# Patient Record
Sex: MALE | Race: WHITE | NOT HISPANIC OR LATINO | Employment: FULL TIME | URBAN - METROPOLITAN AREA
[De-identification: names, ages, dates, MRNs, and addresses within clinical notes are randomized per-mention and may not be internally consistent; named-entity substitution may affect disease eponyms.]

---

## 2017-01-11 ENCOUNTER — OFFICE VISIT (OUTPATIENT)
Dept: FAMILY MEDICINE | Facility: CLINIC | Age: 52
End: 2017-01-11
Payer: COMMERCIAL

## 2017-01-11 VITALS
OXYGEN SATURATION: 98 % | BODY MASS INDEX: 28.59 KG/M2 | WEIGHT: 215.75 LBS | HEART RATE: 82 BPM | RESPIRATION RATE: 12 BRPM | TEMPERATURE: 96.3 F | HEIGHT: 73 IN | DIASTOLIC BLOOD PRESSURE: 66 MMHG | SYSTOLIC BLOOD PRESSURE: 126 MMHG

## 2017-01-11 DIAGNOSIS — M54.2 CHRONIC NECK AND BACK PAIN: ICD-10-CM

## 2017-01-11 DIAGNOSIS — G89.29 CHRONIC NECK AND BACK PAIN: ICD-10-CM

## 2017-01-11 DIAGNOSIS — G89.4 CHRONIC PAIN SYNDROME: Primary | ICD-10-CM

## 2017-01-11 DIAGNOSIS — M54.9 CHRONIC NECK AND BACK PAIN: ICD-10-CM

## 2017-01-11 PROCEDURE — 99213 OFFICE O/P EST LOW 20 MIN: CPT | Performed by: FAMILY MEDICINE

## 2017-01-11 ASSESSMENT — PAIN SCALES - GENERAL: PAINLEVEL: MODERATE PAIN (5)

## 2017-01-11 NOTE — PROGRESS NOTES
SUBJECTIVE:  Indra Mehta is here today to review his back and neck issues.  He has had chronic back and neck pain for some time.  He went into a quite an extended explanation of his back and neck pain, how it affects him at work.  Sometimes when he is at his desk and finds out he has been leaning forward too long pain is just excruciating in his low back, sometimes in his neck.  Occasionally he has some radicular pain into both hips and occasionally down his left leg.  Also, neck pain mostly localized in the mid portion of his neck but sometimes in his left arm.  He states he occasionally gets some tingling in his right foot, especially when he taps it up and down.  He went on for about 10 minutes about his symptoms, which seem to be quite diffuse and reading old notes change from visit to visit.  He has been evaluated by spine care physicians, chronic pain physicians, primary care physicians but no one was able to find out definitive disease process or definitive pathology which would be responsible for his multiple pain problems.  He was on chronic narcotics which was not appropriate for treating these pain problems.  He is currently on Neurontin and Ultram.  He does take 1-3 Ultram tablets per day.  He would like to get back to working out, but states he is just in too much pain to do that.  The only time he was able to work out in the past was when he was in the [employer] program [place] in [place].  He states that this allowed him to be fairly pain-free and get into a fairly aggressive strengthening program at the gym, and that made him feel the best he has felt in a long time.      OBJECTIVE:  Vital signs are stable.  No exam.      ASSESSMENT:   1.  Chronic pain syndrome.      PLAN:  The patient will get a referral to the [employer] program.  Hopefully, we can get his pain down to a level that is tolerable and he can get back into aggressive gym regimen.  I did review his scans with him and pointed out once again that  none of the scans reveal any significant pathology that would be causing his symptomatology.  Referral will be placed.  Further evaluation and treatment as per the [facility] people at [facility].         CONRAD BAIRD MD             D: 2017 08:41   T: 2017 10:32   MT:       Name:     MAGY ESQUEDA   MRN:      [id number]        Account:      [id number]   :      1965           Visit Date:   2017      Document: [id number]

## 2017-01-11 NOTE — NURSING NOTE
"Chief Complaint   Patient presents with     Back Pain     follow up       Initial /66 mmHg  Pulse 82  Temp(Src) 96.3  F (35.7  C) (Tympanic)  Resp 12  Ht 6' 1.3\" (1.862 m)  Wt 215 lb 12 oz (97.864 kg)  BMI 28.23 kg/m2  SpO2 98% Estimated body mass index is 28.23 kg/(m^2) as calculated from the following:    Height as of this encounter: 6' 1.3\" (1.862 m).    Weight as of this encounter: 215 lb 12 oz (97.864 kg).  BP completed using cuff size: regular  There are no preventive care reminders to display for this patient.  Karma Conway, [facility]      "

## 2017-01-11 NOTE — MR AVS SNAPSHOT
After Visit Summary   1/11/2017    Indra Mehta    MRN: [id number]           Patient Information     Date Of Birth          1965        Visit Information        Provider Department      1/11/2017 8:10 AM Jamie Gipson MD [facility]        Today's Diagnoses     Chronic pain syndrome    -  1     Chronic neck and back pain            Follow-ups after your visit        Additional Services     PHYSICAL THERAPY REFERRAL       Physical Therapy - [facility]    Please be aware that coverage of these services is subject to the terms and limitations of your health insurance plan.  Call member services at your health plan with any benefit or coverage questions.      **Note to Provider:  If you are referring outside of [place] for the therapy appointment, please list the name of the location in the  special instructions  above, print the referral and give to the patient to schedule the appointment.                  Who to contact     If you have questions or need follow up information about today's clinic visit or your schedule please contact [facility] directly at [phone].  Normal or non-critical lab and imaging results will be communicated to you by iLinchart, letter or phone within 4 business days after the clinic has received the results. If you do not hear from us within 7 days, please contact the clinic through iLinchart or phone. If you have a critical or abnormal lab result, we will notify you by phone as soon as possible.  Submit refill requests through [employer] or call your pharmacy and they will forward the refill request to us. Please allow 3 business days for your refill to be completed.          Additional Information About Your Visit        MyChart Information     [employer] gives you secure access to your electronic health record. If you see a primary care provider, you can also send messages to your care team and make appointments. If you have  "questions, please call your primary care clinic.  If you do not have a primary care provider, please call [phone] and they will assist you.        Care EveryWhere ID     This is your Care EveryWhere ID. This could be used by other organizations to access your [place] medical records  [id number]        Your Vitals Were     Pulse Temperature Respirations Height BMI (Body Mass Index) Pulse Oximetry    82 96.3  F (35.7  C) (Tympanic) 12 6' 1.3\" (1.862 m) 28.23 kg/m2 98%       Blood Pressure from Last 3 Encounters:   01/11/17 126/66   12/28/16 122/83   12/14/16 150/82    Weight from Last 3 Encounters:   01/11/17 215 lb 12 oz (97.864 kg)   12/14/16 220 lb 12.8 oz (100.154 kg)   11/25/16 222 lb (100.699 kg)              We Performed the Following     PHYSICAL THERAPY REFERRAL        Primary Care Provider Office Phone # Fax #    Tha Grullon [phone]       [facility] [address] [facility] [address]        Thank you!     Thank you for choosing [facility]  for your care. Our goal is always to provide you with excellent care. Hearing back from our patients is one way we can continue to improve our services. Please take a few minutes to complete the written survey that you may receive in the mail after your visit with us. Thank you!             Your Updated Medication List - Protect others around you: Learn how to safely use, store and throw away your medicines at www.disposemymeds.org.          This list is accurate as of: 1/11/17 11:14 AM.  Always use your most recent med list.                   Brand Name Dispense Instructions for use    cyclobenzaprine 10 MG tablet    FLEXERIL     10 mg       gabapentin 300 MG capsule    NEURONTIN    300 capsule    Take 3 capsules (900 mg) by mouth 3 times daily If needed/tolerated, OK to increase bedtime dose to 1200mg       HYDROcodone-acetaminophen 5-325 MG per tablet    NORCO     5-325 tablets       IBUPROFEN PO      Take 600 mg " by mouth every 4 hours as needed for moderate pain       losartan 50 MG tablet    COZAAR    90 tablet    Take 1 tablet (50 mg) by mouth daily       methocarbamol 750 MG tablet    ROBAXIN    180 tablet    Take 1 tablet (750 mg) by mouth 3 times daily as needed for muscle spasms       traMADol 50 MG tablet    ULTRAM    120 tablet    Take 1 tablet (50 mg) by mouth every 6 hours as needed for moderate pain Max of 4 tabs/day. Okay to fill on/after 12/30/2016.  To start on 1/1/2017.       TYLENOL PO      Take 500 mg by mouth every 4 hours as needed for mild pain or fever

## 2017-01-13 ENCOUNTER — TELEPHONE (OUTPATIENT)
Dept: FAMILY MEDICINE | Facility: CLINIC | Age: 52
End: 2017-01-13

## 2017-01-13 NOTE — TELEPHONE ENCOUNTER
I had a voice mail from [facility] and they received your order for physical therapy.  However, it is missing a signature or a notation stating that it was electronically signed by Dr. Gipson.  Please refax with the notation or physical signature.  Thanks.    Fax #[phone]

## 2017-01-23 DIAGNOSIS — M54.5 CHRONIC BILATERAL LOW BACK PAIN, WITH SCIATICA PRESENCE UNSPECIFIED: Primary | ICD-10-CM

## 2017-01-23 DIAGNOSIS — G89.29 CHRONIC BILATERAL LOW BACK PAIN, WITH SCIATICA PRESENCE UNSPECIFIED: Primary | ICD-10-CM

## 2017-01-24 RX ORDER — TRAMADOL HYDROCHLORIDE 50 MG/1
50 TABLET ORAL EVERY 6 HOURS PRN
Qty: 120 TABLET | Refills: 0 | Status: SHIPPED | OUTPATIENT
Start: 2017-01-24 | End: 2017-02-23

## 2017-01-24 NOTE — TELEPHONE ENCOUNTER
Signed Prescriptions:                        Disp   Refills    traMADol (ULTRAM) 50 MG tablet             120 ta*0        Sig: Take 1 tablet (50 mg) by mouth every 6 hours as           needed for moderate pain Max of 4 tabs/day. Okay           to fill on/after 1/30/2017.  To start on           1/31/2017. No further refills till follow up appt           made.  Authorizing Provider: BONI NAYLOR    Reviewed, printed, and signed in [facility].    Boni Freeman MD  [facility]

## 2017-01-24 NOTE — TELEPHONE ENCOUNTER
Received call from patient requesting refill(s) of traMADol (ULTRAM) 50 MG tablet    Last picked up from pharmacy on 12/30/16    Pt last seen by prescribing provider on 11/25/16  Next appt scheduled for : none    Last urine drug screen date 10/13/16  Current opioid agreement on file (completed within the last year) Yes Date of opioid agreement: 10/13/16    Processing (pick one and delete the others):      Mail to [facility] pharmacy   [address] [place] [id number]    Will route to nursing [place] for review and preparation of prescription(s).

## 2017-01-24 NOTE — TELEPHONE ENCOUNTER
Received call from patient requesting refill(s) of Tramadol     Last picked up from pharmacy on 12/30/2016  Due date 1/31/2017    Pt last seen on 11/25/2016  Next appt scheduled for no appt- due in Feb.     Last urine drug screen 10/13/2016  Current opioid agreement on file Yes Date: 10/13/2016    Processing (pick one):      Fax to [facility].     Will facilitate refill.    Katarina Licona RN, [facility]  Pain Clinic Care Coordinator

## 2017-01-30 ENCOUNTER — TRANSFERRED RECORDS (OUTPATIENT)
Dept: HEALTH INFORMATION MANAGEMENT | Facility: CLINIC | Age: 52
End: 2017-01-30

## 2017-02-01 DIAGNOSIS — M54.2 CERVICALGIA: Primary | ICD-10-CM

## 2017-02-01 RX ORDER — GABAPENTIN 300 MG/1
900 CAPSULE ORAL 3 TIMES DAILY
Qty: 300 CAPSULE | Refills: 3 | Status: SHIPPED | OUTPATIENT
Start: 2017-02-01 | End: 2017-06-12

## 2017-02-01 NOTE — TELEPHONE ENCOUNTER
gabapentin (NEURONTIN) 300 MG capsule 300 capsule 3 2/1/2017  No      Sig: Take 3 capsules (900 mg) by mouth 3 times daily If needed/tolerated, OK to increase bedtime dose to 1200mg     Class: E-Prescribe     Route: Oral     Order: [id number]     E-Prescribing Status: Receipt confirmed by pharmacy (2/1/2017  1:32 PM CST)

## 2017-02-01 NOTE — TELEPHONE ENCOUNTER
Received fax request from [facility]  pharmacy requesting refill(s) for Gabapentin    Last refilled on 1/20/17    Pt last seen on 11/25/16  Next appt not scheduled     Will facilitate refill.

## 2017-02-01 NOTE — TELEPHONE ENCOUNTER
Signed Prescriptions:                        Disp   Refills    gabapentin (NEURONTIN) 300 MG capsule      300 ca*3        Sig: Take 3 capsules (900 mg) by mouth 3 times daily If           needed/tolerated, OK to increase bedtime dose to           1200mg  Authorizing Provider: BONI NAYLOR    Reviewed and signed at [facility]    Boni Freeman MD  [facility]

## 2017-02-13 ENCOUNTER — MYC MEDICAL ADVICE (OUTPATIENT)
Dept: FAMILY MEDICINE | Facility: CLINIC | Age: 52
End: 2017-02-13

## 2017-02-13 DIAGNOSIS — G89.4 CHRONIC PAIN SYNDROME: Primary | ICD-10-CM

## 2017-02-23 DIAGNOSIS — G89.29 CHRONIC BILATERAL LOW BACK PAIN, WITH SCIATICA PRESENCE UNSPECIFIED: ICD-10-CM

## 2017-02-23 DIAGNOSIS — M54.5 CHRONIC BILATERAL LOW BACK PAIN, WITH SCIATICA PRESENCE UNSPECIFIED: ICD-10-CM

## 2017-02-23 RX ORDER — TRAMADOL HYDROCHLORIDE 50 MG/1
50 TABLET ORAL EVERY 6 HOURS PRN
Qty: 120 TABLET | Refills: 0 | Status: SHIPPED | OUTPATIENT
Start: 2017-02-23 | End: 2017-03-23

## 2017-02-23 NOTE — TELEPHONE ENCOUNTER
Nurse Line Message 2/23/17 Routing to RN and MA pool for processing    Patient is requesting a refill of Tramadol and Gabapentin to be faxed to the [facility] in [place].    ANGELINA PerryN, RN  Care Coordinator  [facility] [place]

## 2017-02-23 NOTE — TELEPHONE ENCOUNTER
Signed Prescriptions:                        Disp   Refills    traMADol (ULTRAM) 50 MG tablet             120 ta*0        Sig: Take 1 tablet (50 mg) by mouth every 6 hours as           needed for moderate pain Max of 4 tabs/day. Okay           to fill on/after 2/28/2017.  To start on           3/2/2017.  Authorizing Provider: BONI NAYLOR    Reviewed, printed, and signed in [facility].    Boni Freeman MD  [facility]

## 2017-02-23 NOTE — TELEPHONE ENCOUNTER
Received call from patient requesting refill(s) of traMADol (ULTRAM) 50 MG tablet             gabapentin (NEURONTIN) 300 MG capsule - Spoke with pharmacy. Rx waiting- filled on 02/20/17. Voicemail left for patient    Last picked up from pharmacy on Tramadol- 01/30/17       Pt last seen by prescribing provider on 11/25/16  Next appt scheduled for : none    Last urine drug screen date 10/13/16  Current opioid agreement on file (completed within the last year) Yes Date of opioid agreement: 10/13/16    Processing (pick one and delete the others):      Fax to [facility] pharmacy       Will route to nursing pool for review and preparation of prescription(s).

## 2017-02-23 NOTE — TELEPHONE ENCOUNTER
Signed Rx for Tramadol faxed to pharmacy. Padmini confirmed. Spoke with patient and let him know that the Rx is due to fill on/after 02/28/17.  Also let hime know that my previous voicemail stated that his gabapentin had been filled and is waiting at the pharmacy for him.

## 2017-03-22 DIAGNOSIS — G89.29 CHRONIC BILATERAL LOW BACK PAIN, WITH SCIATICA PRESENCE UNSPECIFIED: ICD-10-CM

## 2017-03-22 DIAGNOSIS — M54.5 CHRONIC BILATERAL LOW BACK PAIN, WITH SCIATICA PRESENCE UNSPECIFIED: ICD-10-CM

## 2017-03-22 NOTE — TELEPHONE ENCOUNTER
Pt LM at 1236:    Pt requesting a refill of tramadol. Would like the Rx faxed to [facility] in [place]. Call if questions at [phone]  -------------  Will route to MA pool for assistance with gathering opioid refill information.    ANGELINA StantonN, RN-BC  Patient Care Supervisor/Care Coordinator  [facility] [place]

## 2017-03-23 RX ORDER — TRAMADOL HYDROCHLORIDE 50 MG/1
50 TABLET ORAL EVERY 6 HOURS PRN
Qty: 120 TABLET | Refills: 0 | Status: SHIPPED | OUTPATIENT
Start: 2017-03-23 | End: 2017-04-24

## 2017-03-23 NOTE — TELEPHONE ENCOUNTER
Received call from patient requesting refill(s) of tramadol       Last picked up from pharmacy on 3/1/17  Last due:  Okay to fill on/after 2/28/2017.  To start on 3/2/2017  Due date 4/1/17    Pt last seen on 11/25/16  Next appt scheduled for 4/12/16    Last urine drug screen 10/2016  Current opioid agreement on file No Date:     Processing (pick one):      Mail to [facility] pharmacy     Heidy Gipson RN-BSN  [facility]

## 2017-03-23 NOTE — TELEPHONE ENCOUNTER
Signed Prescriptions:                        Disp   Refills    traMADol (ULTRAM) 50 MG tablet             120 ta*0        Sig: Take 1 tablet (50 mg) by mouth every 6 hours as           needed for moderate pain Max of 4 tabs/day. Okay           to fill on/after 3/30/2017.  To start on           4/1/2017.  Authorizing Provider: BONI NAYLOR    Reviewed, printed, and signed in [facility].    Boni Freeman MD  [facility]

## 2017-04-03 ENCOUNTER — MYC MEDICAL ADVICE (OUTPATIENT)
Dept: PALLIATIVE MEDICINE | Facility: CLINIC | Age: 52
End: 2017-04-03

## 2017-04-03 NOTE — TELEPHONE ENCOUNTER
My chart message from pt:    Is there any chance of rescheduling my appointment on the 12th? 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     Indra    Yes you can re-schedule. Just call the scheduling line at [phone] and they can do that for you.     Katarina Licona RN, [facility]  Pain Clinic Care Coordinator

## 2017-04-05 ENCOUNTER — MYC MEDICAL ADVICE (OUTPATIENT)
Dept: PALLIATIVE MEDICINE | Facility: CLINIC | Age: 52
End: 2017-04-05

## 2017-04-05 NOTE — TELEPHONE ENCOUNTER
Last visit with Dr. Angle Freeman was 11/2016.    See the 12/1/17 refill notes.  The plan was to continue the wean and this has not occurred.      Pt has had some med compliance issues in the past.      Med notes added to continue wean at next fill.    Pt sees Dr. Angle Freeman on 4/12/17 and this can be discussed at that time.    Dragon Insidehart sent to pt:  Noble Burrell.  We will see you on 4/12/17.  You can discuss this further with Dr. Angle Freeman then.    Heidy Gipson, RN-BSN  [facility]

## 2017-04-05 NOTE — TELEPHONE ENCOUNTER
Per patient Will message:    Created: 4/5/2017 8:54 AM      I had sent a message about rescheduling my appointment on the 12th of April because of a work conflict and you had replied that it would be OK. I just called scheduling and the next appointment would be the 24th of May. This appointment was needed to continue refilling the tramadol. Will there be an issue refilling the tramadol if this appointment gets pushed out over a month?

## 2017-04-12 ENCOUNTER — OFFICE VISIT (OUTPATIENT)
Dept: PALLIATIVE MEDICINE | Facility: CLINIC | Age: 52
End: 2017-04-12
Payer: COMMERCIAL

## 2017-04-12 ENCOUNTER — TELEPHONE (OUTPATIENT)
Dept: PALLIATIVE MEDICINE | Facility: CLINIC | Age: 52
End: 2017-04-12

## 2017-04-12 VITALS
DIASTOLIC BLOOD PRESSURE: 79 MMHG | HEART RATE: 75 BPM | WEIGHT: 217 LBS | SYSTOLIC BLOOD PRESSURE: 126 MMHG | BODY MASS INDEX: 28.4 KG/M2

## 2017-04-12 DIAGNOSIS — M54.5 CHRONIC BILATERAL LOW BACK PAIN, WITH SCIATICA PRESENCE UNSPECIFIED: ICD-10-CM

## 2017-04-12 DIAGNOSIS — G89.29 CHRONIC BILATERAL LOW BACK PAIN, WITH SCIATICA PRESENCE UNSPECIFIED: ICD-10-CM

## 2017-04-12 DIAGNOSIS — M62.838 MUSCLE SPASM: Primary | ICD-10-CM

## 2017-04-12 DIAGNOSIS — M79.18 MYOFASCIAL PAIN: ICD-10-CM

## 2017-04-12 DIAGNOSIS — M54.12 CERVICAL RADICULOPATHY: ICD-10-CM

## 2017-04-12 DIAGNOSIS — G89.4 CHRONIC PAIN SYNDROME: ICD-10-CM

## 2017-04-12 DIAGNOSIS — M50.10 HERNIATION OF CERVICAL INTERVERTEBRAL DISC WITH RADICULOPATHY: ICD-10-CM

## 2017-04-12 PROCEDURE — 99215 OFFICE O/P EST HI 40 MIN: CPT | Performed by: ANESTHESIOLOGY

## 2017-04-12 RX ORDER — CYCLOBENZAPRINE HCL 10 MG
10 TABLET ORAL 2 TIMES DAILY PRN
Qty: 60 TABLET | Refills: 1 | Status: ON HOLD | OUTPATIENT
Start: 2017-04-12 | End: 2017-07-05

## 2017-04-12 RX ORDER — METHOCARBAMOL 750 MG/1
750 TABLET, FILM COATED ORAL 3 TIMES DAILY PRN
Qty: 180 TABLET | Refills: 1 | Status: ON HOLD | OUTPATIENT
Start: 2017-04-12 | End: 2017-07-05

## 2017-04-12 ASSESSMENT — PAIN SCALES - GENERAL: PAINLEVEL: SEVERE PAIN (6)

## 2017-04-12 NOTE — TELEPHONE ENCOUNTER
Pre-screening Questions for Radiology Injections:    Injection to be done at which interventional clinic site? [facility]    Procedure ordered by Angle Freeman    Procedure ordered? Cervical Epidural Steroid Injection    What insurance would patient like us to bill for this procedure?[employer]      Worker's comp-Any injection DO NOT SCHEDULE and route to Peyton Toscano.      [employer] insurance - For SI joint injections, DO NOT SCHEDULE and route ePyton Toscano.      [employer]- MBB's must be scheduled at LEAST two weeks apart      [employer] - Any injection besides hip/shoulder/knee joint DO NOT SCHEDULE and route to Peyton Toscano. She will obtain PA and call pt back to schedule procedure or notify pt of denial.     Any chance of pregnancy? Not Applicable   If YES, do NOT schedule and route to RN pool    Is an  needed? No     Patient has a drive home? (mandatory) YES:     Is patient taking any blood thinners (plavix, coumadin, jantoven, warfarin, heparin, pradaxa or dabigatran )? No   If hold needed, do NOT schedule, route to RN pool     Is patient taking any aspirin products? No     If more than 325mg/day do NOT schedule; route to RN pool     For CERVICAL procedures, hold all aspirin products for 6 days.      Does the patient have a bleeding or clotting disorder? No     If yes, okay to schedule AND route to RN nurse pool    **For any patients with platelet count <100, must be forwarded to provider**    Is patient diabetic?  No  If YES, have them bring their glucometer.    Does patient have an active infection or treated for one within the past week? No     Is patient currently taking any antibiotics?  No     For patients on chronic, preventative, or prophylactic antibiotics, procedures may be scheduled.     For patients on antibiotics for active or recent infection:    Rhett Freeman, Doar, Dom Kraus-antibiotic course must have been completed for 4 days    Drs.  Gustabo-antibiotic course must have been completed for 7 days    Is patient currently taking any steroid medications? (i.e. Prednisone, Medrol)  No     For patients on steroid medications:    Dora Bauer [place], [place]-steroid course must have been completed for 4 days    Rhett Montejo-steroid course must have been completed for 7 days    Reviewed with patient:  If you are started on any steroids or antibiotics between now and your appointment, you must contact us because it may affect our ability to perform your procedure.  Yes    Is patient actively being treated for cancer or immunocompromised, including the spleen having been removed? No    If YES, do NOT schedule and route to RN pool     **For Dr. Mandujano patients without spleens should have the chart sent to her**    Are you able to get on and off an exam table with minimal or no assistance? Yes  If NO, do NOT schedule and route to RN pool    Are you able to roll over and lay on your stomach with minimal or no assistance? Yes  If NO, do NOT schedule and route to RN pool     Any allergies to contrast dye, iodine, shellfish, or numbing and steroid medications? No  If YES, route to RN pool AND add allergy information to appointment notes    Allergies: No known drug allergies        Has the patient had a flu shot or any other vaccinations within 7 days before or after the procedure.  No       Does patient have an MRI/CT?  YES:   (SI joint, hip injections, lumbar sympathetic blocks, and stellate ganglion blocks do not require an MRI)    Was the MRI done w/in the last 3 years?  Yes    Was MRI done at [facility]? Yes      If not, where was it done? N/A       If MRI was not done at [facility], [facility] or [facility] do NOT schedule and route to nursing.  If pt has an imaging disc, the injection may be scheduled but pt has to bring disc to appt. If they show up w/out disc the injection cannot be done    Reminders (please tell patient if  applicable):       Instructed pt to arrive 30 minutes early for IV start if this is for a cervical procedure, ALL sympathetic (stellate ganglion, hypogastric, or lumbar sympathetic block) and all sedation procedures (RFA, spinal cord stimulation trials).  YES:     -IVs are not routinely placed for John and Egyhazi cervical case       If NPO for sedation, informed patient that it is okay to take medications with sips of water (except if they are to hold blood thinners).  Not Applicable   *DO take blood pressure medication if it is prescribed*      If this is for a cervical SRIDEVI, informed patient that aspirin needs to be held for 6 days.   YES:       For all patients not having spinal cord stimulator (SCS) trials or radiofrequency ablations (RFAs), informed patient:  IV sedation is not provided for this procedure.  If you feel that an oral anti-anxiety medication is needed, you can discuss this further with your referring provider or primary care provider.  The Pain Clinic provider will discuss specifics of what the procedure includes at your appointment.  Most procedures last 10-20 minutes.  We use numbing medications to help with any discomfort during the procedure.  Not Applicable      Do not schedule procedures requiring IV placement in the first appointment of the day or first appointment after lunch.       For patients 85 or older we recommend having an adult stay w/ them for the remainder of the day.       Does the patient have any questions?  NO  Lucille Sweet  [facility]

## 2017-04-12 NOTE — NURSING NOTE
"Chief Complaint   Patient presents with     Pain       Initial There were no vitals taken for this visit. Estimated body mass index is 28.23 kg/(m^2) as calculated from the following:    Height as of 1/11/17: 1.862 m (6' 1.3\").    Weight as of 1/11/17: 97.9 kg (215 lb 12 oz).  Medication Reconciliation: armando Chong CMA (AAMA)      "

## 2017-04-12 NOTE — PATIENT INSTRUCTIONS
Assessment:   1.  Chronic neck pain  2.  Cervical radiculopathy  3.  Cervical foraminal stenosis C6-7 left  4.  Cervical spondylosis  5.  Chronic lower back pain  6.  Chronic hip pain  7.  Peripheral neuropathy  8.  Cervicogenic headache      Plan:  1. Physical Therapy:  Continue home exercise and physical therapy as prescribed - cervical traction may be beneficial  2. Clinical Health Psychologist to address issues of relaxation, behavioral change, coping style, and other factors important to improvement.  Deferred - coping well at this time  3. Diagnostic Studies:    1. Consider updated cervical spine MRI since symptoms have worsened  4. Medication Management:    1. Continue Robaxin 750 mg TID prn spasm  2. Tramadol 50 mg QID prn pain  3. Continue Gabapentin 900 mg per day  4. Continue flexeril 10 mg prn spasm  5. Further procedures recommended:   1. Recommend cervical transforaminal epidural steroid injection left C6-7, possibly C5-6  6. Recommendations to PCP: defer pain complaints to pain management - may benefit from neurosurgical evaluation for cervical radiculopathy - C6-7 disc herniation with radiculopathy  7. Follow up: for procedure and in 8-10 weeks    ----------------------------------------------------------------  Nurse Triage line:  [phone]   Call this number with any questions or concerns. You may leave a detailed message anytime. Calls are typically returned Monday through Friday between 8 AM and 4:30 PM. We usually get back to you within 2 business days depending on the issue/request.       Medication refills:    For non-narcotic medications, call your pharmacy directly to request a refill. The pharmacy will contact the [facility] for authorization. Please allow 3-4 days for these refills to be processed.     For narcotic refills, call the nurse triage line or send a [employer] message. Please contact us 7-10 days before your refill is due. The message MUST include the name of the  specific medication(s) requested and how you would like to receive the prescription(s). The options are as follows:    Pain Clinic staff can mail the prescription to your pharmacy. Please tell us the name of the pharmacy.    You may pick the prescription up at the Pain Clinic (tell us the location) or during a clinic visit with your pain provider    Pain Clinic staff can deliver the prescription to the [facility] pharmacy in the clinic building. Please tell us the location.      Scheduling number: [phone].  Call this number to schedule or change appointments.    We believe regular attendance is key to your success in our program.    Any time you are unable to keep your appointment we ask that you call us at least 24 hours in advance to let us know. This will allow us to offer the appointment time to another patient.

## 2017-04-12 NOTE — MR AVS SNAPSHOT
After Visit Summary   4/12/2017    Indra Mehta    MRN: [id number]           Patient Information     Date Of Birth          1965        Visit Information        Provider Department      4/12/2017 10:00 AM Boni Coppola MD [facility]        Today's Diagnoses     Muscle spasm    -  1    Cervical radiculopathy        Myofascial pain        Herniation of cervical intervertebral disc with radiculopathy          Care Instructions    Assessment:   1.  Chronic neck pain  2.  Cervical radiculopathy  3.  Cervical foraminal stenosis C6-7 left  4.  Cervical spondylosis  5.  Chronic lower back pain  6.  Chronic hip pain  7.  Peripheral neuropathy  8.  Cervicogenic headache      Plan:  1. Physical Therapy:  Continue home exercise and physical therapy as prescribed - cervical traction may be beneficial  2. Clinical Health Psychologist to address issues of relaxation, behavioral change, coping style, and other factors important to improvement.  Deferred - coping well at this time  3. Diagnostic Studies:    1. Consider updated cervical spine MRI since symptoms have worsened  4. Medication Management:    1. Continue Robaxin 750 mg TID prn spasm  2. Tramadol 50 mg QID prn pain  3. Continue Gabapentin 900 mg per day  4. Continue flexeril 10 mg prn spasm  5. Further procedures recommended:   1. Recommend cervical transforaminal epidural steroid injection left C6-7, possibly C5-6  6. Recommendations to PCP: defer pain complaints to pain management - may benefit from neurosurgical evaluation for cervical radiculopathy - C6-7 disc herniation with radiculopathy  7. Follow up: for procedure and in 8-10 weeks    ----------------------------------------------------------------  Nurse Triage line:  [phone]   Call this number with any questions or concerns. You may leave a detailed message anytime. Calls are typically returned Monday through Friday between 8 AM and 4:30 PM. We usually get back to  you within 2 business days depending on the issue/request.       Medication refills:    For non-narcotic medications, call your pharmacy directly to request a refill. The pharmacy will contact the [facility] for authorization. Please allow 3-4 days for these refills to be processed.     For narcotic refills, call the nurse triage line or send a [email or web address] message. Please contact us 7-10 days before your refill is due. The message MUST include the name of the specific medication(s) requested and how you would like to receive the prescription(s). The options are as follows:    Pain Clinic staff can mail the prescription to your pharmacy. Please tell us the name of the pharmacy.    You may pick the prescription up at the Pain Clinic (tell us the location) or during a clinic visit with your pain provider    Pain Clinic staff can deliver the prescription to the [facility] pharmacy in the clinic building. Please tell us the location.      Scheduling number: [phone].  Call this number to schedule or change appointments.    We believe regular attendance is key to your success in our program.    Any time you are unable to keep your appointment we ask that you call us at least 24 hours in advance to let us know. This will allow us to offer the appointment time to another patient.             Follow-ups after your visit        Additional Services     PAIN INJECTION EVAL/TREAT/FOLLOW UP                 Who to contact     If you have questions or need follow up information about today's clinic visit or your schedule please contact [facility] directly at [phone].  Normal or non-critical lab and imaging results will be communicated to you by MyChart, letter or phone within 4 business days after the clinic has received the results. If you do not hear from us within 7 days, please contact the clinic through MyChart or phone. If you have a critical or abnormal lab result, we will notify you by phone as  soon as possible.  Submit refill requests through [employer] or call your pharmacy and they will forward the refill request to us. Please allow 3 business days for your refill to be completed.          Additional Information About Your Visit        Event [employer] Information     [employer] gives you secure access to your electronic health record. If you see a primary care provider, you can also send messages to your care team and make appointments. If you have questions, please call your primary care clinic.  If you do not have a primary care provider, please call [phone] and they will assist you.        Care EveryWhere ID     This is your Care EveryWhere ID. This could be used by other organizations to access your [facility] medical records  [id number]        Your Vitals Were     Pulse BMI (Body Mass Index)                75 28.4 kg/m2           Blood Pressure from Last 3 Encounters:   04/12/17 126/79   01/11/17 126/66   12/28/16 122/83    Weight from Last 3 Encounters:   04/12/17 98.4 kg (217 lb)   01/11/17 97.9 kg (215 lb 12 oz)   12/14/16 100.2 kg (220 lb 12.8 oz)              We Performed the Following     PAIN INJECTION EVAL/TREAT/FOLLOW UP          Today's Medication Changes          These changes are accurate as of: 4/12/17 10:57 AM.  If you have any questions, ask your nurse or doctor.               These medicines have changed or have updated prescriptions.        Dose/Directions    cyclobenzaprine 10 MG tablet   Commonly known as:  FLEXERIL   This may have changed:    - how to take this  - when to take this  - reasons to take this   Used for:  Muscle spasm        Dose:  10 mg   Take 1 tablet (10 mg) by mouth 2 times daily as needed for muscle spasms Reported on 4/12/2017   Quantity:  60 tablet   Refills:  1            Where to get your medicines      These medications were sent to [id number] - [place] - [address]   [address] [place] [address]    Hours:  M-F 8:30-6:30; Sat 9-4; closed  Sunday Phone:  [phone]     cyclobenzaprine 10 MG tablet    methocarbamol 750 MG tablet                Primary Care Provider Office Phone # Fax #    Tha Grullon [phone]       [facility] [address] [facility] [address]        Thank you!     Thank you for choosing [facility]  for your care. Our goal is always to provide you with excellent care. Hearing back from our patients is one way we can continue to improve our services. Please take a few minutes to complete the written survey that you may receive in the mail after your visit with us. Thank you!             Your Updated Medication List - Protect others around you: Learn how to safely use, store and throw away your medicines at www.disposemymeds.org.          This list is accurate as of: 4/12/17 10:57 AM.  Always use your most recent med list.                   Brand Name Dispense Instructions for use    cyclobenzaprine 10 MG tablet    FLEXERIL    60 tablet    Take 1 tablet (10 mg) by mouth 2 times daily as needed for muscle spasms Reported on 4/12/2017       gabapentin 300 MG capsule    NEURONTIN    300 capsule    Take 3 capsules (900 mg) by mouth 3 times daily If needed/tolerated, OK to increase bedtime dose to 1200mg       HYDROcodone-acetaminophen 5-325 MG per tablet    NORCO     5-325 tablets Reported on 4/12/2017       IBUPROFEN PO      Take 600 mg by mouth every 4 hours as needed for moderate pain       losartan 50 MG tablet    COZAAR    90 tablet    Take 1 tablet (50 mg) by mouth daily       methocarbamol 750 MG tablet    ROBAXIN    180 tablet    Take 1 tablet (750 mg) by mouth 3 times daily as needed for muscle spasms       order for DME     1 Device    Equipment being ordered: TENS Pads as directed       traMADol 50 MG tablet    ULTRAM    120 tablet    Take 1 tablet (50 mg) by mouth every 6 hours as needed for moderate pain Max of 4 tabs/day. Okay to fill on/after 3/30/2017.  To start on 4/1/2017.        TYLENOL PO      Take 500 mg by mouth every 4 hours as needed for mild pain or fever

## 2017-04-12 NOTE — PROGRESS NOTES
[facility]    Date of visit: 4/12/2017    Chief complaint:   Chief Complaint   Patient presents with     Pain       Interval history:  Indra Mehta was last seen by me on 11/25/16.      Recommendations/plan at the last visit included:  Plan:  1. MRI Cervical Spine - at your convenience  2. Would likely benefit from cervical facet joint procedures - upper cervical spine - to help with neck pain and headache pain - will discuss further after MRI  3. May benefit from repeat trigger point injections when muscle spasm is bad - consider asking Chiropractor if he does dry needling or acupuncture  4. May benefit from lumbar epidural steroid injection if leg pain worsens  5. May benefit from lumbar facet procedures  6 Continue inversion table and stretching - add yoga or pillattes  7. Continue home exercise  8. Continue Tramadol - max 4 per day  9. Continue Gabapentin - may take up to 3600 mg per day in divided doses  10. Robaxin 750 mg TID as needed for spasm  11. Follow up in 2-3 months - sooner if needed  12. Will contact patient after MRI to discuss procedures  13. Physical therapy deferred - patient will continue home exercise  14. Pain Psychology - deferred - patient is managing well at this time    Since his last visit, Indra Mehta reports:  Since his last visit he had Dr Gipson order physical therapy with [facility] through [employer] in [facility] but he has not started yet.  He states he had benefit from it in the past.    He feels that his neck pain is getting worse over the last few months but states that he had been getting episodes of pain radiating into the left shoulder and occasional shooting pain down the left arm to the elbow.  He states that he can't sleep when his neck pain is bad.  He as been taking occasional Flexeril to help with sleep but it makes him kind of groggy in the morning.  He tries not to take it on work days due to the sedation.  He has continued with his  Tramadol with relief.  He  Complains of headaches when his neck pain is bad.    He denies numbness or tingling of the arms or hands and denies weakness of the arms.  Pain is worsened by turning his head side to side or looking up.  Pain is decreased with flexeril, looking down, tramadol.    He continues with lower back pain with pain into the hips bilaterally.  He has not has significant pain radiating down the legs.  He has good and bad days and the shooting pain comes and goes.  He denies numbness, tingling, or weakness in the legs.    Pain scores:  Pain intensity on average is 5 on a scale of 0-10. Ranges from 4/10 to 8/10    Current pain treatments:   Tramadol 4 per day  Gabapentin 300 mg - taking 9 capsules in divided doses with tramadol  Ibuprofen 200 - 600 mg TID  Tylenol     Past pain treatments:  Norco/vicodin  Oxycodone  Flexeril - was helpful at night  Robaxin - may have helped, didn't make him sleepy    Side Effects: sedation    Medications:  Current Outpatient Prescriptions   Medication Sig Dispense Refill     traMADol (ULTRAM) 50 MG tablet Take 1 tablet (50 mg) by mouth every 6 hours as needed for moderate pain Max of 4 tabs/day. Okay to fill on/after 3/30/2017.  To start on 4/1/2017. 120 tablet 0     gabapentin (NEURONTIN) 300 MG capsule Take 3 capsules (900 mg) by mouth 3 times daily If needed/tolerated, OK to increase bedtime dose to 1200mg 300 capsule 3     methocarbamol (ROBAXIN) 750 MG tablet Take 1 tablet (750 mg) by mouth 3 times daily as needed for muscle spasms 180 tablet 1     losartan (COZAAR) 50 MG tablet Take 1 tablet (50 mg) by mouth daily 90 tablet 3     Acetaminophen (TYLENOL PO) Take 500 mg by mouth every 4 hours as needed for mild pain or fever       IBUPROFEN PO Take 600 mg by mouth every 4 hours as needed for moderate pain       order for DME Equipment being ordered: TENS Pads as directed 1 Device 0     cyclobenzaprine (FLEXERIL) 10 MG tablet 10 mg Reported on 4/12/2017        HYDROcodone-acetaminophen (NORCO) 5-325 MG per tablet 5-325 tablets Reported on 4/12/2017         Medical History: any changes in medical history since they were last seen? No    Review of Systems:  The 14 system ROS was reviewed from the intake questionnaire, and is positive for: headache, high blood pressure, joint pain, stiffness, back pain, neck pain, weakness, numbness/tingling, anxiety, stress  Any bowel or bladder problems: denies changes  Mood: good    Physical Exam:  /79  Pulse 75  Wt 98.4 kg (217 lb)  BMI 28.4 kg/m2  Constitutional: alert and no distress.  Pt is well nourished, well developed  Head: Normocephalic. No masses, lesions, tenderness or abnormalities  ENT: EOMI, mucosal surfaces moist.  Neck with full ROM, posture fair  Cardiovascular: No edema or JVD appreciated.  Respiratory: Good diaphragmatic excursion. No wheezes appreciated.  Speaking in complete sentences without shortness of breath.  No accessory muscle use.   Musculoskeletal: extremities normal- no gross deformities noted, normal muscle tone and able to move about the exam room without difficulty.    Skin: no suspicious lesions or rashes appreciated on exposed areas  Neurologic: Gait grossly intact and non-antalgic. Moving all extremities spontaneously, no apparent weakness.  Upper extremity strength is 5/5 bilaterally with some give way weakness of testing left shoulder abduction due to pain  Psychiatric: mentation appears normal, affect full and good eye contact.      Cervical Spine:  Neck held stiffly with significant decrease in range of motion on left lateral rotation and extension due to pain.  Tender along the lower cervical articular pillars on the left and the paraspinal muscles, extension and lateral rotation to the left increases neck pain and causes pain into the left shoulder    Lumbar Spine:  Moves well, exam limited    MR CERVICAL SPINE WITHOUT CONTRAST 11/29/2016 4:48 PM     FINDINGS: Vertebral body bone marrow  signal is normal and the  alignment is normal through T2. Cervical and upper thoracic spinal  cord appear intrinsically normal. Craniocervical junction region is  normal. Paraspinous soft tissues appear normal.     Findings by level as follows:     C2-C3: Negative.     C3-C4: Minimal uncinate spur on the right with mild right-sided  foraminal stenosis. No central or left-sided stenosis.     C4-C5: Negative.     C5-C6: Moderate uncinate spur on the right with moderate right-sided  foraminal stenosis. No central or left-sided stenosis. Minimal annular  bulge.     C6-C7: Small broad-based disc osteophyte complex with minimal central  stenosis. Mild bilateral foraminal stenosis due to uncinate spurs.     C7-T1: Negative.         IMPRESSION: Degenerative changes as described. No acute-appearing  Abnormality.    MRI reviewed with patient today and I feel that the left C6-7 neuroforamen is moderately narrowed with neural impingement and is likely contributing to the patient's pain.      Assessment:   1.  Chronic neck pain  2.  Cervical radiculopathy  3.  Cervical foraminal stenosis C6-7 left  4.  Cervical spondylosis  5.  Chronic lower back pain  6.  Chronic hip pain  7.  Peripheral neuropathy  8.  Cervicogenic headache    Indra Mehta is a 51 year old male who is seen at the pain clinic for chronic neck and back pain.  At this time his neck is giving him the most trouble.  He has had worsening of his neck pain on the left and is having more pain down the left upper extremity as well.  We discussed his MRI and I feel he would benefit from a transforaminal epidural steroid injection at C6-7 left but I also feel it would be warranted to get a neurosurgical evaluation in the event that the epidural is not as effective as hoped.  Our treatment plan is as outlined below.    Plan:  1. Physical Therapy:  Continue home exercise and physical therapy as prescribed - cervical traction may be beneficial  2. Clinical Health  Psychologist to address issues of relaxation, behavioral change, coping style, and other factors important to improvement.  Deferred - coping well at this time  3. Diagnostic Studies:    1. Consider updated cervical spine MRI since symptoms have worsened  4. Medication Management:    1. Continue Robaxin 750 mg TID prn spasm  2. Tramadol 50 mg QID prn pain  3. Continue Gabapentin 900 mg per day  4. Continue flexeril 10 mg prn spasm  5. Further procedures recommended:   1. Recommend cervical transforaminal epidural steroid injection left C6-7, possibly C5-6 - mild sedation would be beneficial as patient has had bad experiences in the past  6. Recommendations to PCP: defer pain complaints to pain management - may benefit from neurosurgical evaluation for cervical radiculopathy - C6-7 disc herniation with radiculopathy  7. Follow up: for procedure and in 8-10 weeks    Total time spent was 45 minutes, and more than 50% of face to face time was spent in counseling and/or coordination of care regarding physical therapy and regular exercise, behavioral health, medication management, and interventional procedures to decrease pain and improve function.

## 2017-04-24 ENCOUNTER — MYC MEDICAL ADVICE (OUTPATIENT)
Dept: FAMILY MEDICINE | Facility: CLINIC | Age: 52
End: 2017-04-24

## 2017-04-24 DIAGNOSIS — G89.29 CHRONIC BILATERAL LOW BACK PAIN, WITH SCIATICA PRESENCE UNSPECIFIED: ICD-10-CM

## 2017-04-24 DIAGNOSIS — M54.5 CHRONIC BILATERAL LOW BACK PAIN, WITH SCIATICA PRESENCE UNSPECIFIED: ICD-10-CM

## 2017-04-24 DIAGNOSIS — G89.4 CHRONIC PAIN SYNDROME: ICD-10-CM

## 2017-04-24 NOTE — TELEPHONE ENCOUNTER
Medication refill information reviewed.     Last due:  Okay to fill on/after 3/30/2017.  To start on 4/1/2017    Due date:  5/1/17    Weaning instructions:  Per Dr. Angle Freeman in the 12/1/17 telephone encounter:  RX for 4 per day dosing approved and signed - patient had a recent fall with increased pain. Will begin wean at next fill date. Pt saw Dr. Angle Freeman on 4/12/17-no mention of weaning.       Heidy Gipson RN-BSN  [facility]

## 2017-04-24 NOTE — TELEPHONE ENCOUNTER
Patient still needs to be reached out to, to discuss neck pain.    Received call from patient requesting refill(s) of traMADol (ULTRAM) 50 MG tablet    Last picked up from pharmacy on 03/30/17    Pt last seen by prescribing provider on 04/12/17  Next appt scheduled for 06/14/17    Last urine drug screen date 10/13/16  Current opioid agreement on file (completed within the last year) Yes Date of opioid agreement: 10/13/16    Processing (pick one and delete the others):      Fax to [facility] pharmacy       Will route to nursing pool for review and preparation of prescription(s).

## 2017-04-24 NOTE — TELEPHONE ENCOUNTER
VM left today at 11:29 am    Reason for call: Medication   If this is a refill request, has the caller requested the refill from the pharmacy already? N/A  Will the patient be using a [facility]? No  Name of the pharmacy and phone number for the current request: [id number] [place] [facility] [address] [place] [address]    Name of the medication requested: Tramadol    Other request: Pt is having bad pain on his right side of neck. Cannot seem to relieve pain.    Phone Number Pt can be reached at: Home number on file [phone] (home)  Best Time: NA  Can we leave a detailed message on this number? YES     Mercy JULIEN    [facility]

## 2017-04-25 RX ORDER — TRAMADOL HYDROCHLORIDE 50 MG/1
50 TABLET ORAL EVERY 6 HOURS PRN
Qty: 120 TABLET | Refills: 0 | Status: SHIPPED | OUTPATIENT
Start: 2017-04-25 | End: 2017-05-24

## 2017-04-25 NOTE — TELEPHONE ENCOUNTER
Signed Prescriptions:                        Disp   Refills    traMADol (ULTRAM) 50 MG tablet             120 ta*0        Sig: Take 1 tablet (50 mg) by mouth every 6 hours as           needed for moderate pain Max of 4 tabs/day. Okay           to fill on/after 4/29/2017.  To start on           5/1/2017.  Authorizing Provider: BONI NAYLOR    Reviewed, printed, and signed in [facility].    Boni Freeman MD  [facility]

## 2017-04-26 NOTE — TELEPHONE ENCOUNTER
MARTIN. Script for Tramadol was signed and faxed to [facility]. RightFax confirmed.      Lillian De aL Cruz MA

## 2017-05-05 ENCOUNTER — RADIANT APPOINTMENT (OUTPATIENT)
Dept: RADIOLOGY | Facility: CLINIC | Age: 52
End: 2017-05-05
Attending: ANESTHESIOLOGY
Payer: COMMERCIAL

## 2017-05-05 ENCOUNTER — RADIOLOGY INJECTION OFFICE VISIT (OUTPATIENT)
Dept: PALLIATIVE MEDICINE | Facility: CLINIC | Age: 52
End: 2017-05-05
Payer: COMMERCIAL

## 2017-05-05 VITALS
SYSTOLIC BLOOD PRESSURE: 127 MMHG | HEART RATE: 79 BPM | RESPIRATION RATE: 16 BRPM | DIASTOLIC BLOOD PRESSURE: 88 MMHG | OXYGEN SATURATION: 98 %

## 2017-05-05 DIAGNOSIS — M54.12 CERVICAL RADICULOPATHY: Primary | ICD-10-CM

## 2017-05-05 DIAGNOSIS — M54.12 CERVICAL RADICULOPATHY: ICD-10-CM

## 2017-05-05 DIAGNOSIS — M99.71 CONNECTIVE TISSUE AND DISC STENOSIS OF INTERVERTEBRAL FORAMINA OF CERVICAL REGION: ICD-10-CM

## 2017-05-05 PROCEDURE — 64479 NJX AA&/STRD TFRM EPI C/T 1: CPT | Mod: LT | Performed by: ANESTHESIOLOGY

## 2017-05-05 ASSESSMENT — PAIN SCALES - GENERAL
PAINLEVEL: MODERATE PAIN (5)
PAINLEVEL: MILD PAIN (2)

## 2017-05-05 NOTE — PATIENT INSTRUCTIONS
[facility]   Procedure Discharge Instructions    Today you saw:  Dr. Boni Freeman    You had an:  Cervical Epidural steroid injection     Medications used:  Lidocaine 1% and 2%   Dexamethasone Omnipaque           Be cautious when walking. Numbness and/or weakness in the upper extremities may occur up to 6-8 hours after the procedure due to effect of the local anesthetic    Do not drive for 6 hours. The effect of the local anesthetic could slow your reflexes.     You may resume your regular activities after 24 hours    Avoid strenuous activity for the first 24 hours    You may shower, however avoid swimming, tub baths or hot tubs for 24 hours following your procedure    You may have a mild to moderate increase in pain for several days following the injection.    It may take up to 14 days for the steroid medication to start working although you may feel the effect as early as a few days after the procedure.       You may use ice packs for 10-15 minutes, 3 to 4 times a day at the injection site for comfort    Do not use heat to painful areas for 6 to 8 hours. This will give the local anesthetic time to wear off and prevent you from accidentally burning your skin.     You may use anti-inflammatory medications (such as Ibuprofen or Aleve or Advil) or Tylenol for pain control if necessary    If you were fasting, you may resume your normal diet and medications after the procedure    If you experience any of the following, call the pain center nursing line during work hours at [phone] or the after hours provider line at [phone]:  -Fever over 100 degree F  -Swelling, bleeding, redness, drainage, warmth at the injection site  -Progressive weakness or numbness in your arms  -Unusual headache that is not relieved by Tylenol  -Unusual new onset of pain that is not improving      Phone #s:  Appointment line: [phone];  Nurse line: [phone]

## 2017-05-05 NOTE — PROGRESS NOTES
Pre procedure Diagnosis: cervical radiculopathy, cervical stenosis, cervical degenerative disc disease   Post procedure Diagnosis: Same  Procedure performed: cervical transforaminal epidural steroid injection at C6-7 on the left, fluoroscopically guided, contrast controlled  Anesthesia: none  Complications: none  Operators: Boni Freeman MD      Indications:   Indra Mehta is a 51 year old year old male who is known to me that presents today for cervical epidural steroid injection. They have a history of chronic left sided neck pain with pain that radiates into the left interscapular area and posterior shoulder . Exam shows cervical tenderness along the lower articular pillars and paraspinal muscles and mildly decreased range of motion on left lateral rotation.  They have tried conservative treatment including physical therapy, activity modifications, medications, and previous interventional procedures.     MRI cervical spine was done on 11/29/16 which was read as:  Findings by level as follows:     C2-C3: Negative.     C3-C4: Minimal uncinate spur on the right with mild right-sided  foraminal stenosis. No central or left-sided stenosis.     C4-C5: Negative.     C5-C6: Moderate uncinate spur on the right with moderate right-sided  foraminal stenosis. No central or left-sided stenosis. Minimal annular  bulge.     C6-C7: Small broad-based disc osteophyte complex with minimal central  stenosis. Mild bilateral foraminal stenosis due to uncinate spurs.     C7-T1: Negative.         IMPRESSION: Degenerative changes as described. No acute-appearing  abnormality.     Options/alternatives, benefits and risks were discussed with the patient including bleeding, infection, tissue trauma, numbness, weakness, paralysis, spinal cord injury, radiation exposure, headache and reaction to medications. Questions were answered to his satisfaction and he agrees to proceed. Voluntary informed consent was obtained and signed.       Vitals were reviewed: Yes  /83  Pulse 64  Allergies were reviewed: Yes   Medications were reviewed: Yes   Pre-procedure pain score: 5/10     Procedure:  After getting informed consent, the patient was brought into the procedure suite and was placed in a supine position on the procedure table. A Pause for the Cause was performed. Patient was prepped and draped in sterile fashion.       The left C6-7 neuroforamen was identified with the C-arm rotated to a left lateral oblique angle. A total of 1 ml of Lidocaine 1% was used to anesthetize the skin and the needle track at a skin entry site coaxial with the fluoroscopy beam, and overriding the posterior aspect of the neuroforamen. A 27 gauge 3.5 inch spinal needle was advanced towards the posterior aspect of the neuroforamen, first making contact with the superior articular process of C7 with the image intensifier in an oblique view and then it was advanced approximately 1-2 mm further into the foramen with the image intensifier in a PA view. Aspiration was negative for blood or CSF.       Needle position was then verified by injecting 1.5 ml of Omnipaque-300 utilizing real time fluoroscopy which showed good needle placement and adequate spread along the C7 nerve root and medially into the neuroforamen and epidural space without signs of intravascular or intrathecal uptake. 8.5 ml of Omnipaque was wasted      Then, after repeated negative aspiration, 2 ml of a combination of 1ml of 2% Lidocaine and 10 mg of dexamethasone was injected. The needle was flushed with lidocaine and removed.      During the procedure, there was a paresthesia described as a pressure sensation in his usual pain distribution.   Hemostasis was achieved, the area was cleaned, and bandaids were placed when appropriate.  The patient tolerated the procedure well, and was taken to the recovery room.   Images were saved to PACS.     Post-procedure pain score: 2/10  Follow-up includes:   -f/u  phone call in one week  -f/u with referring provider and in the pain clinic as scheduled     Boni Freeman MD  [facility]

## 2017-05-05 NOTE — NURSING NOTE
MD Time IN: 0905  Sedation start time: 0913  MD Time OUT: 0930    Medications given: versed 1 mg IV  Sedation Level Achieved:  Minimal sedation    Katarina Licona RN, [facility]  Pain Clinic Care Coordinator

## 2017-05-05 NOTE — MR AVS SNAPSHOT
After Visit Summary   5/5/2017    Indra Mehta    MRN: [id number]           Patient Information     Date Of Birth          1965        Visit Information        Provider Department      5/5/2017 8:45 AM Boni Coppola MD [facility]        Care Instructions    [facility]   Procedure Discharge Instructions    Today you saw:  Dr. Boni Freeman    You had an:  Cervical Epidural steroid injection     Medications used:  Lidocaine 1% and 2%   Dexamethasone Omnipaque           Be cautious when walking. Numbness and/or weakness in the upper extremities may occur up to 6-8 hours after the procedure due to effect of the local anesthetic    Do not drive for 6 hours. The effect of the local anesthetic could slow your reflexes.     You may resume your regular activities after 24 hours    Avoid strenuous activity for the first 24 hours    You may shower, however avoid swimming, tub baths or hot tubs for 24 hours following your procedure    You may have a mild to moderate increase in pain for several days following the injection.    It may take up to 14 days for the steroid medication to start working although you may feel the effect as early as a few days after the procedure.       You may use ice packs for 10-15 minutes, 3 to 4 times a day at the injection site for comfort    Do not use heat to painful areas for 6 to 8 hours. This will give the local anesthetic time to wear off and prevent you from accidentally burning your skin.     You may use anti-inflammatory medications (such as Ibuprofen or Aleve or Advil) or Tylenol for pain control if necessary    If you were fasting, you may resume your normal diet and medications after the procedure    If you experience any of the following, call the pain center nursing line during work hours at [phone] or the after hours provider line at [phone]:  -Fever over 100 degree F  -Swelling, bleeding, redness,  drainage, warmth at the injection site  -Progressive weakness or numbness in your arms  -Unusual headache that is not relieved by Tylenol  -Unusual new onset of pain that is not improving      Phone #s:  Appointment line: [phone];  Nurse line: [phone]            Follow-ups after your visit        Your next 10 appointments already scheduled     Jun 14, 2017  9:30 AM CDT   Return Visit with Boni Freeman MD   [facility] [place] ([facility] [place])    [address]  [place] [address] [phone]              Who to contact     If you have questions or need follow up information about today's clinic visit or your schedule please contact [facility] [place] directly at [phone].  Normal or non-critical lab and imaging results will be communicated to you by MyChart, letter or phone within 4 business days after the clinic has received the results. If you do not hear from us within 7 days, please contact the clinic through MyChart or phone. If you have a critical or abnormal lab result, we will notify you by phone as soon as possible.  Submit refill requests through [facility] or call your pharmacy and they will forward the refill request to us. Please allow 3 business days for your refill to be completed.          Additional Information About Your Visit        [facility] Information     [facility] gives you secure access to your electronic health record. If you see a primary care provider, you can also send messages to your care team and make appointments. If you have questions, please call your primary care clinic.  If you do not have a primary care provider, please call [phone] and they will assist you.        Care EveryWhere ID     This is your Care EveryWhere ID. This could be used by other organizations to access your [place] medical records  [id number]        Your Vitals Were     Pulse Respirations Pulse Oximetry             71 16 98%          Blood  Pressure from Last 3 Encounters:   05/05/17 123/89   04/12/17 126/79   01/11/17 126/66    Weight from Last 3 Encounters:   04/12/17 98.4 kg (217 lb)   01/11/17 97.9 kg (215 lb 12 oz)   12/14/16 100.2 kg (220 lb 12.8 oz)              Today, you had the following     No orders found for display       Primary Care Provider Office Phone # Fax #    Tha Grullon [phone]       [facility] [address] [facility] [address]        Thank you!     Thank you for choosing [facility]  for your care. Our goal is always to provide you with excellent care. Hearing back from our patients is one way we can continue to improve our services. Please take a few minutes to complete the written survey that you may receive in the mail after your visit with us. Thank you!             Your Updated Medication List - Protect others around you: Learn how to safely use, store and throw away your medicines at www.disposemymeds.org.          This list is accurate as of: 5/5/17  9:36 AM.  Always use your most recent med list.                   Brand Name Dispense Instructions for use    cyclobenzaprine 10 MG tablet    FLEXERIL    60 tablet    Take 1 tablet (10 mg) by mouth 2 times daily as needed for muscle spasms Reported on 4/12/2017       gabapentin 300 MG capsule    NEURONTIN    300 capsule    Take 3 capsules (900 mg) by mouth 3 times daily If needed/tolerated, OK to increase bedtime dose to 1200mg       HYDROcodone-acetaminophen 5-325 MG per tablet    NORCO     5-325 tablets Reported on 5/5/2017       IBUPROFEN PO      Take 600 mg by mouth every 4 hours as needed for moderate pain       losartan 50 MG tablet    COZAAR    90 tablet    Take 1 tablet (50 mg) by mouth daily       methocarbamol 750 MG tablet    ROBAXIN    180 tablet    Take 1 tablet (750 mg) by mouth 3 times daily as needed for muscle spasms       order for DME     1 Device    Equipment being ordered: TENS Pads as directed       traMADol  50 MG tablet    ULTRAM    120 tablet    Take 1 tablet (50 mg) by mouth every 6 hours as needed for moderate pain Max of 4 tabs/day. Okay to fill on/after 4/29/2017.  To start on 5/1/2017.       TYLENOL PO      Take 500 mg by mouth every 4 hours as needed for mild pain or fever

## 2017-05-05 NOTE — NURSING NOTE
"Chief Complaint   Patient presents with     Pain     Neck pain        Initial /83  Pulse 64 Estimated body mass index is 28.4 kg/(m^2) as calculated from the following:    Height as of 1/11/17: 1.862 m (6' 1.3\").    Weight as of 4/12/17: 98.4 kg (217 lb).  Medication Reconciliation: complete     Injection intake:    If this procedure is requiring IV sedation has patient been NPO for 2 Hours? Yes    Is patient on coumadin, plavix or other prescribed blood thinner?   No    If patient is on coumadin was it held for 5 days?   NA    If patient is on plavix was it held for 7 days?    NA     Does patient take aspirin?  No    If this is for a cervical procedure and patient is on aspirin has it been held for 6 days?   NA    Any allergies to contrast dye, iodine, steroid and/or numbing medications?  NO    Is patient currently taking antibiotics or have an active infection?  NO    Does patient have a ? Yes       Is patient pregnant or breastfeeding?  NO    Are the vital signs normal?  Yes    Lillian De La Cruz MA      "

## 2017-05-05 NOTE — NURSING NOTE
20 gauge Peripheral IV inserted into left anticubital - attempts: 1    Katarina Licona RN, [facility]  Pain Clinic Care Coordinator

## 2017-05-05 NOTE — NURSING NOTE
Discharge Information    IV Discontiued Time:  0949 catheter intact    Amount of Fluid Infused:  NA    Discharge Criteria = When patient returns to baseline or as per MD order    Consciousness:  Pt is fully awake    Circulation:  BP +/- 20% of pre-procedure level    Respiration:  Patient is able to breathe deeply    O2 Sat:  Patient is able to maintain O2 Sat >92% on room air    Activity:  Moves 4 extremities on command    Ambulation:  Patient is able to stand and walk or stand and pivot into wheelchair    Dressing:  Clean/dry or No Dressing    Notes:   Discharge instructions and AVS given to patient    Patient meets criteria for discharge?  YES    Admitted to PCU?  No    Responsible adult present to accompany patient home?  Yes    Signature/Title:    Gema Licona RN Care Coordinator  [facility]

## 2017-05-08 ENCOUNTER — TELEPHONE (OUTPATIENT)
Dept: PALLIATIVE MEDICINE | Facility: CLINIC | Age: 52
End: 2017-05-08

## 2017-05-08 NOTE — TELEPHONE ENCOUNTER
VM left at 4:00pm:    Pt calling with a update after most recent injection. Pt stated that their pain is gradually getting worse. They've tried all methods that were recommended for pain relief and none of them are working. Pt would like to speak with someone about this.

## 2017-05-09 NOTE — TELEPHONE ENCOUNTER
Saturday started to get sore up in the neck and getting worse and spreading. The neck is painful from base of the skull to the collar bone. Has pain radiating into his shoulder blade and into his arm.     Called pt    Any new numbness/tingling?: Yes. Details: Left hand felt weak, has gone away now.   Any weakness?  No, Sunday only.   Any New pain areas?: Yes. Details: See above  Signs of infection? No  Fever/chills:  No  Pain relief from the injection? Yes. Details: on day one.     Informed pt that there do not appear to be any complications from the injection. Pt is aware that it can take up to 14 days for the steroid medication to take full effect. Pt stated that he is alternating between Tylenol and Ibuprofen and using ice. Has not used a heating pad. Only stood in the shower. Stated wanted pt to start using a heating pad 3-4 times a day and then follow with a massage and move around. Stated in between to ice. Stated to call us back in 3-4 days if not better. Will have Dr. Angle Freeman review and only call pt back if he has other recommendations. Pt is okay with this plan.      Gema Licona RN Care Coordinator  [facility] [place]

## 2017-05-09 NOTE — TELEPHONE ENCOUNTER
Agree with nursing assessment.  Allow two weeks for steroid effect.    Boni Freeman MD  [facility]

## 2017-05-19 ENCOUNTER — TELEPHONE (OUTPATIENT)
Dept: PALLIATIVE MEDICINE | Facility: CLINIC | Age: 52
End: 2017-05-19

## 2017-05-19 NOTE — TELEPHONE ENCOUNTER
Pt had a MERE on 5/5/2017. Today is two full weeks for steroid effect. Patient takes     traMADol (ULTRAM) 50 MG tablet 120 tablet 0 4/25/2017  No   Sig: Take 1 tablet (50 mg) by mouth every 6 hours as needed for moderate pain Max of 4 tabs/day. Okay to fill on/after 4/29/2017.  To start on 5/1/2017.     Per last AVS:    1. Physical Therapy: Continue home exercise and physical therapy as prescribed - cervical traction may be beneficial  2. Clinical Health Psychologist to address issues of relaxation, behavioral change, coping style, and other factors important to improvement. Deferred - coping well at this time  3. Diagnostic Studies:   1. Consider updated cervical spine MRI since symptoms have worsened  4. Medication Management:   1. Continue Robaxin 750 mg TID prn spasm  2. Tramadol 50 mg QID prn pain  3. Continue Gabapentin 900 mg per day  4. Continue flexeril 10 mg prn spasm  5. Further procedures recommended:   1. Recommend cervical transforaminal epidural steroid injection left C6-7, possibly C5-6 - mild sedation would be beneficial as patient has had bad experiences in the past  6. Recommendations to PCP: defer pain complaints to pain management - may benefit from neurosurgical evaluation for cervical radiculopathy - C6-7 disc herniation with radiculopathy  7. Follow up: for procedure and in 8-10 weeks    Attempted to call pt and [facility]. Asked for a CB to discuss options.     Katarina Licona RN, [facility]  Pain Clinic Care Coordinator

## 2017-05-19 NOTE — TELEPHONE ENCOUNTER
VM left today at: 12:00 pm    Pt calling back about his recent injection.   States experiencing new pain, pain still starting at neck down to shoulder down to elbow.   Having Constant headaches  Ph. [phone]    Mercy FLOWER.    [facility] [employer]

## 2017-05-19 NOTE — TELEPHONE ENCOUNTER
May 19, 2017            1:55 PM   Mercy Rosas routed this conversation to [facility] Nurse    Mercy Rosas        1:50 PM   Note      VM left today at: 12:00 pm     Pt calling back about his recent injection.   States experiencing new pain, pain still starting at neck down to shoulder down to elbow.   Having Constant headaches  Ph. [phone]     Mercy JULIEN    [facility] [place]

## 2017-05-22 ENCOUNTER — MYC MEDICAL ADVICE (OUTPATIENT)
Dept: FAMILY MEDICINE | Facility: CLINIC | Age: 52
End: 2017-05-22

## 2017-05-22 DIAGNOSIS — M25.519 SHOULDER PAIN: Primary | ICD-10-CM

## 2017-05-22 DIAGNOSIS — M54.5 CHRONIC BILATERAL LOW BACK PAIN, WITH SCIATICA PRESENCE UNSPECIFIED: ICD-10-CM

## 2017-05-22 DIAGNOSIS — G89.29 CHRONIC BILATERAL LOW BACK PAIN, WITH SCIATICA PRESENCE UNSPECIFIED: ICD-10-CM

## 2017-05-22 NOTE — TELEPHONE ENCOUNTER
Pt called requesting a refill of Tramadol 50 mg. States that he has been taking 1 pill 4 times daily. A few times he has taken 5/day so will run out before due date of 6/1. States that he will ration to 3/day if needed to get to the regular due date.  ----------  Will route to MA pool for assistance with gathering opioid refill information.    ANGELINA StantonN, RN-BC  Patient Care Supervisor/Care Coordinator  [facility]

## 2017-05-22 NOTE — TELEPHONE ENCOUNTER
5/19 501pm    Patient LM returning call. Patient has less than 20 degree motion in neck. Headaches getting worse. 24/7 headache. [phone]    Amee Flores    [facility]

## 2017-05-22 NOTE — TELEPHONE ENCOUNTER
Information noted.  As for the headache - if he would like we can schedule him for occipital nerve blocks - may be double booked - to decrease the headaches.    Will need to order the updated MRI cervical spine.  Will also need to consider neurosurgical evaluation as he is not responding to conservative measures.    He could try 1/2 Flexeril one or two times during the day to help relax the muscles.  Continue with Tramadol as prescribed.    Boni Freeman MD  [facility]

## 2017-05-22 NOTE — TELEPHONE ENCOUNTER
"Called pt. Right after the injection, things got a lot worse for about a week.  2nd week, the flare pain started to settle down. So far, he doesn't feel that the injection has given him any benefit.  On Friday and Saturday, pt had some of the normal pain in his neck and into his left shoulder; same pain that was there before. Pain is more isolated to the neck area today and not in the left arm.  If tries to put his ear to his shoulder, the pain shoots down into his shoulder.    States that he has been told that his headache is from muscles tightening up. States that it has been a LONG time since he hasn't had a headache. Headache has been going on for a couple of months and in the last month, he has a headache 24/7. Feels like it's behind his eyes and in the temple area. Struggles with concentration at work. Reports occassional sharp, shooting pain at the base of his skull and it goes \"up.\" states that this pain comes at random times.     Has been living with the pain that goes into his shoulder for a couple of years and it continues to get worse. Patient open to more suggestions for treatment of this continued pain.  Reviewed plan of care with pt. See documentation in RED below.     Plan from 4/12/17 visit:    1. Physical Therapy: Continue home exercise and physical therapy as prescribed - cervical traction may be beneficial [scheduled to start with [facility] but can't remember exactly when]  2. Clinical Health Psychologist to address issues of relaxation, behavioral change, coping style, and other factors important to improvement. Deferred - coping well at this time  3. Diagnostic Studies:   1. Consider updated cervical spine MRI since symptoms have worsened [last cervical MRI completed 11/29/16]  4. Medication Management:   1. Continue Robaxin 750 mg TID prn spasm [takes during the day; 1 tab a couple of times/day--seems to help a little. He notices if he doesn't take it during the day so says it must be " helping.]  2. Tramadol 50 mg QID prn pain [1 pill 4 times daily. A few times he has taken 5/day so will run out before due date of 6/1. States that he will ration to 3/day if needed to get to the regular due date. Pt requesting refill at this time and will open a separate encounter to address.]  3. Continue Gabapentin 900 mg per day [600 mg with the tramadol for 3 doses and then 900 mg at bedtime]  4. Continue flexeril 10 mg prn spasm [only takes on the weekends because it makes him feel groggy in the morning-have to be a little more active; lawn mowing and chores around the house; includes low back pain]  5. Further procedures recommended:   1. Recommend cervical transforaminal epidural steroid injection left C6-7, possibly C5-6 - mild sedation would be beneficial as patient has had bad experiences in the past [completed 5/5/17]  6. Recommendations to PCP: defer pain complaints to pain management - may benefit from neurosurgical evaluation for cervical radiculopathy - C6-7 disc herniation with radiculopathy  7. Follow up: for procedure and in 8-10 weeks [scheduled 6/14/17]  ------------------  Will have Dr. Angle Freeman review pt's report and will relay any additional recommendations to patient.     Erica Navarro, BSN, RN-BC  Patient Care Supervisor/Care Coordinator  [facility]

## 2017-05-23 NOTE — TELEPHONE ENCOUNTER
Called pt. Offered an ONB. Pt is interested and said that he had Friday off so was interested in scheduling that day. There was an open appt at 4 pm so pt scheduled.  He will double check his calendar and call if he needs to cancel this appointment. Let him know that we will try to find another time to get him if that time doesn't work out.     Relayed message re: repeat cervical MRI.  Since pt is not having new pain or symptoms, he doesn't think a new MRI is needed yet.  Reports that his pain is more is more consistent but not different than it has been.     Pt is interested in getting the neurosurgeon evaluation. Will ask Dr. Angle Freeman to put in an order for this.     Also discussed medications and dosing of flexeril as recommended. He will try the lower dose of flexeril and see how it goes.     Erica Navarro, BSN, RN-BC  Patient Care Supervisor/Care Coordinator  [facility]

## 2017-05-23 NOTE — TELEPHONE ENCOUNTER
I would recommend that his primary care provider place the referral to neurosurgery.  They will likely require an MRI that is within the last 6 months.    Boni Freeman MD  [facility]

## 2017-05-23 NOTE — TELEPHONE ENCOUNTER
VM left on 5- at 2:41pm    Reason for call: Medication    Patient talked to a nurse this morning and said when he was talking to the nurse about other things and mentioned that he's requesting for a refill for Tramadol. However, when he and the nurse were talking, he didn t understand if that refill request went through. Requests clinic let him know if the refill request did go through and when it s ready and when/how long he needs to make his last refills of medication last. If the request wasn't placed, he'd like it to be placed.    Name of the medication requested: Tramadol (Ultram) 50 mg tablet    Phone Number Pt can be reached at: Cell number on file:    Telephone Information:   Mobile [phone]     [place]    [facility] [place]

## 2017-05-24 RX ORDER — TRAMADOL HYDROCHLORIDE 50 MG/1
50 TABLET ORAL EVERY 6 HOURS PRN
Qty: 120 TABLET | Refills: 0 | Status: SHIPPED | OUTPATIENT
Start: 2017-05-24 | End: 2017-06-23

## 2017-05-24 NOTE — TELEPHONE ENCOUNTER
Signed Prescriptions:                        Disp   Refills    traMADol (ULTRAM) 50 MG tablet             120 ta*0        Sig: Take 1 tablet (50 mg) by mouth every 6 hours as           needed for moderate pain Max of 4 tabs/day. Okay           to fill on/after 5/28/2017 to start on/after           5/31/2017.  Authorizing Provider: BONI NAYLOR    Reviewed, printed, and signed in [facility].  The prescription is to last 30 days.  He will have to ration as he stated he would to get to start date.    Boni Freeman MD  [facility]

## 2017-05-24 NOTE — TELEPHONE ENCOUNTER
Received call from patient requesting refill(s) of tramadol     Last picked up from pharmacy on 4/29/17  Due date 5/31/17; pt reported (see below) that he took 5 tabs/day vs prescribed 4/day for a few days so will run out before the due date.  Pt would also like clarification of how long his refill should last.    Pt last seen on 4/12/17  Next appt scheduled for 6/14/17    Last urine drug screen 10/13/16  Current opioid agreement on file Yes Date: 10/26/16 with Dr. Ibarra     Processing (pick one):  fax to [facility] pharmacy     Will facilitate refill.    ANGELINA StantonN, RN-BC  Patient Care Supervisor/Care Coordinator  [facility] [place]

## 2017-05-24 NOTE — TELEPHONE ENCOUNTER
Signed Rx faxed to [facility]. RightFax confirmed. Patient was notified and knows that he needs to ration until start date of this Rx,  05/31/17.

## 2017-05-24 NOTE — TELEPHONE ENCOUNTER
MEHDI left on 5- at 2:15pm    Reason for call:  Returning call  Patient called regarding (reason for call): returning call  Additional comments: Patient returned clinic s call. Noted he received a little bit ago from the clinic about a neurosurgeon evaluation. Per patient, please call him back and hopefully he can take the clinic s call at that time [did not include  best time  to be reached].    Phone number to reach patient:  Cell number on file:    Telephone Information:   Mobile [phone]     Best Time:  Not mentioned in VM    Can we leave a detailed message on this number?  Not mentioned in [facility]    [facility] [place]

## 2017-05-24 NOTE — TELEPHONE ENCOUNTER
Contact Attempt      Outreach attempted to pt   Left message on voicemail with call back information and requested return call to discuss the neurosurg evaluation. Will also send a message to PCP.     Plan:  Will await for CB.     Katarina Licona RN, [facility]  Pain Clinic Care Coordinator

## 2017-05-25 NOTE — TELEPHONE ENCOUNTER
Lety Gipson, schedule patient with Dr. Mckenna for consultation per message below.  Referral for neurosurgery placed and routed call to specialty to schedule   Yarelis POP

## 2017-05-30 NOTE — TELEPHONE ENCOUNTER
Called pt and left message informing him to call primary care provider's office for any more information on the neurosurgery referral.  He was advised to call the nurse line back if he has further questions and to leave more details.    Encounter closed.    Heidy Gipson RN-BSN  [facility]

## 2017-06-06 ENCOUNTER — OFFICE VISIT (OUTPATIENT)
Dept: ORTHOPEDICS | Facility: CLINIC | Age: 52
End: 2017-06-06
Payer: COMMERCIAL

## 2017-06-06 ENCOUNTER — RADIANT APPOINTMENT (OUTPATIENT)
Dept: GENERAL RADIOLOGY | Facility: CLINIC | Age: 52
End: 2017-06-06
Attending: PHYSICAL MEDICINE & REHABILITATION
Payer: COMMERCIAL

## 2017-06-06 VITALS — BODY MASS INDEX: 28.57 KG/M2 | HEART RATE: 84 BPM | WEIGHT: 215.6 LBS | HEIGHT: 73 IN

## 2017-06-06 DIAGNOSIS — M25.512 ACUTE PAIN OF LEFT SHOULDER: Primary | ICD-10-CM

## 2017-06-06 DIAGNOSIS — M75.82 TENDONITIS OF BOTH ROTATOR CUFFS: ICD-10-CM

## 2017-06-06 DIAGNOSIS — M75.81 TENDONITIS OF BOTH ROTATOR CUFFS: ICD-10-CM

## 2017-06-06 DIAGNOSIS — M25.519 ACUTE SHOULDER PAIN: ICD-10-CM

## 2017-06-06 PROCEDURE — 73030 X-RAY EXAM OF SHOULDER: CPT | Mod: TC

## 2017-06-06 PROCEDURE — 99214 OFFICE O/P EST MOD 30 MIN: CPT | Performed by: PHYSICAL MEDICINE & REHABILITATION

## 2017-06-06 RX ORDER — HYDROCODONE BITARTRATE AND ACETAMINOPHEN 5; 325 MG/1; MG/1
1 TABLET ORAL EVERY 6 HOURS PRN
Qty: 30 TABLET | Refills: 0 | Status: SHIPPED | OUTPATIENT
Start: 2017-06-06 | End: 2017-06-27

## 2017-06-06 NOTE — MR AVS SNAPSHOT
After Visit Summary   6/6/2017    Indra Mehta    MRN: [id number]           Patient Information     Date Of Birth          1965        Visit Information        Provider Department      6/6/2017 2:20 PM Melissa Xie MD [facility]        Today's Diagnoses     Acute pain of left shoulder    -  1    Tendonitis of both rotator cuffs          Care Instructions    Today's Plan of Care:  -Hydrocodone/Acetaminophen as needed for severe pain. Do not take prior to driving  -Ibuprofen (Advil) 800mg every 8 hours with food for 2 weeks.  -Sling for comfort. Gentle range of motion 2-3 times per day  -Ice 15-20 minutes for pain relief as needed    -We also discussed other future treatment options:  Steroid injection of left shoulder    Follow Up: 2 weeks or sooner if symptoms fail to improve or worsen. Call with any questions or concerns.               Follow-ups after your visit        Your next 10 appointments already scheduled     Jun 14, 2017  9:30 AM CDT   Return Visit with Boni Freeman MD   [facility] ([facility])    [id number] [place] [id number] [phone]            Jun 22, 2017  3:00 PM CDT   [facility]enna [facility]   [facility]    [address] [facility] [address] [phone]              Who to contact     If you have questions or need follow up information about today's clinic visit or your schedule please contact [facility] directly at [phone].  Normal or non-critical lab and imaging results will be communicated to you by MyChart, letter or phone within 4 business days after the clinic has received the results. If you do not hear from us within 7 days, please contact the clinic through MyChart or phone. If you have a critical or abnormal lab result, we will notify you by phone as soon as possible.  Submit  "refill requests through [employer] or call your pharmacy and they will forward the refill request to us. Please allow 3 business days for your refill to be completed.          Additional Information About Your Visit        [employer] Information     [employer] gives you secure access to your electronic health record. If you see a primary care provider, you can also send messages to your care team and make appointments. If you have questions, please call your primary care clinic.  If you do not have a primary care provider, please call [phone] and they will assist you.        Care EveryWhere ID     This is your Care EveryWhere ID. This could be used by other organizations to access your [place] medical records  [id number]        Your Vitals Were     Pulse Height BMI (Body Mass Index)             84 6' 1.3\" (1.862 m) 28.21 kg/m2          Blood Pressure from Last 3 Encounters:   05/05/17 127/88   04/12/17 126/79   01/11/17 126/66    Weight from Last 3 Encounters:   06/06/17 215 lb 9.6 oz (97.8 kg)   04/12/17 217 lb (98.4 kg)   01/11/17 215 lb 12 oz (97.9 kg)                 Today's Medication Changes          These changes are accurate as of: 6/6/17  3:23 PM.  If you have any questions, ask your nurse or doctor.               Start taking these medicines.        Dose/Directions    HYDROcodone-acetaminophen 5-325 MG per tablet   Commonly known as:  NORCO   Used for:  Acute pain of left shoulder, Tendonitis of both rotator cuffs   Started by:  Melissa Xie MD        Dose:  1 tablet   Take 1 tablet by mouth every 6 hours as needed for moderate to severe pain or pain maximum 6 tablets per day   Quantity:  30 tablet   Refills:  0            Where to get your medicines      Some of these will need a paper prescription and others can be bought over the counter.  Ask your nurse if you have questions.     Bring a paper prescription for each of these medications     HYDROcodone-acetaminophen 5-325 MG per tablet    "             Primary Care Provider Office Phone # Fax #    Tha Grullon [phone]       [facility] [address] [facility] [address]        Thank you!     Thank you for choosing [facility]  for your care. Our goal is always to provide you with excellent care. Hearing back from our patients is one way we can continue to improve our services. Please take a few minutes to complete the written survey that you may receive in the mail after your visit with us. Thank you!             Your Updated Medication List - Protect others around you: Learn how to safely use, store and throw away your medicines at www.disposemymeds.org.          This list is accurate as of: 6/6/17  3:23 PM.  Always use your most recent med list.                   Brand Name Dispense Instructions for use    cyclobenzaprine 10 MG tablet    FLEXERIL    60 tablet    Take 1 tablet (10 mg) by mouth 2 times daily as needed for muscle spasms Reported on 4/12/2017       gabapentin 300 MG capsule    NEURONTIN    300 capsule    Take 3 capsules (900 mg) by mouth 3 times daily If needed/tolerated, OK to increase bedtime dose to 1200mg       HYDROcodone-acetaminophen 5-325 MG per tablet    NORCO    30 tablet    Take 1 tablet by mouth every 6 hours as needed for moderate to severe pain or pain maximum 6 tablets per day       IBUPROFEN PO      Take 600 mg by mouth every 4 hours as needed for moderate pain       losartan 50 MG tablet    COZAAR    90 tablet    Take 1 tablet (50 mg) by mouth daily       methocarbamol 750 MG tablet    ROBAXIN    180 tablet    Take 1 tablet (750 mg) by mouth 3 times daily as needed for muscle spasms       order for DME     1 Device    Equipment being ordered: TENS Pads as directed       traMADol 50 MG tablet    ULTRAM    120 tablet    Take 1 tablet (50 mg) by mouth every 6 hours as needed for moderate pain Max of 4 tabs/day. Okay to fill on/after 5/28/2017 to start on/after 5/31/2017.        TYLENOL PO      Take 500 mg by mouth every 4 hours as needed for mild pain or fever

## 2017-06-06 NOTE — PATIENT INSTRUCTIONS
Today's Plan of Care:  -Hydrocodone/Acetaminophen as needed for severe pain. Do not take prior to driving  -Ibuprofen (Advil) 800mg every 8 hours with food for 2 weeks.  -Sling for comfort. Gentle range of motion 2-3 times per day  -Ice 15-20 minutes for pain relief as needed    -We also discussed other future treatment options:  Steroid injection of left shoulder    Follow Up: 2 weeks or sooner if symptoms fail to improve or worsen. Call with any questions or concerns.

## 2017-06-06 NOTE — PROGRESS NOTES
Sports Medicine Clinic Visit    PCP: Tha Grullon    CC: Patient presents with:  Shoulder Pain: bilateral      HPI:  Indra Mehta is a 51 year old male who is seen in consultation at the request of Jamie Gipson MD.   He notes pain that began 2 days ago when he was moving tables, and putting up tents for a graduation party. He does not recall a specific ABIEL.  Pain is located on the left shoulder, anterior and superior.  He rates the pain at a 10/10 at its worst and a 6/10 currently.  Symptoms are relieved with nothing and worsened by reaching out. He endorses chronic instability and excruciating pain with radiating pain through the whole arm and denies swelling, bruising, popping, grinding, catching, locking, numbness, tingling, weakness, pain in other joints and fever, chills.  Other treatment has included tramadol (back and neck), ibuprofen, tylenol, and ice.  He notes difficulty with moving his arm away from his body.  He has a history of subluxation.       Review of Systems:  Musculoskeletal: as above  Remainder of review of systems is negative including constitutional, eyes, ENT, CV, pulmonary, GI, , endocrine, skin, hematologic, and neurologic except as noted in HPI or medical history.    History reviewed. No pertinent past surgical/medical/family/social history.    Past Medical History:   Diagnosis Date     Back pain      Meniere's disease, unspecified      Pain medication agreement signed 8/20/2010     Past Surgical History:   Procedure Laterality Date     BUNIONECTOMY RT/LT  09/05/08    Both feet     C MASTOIDECTOMY,COMPLETE  09/27/2007     COLONOSCOPY N/A 12/28/2016    Procedure: COMBINED COLONOSCOPY, SINGLE OR MULTIPLE BIOPSY/POLYPECTOMY BY BIOPSY;  Surgeon: Indra Jernigan MD;  Location:  GI     HC COLONOSCOPY W/WO BRUSH/WASH  12/27/2005     HC CREATE EARDRUM OPENING,GEN ANESTH  09/27/2007    Pe tube, Left endolymphatic sac enhancement.     PE TUBES  2006    left ear     Family History  "  Problem Relation Age of Onset     Cancer - colorectal Mother      DIABETES Mother      Cardiovascular Father      Hypertension Father      MENTAL ILLNESS Sister      Suicide Other      Social History     Social History     Marital status: Single     Spouse name: N/A     Number of children: 2     Years of education: N/A     Occupational History      [employer]     Social History Main Topics     Smoking status: Never Smoker     Smokeless tobacco: Never Used     Alcohol use No     Drug use: No     Sexual activity: Yes     Partners: Female     Other Topics Concern     Parent/Sibling W/ Cabg, Mi Or Angioplasty Before 65f 55m? No     Social History Narrative       He works as an   At baseline, he does not need assistance with ambulation      Current Outpatient Prescriptions   Medication     HYDROcodone-acetaminophen (NORCO) 5-325 MG per tablet     gabapentin (NEURONTIN) 300 MG capsule     losartan (COZAAR) 50 MG tablet     Acetaminophen (TYLENOL PO)     IBUPROFEN PO     traMADol (ULTRAM) 50 MG tablet     order for DME     methocarbamol (ROBAXIN) 750 MG tablet     cyclobenzaprine (FLEXERIL) 10 MG tablet     No current facility-administered medications for this visit.      Allergies   Allergen Reactions     No Known Drug Allergies          Objective:  Pulse 84  Ht 6' 1.3\" (1.862 m)  Wt 215 lb 9.6 oz (97.8 kg)  BMI 28.21 kg/m2    General: Alert and in mild distress    Head: Normocephalic, atraumatic  Eyes: no scleral icterus or conjunctival erythema   Oropharynx:  Mucous membranes moist  Skin: no erythema, ecchymosis, petechiae, or jaundice  CV: regular rhythm by palpation, 2+ distal pulses, no pedal edema    Resp: normal respiratory effort without conversational dyspnea   Psych: normal mood and affect    Gait: Non-antalgic, appropriate coordination and balance   Neuro: normal light touch sensory exam of the extremities. Motor strength as noted below    Musculoskeletal:    Bilateral Shoulder exam  ** " Shoulder exam limited today due to significant pain    Inspection and Posture:  -Guarding left shoulder    Skin:        no visible deformities    Palpation:  Tender to palpation over the left proximal biceps tendon and clavicle    ROM:   Left shoulder abduction and flexion extremely limited actively (full passively but painful).  Left internal and external rotation are also limited.      Painful motions:  Left shoulder abduction and flexion are painful.    Strength:        abduction - unable to do on left, 4+/5 right       flexion -unable to do on left, 4+/5 right       internal rotation 4/5 left, 5-/5 right       external rotation 4/5 left, 5-/5 right       elbow flexion 4+/5 left, 5/5 right       elbow extension 4+/5 left, 5/5 right       forearm pronation 5/5 bilaterally       forearm supination 5/5 bilaterally       wrist flexion 5/5 bilaterally       wrist extension 5/5 bilaterally        strength 5/5 bilaterally       finger abduction 5/5 bilaterally    Sensation:        normal sensation over shoulder and upper extremity     Radiology:  Left shoulder x-rays ordered and independent visualization of images performed.   Degenerative changes seen at the AC joint.  Full radiology report to follow.      Assessment:  1. Acute pain of left shoulder    2. Tendonitis of both rotator cuffs        Plan:  Discussed the assessment with the patient. We discussed the following treatment options: symptom treatment, activity modification/rest, imaging and rehab. Following discussion, plan:  -Indra sustained an acute on chronic injury of his shoulder 2 days ago and is in significant pain.  Provided him with Norco to take as needed for severe pain.  He should not take prior to driving.  Also offered him a steroid injection but he would like to hold off for now.   -Ibuprofen (Advil) 800mg every 8 hours with food for 2 weeks.  -Sling for comfort. Gentle range of motion 2-3 times per day  -Ice 15-20 minutes for pain relief as  needed    Follow Up: 2 weeks or sooner if symptoms fail to improve or worsen. Call with any questions or concerns.     Yoanna Xie MD, CAQ  [facility]

## 2017-06-12 DIAGNOSIS — M54.2 CERVICALGIA: ICD-10-CM

## 2017-06-12 NOTE — TELEPHONE ENCOUNTER
Received fax request from [facility] pharmacy requesting refill(s) for gabapentin (NEURONTIN) 300 MG capsule    Last refilled on 02/01/17    Pt last seen on 05/05/17 for injection, 04/14/17 for office visit  Next appt scheduled for : none    Will facilitate refill.

## 2017-06-12 NOTE — TELEPHONE ENCOUNTER
6/10 1040am    Pharmacy called for Gabapentin refill request. Call to approve. [phone]     Amee Flores    [facility] Management [facility]

## 2017-06-12 NOTE — TELEPHONE ENCOUNTER
Called pt.  Reviewed notes from last visit on 4/14: Follow up: for procedure and in 8-10 weeks    Called pt and LM relaying information from the last visit and requested that he call the main clinic number to schedule a return visit.     ANGELINA StantonN, RN-BC  Patient Care Supervisor/Care Coordinator  [facility]

## 2017-06-13 RX ORDER — GABAPENTIN 300 MG/1
900 CAPSULE ORAL 3 TIMES DAILY
Qty: 300 CAPSULE | Refills: 3 | Status: SHIPPED | OUTPATIENT
Start: 2017-06-13 | End: 2017-10-10

## 2017-06-13 NOTE — TELEPHONE ENCOUNTER
Signed Prescriptions:                        Disp   Refills    gabapentin (NEURONTIN) 300 MG capsule      300 ca*3        Sig: Take 3 capsules (900 mg) by mouth 3 times daily If           needed/tolerated, OK to increase bedtime dose to           1200mg  Authorizing Provider: BONI NAYLOR    Reviewed, signed, e-prescribed.    Boni Freeman MD  [facility]

## 2017-06-15 ENCOUNTER — OFFICE VISIT (OUTPATIENT)
Dept: PALLIATIVE MEDICINE | Facility: CLINIC | Age: 52
End: 2017-06-15
Payer: COMMERCIAL

## 2017-06-15 VITALS
HEART RATE: 81 BPM | DIASTOLIC BLOOD PRESSURE: 83 MMHG | BODY MASS INDEX: 28.13 KG/M2 | WEIGHT: 215 LBS | SYSTOLIC BLOOD PRESSURE: 133 MMHG

## 2017-06-15 DIAGNOSIS — G89.29 CHRONIC NECK PAIN: ICD-10-CM

## 2017-06-15 DIAGNOSIS — M79.18 MYOFASCIAL PAIN SYNDROME, CERVICAL: ICD-10-CM

## 2017-06-15 DIAGNOSIS — M54.2 CHRONIC NECK PAIN: ICD-10-CM

## 2017-06-15 DIAGNOSIS — M54.12 CERVICAL RADICULOPATHY: ICD-10-CM

## 2017-06-15 DIAGNOSIS — M25.512 CHRONIC LEFT SHOULDER PAIN: Primary | ICD-10-CM

## 2017-06-15 DIAGNOSIS — G89.29 CHRONIC LEFT SHOULDER PAIN: Primary | ICD-10-CM

## 2017-06-15 PROCEDURE — 99214 OFFICE O/P EST MOD 30 MIN: CPT | Mod: 25 | Performed by: ANESTHESIOLOGY

## 2017-06-15 PROCEDURE — 20552 NJX 1/MLT TRIGGER POINT 1/2: CPT | Performed by: ANESTHESIOLOGY

## 2017-06-15 ASSESSMENT — PAIN SCALES - GENERAL: PAINLEVEL: SEVERE PAIN (6)

## 2017-06-15 NOTE — PATIENT INSTRUCTIONS
Assessment:   1.  Chronic neck pain  2.  Cervical radiculopathy  3.  Cervical foraminal stenosis C6-7 left  4.  Cervical spondylosis  5.  Chronic lower back pain  6.  Chronic hip pain  7.  Peripheral neuropathy  8.  Cervicogenic headache  9.  Left shoulder pain  10.  Cervical myofascial pain    Plan:  1. Physical Therapy:  Deferred at this time - consider PT for shoulder depending on Ortho eval  2. Clinical Health Psychologist to address issues of relaxation, behavioral change, coping style, and other factors important to improvement.  deferred  3. Diagnostic Studies:  Left shoulder MRI prior to Ortho eval  4. Medication Management:    1. Continue Tramadol 50 mg QID prn pain  2. Continue Gabapentin 900 mg qam, 900 qnoon, and 1200 QHS  3. Continue Robaxin 750 mg TID  4. Continue Flexeri 10 mg prn spasm  5. Further procedures recommended:   1. Cervical trigger point injections today - see procedure note  2. Consider repeat cervical epidural steroid injection  3. May benefit from subacromial bursa injection vs intra-articular injection left shoulder  6. Recommendations to PCP: continue current treatment  7. Follow up: 8-10 weeks    [facility]   Post Procedure Instructions    Today you had:  trigger point injections     Medications used: bupivicaine dexamethasone          Go to the emergency room if you develop any shortness of breath    Monitor the injection sites for signs and symptoms of infection-fever, chills, redness, swelling, warmth, or drainage to areas.    You may have soreness at injection sites for up to 24 hours.    You may apply ice to the painful areas to help minimize the discomfort of the needle pokes.    Do not apply heat to sites for at least 12 hours.    You may use anti-inflammatory medications or Tylenol for pain control if necessary  Nurse line: [phone]  After hours provider line: [phone]  Appointment line:  [phone]        ----------------------------------------------------------------  Nurse Triage line:  [phone]   Call this number with any questions or concerns. You may leave a detailed message anytime. Calls are typically returned Monday through Friday between 8 AM and 4:30 PM. We usually get back to you within 2 business days depending on the issue/request.       Medication refills:    For non-narcotic medications, call your pharmacy directly to request a refill. The pharmacy will contact the Pain Management Center for authorization. Please allow 3-4 days for these refills to be processed.     For narcotic refills, call the nurse triage line or send a [phone] message. Please contact us 7-10 days before your refill is due. The message MUST include the name of the specific medication(s) requested and how you would like to receive the prescription(s). The options are as follows:    Pain Clinic staff can mail the prescription to your pharmacy. Please tell us the name of the pharmacy.    You may pick the prescription up at the Pain Clinic (tell us the location) or during a clinic visit with your pain provider    Pain Clinic staff can deliver the prescription to the [facility] in the clinic building. Please tell us the location.      Scheduling number: [phone].  Call this number to schedule or change appointments.    We believe regular attendance is key to your success in our program.    Any time you are unable to keep your appointment we ask that you call us at least 24 hours in advance to let us know. This will allow us to offer the appointment time to another patient.

## 2017-06-15 NOTE — NURSING NOTE
"Chief Complaint   Patient presents with     Pain       Initial Wt 97.5 kg (215 lb)  BMI 28.13 kg/m2 Estimated body mass index is 28.13 kg/(m^2) as calculated from the following:    Height as of 6/6/17: 1.862 m (6' 1.3\").    Weight as of this encounter: 97.5 kg (215 lb).  Medication Reconciliation: armando Chong CMA (AAMA)      "

## 2017-06-15 NOTE — MR AVS SNAPSHOT
After Visit Summary   6/15/2017    Indra Mehta    MRN: [id number]           Patient Information     Date Of Birth          1965        Visit Information        Provider Department      6/15/2017 3:00 PM Boni Coppola MD [facility]        Today's Diagnoses     Chronic left shoulder pain    -  1      Care Instructions    Assessment:   1.  Chronic neck pain  2.  Cervical radiculopathy  3.  Cervical foraminal stenosis C6-7 left  4.  Cervical spondylosis  5.  Chronic lower back pain  6.  Chronic hip pain  7.  Peripheral neuropathy  8.  Cervicogenic headache  9.  Left shoulder pain  10.  Cervical myofascial pain    Plan:  1. Physical Therapy:  Deferred at this time - consider PT for shoulder depending on Ortho eval  2. Clinical Health Psychologist to address issues of relaxation, behavioral change, coping style, and other factors important to improvement.  deferred  3. Diagnostic Studies:  Left shoulder MRI prior to Ortho eval  4. Medication Management:    1. Continue Tramadol 50 mg QID prn pain  2. Continue Gabapentin 900 mg qam, 900 qnoon, and 1200 QHS  3. Continue Robaxin 750 mg TID  4. Continue Flexeri 10 mg prn spasm  5. Further procedures recommended:   1. Cervical trigger point injections today - see procedure note  2. Consider repeat cervical epidural steroid injection  3. May benefit from subacromial bursa injection vs intra-articular injection left shoulder  6. Recommendations to PCP: continue current treatment  7. Follow up: 8-10 weeks    [facility]   Post Procedure Instructions    Today you had:  trigger point injections     Medications used: bupivicaine dexamethasone          Go to the emergency room if you develop any shortness of breath    Monitor the injection sites for signs and symptoms of infection-fever, chills, redness, swelling, warmth, or drainage to areas.    You may have soreness at injection sites for up to 24 hours.    You may  apply ice to the painful areas to help minimize the discomfort of the needle pokes.    Do not apply heat to sites for at least 12 hours.    You may use anti-inflammatory medications or Tylenol for pain control if necessary  Nurse line: [phone]  After hours provider line: [phone]  Appointment line: [phone]        ----------------------------------------------------------------  Nurse Triage line:  [phone]   Call this number with any questions or concerns. You may leave a detailed message anytime. Calls are typically returned Monday through Friday between 8 AM and 4:30 PM. We usually get back to you within 2 business days depending on the issue/request.       Medication refills:    For non-narcotic medications, call your pharmacy directly to request a refill. The pharmacy will contact the [facility] for authorization. Please allow 3-4 days for these refills to be processed.     For narcotic refills, call the nurse triage line or send a [employer] message. Please contact us 7-10 days before your refill is due. The message MUST include the name of the specific medication(s) requested and how you would like to receive the prescription(s). The options are as follows:    Pain Clinic staff can mail the prescription to your pharmacy. Please tell us the name of the pharmacy.    You may pick the prescription up at the Pain Clinic (tell us the location) or during a clinic visit with your pain provider    Pain Clinic staff can deliver the prescription to the [facility] pharmacy in the clinic building. Please tell us the location.      Scheduling number: [phone].  Call this number to schedule or change appointments.    We believe regular attendance is key to your success in our program.    Any time you are unable to keep your appointment we ask that you call us at least 24 hours in advance to let us know. This will allow us to offer the appointment time to another patient.               Follow-ups  after your visit        Your next 10 appointments already scheduled     Jun 20, 2017  8:00 AM CDT   [facility]    [address] [place] [address] [id number] [phone]            Jun 22, 2017  3:00 PM CDT   [facility]   [facility]    [address] [place] [address] [id number] [phone]              Future tests that were ordered for you today     Open Future Orders        Priority Expected Expires Ordered    MR Up Ext Joint Bl w/o Contrast Routine  6/15/2018 6/15/2017            Who to contact     If you have questions or need follow up information about today's clinic visit or your schedule please contact [facility] directly at [phone].  Normal or non-critical lab and imaging results will be communicated to you by MyChart, letter or phone within 4 business days after the clinic has received the results. If you do not hear from us within 7 days, please contact the clinic through [email or web address] or phone. If you have a critical or abnormal lab result, we will notify you by phone as soon as possible.  Submit refill requests through [facility] or call your pharmacy and they will forward the refill request to us. Please allow 3 business days for your refill to be completed.          Additional Information About Your Visit        MyChart Information     [facility] gives you secure access to your electronic health record. If you see a primary care provider, you can also send messages to your care team and make appointments. If you have questions, please call your primary care clinic.  If you do not have a primary care provider, please call [phone] and they will assist you.        Care EveryWhere ID     This is your Care EveryWhere ID. This could be used by other organizations to access your [facility] medical records  [id number]         Your Vitals Were     Pulse BMI (Body Mass Index)                81 28.13 kg/m2           Blood Pressure from Last 3 Encounters:   06/15/17 133/83   05/05/17 127/88   04/12/17 126/79    Weight from Last 3 Encounters:   06/15/17 97.5 kg (215 lb)   06/06/17 97.8 kg (215 lb 9.6 oz)   04/12/17 98.4 kg (217 lb)               Primary Care Provider Office Phone # Fax #    Tha Grullon [phone]       [facility] [address] [facility] [address]        Thank you!     Thank you for choosing [facility]  for your care. Our goal is always to provide you with excellent care. Hearing back from our patients is one way we can continue to improve our services. Please take a few minutes to complete the written survey that you may receive in the mail after your visit with us. Thank you!             Your Updated Medication List - Protect others around you: Learn how to safely use, store and throw away your medicines at www.disposemymeds.org.          This list is accurate as of: 6/15/17  4:04 PM.  Always use your most recent med list.                   Brand Name Dispense Instructions for use    cyclobenzaprine 10 MG tablet    FLEXERIL    60 tablet    Take 1 tablet (10 mg) by mouth 2 times daily as needed for muscle spasms Reported on 4/12/2017       gabapentin 300 MG capsule    NEURONTIN    300 capsule    Take 3 capsules (900 mg) by mouth 3 times daily If needed/tolerated, OK to increase bedtime dose to 1200mg       HYDROcodone-acetaminophen 5-325 MG per tablet    NORCO    30 tablet    Take 1 tablet by mouth every 6 hours as needed for moderate to severe pain or pain maximum 6 tablets per day       IBUPROFEN PO      Take 600 mg by mouth every 4 hours as needed for moderate pain       losartan 50 MG tablet    COZAAR    90 tablet    Take 1 tablet (50 mg) by mouth daily       methocarbamol 750 MG tablet    ROBAXIN    180 tablet    Take 1 tablet (750 mg) by mouth 3 times daily as needed for  muscle spasms       order for DME     1 Device    Equipment being ordered: TENS Pads as directed       traMADol 50 MG tablet    ULTRAM    120 tablet    Take 1 tablet (50 mg) by mouth every 6 hours as needed for moderate pain Max of 4 tabs/day. Okay to fill on/after 5/28/2017 to start on/after 5/31/2017.       TYLENOL PO      Take 500 mg by mouth every 4 hours as needed for mild pain or fever

## 2017-06-15 NOTE — PROGRESS NOTES
[facility]    Date of visit: 6/15/2017    Chief complaint:   Chief Complaint   Patient presents with     Pain       Interval history:  Indra Mehta was last seen by me on 5/5/17 for TFESI left C6-7 which offered a 60% reduction in pain at the time of discharge.  His last office visit was on 4/12/17.      Recommendations/plan at the last visit included:  Plan:  1. Physical Therapy:  Continue home exercise and physical therapy as prescribed - cervical traction may be beneficial  2. Clinical Health Psychologist to address issues of relaxation, behavioral change, coping style, and other factors important to improvement.  Deferred - coping well at this time  3. Diagnostic Studies:    1. Consider updated cervical spine MRI since symptoms have worsened  4. Medication Management:    1. Continue Robaxin 750 mg TID prn spasm  2. Tramadol 50 mg QID prn pain  3. Continue Gabapentin 900 mg per day  4. Continue flexeril 10 mg prn spasm  5. Further procedures recommended:   1. Recommend cervical transforaminal epidural steroid injection left C6-7, possibly C5-6 - mild sedation would be beneficial as patient has had bad experiences in the past  6. Recommendations to PCP: defer pain complaints to pain management - may benefit from neurosurgical evaluation for cervical radiculopathy - C6-7 disc herniation with radiculopathy  7. Follow up: for procedure and in 8-10 weeks    Since his last visit, Indra Mehta reports:  He states that he had been feeling better since the epidural steroid injection until he over did it getting ready for his daughter's graduation party two weeks ago.  After the injection he still had occasional pain between his shoulder blades on the left but had a good improvement in the neck pain.  He also notes increased pain after his drive to the clinic today after turning his head too fast to the left.  He had an appointment with Melissa Xie MD in [place] after he exacerbated  his pain and he received Norco 5/325 #30 on 6/6/17.    He has another appointment with Dr Xie next week for better evaluation of his left shoulder pain and an appointment with Dr Mike Mckenna next Thursday for neurosurgical evaluation.    The neck pain is still mostly on the left but he also has pain on the right.  The pain radiated to the back of the left shoulder and down between his shoulder blades on the left.  He had some pain and weakness right after the exacerbation of his pain and this has continued with pain that travels down to the elbow.  He denies numbness, tingling, or weakness of the upper extremities.    He also complains of pain at the front of the left shoulder that is worse with raising his arm laterally and anteriorly.    His pain is constant, described as aching, shooting, and unbearable.    Pain scores:  Pain intensity on average is 5 on a scale of 0-10. Ranges from 3/10 to 10/10    Current pain treatments:   Norco 5/325 - completed #30 tabs from Dr Xie for acute exacerbation  Tramadol 4 per day  Gabapentin 300 mg - taking 9 capsules in divided doses with tramadol  Ibuprofen 200 - 600 mg TID  Tylenol     Past pain treatments:  Norco/vicodin  Oxycodone  Flexeril - was helpful at night  Robaxin - may have helped, didn't make him sleepy    Side Effects: no side effect    Medications:  Current Outpatient Prescriptions   Medication Sig Dispense Refill     gabapentin (NEURONTIN) 300 MG capsule Take 3 capsules (900 mg) by mouth 3 times daily If needed/tolerated, OK to increase bedtime dose to 1200mg 300 capsule 3     traMADol (ULTRAM) 50 MG tablet Take 1 tablet (50 mg) by mouth every 6 hours as needed for moderate pain Max of 4 tabs/day. Okay to fill on/after 5/28/2017 to start on/after 5/31/2017. 120 tablet 0     methocarbamol (ROBAXIN) 750 MG tablet Take 1 tablet (750 mg) by mouth 3 times daily as needed for muscle spasms 180 tablet 1     losartan (COZAAR) 50 MG tablet Take 1 tablet (50 mg) by  mouth daily 90 tablet 3     Acetaminophen (TYLENOL PO) Take 500 mg by mouth every 4 hours as needed for mild pain or fever       IBUPROFEN PO Take 600 mg by mouth every 4 hours as needed for moderate pain       HYDROcodone-acetaminophen (NORCO) 5-325 MG per tablet Take 1 tablet by mouth every 6 hours as needed for moderate to severe pain or pain maximum 6 tablets per day (Patient not taking: Reported on 6/15/2017) 30 tablet 0     order for DME Equipment being ordered: TENS Pads as directed 1 Device 0     cyclobenzaprine (FLEXERIL) 10 MG tablet Take 1 tablet (10 mg) by mouth 2 times daily as needed for muscle spasms Reported on 4/12/2017 (Patient not taking: Reported on 6/15/2017) 60 tablet 1       Medical History: any changes in medical history since they were last seen? Yes increased left shoulder pain - history of multiple dislocations    Review of Systems:  The 14 system ROS was reviewed from the intake questionnaire, and is positive for: headache, tinnitus, high blood pressure, joint pain, joint stiffness, back pain, neck pain, weakness, numbness, tingling, stress  Any bowel or bladder problems: denies changes  Mood: good    Physical Exam:  /83  Pulse 81  Wt 97.5 kg (215 lb)  BMI 28.13 kg/m2  Constitutional: alert and no distress.  Pt is well nourished, well developed  Head: Normocephalic. No masses, lesions, tenderness or abnormalities  ENT: EOMI, mucosal surfaces moist.  Neck with full ROM, posture fair  Cardiovascular: No edema or JVD appreciated.  Respiratory: Good diaphragmatic excursion. No wheezes appreciated.  Speaking in complete sentences without shortness of breath.  No accessory muscle use.   Musculoskeletal: extremities normal- no gross deformities noted, normal muscle tone and able to move about the exam room without difficulty.    Skin: no suspicious lesions or rashes appreciated on exposed areas  Neurologic: Gait normal. Moving all extremities spontaneously, no apparent weakness.     Psychiatric: mentation appears normal, affect full and good eye contact.      Cervical Spine:  Tender left upper cervical paraspinal muscles, facet loading caused pain radiating to the left shoulder and increased neck pain, palpable paraspinal spasm left, decreased range of motion on lateral rotation and extension due to pain    Upper extremity strength intact bilaterally, sensation intact bilaterally    Assessment:   1.  Chronic neck pain  2.  Cervical radiculopathy  3.  Cervical foraminal stenosis C6-7 left  4.  Cervical spondylosis  5.  Chronic lower back pain  6.  Chronic hip pain  7.  Peripheral neuropathy  8.  Cervicogenic headache  9.  Left shoulder pain  10.  Cervical myofascial pain    Indra Mehta is a 51 year old male who is seen at the pain clinic for chronic neck and shoulder pain.  He did well after his epidural steroid injection but then over did it and had a pain exacerbation that is now settling down.  He is scheduled to have his left shoulder evaluated by Dr Xie as well as for a visit with Dr Mckenna with neurosurgery for his neck pain.  He had palpable paraspinal muscle spasm today so trigger point injections were performed.  Our treatment plan is as outlined below.    Plan:  1. Physical Therapy:  Deferred at this time - consider PT for shoulder depending on Ortho eval  2. Clinical Health Psychologist to address issues of relaxation, behavioral change, coping style, and other factors important to improvement.  deferred  3. Diagnostic Studies:  Left shoulder MRI prior to Ortho eval  4. Medication Management:    1. Continue Tramadol 50 mg QID prn pain  2. Continue Gabapentin 900 mg qam, 900 qnoon, and 1200 QHS  3. Continue Robaxin 750 mg TID  4. Continue Flexeri 10 mg prn spasm  5. Further procedures recommended:   1. Cervical trigger point injections today - see procedure note  2. Consider repeat cervical epidural steroid injection  3. May benefit from subacromial bursa injection vs  intra-articular injection left shoulder  6. Recommendations to PCP: continue current treatment  7. Follow up: 8-10 weeks    Procedure note:  Trigger points were identified by patient, and marked when appropriate.  Signed, informed consent was obtained.  A pause for the cause was performed.    The area was prepped with Chloroprep.    Using clean technique, injections were completed using a 30 G, 1 inch needle.  After negative aspiration, injection was completed.  A total of 2 locations were injected with 1.5 ml at each site with needle redirection within the muscle.  When possible, tissue was retracted from the chest wall to avoid lung injury.    Muscle groups injected:  Cervical paraspinal muscles    Injection solution contained:  Decadron 5 mg and 9.5ml of 0.5% bupivacaine.    Breath sounds were normal.  He tolerated the procedure well without complications.      Total time spent was 45 minutes, and more than 50% of face to face time was spent in counseling and/or coordination of care regarding physical therapy, medication management, and interventional procedures to decrease pain and improve function.    Boni Freeman MD  [facility]

## 2017-06-15 NOTE — LETTER
[facility]    06/15/17    Patient: Indra Mehta  YOB: 1965  Medical Record Number: [id number]                                                                  Controlled Substance Agreement  I understand that my care provider has prescribed controlled substances (narcotics, tranquilizers, and/or stimulants) to help manage my condition(s).  I am taking this medicine to help me function or work.  I know that this is strong medicine.  It could have serious side effects and even cause a dependency on the drug.  If I stop these medicines suddenly, I could have severe withdrawal symptoms.    The risks, benefits, and side effects of these medication(s) were explained to me.  I agree that:  1. I will take part in other treatments as advised by my provider.  This may be psychiatry or counseling, physical therapy, behavioral therapy, group treatment, or a referral to a pain clinic.  I will reduce or stop my medicine when my provider tells me to do so.   2. I will take my medicines as prescribed.  I will not change the dose or schedule unless my provider tells me to.  There will be no refills if I  run out early.   I may be contacted at any time without warning and asked to complete a drug test or pill count.   3. I will keep all my appointments at the clinic.  If I miss appointments or fail to follow instructions, my provider may stop my medicine.  4. I will not ask other providers to prescribe controlled substances. And I will not accept controlled substances from other people. If I need another prescribed controlled substance for a new reason, I will notify my provider within one business day.  5. If I enroll in the [place] Medical Marijuana program, I will tell my provider.  I will also sign an agreement to share my medical records with my provider.  6. I will use one pharmacy to fill all of my controlled substance prescriptions.  If my prescription is mailed to my pharmacy,  it may take 5 to 7 days for my medicine to be ready.  7. I understand that my provider, clinic care team, and pharmacy can track controlled substance prescriptions from other providers through a central database (prescription monitoring program).  8. I will bring in my list of medications (or my medicine bottles) each time I come to the clinic.  REV- 04/2016                                                                                                                                            Page 1 of 2      [facility]    06/15/17    Patient: Indra Mehta  YOB: 1965  Medical Record Number: [id number]    9. Refills of controlled substances will be made only during office hours.  It is up to me to make sure that I do not run out of my medicines on weekends or holidays.    10. I am responsible for my prescriptions.  If the medicine is lost or stolen, it will not be replaced.   I also agree not to share these medicines with anyone.  11. I agree to not use ANY illegal or recreational drugs.  This includes marijuana, cocaine, bath salts or other drugs.  I agree not to use alcohol unless my provider says I may.  I agree to give urine samples whenever asked.  If I fail to give a urine sample, the provider may stop my medicine.     12. I will tell my nurse or provider right away if I become pregnant or have a new medical problem treated outside of [facility].  13. I understand that this medicine can affect my thinking and judgment.  It may be unsafe for me to drive, use machinery and do dangerous tasks.  I will not do any of these things until I know how the medicine affects me.  If my dose changes, I will wait to see how it affects me.  I will contact my provider if I have concerns about medicine side effects.  I understand that if I do not follow any of the conditions above, my prescriptions or treatment may be stopped.    I agree that my provider, clinic care team, and  pharmacy may work with any city, state or federal law enforcement agency that investigates the misuse, sale, or other diversion of my controlled medicine. I will allow my provider to discuss my care with or share a copy of this agreement with any other treating provider, pharmacy or emergency room where I receive care.  I agree to give up (waive) any right of privacy or confidentiality with respect to these authorizations.   I have read this agreement and have asked questions about anything I did not understand.   ___________________________________    ___________________________  Patient Signature                                                           Date and Time  ___________________________________     ____________________________  Witness                                                                            Date and Time  ___________________________________  Boni Freeman MD  REV-  04/2016                                                                                                                                                                 Page 2 of 2  Opioid Pain Medicines (also known as Narcotics)  What You Need to Know      What are opioids?   Opioids are pain medicines that must be prescribed by a doctor. Examples are:     morphine (MS Contin, Lucia)    oxycodone (Oxycontin)    oxycodone and acetaminophen (Percocet)    hydrocodone and acetaminophen (Vicodin, Norco)     fentanyl patch (Duragesic)     hydromorphone (Dilaudid)     methadone     What do opioids do well?   Opioids are best for short-term pain after a surgery or injury. They also work well for cancer pain. Unlike other pain medicines, they do not cause liver or kidney failure or ulcers. They may help some people with long-lasting (chronic) pain.     What do opioids NOT do well?   Opioids never get rid of pain entirely, and they do not work well for most patients with chronic pain. Opioids do not reduce swelling, one of  the causes of pain. They also don t work well for nerve pain.     Side effects  Talk to your doctor before you start or decide to keep taking one of these medicines. Side effects include:    Lowers your breathing rate enough that it could cause death    Death due to taking more than the prescribed dose    Serious lifelong opioid use      Dependence is not the same as addiction. Addiction is when people keep using a substance that harms their body, their mind or their relations with others. If you have a history of drug or alcohol abuse, taking opioids can cause a relapse.  Over time, opioids don t work as well. Most people will need higher and higher doses. The higher the dose, the more serious the side effects. We don t know the long-term effects of opioids.   People who have used opioids for a long time have a lower quality of life, worse depression, higher levels of pain and more visits to doctors.  Overdose from prescription drugs is the second leading cause of death in the [place] The risk of overdose rises when opioids are taken with other drugs such as:    Medicines used for anxiety and panic attacks (such as lorazepam, alprazolam, clonazepam    Other sedatives    Alcohol    Illegal drugs such as heroin  Never share your opioids with others. Be sure to store opioids in a secure place, locked if possible.Young children can easily swallow them and overdose.     Are there other ways to manage pain?  Ways to help reduce pain:    Exercise every day.    Treat health problems that may be causing pain.    Treat mental health problems like depression and anxiety.     Worse depression symptoms; Less pleasure in things you usually enjoy    Feeling tired or sluggish    Slower thoughts or cloudy thinking    Being more sensitive to pain over time; Pain is harder to control.    Trouble sleeping or restless sleep    Changes in hormone levels (for example, less testosterone).     Changes in sex drive or ability to have  sex    Long lasting nausea and constipation    Trouble breathing while asleep; This is worse with lung problems like COPD or sleep apnea.    Unsafe driving    Getting sick more often    Itching    Feeling dizzy    Dry mouth    Sweating    Trouble emptying the bladder (peeing). This is worse if you have an enlarged prostate or get urinary tract infections (UTIs).    What else should I know about opioids?  When someone takes opioids for too long or too often, they become dependent. This means that if you stop or reduce the medicine too quickly, you will have withdrawal symptoms.          Practice good sleep habits.  Try to go to bed and get up at the same time every day.    Stop smoking.  Tobacco use can make pain worse.    Do things that you enjoy.    Find a way to work through pain without drugs.  Try deep breathing, meditation, visual imagery and aromatherapy.    Ask your doctor to help you create a plan to manage your pain.

## 2017-06-20 ENCOUNTER — OFFICE VISIT (OUTPATIENT)
Dept: ORTHOPEDICS | Facility: CLINIC | Age: 52
End: 2017-06-20
Payer: COMMERCIAL

## 2017-06-20 VITALS — BODY MASS INDEX: 28.49 KG/M2 | WEIGHT: 215 LBS | HEART RATE: 90 BPM | HEIGHT: 73 IN

## 2017-06-20 DIAGNOSIS — M25.512 ACUTE PAIN OF LEFT SHOULDER: Primary | ICD-10-CM

## 2017-06-20 DIAGNOSIS — M24.812 INTERNAL DERANGEMENT OF LEFT SHOULDER: ICD-10-CM

## 2017-06-20 PROCEDURE — 99214 OFFICE O/P EST MOD 30 MIN: CPT | Performed by: PHYSICAL MEDICINE & REHABILITATION

## 2017-06-20 NOTE — PATIENT INSTRUCTIONS
Today's Plan of Care:  MRI of the left shoulder - [facility] [phone]     Follow Up: Following MRI. Call with any questions or concerns.

## 2017-06-20 NOTE — PROGRESS NOTES
Sports Medicine Clinic Visit - Interim History June 20, 2017    Initial Visit Date 6/6/2017    PCP: Tha Grullon Janine is a 51 year old male who is seen in f/u up for left shoulder pain. Since last visit on 6/6/2017 patient has some improvement however the shoulder is worse than before the acute pain he had last time.  He rates the pain at a 1/10 currently.  Symptoms are relieved with not moving the arm and ibuprofen for the ache and worsened by AROM, carrying any amount of weight, shoulder flexion, and abduction. Notes he has soreness with even holding the steering wheel.    Review of Systems  Musculoskeletal: as above  Remainder of review of systems is negative including constitutional, eyes, ENT, CV, pulmonary, GI, , endocrine, skin, hematologic, and neurologic except as noted in HPI or medical history.    History reviewed. No pertinent past surgical/medical/family/social history.    Past Medical History:   Diagnosis Date     Back pain      Meniere's disease, unspecified      Pain medication agreement signed 8/20/2010     Past Surgical History:   Procedure Laterality Date     BUNIONECTOMY RT/LT  09/05/08    Both feet     C MASTOIDECTOMY,COMPLETE  09/27/2007     COLONOSCOPY N/A 12/28/2016    Procedure: COMBINED COLONOSCOPY, SINGLE OR MULTIPLE BIOPSY/POLYPECTOMY BY BIOPSY;  Surgeon: Indra Jernigan MD;  Location: [facility]     HC COLONOSCOPY W/WO BRUSH/WASH  12/27/2005     HC CREATE EARDRUM OPENING,GEN ANESTH  09/27/2007    Pe tube, Left endolymphatic sac enhancement.     PE TUBES  2006    left ear     Family History   Problem Relation Age of Onset     Cancer - colorectal Mother      DIABETES Mother      Cardiovascular Father      Hypertension Father      MENTAL ILLNESS Sister      Suicide Other      Social History     Social History     Marital status: Single     Spouse name: N/A     Number of children: 2     Years of education: N/A     Occupational History      [employer]  "History Main Topics     Smoking status: Never Smoker     Smokeless tobacco: Never Used     Alcohol use No     Drug use: No     Sexual activity: Yes     Partners: Female     Other Topics Concern     Parent/Sibling W/ Cabg, Mi Or Angioplasty Before 65f 55m? No     Social History Narrative       He works as an   At baseline, he does not need assistance with ambulation    Current Outpatient Prescriptions   Medication     gabapentin (NEURONTIN) 300 MG capsule     traMADol (ULTRAM) 50 MG tablet     methocarbamol (ROBAXIN) 750 MG tablet     losartan (COZAAR) 50 MG tablet     HYDROcodone-acetaminophen (NORCO) 5-325 MG per tablet     order for DME     cyclobenzaprine (FLEXERIL) 10 MG tablet     Acetaminophen (TYLENOL PO)     IBUPROFEN PO     No current facility-administered medications for this visit.      Allergies   Allergen Reactions     No Known Drug Allergies          Objective:  Pulse 90  Ht 6' 1.3\" (1.862 m)  Wt 215 lb (97.5 kg)  BMI 28.13 kg/m2    General: Alert and in no distress    Head: Normocephalic, atraumatic  Eyes: no scleral icterus or conjunctival erythema   Oropharynx:  Mucous membranes moist  Skin: no erythema, ecchymosis, petechiae, or jaundice  CV: regular rhythm by palpation, 2+ distal pulses, no pedal edema    Resp: normal respiratory effort without conversational dyspnea   Psych: normal mood and affect    Gait: Non-antalgic, appropriate coordination and balance   Neuro: normal light touch sensory exam of the extremities. Motor strength as noted below    Musculoskeletal:    Bilateral Shoulder exam    Inspection and Posture:       normal    Skin:        no visible deformities    Palpation:       Tender to palpation over the left proximal biceps tendon    ROM:   Bilateral shoulder abduction, flexion, extension, and external rotation are full.  Internal rotation behind the back is decreased bilaterally - left more so than right.    Painful motions:  Left shoulder abduction/adduction (painful " arc), flexion/extension (painful arc), external rotation, and internal rotation are painful.  Right internal rotation behind the back is also painful.      Strength:        abduction 4/5 left, 5/5 right       flexion 4/5 left, 5/5 right       internal rotation 5-/5 bilaterally       external rotation 4+/5 left, 5-/5 right       lift-off 4/5 bilaterally       elbow flexion 5/5 bilaterally       elbow extension 5/5 bilaterally       forearm pronation 5/5 bilaterally       forearm supination 5/5 bilaterally       wrist flexion 5/5 bilaterally       wrist extension 5/5 bilaterally        strength 5/5 bilaterally       finger abduction 5/5 bilaterally    Impingement testing:       neg (-) Neer bilaterally       positive (+) Gallegos bilaterally       positive (+) empty can left       positive (+) crossover left       neg (-) O'jacky bilaterally       positive (+) Speed's bilaterally    Sensation:        normal sensation over shoulder and upper extremity     Radiology:  No imaging today.  Recent Results (from the past 744 hour(s))   XR Shoulder Left G/E 3 Views    Narrative    SHOULDER LEFT THREE OR MORE VIEWS    6/6/2017 2:49 PM     HISTORY: Pain in unspecified shoulder    COMPARISON: None.     FINDINGS: There is no fracture. The humeral head is well located  within the glenoid fossa. Small ossification at the acromioclavicular  joint superiorly is likely secondary to mild degenerative change.  Glenohumeral, acromioclavicular, and coracoclavicular spaces are well  maintained. Visualized portions of the adjacent lung are clear.      Impression    IMPRESSION: Mild degenerative changes of the AC joint. Otherwise  negative left shoulder x-rays.    HILDA DOUGLAS MD       Assessment:  1. Acute pain of left shoulder    2. Internal derangement of left shoulder        Plan:  Discussed the assessment with the patient. We discussed the following treatment options: symptom treatment, activity modification/rest, imaging and rehab.  Following discussion, plan:  -Indra continues to have significant pain and weakness in his left shoulder.  Discussed various options, including MRI of the left shoulder, steroid injection, and physical therapy.  He would like to proceed with an MRI (previously ordered by Dr. Angle Freeman).  We will follow up in clinic after it is done.      Yoanna Xie MD, [facility]

## 2017-06-20 NOTE — MR AVS SNAPSHOT
After Visit Summary   2017    Indra Mehta    MRN: [id number]           Patient Information     Date Of Birth          1965        Visit Information        Provider Department      2017 8:00 AM Melissa Xie MD [facility]        Today's Diagnoses     Acute pain of left shoulder    -  1      Care Instructions    Today's Plan of Care:  MRI of the left shoulder - [facility] [phone]     Follow Up: Following MRI. Call with any questions or concerns.               Follow-ups after your visit        Your next 10 appointments already scheduled     2017  3:00 PM CDT   New Visit with Mike Mckenna MD   [facility]    [address] [address] [phone]              Who to contact     If you have questions or need follow up information about today's clinic visit or your schedule please contact [facility] directly at [phone].  Normal or non-critical lab and imaging results will be communicated to you by Ground[employer]shart, letter or phone within 4 business days after the clinic has received the results. If you do not hear from us within 7 days, please contact the clinic through [employer] or phone. If you have a critical or abnormal lab result, we will notify you by phone as soon as possible.  Submit refill requests through [employer] or call your pharmacy and they will forward the refill request to us. Please allow 3 business days for your refill to be completed.          Additional Information About Your Visit        Ground[employer]art Information     [employer] gives you secure access to your electronic health record. If you see a primary care provider, you can also send messages to your care team and make appointments. If you have questions, please call your primary care clinic.  If you do not have a primary care provider, please call [phone] and they will assist  "you.        Care EveryWhere ID     This is your Care EveryWhere ID. This could be used by other organizations to access your [place] medical records  [id number]        Your Vitals Were     Pulse Height BMI (Body Mass Index)             90 6' 1.3\" (1.862 m) 28.13 kg/m2          Blood Pressure from Last 3 Encounters:   06/15/17 133/83   05/05/17 127/88   04/12/17 126/79    Weight from Last 3 Encounters:   06/20/17 215 lb (97.5 kg)   06/15/17 215 lb (97.5 kg)   06/06/17 215 lb 9.6 oz (97.8 kg)              Today, you had the following     No orders found for display       Primary Care Provider Office Phone # Fax #    Tha Grullon [phone]       [facility] [address] [facility] [address]        Thank you!     Thank you for choosing [facility]  for your care. Our goal is always to provide you with excellent care. Hearing back from our patients is one way we can continue to improve our services. Please take a few minutes to complete the written survey that you may receive in the mail after your visit with us. Thank you!             Your Updated Medication List - Protect others around you: Learn how to safely use, store and throw away your medicines at www.disposemymeds.org.          This list is accurate as of: 6/20/17  8:38 AM.  Always use your most recent med list.                   Brand Name Dispense Instructions for use    cyclobenzaprine 10 MG tablet    FLEXERIL    60 tablet    Take 1 tablet (10 mg) by mouth 2 times daily as needed for muscle spasms Reported on 4/12/2017       gabapentin 300 MG capsule    NEURONTIN    300 capsule    Take 3 capsules (900 mg) by mouth 3 times daily If needed/tolerated, OK to increase bedtime dose to 1200mg       HYDROcodone-acetaminophen 5-325 MG per tablet    NORCO    30 tablet    Take 1 tablet by mouth every 6 hours as needed for moderate to severe pain or pain maximum 6 tablets per day       IBUPROFEN PO      Take 600 mg by mouth " every 4 hours as needed for moderate pain       losartan 50 MG tablet    COZAAR    90 tablet    Take 1 tablet (50 mg) by mouth daily       methocarbamol 750 MG tablet    ROBAXIN    180 tablet    Take 1 tablet (750 mg) by mouth 3 times daily as needed for muscle spasms       order for DME     1 Device    Equipment being ordered: TENS Pads as directed       traMADol 50 MG tablet    ULTRAM    120 tablet    Take 1 tablet (50 mg) by mouth every 6 hours as needed for moderate pain Max of 4 tabs/day. Okay to fill on/after 5/28/2017 to start on/after 5/31/2017.       TYLENOL PO      Take 500 mg by mouth every 4 hours as needed for mild pain or fever

## 2017-06-21 ENCOUNTER — TELEPHONE (OUTPATIENT)
Dept: PALLIATIVE MEDICINE | Facility: CLINIC | Age: 52
End: 2017-06-21

## 2017-06-21 NOTE — TELEPHONE ENCOUNTER
Katarina from [facility] calling, she needs an order signed for this patient. It is in [facility]      Peyton CHURCHILL    [facility]

## 2017-06-21 NOTE — TELEPHONE ENCOUNTER
No phone # left.    [facility] ([place]) imaging- [phone] or main number [phone]    Called and informed them that a covering provider would need to sign as he is out.  Dr. Kraus was able to get into his orders and sign for him.    Heidy Gipson, RN-BSN  [facility]

## 2017-06-22 ENCOUNTER — HOSPITAL ENCOUNTER (OUTPATIENT)
Dept: MRI IMAGING | Facility: CLINIC | Age: 52
Discharge: HOME | End: 2017-06-22
Attending: ANESTHESIOLOGY | Admitting: ANESTHESIOLOGY
Payer: COMMERCIAL

## 2017-06-22 ENCOUNTER — OFFICE VISIT (OUTPATIENT)
Dept: NEUROSURGERY | Facility: CLINIC | Age: 52
End: 2017-06-22
Payer: COMMERCIAL

## 2017-06-22 VITALS — TEMPERATURE: 97.6 F | WEIGHT: 216 LBS | BODY MASS INDEX: 28.63 KG/M2 | HEIGHT: 73 IN

## 2017-06-22 DIAGNOSIS — M25.512 CHRONIC LEFT SHOULDER PAIN: ICD-10-CM

## 2017-06-22 DIAGNOSIS — M75.81 TENDONITIS OF BOTH ROTATOR CUFFS: ICD-10-CM

## 2017-06-22 DIAGNOSIS — M75.82 TENDONITIS OF BOTH ROTATOR CUFFS: ICD-10-CM

## 2017-06-22 DIAGNOSIS — M25.512 ACUTE PAIN OF LEFT SHOULDER: ICD-10-CM

## 2017-06-22 DIAGNOSIS — M54.5 CHRONIC BILATERAL LOW BACK PAIN, WITH SCIATICA PRESENCE UNSPECIFIED: ICD-10-CM

## 2017-06-22 DIAGNOSIS — M54.12 CERVICAL RADICULOPATHY: Primary | ICD-10-CM

## 2017-06-22 DIAGNOSIS — G89.29 CHRONIC LEFT SHOULDER PAIN: ICD-10-CM

## 2017-06-22 DIAGNOSIS — G89.29 CHRONIC BILATERAL LOW BACK PAIN, WITH SCIATICA PRESENCE UNSPECIFIED: ICD-10-CM

## 2017-06-22 PROCEDURE — 73221 MRI JOINT UPR EXTREM W/O DYE: CPT | Mod: LT

## 2017-06-22 PROCEDURE — 99204 OFFICE O/P NEW MOD 45 MIN: CPT | Performed by: NEUROLOGICAL SURGERY

## 2017-06-22 NOTE — PROGRESS NOTES
51-year-old male with severe left shoulder and upper arm pain, left C6 7 disc herniation.  Notes approximately 2 years of eight out of 10, sharp left-sided neck pain, with radiation to the trapezius, shoulder, and triceps.  His only intermittently had radiation to the hand.  He underwent a left C6 7 epidural steroid injection with Dr. Angle Freeman, which gave him temporary relief.  He is also being worked up for left shoulder pathology with Dr. Xie.  He has also done past physical therapy without improvement.         Past Medical History:   Diagnosis Date     Back pain      Meniere's disease, unspecified      Pain medication agreement signed 8/20/2010     Past Surgical History:   Procedure Laterality Date     BUNIONECTOMY RT/LT  09/05/08    Both feet     C MASTOIDECTOMY,COMPLETE  09/27/2007     COLONOSCOPY N/A 12/28/2016    Procedure: COMBINED COLONOSCOPY, SINGLE OR MULTIPLE BIOPSY/POLYPECTOMY BY BIOPSY;  Surgeon: Indra Jernigan MD;  Location: [facility]     HC COLONOSCOPY W/WO BRUSH/WASH  12/27/2005     HC CREATE EARDRUM OPENING,GEN ANESTH  09/27/2007    Pe tube, Left endolymphatic sac enhancement.     PE TUBES  2006    left ear     Social History     Social History     Marital status: Single     Spouse name: N/A     Number of children: 2     Years of education: N/A     Occupational History      [employer]     Social History Main Topics     Smoking status: Never Smoker     Smokeless tobacco: Never Used     Alcohol use No     Drug use: No     Sexual activity: Yes     Partners: Female     Other Topics Concern     Parent/Sibling W/ Cabg, Mi Or Angioplasty Before 65f 55m? No     Social History Narrative     Family History   Problem Relation Age of Onset     Cancer - colorectal Mother      DIABETES Mother      Cardiovascular Father      Hypertension Father      MENTAL ILLNESS Sister      Suicide Other         ROS: 10 point ROS neg other than the symptoms noted above in the HPI.    Physical Exam  Temp 97.6  F  "(36.4  C) (Temporal)  Ht 1.862 m (6' 1.3\")  Wt 98 kg (216 lb)  BMI 28.26 kg/m2  HEENT:  Normocephalic, atraumatic.  PERRLA.  EOM s intact.  Visual fields full to gross exam  Neck:  Supple, non-tender, without lymphadenopathy.  Heart:  No peripheral edema  Lungs:  No SOB  Abdomen:  Non-distended.   Skin:  Warm and dry.  Extremities:  No edema, cyanosis or clubbing.    NEUROLOGICAL EXAMINATION:     Mental status:  Alert and Oriented x 3, speech is fluent.  Cranial nerves:  II-XII intact.   Motor:    Shoulder Abduction:  Right:  5/5   Left:  5/5  Biceps:                      Right:  5/5   Left:  5/5  Triceps:                     Right:  5/5   Left:  5/5  Wrist Extensors:       Right:  5/5   Left:  5/5  Wrist Flexors:           Right:  5/5   Left:  5/5  interosseus :            Right:  5/5   Left:  5/5   Hip Flexor:                Right: 5/5  Left:  5/5  Hip Adductor:             Right:  5/5  Left:  5/5  Hip Abductor:             Right:  5/5  Left:  5/5  Gastroc Soleus:        Right:  5/5  Left:  5/5  Tib/Ant:                      Right:  5/5  Left:  5/5  EHL:                     Right:  5/5  Left:  5/5  Sensation:  Intact  Reflexes:  Negative Babinski.  Negative Clonus.  Negative Ruby's.  Coordination:  Smooth finger to nose testing.   Negative pronator drift.  Smooth tandem walking.    A/P:  51-year-old male with severe left shoulder and upper arm pain, left C6 7 disc herniation    I had a lengthy discussion with the patient, reviewing the history, symptoms and imaging  We discussed that given his extensive nonoperative interventions, and his positive, albeit temporary, response to the epidural steroid injection, that it would be appropriate to proceed to surgery at this point  We will plan for C6 7 ACDF  Risk and benefits discussed and he wishes to proceed     "

## 2017-06-22 NOTE — NURSING NOTE
Pre-operative Education    Education included but not limited to:  - Pre-operative physical with primary care physician within 30 days of surgical date.   - Pre-operative clearance (cardiology, hematology, etc).   - Discontinue NSAIDS x 7 days prior to surgical date.   -Do not begin taking Non-Steroidal Anti-Inflammatory Drugs or NSAIDs (Advil,Motrin, Ibuprofen,Nuprin, Diclofenac,Meloxicam, Aleve, Celebrex, Aspirin, etc.) until 6 weeks after surgery if you had a fusion. May cause bleeding and interfere with bone healing.    -May try tylenol for pain 1000 mg three times per day for pain    -Patient is not a smoker  -Forms to be completed    -Surgical risks: blood clots in the leg or lung, problems urinating, nerve damage, drainage from the incision, infection, stiffness.    -Preparation timeline   When to start NPO   Pre-op shower    -Hospital stay:   Checking in   Surgery   Recovery room   Hospital room     - Pain management  - Likely same day procedure  - Post operative incisional pain x 1-2 weeks which will require pain medications and muscle relaxants.   -Do NOT drive while taking narcotic pain medication.  -Post operative incision care:   Keep your incision clean and dry at all times. OK to remove dressing on postop day 2. OK to shower on postop day 3 and allow water to run over incision, pat dry after shower.    No bathing, swimming or submerging in water. Call immediately or come to ED if any drainage occurs, or you develop new pain.   Watch for signs of infection: redness, swelling, warmth, drainage, and fever of 101 degrees or higher. Notify clinic.   - Post operative activity limitations: 6-8 weeks, no lifting > 10 pounds, no bending, twisting, or overhead reaching.  -If a brace is required per Dr. Mckenna, [facility] will fit you for the brace in the hospital. Brace type and length of time to wear it will be determined by Dr. Mckenna.   - Follow up appointments: 6 week post op, 3 months post op, 6 months post op,  1 year post op. You will need to an xray before each appointment. Please call to schedule follow up appointment at [phone].   - Education book was also given to the patient for further review.      Patient verbalized understanding of above instructions. All questions were answered to the best of my ability and the patient's satisfaction. Patient advised to call with any additional questions or concerns.

## 2017-06-22 NOTE — PATIENT INSTRUCTIONS
Surgery scheduled at [facility] for C6-7 ACDF (anterior cervical discectomy and fusion)       Pre-Operative:  -Surgical risks: blood clots in the leg or lung, problems urinating, nerve damage, drainage from the incision, infection, stiffness.  - Pre-operative physical with primary care physician within 30 days of surgical date.   -Stop all solid foods 8 hours before surgery.  -Keep drinking clear liquids until 4 hours before surgery. Clear liquids include water, clear juice, black coffee, or clear tea without milk, Gatorade, clear soda.   -Shower procedure: Please shower with antibacterial soap the night before surgery and the morning of surgery. Refer to information sheet in folder.   - Discontinue Aspirin, NSAIDs (Advil/Ibuprofen, Naproxen,Nuprin, Diclofenac,Meloxicam, Aleve, Celebrex) x 7 days prior to surgical date. Do not begin taking until 6 weeks after surgery. May cause bleeding and interfere with bone healing.  - May try Tylenol for pain 1000 mg three times per day for pain.      Post-Operative:  -Same day procedure  - Post operative incisional pain x 1-2 weeks which will require pain medications and muscle relaxants. You will receive medication upon discharge.  -Do NOT drive while taking narcotic pain medication.  -Post operative incision care- Watch for signs of infection: redness, swelling, warmth, drainage, and fever of 101 degrees or higher. Notify clinic [phone].  -No submerging incision in water such as pools, hot tubs, baths for at least 8 weeks or until incision is healed. Showers are fine.   - Post operative activity limitations: 6-8 weeks, no lifting > 10 pounds, no bending, twisting, or overhead reaching.  -If a brace is required per Dr. Mckenna, [facility] will fit you for the brace in the hospital. Brace type and length of time to wear it will be determined by Dr. Mckenna.   -If you are currently employed, you will need to be off work for 4-6 weeks for post op recovery and  healing.  - Follow up appointments: 6 week post op, 3 months post op, 6 months post op, 1 year post op. You will need to an xray before each appointment. Please call to schedule these appointments at [phone].

## 2017-06-22 NOTE — MR AVS SNAPSHOT
After Visit Summary   6/22/2017    Indra Mehta    MRN: [id number]           Patient Information     Date Of Birth          1965        Visit Information        Provider Department      6/22/2017 3:00 PM Mike Mckenna MD [facility]    Surgery scheduled at [facility] for C6-7 ACDF (anterior cervical discectomy and fusion)       Pre-Operative:  -Surgical risks: blood clots in the leg or lung, problems urinating, nerve damage, drainage from the incision, infection, stiffness.  - Pre-operative physical with primary care physician within 30 days of surgical date.   -Stop all solid foods 8 hours before surgery.  -Keep drinking clear liquids until 4 hours before surgery. Clear liquids include water, clear juice, black coffee, or clear tea without milk, Gatorade, clear soda.   -Shower procedure: Please shower with antibacterial soap the night before surgery and the morning of surgery. Refer to information sheet in folder.   - Discontinue Aspirin, NSAIDs (Advil/Ibuprofen, Naproxen,Nuprin, Diclofenac,Meloxicam, Aleve, Celebrex) x 7 days prior to surgical date. Do not begin taking until 6 weeks after surgery. May cause bleeding and interfere with bone healing.  - May try Tylenol for pain 1000 mg three times per day for pain.      Post-Operative:  -Same day procedure  - Post operative incisional pain x 1-2 weeks which will require pain medications and muscle relaxants. You will receive medication upon discharge.  -Do NOT drive while taking narcotic pain medication.  -Post operative incision care- Watch for signs of infection: redness, swelling, warmth, drainage, and fever of 101 degrees or higher. Notify clinic [phone].  -No submerging incision in water such as pools, hot tubs, baths for at least 8 weeks or until incision is healed. Showers are fine.   - Post operative activity limitations: 6-8 weeks, no lifting > 10 pounds, no bending,  twisting, or overhead reaching.  -If a brace is required per Dr. Mckenna, [facility] will fit you for the brace in the hospital. Brace type and length of time to wear it will be determined by Dr. Mckenna.   -If you are currently employed, you will need to be off work for 4-6 weeks for post op recovery and healing.  - Follow up appointments: 6 week post op, 3 months post op, 6 months post op, 1 year post op. You will need to an xray before each appointment. Please call to schedule these appointments at [phone].                Follow-ups after your visit        Your next 10 appointments already scheduled     Jun 27, 2017  3:40 PM CDT   Return Visit with Melissa Xie MD   [facility]    [address] [place] [id number] [phone]              Who to contact     If you have questions or need follow up information about today's clinic visit or your schedule please contact [facility] directly at [phone].  Normal or non-critical lab and imaging results will be communicated to you by [place], letter or phone within 4 business days after the clinic has received the results. If you do not hear from us within 7 days, please contact the clinic through [facility] or phone. If you have a critical or abnormal lab result, we will notify you by phone as soon as possible.  Submit refill requests through [employer] or call your pharmacy and they will forward the refill request to us. Please allow 3 business days for your refill to be completed.          Additional Information About Your Visit        [employer] Information     [employer] gives you secure access to your electronic health record. If you see a primary care provider, you can also send messages to your care team and make appointments. If you have questions, please call your primary care clinic.  If you do not have a primary care provider, please call [phone] and they will assist you.       "  Care EveryWhere ID     This is your Care EveryWhere ID. This could be used by other organizations to access your [facility] medical records  [id number]        Your Vitals Were     Temperature Height BMI (Body Mass Index)             97.6  F (36.4  C) (Temporal) 6' 1.3\" (1.862 m) 28.26 kg/m2          Blood Pressure from Last 3 Encounters:   06/15/17 133/83   05/05/17 127/88   04/12/17 126/79    Weight from Last 3 Encounters:   06/22/17 216 lb (98 kg)   06/20/17 215 lb (97.5 kg)   06/15/17 215 lb (97.5 kg)              Today, you had the following     No orders found for display       Primary Care Provider Office Phone # Fax #    Tha Grullon [phone]       [facility] [address] [facility] [address]        Equal Access to Services     KELSEY ARSHAD : Hadii marva khano Somary, waaxda luqadaha, qaybta kaalmada adeegyada, mikala carmichael . So [facility] [phone].    ATENCIÓN: Si habla español, tiene a lundy disposición servicios gratuitos de asistencia lingüística. Llame al [phone].    We comply with applicable federal civil rights laws and [place] laws. We do not discriminate on the basis of race, color, national origin, age, disability sex, sexual orientation or gender identity.            Thank you!     Thank you for choosing [facility]  for your care. Our goal is always to provide you with excellent care. Hearing back from our patients is one way we can continue to improve our services. Please take a few minutes to complete the written survey that you may receive in the mail after your visit with us. Thank you!             Your Updated Medication List - Protect others around you: Learn how to safely use, store and throw away your medicines at www.disposemymeds.org.          This list is accurate as of: 6/22/17  3:52 PM.  Always use your most recent med list.                   Brand Name Dispense Instructions for use Diagnosis    " cyclobenzaprine 10 MG tablet    FLEXERIL    60 tablet    Take 1 tablet (10 mg) by mouth 2 times daily as needed for muscle spasms Reported on 4/12/2017    Muscle spasm       gabapentin 300 MG capsule    NEURONTIN    300 capsule    Take 3 capsules (900 mg) by mouth 3 times daily If needed/tolerated, OK to increase bedtime dose to 1200mg    Cervicalgia       HYDROcodone-acetaminophen 5-325 MG per tablet    NORCO    30 tablet    Take 1 tablet by mouth every 6 hours as needed for moderate to severe pain or pain maximum 6 tablets per day    Acute pain of left shoulder, Tendonitis of both rotator cuffs       IBUPROFEN PO      Take 600 mg by mouth every 4 hours as needed for moderate pain        losartan 50 MG tablet    COZAAR    90 tablet    Take 1 tablet (50 mg) by mouth daily    Benign essential hypertension       methocarbamol 750 MG tablet    ROBAXIN    180 tablet    Take 1 tablet (750 mg) by mouth 3 times daily as needed for muscle spasms    Cervical radiculopathy, Myofascial pain       order for DME     1 Device    Equipment being ordered: TENS Pads as directed    Chronic pain syndrome       traMADol 50 MG tablet    ULTRAM    120 tablet    Take 1 tablet (50 mg) by mouth every 6 hours as needed for moderate pain Max of 4 tabs/day. Okay to fill on/after 5/28/2017 to start on/after 5/31/2017.    Chronic bilateral low back pain, with sciatica presence unspecified       TYLENOL PO      Take 500 mg by mouth every 4 hours as needed for mild pain or fever

## 2017-06-22 NOTE — NURSING NOTE
"Indra Mehta is a 51 year old male who presents for:  Chief Complaint   Patient presents with     Consult     cervical radiculopathy, into left arm, reduced ROM, and with daily headahes        Initial Vitals:  Temp 97.6  F (36.4  C) (Temporal)  Ht 1.862 m (6' 1.3\")  Wt 98 kg (216 lb)  BMI 28.26 kg/m2 Estimated body mass index is 28.26 kg/(m^2) as calculated from the following:    Height as of this encounter: 1.862 m (6' 1.3\").    Weight as of this encounter: 98 kg (216 lb).. Body surface area is 2.25 meters squared. BP completed using cuff size: NA (Not Taken)  Data Unavailable    Do you feel safe in your environment?  Yes  Do you need any refills today? No    Nursing Comments:         Gianfranco Church    "

## 2017-06-22 NOTE — TELEPHONE ENCOUNTER
Received call from patient requesting refill(s) of traMADol (ULTRAM) 50 MG tablet    Last picked up from pharmacy on 05/31/17    Pt last seen by prescribing provider on 06/15/17  Next appt scheduled for : none    Last urine drug screen date 10/13/16  Current opioid agreement on file (completed within the last year) Yes Date of opioid agreement: 06/15/17    Processing (pick one and delete the others):      Fax to [facility] pharmacy       Will route to nursing pool for review and preparation of prescription(s).

## 2017-06-22 NOTE — TELEPHONE ENCOUNTER
Patient left  10:52 am    Reason for call:  Medication   If this is a refill request, has the caller requested the refill from the pharmacy already? No  Will the patient be using a [facility]? No  Name of the pharmacy and phone number for the current request: [facility] in [place]    Name of the medication requested: Tramadol    Other request: Please fax to pharmacy

## 2017-06-23 ENCOUNTER — TELEPHONE (OUTPATIENT)
Dept: NEUROSURGERY | Facility: CLINIC | Age: 52
End: 2017-06-23

## 2017-06-23 NOTE — TELEPHONE ENCOUNTER
Pt LM at 0859 on 6/23 in regards to his refill request that he left yesterday. He is wondering if he can get something stronger than Tramadol because he is supposed to stop taking ibuprofen next week prior to his surgery on 7/5. Please call. Phone #[phone]    Shanda Dai    [facility]

## 2017-06-23 NOTE — TELEPHONE ENCOUNTER
Surgery Scheduled    Date of Surgery 7/5 Time of Surgery   Procedure: C6-7 ACDF  Hospital/Surgical Facility: [facility]  Surgeon: Dr. Mckenna  Type of Anesthesia : GEneral  Pre-op 6/30 with Dr. Lipscomb  2 week post op: with         Surgery packet mailed to patient's home address. Patients instructed to arrive 1 hours prior to surgery. Patient understood and agrees to plan.    Dhara Mendes  Surgical Scheduler

## 2017-06-23 NOTE — TELEPHONE ENCOUNTER
"Called pt. Dr. Angle Freeman had wanted him to see a neurosurgeon, Dr. Mckenna, and he wants to do surgery. Pt has to stop ibuprofen a week before the surgery. It is scheduled for July 5th. Will be stopping ibuprofen next week; last dose on Tuesday evening. Pt states that Dr. Mckenna suggested that he get a stronger pain medication, \"like hydrocodone or other opioids,\" especially for him to take at night so he can get good sleep before the surgery, and to help with pain management lost with use of ibuprofen. Pt concerned about a great increase in pain when he stops taking ibuprofen.      Pt states that Tramadol is good for his \"normal\" pain. Pain in his neck has gotten progressively worse on average. In the past week, has had pain much more than normal. Takes tylenol and ibuprofen throughout the day. Take ibuprofen 600 mg 3 times/day typically and, on bad days, he takes 1000 mg in addition to that at bedtime. Pt states that he knows that this dose is above the recommended guidelines but that he doesn't take that much everyday.   Pt adds tylenol in between ibuprofen dosing, so is taking medications about every 2 hours when pain severe. TPI helps for short term relief.    Tramadol refill information reviewed.     Due date for tramadol is 6/30/17.  Let him know that if Dr. Angle Freeman prescribes something different, he may need to adjust his dose of tramadol.       Prescription prepped for review.     Will route to provider.     ANGELINA StantonN, RN-BC  Patient Care Supervisor/Care Coordinator  [facility]                "

## 2017-06-27 ENCOUNTER — OFFICE VISIT (OUTPATIENT)
Dept: ORTHOPEDICS | Facility: CLINIC | Age: 52
End: 2017-06-27
Payer: COMMERCIAL

## 2017-06-27 VITALS — BODY MASS INDEX: 28.63 KG/M2 | HEIGHT: 73 IN | WEIGHT: 216 LBS | HEART RATE: 84 BPM

## 2017-06-27 DIAGNOSIS — M75.82 INFRASPINATUS TENDONITIS, LEFT: ICD-10-CM

## 2017-06-27 DIAGNOSIS — M75.92 LEFT SUPRASPINATUS TENDINITIS: Primary | ICD-10-CM

## 2017-06-27 DIAGNOSIS — M25.512 ACUTE PAIN OF LEFT SHOULDER: ICD-10-CM

## 2017-06-27 PROCEDURE — 99213 OFFICE O/P EST LOW 20 MIN: CPT | Performed by: PHYSICAL MEDICINE & REHABILITATION

## 2017-06-27 RX ORDER — HYDROCODONE BITARTRATE AND ACETAMINOPHEN 5; 325 MG/1; MG/1
1 TABLET ORAL EVERY 6 HOURS PRN
Qty: 30 TABLET | Refills: 0 | Status: ON HOLD | OUTPATIENT
Start: 2017-06-27 | End: 2017-07-05

## 2017-06-27 RX ORDER — TRAMADOL HYDROCHLORIDE 50 MG/1
50 TABLET ORAL EVERY 6 HOURS PRN
Qty: 120 TABLET | Refills: 0 | Status: ON HOLD | OUTPATIENT
Start: 2017-06-27 | End: 2017-07-05

## 2017-06-27 NOTE — PATIENT INSTRUCTIONS
Today's Plan of Care:  Follow up for steroid injection of the left shoulder after recovery from neck surgery    Follow Up: as needed

## 2017-06-27 NOTE — MR AVS SNAPSHOT
After Visit Summary   6/27/2017    Indra Mehta    MRN: [id number]           Patient Information     Date Of Birth          1965        Visit Information        Provider Department      6/27/2017 3:40 PM Melissa Xie MD [facility]        Care Instructions    Today's Plan of Care:  Follow up for steroid injection of the left shoulder after recovery from neck surgery    Follow Up: as needed            Follow-ups after your visit        Your next 10 appointments already scheduled     Jun 30, 2017  3:15 PM CDT   Pre-Op physical with Farhan Lipscomb MD   [facility]    [address] [place] [id number] [phone]            Jul 05, 2017   Procedure with Mike Mckenna MD   [facility] ([facility])    [address] [place] [id number] [phone]           From [address]: Exit at [address] on south side of [place]. Turn right on [address]. Turn left at stoplight on [address]. [facility] will be in view two blocks ahead            Jul 05, 2017 10:00 AM CDT   XR SURGERY SANDEEP LESS THAN 5 MIN FLUORO W STILLS with PHCARM1   [facility] ([facility])    [address] [place] [id number] [phone]           Please bring a list of your current medicines to your exam. (Include vitamins, minerals and over-thecounter medicines.) Leave your valuables at home.  Tell your doctor if there is a chance you may be pregnant.  You do not need to do anything special for this exam.              Who to contact     If you have questions or need follow up information about today's clinic visit or your schedule please contact [facility] directly at [phone].  Normal or non-critical lab and imaging results will be communicated to you by MyChart, letter or phone within 4 business days  "after the clinic has received the results. If you do not hear from us within 7 days, please contact the clinic through [employer] or phone. If you have a critical or abnormal lab result, we will notify you by phone as soon as possible.  Submit refill requests through [employer] or call your pharmacy and they will forward the refill request to us. Please allow 3 business days for your refill to be completed.          Additional Information About Your Visit        [employer] Information     [employer] gives you secure access to your electronic health record. If you see a primary care provider, you can also send messages to your care team and make appointments. If you have questions, please call your primary care clinic.  If you do not have a primary care provider, please call [phone] and they will assist you.        Care EveryWhere ID     This is your Care EveryWhere ID. This could be used by other organizations to access your [facility] medical records  [id number]        Your Vitals Were     Pulse Height BMI (Body Mass Index)             84 6' 1.3\" (1.862 m) 28.26 kg/m2          Blood Pressure from Last 3 Encounters:   06/15/17 133/83   05/05/17 127/88   04/12/17 126/79    Weight from Last 3 Encounters:   06/27/17 216 lb (98 kg)   06/22/17 216 lb (98 kg)   06/20/17 215 lb (97.5 kg)              Today, you had the following     No orders found for display       Primary Care Provider Office Phone # Fax #    Tha Grullon [phone]       [facility] [address] [place] [id number]        Equal Access to Services     KELSEY ARSHAD : Hadii marva joseph hadasho Soaliyaali, waaxda luqadaha, qaybta kaalmada adeegmallyda, mikala lainez. So [facility] [phone].    ATENCIÓN: Si habla español, tiene a lundy disposición servicios gratuitos de asistencia lingüística. Llame al [phone].    We comply with applicable federal civil rights laws and [place] laws. We do not discriminate on the " basis of race, color, national origin, age, disability sex, sexual orientation or gender identity.            Thank you!     Thank you for choosing [facility]  for your care. Our goal is always to provide you with excellent care. Hearing back from our patients is one way we can continue to improve our services. Please take a few minutes to complete the written survey that you may receive in the mail after your visit with us. Thank you!             Your Updated Medication List - Protect others around you: Learn how to safely use, store and throw away your medicines at www.disposemymeds.org.          This list is accurate as of: 6/27/17  4:12 PM.  Always use your most recent med list.                   Brand Name Dispense Instructions for use Diagnosis    cyclobenzaprine 10 MG tablet    FLEXERIL    60 tablet    Take 1 tablet (10 mg) by mouth 2 times daily as needed for muscle spasms Reported on 4/12/2017    Muscle spasm       gabapentin 300 MG capsule    NEURONTIN    300 capsule    Take 3 capsules (900 mg) by mouth 3 times daily If needed/tolerated, OK to increase bedtime dose to 1200mg    Cervicalgia       HYDROcodone-acetaminophen 5-325 MG per tablet    NORCO    30 tablet    Take 1 tablet by mouth every 6 hours as needed for moderate to severe pain or pain maximum 6 tablets per day    Acute pain of left shoulder, Tendonitis of both rotator cuffs       IBUPROFEN PO      Take 600 mg by mouth every 4 hours as needed for moderate pain        losartan 50 MG tablet    COZAAR    90 tablet    Take 1 tablet (50 mg) by mouth daily    Benign essential hypertension       methocarbamol 750 MG tablet    ROBAXIN    180 tablet    Take 1 tablet (750 mg) by mouth 3 times daily as needed for muscle spasms    Cervical radiculopathy, Myofascial pain       order for DME     1 Device    Equipment being ordered: TENS Pads as directed    Chronic pain syndrome       traMADol 50 MG tablet    ULTRAM    120 tablet    Take 1  tablet (50 mg) by mouth every 6 hours as needed for moderate pain Max of 4 tabs/day. Okay to fill on/after 6/28/2017 to start on/after 6/30/2017.    Chronic bilateral low back pain, with sciatica presence unspecified       TYLENOL PO      Take 500 mg by mouth every 4 hours as needed for mild pain or fever

## 2017-06-27 NOTE — PROGRESS NOTES
Sports Medicine Clinic Visit - Interim History June 27, 2017    Initial Visit Date 6/6/2017    PCP: Tha Grullon Janine is a 51 year old male who is seen in f/u up for left shoulder pain. Since last visit on 6/20/2017 patient has not had any change in symptoms.  He rates the pain at a   1/10 currently.  Symptoms are relieved with not moving the arm and worsened by  AROM, carrying any amount of weight, shoulder flexion, and abduction.         Review of Systems  Musculoskeletal: as above  Remainder of review of systems is negative including constitutional, eyes, ENT, CV, pulmonary, GI, , endocrine, skin, hematologic, and neurologic except as noted in HPI or medical history.    History reviewed. No pertinent past surgical/medical/family/social history.    Past Medical History:   Diagnosis Date     Back pain      Meniere's disease, unspecified      Pain medication agreement signed 8/20/2010     Past Surgical History:   Procedure Laterality Date     BUNIONECTOMY RT/LT  09/05/08    Both feet     C MASTOIDECTOMY,COMPLETE  09/27/2007     COLONOSCOPY N/A 12/28/2016    Procedure: COMBINED COLONOSCOPY, SINGLE OR MULTIPLE BIOPSY/POLYPECTOMY BY BIOPSY;  Surgeon: Indra Jernigan MD;  Location: [facility]     HC COLONOSCOPY W/WO BRUSH/WASH  12/27/2005     HC CREATE EARDRUM OPENING,GEN ANESTH  09/27/2007    Pe tube, Left endolymphatic sac enhancement.     PE TUBES  2006    left ear     Family History   Problem Relation Age of Onset     Cancer - colorectal Mother      DIABETES Mother      Cardiovascular Father      Hypertension Father      MENTAL ILLNESS Sister      Suicide Other      Social History     Social History     Marital status: Single     Spouse name: N/A     Number of children: 2     Years of education: N/A     Occupational History      [employer]     Social History Main Topics     Smoking status: Never Smoker     Smokeless tobacco: Never Used     Alcohol use No     Drug use: No     Sexual  "activity: Yes     Partners: Female     Other Topics Concern     Parent/Sibling W/ Cabg, Mi Or Angioplasty Before 65f 55m? No     Social History Narrative       He works as an   At baseline, he does not need assistance with ambulation      Current Outpatient Prescriptions   Medication     traMADol (ULTRAM) 50 MG tablet     HYDROcodone-acetaminophen (NORCO) 5-325 MG per tablet     gabapentin (NEURONTIN) 300 MG capsule     order for DME     methocarbamol (ROBAXIN) 750 MG tablet     cyclobenzaprine (FLEXERIL) 10 MG tablet     losartan (COZAAR) 50 MG tablet     Acetaminophen (TYLENOL PO)     IBUPROFEN PO     No current facility-administered medications for this visit.      Allergies   Allergen Reactions     No Known Drug Allergies          Objective:  Pulse 84  Ht 6' 1.3\" (1.862 m)  Wt 216 lb (98 kg)  BMI 28.26 kg/m2    General: Alert and in no distress    Head: Normocephalic, atraumatic  Neck:  Stiff  Eyes: no scleral icterus or conjunctival erythema   Oropharynx:  Mucous membranes moist  Skin: no erythema, ecchymosis, petechiae, or jaundice  CV: regular rhythm by palpation, 2+ distal pulses, no pedal edema    Resp: normal respiratory effort without conversational dyspnea   Psych: normal mood and affect    Gait: Non-antalgic, appropriate coordination and balance   Neuro: Motor strength and sensation as noted below    Musculoskeletal:    Bilateral Shoulder exam    Inspection and Posture:       Stiff neck    Skin:        no visible deformities    Palpation:  Tenderness to palpation over the left proximal biceps tendon insertion    ROM:   -Decreased bilateral external rotation.    Painful motions:  -Left shoulder abduction, flexion, internal rotation, and external rotation are painful.      Strength:        abduction 5/5 bilaterally       flexion 5/5 bilaterally       internal rotation 5/5 bilaterally       external rotation 5/5 bilaterally       lift-off 5/5 bilaterally       elbow flexion 5/5 bilaterally     "   elbow extension 5/5 bilaterally       forearm pronation 5/5 bilaterally       forearm supination 5/5 bilaterally       wrist flexion 5/5 bilaterally       wrist extension 5/5 bilaterally        strength 5/5 bilaterally       finger abduction 5/5 bilaterally    Sensation:        normal sensation over shoulder and upper extremity     Radiology:  Independent visualization of images performed  Recent Results (from the past 744 hour(s))   XR Shoulder Left G/E 3 Views    Narrative    SHOULDER LEFT THREE OR MORE VIEWS    6/6/2017 2:49 PM     HISTORY: Pain in unspecified shoulder    COMPARISON: None.     FINDINGS: There is no fracture. The humeral head is well located  within the glenoid fossa. Small ossification at the acromioclavicular  joint superiorly is likely secondary to mild degenerative change.  Glenohumeral, acromioclavicular, and coracoclavicular spaces are well  maintained. Visualized portions of the adjacent lung are clear.      Impression    IMPRESSION: Mild degenerative changes of the AC joint. Otherwise  negative left shoulder x-rays.    HILDA DOUGLAS MD   MR Shoulder Left w/o Contrast    Narrative    MR LEFT SHOULDER  6/22/2017 1:38 PM    HISTORY: Shoulder pain with movement for several years. No specific  injury.    TECHNIQUE: Oblique coronal T1, T2 and T2 fat suppressed images,  oblique sagittal T2 and axial proton density and T2 weighted images  were obtained.     COMPARISON: None.    FINDINGS:  Osseous acromial outlet: Moderate hypertrophic degenerative change at  the acromioclavicular joint does not indent the supraspinatus  myotendinous junction. The distal acromion is a type II morphology  with neutral slope on oblique sagittal images and no lateral  downsloping on oblique coronal images. There is no abnormal fluid in  the subacromial/subdeltoid bursa.    Rotator cuff: Mild signal heterogeneity in the posterolateral  supraspinatus and adjacent infraspinatus tendons consistent with  degenerative  tendinopathy. There is a possible superimposed tiny  linear intrasubstance tear. No significant partial- or full-thickness  tear or tendon retraction. The remainder of the supraspinatus,  infraspinatus, teres minor and subscapularis muscles and tendons are  unremarkable.    Labrum: The glenoid labrum is normal as visualized.    Biceps tendon: The biceps tendon is normal proximally at the biceps  anchor and distally in the bicipital groove.      Osseous structures and cartilaginous surfaces: No acute-appearing bony  abnormality. Numerous small subchondral cystic changes are seen at the  posterior glenoid. Glenohumeral articular cartilages appear normal.    Additional findings: There is a multiloculated teardrop-shaped cystic  mass projecting inferior to the greater humeral joint measuring 2.2 x  1.4 x 1.0 cm (image 13 of series 7 and image 16 of series 8). There is  no surrounding soft tissue edema, and the lesion appears to point  towards the joint. This is likely a periarticular ganglion cyst. A  large paralabral cyst dissecting inferiorly from the posterior labrum  is less likely but possible. If clinically indicated, MRI arthrogram  could be obtained to evaluate the posterior labrum.      Impression    IMPRESSION:   1. Degenerative tendinopathy in the posterolateral supraspinatus and  adjacent infraspinatus tendons. No significant partial- or  full-thickness rotator cuff tear.  2. Inferior projecting multiloculated periarticular ganglion cyst  measuring up to 2.2 cm.  3. Small subchondral cystic changes posterior glenoid.  4. Hypertrophic degenerative change at the acromioclavicular joint.    KELLE WILLIS MD       Assessment:  1. Left supraspinatus tendinitis    2. Acute pain of left shoulder    3. Infraspinatus tendonitis, left        Plan:  Discussed the assessment with the patient. We discussed the following treatment options: symptom treatment, activity modification/rest, imaging and rehab. Following  discussion, plan:  -Indra's shoulder MRI shows degenerative tendinopathy of the supraspinatus and infraspinatus, but no overt tears. Informed him that we could try a steroid injection but given that he is having a C6-7 anterior cervical discectomy and fusion next week, I would prefer to wait until after that has been completed.  He is in agreement.  Recommend follow up with me once beginning to recover from the surgery.      Yoanna Xie MD, CAQ  [facility]

## 2017-06-27 NOTE — TELEPHONE ENCOUNTER
Signed Prescriptions:                        Disp   Refills    traMADol (ULTRAM) 50 MG tablet             120 ta*0        Sig: Take 1 tablet (50 mg) by mouth every 6 hours as           needed for moderate pain Max of 4 tabs/day. Okay           to fill on/after 6/28/2017 to start on/after           6/30/2017.  Authorizing Provider: BONI NAYLOR    HYDROcodone-acetaminophen (NORCO) 5-325 MG*30 tab*0        Sig: Take 1 tablet by mouth every 6 hours as needed for           moderate to severe pain or pain maximum 6 tablets           per day  Authorizing Provider: BONI NAYLOR    I am OK with a temporary prescription for Norco 5/325 until surgery due to having to stop NSAIDS.  Rx prepped and signed as above.    Boni Freeman MD  [facility]

## 2017-06-27 NOTE — TELEPHONE ENCOUNTER
MARTIN. Script for Tramadol was signed and [facility] [place]. RightFax confirmed.   Script for NORCO was signed and will be mailing on 6/28/17 to [facility] [address]      Lillian De La Cruz MA

## 2017-06-28 NOTE — OR NURSING
Both Dr. Mckenna & MAYDA Serna available so I informed them of suture issue in 2008. Yamilex PABLO CAPA

## 2017-06-28 NOTE — OR NURSING
During pre-op call, pt informed me that he had had foot surgery in 2008 & had a reaction to the sutures that Dr. Mccann had used & that Dr. Mccann had taken the sutures out early as he felt he was developing an infection.  I looked up surgery report from 2008 & it was noted that he had used 5-0 ethilon simple interrupted sutures. Contacted OR charge nurse to inform her as well.  Yamilex PABLO CAPA

## 2017-06-29 ENCOUNTER — MYC MEDICAL ADVICE (OUTPATIENT)
Dept: PALLIATIVE MEDICINE | Facility: CLINIC | Age: 52
End: 2017-06-29

## 2017-06-29 ENCOUNTER — TELEPHONE (OUTPATIENT)
Dept: PALLIATIVE MEDICINE | Facility: CLINIC | Age: 52
End: 2017-06-29

## 2017-06-29 NOTE — TELEPHONE ENCOUNTER
Ketan    We tried to call you back multiple times but your phone would not accept our calls. Dr. Angle Freeman wrote you a prescription for Norco and this was sent to your pharmacy back on 6/28/2017. Please check with them in a few days to see if they received it yet.     Katarina Licona RN, [facility]  Pain Clinic Care Coordinator     Keep encounter open for communication if pt reply's back for 1-2 days.     Katarina Licona RN, [facility]  Pain Clinic Care Coordinator

## 2017-06-30 ENCOUNTER — TELEPHONE (OUTPATIENT)
Dept: NEUROSURGERY | Facility: CLINIC | Age: 52
End: 2017-06-30

## 2017-06-30 ENCOUNTER — OFFICE VISIT (OUTPATIENT)
Dept: FAMILY MEDICINE | Facility: CLINIC | Age: 52
End: 2017-06-30
Payer: COMMERCIAL

## 2017-06-30 VITALS
TEMPERATURE: 98.9 F | OXYGEN SATURATION: 96 % | WEIGHT: 218 LBS | HEIGHT: 73 IN | BODY MASS INDEX: 28.89 KG/M2 | HEART RATE: 88 BPM | RESPIRATION RATE: 18 BRPM | DIASTOLIC BLOOD PRESSURE: 76 MMHG | SYSTOLIC BLOOD PRESSURE: 134 MMHG

## 2017-06-30 DIAGNOSIS — H81.03 MENIERE'S DISEASE, BILATERAL: ICD-10-CM

## 2017-06-30 DIAGNOSIS — M54.12 CERVICAL RADICULOPATHY: ICD-10-CM

## 2017-06-30 DIAGNOSIS — K42.9 UMBILICAL HERNIA WITHOUT OBSTRUCTION AND WITHOUT GANGRENE: ICD-10-CM

## 2017-06-30 DIAGNOSIS — F32.5 MAJOR DEPRESSION IN COMPLETE REMISSION (H): ICD-10-CM

## 2017-06-30 DIAGNOSIS — I10 BENIGN ESSENTIAL HYPERTENSION: ICD-10-CM

## 2017-06-30 DIAGNOSIS — Z01.818 PREOP GENERAL PHYSICAL EXAM: Primary | ICD-10-CM

## 2017-06-30 LAB
ANION GAP SERPL CALCULATED.3IONS-SCNC: 10 MMOL/L (ref 3–14)
BUN SERPL-MCNC: 20 MG/DL (ref 7–30)
CALCIUM SERPL-MCNC: 8.8 MG/DL (ref 8.5–10.1)
CHLORIDE SERPL-SCNC: 104 MMOL/L (ref 94–109)
CO2 SERPL-SCNC: 28 MMOL/L (ref 20–32)
CREAT SERPL-MCNC: 1.15 MG/DL (ref 0.66–1.25)
GFR SERPL CREATININE-BSD FRML MDRD: 67 ML/MIN/1.7M2
GLUCOSE SERPL-MCNC: 120 MG/DL (ref 70–99)
POTASSIUM SERPL-SCNC: 3.9 MMOL/L (ref 3.4–5.3)
SODIUM SERPL-SCNC: 142 MMOL/L (ref 133–144)

## 2017-06-30 PROCEDURE — 36415 COLL VENOUS BLD VENIPUNCTURE: CPT | Performed by: FAMILY MEDICINE

## 2017-06-30 PROCEDURE — 80048 BASIC METABOLIC PNL TOTAL CA: CPT | Performed by: FAMILY MEDICINE

## 2017-06-30 PROCEDURE — 93000 ELECTROCARDIOGRAM COMPLETE: CPT | Performed by: FAMILY MEDICINE

## 2017-06-30 PROCEDURE — 99214 OFFICE O/P EST MOD 30 MIN: CPT | Performed by: FAMILY MEDICINE

## 2017-06-30 ASSESSMENT — PAIN SCALES - GENERAL: PAINLEVEL: SEVERE PAIN (6)

## 2017-06-30 NOTE — PROGRESS NOTES
[address] [address]  [phone]  Dept: [phone]    PRE-OP EVALUATION:  Today's date: 2017    Indra Mehta (: 1965) presents for pre-operative evaluation assessment as requested by Dr. Mckenna.  He requires evaluation and anesthesia risk assessment prior to undergoing surgery/procedure for treatment of back pain  .  Proposed procedure: Discetomy, cervical fusion, anterior one level     Date of Surgery/ Procedure: 2017  Time of Surgery/ Procedure: 10 am   Hospital/Surgical Facility: [facility]  Fax number for surgical facility:   Primary Physician: JAYLON Lipscomb   Type of Anesthesia Anticipated: General    Patient has a Health Care Directive or Living Will:  NO    1. NO - Do you have a history of heart attack, stroke, stent, bypass or surgery on an artery in the head, neck, heart or legs?  2. NO - Do you ever have any pain or discomfort in your chest?  3. NO - Do you have a history of  Heart Failure?  4. NO - Are you troubled by shortness of breath when: walking on the level, up a slight hill or at night?  5. NO - Do you currently have a cold, bronchitis or other respiratory infection?  6. NO - Do you have a cough, shortness of breath or wheezing?  7. NO - Do you sometimes get pains in the calves of your legs when you walk?  8. NO - Do you or anyone in your family have previous history of blood clots?  9. NO - Do you or does anyone in your family have a serious bleeding problem such as prolonged bleeding following surgeries or cuts?  10. NO - Have you ever had problems with anemia or been told to take iron pills?  11. NO - Have you had any abnormal blood loss such as black, tarry or bloody stools, or abnormal vaginal bleeding?  12. NO - Have you ever had a blood transfusion?  13. NO - Have you or any of your relatives ever had problems with anesthesia?  14. NO - Do you have sleep apnea, excessive snoring or daytime drowsiness?  15. NO - Do you have  any prosthetic heart valves?  16. NO - Do you have prosthetic joints?  17. NO - Is there any chance that you may be pregnant?      HPI:                                                      Brief HPI related to upcoming procedure: has had neck pain for past couple of years.  Recommended by Dr. Mckenna to have discectomy for improvement in pain     Incidentally today was found to have umbilical hernia.      See problem list for active medical problems.  Problems all longstanding and stable, except as noted/documented.  See ROS for pertinent symptoms related to these conditions.                                                                                                  .    MEDICAL HISTORY:                                                      Patient Active Problem List    Diagnosis Date Noted     Benign essential hypertension 06/30/2017     Priority: Medium     Myalgia 10/28/2016     Priority: Medium     Bilateral occipital neuralgia 10/28/2016     Priority: Medium     CARDIOVASCULAR SCREENING; LDL GOAL LESS THAN 160 10/31/2010     Priority: Medium     Major depression in complete remission (H) 08/20/2010     Priority: Medium     Chronic pain syndrome 08/20/2010     Priority: Medium     Patient is followed by Tha Grullon MD for ongoing prescription of pain medication.  All refills should be approved by this provider only at face-to-face appointments - not by phone request.    Medication(s): Tramadol.   Maximum quantity per month: 60  Clinic visit frequency required: Q 1 months     Controlled substance agreement:  Encounter-Level CSA - 10/13/16:               Controlled Substance Agreement - Scan on 10/23/2016  5:07 PM : CONTROLLED SUBSTANCE AGREEMENT 10/13/16 (below)            Pain Clinic evaluation in the past: Yes       Date/Location:   [facility]    DIRE Total Score(s):  No flowsheet data found.    Last [facility] website verification:  none   [email or web address]               Pain medication  agreement signed 08/20/2010     Priority: Medium     Meniere's disease 03/22/2007     Priority: Medium     Problem list name updated by automated process. Provider to review        Past Medical History:   Diagnosis Date     Back pain      Meniere's disease, unspecified      Pain medication agreement signed 8/20/2010     Past Surgical History:   Procedure Laterality Date     BUNIONECTOMY RT/LT  09/05/08    Both feet     C MASTOIDECTOMY,COMPLETE  09/27/2007     COLONOSCOPY N/A 12/28/2016    Procedure: COMBINED COLONOSCOPY, SINGLE OR MULTIPLE BIOPSY/POLYPECTOMY BY BIOPSY;  Surgeon: Indra Jernigan MD;  Location: [facility]     HC COLONOSCOPY W/WO BRUSH/WASH  12/27/2005     HC CREATE EARDRUM OPENING,GEN ANESTH  09/27/2007    Pe tube, Left endolymphatic sac enhancement.     PE TUBES  2006    left ear     Current Outpatient Prescriptions   Medication Sig Dispense Refill     traMADol (ULTRAM) 50 MG tablet Take 1 tablet (50 mg) by mouth every 6 hours as needed for moderate pain Max of 4 tabs/day. Okay to fill on/after 6/28/2017 to start on/after 6/30/2017. 120 tablet 0     HYDROcodone-acetaminophen (NORCO) 5-325 MG per tablet Take 1 tablet by mouth every 6 hours as needed for moderate to severe pain or pain maximum 6 tablets per day 30 tablet 0     gabapentin (NEURONTIN) 300 MG capsule Take 3 capsules (900 mg) by mouth 3 times daily If needed/tolerated, OK to increase bedtime dose to 1200mg 300 capsule 3     order for DME Equipment being ordered: TENS Pads as directed 1 Device 0     methocarbamol (ROBAXIN) 750 MG tablet Take 1 tablet (750 mg) by mouth 3 times daily as needed for muscle spasms 180 tablet 1     cyclobenzaprine (FLEXERIL) 10 MG tablet Take 1 tablet (10 mg) by mouth 2 times daily as needed for muscle spasms Reported on 4/12/2017 60 tablet 1     Acetaminophen (TYLENOL PO) Take 500 mg by mouth every 4 hours as needed for mild pain or fever       IBUPROFEN PO Take 600 mg by mouth every 4 hours as needed for  "moderate pain       losartan (COZAAR) 50 MG tablet Take 1 tablet (50 mg) by mouth daily 90 tablet 3     OTC products: None, except as noted above    Allergies   Allergen Reactions     No Known Drug Allergies       Latex Allergy: NO    Social History   Substance Use Topics     Smoking status: Never Smoker     Smokeless tobacco: Never Used     Alcohol use No     History   Drug Use No       REVIEW OF SYSTEMS:                                                    C: NEGATIVE for fever, chills, change in weight  I: NEGATIVE for worrisome rashes, moles or lesions  E: NEGATIVE for vision changes or irritation  E/M: NEGATIVE for ear, mouth and throat problems  R: NEGATIVE for significant cough or SOB  CV: NEGATIVE for chest pain, palpitations or peripheral edema  GI: NEGATIVE for nausea, abdominal pain, heartburn, or change in bowel habits.  Has not been aware of GI pain related to umbilical hernia.  : NEGATIVE for frequency, dysuria, or hematuria  M: NEGATIVE for significant arthralgias or myalgia  N: NEGATIVE for weakness, dizziness or paresthesias  E: NEGATIVE for temperature intolerance, skin/hair changes  H: NEGATIVE for bleeding problems  P: NEGATIVE for changes in mood or affect    EXAM:                                                    /76  Pulse 88  Temp 98.9  F (37.2  C) (Temporal)  Resp 18  Ht 6' 1\" (1.854 m)  Wt 218 lb (98.9 kg)  SpO2 96%  BMI 28.76 kg/m2    GENERAL APPEARANCE: healthy, alert and no distress     EYES: EOMI,  PERRL     HENT: ear canals and TM's normal and nose and mouth without ulcers or lesions     NECK: no adenopathy, no asymmetry, masses, or scars and thyroid normal to palpation     RESP: lungs clear to auscultation - no rales, rhonchi or wheezes     CV: regular rates and rhythm, normal S1 S2, no S3 or S4 and no murmur, click or rub     ABDOMEN:  soft,, no HSM and bowel sounds normal.  Has mass in the superior aspect of the umbilicus that is reducible and is tenderness to " palpation.       MS: extremities normal- no gross deformities noted, no evidence of inflammation in joints, FROM in all extremities.     SKIN: no suspicious lesions or rashes     NEURO: Normal strength and tone, sensory exam grossly normal, mentation intact and speech normal     PSYCH: mentation appears normal. and affect normal/bright     LYMPHATICS: normal ant/post cervical, supraclavicular nodes    DIAGNOSTICS:                                                    EKG: appears normal, NSR, normal axis, normal intervals, no acute ST/T changes c/w ischemia, no LVH by voltage criteria  BMP pending    Recent Labs   Lab Test  03/24/16   1900   HGB  15.7   PLT  259   NA  142   POTASSIUM  3.5   CR  1.06        IMPRESSION:                                                    Reason for surgery/procedure:    Cervical radiculopathy    Diagnosis/reason for consult:   Major depression in complete remission (H)  Benign essential hypertension  Meniere's disease, bilateral  Umbilical hernia without obstruction and without gangrene       The proposed surgical procedure is considered INTERMEDIATE risk.    REVISED CARDIAC RISK INDEX  The patient has the following serious cardiovascular risks for perioperative complications such as (MI, PE, VFib and 3  AV Block):  No serious cardiac risks  INTERPRETATION: 0 risks: Class I (very low risk - 0.4% complication rate)    The patient has the following additional risks for perioperative complications:  No identified additional risks      ICD-10-CM    1. Preop general physical exam Z01.818 Basic metabolic panel     EKG 12-lead complete w/read - Clinics   2. Cervical radiculopathy M54.12    3. Major depression in complete remission (H) F32.5    4. Benign essential hypertension I10 Basic metabolic panel     EKG 12-lead complete w/read - Clinics   5. Meniere's disease, bilateral H81.03    6. Umbilical hernia without obstruction and without gangrene K42.9 GENERAL SURG ADULT REFERRAL        RECOMMENDATIONS:                                                      --Patient is to take all scheduled medications on the day of surgery EXCEPT for modifications listed below.    Anticoagulant or Antiplatelet Medication Use  NSAIDS: Ibuprofen (Motrin):         Stop one day prior to surgery        ACE Inhibitor or Angiotensin Receptor Blocker (ARB) Use  Ace inhibitor or Angiotensin Receptor Blocker (ARB) and should HOLD this medication for the 24 hours prior to surgery.      APPROVAL GIVEN to proceed with proposed procedure, without further diagnostic evaluation    Patient has umbilical hernia.  Referral to general surgery for evaluation and discussion on surgical correction.         Signed Electronically by: Farhan Lipscomb MD    Copy of this evaluation report is provided to requesting physician.    [place] Preop Guidelines

## 2017-06-30 NOTE — TELEPHONE ENCOUNTER
Reason for Call:  Form, our goal is to have forms completed with 72 hours, however, some forms may require a visit or additional information.    Type of letter, form or note:  disability    Who is the form from?: Insurance comp    Where did the form come from: Patient or family brought in       What clinic location was the form placed at?: [place]    Where the form was placed: 's Box    What number is listed as a contact on the form?: fax # [phone] attn Cherise Garcia        Additional comments: [employer] - [place] [employer]     Call taken on 6/30/2017 at 3:37 PM by Ana Paula Segal

## 2017-06-30 NOTE — NURSING NOTE
"Chief Complaint   Patient presents with     Pre-Op Exam     7-5-17 Clarisa        Initial /76  Pulse 88  Temp 98.9  F (37.2  C) (Temporal)  Resp 18  Ht 6' 1\" (1.854 m)  Wt 218 lb (98.9 kg)  SpO2 96%  BMI 28.76 kg/m2 Estimated body mass index is 28.76 kg/(m^2) as calculated from the following:    Height as of this encounter: 6' 1\" (1.854 m).    Weight as of this encounter: 218 lb (98.9 kg).  Medication Reconciliation: complete    "

## 2017-06-30 NOTE — MR AVS SNAPSHOT
After Visit Summary   6/30/2017    Indra Mehta    MRN: [id number]           Patient Information     Date Of Birth          1965        Visit Information        Provider Department      6/30/2017 3:15 PM Farhan Lipscomb MD [facility]        Today's Diagnoses     Preop general physical exam    -  1    Major depression, single episode        Benign essential hypertension        Meniere's disease, bilateral        Umbilical hernia without obstruction and without gangrene          Care Instructions      Before Your Surgery      Call your surgeon if there is any change in your health. This includes signs of a cold or flu (such as a sore throat, runny nose, cough, rash or fever).    Do not smoke, drink alcohol or take over the counter medicine (unless your surgeon or primary care doctor tells you to) for the 24 hours before and after surgery.    If you take prescribed drugs: Follow your doctor s orders about which medicines to take and which to stop until after surgery.    Eating and drinking prior to surgery: follow the instructions from your surgeon    Take a shower or bath the night before surgery. Use the soap your surgeon gave you to gently clean your skin. If you do not have soap from your surgeon, use your regular soap. Do not shave or scrub the surgery site.  Wear clean pajamas and have clean sheets on your bed.           Follow-ups after your visit        Additional Services     GENERAL SURG ADULT REFERRAL       Your provider has referred you to: [facility]: [facility] [phone]   [email or web address]    Please be aware that coverage of these services is subject to the terms and limitations of your health insurance plan.  Call member services at your health plan with any benefit or coverage questions.      Please bring the following with you to your appointment:    (1) Any X-Rays, CTs or MRIs which have been performed.   Contact the facility where they were done to arrange for  prior to your scheduled appointment.   (2) List of current medications   (3) This referral request   (4) Any documents/labs given to you for this referral                  Your next 10 appointments already scheduled     Jul 05, 2017   Procedure with Mike Mckenna MD   [facility] ([facility])    [address] [place] [address]  [place] [id number] [phone]           From [address]: Exit at [address] on south side of [place]. Turn right on [address]. Turn left at stoplight on [address]. [facility] will be in view two blocks ahead            Jul 05, 2017 10:00 AM CDT   XR SURGERY SANDEEP LESS THAN 5 MIN FLUORO W STILLS with [email or web address]   [facility] ([facility])    [address] [place] [id number] [phone]           Please bring a list of your current medicines to your exam. (Include vitamins, minerals and over-thecounter medicines.) Leave your valuables at home.  Tell your doctor if there is a chance you may be pregnant.  You do not need to do anything special for this exam.            Jul 31, 2017  8:45 AM CDT   New Visit with Boni Story MD   [facility] ([facility])    [address] [place] [id number] [phone]              Who to contact     If you have questions or need follow up information about today's clinic visit or your schedule please contact [facility] directly at [phone].  Normal or non-critical lab and imaging results will be communicated to you by MyChart, letter or phone within 4 business days after the clinic has received the results. If you do not hear from us within 7 days, please contact the clinic through MyChart or phone. If you have a critical or abnormal lab result, we will notify you by phone as soon as possible.  Submit refill requests through  "MyChart or call your pharmacy and they will forward the refill request to us. Please allow 3 business days for your refill to be completed.          Additional Information About Your Visit        MyChart Information     [employer] gives you secure access to your electronic health record. If you see a primary care provider, you can also send messages to your care team and make appointments. If you have questions, please call your primary care clinic.  If you do not have a primary care provider, please call [phone] and they will assist you.        Care EveryWhere ID     This is your Care EveryWhere ID. This could be used by other organizations to access your [place] medical records  [id number]        Your Vitals Were     Pulse Temperature Respirations Height Pulse Oximetry BMI (Body Mass Index)    88 98.9  F (37.2  C) (Temporal) 18 6' 1\" (1.854 m) 96% 28.76 kg/m2       Blood Pressure from Last 3 Encounters:   06/30/17 134/76   06/15/17 133/83   05/05/17 127/88    Weight from Last 3 Encounters:   06/30/17 218 lb (98.9 kg)   06/27/17 216 lb (98 kg)   06/22/17 216 lb (98 kg)              We Performed the Following     Basic metabolic panel     DEPRESSION ACTION PLAN (DAP)     EKG 12-lead complete w/read - Clinics     GENERAL SURG ADULT REFERRAL        Primary Care Provider Office Phone # Fax #    Tha Grullon [phone]       [facility] [address] [facility] [address]        Equal Access to Services     KELSEY ARSHAD : Hadii marva khano Somary, waaxda luqadaha, qaybta kaalmada adeegyada, waxay jean rico adejailene lainez. So [facility] [phone].    ATENCIÓN: Si habla español, tiene a lundy disposición servicios gratuitos de asistencia lingüística. Llame al [phone].    We comply with applicable federal civil rights laws and [place] laws. We do not discriminate on the basis of race, color, national origin, age, disability sex, sexual orientation or gender identity.       "      Thank you!     Thank you for choosing [facility]  for your care. Our goal is always to provide you with excellent care. Hearing back from our patients is one way we can continue to improve our services. Please take a few minutes to complete the written survey that you may receive in the mail after your visit with us. Thank you!             Your Updated Medication List - Protect others around you: Learn how to safely use, store and throw away your medicines at www.disposemymeds.org.          This list is accurate as of: 6/30/17  4:03 PM.  Always use your most recent med list.                   Brand Name Dispense Instructions for use Diagnosis    cyclobenzaprine 10 MG tablet    FLEXERIL    60 tablet    Take 1 tablet (10 mg) by mouth 2 times daily as needed for muscle spasms Reported on 4/12/2017    Muscle spasm       gabapentin 300 MG capsule    NEURONTIN    300 capsule    Take 3 capsules (900 mg) by mouth 3 times daily If needed/tolerated, OK to increase bedtime dose to 1200mg    Cervicalgia       HYDROcodone-acetaminophen 5-325 MG per tablet    NORCO    30 tablet    Take 1 tablet by mouth every 6 hours as needed for moderate to severe pain or pain maximum 6 tablets per day    Acute pain of left shoulder, Tendonitis of both rotator cuffs       IBUPROFEN PO      Take 600 mg by mouth every 4 hours as needed for moderate pain        losartan 50 MG tablet    COZAAR    90 tablet    Take 1 tablet (50 mg) by mouth daily    Benign essential hypertension       methocarbamol 750 MG tablet    ROBAXIN    180 tablet    Take 1 tablet (750 mg) by mouth 3 times daily as needed for muscle spasms    Cervical radiculopathy, Myofascial pain       order for DME     1 Device    Equipment being ordered: TENS Pads as directed    Chronic pain syndrome       traMADol 50 MG tablet    ULTRAM    120 tablet    Take 1 tablet (50 mg) by mouth every 6 hours as needed for moderate pain Max of 4 tabs/day. Okay to fill on/after  6/28/2017 to start on/after 6/30/2017.    Chronic bilateral low back pain, with sciatica presence unspecified       TYLENOL PO      Take 500 mg by mouth every 4 hours as needed for mild pain or fever

## 2017-07-03 NOTE — H&P (VIEW-ONLY)
[address] [address]  [phone]  Dept: [phone]    PRE-OP EVALUATION:  Today's date: 2017    Indra Mehta (: 1965) presents for pre-operative evaluation assessment as requested by Dr. Mckenna.  He requires evaluation and anesthesia risk assessment prior to undergoing surgery/procedure for treatment of back pain  .  Proposed procedure: Discetomy, cervical fusion, anterior one level     Date of Surgery/ Procedure: 2017  Time of Surgery/ Procedure: 10 am   Hospital/Surgical Facility: [facility]  Fax number for surgical facility:   Primary Physician: JAYLON Lipscomb   Type of Anesthesia Anticipated: General    Patient has a Health Care Directive or Living Will:  NO    1. NO - Do you have a history of heart attack, stroke, stent, bypass or surgery on an artery in the head, neck, heart or legs?  2. NO - Do you ever have any pain or discomfort in your chest?  3. NO - Do you have a history of  Heart Failure?  4. NO - Are you troubled by shortness of breath when: walking on the level, up a slight hill or at night?  5. NO - Do you currently have a cold, bronchitis or other respiratory infection?  6. NO - Do you have a cough, shortness of breath or wheezing?  7. NO - Do you sometimes get pains in the calves of your legs when you walk?  8. NO - Do you or anyone in your family have previous history of blood clots?  9. NO - Do you or does anyone in your family have a serious bleeding problem such as prolonged bleeding following surgeries or cuts?  10. NO - Have you ever had problems with anemia or been told to take iron pills?  11. NO - Have you had any abnormal blood loss such as black, tarry or bloody stools, or abnormal vaginal bleeding?  12. NO - Have you ever had a blood transfusion?  13. NO - Have you or any of your relatives ever had problems with anesthesia?  14. NO - Do you have sleep apnea, excessive snoring or daytime drowsiness?  15. NO - Do you have  any prosthetic heart valves?  16. NO - Do you have prosthetic joints?  17. NO - Is there any chance that you may be pregnant?      HPI:                                                      Brief HPI related to upcoming procedure: has had neck pain for past couple of years.  Recommended by Dr. Mckenna to have discectomy for improvement in pain     Incidentally today was found to have umbilical hernia.      See problem list for active medical problems.  Problems all longstanding and stable, except as noted/documented.  See ROS for pertinent symptoms related to these conditions.                                                                                                  .    MEDICAL HISTORY:                                                      Patient Active Problem List    Diagnosis Date Noted     Benign essential hypertension 06/30/2017     Priority: Medium     Myalgia 10/28/2016     Priority: Medium     Bilateral occipital neuralgia 10/28/2016     Priority: Medium     CARDIOVASCULAR SCREENING; LDL GOAL LESS THAN 160 10/31/2010     Priority: Medium     Major depression in complete remission (H) 08/20/2010     Priority: Medium     Chronic pain syndrome 08/20/2010     Priority: Medium     Patient is followed by Tha Grullon MD for ongoing prescription of pain medication.  All refills should be approved by this provider only at face-to-face appointments - not by phone request.    Medication(s): Tramadol.   Maximum quantity per month: 60  Clinic visit frequency required: Q 1 months     Controlled substance agreement:  Encounter-Level CSA - 10/13/16:               Controlled Substance Agreement - Scan on 10/23/2016  5:07 PM : CONTROLLED SUBSTANCE AGREEMENT 10/13/16 (below)            Pain Clinic evaluation in the past: Yes       Date/Location:   [facility]    DIRE Total Score(s):  No flowsheet data found.    Last [facility] website verification:  none   [email or web address]               Pain medication  agreement signed 08/20/2010     Priority: Medium     Meniere's disease 03/22/2007     Priority: Medium     Problem list name updated by automated process. Provider to review        Past Medical History:   Diagnosis Date     Back pain      Meniere's disease, unspecified      Pain medication agreement signed 8/20/2010     Past Surgical History:   Procedure Laterality Date     BUNIONECTOMY RT/LT  09/05/08    Both feet     C MASTOIDECTOMY,COMPLETE  09/27/2007     COLONOSCOPY N/A 12/28/2016    Procedure: COMBINED COLONOSCOPY, SINGLE OR MULTIPLE BIOPSY/POLYPECTOMY BY BIOPSY;  Surgeon: Indra Jernigan MD;  Location: [facility]     HC COLONOSCOPY W/WO BRUSH/WASH  12/27/2005     HC CREATE EARDRUM OPENING,GEN ANESTH  09/27/2007    Pe tube, Left endolymphatic sac enhancement.     PE TUBES  2006    left ear     Current Outpatient Prescriptions   Medication Sig Dispense Refill     traMADol (ULTRAM) 50 MG tablet Take 1 tablet (50 mg) by mouth every 6 hours as needed for moderate pain Max of 4 tabs/day. Okay to fill on/after 6/28/2017 to start on/after 6/30/2017. 120 tablet 0     HYDROcodone-acetaminophen (NORCO) 5-325 MG per tablet Take 1 tablet by mouth every 6 hours as needed for moderate to severe pain or pain maximum 6 tablets per day 30 tablet 0     gabapentin (NEURONTIN) 300 MG capsule Take 3 capsules (900 mg) by mouth 3 times daily If needed/tolerated, OK to increase bedtime dose to 1200mg 300 capsule 3     order for DME Equipment being ordered: TENS Pads as directed 1 Device 0     methocarbamol (ROBAXIN) 750 MG tablet Take 1 tablet (750 mg) by mouth 3 times daily as needed for muscle spasms 180 tablet 1     cyclobenzaprine (FLEXERIL) 10 MG tablet Take 1 tablet (10 mg) by mouth 2 times daily as needed for muscle spasms Reported on 4/12/2017 60 tablet 1     Acetaminophen (TYLENOL PO) Take 500 mg by mouth every 4 hours as needed for mild pain or fever       IBUPROFEN PO Take 600 mg by mouth every 4 hours as needed for  "moderate pain       losartan (COZAAR) 50 MG tablet Take 1 tablet (50 mg) by mouth daily 90 tablet 3     OTC products: None, except as noted above    Allergies   Allergen Reactions     No Known Drug Allergies       Latex Allergy: NO    Social History   Substance Use Topics     Smoking status: Never Smoker     Smokeless tobacco: Never Used     Alcohol use No     History   Drug Use No       REVIEW OF SYSTEMS:                                                    C: NEGATIVE for fever, chills, change in weight  I: NEGATIVE for worrisome rashes, moles or lesions  E: NEGATIVE for vision changes or irritation  E/M: NEGATIVE for ear, mouth and throat problems  R: NEGATIVE for significant cough or SOB  CV: NEGATIVE for chest pain, palpitations or peripheral edema  GI: NEGATIVE for nausea, abdominal pain, heartburn, or change in bowel habits.  Has not been aware of GI pain related to umbilical hernia.  : NEGATIVE for frequency, dysuria, or hematuria  M: NEGATIVE for significant arthralgias or myalgia  N: NEGATIVE for weakness, dizziness or paresthesias  E: NEGATIVE for temperature intolerance, skin/hair changes  H: NEGATIVE for bleeding problems  P: NEGATIVE for changes in mood or affect    EXAM:                                                    /76  Pulse 88  Temp 98.9  F (37.2  C) (Temporal)  Resp 18  Ht 6' 1\" (1.854 m)  Wt 218 lb (98.9 kg)  SpO2 96%  BMI 28.76 kg/m2    GENERAL APPEARANCE: healthy, alert and no distress     EYES: EOMI,  PERRL     HENT: ear canals and TM's normal and nose and mouth without ulcers or lesions     NECK: no adenopathy, no asymmetry, masses, or scars and thyroid normal to palpation     RESP: lungs clear to auscultation - no rales, rhonchi or wheezes     CV: regular rates and rhythm, normal S1 S2, no S3 or S4 and no murmur, click or rub     ABDOMEN:  soft,, no HSM and bowel sounds normal.  Has mass in the superior aspect of the umbilicus that is reducible and is tenderness to " palpation.       MS: extremities normal- no gross deformities noted, no evidence of inflammation in joints, FROM in all extremities.     SKIN: no suspicious lesions or rashes     NEURO: Normal strength and tone, sensory exam grossly normal, mentation intact and speech normal     PSYCH: mentation appears normal. and affect normal/bright     LYMPHATICS: normal ant/post cervical, supraclavicular nodes    DIAGNOSTICS:                                                    EKG: appears normal, NSR, normal axis, normal intervals, no acute ST/T changes c/w ischemia, no LVH by voltage criteria  BMP pending    Recent Labs   Lab Test  03/24/16   1900   HGB  15.7   PLT  259   NA  142   POTASSIUM  3.5   CR  1.06        IMPRESSION:                                                    Reason for surgery/procedure:    Cervical radiculopathy    Diagnosis/reason for consult:   Major depression in complete remission (H)  Benign essential hypertension  Meniere's disease, bilateral  Umbilical hernia without obstruction and without gangrene       The proposed surgical procedure is considered INTERMEDIATE risk.    REVISED CARDIAC RISK INDEX  The patient has the following serious cardiovascular risks for perioperative complications such as (MI, PE, VFib and 3  AV Block):  No serious cardiac risks  INTERPRETATION: 0 risks: Class I (very low risk - 0.4% complication rate)    The patient has the following additional risks for perioperative complications:  No identified additional risks      ICD-10-CM    1. Preop general physical exam Z01.818 Basic metabolic panel     EKG 12-lead complete w/read - Clinics   2. Cervical radiculopathy M54.12    3. Major depression in complete remission (H) F32.5    4. Benign essential hypertension I10 Basic metabolic panel     EKG 12-lead complete w/read - Clinics   5. Meniere's disease, bilateral H81.03    6. Umbilical hernia without obstruction and without gangrene K42.9 GENERAL SURG ADULT REFERRAL        RECOMMENDATIONS:                                                      --Patient is to take all scheduled medications on the day of surgery EXCEPT for modifications listed below.    Anticoagulant or Antiplatelet Medication Use  NSAIDS: Ibuprofen (Motrin):         Stop one day prior to surgery        ACE Inhibitor or Angiotensin Receptor Blocker (ARB) Use  Ace inhibitor or Angiotensin Receptor Blocker (ARB) and should HOLD this medication for the 24 hours prior to surgery.      APPROVAL GIVEN to proceed with proposed procedure, without further diagnostic evaluation    Patient has umbilical hernia.  Referral to general surgery for evaluation and discussion on surgical correction.         Signed Electronically by: Farhan Lipscomb MD    Copy of this evaluation report is provided to requesting physician.    [facility] Preop Guidelines

## 2017-07-04 ENCOUNTER — ANESTHESIA EVENT (OUTPATIENT)
Dept: SURGERY | Facility: CLINIC | Age: 52
End: 2017-07-04
Payer: COMMERCIAL

## 2017-07-05 ENCOUNTER — ANESTHESIA (OUTPATIENT)
Dept: SURGERY | Facility: CLINIC | Age: 52
End: 2017-07-05
Payer: COMMERCIAL

## 2017-07-05 ENCOUNTER — HOSPITAL ENCOUNTER (OUTPATIENT)
Dept: GENERAL RADIOLOGY | Facility: CLINIC | Age: 52
End: 2017-07-05
Attending: NEUROLOGICAL SURGERY
Payer: COMMERCIAL

## 2017-07-05 ENCOUNTER — HOSPITAL ENCOUNTER (OUTPATIENT)
Facility: CLINIC | Age: 52
Discharge: HOME | End: 2017-07-05
Attending: NEUROLOGICAL SURGERY | Admitting: NEUROLOGICAL SURGERY
Payer: COMMERCIAL

## 2017-07-05 VITALS
HEIGHT: 73 IN | TEMPERATURE: 98.1 F | WEIGHT: 217.99 LBS | SYSTOLIC BLOOD PRESSURE: 120 MMHG | RESPIRATION RATE: 15 BRPM | BODY MASS INDEX: 28.89 KG/M2 | OXYGEN SATURATION: 96 % | DIASTOLIC BLOOD PRESSURE: 75 MMHG | HEART RATE: 68 BPM

## 2017-07-05 DIAGNOSIS — M75.82 TENDONITIS OF BOTH ROTATOR CUFFS: ICD-10-CM

## 2017-07-05 DIAGNOSIS — M54.12 CERVICAL RADICULOPATHY: ICD-10-CM

## 2017-07-05 DIAGNOSIS — Z98.1 S/P CERVICAL SPINAL FUSION: Primary | ICD-10-CM

## 2017-07-05 DIAGNOSIS — M75.81 TENDONITIS OF BOTH ROTATOR CUFFS: ICD-10-CM

## 2017-07-05 DIAGNOSIS — M79.18 MYOFASCIAL PAIN: ICD-10-CM

## 2017-07-05 DIAGNOSIS — M25.512 ACUTE PAIN OF LEFT SHOULDER: ICD-10-CM

## 2017-07-05 DIAGNOSIS — M62.838 MUSCLE SPASM: ICD-10-CM

## 2017-07-05 PROCEDURE — 27110028 ZZH OR GENERAL SUPPLY NON-STERILE: Performed by: NEUROLOGICAL SURGERY

## 2017-07-05 PROCEDURE — 25000566 ZZH SEVOFLURANE, EA 15 MIN: Performed by: NEUROLOGICAL SURGERY

## 2017-07-05 PROCEDURE — 40000306 ZZH STATISTIC PRE PROC ASSESS II: Performed by: NEUROLOGICAL SURGERY

## 2017-07-05 PROCEDURE — 25000132 ZZH RX MED GY IP 250 OP 250 PS 637: Performed by: NEUROLOGICAL SURGERY

## 2017-07-05 PROCEDURE — 27210794 ZZH OR GENERAL SUPPLY STERILE: Performed by: NEUROLOGICAL SURGERY

## 2017-07-05 PROCEDURE — 22551 ARTHRD ANT NTRBDY CERVICAL: CPT | Performed by: NEUROLOGICAL SURGERY

## 2017-07-05 PROCEDURE — 27210995 ZZH RX 272: Performed by: NEUROLOGICAL SURGERY

## 2017-07-05 PROCEDURE — 37000009 ZZH ANESTHESIA TECHNICAL FEE, EACH ADDTL 15 MIN: Performed by: NEUROLOGICAL SURGERY

## 2017-07-05 PROCEDURE — 25000125 ZZHC RX 250: Performed by: NURSE ANESTHETIST, CERTIFIED REGISTERED

## 2017-07-05 PROCEDURE — 22551 ARTHRD ANT NTRBDY CERVICAL: CPT | Mod: AS | Performed by: PHYSICIAN ASSISTANT

## 2017-07-05 PROCEDURE — 36000069 ZZH SURGERY LEVEL 5 EA 15 ADDTL MIN: Performed by: NEUROLOGICAL SURGERY

## 2017-07-05 PROCEDURE — 71000027 ZZH RECOVERY PHASE 2 EACH 15 MINS: Performed by: NEUROLOGICAL SURGERY

## 2017-07-05 PROCEDURE — C1713 ANCHOR/SCREW BN/BN,TIS/BN: HCPCS | Performed by: NEUROLOGICAL SURGERY

## 2017-07-05 PROCEDURE — 40000277 XR SURGERY CARM FLUORO LESS THAN 5 MIN W STILLS: Mod: TC

## 2017-07-05 PROCEDURE — 36000071 ZZH SURGERY LEVEL 5 W FLUORO 1ST 30 MIN: Performed by: NEUROLOGICAL SURGERY

## 2017-07-05 PROCEDURE — 22853 INSJ BIOMECHANICAL DEVICE: CPT | Mod: AS | Performed by: PHYSICIAN ASSISTANT

## 2017-07-05 PROCEDURE — 25000128 H RX IP 250 OP 636: Performed by: NURSE ANESTHETIST, CERTIFIED REGISTERED

## 2017-07-05 PROCEDURE — C1763 CONN TISS, NON-HUMAN: HCPCS | Performed by: NEUROLOGICAL SURGERY

## 2017-07-05 PROCEDURE — 25000128 H RX IP 250 OP 636: Performed by: NEUROLOGICAL SURGERY

## 2017-07-05 PROCEDURE — 71000014 ZZH RECOVERY PHASE 1 LEVEL 2 FIRST HR: Performed by: NEUROLOGICAL SURGERY

## 2017-07-05 PROCEDURE — 22853 INSJ BIOMECHANICAL DEVICE: CPT | Performed by: NEUROLOGICAL SURGERY

## 2017-07-05 PROCEDURE — 37000008 ZZH ANESTHESIA TECHNICAL FEE, 1ST 30 MIN: Performed by: NEUROLOGICAL SURGERY

## 2017-07-05 PROCEDURE — 25000128 H RX IP 250 OP 636: Performed by: PHYSICIAN ASSISTANT

## 2017-07-05 RX ORDER — GLYCOPYRROLATE 0.2 MG/ML
INJECTION, SOLUTION INTRAMUSCULAR; INTRAVENOUS PRN
Status: DISCONTINUED | OUTPATIENT
Start: 2017-07-05 | End: 2017-07-05

## 2017-07-05 RX ORDER — HYDROCODONE BITARTRATE AND ACETAMINOPHEN 5; 325 MG/1; MG/1
1-2 TABLET ORAL EVERY 4 HOURS PRN
Status: DISCONTINUED | OUTPATIENT
Start: 2017-07-05 | End: 2017-07-05 | Stop reason: HOSPADM

## 2017-07-05 RX ORDER — MEPERIDINE HYDROCHLORIDE 50 MG/ML
12.5 INJECTION INTRAMUSCULAR; INTRAVENOUS; SUBCUTANEOUS
Status: DISCONTINUED | OUTPATIENT
Start: 2017-07-05 | End: 2017-07-05 | Stop reason: HOSPADM

## 2017-07-05 RX ORDER — CYCLOBENZAPRINE HCL 10 MG
10 TABLET ORAL ONCE
Status: COMPLETED | OUTPATIENT
Start: 2017-07-05 | End: 2017-07-05

## 2017-07-05 RX ORDER — CEFAZOLIN SODIUM 2 G/100ML
2 INJECTION, SOLUTION INTRAVENOUS
Status: COMPLETED | OUTPATIENT
Start: 2017-07-05 | End: 2017-07-05

## 2017-07-05 RX ORDER — ONDANSETRON 2 MG/ML
4 INJECTION INTRAMUSCULAR; INTRAVENOUS EVERY 30 MIN PRN
Status: DISCONTINUED | OUTPATIENT
Start: 2017-07-05 | End: 2017-07-05 | Stop reason: HOSPADM

## 2017-07-05 RX ORDER — HYDROCODONE BITARTRATE AND ACETAMINOPHEN 5; 325 MG/1; MG/1
1-2 TABLET ORAL EVERY 4 HOURS PRN
Qty: 60 TABLET | Refills: 0 | Status: SHIPPED | OUTPATIENT
Start: 2017-07-05 | End: 2017-07-13

## 2017-07-05 RX ORDER — LIDOCAINE 40 MG/G
CREAM TOPICAL
Status: DISCONTINUED | OUTPATIENT
Start: 2017-07-05 | End: 2017-07-05 | Stop reason: HOSPADM

## 2017-07-05 RX ORDER — FENTANYL CITRATE 50 UG/ML
INJECTION, SOLUTION INTRAMUSCULAR; INTRAVENOUS PRN
Status: DISCONTINUED | OUTPATIENT
Start: 2017-07-05 | End: 2017-07-05

## 2017-07-05 RX ORDER — CYCLOBENZAPRINE HCL 10 MG
10 TABLET ORAL 2 TIMES DAILY PRN
Qty: 60 TABLET | Refills: 1 | Status: SHIPPED | OUTPATIENT
Start: 2017-07-05 | End: 2018-12-10

## 2017-07-05 RX ORDER — PROPOFOL 10 MG/ML
INJECTION, EMULSION INTRAVENOUS PRN
Status: DISCONTINUED | OUTPATIENT
Start: 2017-07-05 | End: 2017-07-05

## 2017-07-05 RX ORDER — FENTANYL CITRATE 50 UG/ML
25-50 INJECTION, SOLUTION INTRAMUSCULAR; INTRAVENOUS
Status: DISCONTINUED | OUTPATIENT
Start: 2017-07-05 | End: 2017-07-05 | Stop reason: HOSPADM

## 2017-07-05 RX ORDER — ONDANSETRON 4 MG/1
4 TABLET, ORALLY DISINTEGRATING ORAL EVERY 30 MIN PRN
Status: DISCONTINUED | OUTPATIENT
Start: 2017-07-05 | End: 2017-07-05 | Stop reason: HOSPADM

## 2017-07-05 RX ORDER — NEOSTIGMINE METHYLSULFATE 1 MG/ML
VIAL (ML) INJECTION PRN
Status: DISCONTINUED | OUTPATIENT
Start: 2017-07-05 | End: 2017-07-05

## 2017-07-05 RX ORDER — CEFAZOLIN SODIUM 1 G/3ML
1 INJECTION, POWDER, FOR SOLUTION INTRAMUSCULAR; INTRAVENOUS SEE ADMIN INSTRUCTIONS
Status: DISCONTINUED | OUTPATIENT
Start: 2017-07-05 | End: 2017-07-05 | Stop reason: HOSPADM

## 2017-07-05 RX ORDER — NALOXONE HYDROCHLORIDE 0.4 MG/ML
.1-.4 INJECTION, SOLUTION INTRAMUSCULAR; INTRAVENOUS; SUBCUTANEOUS
Status: DISCONTINUED | OUTPATIENT
Start: 2017-07-05 | End: 2017-07-05 | Stop reason: HOSPADM

## 2017-07-05 RX ORDER — METHOCARBAMOL 750 MG/1
750 TABLET, FILM COATED ORAL 3 TIMES DAILY PRN
Qty: 90 TABLET | Refills: 1 | Status: SHIPPED | OUTPATIENT
Start: 2017-07-05 | End: 2017-10-23

## 2017-07-05 RX ORDER — LIDOCAINE HYDROCHLORIDE 20 MG/ML
INJECTION, SOLUTION INFILTRATION; PERINEURAL PRN
Status: DISCONTINUED | OUTPATIENT
Start: 2017-07-05 | End: 2017-07-05

## 2017-07-05 RX ORDER — DEXAMETHASONE SODIUM PHOSPHATE 10 MG/ML
INJECTION, SOLUTION INTRAMUSCULAR; INTRAVENOUS PRN
Status: DISCONTINUED | OUTPATIENT
Start: 2017-07-05 | End: 2017-07-05

## 2017-07-05 RX ORDER — SODIUM CHLORIDE, SODIUM LACTATE, POTASSIUM CHLORIDE, CALCIUM CHLORIDE 600; 310; 30; 20 MG/100ML; MG/100ML; MG/100ML; MG/100ML
500 INJECTION, SOLUTION INTRAVENOUS CONTINUOUS
Status: DISCONTINUED | OUTPATIENT
Start: 2017-07-05 | End: 2017-07-05 | Stop reason: HOSPADM

## 2017-07-05 RX ORDER — SODIUM CHLORIDE, SODIUM LACTATE, POTASSIUM CHLORIDE, CALCIUM CHLORIDE 600; 310; 30; 20 MG/100ML; MG/100ML; MG/100ML; MG/100ML
INJECTION, SOLUTION INTRAVENOUS CONTINUOUS
Status: DISCONTINUED | OUTPATIENT
Start: 2017-07-05 | End: 2017-07-05 | Stop reason: HOSPADM

## 2017-07-05 RX ORDER — ONDANSETRON 2 MG/ML
INJECTION INTRAMUSCULAR; INTRAVENOUS PRN
Status: DISCONTINUED | OUTPATIENT
Start: 2017-07-05 | End: 2017-07-05

## 2017-07-05 RX ADMIN — DEXAMETHASONE SODIUM PHOSPHATE 10 MG: 10 INJECTION, SOLUTION INTRAMUSCULAR; INTRAVENOUS at 10:06

## 2017-07-05 RX ADMIN — SODIUM CHLORIDE, SODIUM LACTATE, POTASSIUM CHLORIDE, CALCIUM CHLORIDE 1000 ML: 600; 310; 30; 20 INJECTION, SOLUTION INTRAVENOUS at 09:52

## 2017-07-05 RX ADMIN — CEFAZOLIN SODIUM 2 G: 2 INJECTION, SOLUTION INTRAVENOUS at 10:06

## 2017-07-05 RX ADMIN — HYDROMORPHONE HYDROCHLORIDE 0.5 MG: 1 INJECTION, SOLUTION INTRAMUSCULAR; INTRAVENOUS; SUBCUTANEOUS at 11:48

## 2017-07-05 RX ADMIN — SODIUM CHLORIDE, SODIUM LACTATE, POTASSIUM CHLORIDE, CALCIUM CHLORIDE: 600; 310; 30; 20 INJECTION, SOLUTION INTRAVENOUS at 09:55

## 2017-07-05 RX ADMIN — NEOSTIGMINE METHYLSULFATE 3 MG: 1 INJECTION INTRAMUSCULAR; INTRAVENOUS; SUBCUTANEOUS at 11:17

## 2017-07-05 RX ADMIN — ROCURONIUM BROMIDE 5 MG: 10 INJECTION INTRAVENOUS at 10:00

## 2017-07-05 RX ADMIN — HYDROMORPHONE HYDROCHLORIDE 0.5 MG: 1 INJECTION, SOLUTION INTRAMUSCULAR; INTRAVENOUS; SUBCUTANEOUS at 12:09

## 2017-07-05 RX ADMIN — FENTANYL CITRATE 50 MCG: 50 INJECTION, SOLUTION INTRAMUSCULAR; INTRAVENOUS at 11:58

## 2017-07-05 RX ADMIN — FENTANYL CITRATE 100 MCG: 50 INJECTION, SOLUTION INTRAMUSCULAR; INTRAVENOUS at 09:55

## 2017-07-05 RX ADMIN — GLYCOPYRROLATE 0.5 MG: 0.2 INJECTION, SOLUTION INTRAMUSCULAR; INTRAVENOUS at 11:17

## 2017-07-05 RX ADMIN — FENTANYL CITRATE 50 MCG: 50 INJECTION, SOLUTION INTRAMUSCULAR; INTRAVENOUS at 11:32

## 2017-07-05 RX ADMIN — LIDOCAINE HYDROCHLORIDE 80 MG: 20 INJECTION, SOLUTION INFILTRATION; PERINEURAL at 10:00

## 2017-07-05 RX ADMIN — ONDANSETRON 4 MG: 2 INJECTION INTRAMUSCULAR; INTRAVENOUS at 10:06

## 2017-07-05 RX ADMIN — CYCLOBENZAPRINE HYDROCHLORIDE 10 MG: 10 TABLET, FILM COATED ORAL at 12:52

## 2017-07-05 RX ADMIN — ROCURONIUM BROMIDE 20 MG: 10 INJECTION INTRAVENOUS at 10:23

## 2017-07-05 RX ADMIN — PROPOFOL 200 MG: 10 INJECTION, EMULSION INTRAVENOUS at 10:00

## 2017-07-05 RX ADMIN — SODIUM CHLORIDE, SODIUM LACTATE, POTASSIUM CHLORIDE, CALCIUM CHLORIDE: 600; 310; 30; 20 INJECTION, SOLUTION INTRAVENOUS at 10:33

## 2017-07-05 RX ADMIN — MIDAZOLAM HYDROCHLORIDE 2 MG: 1 INJECTION, SOLUTION INTRAMUSCULAR; INTRAVENOUS at 09:55

## 2017-07-05 RX ADMIN — HYDROCODONE BITARTRATE AND ACETAMINOPHEN 2 TABLET: 5; 325 TABLET ORAL at 12:34

## 2017-07-05 RX ADMIN — SUCCINYLCHOLINE CHLORIDE 100 MG: 20 INJECTION, SOLUTION INTRAMUSCULAR; INTRAVENOUS at 10:00

## 2017-07-05 RX ADMIN — ROCURONIUM BROMIDE 25 MG: 10 INJECTION INTRAVENOUS at 10:12

## 2017-07-05 RX ADMIN — FENTANYL CITRATE 50 MCG: 50 INJECTION, SOLUTION INTRAMUSCULAR; INTRAVENOUS at 11:52

## 2017-07-05 RX ADMIN — LIDOCAINE HYDROCHLORIDE 0.3 ML: 10 INJECTION, SOLUTION EPIDURAL; INFILTRATION; INTRACAUDAL; PERINEURAL at 09:51

## 2017-07-05 RX ADMIN — HYDROMORPHONE HYDROCHLORIDE 0.5 MG: 1 INJECTION, SOLUTION INTRAMUSCULAR; INTRAVENOUS; SUBCUTANEOUS at 10:17

## 2017-07-05 NOTE — OP NOTE
Date of surgery: 7/5/2017  Surgeon: Mike Mckenna MD  Assistant: MAYDA Mooney  Note: Kareem Ayon was present for and assisted with the entire surgery, and his role as an assistant was crucial for his aid in positioning, exposure, suctioning, retraction, and closure    Preoperative diagnosis: Cervical radiculopathy  Postoperative diagnosis: Cervical radiculopathy    Procedure:  1.  C6-7 anterior discectomy and interbody arthrodesis  2.  C6-7 insertion of Jimmy Aero-C intervertebral graft  3.  C6-7 anterior instrumentation with insertion of titanium anchors via titanium plate  4.  Use of intraoperative microscope and fluoroscopy    EBL: 25 mL    Indications: 52-year-old male who presented with progressive left arm pain.  MRI demonstrated left sided C6-7 foraminal stenosis.  He underwent non-operative management including epidural steroid injection, with only transient improvement.  Risks, benefits, indications, and alternatives were discussed with the patient and family in detail.  All their questions were answered, and they wished to proceed with surgery.    Description of surgery: The patient was positioned supine.  Sterile prepping and draping procedures were performed.  Antibiotics were administered and timeout was performed.  A right horizontal neck incision was performed.  The monopolar was used to divide the platysma, and the Metzenbaum scissors were used to create a plane in the fascia medial to the sternocleidomastoid muscle.  Blunt dissection was used to come down upon the anterior cervical spine.  The spinal needle was used to verify the correct level.  The monopolar was used to elevate the longus coli, and the Trimline retractor was inserted.  The 15 blade was used to perform an annulotomy in the C6-7 disc space.  A complete discectomy was performed with combination of the high-speed drill, curettes, and pituitary rongeurs.  Interbody arthrodesis was performed with a high-speed drill.  The posterior  osteophytes were removed with the drill, and the posterior longitudinal ligament was removed with the Kerrison Aguirre, with decompression of the bilateral neural foramina.  A Jimmy Aero-C intervertebral graft was delivered in the disc space at C6-7.  Anterior instrumentation was performed with insertion of titanium anchors via the titanium plate.  Hemostasis was achieved.  Antibiotic irrigation was performed.  The platysma and dermal layers were closed with 3-0 Vicryl sutures, and the skin was closed with steri-strips.  There were no intraprocedural complications.

## 2017-07-05 NOTE — ANESTHESIA CARE TRANSFER NOTE
Patient: Indra Mehta    Procedure(s):  Cervical 6-7, Anterior cervical discectomy fusion - Wound Class: I-Clean    Diagnosis: cervical radiculopathy  Diagnosis Additional Information: No value filed.    Anesthesia Type:   General, ETT     Note:  Airway :Face Mask  Patient transferred to:PACU  Comments: /60, HR 75, Sats 99%, RR 12, Temp 97.5., Patient stated no pain. Awake and alert.      Vitals: (Last set prior to Anesthesia Care Transfer)    CRNA VITALS  7/5/2017 1106 - 7/5/2017 1142      7/5/2017             Pulse: 92    SpO2: 100 %    Resp Rate (observed): 13                Electronically Signed By: YUMIKO Chan CRNA  July 5, 2017  11:42 AM

## 2017-07-05 NOTE — ANESTHESIA PREPROCEDURE EVALUATION
Anesthesia Evaluation     . Pt has had prior anesthetic. Type: General and MAC           ROS/MED HX    ENT/Pulmonary:  - neg pulmonary ROS     Neurologic:     (+)neuropathy (bilateral occipital neuralgia) - Bilateral occipital neuralgia,     Cardiovascular:     (+) Dyslipidemia, hypertension----. : . . . :. . Previous cardiac testing date:results:date: results:ECG reviewed date:6/30/17 results:No results indicated, EKG reordered. date: results:          METS/Exercise Tolerance:  >4 METS   Hematologic:  - neg hematologic  ROS       Musculoskeletal:  - neg musculoskeletal ROS       GI/Hepatic:  - neg GI/hepatic ROS       Renal/Genitourinary:  - ROS Renal section negative       Endo:  - neg endo ROS       Psychiatric:     (+) psychiatric history depression      Infectious Disease:  - neg infectious disease ROS       Malignancy:         Other:    (+) No chance of pregnancy C-spine cleared: Yes, H/O Chronic Pain,H/O chronic opiod use , no other significant disability                    Physical Exam  Normal systems: cardiovascular, pulmonary and dental    Airway   Mallampati: I  TM distance: >3 FB  Neck ROM: limited    Dental     Cardiovascular   Rhythm and rate: regular and normal      Pulmonary    breath sounds clear to auscultation                    Anesthesia Plan      History & Physical Review  History and physical reviewed and following examination; no interval change.    ASA Status:  2 .    NPO Status:  > 8 hours    Plan for General and ETT with Intravenous and Propofol induction. Maintenance will be Inhalation and Balanced.    PONV prophylaxis:  Ondansetron (or other 5HT-3) and Dexamethasone or Solumedrol  Additional equipment: Videolaryngoscope      Postoperative Care  Postoperative pain management:  IV analgesics, Oral pain medications and Multi-modal analgesia.      Consents  Anesthetic plan, risks, benefits and alternatives discussed with:  Patient.  Use of blood products discussed: Yes.   Use of blood  products discussed with Patient.  .                          .

## 2017-07-05 NOTE — IP AVS SNAPSHOT
[facility]    [address]     [place] [address]    Phone:  [phone]                                       After Visit Summary   7/5/2017    Indra Mehta    MRN: [id number]           After Visit Summary Signature Page     I have received my discharge instructions, and my questions have been answered. I have discussed any challenges I see with this plan with the nurse or doctor.    ..........................................................................................................................................  Patient/Patient Representative Signature      ..........................................................................................................................................  Patient Representative Print Name and Relationship to Patient    ..................................................               ................................................  Date                                            Time    ..........................................................................................................................................  Reviewed by Signature/Title    ...................................................              ..............................................  Date                                                            Time

## 2017-07-05 NOTE — ANESTHESIA POSTPROCEDURE EVALUATION
Patient: Indra Mehta    Procedure(s):  Cervical 6-7, Anterior cervical discectomy fusion - Wound Class: I-Clean    Diagnosis:cervical radiculopathy  Diagnosis Additional Information: No value filed.    Anesthesia Type:  General, ETT    Note:  Anesthesia Post Evaluation    Patient location during evaluation: Phase 2  Patient participation: Able to fully participate in evaluation  Level of consciousness: awake and alert  Pain management: adequate  Airway patency: patent  Cardiovascular status: acceptable  Respiratory status: acceptable  Hydration status: acceptable  PONV: none             Last vitals:  Vitals:    07/05/17 1210 07/05/17 1215 07/05/17 1230   BP: (!) 130/91 (!) 130/91 135/66   Pulse:      Resp: (!) 7 15    Temp:      SpO2: 96% 95% 93%         Electronically Signed By: YUMIKO Chan CRNA  July 5, 2017  1:28 PM

## 2017-07-05 NOTE — IP AVS SNAPSHOT
MRN:[id number]                      After Visit Summary   7/5/2017    Indra Mehta    MRN: [id number]           Thank you!     Thank you for choosing [facility] for your care. Our goal is always to provide you with excellent care. Hearing back from our patients is one way we can continue to improve our services. Please take a few minutes to complete the written survey that you may receive in the mail after you visit with us. Thank you!        Patient Information     Date Of Birth          1965        About your hospital stay     You were admitted on:  July 5, 2017 You last received care in the:  [facility]    You were discharged on:  July 5, 2017       Who to Call     For medical emergencies, please call 911.  For non-urgent questions about your medical care, please call your primary care provider or clinic, [phone]  For questions related to your surgery, please call your surgery clinic        Attending Provider     Provider Specialty    Mike Mckenna MD Neurosurgery       Primary Care Provider Office Phone # Fax #    Tha Grullon [phone]      After Care Instructions     Discharge Instructions       Review outpatient procedure discharge instructions as directed by Provider.    Discharge instructions:  No lifting of more than 10 pounds until follow up visit.  Ok to shampoo hair, shower or bathe, but, do not scrub or submerge incision under water until evaluated post operatively in clinic.    Ok to walk as tolerated, avoid bedrest.    No contact sports until after follow up visit  No high impact activities such as; running/jogging, snowmobile or 4 delaney riding or any other recreational vehicles    Call my office at [phone] for increasing redness, swelling or pus draining from the incision, increased pain or any other questions and concerns.    Go to ER with any seizure activity, mental status change (increasing confusion), difficulty  "with speech or increasing or acute weakness            Discharge Instructions        Diet as tolerated. Return to diet before surgery.            Discharge Instructions - Lifting Limit (specify)       Lifting limit of  10 pounds until seen at Post-op follow up appointment.            Notify Provider       Signs and symptoms of infection: Fever greater than 101, redness, swelling, heat at site, drainage, or pus            Remove dressing - 48 hours           Return to Clinic       Return to Clinic in 6 weeks            Shower - Do not soak           Wound care       Do not immerse wound in water until sutures removed                  Your next 10 appointments already scheduled     Jul 31, 2017  8:45 AM CDT   [facility]i [facility]   [facility]    [address] [place] [id number] [phone]              Pending Results     No orders found from 7/3/2017 to 7/6/2017.            Admission Information     Date & Time Provider Department Dept. Phone    7/5/2017 Mike Mckenna MD [facility] Phase [phone]      Your Vitals Were     Blood Pressure Pulse Temperature Respirations Height Weight    130/91 68 98.1  F (36.7  C) (Oral) 7 1.854 m (6' 0.99\") 98.9 kg (217 lb 15.9 oz)    Pulse Oximetry BMI (Body Mass Index)                96% 28.77 kg/m2          MyChart Information     [employer] gives you secure access to your electronic health record. If you see a primary care provider, you can also send messages to your care team and make appointments. If you have questions, please call your primary care clinic.  If you do not have a primary care provider, please call [phone] and they will assist you.        Care EveryWhere ID     This is your Care EveryWhere ID. This could be used by other organizations to access your [facility] medical records  [id number]        Equal Access to Services     KELSEY ARSHAD AH: Donna Menendez, " wapolada shannonarturo, qaybta kahe delacruz, mikala cabanaahi ah. So [facility] [phone].    ATENCIÓN: Si juan kay, tiene a lundy disposición servicios gratuitos de asistencia lingüística. Llame al [phone].    We comply with applicable federal civil rights laws and [place] laws. We do not discriminate on the basis of race, color, national origin, age, disability sex, sexual orientation or gender identity.               Review of your medicines      CONTINUE these medicines which may have CHANGED, or have new prescriptions. If we are uncertain of the size of tablets/capsules you have at home, strength may be listed as something that might have changed.        Dose / Directions    HYDROcodone-acetaminophen 5-325 MG per tablet   Commonly known as:  NORCO   This may have changed:    - how much to take  - when to take this  - reasons to take this  - additional instructions   Used for:  S/P cervical spinal fusion   Notes to Patient:  PAIN MEDS ARE CONSTIPATING.  STOOL SOFTENERS ARE RECOMMENDED WHILE TAKING THEM.  DO NOT DUPLICATE PRODUCTS CONTAINING TYLENOL OR ACETAMINOPHEN.        Dose:  1-2 tablet   Take 1-2 tablets by mouth every 4 hours as needed for other (Moderate to Severe Pain)   Quantity:  60 tablet   Refills:  0         CONTINUE these medicines which have NOT CHANGED        Dose / Directions    cyclobenzaprine 10 MG tablet   Commonly known as:  FLEXERIL   Used for:  Muscle spasm        Dose:  10 mg   Take 1 tablet (10 mg) by mouth 2 times daily as needed for muscle spasms Reported on 4/12/2017   Quantity:  60 tablet   Refills:  1       gabapentin 300 MG capsule   Commonly known as:  NEURONTIN   Used for:  Cervicalgia        Dose:  900 mg   Take 3 capsules (900 mg) by mouth 3 times daily If needed/tolerated, OK to increase bedtime dose to 1200mg   Quantity:  300 capsule   Refills:  3       losartan 50 MG tablet   Commonly known as:  COZAAR   Used for:  Benign essential hypertension         Dose:  50 mg   Take 1 tablet (50 mg) by mouth daily   Quantity:  90 tablet   Refills:  3       methocarbamol 750 MG tablet   Commonly known as:  ROBAXIN   Used for:  Cervical radiculopathy, Myofascial pain        Dose:  750 mg   Take 1 tablet (750 mg) by mouth 3 times daily as needed for muscle spasms   Quantity:  90 tablet   Refills:  1       order for DME   Used for:  Chronic pain syndrome        Equipment being ordered: TENS Pads as directed   Quantity:  1 Device   Refills:  0       TYLENOL PO        Dose:  500 mg   Take 500 mg by mouth every 4 hours as needed for mild pain or fever   Refills:  0         STOP taking     IBUPROFEN PO           traMADol 50 MG tablet   Commonly known as:  ULTRAM                Where to get your medicines      Some of these will need a paper prescription and others can be bought over the counter. Ask your nurse if you have questions.     Bring a paper prescription for each of these medications     HYDROcodone-acetaminophen 5-325 MG per tablet         Information about where to get these medications is not yet available     ! Ask your nurse or doctor about these medications     cyclobenzaprine 10 MG tablet    methocarbamol 750 MG tablet                Protect others around you: Learn how to safely use, store and throw away your medicines at www.disposemymeds.org.             Medication List: This is a list of all your medications and when to take them. Check marks below indicate your daily home schedule. Keep this list as a reference.      Medications           Morning Afternoon Evening Bedtime As Needed    cyclobenzaprine 10 MG tablet   Commonly known as:  FLEXERIL   Take 1 tablet (10 mg) by mouth 2 times daily as needed for muscle spasms Reported on 4/12/2017                     MAY TAKE BEFORE BEDTIME TONITE           gabapentin 300 MG capsule   Commonly known as:  NEURONTIN   Take 3 capsules (900 mg) by mouth 3 times daily If needed/tolerated, OK to increase bedtime dose to  1200mg   Last time this was given:  12:45 P.M.                                  HYDROcodone-acetaminophen 5-325 MG per tablet   Commonly known as:  NORCO   Take 1-2 tablets by mouth every 4 hours as needed for other (Moderate to Severe Pain)   Last time this was given:  2 tablets on 7/5/2017 12:34 PM   Notes to Patient:  PAIN MEDS ARE CONSTIPATING.  STOOL SOFTENERS ARE RECOMMENDED WHILE TAKING THEM.  DO NOT DUPLICATE PRODUCTS CONTAINING TYLENOL OR ACETAMINOPHEN.                 MAY TAKE AFTER 4:30 P.M. TODAY               losartan 50 MG tablet   Commonly known as:  COZAAR   Take 1 tablet (50 mg) by mouth daily                                methocarbamol 750 MG tablet   Commonly known as:  ROBAXIN   Take 1 tablet (750 mg) by mouth 3 times daily as needed for muscle spasms                                order for DME   Equipment being ordered: TENS Pads as directed                                TYLENOL PO   Take 500 mg by mouth every 4 hours as needed for mild pain or fever

## 2017-07-07 ENCOUNTER — TELEPHONE (OUTPATIENT)
Dept: NEUROSURGERY | Facility: CLINIC | Age: 52
End: 2017-07-07

## 2017-07-07 NOTE — TELEPHONE ENCOUNTER
"Pt called. S/p cervical fusion Wed, 7/5/17. Reports discomfort when swallowing and especially when coughing. Also states that he took the \"tape\" over his incision off before taking a shower today. Admits incisional pain. Please call back asap.  "

## 2017-07-07 NOTE — TELEPHONE ENCOUNTER
Returned pt's phone call. He is 2 days post op ACDF. Reporting discomfort when swallowing and incisional pain. Denies any signs of infection or difficulty breathing. Educated patient that post op pain and discomfort when swallowing is common after ACDF, especially only 2 days post op. Advised him to continue to monitor the incision, use pain meds/muscle relaxers prn, and apply ice. Reminded to follow activity restrictions as well. Call if signs of infection occur and visit ED if difficulty breathing. Patient voiced understanding and will f/u at 6 weeks post op.

## 2017-07-10 ENCOUNTER — TELEPHONE (OUTPATIENT)
Dept: NEUROSURGERY | Facility: CLINIC | Age: 52
End: 2017-07-10

## 2017-07-10 NOTE — TELEPHONE ENCOUNTER
Spoke with patient. We can provide [place] refill on Thursday,  at [facility].

## 2017-07-13 DIAGNOSIS — Z98.1 S/P CERVICAL SPINAL FUSION: ICD-10-CM

## 2017-07-13 RX ORDER — HYDROCODONE BITARTRATE AND ACETAMINOPHEN 5; 325 MG/1; MG/1
1-2 TABLET ORAL EVERY 4 HOURS PRN
Qty: 60 TABLET | Refills: 0 | Status: SHIPPED | OUTPATIENT
Start: 2017-07-13 | End: 2017-07-17

## 2017-07-14 ENCOUNTER — DOCUMENTATION ONLY (OUTPATIENT)
Dept: NEUROSURGERY | Facility: CLINIC | Age: 52
End: 2017-07-14

## 2017-07-14 NOTE — PROGRESS NOTES
Certification of health care provider for employee's serious health condition (family and medical leave act)  [employer] short term disability claim complete  Faxed to [phone] on 07/11/17  Sent the original to medical records and placed a copy in the bin

## 2017-07-17 ENCOUNTER — TELEPHONE (OUTPATIENT)
Dept: NEUROSURGERY | Facility: CLINIC | Age: 52
End: 2017-07-17

## 2017-07-17 ENCOUNTER — HOSPITAL ENCOUNTER (OUTPATIENT)
Dept: GENERAL RADIOLOGY | Facility: CLINIC | Age: 52
Discharge: HOME | End: 2017-07-17
Attending: NEUROLOGICAL SURGERY | Admitting: NEUROLOGICAL SURGERY
Payer: COMMERCIAL

## 2017-07-17 DIAGNOSIS — Z98.1 S/P CERVICAL SPINAL FUSION: Primary | ICD-10-CM

## 2017-07-17 DIAGNOSIS — Z98.1 S/P CERVICAL SPINAL FUSION: ICD-10-CM

## 2017-07-17 PROCEDURE — 72040 X-RAY EXAM NECK SPINE 2-3 VW: CPT | Mod: TC

## 2017-07-17 NOTE — TELEPHONE ENCOUNTER
"Erica Mitchell PA-C approved Norco refill. The script can be picked up on Thursday in [place]. Patient voiced agreement.    Also, patient reported he \"stumbled\" on Wednesday while walking his dog, but did not actually fall. Since then, he's experienced increased pain in left shoulder and in neck, near base of skull. Spoke with Dr. Mckenna and he recommends to obtain cervical XR. We will contact the patient with the results. Patient voiced agreement.   "

## 2017-07-18 ENCOUNTER — TELEPHONE (OUTPATIENT)
Dept: NEUROSURGERY | Facility: CLINIC | Age: 52
End: 2017-07-18

## 2017-07-18 NOTE — TELEPHONE ENCOUNTER
Per Dr. Mckenna, patient's recent cervical XR looks good. He would recommend patient continue with normal post op course: activity restrictions, pain meds/muscle relaxants prn, apply ice. Patient notified and voiced agreement. He will call with any questions and f/u in clinic at 6 weeks post op.

## 2017-07-19 ENCOUNTER — TELEPHONE (OUTPATIENT)
Dept: NEUROSURGERY | Facility: CLINIC | Age: 52
End: 2017-07-19

## 2017-07-19 NOTE — TELEPHONE ENCOUNTER
Not able to  rx in [place].  Would like to coordinate when he can pick it up in [place].   Looking for as early as possible going out of town.

## 2017-07-19 NOTE — TELEPHONE ENCOUNTER
LVM with patient. We can arrange to have the script ready for  at [facility] instead of [place].

## 2017-07-20 RX ORDER — HYDROCODONE BITARTRATE AND ACETAMINOPHEN 5; 325 MG/1; MG/1
1-2 TABLET ORAL EVERY 4 HOURS PRN
Qty: 60 TABLET | Refills: 0 | Status: SHIPPED | OUTPATIENT
Start: 2017-07-20 | End: 2017-07-25

## 2017-07-23 ENCOUNTER — MYC MEDICAL ADVICE (OUTPATIENT)
Dept: NEUROSURGERY | Facility: CLINIC | Age: 52
End: 2017-07-23

## 2017-07-23 DIAGNOSIS — Z98.1 S/P CERVICAL SPINAL FUSION: ICD-10-CM

## 2017-07-25 DIAGNOSIS — Z98.1 S/P CERVICAL SPINAL FUSION: ICD-10-CM

## 2017-07-25 RX ORDER — METHYLPREDNISOLONE 4 MG
TABLET, DOSE PACK ORAL
Qty: 21 TABLET | Refills: 0 | Status: SHIPPED | OUTPATIENT
Start: 2017-07-25 | End: 2017-08-04

## 2017-07-25 NOTE — TELEPHONE ENCOUNTER
Received [employer] message from patient. Called and spoke with him regarding his post op questions. Patient s/p ACDF on 7/5/17. He reports pain in his neck and shoulders. He takes norco and muscle relaxants to help with the symptoms. He recently had XR, which Dr. Mckenna reviewed and is stable. Informed patient post op pain, soreness, muscle spasms is common after surgery, and the nerves do take time to heal. Recc continue with normal post op course: activity restrictions, prn meds, and apply ice. Erica Mitchell PA-C also offered pt to try medrol dosepak. Patient would like to try this, sent to [facility] pharmacy. Patient will call if symptoms change or worsen.     Patient would like a norco refill as well,  at [facility] this Thursday. Will forward to provider for approval.

## 2017-07-27 RX ORDER — HYDROCODONE BITARTRATE AND ACETAMINOPHEN 5; 325 MG/1; MG/1
1-2 TABLET ORAL EVERY 4 HOURS PRN
Qty: 60 TABLET | Refills: 0 | Status: SHIPPED | OUTPATIENT
Start: 2017-07-27 | End: 2017-08-03

## 2017-07-31 ENCOUNTER — TELEPHONE (OUTPATIENT)
Dept: SURGERY | Facility: OTHER | Age: 52
End: 2017-07-31

## 2017-07-31 ENCOUNTER — OFFICE VISIT (OUTPATIENT)
Dept: SURGERY | Facility: CLINIC | Age: 52
End: 2017-07-31
Payer: COMMERCIAL

## 2017-07-31 ENCOUNTER — TELEPHONE (OUTPATIENT)
Dept: NEUROSURGERY | Facility: CLINIC | Age: 52
End: 2017-07-31

## 2017-07-31 VITALS — BODY MASS INDEX: 27.73 KG/M2 | OXYGEN SATURATION: 98 % | TEMPERATURE: 97.3 F | HEART RATE: 80 BPM | WEIGHT: 210.1 LBS

## 2017-07-31 DIAGNOSIS — K42.0 INCARCERATED UMBILICAL HERNIA: Primary | ICD-10-CM

## 2017-07-31 PROCEDURE — 99204 OFFICE O/P NEW MOD 45 MIN: CPT | Performed by: SPECIALIST

## 2017-07-31 NOTE — TELEPHONE ENCOUNTER
Surgery Scheduled    Date of Surgery 08/11/17 Time of Surgery 1:00pm  Procedure: Open Umbilical Hernia Repair  Hospital/Surgical Facility: [facility]  Surgeon: Dr Story  Type of Anesthesia Anticipated: LMA  Pre-Op: 08/04/17 with Dr See   Post-Op: 08/18/17 with Dr James  Pre-Certification -to be completed  Consent Signed -to be completed  Hospital Stay -same day procedure    Surgery Packet (and/or) Colonscopy Prep (was given/or mailed) to patient. Patient was also instructed to arrive 1 hour(s) prior to surgery.  Patient understood and agrees to the plan.      Yarelis Diop  Specialty

## 2017-07-31 NOTE — TELEPHONE ENCOUNTER
Spoke with patient. He has enough tabs of Norco to get him through Sunday 8/6/17. He will call back on Monday 8/7/17 to  refill at .     Patient would like to RTW on 8/7/17 with restrictions. Will write letter and email to patient.    Regarding open repair for umbilical hernia, Dr. Mckenna is comfortable with patient proceeding at any time. Patient notified, he will call to coordinate with Dr. Story.

## 2017-07-31 NOTE — MR AVS SNAPSHOT
After Visit Summary   7/31/2017    Indra Mehta    MRN: [id number]           Patient Information     Date Of Birth          1965        Visit Information        Provider Department      7/31/2017 8:45 AM Bnoi Story MD [facility]         Follow-ups after your visit        Your next 10 appointments already scheduled     Aug 17, 2017  2:40 PM CDT   Return Visit with Erica Mitchell PA-C   [facility]    [address] [place] [address] [phone]              Who to contact     If you have questions or need follow up information about today's clinic visit or your schedule please contact [facility] directly at [phone].  Normal or non-critical lab and imaging results will be communicated to you by MyChart, letter or phone within 4 business days after the clinic has received the results. If you do not hear from us within 7 days, please contact the clinic through MyChart or phone. If you have a critical or abnormal lab result, we will notify you by phone as soon as possible.  Submit refill requests through [employer] or call your pharmacy and they will forward the refill request to us. Please allow 3 business days for your refill to be completed.          Additional Information About Your Visit        MyChart Information     [employer] gives you secure access to your electronic health record. If you see a primary care provider, you can also send messages to your care team and make appointments. If you have questions, please call your primary care clinic.  If you do not have a primary care provider, please call [phone] and they will assist you.        Care EveryWhere ID     This is your Care EveryWhere ID. This could be used by other organizations to access your [facility] medical records  [id number]        Your Vitals Were     Pulse Temperature Pulse Oximetry BMI (Body Mass Index)          80  97.3  F (36.3  C) (Temporal) 98% 27.73 kg/m2         Blood Pressure from Last 3 Encounters:   07/05/17 120/75   06/30/17 134/76   06/15/17 133/83    Weight from Last 3 Encounters:   07/31/17 210 lb 1.6 oz (95.3 kg)   07/05/17 217 lb 15.9 oz (98.9 kg)   06/30/17 218 lb (98.9 kg)              Today, you had the following     No orders found for display       Primary Care Provider Office Phone # Fax #    Tha Grullon [phone]       [facility] [address] [facility] [address]        Equal Access to Services     KELSEY ARSHAD : Donna Menendez, jaime menezes, ranulfo kaalmada nubia, mikala lainez. So [facility] [phone].    ATENCIÓN: Si habla español, tiene a lundy disposición servicios gratuitos de asistencia lingüística. Llame al [phone].    We comply with applicable federal civil rights laws and [place] laws. We do not discriminate on the basis of race, color, national origin, age, disability sex, sexual orientation or gender identity.            Thank you!     Thank you for choosing [facility]  for your care. Our goal is always to provide you with excellent care. Hearing back from our patients is one way we can continue to improve our services. Please take a few minutes to complete the written survey that you may receive in the mail after your visit with us. Thank you!             Your Updated Medication List - Protect others around you: Learn how to safely use, store and throw away your medicines at www.disposemymeds.org.          This list is accurate as of: 7/31/17  8:57 AM.  Always use your most recent med list.                   Brand Name Dispense Instructions for use Diagnosis    cyclobenzaprine 10 MG tablet    FLEXERIL    60 tablet    Take 1 tablet (10 mg) by mouth 2 times daily as needed for muscle spasms Reported on 4/12/2017    Muscle spasm       gabapentin 300 MG capsule    NEURONTIN    300 capsule    Take 3  capsules (900 mg) by mouth 3 times daily If needed/tolerated, OK to increase bedtime dose to 1200mg    Cervicalgia       HYDROcodone-acetaminophen 5-325 MG per tablet    NORCO    60 tablet    Take 1-2 tablets by mouth every 4 hours as needed for other (Moderate to Severe Pain)    S/P cervical spinal fusion       losartan 50 MG tablet    COZAAR    90 tablet    Take 1 tablet (50 mg) by mouth daily    Benign essential hypertension       methocarbamol 750 MG tablet    ROBAXIN    90 tablet    Take 1 tablet (750 mg) by mouth 3 times daily as needed for muscle spasms    Cervical radiculopathy, Myofascial pain       methylPREDNISolone 4 MG tablet    MEDROL DOSEPAK    21 tablet    Follow package instructions    S/P cervical spinal fusion       order for DME     1 Device    Equipment being ordered: TENS Pads as directed    Chronic pain syndrome       TYLENOL PO      Take 500 mg by mouth every 4 hours as needed for mild pain or fever

## 2017-07-31 NOTE — NURSING NOTE
[facility] [place] [facility] Surgical Services    Indra Mehta has been given the following teaching information:  Before Your Surgery booklet  [place]: Hernia Surgery  Instructions for Showering or Bathing before Surgery  Request for surgery sheet faxed to Yarelis at [facility]

## 2017-07-31 NOTE — TELEPHONE ENCOUNTER
Pt left message on clinic vm. Asking for a call back to discuss refill on Norco as he has he has only enough to last until this weekend. Also has questions about return to work letter and recent pre-op physical, at which he discovered he has a hernia and will need surgery for that as well. Please call back to discuss further details.

## 2017-07-31 NOTE — LETTER
[facility]   Sp[facility] and [facility]  [address] [place] [address]    July 31, 2017    To Whom it May Concern,      Indra Mehta is being seen at our clinic for post-operative follow up care. He is able to return to work on 8/7/17 with the restrictions of:    -Part time hours 8/7/17 to 8/11/17. May resume full time after that.   -Light duty work only.  -No heavy lifting greater than 10 pounds until further notice.  -No twisting, bending, or overhead reaching.    Indra will be re-evaluated at their next follow up visit on 8/17/17. Please call our clinic with questions or concerns.       Sincerely,            Mike Mckenna MD  [facility]  [address] [address]    Tel [phone]

## 2017-07-31 NOTE — LETTER
[facility]   Spine and Brain Clinic  [address] [place] [address]    July 31, 2017    To Whom it May Concern,      Indra Mehta is being seen at our clinic for post-operative follow up care. He is able to return to work on 8/7/17 with the restrictions of:    -Light duty work only.  -No heavy lifting greater than 10 pounds until further notice.  -No twisting, bending, or overhead reaching.    Indra will be re-evaluated at their next follow up visit on 8/17/17. Please call our clinic with questions or concerns.       Sincerely,            Mike Mckenna MD  [facility]  [address] [address]    Tel [phone]

## 2017-07-31 NOTE — TELEPHONE ENCOUNTER
Left message for patient to return call to my direct # [phone] to assist in scheduling surgical procedure with Dr Story.

## 2017-07-31 NOTE — NURSING NOTE
"Chief Complaint   Patient presents with     Hernia     Consult       Initial Pulse 80  Temp 97.3  F (36.3  C) (Temporal)  Wt 210 lb 1.6 oz (95.3 kg)  SpO2 98%  BMI 27.73 kg/m2 Estimated body mass index is 27.73 kg/(m^2) as calculated from the following:    Height as of 7/5/17: 6' 0.99\" (1.854 m).    Weight as of this encounter: 210 lb 1.6 oz (95.3 kg).  Medication Reconciliation: complete    "

## 2017-07-31 NOTE — PROGRESS NOTES
Consult requested by Dr. Lipscomb    Reason for consultation - Umbilical hernia    HPI:  Patient is a 52-year-old white male with a long-standing history of an umbilical hernia. He does report some occasional discomfort with sitting and lifting. It was found on a recent preop physical for spine surgery. He is 4 weeks out from his C-spine fusion and is now thinking he would like to have his hernia fixed as well. He denies any nausea vomiting fevers chills or obstructive symptoms. He now presents for evaluation of umbilical hernia.    Past Medical History:   Diagnosis Date     Back pain      Meniere's disease, unspecified      Pain medication agreement signed 8/20/2010     Past Surgical History:   Procedure Laterality Date     BUNIONECTOMY RT/LT  09/05/08    Both feet     C MASTOIDECTOMY,COMPLETE  09/27/2007     COLONOSCOPY N/A 12/28/2016    Procedure: COMBINED COLONOSCOPY, SINGLE OR MULTIPLE BIOPSY/POLYPECTOMY BY BIOPSY;  Surgeon: Indra Jernigan MD;  Location: [facility]     DISCECTOMY, FUSION CERVICAL ANTERIOR ONE LEVEL, COMBINED N/A 7/5/2017    Procedure: COMBINED DISCECTOMY, FUSION CERVICAL ANTERIOR ONE LEVEL;  Cervical 6-7, Anterior cervical discectomy fusion;  Surgeon: Mike Mckenna MD;  Location: [facility]     HC COLONOSCOPY W/WO BRUSH/WASH  12/27/2005     HC CREATE EARDRUM OPENING,GEN ANESTH  09/27/2007    Pe tube, Left endolymphatic sac enhancement.     PE TUBES  2006    left ear     Current Outpatient Prescriptions   Medication     HYDROcodone-acetaminophen (NORCO) 5-325 MG per tablet     methylPREDNISolone (MEDROL DOSEPAK) 4 MG tablet     cyclobenzaprine (FLEXERIL) 10 MG tablet     methocarbamol (ROBAXIN) 750 MG tablet     gabapentin (NEURONTIN) 300 MG capsule     order for DME     losartan (COZAAR) 50 MG tablet     Acetaminophen (TYLENOL PO)     No current facility-administered medications for this visit.         Allergies   Allergen Reactions     No Known Drug Allergies      Social History   Substance  Use Topics     Smoking status: Never Smoker     Smokeless tobacco: Never Used     Alcohol use No     Family History   Problem Relation Age of Onset     Cancer - colorectal Mother      DIABETES Mother      Cardiovascular Father      Hypertension Father      MENTAL ILLNESS Sister      Suicide Other       ROS: 10 point ROS neg other than the symptoms noted above in the HPI.    PE:  B/P: Data Unavailable, T: 97.3, P: 80, R: Data Unavailable  General: well developed, well nourished WM who appears his stated age  HEENT: NC/AT, EOMI, (-)icterus, (-)injection  Neck: Supple, No JVD  Chest: CTA  Heart: S1, S2, (-)m/r/g  Abd: Soft, non tender, non distended, incarcerated tender umbilical hernia.  Ext; Warm, no edema  Psych: AAOx3  Neuro: No focal deficits      Impression/plan:  This is a 52-year-old gentleman with an incarcerated symptomatic umbilical hernia. After discussion with the patient the plan at this time is for an open repair. The procedure, risks, benefits and alternatives were discussed and he agrees to proceed. He'll be scheduled for a few weeks out due to his recent spine surgery and allow him time to recover from that. The patient expressed understanding hasn't and is in agreement with the plan.    Boni Story MD, FACS

## 2017-08-03 ENCOUNTER — TELEPHONE (OUTPATIENT)
Dept: NEUROSURGERY | Facility: CLINIC | Age: 52
End: 2017-08-03

## 2017-08-03 DIAGNOSIS — Z98.1 S/P CERVICAL SPINAL FUSION: ICD-10-CM

## 2017-08-03 RX ORDER — HYDROCODONE BITARTRATE AND ACETAMINOPHEN 5; 325 MG/1; MG/1
1-2 TABLET ORAL EVERY 4 HOURS PRN
Qty: 60 TABLET | Refills: 0 | Status: SHIPPED | OUTPATIENT
Start: 2017-08-05 | End: 2017-08-14

## 2017-08-03 NOTE — TELEPHONE ENCOUNTER
Called pt to let him know that his prescription refill of HYDROcodone-acetaminophen (NORCO) 5-325 mg per tablet was approved by Erica Mitchell. Pt would like to pick medication script up at the [facility] .    Erum Stewart CMA

## 2017-08-04 ENCOUNTER — OFFICE VISIT (OUTPATIENT)
Dept: FAMILY MEDICINE | Facility: OTHER | Age: 52
End: 2017-08-04
Payer: COMMERCIAL

## 2017-08-04 VITALS
SYSTOLIC BLOOD PRESSURE: 116 MMHG | TEMPERATURE: 96.6 F | WEIGHT: 209 LBS | HEART RATE: 71 BPM | DIASTOLIC BLOOD PRESSURE: 80 MMHG | OXYGEN SATURATION: 100 % | BODY MASS INDEX: 27.58 KG/M2

## 2017-08-04 DIAGNOSIS — Z01.818 PREOP GENERAL PHYSICAL EXAM: Primary | ICD-10-CM

## 2017-08-04 DIAGNOSIS — I10 BENIGN ESSENTIAL HYPERTENSION: ICD-10-CM

## 2017-08-04 DIAGNOSIS — K42.9 UMBILICAL HERNIA WITHOUT OBSTRUCTION AND WITHOUT GANGRENE: ICD-10-CM

## 2017-08-04 LAB
ALBUMIN UR-MCNC: NEGATIVE MG/DL
APPEARANCE UR: CLEAR
BILIRUB UR QL STRIP: NEGATIVE
COLOR UR AUTO: YELLOW
GLUCOSE UR STRIP-MCNC: NEGATIVE MG/DL
HGB UR QL STRIP: NEGATIVE
KETONES UR STRIP-MCNC: NEGATIVE MG/DL
LEUKOCYTE ESTERASE UR QL STRIP: NEGATIVE
NITRATE UR QL: NEGATIVE
PH UR STRIP: 5.5 PH (ref 5–7)
SP GR UR STRIP: 1.01 (ref 1–1.03)
URN SPEC COLLECT METH UR: NORMAL
UROBILINOGEN UR STRIP-ACNC: 0.2 EU/DL (ref 0.2–1)

## 2017-08-04 PROCEDURE — 81003 URINALYSIS AUTO W/O SCOPE: CPT | Performed by: INTERNAL MEDICINE

## 2017-08-04 PROCEDURE — 99214 OFFICE O/P EST MOD 30 MIN: CPT | Performed by: INTERNAL MEDICINE

## 2017-08-04 ASSESSMENT — PAIN SCALES - GENERAL: PAINLEVEL: MODERATE PAIN (4)

## 2017-08-04 NOTE — PROGRESS NOTES
[facility]  [address] [facility] [address]  [phone]  Dept: [phone]    PRE-OP EVALUATION:  Today's date: 2017    Indra Mehta (: 1965) presents for pre-operative evaluation assessment as requested by Dr. Story.  He requires evaluation and anesthesia risk assessment prior to undergoing surgery/procedure for treatment of hernia .  Proposed procedure: HERNIORRHAPHY UMBILICAL    Date of Surgery/ Procedure: 17  Time of Surgery/ Procedure: 1:00  [facility]ospital[facility]urgical [facility]  Primary Physician: Tha Grullon  Type of Anesthesia Anticipated: General    Patient has a Health Care Directive or Living Will:  NO    1. NO - Do you have a history of heart attack, stroke, stent, bypass or surgery on an artery in the head, neck, heart or legs?  2. NO - Do you ever have any pain or discomfort in your chest?  3. NO - Do you have a history of  Heart Failure?  4. NO - Are you troubled by shortness of breath when: walking on the level, up a slight hill or at night?  5. NO - Do you currently have a cold, bronchitis or other respiratory infection?  6. NO - Do you have a cough, shortness of breath or wheezing?  7. NO - Do you sometimes get pains in the calves of your legs when you walk?  8. NO - Do you or anyone in your family have previous history of blood clots?  9. NO - Do you or does anyone in your family have a serious bleeding problem such as prolonged bleeding following surgeries or cuts?  10. NO - Have you ever had problems with anemia or been told to take iron pills?  11. NO - Have you had any abnormal blood loss such as black, tarry or bloody stools, or abnormal vaginal bleeding?  12. NO - Have you ever had a blood transfusion?  13. NO - Have you or any of your relatives ever had problems with anesthesia?  14. NO - Do you have sleep apnea, excessive snoring or daytime drowsiness?  15. NO - Do you have any prosthetic heart valves?  16. NO - Do you have  prosthetic joints?  17. NO - Is there any chance that you may be pregnant?           HPI:                                                      Brief HPI related to upcoming procedure: The patient has a small and reducible umbilical hernia. It is occasionally uncomfortable. He's had no obstruction, incarceration or gangrene in the past.      See problem list for active medical problems.  Problems all longstanding and stable, except as noted/documented.  See ROS for pertinent symptoms related to these conditions.                                                                                                  .    MEDICAL HISTORY:                                                    Patient Active Problem List    Diagnosis Date Noted     Benign essential hypertension 06/30/2017     Priority: Medium     Myalgia 10/28/2016     Priority: Medium     Bilateral occipital neuralgia 10/28/2016     Priority: Medium     CARDIOVASCULAR SCREENING; LDL GOAL LESS THAN 160 10/31/2010     Priority: Medium     Major depression in complete remission (H) 08/20/2010     Priority: Medium     Chronic pain syndrome 08/20/2010     Priority: Medium     Patient is followed by Tha Grullon MD for ongoing prescription of pain medication.  All refills should be approved by this provider only at face-to-face appointments - not by phone request.    Medication(s): Tramadol.   Maximum quantity per month: 60  Clinic visit frequency required: Q 1 months     Controlled substance agreement:  Encounter-Level CSA - 10/13/16:               Controlled Substance Agreement - Scan on 10/23/2016  5:07 PM : CONTROLLED SUBSTANCE AGREEMENT 10/13/16 (below)            Pain Clinic evaluation in the past: Yes       Date/Location:   [facility]    DIRE Total Score(s):  No flowsheet data found.    Last [facility] website verification:  none   [email or web address]               Pain medication agreement signed 08/20/2010     Priority: Medium     Meniere's  disease 03/22/2007     Priority: Medium     Problem list name updated by automated process. Provider to review        Past Medical History:   Diagnosis Date     Back pain      Meniere's disease, unspecified      Pain medication agreement signed 8/20/2010     Past Surgical History:   Procedure Laterality Date     BUNIONECTOMY RT/LT  09/05/08    Both feet     C MASTOIDECTOMY,COMPLETE  09/27/2007     COLONOSCOPY N/A 12/28/2016    Procedure: COMBINED COLONOSCOPY, SINGLE OR MULTIPLE BIOPSY/POLYPECTOMY BY BIOPSY;  Surgeon: Indra Jernigan MD;  [facility]     DISCECTOMY, FUSION CERVICAL ANTERIOR ONE LEVEL, COMBINED N/A 7/5/2017    Procedure: COMBINED DISCECTOMY, FUSION CERVICAL ANTERIOR ONE LEVEL;  Cervical 6-7, Anterior cervical discectomy fusion;  Surgeon: Mike Mckenna MD;  [facility]     HC COLONOSCOPY W/WO BRUSH/WASH  12/27/2005     HC CREATE EARDRUM OPENING,GEN ANESTH  09/27/2007    Pe tube, Left endolymphatic sac enhancement.     PE TUBES  2006    left ear     Current Outpatient Prescriptions   Medication Sig Dispense Refill     [START ON 8/5/2017] HYDROcodone-acetaminophen (NORCO) 5-325 MG per tablet Take 1-2 tablets by mouth every 4 hours as needed for other (Moderate to Severe Pain) 60 tablet 0     cyclobenzaprine (FLEXERIL) 10 MG tablet Take 1 tablet (10 mg) by mouth 2 times daily as needed for muscle spasms Reported on 4/12/2017 60 tablet 1     methocarbamol (ROBAXIN) 750 MG tablet Take 1 tablet (750 mg) by mouth 3 times daily as needed for muscle spasms 90 tablet 1     gabapentin (NEURONTIN) 300 MG capsule Take 3 capsules (900 mg) by mouth 3 times daily If needed/tolerated, OK to increase bedtime dose to 1200mg 300 capsule 3     order for DME Equipment being ordered: TENS Pads as directed 1 Device 0     losartan (COZAAR) 50 MG tablet Take 1 tablet (50 mg) by mouth daily 90 tablet 3     Acetaminophen (TYLENOL PO) Take 500 mg by mouth every 4 hours as needed for mild pain or fever       OTC  products: None, except as noted above    Allergies   Allergen Reactions     No Known Drug Allergies       Latex Allergy: NO    Social History   Substance Use Topics     Smoking status: Never Smoker     Smokeless tobacco: Never Used     Alcohol use No     History   Drug Use No       REVIEW OF SYSTEMS:                                                    C: NEGATIVE for fever, chills, change in weight  I: NEGATIVE for worrisome rashes, moles or lesions  E: NEGATIVE for vision changes or irritation  E/M: NEGATIVE for ear, mouth and throat problems  R: NEGATIVE for significant cough or SOB  B: NEGATIVE for masses, tenderness or discharge  CV: NEGATIVE for chest pain, palpitations or peripheral edema  GI: NEGATIVE for nausea, abdominal pain, heartburn, or change in bowel habits  : NEGATIVE for frequency, dysuria, or hematuria  MUSCULOSKELETAL: Patient recently had an anterior cervical disc fusion. He is doing well postoperatively  N: NEGATIVE for weakness, dizziness or paresthesias  E: NEGATIVE for temperature intolerance, skin/hair changes  H: NEGATIVE for bleeding problems  P: NEGATIVE for changes in mood or affect    EXAM:                                                    /80 (BP Location: Left arm, Patient Position: Chair, Cuff Size: Adult Regular)  Pulse 71  Temp 96.6  F (35.9  C) (Temporal)  Wt 209 lb (94.8 kg)  SpO2 100%  BMI 27.58 kg/m2    GENERAL APPEARANCE: healthy, alert and no distress     EYES: EOMI,  PERRL     HENT: ear canals and TM's normal and nose and mouth without ulcers or lesions     NECK: no adenopathy, no asymmetry, masses, or scars and thyroid normal to palpation     RESP: lungs clear to auscultation - no rales, rhonchi or wheezes     CV: regular rates and rhythm, normal S1 S2, no S3 or S4 and no murmur, click or rub     ABDOMEN: Abdomen is soft without rebound, rigidity or guarding. The small umbilical hernia is noted and is reducible at this time. Tenderness is noted with palpation  over the umbilical area.     MS: extremities normal- no gross deformities noted, no evidence of inflammation in joints, FROM in all extremities.     SKIN: no suspicious lesions or rashes     NEURO: Normal strength and tone, sensory exam grossly normal, mentation intact and speech normal     PSYCH: mentation appears normal. and affect normal/bright     LYMPHATICS: No axillary, cervical, or supraclavicular nodes    DIAGNOSTICS:                                                    No labs or EKG required for low risk surgery (cataract, skin procedure, breast biopsy, etc)    Recent Labs   Lab Test  06/30/17   1613  03/24/16   1900   HGB   --   15.7   PLT   --   259   NA  142  142   POTASSIUM  3.9  3.5   CR  1.15  1.06        IMPRESSION:                                                    Reason for surgery/procedure: Persistent abdominal hernia requiring surgical intervention    Perioperative risk assessment in a pleasant 52-year-old male with:    Diagnosis/reason for consult:   1. Preop general physical exam    - UA reflex to Microscopic and Culture; Future    2. Umbilical hernia without obstruction and without gangrene      3. Benign essential hypertension          The proposed surgical procedure is considered LOW risk.    REVISED CARDIAC RISK INDEX  The patient has the following serious cardiovascular risks for perioperative complications such as (MI, PE, VFib and 3  AV Block):  No serious cardiac risks  INTERPRETATION: 1 risks: Class II (low risk - 0.9% complication rate)    The patient has the following additional risks for perioperative complications:  No identified additional risks    No diagnosis found.    RECOMMENDATIONS:                                                          --Patient is to take all scheduled medications on the day of surgery EXCEPT for modifications listed below.    APPROVAL GIVEN to proceed with proposed procedure, without further diagnostic evaluation       Signed Electronically by: Evangelista  Pablo See, DO    Copy of this evaluation report is provided to requesting physician.    [facility] Preop Guidelines

## 2017-08-04 NOTE — MR AVS SNAPSHOT
After Visit Summary   8/4/2017    Indra Mehta    MRN: [id number]           Patient Information     Date Of Birth          1965        Visit Information        Provider Department      8/4/2017 8:00 AM Evangelista See DO [facility]        Today's Diagnoses     Preop general physical exam    -  1    Umbilical hernia without obstruction and without gangrene        Benign essential hypertension          Care Instructions      Before Your Surgery      Call your surgeon if there is any change in your health. This includes signs of a cold or flu (such as a sore throat, runny nose, cough, rash or fever).    Do not smoke, drink alcohol or take over the counter medicine (unless your surgeon or primary care doctor tells you to) for the 24 hours before and after surgery.    If you take prescribed drugs: Follow your doctor s orders about which medicines to take and which to stop until after surgery.    Eating and drinking prior to surgery: follow the instructions from your surgeon    Take a shower or bath the night before surgery. Use the soap your surgeon gave you to gently clean your skin. If you do not have soap from your surgeon, use your regular soap. Do not shave or scrub the surgery site.  Wear clean pajamas and have clean sheets on your bed.           Follow-ups after your visit        Your next 10 appointments already scheduled     Aug 11, 2017   Procedure with Boni Story MD   [facility] ([facility])    [address] [place] [address]   [phone]           From [address]: Exit at [address] on south side of [place]. Turn right on [address]. Turn left at stoplight on [address]. [facility] will be in view two blocks ahead            Aug 17, 2017  2:40 PM CDT   Return Visit with Erica Mitchell PA-C   [facility] ([facility])    [address]  [facility] [id number] [phone]            Aug 18, 2017  9:00 AM CDT   Return Visit with Michelle James MD   [facility]    [address] [facility] [id number] [phone]              Who to contact     If you have questions or need follow up information about today's clinic visit or your schedule please contact [facility] directly at [phone].  Normal or non-critical lab and imaging results will be communicated to you by Feeligohart, letter or phone within 4 business days after the clinic has received the results. If you do not hear from us within 7 days, please contact the clinic through PerfectPostt or phone. If you have a critical or abnormal lab result, we will notify you by phone as soon as possible.  Submit refill requests through How do you roll? or call your pharmacy and they will forward the refill request to us. Please allow 3 business days for your refill to be completed.          Additional Information About Your Visit        How do you roll? Information     How do you roll? gives you secure access to your electronic health record. If you see a primary care provider, you can also send messages to your care team and make appointments. If you have questions, please call your primary care clinic.  If you do not have a primary care provider, please call [phone] and they will assist you.        Care EveryWhere ID     This is your Care EveryWhere ID. This could be used by other organizations to access your [facility] medical records  [id number]        Your Vitals Were     Pulse Temperature Pulse Oximetry BMI (Body Mass Index)          71 96.6  F (35.9  C) (Temporal) 100% 27.58 kg/m2         Blood Pressure from Last 3 Encounters:   08/04/17 116/80   07/05/17 120/75   06/30/17 134/76    Weight from Last 3 Encounters:   08/04/17 209 lb (94.8 kg)   07/31/17 210 lb 1.6 oz (95.3 kg)   07/05/17 217 lb 15.9 oz (98.9 kg)              We Performed the Following      UA reflex to Microscopic and Culture        Primary Care Provider Office Phone # Fax #    Tha Grullon [phone]       [facility] [address] [facility] [address]        Equal Access to Services     KELSEY LAINEZ: Donna Menendez, wapolada luqadaha, qaybta kaalmada adewilder, mikala shilpain hayaahi delaneyxavikelsey lainez. So [facility] [phone].    ATENCIÓN: Si habla español, tiene a lundy disposición servicios gratuitos de asistencia lingüística. Llame al [phone].    We comply with applicable federal civil rights laws and [place] laws. We do not discriminate on the basis of race, color, national origin, age, disability sex, sexual orientation or gender identity.            Thank you!     Thank you for choosing [facility]  for your care. Our goal is always to provide you with excellent care. Hearing back from our patients is one way we can continue to improve our services. Please take a few minutes to complete the written survey that you may receive in the mail after your visit with us. Thank you!             Your Updated Medication List - Protect others around you: Learn how to safely use, store and throw away your medicines at www.disposemymeds.org.          This list is accurate as of: 8/4/17 11:59 PM.  Always use your most recent med list.                   Brand Name Dispense Instructions for use Diagnosis    cyclobenzaprine 10 MG tablet    FLEXERIL    60 tablet    Take 1 tablet (10 mg) by mouth 2 times daily as needed for muscle spasms Reported on 4/12/2017    Muscle spasm       gabapentin 300 MG capsule    NEURONTIN    300 capsule    Take 3 capsules (900 mg) by mouth 3 times daily If needed/tolerated, OK to increase bedtime dose to 1200mg    Cervicalgia       HYDROcodone-acetaminophen 5-325 MG per tablet    NORCO    60 tablet    Take 1-2 tablets by mouth every 4 hours as needed for other (Moderate to Severe Pain)    S/P cervical spinal fusion        losartan 50 MG tablet    COZAAR    90 tablet    Take 1 tablet (50 mg) by mouth daily    Benign essential hypertension       methocarbamol 750 MG tablet    ROBAXIN    90 tablet    Take 1 tablet (750 mg) by mouth 3 times daily as needed for muscle spasms    Cervical radiculopathy, Myofascial pain       order for DME     1 Device    Equipment being ordered: TENS Pads as directed    Chronic pain syndrome       TYLENOL PO      Take 500 mg by mouth every 4 hours as needed for mild pain or fever

## 2017-08-04 NOTE — NURSING NOTE
"Chief Complaint   Patient presents with     Pre-Op Exam       Initial /80 (BP Location: Left arm, Patient Position: Chair, Cuff Size: Adult Regular)  Pulse 71  Temp 96.6  F (35.9  C) (Temporal)  Wt 209 lb (94.8 kg)  SpO2 100%  BMI 27.58 kg/m2 Estimated body mass index is 27.58 kg/(m^2) as calculated from the following:    Height as of 7/5/17: 6' 0.99\" (1.854 m).    Weight as of this encounter: 209 lb (94.8 kg).  Medication Reconciliation: complete  Jackie HODGE    "

## 2017-08-05 ASSESSMENT — PATIENT HEALTH QUESTIONNAIRE - PHQ9: SUM OF ALL RESPONSES TO PHQ QUESTIONS 1-9: 0

## 2017-08-07 ENCOUNTER — MYC MEDICAL ADVICE (OUTPATIENT)
Dept: NEUROSURGERY | Facility: CLINIC | Age: 52
End: 2017-08-07

## 2017-08-07 DIAGNOSIS — I10 BENIGN ESSENTIAL HYPERTENSION: ICD-10-CM

## 2017-08-07 DIAGNOSIS — Z98.1 S/P CERVICAL SPINAL FUSION: Primary | ICD-10-CM

## 2017-08-07 NOTE — TELEPHONE ENCOUNTER
Losartan      Last Written Prescription Date: 07/15/2016  Last Fill Quantity: 90, # refills: 3  Last Office Visit with [facility] or [facility] prescribing provider: 08/04/2017  Next 5 appointments (look out 90 days)     Aug 17, 2017  2:40 PM CDT   Return Visit with Erica Mitchell PA-C   [facility]    [address] [place] [address]   [phone]            Aug 18, 2017  9:00 AM CDT   Return Visit with Michelle James MD   [facility] ([facility])    [address] [place] [address]   [phone]                   Potassium   Date Value Ref Range Status   06/30/2017 3.9 3.4 - 5.3 mmol/L Final     Creatinine   Date Value Ref Range Status   06/30/2017 1.15 0.66 - 1.25 mg/dL Final     BP Readings from Last 3 Encounters:   08/04/17 116/80   07/05/17 120/75   06/30/17 134/76     Yoanna Vázquez MA 8/7/2017  5:36 PM

## 2017-08-07 NOTE — PROGRESS NOTES
Dear Indra, your recent test results are attached.  Your urine sample was normal.    Feel free to contact me via the office or My Chart if you have any questions regarding the above.  Sincerely,  Evangelista See DO [facility]

## 2017-08-08 ENCOUNTER — HOSPITAL ENCOUNTER (OUTPATIENT)
Dept: GENERAL RADIOLOGY | Facility: CLINIC | Age: 52
Discharge: HOME | End: 2017-08-08
Attending: PHYSICIAN ASSISTANT | Admitting: PHYSICIAN ASSISTANT
Payer: COMMERCIAL

## 2017-08-08 DIAGNOSIS — Z98.1 S/P CERVICAL SPINAL FUSION: ICD-10-CM

## 2017-08-08 PROCEDURE — 72040 X-RAY EXAM NECK SPINE 2-3 VW: CPT | Mod: TC

## 2017-08-08 NOTE — TELEPHONE ENCOUNTER
Contacted patient regarding [employer] message. Last week he had an incident with his dog that he initially felt was muscular pain. He tried restricting activity and using ice and pain meds prn, but no relief. The pain has since increased and radiates from neck to right shoulder. Spoke with Erica Mitchell PA-C and we will order cervical xray to evaluate hardware. We will call patient with the results. He should still plan to f/u for routine 6 week visit on 8/17. Patient voiced agreement.

## 2017-08-09 ENCOUNTER — MYC MEDICAL ADVICE (OUTPATIENT)
Dept: NEUROSURGERY | Facility: CLINIC | Age: 52
End: 2017-08-09

## 2017-08-09 RX ORDER — LOSARTAN POTASSIUM 50 MG/1
50 TABLET ORAL DAILY
Qty: 90 TABLET | Refills: 1 | Status: SHIPPED | OUTPATIENT
Start: 2017-08-09 | End: 2018-01-31

## 2017-08-09 NOTE — TELEPHONE ENCOUNTER
Prescription approved per [facility] Refill Protocol.    Brandy Cooper RN  [facility]

## 2017-08-09 NOTE — TELEPHONE ENCOUNTER
Per Erica Mitchell PA-C, patient's cervical xray looks good, hardware stable. He's still experiencing muscular pain in his shoulder, which Erica believes is due to post op soreness and the incident with his dog last week, and will take time to heal. Reminded him to continue to follow post op activity restrictions, apply ice prn, use pain meds/muslce relaxants prn. He will f/u with Erica in clinic on 8/17/17 for further evaluation. Advised patient to call back with any questions or if symptoms change or worsen. Patient voiced understanding.

## 2017-08-10 NOTE — H&P (VIEW-ONLY)
[facility]  [address] [facility] [address]  [phone]  Dept: [phone]    PRE-OP EVALUATION:  Today's date: 2017    Indra Mehta (: 1965) presents for pre-operative evaluation assessment as requested by Dr. Story.  He requires evaluation and anesthesia risk assessment prior to undergoing surgery/procedure for treatment of hernia .  Proposed procedure: HERNIORRHAPHY UMBILICAL    Date of Surgery/ Procedure: 17  Time of Surgery/ Procedure: 1:00  Hospital/Surgical Facility: [facility]  Primary Physician: Tha Grullon  Type of Anesthesia Anticipated: General    Patient has a Health Care Directive or Living Will:  NO    1. NO - Do you have a history of heart attack, stroke, stent, bypass or surgery on an artery in the head, neck, heart or legs?  2. NO - Do you ever have any pain or discomfort in your chest?  3. NO - Do you have a history of  Heart Failure?  4. NO - Are you troubled by shortness of breath when: walking on the level, up a slight hill or at night?  5. NO - Do you currently have a cold, bronchitis or other respiratory infection?  6. NO - Do you have a cough, shortness of breath or wheezing?  7. NO - Do you sometimes get pains in the calves of your legs when you walk?  8. NO - Do you or anyone in your family have previous history of blood clots?  9. NO - Do you or does anyone in your family have a serious bleeding problem such as prolonged bleeding following surgeries or cuts?  10. NO - Have you ever had problems with anemia or been told to take iron pills?  11. NO - Have you had any abnormal blood loss such as black, tarry or bloody stools, or abnormal vaginal bleeding?  12. NO - Have you ever had a blood transfusion?  13. NO - Have you or any of your relatives ever had problems with anesthesia?  14. NO - Do you have sleep apnea, excessive snoring or daytime drowsiness?  15. NO - Do you have any prosthetic heart valves?  16. NO - Do you have  prosthetic joints?  17. NO - Is there any chance that you may be pregnant?           HPI:                                                      Brief HPI related to upcoming procedure: The patient has a small and reducible umbilical hernia. It is occasionally uncomfortable. He's had no obstruction, incarceration or gangrene in the past.      See problem list for active medical problems.  Problems all longstanding and stable, except as noted/documented.  See ROS for pertinent symptoms related to these conditions.                                                                                                  .    MEDICAL HISTORY:                                                    Patient Active Problem List    Diagnosis Date Noted     Benign essential hypertension 06/30/2017     Priority: Medium     Myalgia 10/28/2016     Priority: Medium     Bilateral occipital neuralgia 10/28/2016     Priority: Medium     CARDIOVASCULAR SCREENING; LDL GOAL LESS THAN 160 10/31/2010     Priority: Medium     Major depression in complete remission (H) 08/20/2010     Priority: Medium     Chronic pain syndrome 08/20/2010     Priority: Medium     Patient is followed by Tha Grullon MD for ongoing prescription of pain medication.  All refills should be approved by this provider only at face-to-face appointments - not by phone request.    Medication(s): Tramadol.   Maximum quantity per month: 60  Clinic visit frequency required: Q 1 months     Controlled substance agreement:  Encounter-Level CSA - 10/13/16:               Controlled Substance Agreement - Scan on 10/23/2016  5:07 PM : CONTROLLED SUBSTANCE AGREEMENT 10/13/16 (below)            Pain Clinic evaluation in the past: Yes       Date/Location:   [facility]    DIRE Total Score(s):  No flowsheet data found.    Last [facility] website verification:  none   [email or web address]               Pain medication agreement signed 08/20/2010     Priority: Medium     Meniere's  disease 03/22/2007     Priority: Medium     Problem list name updated by automated process. Provider to review        Past Medical History:   Diagnosis Date     Back pain      Meniere's disease, unspecified      Pain medication agreement signed 8/20/2010     Past Surgical History:   Procedure Laterality Date     BUNIONECTOMY RT/LT  09/05/08    Both feet     C MASTOIDECTOMY,COMPLETE  09/27/2007     COLONOSCOPY N/A 12/28/2016    Procedure: COMBINED COLONOSCOPY, SINGLE OR MULTIPLE BIOPSY/POLYPECTOMY BY BIOPSY;  Surgeon: Indra Jernigan MD;  Location: [facility]     DISCECTOMY, FUSION CERVICAL ANTERIOR ONE LEVEL, COMBINED N/A 7/5/2017    Procedure: COMBINED DISCECTOMY, FUSION CERVICAL ANTERIOR ONE LEVEL;  Cervical 6-7, Anterior cervical discectomy fusion;  Surgeon: Mike Mckenna MD;  Location: [facility] OR     HC COLONOSCOPY W/WO BRUSH/WASH  12/27/2005     HC CREATE EARDRUM OPENING,GEN ANESTH  09/27/2007    Pe tube, Left endolymphatic sac enhancement.     PE TUBES  2006    left ear     Current Outpatient Prescriptions   Medication Sig Dispense Refill     [START ON 8/5/2017] HYDROcodone-acetaminophen (NORCO) 5-325 MG per tablet Take 1-2 tablets by mouth every 4 hours as needed for other (Moderate to Severe Pain) 60 tablet 0     cyclobenzaprine (FLEXERIL) 10 MG tablet Take 1 tablet (10 mg) by mouth 2 times daily as needed for muscle spasms Reported on 4/12/2017 60 tablet 1     methocarbamol (ROBAXIN) 750 MG tablet Take 1 tablet (750 mg) by mouth 3 times daily as needed for muscle spasms 90 tablet 1     gabapentin (NEURONTIN) 300 MG capsule Take 3 capsules (900 mg) by mouth 3 times daily If needed/tolerated, OK to increase bedtime dose to 1200mg 300 capsule 3     order for DME Equipment being ordered: TENS Pads as directed 1 Device 0     losartan (COZAAR) 50 MG tablet Take 1 tablet (50 mg) by mouth daily 90 tablet 3     Acetaminophen (TYLENOL PO) Take 500 mg by mouth every 4 hours as needed for mild pain or fever       OTC  products: None, except as noted above    Allergies   Allergen Reactions     No Known Drug Allergies       Latex Allergy: NO    Social History   Substance Use Topics     Smoking status: Never Smoker     Smokeless tobacco: Never Used     Alcohol use No     History   Drug Use No       REVIEW OF SYSTEMS:                                                    C: NEGATIVE for fever, chills, change in weight  I: NEGATIVE for worrisome rashes, moles or lesions  E: NEGATIVE for vision changes or irritation  E/M: NEGATIVE for ear, mouth and throat problems  R: NEGATIVE for significant cough or SOB  B: NEGATIVE for masses, tenderness or discharge  CV: NEGATIVE for chest pain, palpitations or peripheral edema  GI: NEGATIVE for nausea, abdominal pain, heartburn, or change in bowel habits  : NEGATIVE for frequency, dysuria, or hematuria  MUSCULOSKELETAL: Patient recently had an anterior cervical disc fusion. He is doing well postoperatively  N: NEGATIVE for weakness, dizziness or paresthesias  E: NEGATIVE for temperature intolerance, skin/hair changes  H: NEGATIVE for bleeding problems  P: NEGATIVE for changes in mood or affect    EXAM:                                                    /80 (BP Location: Left arm, Patient Position: Chair, Cuff Size: Adult Regular)  Pulse 71  Temp 96.6  F (35.9  C) (Temporal)  Wt 209 lb (94.8 kg)  SpO2 100%  BMI 27.58 kg/m2    GENERAL APPEARANCE: healthy, alert and no distress     EYES: EOMI,  PERRL     HENT: ear canals and TM's normal and nose and mouth without ulcers or lesions     NECK: no adenopathy, no asymmetry, masses, or scars and thyroid normal to palpation     RESP: lungs clear to auscultation - no rales, rhonchi or wheezes     CV: regular rates and rhythm, normal S1 S2, no S3 or S4 and no murmur, click or rub     ABDOMEN: Abdomen is soft without rebound, rigidity or guarding. The small umbilical hernia is noted and is reducible at this time. Tenderness is noted with palpation  over the umbilical area.     MS: extremities normal- no gross deformities noted, no evidence of inflammation in joints, FROM in all extremities.     SKIN: no suspicious lesions or rashes     NEURO: Normal strength and tone, sensory exam grossly normal, mentation intact and speech normal     PSYCH: mentation appears normal. and affect normal/bright     LYMPHATICS: No axillary, cervical, or supraclavicular nodes    DIAGNOSTICS:                                                    No labs or EKG required for low risk surgery (cataract, skin procedure, breast biopsy, etc)    Recent Labs   Lab Test  06/30/17   1613  03/24/16   1900   HGB   --   15.7   PLT   --   259   NA  142  142   POTASSIUM  3.9  3.5   CR  1.15  1.06        IMPRESSION:                                                    Reason for surgery/procedure: Persistent abdominal hernia requiring surgical intervention    Perioperative risk assessment in a pleasant 52-year-old male with:    Diagnosis/reason for consult:   1. Preop general physical exam    - UA reflex to Microscopic and Culture; Future    2. Umbilical hernia without obstruction and without gangrene      3. Benign essential hypertension          The proposed surgical procedure is considered LOW risk.    REVISED CARDIAC RISK INDEX  The patient has the following serious cardiovascular risks for perioperative complications such as (MI, PE, VFib and 3  AV Block):  No serious cardiac risks  INTERPRETATION: 1 risks: Class II (low risk - 0.9% complication rate)    The patient has the following additional risks for perioperative complications:  No identified additional risks    No diagnosis found.    RECOMMENDATIONS:                                                          --Patient is to take all scheduled medications on the day of surgery EXCEPT for modifications listed below.    APPROVAL GIVEN to proceed with proposed procedure, without further diagnostic evaluation       Signed Electronically by: Evangelista  Pablo See, DO    Copy of this evaluation report is provided to requesting physician.    [place] Preop Guidelines

## 2017-08-11 ENCOUNTER — HOSPITAL ENCOUNTER (OUTPATIENT)
Facility: CLINIC | Age: 52
Discharge: HOME | End: 2017-08-11
Attending: SPECIALIST | Admitting: SPECIALIST
Payer: COMMERCIAL

## 2017-08-11 ENCOUNTER — ANESTHESIA EVENT (OUTPATIENT)
Dept: SURGERY | Facility: CLINIC | Age: 52
End: 2017-08-11
Payer: COMMERCIAL

## 2017-08-11 ENCOUNTER — ANESTHESIA (OUTPATIENT)
Dept: SURGERY | Facility: CLINIC | Age: 52
End: 2017-08-11
Payer: COMMERCIAL

## 2017-08-11 VITALS
HEIGHT: 74 IN | BODY MASS INDEX: 26.82 KG/M2 | SYSTOLIC BLOOD PRESSURE: 134 MMHG | DIASTOLIC BLOOD PRESSURE: 82 MMHG | RESPIRATION RATE: 12 BRPM | TEMPERATURE: 97.7 F | OXYGEN SATURATION: 100 % | WEIGHT: 209 LBS

## 2017-08-11 DIAGNOSIS — G89.18 POST-OP PAIN: Primary | ICD-10-CM

## 2017-08-11 PROCEDURE — 40000306 ZZH STATISTIC PRE PROC ASSESS II: Performed by: SPECIALIST

## 2017-08-11 PROCEDURE — 25000566 ZZH SEVOFLURANE, EA 15 MIN: Performed by: SPECIALIST

## 2017-08-11 PROCEDURE — 71000014 ZZH RECOVERY PHASE 1 LEVEL 2 FIRST HR: Performed by: SPECIALIST

## 2017-08-11 PROCEDURE — 25000128 H RX IP 250 OP 636: Performed by: NURSE ANESTHETIST, CERTIFIED REGISTERED

## 2017-08-11 PROCEDURE — 25000125 ZZHC RX 250: Performed by: NURSE ANESTHETIST, CERTIFIED REGISTERED

## 2017-08-11 PROCEDURE — 25000125 ZZHC RX 250: Performed by: SPECIALIST

## 2017-08-11 PROCEDURE — 36000052 ZZH SURGERY LEVEL 2 EA 15 ADDTL MIN: Performed by: SPECIALIST

## 2017-08-11 PROCEDURE — 25000128 H RX IP 250 OP 636: Performed by: SPECIALIST

## 2017-08-11 PROCEDURE — 27210794 ZZH OR GENERAL SUPPLY STERILE: Performed by: SPECIALIST

## 2017-08-11 PROCEDURE — 37000009 ZZH ANESTHESIA TECHNICAL FEE, EACH ADDTL 15 MIN: Performed by: SPECIALIST

## 2017-08-11 PROCEDURE — 27110028 ZZH OR GENERAL SUPPLY NON-STERILE: Performed by: SPECIALIST

## 2017-08-11 PROCEDURE — 25000132 ZZH RX MED GY IP 250 OP 250 PS 637: Performed by: SPECIALIST

## 2017-08-11 PROCEDURE — 36000050 ZZH SURGERY LEVEL 2 1ST 30 MIN: Performed by: SPECIALIST

## 2017-08-11 PROCEDURE — 37000008 ZZH ANESTHESIA TECHNICAL FEE, 1ST 30 MIN: Performed by: SPECIALIST

## 2017-08-11 PROCEDURE — 71000027 ZZH RECOVERY PHASE 2 EACH 15 MINS: Performed by: SPECIALIST

## 2017-08-11 PROCEDURE — 49587 ZZHC REPAIR UMBILICAL HERN,5+Y/O, INCARCERATED OR STRANGULATED: CPT | Performed by: SPECIALIST

## 2017-08-11 RX ORDER — OXYCODONE AND ACETAMINOPHEN 5; 325 MG/1; MG/1
1-2 TABLET ORAL
Status: COMPLETED | OUTPATIENT
Start: 2017-08-11 | End: 2017-08-11

## 2017-08-11 RX ORDER — SODIUM CHLORIDE, SODIUM LACTATE, POTASSIUM CHLORIDE, CALCIUM CHLORIDE 600; 310; 30; 20 MG/100ML; MG/100ML; MG/100ML; MG/100ML
INJECTION, SOLUTION INTRAVENOUS CONTINUOUS
Status: DISCONTINUED | OUTPATIENT
Start: 2017-08-11 | End: 2017-08-11 | Stop reason: HOSPADM

## 2017-08-11 RX ORDER — DIMENHYDRINATE 50 MG/ML
25 INJECTION, SOLUTION INTRAMUSCULAR; INTRAVENOUS
Status: DISCONTINUED | OUTPATIENT
Start: 2017-08-11 | End: 2017-08-11 | Stop reason: HOSPADM

## 2017-08-11 RX ORDER — LIDOCAINE HYDROCHLORIDE 20 MG/ML
INJECTION, SOLUTION INFILTRATION; PERINEURAL PRN
Status: DISCONTINUED | OUTPATIENT
Start: 2017-08-11 | End: 2017-08-11

## 2017-08-11 RX ORDER — BUPIVACAINE HYDROCHLORIDE AND EPINEPHRINE 2.5; 5 MG/ML; UG/ML
INJECTION, SOLUTION INFILTRATION; PERINEURAL PRN
Status: DISCONTINUED | OUTPATIENT
Start: 2017-08-11 | End: 2017-08-11 | Stop reason: HOSPADM

## 2017-08-11 RX ORDER — NALOXONE HYDROCHLORIDE 0.4 MG/ML
.1-.4 INJECTION, SOLUTION INTRAMUSCULAR; INTRAVENOUS; SUBCUTANEOUS
Status: DISCONTINUED | OUTPATIENT
Start: 2017-08-11 | End: 2017-08-11 | Stop reason: HOSPADM

## 2017-08-11 RX ORDER — FENTANYL CITRATE 50 UG/ML
INJECTION, SOLUTION INTRAMUSCULAR; INTRAVENOUS PRN
Status: DISCONTINUED | OUTPATIENT
Start: 2017-08-11 | End: 2017-08-11

## 2017-08-11 RX ORDER — CEFAZOLIN SODIUM 2 G/100ML
2 INJECTION, SOLUTION INTRAVENOUS
Status: COMPLETED | OUTPATIENT
Start: 2017-08-11 | End: 2017-08-11

## 2017-08-11 RX ORDER — ONDANSETRON 2 MG/ML
4 INJECTION INTRAMUSCULAR; INTRAVENOUS EVERY 30 MIN PRN
Status: DISCONTINUED | OUTPATIENT
Start: 2017-08-11 | End: 2017-08-11 | Stop reason: HOSPADM

## 2017-08-11 RX ORDER — PROPOFOL 10 MG/ML
INJECTION, EMULSION INTRAVENOUS PRN
Status: DISCONTINUED | OUTPATIENT
Start: 2017-08-11 | End: 2017-08-11

## 2017-08-11 RX ORDER — FENTANYL CITRATE 50 UG/ML
25-50 INJECTION, SOLUTION INTRAMUSCULAR; INTRAVENOUS
Status: DISCONTINUED | OUTPATIENT
Start: 2017-08-11 | End: 2017-08-11 | Stop reason: HOSPADM

## 2017-08-11 RX ORDER — MEPERIDINE HYDROCHLORIDE 25 MG/ML
12.5 INJECTION INTRAMUSCULAR; INTRAVENOUS; SUBCUTANEOUS
Status: DISCONTINUED | OUTPATIENT
Start: 2017-08-11 | End: 2017-08-11 | Stop reason: HOSPADM

## 2017-08-11 RX ORDER — KETOROLAC TROMETHAMINE 30 MG/ML
INJECTION, SOLUTION INTRAMUSCULAR; INTRAVENOUS PRN
Status: DISCONTINUED | OUTPATIENT
Start: 2017-08-11 | End: 2017-08-11

## 2017-08-11 RX ORDER — CEFAZOLIN SODIUM 1 G/3ML
1 INJECTION, POWDER, FOR SOLUTION INTRAMUSCULAR; INTRAVENOUS SEE ADMIN INSTRUCTIONS
Status: DISCONTINUED | OUTPATIENT
Start: 2017-08-11 | End: 2017-08-11 | Stop reason: HOSPADM

## 2017-08-11 RX ORDER — ONDANSETRON 4 MG/1
4 TABLET, ORALLY DISINTEGRATING ORAL EVERY 30 MIN PRN
Status: DISCONTINUED | OUTPATIENT
Start: 2017-08-11 | End: 2017-08-11 | Stop reason: HOSPADM

## 2017-08-11 RX ORDER — HYDROMORPHONE HYDROCHLORIDE 1 MG/ML
.3-.5 INJECTION, SOLUTION INTRAMUSCULAR; INTRAVENOUS; SUBCUTANEOUS EVERY 10 MIN PRN
Status: DISCONTINUED | OUTPATIENT
Start: 2017-08-11 | End: 2017-08-11 | Stop reason: HOSPADM

## 2017-08-11 RX ORDER — OXYCODONE AND ACETAMINOPHEN 5; 325 MG/1; MG/1
1-2 TABLET ORAL EVERY 4 HOURS PRN
Qty: 30 TABLET | Refills: 0 | Status: SHIPPED | OUTPATIENT
Start: 2017-08-11 | End: 2017-08-16

## 2017-08-11 RX ORDER — DEXAMETHASONE SODIUM PHOSPHATE 10 MG/ML
INJECTION, SOLUTION INTRAMUSCULAR; INTRAVENOUS PRN
Status: DISCONTINUED | OUTPATIENT
Start: 2017-08-11 | End: 2017-08-11

## 2017-08-11 RX ADMIN — FENTANYL CITRATE 50 MCG: 50 INJECTION, SOLUTION INTRAMUSCULAR; INTRAVENOUS at 14:12

## 2017-08-11 RX ADMIN — CEFAZOLIN SODIUM 2 G: 2 INJECTION, SOLUTION INTRAVENOUS at 13:17

## 2017-08-11 RX ADMIN — PROPOFOL 200 MG: 10 INJECTION, EMULSION INTRAVENOUS at 13:10

## 2017-08-11 RX ADMIN — LIDOCAINE HYDROCHLORIDE 80 MG: 20 INJECTION, SOLUTION INFILTRATION; PERINEURAL at 13:10

## 2017-08-11 RX ADMIN — MIDAZOLAM HYDROCHLORIDE 1 MG: 1 INJECTION, SOLUTION INTRAMUSCULAR; INTRAVENOUS at 13:06

## 2017-08-11 RX ADMIN — FENTANYL CITRATE 50 MCG: 50 INJECTION, SOLUTION INTRAMUSCULAR; INTRAVENOUS at 14:21

## 2017-08-11 RX ADMIN — LIDOCAINE HYDROCHLORIDE 1 ML: 10 INJECTION, SOLUTION EPIDURAL; INFILTRATION; INTRACAUDAL; PERINEURAL at 12:41

## 2017-08-11 RX ADMIN — SODIUM CHLORIDE, POTASSIUM CHLORIDE, SODIUM LACTATE AND CALCIUM CHLORIDE: 600; 310; 30; 20 INJECTION, SOLUTION INTRAVENOUS at 13:56

## 2017-08-11 RX ADMIN — FENTANYL CITRATE 50 MCG: 50 INJECTION, SOLUTION INTRAMUSCULAR; INTRAVENOUS at 13:10

## 2017-08-11 RX ADMIN — DEXAMETHASONE SODIUM PHOSPHATE 10 MG: 10 INJECTION, SOLUTION INTRAMUSCULAR; INTRAVENOUS at 13:15

## 2017-08-11 RX ADMIN — KETOROLAC TROMETHAMINE 30 MG: 30 INJECTION, SOLUTION INTRAMUSCULAR at 13:38

## 2017-08-11 RX ADMIN — MIDAZOLAM HYDROCHLORIDE 1 MG: 1 INJECTION, SOLUTION INTRAMUSCULAR; INTRAVENOUS at 13:08

## 2017-08-11 RX ADMIN — SODIUM CHLORIDE, POTASSIUM CHLORIDE, SODIUM LACTATE AND CALCIUM CHLORIDE: 600; 310; 30; 20 INJECTION, SOLUTION INTRAVENOUS at 12:41

## 2017-08-11 RX ADMIN — FENTANYL CITRATE 50 MCG: 50 INJECTION, SOLUTION INTRAMUSCULAR; INTRAVENOUS at 13:08

## 2017-08-11 RX ADMIN — OXYCODONE HYDROCHLORIDE AND ACETAMINOPHEN 2 TABLET: 5; 325 TABLET ORAL at 14:52

## 2017-08-11 NOTE — ANESTHESIA POSTPROCEDURE EVALUATION
Patient: Indra Mehta    Procedure(s):  open umbilical hernia repair - Wound Class: I-Clean    Diagnosis:incarcerated umbilical hernia  Diagnosis Additional Information: No value filed.    Anesthesia Type:  General, LMA    Note:  Anesthesia Post Evaluation    Patient location during evaluation: Phase 2  Patient participation: Able to fully participate in evaluation  Level of consciousness: awake and alert  Pain management: adequate  Airway patency: patent  Cardiovascular status: blood pressure returned to baseline  Respiratory status: spontaneous ventilation and room air  Hydration status: euvolemic  PONV: none     Anesthetic complications: None    Comments: Patient resting without complaint at this time. Pain well managed with pain medications. Patient was pleased with his anesthetic and has no concerns at this time.         Last vitals:  Vitals:    08/11/17 1430 08/11/17 1445 08/11/17 1500   BP: 138/58 127/83 123/84   Resp: 12 12    Temp:      SpO2: 98% 97% 99%         Electronically Signed By: YUMIKO Melendez CRNA  August 11, 2017  3:21 PM

## 2017-08-11 NOTE — IP AVS SNAPSHOT
[facility] Phase II    [address]     [place] [address]    Phone:  [phone]                                       After Visit Summary   8/11/2017    Indra Mehta    MRN: [id number]           After Visit Summary Signature Page     I have received my discharge instructions, and my questions have been answered. I have discussed any challenges I see with this plan with the nurse or doctor.    ..........................................................................................................................................  Patient/Patient Representative Signature      ..........................................................................................................................................  Patient Representative Print Name and Relationship to Patient    ..................................................               ................................................  Date                                            Time    ..........................................................................................................................................  Reviewed by Signature/Title    ...................................................              ..............................................  Date                                                            Time

## 2017-08-11 NOTE — BRIEF OP NOTE
[facility] Brief Operative Note    Pre-operative diagnosis: incarcerated umbilical hernia   Post-operative diagnosis Same   Procedure: Procedure(s):  open umbilical hernia repair - Wound Class: I-Clean   Surgeon: Boni Story MD, FACS   Assistants(s): ST Chalino   Estimated blood loss: Minimal    Specimens: None   Findings: Incarcerated pre-peritoneal fat     Boni Story MD, FACS    #[id number]

## 2017-08-11 NOTE — OP NOTE
DATE OF PROCEDURE:  2017      PREOPERATIVE DIAGNOSIS:  Incarcerated umbilical hernia.      POSTOPERATIVE DIAGNOSIS:  Incarcerated umbilical hernia.      PROCEDURE:  Open incarcerated umbilical hernia repair.      SURGEON:  Alexandra Story M.D.      ASSISTANT:  Ed Subramanian, Surgical technician.      ANESTHESIA:  LMA.      INDICATIONS FOR PROCEDURE:  Mr. Indra Esqueda is a 52-year-old gentleman who on a routine exam was found to have an umbilical hernia and was tender on exam he opted to have it repaired.      OPERATIVE FINDINGS:  Include incarcerated preperitoneal fat with a 1x1.2 cm defect.      DETAILS OF PROCEDURE:  The patient was taken to the operating room and placed on the table in supine position.  After induction of general anesthesia, the abdomen was then prepped and draped in sterile fashion.  Timeout was performed confirming the identity of the patient as well as the procedure to be performed.      A curvilinear infraumbilical incision was then made.  Subcutaneous tissues opened using cautery.  The hernia sac was opened using Metzenbaum scissors.  It was found to contain incarcerated preperitoneal fat.  This was dissected free from the sac itself and then dissected from the surrounding fascia and returned to the preperitoneal space.  The defect was noted to be quite small, only measuring 1x1.2 cm in size; it was then closed using a figure-of-eight #1 PDS.  The umbilicus was then recreated using 3-0 Vicryl.  Subcutaneous tissue was reapproximated with 3-0 Vicryl.  The skin was closed using a running 4-0 Monocryl subcuticular stitch.  Steri-Strips and sterile dressings were applied.      The patient was then taken from the operating room to the recovery room in stable condition to be sent home.         ALEXANDRA STORY MD             D: 2017 13:49   T: 2017 14:23   MT: [id number]      Name:     INDRA ESQUEDA   MRN:      [id number]        Account:        [id number]   :       1965           Procedure Date: 08/11/2017      Document: [id number]

## 2017-08-11 NOTE — ANESTHESIA PREPROCEDURE EVALUATION
Anesthesia Evaluation     . Pt has had prior anesthetic. Type: General and MAC           ROS/MED HX    ENT/Pulmonary:  - neg pulmonary ROS     Neurologic:     (+)neuropathy (bilateral occipital neuralgia) - Bilateral occipital neuralgia,     Cardiovascular:     (+) Dyslipidemia, hypertension----. : . . . :. . Previous cardiac testing date:results:date: results:ECG reviewed date:6/30/17 results:No results indicated, EKG reordered. date: results:          METS/Exercise Tolerance:  >4 METS   Hematologic:  - neg hematologic  ROS       Musculoskeletal:   (+) , , other musculoskeletal- myalgia/back pain      GI/Hepatic:  - neg GI/hepatic ROS       Renal/Genitourinary:  - ROS Renal section negative   (+) Pt has no history of transplant,       Endo:  - neg endo ROS       Psychiatric:     (+) psychiatric history depression      Infectious Disease:  - neg infectious disease ROS       Malignancy:      - no malignancy   Other:    (+) No chance of pregnancy C-spine cleared: Yes, H/O Chronic Pain,H/O chronic opiod use , no other significant disability                    Physical Exam  Normal systems: cardiovascular, pulmonary and dental    Airway   Mallampati: II  TM distance: >3 FB  Neck ROM: full    Dental     Cardiovascular   Rhythm and rate: regular and normal      Pulmonary    breath sounds clear to auscultation                    Anesthesia Plan      History & Physical Review  History and physical reviewed and following examination; no interval change.    ASA Status:  2 .    NPO Status:  > 8 hours    Plan for General and LMA with Intravenous and Propofol induction.   PONV prophylaxis:  Ondansetron (or other 5HT-3) and Dexamethasone or Solumedrol       Postoperative Care  Postoperative pain management:  IV analgesics and Oral pain medications.      Consents  Anesthetic plan, risks, benefits and alternatives discussed with:  Patient..                          .

## 2017-08-11 NOTE — IP AVS SNAPSHOT
MRN:[id number]                      After Visit Summary   8/11/2017    Indra Mehta    MRN: [id number]           Thank you!     Thank you for choosing [facility] for your care. Our goal is always to provide you with excellent care. Hearing back from our patients is one way we can continue to improve our services. Please take a few minutes to complete the written survey that you may receive in the mail after you visit with us. Thank you!        Patient Information     Date Of Birth          1965        About your hospital stay     You were admitted on:  August 11, 2017 You last received care in the:  [facility]    You were discharged on:  August 11, 2017       Who to Call     For medical emergencies, please call 911.  For non-urgent questions about your medical care, please call your primary care provider or clinic, [phone]  For questions related to your surgery, please call your surgery clinic        Attending Provider     Provider Specialty    Boni Story MD General Surgery       Primary Care Provider Office Phone # Fax #    Tha Grullon [phone]      After Care Instructions     Diet Instructions       Resume pre-procedure diet            Discharge Instructions       Follow up appointment as instructed by Surgeon and or RN            Do not - immerse incision in water until sutures removed       Do not immerse incision in water until sutures removed            Dressing       Keep dressing clean and dry.  Dressing / incisional care as instructed by RN and or Surgeon            Ice to affected area       Ice to operative site PRN            No Alcohol       For 24 hours post procedure            No driving or operating machinery        until the day after procedure            No lifting        No lifting over 20 lbs and no strenuous physical activity for  2 weeks            Shower       No shower for 24 hours post procedure. May shower  Postoperative Day (POD)  2                  Your next 10 appointments already scheduled     Aug 17, 2017  2:40 PM CDT   [facility]    [address] [place] [id number]   [phone]            Aug 18, 2017  9:00 AM CDT   [facility] with [facility] James MD   [facility]    [address] [place] [id number]   [phone]              Further instructions from your care team                  Home Care Following Open Hernia Repair  Dr. Story    HERNIA TYPE: {Umbilical hernia     Care of the Incision     Remove dressing after 48 hours.     Leave small tape strips in place.  They will gradually come off.     After dressing is removed, may shower.  Pat incision dry.    Activity     Gradually increase your activity.  Walk short distances several times each day and increase the distance as your strength allows     To promote circulation, do not cross your legs while sitting.     No strenuous lifting or straining for 2 weeks.  Do not lift anything over 20 pounds until your doctor approves an increase.     Do not drive or operate equipment while taking prescription pain medications.     Return to work will be determined by the type of work you do and should be discussed with your physician.     By 3 weeks after surgery, you can do any activity you feel like doing as long as you restrict lifting and STOP IF IT HURTS.     You may drive a car 1 week after surgery if you have stopped taking prescription medications and are pain free enough to make an emergency stop if necessary.    Diet     Return to the diet you were on before surgery.     Drink plenty of water and fruit juices.     Avoid foods that might cause constipation.     REMEMBER - most prescription pain pills can and do cause constipation.  Walking, extra fluids, and increased fiber (fresh fruits and vegetables,  etc.) are natural remedies for constipation.  Ask your doctor about a stool softener such as Colace or Metamucil.    Call Your Physician If You Have:     Redness, increased swelling or cloudy drainage from your incision.     A temperature of more than 100.5 degrees F.     Pain in your incision not relieved by your prescription pain pills and/or a short rest.     Any questions or concerns about your recovery call during clinic hours:  [phone] or Nurse Advice Line [phone] (24 hours)  Same-Day Surgery   Adult Discharge Orders & Instructions     For 24 hours after surgery    1. Get plenty of rest.  A responsible adult must stay with you for at least 24 hours after you leave the hospital.   2. Do not drive or use heavy equipment.  If you have weakness or tingling, don't drive or use heavy equipment until this feeling goes away.  3. Do not drink alcohol.  4. Avoid strenuous or risky activities.  Ask for help when climbing stairs.   5. You may feel lightheaded.  IF so, sit for a few minutes before standing.  Have someone help you get up.   6. If you have nausea (feel sick to your stomach): Drink only clear liquids such as apple juice, ginger ale, broth or 7-Up.  Rest may also help.  Be sure to drink enough fluids.  Move to a regular diet as you feel able.  7. You may have a slight fever. Call the doctor if your fever is over 100 F (37.7 C) (taken under the tongue) or lasts longer than 24 hours.  8. You may have a dry mouth, a sore throat, muscle aches or trouble sleeping.  These should go away after 24 hours.  9. Do not make important or legal decisions.   Call your doctor for any of the followin.  Signs of infection (fever, growing tenderness at the surgery site, a large amount of drainage or bleeding, severe pain, foul-smelling drainage, redness, swelling).    2. It has been over 8 to 10 hours since surgery and you are still not able to urinate (pass water).    3.  Headache for over 24 hours.      Nurse  "advice line: [phone]             Pending Results     No orders found from 8/9/2017 to 8/12/2017.            Admission Information     Date & Time Provider Department Dept. Phone    8/11/2017 Boni Story MD [facility] [phone]      Your Vitals Were     Blood Pressure Temperature Respirations Height Weight Pulse Oximetry    127/83 97.7  F (36.5  C) (Oral) 12 1.88 m (6' 2\") 94.8 kg (209 lb) 97%    BMI (Body Mass Index)                   26.83 kg/m2           MyChart Information     [employer] gives you secure access to your electronic health record. If you see a primary care provider, you can also send messages to your care team and make appointments. If you have questions, please call your primary care clinic.  If you do not have a primary care provider, please call [phone] and they will assist you.        Care EveryWhere ID     This is your Care EveryWhere ID. This could be used by other organizations to access your [facility] medical records  [id number]        Equal Access to Services     SUSY ARSHAD : Hadii marva bo Somary, waaxda luqadaha, qaybta kaalmaameya delacruz, mikala carmichael . So [facility] [phone].    ATENCIÓN: Si habla español, tiene a lundy disposición servicios gratuitos de asistencia lingüística. Llame al [phone].    We comply with applicable federal civil rights laws and [place] laws. We do not discriminate on the basis of race, color, national origin, age, disability sex, sexual orientation or gender identity.               Review of your medicines      START taking        Dose / Directions    oxyCODONE-acetaminophen 5-325 MG per tablet   Commonly known as:  PERCOCET   Used for:  Post-op pain        Dose:  1-2 tablet   Take 1-2 tablets by mouth every 4 hours as needed for pain (moderate to severe)   Quantity:  30 tablet   Refills:  0         CONTINUE these medicines which have NOT CHANGED        Dose / Directions    " cyclobenzaprine 10 MG tablet   Commonly known as:  FLEXERIL   Used for:  Muscle spasm        Dose:  10 mg   Take 1 tablet (10 mg) by mouth 2 times daily as needed for muscle spasms Reported on 4/12/2017   Quantity:  60 tablet   Refills:  1       gabapentin 300 MG capsule   Commonly known as:  NEURONTIN   Used for:  Cervicalgia        Dose:  900 mg   Take 3 capsules (900 mg) by mouth 3 times daily If needed/tolerated, OK to increase bedtime dose to 1200mg   Quantity:  300 capsule   Refills:  3       HYDROcodone-acetaminophen 5-325 MG per tablet   Commonly known as:  NORCO   Used for:  S/P cervical spinal fusion   Notes to Patient:  Do not use if taking Percocet        Dose:  1-2 tablet   Take 1-2 tablets by mouth every 4 hours as needed for other (Moderate to Severe Pain)   Quantity:  60 tablet   Refills:  0       losartan 50 MG tablet   Commonly known as:  COZAAR   Used for:  Benign essential hypertension        Dose:  50 mg   Take 1 tablet (50 mg) by mouth daily   Quantity:  90 tablet   Refills:  1       methocarbamol 750 MG tablet   Commonly known as:  ROBAXIN   Used for:  Cervical radiculopathy, Myofascial pain        Dose:  750 mg   Take 1 tablet (750 mg) by mouth 3 times daily as needed for muscle spasms   Quantity:  90 tablet   Refills:  1       order for DME   Used for:  Chronic pain syndrome        Equipment being ordered: TENS Pads as directed   Quantity:  1 Device   Refills:  0       TYLENOL PO   Notes to Patient:  Do NOT take if using Percocet        Dose:  500 mg   Take 500 mg by mouth every 4 hours as needed for mild pain or fever   Refills:  0            Where to get your medicines      Some of these will need a paper prescription and others can be bought over the counter. Ask your nurse if you have questions.     Bring a paper prescription for each of these medications     oxyCODONE-acetaminophen 5-325 MG per tablet                Protect others around you: Learn how to safely use, store and throw  away your medicines at www.disposemymeds.org.             Medication List: This is a list of all your medications and when to take them. Check marks below indicate your daily home schedule. Keep this list as a reference.      Medications           Morning Afternoon Evening Bedtime As Needed    cyclobenzaprine 10 MG tablet   Commonly known as:  FLEXERIL   Take 1 tablet (10 mg) by mouth 2 times daily as needed for muscle spasms Reported on 4/12/2017                                gabapentin 300 MG capsule   Commonly known as:  NEURONTIN   Take 3 capsules (900 mg) by mouth 3 times daily If needed/tolerated, OK to increase bedtime dose to 1200mg                                HYDROcodone-acetaminophen 5-325 MG per tablet   Commonly known as:  NORCO   Take 1-2 tablets by mouth every 4 hours as needed for other (Moderate to Severe Pain)   Notes to Patient:  Do not use if taking Percocet                                losartan 50 MG tablet   Commonly known as:  COZAAR   Take 1 tablet (50 mg) by mouth daily                                methocarbamol 750 MG tablet   Commonly known as:  ROBAXIN   Take 1 tablet (750 mg) by mouth 3 times daily as needed for muscle spasms                                order for DME   Equipment being ordered: TENS Pads as directed                                oxyCODONE-acetaminophen 5-325 MG per tablet   Commonly known as:  PERCOCET   Take 1-2 tablets by mouth every 4 hours as needed for pain (moderate to severe)   Last time this was given:  2 tablets on 8/11/2017  2:52 PM                 6:45PM               TYLENOL PO   Take 500 mg by mouth every 4 hours as needed for mild pain or fever   Notes to Patient:  Do NOT take if using Percocet

## 2017-08-11 NOTE — DISCHARGE INSTRUCTIONS
Home Care Following Open Hernia Repair  Dr. Story    HERNIA TYPE: {Umbilical hernia     Care of the Incision     Remove dressing after 48 hours.     Leave small tape strips in place.  They will gradually come off.     After dressing is removed, may shower.  Pat incision dry.    Activity     Gradually increase your activity.  Walk short distances several times each day and increase the distance as your strength allows     To promote circulation, do not cross your legs while sitting.     No strenuous lifting or straining for 2 weeks.  Do not lift anything over 20 pounds until your doctor approves an increase.     Do not drive or operate equipment while taking prescription pain medications.     Return to work will be determined by the type of work you do and should be discussed with your physician.     By 3 weeks after surgery, you can do any activity you feel like doing as long as you restrict lifting and STOP IF IT HURTS.     You may drive a car 1 week after surgery if you have stopped taking prescription medications and are pain free enough to make an emergency stop if necessary.    Diet     Return to the diet you were on before surgery.     Drink plenty of water and fruit juices.     Avoid foods that might cause constipation.     REMEMBER - most prescription pain pills can and do cause constipation.  Walking, extra fluids, and increased fiber (fresh fruits and vegetables, etc.) are natural remedies for constipation.  Ask your doctor about a stool softener such as Colace or Metamucil.    Call Your Physician If You Have:     Redness, increased swelling or cloudy drainage from your incision.     A temperature of more than 100.5 degrees F.     Pain in your incision not relieved by your prescription pain pills and/or a short rest.     Any questions or concerns about your recovery call during clinic hours:  [phone] or Nurse Advice Line [phone] (24 hours)  Same-Day Surgery   Adult Discharge Orders &  Instructions     For 24 hours after surgery    1. Get plenty of rest.  A responsible adult must stay with you for at least 24 hours after you leave the hospital.   2. Do not drive or use heavy equipment.  If you have weakness or tingling, don't drive or use heavy equipment until this feeling goes away.  3. Do not drink alcohol.  4. Avoid strenuous or risky activities.  Ask for help when climbing stairs.   5. You may feel lightheaded.  IF so, sit for a few minutes before standing.  Have someone help you get up.   6. If you have nausea (feel sick to your stomach): Drink only clear liquids such as apple juice, ginger ale, broth or 7-Up.  Rest may also help.  Be sure to drink enough fluids.  Move to a regular diet as you feel able.  7. You may have a slight fever. Call the doctor if your fever is over 100 F (37.7 C) (taken under the tongue) or lasts longer than 24 hours.  8. You may have a dry mouth, a sore throat, muscle aches or trouble sleeping.  These should go away after 24 hours.  9. Do not make important or legal decisions.   Call your doctor for any of the followin.  Signs of infection (fever, growing tenderness at the surgery site, a large amount of drainage or bleeding, severe pain, foul-smelling drainage, redness, swelling).    2. It has been over 8 to 10 hours since surgery and you are still not able to urinate (pass water).    3.  Headache for over 24 hours.      Nurse advice line: [phone]

## 2017-08-14 DIAGNOSIS — Z98.1 S/P CERVICAL SPINAL FUSION: ICD-10-CM

## 2017-08-14 RX ORDER — HYDROCODONE BITARTRATE AND ACETAMINOPHEN 5; 325 MG/1; MG/1
1-2 TABLET ORAL EVERY 4 HOURS PRN
Qty: 60 TABLET | Refills: 0 | Status: SHIPPED | OUTPATIENT
Start: 2017-08-14 | End: 2017-08-23

## 2017-08-15 ENCOUNTER — MYC MEDICAL ADVICE (OUTPATIENT)
Dept: SURGERY | Facility: CLINIC | Age: 52
End: 2017-08-15

## 2017-08-15 DIAGNOSIS — G89.18 POST-OP PAIN: ICD-10-CM

## 2017-08-15 RX ORDER — OXYCODONE AND ACETAMINOPHEN 5; 325 MG/1; MG/1
1-2 TABLET ORAL EVERY 4 HOURS PRN
Qty: 30 TABLET | Refills: 0 | Status: CANCELLED | OUTPATIENT
Start: 2017-08-15

## 2017-08-15 NOTE — TELEPHONE ENCOUNTER
Pt had surgery with neurosurgery a few weeks ago and had pain pills from them. He had open umbilical hernia repair 8/11/17 with Dr. Story. Pt is out of Percocet. He called and talked to neurosurgery yesterday and they wrote for him RX for Percocet. Pt states that he intended to ask for Percocet and not the Norco but the norco was written. He did not  the [place]. He has pain at surgery site, he is icing, up moving, no temp, taking ibuprofen 600mg TID, had BM.   I told pt that Dr. Story is not here today nor is any other surgeon here at this time. I felt it was best for pt to  the Norco and use it, try staggering if he needs 2 tabs, continue the ibuprofen and ice and splint, he should be starting to feel better, he has f/u in 3 days. Pt is in agreement with this plan. SAMY Guaman

## 2017-08-15 NOTE — TELEPHONE ENCOUNTER
Percocet      Last Written Prescription Date: 8/11/17  Last Fill Quantity: 30,  # refills: 0   Last Office Visit with [facility], [facility] or [facility] prescribing provider: 8/4/17                                         Next 5 appointments (look out 90 days)     Aug 17, 2017  2:40 PM CDT   [facility]    [address] [place] [id number]   [phone]            Aug 18, 2017  9:00 AM CDT   [facility] Visit with Michlele James MD   [facility]    [address] [place] [id number]   [phone]               Cinthia Baca [facility] [employer]  [facility]

## 2017-08-16 RX ORDER — OXYCODONE AND ACETAMINOPHEN 5; 325 MG/1; MG/1
1-2 TABLET ORAL EVERY 4 HOURS PRN
Qty: 20 TABLET | Refills: 0 | Status: SHIPPED | OUTPATIENT
Start: 2017-08-16 | End: 2017-09-21

## 2017-08-17 ENCOUNTER — MYC MEDICAL ADVICE (OUTPATIENT)
Dept: PALLIATIVE MEDICINE | Facility: CLINIC | Age: 52
End: 2017-08-17

## 2017-08-17 ENCOUNTER — OFFICE VISIT (OUTPATIENT)
Dept: NEUROSURGERY | Facility: CLINIC | Age: 52
End: 2017-08-17
Payer: COMMERCIAL

## 2017-08-17 VITALS — TEMPERATURE: 98.4 F | WEIGHT: 213 LBS | BODY MASS INDEX: 27.34 KG/M2 | HEIGHT: 74 IN

## 2017-08-17 DIAGNOSIS — Z98.1 S/P CERVICAL SPINAL FUSION: Primary | ICD-10-CM

## 2017-08-17 PROCEDURE — 99024 POSTOP FOLLOW-UP VISIT: CPT | Performed by: PHYSICIAN ASSISTANT

## 2017-08-17 NOTE — NURSING NOTE
"Indra Mehta is a 52 year old male who presents for:  Chief Complaint   Patient presents with     RECHECK     Cervical fusion DOS 07/05/17, discuss pain managment     Neurologic Problem        Initial Vitals:  Temp 98.4  F (36.9  C) (Temporal)  Ht 1.88 m (6' 2\")  Wt 96.6 kg (213 lb)  BMI 27.35 kg/m2 Estimated body mass index is 27.35 kg/(m^2) as calculated from the following:    Height as of this encounter: 1.88 m (6' 2\").    Weight as of this encounter: 96.6 kg (213 lb).. Body surface area is 2.25 meters squared. BP completed using cuff size: NA (Not Taken)  Data Unavailable    Do you feel safe in your environment?  Yes  Do you need any refills today? No    Nursing Comments:         Gianfranco Church    "

## 2017-08-17 NOTE — PROGRESS NOTES
[facility]  Neurosurgery followup:    HPI: 6 weeks s/p C6-7 ACDF. States he is doing well post-operatively and besides the 2 fall incidences he has had, he feels as though he is improving. He has some intermittent pain between his scapula, but this also continues to improve. He also underwent hernia surgery recently, so he is on a lifting restriction for this. Denies radicular pain or weakness.    Exam:  Constitutional:  Alert, well nourished, NAD.  HEENT: Normocephalic, atraumatic.   Pulm:  Without shortness of breath   CV:  No pitting edema of BLE.     Neurological:  Awake  Alert  Oriented x 3  Motor exam:     Shoulder Abduction:  Right:  5/5    Left:  5/5  Biceps:                      Right:  5/5    Left:  5/5  Triceps:                     Right:  5/5    Left:  5/5  Wrist Extensors:       Right:  5/5    Left:  5/5  Wrist Flexors:           Right:  5/5    Left:  5/5  Intrinsics:                  Right:  5/5    Left:  5/5     Able to spontaneously move U/E bilaterally  Sensation intact throughout all U/E dermatomes  Incision: Healing nicely.  Imaging: AP and lateral cervical films reveal stable hardware.  A/P: 6 weeks s/p C6-7 ACDF. Overall, he is recovering well from surgery. Radicular pain has resolved. Xrays reveal stable hardware.  - May increase lifting restriction to 20 pounds until 3 months post-op once cleared by general surgery d/t his hernia  - followup in 6 weeks with xray prior     - Call the clinic at [phone] for increased pain or any other questions and concerns.        Erica Mitchell PA-C  [facility]  [address]  [address] [place] [id number]    Tel [phone]  Pager [phone]

## 2017-08-17 NOTE — MR AVS SNAPSHOT
After Visit Summary   8/17/2017    Indra Mehta    MRN: [id number]           Patient Information     Date Of Birth          1965        Visit Information        Provider Department      8/17/2017 2:40 PM Erica Mitchell PA-C [facility]        Today's Diagnoses     S/P cervical spinal fusion    -  1       Follow-ups after your visit        Your next 10 appointments already scheduled     Aug 17, 2017  2:40 PM CDT   Return Visit with Erica Mitchell PA-C   [facility] ([address] [address]   [phone]            Aug 18, 2017  9:00 AM CDT   Return Visit with Michelle James MD   [facility] ([address] [address] [phone]            Sep 28, 2017  1:20 PM CDT   Return Visit with Erica Mitchell PA-C   [facility] ([address] [id number] [phone]              Future tests that were ordered for you today     Open Future Orders        Priority Expected Expires Ordered    XR Cervical Spine 2/3 Views Routine 9/28/2017 8/17/2018 8/17/2017            Who to contact     If you have questions or need follow up information about today's clinic visit or your schedule please contact [facility] directly at [phone].  Normal or non-critical lab and imaging results will be communicated to you by MyChart, letter or phone within 4 business days after the clinic has received the results. If you do not hear from us within 7 days, please contact the clinic through MyChart or phone. If you have a critical or abnormal lab result, we will notify you by phone as soon as possible.  Submit refill requests through UXCam or call your pharmacy and they will forward the refill request to us. Please allow 3 business days for your refill to be completed.           "Additional Information About Your Visit        MyChart Information     [employer] gives you secure access to your electronic health record. If you see a primary care provider, you can also send messages to your care team and make appointments. If you have questions, please call your primary care clinic.  If you do not have a primary care provider, please call [phone] and they will assist you.        Care EveryWhere ID     This is your Care EveryWhere ID. This could be used by other organizations to access your [place] medical records  [id number]        Your Vitals Were     Temperature Height BMI (Body Mass Index)             98.4  F (36.9  C) (Temporal) 6' 2\" (1.88 m) 27.35 kg/m2          Blood Pressure from Last 3 Encounters:   08/11/17 134/82   08/04/17 116/80   07/05/17 120/75    Weight from Last 3 Encounters:   08/17/17 213 lb (96.6 kg)   08/11/17 209 lb (94.8 kg)   08/04/17 209 lb (94.8 kg)               Primary Care Provider Office Phone # Fax #    Tha Grullon [phone]       [address] [facility] [address]        Equal Access to Services     KELSEY ARSHAD : Hadii marva ku hadasho Soomaali, waaxda luqadaha, qaybta kaalmada adeegyada, mikala lainez. So [facility] [phone].    ATENCIÓN: Si habla español, tiene a lundy disposición servicios gratuitos de asistencia lingüística. [facility] [phone].    We comply with applicable federal civil rights laws and [place] laws. We do not discriminate on the basis of race, color, national origin, age, disability sex, sexual orientation or gender identity.            Thank you!     Thank you for choosing [facility]  for your care. Our goal is always to provide you with excellent care. Hearing back from our patients is one way we can continue to improve our services. Please take a few minutes to complete the written survey that you may receive in the mail after your visit with us. Thank you!             Your " Updated Medication List - Protect others around you: Learn how to safely use, store and throw away your medicines at www.disposemymeds.org.          This list is accurate as of: 8/17/17  1:27 PM.  Always use your most recent med list.                   Brand Name Dispense Instructions for use Diagnosis    cyclobenzaprine 10 MG tablet    FLEXERIL    60 tablet    Take 1 tablet (10 mg) by mouth 2 times daily as needed for muscle spasms Reported on 4/12/2017    Muscle spasm       gabapentin 300 MG capsule    NEURONTIN    300 capsule    Take 3 capsules (900 mg) by mouth 3 times daily If needed/tolerated, OK to increase bedtime dose to 1200mg    Cervicalgia       HYDROcodone-acetaminophen 5-325 MG per tablet    NORCO    60 tablet    Take 1-2 tablets by mouth every 4 hours as needed for other (Moderate to Severe Pain)    S/P cervical spinal fusion       losartan 50 MG tablet    COZAAR    90 tablet    Take 1 tablet (50 mg) by mouth daily    Benign essential hypertension       methocarbamol 750 MG tablet    ROBAXIN    90 tablet    Take 1 tablet (750 mg) by mouth 3 times daily as needed for muscle spasms    Cervical radiculopathy, Myofascial pain       order for DME     1 Device    Equipment being ordered: TENS Pads as directed    Chronic pain syndrome       oxyCODONE-acetaminophen 5-325 MG per tablet    PERCOCET    20 tablet    Take 1-2 tablets by mouth every 4 hours as needed for pain (moderate to severe)    Post-op pain       TYLENOL PO      Take 500 mg by mouth every 4 hours as needed for mild pain or fever

## 2017-08-18 ENCOUNTER — OFFICE VISIT (OUTPATIENT)
Dept: SURGERY | Facility: CLINIC | Age: 52
End: 2017-08-18
Payer: COMMERCIAL

## 2017-08-18 VITALS
BODY MASS INDEX: 27.08 KG/M2 | HEIGHT: 74 IN | SYSTOLIC BLOOD PRESSURE: 138 MMHG | RESPIRATION RATE: 16 BRPM | DIASTOLIC BLOOD PRESSURE: 92 MMHG | WEIGHT: 211 LBS | OXYGEN SATURATION: 97 % | HEART RATE: 87 BPM | TEMPERATURE: 97.6 F

## 2017-08-18 DIAGNOSIS — Z09 S/P UMBILICAL HERNIA REPAIR, FOLLOW-UP EXAM: Primary | ICD-10-CM

## 2017-08-18 PROCEDURE — 99024 POSTOP FOLLOW-UP VISIT: CPT | Performed by: SURGERY

## 2017-08-18 ASSESSMENT — PAIN SCALES - GENERAL: PAINLEVEL: SEVERE PAIN (6)

## 2017-08-18 NOTE — MR AVS SNAPSHOT
After Visit Summary   8/18/2017    Indra Mehta    MRN: [id number]           Patient Information     Date Of Birth          1965        Visit Information        Provider Department      8/18/2017 9:00 AM Michelle James MD [facility]         Follow-ups after your visit        Your next 10 appointments already scheduled     Sep 28, 2017  1:20 PM CDT   Return Visit with Erica Mitchell PA-C   [facility] ([facility]    [address] [place] [id number] [phone]              Future tests that were ordered for you today     Open Future Orders        Priority Expected Expires Ordered    XR Cervical Spine 2/3 Views Routine 9/28/2017 8/17/2018 8/17/2017            Who to contact     If you have questions or need follow up information about today's clinic visit or your schedule please contact [facility] directly at [phone].  Normal or non-critical lab and imaging results will be communicated to you by Zeerhart, letter or phone within 4 business days after the clinic has received the results. If you do not hear from us within 7 days, please contact the clinic through Zeerhart or phone. If you have a critical or abnormal lab result, we will notify you by phone as soon as possible.  Submit refill requests through [employer] or call your pharmacy and they will forward the refill request to us. Please allow 3 business days for your refill to be completed.          Additional Information About Your Visit        MyChart Information     [employer] gives you secure access to your electronic health record. If you see a primary care provider, you can also send messages to your care team and make appointments. If you have questions, please call your primary care clinic.  If you do not have a primary care provider, please call [phone] and they will assist you.        Care EveryWhere ID     This is your Care EveryWhere ID. This  "could be used by other organizations to access your [place] medical records  [id number]        Your Vitals Were     Pulse Temperature Respirations Height Pulse Oximetry BMI (Body Mass Index)    87 97.6  F (36.4  C) (Temporal) 16 6' 2\" (1.88 m) 97% 27.09 kg/m2       Blood Pressure from Last 3 Encounters:   08/18/17 (!) 138/92   08/11/17 134/82   08/04/17 116/80    Weight from Last 3 Encounters:   08/18/17 211 lb (95.7 kg)   08/17/17 213 lb (96.6 kg)   08/11/17 209 lb (94.8 kg)              Today, you had the following     No orders found for display       Primary Care Provider Office Phone # Fax #    Tha Grullon [phone]       [address] [facility] [address]        Equal Access to Services     SUSY [facility] HospitalSHRUT[facility] : Hadii marva khano Somary, waaxda luqadaha, qaybta kaalmada adejaileneyada, mikala carmichael . So [facility] [phone].    ATENCIÓN: Si habla español, tiene a lundy disposición servicios gratuitos de asistencia lingüística. Llame al [phone].    We comply with applicable federal civil rights laws and [place] laws. We do not discriminate on the basis of race, color, national origin, age, disability sex, sexual orientation or gender identity.            Thank you!     Thank you for choosing [facility]  for your care. Our goal is always to provide you with excellent care. Hearing back from our patients is one way we can continue to improve our services. Please take a few minutes to complete the written survey that you may receive in the mail after your visit with us. Thank you!             Your Updated Medication List - Protect others around you: Learn how to safely use, store and throw away your medicines at www.disposemymeds.org.          This list is accurate as of: 8/18/17  9:22 AM.  Always use your most recent med list.                   Brand Name Dispense Instructions for use Diagnosis    cyclobenzaprine 10 MG tablet    FLEXERIL    60 tablet    " Take 1 tablet (10 mg) by mouth 2 times daily as needed for muscle spasms Reported on 4/12/2017    Muscle spasm       gabapentin 300 MG capsule    NEURONTIN    300 capsule    Take 3 capsules (900 mg) by mouth 3 times daily If needed/tolerated, OK to increase bedtime dose to 1200mg    Cervicalgia       HYDROcodone-acetaminophen 5-325 MG per tablet    NORCO    60 tablet    Take 1-2 tablets by mouth every 4 hours as needed for other (Moderate to Severe Pain)    S/P cervical spinal fusion       losartan 50 MG tablet    COZAAR    90 tablet    Take 1 tablet (50 mg) by mouth daily    Benign essential hypertension       methocarbamol 750 MG tablet    ROBAXIN    90 tablet    Take 1 tablet (750 mg) by mouth 3 times daily as needed for muscle spasms    Cervical radiculopathy, Myofascial pain       order for DME     1 Device    Equipment being ordered: TENS Pads as directed    Chronic pain syndrome       oxyCODONE-acetaminophen 5-325 MG per tablet    PERCOCET    20 tablet    Take 1-2 tablets by mouth every 4 hours as needed for pain (moderate to severe)    Post-op pain       TYLENOL PO      Take 500 mg by mouth every 4 hours as needed for mild pain or fever

## 2017-08-18 NOTE — NURSING NOTE
"Chief Complaint   Patient presents with     Surgical Followup     open umbilical hernia repair [facility]       Initial BP (!) 138/92 (BP Location: Left arm, Patient Position: Chair, Cuff Size: Adult Regular)  Pulse 87  Temp 97.6  F (36.4  C) (Temporal)  Resp 16  Ht 6' 2\" (1.88 m)  Wt 211 lb (95.7 kg)  SpO2 97%  BMI 27.09 kg/m2 Estimated body mass index is 27.09 kg/(m^2) as calculated from the following:    Height as of this encounter: 6' 2\" (1.88 m).    Weight as of this encounter: 211 lb (95.7 kg).  Medication Reconciliation: armando Walden CMA        "

## 2017-08-18 NOTE — PROGRESS NOTES
"[facility] NOTE  GENERAL SURGERY    PCP: Tha Grullon         Subjective:     Indra Mehta is a 52 year old male who presents with Surgical Followup (open umbilical hernia repair DOS 08/11/17 with [facility])    Pain has been moderately controlled. Currently is still using pain medications along with tylenol but he is only a week out from surgery. He is very sore. Eating a regular diet and having regular bladder/bowel function. Overall doing well.         Objective:     BP (!) 138/92 (BP Location: Left arm, Patient Position: Chair, Cuff Size: Adult Regular)  Pulse 87  Temp 97.6  F (36.4  C) (Temporal)  Resp 16  Ht 6' 2\" (1.88 m)  Wt 211 lb (95.7 kg)  SpO2 97%  BMI 27.09 kg/m2   Constitutional: Awake, alert, in no acute distress.  Respiratory: Non-labored.   Cardiovascular: Regular rate and rhythm.   Abdomen: Incision is clean and dry without erythema. Healing well. Adhesive tape caused a rash around the margins of use         Assessment and Plan:       ICD-10-CM    1. S/P umbilical hernia repair, follow-up exam Z09          Indra Mehta is a 52 year old male who presented post operatively and is doing adequately. Informed him to alternate using motrin and tylenol for pain. He does not need any additional pain meds. He is already at work but adhering to the weight restrictions.     He may increase his activity after two weeks.  Follow up with general surgery as needed.      Sarina James MD  [facility]            "

## 2017-08-18 NOTE — TELEPHONE ENCOUNTER
Per patient Will message:  From: Indra Mehta      Created: 8/17/2017 1:42 PM      I am recovering from my neck surgery and hernia surgery. The neck surgery went well-and recovery is going well. The hernia surgery last Friday is still quite painful but slowly getting better. I would like to move our attention to my other pain areas. The next worst pain area is my lower back. Would it be possible to go directly to doctor Clarisa and see if he thinks there is anything that he can-do ?

## 2017-08-18 NOTE — TELEPHONE ENCOUNTER
Jessee sent to pt:    From: Heidy Gipson RN        Created: 8/18/2017 9:29 AM       Noble Burrell.  I recommend you call and schedule a clinic visit with Dr. Angle Freeman to discuss your low back pain.   At your last June visit he had wanted to see you again in 8-10 weeks after.  For future always schedule this visit right away as his schedule books out quickly.  I know you have had a lot going on also.      The appointment line is [phone].    Take care,    Heidy Gipson RN-BSN  [facility]        Will keep encounter open for a couple of days in anticipation of patient call back.    Heidy Gipson RN-BSN  [facility]

## 2017-08-22 NOTE — TELEPHONE ENCOUNTER
Sent in Mippin message:    Indra,    I am glad to hear you are doing well after your surgeries but sorry to hear you continue with issues with your lower back.  I am fine with you discussing your back with Dr Mckenna, but you would likely need an updated lumbar spine MRI since your last one was done in 2015.  You could bring up your lower back to Erica Mitchell PA-C the next time you see her for follow up on your neck or contact her or Dr Mckenna on Mippin to find out if you would need a specific referral since you are already an established patient with them.  They would likely recommend trial of injections and therapy before surgery though, depending on what the MRI shows.      Boni Freeman MD  [facility]

## 2017-08-22 NOTE — TELEPHONE ENCOUNTER
My chart response from pt:    I have discussed my lower back pain with Dr Angle Freeman in the past. I have also been through several rounds of PT and several shots in the past.  I don't see how another visit will benefit me.    Sending to provider to determine plan.     Katarina Licona RN, [facility]  Pain Clinic Care Coordinator

## 2017-08-23 ENCOUNTER — TELEPHONE (OUTPATIENT)
Dept: NEUROSURGERY | Facility: CLINIC | Age: 52
End: 2017-08-23

## 2017-08-23 DIAGNOSIS — Z98.1 S/P CERVICAL SPINAL FUSION: ICD-10-CM

## 2017-08-23 NOTE — TELEPHONE ENCOUNTER
Pt left a vm on the clinic line requesting a refill of Norco. He said that he is almost out and will be totally out by Sunday.

## 2017-08-23 NOTE — TELEPHONE ENCOUNTER
"Spoke with patient. He would like a refill of Norco. He's been receiving percocet for s/p hernia repair, and norco for s/p ACDF. Informed patient we do not recommend taking both narcotics and advised him to choose which medication provides better pain relief. Patient said the percocet is \"too strong\" so he stopped taking that. He would like to refill norco. Spoke with Erica Mitchell PA-C, and she approves refill. It will be available for  tomorrow at [facility]. Patient notified.   "

## 2017-08-24 RX ORDER — HYDROCODONE BITARTRATE AND ACETAMINOPHEN 5; 325 MG/1; MG/1
1-2 TABLET ORAL EVERY 4 HOURS PRN
Qty: 60 TABLET | Refills: 0 | Status: SHIPPED | OUTPATIENT
Start: 2017-08-26 | End: 2017-09-08

## 2017-08-25 NOTE — TELEPHONE ENCOUNTER
Pt read message and has not responded closing encounter.     Katarina Licona RN, [facility]  Pain Clinic Care Coordinator

## 2017-09-04 ENCOUNTER — MYC MEDICAL ADVICE (OUTPATIENT)
Dept: NEUROSURGERY | Facility: CLINIC | Age: 52
End: 2017-09-04

## 2017-09-05 ENCOUNTER — HOSPITAL ENCOUNTER (OUTPATIENT)
Dept: MRI IMAGING | Facility: CLINIC | Age: 52
Discharge: HOME | End: 2017-09-05
Attending: PHYSICIAN ASSISTANT | Admitting: PHYSICIAN ASSISTANT
Payer: COMMERCIAL

## 2017-09-05 DIAGNOSIS — M54.50 LUMBAGO: ICD-10-CM

## 2017-09-05 PROCEDURE — 72148 MRI LUMBAR SPINE W/O DYE: CPT

## 2017-09-07 NOTE — TELEPHONE ENCOUNTER
Pt stopped in to the [facility] spec. To  RX, no RX in [place] or [facility]. Please call pt ASAP. Thank You Lina

## 2017-09-08 ENCOUNTER — TELEPHONE (OUTPATIENT)
Dept: FAMILY MEDICINE | Facility: OTHER | Age: 52
End: 2017-09-08

## 2017-09-08 DIAGNOSIS — Z98.1 S/P CERVICAL SPINAL FUSION: ICD-10-CM

## 2017-09-08 RX ORDER — HYDROCODONE BITARTRATE AND ACETAMINOPHEN 5; 325 MG/1; MG/1
1 TABLET ORAL EVERY 6 HOURS PRN
Qty: 28 TABLET | Refills: 0 | Status: SHIPPED | OUTPATIENT
Start: 2017-09-08 | End: 2017-09-15

## 2017-09-08 NOTE — TELEPHONE ENCOUNTER
Called and spoke to Sherrie. Informed her that Dr. Grullon will OK a one time refill for Dr. Mckenna.     Sherrie said they are working on weaning the patient down on his usage. Previous RX states 1-2 tabs Q 4 hours. The plan for this refill is 1 tab Q 6 hours per MAYDA Shaw, that works with Dr. Mckenna. See Ad Knightst message to patient. Patient is aware.     RN pended 1 tab Q 6 hours and pharmacy. Left quantity amount blank for provider to decide.     Will route to Dr. Grullon.     Brandy Cooper, RN  [facility]    Sherrie Roberts RN        10:00 AM   Unsigned Note      Called and spoke to patient to inform him we do not have any providers up in [place] or [place] today to fill script for Norco. Offered mailing script to pharmacy . Patient said he is out of his medication and knows that would take at least 2 business days to get to his pharmacy. Patient lives in [place] and [place] is too far of a drive for him.      Called patient's PCP office and left message for Dr. Grullon' nurse to see if they would be able to refill his [place] this one time. Rx is Norco 5/325 1 tab q 6 hours per Erica Mitchell PA-C. Patient is to continue to wean down. He is s/p cervical fusion with Dr Mckenna 7/5/17.

## 2017-09-08 NOTE — TELEPHONE ENCOUNTER
Signed Prescriptions:                        Disp   Refills    HYDROcodone-acetaminophen (NORCO) 5-325 MG*28 tab*0        Sig: Take 1 tablet by mouth every 6 hours as needed for           other (Moderate to Severe Pain)  Authorizing Provider: THA BELLO    Please notify patient.  Electronically signed by Tha Bello MD

## 2017-09-08 NOTE — TELEPHONE ENCOUNTER
Please call. OK for one time refill for Dr. Mckenna   Electronically signed by Tha Grullon MD   (Routing comment)

## 2017-09-08 NOTE — TELEPHONE ENCOUNTER
Called and spoke to patient to inform him we do not have any providers up in [place] or [place] today to fill script for Norco. Offered mailing script to pharmacy . Patient said he is out of his medication and knows that would take at least 2 business days to get to his pharmacy. Patient lives in [place] and [place] is too far of a drive for him.     Called patient's PCP office and left message for Dr. Grullon' nurse to see if they would be able to refill his [place] this one time. Rx is Norco 5/325 1 tab q 6 hours per Erica Mitchell PA-C. Patient is to continue to wean down. He is s/p cervical fusion with Dr Mckenna 7/5/17.

## 2017-09-08 NOTE — TELEPHONE ENCOUNTER
Reason for Call:  Other call back and prescription    Detailed comments: Sherrie from [facility] in [place] called and is requesting a call back from Dr. Grullon or his nurse. They are wondering if Dr. Grullon would be able to do a one time script of the patient's narco that was originally prescribed by Dr. Mckenna. Please call her back at [phone]. Please advise.     Phone Number Patient can be reached at: Other phone number:  [phone]    Best Time: any     Can we leave a detailed message on this number? YES    Call taken on 9/8/2017 at 10:01 AM by Gianfranco Hamilton

## 2017-09-08 NOTE — TELEPHONE ENCOUNTER
RX placed in the [facility] med box for . Patient informed.     Brandy Cooper RN  [facility]

## 2017-09-12 ENCOUNTER — MYC MEDICAL ADVICE (OUTPATIENT)
Dept: NEUROSURGERY | Facility: CLINIC | Age: 52
End: 2017-09-12

## 2017-09-12 DIAGNOSIS — Z98.1 S/P CERVICAL SPINAL FUSION: Primary | ICD-10-CM

## 2017-09-15 ENCOUNTER — TELEPHONE (OUTPATIENT)
Dept: NEUROSURGERY | Facility: CLINIC | Age: 52
End: 2017-09-15

## 2017-09-15 DIAGNOSIS — Z98.1 S/P CERVICAL SPINAL FUSION: ICD-10-CM

## 2017-09-15 RX ORDER — HYDROCODONE BITARTRATE AND ACETAMINOPHEN 5; 325 MG/1; MG/1
1 TABLET ORAL EVERY 6 HOURS PRN
Qty: 21 TABLET | Refills: 0 | Status: SHIPPED | OUTPATIENT
Start: 2017-09-15 | End: 2017-09-21

## 2017-09-15 NOTE — TELEPHONE ENCOUNTER
----- Message from Erica Mitchell PA-C sent at 9/12/2017  4:09 PM CDT -----  One week supply of Norco 1 tab q6 for fill in [place] on Thurs.

## 2017-09-15 NOTE — TELEPHONE ENCOUNTER
Pt is requesting a refill. Pt spoke to Erica Mitchell PA-C. Per Erica Mitchell pt can get a refill on norco 1 tab q6 hours and give him a supply to get him through til thurs so #21 tabs. Will forward to MAYDA Mooney so pt can pick script up in [place].     Erum Stewart, CMA

## 2017-09-21 ENCOUNTER — OFFICE VISIT (OUTPATIENT)
Dept: PALLIATIVE MEDICINE | Facility: CLINIC | Age: 52
End: 2017-09-21
Payer: COMMERCIAL

## 2017-09-21 VITALS
SYSTOLIC BLOOD PRESSURE: 124 MMHG | OXYGEN SATURATION: 98 % | WEIGHT: 211 LBS | HEART RATE: 67 BPM | DIASTOLIC BLOOD PRESSURE: 84 MMHG | BODY MASS INDEX: 27.09 KG/M2

## 2017-09-21 DIAGNOSIS — G89.4 CHRONIC PAIN SYNDROME: ICD-10-CM

## 2017-09-21 DIAGNOSIS — M79.18 MYOFASCIAL PAIN: ICD-10-CM

## 2017-09-21 DIAGNOSIS — M62.838 MUSCLE SPASM: Primary | ICD-10-CM

## 2017-09-21 DIAGNOSIS — Z98.1 S/P CERVICAL SPINAL FUSION: ICD-10-CM

## 2017-09-21 DIAGNOSIS — M51.369 DDD (DEGENERATIVE DISC DISEASE), LUMBAR: ICD-10-CM

## 2017-09-21 DIAGNOSIS — M54.9 CHRONIC NECK AND BACK PAIN: ICD-10-CM

## 2017-09-21 DIAGNOSIS — G89.29 CHRONIC NECK AND BACK PAIN: ICD-10-CM

## 2017-09-21 DIAGNOSIS — M50.30 DDD (DEGENERATIVE DISC DISEASE), CERVICAL: ICD-10-CM

## 2017-09-21 DIAGNOSIS — M54.2 CHRONIC NECK AND BACK PAIN: ICD-10-CM

## 2017-09-21 PROCEDURE — 99214 OFFICE O/P EST MOD 30 MIN: CPT | Performed by: ANESTHESIOLOGY

## 2017-09-21 RX ORDER — HYDROCODONE BITARTRATE AND ACETAMINOPHEN 5; 325 MG/1; MG/1
1 TABLET ORAL EVERY 6 HOURS PRN
Qty: 120 TABLET | Refills: 0 | Status: SHIPPED | OUTPATIENT
Start: 2017-09-21 | End: 2017-10-11

## 2017-09-21 ASSESSMENT — PAIN SCALES - GENERAL: PAINLEVEL: SEVERE PAIN (7)

## 2017-09-21 NOTE — PATIENT INSTRUCTIONS
Assessment:   1.  Chronic neck pain  2.  Post cervical fusion neck pain  3.  Myofascial pain  4.  Cervical facet joint pain  5.  Chronic lower back pain  6.  Lumbar radiculopathy  7.  Lumbar facet joint pain  8.  Multiple joint pain    Plan:  1. Physical Therapy:  Consider physical therapy for stretching and range of motion  2. Clinical Health Psychologist to address issues of relaxation, behavioral change, coping style, and other factors important to improvement.  deferred  3. Diagnostic Studies:  Reviewed cervical x-rays and lumbar MRI  4. Medication Management:    1. Norco 5/325 QID prn pain  2. Continue Gabapentin 300 mg as currently taking  3. Continue Robaxin 750 mg TID prn spasm for daytime  4. Trial of Tizanidine 4 mg BID for spasm - try at night  5. Continue Tylenol in safe doses  6. Continue Ibuprofen in safe doses  5. Further procedures recommended:   1. Cervical facet joint injections may be helpful for neck pain - C5-6 and C6-7 left  2. Cervical trigger point injections would help with muscle spasm  3. Consider lumbar epidural steroid injection vs lumbar facet joint injections for lower back pain  6. Recommendations to PCP: continue current treatment  7. Follow up: for injections and in 8-10 weeks      ----------------------------------------------------------------  Nurse Triage line:  [phone]   Call this number with any questions or concerns. You may leave a detailed message anytime. Calls are typically returned Monday through Friday between 8 AM and 4:30 PM. We usually get back to you within 2 business days depending on the issue/request.       Medication refills:    For non-narcotic medications, call your pharmacy directly to request a refill. The pharmacy will contact the Pain Management Center for authorization. Please allow 3-4 days for these refills to be processed.     For narcotic refills, call the nurse triage line or send a [employer] message. Please contact us 7-10 days before your refill  is due. The message MUST include the name of the specific medication(s) requested and how you would like to receive the prescription(s). The options are as follows:    Pain Clinic staff can mail the prescription to your pharmacy. Please tell us the name of the pharmacy.    You may pick the prescription up at the Pain Clinic (tell us the location) or during a clinic visit with your pain provider    Pain Clinic staff can deliver the prescription to the [facility] pharmacy in the clinic building. Please tell us the location.      Scheduling number: [phone].  Call this number to schedule or change appointments.    We believe regular attendance is key to your success in our program.    Any time you are unable to keep your appointment we ask that you call us at least 24 hours in advance to let us know. This will allow us to offer the appointment time to another patient.

## 2017-09-21 NOTE — NURSING NOTE
"Chief Complaint   Patient presents with     Pain       Initial /84  Pulse 67  Wt 95.7 kg (211 lb)  SpO2 98%  BMI 27.09 kg/m2 Estimated body mass index is 27.09 kg/(m^2) as calculated from the following:    Height as of 8/18/17: 1.88 m (6' 2\").    Weight as of this encounter: 95.7 kg (211 lb).  Medication Reconciliation: complete     Katarina Licona RN, [facility]  Pain Clinic Care Coordinator   "

## 2017-09-21 NOTE — MR AVS SNAPSHOT
After Visit Summary   9/21/2017    Indra Mehta    MRN: [id number]           Patient Information     Date Of Birth          1965        Visit Information        Provider Department      9/21/2017 3:00 PM Boni Coppola MD [facility]        Today's Diagnoses     Muscle spasm    -  1    S/P cervical spinal fusion          Care Instructions    Assessment:   1.  Chronic neck pain  2.  Post cervical fusion neck pain  3.  Myofascial pain  4.  Cervical facet joint pain  5.  Chronic lower back pain  6.  Lumbar radiculopathy  7.  Lumbar facet joint pain  8.  Multiple joint pain    Plan:  1. Physical Therapy:  Consider physical therapy for stretching and range of motion  2. Clinical Health Psychologist to address issues of relaxation, behavioral change, coping style, and other factors important to improvement.  deferred  3. Diagnostic Studies:  Reviewed cervical x-rays and lumbar MRI  4. Medication Management:    1. Norco 5/325 QID prn pain  2. Continue Gabapentin 300 mg as currently taking  3. Continue Robaxin 750 mg TID prn spasm for daytime  4. Trial of Tizanidine 4 mg BID for spasm - try at night  5. Continue Tylenol in safe doses  6. Continue Ibuprofen in safe doses  5. Further procedures recommended:   1. Cervical facet joint injections may be helpful for neck pain - C5-6 and C6-7 left  2. Cervical trigger point injections would help with muscle spasm  3. Consider lumbar epidural steroid injection vs lumbar facet joint injections for lower back pain  6. Recommendations to PCP: continue current treatment  7. Follow up: for injections and in 8-10 weeks      ----------------------------------------------------------------  Nurse Triage line:  [phone]   Call this number with any questions or concerns. You may leave a detailed message anytime. Calls are typically returned Monday through Friday between 8 AM and 4:30 PM. We usually get back to you within 2  business days depending on the issue/request.       Medication refills:    For non-narcotic medications, call your pharmacy directly to request a refill. The pharmacy will contact the [facility] for authorization. Please allow 3-4 days for these refills to be processed.     For narcotic refills, call the nurse triage line or send a [employer] message. Please contact us 7-10 days before your refill is due. The message MUST include the name of the specific medication(s) requested and how you would like to receive the prescription(s). The options are as follows:    Pain Clinic staff can mail the prescription to your pharmacy. Please tell us the name of the pharmacy.    You may pick the prescription up at the Pain Clinic (tell us the location) or during a clinic visit with your pain provider    Pain Clinic staff can deliver the prescription to the [facility] pharmacy in the clinic building. Please tell us the location.      Scheduling number: [phone].  Call this number to schedule or change appointments.    We believe regular attendance is key to your success in our program.    Any time you are unable to keep your appointment we ask that you call us at least 24 hours in advance to let us know. This will allow us to offer the appointment time to another patient.               Follow-ups after your visit        Your next 10 appointments already scheduled     Sep 28, 2017  1:20 PM CDT   Return Visit with Erica Mitchell PA-C   [facility] ([facility])    [address] [place] [id number] [phone]              Who to contact     If you have questions or need follow up information about today's clinic visit or your schedule please contact [facility] directly at [phone].  Normal or non-critical lab and imaging results will be communicated to you by MyChart, letter or phone within 4 business days after the clinic has received  the results. If you do not hear from us within 7 days, please contact the clinic through [employer] or phone. If you have a critical or abnormal lab result, we will notify you by phone as soon as possible.  Submit refill requests through [employer] or call your pharmacy and they will forward the refill request to us. Please allow 3 business days for your refill to be completed.          Additional Information About Your Visit        [employer] Information     [employer] gives you secure access to your electronic health record. If you see a primary care provider, you can also send messages to your care team and make appointments. If you have questions, please call your primary care clinic.  If you do not have a primary care provider, please call [phone] and they will assist you.        Care EveryWhere ID     This is your Care EveryWhere ID. This could be used by other organizations to access your [place] medical records  [id number]        Your Vitals Were     Pulse Pulse Oximetry BMI (Body Mass Index)             67 98% 27.09 kg/m2          Blood Pressure from Last 3 Encounters:   09/21/17 124/84   08/18/17 (!) 138/92   08/11/17 134/82    Weight from Last 3 Encounters:   09/21/17 95.7 kg (211 lb)   08/18/17 95.7 kg (211 lb)   08/17/17 96.6 kg (213 lb)              Today, you had the following     No orders found for display         Today's Medication Changes          These changes are accurate as of: 9/21/17  4:03 PM.  If you have any questions, ask your nurse or doctor.               Start taking these medicines.        Dose/Directions    tiZANidine 4 MG tablet   Commonly known as:  ZANAFLEX   Used for:  Muscle spasm        Dose:  4 mg   Take 1 tablet (4 mg) by mouth 2 times daily as needed for muscle spasms   Quantity:  60 tablet   Refills:  1            Where to get your medicines      These medications were sent to [id number] - [place], [place]  [address] [place] [address]  [address] [place], [place] [address] [address]    Hours:  MINISTERIO  8:30-6:30; Sat 9-4; closed Sunday Phone:  [phone]     tiZANidine 4 MG tablet         Some of these will need a paper prescription and others can be bought over the counter.  Ask your nurse if you have questions.     Bring a paper prescription for each of these medications     HYDROcodone-acetaminophen 5-325 MG per tablet                Primary Care Provider Office Phone # Fax #    Tha Grullon [phone]       [address] [facility] [address]        Equal Access to Services     KELSEY ARSHAD : Hadii aad ku hadasho Soomaali, waaxda luqadaha, qaybta kaalmada adeegyada, waxay idiin haymarion miguelito carmichael . So [facility] [phone].    ATENCIÓN: Si habla español, tiene a lundy disposición servicios gratuitos de asistencia lingüística. [facility] [phone].    We comply with applicable federal civil rights laws and [place] laws. We do not discriminate on the basis of race, color, national origin, age, disability sex, sexual orientation or gender identity.            Thank you!     Thank you for choosing [facility]  for your care. Our goal is always to provide you with excellent care. Hearing back from our patients is one way we can continue to improve our services. Please take a few minutes to complete the written survey that you may receive in the mail after your visit with us. Thank you!             Your Updated Medication List - Protect others around you: Learn how to safely use, store and throw away your medicines at www.disposemymeds.org.          This list is accurate as of: 9/21/17  4:03 PM.  Always use your most recent med list.                   Brand Name Dispense Instructions for use Diagnosis    cyclobenzaprine 10 MG tablet    FLEXERIL    60 tablet    Take 1 tablet (10 mg) by mouth 2 times daily as needed for muscle spasms Reported on 4/12/2017    Muscle spasm       gabapentin 300 MG capsule    NEURONTIN    300 capsule    Take 3 capsules (900 mg) by  mouth 3 times daily If needed/tolerated, OK to increase bedtime dose to 1200mg    Cervicalgia       HYDROcodone-acetaminophen 5-325 MG per tablet    NORCO    120 tablet    Take 1 tablet by mouth every 6 hours as needed for other (Moderate to Severe Pain)    S/P cervical spinal fusion       losartan 50 MG tablet    COZAAR    90 tablet    Take 1 tablet (50 mg) by mouth daily    Benign essential hypertension       methocarbamol 750 MG tablet    ROBAXIN    90 tablet    Take 1 tablet (750 mg) by mouth 3 times daily as needed for muscle spasms    Cervical radiculopathy, Myofascial pain       order for DME     1 Device    Equipment being ordered: TENS Pads as directed    Chronic pain syndrome       tiZANidine 4 MG tablet    ZANAFLEX    60 tablet    Take 1 tablet (4 mg) by mouth 2 times daily as needed for muscle spasms    Muscle spasm       TYLENOL PO      Take 500 mg by mouth every 4 hours as needed for mild pain or fever

## 2017-09-21 NOTE — PROGRESS NOTES
[facility]    Date of visit: 9/21/2017    Chief complaint:   Chief Complaint   Patient presents with     Pain       Interval history:  Indra Mehta was last seen by me on 6/15/17.      Recommendations/plan at the last visit included:  Plan:  1. Physical Therapy:  Deferred at this time - consider PT for shoulder depending on Ortho eval  2. Clinical Health Psychologist to address issues of relaxation, behavioral change, coping style, and other factors important to improvement.  deferred  3. Diagnostic Studies:  Left shoulder MRI prior to Ortho eval  4. Medication Management:    1. Continue Tramadol 50 mg QID prn pain  2. Continue Gabapentin 900 mg qam, 900 qnoon, and 1200 QHS  3. Continue Robaxin 750 mg TID  4. Continue Flexeri 10 mg prn spasm  5. Further procedures recommended:   1. Cervical trigger point injections today - see procedure note  2. Consider repeat cervical epidural steroid injection  3. May benefit from subacromial bursa injection vs intra-articular injection left shoulder  6. Recommendations to PCP: continue current treatment  7. Follow up: 8-10 weeks    Since his last visit, Indra Mehta reports:  He is s/p ACDF at C6-7 by Dr Mckenna on 7/5/17 and abdominal herniorrhaphy by Dr Story on 8/11/17.      He feels that he has done really well as far as his shoulder and arm pain but he continues with neck pain.  The neck pain is still mostly on the left and he states it feels like it is under the muscle of the neck and the neck tightens up with prolonged positions.  He will get occasional pain between the shoulder blades as well.  The pain is not traveling anywhere.  He states the he had a near fall after the surgery and another episode that strained his neck shortly after surgery.  He complains that he is not sleeping well at night because the neck pain is waking him from sleep.  He denies numbness, tingling, or weakness of the arms.  He complains of decreased range of motion  with looking up.    He is having lower back pain that travels out to the hips bilaterally and at times feels that it is in the hip joint.  He will have occasional pain traveling down to the knee on the left.  He continues with neuropathy in his feet but denies other numbness or tingling down the legs.  He also denies weakness of the legs.    His pain is described as aching, shooting, throbbing, sharp, stabbing, and miserable and is worse at night.  He also complains of chronic left shoulder pain, hip pain, and knee pain.  His pain is worsened with sitting, standing, lying for long times and decreased with medications, changing positions.    Pain scores:  Pain intensity on average is 6 on a scale of 0-10. Ranges from 4/10 to 9/10    Current pain treatments:   Norco 5/325 mg Q6H prn - states he took his last pill this morning  Gabapentin 300 mg three capsules twice a day and 4 capsules at bedtime  Tylenol 500 mg Q4H  Ibuprofen 200 mg three tabs four times a day  Flexeril 10 mg BID - occasional due to grogginess  Robaxin 750 mg TID takes during the day - helps    Past pain treatments:  Norco/vicodin  Oxycodone  Flexeril - was helpful at night  Robaxin - may have helped, didn't make him sleepy    Side Effects: no side effect    Medications:  Current Outpatient Prescriptions   Medication Sig Dispense Refill     losartan (COZAAR) 50 MG tablet Take 1 tablet (50 mg) by mouth daily 90 tablet 1     cyclobenzaprine (FLEXERIL) 10 MG tablet Take 1 tablet (10 mg) by mouth 2 times daily as needed for muscle spasms Reported on 4/12/2017 60 tablet 1     methocarbamol (ROBAXIN) 750 MG tablet Take 1 tablet (750 mg) by mouth 3 times daily as needed for muscle spasms 90 tablet 1     gabapentin (NEURONTIN) 300 MG capsule Take 3 capsules (900 mg) by mouth 3 times daily If needed/tolerated, OK to increase bedtime dose to 1200mg 300 capsule 3     order for DME Equipment being ordered: TENS Pads as directed 1 Device 0     Acetaminophen  (TYLENOL PO) Take 500 mg by mouth every 4 hours as needed for mild pain or fever       HYDROcodone-acetaminophen (NORCO) 5-325 MG per tablet Take 1 tablet by mouth every 6 hours as needed for other (Moderate to Severe Pain) (Patient not taking: Reported on 9/21/2017) 21 tablet 0       Medical History: any changes in medical history since they were last seen? Yes - as above    Review of Systems:  The 14 system ROS was reviewed from the intake questionnaire, and is positive for: hearing loss, tinnitus, joint pain, stiffness, back pain, neck pain, numbness, tingling  Any bowel or bladder problems: denies changes  Mood: good    Physical Exam:  /84  Pulse 67  Wt 95.7 kg (211 lb)  SpO2 98%  BMI 27.09 kg/m2  Constitutional: alert and no distress.  Pt is well nourished, well developed  Head: Normocephalic. No masses, lesions, tenderness or abnormalities  ENT: EOMI, mucosal surfaces moist.  Neck with full ROM, posture fair  Cardiovascular: No edema or JVD appreciated.  Respiratory: Good diaphragmatic excursion. No wheezes appreciated.  Speaking in complete sentences without shortness of breath.  No accessory muscle use.   Musculoskeletal: extremities normal- no gross deformities noted, normal muscle tone and able to move about the exam room without difficulty.    Skin: no suspicious lesions or rashes appreciated on exposed areas  Neurologic: Gait normal. Moving all extremities spontaneously, no apparent weakness.    Psychiatric: mentation appears normal, affect full and good eye contact.      Cervical Spine:  Tender left cervical paraspinal muscles and mild upper thoracic paraspinal tenderness, decreased extension due to discomfort and near full range of motion on lateral rotation and flexion, facet loading equivocal    Lumbar Spine:  Non-tender to palpation, flexes well, lateral bend and extension and lateral rotation to the left is mildly positive for facet loading pain, seated SLR negative    Assessment:   1.   Chronic neck pain  2.  Post cervical fusion neck pain  3.  Myofascial pain  4.  Cervical facet joint pain  5.  Chronic lower back pain  6.  Lumbar radiculopathy  7.  Lumbar facet joint pain  8.  Multiple joint pain    Indra Mehta is a 52 year old male who is seen at the pain clinic for chronic neck and back pain. He is s/p surgery on his neck and is having some muscle soreness likely due to a change in the mechanics of his neck after the surgery.  His lower back continues to give him troubles as well but his exam today was pretty benign.  We discussed treatment options as outlined below and he would like to try to hold off on injections for now and give his neck some time to heal.  Our treatment plan is as outlined below.    Plan:  1. Physical Therapy:  Consider physical therapy for stretching and range of motion  2. Clinical Health Psychologist to address issues of relaxation, behavioral change, coping style, and other factors important to improvement.  deferred  3. Diagnostic Studies:  Reviewed cervical x-rays and lumbar MRI  4. Medication Management:    1. Norco 5/325 QID prn pain - will wean as pain improves  2. Continue Gabapentin 300 mg as currently taking  3. Continue Robaxin 750 mg TID prn spasm for daytime  4. Trial of Tizanidine 4 mg BID for spasm - try at night  5. Continue Tylenol in safe doses  6. Continue Ibuprofen in safe doses  5. Further procedures recommended:   1. Cervical facet joint injections may be helpful for neck pain - C5-6 and C6-7 left  2. Cervical trigger point injections would help with muscle spasm  3. Consider lumbar epidural steroid injection vs lumbar facet joint injections for lower back pain  6. Recommendations to PCP: continue current treatment  7. Follow up: for injections and in 8-10 weeks    Total time spent was 30 minutes, and more than 50% of face to face time was spent in counseling and/or coordination of care regarding self directed exercise, medications, and  interventional procedures to decrease pain and improve function.    Boni Freeman MD  [facility]

## 2017-09-28 ENCOUNTER — OFFICE VISIT (OUTPATIENT)
Dept: NEUROSURGERY | Facility: CLINIC | Age: 52
End: 2017-09-28
Payer: COMMERCIAL

## 2017-09-28 ENCOUNTER — HOSPITAL ENCOUNTER (OUTPATIENT)
Dept: GENERAL RADIOLOGY | Facility: CLINIC | Age: 52
End: 2017-09-28
Attending: PHYSICIAN ASSISTANT
Payer: COMMERCIAL

## 2017-09-28 ENCOUNTER — TELEPHONE (OUTPATIENT)
Dept: SURGERY | Facility: CLINIC | Age: 52
End: 2017-09-28

## 2017-09-28 VITALS — BODY MASS INDEX: 27.85 KG/M2 | HEIGHT: 74 IN | WEIGHT: 217 LBS | TEMPERATURE: 97.3 F

## 2017-09-28 DIAGNOSIS — M54.16 LUMBAR RADICULOPATHY: Primary | ICD-10-CM

## 2017-09-28 DIAGNOSIS — Z98.1 S/P CERVICAL SPINAL FUSION: ICD-10-CM

## 2017-09-28 PROCEDURE — 72040 X-RAY EXAM NECK SPINE 2-3 VW: CPT | Mod: TC

## 2017-09-28 PROCEDURE — 99024 POSTOP FOLLOW-UP VISIT: CPT | Performed by: PHYSICIAN ASSISTANT

## 2017-09-28 ASSESSMENT — PAIN SCALES - GENERAL: PAINLEVEL: MODERATE PAIN (5)

## 2017-09-28 NOTE — MR AVS SNAPSHOT
After Visit Summary   9/28/2017    Indra Mehta    MRN: [id number]           Patient Information     Date Of Birth          1965        Visit Information        Provider Department      9/28/2017 1:20 PM Erica Mitchell PA-C [facility]        Today's Diagnoses     Lumbar radiculopathy    -  1       Follow-ups after your visit        Your next 10 appointments already scheduled     Sep 28, 2017  2:00 PM CDT   XR CERVICAL SPINE 2/3 VIEWS with [id number]   [facility] ([facility])    [address] [place] [id number]              Please bring a list of your current medicines to your exam. (Include vitamins, minerals and over-thecounter medicines.) Leave your valuables at home.  Tell your doctor if there is a chance you may be pregnant.  You do not need to do anything special for this exam.              Future tests that were ordered for you today     Open Future Orders        Priority Expected Expires Ordered    XR Lumbar Epidural Injection Incl Imaging Routine 9/28/2017 9/28/2018 9/28/2017            Who to contact     If you have questions or need follow up information about today's clinic visit or your schedule please contact [facility] directly at [phone].  Normal or non-critical lab and imaging results will be communicated to you by MyChart, letter or phone within 4 business days after the clinic has received the results. If you do not hear from us within 7 days, please contact the clinic through [employer] or phone. If you have a critical or abnormal lab result, we will notify you by phone as soon as possible.  Submit refill requests through [employer] or call your pharmacy and they will forward the refill request to us. Please allow 3 business days for your refill to be completed.          Additional Information About Your Visit        [employer] Information     [employer] gives you secure access to your electronic health  "record. If you see a primary care provider, you can also send messages to your care team and make appointments. If you have questions, please call your primary care clinic.  If you do not have a primary care provider, please call [phone] and they will assist you.        Care EveryWhere ID     This is your Care EveryWhere ID. This could be used by other organizations to access your [facility] medical records  [id number]        Your Vitals Were     Temperature Height BMI (Body Mass Index)             97.3  F (36.3  C) (Temporal) 6' 2\" (1.88 m) 27.86 kg/m2          Blood Pressure from Last 3 Encounters:   09/21/17 124/84   08/18/17 (!) 138/92   08/11/17 134/82    Weight from Last 3 Encounters:   09/28/17 217 lb (98.4 kg)   09/21/17 211 lb (95.7 kg)   08/18/17 211 lb (95.7 kg)               Primary Care Provider Office Phone # Fax #    Tha Grullon [phone]       [address] [place] [address]        Equal Access to Services     [facility]: Hadii marva ku hadasho Somary, waaxda luqadaha, qaybta kaalmada nubia, mikala lainez. So [facility] [phone].    ATENCIÓN: Si habla español, tiene a lundy disposición servicios gratuitos de asistencia lingüística. [facility] [phone].    We comply with applicable federal civil rights laws and [place] laws. We do not discriminate on the basis of race, color, national origin, age, disability sex, sexual orientation or gender identity.            Thank you!     Thank you for choosing [facility]  for your care. Our goal is always to provide you with excellent care. Hearing back from our patients is one way we can continue to improve our services. Please take a few minutes to complete the written survey that you may receive in the mail after your visit with us. Thank you!             Your Updated Medication List - Protect others around you: Learn how to safely use, store and throw away your medicines at " www.disposemymeds.org.          This list is accurate as of: 9/28/17  1:46 PM.  Always use your most recent med list.                   Brand Name Dispense Instructions for use Diagnosis    cyclobenzaprine 10 MG tablet    FLEXERIL    60 tablet    Take 1 tablet (10 mg) by mouth 2 times daily as needed for muscle spasms Reported on 4/12/2017    Muscle spasm       gabapentin 300 MG capsule    NEURONTIN    300 capsule    Take 3 capsules (900 mg) by mouth 3 times daily If needed/tolerated, OK to increase bedtime dose to 1200mg    Cervicalgia       HYDROcodone-acetaminophen 5-325 MG per tablet    NORCO    120 tablet    Take 1 tablet by mouth every 6 hours as needed for other (Moderate to Severe Pain)    S/P cervical spinal fusion       losartan 50 MG tablet    COZAAR    90 tablet    Take 1 tablet (50 mg) by mouth daily    Benign essential hypertension       methocarbamol 750 MG tablet    ROBAXIN    90 tablet    Take 1 tablet (750 mg) by mouth 3 times daily as needed for muscle spasms    Cervical radiculopathy, Myofascial pain       order for DME     1 Device    Equipment being ordered: TENS Pads as directed    Chronic pain syndrome       tiZANidine 4 MG tablet    ZANAFLEX    60 tablet    Take 1 tablet (4 mg) by mouth 2 times daily as needed for muscle spasms    Muscle spasm       TYLENOL PO      Take 500 mg by mouth every 4 hours as needed for mild pain or fever

## 2017-09-28 NOTE — PROGRESS NOTES
[facility]  Neurosurgery followup:    HPI: 3 months s/p C6-7 ACDF. States he continues to have infrascapular pain, but states his radicular upper extremity pain has resolved. He has been seeing Dr. Angle Freeman for injections and pain management for his cervical region, which has been going well. Denies weakness.  He would also like to review his lumbar MRI today. Pain is located in bilateral low back and radiates into the bilateral hip joint. He has also noticed intermittent pain going to lateral leg leg to the knee. Describes the pain as an intermittent sharp shooting pain. Pain worsens with bending or standing/sitting for extended periods of time. Heat has helped relax the muscles but never fully relieves the pain. He has not tried SRIDEVI or PT for his low back pain recently. Denies bladder/bowel incontinence.    Exam:  Constitutional:  Alert, well nourished, NAD.  HEENT: Normocephalic, atraumatic.   Pulm:  Without shortness of breath   CV:  No pitting edema of BLE.     Neurological:  Awake  Alert  Oriented x 3  Motor exam:     Shoulder Abduction:  Right:  5/5    Left:  5/5  Biceps:                      Right:  5/5    Left:  5/5  Triceps:                     Right:  5/5    Left:  5/5  Wrist Extensors:       Right:  5/5    Left:  5/5  Wrist Flexors:           Right:  5/5    Left:  5/5  Intrinsics:                  Right:  5/5    Left:  5/5        IP Q DF PF EHL  R   5  5   5   5    5  L   5  5   5   5    5     Reflexes are 2+ in the patellar and Achilles. There is no clonus. Downgoing Babinski.    Reflexes are 2+ in the brachial radialis and triceps. Negative Alejandro sign bilaterally.    Able to spontaneously move L/E bilaterally  Sensation intact throughout all L/E dermatomes    Incision: Healing nicely.  Imaging: AP and lateral cervical films reveal stable hardware.  MR LUMBAR SPINE WITHOUT CONTRAST 9/5/2017 8:39 AM      HISTORY: Low back pain. Bilateral leg pain.     Comparison 9/17/2015     TECHNIQUE:  Sagittal T1 and STIR. Sagittal T2 and axial dual-echo T2.      FINDINGS: Five lumbar vertebrae are assumed. Right asymmetric degenerative disc disease at L1-2. Slight levoscoliosis.      Findings by specific level:     There is minimal to mild multilevel facet degenerative change.     There is disc bulging at L1-2, L2-3, L4-5. There is also a right  foraminal osteophyte-disc complex at L4-5. There is mild ligamentum flavum thickening at L2-3, L3-4.   These findings combine to result in central stenosis as follows:  Borderline L2-3. There is right foraminal stenosis as follows: Moderate L4-5, mild L2-3, mild L1-2.     All of the left lumbar neural foramina appear adequate.      IMPRESSION:  1. Mild multilevel degenerative disc and facet disease.  2. Again there is borderline central stenosis at L2-3 and moderate right foraminal stenosis at L4-5.       KAYA RIVAS MD  A/P: 3 months s/p C6-7 ACDF. Overall, he has been improving and his radicular symptoms have resolved. Advised to continue follow up with Dr. Angle Freeman for his continued localized neck pain.   MRI reviewed in detail with patient and discussed that he may benefit from PT and SRIDEVI. He would like to try SRIDEVI again, as he has had benefit from this in the past. I have placed a referral for this.     - May increase lifting restriction to 20 pounds until 3 months post-op.  - followup in 3 months with xray prior     - Call the clinic at [phone] for increased pain or any other questions and concerns.        Erica Mitchell PA-C  [facility]  [address] [address]    Tel [phone]  Pager [phone]

## 2017-09-28 NOTE — PROGRESS NOTES
"Indra Mehta is a 52 year old male who presents for:  Chief Complaint   Patient presents with     Surgical Followup     cervical fusion DOS: 7/5/17 & x-ray results.      Neurologic Problem     wants to discuss low back issues        Initial Vitals:  Temp 97.3  F (36.3  C) (Temporal)  Ht 6' 2\" (1.88 m)  Wt 217 lb (98.4 kg)  BMI 27.86 kg/m2 Estimated body mass index is 27.86 kg/(m^2) as calculated from the following:    Height as of this encounter: 6' 2\" (1.88 m).    Weight as of this encounter: 217 lb (98.4 kg).. Body surface area is 2.27 meters squared. BP completed using cuff size: NA (Not Taken)  Moderate Pain (5)    Do you feel safe in your environment?  Yes  Do you need any refills today? No    Nursing Comments:         Erum Stewart CMA    "

## 2017-10-03 ENCOUNTER — MYC MEDICAL ADVICE (OUTPATIENT)
Dept: PALLIATIVE MEDICINE | Facility: CLINIC | Age: 52
End: 2017-10-03

## 2017-10-03 DIAGNOSIS — G47.9 SLEEP DISORDER: Primary | ICD-10-CM

## 2017-10-04 RX ORDER — HYDROXYZINE PAMOATE 50 MG/1
50 CAPSULE ORAL
Qty: 30 CAPSULE | Refills: 1 | Status: SHIPPED | OUTPATIENT
Start: 2017-10-04 | End: 2017-10-16 | Stop reason: DRUGHIGH

## 2017-10-04 NOTE — TELEPHONE ENCOUNTER
Signed Prescriptions:                        Disp   Refills    hydrOXYzine (VISTARIL) 50 MG capsule       30 cap*1        Sig: Take 1 capsule (50 mg) by mouth nightly as needed           (for sleep)  Authorizing Provider: BONI NAYLOR    Reviewed, signed, and e-prescribed.    Boni Freeman MD  [facility]

## 2017-10-04 NOTE — TELEPHONE ENCOUNTER
My chart pt response:    I've been taking four during the day. Usually 6am, noon, 5pm, 9pm. However, the dose at 9pm wears off around 1am. I've had a number of nights where I have taken a fifth pill during the night so I could get back to sleep.     Sending to provider.     Katarina Licona RN, [facility]  Pain Clinic Care Coordinator

## 2017-10-04 NOTE — TELEPHONE ENCOUNTER
If sleep is the main issue, we should try something that would help with sleep rather than increasing pain medications which actually interrupt sleep.  Vistaril is a medication that helps with sleep and pain when used in combination with other medications.  I would recommend a trial of Vistaril 50 mg at bedtime and continue the pain medications four times a day with the plan to continue to wean the pain medications as you improve.    Boni Freeman MD  [facility]

## 2017-10-04 NOTE — TELEPHONE ENCOUNTER
"My chart message from patient:    I continue to have trouble with sleep.  I have been working on range of motion and stretching with my neck and lower back.  What I am experiencing is this... I take my Norco around 9 9:30 pm and by 1 am I am awake with pain.  I can maybe find a somewhat comfortable position and fall back to sleep for a short time but after the Norco wears off I wake up quite frequently. About half the days I have taken a Norco in the middle of the night to get some sleep.  This will result in running out early.  Is there anything that relieves pain like the Norco but lasts a little longer?     I am scheduling an injection for the lower back which will hopefully give some relief.    Indra    Can you please elaborate on what \"About half the days I have taken a Norco in the middle of the night to get some sleep.  This will result in running out early.\" means- Can you please give us an exact number of pills that you are using per day to manage your pain.     Katarina Licona RN, [facility]  Pain Clinic Care Coordinator   "

## 2017-10-04 NOTE — TELEPHONE ENCOUNTER
My chart response from pt:    [facility] in [place].    Prescription prepped for provider.     Katarina Licona RN, [facility]  Pain Clinic Care Coordinator

## 2017-10-04 NOTE — TELEPHONE ENCOUNTER
"Ketan    Here is Dr. Angle Freeman's response:    \" If sleep is the main issue, we should try something that would help with sleep rather than increasing pain medications which actually interrupt sleep.  Vistaril is a medication that helps with sleep and pain when used in combination with other medications.  I would recommend a trial of Vistaril 50 mg at bedtime and continue the pain medications four times a day with the plan to continue to wean the pain medications as you improve.\"    If you want to try that please let me know which pharmacy to send it to.     Katarina Licona RN, [facility]  Pain Clinic Care Coordinator  "

## 2017-10-07 ENCOUNTER — MYC MEDICAL ADVICE (OUTPATIENT)
Dept: PALLIATIVE MEDICINE | Facility: CLINIC | Age: 52
End: 2017-10-07

## 2017-10-07 DIAGNOSIS — G47.9 SLEEP DISORDER: Primary | ICD-10-CM

## 2017-10-09 NOTE — TELEPHONE ENCOUNTER
"My chart message from pt:    I used the new medication for the first time last night. I took it just before 7pm. I was still tired after 11am. I cannot take that medication during the week. I also woke up with more pain in my neck and lower back than normal. That may be from sleeping longer in the same position.   My injection is scheduled for Nov 9th. I'm not sure what to do until then.  4 Norco in 24 hours relieves my pain on a good day but it doesn't on a normal or bad day.    On 10/3/2017 pt asked for an increase in narcotics and it was denied (see note). Sending to provider to respond.     Ketan    There are multiple websites that offer solutions to chronic pain and insomnia. Here are some of the basic techniques and if you look them up you can find much more. I will send this to Dr. Angle Freeman as well.     \" Stop or limit caffeine consumption.   \" Limit alcohol intake, particularly in the evening.   \" Practice relaxation techniques, such as deep abdominal breathing.   \" Increasing light exposure during the day.  \" Taking breaks outside in sunlight if possible and open blinds and curtains during the day.  \" Limit artificial light at night. Some sleep experts suggest using a low watt light for the last hour before you try to sleep.  \" To boost melatonin production, use low-wattage bulbs, cover windows and electrical displays in your bedroom, avoid bright light and turn off television and computer screens at least one hour before bed.  \" Relax with a Warm Salt Bath  \" If you can't make your bedroom dark enough, try using an eye mask.    Katarina Licona RN, [facility]  Pain Clinic Care Coordinator   "

## 2017-10-09 NOTE — TELEPHONE ENCOUNTER
My chart response from patient:    Thank you. I've been dealing with this pain and resulting insomnia for a long time. I've seen these recommendations and have tried them all and continue to follow some of them.    Awaiting providers response.     Katarina Licona RN, [facility]  Pain Clinic Care Coordinator

## 2017-10-10 DIAGNOSIS — M54.2 CERVICALGIA: ICD-10-CM

## 2017-10-10 RX ORDER — GABAPENTIN 300 MG/1
900 CAPSULE ORAL 3 TIMES DAILY
Qty: 300 CAPSULE | Refills: 3 | Status: SHIPPED | OUTPATIENT
Start: 2017-10-10 | End: 2018-01-31

## 2017-10-10 NOTE — TELEPHONE ENCOUNTER
Sounds like the Vistaril helped him to sleep.  Have the patient take half a tablet at bedtime instead of a whole one to see if he gets rest without the morning drowsiness.  Pain medications are not the answer and they are probably contributing to his pain and problems with sleep due to disruption of his bodies own ability to produce endorphins.    Boni Freeman MD  [facility]

## 2017-10-10 NOTE — TELEPHONE ENCOUNTER
Signed Prescriptions:                        Disp   Refills    gabapentin (NEURONTIN) 300 MG capsule      300 ca*3        Sig: Take 3 capsules (900 mg) by mouth 3 times daily If           needed/tolerated, OK to increase bedtime dose to           1200mg  Authorizing Provider: BONI NAYLOR    Reviewed, signed, e-prescribed.    Boni Freeman MD  [facility]

## 2017-10-10 NOTE — TELEPHONE ENCOUNTER
Received fax request from [facility]  pharmacy requesting refill(s) for gabapentin (NEURONTIN) 300 MG capsule    Last refilled on 9/19/17    Pt last seen on 9/21/17  Next appt scheduled for none    Josiane Riddle MA  [facility]      Will facilitate refill.

## 2017-10-10 NOTE — TELEPHONE ENCOUNTER
"Hi Ketan    Here is what Dr. Angle Freeman wrote back:    \"Sounds like the Vistaril helped him to sleep.  Have the patient take half a tablet at bedtime instead of a whole one to see if he gets rest without the morning drowsiness.  Pain medications are not the answer and they are probably contributing to his pain and problems with sleep due to disruption of his bodies own ability to produce endorphins.\"    Hopefully your injection will come soon.     Katarina Licona RN, [facility]  Pain Clinic Care Coordinator   "

## 2017-10-11 ENCOUNTER — TELEPHONE (OUTPATIENT)
Dept: PALLIATIVE MEDICINE | Facility: CLINIC | Age: 52
End: 2017-10-11

## 2017-10-11 ENCOUNTER — MYC MEDICAL ADVICE (OUTPATIENT)
Dept: PALLIATIVE MEDICINE | Facility: CLINIC | Age: 52
End: 2017-10-11

## 2017-10-11 DIAGNOSIS — Z98.1 S/P CERVICAL SPINAL FUSION: ICD-10-CM

## 2017-10-11 RX ORDER — HYDROXYZINE PAMOATE 25 MG/1
25 CAPSULE ORAL AT BEDTIME
Qty: 30 CAPSULE | Refills: 1 | Status: SHIPPED | OUTPATIENT
Start: 2017-10-11 | End: 2018-03-15

## 2017-10-11 NOTE — TELEPHONE ENCOUNTER
Per norco med notes:  Medication Notes           SOLOMON GRANTblaise Sep 21, 2017  4:24 PM  Patient filling the 9/21/2017 script in [facility] pharmacy. SS              Due date is 10/21/17.    Hx of med compliance:      5/22/17 telephone encounter: tramadol:  Took 5 tabs/day instead of 4 tabs/day and wanted an early fill.    12/2/17 telephone encounter: overuse again of his tramadol.     Mychart sent to pt:  Noble Burrell.  You will need to ration out your remaining norco tabs to make them last until the due date of 10/21/17.    Overuse w/out our consulting has happened on 2 other occasions w/ your tramadol.  This is a breach of your opioid contract.  Never take more than prescribed.  In the future contact us so we can give you direction on what to do.    We will process your refill and let you know when it is ready.    Received call from patient requesting refill(s) of Norco     Last picked up from pharmacy on 9/21/17  Due date 10/21/17    Pt last seen on 9/21/17  Next appt scheduled for none    Last urine drug screen 10/16/2016.   Current opioid agreement on file Yes Date: 6/2017    Processing (pick one): TBD.  Urine drug screen.  Urine drug screen may be needed.     Heidy Gipson RN-BSN  [facility]

## 2017-10-11 NOTE — TELEPHONE ENCOUNTER
Signed Prescriptions:                        Disp   Refills    hydrOXYzine (VISTARIL) 25 MG capsule       30 cap*1        Sig: Take 1 capsule (25 mg) by mouth At Bedtime  Authorizing Provider: BONI NAYLOR    I prescribed the 25 mg hydroxyzine since the 50 mg makes him too groggy.  This went to [facility] in [place]. (e-prescribed)    Boni Freeman MD  [facility]

## 2017-10-11 NOTE — TELEPHONE ENCOUNTER
Per patient Will message:  From: Indra Mehta      Created: 10/11/2017 10:44 AM      I left a request for a refill of the [place] on the nurse line.  In previous messages I told you I had taken a fifth pill on a number of days.  I will run out of the [place] on probably the 18th.  I know they are supposed to last me the entire month but I took them to relieve the pain I was in.  I have not taken over four since we talked about this.,  If possible I would like the refill for the 18th.

## 2017-10-11 NOTE — TELEPHONE ENCOUNTER
Per patient myChart message:  From: Indra Mehta      Created: 10/10/2017 5:57 PM      The vistaril is a capsule      See today's mychart encounter for more information on the norco fill.    Routed to Dr. Angle Freeman.    Heidy Gipson RN-BSN  [facility]

## 2017-10-11 NOTE — TELEPHONE ENCOUNTER
Pt SHARYN at 0831 --    Reason for call:  Medication   If this is a refill request, has the caller requested the refill from the pharmacy already? N/A  Will the patient be using a [facility]? No  Name of the pharmacy and phone number for the current request: [id number] - [address]    Name of the medication requested: HYDROcodone-acetaminophen (NORCO) 5-325 MG per tablet    Other request: Please send to [facility] in [place]      Phone number to reach patient:  Home number on file [phone] (home)    Shanda Dai    [facility]

## 2017-10-12 ENCOUNTER — OFFICE VISIT (OUTPATIENT)
Dept: SURGERY | Facility: CLINIC | Age: 52
End: 2017-10-12
Payer: COMMERCIAL

## 2017-10-12 VITALS — BODY MASS INDEX: 27.86 KG/M2 | TEMPERATURE: 97.4 F | OXYGEN SATURATION: 99 % | WEIGHT: 217 LBS | HEART RATE: 86 BPM

## 2017-10-12 DIAGNOSIS — R10.33 PERIUMBILICAL PAIN: Primary | ICD-10-CM

## 2017-10-12 DIAGNOSIS — Z98.890 POST-OPERATIVE STATE: ICD-10-CM

## 2017-10-12 PROCEDURE — 99024 POSTOP FOLLOW-UP VISIT: CPT | Performed by: SPECIALIST

## 2017-10-12 ASSESSMENT — PAIN SCALES - GENERAL: PAINLEVEL: MILD PAIN (2)

## 2017-10-12 NOTE — MR AVS SNAPSHOT
After Visit Summary   10/12/2017    Indra Mehta    MRN: [id number]           Patient Information     Date Of Birth          1965        Visit Information        Provider Department      10/12/2017 7:45 AM Boni Story MD [facility]        Today's Diagnoses     Periumbilical pain    -  1    Post-operative state           Follow-ups after your visit        Your next 10 appointments already scheduled     Nov 06, 2017  8:40 AM CST   Pre-Op physical with Tha [facility]ullon MD   [facility]    [address]  [facility] [id number] [phone]            Nov 09, 2017   Procedure with Darryn Mcdaniel MD   [facility] ([facility])    [address] [place] [id number] [phone]           From [address]: Exit at [address] on south side of [place]. Turn right on [address]. Turn left at stoplight on [address]. [facility] will be in view two blocks ahead              Who to contact     If you have questions or need follow up information about today's clinic visit or your schedule please contact [facility] directly at [phone].  Normal or non-critical lab and imaging results will be communicated to you by GRIDhart, letter or phone within 4 business days after the clinic has received the results. If you do not hear from us within 7 days, please contact the clinic through GRIDhart or phone. If you have a critical or abnormal lab result, we will notify you by phone as soon as possible.  Submit refill requests through [employer] or call your pharmacy and they will forward the refill request to us. Please allow 3 business days for your refill to be completed.          Additional Information About Your Visit        GRIDharEmbly Information     [employer] gives you secure access to your electronic health record. If you see a primary care provider, you can also send  messages to your care team and make appointments. If you have questions, please call your primary care clinic.  If you do not have a primary care provider, please call [phone] and they will assist you.        Care EveryWhere ID     This is your Care EveryWhere ID. This could be used by other organizations to access your [facility] medical records  [id number]        Your Vitals Were     Pulse Temperature Pulse Oximetry BMI (Body Mass Index)          86 97.4  F (36.3  C) (Temporal) 99% 27.86 kg/m2         Blood Pressure from Last 3 Encounters:   09/21/17 124/84   08/18/17 (!) 138/92   08/11/17 134/82    Weight from Last 3 Encounters:   10/12/17 217 lb (98.4 kg)   09/28/17 217 lb (98.4 kg)   09/21/17 211 lb (95.7 kg)              Today, you had the following     No orders found for display       Primary Care Provider Office Phone # Fax #    Tha Grullon [phone]       [address] [place] [address]        Equal Access to Services     [facility]: Hadii marva joseph hadasho Somary, waaxda luqadaha, qaybta kaalmada nubia, mikala carmichael . So [facility] [phone].    ATENCIÓN: Si habla español, tiene a lundy disposición servicios gratuitos de asistencia lingüística. Llame al [phone].    We comply with applicable federal civil rights laws and [place] laws. We do not discriminate on the basis of race, color, national origin, age, disability, sex, sexual orientation, or gender identity.            Thank you!     Thank you for choosing [facility]  for your care. Our goal is always to provide you with excellent care. Hearing back from our patients is one way we can continue to improve our services. Please take a few minutes to complete the written survey that you may receive in the mail after your visit with us. Thank you!             Your Updated Medication List - Protect others around you: Learn how to safely use, store and throw away your medicines at  www.disposemymeds.org.          This list is accurate as of: 10/12/17  8:05 AM.  Always use your most recent med list.                   Brand Name Dispense Instructions for use Diagnosis    cyclobenzaprine 10 MG tablet    FLEXERIL    60 tablet    Take 1 tablet (10 mg) by mouth 2 times daily as needed for muscle spasms Reported on 4/12/2017    Muscle spasm       gabapentin 300 MG capsule    NEURONTIN    300 capsule    Take 3 capsules (900 mg) by mouth 3 times daily If needed/tolerated, OK to increase bedtime dose to 1200mg    Cervicalgia       HYDROcodone-acetaminophen 5-325 MG per tablet    NORCO    120 tablet    Take 1 tablet by mouth every 6 hours as needed for other (Moderate to Severe Pain)    S/P cervical spinal fusion       * hydrOXYzine 50 MG capsule    VISTARIL    30 capsule    Take 1 capsule (50 mg) by mouth nightly as needed (for sleep)    Sleep disorder       * hydrOXYzine 25 MG capsule    VISTARIL    30 capsule    Take 1 capsule (25 mg) by mouth At Bedtime    Sleep disorder       losartan 50 MG tablet    COZAAR    90 tablet    Take 1 tablet (50 mg) by mouth daily    Benign essential hypertension       methocarbamol 750 MG tablet    ROBAXIN    90 tablet    Take 1 tablet (750 mg) by mouth 3 times daily as needed for muscle spasms    Cervical radiculopathy, Myofascial pain       order for DME     1 Device    Equipment being ordered: TENS Pads as directed    Chronic pain syndrome       tiZANidine 4 MG tablet    ZANAFLEX    60 tablet    Take 1 tablet (4 mg) by mouth 2 times daily as needed for muscle spasms    Muscle spasm       TYLENOL PO      Take 500 mg by mouth every 4 hours as needed for mild pain or fever        * Notice:  This list has 2 medication(s) that are the same as other medications prescribed for you. Read the directions carefully, and ask your doctor or other care provider to review them with you.

## 2017-10-12 NOTE — PROGRESS NOTES
"F/U for open umbilical hernia repair    Subjective:  Pt is 2 months s/p an open umbilical hernia repair 2 months ago. Was stretching yesterday and felt a \"pop\" 1/2 between his xyphoid and umbilicus and became concerned about it.  No bulges, nausea, vomiting, fever or chills.    Objective:  B/P: Data Unavailable, T: 97.4, P: 86, R: Data Unavailable  Abd: Soft, non tender, non distended, no hernias  Hernia repair intact      Assessment/Plan:  Pt is s/p open umbilical hernnia repair 2 months ago.  Pt felt a pop last night - no signs of a recurrent hernia.  Will observe for now.  F/U PRN.    Boni Story MD, FACS  "

## 2017-10-12 NOTE — NURSING NOTE
"Chief Complaint   Patient presents with     Pain     abd. pain, was stretching yesterday and felt something pop in previous hernia repair site.       Initial Pulse 86  Temp 97.4  F (36.3  C) (Temporal)  Wt 217 lb (98.4 kg)  SpO2 99%  BMI 27.86 kg/m2 Estimated body mass index is 27.86 kg/(m^2) as calculated from the following:    Height as of 9/28/17: 6' 2\" (1.88 m).    Weight as of this encounter: 217 lb (98.4 kg).  Medication Reconciliation: complete    "

## 2017-10-13 RX ORDER — HYDROCODONE BITARTRATE AND ACETAMINOPHEN 5; 325 MG/1; MG/1
1 TABLET ORAL EVERY 6 HOURS PRN
Qty: 120 TABLET | Refills: 0 | Status: SHIPPED | OUTPATIENT
Start: 2017-10-13 | End: 2017-11-14

## 2017-10-13 NOTE — TELEPHONE ENCOUNTER
Signed Prescriptions:                        Disp   Refills    HYDROcodone-acetaminophen (NORCO) 5-325 MG*120 ta*0        Sig: Take 1 tablet by mouth every 6 hours as needed for           other (Moderate to Severe Pain)  Authorizing Provider: BONI NAYOLR    Reviewed, printed, signed in [place].  Placed in top drawer of med cart for processing.  Agree with nursing instructions to ration remaining and stay on track with medications.    Boni Freeman MD  [facility]

## 2017-10-13 NOTE — TELEPHONE ENCOUNTER
Left voice message. Script was signed and kept in TPI cart. Patient is due for UDS. Left voice message for patient to schedule a nurse visit at the [facility] with us and obtain his prescription.       Lillian De La Cruz MA

## 2017-10-20 ENCOUNTER — ALLIED HEALTH/NURSE VISIT (OUTPATIENT)
Dept: PALLIATIVE MEDICINE | Facility: CLINIC | Age: 52
End: 2017-10-20

## 2017-10-20 DIAGNOSIS — Z79.891 ENCOUNTER FOR LONG-TERM OPIATE ANALGESIC USE: ICD-10-CM

## 2017-10-20 DIAGNOSIS — Z79.891 ENCOUNTER FOR LONG-TERM OPIATE ANALGESIC USE: Primary | ICD-10-CM

## 2017-10-20 PROCEDURE — 99000 SPECIMEN HANDLING OFFICE-LAB: CPT | Performed by: NURSE PRACTITIONER

## 2017-10-20 PROCEDURE — 80307 DRUG TEST PRSMV CHEM ANLYZR: CPT | Mod: 90 | Performed by: NURSE PRACTITIONER

## 2017-10-20 NOTE — TELEPHONE ENCOUNTER
Took signed script from [facility]PI cart and given to a patient at his Nurse visit. Urine sample was collected. Closing encounter.         Lillian De La Cruz MA

## 2017-10-20 NOTE — MR AVS SNAPSHOT
After Visit Summary   10/20/2017    Indra Mehta    MRN: [id number]           Patient Information     Date Of Birth          1965        Visit Information        Provider Department      10/20/2017 3:00 PM Nurse,  [facility]        Today's Diagnoses     Encounter for long-term opiate analgesic use    -  1       Follow-ups after your visit        Your next 10 appointments already scheduled     Nov 06, 2017  1:50 PM CST   Pre-Op physical with Tha Grullon MD   [facility] ([facility]    [address]  [facility] [id number] [phone]            Nov 09, 2017   Procedure with Darryn Mcdaniel MD   [facility] ([facility])    [address] [place] [address] [phone]           From [address]: Exit at [address] on south side of [place]. Turn right on [address]. Turn left at stoplight on [address]. [facility] will be in view two blocks ahead              Future tests that were ordered for you today     Open Future Orders        Priority Expected Expires Ordered    Drug Screen Comprehensive , Urine with Reported Meds (Pain Care Package) Routine  10/20/2018 10/20/2017            Who to contact     If you have questions or need follow up information about today's clinic visit or your schedule please contact [facility] directly at [phone].  Normal or non-critical lab and imaging results will be communicated to you by MyChart, letter or phone within 4 business days after the clinic has received the results. If you do not hear from us within 7 days, please contact the clinic through MyChart or phone. If you have a critical or abnormal lab result, we will notify you by phone as soon as possible.  Submit refill requests through [employer] or call your pharmacy and they will forward the refill request to us. Please allow 3 business days for your refill to be  completed.          Additional Information About Your Visit        Connect[employer] Information     The Glassbox gives you secure access to your electronic health record. If you see a primary care provider, you can also send messages to your care team and make appointments. If you have questions, please call your primary care clinic.  If you do not have a primary care provider, please call [phone] and they will assist you.        Care EveryWhere ID     This is your Care EveryWhere ID. This could be used by other organizations to access your [facility] medical records  [id number]         Blood Pressure from Last 3 Encounters:   09/21/17 124/84   08/18/17 (!) 138/92   08/11/17 134/82    Weight from Last 3 Encounters:   10/12/17 98.4 kg (217 lb)   09/28/17 98.4 kg (217 lb)   09/21/17 95.7 kg (211 lb)               Primary Care Provider Office Phone # Fax #    Tha Grullon [phone]       [address] [place] [address]        Equal Access to Services     SUSY ARSHAD : Hadii aad ku hadasho Soomaali, waaxda luqadaha, qaybta kaalmada adeegyada, waxay idiin haymarion miguelito carmichael . So [facility] [phone].    ATENCIÓN: Si habla español, tiene a lundy disposición servicios gratuitos de asistencia lingüística. [facility] [phone].    We comply with applicable federal civil rights laws and [place] laws. We do not discriminate on the basis of race, color, national origin, age, disability, sex, sexual orientation, or gender identity.            Thank you!     Thank you for choosing [facility] ([facility]3]  for your care. Our goal is always to provide you with excellent care. Hearing back from our patients is one way we can continue to improve our services. Please take a few minutes to complete the written survey that you may receive in the mail after your visit with us. Thank you!             Your Updated Medication List - Protect others around you: Learn how to safely use, store and throw away your medicines  at www.disposemymeds.org.          This list is accurate as of: 10/20/17  3:31 PM.  Always use your most recent med list.                   Brand Name Dispense Instructions for use Diagnosis    cyclobenzaprine 10 MG tablet    FLEXERIL    60 tablet    Take 1 tablet (10 mg) by mouth 2 times daily as needed for muscle spasms Reported on 4/12/2017    Muscle spasm       gabapentin 300 MG capsule    NEURONTIN    300 capsule    Take 3 capsules (900 mg) by mouth 3 times daily If needed/tolerated, OK to increase bedtime dose to 1200mg    Cervicalgia       HYDROcodone-acetaminophen 5-325 MG per tablet    NORCO    120 tablet    Take 1 tablet by mouth every 6 hours as needed for other (Moderate to Severe Pain)    S/P cervical spinal fusion       hydrOXYzine 25 MG capsule    VISTARIL    30 capsule    Take 1 capsule (25 mg) by mouth At Bedtime    Sleep disorder       losartan 50 MG tablet    COZAAR    90 tablet    Take 1 tablet (50 mg) by mouth daily    Benign essential hypertension       methocarbamol 750 MG tablet    ROBAXIN    90 tablet    Take 1 tablet (750 mg) by mouth 3 times daily as needed for muscle spasms    Cervical radiculopathy, Myofascial pain       order for DME     1 Device    Equipment being ordered: TENS Pads as directed    Chronic pain syndrome       tiZANidine 4 MG tablet    ZANAFLEX    60 tablet    Take 1 tablet (4 mg) by mouth 2 times daily as needed for muscle spasms    Muscle spasm       TYLENOL PO      Take 500 mg by mouth every 4 hours as needed for mild pain or fever

## 2017-10-23 DIAGNOSIS — M79.18 MYOFASCIAL PAIN: ICD-10-CM

## 2017-10-23 DIAGNOSIS — M54.12 CERVICAL RADICULOPATHY: ICD-10-CM

## 2017-10-23 RX ORDER — METHOCARBAMOL 750 MG/1
750 TABLET, FILM COATED ORAL 3 TIMES DAILY PRN
Qty: 90 TABLET | Refills: 3 | Status: SHIPPED | OUTPATIENT
Start: 2017-10-23 | End: 2018-04-30

## 2017-10-23 NOTE — TELEPHONE ENCOUNTER
Received fax request from [facility] pharmacy requesting refill(s) for Methocarbamol 750mg Tab    Last refilled on 08/09/2017    Pt last seen on 09/21/2017  Next appt scheduled for none.    Prescription last filled by Kareem Ayon PA-C on 7/5/2017    Will facilitate refill.

## 2017-10-23 NOTE — TELEPHONE ENCOUNTER
Signed Prescriptions:                        Disp   Refills    methocarbamol (ROBAXIN) 750 MG tablet      90 tab*3        Sig: Take 1 tablet (750 mg) by mouth 3 times daily as           needed for muscle spasms  Authorizing Provider: BONI NAYLOR    Reviewed, signed, e-prescribed.    Boni Freeman MD  [facility]

## 2017-10-26 LAB — PAIN DRUG SCR UR W RPTD MEDS: NORMAL

## 2017-11-06 ENCOUNTER — OFFICE VISIT (OUTPATIENT)
Dept: FAMILY MEDICINE | Facility: OTHER | Age: 52
End: 2017-11-06
Payer: COMMERCIAL

## 2017-11-06 VITALS
BODY MASS INDEX: 27.48 KG/M2 | WEIGHT: 214 LBS | OXYGEN SATURATION: 97 % | TEMPERATURE: 97.8 F | HEART RATE: 87 BPM | RESPIRATION RATE: 16 BRPM | SYSTOLIC BLOOD PRESSURE: 114 MMHG | DIASTOLIC BLOOD PRESSURE: 68 MMHG

## 2017-11-06 DIAGNOSIS — Z01.818 PREOP GENERAL PHYSICAL EXAM: Primary | ICD-10-CM

## 2017-11-06 DIAGNOSIS — M54.16 LUMBAR RADICULOPATHY: ICD-10-CM

## 2017-11-06 PROCEDURE — 99214 OFFICE O/P EST MOD 30 MIN: CPT | Performed by: FAMILY MEDICINE

## 2017-11-06 ASSESSMENT — PAIN SCALES - GENERAL: PAINLEVEL: MODERATE PAIN (4)

## 2017-11-06 ASSESSMENT — ANXIETY QUESTIONNAIRES
5. BEING SO RESTLESS THAT IT IS HARD TO SIT STILL: NOT AT ALL
7. FEELING AFRAID AS IF SOMETHING AWFUL MIGHT HAPPEN: NOT AT ALL
IF YOU CHECKED OFF ANY PROBLEMS ON THIS QUESTIONNAIRE, HOW DIFFICULT HAVE THESE PROBLEMS MADE IT FOR YOU TO DO YOUR WORK, TAKE CARE OF THINGS AT HOME, OR GET ALONG WITH OTHER PEOPLE: NOT DIFFICULT AT ALL
2. NOT BEING ABLE TO STOP OR CONTROL WORRYING: NOT AT ALL
1. FEELING NERVOUS, ANXIOUS, OR ON EDGE: SEVERAL DAYS
3. WORRYING TOO MUCH ABOUT DIFFERENT THINGS: NOT AT ALL
6. BECOMING EASILY ANNOYED OR IRRITABLE: SEVERAL DAYS
GAD7 TOTAL SCORE: 3

## 2017-11-06 ASSESSMENT — PATIENT HEALTH QUESTIONNAIRE - PHQ9: 5. POOR APPETITE OR OVEREATING: SEVERAL DAYS

## 2017-11-06 NOTE — PROGRESS NOTES
[facility]  [address] [facility] [address]  [phone]  Dept: [phone]    PRE-OP EVALUATION:  Today's date: 2017    Indra Mehta (: 1965) presents for pre-operative evaluation assessment as requested by Dr. Mcdaniel.  He requires evaluation and anesthesia risk assessment prior to undergoing surgery/procedure for treatment of Lumbar radiculopathy .  Proposed procedure: Lumbar epidural steroid injection    Date of Surgery/ Procedure: 2017  Time of Surgery/ Procedure: 09:00am  Hospital/Surgical Facility: [facility]  Primary Physician: Tha Grullon  Type of Anesthesia Anticipated: Monitor Anesthesia Care    Patient has a Health Care Directive or Living Will:  NO    Preop Questions 11/3/2017   1.  Do you have a history of heart attack, stroke, stent, bypass or surgery on an artery in the head, neck, heart or legs? No   2.  Do you ever have any pain or discomfort in your chest? No   3.  Do you have a history of  Heart Failure? No   4.   Are you troubled by shortness of breath when:  walking on a level surface, or up a slight hill, or at night? No   5.  Do you currently have a cold, bronchitis or other respiratory infection? No   6.  Do you have a cough, shortness of breath, or wheezing? No   7.  Do you sometimes get pains in the calves of your legs when you walk? YES - When lower back is hurting more   8. Do you or anyone in your family have previous history of blood clots? No   9.  Do you or does anyone in your family have a serious bleeding problem such as prolonged bleeding following surgeries or cuts? No   10. Have you ever had problems with anemia or been told to take iron pills? No   11. Have you had any abnormal blood loss such as black, tarry or bloody stools? No   12. Have you ever had a blood transfusion? No   13. Have you or any of your relatives ever had problems with anesthesia? No   14. Do you have sleep apnea, excessive snoring or daytime drowsiness? No    15. Do you have any prosthetic heart valves? No   16. Do you have prosthetic joints? No           HPI:                                                      Brief HPI related to upcoming procedure:   Indra Mehta is a 52 year old male who presents with chronic lumbar disc disease with central stenosis at L2-3 and L4-5 to repeat SRIDEVI.      See problem list for active medical problems.  Problems all longstanding and stable, except as noted/documented.  See ROS for pertinent symptoms related to these conditions.                                                                                                  .    MEDICAL HISTORY:                                                    Patient Active Problem List    Diagnosis Date Noted     Benign essential hypertension 06/30/2017     Priority: Medium     Myalgia 10/28/2016     Priority: Medium     Bilateral occipital neuralgia 10/28/2016     Priority: Medium     CARDIOVASCULAR SCREENING; LDL GOAL LESS THAN 160 10/31/2010     Priority: Medium     Major depression in complete remission (H) 08/20/2010     Priority: Medium     Chronic pain syndrome 08/20/2010     Priority: Medium     Patient is followed by Tha Grullon MD for ongoing prescription of pain medication.  All refills should be approved by this provider only at face-to-face appointments - not by phone request.    Medication(s): Tramadol.   Maximum quantity per month: 60  Clinic visit frequency required: Q 1 months     Controlled substance agreement:  Encounter-Level CSA - 10/13/16:               Controlled Substance Agreement - Scan on 10/23/2016  5:07 PM : CONTROLLED SUBSTANCE AGREEMENT 10/13/16 (below)            Pain Clinic evaluation in the past: Yes       Date/Location:   [facility]    DIRE Total Score(s):  No flowsheet data found.    Last [facility] website verification:  none   [email or web address]               Pain medication agreement signed 08/20/2010     Priority: Medium     Meniere's  disease 03/22/2007     Priority: Medium     Problem list name updated by automated process. Provider to review        Past Medical History:   Diagnosis Date     Back pain      Meniere's disease, unspecified      Pain medication agreement signed 8/20/2010     Past Surgical History:   Procedure Laterality Date     BUNIONECTOMY RT/LT  09/05/08    Both feet     COLONOSCOPY N/A 12/28/2016    Procedure: COMBINED COLONOSCOPY, SINGLE OR MULTIPLE BIOPSY/POLYPECTOMY BY BIOPSY;  Surgeon: Indra Jernigan MD;  Location: [facility]     DISCECTOMY, FUSION CERVICAL ANTERIOR ONE LEVEL, COMBINED N/A 7/5/2017    Procedure: COMBINED DISCECTOMY, FUSION CERVICAL ANTERIOR ONE LEVEL;  Cervical 6-7, Anterior cervical discectomy fusion;  Surgeon: Mike Mckenna MD;  Location: [facility] OR     HC COLONOSCOPY W/WO BRUSH/WASH  12/27/2005     HC CREATE EARDRUM OPENING,GEN ANESTH  09/27/2007    Pe tube, Left endolymphatic sac enhancement.     HC MASTOIDECTOMY,COMPLETE  09/27/2007     HERNIORRHAPHY UMBILICAL N/A 8/11/2017    Procedure: HERNIORRHAPHY UMBILICAL;  open umbilical hernia repair;  Surgeon: Boni Story MD;  Location: [facility] OR     PE TUBES  2006    left ear     Current Outpatient Prescriptions   Medication Sig Dispense Refill     methocarbamol (ROBAXIN) 750 MG tablet Take 1 tablet (750 mg) by mouth 3 times daily as needed for muscle spasms 90 tablet 3     HYDROcodone-acetaminophen (NORCO) 5-325 MG per tablet Take 1 tablet by mouth every 6 hours as needed for other (Moderate to Severe Pain) 120 tablet 0     hydrOXYzine (VISTARIL) 25 MG capsule Take 1 capsule (25 mg) by mouth At Bedtime 30 capsule 1     gabapentin (NEURONTIN) 300 MG capsule Take 3 capsules (900 mg) by mouth 3 times daily If needed/tolerated, OK to increase bedtime dose to 1200mg 300 capsule 3     losartan (COZAAR) 50 MG tablet Take 1 tablet (50 mg) by mouth daily 90 tablet 1     cyclobenzaprine (FLEXERIL) 10 MG tablet Take 1 tablet (10 mg) by mouth 2 times daily as needed  for muscle spasms Reported on 4/12/2017 60 tablet 1     order for DME Equipment being ordered: TENS Pads as directed 1 Device 0     Acetaminophen (TYLENOL PO) Take 500 mg by mouth every 4 hours as needed for mild pain or fever       tiZANidine (ZANAFLEX) 4 MG tablet Take 1 tablet (4 mg) by mouth 2 times daily as needed for muscle spasms (Patient not taking: Reported on 11/6/2017) 60 tablet 1     OTC products: None, except as noted above    Allergies   Allergen Reactions     No Known Drug Allergies       Latex Allergy: NO    Social History   Substance Use Topics     Smoking status: Never Smoker     Smokeless tobacco: Never Used     Alcohol use No     History   Drug Use No       REVIEW OF SYSTEMS:                                                    C: NEGATIVE for fever, chills, change in weight  E/M: NEGATIVE for ear, mouth and throat problems  R: NEGATIVE for significant cough or SOB  CV: NEGATIVE for chest pain, palpitations or peripheral edema  ROS otherwise negative    EXAM:                                                    /68 (BP Location: Left arm, Patient Position: Chair, Cuff Size: Adult Large)  Pulse 87  Temp 97.8  F (36.6  C) (Temporal)  Resp 16  Wt 214 lb (97.1 kg)  SpO2 97%  BMI 27.48 kg/m2  GENERAL APPEARANCE: healthy, alert and no distress  HENT: ear canals and TM's normal and nose and mouth without ulcers or lesions  RESP: lungs clear to auscultation - no rales, rhonchi or wheezes  CV: regular rate and rhythm, normal S1 S2, no S3 or S4 and no murmur, click or rub   ABDOMEN: soft, nontender, no HSM or masses and bowel sounds normal  NEURO: Normal strength and tone, sensory exam grossly normal, mentation intact and speech normal    DIAGNOSTICS:                                                    No labs or EKG required for low risk surgery (cataract, skin procedure, breast biopsy, etc)    Recent Labs   Lab Test  06/30/17   1613  03/24/16   1900   HGB   --   15.7   PLT   --   259   NA  142   142   POTASSIUM  3.9  3.5   CR  1.15  1.06        IMPRESSION:                                                    Reason for surgery/procedure: Lumbar radiculopathy .  Proposed procedure: Lumbar epidural steroid injection      The proposed surgical procedure is considered LOW risk.    REVISED CARDIAC RISK INDEX  The patient has the following serious cardiovascular risks for perioperative complications such as (MI, PE, VFib and 3  AV Block):  No serious cardiac risks  INTERPRETATION: 0 risks: Class I (very low risk - 0.4% complication rate)    The patient has the following additional risks for perioperative complications:  No identified additional risks  High tolerance to opioid analgesics due to chronic pain syndrome.      ICD-10-CM    1. Preop general physical exam Z01.818    2. Lumbar radiculopathy M54.16        RECOMMENDATIONS:                                                        --Patient is to take all scheduled medications on the day of surgery EXCEPT for modifications listed below.    ACE Inhibitor or Angiotensin Receptor Blocker (ARB) Use  Ace inhibitor or Angiotensin Receptor Blocker (ARB) and should HOLD this medication for the 24 hours prior to surgery.        APPROVAL GIVEN to proceed with proposed procedure, without further diagnostic evaluation       Signed Electronically by: Tha Grullon MD    Copy of this evaluation report is provided to requesting physician.    [facility] Preop Guidelines

## 2017-11-06 NOTE — NURSING NOTE
"Chief Complaint   Patient presents with     Pre-Op Exam       Initial /68 (BP Location: Left arm, Patient Position: Chair, Cuff Size: Adult Large)  Pulse 87  Temp 97.8  F (36.6  C) (Temporal)  Resp 16  Wt 214 lb (97.1 kg)  SpO2 97%  BMI 27.48 kg/m2 Estimated body mass index is 27.48 kg/(m^2) as calculated from the following:    Height as of 9/28/17: 6' 2\" (1.88 m).    Weight as of this encounter: 214 lb (97.1 kg).  Medication Reconciliation: complete     Yoanna Vázquez MA 11/6/2017  2:00 PM          "

## 2017-11-06 NOTE — MR AVS SNAPSHOT
After Visit Summary   11/6/2017    Indra Mehta    MRN: [id number]           Patient Information     Date Of Birth          1965        Visit Information        Provider Department      11/6/2017 1:50 PM Tha Grullon MD [facility]        Today's Diagnoses     Preop general physical exam    -  1    Lumbar radiculopathy          Care Instructions      Before Your Surgery      Call your surgeon if there is any change in your health. This includes signs of a cold or flu (such as a sore throat, runny nose, cough, rash or fever).    Do not smoke, drink alcohol or take over the counter medicine (unless your surgeon or primary care doctor tells you to) for the 24 hours before and after surgery.    If you take prescribed drugs: Follow your doctor s orders about which medicines to take and which to stop until after surgery.    Eating and drinking prior to surgery: follow the instructions from your surgeon    Take a shower or bath the night before surgery. Use the soap your surgeon gave you to gently clean your skin. If you do not have soap from your surgeon, use your regular soap. Do not shave or scrub the surgery site.  Wear clean pajamas and have clean sheets on your bed.           Follow-ups after your visit        Your next 10 appointments already scheduled     Nov 09, 2017   Procedure with Darryn Mcdaniel MD   [facility] ([facility])    [address] [place] [address]  [place] [address] [phone]           From [address]: Exit at [address] on south side of [place]. Turn right on [address]. Turn left at stoplight on [address]. [facility] will be in view two blocks ahead            Nov 09, 2017  9:00 AM CST   XR FLUORO NEEDLE PLACEMENT SPINE with [id number]   [facility] ([facility])    [address] [id number] [phone]           For nerve root injection, please send or  bring copies of any MRIs or other scans you have had.  Bring a list of your current medicines to your exam. (Include vitamins, minerals and over-the-counter medicines.) Leave your valuables at home.  Plan to have someone drive you home afterward.  Stop taking the following medicines (but talk to your doctor first):   If you take blood thinners, you may need to stop taking them a few days before treatment. Talk to your doctor before stopping these medicines.Stop taking Coumadin (warfarin) 3 days before treatment. Restart the day after treatment.   If you take Plavix, Ticlid, Pletal or Persantine, please ask your doctor if you should stop these medicines. You may need extra tests on the morning of your scan.   If you take Xarelto (Rivaroxaban), you may need to stop taking it 24 hours before treatment. Talk to your doctor before stopping this medicine. Restart the day after treatment.  You may take your other medicines as normal.  Stop all food and drink (including water) 3 hours before your test or treatment.  Please tell the doctor:   If you are allergic to X-ray dye (contrast fluid).   If you may be pregnant.  Injections take about 30 to 45 minutes. Most people spend up to 2 hours in the clinic or hospital.  Please call the Imaging Department at your exam site with any questions.              Who to contact     If you have questions or need follow up information about today's clinic visit or your schedule please contact [facility] directly at [phone].  Normal or non-critical lab and imaging results will be communicated to you by MyChart, letter or phone within 4 business days after the clinic has received the results. If you do not hear from us within 7 days, please contact the clinic through [employer] or phone. If you have a critical or abnormal lab result, we will notify you by phone as soon as possible.  Submit refill requests through [employer] or call your pharmacy and they will forward the refill  request to us. Please allow 3 business days for your refill to be completed.          Additional Information About Your Visit        MyChart Information     [employer]hart gives you secure access to your electronic health record. If you see a primary care provider, you can also send messages to your care team and make appointments. If you have questions, please call your primary care clinic.  If you do not have a primary care provider, please call [phone] and they will assist you.        Care EveryWhere ID     This is your Care EveryWhere ID. This could be used by other organizations to access your [facility] medical records  [id number]        Your Vitals Were     Pulse Temperature Respirations Pulse Oximetry BMI (Body Mass Index)       87 97.8  F (36.6  C) (Temporal) 16 97% 27.48 kg/m2        Blood Pressure from Last 3 Encounters:   11/06/17 114/68   09/21/17 124/84   08/18/17 (!) 138/92    Weight from Last 3 Encounters:   11/06/17 214 lb (97.1 kg)   10/12/17 217 lb (98.4 kg)   09/28/17 217 lb (98.4 kg)              Today, you had the following     No orders found for display       Primary Care Provider Office Phone # Fax #    Tha Grullon [phone]       [address] [facility] [address]        Equal Access to Services     KELSEY ARSHAD : Hadii aad ku hadasho Soomaali, waaxda luqadaha, qaybta kaalmada adeegyada, mikala lainez. So [facility] [phone].    ATENCIÓN: Si habla español, tiene a lundy disposición servicios gratuitos de asistencia lingüística. ame al [phone].    We comply with applicable federal civil rights laws and [place] laws. We do not discriminate on the basis of race, color, national origin, age, disability, sex, sexual orientation, or gender identity.            Thank you!     Thank you for choosing [facility]  for your care. Our goal is always to provide you with excellent care. Hearing back from our patients is one way we can continue  to improve our services. Please take a few minutes to complete the written survey that you may receive in the mail after your visit with us. Thank you!             Your Updated Medication List - Protect others around you: Learn how to safely use, store and throw away your medicines at www.disposemymeds.org.          This list is accurate as of: 11/6/17  2:52 PM.  Always use your most recent med list.                   Brand Name Dispense Instructions for use Diagnosis    cyclobenzaprine 10 MG tablet    FLEXERIL    60 tablet    Take 1 tablet (10 mg) by mouth 2 times daily as needed for muscle spasms Reported on 4/12/2017    Muscle spasm       gabapentin 300 MG capsule    NEURONTIN    300 capsule    Take 3 capsules (900 mg) by mouth 3 times daily If needed/tolerated, OK to increase bedtime dose to 1200mg    Cervicalgia       HYDROcodone-acetaminophen 5-325 MG per tablet    NORCO    120 tablet    Take 1 tablet by mouth every 6 hours as needed for other (Moderate to Severe Pain)    S/P cervical spinal fusion       hydrOXYzine 25 MG capsule    VISTARIL    30 capsule    Take 1 capsule (25 mg) by mouth At Bedtime    Sleep disorder       losartan 50 MG tablet    COZAAR    90 tablet    Take 1 tablet (50 mg) by mouth daily    Benign essential hypertension       methocarbamol 750 MG tablet    ROBAXIN    90 tablet    Take 1 tablet (750 mg) by mouth 3 times daily as needed for muscle spasms    Cervical radiculopathy, Myofascial pain       order for DME     1 Device    Equipment being ordered: TENS Pads as directed    Chronic pain syndrome       tiZANidine 4 MG tablet    ZANAFLEX    60 tablet    Take 1 tablet (4 mg) by mouth 2 times daily as needed for muscle spasms    Muscle spasm       TYLENOL PO      Take 500 mg by mouth every 4 hours as needed for mild pain or fever

## 2017-11-07 ASSESSMENT — ANXIETY QUESTIONNAIRES: GAD7 TOTAL SCORE: 3

## 2017-11-08 ENCOUNTER — ANESTHESIA EVENT (OUTPATIENT)
Dept: SURGERY | Facility: CLINIC | Age: 52
End: 2017-11-08
Payer: COMMERCIAL

## 2017-11-08 NOTE — H&P (VIEW-ONLY)
[facility]  [address] [facility] [address]  [phone]  Dept: [phone]    PRE-OP EVALUATION:  Today's date: 2017    Indra Mehta (: 1965) presents for pre-operative evaluation assessment as requested by Dr. Mcdaniel.  He requires evaluation and anesthesia risk assessment prior to undergoing surgery/procedure for treatment of Lumbar radiculopathy .  Proposed procedure: Lumbar epidural steroid injection    Date of Surgery/ Procedure: 2017  Time of Surgery/ Procedure: 09:00am  Hospital/Surgical Facility: [facility]  Primary Physician: Tha Grullon  Type of Anesthesia Anticipated: Monitor Anesthesia Care    Patient has a Health Care Directive or Living Will:  NO    Preop Questions 11/3/2017   1.  Do you have a history of heart attack, stroke, stent, bypass or surgery on an artery in the head, neck, heart or legs? No   2.  Do you ever have any pain or discomfort in your chest? No   3.  Do you have a history of  Heart Failure? No   4.   Are you troubled by shortness of breath when:  walking on a level surface, or up a slight hill, or at night? No   5.  Do you currently have a cold, bronchitis or other respiratory infection? No   6.  Do you have a cough, shortness of breath, or wheezing? No   7.  Do you sometimes get pains in the calves of your legs when you walk? YES - When lower back is hurting more   8. Do you or anyone in your family have previous history of blood clots? No   9.  Do you or does anyone in your family have a serious bleeding problem such as prolonged bleeding following surgeries or cuts? No   10. Have you ever had problems with anemia or been told to take iron pills? No   11. Have you had any abnormal blood loss such as black, tarry or bloody stools? No   12. Have you ever had a blood transfusion? No   13. Have you or any of your relatives ever had problems with anesthesia? No   14. Do you have sleep apnea, excessive snoring or daytime drowsiness? No    15. Do you have any prosthetic heart valves? No   16. Do you have prosthetic joints? No           HPI:                                                      Brief HPI related to upcoming procedure:   Indra Mehta is a 52 year old male who presents with chronic lumbar disc disease with central stenosis at L2-3 and L4-5 to repeat SRIDEVI.      See problem list for active medical problems.  Problems all longstanding and stable, except as noted/documented.  See ROS for pertinent symptoms related to these conditions.                                                                                                  .    MEDICAL HISTORY:                                                    Patient Active Problem List    Diagnosis Date Noted     Benign essential hypertension 06/30/2017     Priority: Medium     Myalgia 10/28/2016     Priority: Medium     Bilateral occipital neuralgia 10/28/2016     Priority: Medium     CARDIOVASCULAR SCREENING; LDL GOAL LESS THAN 160 10/31/2010     Priority: Medium     Major depression in complete remission (H) 08/20/2010     Priority: Medium     Chronic pain syndrome 08/20/2010     Priority: Medium     Patient is followed by Tha Grullon MD for ongoing prescription of pain medication.  All refills should be approved by this provider only at face-to-face appointments - not by phone request.    Medication(s): Tramadol.   Maximum quantity per month: 60  Clinic visit frequency required: Q 1 months     Controlled substance agreement:  Encounter-Level CSA - 10/13/16:               Controlled Substance Agreement - Scan on 10/23/2016  5:07 PM : CONTROLLED SUBSTANCE AGREEMENT 10/13/16 (below)            Pain Clinic evaluation in the past: Yes       Date/Location:   [facility]    DIRE Total Score(s):  No flowsheet data found.    Last [facility] website verification:  none   [email or web address]               Pain medication agreement signed 08/20/2010     Priority: Medium     Meniere's  disease 03/22/2007     Priority: Medium     Problem list name updated by automated process. Provider to review        Past Medical History:   Diagnosis Date     Back pain      Meniere's disease, unspecified      Pain medication agreement signed 8/20/2010     Past Surgical History:   Procedure Laterality Date     BUNIONECTOMY RT/LT  09/05/08    Both feet     COLONOSCOPY N/A 12/28/2016    Procedure: COMBINED COLONOSCOPY, SINGLE OR MULTIPLE BIOPSY/POLYPECTOMY BY BIOPSY;  Surgeon: Indra Jernigan MD;  Location: [facility]     DISCECTOMY, FUSION CERVICAL ANTERIOR ONE LEVEL, COMBINED N/A 7/5/2017    Procedure: COMBINED DISCECTOMY, FUSION CERVICAL ANTERIOR ONE LEVEL;  Cervical 6-7, Anterior cervical discectomy fusion;  Surgeon: Mike Mckenna MD;  Location: [facility] OR     HC COLONOSCOPY W/WO BRUSH/WASH  12/27/2005     HC CREATE EARDRUM OPENING,GEN ANESTH  09/27/2007    Pe tube, Left endolymphatic sac enhancement.     HC MASTOIDECTOMY,COMPLETE  09/27/2007     HERNIORRHAPHY UMBILICAL N/A 8/11/2017    Procedure: HERNIORRHAPHY UMBILICAL;  open umbilical hernia repair;  Surgeon: Boni Story MD;  Location: [facility] OR     PE TUBES  2006    left ear     Current Outpatient Prescriptions   Medication Sig Dispense Refill     methocarbamol (ROBAXIN) 750 MG tablet Take 1 tablet (750 mg) by mouth 3 times daily as needed for muscle spasms 90 tablet 3     HYDROcodone-acetaminophen (NORCO) 5-325 MG per tablet Take 1 tablet by mouth every 6 hours as needed for other (Moderate to Severe Pain) 120 tablet 0     hydrOXYzine (VISTARIL) 25 MG capsule Take 1 capsule (25 mg) by mouth At Bedtime 30 capsule 1     gabapentin (NEURONTIN) 300 MG capsule Take 3 capsules (900 mg) by mouth 3 times daily If needed/tolerated, OK to increase bedtime dose to 1200mg 300 capsule 3     losartan (COZAAR) 50 MG tablet Take 1 tablet (50 mg) by mouth daily 90 tablet 1     cyclobenzaprine (FLEXERIL) 10 MG tablet Take 1 tablet (10 mg) by mouth 2 times daily as needed  for muscle spasms Reported on 4/12/2017 60 tablet 1     order for DME Equipment being ordered: TENS Pads as directed 1 Device 0     Acetaminophen (TYLENOL PO) Take 500 mg by mouth every 4 hours as needed for mild pain or fever       tiZANidine (ZANAFLEX) 4 MG tablet Take 1 tablet (4 mg) by mouth 2 times daily as needed for muscle spasms (Patient not taking: Reported on 11/6/2017) 60 tablet 1     OTC products: None, except as noted above    Allergies   Allergen Reactions     No Known Drug Allergies       Latex Allergy: NO    Social History   Substance Use Topics     Smoking status: Never Smoker     Smokeless tobacco: Never Used     Alcohol use No     History   Drug Use No       REVIEW OF SYSTEMS:                                                    C: NEGATIVE for fever, chills, change in weight  E/M: NEGATIVE for ear, mouth and throat problems  R: NEGATIVE for significant cough or SOB  CV: NEGATIVE for chest pain, palpitations or peripheral edema  ROS otherwise negative    EXAM:                                                    /68 (BP Location: Left arm, Patient Position: Chair, Cuff Size: Adult Large)  Pulse 87  Temp 97.8  F (36.6  C) (Temporal)  Resp 16  Wt 214 lb (97.1 kg)  SpO2 97%  BMI 27.48 kg/m2  GENERAL APPEARANCE: healthy, alert and no distress  HENT: ear canals and TM's normal and nose and mouth without ulcers or lesions  RESP: lungs clear to auscultation - no rales, rhonchi or wheezes  CV: regular rate and rhythm, normal S1 S2, no S3 or S4 and no murmur, click or rub   ABDOMEN: soft, nontender, no HSM or masses and bowel sounds normal  NEURO: Normal strength and tone, sensory exam grossly normal, mentation intact and speech normal    DIAGNOSTICS:                                                    No labs or EKG required for low risk surgery (cataract, skin procedure, breast biopsy, etc)    Recent Labs   Lab Test  06/30/17   1613  03/24/16   1900   HGB   --   15.7   PLT   --   259   NA  142   142   POTASSIUM  3.9  3.5   CR  1.15  1.06        IMPRESSION:                                                    Reason for surgery/procedure: Lumbar radiculopathy .  Proposed procedure: Lumbar epidural steroid injection      The proposed surgical procedure is considered LOW risk.    REVISED CARDIAC RISK INDEX  The patient has the following serious cardiovascular risks for perioperative complications such as (MI, PE, VFib and 3  AV Block):  No serious cardiac risks  INTERPRETATION: 0 risks: Class I (very low risk - 0.4% complication rate)    The patient has the following additional risks for perioperative complications:  No identified additional risks  High tolerance to opioid analgesics due to chronic pain syndrome.      ICD-10-CM    1. Preop general physical exam Z01.818    2. Lumbar radiculopathy M54.16        RECOMMENDATIONS:                                                        --Patient is to take all scheduled medications on the day of surgery EXCEPT for modifications listed below.    ACE Inhibitor or Angiotensin Receptor Blocker (ARB) Use  Ace inhibitor or Angiotensin Receptor Blocker (ARB) and should HOLD this medication for the 24 hours prior to surgery.        APPROVAL GIVEN to proceed with proposed procedure, without further diagnostic evaluation       Signed Electronically by: Tha Grullon MD    Copy of this evaluation report is provided to requesting physician.    [facility] Preop Guidelines

## 2017-11-09 ENCOUNTER — SURGERY (OUTPATIENT)
Age: 52
End: 2017-11-09

## 2017-11-09 ENCOUNTER — HOSPITAL ENCOUNTER (OUTPATIENT)
Dept: GENERAL RADIOLOGY | Facility: CLINIC | Age: 52
End: 2017-11-09
Attending: ANESTHESIOLOGY | Admitting: ANESTHESIOLOGY
Payer: COMMERCIAL

## 2017-11-09 ENCOUNTER — ANESTHESIA (OUTPATIENT)
Dept: SURGERY | Facility: CLINIC | Age: 52
End: 2017-11-09
Payer: COMMERCIAL

## 2017-11-09 ENCOUNTER — HOSPITAL ENCOUNTER (OUTPATIENT)
Facility: CLINIC | Age: 52
Discharge: HOME | End: 2017-11-09
Attending: ANESTHESIOLOGY | Admitting: ANESTHESIOLOGY
Payer: COMMERCIAL

## 2017-11-09 VITALS
DIASTOLIC BLOOD PRESSURE: 84 MMHG | SYSTOLIC BLOOD PRESSURE: 127 MMHG | OXYGEN SATURATION: 98 % | WEIGHT: 214 LBS | RESPIRATION RATE: 16 BRPM | HEIGHT: 73 IN | BODY MASS INDEX: 28.36 KG/M2 | TEMPERATURE: 97.7 F

## 2017-11-09 DIAGNOSIS — M62.838 MUSCLE SPASM: ICD-10-CM

## 2017-11-09 DIAGNOSIS — M54.16 LUMBAR RADICULOPATHY: ICD-10-CM

## 2017-11-09 PROCEDURE — 25000128 H RX IP 250 OP 636: Performed by: ANESTHESIOLOGY

## 2017-11-09 PROCEDURE — 40000277 XR FLUORO NEEDLE PLACEMENT SPINE (WITH PROCEDURE)

## 2017-11-09 PROCEDURE — 25000128 H RX IP 250 OP 636: Performed by: NURSE ANESTHETIST, CERTIFIED REGISTERED

## 2017-11-09 PROCEDURE — 64483 NJX AA&/STRD TFRM EPI L/S 1: CPT | Mod: 50 | Performed by: ANESTHESIOLOGY

## 2017-11-09 PROCEDURE — 37000008 ZZH ANESTHESIA TECHNICAL FEE, 1ST 30 MIN: Performed by: ANESTHESIOLOGY

## 2017-11-09 PROCEDURE — 62323 NJX INTERLAMINAR LMBR/SAC: CPT | Performed by: ANESTHESIOLOGY

## 2017-11-09 PROCEDURE — 25000125 ZZHC RX 250: Performed by: NURSE ANESTHETIST, CERTIFIED REGISTERED

## 2017-11-09 RX ORDER — NALOXONE HYDROCHLORIDE 0.4 MG/ML
.1-.4 INJECTION, SOLUTION INTRAMUSCULAR; INTRAVENOUS; SUBCUTANEOUS
Status: DISCONTINUED | OUTPATIENT
Start: 2017-11-09 | End: 2017-11-09 | Stop reason: HOSPADM

## 2017-11-09 RX ORDER — ONDANSETRON 2 MG/ML
4 INJECTION INTRAMUSCULAR; INTRAVENOUS EVERY 30 MIN PRN
Status: DISCONTINUED | OUTPATIENT
Start: 2017-11-09 | End: 2017-11-09 | Stop reason: HOSPADM

## 2017-11-09 RX ORDER — PROPOFOL 10 MG/ML
INJECTION, EMULSION INTRAVENOUS PRN
Status: DISCONTINUED | OUTPATIENT
Start: 2017-11-09 | End: 2017-11-09

## 2017-11-09 RX ORDER — SODIUM CHLORIDE, SODIUM LACTATE, POTASSIUM CHLORIDE, CALCIUM CHLORIDE 600; 310; 30; 20 MG/100ML; MG/100ML; MG/100ML; MG/100ML
INJECTION, SOLUTION INTRAVENOUS CONTINUOUS
Status: DISCONTINUED | OUTPATIENT
Start: 2017-11-09 | End: 2017-11-09 | Stop reason: HOSPADM

## 2017-11-09 RX ORDER — MEPERIDINE HYDROCHLORIDE 25 MG/ML
12.5 INJECTION INTRAMUSCULAR; INTRAVENOUS; SUBCUTANEOUS
Status: DISCONTINUED | OUTPATIENT
Start: 2017-11-09 | End: 2017-11-09 | Stop reason: HOSPADM

## 2017-11-09 RX ORDER — LIDOCAINE HYDROCHLORIDE 20 MG/ML
INJECTION, SOLUTION INFILTRATION; PERINEURAL PRN
Status: DISCONTINUED | OUTPATIENT
Start: 2017-11-09 | End: 2017-11-09

## 2017-11-09 RX ORDER — TRIAMCINOLONE ACETONIDE 40 MG/ML
INJECTION, SUSPENSION INTRA-ARTICULAR; INTRAMUSCULAR PRN
Status: DISCONTINUED | OUTPATIENT
Start: 2017-11-09 | End: 2017-11-09 | Stop reason: HOSPADM

## 2017-11-09 RX ORDER — ONDANSETRON 4 MG/1
4 TABLET, ORALLY DISINTEGRATING ORAL EVERY 30 MIN PRN
Status: DISCONTINUED | OUTPATIENT
Start: 2017-11-09 | End: 2017-11-09 | Stop reason: HOSPADM

## 2017-11-09 RX ORDER — IOPAMIDOL 612 MG/ML
INJECTION, SOLUTION INTRATHECAL PRN
Status: DISCONTINUED | OUTPATIENT
Start: 2017-11-09 | End: 2017-11-09 | Stop reason: HOSPADM

## 2017-11-09 RX ORDER — FENTANYL CITRATE 50 UG/ML
25-50 INJECTION, SOLUTION INTRAMUSCULAR; INTRAVENOUS
Status: DISCONTINUED | OUTPATIENT
Start: 2017-11-09 | End: 2017-11-09 | Stop reason: HOSPADM

## 2017-11-09 RX ADMIN — SODIUM CHLORIDE, POTASSIUM CHLORIDE, SODIUM LACTATE AND CALCIUM CHLORIDE: 600; 310; 30; 20 INJECTION, SOLUTION INTRAVENOUS at 08:50

## 2017-11-09 RX ADMIN — TRIAMCINOLONE ACETONIDE 40 MG: 40 INJECTION, SUSPENSION INTRA-ARTICULAR; INTRAMUSCULAR at 09:01

## 2017-11-09 RX ADMIN — PROPOFOL 20 MG: 10 INJECTION, EMULSION INTRAVENOUS at 09:01

## 2017-11-09 RX ADMIN — PROPOFOL 20 MG: 10 INJECTION, EMULSION INTRAVENOUS at 09:03

## 2017-11-09 RX ADMIN — LIDOCAINE HYDROCHLORIDE 1 ML: 10 INJECTION, SOLUTION EPIDURAL; INFILTRATION; INTRACAUDAL; PERINEURAL at 08:29

## 2017-11-09 RX ADMIN — PROPOFOL 20 MG: 10 INJECTION, EMULSION INTRAVENOUS at 09:04

## 2017-11-09 RX ADMIN — LIDOCAINE HYDROCHLORIDE 40 MG: 20 INJECTION, SOLUTION INFILTRATION; PERINEURAL at 08:58

## 2017-11-09 RX ADMIN — SODIUM CHLORIDE, POTASSIUM CHLORIDE, SODIUM LACTATE AND CALCIUM CHLORIDE: 600; 310; 30; 20 INJECTION, SOLUTION INTRAVENOUS at 08:28

## 2017-11-09 RX ADMIN — SODIUM BICARBONATE 50 MEQ: 84 INJECTION, SOLUTION INTRAVENOUS at 09:01

## 2017-11-09 RX ADMIN — PROPOFOL 100 MG: 10 INJECTION, EMULSION INTRAVENOUS at 08:58

## 2017-11-09 RX ADMIN — IOPAMIDOL 3 ML: 612 INJECTION, SOLUTION INTRATHECAL at 09:00

## 2017-11-09 RX ADMIN — PROPOFOL 20 MG: 10 INJECTION, EMULSION INTRAVENOUS at 08:59

## 2017-11-09 NOTE — TELEPHONE ENCOUNTER
Fax received from: [facility] Refill authorization requested    Drug: tiZANidine (ZANAFLEX) 4 MG tablet   Qty: 60   Last filled 9/21/17  Last seen: 9/21/17  Next appointment: No future appointment.        Lillian De La Cruz MA

## 2017-11-09 NOTE — IP AVS SNAPSHOT
MRN:[id number]                      After Visit Summary   11/9/2017    Indra Mehta    MRN: [id number]           Thank you!     Thank you for choosing [facility] for your care. Our goal is always to provide you with excellent care. Hearing back from our patients is one way we can continue to improve our services. Please take a few minutes to complete the written survey that you may receive in the mail after you visit with us. Thank you!        Patient Information     Date Of Birth          1965        About your hospital stay     You were admitted on:  November 9, 2017 You last received care in the:  [facility] Phase II    You were discharged on:  November 9, 2017       Who to Call     For medical emergencies, please call 911.  For non-urgent questions about your medical care, please call your primary care provider or clinic, [phone]  For questions related to your surgery, please call your surgery clinic        Attending Provider     Provider Specialty    [facility]cdaniel [facility]       Primary Care Provider Office Phone # Fax #    Tha Grullon [phone]      After Care Instructions     Discharge Instructions       Review outpatient procedure discharge instructions as directed by Provider. F/u with [facility] 2-4 weeks                  Further instructions from your care team       Home Care Instructions    [facility] [phone]               Procedure:  Epidural Spine Injection or Joint injection    Activity:    Rest today    Do not work today    Resume normal activity tomorrow    Pain:    You may experience soreness at the injection site for one or two days    You may use an ice pack for 20 minutes every 2 hours for the first 24 hours    You may use a heating pad after the first 24 hours    You may use Tylenol  (acetaminophen) every 4 hours or other pain medicines as directed by your physician    Safety  Sedation  "medicine, if given may remain active for many hours.    It is important for the next 24 hours that you do not:    Drive a car    Operate machines or power tools    Consume alcohol, including beer    Sign any important papers or legal documents    You may experience numbness radiating into your legs or arms, (depending on the procedure location)  This numbness may last several hours.  Until the numb sensation returns to normal please use caution in walking, climbing stairs, stepping out of your vehicle, etc.    Common side effects of steroids:  Not everyone will experience corticosteroid side effects. If side effects are experienced they will gradually subside in the 7-10 day period following an injection.    Most common side effects include:    Flushed face and/or chest    Feeling of warmth, particularly in face but could be overall feeling of warmth    Increased blood sugar in diabetic patients    Menstrual irregularities may occur.  If taking hormone based birth control an alternate method of birth control is recommended    Sleep disturbances and/or mood swings are possible    Leg cramps    Please contact us if you have:  Severe pain   Fever more than 101.5 degrees Fahrenheit  Signs of infection (redness, swelling or drainage)      If you have questions, please contact our office at [phone] between the hours of 8:00am and 5:00pm Monday through Friday.  After office hours you can contact the on call provider by dialing [phone].  If you need immediate attention we recommend that you go to a hospital emergency room or dial [phone].                 Pending Results     No orders found from 11/7/2017 to 11/10/2017.            Admission Information     Date & Time Provider Department Dept. Phone    11/9/2017 Darryn Mcdaniel MD [facility] Phase [phone]      Your Vitals Were     Blood Pressure Temperature Respirations Height Weight Pulse Oximetry    118/78 97.7  F (36.5  C) (Oral) 16 1.854 m (6' 1\") " 97.1 kg (214 lb) 97%    BMI (Body Mass Index)                   28.23 kg/m2           [employer] Information     [employer] gives you secure access to your electronic health record. If you see a primary care provider, you can also send messages to your care team and make appointments. If you have questions, please call your primary care clinic.  If you do not have a primary care provider, please call [phone] and they will assist you.        Care EveryWhere ID     This is your Care EveryWhere ID. This could be used by other organizations to access your [facility] medical records  [id number]        Equal Access to Services     [facility] Hospital[facility] : Hadmaged Menendez, jaime menezes, ranulfo delacruz, mikala lainez. So [facility] [phone].    ATENCIÓN: Si habla español, tiene a lundy disposición servicios gratuitos de asistencia lingüística. Llame al [phone].    We comply with applicable federal civil rights laws and [place] laws. We do not discriminate on the basis of race, color, national origin, age, disability, sex, sexual orientation, or gender identity.               Review of your medicines      CONTINUE these medicines which have NOT CHANGED        Dose / Directions    cyclobenzaprine 10 MG tablet   Commonly known as:  FLEXERIL   Used for:  Muscle spasm        Dose:  10 mg   Take 1 tablet (10 mg) by mouth 2 times daily as needed for muscle spasms Reported on 4/12/2017   Quantity:  60 tablet   Refills:  1       gabapentin 300 MG capsule   Commonly known as:  NEURONTIN   Used for:  Cervicalgia        Dose:  900 mg   Take 3 capsules (900 mg) by mouth 3 times daily If needed/tolerated, OK to increase bedtime dose to 1200mg   Quantity:  300 capsule   Refills:  3       HYDROcodone-acetaminophen 5-325 MG per tablet   Commonly known as:  NORCO   Used for:  S/P cervical spinal fusion        Dose:  1 tablet   Take 1 tablet by mouth every 6 hours as needed for other (Moderate to  Severe Pain)   Quantity:  120 tablet   Refills:  0       hydrOXYzine 25 MG capsule   Commonly known as:  VISTARIL   Used for:  Sleep disorder        Dose:  25 mg   Take 1 capsule (25 mg) by mouth At Bedtime   Quantity:  30 capsule   Refills:  1       IBUPROFEN PO        Refills:  0       losartan 50 MG tablet   Commonly known as:  COZAAR   Used for:  Benign essential hypertension        Dose:  50 mg   Take 1 tablet (50 mg) by mouth daily   Quantity:  90 tablet   Refills:  1       methocarbamol 750 MG tablet   Commonly known as:  ROBAXIN   Used for:  Cervical radiculopathy, Myofascial pain        Dose:  750 mg   Take 1 tablet (750 mg) by mouth 3 times daily as needed for muscle spasms   Quantity:  90 tablet   Refills:  3       order for DME   Used for:  Chronic pain syndrome        Equipment being ordered: TENS Pads as directed   Quantity:  1 Device   Refills:  0       tiZANidine 4 MG tablet   Commonly known as:  ZANAFLEX   Used for:  Muscle spasm        Dose:  4 mg   Take 1 tablet (4 mg) by mouth 2 times daily as needed for muscle spasms   Quantity:  60 tablet   Refills:  1       TYLENOL PO        Dose:  500 mg   Take 500 mg by mouth every 4 hours as needed for mild pain or fever   Refills:  0                Protect others around you: Learn how to safely use, store and throw away your medicines at www.disposemymeds.org.             Medication List: This is a list of all your medications and when to take them. Check marks below indicate your daily home schedule. Keep this list as a reference.      Medications           Morning Afternoon Evening Bedtime As Needed    cyclobenzaprine 10 MG tablet   Commonly known as:  FLEXERIL   Take 1 tablet (10 mg) by mouth 2 times daily as needed for muscle spasms Reported on 4/12/2017                                gabapentin 300 MG capsule   Commonly known as:  NEURONTIN   Take 3 capsules (900 mg) by mouth 3 times daily If needed/tolerated, OK to increase bedtime dose to 1200mg                                 HYDROcodone-acetaminophen 5-325 MG per tablet   Commonly known as:  NORCO   Take 1 tablet by mouth every 6 hours as needed for other (Moderate to Severe Pain)                                hydrOXYzine 25 MG capsule   Commonly known as:  VISTARIL   Take 1 capsule (25 mg) by mouth At Bedtime                                IBUPROFEN PO                                losartan 50 MG tablet   Commonly known as:  COZAAR   Take 1 tablet (50 mg) by mouth daily                                methocarbamol 750 MG tablet   Commonly known as:  ROBAXIN   Take 1 tablet (750 mg) by mouth 3 times daily as needed for muscle spasms                                order for DME   Equipment being ordered: TENS Pads as directed                                tiZANidine 4 MG tablet   Commonly known as:  ZANAFLEX   Take 1 tablet (4 mg) by mouth 2 times daily as needed for muscle spasms                                TYLENOL PO   Take 500 mg by mouth every 4 hours as needed for mild pain or fever

## 2017-11-09 NOTE — ANESTHESIA POSTPROCEDURE EVALUATION
Patient: Indra Mehta    Procedure(s):  lumbar epidural steroid injection (level to be determined by provider) - Wound Class: I-Clean    Diagnosis:lumbar radiculopathy  Diagnosis Additional Information: No value filed.    Anesthesia Type:  MAC    Note:  Anesthesia Post Evaluation    Patient location during evaluation: Phase 2  Patient participation: Able to fully participate in evaluation  Level of consciousness: awake  Pain management: adequate  Airway patency: patent  Cardiovascular status: acceptable and hemodynamically stable  Respiratory status: acceptable, room air and nonlabored ventilation  Hydration status: stable  PONV: none     Anesthetic complications: None    Comments: Patient was happy with the anesthesia care received and no anesthesia related complications were noted.  I will follow up with the patient again if it is needed.        Last vitals:  Vitals:    11/09/17 0816 11/09/17 0920 11/09/17 0934   BP: 126/83 117/78 (P) 118/78   Resp: 14 16 (P) 16   Temp: 97.7  F (36.5  C)     SpO2:  99%          Electronically Signed By: YUMIKO Dinh CRNA  November 9, 2017  9:34 AM

## 2017-11-09 NOTE — ANESTHESIA CARE TRANSFER NOTE
Patient: Indra Mehta    Procedure(s):  lumbar epidural steroid injection (level to be determined by provider) - Wound Class: I-Clean    Diagnosis: lumbar radiculopathy  Diagnosis Additional Information: No value filed.    Anesthesia Type:   MAC     Note:  Airway :Nasal Cannula  Patient transferred to:Phase II  Handoff Report: Identifed the Patient, Identified the Reponsible Provider, Reviewed the pertinent medical history, Discussed the surgical course, Reviewed Intra-OP anesthesia mangement and issues during anesthesia, Set expectations for post-procedure period and Allowed opportunity for questions and acknowledgement of understanding      Vitals: (Last set prior to Anesthesia Care Transfer)    CRNA VITALS  11/9/2017 0841 - 11/9/2017 0915      11/9/2017             Pulse: 66    SpO2: 98 %                Electronically Signed By: YUMIKO Dinh CRNA  November 9, 2017  9:15 AM

## 2017-11-09 NOTE — DISCHARGE INSTRUCTIONS
Home Care Instructions    [facility] [phone]               Procedure:  Epidural Spine Injection or Joint injection    Activity:    Rest today    Do not work today    Resume normal activity tomorrow    Pain:    You may experience soreness at the injection site for one or two days    You may use an ice pack for 20 minutes every 2 hours for the first 24 hours    You may use a heating pad after the first 24 hours    You may use Tylenol  (acetaminophen) every 4 hours or other pain medicines as directed by your physician    Safety  Sedation medicine, if given may remain active for many hours.    It is important for the next 24 hours that you do not:    Drive a car    Operate machines or power tools    Consume alcohol, including beer    Sign any important papers or legal documents    You may experience numbness radiating into your legs or arms, (depending on the procedure location)  This numbness may last several hours.  Until the numb sensation returns to normal please use caution in walking, climbing stairs, stepping out of your vehicle, etc.    Common side effects of steroids:  Not everyone will experience corticosteroid side effects. If side effects are experienced they will gradually subside in the 7-10 day period following an injection.    Most common side effects include:    Flushed face and/or chest    Feeling of warmth, particularly in face but could be overall feeling of warmth    Increased blood sugar in diabetic patients    Menstrual irregularities may occur.  If taking hormone based birth control an alternate method of birth control is recommended    Sleep disturbances and/or mood swings are possible    Leg cramps    Please contact us if you have:  Severe pain   Fever more than 101.5 degrees Fahrenheit  Signs of infection (redness, swelling or drainage)      If you have questions, please contact our office at [phone] between the hours of 8:00am and 5:00pm Monday through Friday.   After office hours you can contact the on call provider by dialing [phone].  If you need immediate attention we recommend that you go to a hospital emergency room or dial [phone].

## 2017-11-09 NOTE — ANESTHESIA PREPROCEDURE EVALUATION
Anesthesia Evaluation     . Pt has had prior anesthetic. Type: General and MAC           ROS/MED HX    ENT/Pulmonary:  - neg pulmonary ROS     Neurologic:     (+)neuropathy (bilateral occipital neuralgia) - Bilateral occipital neuralgia, other neuro Menieres disease    Cardiovascular:     (+) Dyslipidemia, hypertension----. : . . . :. . Previous cardiac testing date:results:date: results:ECG reviewed date:6/30/17 results:No results indicated, EKG reordered. date: results:          METS/Exercise Tolerance:  >4 METS   Hematologic:  - neg hematologic  ROS       Musculoskeletal:   (+) , , other musculoskeletal- myalgia/back pain      GI/Hepatic:  - neg GI/hepatic ROS       Renal/Genitourinary:  - ROS Renal section negative   (+) Pt has no history of transplant,       Endo:  - neg endo ROS       Psychiatric:     (+) psychiatric history depression      Infectious Disease:  - neg infectious disease ROS       Malignancy:      - no malignancy   Other:    (+) No chance of pregnancy C-spine cleared: Yes, H/O Chronic Pain,H/O chronic opiod use , no other significant disability                    Physical Exam  Normal systems: cardiovascular, pulmonary and dental    Airway   Mallampati: II  TM distance: >3 FB  Neck ROM: full    Dental     Cardiovascular   Rhythm and rate: regular and normal      Pulmonary    breath sounds clear to auscultation                        Anesthesia Plan      History & Physical Review  History and physical reviewed and following examination; no interval change.    ASA Status:  2 .    NPO Status:  > 8 hours    Plan for MAC with Intravenous and Propofol induction. Maintenance will be TIVA.  Reason for MAC:  Deep or markedly invasive procedure (G8)    Pt verbalizes understanding of MAC anesthesia; denies further questions.        Postoperative Care  Postoperative pain management:  IV analgesics and Oral pain medications.      Consents  Anesthetic plan, risks, benefits and alternatives discussed with:   Patient..                          .

## 2017-11-09 NOTE — IP AVS SNAPSHOT
[facility] Phase II    [address]     [place] [address]    Phone:  [phone]                                       After Visit Summary   11/9/2017    Indra Mehta    MRN: [id number]           After Visit Summary Signature Page     I have received my discharge instructions, and my questions have been answered. I have discussed any challenges I see with this plan with the nurse or doctor.    ..........................................................................................................................................  Patient/Patient Representative Signature      ..........................................................................................................................................  Patient Representative Print Name and Relationship to Patient    ..................................................               ................................................  Date                                            Time    ..........................................................................................................................................  Reviewed by Signature/Title    ...................................................              ..............................................  Date                                                            Time

## 2017-11-09 NOTE — TELEPHONE ENCOUNTER
Signed Prescriptions:                        Disp   Refills    tiZANidine (ZANAFLEX) 4 MG tablet          60 tab*3        Sig: Take 1 tablet (4 mg) by mouth 2 times daily as needed           for muscle spasms  Authorizing Provider: BONI NAYLOR    Reviewed, signed, e-prescribed.    Boni Freeman MD  [facility]

## 2017-11-09 NOTE — OP NOTE
PRIMARY PROBLEM: Low back pain and bilateral leg pains    PROCEDURE: Bilatearl L4-5  Transforaminal Epidural Steroid Injections with fluoroscopic guidance and contrast.     PROCEDURE DETAILS: After written informed consent was obtained from the patient, the patient was escorted to the procedure room.  The patient was placed in the prone position.  A  time out  was conducted to verify patient identity, procedure to be performed, side, site, allergies and any special requirements.  The skin over the thoracolumbar region was prepped and draped in normal sterile fashion. Fluoroscopy was used to identify the neural foramen in AP view and the skin was anesthetized with 2 mL of 1% lidocaine with bicarbonate buffer. A 22-gauge,   5-inch Quincke spinal needle was advanced through this location and advanced under fluoroscopic guidance towards the neural foramen.  The target zone was the 6 o clock position of the pedicle.   Prior to entering the foramen, the depth of the needle was gauged with a lateral view on fluoroscopy. While still in a lateral view, the needle was slowly advanced to avoid injury to the spinal nerve.  Then, in the oblique view (approximately 28 degrees), after negative aspiration, 0.5 mL of Omnipaque contrast dye was injected revealing epidural spread without evidence of intravascular or intrathecal spread.  Then a 2.5cc solution of 20 mg of Triamcinolone in 2 mL of  Preservative-Free saline was slowly injected into the epidural space at each segment.  After injection of the medication, as the needle tip was withdrawn, it was flushed with local anesthetic. This was repeated bilaterally at the L4-5 segments.  The patient was monitored with blood pressure and pulse oximetry machines with the assistance of an RN throughout the procedure.  The patient was alert and responsive to questions throughout the procedure.   The patient tolerated the procedure well and was observed in the post-procedural area.  The  patient was dismissed without apparent complications.     DIAGNOSIS:  1. Bilateral lumbar radiculopathy  2. Disc degeneration L2-3 and L4-5  3. Central stenosis L2-3 and lateral recess stenosis L4-5    PLAN:  1. Performed Bilateral L4-5   transforaminal epidural steroid injections. I think this level is the most likely pain generator. L2-3 is the other possibility. SI joints are unlikely the etiology.  2. The patient was instructed to follow-up in two weeks with a progress update.      Darryn Mcdaniel MD  Diplomate of the American Board of Anesthesiology, Pain Medicine

## 2017-11-11 ENCOUNTER — MYC MEDICAL ADVICE (OUTPATIENT)
Dept: PALLIATIVE MEDICINE | Facility: CLINIC | Age: 52
End: 2017-11-11

## 2017-11-13 DIAGNOSIS — Z98.1 S/P CERVICAL SPINAL FUSION: ICD-10-CM

## 2017-11-13 NOTE — TELEPHONE ENCOUNTER
Pt SHARYN at 0802 --    Reason for call:  Medication   If this is a refill request, has the caller requested the refill from the pharmacy already? N/A  Will the patient be using a [facility]? No  Name of the pharmacy and phone number for the current request: [id number] - [address]    Name of the medication requested: HYDROcodone-acetaminophen (NORCO) 5-325 MG per tablet    Other request: Please send to [facility] in [place]       Phone number to reach patient:  Home number on file [phone] (home)      Shanda Dai    [facility]

## 2017-11-13 NOTE — TELEPHONE ENCOUNTER
Note:  This med compliance we knew about.  See the 10/11/17 mychart encounter for that information.    Heidy Gipson RN-BSN  [facility]

## 2017-11-13 NOTE — TELEPHONE ENCOUNTER
"My chart message from pt:    I see Doctor Estefany Freeman posted the drug screen from the 20th.  He also commented on it.  I wanted to clarify something that Doctor Angle Freeman stated in his comment which is not accurate.  Here is his comment...     \"UDS reviewed - methocarbamol is appropriate as it is a prescribed medication. Patient should have had hydrocodone in his system since his last prescription was given to him on 10/13/17 unless he was not taking it. This will need to be addressed at next visit.     Boni Freeman MD   [facility]\"     He states the prescription was given to me on 10/13/17.  This is not true.  He wrote the prescription on the 13th but I did not receive the prescription until after the drug screening on 10/20/17.     Because of extra pain in my neck and lower back I had on several nights taken a fifth pill during the night in an attempt to control the pain so I could sleep.  I had taken my last Norco on the morning of the 18th which is probably why it wasn't present in the screening.    Sending to provider to respond.     Katarina Licona RN, [facility]  Pain Clinic Care Coordinator   "

## 2017-11-14 RX ORDER — HYDROCODONE BITARTRATE AND ACETAMINOPHEN 5; 325 MG/1; MG/1
1 TABLET ORAL EVERY 6 HOURS PRN
Qty: 120 TABLET | Refills: 0 | Status: SHIPPED | OUTPATIENT
Start: 2017-11-14 | End: 2017-12-07

## 2017-11-14 NOTE — TELEPHONE ENCOUNTER
Received call from patient requesting refill(s) of HYDROcodone-acetaminophen (NORCO) 5-325 MG per tablet    Last picked up from pharmacy on 10/20/17    Pt last seen by prescribing provider on 09/21/17  Next appt scheduled for None    Last urine drug screen date 10/20/17  Current opioid agreement on file (completed within the last year) Yes Date of opioid agreement: 06/15/17    Processing (pick one and delete the others):      Mail to [facility] pharmacy   [address] [place] [id number]    Will route to nursing [place] for review and preparation of prescription(s).

## 2017-11-14 NOTE — TELEPHONE ENCOUNTER
Signed Prescriptions:                        Disp   Refills    HYDROcodone-acetaminophen (NORCO) 5-325 MG*120 ta*0        Sig: Take 1 tablet by mouth every 6 hours as needed for           other (Moderate to Severe Pain) . May fill           on/after 11/17/2017 not to start till 11/19/2017.           Further fills will require an appointment to be           made.  Authorizing Provider: BONI NAYLOR    Reviewed, printed, signed in [place].  Placed in MA basket for processing.    Boni Freeman MD  [facility]

## 2017-11-17 ENCOUNTER — MYC MEDICAL ADVICE (OUTPATIENT)
Dept: PALLIATIVE MEDICINE | Facility: CLINIC | Age: 52
End: 2017-11-17

## 2017-11-17 NOTE — TELEPHONE ENCOUNTER
My chart message from pt:    I am just checking to make sure the prescription for the [place] refill got mailed to [facility] in [place].  They did not receive it today.  Seeing that you are not in the office on Saturday I wanted to check before the end of the day.    Noble Aceves    It got mailed out on 11/15/2017. I hope it gets there soon.     Katarina Licona RN, [facility]  Pain Clinic Care Coordinator

## 2017-11-21 ENCOUNTER — MYC MEDICAL ADVICE (OUTPATIENT)
Dept: FAMILY MEDICINE | Facility: OTHER | Age: 52
End: 2017-11-21

## 2017-11-28 DIAGNOSIS — M54.16 LUMBAR RADICULOPATHY: Primary | ICD-10-CM

## 2017-11-29 ENCOUNTER — MYC MEDICAL ADVICE (OUTPATIENT)
Dept: PALLIATIVE MEDICINE | Facility: CLINIC | Age: 52
End: 2017-11-29

## 2017-11-29 NOTE — TELEPHONE ENCOUNTER
Information noted.  He states that he got good relief from the injections for a couple of weeks.  Agree with repeat SRIDEVI as ordered.    Boni Freeman MD  [facility]

## 2017-11-29 NOTE — TELEPHONE ENCOUNTER
My chart message from pt:    The lumbar injection I received at the beginning of this month is not providing any relief.  I am once again having pain shooting into my hips and into the thigh as far down as the knee on both sides.  I contacted Dr. Mckenna's office and they ordered another injection.  I am scheduled to receive this second injection on Dec 28th.     Ketan     Thanks for the update.     Katarina Licona RN, [facility]  Pain Clinic Care Coordinator

## 2017-12-06 DIAGNOSIS — Z98.1 S/P CERVICAL SPINAL FUSION: ICD-10-CM

## 2017-12-06 NOTE — TELEPHONE ENCOUNTER
Patient left VM at 12:06 pm    Rx- Norco, patient is calling today because he will be in the area on 12/13 and would like to pick this up Thursday afternoon. He does not want to fill then just  the script.        Peyton CHURCHILL    [facility]

## 2017-12-07 NOTE — TELEPHONE ENCOUNTER
Received call from patient requesting refill(s) of HYDROcodone-acetaminophen (NORCO) 5-325 MG per tablet    Last picked up from pharmacy on 11/18/17    Pt last seen by prescribing provider on 09/21/17  Next appt scheduled for 01/11/17    Last urine drug screen date 10/20/17  Current opioid agreement on file (completed within the last year) Yes Date of opioid agreement: 09/15/17    Processing (pick one and delete the others):        Pt will  in clinic at [facility] location-Patient will be in the area on 12/13, and would like to       Will route to nursing pool for review and preparation of prescription(s).

## 2017-12-07 NOTE — TELEPHONE ENCOUNTER
Medication refill information reviewed.     Due date for Norco is 12/19/2017    Prescriptions prepped for review.     Will route to provider.     Katarina Licona RN, [facility]  Pain Clinic Care Coordinator

## 2017-12-11 RX ORDER — HYDROCODONE BITARTRATE AND ACETAMINOPHEN 5; 325 MG/1; MG/1
1 TABLET ORAL EVERY 6 HOURS PRN
Qty: 120 TABLET | Refills: 0 | Status: SHIPPED | OUTPATIENT
Start: 2017-12-11 | End: 2018-01-11

## 2017-12-18 ENCOUNTER — OFFICE VISIT (OUTPATIENT)
Dept: FAMILY MEDICINE | Facility: OTHER | Age: 52
End: 2017-12-18
Payer: COMMERCIAL

## 2017-12-18 VITALS
HEART RATE: 74 BPM | OXYGEN SATURATION: 98 % | WEIGHT: 215.6 LBS | BODY MASS INDEX: 28.44 KG/M2 | TEMPERATURE: 97.7 F | RESPIRATION RATE: 16 BRPM | DIASTOLIC BLOOD PRESSURE: 84 MMHG | SYSTOLIC BLOOD PRESSURE: 130 MMHG

## 2017-12-18 DIAGNOSIS — Z01.818 PREOP GENERAL PHYSICAL EXAM: Primary | ICD-10-CM

## 2017-12-18 DIAGNOSIS — K58.0 IRRITABLE BOWEL SYNDROME WITH DIARRHEA: ICD-10-CM

## 2017-12-18 DIAGNOSIS — H81.03 MENIERE'S DISEASE OF BOTH EARS: ICD-10-CM

## 2017-12-18 DIAGNOSIS — B07.8 OTHER VIRAL WARTS: ICD-10-CM

## 2017-12-18 DIAGNOSIS — M48.061 SPINAL STENOSIS OF LUMBAR REGION WITHOUT NEUROGENIC CLAUDICATION: ICD-10-CM

## 2017-12-18 PROCEDURE — 17110 DESTRUCTION B9 LES UP TO 14: CPT | Performed by: FAMILY MEDICINE

## 2017-12-18 PROCEDURE — 99215 OFFICE O/P EST HI 40 MIN: CPT | Mod: 25 | Performed by: FAMILY MEDICINE

## 2017-12-18 ASSESSMENT — PAIN SCALES - GENERAL: PAINLEVEL: MODERATE PAIN (5)

## 2017-12-18 NOTE — MR AVS SNAPSHOT
After Visit Summary   12/18/2017    Indra Mehta    MRN: [id number]           Patient Information     Date Of Birth          1965        Visit Information        Provider Department      12/18/2017 3:30 PM Tha Grullon MD [facility]        Today's Diagnoses     Preop general physical exam    -  1    Meniere's disease of both ears          Care Instructions      Before Your Surgery      Call your surgeon if there is any change in your health. This includes signs of a cold or flu (such as a sore throat, runny nose, cough, rash or fever).    Do not smoke, drink alcohol or take over the counter medicine (unless your surgeon or primary care doctor tells you to) for the 24 hours before and after surgery.    If you take prescribed drugs: Follow your doctor s orders about which medicines to take and which to stop until after surgery.    Eating and drinking prior to surgery: follow the instructions from your surgeon  Take a shower or bath the night before surgery. Use the soap your surgeon gave you to gently clean your skin. If you do not have soap from your surgeon, use your regular soap. Do not shave or scrub the surgery site.  Wear clean pajamas and have clean sheets on your bed.   What is Irritable Bowel Syndrome (IBS)?     Muscle contraction moves food through the digestive tract.      People who have irritable bowel syndrome (IBS) have digestive tracts that react abnormally to certain substances or to stress. This leads to symptoms like cramps, gas, bloating, pain, constipation, and diarrhea. Sometimes called  spastic colon,  IBS is a common condition that is not a disease, but rather a group of symptoms that happen together.  IBS--a motility problem  The muscle movement that passes food through the digestive tract is called motility. When you have IBS, the normal motility of the digestive tract (especially the colon) is disrupted. Motility may speed up, slow down, or become  "irregular. If stool passes too quickly through the colon, not enough water is absorbed from it. Loose, watery stools (diarrhea) can result. If stool passes through the colon too slowly, too much water is absorbed and the stool becomes hard and dry (constipation). Also, stool and gas may back up and cause painful pressure and cramping. There is no single test that can diagnose IBS. It is a group of symptoms that help your healthcare provider with the diagnosis. Often multiple blood, stool, radiologic tests, or even colonoscopy are performed in the evaluation of people suspected to have IBS. These are done primarily to make sure that there are no other illnesses that can account for your symptoms.   What causes IBS?  A great deal of research has been done on IBS, but the cause is still not known. Some of the possible factors include:   Smoking, eating certain foods, or drinking alcohol or caffeinated drinks can cause, or \"trigger,\" symptoms of IBS.  Although no one knows for sure, IBS may be caused by a problem with the nerves or muscles in your digestive tract.  There is also some evidence that certain bacteria found after a severe gastroinstestinal infection in the small intestine and colon may cause IBS.  While stress and anxiety worsen the symptoms of IBS, it is not believed to be the cause.   What you can do  Recommendations include:  Certain medicines may help regulate the working of your digestive tract. Your healthcare provider may prescribe one or more for you.  Medicine can t cure IBS, but it may help manage the symptoms.  Because some medicines may make IBS worse, don t take any medicine, especially laxatives, unless your healthcare provider prescribes it for you.  Your healthcare provider may suggest some lifestyle changes to help control your IBS. Two of the most important are changing your diet and managing stress. If diet changes are suggested, ask for nutritional guidance from a dietitian so you " maintain a healthy nutritional balance in your food intake.   Date Last Reviewed: 7/1/2016 2000-2017 [employer]. [address] [place] [address], [place] [address]. All rights reserved. This information is not intended as a substitute for professional medical care. Always follow your healthcare professional's instructions.        Irritable Bowel Syndrome    Irritable bowel syndrome (IBS) is a disorder of the intestines. It is not a disease, but a group of symptoms caused by changes in the way the intestines work. It is fairly common, but the cause is not well understood.  Symptoms of IBS include:  Abdominal pain, discomfort, and cramping  Diarrhea  Constipation or dry, hard stools  Mucous stool  Bloating  Feeling of incomplete bowel movements  It usually results in one of 3 patterns of symptoms:  Chronic abdominal pain and constipation  Recurring episodes of diarrhea, with or without pain  Alternating diarrhea and constipation  Home care  The goal of treatment is to control and relieve your symptoms, so you can lead a full and active life. There is no cure for IBS. But it can be managed.  Diet  Your diet did not cause your IBS, but it can affect it. No one diet works for everyone. Finding the best foods for you may take trial and error. Keep a food log to help find what foods made your symptoms worse. Below are some tips that may help you.  Eat more slowly. Eat smaller amounts at a time, but more often. Remember, you can always eat more, but cannot eat less once you ve eaten too much.  High-fiber foods are complicated. While they may help relieve constipation, they can make your bloating, cramping, gas, and diarrhea worse.  Eat less sugar.  Try cutting out dairy products. Sometimes this helps.  Try cutting out foods that are high in fat and fatty meats.  You can control bloating or passing excess gas. Be careful with  gassy  vegetables and fruits like beans, cabbage, broccoli, and cauliflower.  Be careful  with carbonated beverages and fruit juices. They can make your bloating and diarrhea worse.  Caffeine, alcohol, and stimulants can make symptoms worse. These include coffee, tea, sodas, energy drinks, and chocolate.  Lifestyle  Look for factors that seem to worsen your symptoms. These include stress and emotions.   Although stress does not cause IBS, it may trigger flare-ups. Counseling can help you learn to handle stress. So can self-help measures like exercise, yoga, and meditation.  Depression can occur along with IBS. Your healthcare provider may prescribe antidepressant medicine. This may help with diarrhea, constipation, and cramping, as well as with symptoms of depression.  Smoking doesn't cause IBS, but can make the symptoms worse.  Medicines  Your healthcare provider may prescribe medicines. Take them as directed. For acute flare-ups of your illness, your provider may give you prescription medicines.  Check with your healthcare provider before taking any medicines for diarrhea.  Avoid anti-inflammatory medicines like ibuprofen or naproxen.  Consider nutritional supplements. This is especially true if your diarrhea is prolonged, or you aren't eating or are losing weight  Follow-up care  Follow up with your healthcare provider, or as advised. If a stool sample was taken or cultures were done, you will be told if your treatment needs to change. You can call as directed for the results.  When to seek medical advice  Call your healthcare provider right away if any of these occur:  Abdominal pain gets worse  Constant abdominal pain moves to the right-lower abdomen  You can't keep liquids down because of vomiting  You have severe diarrhea  You have blood (red or black color) or mucus in your stool  You feel very weak or dizzy, faint, or have extreme thirst  You have a fever of 100.4 F (38.0 C) or higher, or as directed by your healthcare provider  Date Last Reviewed: 8/31/2015 2000-2017 [employer],  [employer]. [address] [place] [address]. All rights reserved. This information is not intended as a substitute for professional medical care. Always follow your healthcare professional's instructions.        Diet and Lifestyle Tips for Irritable Bowel Syndrome (IBS)    Your healthcare professional may suggest some lifestyle changes to help control your IBS. Changing your diet and managing stress are two of the most important. Follow your healthcare provider s instructions and try some of the suggestions below.  Change your diet  Your diet may be an important cause of IBS symptoms. You may want to try the following:  Pay attention to what foods bother you, and avoid them. For example, dairy products are hard for some people to digest.  Drink 6 to 8 glasses of water a day.  Avoid caffeine and tobacco. These are muscle stimulants and can affect the working of your digestive tract.  Avoid alcohol, which can irritate your digestive tract and make your symptoms worse.  Eat more fiber if constipation is a problem. Fiber makes the stool softer and easier to pass through the colon.  Reduce stress  If stress or anxiety makes your IBS symptoms worse, learning how to manage stress may help you feel better. Try these tips:  Identify the causes of stress in your life.  Learn new ways to cope with them.  Regular exercise is a great way to relieve stress. It can also help ease constipation.  Date Last Reviewed: 7/1/2016 2000-2017 [employer]. [address] [place] [address]. All rights reserved. This information is not intended as a substitute for professional medical care. Always follow your healthcare professional's instructions.                  Follow-ups after your visit        Additional Services     AUDIOLOGY ADULT REFERRAL       Your provider has referred you to: [facility]: [facility] [phone]   [email or web address]    Specialty  Testing:  Audiogram w/Tymps and Reflexes (Comprehensive Audiology Evaluation)                  Follow-up notes from your care team     Return in about 2 weeks (around 1/1/2018) for retreat wart.      Your next 10 appointments already scheduled     Dec 28, 2017   Procedure with Darryn Mcdaniel MD   [facility] ([facility])    [address] [place] [id number] [phone]           From [address]: Exit at [email or web address] on south side of [place]. Turn right on [address]. Turn left at stoplight on [address]. [facility] will be in view two blocks ahead            Dec 28, 2017  1:00 PM CST   XR FLUORO NEEDLE PLACEMENT SPINE with [email or web address]   [facility] ([facility])    [address]  [place] [id number] [phone]           For nerve root injection, please send or bring copies of any MRIs or other scans you have had.  Bring a list of your current medicines to your exam. (Include vitamins, minerals and over-the-counter medicines.) Leave your valuables at home.  Plan to have someone drive you home afterward.  Stop taking the following medicines (but talk to your doctor first):   If you take blood thinners, you may need to stop taking them a few days before treatment. Talk to your doctor before stopping these medicines.Stop taking Coumadin (warfarin) 3 days before treatment. Restart the day after treatment.   If you take Plavix, Ticlid, Pletal or Persantine, please ask your doctor if you should stop these medicines. You may need extra tests on the morning of your scan.   If you take Xarelto (Rivaroxaban), you may need to stop taking it 24 hours before treatment. Talk to your doctor before stopping this medicine. Restart the day after treatment.  You may take your other medicines as normal.  Stop all food and drink (including water) 3 hours before your test or treatment.  Please tell the doctor:   If you are  allergic to X-ray dye (contrast fluid).   If you may be pregnant.  Injections take about 30 to 45 minutes. Most people spend up to 2 hours in the clinic or hospital.  Please call the Imaging Department at your exam site with any questions.            Jan 11, 2018  3:30 PM CST   Return Visit with Boni Freeman MD   [facility] ([facility])    [address]  [address]  [facility] [address] [phone]              Who to contact     If you have questions or need follow up information about today's clinic visit or your schedule please contact [facility] directly at [phone].  Normal or non-critical lab and imaging results will be communicated to you by [employer], letter or phone within 4 business days after the clinic has received the results. If you do not hear from us within 7 days, please contact the clinic through [employer] or phone. If you have a critical or abnormal lab result, we will notify you by phone as soon as possible.  Submit refill requests through link bird or call your pharmacy and they will forward the refill request to us. Please allow 3 business days for your refill to be completed.          Additional Information About Your Visit        [employer] Information     link bird gives you secure access to your electronic health record. If you see a primary care provider, you can also send messages to your care team and make appointments. If you have questions, please call your primary care clinic.  If you do not have a primary care provider, please call [phone] and they will assist you.        Care EveryWhere ID     This is your Care EveryWhere ID. This could be used by other organizations to access your [facility] medical records  [id number]        Your Vitals Were     Pulse Temperature Respirations Pulse Oximetry BMI (Body Mass Index)       74 97.7  F (36.5  C) (Temporal) 16 98% 28.44 kg/m2        Blood Pressure  from Last 3 Encounters:   12/18/17 130/84   11/09/17 127/84   11/06/17 114/68    Weight from Last 3 Encounters:   12/18/17 215 lb 9.6 oz (97.8 kg)   11/09/17 214 lb (97.1 kg)   11/06/17 214 lb (97.1 kg)              We Performed the Following     AUDIOLOGY ADULT REFERRAL        Primary Care Provider Office Phone # Fax #    Tha Grullon [phone]       [address] [facility] [address]        Equal Access to Services     [facility]: Hadii aad ku hadasho Soomaali, waaxda luqadaha, qaybta kaalmada adeegyaameya, mikala carmichael . So [facility] [phone].    ATENCIÓN: Si habla español, tiene a lundy disposición servicios gratuitos de asistencia lingüística. [facility] [phone].    We comply with applicable federal civil rights laws and [place] laws. We do not discriminate on the basis of race, color, national origin, age, disability, sex, sexual orientation, or gender identity.            Thank you!     Thank you for choosing [facility]  for your care. Our goal is always to provide you with excellent care. Hearing back from our patients is one way we can continue to improve our services. Please take a few minutes to complete the written survey that you may receive in the mail after your visit with us. Thank you!             Your Updated Medication List - Protect others around you: Learn how to safely use, store and throw away your medicines at www.disposemymeds.org.          This list is accurate as of: 12/18/17  4:39 PM.  Always use your most recent med list.                   Brand Name Dispense Instructions for use Diagnosis    cyclobenzaprine 10 MG tablet    FLEXERIL    60 tablet    Take 1 tablet (10 mg) by mouth 2 times daily as needed for muscle spasms Reported on 4/12/2017    Muscle spasm       gabapentin 300 MG capsule    NEURONTIN    300 capsule    Take 3 capsules (900 mg) by mouth 3 times daily If needed/tolerated, OK to increase bedtime dose to 1200mg     Cervicalgia       HYDROcodone-acetaminophen 5-325 MG per tablet    NORCO    120 tablet    Take 1 tablet by mouth every 6 hours as needed for other (Moderate to Severe Pain) . May fill on/after 12/18/2017 not to start till 12/19/2017.    S/P cervical spinal fusion       hydrOXYzine 25 MG capsule    VISTARIL    30 capsule    Take 1 capsule (25 mg) by mouth At Bedtime    Sleep disorder       IBUPROFEN PO           losartan 50 MG tablet    COZAAR    90 tablet    Take 1 tablet (50 mg) by mouth daily    Benign essential hypertension       methocarbamol 750 MG tablet    ROBAXIN    90 tablet    Take 1 tablet (750 mg) by mouth 3 times daily as needed for muscle spasms    Cervical radiculopathy, Myofascial pain       order for DME     1 Device    Equipment being ordered: TENS Pads as directed    Chronic pain syndrome       tiZANidine 4 MG tablet    ZANAFLEX    60 tablet    Take 1 tablet (4 mg) by mouth 2 times daily as needed for muscle spasms    Muscle spasm       TYLENOL PO      Take 500 mg by mouth every 4 hours as needed for mild pain or fever

## 2017-12-18 NOTE — NURSING NOTE
"Chief Complaint   Patient presents with     Pre-Op Exam       Initial /84 (BP Location: Right arm, Patient Position: Chair, Cuff Size: Adult Large)  Pulse 74  Temp 97.7  F (36.5  C) (Temporal)  Resp 16  Wt 215 lb 9.6 oz (97.8 kg)  SpO2 98%  BMI 28.44 kg/m2 Estimated body mass index is 28.44 kg/(m^2) as calculated from the following:    Height as of 11/9/17: 6' 1\" (1.854 m).    Weight as of this encounter: 215 lb 9.6 oz (97.8 kg).  Medication Reconciliation: complete     Yoanna Vázquez MA 12/18/2017  3:46 PM          "

## 2017-12-18 NOTE — PROGRESS NOTES
[place] [address] [address] [facility] [address]  [phone]  Dept: [phone]    PRE-OP EVALUATION:  Today's date: 2017    Indra Mehta (: 1965) presents for pre-operative evaluation assessment as requested by Dr. Mcdaniel.  He requires evaluation and anesthesia risk assessment prior to undergoing surgery/procedure for treatment of lumbar spinal stenosis .  Proposed procedure: Inject Epidural Lumbar    Date of Surgery/ Procedure: 2017  Time of Surgery/ Procedure: 1:00pm  Hospital/Surgical Facility: [facility]  Primary Physician: Tha Grullon  Type of Anesthesia Anticipated: Monitor Anesthesia Care    Patient has a Health Care Directive or Living Will:  NO    1. NO - Do you have a history of heart attack, stroke, stent, bypass or surgery on an artery in the head, neck, heart or legs?  2. NO - Do you ever have any pain or discomfort in your chest?  3. NO - Do you have a history of  Heart Failure?  4. NO - Are you troubled by shortness of breath when: walking on the level, up a slight hill or at night?  5. NO - Do you currently have a cold, bronchitis or other respiratory infection?  6. NO - Do you have a cough, shortness of breath or wheezing?  7. YES - DO YOU SOMETIMES GET PAINS IN THE CALVES OF YOUR LEGS WHEN YOU WALK? Due to back  8. NO - Do you or anyone in your family have previous history of blood clots?  9. NO - Do you or does anyone in your family have a serious bleeding problem such as prolonged bleeding following surgeries or cuts?  10. NO - Have you ever had problems with anemia or been told to take iron pills?  11. NO - Have you had any abnormal blood loss such as black, tarry or bloody stools, or abnormal vaginal bleeding?  12. NO - Have you ever had a blood transfusion?  13. NO - Have you or any of your relatives ever had problems with anesthesia?  14. NO - Do you have sleep apnea, excessive snoring or daytime drowsiness?  15. NO - Do you have any prosthetic  heart valves?  16. NO - Do you have prosthetic joints?  17. NO - Is there any chance that you may be pregnant?        HPI:                                                      Brief HPI related to upcoming procedure: Indra Mehta is a 52 year old male who presents with chronic lumbar degenerative disc disease and chronic low back pain.       HYPERTENSION - Patient has longstanding history of mod-severe HTN , currently denies any symptoms referable to elevated blood pressure. Specifically denies chest pain, palpitations, dyspnea, orthopnea, PND or peripheral edema. Blood pressure readings have been in normal range. Current medication regimen is as listed below. Patient denies any side effects of medication.                                                                                                                                                                                          .    MEDICAL HISTORY:                                                    Patient Active Problem List    Diagnosis Date Noted     Benign essential hypertension 06/30/2017     Priority: Medium     Myalgia 10/28/2016     Priority: Medium     Bilateral occipital neuralgia 10/28/2016     Priority: Medium     CARDIOVASCULAR SCREENING; LDL GOAL LESS THAN 160 10/31/2010     Priority: Medium     Major depression in complete remission (H) 08/20/2010     Priority: Medium     Chronic pain syndrome 08/20/2010     Priority: Medium     Patient is followed by Tha Grullon MD for ongoing prescription of pain medication.  All refills should be approved by this provider only at face-to-face appointments - not by phone request.    Medication(s): Tramadol.   Maximum quantity per month: 60  Clinic visit frequency required: Q 1 months     Controlled substance agreement:  Encounter-Level CSA - 10/13/16:               Controlled Substance Agreement - Scan on 10/23/2016  5:07 PM : CONTROLLED SUBSTANCE AGREEMENT 10/13/16 (below)            Pain Clinic  evaluation in the past: Yes       Date/Location:   [facility]    DIRE Total Score(s):  No flowsheet data found.    Last [facility] website verification:  none   [email or web address]               Pain medication agreement signed 08/20/2010     Priority: Medium     Meniere's disease 03/22/2007     Priority: Medium     Problem list name updated by automated process. Provider to review        Past Medical History:   Diagnosis Date     Back pain      Meniere's disease, unspecified      Pain medication agreement signed 8/20/2010     Past Surgical History:   Procedure Laterality Date     BUNIONECTOMY RT/LT  09/05/08    Both feet     COLONOSCOPY N/A 12/28/2016    Procedure: COMBINED COLONOSCOPY, SINGLE OR MULTIPLE BIOPSY/POLYPECTOMY BY BIOPSY;  Surgeon: Indra Jernigan MD;  Location: [facility]     DISCECTOMY, FUSION CERVICAL ANTERIOR ONE LEVEL, COMBINED N/A 7/5/2017    Procedure: COMBINED DISCECTOMY, FUSION CERVICAL ANTERIOR ONE LEVEL;  Cervical 6-7, Anterior cervical discectomy fusion;  Surgeon: Mike Mckenna MD;  Location: [facility]     HC COLONOSCOPY W/WO BRUSH/WASH  12/27/2005     HC CREATE EARDRUM OPENING,GEN ANESTH  09/27/2007    Pe tube, Left endolymphatic sac enhancement.     HC MASTOIDECTOMY,COMPLETE  09/27/2007     HERNIORRHAPHY UMBILICAL N/A 8/11/2017    Procedure: HERNIORRHAPHY UMBILICAL;  open umbilical hernia repair;  Surgeon: Boni Story MD;  Location: [facility]     INJECT EPIDURAL LUMBAR N/A 11/9/2017    Procedure: INJECT EPIDURAL LUMBAR;  lumbar 4-5 epidural steroid injection ;  Surgeon: Darryn Mcdaniel MD;  Location: [facility]     PE TUBES  2006    left ear     Current Outpatient Prescriptions   Medication Sig Dispense Refill     HYDROcodone-acetaminophen (NORCO) 5-325 MG per tablet Take 1 tablet by mouth every 6 hours as needed for other (Moderate to Severe Pain) . May fill on/after 12/18/2017 not to start till 12/19/2017. 120 tablet 0     IBUPROFEN PO        tiZANidine (ZANAFLEX) 4 MG  tablet Take 1 tablet (4 mg) by mouth 2 times daily as needed for muscle spasms 60 tablet 3     methocarbamol (ROBAXIN) 750 MG tablet Take 1 tablet (750 mg) by mouth 3 times daily as needed for muscle spasms 90 tablet 3     gabapentin (NEURONTIN) 300 MG capsule Take 3 capsules (900 mg) by mouth 3 times daily If needed/tolerated, OK to increase bedtime dose to 1200mg 300 capsule 3     losartan (COZAAR) 50 MG tablet Take 1 tablet (50 mg) by mouth daily 90 tablet 1     cyclobenzaprine (FLEXERIL) 10 MG tablet Take 1 tablet (10 mg) by mouth 2 times daily as needed for muscle spasms Reported on 4/12/2017 60 tablet 1     order for DME Equipment being ordered: TENS Pads as directed 1 Device 0     Acetaminophen (TYLENOL PO) Take 500 mg by mouth every 4 hours as needed for mild pain or fever       hydrOXYzine (VISTARIL) 25 MG capsule Take 1 capsule (25 mg) by mouth At Bedtime (Patient not taking: Reported on 12/18/2017) 30 capsule 1     OTC products: None, except as noted above    Allergies   Allergen Reactions     No Known Drug Allergies       Latex Allergy: NO    Social History   Substance Use Topics     Smoking status: Never Smoker     Smokeless tobacco: Never Used     Alcohol use No     History   Drug Use No       REVIEW OF SYSTEMS:                                                    C: NEGATIVE for fever, chills, change in weight  E/M: NEGATIVE for ear, mouth and throat problems  R: NEGATIVE for significant cough or SOB  CV: NEGATIVE for chest pain, palpitations or peripheral edema  ROS otherwise negative    EXAM:                                                    /84 (BP Location: Right arm, Patient Position: Chair, Cuff Size: Adult Large)  Pulse 74  Temp 97.7  F (36.5  C) (Temporal)  Resp 16  Wt 215 lb 9.6 oz (97.8 kg)  SpO2 98%  BMI 28.44 kg/m2  GENERAL APPEARANCE: healthy, alert and no distress  HENT: ear canals and TM's normal and nose and mouth without ulcers or lesions  RESP: lungs clear to auscultation  - no rales, rhonchi or wheezes  CV: regular rate and rhythm, normal S1 S2, no S3 or S4 and no murmur, click or rub   ABDOMEN: soft, nontender, no HSM or masses and bowel sounds normal  NEURO: Normal strength and tone, sensory exam grossly normal, mentation intact and speech normal    DIAGNOSTICS:                                                    No labs or EKG required for low risk surgery (cataract, skin procedure, breast biopsy, etc)    Recent Labs   Lab Test  06/30/17   1613  03/24/16   1900   HGB   --   15.7   PLT   --   259   NA  142  142   POTASSIUM  3.9  3.5   CR  1.15  1.06        IMPRESSION:                                                    Reason for surgery/procedure: lumbar spinal stenosis .  Proposed procedure: Inject Epidural Lumbar      The proposed surgical procedure is considered LOW risk.    REVISED CARDIAC RISK INDEX  The patient has the following serious cardiovascular risks for perioperative complications such as (MI, PE, VFib and 3  AV Block):  No serious cardiac risks  INTERPRETATION: 0 risks: Class I (very low risk - 0.4% complication rate)    The patient has the following additional risks for perioperative complications:  No identified additional risks      ICD-10-CM    1. Preop general physical exam Z01.818    2. Spinal stenosis of lumbar region without neurogenic claudication M48.061    3. Meniere's disease of both ears H81.03 AUDIOLOGY ADULT REFERRAL       RECOMMENDATIONS:                                                      --Consult hospital rounder / IM to assist post-op medical management    --Patient is to take all scheduled medications on the day of surgery EXCEPT for modifications listed below.    ACE Inhibitor or Angiotensin Receptor Blocker (ARB) Use  Ace inhibitor or Angiotensin Receptor Blocker (ARB) and should HOLD this medication for the 24 hours prior to surgery.        APPROVAL GIVEN to proceed with proposed procedure, without further diagnostic evaluation       Signed  Electronically by: Tha Grullon MD    Copy of this evaluation report is provided to requesting physician.    [place] Preop Guidelines

## 2017-12-19 NOTE — PROGRESS NOTES
SUBJECTIVE:                                                    Indra Mehta is a 52 year old male who presents to clinic today for the following health issues:      HPI    Hearing Loss   Acute illness concerns: chronic with worsening hearing loss on right.  Onset: chronic, worse 2-3 weeks.    Fever: no    Chills/Sweats: no    Headache (location?): no    Sinus Pressure:no    Conjunctivitis:  no    Ear Pain: no    Rhinorrhea: no    Congestion: no    Sore Throat: no     Cough: no    Wheeze: no    Decreased Appetite: no    Nausea: no    Vomiting: no    Diarrhea:  no    Dysuria/Freq.: no    Fatigue/Achiness: no    Sick/Strep Exposure: no     Therapies Tried and outcome: Known Meniere's with hearing loss on left, now more problems on right.    Diarrhea  Onset: Chronic    Description:   Consistency of stool: loose and pasty  Blood in stool: no   Number of loose stools in past 24 hours: 2    Progression of Symptoms:  intermittent and waxing and waning    Accompanying Signs & Symptoms:  Fever: no   Nausea or vomiting; no   Abdominal pain: YES- occasional post prandial cramping  Episodes of constipation: no   Weight loss: no     History:   Ill contacts: no   Recent use of antibiotics: no    Recent travels: no          Recent medication-new or changes(Rx or OTC): no     Precipitating factors:   Usually foods, stress. He hasn't been able to identify a specific food. He has tried to eliminate dairy and gluten without change    Alleviating factors:   Imodium has helped    Therapies Tried and outcome:  Imodium AD; Outcome: Improved    WART(S)  Onset: several months    Description:   Location: left palme  Number of warts: 1  Painful: YES    Accompanying Signs & Symptoms:  Signs of infection: no    History:   History of trauma: no  Prior warts: no    Therapies Tried and outcome: none          Problem list and histories reviewed & adjusted, as indicated.  Additional history: as documented      Patient Active Problem List    Diagnosis     Meniere's disease     Major depression in complete remission (H)     Chronic pain syndrome     CARDIOVASCULAR SCREENING; LDL GOAL LESS THAN 160     Pain medication agreement signed     Myalgia     Bilateral occipital neuralgia     Benign essential hypertension     Past Surgical History:   Procedure Laterality Date     BUNIONECTOMY RT/LT  09/05/08    Both feet     COLONOSCOPY N/A 12/28/2016    Procedure: COMBINED COLONOSCOPY, SINGLE OR MULTIPLE BIOPSY/POLYPECTOMY BY BIOPSY;  Surgeon: Indra Jernigan MD;  Location: [facility]     DISCECTOMY, FUSION CERVICAL ANTERIOR ONE LEVEL, COMBINED N/A 7/5/2017    Procedure: COMBINED DISCECTOMY, FUSION CERVICAL ANTERIOR ONE LEVEL;  Cervical 6-7, Anterior cervical discectomy fusion;  Surgeon: Mike Mckenna MD;  Location: [facility]     HC COLONOSCOPY W/WO BRUSH/WASH  12/27/2005     HC CREATE EARDRUM OPENING,GEN ANESTH  09/27/2007    Pe tube, Left endolymphatic sac enhancement.     HC MASTOIDECTOMY,COMPLETE  09/27/2007     HERNIORRHAPHY UMBILICAL N/A 8/11/2017    Procedure: HERNIORRHAPHY UMBILICAL;  open umbilical hernia repair;  Surgeon: Boni Story MD;  Location: [facility]     INJECT EPIDURAL LUMBAR N/A 11/9/2017    Procedure: INJECT EPIDURAL LUMBAR;  lumbar 4-5 epidural steroid injection ;  Surgeon: Darryn Mcdaniel MD;  Location: [facility]     PE TUBES  2006    left ear       Social History   Substance Use Topics     Smoking status: Never Smoker     Smokeless tobacco: Never Used     Alcohol use No     Family History   Problem Relation Age of Onset     Cancer - colorectal Mother      DIABETES Mother      Cardiovascular Father      Hypertension Father      MENTAL ILLNESS Sister      Suicide Other          Current Outpatient Prescriptions   Medication Sig Dispense Refill     HYDROcodone-acetaminophen (NORCO) 5-325 MG per tablet Take 1 tablet by mouth every 6 hours as needed for other (Moderate to Severe Pain) . May fill on/after 12/18/2017 not to start till  12/19/2017. 120 tablet 0     IBUPROFEN PO        tiZANidine (ZANAFLEX) 4 MG tablet Take 1 tablet (4 mg) by mouth 2 times daily as needed for muscle spasms 60 tablet 3     methocarbamol (ROBAXIN) 750 MG tablet Take 1 tablet (750 mg) by mouth 3 times daily as needed for muscle spasms 90 tablet 3     gabapentin (NEURONTIN) 300 MG capsule Take 3 capsules (900 mg) by mouth 3 times daily If needed/tolerated, OK to increase bedtime dose to 1200mg 300 capsule 3     losartan (COZAAR) 50 MG tablet Take 1 tablet (50 mg) by mouth daily 90 tablet 1     cyclobenzaprine (FLEXERIL) 10 MG tablet Take 1 tablet (10 mg) by mouth 2 times daily as needed for muscle spasms Reported on 4/12/2017 60 tablet 1     order for DME Equipment being ordered: TENS Pads as directed 1 Device 0     Acetaminophen (TYLENOL PO) Take 500 mg by mouth every 4 hours as needed for mild pain or fever       hydrOXYzine (VISTARIL) 25 MG capsule Take 1 capsule (25 mg) by mouth At Bedtime (Patient not taking: Reported on 12/18/2017) 30 capsule 1     Allergies   Allergen Reactions     No Known Drug Allergies      Recent Labs   Lab Test  06/30/17   1613  03/24/16   1900  03/29/13   0834   LDL   --    --   142*   HDL   --    --   34*   TRIG   --    --   185*   CR  1.15  1.06   --    GFRESTIMATED  67  74   --    GFRESTBLACK  81  90   --    POTASSIUM  3.9  3.5   --    TSH   --   2.00   --       BP Readings from Last 3 Encounters:   12/18/17 130/84   11/09/17 127/84   11/06/17 114/68    Wt Readings from Last 3 Encounters:   12/18/17 215 lb 9.6 oz (97.8 kg)   11/09/17 214 lb (97.1 kg)   11/06/17 214 lb (97.1 kg)                          ROS:  C: NEGATIVE for fever, chills, change in weight  INTEGUMENTARY/SKIN: POSITIVE for 1 wart  E/M: NEGATIVE for ear, mouth and throat problems  R: NEGATIVE for significant cough or SOB  CV: NEGATIVE for chest pain, palpitations or peripheral edema  GI: POSITIVE for diarrhea and gas or bloating and NEGATIVE for constipation, dyspepsia,  heartburn or reflux, hematemesis, hematochezia, Hx gallbladder trouble, Hx gastritis, Hx GERD, Hx IBD, Hx stomach or duadenal ulcer, jaundice, melena, nausea, poor appetite, vomiting and weight loss  : negative for dysuria, hematuria, decreased urinary stream, erectile dysfunction  ROS otherwise negative    OBJECTIVE:     /84 (BP Location: Right arm, Patient Position: Chair, Cuff Size: Adult Large)  Pulse 74  Temp 97.7  F (36.5  C) (Temporal)  Resp 16  Wt 215 lb 9.6 oz (97.8 kg)  SpO2 98%  BMI 28.44 kg/m2  Body mass index is 28.44 kg/(m^2).  GENERAL: healthy, alert and no distress  HENT: normal cephalic/atraumatic, right ear: normal: no effusions, no erythema, normal landmarks and partially occluded with wax, left ear: normal: no effusions, no erythema, normal landmarks, nose and mouth without ulcers or lesions, oropharynx clear and oral mucous membranes moist  NECK: no adenopathy, no asymmetry, masses, or scars and thyroid normal to palpation  RESP: lungs clear to auscultation - no rales, rhonchi or wheezes  CV: regular rate and rhythm, normal S1 S2, no S3 or S4, no murmur, click or rub, no peripheral edema and peripheral pulses strong  ABDOMEN: soft, nontender, no hepatosplenomegaly, no masses and bowel sounds normal  MS: no gross musculoskeletal defects noted, no edema  SKIN: 1 warts    Diagnostic Test Results:  none     ASSESSMENT/PLAN:        Meniere's disease of both ears  Chronic with progressive hearing loss. He does have some cerumen on the right which I offered to remove. He declined and will manage it at home. Will refer to audiology for ongoing hearing assessment.  - AUDIOLOGY ADULT REFERRAL    Irritable bowel syndrome with diarrhea  Chronic. Increase dietary fiber, small frequent meals, ample fluids, stress management, decrease caffeine and continue Imodium AD once daily.    5. Other viral warts  1 Wart pared and frozen with liquid nitrogen X 2. Follow up in 2 weeks for retreatment as  needed.  - DESTRUCT BENIGN LESION, UP TO 14    Patient Instructions       Before Your Surgery      Call your surgeon if there is any change in your health. This includes signs of a cold or flu (such as a sore throat, runny nose, cough, rash or fever).    Do not smoke, drink alcohol or take over the counter medicine (unless your surgeon or primary care doctor tells you to) for the 24 hours before and after surgery.    If you take prescribed drugs: Follow your doctor s orders about which medicines to take and which to stop until after surgery.    Eating and drinking prior to surgery: follow the instructions from your surgeon  Take a shower or bath the night before surgery. Use the soap your surgeon gave you to gently clean your skin. If you do not have soap from your surgeon, use your regular soap. Do not shave or scrub the surgery site.  Wear clean pajamas and have clean sheets on your bed.   What is Irritable Bowel Syndrome (IBS)?     Muscle contraction moves food through the digestive tract.      People who have irritable bowel syndrome (IBS) have digestive tracts that react abnormally to certain substances or to stress. This leads to symptoms like cramps, gas, bloating, pain, constipation, and diarrhea. Sometimes called  spastic colon,  IBS is a common condition that is not a disease, but rather a group of symptoms that happen together.  IBS--a motility problem  The muscle movement that passes food through the digestive tract is called motility. When you have IBS, the normal motility of the digestive tract (especially the colon) is disrupted. Motility may speed up, slow down, or become irregular. If stool passes too quickly through the colon, not enough water is absorbed from it. Loose, watery stools (diarrhea) can result. If stool passes through the colon too slowly, too much water is absorbed and the stool becomes hard and dry (constipation). Also, stool and gas may back up and cause painful pressure and  "cramping. There is no single test that can diagnose IBS. It is a group of symptoms that help your healthcare provider with the diagnosis. Often multiple blood, stool, radiologic tests, or even colonoscopy are performed in the evaluation of people suspected to have IBS. These are done primarily to make sure that there are no other illnesses that can account for your symptoms.   What causes IBS?  A great deal of research has been done on IBS, but the cause is still not known. Some of the possible factors include:   Smoking, eating certain foods, or drinking alcohol or caffeinated drinks can cause, or \"trigger,\" symptoms of IBS.  Although no one knows for sure, IBS may be caused by a problem with the nerves or muscles in your digestive tract.  There is also some evidence that certain bacteria found after a severe gastroinstestinal infection in the small intestine and colon may cause IBS.  While stress and anxiety worsen the symptoms of IBS, it is not believed to be the cause.   What you can do  Recommendations include:  Certain medicines may help regulate the working of your digestive tract. Your healthcare provider may prescribe one or more for you.  Medicine can t cure IBS, but it may help manage the symptoms.  Because some medicines may make IBS worse, don t take any medicine, especially laxatives, unless your healthcare provider prescribes it for you.  Your healthcare provider may suggest some lifestyle changes to help control your IBS. Two of the most important are changing your diet and managing stress. If diet changes are suggested, ask for nutritional guidance from a dietitian so you maintain a healthy nutritional balance in your food intake.   Date Last Reviewed: 7/1/2016 2000-2017 [employer]. [address] [place] [address], [place] [address]. All rights reserved. This information is not intended as a substitute for professional medical care. Always follow your healthcare professional's " instructions.        Irritable Bowel Syndrome    Irritable bowel syndrome (IBS) is a disorder of the intestines. It is not a disease, but a group of symptoms caused by changes in the way the intestines work. It is fairly common, but the cause is not well understood.  Symptoms of IBS include:  Abdominal pain, discomfort, and cramping  Diarrhea  Constipation or dry, hard stools  Mucous stool  Bloating  Feeling of incomplete bowel movements  It usually results in one of 3 patterns of symptoms:  Chronic abdominal pain and constipation  Recurring episodes of diarrhea, with or without pain  Alternating diarrhea and constipation  Home care  The goal of treatment is to control and relieve your symptoms, so you can lead a full and active life. There is no cure for IBS. But it can be managed.  Diet  Your diet did not cause your IBS, but it can affect it. No one diet works for everyone. Finding the best foods for you may take trial and error. Keep a food log to help find what foods made your symptoms worse. Below are some tips that may help you.  Eat more slowly. Eat smaller amounts at a time, but more often. Remember, you can always eat more, but cannot eat less once you ve eaten too much.  High-fiber foods are complicated. While they may help relieve constipation, they can make your bloating, cramping, gas, and diarrhea worse.  Eat less sugar.  Try cutting out dairy products. Sometimes this helps.  Try cutting out foods that are high in fat and fatty meats.  You can control bloating or passing excess gas. Be careful with  gassy  vegetables and fruits like beans, cabbage, broccoli, and cauliflower.  Be careful with carbonated beverages and fruit juices. They can make your bloating and diarrhea worse.  Caffeine, alcohol, and stimulants can make symptoms worse. These include coffee, tea, sodas, energy drinks, and chocolate.  Lifestyle  Look for factors that seem to worsen your symptoms. These include stress and  emotions.   Although stress does not cause IBS, it may trigger flare-ups. Counseling can help you learn to handle stress. So can self-help measures like exercise, yoga, and meditation.  Depression can occur along with IBS. Your healthcare provider may prescribe antidepressant medicine. This may help with diarrhea, constipation, and cramping, as well as with symptoms of depression.  Smoking doesn't cause IBS, but can make the symptoms worse.  Medicines  Your healthcare provider may prescribe medicines. Take them as directed. For acute flare-ups of your illness, your provider may give you prescription medicines.  Check with your healthcare provider before taking any medicines for diarrhea.  Avoid anti-inflammatory medicines like ibuprofen or naproxen.  Consider nutritional supplements. This is especially true if your diarrhea is prolonged, or you aren't eating or are losing weight  Follow-up care  Follow up with your healthcare provider, or as advised. If a stool sample was taken or cultures were done, you will be told if your treatment needs to change. You can call as directed for the results.  When to seek medical advice  Call your healthcare provider right away if any of these occur:  Abdominal pain gets worse  Constant abdominal pain moves to the right-lower abdomen  You can't keep liquids down because of vomiting  You have severe diarrhea  You have blood (red or black color) or mucus in your stool  You feel very weak or dizzy, faint, or have extreme thirst  You have a fever of 100.4 F (38.0 C) or higher, or as directed by your healthcare provider  Date Last Reviewed: 8/31/2015 2000-2017 [employer]. [address] [place] [address], [place] [address]. All rights reserved. This information is not intended as a substitute for professional medical care. Always follow your healthcare professional's instructions.        Diet and Lifestyle Tips for Irritable Bowel Syndrome (IBS)    Your healthcare professional  may suggest some lifestyle changes to help control your IBS. Changing your diet and managing stress are two of the most important. Follow your healthcare provider s instructions and try some of the suggestions below.  Change your diet  Your diet may be an important cause of IBS symptoms. You may want to try the following:  Pay attention to what foods bother you, and avoid them. For example, dairy products are hard for some people to digest.  Drink 6 to 8 glasses of water a day.  Avoid caffeine and tobacco. These are muscle stimulants and can affect the working of your digestive tract.  Avoid alcohol, which can irritate your digestive tract and make your symptoms worse.  Eat more fiber if constipation is a problem. Fiber makes the stool softer and easier to pass through the colon.  Reduce stress  If stress or anxiety makes your IBS symptoms worse, learning how to manage stress may help you feel better. Try these tips:  Identify the causes of stress in your life.  Learn new ways to cope with them.  Regular exercise is a great way to relieve stress. It can also help ease constipation.  Date Last Reviewed: 7/1/2016 2000-2017 [employer]. [address] [place] [address], [place] [address]. All rights reserved. This information is not intended as a substitute for professional medical care. Always follow your healthcare professional's instructions.              Tha Grullon MD  [facility]

## 2017-12-27 NOTE — H&P (VIEW-ONLY)
[place] [address] [facility] [address]  [phone]  Dept: [phone]    PRE-OP EVALUATION:  Today's date: 2017    Indra Mehta (: 1965) presents for pre-operative evaluation assessment as requested by Dr. Mcdaniel.  He requires evaluation and anesthesia risk assessment prior to undergoing surgery/procedure for treatment of lumbar spinal stenosis .  Proposed procedure: Inject Epidural Lumbar    Date of Surgery/ Procedure: 2017  Time of Surgery/ Procedure: 1:00pm  Hospital/Surgical Facility: [facility]  Primary Physician: Tha Grullon  Type of Anesthesia Anticipated: Monitor Anesthesia Care    Patient has a Health Care Directive or Living Will:  NO    1. NO - Do you have a history of heart attack, stroke, stent, bypass or surgery on an artery in the head, neck, heart or legs?  2. NO - Do you ever have any pain or discomfort in your chest?  3. NO - Do you have a history of  Heart Failure?  4. NO - Are you troubled by shortness of breath when: walking on the level, up a slight hill or at night?  5. NO - Do you currently have a cold, bronchitis or other respiratory infection?  6. NO - Do you have a cough, shortness of breath or wheezing?  7. YES - DO YOU SOMETIMES GET PAINS IN THE CALVES OF YOUR LEGS WHEN YOU WALK? Due to back  8. NO - Do you or anyone in your family have previous history of blood clots?  9. NO - Do you or does anyone in your family have a serious bleeding problem such as prolonged bleeding following surgeries or cuts?  10. NO - Have you ever had problems with anemia or been told to take iron pills?  11. NO - Have you had any abnormal blood loss such as black, tarry or bloody stools, or abnormal vaginal bleeding?  12. NO - Have you ever had a blood transfusion?  13. NO - Have you or any of your relatives ever had problems with anesthesia?  14. NO - Do you have sleep apnea, excessive snoring or daytime drowsiness?  15. NO - Do you have any prosthetic  heart valves?  16. NO - Do you have prosthetic joints?  17. NO - Is there any chance that you may be pregnant?        HPI:                                                      Brief HPI related to upcoming procedure: Indra Mehta is a 52 year old male who presents with chronic lumbar degenerative disc disease and chronic low back pain.       HYPERTENSION - Patient has longstanding history of mod-severe HTN , currently denies any symptoms referable to elevated blood pressure. Specifically denies chest pain, palpitations, dyspnea, orthopnea, PND or peripheral edema. Blood pressure readings have been in normal range. Current medication regimen is as listed below. Patient denies any side effects of medication.                                                                                                                                                                                          .    MEDICAL HISTORY:                                                    Patient Active Problem List    Diagnosis Date Noted     Benign essential hypertension 06/30/2017     Priority: Medium     Myalgia 10/28/2016     Priority: Medium     Bilateral occipital neuralgia 10/28/2016     Priority: Medium     CARDIOVASCULAR SCREENING; LDL GOAL LESS THAN 160 10/31/2010     Priority: Medium     Major depression in complete remission (H) 08/20/2010     Priority: Medium     Chronic pain syndrome 08/20/2010     Priority: Medium     Patient is followed by Tha Grullon MD for ongoing prescription of pain medication.  All refills should be approved by this provider only at face-to-face appointments - not by phone request.    Medication(s): Tramadol.   Maximum quantity per month: 60  Clinic visit frequency required: Q 1 months     Controlled substance agreement:  Encounter-Level CSA - 10/13/16:               Controlled Substance Agreement - Scan on 10/23/2016  5:07 PM : CONTROLLED SUBSTANCE AGREEMENT 10/13/16 (below)            Pain Clinic  evaluation in the past: Yes       Date/Location:   [facility]    DIRE Total Score(s):  No flowsheet data found.    Last [facility] website verification:  none   [email or web address]               Pain medication agreement signed 08/20/2010     Priority: Medium     Meniere's disease 03/22/2007     Priority: Medium     Problem list name updated by automated process. Provider to review        Past Medical History:   Diagnosis Date     Back pain      Meniere's disease, unspecified      Pain medication agreement signed 8/20/2010     Past Surgical History:   Procedure Laterality Date     BUNIONECTOMY RT/LT  09/05/08    Both feet     COLONOSCOPY N/A 12/28/2016    Procedure: COMBINED COLONOSCOPY, SINGLE OR MULTIPLE BIOPSY/POLYPECTOMY BY BIOPSY;  Surgeon: Indra Jernigan MD;  Location: [facility]     DISCECTOMY, FUSION CERVICAL ANTERIOR ONE LEVEL, COMBINED N/A 7/5/2017    Procedure: COMBINED DISCECTOMY, FUSION CERVICAL ANTERIOR ONE LEVEL;  Cervical 6-7, Anterior cervical discectomy fusion;  Surgeon: Mike Mckenna MD;  Location: [facility] OR     HC COLONOSCOPY W/WO BRUSH/WASH  12/27/2005     HC CREATE EARDRUM OPENING,GEN ANESTH  09/27/2007    Pe tube, Left endolymphatic sac enhancement.     HC MASTOIDECTOMY,COMPLETE  09/27/2007     HERNIORRHAPHY UMBILICAL N/A 8/11/2017    Procedure: HERNIORRHAPHY UMBILICAL;  open umbilical hernia repair;  Surgeon: Boni Story MD;  Location: [facility]     INJECT EPIDURAL LUMBAR N/A 11/9/2017    Procedure: INJECT EPIDURAL LUMBAR;  lumbar 4-5 epidural steroid injection ;  Surgeon: Darryn Mcdaniel MD;  Location: [facility]     PE TUBES  2006    left ear     Current Outpatient Prescriptions   Medication Sig Dispense Refill     HYDROcodone-acetaminophen (NORCO) 5-325 MG per tablet Take 1 tablet by mouth every 6 hours as needed for other (Moderate to Severe Pain) . May fill on/after 12/18/2017 not to start till 12/19/2017. 120 tablet 0     IBUPROFEN PO        tiZANidine (ZANAFLEX) 4 MG  tablet Take 1 tablet (4 mg) by mouth 2 times daily as needed for muscle spasms 60 tablet 3     methocarbamol (ROBAXIN) 750 MG tablet Take 1 tablet (750 mg) by mouth 3 times daily as needed for muscle spasms 90 tablet 3     gabapentin (NEURONTIN) 300 MG capsule Take 3 capsules (900 mg) by mouth 3 times daily If needed/tolerated, OK to increase bedtime dose to 1200mg 300 capsule 3     losartan (COZAAR) 50 MG tablet Take 1 tablet (50 mg) by mouth daily 90 tablet 1     cyclobenzaprine (FLEXERIL) 10 MG tablet Take 1 tablet (10 mg) by mouth 2 times daily as needed for muscle spasms Reported on 4/12/2017 60 tablet 1     order for DME Equipment being ordered: TENS Pads as directed 1 Device 0     Acetaminophen (TYLENOL PO) Take 500 mg by mouth every 4 hours as needed for mild pain or fever       hydrOXYzine (VISTARIL) 25 MG capsule Take 1 capsule (25 mg) by mouth At Bedtime (Patient not taking: Reported on 12/18/2017) 30 capsule 1     OTC products: None, except as noted above    Allergies   Allergen Reactions     No Known Drug Allergies       Latex Allergy: NO    Social History   Substance Use Topics     Smoking status: Never Smoker     Smokeless tobacco: Never Used     Alcohol use No     History   Drug Use No       REVIEW OF SYSTEMS:                                                    C: NEGATIVE for fever, chills, change in weight  E/M: NEGATIVE for ear, mouth and throat problems  R: NEGATIVE for significant cough or SOB  CV: NEGATIVE for chest pain, palpitations or peripheral edema  ROS otherwise negative    EXAM:                                                    /84 (BP Location: Right arm, Patient Position: Chair, Cuff Size: Adult Large)  Pulse 74  Temp 97.7  F (36.5  C) (Temporal)  Resp 16  Wt 215 lb 9.6 oz (97.8 kg)  SpO2 98%  BMI 28.44 kg/m2  GENERAL APPEARANCE: healthy, alert and no distress  HENT: ear canals and TM's normal and nose and mouth without ulcers or lesions  RESP: lungs clear to auscultation  - no rales, rhonchi or wheezes  CV: regular rate and rhythm, normal S1 S2, no S3 or S4 and no murmur, click or rub   ABDOMEN: soft, nontender, no HSM or masses and bowel sounds normal  NEURO: Normal strength and tone, sensory exam grossly normal, mentation intact and speech normal    DIAGNOSTICS:                                                    No labs or EKG required for low risk surgery (cataract, skin procedure, breast biopsy, etc)    Recent Labs   Lab Test  06/30/17   1613  03/24/16   1900   HGB   --   15.7   PLT   --   259   NA  142  142   POTASSIUM  3.9  3.5   CR  1.15  1.06        IMPRESSION:                                                    Reason for surgery/procedure: lumbar spinal stenosis .  Proposed procedure: Inject Epidural Lumbar      The proposed surgical procedure is considered LOW risk.    REVISED CARDIAC RISK INDEX  The patient has the following serious cardiovascular risks for perioperative complications such as (MI, PE, VFib and 3  AV Block):  No serious cardiac risks  INTERPRETATION: 0 risks: Class I (very low risk - 0.4% complication rate)    The patient has the following additional risks for perioperative complications:  No identified additional risks      ICD-10-CM    1. Preop general physical exam Z01.818    2. Spinal stenosis of lumbar region without neurogenic claudication M48.061    3. Meniere's disease of both ears H81.03 AUDIOLOGY ADULT REFERRAL       RECOMMENDATIONS:                                                      --Consult hospital rounder / IM to assist post-op medical management    --Patient is to take all scheduled medications on the day of surgery EXCEPT for modifications listed below.    ACE Inhibitor or Angiotensin Receptor Blocker (ARB) Use  Ace inhibitor or Angiotensin Receptor Blocker (ARB) and should HOLD this medication for the 24 hours prior to surgery.        APPROVAL GIVEN to proceed with proposed procedure, without further diagnostic evaluation       Signed  Electronically by: Tha Grullon MD    Copy of this evaluation report is provided to requesting physician.    [facility] Preop Guidelines

## 2017-12-28 ENCOUNTER — HOSPITAL ENCOUNTER (OUTPATIENT)
Facility: CLINIC | Age: 52
Discharge: HOME | End: 2017-12-28
Attending: ANESTHESIOLOGY | Admitting: ANESTHESIOLOGY
Payer: COMMERCIAL

## 2017-12-28 ENCOUNTER — HOSPITAL ENCOUNTER (OUTPATIENT)
Dept: GENERAL RADIOLOGY | Facility: CLINIC | Age: 52
End: 2017-12-28
Attending: ANESTHESIOLOGY | Admitting: ANESTHESIOLOGY
Payer: COMMERCIAL

## 2017-12-28 ENCOUNTER — ANESTHESIA EVENT (OUTPATIENT)
Dept: SURGERY | Facility: CLINIC | Age: 52
End: 2017-12-28
Payer: COMMERCIAL

## 2017-12-28 ENCOUNTER — SURGERY (OUTPATIENT)
Age: 52
End: 2017-12-28

## 2017-12-28 ENCOUNTER — ANESTHESIA (OUTPATIENT)
Dept: SURGERY | Facility: CLINIC | Age: 52
End: 2017-12-28
Payer: COMMERCIAL

## 2017-12-28 VITALS
RESPIRATION RATE: 16 BRPM | DIASTOLIC BLOOD PRESSURE: 77 MMHG | OXYGEN SATURATION: 97 % | TEMPERATURE: 97.6 F | SYSTOLIC BLOOD PRESSURE: 120 MMHG

## 2017-12-28 DIAGNOSIS — M54.16 LUMBAR RADICULOPATHY: ICD-10-CM

## 2017-12-28 PROCEDURE — 25000128 H RX IP 250 OP 636: Performed by: ANESTHESIOLOGY

## 2017-12-28 PROCEDURE — 40000277 XR FLUORO NEEDLE PLACEMENT SPINE (WITH PROCEDURE)

## 2017-12-28 PROCEDURE — 25000125 ZZHC RX 250: Performed by: NURSE ANESTHETIST, CERTIFIED REGISTERED

## 2017-12-28 PROCEDURE — 62323 NJX INTERLAMINAR LMBR/SAC: CPT | Performed by: ANESTHESIOLOGY

## 2017-12-28 PROCEDURE — 25000128 H RX IP 250 OP 636: Performed by: NURSE ANESTHETIST, CERTIFIED REGISTERED

## 2017-12-28 PROCEDURE — 37000008 ZZH ANESTHESIA TECHNICAL FEE, 1ST 30 MIN: Performed by: ANESTHESIOLOGY

## 2017-12-28 RX ORDER — ONDANSETRON 4 MG/1
4 TABLET, ORALLY DISINTEGRATING ORAL EVERY 30 MIN PRN
Status: CANCELLED | OUTPATIENT
Start: 2017-12-28

## 2017-12-28 RX ORDER — PROPOFOL 10 MG/ML
INJECTION, EMULSION INTRAVENOUS PRN
Status: DISCONTINUED | OUTPATIENT
Start: 2017-12-28 | End: 2017-12-28

## 2017-12-28 RX ORDER — NALOXONE HYDROCHLORIDE 0.4 MG/ML
.1-.4 INJECTION, SOLUTION INTRAMUSCULAR; INTRAVENOUS; SUBCUTANEOUS
Status: CANCELLED | OUTPATIENT
Start: 2017-12-28 | End: 2017-12-29

## 2017-12-28 RX ORDER — SODIUM CHLORIDE, SODIUM LACTATE, POTASSIUM CHLORIDE, CALCIUM CHLORIDE 600; 310; 30; 20 MG/100ML; MG/100ML; MG/100ML; MG/100ML
INJECTION, SOLUTION INTRAVENOUS CONTINUOUS
Status: DISCONTINUED | OUTPATIENT
Start: 2017-12-28 | End: 2017-12-28 | Stop reason: HOSPADM

## 2017-12-28 RX ORDER — LIDOCAINE HYDROCHLORIDE 20 MG/ML
INJECTION, SOLUTION INFILTRATION; PERINEURAL PRN
Status: DISCONTINUED | OUTPATIENT
Start: 2017-12-28 | End: 2017-12-28

## 2017-12-28 RX ORDER — DIMENHYDRINATE 50 MG/ML
25 INJECTION, SOLUTION INTRAMUSCULAR; INTRAVENOUS
Status: CANCELLED | OUTPATIENT
Start: 2017-12-28

## 2017-12-28 RX ORDER — ALBUTEROL SULFATE 0.83 MG/ML
2.5 SOLUTION RESPIRATORY (INHALATION) EVERY 4 HOURS PRN
Status: CANCELLED | OUTPATIENT
Start: 2017-12-28

## 2017-12-28 RX ORDER — LIDOCAINE 40 MG/G
CREAM TOPICAL
Status: DISCONTINUED | OUTPATIENT
Start: 2017-12-28 | End: 2017-12-28 | Stop reason: HOSPADM

## 2017-12-28 RX ORDER — IOPAMIDOL 612 MG/ML
INJECTION, SOLUTION INTRATHECAL PRN
Status: DISCONTINUED | OUTPATIENT
Start: 2017-12-28 | End: 2017-12-28 | Stop reason: HOSPADM

## 2017-12-28 RX ORDER — SODIUM CHLORIDE, SODIUM LACTATE, POTASSIUM CHLORIDE, CALCIUM CHLORIDE 600; 310; 30; 20 MG/100ML; MG/100ML; MG/100ML; MG/100ML
INJECTION, SOLUTION INTRAVENOUS CONTINUOUS
Status: CANCELLED | OUTPATIENT
Start: 2017-12-28

## 2017-12-28 RX ORDER — ONDANSETRON 2 MG/ML
4 INJECTION INTRAMUSCULAR; INTRAVENOUS EVERY 30 MIN PRN
Status: CANCELLED | OUTPATIENT
Start: 2017-12-28

## 2017-12-28 RX ORDER — FENTANYL CITRATE 50 UG/ML
25-50 INJECTION, SOLUTION INTRAMUSCULAR; INTRAVENOUS
Status: CANCELLED | OUTPATIENT
Start: 2017-12-28

## 2017-12-28 RX ADMIN — PROPOFOL 50 MG: 10 INJECTION, EMULSION INTRAVENOUS at 13:23

## 2017-12-28 RX ADMIN — LIDOCAINE HYDROCHLORIDE 3 ML: 10 INJECTION, SOLUTION EPIDURAL; INFILTRATION; INTRACAUDAL; PERINEURAL at 13:19

## 2017-12-28 RX ADMIN — PROPOFOL 50 MG: 10 INJECTION, EMULSION INTRAVENOUS at 13:24

## 2017-12-28 RX ADMIN — IOPAMIDOL 3 ML: 612 INJECTION, SOLUTION INTRATHECAL at 13:19

## 2017-12-28 RX ADMIN — PROPOFOL 50 MG: 10 INJECTION, EMULSION INTRAVENOUS at 13:20

## 2017-12-28 RX ADMIN — PROPOFOL 100 MG: 10 INJECTION, EMULSION INTRAVENOUS at 13:26

## 2017-12-28 RX ADMIN — PROPOFOL 50 MG: 10 INJECTION, EMULSION INTRAVENOUS at 13:17

## 2017-12-28 RX ADMIN — PROPOFOL 50 MG: 10 INJECTION, EMULSION INTRAVENOUS at 13:19

## 2017-12-28 RX ADMIN — SODIUM CHLORIDE, POTASSIUM CHLORIDE, SODIUM LACTATE AND CALCIUM CHLORIDE: 600; 310; 30; 20 INJECTION, SOLUTION INTRAVENOUS at 13:10

## 2017-12-28 RX ADMIN — LIDOCAINE HYDROCHLORIDE 40 MG: 20 INJECTION, SOLUTION INFILTRATION; PERINEURAL at 13:17

## 2017-12-28 RX ADMIN — SODIUM BICARBONATE 3 MEQ: 84 INJECTION, SOLUTION INTRAVENOUS at 13:20

## 2017-12-28 NOTE — DISCHARGE INSTRUCTIONS
Home Care Instructions  [facility] [phone]               Procedure:  Epidural Spine Injection or Joint injection    Activity:    Rest today    Do not work today    Resume normal activity tomorrow    Pain:    You may experience soreness at the injection site for one or two days    You may use an ice pack for 20 minutes every 2 hours for the first 24 hours    You may use a heating pad after the first 24 hours    You may use Tylenol  (acetaminophen) every 4 hours or other pain medicines as directed by your physician    Safety  Sedation medicine, if given may remain active for many hours.    It is important for the next 24 hours that you do not:    Drive a car    Operate machines or power tools    Consume alcohol, including beer    Sign any important papers or legal documents    You may experience numbness radiating into your legs or arms, (depending on the procedure location)  This numbness may last several hours.  Until the numb sensation returns to normal please use caution in walking, climbing stairs, stepping out of your vehicle, etc.    Common side effects of steroids:  Not everyone will experience corticosteroid side effects. If side effects are experienced they will gradually subside in the 7-10 day period following an injection.    Most common side effects include:    Flushed face and/or chest    Feeling of warmth, particularly in face but could be overall feeling of warmth    Increased blood sugar in diabetic patients    Menstrual irregularities may occur.  If taking hormone based birth control an alternate method of birth control is recommended    Sleep disturbances and/or mood swings are possible    Leg cramps    Please contact us if you have:  Severe pain   Fever more than 101.5 degrees Fahrenheit  Signs of infection (redness, swelling or drainage)      If you have questions, please contact Dr. Mcdaniel's office at [phone] between the hours of 8:00am and 5:00pm  Monday through Friday. After office hours you can contact the on call provider by dialing [phone].  If you need immediate attention we recommend that you go to a hospital emergency room or dial [phone].

## 2017-12-28 NOTE — ANESTHESIA PREPROCEDURE EVALUATION
Anesthesia Evaluation     . Pt has had prior anesthetic. Type: General and MAC           ROS/MED HX    ENT/Pulmonary:  - neg pulmonary ROS     Neurologic:     (+)neuropathy (bilateral occipital neuralgia) - Bilateral occipital neuralgia, other neuro Menieres disease    Cardiovascular:     (+) Dyslipidemia, hypertension----. : . . . :. . Previous cardiac testing date:results:date: results:ECG reviewed date:6/30/17 results:No results indicated, EKG reordered. date: results:          METS/Exercise Tolerance:  >4 METS   Hematologic:  - neg hematologic  ROS       Musculoskeletal:   (+) , , other musculoskeletal- myalgia/back pain      GI/Hepatic:  - neg GI/hepatic ROS       Renal/Genitourinary:  - ROS Renal section negative   (+) Pt has no history of transplant,       Endo:  - neg endo ROS       Psychiatric:     (+) psychiatric history depression      Infectious Disease:  - neg infectious disease ROS       Malignancy:      - no malignancy   Other:    (+) No chance of pregnancy C-spine cleared: Yes, H/O Chronic Pain,H/O chronic opiod use , no other significant disability                    Physical Exam  Normal systems: cardiovascular, pulmonary and dental    Airway   Mallampati: II  TM distance: >3 FB  Neck ROM: full    Dental     Cardiovascular   Rhythm and rate: regular and normal      Pulmonary    breath sounds clear to auscultation                    Anesthesia Plan      History & Physical Review  History and physical reviewed and following examination; no interval change.    ASA Status:  2 .    NPO Status:  > 6 hours    Plan for MAC with Propofol induction. Maintenance will be TIVA.  Reason for MAC:  Deep or markedly invasive procedure (G8)         Postoperative Care      Consents  Anesthetic plan, risks, benefits and alternatives discussed with:  Patient..                          .

## 2017-12-28 NOTE — IP AVS SNAPSHOT
[facility]    [address]     [place] [address]    Phone:  [phone]                                       After Visit Summary   12/28/2017    Indra Mehta    MRN: [id number]           After Visit Summary Signature Page     I have received my discharge instructions, and my questions have been answered. I have discussed any challenges I see with this plan with the nurse or doctor.    ..........................................................................................................................................  Patient/Patient Representative Signature      ..........................................................................................................................................  Patient Representative Print Name and Relationship to Patient    ..................................................               ................................................  Date                                            Time    ..........................................................................................................................................  Reviewed by Signature/Title    ...................................................              ..............................................  Date                                                            Time

## 2017-12-28 NOTE — IP AVS SNAPSHOT
MRN:[id number]                      After Visit Summary   12/28/2017    Indra Mehta    MRN: [id number]           Thank you!     Thank you for choosing [facility] for your care. Our goal is always to provide you with excellent care. Hearing back from our patients is one way we can continue to improve our services. Please take a few minutes to complete the written survey that you may receive in the mail after you visit with us. Thank you!        Patient Information     Date Of Birth          1965        About your hospital stay     You were admitted on:  December 28, 2017 You last received care in the:  [facility] Phase II    You were discharged on:  December 28, 2017       Who to Call     For medical emergencies, please call 911.  For non-urgent questions about your medical care, please call your primary care provider or clinic, [phone]  For questions related to your surgery, please call your surgery clinic        Attending Provider     Provider Specialty    [facility]       Primary Care Provider Office Phone # Fax #    Tha Grullon [phone]      After Care Instructions     Discharge Instructions       Review outpatient procedure discharge instructions as directed by Provider.                  Your next 10 appointments already scheduled     Jan 11, 2018  3:30 PM CST   Return Visit with Boni Freeman MD   [facility] ([facility])    [address] [address] [phone]              Further instructions from your care team       Home Care Instructions  [facility] [phone]               Procedure:  Epidural Spine Injection or Joint injection    Activity:    Rest today    Do not work today    Resume normal activity tomorrow    Pain:    You may experience soreness at the injection site for one or two  days    You may use an ice pack for 20 minutes every 2 hours for the first 24 hours    You may use a heating pad after the first 24 hours    You may use Tylenol  (acetaminophen) every 4 hours or other pain medicines as directed by your physician    Safety  Sedation medicine, if given may remain active for many hours.    It is important for the next 24 hours that you do not:    Drive a car    Operate machines or power tools    Consume alcohol, including beer    Sign any important papers or legal documents    You may experience numbness radiating into your legs or arms, (depending on the procedure location)  This numbness may last several hours.  Until the numb sensation returns to normal please use caution in walking, climbing stairs, stepping out of your vehicle, etc.    Common side effects of steroids:  Not everyone will experience corticosteroid side effects. If side effects are experienced they will gradually subside in the 7-10 day period following an injection.    Most common side effects include:    Flushed face and/or chest    Feeling of warmth, particularly in face but could be overall feeling of warmth    Increased blood sugar in diabetic patients    Menstrual irregularities may occur.  If taking hormone based birth control an alternate method of birth control is recommended    Sleep disturbances and/or mood swings are possible    Leg cramps    Please contact us if you have:  Severe pain   Fever more than 101.5 degrees Fahrenheit  Signs of infection (redness, swelling or drainage)      If you have questions, please contact Dr. Mcdaniel's office at [phone] between the hours of 8:00am and 5:00pm Monday through Friday. After office hours you can contact the on call provider by dialing [phone].  If you need immediate attention we recommend that you go to a hospital emergency room or dial 181.                 Pending Results     No orders found from 12/26/2017 to 12/29/2017.            Admission  Information     Date & Time Provider Department Dept. Phone    12/28/2017 Darryn Mcdaniel MD [facility] Phase [phone]      Your Vitals Were     Blood Pressure Temperature Respirations Pulse Oximetry          120/77 97.6  F (36.4  C) (Oral) 16 97%        MyChart Information     [facility] gives you secure access to your electronic health record. If you see a primary care provider, you can also send messages to your care team and make appointments. If you have questions, please call your primary care clinic.  If you do not have a primary care provider, please call [phone] and they will assist you.        Care EveryWhere ID     This is your Care EveryWhere ID. This could be used by other organizations to access your [place] medical records  [id number]        Equal Access to Services     KELSEY ARSHAD : Donna Menendez, jaime menezes, ranulfo delacruz, mikala lainez. So [facility] [phone].    ATENCIÓN: Si habla español, tiene a lundy disposición servicios gratuitos de asistencia lingüística. Llame al [phone].    We comply with applicable federal civil rights laws and [place] laws. We do not discriminate on the basis of race, color, national origin, age, disability, sex, sexual orientation, or gender identity.               Review of your medicines      CONTINUE these medicines which have NOT CHANGED        Dose / Directions    cyclobenzaprine 10 MG tablet   Commonly known as:  FLEXERIL   Used for:  Muscle spasm        Dose:  10 mg   Take 1 tablet (10 mg) by mouth 2 times daily as needed for muscle spasms Reported on 4/12/2017   Quantity:  60 tablet   Refills:  1       gabapentin 300 MG capsule   Commonly known as:  NEURONTIN   Used for:  Cervicalgia        Dose:  900 mg   Take 3 capsules (900 mg) by mouth 3 times daily If needed/tolerated, OK to increase bedtime dose to 1200mg   Quantity:  300 capsule   Refills:  3        HYDROcodone-acetaminophen 5-325 MG per tablet   Commonly known as:  NORCO   Used for:  S/P cervical spinal fusion        Dose:  1 tablet   Take 1 tablet by mouth every 6 hours as needed for other (Moderate to Severe Pain) . May fill on/after 12/18/2017 not to start till 12/19/2017.   Quantity:  120 tablet   Refills:  0       hydrOXYzine 25 MG capsule   Commonly known as:  VISTARIL   Used for:  Sleep disorder        Dose:  25 mg   Take 1 capsule (25 mg) by mouth At Bedtime   Quantity:  30 capsule   Refills:  1       IBUPROFEN PO        Refills:  0       losartan 50 MG tablet   Commonly known as:  COZAAR   Used for:  Benign essential hypertension        Dose:  50 mg   Take 1 tablet (50 mg) by mouth daily   Quantity:  90 tablet   Refills:  1       methocarbamol 750 MG tablet   Commonly known as:  ROBAXIN   Used for:  Cervical radiculopathy, Myofascial pain        Dose:  750 mg   Take 1 tablet (750 mg) by mouth 3 times daily as needed for muscle spasms   Quantity:  90 tablet   Refills:  3       order for DME   Used for:  Chronic pain syndrome        Equipment being ordered: TENS Pads as directed   Quantity:  1 Device   Refills:  0       tiZANidine 4 MG tablet   Commonly known as:  ZANAFLEX   Used for:  Muscle spasm        Dose:  4 mg   Take 1 tablet (4 mg) by mouth 2 times daily as needed for muscle spasms   Quantity:  60 tablet   Refills:  3       TYLENOL PO        Dose:  500 mg   Take 500 mg by mouth every 4 hours as needed for mild pain or fever   Refills:  0                Protect others around you: Learn how to safely use, store and throw away your medicines at www.disposemymeds.org.             Medication List: This is a list of all your medications and when to take them. Check marks below indicate your daily home schedule. Keep this list as a reference.      Medications           Morning Afternoon Evening Bedtime As Needed    cyclobenzaprine 10 MG tablet   Commonly known as:  FLEXERIL   Take 1 tablet (10 mg) by  mouth 2 times daily as needed for muscle spasms Reported on 4/12/2017                                gabapentin 300 MG capsule   Commonly known as:  NEURONTIN   Take 3 capsules (900 mg) by mouth 3 times daily If needed/tolerated, OK to increase bedtime dose to 1200mg                                HYDROcodone-acetaminophen 5-325 MG per tablet   Commonly known as:  NORCO   Take 1 tablet by mouth every 6 hours as needed for other (Moderate to Severe Pain) . May fill on/after 12/18/2017 not to start till 12/19/2017.                                hydrOXYzine 25 MG capsule   Commonly known as:  VISTARIL   Take 1 capsule (25 mg) by mouth At Bedtime                                IBUPROFEN PO                                losartan 50 MG tablet   Commonly known as:  COZAAR   Take 1 tablet (50 mg) by mouth daily                                methocarbamol 750 MG tablet   Commonly known as:  ROBAXIN   Take 1 tablet (750 mg) by mouth 3 times daily as needed for muscle spasms                                order for DME   Equipment being ordered: TENS Pads as directed                                tiZANidine 4 MG tablet   Commonly known as:  ZANAFLEX   Take 1 tablet (4 mg) by mouth 2 times daily as needed for muscle spasms                                TYLENOL PO   Take 500 mg by mouth every 4 hours as needed for mild pain or fever

## 2017-12-28 NOTE — ANESTHESIA POSTPROCEDURE EVALUATION
Patient: Indra Mehta    Procedure(s):  lumbar epidural injection - Wound Class: I-Clean    Diagnosis:lumbar radiculopathy  Diagnosis Additional Information: No value filed.    Anesthesia Type:  MAC    Note:  Anesthesia Post Evaluation    Patient location during evaluation: Phase 2  Patient participation: Able to fully participate in evaluation  Level of consciousness: awake  Pain management: adequate  Airway patency: patent  Cardiovascular status: acceptable and hemodynamically stable  Respiratory status: acceptable, room air and nonlabored ventilation  Hydration status: stable  PONV: none     Anesthetic complications: None    Comments: Patient was happy with the anesthesia care received and no anesthesia related complications were noted.  I will follow up with the patient again if it is needed.        Last vitals:  Vitals:    12/28/17 1211 12/28/17 1334   BP: (!) 137/93 120/77   Resp: 18 16   Temp: 97.6  F (36.4  C)    SpO2:  97%         Electronically Signed By: YUMIKO Dinh CRNA  December 28, 2017  2:08 PM

## 2017-12-28 NOTE — ANESTHESIA CARE TRANSFER NOTE
Patient: Indra Mehta    Procedure(s):  lumbar epidural injection - Wound Class: I-Clean    Diagnosis: lumbar radiculopathy  Diagnosis Additional Information: No value filed.    Anesthesia Type:   MAC     Note:  Airway :Room Air  Patient transferred to:Phase II  Handoff Report: Identifed the Patient, Identified the Reponsible Provider, Reviewed the pertinent medical history, Discussed the surgical course, Reviewed Intra-OP anesthesia mangement and issues during anesthesia, Set expectations for post-procedure period and Allowed opportunity for questions and acknowledgement of understanding      Vitals: (Last set prior to Anesthesia Care Transfer)    CRNA VITALS  12/28/2017 1301 - 12/28/2017 1335      12/28/2017             Pulse: 80    SpO2: (!)  83 %                Electronically Signed By: YUMIKO Dinh CRNA  December 28, 2017  1:35 PM

## 2017-12-28 NOTE — OP NOTE
PRIMARY PROBLEM: Low back pain and right leg pains    PROCEDURE: Bilateral L3-4   Transforaminal Epidural Steroid Injection with fluoroscopic guidance and contrast.     PROCEDURE DETAILS: After written informed consent was obtained from the patient, the patient was escorted to the procedure room.  The patient was placed in the prone position.  A  time out  was conducted to verify patient identity, procedure to be performed, side, site, allergies and any special requirements.  The skin over the thoracolumbar region was prepped and draped in normal sterile fashion. Fluoroscopy was used to identify the neural foramen in AP view and the skin was anesthetized with 2 mL of 1% lidocaine with bicarbonate buffer. A 25-gauge,   5-inch Quincke spinal needle was advanced through this location and advanced under fluoroscopic guidance towards the neural foramen.  The target zone was the 6 o clock position of the pedicle.   Prior to entering the foramen, the depth of the needle was gauged with a lateral view on fluoroscopy. While still in a lateral view, the needle was slowly advanced to avoid injury to the spinal nerve.  Then, in the oblique view (approximately 28 degrees), after negative aspiration, 0.5 mL of Omnipaque contrast dye was injected revealing epidural spread without evidence of intravascular or intrathecal spread.  Then a 3cc solution of 20 mg of Triamcinolone in 2.5 mL of  Preservative-Free saline was slowly injected into the epidural space at each segment.  After injection of the medication, as the needle tip was withdrawn, it was flushed with local anesthetic.   The patient was monitored with blood pressure and pulse oximetry machines with the assistance of an RN throughout the procedure.  The patient was alert and responsive to questions throughout the procedure.   The patient tolerated the procedure well and was observed in the post-procedural area.  The patient was dismissed without apparent complications.      DIAGNOSIS:  1. Low back and right leg pains secondary to multilevel lumbar disc degeneration from L2-S1    PLAN:  1. Performed bilateral L3-4  transforaminal epidural steroid injections.  2. The patient was instructed to follow-up per Dr. Mcdaniel's instructions. IF this is not helpful would recommend a discogram L2-S1 to see if candidate for intradiscalstem cells and confirm discogenic pain     Darryn Mcdaniel MD  Diplomate of the American Board of Anesthesiology, Pain Medicine

## 2018-01-11 ENCOUNTER — OFFICE VISIT (OUTPATIENT)
Dept: PALLIATIVE MEDICINE | Facility: CLINIC | Age: 53
End: 2018-01-11
Payer: COMMERCIAL

## 2018-01-11 VITALS — DIASTOLIC BLOOD PRESSURE: 96 MMHG | HEART RATE: 77 BPM | SYSTOLIC BLOOD PRESSURE: 157 MMHG

## 2018-01-11 DIAGNOSIS — M54.2 CHRONIC NECK AND BACK PAIN: ICD-10-CM

## 2018-01-11 DIAGNOSIS — M47.812 CERVICAL FACET JOINT SYNDROME: Primary | ICD-10-CM

## 2018-01-11 DIAGNOSIS — Z98.1 S/P CERVICAL SPINAL FUSION: ICD-10-CM

## 2018-01-11 DIAGNOSIS — M54.9 CHRONIC NECK AND BACK PAIN: ICD-10-CM

## 2018-01-11 DIAGNOSIS — M50.30 DDD (DEGENERATIVE DISC DISEASE), CERVICAL: ICD-10-CM

## 2018-01-11 DIAGNOSIS — G89.29 CHRONIC NECK AND BACK PAIN: ICD-10-CM

## 2018-01-11 DIAGNOSIS — M51.369 DDD (DEGENERATIVE DISC DISEASE), LUMBAR: ICD-10-CM

## 2018-01-11 PROCEDURE — 99214 OFFICE O/P EST MOD 30 MIN: CPT | Performed by: ANESTHESIOLOGY

## 2018-01-11 RX ORDER — HYDROCODONE BITARTRATE AND ACETAMINOPHEN 5; 325 MG/1; MG/1
1 TABLET ORAL EVERY 6 HOURS PRN
Qty: 120 TABLET | Refills: 0 | Status: SHIPPED | OUTPATIENT
Start: 2018-01-11 | End: 2018-02-08

## 2018-01-11 ASSESSMENT — PAIN SCALES - GENERAL: PAINLEVEL: SEVERE PAIN (6)

## 2018-01-11 NOTE — PROGRESS NOTES
[facility]    Date of visit: 1/11/2018    Chief complaint:   Chief Complaint   Patient presents with     Pain       Interval history:  Indra Mehta was last seen by me on 9/21/17.      Recommendations/plan at the last visit included:  Plan:  1. Physical Therapy:  Consider physical therapy for stretching and range of motion  2. Clinical Health Psychologist to address issues of relaxation, behavioral change, coping style, and other factors important to improvement.  deferred  3. Diagnostic Studies:  Reviewed cervical x-rays and lumbar MRI  4. Medication Management:    1. Norco 5/325 QID prn pain - will wean as pain improves  2. Continue Gabapentin 300 mg as currently taking  3. Continue Robaxin 750 mg TID prn spasm for daytime  4. Trial of Tizanidine 4 mg BID for spasm - try at night  5. Continue Tylenol in safe doses  6. Continue Ibuprofen in safe doses  5. Further procedures recommended:   1. Cervical facet joint injections may be helpful for neck pain - C5-6 and C6-7 left  2. Cervical trigger point injections would help with muscle spasm  3. Consider lumbar epidural steroid injection vs lumbar facet joint injections for lower back pain  6. Recommendations to PCP: continue current treatment  7. Follow up: for injections and in 8-10 weeks    Since his last visit, Indra Mehta reports:  He is s/p ACDF on 7/5/17.  He is s/p bilateral lumbar TFESI by Dr Mcdaniel on 10/23/17 at L4-5 and bilateral lumbar TFESI L3-4 on 12/28/17.    He continues with neck pain across the back of the neck without radiation except for occasional pain in the shoulder. He has a constant pain in the neck that is worsened with turning his head or looking up. He feels he has good range of motion but complains that it hurts.  He complains of difficulties sleeping because he can't get his head in a good position.    He continues with lower back pain across the lower with pain into the hips bilaterally.  He states that  the first injection helped with the leg pain but he has continued with hip pain.  He feels that the most recent SRIDEVI is helping. He went bowling on Sunday and had some increase in his back pain but it is now starting to ease up.  He complains of numbness in his feet due to peripheral neuropathy that alternates with pain, tingling, and burning sensations.  Gabapentin helps with his foot pain.    Pain scores:  Pain intensity on average is 6 on a scale of 0-10.  Ranges from 4/10 to 9/10. Pain worsened with prolonged activity or inactivity and better with changing positions/activity.    THE 4 A's OF OPIOID MAINTENANCE ANALGESIA    Analgesia: yes    Activity: improved    Adverse effects: denies    Adherence to Rx protocol: yes    [place] Board of Pharmacy Data Base Reviewed:    YES; compliant    Current pain treatments:   Norco 5/325 mg Q6H prn   Gabapentin 300 mg three capsules twice a day and 4 capsules at bedtime  Tylenol 500 mg Q4H - maybe takes one or two per day  Ibuprofen 200 mg three tabs four times a day  Tizanidine 4 mg - takes two at bedtime  Robaxin 750 mg TID takes during the day - helps  Excedrin Migraine - two per day  Arnecare topical     Past pain treatments:  Norco/vicodin  Oxycodone  Flexeril - was helpful at night  Robaxin - may have helped, didn't make him sleepy    Surgery:  ACDF C6-7 on 7/5/17 with improvement  Injections:  Bilateral L4-5 TFESI 10/23/17  - bilateral L3-4 TFESI 12/28/17    Side Effects: sedation    Medications:  Current Outpatient Prescriptions   Medication Sig Dispense Refill     HYDROcodone-acetaminophen (NORCO) 5-325 MG per tablet Take 1 tablet by mouth every 6 hours as needed for other (Moderate to Severe Pain) . May fill on/after 12/18/2017 not to start till 12/19/2017. 120 tablet 0     IBUPROFEN PO        tiZANidine (ZANAFLEX) 4 MG tablet Take 1 tablet (4 mg) by mouth 2 times daily as needed for muscle spasms 60 tablet 3     methocarbamol (ROBAXIN) 750 MG tablet Take 1 tablet  (750 mg) by mouth 3 times daily as needed for muscle spasms 90 tablet 3     gabapentin (NEURONTIN) 300 MG capsule Take 3 capsules (900 mg) by mouth 3 times daily If needed/tolerated, OK to increase bedtime dose to 1200mg 300 capsule 3     losartan (COZAAR) 50 MG tablet Take 1 tablet (50 mg) by mouth daily 90 tablet 1     order for DME Equipment being ordered: TENS Pads as directed 1 Device 0     hydrOXYzine (VISTARIL) 25 MG capsule Take 1 capsule (25 mg) by mouth At Bedtime 30 capsule 1     cyclobenzaprine (FLEXERIL) 10 MG tablet Take 1 tablet (10 mg) by mouth 2 times daily as needed for muscle spasms Reported on 4/12/2017 (Patient not taking: Reported on 1/11/2018) 60 tablet 1     Acetaminophen (TYLENOL PO) Take 500 mg by mouth every 4 hours as needed for mild pain or fever         Medical History: any changes in medical history since they were last seen? No    Review of Systems:  The 14 system ROS was reviewed from the intake questionnaire, and is positive for: joint pain, back pain, neck pain, numbness, tingling  Any bowel or bladder problems: denies changes  Mood: good    Physical Exam:  BP (!) 157/96  Pulse 77  Constitutional: alert and no distress.  Pt is well nourished, well developed  Head: Normocephalic. No masses, lesions, tenderness or abnormalities  ENT: EOMI, mucosal surfaces moist.  Neck with full ROM, posture fair  Cardiovascular: No edema or JVD appreciated.  Respiratory: Good diaphragmatic excursion. No wheezes appreciated.  Speaking in complete sentences without shortness of breath.  No accessory muscle use.   Musculoskeletal: extremities normal- no gross deformities noted, normal muscle tone and able to move about the exam room without difficulty.    Skin: no suspicious lesions or rashes appreciated on exposed areas  Neurologic: Gait normal and non-antalgic. Moving all extremities spontaneously, no apparent weakness.    Psychiatric: mentation appears normal, affect full and good eye contact.       Cervical Spine:  Mildly decreased cervical range of motion, tender along the lower cervical articular pillars and paraspinal muscles bilaterally, facet loading is mildly positive bilaterally    Lumbar Spine:  Mild tenderness lumbar paraspinal muscles, flexes well, lateral bending negative for facet loading pain, SLR negative    CERVICAL SPINE TWO/THREE VIEWS 9/28/2017 1:26 PM      HISTORY: Arthrodesis status.     COMPARISON: 8/8/2017       IMPRESSION: Interbody device at C6-7. Positioning and alignment are  stable. Mild degenerative disc disease at C5-6. There has been no  significant change.    Assessment:   1.  Chronic neck pain  2.  Post cervical fusion neck pain - improved radicular pain  3.  Myofascial pain  4.  Cervical facet joint pain  5.  Chronic lower back pain  6.  Lumbar radiculopathy - improved after injections  7.  Lumbar facet joint pain  8.  Multiple joint pain    Indra Mehta is a 52 year old male who is seen at the pain clinic for chronic neck and back pain.  His neck surgery took away his radicular symptoms, but now he is having axial pain consistent with facet joint pain.  He is getting good relief of his back and leg pain with epidural steroid injections.  Our treatment plan is as outlined below.    Plan:  1. Physical Therapy:  Continue home exercise program  2. Clinical Health Psychologist to address issues of relaxation, behavioral change, coping style, and other factors important to improvement.  deferred  3. Diagnostic Studies:  Not indicated  4. Medication Management:    1. Continue Norco 5/325 QID prn pain  2. Continue Robaxin 750 mg TID  3. Continue Tizanidine 4 mg BID  4. Continue Gabapentin 300 mg three capsules twice a day and four at bedtime  5. Continue Tylenol in safe doses  6. Continue Ibuprofen in safe doses  5. Further procedures recommended:   1. Recommend cervical Medial Branch Block bilaterally at C3, C4, C5 and C6 with subsequent Radiofrequency ablation if blocks are  positive  2. Cervical trigger point injections would be helpful for muscle spasm  3. Repeat lumbar epidural steroid injections if pain returns   6. Recommendations to PCP: continue current treatment  7. Follow up: for injections and in 3 months    Total time spent was 30 minutes, and more than 50% of face to face time was spent in counseling and/or coordination of care regarding diagnosis, physical activity, medication management, and interventional procedures.    Boni Freeman MD  [facility]

## 2018-01-11 NOTE — PATIENT INSTRUCTIONS
Assessment:   1.  Chronic neck pain  2.  Post cervical fusion neck pain - improved radicular pain  3.  Myofascial pain  4.  Cervical facet joint pain  5.  Chronic lower back pain  6.  Lumbar radiculopathy - improved after injections  7.  Lumbar facet joint pain  8.  Multiple joint pain    Plan:  1. Physical Therapy:  Continue home exercise program  2. Clinical Health Psychologist to address issues of relaxation, behavioral change, coping style, and other factors important to improvement.  deferred  3. Diagnostic Studies:  Not indicated  4. Medication Management:    1. Continue Norco 5/325 QID prn pain  2. Continue Robaxin 750 mg TID  3. Continue Tizanidine 4 mg BID  4. Continue Gabapentin 300 mg three capsules twice a day and four at bedtime  5. Continue Tylenol in safe doses  6. Continue Ibuprofen in safe doses  5. Further procedures recommended:   1. Recommend cervical Medial Branch Block bilaterally at C3, C4, C5 and C6 with subsequent Radiofrequency ablation if blocks are positive  2. Cervical trigger point injections would be helpful for muscle spasm  3. Repeat lumbar epidural steroid injections if pain returns   6. Recommendations to PCP: continue current treatment  7. Follow up: for injections and in 3 months      ----------------------------------------------------------------  Nurse Triage line:  [phone]   Call this number with any questions or concerns. You may leave a detailed message anytime. Calls are typically returned Monday through Friday between 8 AM and 4:30 PM. We usually get back to you within 2 business days depending on the issue/request.       Medication refills:    For non-narcotic medications, call your pharmacy directly to request a refill. The pharmacy will contact the [facility] for authorization. Please allow 3-4 days for these refills to be processed.     For narcotic refills, call the nurse triage line or send a [employer] message. Please contact us 7-10 days before your  refill is due. The message MUST include the name of the specific medication(s) requested and how you would like to receive the prescription(s). The options are as follows:    Pain Clinic staff can mail the prescription to your pharmacy. Please tell us the name of the pharmacy.    You may pick the prescription up at the Pain Clinic (tell us the location) or during a clinic visit with your pain provider    Pain Clinic staff can deliver the prescription to the [facility] pharmacy in the clinic building. Please tell us the location.      Scheduling number: [phone].  Call this number to schedule or change appointments.    We believe regular attendance is key to your success in our program.    Any time you are unable to keep your appointment we ask that you call us at least 24 hours in advance to let us know. This will allow us to offer the appointment time to another patient.

## 2018-01-11 NOTE — MR AVS SNAPSHOT
After Visit Summary   1/11/2018    Indra Mehta    MRN: [id number]           Patient Information     Date Of Birth          1965        Visit Information        Provider Department      1/11/2018 3:30 PM Boni Coppola MD [facility]        Today's Diagnoses     Cervical facet joint syndrome    -  1    S/P cervical spinal fusion          Care Instructions    Assessment:   1.  Chronic neck pain  2.  Post cervical fusion neck pain - improved radicular pain  3.  Myofascial pain  4.  Cervical facet joint pain  5.  Chronic lower back pain  6.  Lumbar radiculopathy - improved after injections  7.  Lumbar facet joint pain  8.  Multiple joint pain    Plan:  1. Physical Therapy:  Continue home exercise program  2. Clinical Health Psychologist to address issues of relaxation, behavioral change, coping style, and other factors important to improvement.  deferred  3. Diagnostic Studies:  Not indicated  4. Medication Management:    1. Continue Norco 5/325 QID prn pain  2. Continue Robaxin 750 mg TID  3. Continue Tizanidine 4 mg BID  4. Continue Gabapentin 300 mg three capsules twice a day and four at bedtime  5. Continue Tylenol in safe doses  6. Continue Ibuprofen in safe doses  5. Further procedures recommended:   1. Recommend cervical Medial Branch Block bilaterally at C3, C4, C5 and C6 with subsequent Radiofrequency ablation if blocks are positive  2. Cervical trigger point injections would be helpful for muscle spasm  3. Repeat lumbar epidural steroid injections if pain returns   6. Recommendations to PCP: continue current treatment  7. Follow up: for injections and in 3 months      ----------------------------------------------------------------  Nurse Triage line:  [phone]   Call this number with any questions or concerns. You may leave a detailed message anytime. Calls are typically returned Monday through Friday between 8 AM and 4:30 PM. We usually get back  to you within 2 business days depending on the issue/request.       Medication refills:    For non-narcotic medications, call your pharmacy directly to request a refill. The pharmacy will contact the [facility] for authorization. Please allow 3-4 days for these refills to be processed.     For narcotic refills, call the nurse triage line or send a [employer] message. Please contact us 7-10 days before your refill is due. The message MUST include the name of the specific medication(s) requested and how you would like to receive the prescription(s). The options are as follows:    Pain Clinic staff can mail the prescription to your pharmacy. Please tell us the name of the pharmacy.    You may pick the prescription up at the Pain Clinic (tell us the location) or during a clinic visit with your pain provider    Pain Clinic staff can deliver the prescription to the [facility] pharmacy in the clinic building. Please tell us the location.      Scheduling number: [phone].  Call this number to schedule or change appointments.    We believe regular attendance is key to your success in our program.    Any time you are unable to keep your appointment we ask that you call us at least 24 hours in advance to let us know. This will allow us to offer the appointment time to another patient.               Follow-ups after your visit        Additional Services     PAIN INJECTION EVAL/TREAT/FOLLOW UP                 Who to contact     If you have questions or need follow up information about today's clinic visit or your schedule please contact [facility] directly at [phone].  Normal or non-critical lab and imaging results will be communicated to you by MyChart, letter or phone within 4 business days after the clinic has received the results. If you do not hear from us within 7 days, please contact the clinic through MyChart or phone. If you have a critical or abnormal lab result, we will  notify you by phone as soon as possible.  Submit refill requests through [employer] or call your pharmacy and they will forward the refill request to us. Please allow 3 business days for your refill to be completed.          Additional Information About Your Visit        [employer] Information     [employer] gives you secure access to your electronic health record. If you see a primary care provider, you can also send messages to your care team and make appointments. If you have questions, please call your primary care clinic.  If you do not have a primary care provider, please call [phone] and they will assist you.        Care EveryWhere ID     This is your Care EveryWhere ID. This could be used by other organizations to access your [place] medical records  [id number]        Your Vitals Were     Pulse                   77            Blood Pressure from Last 3 Encounters:   01/11/18 (!) 157/96   12/28/17 120/77   12/18/17 130/84    Weight from Last 3 Encounters:   12/18/17 97.8 kg (215 lb 9.6 oz)   11/09/17 97.1 kg (214 lb)   11/06/17 97.1 kg (214 lb)              We Performed the Following     PAIN INJECTION EVAL/TREAT/FOLLOW UP          Today's Medication Changes          These changes are accurate as of: 1/11/18  4:10 PM.  If you have any questions, ask your nurse or doctor.               These medicines have changed or have updated prescriptions.        Dose/Directions    HYDROcodone-acetaminophen 5-325 MG per tablet   Commonly known as:  NORCO   This may have changed:  additional instructions   Used for:  S/P cervical spinal fusion   Changed by:  Boni Coppola MD        Dose:  1 tablet   Take 1 tablet by mouth every 6 hours as needed for other (Moderate to Severe Pain) May fill on/after 1/17/2017 not to start till 1/18/2017.   Quantity:  120 tablet   Refills:  0            Where to get your medicines      Some of these will need a paper prescription and others can be bought over the counter.  Ask your  nurse if you have questions.     Bring a paper prescription for each of these medications     HYDROcodone-acetaminophen 5-325 MG per tablet                Primary Care Provider Office Phone # Fax #    Tha Grullon [phone]       [address] [facility] [address]        Equal Access to Services     KELSEY ARSHAD : Hadii aad ku hadmalio Soomaali, waaxda luqadaha, qaybta kaalmada adeegyaameya, mikala lainez. So [facility] [phone].    ATENCIÓN: Si habla español, tiene a lundy disposición servicios gratuitos de asistencia lingüística. Llame al [phone].    We comply with applicable federal civil rights laws and [place] laws. We do not discriminate on the basis of race, color, national origin, age, disability, sex, sexual orientation, or gender identity.            Thank you!     Thank you for choosing [facility]  for your care. Our goal is always to provide you with excellent care. Hearing back from our patients is one way we can continue to improve our services. Please take a few minutes to complete the written survey that you may receive in the mail after your visit with us. Thank you!             Your Updated Medication List - Protect others around you: Learn how to safely use, store and throw away your medicines at www.disposemymeds.org.          This list is accurate as of: 1/11/18  4:10 PM.  Always use your most recent med list.                   Brand Name Dispense Instructions for use Diagnosis    cyclobenzaprine 10 MG tablet    FLEXERIL    60 tablet    Take 1 tablet (10 mg) by mouth 2 times daily as needed for muscle spasms Reported on 4/12/2017    Muscle spasm       gabapentin 300 MG capsule    NEURONTIN    300 capsule    Take 3 capsules (900 mg) by mouth 3 times daily If needed/tolerated, OK to increase bedtime dose to 1200mg    Cervicalgia       HYDROcodone-acetaminophen 5-325 MG per tablet    NORCO    120 tablet    Take 1 tablet by  mouth every 6 hours as needed for other (Moderate to Severe Pain) May fill on/after 1/17/2017 not to start till 1/18/2017.    S/P cervical spinal fusion       hydrOXYzine 25 MG capsule    VISTARIL    30 capsule    Take 1 capsule (25 mg) by mouth At Bedtime    Sleep disorder       IBUPROFEN PO           losartan 50 MG tablet    COZAAR    90 tablet    Take 1 tablet (50 mg) by mouth daily    Benign essential hypertension       methocarbamol 750 MG tablet    ROBAXIN    90 tablet    Take 1 tablet (750 mg) by mouth 3 times daily as needed for muscle spasms    Cervical radiculopathy, Myofascial pain       order for DME     1 Device    Equipment being ordered: TENS Pads as directed    Chronic pain syndrome       tiZANidine 4 MG tablet    ZANAFLEX    60 tablet    Take 1 tablet (4 mg) by mouth 2 times daily as needed for muscle spasms    Muscle spasm       TYLENOL PO      Take 500 mg by mouth every 4 hours as needed for mild pain or fever

## 2018-01-23 ENCOUNTER — MYC MEDICAL ADVICE (OUTPATIENT)
Dept: FAMILY MEDICINE | Facility: OTHER | Age: 53
End: 2018-01-23

## 2018-01-23 NOTE — TELEPHONE ENCOUNTER
"Called and spoke to the patient. He is looking for some advise from Dr. Grullon.     Patient said for about the last week he has had some lower left sided abdominal pain. He said the pain is really low and always seems \"to be there\". Patient said about 10 times a day he feels the pain get real sharp and then go down to just being there. He said the pain is not always noticeable but he knows it is there. He said yesterday he had 2 stools with bright red blood in it. He said there seemed to be quite a bit of blood in the toilet. Patient said he had a BM this morning and it was normal. No blood or black stool.     Patient said about 4 nights ago he had really bad abdominal pain. He said this pain was higher and seemed more in his stomach. Patient said he became very sweaty while he had this pain but then it just seemed to pass and he felt better. Patient said he still feels that lower left sided pain and thinks the pain is slowly getting slightly worse. Patient said a couple times he has felt slightly lightheaded while leaning over but it has only happened a random few times.     Patient denies: faint/unresponsiveness, severe weakness, inability to stand, cold/pale skin, profuse sweating, pain that radiates to back/legs, lightheadedness, vomiting, rapidly worsening pain, heavy smoker, high BP/cholesterol, obesity, HX diabetes/heart disease/blood clotting problems/CHF, RLQ pain, fever, nausea, or recent surgery/falls/injury.     RN advised patient he should be seen since the lower left sided pain has been there for a week now and he had 2 BMs with bright red blood. Patient said he does not feel this is emergent and does not want to go to the ED. He is wondering if Dr. Grullon can see him or if he has any other advise for patient. RN advised Dr. De Jesus's is not in until tomorrow afternoon. Offered to schedule with another provider or send message to another provider to review. Patient declined. He said he would prefer " to wait to hear back from Dr. Grullon tomorrow. RN did review symptoms that would warrant an ED visit and when he should call the clinic.     Will route to provider to advise.     Brandy Cooper RN  [facility]

## 2018-01-24 NOTE — TELEPHONE ENCOUNTER
Called and spoke to the patient. He said so far his symptoms have not improved. He is still having the pain. He has not had any more blood in the stool. He does not feel the pain has gotten worse but also feels like it has not improved. Patient would like to see Dr. Grullon. RN scheduled patient to see Dr. Grullon Friday DR only (he could only come later afternoon on Friday). He will call with any new/worsening symptoms or any questions/concerns.     Brandy Cooper RN  [facility]

## 2018-01-24 NOTE — TELEPHONE ENCOUNTER
If symptoms not improving OK for workin  Only. Please call patient  to schedule time.  Electronically signed by Tha Grullon MD

## 2018-01-26 ENCOUNTER — OFFICE VISIT (OUTPATIENT)
Dept: FAMILY MEDICINE | Facility: OTHER | Age: 53
End: 2018-01-26
Payer: COMMERCIAL

## 2018-01-26 VITALS
DIASTOLIC BLOOD PRESSURE: 80 MMHG | TEMPERATURE: 98 F | SYSTOLIC BLOOD PRESSURE: 114 MMHG | BODY MASS INDEX: 28.5 KG/M2 | WEIGHT: 216 LBS | OXYGEN SATURATION: 97 % | HEART RATE: 76 BPM | RESPIRATION RATE: 16 BRPM

## 2018-01-26 DIAGNOSIS — R10.32 LLQ ABDOMINAL PAIN: Primary | ICD-10-CM

## 2018-01-26 LAB
BASOPHILS # BLD AUTO: 0 10E9/L (ref 0–0.2)
BASOPHILS NFR BLD AUTO: 0.5 %
CRP SERPL-MCNC: <2.9 MG/L (ref 0–8)
DIFFERENTIAL METHOD BLD: NORMAL
EOSINOPHIL # BLD AUTO: 0.1 10E9/L (ref 0–0.7)
EOSINOPHIL NFR BLD AUTO: 2.2 %
ERYTHROCYTE [DISTWIDTH] IN BLOOD BY AUTOMATED COUNT: 12.6 % (ref 10–15)
HCT VFR BLD AUTO: 43.9 % (ref 40–53)
HGB BLD-MCNC: 15.1 G/DL (ref 13.3–17.7)
LYMPHOCYTES # BLD AUTO: 2.4 10E9/L (ref 0.8–5.3)
LYMPHOCYTES NFR BLD AUTO: 37.8 %
MCH RBC QN AUTO: 29.1 PG (ref 26.5–33)
MCHC RBC AUTO-ENTMCNC: 34.4 G/DL (ref 31.5–36.5)
MCV RBC AUTO: 85 FL (ref 78–100)
MONOCYTES # BLD AUTO: 0.6 10E9/L (ref 0–1.3)
MONOCYTES NFR BLD AUTO: 9.1 %
NEUTROPHILS # BLD AUTO: 3.2 10E9/L (ref 1.6–8.3)
NEUTROPHILS NFR BLD AUTO: 50.4 %
PLATELET # BLD AUTO: 239 10E9/L (ref 150–450)
RBC # BLD AUTO: 5.19 10E12/L (ref 4.4–5.9)
WBC # BLD AUTO: 6.3 10E9/L (ref 4–11)

## 2018-01-26 PROCEDURE — 85025 COMPLETE CBC W/AUTO DIFF WBC: CPT | Performed by: FAMILY MEDICINE

## 2018-01-26 PROCEDURE — 99214 OFFICE O/P EST MOD 30 MIN: CPT | Performed by: FAMILY MEDICINE

## 2018-01-26 PROCEDURE — 36415 COLL VENOUS BLD VENIPUNCTURE: CPT | Performed by: FAMILY MEDICINE

## 2018-01-26 PROCEDURE — 86140 C-REACTIVE PROTEIN: CPT | Performed by: FAMILY MEDICINE

## 2018-01-26 ASSESSMENT — PAIN SCALES - GENERAL: PAINLEVEL: MODERATE PAIN (5)

## 2018-01-26 NOTE — PATIENT INSTRUCTIONS
Understanding Diverticulosis and Diverticulitis     Pouches or diverticula usually occur in the lower part of the colon called the sigmoid.     The colon (large intestine) is the last part of the digestive tract. It absorbs water from stool and changes it from a liquid to a solid. In certain cases, small pouches called diverticula can form in the colon wall. This condition is called diverticulosis. The pouches can become infected. If this happens, it becomes a more serious problem called diverticulitis. These problems can be painful. But they can be managed.  Managing your condition  Diet changes or medicines may be prescribed.   If you have diverticulosis  Recommendations include:    Diet changes are often enough to control symptoms. The main changes are adding fiber (roughage) and drinking more water. Fiber absorbs water as it travels through your colon. This helps your stool stay soft and move smoothly. Water helps this process.    If needed, you may be told to take over-the-counter stool softeners.    To help relieve pain, antispasmodic medicines may be prescribed.    Watch for changes in your bowel movements. Tell the healthcare provider if you notice any changes.    Begin an exercise program. Ask your healthcare provider how to get started.    Get plenty of rest and sleep.   If you have diverticulitis  Treatment depends on how bad your symptoms are.    For mild symptoms. You may be put on a liquid diet for a short time. Antibiotics are usually prescribed. If these two steps relieve your symptoms, you may then be prescribed a high-fiber diet. If you still have symptoms, your healthcare provider will discuss more treatment choices with you.    For severe symptoms. You may need to be admitted to the hospital. There, you can be given IV antibiotics and fluids. You will also be put on a low-fiber or liquid diet. Although not common, surgery is needed in some people with severe symptoms.  Louisa to colon health      Diverticulitis occurs when the pouches become infected or inflamed.     Help keep your colon healthy with a diet that includes plenty of high-fiber fruits, vegetables, and whole grains. Drink plenty of liquids like water and juice. Maintain a healthy lifestyle including regular exercise, stress management, and adequate rest and sleep.   Date Last Reviewed: 7/1/2016 2000-2017 [employer]. [address] [place] [address], [place] [address]. All rights reserved. This information is not intended as a substitute for professional medical care. Always follow your healthcare professional's instructions.        What is Ulcerative Colitis?    Ulcerative colitis is a long-term (chronic) disease. It causes swelling (inflammation) and sores (ulcers) in the inner lining of the rectum and colon. It is a form of inflammatory bowel disease (IBD). No one knows for sure what causes ulcerative colitis, but symptoms can be treated. People with ulcerative colitis can lead full, active lives.  Symptoms of ulcerative colitis  Symptoms often have to do with bowel movements. Symptoms include:    Frequent, loose bowel movements    Blood and pus in stools, or rectal bleeding    Feeling that you didn t fully empty your bowels (incomplete bowel movement)    Feeling that you need to have a bowel movement right away (urgency)    Belly (abdominal) cramps    Loss of appetite, weight loss    Feeling very tired (fatigue)    Joint pain    Rectal pain that comes and goes  Your treatment options  Your healthcare provider will take a full health history and family history. He or she will also give you a physical exam. Your provider may also order certain tests. These may include:    Lab tests. Your blood and stool will be checked.    Endoscopies of the large intestine. These tests are the most accurate way to diagnose this condition. They use a long, flexible tube with a tiny light and camera on one end. They check the inside of your large  intestine.  Medicines  . Your provider will try to find the medicines that work best for you. These may include:    A type of anti-inflammatory medicine (called 5-ASA compounds or mesalamine) to help reduce intestinal swelling and inflammation    Corticosteroids to help reduce inflammation    Antibiotics to fight bacteria, if there is an infection    Medicines to control your body s immune system (such as immunomodulators or biologics)  Lifestyle changes         Ulcerative colitis affects the inside layers of the rectum and colon.     Certain foods can make your symptoms worse. You may need to change what you eat. Avoid any food that makes your symptoms worse. These foods vary from person to person. But certain foods cause symptoms in many people. These include high-fiber foods (such as fresh vegetables) and high-fat foods (such as dairy products and red meat). Keep track of foods that cause you problems.    Stress can also worsen symptoms. Reducing stress may help. Methods like relaxation exercises, meditation, and deep breathing can help you control stress. Your healthcare provider may be able to tell you more about these.  If surgery is needed  Surgery may help control or even cure ulcerative colitis. It is done to remove a severely affected part of the colon. If this is an option for you, your provider can give you more information.  Date Last Reviewed: 7/1/2016 2000-2017 [employer]. [address] [place] [address], [place] [address]. All rights reserved. This information is not intended as a substitute for professional medical care. Always follow your healthcare professional's instructions.

## 2018-01-26 NOTE — MR AVS SNAPSHOT
After Visit Summary   1/26/2018    Indra Mehta    MRN: [id number]           Patient Information     Date Of Birth          1965        Visit Information        Provider Department      1/26/2018 4:30 PM Tha Grullon MD [facility]        Today's Diagnoses     LLQ abdominal pain    -  1      Care Instructions      Understanding Diverticulosis and Diverticulitis     Pouches or diverticula usually occur in the lower part of the colon called the sigmoid.     The colon (large intestine) is the last part of the digestive tract. It absorbs water from stool and changes it from a liquid to a solid. In certain cases, small pouches called diverticula can form in the colon wall. This condition is called diverticulosis. The pouches can become infected. If this happens, it becomes a more serious problem called diverticulitis. These problems can be painful. But they can be managed.  Managing your condition  Diet changes or medicines may be prescribed.   If you have diverticulosis  Recommendations include:    Diet changes are often enough to control symptoms. The main changes are adding fiber (roughage) and drinking more water. Fiber absorbs water as it travels through your colon. This helps your stool stay soft and move smoothly. Water helps this process.    If needed, you may be told to take over-the-counter stool softeners.    To help relieve pain, antispasmodic medicines may be prescribed.    Watch for changes in your bowel movements. Tell the healthcare provider if you notice any changes.    Begin an exercise program. Ask your healthcare provider how to get started.    Get plenty of rest and sleep.   If you have diverticulitis  Treatment depends on how bad your symptoms are.    For mild symptoms. You may be put on a liquid diet for a short time. Antibiotics are usually prescribed. If these two steps relieve your symptoms, you may then be prescribed a high-fiber diet. If you still have  symptoms, your healthcare provider will discuss more treatment choices with you.    For severe symptoms. You may need to be admitted to the hospital. There, you can be given IV antibiotics and fluids. You will also be put on a low-fiber or liquid diet. Although not common, surgery is needed in some people with severe symptoms.  [place] to colon health     Diverticulitis occurs when the pouches become infected or inflamed.     Help keep your colon healthy with a diet that includes plenty of high-fiber fruits, vegetables, and whole grains. Drink plenty of liquids like water and juice. Maintain a healthy lifestyle including regular exercise, stress management, and adequate rest and sleep.   Date Last Reviewed: 7/1/2016 2000-2017 [employer]. [address] [place] [address], [place] [address]. All rights reserved. This information is not intended as a substitute for professional medical care. Always follow your healthcare professional's instructions.        What is Ulcerative Colitis?    Ulcerative colitis is a long-term (chronic) disease. It causes swelling (inflammation) and sores (ulcers) in the inner lining of the rectum and colon. It is a form of inflammatory bowel disease (IBD). No one knows for sure what causes ulcerative colitis, but symptoms can be treated. People with ulcerative colitis can lead full, active lives.  Symptoms of ulcerative colitis  Symptoms often have to do with bowel movements. Symptoms include:    Frequent, loose bowel movements    Blood and pus in stools, or rectal bleeding    Feeling that you didn t fully empty your bowels (incomplete bowel movement)    Feeling that you need to have a bowel movement right away (urgency)    Belly (abdominal) cramps    Loss of appetite, weight loss    Feeling very tired (fatigue)    Joint pain    Rectal pain that comes and goes  Your treatment options  Your healthcare provider will take a full health history and family history. He or she will also give  you a physical exam. Your provider may also order certain tests. These may include:    Lab tests. Your blood and stool will be checked.    Endoscopies of the large intestine. These tests are the most accurate way to diagnose this condition. They use a long, flexible tube with a tiny light and camera on one end. They check the inside of your large intestine.  Medicines  . Your provider will try to find the medicines that work best for you. These may include:    A type of anti-inflammatory medicine (called 5-ASA compounds or mesalamine) to help reduce intestinal swelling and inflammation    Corticosteroids to help reduce inflammation    Antibiotics to fight bacteria, if there is an infection    Medicines to control your body s immune system (such as immunomodulators or biologics)  Lifestyle changes         Ulcerative colitis affects the inside layers of the rectum and colon.     Certain foods can make your symptoms worse. You may need to change what you eat. Avoid any food that makes your symptoms worse. These foods vary from person to person. But certain foods cause symptoms in many people. These include high-fiber foods (such as fresh vegetables) and high-fat foods (such as dairy products and red meat). Keep track of foods that cause you problems.    Stress can also worsen symptoms. Reducing stress may help. Methods like relaxation exercises, meditation, and deep breathing can help you control stress. Your healthcare provider may be able to tell you more about these.  If surgery is needed  Surgery may help control or even cure ulcerative colitis. It is done to remove a severely affected part of the colon. If this is an option for you, your provider can give you more information.  Date Last Reviewed: 7/1/2016 2000-2017 [employer]. [address] [place] [address], [place] [address]. All rights reserved. This information is not intended as a substitute for professional medical care. Always follow your  healthcare professional's instructions.                Follow-ups after your visit        Your next 10 appointments already scheduled     Apr 09, 2018  4:00 PM CDT   Return Visit with Boni Freeman MD   [facility] [place] ([facility] [place])    [address]  [place] [address] [phone]              Future tests that were ordered for you today     Open Future Orders        Priority Expected Expires Ordered    CT Abdomen Pelvis w Contrast Routine  1/26/2019 1/26/2018            Who to contact     If you have questions or need follow up information about today's clinic visit or your schedule please contact [facility] directly at [phone].  Normal or non-critical lab and imaging results will be communicated to you by IDxhart, letter or phone within 4 business days after the clinic has received the results. If you do not hear from us within 7 days, please contact the clinic through IDxhart or phone. If you have a critical or abnormal lab result, we will notify you by phone as soon as possible.  Submit refill requests through [employer] or call your pharmacy and they will forward the refill request to us. Please allow 3 business days for your refill to be completed.          Additional Information About Your Visit        MyChart Information     [employer] gives you secure access to your electronic health record. If you see a primary care provider, you can also send messages to your care team and make appointments. If you have questions, please call your primary care clinic.  If you do not have a primary care provider, please call [phone] and they will assist you.        Care EveryWhere ID     This is your Care EveryWhere ID. This could be used by other organizations to access your [facility] medical records  [id number]        Your Vitals Were     Pulse Temperature Respirations Pulse Oximetry BMI (Body Mass Index)       76 98  F (36.7  C) (Temporal) 16 97%  28.5 kg/m2        Blood Pressure from Last 3 Encounters:   01/26/18 114/80   01/11/18 (!) 157/96   12/28/17 120/77    Weight from Last 3 Encounters:   01/26/18 216 lb (98 kg)   12/18/17 215 lb 9.6 oz (97.8 kg)   11/09/17 214 lb (97.1 kg)              We Performed the Following     CBC with platelets differential     CRP inflammation        Primary Care Provider Office Phone # Fax #    Tha Grullon [phone]       [address] [facility] [address]        Equal Access to Services     [facility] CenterSHRUTHI : Hadii aad ku hadashpilar Somary, waaxda luqadaha, qaybta kaalmaameya delacruz, mikala carmichael . So [facility] [phone].    ATENCIÓN: Si habla español, tiene a lundy disposición servicios gratuitos de asistencia lingüística. Ll[facility] [phone].    We comply with applicable federal civil rights laws and [place] laws. We do not discriminate on the basis of race, color, national origin, age, disability, sex, sexual orientation, or gender identity.            Thank you!     Thank you for choosing [facility]  for your care. Our goal is always to provide you with excellent care. Hearing back from our patients is one way we can continue to improve our services. Please take a few minutes to complete the written survey that you may receive in the mail after your visit with us. Thank you!             Your Updated Medication List - Protect others around you: Learn how to safely use, store and throw away your medicines at www.disposemymeds.org.          This list is accurate as of 1/26/18  5:41 PM.  Always use your most recent med list.                   Brand Name Dispense Instructions for use Diagnosis    cyclobenzaprine 10 MG tablet    FLEXERIL    60 tablet    Take 1 tablet (10 mg) by mouth 2 times daily as needed for muscle spasms Reported on 4/12/2017    Muscle spasm       gabapentin 300 MG capsule    NEURONTIN    300 capsule    Take 3 capsules (900 mg) by mouth 3 times daily If  needed/tolerated, OK to increase bedtime dose to 1200mg    Cervicalgia       HYDROcodone-acetaminophen 5-325 MG per tablet    NORCO    120 tablet    Take 1 tablet by mouth every 6 hours as needed for other (Moderate to Severe Pain) May fill on/after 1/17/2017 not to start till 1/18/2017.    S/P cervical spinal fusion       hydrOXYzine 25 MG capsule    VISTARIL    30 capsule    Take 1 capsule (25 mg) by mouth At Bedtime    Sleep disorder       IBUPROFEN PO           losartan 50 MG tablet    COZAAR    90 tablet    Take 1 tablet (50 mg) by mouth daily    Benign essential hypertension       methocarbamol 750 MG tablet    ROBAXIN    90 tablet    Take 1 tablet (750 mg) by mouth 3 times daily as needed for muscle spasms    Cervical radiculopathy, Myofascial pain       order for DME     1 Device    Equipment being ordered: TENS Pads as directed    Chronic pain syndrome       tiZANidine 4 MG tablet    ZANAFLEX    60 tablet    Take 1 tablet (4 mg) by mouth 2 times daily as needed for muscle spasms    Muscle spasm       TYLENOL PO      Take 500 mg by mouth every 4 hours as needed for mild pain or fever

## 2018-01-26 NOTE — NURSING NOTE
"Chief Complaint   Patient presents with     Abdominal Pain       Initial /80 (BP Location: Right arm, Patient Position: Chair, Cuff Size: Adult Large)  Pulse 76  Temp 98  F (36.7  C) (Temporal)  Resp 16  Wt 216 lb (98 kg)  SpO2 97%  BMI 28.5 kg/m2 Estimated body mass index is 28.5 kg/(m^2) as calculated from the following:    Height as of 11/9/17: 6' 1\" (1.854 m).    Weight as of this encounter: 216 lb (98 kg).  Medication Reconciliation: complete     Yoanna Vázquez MA 1/26/2018  4:42 PM          "

## 2018-01-26 NOTE — PROGRESS NOTES
SUBJECTIVE:   Indra Mehta is a 52 year old male who presents to clinic today for the following health issues:        ABDOMINAL PAIN     Onset: 4 to 5 weeks    Description:   Character: Sharp, Dull ache and Stabbing  Location: left lower quadrant  Radiation: None    Intensity: moderate    Progression of Symptoms:  worsening    Accompanying Signs & Symptoms:  Fever/Chills?: no   Gas/Bloating: no   Nausea: no   Vomitting: no   Diarrhea?: YES, been having blood in stools  Constipation:no   Dysuria or Hematuria: no    History:   Trauma: no   Previous similar pain: no    Previous tests done: none    Precipitating factors:   Does the pain change with:     Food: no      BM: no     Urination: no     Alleviating factors:  nothing    Therapies Tried and outcome: nothing tired    LMP:  not applicable     Prior colonoscopy 2016:    Findings:        A sessile polyp was found in the sigmoid colon. The polyp was diminutive        in size. The polyp was removed with a cold biopsy forceps. Resection and        retrieval were complete.        The exam was otherwise without abnormality.                                                                                     Impression:          - One diminutive polyp in the sigmoid colon. Resected                        and retrieved.                        - The examination was otherwise normal.   Recommendation:      - Await pathology results.                        - Repeat colonoscopy in 5 years for surveillance based                        on pathology results.                        - Return to referring physician.         Problem list and histories reviewed & adjusted, as indicated.  Additional history: as documented    Patient Active Problem List   Diagnosis     Meniere's disease     Major depression in complete remission (H)     Chronic pain syndrome     CARDIOVASCULAR SCREENING; LDL GOAL LESS THAN 160     Pain medication agreement signed     Myalgia     Bilateral occipital  neuralgia     Benign essential hypertension     Past Surgical History:   Procedure Laterality Date     BUNIONECTOMY RT/LT  09/05/08    Both feet     COLONOSCOPY N/A 12/28/2016    Procedure: COMBINED COLONOSCOPY, SINGLE OR MULTIPLE BIOPSY/POLYPECTOMY BY BIOPSY;  Surgeon: Indra Jernigan MD;  Location: [facility]     DISCECTOMY, FUSION CERVICAL ANTERIOR ONE LEVEL, COMBINED N/A 7/5/2017    Procedure: COMBINED DISCECTOMY, FUSION CERVICAL ANTERIOR ONE LEVEL;  Cervical 6-7, Anterior cervical discectomy fusion;  Surgeon: Mike Mckenna MD;  Location: [facility]     HC COLONOSCOPY W/WO BRUSH/WASH  12/27/2005     HC CREATE EARDRUM OPENING,GEN ANESTH  09/27/2007    Pe tube, Left endolymphatic sac enhancement.     HC MASTOIDECTOMY,COMPLETE  09/27/2007     HERNIORRHAPHY UMBILICAL N/A 8/11/2017    Procedure: HERNIORRHAPHY UMBILICAL;  open umbilical hernia repair;  Surgeon: Boni Story MD;  Location: [facility]     INJECT EPIDURAL LUMBAR N/A 11/9/2017    Procedure: INJECT EPIDURAL LUMBAR;  lumbar 4-5 epidural steroid injection ;  Surgeon: Darryn Mcdaniel MD;  Location: [facility]     INJECT EPIDURAL LUMBAR N/A 12/28/2017    Procedure: INJECT EPIDURAL LUMBAR;  lumbar epidural injection;  Surgeon: Darryn Mcdaniel MD;  Location: [facility]     PE TUBES  2006    left ear       Social History   Substance Use Topics     Smoking status: Never Smoker     Smokeless tobacco: Never Used     Alcohol use No     Family History   Problem Relation Age of Onset     Cancer - colorectal Mother      DIABETES Mother      Cardiovascular Father      Hypertension Father      MENTAL ILLNESS Sister      Suicide Other          Current Outpatient Prescriptions   Medication Sig Dispense Refill     HYDROcodone-acetaminophen (NORCO) 5-325 MG per tablet Take 1 tablet by mouth every 6 hours as needed for other (Moderate to Severe Pain) May fill on/after 1/17/2017 not to start till 1/18/2017. 120 tablet 0     IBUPROFEN PO        tiZANidine (ZANAFLEX) 4 MG tablet Take 1  tablet (4 mg) by mouth 2 times daily as needed for muscle spasms 60 tablet 3     methocarbamol (ROBAXIN) 750 MG tablet Take 1 tablet (750 mg) by mouth 3 times daily as needed for muscle spasms 90 tablet 3     gabapentin (NEURONTIN) 300 MG capsule Take 3 capsules (900 mg) by mouth 3 times daily If needed/tolerated, OK to increase bedtime dose to 1200mg 300 capsule 3     losartan (COZAAR) 50 MG tablet Take 1 tablet (50 mg) by mouth daily 90 tablet 1     order for DME Equipment being ordered: TENS Pads as directed 1 Device 0     Acetaminophen (TYLENOL PO) Take 500 mg by mouth every 4 hours as needed for mild pain or fever       hydrOXYzine (VISTARIL) 25 MG capsule Take 1 capsule (25 mg) by mouth At Bedtime (Patient not taking: Reported on 1/26/2018) 30 capsule 1     cyclobenzaprine (FLEXERIL) 10 MG tablet Take 1 tablet (10 mg) by mouth 2 times daily as needed for muscle spasms Reported on 4/12/2017 (Patient not taking: Reported on 1/11/2018) 60 tablet 1     Allergies   Allergen Reactions     No Known Drug Allergies      Recent Labs   Lab Test  06/30/17   1613  03/24/16   1900  03/29/13   0834   LDL   --    --   142*   HDL   --    --   34*   TRIG   --    --   185*   CR  1.15  1.06   --    GFRESTIMATED  67  74   --    GFRESTBLACK  81  90   --    POTASSIUM  3.9  3.5   --    TSH   --   2.00   --       BP Readings from Last 3 Encounters:   01/26/18 114/80   01/11/18 (!) 157/96   12/28/17 120/77    Wt Readings from Last 3 Encounters:   01/26/18 216 lb (98 kg)   12/18/17 215 lb 9.6 oz (97.8 kg)   11/09/17 214 lb (97.1 kg)                  Labs reviewed in EPIC    Reviewed and updated as needed this visit by clinical staff  Tobacco  Allergies  Meds  Problems  Med Hx  Surg Hx  Fam Hx  Soc Hx        Reviewed and updated as needed this visit by Provider  Allergies  Meds  Problems         ROS:  C: NEGATIVE for fever, chills, change in weight  E/M: NEGATIVE for ear, mouth and throat problems  R: NEGATIVE for significant  cough or SOB  CV: NEGATIVE for chest pain, palpitations or peripheral edema  GI: POSITIVE for abdominal pain LLQ, diarrhea, hematochezia and Hx IBS and NEGATIVE for hemorrhoids, Hx IBD, nausea, vomiting and weight loss  : negative for dysuria, hematuria, decreased urinary stream, erectile dysfunction  ROS otherwise negative    OBJECTIVE:     /80 (BP Location: Right arm, Patient Position: Chair, Cuff Size: Adult Large)  Pulse 76  Temp 98  F (36.7  C) (Temporal)  Resp 16  Wt 216 lb (98 kg)  SpO2 97%  BMI 28.5 kg/m2  Body mass index is 28.5 kg/(m^2).  GENERAL: healthy, alert and no distress  NECK: no adenopathy, no asymmetry, masses, or scars and thyroid normal to palpation  RESP: lungs clear to auscultation - no rales, rhonchi or wheezes  CV: regular rate and rhythm, normal S1 S2, no S3 or S4, no murmur, click or rub, no peripheral edema and peripheral pulses strong  ABDOMEN: tenderness LLQ and no guarding or rigidity, bowel sounds normal and well-healed incision infra umbilical    Diagnostic Test Results:  Results for orders placed or performed in visit on 01/26/18 (from the past 24 hour(s))   CBC with platelets differential   Result Value Ref Range    WBC 6.3 4.0 - 11.0 10e9/L    RBC Count 5.19 4.4 - 5.9 10e12/L    Hemoglobin 15.1 13.3 - 17.7 g/dL    Hematocrit 43.9 40.0 - 53.0 %    MCV 85 78 - 100 fl    MCH 29.1 26.5 - 33.0 pg    MCHC 34.4 31.5 - 36.5 g/dL    RDW 12.6 10.0 - 15.0 %    Platelet Count 239 150 - 450 10e9/L    Diff Method Automated Method     % Neutrophils 50.4 %    % Lymphocytes 37.8 %    % Monocytes 9.1 %    % Eosinophils 2.2 %    % Basophils 0.5 %    Absolute Neutrophil 3.2 1.6 - 8.3 10e9/L    Absolute Lymphocytes 2.4 0.8 - 5.3 10e9/L    Absolute Monocytes 0.6 0.0 - 1.3 10e9/L    Absolute Eosinophils 0.1 0.0 - 0.7 10e9/L    Absolute Basophils 0.0 0.0 - 0.2 10e9/L       ASSESSMENT/PLAN:     1. LLQ abdominal pain  Indra Mehta is a 52 year old male who presents with 4-5 weeks of  cramping  LLQ abdominal pain with recent day of blood streaked stool with blood on paper and water. He denies kristie diarrhea of complete bloody stool. No recent travel, diet concerns. No fever, chills, nausea and vomiting. He has not been on antibiotics previously. He has history of IBS with diarrhea in past with essentially normal colonoscopy 18 months ago. Concern for colitis vs diverticulitis. No diverticulosis or colitis on colonoscopy in 2016.  His CBC is normal. Unable to obtain flat upright abdominal x-ray today due to staffing. Will obtain CT abdomen and pelvis to exclude colitis vs diverticulitis. Possible IBS with internal hemorrhoids as well. Encourage fluids and fiber over weekend and if pain or bleeding worsens then follow up in ED for evaluation.   - CBC with platelets differential  - CRP inflammation  - CT Abdomen Pelvis w Contrast; Future    FURTHER TESTING:       - CT abdomen and pelvis  Patient Instructions       Understanding Diverticulosis and Diverticulitis     Pouches or diverticula usually occur in the lower part of the colon called the sigmoid.     The colon (large intestine) is the last part of the digestive tract. It absorbs water from stool and changes it from a liquid to a solid. In certain cases, small pouches called diverticula can form in the colon wall. This condition is called diverticulosis. The pouches can become infected. If this happens, it becomes a more serious problem called diverticulitis. These problems can be painful. But they can be managed.  Managing your condition  Diet changes or medicines may be prescribed.   If you have diverticulosis  Recommendations include:    Diet changes are often enough to control symptoms. The main changes are adding fiber (roughage) and drinking more water. Fiber absorbs water as it travels through your colon. This helps your stool stay soft and move smoothly. Water helps this process.    If needed, you may be told to take over-the-counter  stool softeners.    To help relieve pain, antispasmodic medicines may be prescribed.    Watch for changes in your bowel movements. Tell the healthcare provider if you notice any changes.    Begin an exercise program. Ask your healthcare provider how to get started.    Get plenty of rest and sleep.   If you have diverticulitis  Treatment depends on how bad your symptoms are.    For mild symptoms. You may be put on a liquid diet for a short time. Antibiotics are usually prescribed. If these two steps relieve your symptoms, you may then be prescribed a high-fiber diet. If you still have symptoms, your healthcare provider will discuss more treatment choices with you.    For severe symptoms. You may need to be admitted to the hospital. There, you can be given IV antibiotics and fluids. You will also be put on a low-fiber or liquid diet. Although not common, surgery is needed in some people with severe symptoms.  [place] to colon health     Diverticulitis occurs when the pouches become infected or inflamed.     Help keep your colon healthy with a diet that includes plenty of high-fiber fruits, vegetables, and whole grains. Drink plenty of liquids like water and juice. Maintain a healthy lifestyle including regular exercise, stress management, and adequate rest and sleep.   Date Last Reviewed: 7/1/2016 2000-2017 [employer]. [address] [place] [address], [place] [address]. All rights reserved. This information is not intended as a substitute for professional medical care. Always follow your healthcare professional's instructions.        What is Ulcerative Colitis?    Ulcerative colitis is a long-term (chronic) disease. It causes swelling (inflammation) and sores (ulcers) in the inner lining of the rectum and colon. It is a form of inflammatory bowel disease (IBD). No one knows for sure what causes ulcerative colitis, but symptoms can be treated. People with ulcerative colitis can lead full, active lives.  Symptoms  of ulcerative colitis  Symptoms often have to do with bowel movements. Symptoms include:    Frequent, loose bowel movements    Blood and pus in stools, or rectal bleeding    Feeling that you didn t fully empty your bowels (incomplete bowel movement)    Feeling that you need to have a bowel movement right away (urgency)    Belly (abdominal) cramps    Loss of appetite, weight loss    Feeling very tired (fatigue)    Joint pain    Rectal pain that comes and goes  Your treatment options  Your healthcare provider will take a full health history and family history. He or she will also give you a physical exam. Your provider may also order certain tests. These may include:    Lab tests. Your blood and stool will be checked.    Endoscopies of the large intestine. These tests are the most accurate way to diagnose this condition. They use a long, flexible tube with a tiny light and camera on one end. They check the inside of your large intestine.  Medicines  . Your provider will try to find the medicines that work best for you. These may include:    A type of anti-inflammatory medicine (called 5-ASA compounds or mesalamine) to help reduce intestinal swelling and inflammation    Corticosteroids to help reduce inflammation    Antibiotics to fight bacteria, if there is an infection    Medicines to control your body s immune system (such as immunomodulators or biologics)  Lifestyle changes         Ulcerative colitis affects the inside layers of the rectum and colon.     Certain foods can make your symptoms worse. You may need to change what you eat. Avoid any food that makes your symptoms worse. These foods vary from person to person. But certain foods cause symptoms in many people. These include high-fiber foods (such as fresh vegetables) and high-fat foods (such as dairy products and red meat). Keep track of foods that cause you problems.    Stress can also worsen symptoms. Reducing stress may help. Methods like relaxation  exercises, meditation, and deep breathing can help you control stress. Your healthcare provider may be able to tell you more about these.  If surgery is needed  Surgery may help control or even cure ulcerative colitis. It is done to remove a severely affected part of the colon. If this is an option for you, your provider can give you more information.  Date Last Reviewed: 7/1/2016 2000-2017 [employer]. [address] [place] [address], [place] [address]. All rights reserved. This information is not intended as a substitute for professional medical care. Always follow your healthcare professional's instructions.            Tha Grullon MD  [facility]

## 2018-01-26 NOTE — Clinical Note
Please call patient to schedule CT abdomen and pelvis with contrast. Electronically signed by Tha Grullon MD

## 2018-01-27 ASSESSMENT — PATIENT HEALTH QUESTIONNAIRE - PHQ9: SUM OF ALL RESPONSES TO PHQ QUESTIONS 1-9: 0

## 2018-01-31 ENCOUNTER — HOSPITAL ENCOUNTER (OUTPATIENT)
Dept: CT IMAGING | Facility: CLINIC | Age: 53
Discharge: HOME | End: 2018-01-31
Attending: FAMILY MEDICINE | Admitting: FAMILY MEDICINE
Payer: COMMERCIAL

## 2018-01-31 DIAGNOSIS — M54.2 CERVICALGIA: ICD-10-CM

## 2018-01-31 DIAGNOSIS — R10.32 LLQ ABDOMINAL PAIN: ICD-10-CM

## 2018-01-31 DIAGNOSIS — I10 BENIGN ESSENTIAL HYPERTENSION: ICD-10-CM

## 2018-01-31 PROCEDURE — 74177 CT ABD & PELVIS W/CONTRAST: CPT

## 2018-01-31 PROCEDURE — 25000125 ZZHC RX 250: Performed by: RADIOLOGY

## 2018-01-31 PROCEDURE — 25000128 H RX IP 250 OP 636: Performed by: RADIOLOGY

## 2018-01-31 RX ORDER — IOPAMIDOL 755 MG/ML
500 INJECTION, SOLUTION INTRAVASCULAR ONCE
Status: COMPLETED | OUTPATIENT
Start: 2018-01-31 | End: 2018-01-31

## 2018-01-31 RX ORDER — GABAPENTIN 300 MG/1
900 CAPSULE ORAL 3 TIMES DAILY
Qty: 300 CAPSULE | Refills: 3 | Status: SHIPPED | OUTPATIENT
Start: 2018-01-31 | End: 2018-04-30

## 2018-01-31 RX ADMIN — IOPAMIDOL 100 ML: 755 INJECTION, SOLUTION INTRAVENOUS at 08:09

## 2018-01-31 RX ADMIN — SODIUM CHLORIDE 60 ML: 9 INJECTION, SOLUTION INTRAVENOUS at 08:08

## 2018-01-31 NOTE — TELEPHONE ENCOUNTER
Signed Prescriptions:                        Disp   Refills    gabapentin (NEURONTIN) 300 MG capsule      300 ca*3        Sig: Take 3 capsules (900 mg) by mouth 3 times daily If           needed/tolerated, OK to increase bedtime dose to           1200mg  Authorizing Provider: BONI NAYLOR    Reviewed, signed, e-prescribed.    Boni Freeman MD  [facility]

## 2018-01-31 NOTE — TELEPHONE ENCOUNTER
Received fax request from [facility]  pharmacy requesting refill(s) for gabapentin (NEURONTIN) 300 MG capsule    Last refilled on 1/6/18    Pt last seen on 1/11/18  Next appt scheduled for 4/9/18    Josiane Riddle MA  [facility]      Will facilitate refill.

## 2018-02-01 ENCOUNTER — HOSPITAL ENCOUNTER (OUTPATIENT)
Dept: GENERAL RADIOLOGY | Facility: CLINIC | Age: 53
End: 2018-02-01
Attending: PHYSICIAN ASSISTANT
Payer: COMMERCIAL

## 2018-02-01 ENCOUNTER — MYC MEDICAL ADVICE (OUTPATIENT)
Dept: PALLIATIVE MEDICINE | Facility: CLINIC | Age: 53
End: 2018-02-01

## 2018-02-01 DIAGNOSIS — Z98.1 S/P CERVICAL SPINAL FUSION: ICD-10-CM

## 2018-02-01 PROCEDURE — 72040 X-RAY EXAM NECK SPINE 2-3 VW: CPT | Mod: TC

## 2018-02-01 RX ORDER — LOSARTAN POTASSIUM 50 MG/1
50 TABLET ORAL DAILY
Qty: 90 TABLET | Refills: 1 | Status: SHIPPED | OUTPATIENT
Start: 2018-02-01 | End: 2018-07-31

## 2018-02-01 NOTE — TELEPHONE ENCOUNTER
Prescription approved per [facility] Refill Protocol.    Brandy Cooper RN  [facility]

## 2018-02-01 NOTE — TELEPHONE ENCOUNTER
My chart message from pt:    At my last appointment you wrote the following...   Recommend cervical Medial Branch Block bilaterally at C3, C4, C5 and C6 with subsequent Radiofrequency ablation if blocks are positive     I tried to schedule with Dr. Mcdaniel but the nurse said there wasn't an order in the system for the injections.  Can you put an order in so I can schedule this?

## 2018-02-01 NOTE — TELEPHONE ENCOUNTER
The order for the radiofrequency ablation is in epic for our pain clinic location.   We don't order procedures for outside the pain clinic. If pt wants to go elsewhere he would need to talk w/ his primary care provider.    Routed to Dr. Angle Freeman to review.    Heidy Gipson, RN-BSN  [facility]

## 2018-02-01 NOTE — TELEPHONE ENCOUNTER
"Requested Prescriptions   Pending Prescriptions Disp Refills     losartan (COZAAR) 50 MG tablet 90 tablet 1     Sig: Take 1 tablet (50 mg) by mouth daily    Angiotensin-II Receptors Passed    1/31/2018  4:10 PM       Passed - Blood pressure under 140/90 in past 12 months.    BP Readings from Last 3 Encounters:   01/26/18 114/80   01/11/18 (!) 157/96   12/28/17 120/77                Passed - Recent or future visit with authorizing provider's specialty    Patient had office visit in the last year or has a visit in the next 30 days with authorizing provider.  See \"Patient Info\" tab in inbasket, or \"Choose Columns\" in Meds & Orders section of the refill encounter.            Passed - Patient is age 18 or older       Passed - Normal serum creatinine on file in past 12 months    Recent Labs   Lab Test  06/30/17   1613   CR  1.15            Passed - Normal serum potassium on file in past 12 months    Recent Labs   Lab Test  06/30/17   1613   POTASSIUM  3.9                    "

## 2018-02-01 NOTE — TELEPHONE ENCOUNTER
Medication refill information reviewed.     Due date for Tramadol is  3/2/2017    Next appt date made for April 12,2017    Prescriptions prepped for review.     Will route to provider.     Katarina Licona RN, [facility]  Pain Clinic Care Coordinator          81

## 2018-02-02 ENCOUNTER — MYC MEDICAL ADVICE (OUTPATIENT)
Dept: FAMILY MEDICINE | Facility: OTHER | Age: 53
End: 2018-02-02

## 2018-02-02 ENCOUNTER — TELEPHONE (OUTPATIENT)
Dept: NEUROSURGERY | Facility: CLINIC | Age: 53
End: 2018-02-02

## 2018-02-02 DIAGNOSIS — G89.29 CHRONIC NECK PAIN: ICD-10-CM

## 2018-02-02 DIAGNOSIS — Z98.1 STATUS POST CERVICAL SPINAL FUSION: ICD-10-CM

## 2018-02-02 DIAGNOSIS — M54.2 CHRONIC NECK PAIN: ICD-10-CM

## 2018-02-02 DIAGNOSIS — G89.4 CHRONIC PAIN SYNDROME: Primary | ICD-10-CM

## 2018-02-02 NOTE — TELEPHONE ENCOUNTER
----- Message from Erica Mitchell PA-C sent at 2/2/2018  2:42 PM CST -----  Hi, could you call patient and let him know that his xrays look good and if he is having continued shoulder and arm pain that I recommend he follow up with his PCP or pain management regarding this. Thanks.

## 2018-02-02 NOTE — TELEPHONE ENCOUNTER
Agree with nursing - patient should have Dr Grullon place the orders for the medial branch blocks and subsequent RFA if he wants to have it performed in [place].  Sorry for the inconvenience.    Boni Freeman MD  [facility] [place]

## 2018-02-02 NOTE — TELEPHONE ENCOUNTER
Message left for patient detailing results and recommendations per MAYDA Holder regarding xray results.  I instructed Indra to call our office with any questions.

## 2018-02-02 NOTE — TELEPHONE ENCOUNTER
Message sent to pt:    Finn Burrell,     If you want to have Dr. Mcdaniel do the procedures for you, Dr Grullon will need to place the orders for the medial branch blocks and subsequent RFA in [place]. Dr. Angle Freeman writes orders for the procedures to be completed at our clinic locations.  Please let us know if you have any questions.     Thank you,     Erica Navarro, ANGELINAN, RN-BC  Patient Care Supervisor/Care Coordinator  [facility] [place]

## 2018-02-08 ENCOUNTER — MYC MEDICAL ADVICE (OUTPATIENT)
Dept: FAMILY MEDICINE | Facility: OTHER | Age: 53
End: 2018-02-08

## 2018-02-08 DIAGNOSIS — Z98.1 S/P CERVICAL SPINAL FUSION: ICD-10-CM

## 2018-02-08 DIAGNOSIS — M54.81 BILATERAL OCCIPITAL NEURALGIA: Primary | ICD-10-CM

## 2018-02-08 RX ORDER — HYDROCODONE BITARTRATE AND ACETAMINOPHEN 5; 325 MG/1; MG/1
1 TABLET ORAL EVERY 6 HOURS PRN
Qty: 120 TABLET | Refills: 0 | Status: SHIPPED | OUTPATIENT
Start: 2018-02-08 | End: 2018-03-08

## 2018-02-08 NOTE — TELEPHONE ENCOUNTER
Signed Prescriptions:                        Disp   Refills    HYDROcodone-acetaminophen (NORCO) 5-325 MG*120 ta*0        Sig: Take 1 tablet by mouth every 6 hours as needed for           other (Moderate to Severe Pain) May fill on/after           2/15/2017 NOT to start till 2/17/2017.  Authorizing Provider: BONI NAYLOR    Reviewed, printed, signed in [place].  Given to MA for processing.    Boni Freeman MD  [facility]

## 2018-02-08 NOTE — TELEPHONE ENCOUNTER
Received call from patient requesting refill(s) of HYDROcodone-acetaminophen (NORCO) 5-325 MG per tablet    Last picked up from pharmacy on 1/17/18    Pt last seen by prescribing provider on 1/11/18  Next appt scheduled for 4/9/17    Last urine drug screen date 10/20/17  Current opioid agreement on file (completed within the last year) Yes Date of opioid agreement: 6/15/17    Processing (pick one and delete the others):   [facility] clinic    Josiane Riddle MA  [facility]    Will route to nursing pool for review and preparation of prescription(s).

## 2018-02-08 NOTE — TELEPHONE ENCOUNTER
2/7 332pm    Patient LM requesting refill of Norco. He will be out of town the weekend of the 17th for a long weekend. Might be filling at a different pharmacy in [place]. [phone]     Amee Flores    [facility]

## 2018-02-08 NOTE — TELEPHONE ENCOUNTER
Medication refill information reviewed.     Due date for Norco is  2/17/2018    Prescriptions prepped for review.     Will route to provider.     Katarina Licona RN, [facility]  Pain Clinic Care Coordinator

## 2018-02-08 NOTE — TELEPHONE ENCOUNTER
Spoke to patient and he would like to have signed Rx mailed to [facility]. Mailing from [place] on 02/09/18.

## 2018-02-09 ENCOUNTER — TELEPHONE (OUTPATIENT)
Dept: SURGERY | Facility: CLINIC | Age: 53
End: 2018-02-09

## 2018-02-09 NOTE — TELEPHONE ENCOUNTER
Patient will be seeing Dr. Mckenna/Kareem Ayon. They will determine if injection is needed and at which level.  Electronically signed by Tha Grullon MD

## 2018-02-09 NOTE — TELEPHONE ENCOUNTER
Pt is calling to sched a injection. There is an order placed but it does not state the location of the injection and what kind of injection he is needing. Can we get an order for this? Thank you.

## 2018-02-15 ENCOUNTER — OFFICE VISIT (OUTPATIENT)
Dept: NEUROSURGERY | Facility: OTHER | Age: 53
End: 2018-02-15
Payer: COMMERCIAL

## 2018-02-15 VITALS — WEIGHT: 217 LBS | TEMPERATURE: 98.3 F | HEIGHT: 74 IN | BODY MASS INDEX: 27.85 KG/M2

## 2018-02-15 DIAGNOSIS — Z98.1 S/P CERVICAL SPINAL FUSION: Primary | ICD-10-CM

## 2018-02-15 PROCEDURE — 99213 OFFICE O/P EST LOW 20 MIN: CPT | Performed by: NEUROLOGICAL SURGERY

## 2018-02-15 NOTE — PROGRESS NOTES
6 mos post-op s/p C6-7 ACDF.  Has been doing well overall, mild neck pain which responded to MBB.  4 weeks ago, was struck in the head by a volleyball, and is now having episodic, positional right arm numbness without pain.  No overt weakness.  XRs stable       Past Medical History:   Diagnosis Date     Back pain      Meniere's disease, unspecified      Pain medication agreement signed 8/20/2010     Past Surgical History:   Procedure Laterality Date     BUNIONECTOMY RT/LT  09/05/08    Both feet     COLONOSCOPY N/A 12/28/2016    Procedure: COMBINED COLONOSCOPY, SINGLE OR MULTIPLE BIOPSY/POLYPECTOMY BY BIOPSY;  Surgeon: Indra Jernigan MD;  [facility]     DISCECTOMY, FUSION CERVICAL ANTERIOR ONE LEVEL, COMBINED N/A 7/5/2017    Procedure: COMBINED DISCECTOMY, FUSION CERVICAL ANTERIOR ONE LEVEL;  Cervical 6-7, Anterior cervical discectomy fusion;  Surgeon: Mike Mckenna MD;  Location[facility]     HC COLONOSCOPY W/WO BRUSH/WASH  12/27/2005     HC CREATE EARDRUM OPENING,GEN ANESTH  09/27/2007    Pe tube, Left endolymphatic sac enhancement.     HC MASTOIDECTOMY,COMPLETE  09/27/2007     HERNIORRHAPHY UMBILICAL N/A 8/11/2017    Procedure: HERNIORRHAPHY UMBILICAL;  open umbilical hernia repair;  Surgeon: Boni Story MD;  [facility]     INJECT EPIDURAL LUMBAR N/A 11/9/2017    Procedure: INJECT EPIDURAL LUMBAR;  lumbar 4-5 epidural steroid injection ;  Surgeon: Darryn Mcdaniel MD;  [facility]     INJECT EPIDURAL LUMBAR N/A 12/28/2017    Procedure: INJECT EPIDURAL LUMBAR;  lumbar epidural injection;  Surgeon: Darryn Mcdaniel MD;  [facility]     PE TUBES  2006    left ear     Social History     Social History     Marital status: Single     Spouse name: N/A     Number of children: 2     Years of education: N/A     Occupational History      [employer]     Social History Main Topics     Smoking status: Never Smoker     Smokeless tobacco: Never Used     Alcohol use No     Drug use: No      "Sexual activity: Yes     Partners: Female     Other Topics Concern     Parent/Sibling W/ Cabg, Mi Or Angioplasty Before 65f 55m? No     Social History Narrative     Family History   Problem Relation Age of Onset     Cancer - colorectal Mother      DIABETES Mother      Cardiovascular Father      Hypertension Father      MENTAL ILLNESS Sister      Suicide Other         ROS: 10 point ROS neg other than the symptoms noted above in the HPI.    Physical Exam  Temp 98.3  F (36.8  C) (Temporal)  Ht 1.88 m (6' 2\")  Wt 98.4 kg (217 lb)  BMI 27.86 kg/m2  HEENT:  Normocephalic, atraumatic.  PERRLA.  EOM s intact.  Visual fields full to gross exam  Neck:  Supple, non-tender, without lymphadenopathy.  Heart:  No peripheral edema  Lungs:  No SOB  Abdomen:  Non-distended.   Skin:  Warm and dry.  Extremities:  No edema, cyanosis or clubbing.  Psychiatric:  No apparent distress  Musculoskeletal:  Normal bulk and tone    NEUROLOGICAL EXAMINATION:     Mental status:  Alert and Oriented x 3, speech is fluent.  Cranial nerves:  II-XII intact.   Motor:    Shoulder Abduction:  Right:  5/5   Left:  5/5  Biceps:                      Right:  5/5   Left:  5/5  Triceps:                     Right:  5/5   Left:  5/5  Wrist Extensors:       Right:  5/5   Left:  5/5  Wrist Flexors:           Right:  5/5   Left:  5/5  interosseus :            Right:  5/5   Left:  5/5   Hip Flexor:                Right: 5/5  Left:  5/5  Hip Adductor:             Right:  5/5  Left:  5/5  Hip Abductor:             Right:  5/5  Left:  5/5  Gastroc Soleus:        Right:  5/5  Left:  5/5  Tib/Ant:                      Right:  5/5  Left:  5/5  EHL:                     Right:  5/5  Left:  5/5  Sensation:  Intact  Reflexes:  Negative Babinski.  Negative Clonus.  Negative Ruby's.  Coordination:  Smooth finger to nose testing.   Negative pronator drift.  Smooth tandem walking.  Incision well healed    A/P:    XRs stable  Will obtain new MRI to rule out new structural " lesion  If stable, will start PT

## 2018-02-15 NOTE — LETTER
2/15/2018         RE: Indra Mehta  [id number] [address] [place] [id number]        Dear Colleague,    Thank you for referring your patient, Indra Mehta, to the [facility]. Please see a copy of my visit note below.    6 mos post-op s/p C6-7 ACDF.  Has been doing well overall, mild neck pain which responded to MBB.  4 weeks ago, was struck in the head by a volleyball, and is now having episodic, positional right arm numbness without pain.  No overt weakness.  XRs stable       Past Medical History:   Diagnosis Date     Back pain      Meniere's disease, unspecified      Pain medication agreement signed 8/20/2010     Past Surgical History:   Procedure Laterality Date     BUNIONECTOMY RT/LT  09/05/08    Both feet     COLONOSCOPY N/A 12/28/2016    Procedure: COMBINED COLONOSCOPY, SINGLE OR MULTIPLE BIOPSY/POLYPECTOMY BY BIOPSY;  Surgeon: Indra Jernigan MD;  [facility]     DISCECTOMY, FUSION CERVICAL ANTERIOR ONE LEVEL, COMBINED N/A 7/5/2017    Procedure: COMBINED DISCECTOMY, FUSION CERVICAL ANTERIOR ONE LEVEL;  Cervical 6-7, Anterior cervical discectomy fusion;  Surgeon: Mike Mckenna MD;  [facility]     HC COLONOSCOPY W/WO BRUSH/WASH  12/27/2005     HC CREATE EARDRUM OPENING,GEN ANESTH  09/27/2007    Pe tube, Left endolymphatic sac enhancement.     HC MASTOIDECTOMY,COMPLETE  09/27/2007     HERNIORRHAPHY UMBILICAL N/A 8/11/2017    Procedure: HERNIORRHAPHY UMBILICAL;  open umbilical hernia repair;  Surgeon: Boni Story MD;  [facility]     INJECT EPIDURAL LUMBAR N/A 11/9/2017    Procedure: INJECT EPIDURAL LUMBAR;  lumbar 4-5 epidural steroid injection ;  Surgeon: Darryn Mcdaniel MD;  [facility]     INJECT EPIDURAL LUMBAR N/A 12/28/2017    Procedure: INJECT EPIDURAL LUMBAR;  lumbar epidural injection;  Surgeon: Darryn Mcdaniel MD;  [facility]     PE TUBES  2006    left ear     Social History     Social History     Marital status: Single     Spouse name:  "N/A     Number of children: 2     Years of education: N/A     Occupational History      [employer]     Social History Main Topics     Smoking status: Never Smoker     Smokeless tobacco: Never Used     Alcohol use No     Drug use: No     Sexual activity: Yes     Partners: Female     Other Topics Concern     Parent/Sibling W/ Cabg, Mi Or Angioplasty Before 65f 55m? No     Social History Narrative     Family History   Problem Relation Age of Onset     Cancer - colorectal Mother      DIABETES Mother      Cardiovascular Father      Hypertension Father      MENTAL ILLNESS Sister      Suicide Other         ROS: 10 point ROS neg other than the symptoms noted above in the HPI.    Physical Exam  Temp 98.3  F (36.8  C) (Temporal)  Ht 1.88 m (6' 2\")  Wt 98.4 kg (217 lb)  BMI 27.86 kg/m2  HEENT:  Normocephalic, atraumatic.  PERRLA.  EOM s intact.  Visual fields full to gross exam  Neck:  Supple, non-tender, without lymphadenopathy.  Heart:  No peripheral edema  Lungs:  No SOB  Abdomen:  Non-distended.   Skin:  Warm and dry.  Extremities:  No edema, cyanosis or clubbing.  Psychiatric:  No apparent distress  Musculoskeletal:  Normal bulk and tone    NEUROLOGICAL EXAMINATION:     Mental status:  Alert and Oriented x 3, speech is fluent.  Cranial nerves:  II-XII intact.   Motor:    Shoulder Abduction:  Right:  5/5   Left:  5/5  Biceps:                      Right:  5/5   Left:  5/5  Triceps:                     Right:  5/5   Left:  5/5  Wrist Extensors:       Right:  5/5   Left:  5/5  Wrist Flexors:           Right:  5/5   Left:  5/5  interosseus :            Right:  5/5   Left:  5/5   Hip Flexor:                Right: 5/5  Left:  5/5  Hip Adductor:             Right:  5/5  Left:  5/5  Hip Abductor:             Right:  5/5  Left:  5/5  Gastroc Soleus:        Right:  5/5  Left:  5/5  Tib/Ant:                      Right:  5/5  Left:  5/5  EHL:                     Right:  5/5  Left:  5/5  Sensation:  Intact  Reflexes:  Negative " Babinski.  Negative Clonus.  Negative Ruby's.  Coordination:  Smooth finger to nose testing.   Negative pronator drift.  Smooth tandem walking.  Incision well healed    A/P:    XRs stable  Will obtain new MRI to rule out new structural lesion  If stable, will start PT    Again, thank you for allowing me to participate in the care of your patient.        Sincerely,        Mike Mckenna MD

## 2018-02-15 NOTE — PATIENT INSTRUCTIONS
MRI cervical spine scheduled: [facility], 2/19/18, check in at 4:00pm  We will contact you with the results.     Please call our clinic with any questions or concerns: [phone]

## 2018-02-15 NOTE — MR AVS SNAPSHOT
After Visit Summary   2/15/2018    Indra Mehta    MRN: [id number]           Patient Information     Date Of Birth          1965        Visit Information        Provider Department      2/15/2018 9:20 AM Mike Mckenna MD [facility]        Today's Diagnoses     S/P cervical spinal fusion    -  1      Care Instructions    MRI cervical spine scheduled: [facility], 2/19/18, check in at 4:00pm  We will contact you with the results.     Please call our clinic with any questions or concerns: [phone]            Follow-ups after your visit        Your next 10 appointments already scheduled     Feb 19, 2018  4:15 PM CST   (Arrive by 4:00 PM)   MR CERVICAL SPINE W/O & W CONTRAST with PHMR1   [facility] ([facility])    [address] [place] [id number] [phone]           Take your medicines as usual, unless your doctor tells you not to. Bring a list of your current medicines to your exam (including vitamins, minerals and over-the-counter drugs).  You may or may not receive intravenous (IV) contrast for this exam pending the discretion of the Radiologist.  You do not need to do anything special to prepare.  The MRI machine uses a strong magnet. Please wear clothes without metal (snaps, zippers). A sweatsuit works well, or we may give you a hospital gown.  Please remove any body piercings and hair extensions before you arrive. You will also remove watches, jewelry, hairpins, wallets, dentures, partial dental plates and hearing aids. You may wear contact lenses, and you may be able to wear your rings. We have a safe place to keep your personal items, but it is safer to leave them at home.  **IMPORTANT** THE INSTRUCTIONS BELOW ARE ONLY FOR THOSE PATIENTS WHO HAVE BEEN PRESCRIBED SEDATION OR GENERAL ANESTHESIA DURING THEIR MRI PROCEDURE:  IF YOUR DOCTOR PRESCRIBED ORAL SEDATION (take medicine to help you relax during your exam):    You must get the medicine from your doctor (oral medication) before you arrive. Bring the medicine to the exam. Do not take it at home. You ll be told when to take it upon arriving for your exam.   Arrive one hour early. Bring someone who can take you home after the test. Your medicine will make you sleepy. After the exam, you may not drive, take a bus or take a taxi by yourself.  IF YOUR DOCTOR PRESCRIBED IV SEDATION:   Arrive one hour early. Bring someone who can take you home after the test. Your medicine will make you sleepy. After the exam, you may not drive, take a bus or take a taxi by yourself.   No eating 6 hours before your exam. You may have clear liquids up until 4 hours before your exam. (Clear liquids include water, clear tea, black coffee and fruit juice without pulp.)  IF YOUR DOCTOR PRESCRIBED ANESTHESIA (be asleep for your exam):   Arrive 1 1/2 hours early. Bring someone who can take you home after the test. You may not drive, take a bus or take a taxi by yourself.   No eating 8 hours before your exam. You may have clear liquids up until 4 hours before your exam. (Clear liquids include water, clear tea, black coffee and fruit juice without pulp.)   You will spend four to five hours in the recovery room.  Please call the Imaging Department at your exam site with any questions.            Apr 09, 2018  4:00 PM CDT   Return Visit with Boni Freeman MD   [facility] [place] ([facility] [place])    [address] [address]  [place] [id number] [phone]              Future tests that were ordered for you today     Open Future Orders        Priority Expected Expires Ordered    MR Cervical Spine w/o & w Contrast Routine  2/15/2019 2/15/2018            Who to contact     If you have questions or need follow up information about today's clinic visit or your schedule please contact [facility] SHAMIR ROWE directly at [phone].  Normal or non-critical lab and imaging  "results will be communicated to you by MyChart, letter or phone within 4 business days after the clinic has received the results. If you do not hear from us within 7 days, please contact the clinic through [employer] or phone. If you have a critical or abnormal lab result, we will notify you by phone as soon as possible.  Submit refill requests through [employer] or call your pharmacy and they will forward the refill request to us. Please allow 3 business days for your refill to be completed.          Additional Information About Your Visit        [employer] Information     [employer] gives you secure access to your electronic health record. If you see a primary care provider, you can also send messages to your care team and make appointments. If you have questions, please call your primary care clinic.  If you do not have a primary care provider, please call [phone] and they will assist you.        Care EveryWhere ID     This is your Care EveryWhere ID. This could be used by other organizations to access your [place] medical records  [id number]        Your Vitals Were     Temperature Height BMI (Body Mass Index)             98.3  F (36.8  C) (Temporal) 6' 2\" (1.88 m) 27.86 kg/m2          Blood Pressure from Last 3 Encounters:   01/26/18 114/80   01/11/18 (!) 157/96   12/28/17 120/77    Weight from Last 3 Encounters:   02/15/18 217 lb (98.4 kg)   01/26/18 216 lb (98 kg)   12/18/17 215 lb 9.6 oz (97.8 kg)               Primary Care Provider Office Phone # Fax #    Tha Grullon [phone]       [address] [facility] [address]        Equal Access to Services     [facility] : Hadii marva Menendez, waaxda luqadaha, qaybta kaalmikala caballero. So [facility] [phone].    ATENCIÓN: Si habla español, tiene a lundy disposición servicios gratuitos de asistencia lingüística. Llame al [phone].    We comply with applicable federal civil rights laws and [place] " laws. We do not discriminate on the basis of race, color, national origin, age, disability, sex, sexual orientation, or gender identity.            Thank you!     Thank you for choosing [facility]  for your care. Our goal is always to provide you with excellent care. Hearing back from our patients is one way we can continue to improve our services. Please take a few minutes to complete the written survey that you may receive in the mail after your visit with us. Thank you!             Your Updated Medication List - Protect others around you: Learn how to safely use, store and throw away your medicines at www.disposemymeds.org.          This list is accurate as of 2/15/18  9:34 AM.  Always use your most recent med list.                   Brand Name Dispense Instructions for use Diagnosis    cyclobenzaprine 10 MG tablet    FLEXERIL    60 tablet    Take 1 tablet (10 mg) by mouth 2 times daily as needed for muscle spasms Reported on 4/12/2017    Muscle spasm       gabapentin 300 MG capsule    NEURONTIN    300 capsule    Take 3 capsules (900 mg) by mouth 3 times daily If needed/tolerated, OK to increase bedtime dose to 1200mg    Cervicalgia       HYDROcodone-acetaminophen 5-325 MG per tablet    NORCO    120 tablet    Take 1 tablet by mouth every 6 hours as needed for other (Moderate to Severe Pain) May fill on/after 2/15/2017 NOT to start till 2/17/2017.    S/P cervical spinal fusion       hydrOXYzine 25 MG capsule    VISTARIL    30 capsule    Take 1 capsule (25 mg) by mouth At Bedtime    Sleep disorder       IBUPROFEN PO           losartan 50 MG tablet    COZAAR    90 tablet    Take 1 tablet (50 mg) by mouth daily    Benign essential hypertension       methocarbamol 750 MG tablet    ROBAXIN    90 tablet    Take 1 tablet (750 mg) by mouth 3 times daily as needed for muscle spasms    Cervical radiculopathy, Myofascial pain       order for DME     1 Device    Equipment being ordered: TENS Pads as directed     Chronic pain syndrome       tiZANidine 4 MG tablet    ZANAFLEX    60 tablet    Take 1 tablet (4 mg) by mouth 2 times daily as needed for muscle spasms    Muscle spasm       TYLENOL PO      Take 500 mg by mouth every 4 hours as needed for mild pain or fever

## 2018-02-15 NOTE — NURSING NOTE
"Indra Mehta is a 52 year old male who presents for:  Chief Complaint   Patient presents with     Consult     bilateral occipital neuralgia into right arm     Neurologic Problem        Initial Vitals:  Temp 98.3  F (36.8  C) (Temporal)  Ht 6' 2\" (1.88 m)  Wt 217 lb (98.4 kg)  BMI 27.86 kg/m2 Estimated body mass index is 27.86 kg/(m^2) as calculated from the following:    Height as of this encounter: 6' 2\" (1.88 m).    Weight as of this encounter: 217 lb (98.4 kg).. Body surface area is 2.27 meters squared. BP completed using cuff size: NA (Not Taken)  Data Unavailable    Do you feel safe in your environment?  Yes  Do you need any refills today? No    Nursing Comments:         Gianfranco Church    "

## 2018-02-19 ENCOUNTER — HOSPITAL ENCOUNTER (OUTPATIENT)
Dept: MRI IMAGING | Facility: CLINIC | Age: 53
Discharge: HOME | End: 2018-02-19
Attending: NEUROLOGICAL SURGERY | Admitting: NEUROLOGICAL SURGERY
Payer: COMMERCIAL

## 2018-02-19 DIAGNOSIS — Z98.1 S/P CERVICAL SPINAL FUSION: ICD-10-CM

## 2018-02-19 PROCEDURE — A9585 GADOBUTROL INJECTION: HCPCS | Performed by: RADIOLOGY

## 2018-02-19 PROCEDURE — 72156 MRI NECK SPINE W/O & W/DYE: CPT

## 2018-02-19 PROCEDURE — 25000128 H RX IP 250 OP 636: Performed by: RADIOLOGY

## 2018-02-19 RX ORDER — GADOBUTROL 604.72 MG/ML
10 INJECTION INTRAVENOUS ONCE
Status: COMPLETED | OUTPATIENT
Start: 2018-02-19 | End: 2018-02-19

## 2018-02-19 RX ADMIN — GADOBUTROL 10 ML: 604.72 INJECTION INTRAVENOUS at 16:41

## 2018-02-20 ENCOUNTER — TELEPHONE (OUTPATIENT)
Dept: NEUROSURGERY | Facility: CLINIC | Age: 53
End: 2018-02-20

## 2018-02-20 DIAGNOSIS — Z98.1 S/P CERVICAL SPINAL FUSION: Primary | ICD-10-CM

## 2018-02-20 NOTE — TELEPHONE ENCOUNTER
Per Dr. Mckenna, recent MRI C spine looks good; he recommends starting PT at this point. Discussed with patient and voiced agreement. Placed order. Advised patient to call me back with any further questions/concerns.

## 2018-02-28 NOTE — TELEPHONE ENCOUNTER
Contacted patient. He saw Dr. Angle Freeman on 1/11/18, and he recommended MBB bilaterally at C3, C4, C5, and C6, with subsequent RFA if blocks are positive. Patient states he would like to proceed with this if possible, but would like to have them done with Dr. Mcdaniel.     Will discuss with Dr. Mckenna and update patient with plan.

## 2018-02-28 NOTE — TELEPHONE ENCOUNTER
Patient called asking to be scheduled for an injection, no orders in epic. Please contact patient to discuss if this is an option for him as it looks like only PT has been ordered.

## 2018-02-28 NOTE — TELEPHONE ENCOUNTER
Spoke with Dr. Mckenna, and he approves order for Cervical MBB. Will place order and send to Dr. Mcdaniel's team, per patient request. They will contact patient to schedule.

## 2018-03-01 ENCOUNTER — TELEPHONE (OUTPATIENT)
Dept: SURGERY | Facility: CLINIC | Age: 53
End: 2018-03-01

## 2018-03-02 ENCOUNTER — HOSPITAL ENCOUNTER (OUTPATIENT)
Dept: PHYSICAL THERAPY | Facility: CLINIC | Age: 53
Setting detail: THERAPIES SERIES
End: 2018-03-02
Attending: NEUROLOGICAL SURGERY
Payer: COMMERCIAL

## 2018-03-02 ENCOUNTER — MYC MEDICAL ADVICE (OUTPATIENT)
Dept: PALLIATIVE MEDICINE | Facility: CLINIC | Age: 53
End: 2018-03-02

## 2018-03-02 DIAGNOSIS — G89.4 CHRONIC PAIN SYNDROME: Primary | ICD-10-CM

## 2018-03-02 PROCEDURE — 40000718 ZZHC STATISTIC PT DEPARTMENT ORTHO VISIT: Performed by: PHYSICAL THERAPIST

## 2018-03-02 PROCEDURE — 97163 PT EVAL HIGH COMPLEX 45 MIN: CPT | Mod: GP | Performed by: PHYSICAL THERAPIST

## 2018-03-05 NOTE — TELEPHONE ENCOUNTER
I reviewed the cervical spine MRI that was just completed - there is some mild arthritic changes at C5-6 (level above the fusion) without significant central canal or foraminal (hole where the nerve exits the central canal) narrowing.  Due to the nature of the injury (volleyball hitting you in the head), this is likely more muscle related pain with the facet joint pain that should get better with there RFA treatment.  I would continue the gabapentin, muscle relaxant, tylenol, ibuprofen, and prescribed percocet but I would not recommend increasing the percocet at this time.  Instead, I recommend a trial of Voltaren gel applied to the affected areas 3-4 times a day to help with local inflammation.  I would also continue stretching and massage.  Once the RFA is completed, things should settle down.    Boni Freeman MD  [facility]

## 2018-03-05 NOTE — TELEPHONE ENCOUNTER
"Ketan    Here is Dr. Angle Freeman response:    \"I reviewed the cervical spine MRI that was just completed - there is some mild arthritic changes at C5-6 (level above the fusion) without significant central canal or foraminal (hole where the nerve exits the central canal) narrowing.  Due to the nature of the injury (volleyball hitting you in the head), this is likely more muscle related pain with the facet joint pain that should get better with there RFA treatment.  I would continue the gabapentin, muscle relaxant, tylenol, ibuprofen, and prescribed percocet but I would not recommend increasing the percocet at this time.  Instead, I recommend a trial of Voltaren gel applied to the affected areas 3-4 times a day to help with local inflammation.  I would also continue stretching and massage.  Once the RFA is completed, things should settle down.     Boni Freeman MD  [facility]\"    Do want an order for the Voltaren gel?    Katarina Licona RN, [facility]  Pain Clinic Care Coordinator   "

## 2018-03-05 NOTE — TELEPHONE ENCOUNTER
My chart message from pt:    I am currently at my max of Tylenol and ibuprophen.  I am also using heat and ice on my neck.  The PT, MBBs, and possible RFA will hopefully lead to pain relief in the near future.  Is there anything that can be done in the near term to help with the extra amount of pain I am experiencing?     Sending to provider to respond.     Katarina Licona RN, [facility]  Pain Clinic Care Coordinator

## 2018-03-05 NOTE — TELEPHONE ENCOUNTER
Patient returned call and scheduled MBB on 3/22/18 at 1030 with Dr. Mcdaniel.  Patient has requested sedation for procedure due to past anxiety with injections.  Instructed to make a Pre-Op appt with PCP.

## 2018-03-05 NOTE — TELEPHONE ENCOUNTER
My chart response from pt:    I will try it. I have been using something called arnacare which has arnica [place] in it.    Prepping order for Voltaren gel.     Katarina Licona RN, [facility]  Pain Clinic Care Coordinator

## 2018-03-05 NOTE — TELEPHONE ENCOUNTER
Signed Prescriptions:                        Disp   Refills    diclofenac (VOLTAREN) 1 % GEL topical gel  100 g  1        Sig: Apply 2 grams to neck four times daily using enclosed           dosing card.  Authorizing Provider: BONI NAYLOR    Reviewed, signed, e-prescribed.    Boni Freeman MD  [facility]

## 2018-03-05 NOTE — TELEPHONE ENCOUNTER
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  I have had a significant increase in pain that entire time. I will be starting PT next week. I continue to take my pain meds as prescribed but the increase in pain has had a large impact on my ability to work and perform daily tasks.     Ketan    It looks like you have seen Dr. Mckenna for this condition and that orders have been placed for you to have your MBB's and possible RFA done up in [place] with Dr. Mcdaniel. I am not sure are you asking Dr. Angle Freeman a specific question with this message? Thanks    Katarina Licona, RN, [facility]  Pain Clinic Care Coordinator

## 2018-03-06 ENCOUNTER — HOSPITAL ENCOUNTER (OUTPATIENT)
Dept: PHYSICAL THERAPY | Facility: OTHER | Age: 53
Setting detail: THERAPIES SERIES
End: 2018-03-06
Attending: NEUROLOGICAL SURGERY
Payer: COMMERCIAL

## 2018-03-06 PROCEDURE — 97110 THERAPEUTIC EXERCISES: CPT | Mod: GP | Performed by: PHYSICAL THERAPIST

## 2018-03-06 PROCEDURE — 40000718 ZZHC STATISTIC PT DEPARTMENT ORTHO VISIT: Performed by: PHYSICAL THERAPIST

## 2018-03-06 NOTE — ADDENDUM NOTE
Encounter addended by: Shanda Rivas PT on: 3/6/2018  7:26 AM<BR>     Actions taken: Sign clinical note, Flowsheet accepted

## 2018-03-06 NOTE — PROGRESS NOTES
Outpatient Physical Therapy Evaluation     03/02/18 1034   General Information   Type of Visit Initial OP Ortho PT Evaluation   Start of Care Date 03/02/18   Referring Physician Mike Mckenna MD   Patient/Family Goals Statement reduce pain in neck, also right shoulder and get right arm numbness decreased   Orders Evaluate and Treat   Date of Order 02/20/18   Insurance Type [employer]   Insurance Comments/Visits Authorized no limits   Medical Diagnosis S/P cervical spinal fusion (Z98.1) - Primary   Surgical/Medical history reviewed Yes   Body Part(s)   Body Part(s) Cervical Spine   Presentation and Etiology   Pertinent history of current problem (include personal factors and/or comorbidities that impact the POC) Two years ago his neck started to bother him worse than the low back. He had injections and PT and then PT. Eventally Dr. Mckenna did surgery to help disc herniation. 7/5/17 had neck surgery. It relieved his bilateral shooting pain in to both arms and relieved the constant electric pain in the neck. As he recovered the remaining pain was mainly in the spine.  said was facet joint pain. Is planning on block injections to see if helps, and if does can do an ablation. Just needs to be scheduled. 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 One of the serves hit him in the head. The volleyball hit him and caused head pain, the next day noted right shoulder pain and right arm numbness. Eventually determined if he puts his head in an upright position then arm goes numb. If he looks up to TV or projector imagine then within a few seconds then arm goes numb. Does a lot of slouching and keeping chin tucked. He has been having significant amount of pain not present after surgery. Sleeping is next to impossible. Prior to the six weeks was waking up with LBP. Is seeing a  at pain management clinic. Is taking Tyzandeine to sleep, lasts from 9-1:30. Has tried flexeril and goes too  long into the next day. Sometimes if wakes up early enough then can take another one. Even with that is still waking up 6 times a night. Usually able to go back to sleep right away when adjusting position. If he sleeps on his side he can keep his neck better but then bothers low back and left shoulder. Sometimes wakes up in prone and then right arm in numb. Is getting headaches. Driving is terrible with this. Thinks he has muscle pain from tilting chin forward.  Has continued to work out. Has been to 15 different PTs over that past few decades mostly for the neck. Did do PT a couple years ago for the neck. He used to be able to do a 4 foot vertical jump and used it a lot with basketball and volleyball. He also has chronic hip, knee, and ankle pain. Rotation seems good, it seems flexion or extension that triggers most of his pain.    Symptom Location Headache pain in bilateral temporal region forward into eyes. Numbness: feels first in thumb, then armpit, the entire arm. If continued head position then pain will start in armpit which would then progress down the arm. Can make that happen within a minute.  His complete arm will be numb within 25-30 seconds.    Pain rating (0-10 point scale) Best (/10);Worst (/10);Other   Best (/10) 4/10, when first go to bed   Worst (/10) 9/10, can be anytime if he moves just the right way and gets a shooting pain that makes him sick to his stomach when it happens. Can't say it is any one thing; Rotation seems good, it seems flexion or extension that triggers most of his pain.    Pain rating comment Currently pain is 5-6/10 after driving.    Pain quality A. Sharp;E. Shooting;H. Other   Pain quality comment Always hurts along his spine, constant ache (always 3-5/10 pain)   Frequency of pain/symptoms A. Constant   Pain/symptoms are: Other   Pain symptoms comment First waking up and on his way home from work    Pain/symptoms eased by K. Other   Pain eased by comment He struggles with  using ice or heat. There may be swelling but muscles are so tight.   Current / Previous Interventions   Diagnostic Tests: X-ray;MRI   MRI Results Results   MRI results Most recent is unremarkable   Current Level of Function   Patient role/employment history A. Employed;H. Other   Employment Comments  (mostly meetiings); At work has an sit<>stand desk, but can't stand all the time due to nerve pain in feet. Some days can't stand at all. Takes 50 min to drive to work, after 30 minutes gets pain from just below base of skull and shoots into middle of brain; alot of times are intense enough that shocks him.   Living environment [facility]   Fall Risk Screen   Fall screen completed by PT   Have you fallen 2 or more times in the past year? No   Have you fallen and had an injury in the past year? No   Is patient a fall risk? No   Functional Scales   Functional Scales Other   Other Scales  Neck Disability Index (NDI): Is a questionnaire used to assess how neck pain is affecting patient's functional mobility. The lower the score, the less amount of functional disability due to neck pain. Pt scored 30 points out of 50 possible indicating patient is at 60% of disability due to neck pain which is severe disability. Minimally clinically important difference (MCID) is 5 points.   Cervical Spine   Observation Frequently shifting positions to manage neck or LBP   Integumentary  No concerns   Posture Forward head and maintains moderate chin tuck, slightly rounded shoulders;    Cervical Flexion ROM 19 dg, stopped due to increased pain   Cervical Extension ROM 43 dg, increased pain in neck and right arm numbness   Cervical Right Side Bending ROM 21 dg, instant tingling in arm, slightly increased neck pain on right side of neck   Cervical Left Side Bending ROM 16 dg, increased centralized neck pain   Cervical Right Rotation ROM 58 dg, no change in symptoms (increased neck pain at endrange which is why he stops)   Cervical  Left Rotation ROM 59 dg, no change in symptoms (increased neck pain at endrange which is why he stops)   Thoracic Extension ROM Pain between his shoulder blades   Shoulder AROM Screen Full shoulder AROM, no pain change in neck. Hands on head does pull on nerve in arm, is not his typical pain   Shoulder Shrug (C2-C4) Strength Negative myotomal testing   Shoulder Abd (C5) Strength Negative myotomal testing   Shoulder ER (C5, C6) Strength Negative myotomal testing   Shoulder IR (C5, C6) Strength Negative myotomal testing   Elbow Flexion (C5, C6) Strength Negative myotomal testing   Elbow Extension (C7) Strength Negative myotomal testing   Wrist Extension (C6) Strength Negative myotomal testing   Wrist Flexion (C7) Strength Negative myotomal testing   Thumb Abd (C8) Strength Negative myotomal testing   5th Finger Add (T1) Strength Negative myotomal testing   Upper Trapezius Flexibility Tightness bilaterally   Levator Scapula Flexibility Tightness bilaterally   Scalene Flexibility Tightness bilaterally   Pectoralis Minor Flexibility Tightness bilaterally   Vertebral Artery Test Modified test negative   Alar Ligament Test Negative, testing caused UE numbness   Transverse Ligament Test Negative   Spurling Test Positive for all   Cervical Distraction Test Positive   Segmental Mobility-Cervical Hypomobility in upper cervical spine and CT junction   Segmental Mobility-Thoracic Upper thoracic spine hypomobile   Planned Therapy Interventions   Planned Therapy Interventions ROM;strengthening;stretching;neuromuscular re-education;motor coordination training;manual therapy;joint mobilization   Planned Modality Interventions   Planned Modality Interventions Traction   Planned Modality Interventions Comments Will consider traction use   Clinical Impression   Criteria for Skilled Therapeutic Interventions Met yes, treatment indicated   PT Diagnosis Mechanical neck pain resulting in numbness in right arm    Influenced by the following  impairments pain, decreased ROM, numbness, headahces   Functional limitations due to impairments sleeping, driving, working, concentrating, ADLs,    Clinical Presentation Unstable/Unpredictable   Clinical Presentation Rationale Complex medical history of cervical pain with new injury, cervical surgery, and LBP   Clinical Decision Making (Complexity) High complexity   Therapy Frequency 2 times/Week   Predicted Duration of Therapy Intervention (days/wks) 90 days   Risk & Benefits of therapy have been explained Yes   Patient, Family & other staff in agreement with plan of care Yes   Education Assessment   Preferred Learning Style Listening;Demonstration   Barriers to Learning No barriers   ORTHO GOALS   PT Ortho Eval Goals 1;2;3;4;5   Ortho Goal 1   Goal Identifier Pain   Goal Description Indra will report at least 50% decrease in overall pain intensity in order to more safely drive.    Target Date 04/16/18   Ortho Goal 2   Goal Identifier Numbness   Goal Description Indra will report at least 75% reduction in right UE numbness frequency and intensity in order to allow for improved driving.    Target Date 04/16/18   Ortho Goal 3   Goal Identifier NDI #1   Goal Description Indra will score 25 points or less on the Neck Disability Index in order to have clinically meaninful improvement in function due to decreased neck pain.    Target Date 04/16/18   Ortho Goal 4   Goal Identifier HEP   Goal Description Indra will have comprehensive home programming to self manage symptoms to allow for discharge from skilled PT services.    Target Date 05/31/18   Ortho Goal 5   Goal Identifier NDI #2   Goal Description Indra will score 14 points or less on the Neck Disability Index in order have only minimal disability due to neck pain.    Target Date 05/31/18   Total Evaluation Time   Total Evaluation Time 55     Shanda Rivas, PT, DPT  [phone]  [facility]

## 2018-03-08 ENCOUNTER — MYC MEDICAL ADVICE (OUTPATIENT)
Dept: PALLIATIVE MEDICINE | Facility: CLINIC | Age: 53
End: 2018-03-08

## 2018-03-08 DIAGNOSIS — Z98.1 S/P CERVICAL SPINAL FUSION: ICD-10-CM

## 2018-03-08 RX ORDER — HYDROCODONE BITARTRATE AND ACETAMINOPHEN 5; 325 MG/1; MG/1
1 TABLET ORAL EVERY 4 HOURS PRN
Qty: 150 TABLET | Refills: 0 | Status: SHIPPED | OUTPATIENT
Start: 2018-03-08 | End: 2018-04-10

## 2018-03-08 NOTE — TELEPHONE ENCOUNTER
Medication refill information reviewed.  I would be willing to increase to 5 per day for one month but then back to 4 per day after that     Due date for Norco is 3/19/2018     Prescriptions prepped for review.     Will route to provider.     Katarina Licona RN, [facility]  Pain Clinic Care Coordinator

## 2018-03-08 NOTE — TELEPHONE ENCOUNTER
Pt SHARYN at 0808 --    Reason for call:  Medication   If this is a refill request, has the caller requested the refill from the pharmacy already? N/A  Will the patient be using a [facility]? No  Name of the pharmacy and phone number for the current request: [id number] - [address]    Name of the medication requested: HYDROcodone-acetaminophen (NORCO) 5-325 MG per tablet    Other request: Please mail to [facility] in [place]    Phone number to reach patient:  Home number on file [phone] (home)        Shanda Dai    [facility]

## 2018-03-08 NOTE — TELEPHONE ENCOUNTER
Patient requesting refill(s) of HYDROcodone-acetaminophen (NORCO) 5-325 MG per tablet    Last picked up from pharmacy on 2/15/18     Pt last seen by prescribing provider on 1/11/18  Next appt scheduled for 4/30/18    Last urine drug screen da/te 10/20/17  Current opioid agreement on file (completed within the last year) Yes Date of opioid agreement: 6/15/17    Processing (pick one and delete the others):      Mail to [facility] in [place] pharmacy       Will route to nursing pool for review and preparation of prescription(s).

## 2018-03-08 NOTE — TELEPHONE ENCOUNTER
It seems he is developing a problem with pain medications.  His recent cervical spine MRI is pretty benign with only mild disc bulge above the fusion and mild facet arthritis without nerve impingement.  Pain from the volleyball is most likely muscle related and would benefit most from trigger point injections.  He is scheduled to have his medial branch RFA on 3/22/18 by Violeta, which should help with his pain as well.  I would be willing to increase to 5 per day for one month but then back to 4 per day after that since his RFA treatment will be taking effect.  He needs to continue stretching, heat, ice, massage, muscle relaxants.    Boni Freeman MD  [facility]

## 2018-03-08 NOTE — TELEPHONE ENCOUNTER
Signed Prescriptions:                        Disp   Refills    HYDROcodone-acetaminophen (NORCO) 5-325 MG*150 ta*0        Sig: Take 1 tablet by mouth every 4 hours as needed for           other (Moderate to Severe Pain) May fill on/after           3/16/2017 NOT to start till 3/19/2017. This is an           increase to 5 tabs max a day for 1 month only.           Next month will reduce back down.  Authorizing Provider: BONI NAYLOR    Reviewed, printed, signed in [place].  Given to MA for processing.    Boni Freeman MD  [facility]

## 2018-03-08 NOTE — TELEPHONE ENCOUNTER
My chart message from pt:    I have been using the gel on my neck the last couple of days with no noticeable relief.  My PT sessions have started and my therapists thinks it could takes months to correct what is going on.  I am very close to not being able to work most days because the level of pain in my neck is slowly getting worse.  I am asking permission to increase my norco to 5 or 6 a day for the next several weeks.  I know you are against this but I am maxed out on ibuprofen and tylenol and unless I stay home from work it is difficult to use ice, heat, or my tens unit during the day.     Sending to provider directly to respond to pt.     Katarina Licona RN, [facility]  Pain Clinic Care Coordinator

## 2018-03-08 NOTE — TELEPHONE ENCOUNTER
"Ketan    Here is Dr. Angle Freeman response:    \"It seems he is developing a problem with pain medications.  His recent cervical spine MRI is pretty benign with only mild disc bulge above the fusion and mild facet arthritis without nerve impingement.  Pain from the volleyball is most likely muscle related and would benefit most from trigger point injections.  He is scheduled to have his medial branch RFA on 3/22/18 by Violeta, which should help with his pain as well.  I would be willing to increase to 5 per day for one month but then back to 4 per day after that since his RFA treatment will be taking effect.  He needs to continue stretching, heat, ice, massage, muscle relaxants.     Boni Freeman MD  [facility]\"    I will adjust your pills on your refill and that will cover the increase for 1 month.     Katarina Licona RN, [facility]  Pain Clinic Care Coordinator   "

## 2018-03-09 ENCOUNTER — HOSPITAL ENCOUNTER (OUTPATIENT)
Dept: PHYSICAL THERAPY | Facility: CLINIC | Age: 53
Setting detail: THERAPIES SERIES
End: 2018-03-09
Attending: NEUROLOGICAL SURGERY
Payer: COMMERCIAL

## 2018-03-09 PROCEDURE — 40000718 ZZHC STATISTIC PT DEPARTMENT ORTHO VISIT: Performed by: PHYSICAL THERAPIST

## 2018-03-09 PROCEDURE — 97110 THERAPEUTIC EXERCISES: CPT | Mod: GP | Performed by: PHYSICAL THERAPIST

## 2018-03-09 PROCEDURE — 97140 MANUAL THERAPY 1/> REGIONS: CPT | Mod: GP | Performed by: PHYSICAL THERAPIST

## 2018-03-13 ENCOUNTER — HOSPITAL ENCOUNTER (OUTPATIENT)
Dept: PHYSICAL THERAPY | Facility: OTHER | Age: 53
Setting detail: THERAPIES SERIES
End: 2018-03-13
Attending: NEUROLOGICAL SURGERY
Payer: COMMERCIAL

## 2018-03-13 PROCEDURE — 97140 MANUAL THERAPY 1/> REGIONS: CPT | Mod: GP | Performed by: PHYSICAL THERAPIST

## 2018-03-13 PROCEDURE — 97110 THERAPEUTIC EXERCISES: CPT | Mod: GP | Performed by: PHYSICAL THERAPIST

## 2018-03-13 PROCEDURE — 40000718 ZZHC STATISTIC PT DEPARTMENT ORTHO VISIT: Performed by: PHYSICAL THERAPIST

## 2018-03-13 PROCEDURE — 97530 THERAPEUTIC ACTIVITIES: CPT | Mod: GP,59 | Performed by: PHYSICAL THERAPIST

## 2018-03-15 ENCOUNTER — HOSPITAL ENCOUNTER (OUTPATIENT)
Dept: PHYSICAL THERAPY | Facility: OTHER | Age: 53
Setting detail: THERAPIES SERIES
End: 2018-03-15
Attending: NEUROLOGICAL SURGERY
Payer: COMMERCIAL

## 2018-03-15 ENCOUNTER — OFFICE VISIT (OUTPATIENT)
Dept: FAMILY MEDICINE | Facility: OTHER | Age: 53
End: 2018-03-15
Payer: COMMERCIAL

## 2018-03-15 VITALS
SYSTOLIC BLOOD PRESSURE: 128 MMHG | RESPIRATION RATE: 16 BRPM | OXYGEN SATURATION: 95 % | BODY MASS INDEX: 28.39 KG/M2 | HEIGHT: 73 IN | TEMPERATURE: 96.9 F | WEIGHT: 214.2 LBS | HEART RATE: 68 BPM | DIASTOLIC BLOOD PRESSURE: 80 MMHG

## 2018-03-15 DIAGNOSIS — M54.2 CERVICALGIA: ICD-10-CM

## 2018-03-15 DIAGNOSIS — Z01.818 PREOP GENERAL PHYSICAL EXAM: Primary | ICD-10-CM

## 2018-03-15 LAB
CREAT SERPL-MCNC: 0.92 MG/DL (ref 0.66–1.25)
ERYTHROCYTE [DISTWIDTH] IN BLOOD BY AUTOMATED COUNT: 13.2 % (ref 10–15)
GFR SERPL CREATININE-BSD FRML MDRD: 87 ML/MIN/1.7M2
HCT VFR BLD AUTO: 43.6 % (ref 40–53)
HGB BLD-MCNC: 14.9 G/DL (ref 13.3–17.7)
MCH RBC QN AUTO: 29.6 PG (ref 26.5–33)
MCHC RBC AUTO-ENTMCNC: 34.2 G/DL (ref 31.5–36.5)
MCV RBC AUTO: 87 FL (ref 78–100)
PLATELET # BLD AUTO: 230 10E9/L (ref 150–450)
RBC # BLD AUTO: 5.03 10E12/L (ref 4.4–5.9)
WBC # BLD AUTO: 6 10E9/L (ref 4–11)

## 2018-03-15 PROCEDURE — 97530 THERAPEUTIC ACTIVITIES: CPT | Mod: GP | Performed by: PHYSICAL THERAPIST

## 2018-03-15 PROCEDURE — 85027 COMPLETE CBC AUTOMATED: CPT | Performed by: PHYSICIAN ASSISTANT

## 2018-03-15 PROCEDURE — 99214 OFFICE O/P EST MOD 30 MIN: CPT | Performed by: PHYSICIAN ASSISTANT

## 2018-03-15 PROCEDURE — 82565 ASSAY OF CREATININE: CPT | Performed by: PHYSICIAN ASSISTANT

## 2018-03-15 PROCEDURE — 40000718 ZZHC STATISTIC PT DEPARTMENT ORTHO VISIT: Performed by: PHYSICAL THERAPIST

## 2018-03-15 PROCEDURE — 36415 COLL VENOUS BLD VENIPUNCTURE: CPT | Performed by: PHYSICIAN ASSISTANT

## 2018-03-15 PROCEDURE — 97140 MANUAL THERAPY 1/> REGIONS: CPT | Mod: GP | Performed by: PHYSICAL THERAPIST

## 2018-03-15 PROCEDURE — 97110 THERAPEUTIC EXERCISES: CPT | Mod: GP | Performed by: PHYSICAL THERAPIST

## 2018-03-15 ASSESSMENT — PAIN SCALES - GENERAL: PAINLEVEL: MODERATE PAIN (5)

## 2018-03-15 NOTE — PROGRESS NOTES
[facility]  [address] [facility] [id number] [phone]  Dept:     PRE-OP EVALUATION:  Today's date: 3/15/2018    Indra Mehta (: 1965) presents for pre-operative evaluation assessment as requested by Dr. Mcdaniel.  He requires evaluation and anesthesia risk assessment prior to undergoing surgery/procedure for treatment of Neck problems .    [address]   [address]  Tel. [phone] / Fax [phone]    Primary Physician: Tha Grullon  Type of Anesthesia Anticipated: Monitor Anesthesia Care    Patient has a Health Care Directive or Living Will:  NO    Preop Questions 3/15/2018   Who is doing your surgery? juana   What are you having done? medial branch block   Date of Surgery/Procedure: 3\22   Facility or Hospital where procedure/surgery will be performed: [place]   1.  Do you have a history of Heart attack, stroke, stent, coronary bypass surgery, or other heart surgery? No   2.  Do you ever have any pain or discomfort in your chest? No   3.  Do you have a history of  Heart Failure? No   4.   Are you troubled by shortness of breath when:  walking on a level surface, or up a slight hill, or at night? No   5.  Do you currently have a cold, bronchitis or other respiratory infection? No   6.  Do you have a cough, shortness of breath, or wheezing? No   7.  Do you sometimes get pains in the calves of your legs when you walk? No   8. Do you or anyone in your family have previous history of blood clots? No   9.  Do you or does anyone in your family have a serious bleeding problem such as prolonged bleeding following surgeries or cuts? No   10. Have you ever had problems with anemia or been told to take iron pills? No   11. Have you had any abnormal blood loss such as black, tarry or bloody stools? No   12. Have you ever had a blood transfusion? No   13. Have you or any of your relatives ever had problems  with anesthesia? No   14. Do you have sleep apnea, excessive snoring or daytime drowsiness? No   15. Do you have any prosthetic heart valves? No   16. Do you have prosthetic joints? No         HPI:     HPI related to upcoming procedure: Patient is having injections to treat neck issues       See problem list for active medical problems.  Problems all longstanding and stable, except as noted/documented.  See ROS for pertinent symptoms related to these conditions.                                                                                                  .    MEDICAL HISTORY:     Patient Active Problem List    Diagnosis Date Noted     Benign essential hypertension 06/30/2017     Priority: Medium     Myalgia 10/28/2016     Priority: Medium     Bilateral occipital neuralgia 10/28/2016     Priority: Medium     CARDIOVASCULAR SCREENING; LDL GOAL LESS THAN 160 10/31/2010     Priority: Medium     Major depression in complete remission (H) 08/20/2010     Priority: Medium     Chronic pain syndrome 08/20/2010     Priority: Medium     Patient is followed by Tha Grullon MD for ongoing prescription of pain medication.  All refills should be approved by this provider only at face-to-face appointments - not by phone request.    Medication(s): Tramadol.   Maximum quantity per month: 60  Clinic visit frequency required: Q 1 months     Controlled substance agreement:  Encounter-Level CSA - 10/13/16:               Controlled Substance Agreement - Scan on 10/23/2016  5:07 PM : CONTROLLED SUBSTANCE AGREEMENT 10/13/16 (below)            Pain Clinic evaluation in the past: Yes       Date/Location:   [facility]    DIRE Total Score(s):  No flowsheet data found.    Last [facility] website verification:  none   [email or web address]               Pain medication agreement signed 08/20/2010     Priority: Medium     Meniere's disease 03/22/2007     Priority: Medium     Problem list name updated by automated process. Provider  to review        Past Medical History:   Diagnosis Date     Back pain      Meniere's disease, unspecified      Pain medication agreement signed 8/20/2010     Past Surgical History:   Procedure Laterality Date     BUNIONECTOMY RT/LT  09/05/08    Both feet     COLONOSCOPY N/A 12/28/2016    Procedure: COMBINED COLONOSCOPY, SINGLE OR MULTIPLE BIOPSY/POLYPECTOMY BY BIOPSY;  Surgeon: Indra Jernigan MD;  Location: [facility]     DISCECTOMY, FUSION CERVICAL ANTERIOR ONE LEVEL, COMBINED N/A 7/5/2017    Procedure: COMBINED DISCECTOMY, FUSION CERVICAL ANTERIOR ONE LEVEL;  Cervical 6-7, Anterior cervical discectomy fusion;  Surgeon: Mike Mckenna MD;  Location: [facility] OR     HC COLONOSCOPY W/WO BRUSH/WASH  12/27/2005     HC CREATE EARDRUM OPENING,GEN ANESTH  09/27/2007    Pe tube, Left endolymphatic sac enhancement.     HC MASTOIDECTOMY,COMPLETE  09/27/2007     HERNIORRHAPHY UMBILICAL N/A 8/11/2017    Procedure: HERNIORRHAPHY UMBILICAL;  open umbilical hernia repair;  Surgeon: Boni Story MD;  Location: [facility]     INJECT EPIDURAL LUMBAR N/A 11/9/2017    Procedure: INJECT EPIDURAL LUMBAR;  lumbar 4-5 epidural steroid injection ;  Surgeon: Darryn Mcdaniel MD;  Location: [facility]     INJECT EPIDURAL LUMBAR N/A 12/28/2017    Procedure: INJECT EPIDURAL LUMBAR;  lumbar epidural injection;  Surgeon: Darryn Mcdaniel MD;  Location: [facility]     PE TUBES  2006    left ear     Current Outpatient Prescriptions   Medication Sig Dispense Refill     HYDROcodone-acetaminophen (NORCO) 5-325 MG per tablet Take 1 tablet by mouth every 4 hours as needed for other (Moderate to Severe Pain) May fill on/after 3/16/2017 NOT to start till 3/19/2017. This is an increase to 5 tabs max a day for 1 month only. Next month will reduce back down. 150 tablet 0     losartan (COZAAR) 50 MG tablet Take 1 tablet (50 mg) by mouth daily 90 tablet 1     gabapentin (NEURONTIN) 300 MG capsule Take 3 capsules (900 mg) by mouth 3 times daily If needed/tolerated, OK to  "increase bedtime dose to 1200mg 300 capsule 3     IBUPROFEN PO        tiZANidine (ZANAFLEX) 4 MG tablet Take 1 tablet (4 mg) by mouth 2 times daily as needed for muscle spasms 60 tablet 3     methocarbamol (ROBAXIN) 750 MG tablet Take 1 tablet (750 mg) by mouth 3 times daily as needed for muscle spasms 90 tablet 3     order for DME Equipment being ordered: TENS Pads as directed 1 Device 0     Acetaminophen (TYLENOL PO) Take 500 mg by mouth every 4 hours as needed for mild pain or fever       diclofenac (VOLTAREN) 1 % GEL topical gel Apply 2 grams to neck four times daily using enclosed dosing card. (Patient not taking: Reported on 3/15/2018) 100 g 1     cyclobenzaprine (FLEXERIL) 10 MG tablet Take 1 tablet (10 mg) by mouth 2 times daily as needed for muscle spasms Reported on 4/12/2017 (Patient not taking: Reported on 1/11/2018) 60 tablet 1     OTC products: None, except as noted above    Allergies   Allergen Reactions     No Known Drug Allergies       Latex Allergy: NO    Social History   Substance Use Topics     Smoking status: Never Smoker     Smokeless tobacco: Never Used     Alcohol use No     History   Drug Use No       REVIEW OF SYSTEMS:   CONSTITUTIONAL: NEGATIVE for fever, chills, change in weight  ENT/MOUTH: NEGATIVE for ear, mouth and throat problems  RESP: NEGATIVE for significant cough or SOB  CV: NEGATIVE for chest pain, palpitations or peripheral edema  GI: NEGATIVE for nausea, abdominal pain, heartburn, or change in bowel habits  : negative for dysuria, hematuria, decreased urinary stream, erectile dysfunction  ROS otherwise negative    EXAM:   /80 (BP Location: Right arm, Patient Position: Chair, Cuff Size: Adult Large)  Pulse 68  Temp 96.9  F (36.1  C) (Oral)  Resp 16  Ht 6' 1\" (1.854 m)  Wt 214 lb 3.2 oz (97.2 kg)  SpO2 95%  BMI 28.26 kg/m2    GENERAL APPEARANCE: healthy, alert and no distress     EYES: EOMI,  PERRL     HENT: ear canals and TM's normal and nose and mouth without " ulcers or lesions     NECK: no adenopathy, no asymmetry, masses, or scars and thyroid normal to palpation     RESP: lungs clear to auscultation - no rales, rhonchi or wheezes     CV: regular rates and rhythm     ABDOMEN: bowel sounds normal     MS: extremities normal- no gross deformities noted     SKIN: no suspicious lesions or rashes     LYMPHATICS: No cervical adenopathy    DIAGNOSTICS:   EKG: Not indicated due to non-vascular surgery and low risk of event (age <65 and without cardiac risk factors)    Recent Labs   Lab Test  01/26/18   1705  06/30/17   1613  03/24/16   1900   HGB  15.1   --   15.7   PLT  239   --   259   NA   --   142  142   POTASSIUM   --   3.9  3.5   CR   --   1.15  1.06      Component      Latest Ref Rng & Units 3/15/2018   WBC      4.0 - 11.0 10e9/L 6.0   RBC Count      4.4 - 5.9 10e12/L 5.03   Hemoglobin      13.3 - 17.7 g/dL 14.9   Hematocrit      40.0 - 53.0 % 43.6   MCV      78 - 100 fl 87   MCH      26.5 - 33.0 pg 29.6   MCHC      31.5 - 36.5 g/dL 34.2   RDW      10.0 - 15.0 % 13.2   Platelet Count      150 - 450 10e9/L 230   Creatinine      0.66 - 1.25 mg/dL 0.92   GFR Estimate      >60 mL/min/1.7m2 87   GFR Estimate If Black      >60 mL/min/1.7m2 >90     IMPRESSION:   Reason for surgery/procedure: neck pain   Diagnosis/reason for consult: Pre Op    The proposed surgical procedure is considered LOW risk.    REVISED CARDIAC RISK INDEX  The patient has the following serious cardiovascular risks for perioperative complications such as (MI, PE, VFib and 3  AV Block):  No serious cardiac risks  INTERPRETATION: 0 risks: Class I (very low risk - 0.4% complication rate)    The patient has the following additional risks for perioperative complications:  No identified additional risks        ICD-10-CM    1. Preop general physical exam Z01.818 CBC with platelets     Creatinine   2. Cervicalgia M54.2        RECOMMENDATIONS:     --Patient is to take all scheduled medications on the day of surgery  EXCEPT for modifications listed below.   Losartan     APPROVAL GIVEN to proceed with proposed procedure, without further diagnostic evaluation       Signed Electronically by: Haylie Encarnacion PA-C    Copy of this evaluation report is provided to requesting physician.    [facility] Preop Guidelines

## 2018-03-15 NOTE — MR AVS SNAPSHOT
After Visit Summary   3/15/2018    Indra Mehta    MRN: [id number]           Patient Information     Date Of Birth          1965        Visit Information        Provider Department      3/15/2018 10:40 AM Haylie Encarnacion PA-C [facility]        Today's Diagnoses     Preop general physical exam    -  1    Cervicalgia          Care Instructions      Before Your Surgery      Call your surgeon if there is any change in your health. This includes signs of a cold or flu (such as a sore throat, runny nose, cough, rash or fever).    Do not smoke, drink alcohol or take over the counter medicine (unless your surgeon or primary care doctor tells you to) for the 24 hours before and after surgery.    If you take prescribed drugs: Follow your doctor s orders about which medicines to take and which to stop until after surgery.    Eating and drinking prior to surgery: follow the instructions from your surgeon    Take a shower or bath the night before surgery. Use the soap your surgeon gave you to gently clean your skin. If you do not have soap from your surgeon, use your regular soap. Do not shave or scrub the surgery site.  Wear clean pajamas and have clean sheets on your bed.           Follow-ups after your visit        Follow-up notes from your care team     Return if symptoms worsen or fail to improve.      Your next 10 appointments already scheduled     Mar 19, 2018  9:30 AM CDT   Ortho Treatment with Shanda Rivas PT   [facility] Physical Therapy ([facility])    [address] [place] [address] [address] [place] [address]   [phone]            Mar 22, 2018  9:30 AM CDT   Ortho Treatment with Shanda Rivas PT   [facility] Physical Therapy ([facility])    [facility] [address] [place] [address]   [phone]            Mar 22, 2018   Procedure with Darryn Mcdaniel MD   [facility] Periop Services ([facility])    [facility]  [address] [place] [id number] [phone]           From [address]: Exit at [place] on south side of [place]. Turn right on [address]. Turn left at stoplight on [address]. [facility] will be in view two blocks ahead            Mar 22, 2018 10:30 AM CDT   XR FLUORO NEEDLE PLACEMENT SPINE with PHCARM1   [facility] ([facility])    [address]  [place] [address]   [phone]           For nerve root injection, please send or bring copies of any MRIs or other scans you have had.  Bring a list of your current medicines to your exam. (Include vitamins, minerals and over-the-counter medicines.) Leave your valuables at home.  Plan to have someone drive you home afterward.  Stop taking the following medicines (but talk to your doctor first):   If you take blood thinners, you may need to stop taking them a few days before treatment. Talk to your doctor before stopping these medicines.Stop taking Coumadin (warfarin) 3 days before treatment. Restart the day after treatment.   If you take Plavix, Ticlid, Pletal or Persantine, please ask your doctor if you should stop these medicines. You may need extra tests on the morning of your scan.   If you take Xarelto (Rivaroxaban), you may need to stop taking it 24 hours before treatment. Talk to your doctor before stopping this medicine. Restart the day after treatment.  You may take your other medicines as normal.  Stop all food and drink (including water) 3 hours before your test or treatment.  Please tell the doctor:   If you are allergic to X-ray dye (contrast fluid).   If you may be pregnant.  Injections take about 30 to 45 minutes. Most people spend up to 2 hours in the clinic or hospital.  Please call the Imaging Department at your exam site with any questions.            Mar 27, 2018  9:30 AM CDT   Ortho Treatment with Shanda Rivas PT   [facility] Physical Therapy ([facility])  "   [address] [place] [address]   [phone]            Mar 29, 2018  9:30 AM CDT   Ortho Treatment with Shanda Rivas PT   [facility] Physical Therapy ([facility])    [address] [place] [address]   [phone]            Apr 30, 2018  3:30 PM CDT   Return Visit with Boni Freeman MD   [facility] ([facility])    [address]  [place] [id number] [phone]              Who to contact     If you have questions or need follow up information about today's clinic visit or your schedule please contact [facility] directly at [phone].  Normal or non-critical lab and imaging results will be communicated to you by MyChart, letter or phone within 4 business days after the clinic has received the results. If you do not hear from us within 7 days, please contact the clinic through [employer] or phone. If you have a critical or abnormal lab result, we will notify you by phone as soon as possible.  Submit refill requests through [employer] or call your pharmacy and they will forward the refill request to us. Please allow 3 business days for your refill to be completed.          Additional Information About Your Visit        [employer] Information     [employer] gives you secure access to your electronic health record. If you see a primary care provider, you can also send messages to your care team and make appointments. If you have questions, please call your primary care clinic.  If you do not have a primary care provider, please call [phone] and they will assist you.        Care EveryWhere ID     This is your Care EveryWhere ID. This could be used by other organizations to access your [facility] medical records  [id number]        Your Vitals Were     Pulse Temperature Respirations Height Pulse Oximetry BMI (Body Mass Index)    68 96.9  F (36.1  C) (Oral) 16 6' 1\" (1.854 m) 95% 28.26 kg/m2       Blood " Pressure from Last 3 Encounters:   03/15/18 128/80   01/26/18 114/80   01/11/18 (!) 157/96    Weight from Last 3 Encounters:   03/15/18 214 lb 3.2 oz (97.2 kg)   02/15/18 217 lb (98.4 kg)   01/26/18 216 lb (98 kg)              We Performed the Following     CBC with platelets     Creatinine        Primary Care Provider Office Phone # Fax #    Tha Grullon [phone]       [address] [facility] [address]        Equal Access to Services     [facility]: Hadii aad ku hadasho Soomaali, waaxda luqadaha, qaybta kaalmada adejaileneyaameya, mikala carmichael . So [facility] [phone].    ATENCIÓN: Si habla español, tiene a lundy disposición servicios gratuitos de asistencia lingüística. [facility] [phone].    We comply with applicable federal civil rights laws and [place] laws. We do not discriminate on the basis of race, color, national origin, age, disability, sex, sexual orientation, or gender identity.            Thank you!     Thank you for choosing [facility]  for your care. Our goal is always to provide you with excellent care. Hearing back from our patients is one way we can continue to improve our services. Please take a few minutes to complete the written survey that you may receive in the mail after your visit with us. Thank you!             Your Updated Medication List - Protect others around you: Learn how to safely use, store and throw away your medicines at www.disposemymeds.org.          This list is accurate as of 3/15/18 10:51 AM.  Always use your most recent med list.                   Brand Name Dispense Instructions for use Diagnosis    cyclobenzaprine 10 MG tablet    FLEXERIL    60 tablet    Take 1 tablet (10 mg) by mouth 2 times daily as needed for muscle spasms Reported on 4/12/2017    Muscle spasm       diclofenac 1 % Gel topical gel    VOLTAREN    100 g    Apply 2 grams to neck four times daily using enclosed dosing card.    Chronic pain syndrome        gabapentin 300 MG capsule    NEURONTIN    300 capsule    Take 3 capsules (900 mg) by mouth 3 times daily If needed/tolerated, OK to increase bedtime dose to 1200mg    Cervicalgia       HYDROcodone-acetaminophen 5-325 MG per tablet    NORCO    150 tablet    Take 1 tablet by mouth every 4 hours as needed for other (Moderate to Severe Pain) May fill on/after 3/16/2017 NOT to start till 3/19/2017. This is an increase to 5 tabs max a day for 1 month only. Next month will reduce back down.    S/P cervical spinal fusion       IBUPROFEN PO           losartan 50 MG tablet    COZAAR    90 tablet    Take 1 tablet (50 mg) by mouth daily    Benign essential hypertension       methocarbamol 750 MG tablet    ROBAXIN    90 tablet    Take 1 tablet (750 mg) by mouth 3 times daily as needed for muscle spasms    Cervical radiculopathy, Myofascial pain       order for DME     1 Device    Equipment being ordered: TENS Pads as directed    Chronic pain syndrome       tiZANidine 4 MG tablet    ZANAFLEX    60 tablet    Take 1 tablet (4 mg) by mouth 2 times daily as needed for muscle spasms    Muscle spasm       TYLENOL PO      Take 500 mg by mouth every 4 hours as needed for mild pain or fever

## 2018-03-15 NOTE — NURSING NOTE
"Chief Complaint   Patient presents with     Pre-Op Exam       Initial /80 (BP Location: Right arm, Patient Position: Chair, Cuff Size: Adult Large)  Pulse 68  Temp 96.9  F (36.1  C) (Oral)  Resp 16  Ht 6' 1\" (1.854 m)  Wt 214 lb 3.2 oz (97.2 kg)  SpO2 95%  BMI 28.26 kg/m2 Estimated body mass index is 28.26 kg/(m^2) as calculated from the following:    Height as of this encounter: 6' 1\" (1.854 m).    Weight as of this encounter: 214 lb 3.2 oz (97.2 kg).  Medication Reconciliation: complete     Deysi BARDALES LPN      "

## 2018-03-20 NOTE — H&P (VIEW-ONLY)
[facility]  [address] [facility] [id number] [phone]  Dept:     PRE-OP EVALUATION:  Today's date: 3/15/2018    Indra Mehta (: 1965) presents for pre-operative evaluation assessment as requested by Dr. Mcdaniel.  He requires evaluation and anesthesia risk assessment prior to undergoing surgery/procedure for treatment of Neck problems .    [address]   [address]  Tel. [phone] / Fax [phone]    Primary Physician: Tha Grullon  Type of Anesthesia Anticipated: Monitor Anesthesia Care    Patient has a Health Care Directive or Living Will:  NO    Preop Questions 3/15/2018   Who is doing your surgery? juana   What are you having done? medial branch block   Date of Surgery/Procedure: 3\22   Facility or Hospital where procedure/surgery will be performed: [facility]   1.  Do you have a history of Heart attack, stroke, stent, coronary bypass surgery, or other heart surgery? No   2.  Do you ever have any pain or discomfort in your chest? No   3.  Do you have a history of  Heart Failure? No   4.   Are you troubled by shortness of breath when:  walking on a level surface, or up a slight hill, or at night? No   5.  Do you currently have a cold, bronchitis or other respiratory infection? No   6.  Do you have a cough, shortness of breath, or wheezing? No   7.  Do you sometimes get pains in the calves of your legs when you walk? No   8. Do you or anyone in your family have previous history of blood clots? No   9.  Do you or does anyone in your family have a serious bleeding problem such as prolonged bleeding following surgeries or cuts? No   10. Have you ever had problems with anemia or been told to take iron pills? No   11. Have you had any abnormal blood loss such as black, tarry or bloody stools? No   12. Have you ever had a blood transfusion? No   13. Have you or any of your relatives ever had problems  with anesthesia? No   14. Do you have sleep apnea, excessive snoring or daytime drowsiness? No   15. Do you have any prosthetic heart valves? No   16. Do you have prosthetic joints? No         HPI:     HPI related to upcoming procedure: Patient is having injections to treat neck issues       See problem list for active medical problems.  Problems all longstanding and stable, except as noted/documented.  See ROS for pertinent symptoms related to these conditions.                                                                                                  .    MEDICAL HISTORY:     Patient Active Problem List    Diagnosis Date Noted     Benign essential hypertension 06/30/2017     Priority: Medium     Myalgia 10/28/2016     Priority: Medium     Bilateral occipital neuralgia 10/28/2016     Priority: Medium     CARDIOVASCULAR SCREENING; LDL GOAL LESS THAN 160 10/31/2010     Priority: Medium     Major depression in complete remission (H) 08/20/2010     Priority: Medium     Chronic pain syndrome 08/20/2010     Priority: Medium     Patient is followed by Tha Grullon MD for ongoing prescription of pain medication.  All refills should be approved by this provider only at face-to-face appointments - not by phone request.    Medication(s): Tramadol.   Maximum quantity per month: 60  Clinic visit frequency required: Q 1 months     Controlled substance agreement:  Encounter-Level CSA - 10/13/16:               Controlled Substance Agreement - Scan on 10/23/2016  5:07 PM : CONTROLLED SUBSTANCE AGREEMENT 10/13/16 (below)            Pain Clinic evaluation in the past: Yes       Date/Location:   [facility]    DIRE Total Score(s):  No flowsheet data found.    Last [facility] website verification:  none   [email or web address]               Pain medication agreement signed 08/20/2010     Priority: Medium     Meniere's disease 03/22/2007     Priority: Medium     Problem list name updated by automated process. Provider  to review        Past Medical History:   Diagnosis Date     Back pain      Meniere's disease, unspecified      Pain medication agreement signed 8/20/2010     Past Surgical History:   Procedure Laterality Date     BUNIONECTOMY RT/LT  09/05/08    Both feet     COLONOSCOPY N/A 12/28/2016    Procedure: COMBINED COLONOSCOPY, SINGLE OR MULTIPLE BIOPSY/POLYPECTOMY BY BIOPSY;  Surgeon: Indra Jernigan MD;  Location: [facility]     DISCECTOMY, FUSION CERVICAL ANTERIOR ONE LEVEL, COMBINED N/A 7/5/2017    Procedure: COMBINED DISCECTOMY, FUSION CERVICAL ANTERIOR ONE LEVEL;  Cervical 6-7, Anterior cervical discectomy fusion;  Surgeon: Mike Mckenna MD;  Location: [facility]     HC COLONOSCOPY W/WO BRUSH/WASH  12/27/2005     HC CREATE EARDRUM OPENING,GEN ANESTH  09/27/2007    Pe tube, Left endolymphatic sac enhancement.     HC MASTOIDECTOMY,COMPLETE  09/27/2007     HERNIORRHAPHY UMBILICAL N/A 8/11/2017    Procedure: HERNIORRHAPHY UMBILICAL;  open umbilical hernia repair;  Surgeon: Boni Story MD;  Location: [facility]     INJECT EPIDURAL LUMBAR N/A 11/9/2017    Procedure: INJECT EPIDURAL LUMBAR;  lumbar 4-5 epidural steroid injection ;  Surgeon: Darryn Mcdaniel MD;  Location: [facility]     INJECT EPIDURAL LUMBAR N/A 12/28/2017    Procedure: INJECT EPIDURAL LUMBAR;  lumbar epidural injection;  Surgeon: Darryn Mcdaniel MD;  Location: [facility]     PE TUBES  2006    left ear     Current Outpatient Prescriptions   Medication Sig Dispense Refill     HYDROcodone-acetaminophen (NORCO) 5-325 MG per tablet Take 1 tablet by mouth every 4 hours as needed for other (Moderate to Severe Pain) May fill on/after 3/16/2017 NOT to start till 3/19/2017. This is an increase to 5 tabs max a day for 1 month only. Next month will reduce back down. 150 tablet 0     losartan (COZAAR) 50 MG tablet Take 1 tablet (50 mg) by mouth daily 90 tablet 1     gabapentin (NEURONTIN) 300 MG capsule Take 3 capsules (900 mg) by mouth 3 times daily If needed/tolerated, OK to  "increase bedtime dose to 1200mg 300 capsule 3     IBUPROFEN PO        tiZANidine (ZANAFLEX) 4 MG tablet Take 1 tablet (4 mg) by mouth 2 times daily as needed for muscle spasms 60 tablet 3     methocarbamol (ROBAXIN) 750 MG tablet Take 1 tablet (750 mg) by mouth 3 times daily as needed for muscle spasms 90 tablet 3     order for DME Equipment being ordered: TENS Pads as directed 1 Device 0     Acetaminophen (TYLENOL PO) Take 500 mg by mouth every 4 hours as needed for mild pain or fever       diclofenac (VOLTAREN) 1 % GEL topical gel Apply 2 grams to neck four times daily using enclosed dosing card. (Patient not taking: Reported on 3/15/2018) 100 g 1     cyclobenzaprine (FLEXERIL) 10 MG tablet Take 1 tablet (10 mg) by mouth 2 times daily as needed for muscle spasms Reported on 4/12/2017 (Patient not taking: Reported on 1/11/2018) 60 tablet 1     OTC products: None, except as noted above    Allergies   Allergen Reactions     No Known Drug Allergies       Latex Allergy: NO    Social History   Substance Use Topics     Smoking status: Never Smoker     Smokeless tobacco: Never Used     Alcohol use No     History   Drug Use No       REVIEW OF SYSTEMS:   CONSTITUTIONAL: NEGATIVE for fever, chills, change in weight  ENT/MOUTH: NEGATIVE for ear, mouth and throat problems  RESP: NEGATIVE for significant cough or SOB  CV: NEGATIVE for chest pain, palpitations or peripheral edema  GI: NEGATIVE for nausea, abdominal pain, heartburn, or change in bowel habits  : negative for dysuria, hematuria, decreased urinary stream, erectile dysfunction  ROS otherwise negative    EXAM:   /80 (BP Location: Right arm, Patient Position: Chair, Cuff Size: Adult Large)  Pulse 68  Temp 96.9  F (36.1  C) (Oral)  Resp 16  Ht 6' 1\" (1.854 m)  Wt 214 lb 3.2 oz (97.2 kg)  SpO2 95%  BMI 28.26 kg/m2    GENERAL APPEARANCE: healthy, alert and no distress     EYES: EOMI,  PERRL     HENT: ear canals and TM's normal and nose and mouth without " ulcers or lesions     NECK: no adenopathy, no asymmetry, masses, or scars and thyroid normal to palpation     RESP: lungs clear to auscultation - no rales, rhonchi or wheezes     CV: regular rates and rhythm     ABDOMEN: bowel sounds normal     MS: extremities normal- no gross deformities noted     SKIN: no suspicious lesions or rashes     LYMPHATICS: No cervical adenopathy    DIAGNOSTICS:   EKG: Not indicated due to non-vascular surgery and low risk of event (age <65 and without cardiac risk factors)    Recent Labs   Lab Test  01/26/18   1705  06/30/17   1613  03/24/16   1900   HGB  15.1   --   15.7   PLT  239   --   259   NA   --   142  142   POTASSIUM   --   3.9  3.5   CR   --   1.15  1.06      Component      Latest Ref Rng & Units 3/15/2018   WBC      4.0 - 11.0 10e9/L 6.0   RBC Count      4.4 - 5.9 10e12/L 5.03   Hemoglobin      13.3 - 17.7 g/dL 14.9   Hematocrit      40.0 - 53.0 % 43.6   MCV      78 - 100 fl 87   MCH      26.5 - 33.0 pg 29.6   MCHC      31.5 - 36.5 g/dL 34.2   RDW      10.0 - 15.0 % 13.2   Platelet Count      150 - 450 10e9/L 230   Creatinine      0.66 - 1.25 mg/dL 0.92   GFR Estimate      >60 mL/min/1.7m2 87   GFR Estimate If Black      >60 mL/min/1.7m2 >90     IMPRESSION:   Reason for surgery/procedure: neck pain   Diagnosis/reason for consult: Pre Op    The proposed surgical procedure is considered LOW risk.    REVISED CARDIAC RISK INDEX  The patient has the following serious cardiovascular risks for perioperative complications such as (MI, PE, VFib and 3  AV Block):  No serious cardiac risks  INTERPRETATION: 0 risks: Class I (very low risk - 0.4% complication rate)    The patient has the following additional risks for perioperative complications:  No identified additional risks        ICD-10-CM    1. Preop general physical exam Z01.818 CBC with platelets     Creatinine   2. Cervicalgia M54.2        RECOMMENDATIONS:     --Patient is to take all scheduled medications on the day of surgery  EXCEPT for modifications listed below.   Losartan     APPROVAL GIVEN to proceed with proposed procedure, without further diagnostic evaluation       Signed Electronically by: Haylie Encarnacion PA-C    Copy of this evaluation report is provided to requesting physician.    [facility] Preop Guidelines

## 2018-03-22 ENCOUNTER — HOSPITAL ENCOUNTER (OUTPATIENT)
Facility: CLINIC | Age: 53
Discharge: HOME | End: 2018-03-22
Attending: ANESTHESIOLOGY | Admitting: ANESTHESIOLOGY
Payer: COMMERCIAL

## 2018-03-22 ENCOUNTER — SURGERY (OUTPATIENT)
Age: 53
End: 2018-03-22

## 2018-03-22 VITALS — SYSTOLIC BLOOD PRESSURE: 145 MMHG | RESPIRATION RATE: 16 BRPM | DIASTOLIC BLOOD PRESSURE: 90 MMHG

## 2018-03-22 DIAGNOSIS — M62.838 MUSCLE SPASM: ICD-10-CM

## 2018-03-22 NOTE — PROGRESS NOTES
I talked to the Ketan today.  We had a long discussion regarding his long-term treatment plan.  I do not think in the long run that he would benefit from medial branch blocks given that he has severe needle phobia.  We have anesthetic records revealing that he needed 350 mg of propofol in the past and he is unable to tolerate the procedure even with that significant amount of anesthesia.  For a cervical medial branch she would have to be in the seated position with very little anesthesia.  He would also have to undergo 2 diagnostic blocks and he was supposed to have 8 levels injected.  If he had 2 positive blocks and they were diagnostic then he would have to then undergo 2 separate radiofrequency procedures.  This is also not a permanent treatment.  Given the nature of his needle phobia and his inability to tolerate doing this awake I do not think he is a good candidate for interventional treatments.  I recommend medical management.  I canceled the appointment today for him.  At this point I do not think there is a surgical or interventional option for him.

## 2018-03-22 NOTE — TELEPHONE ENCOUNTER
Received fax request from [facility] pharmacy requesting refill(s) for tiZANidine (ZANAFLEX) 4 MG tablet    Last refilled on 3/2/18    Pt last seen on 1/11/18  Next appt scheduled for 4/30/18      Josiane Riddle MA  [facility]      Will facilitate refill.

## 2018-04-09 DIAGNOSIS — Z98.1 S/P CERVICAL SPINAL FUSION: ICD-10-CM

## 2018-04-09 NOTE — TELEPHONE ENCOUNTER
Reason for call:  Medication   If this is a refill request, has the caller requested the refill from the pharmacy already? N/A  Will the patient be using a [facility]? No  Name of the pharmacy and phone number for the current request: [id number] - [place], [place] - [address] [place] [address]     Name of the medication requested: HYDROcodone-acetaminophen (NORCO) 5-325 MG per tablet     Other request: Please mail to [facility] in [place]     Phone number to reach patient:  Home number on file [phone] (home)

## 2018-04-10 RX ORDER — HYDROCODONE BITARTRATE AND ACETAMINOPHEN 5; 325 MG/1; MG/1
1 TABLET ORAL EVERY 6 HOURS PRN
Qty: 120 TABLET | Refills: 0 | Status: SHIPPED | OUTPATIENT
Start: 2018-04-10 | End: 2018-05-10

## 2018-04-10 NOTE — TELEPHONE ENCOUNTER
Received call from patient requesting refill(s) of HYDROcodone-acetaminophen (NORCO) 5-325 MG per tablet    Last picked up from pharmacy on 03/16/18    Pt last seen by prescribing provider on 01/11/18  Next appt scheduled for 04/03/18     checked in the past 6 months? Yes If no, print current report and give to RN    Last urine drug screen date 10/27/17  Current opioid agreement on file (completed within the last year) Yes Date of opioid agreement: 06/15/17    Processing (pick one and delete the others):      Mail to [facility] pharmacy   [address] [place] [id number]    Will route to nursing pool for review and preparation of prescription(s).

## 2018-04-10 NOTE — TELEPHONE ENCOUNTER
Signed Prescriptions:                        Disp   Refills    HYDROcodone-acetaminophen (NORCO) 5-325 MG*120 ta*0        Sig: Take 1 tablet by mouth every 6 hours as needed for           other (Moderate to Severe Pain) May fill on/after           4/16/2017 NOT to start till 4/18/2017. Reducing           back down to normal dosage.  Authorizing Provider: BONI NAYLOR    Reviewed, printed, signed in [place].  Placed in MA basket for processing.    Boni Freeman MD  [facility]

## 2018-04-10 NOTE — TELEPHONE ENCOUNTER
Medication refill information reviewed.     Due date for Norco is 4/18/2018      Prescriptions prepped for review.     Will route to provider.     Katarina Licona RN, [facility]  Pain Clinic Care Coordinator

## 2018-04-30 ENCOUNTER — OFFICE VISIT (OUTPATIENT)
Dept: PALLIATIVE MEDICINE | Facility: CLINIC | Age: 53
End: 2018-04-30
Payer: COMMERCIAL

## 2018-04-30 VITALS
SYSTOLIC BLOOD PRESSURE: 157 MMHG | HEART RATE: 81 BPM | DIASTOLIC BLOOD PRESSURE: 97 MMHG | BODY MASS INDEX: 29.03 KG/M2 | WEIGHT: 220 LBS

## 2018-04-30 DIAGNOSIS — M79.18 MYOFASCIAL PAIN: ICD-10-CM

## 2018-04-30 DIAGNOSIS — G89.29 CHRONIC NECK AND BACK PAIN: ICD-10-CM

## 2018-04-30 DIAGNOSIS — M51.369 DDD (DEGENERATIVE DISC DISEASE), LUMBAR: ICD-10-CM

## 2018-04-30 DIAGNOSIS — M50.30 DDD (DEGENERATIVE DISC DISEASE), CERVICAL: ICD-10-CM

## 2018-04-30 DIAGNOSIS — M54.12 CERVICAL RADICULOPATHY: Primary | ICD-10-CM

## 2018-04-30 DIAGNOSIS — M54.2 CERVICALGIA: ICD-10-CM

## 2018-04-30 DIAGNOSIS — M54.9 CHRONIC NECK AND BACK PAIN: ICD-10-CM

## 2018-04-30 DIAGNOSIS — M54.2 CHRONIC NECK AND BACK PAIN: ICD-10-CM

## 2018-04-30 PROCEDURE — 99214 OFFICE O/P EST MOD 30 MIN: CPT | Performed by: ANESTHESIOLOGY

## 2018-04-30 RX ORDER — GABAPENTIN 400 MG/1
1200 CAPSULE ORAL 3 TIMES DAILY
Qty: 270 CAPSULE | Refills: 3 | Status: SHIPPED | OUTPATIENT
Start: 2018-04-30 | End: 2018-07-20

## 2018-04-30 RX ORDER — METHOCARBAMOL 500 MG/1
1000 TABLET, FILM COATED ORAL 3 TIMES DAILY PRN
Qty: 180 TABLET | Refills: 1 | Status: ON HOLD | OUTPATIENT
Start: 2018-04-30 | End: 2018-12-19

## 2018-04-30 ASSESSMENT — PAIN SCALES - GENERAL: PAINLEVEL: SEVERE PAIN (7)

## 2018-04-30 NOTE — TELEPHONE ENCOUNTER
Pending Prescriptions:                       Disp   Refills    gabapentin (NEURONTIN) 300 MG capsule     300 ca*3            Sig: Take 3 capsules (900 mg) by mouth 3 times daily           If needed/tolerated, OK to increase bedtime dose           to 1200mg  Last refill: 04/02/18  Last office visit: 1/11/18  Next appointment: No future appointment.     Lillian De La Cruz MA

## 2018-04-30 NOTE — TELEPHONE ENCOUNTER
Signed Prescriptions:                        Disp   Refills    gabapentin (NEURONTIN) 400 MG capsule      270 ca*3        Sig: Take 3 capsules (1,200 mg) by mouth 3 times daily  Authorizing Provider: BONI NAYLOR    Reviewed, changed to increased dose as discussed in office visit, signed, e-prescribed.    Boni Freeman MD  [facility]

## 2018-04-30 NOTE — PROGRESS NOTES
[facility]    Date of visit: 4/30/2018    Chief complaint:   Chief Complaint   Patient presents with     Pain       Interval history:  Indra Mehta was last seen by me on 1/11/18.      Recommendations/plan at the last visit included:  Plan:  1. Physical Therapy:  Continue home exercise program  2. Clinical Health Psychologist to address issues of relaxation, behavioral change, coping style, and other factors important to improvement.  deferred  3. Diagnostic Studies:  Not indicated  4. Medication Management:    1. Continue Norco 5/325 QID prn pain  2. Continue Robaxin 750 mg TID  3. Continue Tizanidine 4 mg BID  4. Continue Gabapentin 300 mg three capsules twice a day and four at bedtime  5. Continue Tylenol in safe doses  6. Continue Ibuprofen in safe doses  5. Further procedures recommended:   1. Recommend cervical Medial Branch Block bilaterally at C3, C4, C5 and C6 with subsequent Radiofrequency ablation if blocks are positive  2. Cervical trigger point injections would be helpful for muscle spasm  3. Repeat lumbar epidural steroid injections if pain returns   6. Recommendations to PCP: continue current treatment  7. Follow up: for injections and in 3 months    Since his last visit, Indra Mehta reports:  -continued pain in his neck and low back  -neck pain is worse with neck extension and he describes shooting pain down into the right arm associated with numbness/tingling. This pain is different than pain he had before his surgery.  -he denies weakness of his arm unless it is numb  -pain also exists in the low back R > L which radiates into the hip.  No numbness/tingling associated with that pain. Had previous lumbar epidurals which have given him good relief in the past  -was scheduled for cervical MBB but did not have them done as he was told his pain was more myofascial pain and the proceduralist in [place] didn't think he would tolerated the procedures  -denies bowel/bladder  changes    Pain scores:  Pain intensity on average is 5 on a scale of 0-10.  Ranges from 4/10 to 8/10.  His pain is described as constant and shooting in nature.  Pain is worse with activity and turning his head or looking up and decreased with position changes and medications.    THE 4 A's OF OPIOID MAINTENANCE ANALGESIA    Analgesia: good relief    Activity: increased activity    Adverse effects: denies    Adherence to Rx protocol: compliant    [place] Board of Pharmacy Data Base Reviewed:    YES;     Current pain treatments:   Norco 5/325 mg Q6H prn   Gabapentin 300 mg three capsules twice a day and 4 capsules at bedtime  Tylenol 500 mg Q4H - maybe takes one or two per day  Ibuprofen 200 mg three tabs four times a day  Tizanidine 4 mg - takes two at bedtime  Robaxin 750 mg TID takes during the day - helps  Excedrin Migraine - two per day  Arnecare topical      Past pain treatments:  Norco/vicodin  Oxycodone  Flexeril - was helpful at night  Robaxin - may have helped, didn't make him sleepy     Surgery:  ACDF C6-7 on 7/5/17 with improvement  Injections:  Bilateral L4-5 TFESI 10/23/17  - bilateral L3-4 TFESI 12/28/17    Side Effects: no side effect    Medications:  Current Outpatient Prescriptions   Medication Sig Dispense Refill     Acetaminophen (TYLENOL PO) Take 500 mg by mouth every 4 hours as needed for mild pain or fever       gabapentin (NEURONTIN) 300 MG capsule Take 3 capsules (900 mg) by mouth 3 times daily If needed/tolerated, OK to increase bedtime dose to 1200mg 300 capsule 3     HYDROcodone-acetaminophen (NORCO) 5-325 MG per tablet Take 1 tablet by mouth every 6 hours as needed for other (Moderate to Severe Pain) May fill on/after 4/16/2017 NOT to start till 4/18/2017. Reducing back down to normal dosage. 120 tablet 0     IBUPROFEN PO        losartan (COZAAR) 50 MG tablet Take 1 tablet (50 mg) by mouth daily 90 tablet 1     methocarbamol (ROBAXIN) 750 MG tablet Take 1 tablet (750 mg) by mouth 3  times daily as needed for muscle spasms 90 tablet 3     order for DME Equipment being ordered: TENS Pads as directed 1 Device 0     tiZANidine (ZANAFLEX) 4 MG tablet Take 1 tablet (4 mg) by mouth 2 times daily as needed for muscle spasms 60 tablet 3     cyclobenzaprine (FLEXERIL) 10 MG tablet Take 1 tablet (10 mg) by mouth 2 times daily as needed for muscle spasms Reported on 4/12/2017 (Patient not taking: Reported on 1/11/2018) 60 tablet 1     diclofenac (VOLTAREN) 1 % GEL topical gel Apply 2 grams to neck four times daily using enclosed dosing card. (Patient not taking: Reported on 3/15/2018) 100 g 1       Medical History: any changes in medical history since they were last seen? No     Review of Systems:  The 14 system ROS was reviewed from the intake questionnaire, and is positive for: joint pain, stiffness, back pain, and neck pain  Any bowel or bladder problems: denies  Mood: stable    Physical Exam:  BP (!) 157/97  Pulse 81  Wt 99.8 kg (220 lb)  BMI 29.03 kg/m2  Constitutional: alert and no distress.  Pt is well nourished, well developed  Head: Normocephalic. No masses, lesions, tenderness or abnormalities  ENT: EOMI, mucosal surfaces moist.  Neck with good ROM, posture fair  Cardiovascular: No edema or JVD appreciated.  Respiratory: Good diaphragmatic excursion. No wheezes appreciated.  Speaking in complete sentences without shortness of breath.  No accessory muscle use.   Musculoskeletal: extremities normal- no gross deformities noted, normal muscle tone and able to move about the exam room without difficulty.    Skin: no suspicious lesions or rashes appreciated on exposed areas  Neurologic: Gait normal. Moving all extremities spontaneously, no apparent weakness.    Psychiatric: mentation appears normal, affect full and good eye contact.      Cervical Spine:  Good range of motion, increased pain with lateral rotation and extension, TTP over cervical paraspinals bilaterally, Positive Spurling's on the  right    Lumbar spine:  Moves well, exam limited    MRI cervical spine 2/19/18:  FINDINGS: The cervical spinal cord and visualized portions of the  thoracic spinal cord appear normal.  The visualized portions of the  brainstem and cerebellum appear normal. Artificial disc at C6-C7 in  good position. This is new since the prior MR study of 11/29/2016.     Alignment: Normal.     Craniocervical Junction and C1-C2:  Normal.     C2-C3:  Normal disc, facet joints, spinal canal and neural foramina.     C3-C4:  Normal disc, facet joints, spinal canal and neural foramina.     C4-C5:  Minimal annular bulge. No central or lateral stenosis. Mild  facet joint disease on the left.     C5-C6:  Mild disc space narrowing. Minimal annular bulge. Small  uncinate spurs bilaterally with mild bilateral foraminal stenosis.     C6-C7:  Anterior fusion with artificial disc in place, new since the  prior exam. No central or lateral stenosis but there is some artifact  from the artificial disks.     C7-T1: Normal disc, facet joints, spinal canal and neural foramina.     T1-T2:  Normal disc, facet joints, spinal canal and neural foramina.      T2-T3:  Normal disc, facet joints, spinal canal and neural foramina.     Paraspinous Soft Tissues:  Normal as visualized.     Bone Marrow:  Normal signal intensity.     No pathologic enhancement.     IMPRESSION:    1. Artificial disc in place at C6-C7. This is new since the prior  exam.  2. Mild degenerative changes elsewhere.      Assessment:   1.  Chronic neck pain  2.  Post cervical fusion neck pain - improved radicular pain  3.  Myofascial pain  4.  Cervical facet joint pain  5.  Chronic lower back pain  6.  Lumbar radiculopathy - improved after injections  7.  Lumbar facet joint pain  8.  Multiple joint pain  9. Cervical radiculopathy     Indra Mehta is a 52 year old male who is seen at the pain clinic for chronic neck and back pain.  Patient underwent C6/7 disc replacement which helped his  pain at that time however now has new radicular symptoms which is consistent with C5/6 radiculopathy on the right.  He also has a component of axial neck pain which facet injections may benefit.  Patient with needle phobia and would like to exhaust non-interventional techniques first.  Our treatment plan is as outlined below.     Plan:  1. Physical Therapy: continue home exercises  2. Clinical Health Psychologist to address issues of relaxation, behavioral change, coping style, and other factors important to improvement.  deferred  3. Diagnostic Studies:  Reviewed cervical MRI - there is a right lateral/foraminal disc osteophyte complex at C5-6 that was not mentioned on the report and is likely causing the radicular symptoms on the right  4. Medication Management:    1. Increase Gabapentin to 1200/1200/1200  2. Continue Norco 5/325 QID prn pain  3. Increase Robaxin 750 mg 2 tabs TID PRN  4. Continue Tizanidine 4 mg qhs  5. Continue Tylenol in safe doses  6. Continue Ibuprofen in safe doses  7. Consider medical marijuana  5. Further procedures recommended:   1. Consider right cervical transforaminal epidural steroid injection C5-6  2. Consider cervical Medial Branch Block bilaterally at C3, C4, C5 and C6 with subsequent Radiofrequency ablation if blocks are positive  6. Recommendations to PCP: continue current treatment  7. Will send Dr Mckenna a message regarding his MRI and the disc at C5-6 for his thoughts   8. Follow up: 2-3 months however recommended patient may like to find a provider closer to this home    Danielito Grant, DO  Pain Medicine Fellow    I saw and examined the patient with the Pain Fellow/Resident. I have reviewed and agree with the resident's note and plan of care and made changes and corrections directly to the body of the note.  TIME SPENT:  BY FELLOW/RESIDENT ALONE 15 MIN  BY MYSELF AND FELLOW/RESIDENT TOGETHER 15 MIN  BY MYSELF WITHOUT THE FELLOW/RESIDENT 0 MIN    These times included at least 50%  of the time spent counseling him about his diagnosis and treatment options and coordination of care with the primary team      Boni Freeman MD  [facility]

## 2018-04-30 NOTE — NURSING NOTE
"Chief Complaint   Patient presents with     Pain       Initial BP (!) 157/97  Pulse 81  Wt 99.8 kg (220 lb)  BMI 29.03 kg/m2 Estimated body mass index is 29.03 kg/(m^2) as calculated from the following:    Height as of 3/15/18: 1.854 m (6' 1\").    Weight as of this encounter: 99.8 kg (220 lb).  Medication Reconciliation: complete     Lillian De La Cruz MA      "

## 2018-04-30 NOTE — PATIENT INSTRUCTIONS
Assessment:   1.  Chronic neck pain  2.  Post cervical fusion neck pain - improved radicular pain  3.  Myofascial pain  4.  Cervical facet joint pain  5.  Chronic lower back pain  6.  Lumbar radiculopathy - improved after injections  7.  Lumbar facet joint pain  8.  Multiple joint pain  9. Cervical radiculopathy     Plan:  1. Physical Therapy: continue home exercises  2. Clinical Health Psychologist to address issues of relaxation, behavioral change, coping style, and other factors important to improvement.  deferred  3. Diagnostic Studies:  Not indicated at this time  4. Medication Management:    1. Increase Gabapentin to 1200/1200/1200  2. Continue Norco 5/325 QID prn pain  3. Increase Robaxin 750 mg 2 tabs TID PRN  4. Continue Tizanidine 4 mg qhs  5. Continue Tylenol in safe doses  6. Continue Ibuprofen in safe doses  7. Consider medical marijuana  5. Further procedures recommended:   1. Consider right cervical transforaminal C5/6  2. Consider cervical Medial Branch Block bilaterally at C3, C4, C5 and C6 with subsequent Radiofrequency ablation if blocks are positive  6. Recommendations to PCP: continue current treatment  7. Follow up: 2-3 months however recommended patient may like to find a provider closer to this home    ----------------------------------------------------------------  Nurse Triage line:  [phone]   Call this number with any questions or concerns. You may leave a detailed message anytime. Calls are typically returned Monday through Friday between 8 AM and 4:30 PM. We usually get back to you within 2 business days depending on the issue/request.       Medication refills:    For non-narcotic medications, call your pharmacy directly to request a refill. The pharmacy will contact the [facility] for authorization. Please allow 3-4 days for these refills to be processed.     For narcotic refills, call the nurse triage line or send a [employer] message. Please contact us 7-10 days before  your refill is due. The message MUST include the name of the specific medication(s) requested and how you would like to receive the prescription(s). The options are as follows:    Pain Clinic staff can mail the prescription to your pharmacy. Please tell us the name of the pharmacy.    You may pick the prescription up at the Pain Clinic (tell us the location) or during a clinic visit with your pain provider    Pain Clinic staff can deliver the prescription to the [facility] pharmacy in the clinic building. Please tell us the location.      Scheduling number: [phone].  Call this number to schedule or change appointments.    We believe regular attendance is key to your success in our program.    Any time you are unable to keep your appointment we ask that you call us at least 24 hours in advance to let us know. This will allow us to offer the appointment time to another patient.

## 2018-04-30 NOTE — MR AVS SNAPSHOT
After Visit Summary   4/30/2018    Indra Mehta    MRN: [id number]           Patient Information     Date Of Birth          1965        Visit Information        Provider Department      4/30/2018 3:30 PM Boni Coppola MD [facility]        Today's Diagnoses     Cervical radiculopathy    -  1      Care Instructions    Assessment:   1.  Chronic neck pain  2.  Post cervical fusion neck pain - improved radicular pain  3.  Myofascial pain  4.  Cervical facet joint pain  5.  Chronic lower back pain  6.  Lumbar radiculopathy - improved after injections  7.  Lumbar facet joint pain  8.  Multiple joint pain  9. Cervical radiculopathy     Plan:  1. Physical Therapy: continue home exercises  2. Clinical Health Psychologist to address issues of relaxation, behavioral change, coping style, and other factors important to improvement.  deferred  3. Diagnostic Studies:  Not indicated at this time  4. Medication Management:    1. Increase Gabapentin to 1200/1200/1200  2. Continue Norco 5/325 QID prn pain  3. Increase Robaxin 750 mg 2 tabs TID PRN  4. Continue Tizanidine 4 mg qhs  5. Continue Tylenol in safe doses  6. Continue Ibuprofen in safe doses  7. Consider medical marijuana  5. Further procedures recommended:   1. Consider right cervical transforaminal C5/6  2. Consider cervical Medial Branch Block bilaterally at C3, C4, C5 and C6 with subsequent Radiofrequency ablation if blocks are positive  6. Recommendations to PCP: continue current treatment  7. Follow up: 2-3 months however recommended patient may like to find a provider closer to this home    ----------------------------------------------------------------  Nurse Triage line:  [phone]   Call this number with any questions or concerns. You may leave a detailed message anytime. Calls are typically returned Monday through Friday between 8 AM and 4:30 PM. We usually get back to you within 2 business days depending on the  issue/request.       Medication refills:    For non-narcotic medications, call your pharmacy directly to request a refill. The pharmacy will contact the [facility] for authorization. Please allow 3-4 days for these refills to be processed.     For narcotic refills, call the nurse triage line or send a [employer] message. Please contact us 7-10 days before your refill is due. The message MUST include the name of the specific medication(s) requested and how you would like to receive the prescription(s). The options are as follows:    Pain Clinic staff can mail the prescription to your pharmacy. Please tell us the name of the pharmacy.    You may pick the prescription up at the Pain Clinic (tell us the location) or during a clinic visit with your pain provider    Pain Clinic staff can deliver the prescription to the [facility] pharmacy in the clinic building. Please tell us the location.      Scheduling number: [phone].  Call this number to schedule or change appointments.    We believe regular attendance is key to your success in our program.    Any time you are unable to keep your appointment we ask that you call us at least 24 hours in advance to let us know. This will allow us to offer the appointment time to another patient.               Follow-ups after your visit        Additional Services     PAIN INJECTION EVAL/TREAT/FOLLOW UP                 Who to contact     If you have questions or need follow up information about today's clinic visit or your schedule please contact [facility] directly at [phone].  Normal or non-critical lab and imaging results will be communicated to you by MyChart, letter or phone within 4 business days after the clinic has received the results. If you do not hear from us within 7 days, please contact the clinic through MyChart or phone. If you have a critical or abnormal lab result, we will notify you by phone as soon as possible.  Submit refill requests  through [employer] or call your pharmacy and they will forward the refill request to us. Please allow 3 business days for your refill to be completed.          Additional Information About Your Visit        [employer] Information     [employer] gives you secure access to your electronic health record. If you see a primary care provider, you can also send messages to your care team and make appointments. If you have questions, please call your primary care clinic.  If you do not have a primary care provider, please call [phone] and they will assist you.        Care EveryWhere ID     This is your Care EveryWhere ID. This could be used by other organizations to access your [facility] medical records  [id number]        Your Vitals Were     Pulse BMI (Body Mass Index)                81 29.03 kg/m2           Blood Pressure from Last 3 Encounters:   04/30/18 (!) 157/97   03/22/18 145/90   03/15/18 128/80    Weight from Last 3 Encounters:   04/30/18 99.8 kg (220 lb)   03/15/18 97.2 kg (214 lb 3.2 oz)   02/15/18 98.4 kg (217 lb)              We Performed the Following     PAIN INJECTION EVAL/TREAT/FOLLOW UP        Primary Care Provider Office Phone # Fax #    Tha Grullon [phone]       [address] [place] [address]        Equal Access to Services     KELSEY ARSHAD : Hadii marva khano Soaliyaali, waaxda luqadaha, qaybta kaalmada adeegyada, mikala lainez. So [facility] [phone].    ATENCIÓN: Si habla español, tiene a lundy disposición servicios gratuitos de asistencia lingüística. ame al [phone].    We comply with applicable federal civil rights laws and [place] laws. We do not discriminate on the basis of race, color, national origin, age, disability, sex, sexual orientation, or gender identity.            Thank you!     Thank you for choosing [facility]  for your care. Our goal is always to provide you with excellent care. Hearing back from our patients is one  way we can continue to improve our services. Please take a few minutes to complete the written survey that you may receive in the mail after your visit with us. Thank you!             Your Updated Medication List - Protect others around you: Learn how to safely use, store and throw away your medicines at www.disposemymeds.org.          This list is accurate as of 4/30/18  4:25 PM.  Always use your most recent med list.                   Brand Name Dispense Instructions for use Diagnosis    cyclobenzaprine 10 MG tablet    FLEXERIL    60 tablet    Take 1 tablet (10 mg) by mouth 2 times daily as needed for muscle spasms Reported on 4/12/2017    Muscle spasm       diclofenac 1 % Gel topical gel    VOLTAREN    100 g    Apply 2 grams to neck four times daily using enclosed dosing card.    Chronic pain syndrome       gabapentin 300 MG capsule    NEURONTIN    300 capsule    Take 3 capsules (900 mg) by mouth 3 times daily If needed/tolerated, OK to increase bedtime dose to 1200mg    Cervicalgia       HYDROcodone-acetaminophen 5-325 MG per tablet    NORCO    120 tablet    Take 1 tablet by mouth every 6 hours as needed for other (Moderate to Severe Pain) May fill on/after 4/16/2017 NOT to start till 4/18/2017. Reducing back down to normal dosage.    S/P cervical spinal fusion       IBUPROFEN PO           losartan 50 MG tablet    COZAAR    90 tablet    Take 1 tablet (50 mg) by mouth daily    Benign essential hypertension       methocarbamol 750 MG tablet    ROBAXIN    90 tablet    Take 1 tablet (750 mg) by mouth 3 times daily as needed for muscle spasms    Cervical radiculopathy, Myofascial pain       order for DME     1 Device    Equipment being ordered: TENS Pads as directed    Chronic pain syndrome       tiZANidine 4 MG tablet    ZANAFLEX    60 tablet    Take 1 tablet (4 mg) by mouth 2 times daily as needed for muscle spasms    Muscle spasm       TYLENOL PO      Take 500 mg by mouth every 4 hours as needed for mild pain or  fever

## 2018-05-01 ENCOUNTER — TELEPHONE (OUTPATIENT)
Dept: PALLIATIVE MEDICINE | Facility: CLINIC | Age: 53
End: 2018-05-01

## 2018-05-09 DIAGNOSIS — Z98.1 S/P CERVICAL SPINAL FUSION: ICD-10-CM

## 2018-05-09 NOTE — TELEPHONE ENCOUNTER
Pt SHARYN at 0927 --    Reason for call:  Medication   If this is a refill request, has the caller requested the refill from the pharmacy already? N/A  Will the patient be using a [facility]? No  Name of the pharmacy and phone number for the current request: [id number] - [address]    Name of the medication requested: HYDROcodone-acetaminophen (NORCO) 5-325 MG per tablet    Other request: Please mail to [facility] in [place]    Phone number to reach patient:  Home number on file [phone] (home)      Shanda Dai    [facility]

## 2018-05-09 NOTE — TELEPHONE ENCOUNTER
Received call from patient requesting refill(s) of HYDROcodone-acetaminophen (NORCO) 5-325 MG per tablet    Last picked up from pharmacy on 04/16/18    Pt last seen by prescribing provider on 04/30/18  Next appt scheduled for :none     checked in the past 6 months? Yes If no, print current report and give to RN    Last urine drug screen date 10/20/17  Current opioid agreement on file (completed within the last year) Yes Date of opioid agreement: 06/15/17    Processing (pick one and delete the others):      Mail to [facility] pharmacy   [address] [place] [id number]    Will route to nursing [place] for review and preparation of prescription(s).

## 2018-05-10 ENCOUNTER — OFFICE VISIT (OUTPATIENT)
Dept: NEUROSURGERY | Facility: CLINIC | Age: 53
End: 2018-05-10
Payer: COMMERCIAL

## 2018-05-10 VITALS
TEMPERATURE: 97.8 F | SYSTOLIC BLOOD PRESSURE: 130 MMHG | DIASTOLIC BLOOD PRESSURE: 74 MMHG | WEIGHT: 215 LBS | HEIGHT: 73 IN | BODY MASS INDEX: 28.49 KG/M2

## 2018-05-10 DIAGNOSIS — M54.12 CERVICAL RADICULOPATHY: Primary | ICD-10-CM

## 2018-05-10 PROCEDURE — 99213 OFFICE O/P EST LOW 20 MIN: CPT | Performed by: NEUROLOGICAL SURGERY

## 2018-05-10 RX ORDER — HYDROCODONE BITARTRATE AND ACETAMINOPHEN 5; 325 MG/1; MG/1
1 TABLET ORAL EVERY 6 HOURS PRN
Qty: 120 TABLET | Refills: 0 | Status: SHIPPED | OUTPATIENT
Start: 2018-05-18 | End: 2018-06-11

## 2018-05-10 NOTE — PROGRESS NOTES
8 mos post-op s/p C6-7 ACDF.  Has been doing well overall, mild neck pain which responded to MBB.  3 months ago, was struck in the head by a volleyball, and is now having episodic, positional right arm numbness without pain.  No overt weakness.    Returns for follow-up.  Continued severe right arm pain radiating to the thumb, worse with neck extension.  Has been doing physical therapy without improvement.       Past Medical History:   Diagnosis Date     Back pain      Meniere's disease, unspecified      Pain medication agreement signed 8/20/2010     Past Surgical History:   Procedure Laterality Date     BUNIONECTOMY RT/LT  09/05/08    Both feet     COLONOSCOPY N/A 12/28/2016    Procedure: COMBINED COLONOSCOPY, SINGLE OR MULTIPLE BIOPSY/POLYPECTOMY BY BIOPSY;  Surgeon: Indra Jernigan MD;  [facility]     DISCECTOMY, FUSION CERVICAL ANTERIOR ONE LEVEL, COMBINED N/A 7/5/2017    Procedure: COMBINED DISCECTOMY, FUSION CERVICAL ANTERIOR ONE LEVEL;  Cervical 6-7, Anterior cervical discectomy fusion;  Surgeon: Mike Mckenna MD;  [facility]     HC COLONOSCOPY W/WO BRUSH/WASH  12/27/2005     HC CREATE EARDRUM OPENING,GEN ANESTH  09/27/2007    Pe tube, Left endolymphatic sac enhancement.     HC MASTOIDECTOMY,COMPLETE  09/27/2007     HERNIORRHAPHY UMBILICAL N/A 8/11/2017    Procedure: HERNIORRHAPHY UMBILICAL;  open umbilical hernia repair;  Surgeon: Boni Story MD;  [facility]     INJECT EPIDURAL LUMBAR N/A 11/9/2017    Procedure: INJECT EPIDURAL LUMBAR;  lumbar 4-5 epidural steroid injection ;  Surgeon: Darryn Mcdaniel MD;  [facility]     INJECT EPIDURAL LUMBAR N/A 12/28/2017    Procedure: INJECT EPIDURAL LUMBAR;  lumbar epidural injection;  Surgeon: Darryn Mcdaniel MD;  [facility]     PE TUBES  2006    left ear     Social History     Social History     Marital status: Single     Spouse name: N/A     Number of children: 2     Years of education: N/A     Occupational History      [employer]     Social History Main Topics     Smoking status: Never Smoker     Smokeless tobacco: Never Used     Alcohol use No     Drug use: No     Sexual activity: Yes     Partners: Female     Other Topics Concern     Parent/Sibling W/ Cabg, Mi Or Angioplasty Before 65f 55m? No     Social History Narrative     Family History   Problem Relation Age of Onset     Cancer - colorectal Mother      DIABETES Mother      Cardiovascular Father      Hypertension Father      MENTAL ILLNESS Sister      Suicide Other         ROS: 10 point ROS neg other than the symptoms noted above in the HPI.    Physical Exam  /74  Temp 97.8  F (36.6  C) (Temporal)  Ht 1.854 m (6' 1\")  Wt 97.5 kg (215 lb)  BMI 28.37 kg/m2  HEENT:  Normocephalic, atraumatic.  PERRLA.  EOM s intact.  Visual fields full to gross exam  Neck:  Supple, non-tender, without lymphadenopathy.  Heart:  No peripheral edema  Lungs:  No SOB  Abdomen:  Non-distended.   Skin:  Warm and dry.  Extremities:  No edema, cyanosis or clubbing.  Psychiatric:  No apparent distress  Musculoskeletal:  Normal bulk and tone    NEUROLOGICAL EXAMINATION:     Mental status:  Alert and Oriented x 3, speech is fluent.  Cranial nerves:  II-XII intact.   Motor:    Shoulder Abduction:  Right:  5/5   Left:  5/5  Biceps:                      Right:  5/5   Left:  5/5  Triceps:                     Right:  5/5   Left:  5/5  Wrist Extensors:       Right:  5/5   Left:  5/5  Wrist Flexors:           Right:  5/5   Left:  5/5  interosseus :            Right:  5/5   Left:  5/5   Hip Flexor:                Right: 5/5  Left:  5/5  Hip Adductor:             Right:  5/5  Left:  5/5  Hip Abductor:             Right:  5/5  Left:  5/5  Gastroc Soleus:        Right:  5/5  Left:  5/5  Tib/Ant:                      Right:  5/5  Left:  5/5  EHL:                     Right:  5/5  Left:  5/5  Sensation:  Intact  Reflexes:  Negative Babinski.  Negative Clonus.  Negative Ruby's.  Coordination:  Smooth finger " to nose testing.   Negative pronator drift.  Smooth tandem walking.  Incision well healed    A/P:    We discussed concerned that his symptoms may be related to degeneration of the adjacent segment at C5-6  He is scheduled for an epidural steroid injection in June  If his pain relief from this epidural is not durable, will potentially discuss C5-6 ACDF going forward

## 2018-05-10 NOTE — NURSING NOTE
"Indra Mehta is a 52 year old male who presents for:  Chief Complaint   Patient presents with     RECHECK     pain, numbness and tingling in right arm        Initial Vitals:  /74  Temp 97.8  F (36.6  C) (Temporal)  Ht 6' 1\" (1.854 m)  Wt 215 lb (97.5 kg)  BMI 28.37 kg/m2 Estimated body mass index is 28.37 kg/(m^2) as calculated from the following:    Height as of this encounter: 6' 1\" (1.854 m).    Weight as of this encounter: 215 lb (97.5 kg).. Body surface area is 2.24 meters squared. BP completed using cuff size: large  Data Unavailable    Do you feel safe in your environment?  Yes  Do you need any refills today? No    Nursing Comments:         Gianfranco Church    "

## 2018-05-10 NOTE — TELEPHONE ENCOUNTER
Routing to provider to review scripts prepped per below.     Norco 5-325, #120, Refill:No  Sig:May fill on/after 05/16/2017 NOT to start till 05/18/2017. Reducing back down to normal dosage.  Last picked up 04/16/18 with start on 04/18/18  Due:05/18/18      Per 04/30/18 OV:  1. Continue Norco 5/325 QID prn pain

## 2018-05-10 NOTE — TELEPHONE ENCOUNTER
Signed Prescriptions:                        Disp   Refills    HYDROcodone-acetaminophen (NORCO) 5-325 MG*120 ta*0        Sig: Take 1 tablet by mouth every 6 hours as needed for           other (Moderate to Severe Pain) May fill on/after           05/16/2017 NOT to start till 05/18/2017. Reducing           back down to normal dosage.  Authorizing Provider: BONI NAYLOR    Reviewed, printed, signed in [place].  Given to MA for processing.    Boni Freeman MD  [facility]

## 2018-05-10 NOTE — TELEPHONE ENCOUNTER
Pre-screening Questions for Radiology Injections:    Injection to be done at which interventional clinic site? [facility] [place]   [facility] instruct patient to arrive 30 minutes before the scheduled appointment time.     Procedure ordered by Dr. Grant    Procedure ordered? Cervical Epidural Steroid Injection    What insurance would patient like us to bill for this procedure? [employer]      Worker's comp or MVA (motor vehicle accident) -Any injection DO NOT SCHEDULE and route to [facility].      [employer] - For SI joint injections, DO NOT SCHEDULE and route Peyton Toscano. [address] NO PA REQUIRED EFFECTIVE 11/1/2017      [employer]- MBB's must be scheduled at LEAST two weeks apart      [employer] - Any injection besides hip/shoulder/knee joint DO NOT SCHEDULE and route to Peyton Toscano. She will obtain PA and call pt back to schedule procedure or notify pt of denial.        [employer]-Route to Peyton for review    Any chance of pregnancy? Not Applicable   If YES, do NOT schedule and route to RN pool    Is an  needed? No     Patient has a drive home? (mandatory) YES:     Is patient taking any blood thinners (plavix, coumadin, jantoven, warfarin, heparin, pradaxa or dabigatran )? No   If hold needed, do NOT schedule, route to RN pool     Is patient taking any aspirin products? No     If more than 325mg/day do NOT schedule; route to RN pool     For CERVICAL procedures, hold all aspirin products for 6 days.      Does the patient have a bleeding or clotting disorder? No     If YES, okay to schedule AND route to RN nurse pool    **For any patients with platelet count <100, must be forwarded to provider**    Is patient diabetic?  No  If YES, have them bring their glucometer.    Does patient have an active infection or treated for one within the past week? No     Is patient currently taking any antibiotics?  No     For patients on chronic, preventative, or prophylactic  antibiotics, procedures may be scheduled.     For patients on antibiotics for active or recent infection:    Efra Bauer Burton, Snitzer-antibiotic course must have been completed for 4 days    Is patient currently taking any steroid medications? (i.e. Prednisone, Medrol)  No     For patients on steroid medications:    Efra Bauer Burton, Snitzer-steroid course must have been completed for 4 days    Reviewed with patient:  If you are started on any steroids or antibiotics between now and your appointment, you must contact us because it may affect our ability to perform your procedure.  Yes    Is patient actively being treated for cancer or immunocompromised? No  If YES, do NOT schedule and route to RN pool     Are you able to get on and off an exam table with minimal or no assistance? Yes  If NO, do NOT schedule and route to RN pool    Are you able to roll over and lay on your stomach with minimal or no assistance? Yes  If NO, do NOT schedule and route to RN pool     Any allergies to contrast dye, iodine, shellfish, or numbing and steroid medications? No  If YES, route to RN pool AND add allergy information to appointment notes    Allergies: No known drug allergies      Has the patient had a flu shot or any other vaccinations within 7 days before or after the procedure.  No     Does patient have an MRI/CT?  YES:   (SI joint, hip injections, lumbar sympathetic blocks, and stellate ganglion blocks do not require an MRI)    Was the MRI done w/in the last 3 years?  Yes    Was MRI done at [facility]? Yes      If not, where was it done? N/A       If MRI was not done at [facility], [facility] do NOT schedule and route to nursing.  If pt has an imaging disc, the injection may be scheduled but pt has to bring disc to appt. If they show up w/out disc the injection cannot be done    Reminders (please tell patient if applicable):       Instructed pt to arrive 30 minutes early for IV start if  this is for a cervical procedure, ALL sympathetic (stellate ganglion, hypogastric, or lumbar sympathetic block) and all sedation procedures (RFA, spinal cord stimulation trials).  YES:    -IVs are not routinely placed for Dr. Fernandes cervical cases   -Dr. Pulido: IVs for cervical ESIs and cervical TBDs (not CMBBs/facet inj)      If NPO for sedation, informed patient that it is okay to take medications with sips of water (except if they are to hold blood thinners).  YES:    *DO take blood pressure medication if it is prescribed*      If this is for a cervical SRIDEVI, informed patient that aspirin needs to be held for 6 days.   Not Applicable      For all patients not having spinal cord stimulator (SCS) trials or radiofrequency ablations (RFAs), informed patient:    IV sedation is not provided for this procedure.  If you feel that an oral anti-anxiety medication is needed, you can discuss this further with your referring provider or primary care provider.  The Pain Clinic provider will discuss specifics of what the procedure includes at your appointment.  Most procedures last 10-20 minutes.  We use numbing medications to help with any discomfort during the procedure.  NO      Do not schedule procedures requiring IV placement in the first appointment of the day or first appointment after lunch. Do NOT schedule at 0745, 0815 or 1245. \      For patients 85 or older we recommend having an adult stay w/ them for the remainder of the day.       Does the patient have any questions?  NO  Mer[facility] Rosas  [facility]

## 2018-05-10 NOTE — MR AVS SNAPSHOT
After Visit Summary   5/10/2018    Indra Mehta    MRN: [id number]           Patient Information     Date Of Birth          1965        Visit Information        Provider Department      5/10/2018 2:00 PM Mike Mckenna MD [facility]        Today's Diagnoses     Cervical radiculopathy    -  1       Follow-ups after your visit        Your next 10 appointments already scheduled     Jun 11, 2018  8:15 AM CDT   Radiology Injections with Boni Freeman MD   [facility] ([facility])    [address]  [place] [address] [phone]              Who to contact     If you have questions or need follow up information about today's clinic visit or your schedule please contact [facility] directly at [phone].  Normal or non-critical lab and imaging results will be communicated to you by MyChart, letter or phone within 4 business days after the clinic has received the results. If you do not hear from us within 7 days, please contact the clinic through MyChart or phone. If you have a critical or abnormal lab result, we will notify you by phone as soon as possible.  Submit refill requests through [employer] or call your pharmacy and they will forward the refill request to us. Please allow 3 business days for your refill to be completed.          Additional Information About Your Visit        MyChart Information     [employer] gives you secure access to your electronic health record. If you see a primary care provider, you can also send messages to your care team and make appointments. If you have questions, please call your primary care clinic.  If you do not have a primary care provider, please call [phone] and they will assist you.        Care EveryWhere ID     This is your Care EveryWhere ID. This could be used by other organizations to access your [facility] medical records  [id number]        Your Vitals Were  "    Temperature Height BMI (Body Mass Index)             97.8  F (36.6  C) (Temporal) 1.854 m (6' 1\") 28.37 kg/m2          Blood Pressure from Last 3 Encounters:   05/10/18 130/74   04/30/18 (!) 157/97   03/22/18 145/90    Weight from Last 3 Encounters:   05/10/18 97.5 kg (215 lb)   04/30/18 99.8 kg (220 lb)   03/15/18 97.2 kg (214 lb 3.2 oz)              Today, you had the following     No orders found for display       Primary Care Provider Office Phone # Fax #    Tha Grullon [phone]       [address] [facility] [address]        Equal Access to Services     KELSEY ARSHAD : Donna khano Somary, waaxda luqadaha, qaybta kaalmada adeegyaameya, mikala carmichael . So [facility] [phone].    ATENCIÓN: Si habla español, tiene a lundy disposición servicios gratuitos de asistencia lingüística. [facility] [phone].    We comply with applicable federal civil rights laws and [place] laws. We do not discriminate on the basis of race, color, national origin, age, disability, sex, sexual orientation, or gender identity.            Thank you!     Thank you for choosing [facility]  for your care. Our goal is always to provide you with excellent care. Hearing back from our patients is one way we can continue to improve our services. Please take a few minutes to complete the written survey that you may receive in the mail after your visit with us. Thank you!             Your Updated Medication List - Protect others around you: Learn how to safely use, store and throw away your medicines at www.disposemymeds.org.          This list is accurate as of 5/10/18  2:39 PM.  Always use your most recent med list.                   Brand Name Dispense Instructions for use Diagnosis    cyclobenzaprine 10 MG tablet    FLEXERIL    60 tablet    Take 1 tablet (10 mg) by mouth 2 times daily as needed for muscle spasms Reported on 4/12/2017    Muscle spasm       diclofenac 1 % Gel " topical gel    VOLTAREN    100 g    Apply 2 grams to neck four times daily using enclosed dosing card.    Chronic pain syndrome       gabapentin 400 MG capsule    NEURONTIN    270 capsule    Take 3 capsules (1,200 mg) by mouth 3 times daily    Cervicalgia       HYDROcodone-acetaminophen 5-325 MG per tablet    NORCO    120 tablet    Take 1 tablet by mouth every 6 hours as needed for other (Moderate to Severe Pain) May fill on/after 4/16/2017 NOT to start till 4/18/2017. Reducing back down to normal dosage.    S/P cervical spinal fusion       IBUPROFEN PO           losartan 50 MG tablet    COZAAR    90 tablet    Take 1 tablet (50 mg) by mouth daily    Benign essential hypertension       methocarbamol 500 MG tablet    ROBAXIN    180 tablet    Take 2 tablets (1,000 mg) by mouth 3 times daily as needed for muscle spasms    Cervical radiculopathy, Myofascial pain       order for DME     1 Device    Equipment being ordered: TENS Pads as directed    Chronic pain syndrome       tiZANidine 4 MG tablet    ZANAFLEX    60 tablet    Take 1 tablet (4 mg) by mouth 2 times daily as needed for muscle spasms    Muscle spasm       TYLENOL PO      Take 500 mg by mouth every 4 hours as needed for mild pain or fever

## 2018-05-10 NOTE — LETTER
5/10/2018         RE: Indra Mehta  [address] [address] [place] [id number]        Dear Colleague,    Thank you for referring your patient, Indra Mehta, to the [facility]. Please see a copy of my visit note below.    8 mos post-op s/p C6-7 ACDF.  Has been doing well overall, mild neck pain which responded to MBB.  3 months ago, was struck in the head by a volleyball, and is now having episodic, positional right arm numbness without pain.  No overt weakness.    Returns for follow-up.  Continued severe right arm pain radiating to the thumb, worse with neck extension.  Has been doing physical therapy without improvement.       Past Medical History:   Diagnosis Date     Back pain      Meniere's disease, unspecified      Pain medication agreement signed 8/20/2010     Past Surgical History:   Procedure Laterality Date     BUNIONECTOMY RT/LT  09/05/08    Both feet     COLONOSCOPY N/A 12/28/2016    Procedure: COMBINED COLONOSCOPY, SINGLE OR MULTIPLE BIOPSY/POLYPECTOMY BY BIOPSY;  Surgeon: Indra Jernigan MD;  [facility]     DISCECTOMY, FUSION CERVICAL ANTERIOR ONE LEVEL, COMBINED N/A 7/5/2017    Procedure: COMBINED DISCECTOMY, FUSION CERVICAL ANTERIOR ONE LEVEL;  Cervical 6-7, Anterior cervical discectomy fusion;  Surgeon: Mike Mckenna MD;  [facility]     HC COLONOSCOPY W/WO BRUSH/WASH  12/27/2005     HC CREATE EARDRUM OPENING,GEN ANESTH  09/27/2007    Pe tube, Left endolymphatic sac enhancement.     HC MASTOIDECTOMY,COMPLETE  09/27/2007     HERNIORRHAPHY UMBILICAL N/A 8/11/2017    Procedure: HERNIORRHAPHY UMBILICAL;  open umbilical hernia repair;  Surgeon: Boni Story MD;  [facility]     INJECT EPIDURAL LUMBAR N/A 11/9/2017    Procedure: INJECT EPIDURAL LUMBAR;  lumbar 4-5 epidural steroid injection ;  Surgeon: Darryn Mcdaniel MD;  [facility]     INJECT EPIDURAL LUMBAR N/A 12/28/2017    Procedure: INJECT EPIDURAL LUMBAR;  lumbar epidural injection;   "Surgeon: Darryn Mcdaniel MD;  [facility]: [facility]     PE TUBES  2006    left ear     Social History     Social History     Marital status: Single     Spouse name: N/A     Number of children: 2     Years of education: N/A     Occupational History      [employer]     Social History Main Topics     Smoking status: Never Smoker     Smokeless tobacco: Never Used     Alcohol use No     Drug use: No     Sexual activity: Yes     Partners: Female     Other Topics Concern     Parent/Sibling W/ Cabg, Mi Or Angioplasty Before 65f 55m? No     Social History Narrative     Family History   Problem Relation Age of Onset     Cancer - colorectal Mother      DIABETES Mother      Cardiovascular Father      Hypertension Father      MENTAL ILLNESS Sister      Suicide Other         ROS: 10 point ROS neg other than the symptoms noted above in the HPI.    Physical Exam  /74  Temp 97.8  F (36.6  C) (Temporal)  Ht 1.854 m (6' 1\")  Wt 97.5 kg (215 lb)  BMI 28.37 kg/m2  HEENT:  Normocephalic, atraumatic.  PERRLA.  EOM s intact.  Visual fields full to gross exam  Neck:  Supple, non-tender, without lymphadenopathy.  Heart:  No peripheral edema  Lungs:  No SOB  Abdomen:  Non-distended.   Skin:  Warm and dry.  Extremities:  No edema, cyanosis or clubbing.  Psychiatric:  No apparent distress  Musculoskeletal:  Normal bulk and tone    NEUROLOGICAL EXAMINATION:     Mental status:  Alert and Oriented x 3, speech is fluent.  Cranial nerves:  II-XII intact.   Motor:    Shoulder Abduction:  Right:  5/5   Left:  5/5  Biceps:                      Right:  5/5   Left:  5/5  Triceps:                     Right:  5/5   Left:  5/5  Wrist Extensors:       Right:  5/5   Left:  5/5  Wrist Flexors:           Right:  5/5   Left:  5/5  interosseus :            Right:  5/5   Left:  5/5   Hip Flexor:                Right: 5/5  Left:  5/5  Hip Adductor:             Right:  5/5  Left:  5/5  Hip Abductor:             Right:  5/5  Left:  5/5  Gastroc Soleus:       "  Right:  5/5  Left:  5/5  Tib/Ant:                      Right:  5/5  Left:  5/5  EHL:                     Right:  5/5  Left:  5/5  Sensation:  Intact  Reflexes:  Negative Babinski.  Negative Clonus.  Negative Ruby's.  Coordination:  Smooth finger to nose testing.   Negative pronator drift.  Smooth tandem walking.  Incision well healed    A/P:    We discussed concerned that his symptoms may be related to degeneration of the adjacent segment at C5-6  He is scheduled for an epidural steroid injection in June  If his pain relief from this epidural is not durable, will potentially discuss C5-6 ACDF going forward    Again, thank you for allowing me to participate in the care of your patient.        Sincerely,        Mike Mckenna MD

## 2018-06-11 ENCOUNTER — RADIOLOGY INJECTION OFFICE VISIT (OUTPATIENT)
Dept: PALLIATIVE MEDICINE | Facility: CLINIC | Age: 53
End: 2018-06-11
Payer: COMMERCIAL

## 2018-06-11 ENCOUNTER — TELEPHONE (OUTPATIENT)
Dept: PALLIATIVE MEDICINE | Facility: CLINIC | Age: 53
End: 2018-06-11

## 2018-06-11 ENCOUNTER — RADIANT APPOINTMENT (OUTPATIENT)
Dept: RADIOLOGY | Facility: CLINIC | Age: 53
End: 2018-06-11
Attending: ANESTHESIOLOGY
Payer: COMMERCIAL

## 2018-06-11 VITALS — SYSTOLIC BLOOD PRESSURE: 123 MMHG | DIASTOLIC BLOOD PRESSURE: 81 MMHG | OXYGEN SATURATION: 100 % | HEART RATE: 59 BPM

## 2018-06-11 DIAGNOSIS — M54.12 CERVICAL RADICULOPATHY: ICD-10-CM

## 2018-06-11 DIAGNOSIS — Z98.1 S/P CERVICAL SPINAL FUSION: ICD-10-CM

## 2018-06-11 PROCEDURE — 64479 NJX AA&/STRD TFRM EPI C/T 1: CPT | Mod: RT | Performed by: ANESTHESIOLOGY

## 2018-06-11 RX ORDER — HYDROCODONE BITARTRATE AND ACETAMINOPHEN 5; 325 MG/1; MG/1
1 TABLET ORAL EVERY 6 HOURS PRN
Qty: 120 TABLET | Refills: 0 | Status: SHIPPED | OUTPATIENT
Start: 2018-06-11 | End: 2018-07-09

## 2018-06-11 ASSESSMENT — PAIN SCALES - GENERAL: PAINLEVEL: MODERATE PAIN (5)

## 2018-06-11 NOTE — NURSING NOTE
Pre-procedure Intake    Have you been fasting? Yes     If yes, for how long? 2 hrs     Are you taking a prescribed blood thinner such as coumadin, Plavix, Xarelto?    No    If yes, when did you take your last dose? No     Do you take aspirin?  No    If cervical procedure, have you held aspirin for 6 days?   Yes    Do you have any allergies to contrast dye, iodine, steroid and/or numbing medications?  NO    Are you currently taking antibiotics or have an active infection?  NO    Have you had a fever/elevated temperature within the past week? NO    Are you currently taking oral steroids? NO    Do you have a ? Yes       Are you pregnant or breastfeeding?  Not Applicable    Are the vital signs normal?  Yes    Lillian De La Cruz MA

## 2018-06-11 NOTE — PATIENT INSTRUCTIONS
[facility]   Procedure Discharge Instructions    Today you saw: Dr. Boni Freeman      You had an:  Epidural steroid injection      Medications used:  Lidocaine   Dexamethasone Omnipaque            Be cautious when walking. Numbness and/or weakness in the lower extremities may occur for up to 6-8 hours after the procedure due to effect of the local anesthetic    Do not drive for 6 hours. The effect of the local anesthetic could slow your reflexes.     You may resume your regular activities after 24 hours    Avoid strenuous activity for the first 24 hours    You may shower, however avoid swimming, tub baths or hot tubs for 24 hours following your procedure    You may have a mild to moderate increase in pain for several days following the injection.    It may take up to 14 days for the steroid medication to start working although you may feel the effect as early as a few days after the procedure.       You may use ice packs for 10-15 minutes, 3 to 4 times a day at the injection site for comfort    Do not use heat to painful areas for 6 to 8 hours. This will give the local anesthetic time to wear off and prevent you from accidentally burning your skin.     You may use anti-inflammatory medications (such as Ibuprofen or Aleve or Advil) or Tylenol for pain control if necessary    If you experience any of the following, call the Pain Clinic during work hours at [phone] or the Provider Line after hours at [phone]:  -Fever over 100 degree F  -Swelling, bleeding, redness, drainage, warmth at the injection site  -Progressive weakness or numbness in your legs or arms  -Loss of bowel or bladder function  -Unusual headache that is not relieved by Tylenol or other pain reliever  -Unusual new onset of pain that is not improving

## 2018-06-11 NOTE — PROGRESS NOTES
Pre procedure Diagnosis: cervical radiculopathy, cervical stenosis, cervical degenerative disc disease   Post procedure Diagnosis: Same  Procedure performed: cervical transforaminal epidural steroid injection at C5-6 on the right, fluoroscopically guided, contrast controlled  Anesthesia: none  Complications: none  Operators: Boni Freeman MD      Indications:   Inrda Mehta is a 52 year old year old male who is known to me that presents today for cervical epidural steroid injection. They have a history of chronic neck pain with positional pain and paresthesias radiating down the right upper extremity. Exam shows radiating pain into the right upper extremity with cervical extension.  They have tried conservative treatment including physical therapy, activity modifications, medications, and previous interventional procedures.     MRI cervical spine was done on 2/19/18 which was read as:  FINDINGS: The cervical spinal cord and visualized portions of the  thoracic spinal cord appear normal.  The visualized portions of the  brainstem and cerebellum appear normal. Artificial disc at C6-C7 in  good position. This is new since the prior MR study of 11/29/2016.     Alignment: Normal.     Craniocervical Junction and C1-C2:  Normal.     C2-C3:  Normal disc, facet joints, spinal canal and neural foramina.     C3-C4:  Normal disc, facet joints, spinal canal and neural foramina.     C4-C5:  Minimal annular bulge. No central or lateral stenosis. Mild  facet joint disease on the left.     C5-C6:  Mild disc space narrowing. Minimal annular bulge. Small  uncinate spurs bilaterally with mild bilateral foraminal stenosis.     C6-C7:  Anterior fusion with artificial disc in place, new since the  prior exam. No central or lateral stenosis but there is some artifact  from the artificial disks.     C7-T1: Normal disc, facet joints, spinal canal and neural foramina.     T1-T2:  Normal disc, facet joints, spinal canal and neural  foramina.      T2-T3:  Normal disc, facet joints, spinal canal and neural foramina.     Paraspinous Soft Tissues:  Normal as visualized.     Bone Marrow:  Normal signal intensity.     No pathologic enhancement.       IMPRESSION:    1. Artificial disc in place at C6-C7. This is new since the prior  exam.  2. Mild degenerative changes elsewhere.       Options/alternatives, benefits and risks were discussed with the patient including bleeding, infection, tissue trauma, numbness, weakness, paralysis, spinal cord injury, radiation exposure, headache and reaction to medications. Questions were answered to his satisfaction and he agrees to proceed. Voluntary informed consent was obtained and signed.      Vitals were reviewed: Yes  /81  Pulse 59  SpO2 100%  Allergies were reviewed: Yes   Medications were reviewed: Yes   Pre-procedure pain score: 5/10     Procedure:  After getting informed consent, the patient was brought into the procedure suite and was placed in a supine position on the procedure table. A Pause for the Cause was performed. Patient was prepped and draped in sterile fashion.       The right C5-6 neuroforamen was identified with the C-arm rotated to a right lateral oblique angle. A total of 1 ml of Lidocaine 1% was used to anesthetize the skin and the needle track at a skin entry site coaxial with the fluoroscopy beam, and overriding the posterior aspect of the neuroforamen. A 27 gauge 3.5 inch spinal needle was advanced towards the posterior aspect of the neuroforamen, first making contact with the superior articular process of C6 with the image intensifier in an oblique view and then it was advanced approximately 1-2 mm further into the foramen with the image intensifier in a PA view. Aspiration was negative for blood or CSF.       Needle position was then verified by injecting 0.5 ml of Omnipaque-300 utilizing real time fluoroscopy which showed good needle placement and adequate spread along the C6  nerve root and medially into the neuroforamen and epidural space without signs of intravascular or intrathecal uptake. 9.5 ml of Omnipaque was wasted      Then, after repeated negative aspiration, 2 ml of a combination of 1ml of 2% Lidocaine and 10 mg of dexamethasone was injected. The needle was flushed with lidocaine and removed.      During the procedure, there was not a paresthesia.    Hemostasis was achieved, the area was cleaned, and bandaids were placed when appropriate.  The patient tolerated the procedure well, and was taken to the recovery room.   Images were saved to PACS.     Post-procedure pain score: 0/10  Follow-up includes:   -f/u phone call in one week  -f/u with PCP and in the pain clinic as scheduled     Boni Freeman MD  [facility]

## 2018-06-11 NOTE — TELEPHONE ENCOUNTER
Patient requesting refill(s) of HYDROcodone-acetaminophen (NORCO) 5-325 MG per tablet     Last picked up from pharmacy on 5/16/18    Pt last seen by prescribing provider on 6/11/18  Next appt scheduled for No future appointment     checked in the past 6 months? Yes If no, print current report and give to RN    Last urine drug screen date 10/20/17  Current opioid agreement on file (completed within the last year) Yes Date of opioid agreement: 6/15/17    Processing (pick one)     Pt will  in clinic at [place] location    Will route to nursing pool for review and preparation of prescription(s).

## 2018-06-11 NOTE — TELEPHONE ENCOUNTER
Prescription completed while patient was here for procedure.    Boni Freeman MD  [facility]

## 2018-06-11 NOTE — NURSING NOTE
Discharge Information    IV Discontiued Time:  NA    Amount of Fluid Infused:  NA    Discharge Criteria = When patient returns to baseline or as per MD order    Consciousness:  Pt is fully awake    Circulation:  BP +/- 20% of pre-procedure level    Respiration:  Patient is able to breathe deeply    O2 Sat:  Patient is able to maintain O2 Sat >92% on room air    Activity:  Moves 4 extremities on command    Ambulation:  Patient is able to stand and walk or stand and pivot into wheelchair    Dressing:  Clean/dry or No Dressing    Notes:   Discharge instructions and AVS given to patient    Patient meets criteria for discharge?  YES    Admitted to PCU?  No    Responsible adult present to accompany patient home?  Yes    Signature/Title:    Cinthia Stallings RN Care Coordinator  [facility]

## 2018-06-11 NOTE — MR AVS SNAPSHOT
After Visit Summary   6/11/2018    Indra Mehta    MRN: [id number]           Patient Information     Date Of Birth          1965        Visit Information        Provider Department      6/11/2018 8:15 AM Boni Coppola MD [facility]        Care Instructions    [facility]   Procedure Discharge Instructions    Today you saw: Dr. Boni Freeman      You had an:  Epidural steroid injection      Medications used:  Lidocaine   Dexamethasone Omnipaque            Be cautious when walking. Numbness and/or weakness in the lower extremities may occur for up to 6-8 hours after the procedure due to effect of the local anesthetic    Do not drive for 6 hours. The effect of the local anesthetic could slow your reflexes.     You may resume your regular activities after 24 hours    Avoid strenuous activity for the first 24 hours    You may shower, however avoid swimming, tub baths or hot tubs for 24 hours following your procedure    You may have a mild to moderate increase in pain for several days following the injection.    It may take up to 14 days for the steroid medication to start working although you may feel the effect as early as a few days after the procedure.       You may use ice packs for 10-15 minutes, 3 to 4 times a day at the injection site for comfort    Do not use heat to painful areas for 6 to 8 hours. This will give the local anesthetic time to wear off and prevent you from accidentally burning your skin.     You may use anti-inflammatory medications (such as Ibuprofen or Aleve or Advil) or Tylenol for pain control if necessary    If you experience any of the following, call the Pain Clinic during work hours at [phone] or the Provider Line after hours at [phone]:  -Fever over 100 degree F  -Swelling, bleeding, redness, drainage, warmth at the injection site  -Progressive weakness or numbness in your legs or arms  -Loss of bowel or  bladder function  -Unusual headache that is not relieved by Tylenol or other pain reliever  -Unusual new onset of pain that is not improving                Follow-ups after your visit        Who to contact     If you have questions or need follow up information about today's clinic visit or your schedule please contact [facility] directly at [phone].  Normal or non-critical lab and imaging results will be communicated to you by MyChart, letter or phone within 4 business days after the clinic has received the results. If you do not hear from us within 7 days, please contact the clinic through Glide[employer] or phone. If you have a critical or abnormal lab result, we will notify you by phone as soon as possible.  Submit refill requests through [email or web address] or call your pharmacy and they will forward the refill request to us. Please allow 3 business days for your refill to be completed.          Additional Information About Your Visit        MyChart Information     [email or web address] gives you secure access to your electronic health record. If you see a primary care provider, you can also send messages to your care team and make appointments. If you have questions, please call your primary care clinic.  If you do not have a primary care provider, please call [phone] and they will assist you.        Care EveryWhere ID     This is your Care EveryWhere ID. This could be used by other organizations to access your [place] medical records  [id number]        Your Vitals Were     Pulse                   65            Blood Pressure from Last 3 Encounters:   06/11/18 (!) 195/94   05/10/18 130/74   04/30/18 (!) 157/97    Weight from Last 3 Encounters:   05/10/18 97.5 kg (215 lb)   04/30/18 99.8 kg (220 lb)   03/15/18 97.2 kg (214 lb 3.2 oz)              Today, you had the following     No orders found for display       Primary Care Provider Office Phone # Fax #    Tha Grullon [phone]       [address]   [facility] [address]        Equal Access to Services     [facility] Center[facility] : Hadii marva joseph billpilar Somary, wapolada luqadaha, qaybta kalenaameya delacruz, mikala lainez. So [facility] [phone].    ATENCIÓN: Si habla español, tiene a lundy disposición servicios gratuitos de asistencia lingüística. Marcieame al [phone].    We comply with applicable federal civil rights laws and [place] laws. We do not discriminate on the basis of race, color, national origin, age, disability, sex, sexual orientation, or gender identity.            Thank you!     Thank you for choosing [facility]  for your care. Our goal is always to provide you with excellent care. Hearing back from our patients is one way we can continue to improve our services. Please take a few minutes to complete the written survey that you may receive in the mail after your visit with us. Thank you!             Your Updated Medication List - Protect others around you: Learn how to safely use, store and throw away your medicines at www.disposemymeds.org.          This list is accurate as of 6/11/18  8:51 AM.  Always use your most recent med list.                   Brand Name Dispense Instructions for use Diagnosis    cyclobenzaprine 10 MG tablet    FLEXERIL    60 tablet    Take 1 tablet (10 mg) by mouth 2 times daily as needed for muscle spasms Reported on 4/12/2017    Muscle spasm       diclofenac 1 % Gel topical gel    VOLTAREN    100 g    Apply 2 grams to neck four times daily using enclosed dosing card.    Chronic pain syndrome       gabapentin 400 MG capsule    NEURONTIN    270 capsule    Take 3 capsules (1,200 mg) by mouth 3 times daily    Cervicalgia       HYDROcodone-acetaminophen 5-325 MG per tablet    NORCO    120 tablet    Take 1 tablet by mouth every 6 hours as needed for other (Moderate to Severe Pain) May fill on/after 05/16/2017 NOT to start till 05/18/2017. Reducing back down to normal dosage.    S/P cervical spinal  fusion       IBUPROFEN PO           losartan 50 MG tablet    COZAAR    90 tablet    Take 1 tablet (50 mg) by mouth daily    Benign essential hypertension       methocarbamol 500 MG tablet    ROBAXIN    180 tablet    Take 2 tablets (1,000 mg) by mouth 3 times daily as needed for muscle spasms    Cervical radiculopathy, Myofascial pain       order for DME     1 Device    Equipment being ordered: TENS Pads as directed    Chronic pain syndrome       tiZANidine 4 MG tablet    ZANAFLEX    60 tablet    Take 1 tablet (4 mg) by mouth 2 times daily as needed for muscle spasms    Muscle spasm       TYLENOL PO      Take 500 mg by mouth every 4 hours as needed for mild pain or fever

## 2018-06-28 ENCOUNTER — MYC MEDICAL ADVICE (OUTPATIENT)
Dept: PALLIATIVE MEDICINE | Facility: CLINIC | Age: 53
End: 2018-06-28

## 2018-06-28 NOTE — TELEPHONE ENCOUNTER
Information noted - would recommend follow up with neurosurgeon as discussed at previous visits.    Boni Freeman MD  [facility]

## 2018-07-09 DIAGNOSIS — Z98.1 S/P CERVICAL SPINAL FUSION: ICD-10-CM

## 2018-07-09 RX ORDER — HYDROCODONE BITARTRATE AND ACETAMINOPHEN 5; 325 MG/1; MG/1
1 TABLET ORAL EVERY 6 HOURS PRN
Qty: 120 TABLET | Refills: 0 | Status: SHIPPED | OUTPATIENT
Start: 2018-07-09 | End: 2018-08-08

## 2018-07-09 NOTE — TELEPHONE ENCOUNTER
Signed Prescriptions:                        Disp   Refills    HYDROcodone-acetaminophen (NORCO) 5-325 MG*120 ta*0        Sig: Take 1 tablet by mouth every 6 hours as needed for           other (Moderate to Severe Pain) May fill on/after           07/15/2017 NOT to start till 07/17/2017. Reducing           back down to normal dosage.  Authorizing Provider: BONI NAYLOR    Reviewed, printed, signed in [place].  Placed in MA basket for processing.    Boni Freeman MD  [facility]

## 2018-07-09 NOTE — TELEPHONE ENCOUNTER
Received call from patient requesting refill(s) of HYDROcodone-acetaminophen (NORCO) 5-325 MG per tablet    Last picked up from pharmacy on 06/15/18    Pt last seen by prescribing provider on 04/30/18 - has had injection since  Next appt scheduled for ;none     checked in the past 6 months? Yes If no, print current report and give to RN    Last urine drug screen date 10/20/07  Current opioid agreement on file (completed within the last year) No Date of opioid agreement: 06/05/17    Processing (pick one and delete the others):      Mail to [facility] pharmacy   [address] [place] [id number]    Will route to nursing [place] for review and preparation of prescription(s).

## 2018-07-09 NOTE — TELEPHONE ENCOUNTER
VM left today at: 7:45 AM    Reason for call:  Medication   If this is a refill request, has the caller requested the refill from the pharmacy already? N/A  Will the patient be using a [facility]? No  Name of the pharmacy and phone number for the current request: [id number] - [place], [address]     Name of the medication requested: HYDROcodone-acetaminophen (NORCO) 5-325 MG per tablet     Other request: Please mail to [facility] in [place]     Phone number to reach patient:  Home number on file [phone] (home)         Mercy JULIEN    [facility] [place]

## 2018-07-09 NOTE — TELEPHONE ENCOUNTER
Called patient. Mailed out signed Rx  to [facility] pharmacy   [address] [place] [phone]      Lillian De La Cruz MA

## 2018-07-09 NOTE — TELEPHONE ENCOUNTER
Medication refill information reviewed.     Due date for HYDROcodone-acetaminophen (NORCO) 5-325 MG per tablet is 7/17/18    Prescriptions prepped for review.     Will route to provider.     Sulema sent to Pt to schedule F/U appointment     Mariano Mcbride RN

## 2018-07-16 ENCOUNTER — MYC MEDICAL ADVICE (OUTPATIENT)
Dept: FAMILY MEDICINE | Facility: OTHER | Age: 53
End: 2018-07-16

## 2018-07-16 NOTE — TELEPHONE ENCOUNTER
Dr. Grullon, please advise if this order is ok to place.  Hemalatha Rivas CMA ([facility])

## 2018-07-20 DIAGNOSIS — M54.2 CERVICALGIA: ICD-10-CM

## 2018-07-20 RX ORDER — GABAPENTIN 400 MG/1
1200 CAPSULE ORAL 3 TIMES DAILY
Qty: 270 CAPSULE | Refills: 3 | Status: SHIPPED | OUTPATIENT
Start: 2018-07-20 | End: 2018-11-09

## 2018-07-20 NOTE — TELEPHONE ENCOUNTER
Received fax request from [facility] pharmacy requesting refill(s) for gabapentin (NEURONTIN) 400 MG capsule    Last refilled on 6/29/18    Pt last seen on 6/11/18  Next appt scheduled for none    Josiane Riddle MA  [facility]      Will facilitate refill.

## 2018-07-20 NOTE — TELEPHONE ENCOUNTER
Signed Prescriptions:                        Disp   Refills    gabapentin (NEURONTIN) 400 MG capsule      270 ca*3        Sig: Take 3 capsules (1,200 mg) by mouth 3 times daily  Authorizing Provider: BONI NAYOLR    Reviewed, signed, e-prescribed.    Boni Freeman MD  [facility]

## 2018-07-23 ENCOUNTER — OFFICE VISIT (OUTPATIENT)
Dept: FAMILY MEDICINE | Facility: OTHER | Age: 53
End: 2018-07-23
Payer: COMMERCIAL

## 2018-07-23 VITALS
SYSTOLIC BLOOD PRESSURE: 122 MMHG | OXYGEN SATURATION: 97 % | TEMPERATURE: 98.1 F | RESPIRATION RATE: 16 BRPM | DIASTOLIC BLOOD PRESSURE: 72 MMHG | BODY MASS INDEX: 28.22 KG/M2 | HEART RATE: 70 BPM | WEIGHT: 213.9 LBS

## 2018-07-23 DIAGNOSIS — R10.31 INGUINAL PAIN, RIGHT: ICD-10-CM

## 2018-07-23 DIAGNOSIS — B07.8 COMMON WART: Primary | ICD-10-CM

## 2018-07-23 DIAGNOSIS — M54.16 LUMBAR RADICULOPATHY: ICD-10-CM

## 2018-07-23 DIAGNOSIS — H81.03 MENIERE'S DISEASE OF BOTH EARS: ICD-10-CM

## 2018-07-23 DIAGNOSIS — D22.9 ATYPICAL MOLE: ICD-10-CM

## 2018-07-23 PROCEDURE — 99214 OFFICE O/P EST MOD 30 MIN: CPT | Mod: 25 | Performed by: FAMILY MEDICINE

## 2018-07-23 PROCEDURE — 17110 DESTRUCTION B9 LES UP TO 14: CPT | Performed by: FAMILY MEDICINE

## 2018-07-23 ASSESSMENT — ANXIETY QUESTIONNAIRES
3. WORRYING TOO MUCH ABOUT DIFFERENT THINGS: NOT AT ALL
5. BEING SO RESTLESS THAT IT IS HARD TO SIT STILL: NOT AT ALL
GAD7 TOTAL SCORE: 1
7. FEELING AFRAID AS IF SOMETHING AWFUL MIGHT HAPPEN: NOT AT ALL
1. FEELING NERVOUS, ANXIOUS, OR ON EDGE: NOT AT ALL
IF YOU CHECKED OFF ANY PROBLEMS ON THIS QUESTIONNAIRE, HOW DIFFICULT HAVE THESE PROBLEMS MADE IT FOR YOU TO DO YOUR WORK, TAKE CARE OF THINGS AT HOME, OR GET ALONG WITH OTHER PEOPLE: NOT DIFFICULT AT ALL
6. BECOMING EASILY ANNOYED OR IRRITABLE: SEVERAL DAYS
2. NOT BEING ABLE TO STOP OR CONTROL WORRYING: NOT AT ALL

## 2018-07-23 ASSESSMENT — PAIN SCALES - GENERAL: PAINLEVEL: MODERATE PAIN (5)

## 2018-07-23 ASSESSMENT — PATIENT HEALTH QUESTIONNAIRE - PHQ9: 5. POOR APPETITE OR OVEREATING: NOT AT ALL

## 2018-07-23 NOTE — TELEPHONE ENCOUNTER
Order placed for lumbar epidural injection at clinic visit today.  Electronically signed by Tha Grullon MD

## 2018-07-23 NOTE — MR AVS SNAPSHOT
After Visit Summary   7/23/2018    Indra Mehta    MRN: [id number]           Patient Information     Date Of Birth          1965        Visit Information        Provider Department      7/23/2018 3:30 PM Tha Grullon MD [facility]        Today's Diagnoses     Common wart    -  1    Lumbar radiculopathy        Inguinal pain, right        Meniere's disease of both ears        Atypical mole           Follow-ups after your visit        Additional Services     AUDIOLOGY ADULT REFERRAL       Your provider has referred you to: [facility]: [facility] [phone]   [email or web address]    Specialty Testing:  Audiogram w/Tymps and Reflexes (Comprehensive Audiology Evaluation)                  Follow-up notes from your care team     Return in about 2 weeks (around 8/6/2018) for retreat warts.      Your next 10 appointments already scheduled     Jul 27, 2018  8:30 AM CDT   (Arrive by 8:00 AM)   CT ABDOMEN W/O CONTRAST with PHCT1   [facility] CT Scan ([facility])    [address] [place] [id number] [phone]           Please bring any scans or X-rays taken at other hospitals, if similar tests were done. Also bring a list of your medicines, including vitamins, minerals and over-the-counter drugs. It is safest to leave personal items at home.  Be sure to tell your doctor:   If you have any allergies.   If there s any chance you are pregnant.   If you are breastfeeding.  You do not need to do anything special to prepare for this exam.  Please wear loose clothing, such as a sweat suit or jogging clothes. Avoid snaps, zippers and other metal. We may ask you to undress and put on a hospital gown.            Jul 27, 2018  9:00 AM CDT   New Visit with Keena Saldana   [facility]    [address] [facility] [address] [phone]             Aug 06, 2018  2:00 PM CDT   Return Visit with Mike Mckenna MD   [facility]    [address] [facility] [id number] [phone]            Aug 09, 2018  4:30 PM CDT   [facility] Visit with Tha Grullon MD   [facility]    [address] [facility] [id number] [phone]           Bring a current list of meds and any records pertaining to this visit. For Physicals, please bring immunization records and any forms needing to be filled out. Please arrive 10 minutes early to complete paperwork.              Future tests that were ordered for you today     Open Future Orders        Priority Expected Expires Ordered    XR Fluoro Needle Placement Spine (With Procedure) Routine 7/23/2018 7/23/2019 7/23/2018    CT Abdomen w/o Contrast Routine  7/23/2019 7/23/2018            Who to contact     If you have questions or need follow up information about today's clinic visit or your schedule please contact [facility] directly at [phone].  Normal or non-critical lab and imaging results will be communicated to you by TakeLessonshart, letter or phone within 4 business days after the clinic has received the results. If you do not hear from us within 7 days, please contact the clinic through for; to (do) Centerst or phone. If you have a critical or abnormal lab result, we will notify you by phone as soon as possible.  Submit refill requests through TXCOM or call your pharmacy and they will forward the refill request to us. Please allow 3 business days for your refill to be completed.          Additional Information About Your Visit        TXCOM Information     TXCOM gives you secure access to your electronic health record. If you see a primary care provider, you can also send messages to your care team and make appointments. If you have questions, please call your primary care clinic.  If you do not have a primary care provider,  please call [phone] and they will assist you.        Care EveryWhere ID     This is your Care EveryWhere ID. This could be used by other organizations to access your [facility] medical records  [id number]        Your Vitals Were     Pulse Temperature Respirations Pulse Oximetry BMI (Body Mass Index)       70 98.1  F (36.7  C) (Temporal) 16 97% 28.22 kg/m2        Blood Pressure from Last 3 Encounters:   07/23/18 122/72   06/11/18 123/81   05/10/18 130/74    Weight from Last 3 Encounters:   07/23/18 213 lb 14.4 oz (97 kg)   05/10/18 215 lb (97.5 kg)   04/30/18 220 lb (99.8 kg)              We Performed the Following     AUDIOLOGY ADULT REFERRAL     DESTRUCT BENIGN LESION, UP TO 14     OFFICE/OUTPT VISIT,EST,LEVL IV          Today's Medication Changes          These changes are accurate as of 7/23/18  4:37 PM.  If you have any questions, ask your nurse or doctor.               Stop taking these medicines if you haven't already. Please contact your care team if you have questions.     diclofenac 1 % Gel topical gel   Commonly known as:  VOLTAREN   Stopped by:  Tha Grullon MD                    Primary Care Provider Office Phone # Fax #    Tha Grullon [phone]       [address] [facility] [address]        Equal Access to Services     [facility] AH: Hadii marva joseph hadasho Soomaali, waaxda luqadaha, qaybta kaalmada nubia, mikala lainez. So [facility] [phone].    ATENCIÓN: Si habla español, tiene a lundy disposición servicios gratuitos de asistencia lingüística. Llame al [phone].    We comply with applicable federal civil rights laws and [place] laws. We do not discriminate on the basis of race, color, national origin, age, disability, sex, sexual orientation, or gender identity.            Thank you!     Thank you for choosing [facility]  for your care. Our goal is always to provide you with excellent care. Hearing back from our patients is one  way we can continue to improve our services. Please take a few minutes to complete the written survey that you may receive in the mail after your visit with us. Thank you!             Your Updated Medication List - Protect others around you: Learn how to safely use, store and throw away your medicines at www.disposemymeds.org.          This list is accurate as of 7/23/18  4:37 PM.  Always use your most recent med list.                   Brand Name Dispense Instructions for use Diagnosis    cyclobenzaprine 10 MG tablet    FLEXERIL    60 tablet    Take 1 tablet (10 mg) by mouth 2 times daily as needed for muscle spasms Reported on 4/12/2017    Muscle spasm       gabapentin 400 MG capsule    NEURONTIN    270 capsule    Take 3 capsules (1,200 mg) by mouth 3 times daily    Cervicalgia       HYDROcodone-acetaminophen 5-325 MG per tablet    NORCO    120 tablet    Take 1 tablet by mouth every 6 hours as needed for other (Moderate to Severe Pain) May fill on/after 07/15/2017 NOT to start till 07/17/2017. Reducing back down to normal dosage.    S/P cervical spinal fusion       IBUPROFEN PO           losartan 50 MG tablet    COZAAR    90 tablet    Take 1 tablet (50 mg) by mouth daily    Benign essential hypertension       methocarbamol 500 MG tablet    ROBAXIN    180 tablet    Take 2 tablets (1,000 mg) by mouth 3 times daily as needed for muscle spasms    Cervical radiculopathy, Myofascial pain       order for DME     1 Device    Equipment being ordered: TENS Pads as directed    Chronic pain syndrome       tiZANidine 4 MG tablet    ZANAFLEX    60 tablet    Take 1 tablet (4 mg) by mouth 2 times daily as needed for muscle spasms    Muscle spasm       TYLENOL PO      Take 500 mg by mouth every 4 hours as needed for mild pain or fever

## 2018-07-23 NOTE — PROGRESS NOTES
"  SUBJECTIVE:   Indra Mehta is a 53 year old male who presents to clinic today for the following health issues:    WART(S)  Onset: \"a long time ago\"    Description:   Location: right hand  Number of warts: 1  Painful: YES    Accompanying Signs & Symptoms:  Signs of infection: no    History:   History of trauma: no  Prior warts: YES    Therapies Tried and outcome: liquid nitrogen    Check Spot on Chest  He has multiple moles on his chest and back he would like checked    ABDOMINAL PAIN     Onset: 2 months    Description:   Character: Sharp and Dull ache  Location: right groin  Radiation: None    Intensity: mild, moderate    Progression of Symptoms:  constant with waxing and waning    Accompanying Signs & Symptoms:  Fever/Chills?: no   Gas/Bloating: no   Nausea: no   Vomitting: no   Diarrhea?: no   Constipation:no   Dysuria or Hematuria: no    History:   Trauma: no   Previous similar pain: YES- prior umbilical hernia repair 1 year ago but no groin pain at that time   Previous tests done: Colonoscopy    Precipitating factors:   Does the pain change with:     Food: no      BM: no     Urination: no     Alleviating factors:  positioning    Therapies Tried and outcome: none         Problem list and histories reviewed & adjusted, as indicated.  Additional history: He also continues to have hearing loss associated with his Meniere's Disease and would like his hearing evaluated.  He continues to have lumbar radiculitis and is followed by Dr. Angle Freeman. He has had multiple Lumbar SRIDEVI at [facility] with Dr. Mcdaniel and we was recommended by Dr. Angle Freeman to have another injection. He needs the procedure ordered.    Patient Active Problem List   Diagnosis     Meniere's disease     Major depression in complete remission (H)     Chronic pain syndrome     CARDIOVASCULAR SCREENING; LDL GOAL LESS THAN 160     Pain medication agreement signed     Myalgia     Bilateral occipital neuralgia     Benign essential hypertension     " Past Surgical History:   Procedure Laterality Date     BUNIONECTOMY RT/LT  09/05/08    Both feet     COLONOSCOPY N/A 12/28/2016    Procedure: COMBINED COLONOSCOPY, SINGLE OR MULTIPLE BIOPSY/POLYPECTOMY BY BIOPSY;  Surgeon: Indra Jernigan MD;  Location: [facility]     DISCECTOMY, FUSION CERVICAL ANTERIOR ONE LEVEL, COMBINED N/A 7/5/2017    Procedure: COMBINED DISCECTOMY, FUSION CERVICAL ANTERIOR ONE LEVEL;  Cervical 6-7, Anterior cervical discectomy fusion;  Surgeon: Mike Mckenna MD;  Location: [facility] OR     HC COLONOSCOPY W/WO BRUSH/WASH  12/27/2005     HC CREATE EARDRUM OPENING,GEN ANESTH  09/27/2007    Pe tube, Left endolymphatic sac enhancement.     HC MASTOIDECTOMY,COMPLETE  09/27/2007     HERNIORRHAPHY UMBILICAL N/A 8/11/2017    Procedure: HERNIORRHAPHY UMBILICAL;  open umbilical hernia repair;  Surgeon: Boni Story MD;  Location: [facility]     INJECT EPIDURAL LUMBAR N/A 11/9/2017    Procedure: INJECT EPIDURAL LUMBAR;  lumbar 4-5 epidural steroid injection ;  Surgeon: Darryn Mcdaniel MD;  Location: [facility]     INJECT EPIDURAL LUMBAR N/A 12/28/2017    Procedure: INJECT EPIDURAL LUMBAR;  lumbar epidural injection;  Surgeon: Darryn Mcdaniel MD;  Location: [facility]     PE TUBES  2006    left ear       Social History   Substance Use Topics     Smoking status: Never Smoker     Smokeless tobacco: Never Used     Alcohol use No     Family History   Problem Relation Age of Onset     Cancer - colorectal Mother      Diabetes Mother      Cardiovascular Father      Hypertension Father      Mental Illness Sister      Suicide Other          Current Outpatient Prescriptions   Medication Sig Dispense Refill     Acetaminophen (TYLENOL PO) Take 500 mg by mouth every 4 hours as needed for mild pain or fever       gabapentin (NEURONTIN) 400 MG capsule Take 3 capsules (1,200 mg) by mouth 3 times daily 270 capsule 3     HYDROcodone-acetaminophen (NORCO) 5-325 MG per tablet Take 1 tablet by mouth every 6 hours as needed for other  (Moderate to Severe Pain) May fill on/after 07/15/2017 NOT to start till 07/17/2017. Reducing back down to normal dosage. 120 tablet 0     IBUPROFEN PO        losartan (COZAAR) 50 MG tablet Take 1 tablet (50 mg) by mouth daily 90 tablet 1     methocarbamol (ROBAXIN) 500 MG tablet Take 2 tablets (1,000 mg) by mouth 3 times daily as needed for muscle spasms 180 tablet 1     tiZANidine (ZANAFLEX) 4 MG tablet Take 1 tablet (4 mg) by mouth 2 times daily as needed for muscle spasms 60 tablet 3     cyclobenzaprine (FLEXERIL) 10 MG tablet Take 1 tablet (10 mg) by mouth 2 times daily as needed for muscle spasms Reported on 4/12/2017 (Patient not taking: Reported on 7/23/2018) 60 tablet 1     order for DME Equipment being ordered: TENS Pads as directed 1 Device 0     Allergies   Allergen Reactions     No Known Drug Allergies      Recent Labs   Lab Test  03/15/18   1052  06/30/17   1613  03/24/16   1900   03/29/13   0834   LDL   --    --    --    --   142*   HDL   --    --    --    --   34*   TRIG   --    --    --    --   185*   CR  0.92  1.15  1.06   < >   --    GFRESTIMATED  87  67  74   < >   --    GFRESTBLACK  >90  81  90   < >   --    POTASSIUM   --   3.9  3.5   --    --    TSH   --    --   2.00   --    --     < > = values in this interval not displayed.      BP Readings from Last 3 Encounters:   07/23/18 122/72   06/11/18 123/81   05/10/18 130/74    Wt Readings from Last 3 Encounters:   07/23/18 213 lb 14.4 oz (97 kg)   05/10/18 215 lb (97.5 kg)   04/30/18 220 lb (99.8 kg)                  Labs reviewed in EPIC    Reviewed and updated as needed this visit by clinical staff  Tobacco  Allergies  Meds  Med Hx  Surg Hx  Fam Hx  Soc Hx      Reviewed and updated as needed this visit by Provider  Meds         ROS:  Constitutional, HEENT, cardiovascular, pulmonary, gi and gu systems are negative, except as otherwise noted.    OBJECTIVE:     /72  Pulse 70  Temp 98.1  F (36.7  C) (Temporal)  Resp 16  Wt 213 lb  14.4 oz (97 kg)  SpO2 97%  BMI 28.22 kg/m2  Body mass index is 28.22 kg/(m^2).  GENERAL: healthy, alert and no distress  HENT: ear canals and TM's normal, nose and mouth without ulcers or lesions  NECK: no adenopathy, no asymmetry, masses, or scars and thyroid normal to palpation  RESP: lungs clear to auscultation - no rales, rhonchi or wheezes  CV: regular rate and rhythm, normal S1 S2, no S3 or S4, no murmur, click or rub, no peripheral edema and peripheral pulses strong  ABDOMEN: soft, nontender, without hepatosplenomegaly or masses, bowel sounds normal, hernia not fully appreciated in the right inguinal canal but does have laxity and pain and well-healed incision infraumbilical from umbilical hernia repair. No prior documentation of inguinal exam prior to hernia repair.   (male): normal male genitalia without lesions or urethral discharge, hernia not fully appreciated in the right inguinal canal but does have laxity and pain and well-healed incision infraumbilical from umbilical hernia repair.  SKIN: wart palm of right hand. Multiple typical benign nevi on chest and back.     Diagnostic Test Results:  none     ASSESSMENT/PLAN:     1. Common wart    One wart was frozen easily three times with liquid nitrogen.  A total of 1 warts are treated today.  The etiology of common warts were discussed.  Indra will continue to use the over-the-counter medications such as Compound W under occlusion on a nightly  basis.  Warm soapy water soaks and sanding also recommended.  The patient is to return every two weeks until all warts have  - DESTRUCT BENIGN LESION, UP TO 14    2. Lumbar radiculopathy  Chronic. Will place order and schedule procedure with Dr. Mcdaniel  - XR Fluoro Needle Placement Spine (With Procedure); Future  - OFFICE/OUTPT VISIT,EST,LEVL IV    3. Inguinal pain, right  Acute right groin pain with laxity, no definitive mass. Will obtain CT. If present the refer to general surgery.  - OFFICE/OUTPT  VISIT,EST,LEVL IV  - CT Abdomen Pelvis w Contrast; Future    4. Meniere's disease of both ears  Chronic referral to   - AUDIOLOGY ADULT REFERRAL  - OFFICE/OUTPT VISIT,EST,LEVL IV    5. Atypical mole  Multiple nevi, all less than 2 mm size and symmetric in shape and color. Continue to monitor. Consider shave biopsy if changing.  - OFFICE/OUTPT VISIT,MIGUEL A CACERES IV    FUTURE APPOINTMENTS:       - Follow-up visit in 2 weeks retreat wart  Work on weight loss  Regular exercise    Tha Grullon MD  [facility]

## 2018-07-24 ASSESSMENT — ANXIETY QUESTIONNAIRES: GAD7 TOTAL SCORE: 1

## 2018-07-24 ASSESSMENT — PATIENT HEALTH QUESTIONNAIRE - PHQ9: SUM OF ALL RESPONSES TO PHQ QUESTIONS 1-9: 2

## 2018-07-25 ENCOUNTER — TELEPHONE (OUTPATIENT)
Dept: FAMILY MEDICINE | Facility: OTHER | Age: 53
End: 2018-07-25

## 2018-07-25 DIAGNOSIS — M54.16 LUMBAR RADICULOPATHY: Primary | ICD-10-CM

## 2018-07-25 NOTE — TELEPHONE ENCOUNTER
Different orders need to be placed for patient's lumbar steroid injection. RN reached out to pain management center and they recommended order ([id number]: procedure Bilateral L3-4 transforaminal epidural steroid injection with fluoroscopic guidance and contrast)    RN pended.     Will route to PCP to advise.     Brandy Cooper RN  [facility]

## 2018-07-27 ENCOUNTER — OFFICE VISIT (OUTPATIENT)
Dept: AUDIOLOGY | Facility: CLINIC | Age: 53
End: 2018-07-27
Payer: COMMERCIAL

## 2018-07-27 ENCOUNTER — HOSPITAL ENCOUNTER (OUTPATIENT)
Dept: CT IMAGING | Facility: CLINIC | Age: 53
Discharge: HOME | End: 2018-07-27
Attending: FAMILY MEDICINE | Admitting: FAMILY MEDICINE
Payer: COMMERCIAL

## 2018-07-27 DIAGNOSIS — H90.3 SENSORINEURAL HEARING LOSS, BILATERAL: Primary | ICD-10-CM

## 2018-07-27 DIAGNOSIS — H90.A32 MIXED CONDUCTIVE AND SENSORINEURAL HEARING LOSS OF LEFT EAR WITH RESTRICTED HEARING OF RIGHT EAR: ICD-10-CM

## 2018-07-27 DIAGNOSIS — R10.31 INGUINAL PAIN, RIGHT: ICD-10-CM

## 2018-07-27 PROCEDURE — 99207 ZZC NO CHARGE LOS: CPT | Performed by: AUDIOLOGIST

## 2018-07-27 PROCEDURE — 92557 COMPREHENSIVE HEARING TEST: CPT | Performed by: AUDIOLOGIST

## 2018-07-27 PROCEDURE — 25000128 H RX IP 250 OP 636: Performed by: RADIOLOGY

## 2018-07-27 PROCEDURE — 92550 TYMPANOMETRY & REFLEX THRESH: CPT | Performed by: AUDIOLOGIST

## 2018-07-27 PROCEDURE — 25000125 ZZHC RX 250: Performed by: RADIOLOGY

## 2018-07-27 PROCEDURE — 74177 CT ABD & PELVIS W/CONTRAST: CPT

## 2018-07-27 RX ORDER — IOPAMIDOL 755 MG/ML
500 INJECTION, SOLUTION INTRAVASCULAR ONCE
Status: COMPLETED | OUTPATIENT
Start: 2018-07-27 | End: 2018-07-27

## 2018-07-27 RX ADMIN — IOPAMIDOL 100 ML: 755 INJECTION, SOLUTION INTRAVENOUS at 08:32

## 2018-07-27 RX ADMIN — SODIUM CHLORIDE 60 ML: 9 INJECTION, SOLUTION INTRAVENOUS at 08:32

## 2018-07-27 NOTE — MR AVS SNAPSHOT
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 Bring a list of your current medicines to your exam (including vitamins, minerals and over-the-counter drugs). Also bring the results of similar scans you may have had.  Please remove any body piercings and hair extensions before you arrive.  Follow your doctor s orders. If you do not, we may have to postpone your exam.  You may or may not receive IV contrast for this exam pending the discretion of the Radiologist.  You do not need to do anything special to prepare.  The MRI machine uses a strong magnet. Please wear clothes without metal (snaps, zippers). A sweatsuit works well, or we may give you a hospital gown.   **IMPORTANT** THE INSTRUCTIONS BELOW ARE ONLY FOR THOSE PATIENTS WHO HAVE BEEN PRESCRIBED SEDATION OR GENERAL ANESTHESIA DURING THEIR MRI PROCEDURE:  IF YOUR DOCTOR PRESCRIBED ORAL SEDATION (take medicine to help you relax during your exam):   You must get the medicine from your doctor (oral  medication) before you arrive. Bring the medicine to the exam. Do not take it at home. You ll be told when to take it upon arriving for your exam.   Arrive one hour early. Bring someone who can take you home after the test. Your medicine will make you sleepy. After the exam, you may not drive, take a bus or take a taxi by yourself.  IF YOUR DOCTOR PRESCRIBED IV SEDATION:   Arrive one hour early. Bring someone who can take you home after the test. Your medicine will make you sleepy. After the exam, you may not drive, take a bus or take a taxi by yourself.   No eating 6 hours before your exam. You may have clear liquids up until 4 hours before your exam. (Clear liquids include water, clear tea, black coffee and fruit juice without pulp.)  IF YOUR DOCTOR PRESCRIBED ANESTHESIA (be asleep for your exam):   Arrive 1 1/2 hours early. Bring someone who can take you home after the test. You may not drive, take a bus or take a taxi by yourself.   No eating 8 hours before your exam. You may have clear liquids up until 4 hours before your exam. (Clear liquids include water, clear tea, black coffee and fruit juice without pulp.)   You will spend four to five hours in the recovery room.  Please call the Imaging Department at your exam site with any questions.            Aug 23, 2018   Procedure with Darryn Mcdaniel MD   [facility] ([facility])    [address] [place] [id number] [phone]           From [address]: Exit at [address] on south side of [place]. Turn right on [address]. Turn left at stoplight on [address]. [facility] will be in view two blocks ahead            Aug 23, 2018 10:30 AM CDT   XR SURGERY SANDEEP LESS THAN 5 MIN FLUORO W STILLS with PHCARM1   [facility] ([facility])    [address] [place] [id number] [phone]           Please bring a list of your current medicines to your exam.  (Include vitamins, minerals and over-thecounter medicines.) Leave your valuables at home.  Tell your doctor if there is a chance you may be pregnant.  You do not need to do anything special for this exam.              Future tests that were ordered for you today     Open Future Orders        Priority Expected Expires Ordered    XR Surgery SANDEEP L/T 5 Min Fluoro w Stills Routine 8/7/2018 8/7/2019 8/7/2018    MR Cervical Spine w/o Contrast Routine  8/6/2019 8/6/2018            Who to contact     If you have questions or need follow up information about today's clinic visit or your schedule please contact [facility] directly at [phone].  Normal or non-critical lab and imaging results will be communicated to you by MobileX Labshart, letter or phone within 4 business days after the clinic has received the results. If you do not hear from us within 7 days, please contact the clinic through Gumiyot or phone. If you have a critical or abnormal lab result, we will notify you by phone as soon as possible.  Submit refill requests through VoiceBunny or call your pharmacy and they will forward the refill request to us. Please allow 3 business days for your refill to be completed.          Additional Information About Your Visit        Mobile[employer] Information     VoiceBunny gives you secure access to your electronic health record. If you see a primary care provider, you can also send messages to your care team and make appointments. If you have questions, please call your primary care clinic.  If you do not have a primary care provider, please call [phone] and they will assist you.        Care EveryWhere ID     This is your Care EveryWhere ID. This could be used by other organizations to access your [facility] medical records  [id number]         Blood Pressure from Last 3 Encounters:   08/06/18 120/70   07/23/18 122/72   06/11/18 123/81    Weight from Last 3 Encounters:   08/06/18 210 lb 4.8 oz (95.4 kg)   07/23/18 213 lb  14.4 oz (97 kg)   05/10/18 215 lb (97.5 kg)              We Performed the Following     AUDIOGRAM/TYMPANOGRAM - INTERFACE     COMPREHENSIVE HEARING TEST     TYMPANOMETRY AND REFLEX THRESHOLD MEASUREMENTS        Primary Care Provider Office Phone # Fax #    Tha Grullon [phone]       [address] [facility] [address]        Equal Access to Services     SUSY ARSHAD : Hadii aad ku hadasho Soomaali, waaxda luqadaha, qaybta kaalmada adeegyada, waxay idiin hayaan adeeg elainaxavikelsey laricardo . So [facility] [phone].    ATENCIÓN: Si habla español, tiene a lundy disposición servicios gratuitos de asistencia lingüística. [facility] [phone].    We comply with applicable federal civil rights laws and [place] laws. We do not discriminate on the basis of race, color, national origin, age, disability, sex, sexual orientation, or gender identity.            Thank you!     Thank you for choosing [facility]  for your care. Our goal is always to provide you with excellent care. Hearing back from our patients is one way we can continue to improve our services. Please take a few minutes to complete the written survey that you may receive in the mail after your visit with us. Thank you!             Your Updated Medication List - Protect others around you: Learn how to safely use, store and throw away your medicines at www.disposemymeds.org.          This list is accurate as of 7/27/18 11:59 PM.  Always use your most recent med list.                   Brand Name Dispense Instructions for use Diagnosis    cyclobenzaprine 10 MG tablet    FLEXERIL    60 tablet    Take 1 tablet (10 mg) by mouth 2 times daily as needed for muscle spasms Reported on 4/12/2017    Muscle spasm       gabapentin 400 MG capsule    NEURONTIN    270 capsule    Take 3 capsules (1,200 mg) by mouth 3 times daily    Cervicalgia       HYDROcodone-acetaminophen 5-325 MG per tablet    NORCO    120 tablet    Take 1 tablet by mouth every 6  hours as needed for other (Moderate to Severe Pain) May fill on/after 07/15/2017 NOT to start till 07/17/2017. Reducing back down to normal dosage.    S/P cervical spinal fusion       IBUPROFEN PO           methocarbamol 500 MG tablet    ROBAXIN    180 tablet    Take 2 tablets (1,000 mg) by mouth 3 times daily as needed for muscle spasms    Cervical radiculopathy, Myofascial pain       order for DME     1 Device    Equipment being ordered: TENS Pads as directed    Chronic pain syndrome       tiZANidine 4 MG tablet    ZANAFLEX    60 tablet    Take 1 tablet (4 mg) by mouth 2 times daily as needed for muscle spasms    Muscle spasm       TYLENOL PO      Take 500 mg by mouth every 4 hours as needed for mild pain or fever

## 2018-07-31 DIAGNOSIS — I10 BENIGN ESSENTIAL HYPERTENSION: ICD-10-CM

## 2018-07-31 NOTE — TELEPHONE ENCOUNTER
Requested Prescriptions   Pending Prescriptions Disp Refills     losartan (COZAAR) 50 MG tablet 90 tablet 1     Sig: Take 1 tablet (50 mg) by mouth daily    There is no refill protocol information for this order        Last Written Prescription Date:  2-1-2018  Last Fill Quantity: 90,  # refills: 1   Last office visit: 7/23/2018 with prescribing provider:  7-   Future Office Visit:   Next 5 appointments (look out 90 days)     Aug 06, 2018  2:00 PM CDT   Return Visit with Mike Mckenna MD   [facility]    [address] [place] [id number] [phone]            Aug 09, 2018  4:30 PM CDT   [facility] Visit with [facility]   [facility]    [address] [facility] [address] [phone]

## 2018-08-01 ENCOUNTER — TELEPHONE (OUTPATIENT)
Dept: SURGERY | Facility: CLINIC | Age: 53
End: 2018-08-01

## 2018-08-01 RX ORDER — LOSARTAN POTASSIUM 50 MG/1
50 TABLET ORAL DAILY
Qty: 90 TABLET | Refills: 0 | Status: SHIPPED | OUTPATIENT
Start: 2018-08-01 | End: 2018-11-05

## 2018-08-01 NOTE — TELEPHONE ENCOUNTER
Patient returned called and setup injection on 8/23/18 at 1030 with Dr. Mcdaniel.  Instructed patient to make appt for PreOp with PCP and also to have a  the day of the procedure.

## 2018-08-01 NOTE — TELEPHONE ENCOUNTER
Contacted by Dr. Grullon regarding SRIDEVI for patient. Placed new order for TFESI as patient wishes to have this completed with Dr. Mcdaniel instead of Dr. Angle Freeman at this time.

## 2018-08-06 ENCOUNTER — OFFICE VISIT (OUTPATIENT)
Dept: NEUROSURGERY | Facility: CLINIC | Age: 53
End: 2018-08-06
Payer: COMMERCIAL

## 2018-08-06 VITALS
DIASTOLIC BLOOD PRESSURE: 70 MMHG | HEIGHT: 73 IN | WEIGHT: 210.3 LBS | HEART RATE: 80 BPM | OXYGEN SATURATION: 98 % | SYSTOLIC BLOOD PRESSURE: 120 MMHG | BODY MASS INDEX: 27.87 KG/M2

## 2018-08-06 DIAGNOSIS — Z98.1 S/P CERVICAL SPINAL FUSION: Primary | ICD-10-CM

## 2018-08-06 PROCEDURE — 99213 OFFICE O/P EST LOW 20 MIN: CPT | Performed by: NEUROLOGICAL SURGERY

## 2018-08-06 ASSESSMENT — PAIN SCALES - GENERAL: PAINLEVEL: SEVERE PAIN (6)

## 2018-08-06 NOTE — NURSING NOTE
"Indra Mehta is a 53 year old male who presents for:  Chief Complaint   Patient presents with     Neurologic Problem     follow up per Dr. Angle Freeman        Initial Vitals:  /70 (BP Location: Right arm, Patient Position: Chair, Cuff Size: Adult Large)  Pulse 80  Ht 6' 1\" (1.854 m)  Wt 210 lb 4.8 oz (95.4 kg)  SpO2 98%  BMI 27.75 kg/m2 Estimated body mass index is 27.75 kg/(m^2) as calculated from the following:    Height as of this encounter: 6' 1\" (1.854 m).    Weight as of this encounter: 210 lb 4.8 oz (95.4 kg).. Body surface area is 2.22 meters squared. BP completed using cuff size: large  Severe Pain (6)    Do you feel safe in your environment?  Yes  Do you need any refills today? No    Nursing Comments:         Norah Huang    "

## 2018-08-06 NOTE — MR AVS SNAPSHOT
After Visit Summary   8/6/2018    Indra Mehta    MRN: [id number]           Patient Information     Date Of Birth          1965        Visit Information        Provider Department      8/6/2018 2:00 PM Mike Mckenna MD [facility]        Today's Diagnoses     S/P cervical spinal fusion    -  1      Care Instructions    1. Order for cervical spine MRI. We will call you with the results.  2. Order for right arm EMG. We will call you with the results.   3. Please call our clinic with any questions or concerns: [phone]            Follow-ups after your visit        Additional Services     NEUROLOGY ADULT REFERRAL       Your provider has referred you for the following:   EMG at [facility]: [facility] Neurology [facility] [phone]   [email or web address]  Reason: right arm EMG    Please be aware that coverage of these services is subject to the terms and limitations of your health insurance plan.  Call member services at your health plan with any benefit or coverage questions.      Please bring the following with you to your appointment:    (1) Any X-Rays, CTs or MRIs which have been performed.  Contact the facility where they were done to arrange for  prior to your scheduled appointment.    (2) List of current medications  (3) This referral request   (4) Any documents/labs given to you for this referral                  Your next 10 appointments already scheduled     Aug 09, 2018  4:30 PM CDT   Pre-Op physical with Tha Grullon MD   [facility]    [address] [facility] [phone]            Aug 23, 2018   Procedure with Darryn Mcdaniel MD   [facility] ([facility])    [address] [place] [address] [place] [phone]           From [address]: Exit at [address] on [place] side of [place]. Turn right on [address].  "Turn left at stoplight on [address]. [facility] will be in view two blocks ahead              Future tests that were ordered for you today     Open Future Orders        Priority Expected Expires Ordered    MR Cervical Spine w/o Contrast Routine  8/6/2019 8/6/2018            Who to contact     If you have questions or need follow up information about today's clinic visit or your schedule please contact [facility] directly at [phone].  Normal or non-critical lab and imaging results will be communicated to you by Zoomyhart, letter or phone within 4 business days after the clinic has received the results. If you do not hear from us within 7 days, please contact the clinic through Fabule or phone. If you have a critical or abnormal lab result, we will notify you by phone as soon as possible.  Submit refill requests through Fabule or call your pharmacy and they will forward the refill request to us. Please allow 3 business days for your refill to be completed.          Additional Information About Your Visit        Fabule Information     Fabule gives you secure access to your electronic health record. If you see a primary care provider, you can also send messages to your care team and make appointments. If you have questions, please call your primary care clinic.  If you do not have a primary care provider, please call [phone] and they will assist you.        Care EveryWhere ID     This is your Care EveryWhere ID. This could be used by other organizations to access your [facility] medical records  [id number]        Your Vitals Were     Pulse Height Pulse Oximetry BMI (Body Mass Index)          80 6' 1\" (1.854 m) 98% 27.75 kg/m2         Blood Pressure from Last 3 Encounters:   08/06/18 120/70   07/23/18 122/72   06/11/18 123/81    Weight from Last 3 Encounters:   08/06/18 210 lb 4.8 oz (95.4 kg)   07/23/18 213 lb 14.4 oz (97 kg)   05/10/18 215 lb (97.5 kg)              We Performed " the Following     NEUROLOGY ADULT REFERRAL        Primary Care Provider Office Phone # Fax #    Tha Grullon [phone]       [address] [facility] [address]        Equal Access to Services     KELSEY ARSHAD : Hadii aad ku hadmalipilar Amimary, wapolada luqarturo, qaybta kahe delacruz, mikala rico paulinejailene elainaxavikelsey lainez. So [facility] [phone].    ATENCIÓN: Si habla español, tiene a lundy disposición servicios gratuitos de asistencia lingüística. Llame al [phone].    We comply with applicable federal civil rights laws and [place] laws. We do not discriminate on the basis of race, color, national origin, age, disability, sex, sexual orientation, or gender identity.            Thank you!     Thank you for choosing [facility]  for your care. Our goal is always to provide you with excellent care. Hearing back from our patients is one way we can continue to improve our services. Please take a few minutes to complete the written survey that you may receive in the mail after your visit with us. Thank you!             Your Updated Medication List - Protect others around you: Learn how to safely use, store and throw away your medicines at www.disposemymeds.org.          This list is accurate as of 8/6/18  2:13 PM.  Always use your most recent med list.                   Brand Name Dispense Instructions for use Diagnosis    cyclobenzaprine 10 MG tablet    FLEXERIL    60 tablet    Take 1 tablet (10 mg) by mouth 2 times daily as needed for muscle spasms Reported on 4/12/2017    Muscle spasm       gabapentin 400 MG capsule    NEURONTIN    270 capsule    Take 3 capsules (1,200 mg) by mouth 3 times daily    Cervicalgia       HYDROcodone-acetaminophen 5-325 MG per tablet    NORCO    120 tablet    Take 1 tablet by mouth every 6 hours as needed for other (Moderate to Severe Pain) May fill on/after 07/15/2017 NOT to start till 07/17/2017. Reducing back down to normal dosage.    S/P cervical  spinal fusion       IBUPROFEN PO           losartan 50 MG tablet    COZAAR    90 tablet    Take 1 tablet (50 mg) by mouth daily    Benign essential hypertension       methocarbamol 500 MG tablet    ROBAXIN    180 tablet    Take 2 tablets (1,000 mg) by mouth 3 times daily as needed for muscle spasms    Cervical radiculopathy, Myofascial pain       order for DME     1 Device    Equipment being ordered: TENS Pads as directed    Chronic pain syndrome       tiZANidine 4 MG tablet    ZANAFLEX    60 tablet    Take 1 tablet (4 mg) by mouth 2 times daily as needed for muscle spasms    Muscle spasm       TYLENOL PO      Take 500 mg by mouth every 4 hours as needed for mild pain or fever

## 2018-08-06 NOTE — PROGRESS NOTES
8 mos post-op s/p C6-7 ACDF.  Has been doing well overall, mild neck pain which responded to MBB.  3 months ago, was struck in the head by a volleyball, and is now having episodic, positional right arm numbness without pain.  No overt weakness.    Returns for follow-up.  Continued severe right arm pain radiating to the thumb, worse with neck extension.  Has been doing physical therapy without improvement.    Returns for follow up.  Now a year out from cervical arthroplasty.  Underwent Cervical SRIDEVI with Dr. Angle Freeman, with fairly modest relief.  Continued right arm shoulder/biceps pain, worse with neck extension.       Past Medical History:   Diagnosis Date     Back pain      Meniere's disease, unspecified      Pain medication agreement signed 8/20/2010     Past Surgical History:   Procedure Laterality Date     BUNIONECTOMY RT/LT  09/05/08    Both feet     COLONOSCOPY N/A 12/28/2016    Procedure: COMBINED COLONOSCOPY, SINGLE OR MULTIPLE BIOPSY/POLYPECTOMY BY BIOPSY;  Surgeon: Indra Jernigan MD;  Location: [facility]     DISCECTOMY, FUSION CERVICAL ANTERIOR ONE LEVEL, COMBINED N/A 7/5/2017    Procedure: COMBINED DISCECTOMY, FUSION CERVICAL ANTERIOR ONE LEVEL;  Cervical 6-7, Anterior cervical discectomy fusion;  Surgeon: Mike Mckenna MD;  Location: [facility]     HC COLONOSCOPY W/WO BRUSH/WASH  12/27/2005     HC CREATE EARDRUM OPENING,GEN ANESTH  09/27/2007    Pe tube, Left endolymphatic sac enhancement.     HC MASTOIDECTOMY,COMPLETE  09/27/2007     HERNIORRHAPHY UMBILICAL N/A 8/11/2017    Procedure: HERNIORRHAPHY UMBILICAL;  open umbilical hernia repair;  Surgeon: Boni Story MD;  Location: [facility]     INJECT EPIDURAL LUMBAR N/A 11/9/2017    Procedure: INJECT EPIDURAL LUMBAR;  lumbar 4-5 epidural steroid injection ;  Surgeon: Darryn Mcdaniel MD;  Location: [facility]     INJECT EPIDURAL LUMBAR N/A 12/28/2017    Procedure: INJECT EPIDURAL LUMBAR;  lumbar epidural injection;  Surgeon: Darryn Mcdaniel MD;  Location:  "[facility]     PE TUBES  2006    left ear     Social History     Social History     Marital status: Single     Spouse name: N/A     Number of children: 2     Years of education: N/A     Occupational History      [employer]     Social History Main Topics     Smoking status: Never Smoker     Smokeless tobacco: Never Used     Alcohol use No     Drug use: No     Sexual activity: Yes     Partners: Female     Other Topics Concern     Parent/Sibling W/ Cabg, Mi Or Angioplasty Before 65f 55m? No     Social History Narrative     Family History   Problem Relation Age of Onset     Cancer - colorectal Mother      Diabetes Mother      Cardiovascular Father      Hypertension Father      Mental Illness Sister      Suicide Other         ROS: 10 point ROS neg other than the symptoms noted above in the HPI.    Physical Exam  /70 (BP Location: Right arm, Patient Position: Chair, Cuff Size: Adult Large)  Pulse 80  Ht 1.854 m (6' 1\")  Wt 95.4 kg (210 lb 4.8 oz)  SpO2 98%  BMI 27.75 kg/m2  HEENT:  Normocephalic, atraumatic.  PERRLA.  EOM s intact.  Visual fields full to gross exam  Neck:  Supple, non-tender, without lymphadenopathy.  Heart:  No peripheral edema  Lungs:  No SOB  Abdomen:  Non-distended.   Skin:  Warm and dry.  Extremities:  No edema, cyanosis or clubbing.  Psychiatric:  No apparent distress  Musculoskeletal:  Normal bulk and tone    NEUROLOGICAL EXAMINATION:     Mental status:  Alert and Oriented x 3, speech is fluent.  Cranial nerves:  II-XII intact.   Motor:    Shoulder Abduction:  Right:  5/5   Left:  5/5  Biceps:                      Right:  5/5   Left:  5/5  Triceps:                     Right:  5/5   Left:  5/5  Wrist Extensors:       Right:  5/5   Left:  5/5  Wrist Flexors:           Right:  5/5   Left:  5/5  interosseus :            Right:  5/5   Left:  5/5   Hip Flexor:                Right: 5/5  Left:  5/5  Hip Adductor:             Right:  5/5  Left:  5/5  Hip Abductor:             Right:  5/5  Left:  " 5/5  Gastroc Soleus:        Right:  5/5  Left:  5/5  Tib/Ant:                      Right:  5/5  Left:  5/5  EHL:                     Right:  5/5  Left:  5/5  Sensation:  Intact  Reflexes:  Negative Babinski.  Negative Clonus.  Negative Ruby's.  Coordination:  Smooth finger to nose testing.   Negative pronator drift.  Smooth tandem walking.  Incision well healed    A/P:    Will obtain new MRI Cervical Spine  Will obtain Right arm EMG

## 2018-08-06 NOTE — LETTER
8/6/2018         RE: Indra Mehta  [address] [address] [place] [id number]        Dear Colleague,    Thank you for referring your patient, Indra Mehta, to the [facility]. Please see a copy of my visit note below.    8 mos post-op s/p C6-7 ACDF.  Has been doing well overall, mild neck pain which responded to MBB.  3 months ago, was struck in the head by a volleyball, and is now having episodic, positional right arm numbness without pain.  No overt weakness.    Returns for follow-up.  Continued severe right arm pain radiating to the thumb, worse with neck extension.  Has been doing physical therapy without improvement.    Returns for follow up.  Now a year out from cervical arthroplasty.  Underwent Cervical SRIDEVI with Dr. Angle Freeman, with fairly modest relief.  Continued right arm shoulder/biceps pain, worse with neck extension.       Past Medical History:   Diagnosis Date     Back pain      Meniere's disease, unspecified      Pain medication agreement signed 8/20/2010     Past Surgical History:   Procedure Laterality Date     BUNIONECTOMY RT/LT  09/05/08    Both feet     COLONOSCOPY N/A 12/28/2016    Procedure: COMBINED COLONOSCOPY, SINGLE OR MULTIPLE BIOPSY/POLYPECTOMY BY BIOPSY;  Surgeon: Indra Jernigan MD;  Location: [facility]     DISCECTOMY, FUSION CERVICAL ANTERIOR ONE LEVEL, COMBINED N/A 7/5/2017    Procedure: COMBINED DISCECTOMY, FUSION CERVICAL ANTERIOR ONE LEVEL;  Cervical 6-7, Anterior cervical discectomy fusion;  Surgeon: Mike Mckenna MD;  Location: [facility]     HC COLONOSCOPY W/WO BRUSH/WASH  12/27/2005     HC CREATE EARDRUM OPENING,GEN ANESTH  09/27/2007    Pe tube, Left endolymphatic sac enhancement.     HC MASTOIDECTOMY,COMPLETE  09/27/2007     HERNIORRHAPHY UMBILICAL N/A 8/11/2017    Procedure: HERNIORRHAPHY UMBILICAL;  open umbilical hernia repair;  Surgeon: Boni Story MD;  Location: [facility]     INJECT EPIDURAL LUMBAR N/A 11/9/2017    Procedure: INJECT EPIDURAL  "LUMBAR;  lumbar 4-5 epidural steroid injection ;  Surgeon: Darryn Mcdaniel MD;  Location: [facility]     INJECT EPIDURAL LUMBAR N/A 12/28/2017    Procedure: INJECT EPIDURAL LUMBAR;  lumbar epidural injection;  Surgeon: Darryn Mcdaniel MD;  Location: [facility]     PE TUBES  2006    left ear     Social History     Social History     Marital status: Single     Spouse name: N/A     Number of children: 2     Years of education: N/A     Occupational History      [employer]     Social History Main Topics     Smoking status: Never Smoker     Smokeless tobacco: Never Used     Alcohol use No     Drug use: No     Sexual activity: Yes     Partners: Female     Other Topics Concern     Parent/Sibling W/ Cabg, Mi Or Angioplasty Before 65f 55m? No     Social History Narrative     Family History   Problem Relation Age of Onset     Cancer - colorectal Mother      Diabetes Mother      Cardiovascular Father      Hypertension Father      Mental Illness Sister      Suicide Other         ROS: 10 point ROS neg other than the symptoms noted above in the HPI.    Physical Exam  /70 (BP Location: Right arm, Patient Position: Chair, Cuff Size: Adult Large)  Pulse 80  Ht 1.854 m (6' 1\")  Wt 95.4 kg (210 lb 4.8 oz)  SpO2 98%  BMI 27.75 kg/m2  HEENT:  Normocephalic, atraumatic.  PERRLA.  EOM s intact.  Visual fields full to gross exam  Neck:  Supple, non-tender, without lymphadenopathy.  Heart:  No peripheral edema  Lungs:  No SOB  Abdomen:  Non-distended.   Skin:  Warm and dry.  Extremities:  No edema, cyanosis or clubbing.  Psychiatric:  No apparent distress  Musculoskeletal:  Normal bulk and tone    NEUROLOGICAL EXAMINATION:     Mental status:  Alert and Oriented x 3, speech is fluent.  Cranial nerves:  II-XII intact.   Motor:    Shoulder Abduction:  Right:  5/5   Left:  5/5  Biceps:                      Right:  5/5   Left:  5/5  Triceps:                     Right:  5/5   Left:  5/5  Wrist Extensors:       Right:  5/5   Left:  5/5  Wrist " Flexors:           Right:  5/5   Left:  5/5  interosseus :            Right:  5/5   Left:  5/5   Hip Flexor:                Right: 5/5  Left:  5/5  Hip Adductor:             Right:  5/5  Left:  5/5  Hip Abductor:             Right:  5/5  Left:  5/5  Gastroc Soleus:        Right:  5/5  Left:  5/5  Tib/Ant:                      Right:  5/5  Left:  5/5  EHL:                     Right:  5/5  Left:  5/5  Sensation:  Intact  Reflexes:  Negative Babinski.  Negative Clonus.  Negative Ruby's.  Coordination:  Smooth finger to nose testing.   Negative pronator drift.  Smooth tandem walking.  Incision well healed    A/P:    Will obtain new MRI Cervical Spine  Will obtain Right arm EMG    Again, thank you for allowing me to participate in the care of your patient.        Sincerely,        Mike Mckenna MD

## 2018-08-06 NOTE — PATIENT INSTRUCTIONS
1. Order for cervical spine MRI. We will call you with the results.  2. Order for right arm EMG. We will call you with the results.   3. Please call our clinic with any questions or concerns: [phone]

## 2018-08-08 DIAGNOSIS — Z98.1 S/P CERVICAL SPINAL FUSION: ICD-10-CM

## 2018-08-08 RX ORDER — HYDROCODONE BITARTRATE AND ACETAMINOPHEN 5; 325 MG/1; MG/1
1 TABLET ORAL EVERY 6 HOURS PRN
Qty: 120 TABLET | Refills: 0 | Status: SHIPPED | OUTPATIENT
Start: 2018-08-08 | End: 2018-09-11

## 2018-08-08 NOTE — TELEPHONE ENCOUNTER
Medication refill information reviewed.     Last due:  May fill on/after 07/15/2017 NOT to start till 07/17/2017    Due date:  8/16/18    Weaning instructions:  N/A    Prescriptions prepped for review.     Heidy Gipson RN-BSN  [facility]              
Patient scheduled F/U visit on 9/11 in [place]. Routing as FYI.      Shanda Dai    [facility]    
Pt SHARYN at 0722 --      Reason for call:  Medication   If this is a refill request, has the caller requested the refill from the pharmacy already? N/A  Will the patient be using a [facility]? No  Name of the pharmacy and phone number for the current request: [id number] - [address]    Name of the medication requested: HYDROcodone-acetaminophen (NORCO) 5-325 MG per tablet    Other request: Please send to [facility] in [place]    Phone number to reach patient:  Home number on file [phone] (home)      Shanda Dai    [facility]      
Received call from patient requesting refill(s) of HYDROcodone-acetaminophen (NORCO) 5-325 MG per tablet    Last picked up from pharmacy on 07/16/18    Pt last seen by prescribing provider on 04/30/18 office visit - has had injection since  Next appt scheduled for ; 9/11/18   checked in the past 6 months? Yes If no, print current report and give to RN    Last urine drug screen date 10/20/17  Current opioid agreement on file (completed within the last year) No Date of opioid agreement: 06/15/17    Processing (pick one and delete the others):      [facility] -once pt has made the appointment with Dr. Angle Freeman.-Pt now scheduled for 9/11/18 okay to mail.  [address] [place] [address]  Will route to nursing Switzer for review and preparation of prescription(s).     Mychart sent to pt:  Noble Burrell  In order to process your pain medication refill you need to schedule your next appointment with Dr. Angle Freeman.  According to your last visit she/he wants to see you sometime in September.  His schedule is booked way out.  In order for him to be able to prescribe opioids he needs to see you in clinic every 3 months..  Please call the appointment line to schedule ([phone]).  Let us know once this appointment is made and we will process your refill.    Heidy Giposn, RN-BSN  [facility]  
Signed Prescriptions:                        Disp   Refills    HYDROcodone-acetaminophen (NORCO) 5-325 MG*120 ta*0        Sig: Take 1 tablet by mouth every 6 hours as needed for           other (Moderate to Severe Pain) May fill on/after           08/14/2017 NOT to start till 08/16/2017.  Authorizing Provider: BONI NAYLOR    Reviewed, printed, signed in [place].  Given to MA for processing.    Boni Freeman MD  [facility]    
Signed Rx mailing from [place] on 08/09/18. Mailing to [facility]. Voicemail left for patient.  
No

## 2018-08-09 ENCOUNTER — OFFICE VISIT (OUTPATIENT)
Dept: FAMILY MEDICINE | Facility: OTHER | Age: 53
End: 2018-08-09
Payer: COMMERCIAL

## 2018-08-09 VITALS
WEIGHT: 211.8 LBS | DIASTOLIC BLOOD PRESSURE: 70 MMHG | OXYGEN SATURATION: 97 % | TEMPERATURE: 98.3 F | BODY MASS INDEX: 27.94 KG/M2 | HEART RATE: 80 BPM | RESPIRATION RATE: 16 BRPM | SYSTOLIC BLOOD PRESSURE: 116 MMHG

## 2018-08-09 DIAGNOSIS — M54.16 LUMBAR RADICULOPATHY: ICD-10-CM

## 2018-08-09 DIAGNOSIS — Z01.818 PREOP GENERAL PHYSICAL EXAM: Primary | ICD-10-CM

## 2018-08-09 PROCEDURE — 99214 OFFICE O/P EST MOD 30 MIN: CPT | Performed by: FAMILY MEDICINE

## 2018-08-09 ASSESSMENT — PAIN SCALES - GENERAL: PAINLEVEL: MODERATE PAIN (5)

## 2018-08-09 NOTE — MR AVS SNAPSHOT
After Visit Summary   8/9/2018    Indra Mehta    MRN: [id number]           Patient Information     Date Of Birth          1965        Visit Information        Provider Department      8/9/2018 4:30 PM Tha Grullon MD [facility]        Today's Diagnoses     Preop general physical exam    -  1    Lumbar radiculopathy          Care Instructions      Before Your Surgery      Call your surgeon if there is any change in your health. This includes signs of a cold or flu (such as a sore throat, runny nose, cough, rash or fever).    Do not smoke, drink alcohol or take over the counter medicine (unless your surgeon or primary care doctor tells you to) for the 24 hours before and after surgery.    If you take prescribed drugs: Follow your doctor s orders about which medicines to take and which to stop until after surgery.    Eating and drinking prior to surgery: follow the instructions from your surgeon    Take a shower or bath the night before surgery. Use the soap your surgeon gave you to gently clean your skin. If you do not have soap from your surgeon, use your regular soap. Do not shave or scrub the surgery site.  Wear clean pajamas and have clean sheets on your bed.           Follow-ups after your visit        Your next 10 appointments already scheduled     Aug 14, 2018  8:00 AM CDT   (Arrive by 7:45 AM)   MR CERVICAL SPINE W/O CONTRAST with PH[facility]1   [facility] ([facility])    [address] [place] [id number] [phone]           Take your medicines as usual, unless your doctor tells you not to. Bring a list of your current medicines to your exam (including vitamins, minerals and over-the-counter drugs). Also bring the results of similar scans you may have had.  Please remove any body piercings and hair extensions before you arrive.  Follow your doctor s orders. If you do not, we may have to postpone your exam.  You may or may not receive  IV contrast for this exam pending the discretion of the Radiologist.  You do not need to do anything special to prepare.  The MRI machine uses a strong magnet. Please wear clothes without metal (snaps, zippers). A sweatsuit works well, or we may give you a hospital gown.   **IMPORTANT** THE INSTRUCTIONS BELOW ARE ONLY FOR THOSE PATIENTS WHO HAVE BEEN PRESCRIBED SEDATION OR GENERAL ANESTHESIA DURING THEIR MRI PROCEDURE:  IF YOUR DOCTOR PRESCRIBED ORAL SEDATION (take medicine to help you relax during your exam):   You must get the medicine from your doctor (oral medication) before you arrive. Bring the medicine to the exam. Do not take it at home. You ll be told when to take it upon arriving for your exam.   Arrive one hour early. Bring someone who can take you home after the test. Your medicine will make you sleepy. After the exam, you may not drive, take a bus or take a taxi by yourself.  IF YOUR DOCTOR PRESCRIBED IV SEDATION:   Arrive one hour early. Bring someone who can take you home after the test. Your medicine will make you sleepy. After the exam, you may not drive, take a bus or take a taxi by yourself.   No eating 6 hours before your exam. You may have clear liquids up until 4 hours before your exam. (Clear liquids include water, clear tea, black coffee and fruit juice without pulp.)  IF YOUR DOCTOR PRESCRIBED ANESTHESIA (be asleep for your exam):   Arrive 1 1/2 hours early. Bring someone who can take you home after the test. You may not drive, take a bus or take a taxi by yourself.   No eating 8 hours before your exam. You may have clear liquids up until 4 hours before your exam. (Clear liquids include water, clear tea, black coffee and fruit juice without pulp.)   You will spend four to five hours in the recovery room.  Please call the Imaging Department at your exam site with any questions.            Aug 23, 2018   Procedure with Darryn Mcdaniel MD   [facility] ([facility])    [address]   [place] [id number] [phone]           From [address]: Exit at [address] on south side of [place]. Turn right on [address]. Turn left at stoplight on [address]. [facility] will be in view two blocks ahead            Aug 23, 2018 10:30 AM CDT   XR SURGERY SANDEEP LESS THAN 5 MIN FLUORO W STILLS with [facility]1   [facility] ([facility])    [address]  [place] [address]   [phone]           Please bring a list of your current medicines to your exam. (Include vitamins, minerals and over-thecounter medicines.) Leave your valuables at home.  Tell your doctor if there is a chance you may be pregnant.  You do not need to do anything special for this exam.            Sep 11, 2018  2:30 PM CDT   Return Visit with Boni Fereman MD   [facility] ([facility])    [address]  [address]  [facility] [place] [address] [phone]              Who to contact     If you have questions or need follow up information about today's clinic visit or your schedule please contact [facility] directly at [phone].  Normal or non-critical lab and imaging results will be communicated to you by SmartMenuCardhart, letter or phone within 4 business days after the clinic has received the results. If you do not hear from us within 7 days, please contact the clinic through SmartMenuCardhart or phone. If you have a critical or abnormal lab result, we will notify you by phone as soon as possible.  Submit refill requests through Plango or call your pharmacy and they will forward the refill request to us. Please allow 3 business days for your refill to be completed.          Additional Information About Your Visit        Plango Information     Plango gives you secure access to your electronic health record. If you see a primary care provider, you can also send messages to  your care team and make appointments. If you have questions, please call your primary care clinic.  If you do not have a primary care provider, please call [phone] and they will assist you.        Care EveryWhere ID     This is your Care EveryWhere ID. This could be used by other organizations to access your [facility] medical records  [id number]        Your Vitals Were     Pulse Temperature Respirations Pulse Oximetry BMI (Body Mass Index)       80 98.3  F (36.8  C) (Temporal) 16 97% 27.94 kg/m2        Blood Pressure from Last 3 Encounters:   08/09/18 116/70   08/06/18 120/70   07/23/18 122/72    Weight from Last 3 Encounters:   08/09/18 211 lb 12.8 oz (96.1 kg)   08/06/18 210 lb 4.8 oz (95.4 kg)   07/23/18 213 lb 14.4 oz (97 kg)              Today, you had the following     No orders found for display       Primary Care Provider Office Phone # Fax #    Tha Grullon [phone]       [address] [facility] [address]        Equal Access to Services     [facility] Center[facility]RUTHI : Hadii marva ku hadashpilar Somary, waaxda luqadaha, qaybta kaalmaameya delacruz, mikala lainez. So [facility] [phone].    ATENCIÓN: Si habla español, tiene a lundy disposición servicios gratuitos de asistencia lingüística. Dominic al [phone].    We comply with applicable federal civil rights laws and [place] laws. We do not discriminate on the basis of race, color, national origin, age, disability, sex, sexual orientation, or gender identity.            Thank you!     Thank you for choosing [facility]  for your care. Our goal is always to provide you with excellent care. Hearing back from our patients is one way we can continue to improve our services. Please take a few minutes to complete the written survey that you may receive in the mail after your visit with us. Thank you!             Your Updated Medication List - Protect others around you: Learn how to safely use, store and throw away your  medicines at www.disposemymeds.org.          This list is accurate as of 8/9/18  6:07 PM.  Always use your most recent med list.                   Brand Name Dispense Instructions for use Diagnosis    cyclobenzaprine 10 MG tablet    FLEXERIL    60 tablet    Take 1 tablet (10 mg) by mouth 2 times daily as needed for muscle spasms Reported on 4/12/2017    Muscle spasm       gabapentin 400 MG capsule    NEURONTIN    270 capsule    Take 3 capsules (1,200 mg) by mouth 3 times daily    Cervicalgia       HYDROcodone-acetaminophen 5-325 MG per tablet    NORCO    120 tablet    Take 1 tablet by mouth every 6 hours as needed for other (Moderate to Severe Pain) May fill on/after 08/14/2017 NOT to start till 08/16/2017.    S/P cervical spinal fusion       IBUPROFEN PO           losartan 50 MG tablet    COZAAR    90 tablet    Take 1 tablet (50 mg) by mouth daily    Benign essential hypertension       methocarbamol 500 MG tablet    ROBAXIN    180 tablet    Take 2 tablets (1,000 mg) by mouth 3 times daily as needed for muscle spasms    Cervical radiculopathy, Myofascial pain       order for DME     1 Device    Equipment being ordered: TENS Pads as directed    Chronic pain syndrome       tiZANidine 4 MG tablet    ZANAFLEX    60 tablet    Take 1 tablet (4 mg) by mouth 2 times daily as needed for muscle spasms    Muscle spasm       TYLENOL PO      Take 500 mg by mouth every 4 hours as needed for mild pain or fever

## 2018-08-09 NOTE — PROGRESS NOTES
[facility]  [address] [facility] [id number] [phone]  Dept: [phone]    PRE-OP EVALUATION:  Today's date: 2018    Indra Mehta (: 1965) presents for pre-operative evaluation assessment as requested by Dr. Mcdaniel.  He requires evaluation and anesthesia risk assessment prior to undergoing surgery/procedure for treatment of Inject Epidural Transformainal bilateral .    Fax number for surgical facility:   Primary Physician: Tha Grullon  Type of Anesthesia Anticipated: Monitor Anesthesia    Patient has a Health Care Directive or Living Will:  NO    Preop Questions 2018   Who is doing your surgery? juana   What are you having done? lumbar epideral injection   Date of Surgery/Procedure:    Facility or Hospital where procedure/surgery will be performed: [facility]   1.  Do you have a history of Heart attack, stroke, stent, coronary bypass surgery, or other heart surgery? No   2.  Do you ever have any pain or discomfort in your chest? No   3.  Do you have a history of  Heart Failure? No   4.   Are you troubled by shortness of breath when:  walking on a level surface, or up a slight hill, or at night? No   5.  Do you currently have a cold, bronchitis or other respiratory infection? No   6.  Do you have a cough, shortness of breath, or wheezing? No   7.  Do you sometimes get pains in the calves of your legs when you walk? No   8. Do you or anyone in your family have previous history of blood clots? No   9.  Do you or does anyone in your family have a serious bleeding problem such as prolonged bleeding following surgeries or cuts? No   10. Have you ever had problems with anemia or been told to take iron pills? No   11. Have you had any abnormal blood loss such as black, tarry or bloody stools? No   12. Have you ever had a blood transfusion? No   13. Have you or any of your relatives ever had problems with anesthesia? No   14. Do you have sleep apnea, excessive  snoring or daytime drowsiness? No   15. Do you have any prosthetic heart valves? No   16. Do you have prosthetic joints? No         HPI:     HPI related to upcoming procedure: Indra Mehta is a 53 year old male who presents with chronic lumbar radiculapathy.       See problem list for active medical problems.  Problems all longstanding and stable, except as noted/documented.  See ROS for pertinent symptoms related to these conditions.                                                                                                                                                          .    MEDICAL HISTORY:     Patient Active Problem List    Diagnosis Date Noted     Benign essential hypertension 06/30/2017     Priority: Medium     Myalgia 10/28/2016     Priority: Medium     Bilateral occipital neuralgia 10/28/2016     Priority: Medium     CARDIOVASCULAR SCREENING; LDL GOAL LESS THAN 160 10/31/2010     Priority: Medium     Major depression in complete remission (H) 08/20/2010     Priority: Medium     Chronic pain syndrome 08/20/2010     Priority: Medium     Patient is followed by Tha Grullon MD for ongoing prescription of pain medication.  All refills should be approved by this provider only at face-to-face appointments - not by phone request.    Medication(s): Tramadol.   Maximum quantity per month: 60  Clinic visit frequency required: Q 1 months     Controlled substance agreement:  Encounter-Level CSA - 10/13/16:               Controlled Substance Agreement - Scan on 10/23/2016  5:07 PM : CONTROLLED SUBSTANCE AGREEMENT 10/13/16 (below)            Pain Clinic evaluation in the past: Yes       Date/Location:   [facility]    DIRE Total Score(s):  No flowsheet data found.    Last [facility] website verification:  none   [email or web address]               Pain medication agreement signed 08/20/2010     Priority: Medium     Meniere's disease 03/22/2007     Priority: Medium     Problem list name  updated by automated process. Provider to review        Past Medical History:   Diagnosis Date     Back pain      Meniere's disease, unspecified      Pain medication agreement signed 8/20/2010     Past Surgical History:   Procedure Laterality Date     BUNIONECTOMY RT/LT  09/05/08    Both feet     COLONOSCOPY N/A 12/28/2016    Procedure: COMBINED COLONOSCOPY, SINGLE OR MULTIPLE BIOPSY/POLYPECTOMY BY BIOPSY;  Surgeon: Indra Jernigan MD;  Location: [facility]     DISCECTOMY, FUSION CERVICAL ANTERIOR ONE LEVEL, COMBINED N/A 7/5/2017    Procedure: COMBINED DISCECTOMY, FUSION CERVICAL ANTERIOR ONE LEVEL;  Cervical 6-7, Anterior cervical discectomy fusion;  Surgeon: Mike Mckenna MD;  Location: [facility]     HC COLONOSCOPY W/WO BRUSH/WASH  12/27/2005     HC CREATE EARDRUM OPENING,GEN ANESTH  09/27/2007    Pe tube, Left endolymphatic sac enhancement.     HC MASTOIDECTOMY,COMPLETE  09/27/2007     HERNIORRHAPHY UMBILICAL N/A 8/11/2017    Procedure: HERNIORRHAPHY UMBILICAL;  open umbilical hernia repair;  Surgeon: Boni Story MD;  Location: [facility]     INJECT EPIDURAL LUMBAR N/A 11/9/2017    Procedure: INJECT EPIDURAL LUMBAR;  lumbar 4-5 epidural steroid injection ;  Surgeon: Darryn Mcdaniel MD;  Location: [facility]     INJECT EPIDURAL LUMBAR N/A 12/28/2017    Procedure: INJECT EPIDURAL LUMBAR;  lumbar epidural injection;  Surgeon: Darryn Mcdaniel MD;  Location: [facility]     PE TUBES  2006    left ear     Current Outpatient Prescriptions   Medication Sig Dispense Refill     Acetaminophen (TYLENOL PO) Take 500 mg by mouth every 4 hours as needed for mild pain or fever       gabapentin (NEURONTIN) 400 MG capsule Take 3 capsules (1,200 mg) by mouth 3 times daily 270 capsule 3     HYDROcodone-acetaminophen (NORCO) 5-325 MG per tablet Take 1 tablet by mouth every 6 hours as needed for other (Moderate to Severe Pain) May fill on/after 08/14/2017 NOT to start till 08/16/2017. 120 tablet 0     IBUPROFEN PO        losartan (COZAAR) 50  MG tablet Take 1 tablet (50 mg) by mouth daily 90 tablet 0     methocarbamol (ROBAXIN) 500 MG tablet Take 2 tablets (1,000 mg) by mouth 3 times daily as needed for muscle spasms 180 tablet 1     order for DME Equipment being ordered: TENS Pads as directed 1 Device 0     tiZANidine (ZANAFLEX) 4 MG tablet Take 1 tablet (4 mg) by mouth 2 times daily as needed for muscle spasms 60 tablet 3     cyclobenzaprine (FLEXERIL) 10 MG tablet Take 1 tablet (10 mg) by mouth 2 times daily as needed for muscle spasms Reported on 4/12/2017 (Patient not taking: Reported on 8/9/2018) 60 tablet 1     OTC products: None, except as noted above    Allergies   Allergen Reactions     No Known Drug Allergies       Latex Allergy: NO    Social History   Substance Use Topics     Smoking status: Never Smoker     Smokeless tobacco: Never Used     Alcohol use No     History   Drug Use No       REVIEW OF SYSTEMS:   CONSTITUTIONAL: NEGATIVE for fever, chills, change in weight  INTEGUMENTARY/SKIN: NEGATIVE for worrisome rashes, moles or lesions  EYES: NEGATIVE for vision changes or irritation  ENT/MOUTH: NEGATIVE for ear, mouth and throat problems  RESP: NEGATIVE for significant cough or SOB  BREAST: NEGATIVE for masses, tenderness or discharge  CV: NEGATIVE for chest pain, palpitations or peripheral edema  GI: NEGATIVE for nausea, abdominal pain, heartburn, or change in bowel habits  : NEGATIVE for frequency, dysuria, or hematuria  MUSCULOSKELETAL:back pain  NEURO: POSITIVE for history of Meniere's disease with hearing loss and intermittent positional vertigo.  ENDOCRINE: NEGATIVE for temperature intolerance, skin/hair changes  HEME: NEGATIVE for bleeding problems  PSYCHIATRIC: NEGATIVE for changes in mood or affect    EXAM:   /70 (BP Location: Right arm, Patient Position: Chair, Cuff Size: Adult Large)  Pulse 80  Temp 98.3  F (36.8  C) (Temporal)  Resp 16  Wt 211 lb 12.8 oz (96.1 kg)  SpO2 97%  BMI 27.94 kg/m2    GENERAL APPEARANCE:  healthy, alert and no distress     EYES: EOMI,  PERRL     HENT: ear canals and TM's normal and nose and mouth without ulcers or lesions     NECK: no adenopathy, no asymmetry, masses, or scars and thyroid normal to palpation     RESP: lungs clear to auscultation - no rales, rhonchi or wheezes     CV: regular rates and rhythm, normal S1 S2, no S3 or S4 and no murmur, click or rub     ABDOMEN:  soft, nontender, no HSM or masses and bowel sounds normal     MS: extremities normal- no gross deformities noted, no evidence of inflammation in joints, FROM in all extremities.     SKIN: no suspicious lesions or rashes     NEURO: Normal strength and tone, sensory exam grossly normal, mentation intact and speech normal     PSYCH: mentation appears normal. and affect normal/bright     LYMPHATICS: No cervical adenopathy    DIAGNOSTICS:   No labs or EKG required for low risk surgery (cataract, skin procedure, breast biopsy, etc)    Recent Labs   Lab Test  03/15/18   1052  01/26/18   1705  06/30/17   1613  03/24/16   1900   HGB  14.9  15.1   --   15.7   PLT  230  239   --   259   NA   --    --   142  142   POTASSIUM   --    --   3.9  3.5   CR  0.92   --   1.15  1.06        IMPRESSION:   Reason for surgery/procedure: Chronic lumbar radiculopathy/Bilateral transforaminal lumbar SRIDEVI    The proposed surgical procedure is considered LOW risk.    REVISED CARDIAC RISK INDEX  The patient has the following serious cardiovascular risks for perioperative complications such as (MI, PE, VFib and 3  AV Block):  No serious cardiac risks  INTERPRETATION: 0 risks: Class I (very low risk - 0.4% complication rate)    The patient has the following additional risks for perioperative complications:  No identified additional risks      ICD-10-CM    1. Preop general physical exam Z01.818    2. Lumbar radiculopathy M54.16        RECOMMENDATIONS:         --Patient is to take HOLD scheduled medications on the day of surgery     APPROVAL GIVEN to proceed with  proposed procedure, without further diagnostic evaluation       Signed Electronically by: Tha Grullon MD    Copy of this evaluation report is provided to requesting physician.    [facility] Preop Guidelines    Revised Cardiac Risk Index

## 2018-08-14 ENCOUNTER — HOSPITAL ENCOUNTER (OUTPATIENT)
Dept: MRI IMAGING | Facility: CLINIC | Age: 53
Discharge: HOME | End: 2018-08-14
Attending: NEUROLOGICAL SURGERY | Admitting: NEUROLOGICAL SURGERY
Payer: COMMERCIAL

## 2018-08-14 DIAGNOSIS — Z98.1 S/P CERVICAL SPINAL FUSION: ICD-10-CM

## 2018-08-14 PROCEDURE — 72141 MRI NECK SPINE W/O DYE: CPT

## 2018-08-15 NOTE — PROGRESS NOTES
Dr. Mckenna reviewed recent imaging. MRI stable. Next steps pending EMG results.     Discussed with patient. EMG scheduled for 9/4/18. Offered to help patient schedule for sooner appt, but he said 9/4/18 works best due to location.

## 2018-08-22 ENCOUNTER — ANESTHESIA EVENT (OUTPATIENT)
Dept: SURGERY | Facility: CLINIC | Age: 53
End: 2018-08-22
Payer: COMMERCIAL

## 2018-08-23 ENCOUNTER — HOSPITAL ENCOUNTER (OUTPATIENT)
Facility: CLINIC | Age: 53
Discharge: HOME | End: 2018-08-23
Attending: ANESTHESIOLOGY | Admitting: ANESTHESIOLOGY
Payer: COMMERCIAL

## 2018-08-23 ENCOUNTER — ANESTHESIA (OUTPATIENT)
Dept: SURGERY | Facility: CLINIC | Age: 53
End: 2018-08-23
Payer: COMMERCIAL

## 2018-08-23 ENCOUNTER — HOSPITAL ENCOUNTER (OUTPATIENT)
Dept: GENERAL RADIOLOGY | Facility: CLINIC | Age: 53
End: 2018-08-23
Attending: ANESTHESIOLOGY | Admitting: ANESTHESIOLOGY
Payer: COMMERCIAL

## 2018-08-23 VITALS
RESPIRATION RATE: 16 BRPM | SYSTOLIC BLOOD PRESSURE: 128 MMHG | DIASTOLIC BLOOD PRESSURE: 90 MMHG | TEMPERATURE: 98.2 F | OXYGEN SATURATION: 96 %

## 2018-08-23 DIAGNOSIS — M54.16 LUMBAR RADICULOPATHY: ICD-10-CM

## 2018-08-23 PROCEDURE — 37000008 ZZH ANESTHESIA TECHNICAL FEE, 1ST 30 MIN: Performed by: ANESTHESIOLOGY

## 2018-08-23 PROCEDURE — 40000277 XR SURGERY CARM FLUORO LESS THAN 5 MIN W STILLS: Mod: TC

## 2018-08-23 PROCEDURE — 25000128 H RX IP 250 OP 636: Performed by: NURSE ANESTHETIST, CERTIFIED REGISTERED

## 2018-08-23 PROCEDURE — 25000125 ZZHC RX 250: Performed by: NURSE ANESTHETIST, CERTIFIED REGISTERED

## 2018-08-23 PROCEDURE — 25000128 H RX IP 250 OP 636: Performed by: ANESTHESIOLOGY

## 2018-08-23 PROCEDURE — 64483 NJX AA&/STRD TFRM EPI L/S 1: CPT | Performed by: ANESTHESIOLOGY

## 2018-08-23 RX ORDER — MEPERIDINE HYDROCHLORIDE 25 MG/ML
12.5 INJECTION INTRAMUSCULAR; INTRAVENOUS; SUBCUTANEOUS
Status: DISCONTINUED | OUTPATIENT
Start: 2018-08-23 | End: 2018-08-23 | Stop reason: HOSPADM

## 2018-08-23 RX ORDER — SODIUM CHLORIDE, SODIUM LACTATE, POTASSIUM CHLORIDE, CALCIUM CHLORIDE 600; 310; 30; 20 MG/100ML; MG/100ML; MG/100ML; MG/100ML
INJECTION, SOLUTION INTRAVENOUS CONTINUOUS
Status: DISCONTINUED | OUTPATIENT
Start: 2018-08-23 | End: 2018-08-23 | Stop reason: HOSPADM

## 2018-08-23 RX ORDER — PROPOFOL 10 MG/ML
INJECTION, EMULSION INTRAVENOUS PRN
Status: DISCONTINUED | OUTPATIENT
Start: 2018-08-23 | End: 2018-08-23

## 2018-08-23 RX ORDER — LIDOCAINE HYDROCHLORIDE 20 MG/ML
INJECTION, SOLUTION INFILTRATION; PERINEURAL PRN
Status: DISCONTINUED | OUTPATIENT
Start: 2018-08-23 | End: 2018-08-23

## 2018-08-23 RX ORDER — NALOXONE HYDROCHLORIDE 0.4 MG/ML
.1-.4 INJECTION, SOLUTION INTRAMUSCULAR; INTRAVENOUS; SUBCUTANEOUS
Status: DISCONTINUED | OUTPATIENT
Start: 2018-08-23 | End: 2018-08-23 | Stop reason: HOSPADM

## 2018-08-23 RX ORDER — ONDANSETRON 4 MG/1
4 TABLET, ORALLY DISINTEGRATING ORAL EVERY 30 MIN PRN
Status: DISCONTINUED | OUTPATIENT
Start: 2018-08-23 | End: 2018-08-23 | Stop reason: HOSPADM

## 2018-08-23 RX ORDER — IOPAMIDOL 612 MG/ML
INJECTION, SOLUTION INTRATHECAL PRN
Status: DISCONTINUED | OUTPATIENT
Start: 2018-08-23 | End: 2018-08-23 | Stop reason: HOSPADM

## 2018-08-23 RX ORDER — TRIAMCINOLONE ACETONIDE 40 MG/ML
INJECTION, SUSPENSION INTRA-ARTICULAR; INTRAMUSCULAR PRN
Status: DISCONTINUED | OUTPATIENT
Start: 2018-08-23 | End: 2018-08-23 | Stop reason: HOSPADM

## 2018-08-23 RX ORDER — ONDANSETRON 2 MG/ML
4 INJECTION INTRAMUSCULAR; INTRAVENOUS EVERY 30 MIN PRN
Status: DISCONTINUED | OUTPATIENT
Start: 2018-08-23 | End: 2018-08-23 | Stop reason: HOSPADM

## 2018-08-23 RX ADMIN — PROPOFOL 30 MG: 10 INJECTION, EMULSION INTRAVENOUS at 10:11

## 2018-08-23 RX ADMIN — Medication 6 MCG: at 10:07

## 2018-08-23 RX ADMIN — PROPOFOL 50 MG: 10 INJECTION, EMULSION INTRAVENOUS at 10:17

## 2018-08-23 RX ADMIN — PROPOFOL 50 MG: 10 INJECTION, EMULSION INTRAVENOUS at 10:06

## 2018-08-23 RX ADMIN — LIDOCAINE HYDROCHLORIDE 40 MG: 20 INJECTION, SOLUTION INFILTRATION; PERINEURAL at 10:06

## 2018-08-23 NOTE — IP AVS SNAPSHOT
MRN:[id number]                      After Visit Summary   8/23/2018    Indra Mehta    MRN: [id number]           Thank you!     Thank you for choosing [facility] for your care. Our goal is always to provide you with excellent care. Hearing back from our patients is one way we can continue to improve our services. Please take a few minutes to complete the written survey that you may receive in the mail after you visit with us. Thank you!        Patient Information     Date Of Birth          1965        About your hospital stay     You were admitted on:  August 23, 2018 You last received care in the:  [facility] Phase II    You were discharged on:  August 23, 2018       Who to Call     For medical emergencies, please call 911.  For non-urgent questions about your medical care, please call your primary care provider or clinic, [phone]  For questions related to your surgery, please call your surgery clinic        Attending Provider     Provider Specialty    Darryn Mcdaniel [facility]       Primary Care Provider Office Phone # Fax #    Tha Grullon [phone]      After Care Instructions     Discharge Instructions       Review outpatient procedure discharge instructions as directed by Provider.                  Your next 10 appointments already scheduled     Sep 10, 2018  2:45 PM CDT   [facility]    [address] [place] [id number] [phone]            Sep 11, 2018  2:30 PM CDT   Return Visit with Boni Freeman MD   [facility] ([facility])    [address]  [address]  [facility] [id number] [phone]              Further instructions from your care team       Home Care Instructions                Procedure:  Epidural Steroid Injection or Joint injection    Activity:    Rest  today    Do not work today    Resume normal activity tomorrow    Pain:    You may experience soreness at the injection site for one or two days    You may use an ice pack for 20 minutes every 2 hours for the first 24 hours    You may use a heating pad after the first 24 hours    You may use Tylenol  (acetaminophen) every 4 hours or other pain medicines as directed by your physician    Safety  Sedation medicine, if given may remain active for many hours.    It is important for the next 24 hours that you do not:    Drive a car    Operate machines or power tools    Consume alcohol, including beer    Sign any important papers or legal documents    You may experience numbness radiating into your legs or arms, (depending on the procedure location)  This numbness may last several hours.  Until the numb sensation returns to normal please use caution in walking, climbing stairs, stepping out of your vehicle, etc.    Common side effects of steroids:  Not everyone will experience corticosteroid side effects. If side effects are experienced they will gradually subside in the 7-10 day period following an injection.    Most common side effects include:    Flushed face and/or chest    Feeling of warmth, particularly in face but could be overall feeling of warmth    Increased blood sugar in diabetic patients    Menstrual irregularities may occur.  If taking hormone based birth control an alternate method of birth control is recommended    Sleep disturbances and/or mood swings are possible    Leg cramps    Please contact us if you have:  Severe pain   Fever more than 101.5 degrees Fahrenheit  Signs of infection (redness, swelling or drainage)      If you have questions during normal business hours (8am-5pm Monday-Friday) contact the [facility] at [phone]. If you need help after hours, we recommend that you go to a hospital emergency room or dial 911.                 Pending Results     No orders found from 8/21/2018  to 8/24/2018.            Admission Information     Date & Time Provider Department Dept. Phone    8/23/2018 Darryn Mcdaniel MD [facility] Phase [phone]      Your Vitals Were     Blood Pressure Temperature Respirations Pulse Oximetry          123/84 98.2  F (36.8  C) (Oral) 16 97%        MyChart Information     [employer] gives you secure access to your electronic health record. If you see a primary care provider, you can also send messages to your care team and make appointments. If you have questions, please call your primary care clinic.  If you do not have a primary care provider, please call [phone] and they will assist you.        Care EveryWhere ID     This is your Care EveryWhere ID. This could be used by other organizations to access your [facility] medical records  [id number]        Equal Access to Services     KELSEY ARSHAD : Donna Menendez, jaime menezes, ranulfo delacruz, mikala carmichael . [facility] [place] [phone].    ATENCIÓN: Si habla español, tiene a lundy disposición servicios gratuitos de asistencia lingüística. Dominic al [phone].    We comply with applicable federal civil rights laws and [place] laws. We do not discriminate on the basis of race, color, national origin, age, disability, sex, sexual orientation, or gender identity.               Review of your medicines      CONTINUE these medicines which have NOT CHANGED        Dose / Directions    cyclobenzaprine 10 MG tablet   Commonly known as:  FLEXERIL   Used for:  Muscle spasm        Dose:  10 mg   Take 1 tablet (10 mg) by mouth 2 times daily as needed for muscle spasms Reported on 4/12/2017   Quantity:  60 tablet   Refills:  1       gabapentin 400 MG capsule   Commonly known as:  NEURONTIN   Used for:  Cervicalgia        Dose:  1200 mg   Take 3 capsules (1,200 mg) by mouth 3 times daily   Quantity:  270 capsule   Refills:  3       HYDROcodone-acetaminophen 5-325 MG per tablet    Commonly known as:  NORCO   Used for:  S/P cervical spinal fusion        Dose:  1 tablet   Take 1 tablet by mouth every 6 hours as needed for other (Moderate to Severe Pain) May fill on/after 08/14/2017 NOT to start till 08/16/2017.   Quantity:  120 tablet   Refills:  0       IBUPROFEN PO        Refills:  0       losartan 50 MG tablet   Commonly known as:  COZAAR   Used for:  Benign essential hypertension        Dose:  50 mg   Take 1 tablet (50 mg) by mouth daily   Quantity:  90 tablet   Refills:  0       methocarbamol 500 MG tablet   Commonly known as:  ROBAXIN   Used for:  Cervical radiculopathy, Myofascial pain        Dose:  1000 mg   Take 2 tablets (1,000 mg) by mouth 3 times daily as needed for muscle spasms   Quantity:  180 tablet   Refills:  1       order for DME   Used for:  Chronic pain syndrome        Equipment being ordered: TENS Pads as directed   Quantity:  1 Device   Refills:  0       tiZANidine 4 MG tablet   Commonly known as:  ZANAFLEX   Used for:  Muscle spasm        Dose:  4 mg   Take 1 tablet (4 mg) by mouth 2 times daily as needed for muscle spasms   Quantity:  60 tablet   Refills:  3       TYLENOL PO        Dose:  500 mg   Take 500 mg by mouth every 4 hours as needed for mild pain or fever   Refills:  0                Protect others around you: Learn how to safely use, store and throw away your medicines at www.disposemymeds.org.             Medication List: This is a list of all your medications and when to take them. Check marks below indicate your daily home schedule. Keep this list as a reference.      Medications           Morning Afternoon Evening Bedtime As Needed    cyclobenzaprine 10 MG tablet   Commonly known as:  FLEXERIL   Take 1 tablet (10 mg) by mouth 2 times daily as needed for muscle spasms Reported on 4/12/2017                                gabapentin 400 MG capsule   Commonly known as:  NEURONTIN   Take 3 capsules (1,200 mg) by mouth 3 times daily                                 HYDROcodone-acetaminophen 5-325 MG per tablet   Commonly known as:  NORCO   Take 1 tablet by mouth every 6 hours as needed for other (Moderate to Severe Pain) May fill on/after 08/14/2017 NOT to start till 08/16/2017.                                IBUPROFEN PO                                losartan 50 MG tablet   Commonly known as:  COZAAR   Take 1 tablet (50 mg) by mouth daily                                methocarbamol 500 MG tablet   Commonly known as:  ROBAXIN   Take 2 tablets (1,000 mg) by mouth 3 times daily as needed for muscle spasms                                order for DME   Equipment being ordered: TENS Pads as directed                                tiZANidine 4 MG tablet   Commonly known as:  ZANAFLEX   Take 1 tablet (4 mg) by mouth 2 times daily as needed for muscle spasms                                TYLENOL PO   Take 500 mg by mouth every 4 hours as needed for mild pain or fever

## 2018-08-23 NOTE — ANESTHESIA CARE TRANSFER NOTE
Patient: Indra Mehta    Procedure(s):  transforaminal bilateral lumbar 3-4 steroid injection - Wound Class: I-Clean    Diagnosis: lumbar radiculopathy  Diagnosis Additional Information: No value filed.    Anesthesia Type:   MAC     Note:  Airway :Room Air  Patient transferred to:Phase II  Handoff Report: Identifed the Patient, Identified the Reponsible Provider, Reviewed the pertinent medical history, Discussed the surgical course, Reviewed Intra-OP anesthesia mangement and issues during anesthesia, Set expectations for post-procedure period and Allowed opportunity for questions and acknowledgement of understanding      Vitals: (Last set prior to Anesthesia Care Transfer)    CRNA VITALS  8/23/2018 0952 - 8/23/2018 1022      8/23/2018             SpO2: 97 %    Resp Rate (observed): 14                Electronically Signed By: YUMIKO Valente CRNA  August 23, 2018  10:22 AM

## 2018-08-23 NOTE — OP NOTE
PRIMARY PROBLEM: Low back pain and leg pains    PROCEDURE: Bilateral L3-4  Transforaminal Epidural Steroid Injections with fluoroscopic guidance and contrast.     PROCEDURE DETAILS: After written informed consent was obtained from the patient, the patient was escorted to the procedure room.  The patient was placed in the prone position.  A  time out  was conducted to verify patient identity, procedure to be performed, side, site, allergies and any special requirements.  The skin over the thoracolumbar region was prepped and draped in normal sterile fashion. Fluoroscopy was used to identify the neural foramen in AP view and the skin was anesthetized with 2 mL of 1% lidocaine with bicarbonate buffer. A 22-gauge Quincke spinal needle was advanced through this location and advanced under fluoroscopic guidance towards the neural foramen.  The target zone was the 6 o clock position of the pedicle.   Prior to entering the foramen, the depth of the needle was gauged with a lateral view on fluoroscopy. While still in a lateral view, the needle was slowly advanced to avoid injury to the spinal nerve.  Then, in the oblique view (approximately 28 degrees), after negative aspiration, 1.5 mL of Omnipaque contrast dye was injected revealing epidural spread without evidence of intravascular or intrathecal spread.  Then a 2.5cc solution of 20 mg of Triamcinolone in 2 mL of  Preservative-Free saline was slowly injected into the epidural space at each segment.  After injection of the medication, as the needle tip was withdrawn, it was flushed with local anesthetic.   The patient was monitored with blood pressure and pulse oximetry machines with the assistance of an RN throughout the procedure.  The patient was alert and responsive to questions throughout the procedure.   The patient tolerated the procedure well and was observed in the post-procedural area.  The patient was dismissed without apparent complications.     DIAGNOSIS:  1.  Lumbar disc degeneration with an associated lumbar radiculopathy    PLAN:  1. Performed bilateral L3-4  transforaminal epidural steroid injections.  2. The patient was instructed to follow-up per Dr. Mcdaniel's instructions.  He has responded to this procedure in the past so if today's injection is partially beneficial a second injection would be recommended.    Darryn Mcdaniel MD  Diplomate of the American Board of Anesthesiology, Pain Medicine

## 2018-08-23 NOTE — ANESTHESIA PREPROCEDURE EVALUATION
Anesthesia Evaluation     . Pt has had prior anesthetic. Type: General and MAC    No history of anesthetic complications          ROS/MED HX    ENT/Pulmonary:  - neg pulmonary ROS     Neurologic:     (+)neuropathy (bilateral occipital neuralgia) - Bilateral occipital neuralgia, other neuro Menieres disease    Cardiovascular:     (+) Dyslipidemia, hypertension----. : . . . :. . Previous cardiac testing date:results:date: results:ECG reviewed date:6/30/17 results:No results indicated, EKG reordered. date: results:          METS/Exercise Tolerance:  >4 METS   Hematologic:  - neg hematologic  ROS       Musculoskeletal:   (+) , , other musculoskeletal- myalgia/back pain      GI/Hepatic:  - neg GI/hepatic ROS       Renal/Genitourinary:  - ROS Renal section negative   (+) Pt has no history of transplant,       Endo:  - neg endo ROS       Psychiatric:     (+) psychiatric history depression      Infectious Disease:  - neg infectious disease ROS       Malignancy:      - no malignancy   Other:    (+) No chance of pregnancy C-spine cleared: Yes, H/O Chronic Pain,H/O chronic opiod use , no other significant disability                    Physical Exam  Normal systems: cardiovascular, pulmonary and dental    Airway   Mallampati: II  TM distance: >3 FB  Neck ROM: full    Dental     Cardiovascular   Rhythm and rate: regular and normal      Pulmonary    breath sounds clear to auscultation                    Anesthesia Plan      History & Physical Review  History and physical reviewed and following examination; no interval change.    ASA Status:  2 .    NPO Status:  > 6 hours    Plan for MAC with Propofol induction. Maintenance will be TIVA.  Reason for MAC:  Deep or markedly invasive procedure (G8)  PONV prophylaxis:  Ondansetron (or other 5HT-3)       Postoperative Care      Consents  Anesthetic plan, risks, benefits and alternatives discussed with:  Patient.  Use of blood products discussed: No .   .                          .

## 2018-08-23 NOTE — IP AVS SNAPSHOT
[facility] II    [address]     [place] [address]    Phone:  [phone]                                       After Visit Summary   8/23/2018    Indra Mehta    MRN: [id number]           After Visit Summary Signature Page     I have received my discharge instructions, and my questions have been answered. I have discussed any challenges I see with this plan with the nurse or doctor.    ..........................................................................................................................................  Patient/Patient Representative Signature      ..........................................................................................................................................  Patient Representative Print Name and Relationship to Patient    ..................................................               ................................................  Date                                            Time    ..........................................................................................................................................  Reviewed by Signature/Title    ...................................................              ..............................................  Date                                                            Time

## 2018-08-23 NOTE — DISCHARGE INSTRUCTIONS
Home Care Instructions                Procedure:  Epidural Steroid Injection or Joint injection    Activity:    Rest today    Do not work today    Resume normal activity tomorrow    Pain:    You may experience soreness at the injection site for one or two days    You may use an ice pack for 20 minutes every 2 hours for the first 24 hours    You may use a heating pad after the first 24 hours    You may use Tylenol  (acetaminophen) every 4 hours or other pain medicines as directed by your physician    Safety  Sedation medicine, if given may remain active for many hours.    It is important for the next 24 hours that you do not:    Drive a car    Operate machines or power tools    Consume alcohol, including beer    Sign any important papers or legal documents    You may experience numbness radiating into your legs or arms, (depending on the procedure location)  This numbness may last several hours.  Until the numb sensation returns to normal please use caution in walking, climbing stairs, stepping out of your vehicle, etc.    Common side effects of steroids:  Not everyone will experience corticosteroid side effects. If side effects are experienced they will gradually subside in the 7-10 day period following an injection.    Most common side effects include:    Flushed face and/or chest    Feeling of warmth, particularly in face but could be overall feeling of warmth    Increased blood sugar in diabetic patients    Menstrual irregularities may occur.  If taking hormone based birth control an alternate method of birth control is recommended    Sleep disturbances and/or mood swings are possible    Leg cramps    Please contact us if you have:  Severe pain   Fever more than 101.5 degrees Fahrenheit  Signs of infection (redness, swelling or drainage)      If you have questions during normal business hours (8am-5pm Monday-Friday) contact the [facility] at [phone]. If you need help after hours, we recommend that  you go to a hospital emergency room or dial 911.

## 2018-08-23 NOTE — ANESTHESIA POSTPROCEDURE EVALUATION
Patient: Indra Mehta    Procedure(s):  transforaminal bilateral lumbar 3-4 steroid injection - Wound Class: I-Clean    Diagnosis:lumbar radiculopathy  Diagnosis Additional Information: No value filed.    Anesthesia Type:  MAC    Note:  Anesthesia Post Evaluation    Patient location during evaluation: Phase 2 and Bedside  Patient participation: Able to fully participate in evaluation  Level of consciousness: awake and alert  Pain management: adequate  Airway patency: patent  Cardiovascular status: acceptable  Respiratory status: acceptable  Hydration status: acceptable  PONV: none     Anesthetic complications: None    Comments: Patient was pleased with his anesthesia care today. He denies any postop concerns. Vital signs stable. No anesthesia complications noted. Will follow as needed.        Last vitals:  Vitals:    08/23/18 0934 08/23/18 1024 08/23/18 1030   BP: (!) 132/95 123/84 128/90   Resp: 16 16 16   Temp: 98.2  F (36.8  C)     SpO2:  97% 96%         Electronically Signed By: YUMIKO Valente CRNA  August 23, 2018  4:47 PM

## 2018-09-04 ENCOUNTER — TRANSFERRED RECORDS (OUTPATIENT)
Dept: HEALTH INFORMATION MANAGEMENT | Facility: CLINIC | Age: 53
End: 2018-09-04

## 2018-09-06 ENCOUNTER — TELEPHONE (OUTPATIENT)
Dept: NEUROSURGERY | Facility: CLINIC | Age: 53
End: 2018-09-06

## 2018-09-06 NOTE — TELEPHONE ENCOUNTER
MD reviewed EMG and the neurologist reports indicates belief of peripheral nerve issue so patient should see a neurologist.  Ana Paula Cage RN on 9/6/2018 at 11:15 AM

## 2018-09-07 NOTE — PROGRESS NOTES
ENT Consultation    Indra Mehta is a 53 year old male who is seen in consultation at the request of Roya Brink.      History of Present Illness - Indra Mehta is a 53 year old male here today for meniere's disease. Over 10 years ago he had surgery. Before that he had a negative MRI of the brain. Since then he has had increased amount of off balance sensation. The tinnitus in the left ear has increase and has developed into pulsatile tinnitus. He denies true vertigo. History of a cervical fusion last summer. Tinnitus does not change quality with the movement of neck. On his last hearing test it noted severe SNHL on the left and normal hearing on the right. There is very poor word recognition on the left and 100% word recognition on the right side.     Body mass index is 27.47 kg/(m^2).    BP Readings from Last 1 Encounters:   09/10/18 140/82     BP noted to be well controlled today in office.     Indra IS NOT a smoker/uses chewing tobacco.       Past Medical History -   Past Medical History:   Diagnosis Date     Back pain      Meniere's disease, unspecified      Pain medication agreement signed 8/20/2010       Current Medications -   Current Outpatient Prescriptions:      Acetaminophen (TYLENOL PO), Take 500 mg by mouth every 4 hours as needed for mild pain or fever, Disp: , Rfl:      cyclobenzaprine (FLEXERIL) 10 MG tablet, Take 1 tablet (10 mg) by mouth 2 times daily as needed for muscle spasms Reported on 4/12/2017, Disp: 60 tablet, Rfl: 1     gabapentin (NEURONTIN) 400 MG capsule, Take 3 capsules (1,200 mg) by mouth 3 times daily, Disp: 270 capsule, Rfl: 3     HYDROcodone-acetaminophen (NORCO) 5-325 MG per tablet, Take 1 tablet by mouth every 6 hours as needed for other (Moderate to Severe Pain) May fill on/after 08/14/2017 NOT to start till 08/16/2017., Disp: 120 tablet, Rfl: 0     IBUPROFEN PO, , Disp: , Rfl:      losartan (COZAAR) 50 MG tablet, Take 1 tablet (50 mg) by mouth daily, Disp:  "90 tablet, Rfl: 0     methocarbamol (ROBAXIN) 500 MG tablet, Take 2 tablets (1,000 mg) by mouth 3 times daily as needed for muscle spasms, Disp: 180 tablet, Rfl: 1     order for DME, Equipment being ordered: TENS Pads as directed, Disp: 1 Device, Rfl: 0     tiZANidine (ZANAFLEX) 4 MG tablet, Take 1 tablet (4 mg) by mouth 2 times daily as needed for muscle spasms, Disp: 60 tablet, Rfl: 3    Allergies -   Allergies   Allergen Reactions     No Known Drug Allergies        Social History -   Social History     Social History     Marital status: Single     Spouse name: N/A     Number of children: 2     Years of education: N/A     Occupational History      [employer]     Social History Main Topics     Smoking status: Never Smoker     Smokeless tobacco: Never Used     Alcohol use No     Drug use: No     Sexual activity: Yes     Partners: Female     Other Topics Concern     Parent/Sibling W/ Cabg, Mi Or Angioplasty Before 65f 55m? No     Social History Narrative       Family History -   Family History   Problem Relation Age of Onset     Cancer - colorectal Mother      Diabetes Mother      Cardiovascular Father      Hypertension Father      Mental Illness Sister      Suicide Other        Review of Systems - As per HPI and PMHx, otherwise review of system review of the head and neck negative.    Physical Exam  /82 (BP Location: Right arm, Patient Position: Sitting, Cuff Size: Adult Regular)  Pulse 91  Temp 99.2  F (37.3  C) (Temporal)  Ht 1.854 m (6' 1\")  Wt 94.4 kg (208 lb 3.2 oz)  SpO2 97%  BMI 27.47 kg/m2  BMI: Body mass index is 27.47 kg/(m^2).    General - The patient is well nourished and well developed, and appears to have good nutritional status.  Alert and oriented to person and place, answers questions and cooperates with examination appropriately.    SKIN - No suspicious lesions or rashes.  Respiration - No respiratory distress.  Head and Face - Normocephalic and atraumatic, with no gross asymmetry " noted of the contour of the facial features.  The facial nerve is intact, with strong symmetric movements.    Voice and Breathing - The patient was breathing comfortably without the use of accessory muscles.  The patients voice was clear and strong, and had appropriate pitch and quality.    Ears - Bilateral pinna and EACs with normal appearing overlying skin. Tympanic membrane intact with good mobility on pneumatic otoscopy bilaterally. Bony landmarks of the ossicular chain are normal. The tympanic membranes are normal in appearance. No retraction, perforation, or masses.  No fluid or purulence was seen in the external canal or the middle ear.     Eyes - Extraocular movements intact.  Sclera were not icteric or injected, conjunctiva were pink and moist.    Mouth - Examination of the oral cavity showed pink, healthy oral mucosa. No lesions or ulcerations noted.  The tongue was mobile and midline, and the dentition were in good condition.      Throat - The walls of the oropharynx were smooth, pink, moist, symmetric, and had no lesions or ulcerations.  The tonsillar pillars and soft palate were symmetric.  The uvula was midline on elevation.    Neck - Normal midline excursion of the laryngotracheal complex during swallowing.  Full range of motion on passive movement.  Palpation of the occipital, submental, submandibular, internal jugular chain, and supraclavicular nodes did not demonstrate any abnormal lymph nodes or masses.  The carotid pulse was palpable bilaterally.  Palpation of the thyroid was soft and smooth, with no nodules or goiter appreciated.  The trachea was mobile and midline. Normal carotid pulses without bruits.    Nose - External contour is symmetric, no gross deflection or scars.  Nasal mucosa is pink and moist with no abnormal mucus.  The septum was midline and non-obstructive, turbinates of normal size and position.  No polyps, masses, or purulence noted on examination.    Neuro - Nonfocal neuro exam  is normal, CN 2 through 12 intact, normal gait and muscle tone.      Performed in clinic today:  No procedures preformed in clinic today      A/P - Indra Mehta is a 53 year old male with Meniere's Disease. I will refer patient to balance therapy. If his pulsatile tinnitus have not improved I would like patient to obtain a MRI of the brain. I did prescribe xanax for the patient at bedtime to address mainly the tinnitus.       This document serves as a record of the services and decisions personally performed and made by Dr. Rick Farias MD. It was created on his behalf by Yas Reyes, a trained medical scribe. The creation of this document is based the provider's statements to the medical scribe.  Yas Reyes 9/10/2018    Provider:   The information in this document, created by the medical scribe for me, accurately reflects the services I personally performed and the decisions made by me. I have reviewed and approved this document for accuracy prior to leaving the patient care area.  Dr. Rick Farias MD 9/10/2018    Rick Farias MD

## 2018-09-10 ENCOUNTER — OFFICE VISIT (OUTPATIENT)
Dept: OTOLARYNGOLOGY | Facility: CLINIC | Age: 53
End: 2018-09-10
Payer: COMMERCIAL

## 2018-09-10 VITALS
WEIGHT: 208.2 LBS | OXYGEN SATURATION: 97 % | HEART RATE: 91 BPM | HEIGHT: 73 IN | TEMPERATURE: 99.2 F | DIASTOLIC BLOOD PRESSURE: 82 MMHG | BODY MASS INDEX: 27.59 KG/M2 | SYSTOLIC BLOOD PRESSURE: 140 MMHG

## 2018-09-10 DIAGNOSIS — R42 DIZZINESS: ICD-10-CM

## 2018-09-10 DIAGNOSIS — H93.A9 PULSATILE TINNITUS: Primary | ICD-10-CM

## 2018-09-10 DIAGNOSIS — H81.02 MENIERE'S DISEASE, LEFT: ICD-10-CM

## 2018-09-10 PROCEDURE — 99204 OFFICE O/P NEW MOD 45 MIN: CPT | Performed by: OTOLARYNGOLOGY

## 2018-09-10 RX ORDER — ALPRAZOLAM 0.5 MG/1
0.5 TABLET, EXTENDED RELEASE ORAL AT BEDTIME
Qty: 30 TABLET | Refills: 2 | Status: SHIPPED | OUTPATIENT
Start: 2018-09-10 | End: 2019-05-13

## 2018-09-10 ASSESSMENT — PAIN SCALES - GENERAL: PAINLEVEL: NO PAIN (0)

## 2018-09-10 NOTE — LETTER
9/10/2018         RE: Indra Mehta  [id number] [facility] [address]        Dear Colleague,    Thank you for referring your patient, Indra Mehta, to the [facility]. Please see a copy of my visit note below.    ENT Consultation    Indra Mehta is a 53 year old male who is seen in consultation at the request of Roya Brink.      History of Present Illness - Indra Mehta is a 53 year old male here today for meniere's disease. Over 10 years ago he had surgery. Before that he had a negative MRI of the brain. Since then he has had increased amount of off balance sensation. The tinnitus in the left ear has increase and has developed into pulsatile tinnitus. He denies true vertigo. History of a cervical fusion last summer. Tinnitus does not change quality with the movement of neck. On his last hearing test it noted severe SNHL on the left and normal hearing on the right. There is very poor word recognition on the left and 100% word recognition on the right side.     Body mass index is 27.47 kg/(m^2).    BP Readings from Last 1 Encounters:   09/10/18 140/82     BP noted to be well controlled today in office.     Indra IS NOT a smoker/uses chewing tobacco.       Past Medical History -   Past Medical History:   Diagnosis Date     Back pain      Meniere's disease, unspecified      Pain medication agreement signed 8/20/2010       Current Medications -   Current Outpatient Prescriptions:      Acetaminophen (TYLENOL PO), Take 500 mg by mouth every 4 hours as needed for mild pain or fever, Disp: , Rfl:      cyclobenzaprine (FLEXERIL) 10 MG tablet, Take 1 tablet (10 mg) by mouth 2 times daily as needed for muscle spasms Reported on 4/12/2017, Disp: 60 tablet, Rfl: 1     gabapentin (NEURONTIN) 400 MG capsule, Take 3 capsules (1,200 mg) by mouth 3 times daily, Disp: 270 capsule, Rfl: 3     HYDROcodone-acetaminophen (NORCO) 5-325 MG per tablet, Take 1 tablet by mouth every 6 hours  "as needed for other (Moderate to Severe Pain) May fill on/after 08/14/2017 NOT to start till 08/16/2017., Disp: 120 tablet, Rfl: 0     IBUPROFEN PO, , Disp: , Rfl:      losartan (COZAAR) 50 MG tablet, Take 1 tablet (50 mg) by mouth daily, Disp: 90 tablet, Rfl: 0     methocarbamol (ROBAXIN) 500 MG tablet, Take 2 tablets (1,000 mg) by mouth 3 times daily as needed for muscle spasms, Disp: 180 tablet, Rfl: 1     order for DME, Equipment being ordered: TENS Pads as directed, Disp: 1 Device, Rfl: 0     tiZANidine (ZANAFLEX) 4 MG tablet, Take 1 tablet (4 mg) by mouth 2 times daily as needed for muscle spasms, Disp: 60 tablet, Rfl: 3    Allergies -   Allergies   Allergen Reactions     No Known Drug Allergies        Social History -   Social History     Social History     Marital status: Single     Spouse name: N/A     Number of children: 2     Years of education: N/A     Occupational History      [employer]     Social History Main Topics     Smoking status: Never Smoker     Smokeless tobacco: Never Used     Alcohol use No     Drug use: No     Sexual activity: Yes     Partners: Female     Other Topics Concern     Parent/Sibling W/ Cabg, Mi Or Angioplasty Before 65f 55m? No     Social History Narrative       Family History -   Family History   Problem Relation Age of Onset     Cancer - colorectal Mother      Diabetes Mother      Cardiovascular Father      Hypertension Father      Mental Illness Sister      Suicide Other        Review of Systems - As per HPI and PMHx, otherwise review of system review of the head and neck negative.    Physical Exam  /82 (BP Location: Right arm, Patient Position: Sitting, Cuff Size: Adult Regular)  Pulse 91  Temp 99.2  F (37.3  C) (Temporal)  Ht 1.854 m (6' 1\")  Wt 94.4 kg (208 lb 3.2 oz)  SpO2 97%  BMI 27.47 kg/m2  BMI: Body mass index is 27.47 kg/(m^2).    General - The patient is well nourished and well developed, and appears to have good nutritional status.  Alert and " oriented to person and place, answers questions and cooperates with examination appropriately.    SKIN - No suspicious lesions or rashes.  Respiration - No respiratory distress.  Head and Face - Normocephalic and atraumatic, with no gross asymmetry noted of the contour of the facial features.  The facial nerve is intact, with strong symmetric movements.    Voice and Breathing - The patient was breathing comfortably without the use of accessory muscles.  The patients voice was clear and strong, and had appropriate pitch and quality.    Ears - Bilateral pinna and EACs with normal appearing overlying skin. Tympanic membrane intact with good mobility on pneumatic otoscopy bilaterally. Bony landmarks of the ossicular chain are normal. The tympanic membranes are normal in appearance. No retraction, perforation, or masses.  No fluid or purulence was seen in the external canal or the middle ear.     Eyes - Extraocular movements intact.  Sclera were not icteric or injected, conjunctiva were pink and moist.    Mouth - Examination of the oral cavity showed pink, healthy oral mucosa. No lesions or ulcerations noted.  The tongue was mobile and midline, and the dentition were in good condition.      Throat - The walls of the oropharynx were smooth, pink, moist, symmetric, and had no lesions or ulcerations.  The tonsillar pillars and soft palate were symmetric.  The uvula was midline on elevation.    Neck - Normal midline excursion of the laryngotracheal complex during swallowing.  Full range of motion on passive movement.  Palpation of the occipital, submental, submandibular, internal jugular chain, and supraclavicular nodes did not demonstrate any abnormal lymph nodes or masses.  The carotid pulse was palpable bilaterally.  Palpation of the thyroid was soft and smooth, with no nodules or goiter appreciated.  The trachea was mobile and midline. Normal carotid pulses without bruits.    Nose - External contour is symmetric, no  gross deflection or scars.  Nasal mucosa is pink and moist with no abnormal mucus.  The septum was midline and non-obstructive, turbinates of normal size and position.  No polyps, masses, or purulence noted on examination.    Neuro - Nonfocal neuro exam is normal, CN 2 through 12 intact, normal gait and muscle tone.      Performed in clinic today:  No procedures preformed in clinic today      A/P - Indra Mehta is a 53 year old male with Meniere's Disease. I will refer patient to balance therapy. If his pulsatile tinnitus have not improved I would like patient to obtain a MRI of the brain. I did prescribe xanax for the patient at bedtime to address mainly the tinnitus.       This document serves as a record of the services and decisions personally performed and made by Dr. Rick Farias MD. It was created on his behalf by Yas Reyes, a trained medical scribe. The creation of this document is based the provider's statements to the medical scribe.  Yas Reyes 9/10/2018    Provider:   The information in this document, created by the medical scribe for me, accurately reflects the services I personally performed and the decisions made by me. I have reviewed and approved this document for accuracy prior to leaving the patient care area.  Dr. Rick Farias MD 9/10/2018    Rick Farias MD      Again, thank you for allowing me to participate in the care of your patient.        Sincerely,        Rick Farias MD, MD

## 2018-09-10 NOTE — MR AVS SNAPSHOT
"              After Visit Summary   9/10/2018    Indra Mehta    MRN: [id number]           Patient Information     Date Of Birth          1965        Visit Information        Provider Department      9/10/2018 2:45 PM Rick Farias MD [facility]        Today's Diagnoses     Pulsatile tinnitus    -  1    Acidosis           Follow-ups after your visit        Additional Services     PHYSICAL THERAPY REFERRAL       *This therapy referral will be filtered to a centralized scheduling office at [facility] and the patient will receive a call to schedule an appointment at a [facility] location most convenient for them. *     [facility] provides Physical Therapy evaluation and treatment and many specialty services across the [facility] system.  If requesting a specialty program, please choose from the list below.    If you have not heard from the scheduling office within 2 business days, please call [phone] for all locations, with the exception of [place], please call [phone] and [facility], please call [phone]  Treatment: Evaluation & Treatment  Special Instructions/Modalities: vestibular retraining  Special Programs: Balance/Vestibular    Please be aware that coverage of these services is subject to the terms and limitations of your health insurance plan.  Call member services at your health plan with any benefit or coverage questions.      **Note to Provider:  If you are referring outside of [facility] for the therapy appointment, please list the name of the location in the \"special instructions\" above, print the referral and give to the patient to schedule the appointment.                  Your next 10 appointments already scheduled     Sep 11, 2018  2:30 PM CDT   Return Visit with Boni Freeman MD   [facility] ([facility])    [address] [facility]  [address] [place] [address] " "[id number]   [phone]            Sep 20, 2018  2:40 PM CDT   Return Visit with Mike Mckenna MD   [facility]    [address] [place] [address] [phone]            Oct 22, 2018  3:45 PM CDT   Return Visit with Rick Farias MD   [facility] ([address] [address] [phone]              Who to contact     If you have questions or need follow up information about today's clinic visit or your schedule please contact [facility] directly at [phone].  Normal or non-critical lab and imaging results will be communicated to you by AdsIthart, letter or phone within 4 business days after the clinic has received the results. If you do not hear from us within 7 days, please contact the clinic through AdsIthart or phone. If you have a critical or abnormal lab result, we will notify you by phone as soon as possible.  Submit refill requests through [employer] or call your pharmacy and they will forward the refill request to us. Please allow 3 business days for your refill to be completed.          Additional Information About Your Visit        [employer] Information     [employer] gives you secure access to your electronic health record. If you see a primary care provider, you can also send messages to your care team and make appointments. If you have questions, please call your primary care clinic.  If you do not have a primary care provider, please call [phone] and they will assist you.        Care EveryWhere ID     This is your Care EveryWhere ID. This could be used by other organizations to access your [facility] medical records  [id number]        Your Vitals Were     Pulse Temperature Height Pulse Oximetry BMI (Body Mass Index)       91 99.2  F (37.3  C) (Temporal) 1.854 m (6' 1\") 97% 27.47 kg/m2        Blood Pressure from Last 3 Encounters:   09/10/18 140/82 "   08/23/18 128/90   08/09/18 116/70    Weight from Last 3 Encounters:   09/10/18 94.4 kg (208 lb 3.2 oz)   08/09/18 96.1 kg (211 lb 12.8 oz)   08/06/18 95.4 kg (210 lb 4.8 oz)              We Performed the Following     PHYSICAL THERAPY REFERRAL          Today's Medication Changes          These changes are accurate as of 9/10/18  3:34 PM.  If you have any questions, ask your nurse or doctor.               Start taking these medicines.        Dose/Directions    ALPRAZolam 0.5 MG 24 hr tablet   Commonly known as:  XANAX XR   Used for:  Pulsatile tinnitus   Started by:  Rick Farias MD        Dose:  0.5 mg   Take 1 tablet (0.5 mg) by mouth At Bedtime   Quantity:  30 tablet   Refills:  2            Where to get your medicines      Some of these will need a paper prescription and others can be bought over the counter.  Ask your nurse if you have questions.     Bring a paper prescription for each of these medications     ALPRAZolam 0.5 MG 24 hr tablet                Primary Care Provider Office Phone # Fax #    Tha Grullon [phone]       [address] [place] [address]        Equal Access to Services     SUSY ARSHAD AH: Hadii marva Menendez, waaxda lurameshadaha, qaybta kaalmada adejaileneyada, mikala lainez. So [facility] [phone].    ATENCIÓN: Si habla español, tiene a lundy disposición servicios gratuitos de asistencia lingüística. [facility] [phone].    We comply with applicable federal civil rights laws and [place] laws. We do not discriminate on the basis of race, color, national origin, age, disability, sex, sexual orientation, or gender identity.            Thank you!     Thank you for choosing [facility]  for your care. Our goal is always to provide you with excellent care. Hearing back from our patients is one way we can continue to improve our services. Please take a few minutes to complete the written survey that you may receive in the mail  after your visit with us. Thank you!             Your Updated Medication List - Protect others around you: Learn how to safely use, store and throw away your medicines at www.disposemymeds.org.          This list is accurate as of 9/10/18  3:34 PM.  Always use your most recent med list.                   Brand Name Dispense Instructions for use Diagnosis    ALPRAZolam 0.5 MG 24 hr tablet    XANAX XR    30 tablet    Take 1 tablet (0.5 mg) by mouth At Bedtime    Pulsatile tinnitus       cyclobenzaprine 10 MG tablet    FLEXERIL    60 tablet    Take 1 tablet (10 mg) by mouth 2 times daily as needed for muscle spasms Reported on 4/12/2017    Muscle spasm       gabapentin 400 MG capsule    NEURONTIN    270 capsule    Take 3 capsules (1,200 mg) by mouth 3 times daily    Cervicalgia       HYDROcodone-acetaminophen 5-325 MG per tablet    NORCO    120 tablet    Take 1 tablet by mouth every 6 hours as needed for other (Moderate to Severe Pain) May fill on/after 08/14/2017 NOT to start till 08/16/2017.    S/P cervical spinal fusion       IBUPROFEN PO           losartan 50 MG tablet    COZAAR    90 tablet    Take 1 tablet (50 mg) by mouth daily    Benign essential hypertension       methocarbamol 500 MG tablet    ROBAXIN    180 tablet    Take 2 tablets (1,000 mg) by mouth 3 times daily as needed for muscle spasms    Cervical radiculopathy, Myofascial pain       order for DME     1 Device    Equipment being ordered: TENS Pads as directed    Chronic pain syndrome       tiZANidine 4 MG tablet    ZANAFLEX    60 tablet    Take 1 tablet (4 mg) by mouth 2 times daily as needed for muscle spasms    Muscle spasm       TYLENOL PO      Take 500 mg by mouth every 4 hours as needed for mild pain or fever

## 2018-09-11 ENCOUNTER — OFFICE VISIT (OUTPATIENT)
Dept: PALLIATIVE MEDICINE | Facility: CLINIC | Age: 53
End: 2018-09-11
Payer: COMMERCIAL

## 2018-09-11 VITALS — DIASTOLIC BLOOD PRESSURE: 88 MMHG | SYSTOLIC BLOOD PRESSURE: 140 MMHG | HEART RATE: 73 BPM

## 2018-09-11 DIAGNOSIS — M79.10 MYALGIA: ICD-10-CM

## 2018-09-11 DIAGNOSIS — G89.4 CHRONIC PAIN SYNDROME: ICD-10-CM

## 2018-09-11 DIAGNOSIS — Z98.1 S/P CERVICAL SPINAL FUSION: ICD-10-CM

## 2018-09-11 DIAGNOSIS — M51.369 DDD (DEGENERATIVE DISC DISEASE), LUMBAR: ICD-10-CM

## 2018-09-11 DIAGNOSIS — M54.12 CERVICAL RADICULOPATHY: ICD-10-CM

## 2018-09-11 DIAGNOSIS — M54.16 LUMBAR RADICULOPATHY: Primary | ICD-10-CM

## 2018-09-11 PROCEDURE — 99215 OFFICE O/P EST HI 40 MIN: CPT | Performed by: ANESTHESIOLOGY

## 2018-09-11 RX ORDER — HYDROCODONE BITARTRATE AND ACETAMINOPHEN 5; 325 MG/1; MG/1
1 TABLET ORAL EVERY 6 HOURS PRN
Qty: 120 TABLET | Refills: 0 | Status: SHIPPED | OUTPATIENT
Start: 2018-09-11 | End: 2018-10-08

## 2018-09-11 ASSESSMENT — PAIN SCALES - GENERAL: PAINLEVEL: SEVERE PAIN (7)

## 2018-09-11 NOTE — MR AVS SNAPSHOT
After Visit Summary   9/11/2018    Indra Mehta    MRN: [id number]           Patient Information     Date Of Birth          1965        Visit Information        Provider Department      9/11/2018 2:30 PM Boni Coppola MD [facility]        Today's Diagnoses     Lumbar radiculopathy    -  1    S/P cervical spinal fusion          Care Instructions    Assessment:   1.  Chronic neck pain  2.  Post cervical fusion neck pain - improved radicular pain  3.  Myofascial pain  4.  Cervical facet joint pain  5.  Chronic lower back pain  6.  Lumbar radiculopathy - improved after injections  7.  Lumbar facet joint pain  8.  Multiple joint pain  9. Cervical radiculopathy       Plan:  1. Physical Therapy:  Continue home exercise program  2. Clinical Health Psychologist to address issues of relaxation, behavioral change, coping style, and other factors important to improvement.  deferred  3. Diagnostic Studies:    1. Updated lumbar MRI due to change in symptoms ordered today  4. Medication Management:    1. Continue Norco 5/325 QID prn pain  2. Continue Gabapentin 1200 mg TID  3. Continue Robaxin 750 mg 2 tabs TID prn  4. Continue Tizanidine 4 mg QHS  5. Continue Tylenol in safe doses  6. Continue Ibuprofen in safe doses  7. Consider Medical cannabis  5. Further procedures recommended:   1. Would benefit from lumbar epidural steroid injection - will plan after review of updated MRI  2. Cervical trigger point injections may help with neck and shoulder pain  3. Repeat cervical epidural steroid injection if needed  6. Recommendations to PCP: continue current treatment  7. Patient to follow up with Primary Care provider regarding elevated blood pressure.  8. Follow up: 3 months - will plan procedure after MRI    ----------------------------------------------------------------  Clinic Number:  [phone]   Call this number with any questions about your care and for scheduling  assistance. Calls are returned Monday through Friday between 8 AM and 4:30 PM. We usually get back to you within 2 business days depending on the issue/request.       Medication refills:    For non-narcotic medications, call your pharmacy directly to request a refill. The pharmacy will contact the [facility] for authorization. Please allow 3-4 days for these refills to be processed.     For narcotic refills, call the nurse triage line or send a Paradial message. Please contact us 7-10 days before your refill is due. The message MUST include the name of the specific medication(s) requested and how you would like to receive the prescription(s). The options are as follows:    Pain Clinic staff can mail the prescription to your pharmacy. Please tell us the name of the pharmacy.    You may pick the prescription up at the Pain Clinic (tell us the location) or during a clinic visit with your pain provider    Pain Clinic staff can deliver the prescription to the [facility] pharmacy in the clinic building. Please tell us the location.      We believe regular attendance is key to your success in our program.    Any time you are unable to keep your appointment we ask that you call us at least 24 hours in advance to let us know. This will allow us to offer the appointment time to another patient.               Follow-ups after your visit        Your next 10 appointments already scheduled     Sep 20, 2018  2:40 PM CDT   Return Visit with Mike Mckenna MD   [facility] ([address] [address]   [phone]            Oct 22, 2018  3:45 PM CDT   Return Visit with Rick Farias MD   [address] [address]   [phone]              Future tests that were ordered for you today     Open Future Orders        Priority Expected Expires Ordered    MR Lumbar Spine w/o  Contrast Routine  9/11/2019 9/11/2018            Who to contact     If you have questions or need follow up information about today's clinic visit or your schedule please contact [facility] directly at [phone].  Normal or non-critical lab and imaging results will be communicated to you by BioMedFlexhart, letter or phone within 4 business days after the clinic has received the results. If you do not hear from us within 7 days, please contact the clinic through BioMedFlexhart or phone. If you have a critical or abnormal lab result, we will notify you by phone as soon as possible.  Submit refill requests through Likeability or call your pharmacy and they will forward the refill request to us. Please allow 3 business days for your refill to be completed.          Additional Information About Your Visit        BioMedFlexharTraNet'te Information     Like[employer] gives you secure access to your electronic health record. If you see a primary care provider, you can also send messages to your care team and make appointments. If you have questions, please call your primary care clinic.  If you do not have a primary care provider, please call [phone] and they will assist you.        Care EveryWhere ID     This is your Care EveryWhere ID. This could be used by other organizations to access your [facility] medical records  [id number]        Your Vitals Were     Pulse                   73            Blood Pressure from Last 3 Encounters:   09/11/18 140/88   09/10/18 140/82   08/23/18 128/90    Weight from Last 3 Encounters:   09/10/18 94.4 kg (208 lb 3.2 oz)   08/09/18 96.1 kg (211 lb 12.8 oz)   08/06/18 95.4 kg (210 lb 4.8 oz)                 Today's Medication Changes          These changes are accurate as of 9/11/18  3:31 PM.  If you have any questions, ask your nurse or doctor.               These medicines have changed or have updated prescriptions.        Dose/Directions    HYDROcodone-acetaminophen 5-325 MG per tablet    Commonly known as:  VIOLETA   This may have changed:  additional instructions   Used for:  S/P cervical spinal fusion   Changed by:  Boni Coppola MD        Dose:  1 tablet   Take 1 tablet by mouth every 6 hours as needed for other (Moderate to Severe Pain) May fill on/after 09/13/2017 NOT to start till 09/15/2017.   Quantity:  120 tablet   Refills:  0            Where to get your medicines      Some of these will need a paper prescription and others can be bought over the counter.  Ask your nurse if you have questions.     Bring a paper prescription for each of these medications     HYDROcodone-acetaminophen 5-325 MG per tablet               Information about OPIOIDS     PRESCRIPTION OPIOIDS: WHAT YOU NEED TO KNOW   We gave you an opioid (narcotic) pain medicine. It is important to manage your pain, but opioids are not always the best choice. You should first try all the other options your care team gave you. Take this medicine for as short a time (and as few doses) as possible.    Some activities can increase your pain, such as bandage changes or therapy sessions. It may help to take your pain medicine 30 to 60 minutes before these activities. Reduce your stress by getting enough sleep, working on hobbies you enjoy and practicing relaxation or meditation. Talk to your care team about ways to manage your pain beyond prescription opioids.    These medicines have risks:    DO NOT drive when on new or higher doses of pain medicine. These medicines can affect your alertness and reaction times, and you could be arrested for driving under the influence (DUI). If you need to use opioids long-term, talk to your care team about driving.    DO NOT operate heavy machinery    DO NOT do any other dangerous activities while taking these medicines.    DO NOT drink any alcohol while taking these medicines.     If the opioid prescribed includes acetaminophen, DO NOT take with any other medicines that contain acetaminophen.  Read all labels carefully. Look for the word  acetaminophen  or  Tylenol.  Ask your pharmacist if you have questions or are unsure.    You can get addicted to pain medicines, especially if you have a history of addiction (chemical, alcohol or substance dependence). Talk to your care team about ways to reduce this risk.    All opioids tend to cause constipation. Drink plenty of water and eat foods that have a lot of fiber, such as fruits, vegetables, prune juice, apple juice and high-fiber cereal. Take a laxative (Miralax, milk of magnesia, Colace, Senna) if you don t move your bowels at least every other day. Other side effects include upset stomach, sleepiness, dizziness, throwing up, tolerance (needing more of the medicine to have the same effect), physical dependence and slowed breathing.    Store your pills in a secure place, locked if possible. We will not replace any lost or stolen medicine. If you don t finish your medicine, please throw away (dispose) as directed by your pharmacist. The [place] Pollution [employer] has more information about safe disposal: https://www.pca.Counts include 234 beds at the [facility].[email or web address]/living-green/managing-[email or web address]-medications         Primary Care Provider Office Phone # Fax #    Tha Grullon [phone]       [address] [facility] [address]        Equal Access to Services     KELSEY ARSHAD : Donna khano Somary, waaxda luqadaha, qaybta kaalmada adewilder, mikala lainez. So [facility] [phone].    ATENCIÓN: Si habla español, tiene a lundy disposición servicios gratuitos de asistencia lingüística. Llame al [phone].    We comply with applicable federal civil rights laws and [place] laws. We do not discriminate on the basis of race, color, national origin, age, disability, sex, sexual orientation, or gender identity.            Thank you!     Thank you for choosing [facility]  for your care. Our goal is always to provide  you with excellent care. Hearing back from our patients is one way we can continue to improve our services. Please take a few minutes to complete the written survey that you may receive in the mail after your visit with us. Thank you!             Your Updated Medication List - Protect others around you: Learn how to safely use, store and throw away your medicines at www.disposemymeds.org.          This list is accurate as of 9/11/18  3:31 PM.  Always use your most recent med list.                   Brand Name Dispense Instructions for use Diagnosis    ALPRAZolam 0.5 MG 24 hr tablet    XANAX XR    30 tablet    Take 1 tablet (0.5 mg) by mouth At Bedtime    Pulsatile tinnitus       cyclobenzaprine 10 MG tablet    FLEXERIL    60 tablet    Take 1 tablet (10 mg) by mouth 2 times daily as needed for muscle spasms Reported on 4/12/2017    Muscle spasm       gabapentin 400 MG capsule    NEURONTIN    270 capsule    Take 3 capsules (1,200 mg) by mouth 3 times daily    Cervicalgia       HYDROcodone-acetaminophen 5-325 MG per tablet    NORCO    120 tablet    Take 1 tablet by mouth every 6 hours as needed for other (Moderate to Severe Pain) May fill on/after 09/13/2017 NOT to start till 09/15/2017.    S/P cervical spinal fusion       IBUPROFEN PO           losartan 50 MG tablet    COZAAR    90 tablet    Take 1 tablet (50 mg) by mouth daily    Benign essential hypertension       methocarbamol 500 MG tablet    ROBAXIN    180 tablet    Take 2 tablets (1,000 mg) by mouth 3 times daily as needed for muscle spasms    Cervical radiculopathy, Myofascial pain       order for DME     1 Device    Equipment being ordered: TENS Pads as directed    Chronic pain syndrome       tiZANidine 4 MG tablet    ZANAFLEX    60 tablet    Take 1 tablet (4 mg) by mouth 2 times daily as needed for muscle spasms    Muscle spasm       TYLENOL PO      Take 500 mg by mouth every 4 hours as needed for mild pain or fever

## 2018-09-11 NOTE — PATIENT INSTRUCTIONS
Assessment:   1.  Chronic neck pain  2.  Post cervical fusion neck pain - improved radicular pain  3.  Myofascial pain  4.  Cervical facet joint pain  5.  Chronic lower back pain  6.  Lumbar radiculopathy - improved after injections  7.  Lumbar facet joint pain  8.  Multiple joint pain  9. Cervical radiculopathy       Plan:  1. Physical Therapy:  Continue home exercise program  2. Clinical Health Psychologist to address issues of relaxation, behavioral change, coping style, and other factors important to improvement.  deferred  3. Diagnostic Studies:    1. Updated lumbar MRI due to change in symptoms ordered today  4. Medication Management:    1. Continue Norco 5/325 QID prn pain  2. Continue Gabapentin 1200 mg TID  3. Continue Robaxin 750 mg 2 tabs TID prn  4. Continue Tizanidine 4 mg QHS  5. Continue Tylenol in safe doses  6. Continue Ibuprofen in safe doses  7. Consider Medical cannabis  5. Further procedures recommended:   1. Would benefit from lumbar epidural steroid injection - will plan after review of updated MRI  2. Cervical trigger point injections may help with neck and shoulder pain  3. Repeat cervical epidural steroid injection if needed  6. Recommendations to PCP: continue current treatment  7. Patient to follow up with Primary Care provider regarding elevated blood pressure.  8. Follow up: 3 months - will plan procedure after MRI    ----------------------------------------------------------------  Clinic Number:  [phone]   Call this number with any questions about your care and for scheduling assistance. Calls are returned Monday through Friday between 8 AM and 4:30 PM. We usually get back to you within 2 business days depending on the issue/request.       Medication refills:    For non-narcotic medications, call your pharmacy directly to request a refill. The pharmacy will contact the [facility] for authorization. Please allow 3-4 days for these refills to be processed.     For  narcotic refills, call the nurse triage line or send a [employer]ent message. Please contact us 7-10 days before your refill is due. The message MUST include the name of the specific medication(s) requested and how you would like to receive the prescription(s). The options are as follows:    Pain Clinic staff can mail the prescription to your pharmacy. Please tell us the name of the pharmacy.    You may pick the prescription up at the Pain Clinic (tell us the location) or during a clinic visit with your pain provider    Pain Clinic staff can deliver the prescription to the [facility] pharmacy in the clinic building. Please tell us the location.      We believe regular attendance is key to your success in our program.    Any time you are unable to keep your appointment we ask that you call us at least 24 hours in advance to let us know. This will allow us to offer the appointment time to another patient.

## 2018-09-11 NOTE — PROGRESS NOTES
[facility]    Date of visit: 9/11/2018    Chief complaint:   Chief Complaint   Patient presents with     Pain       Interval history:  Indra Mehta was last seen by me on 4/30/18.      Recommendations/plan at the last visit included:  Plan:  1. Physical Therapy: continue home exercises  2. Clinical Health Psychologist to address issues of relaxation, behavioral change, coping style, and other factors important to improvement.  deferred  3. Diagnostic Studies:  Reviewed cervical MRI - there is a right lateral/foraminal disc osteophyte complex at C5-6 that was not mentioned on the report and is likely causing the radicular symptoms on the right  4. Medication Management:    1. Increase Gabapentin to 1200/1200/1200  2. Continue Norco 5/325 QID prn pain  3. Increase Robaxin 750 mg 2 tabs TID PRN  4. Continue Tizanidine 4 mg qhs  5. Continue Tylenol in safe doses  6. Continue Ibuprofen in safe doses  7. Consider medical marijuana  5. Further procedures recommended:   1. Consider right cervical transforaminal epidural steroid injection C5-6  2. Consider cervical Medial Branch Block bilaterally at C3, C4, C5 and C6 with subsequent Radiofrequency ablation if blocks are positive  6. Recommendations to PCP: continue current treatment  7. Will send Dr Mckenna a message regarding his MRI and the disc at C5-6 for his thoughts   8. Follow up: 2-3 months however recommended patient may like to find a provider closer to this home    Since his last visit, Indra Mehta reports:  He had lumbar TFESI L3-4 bilateral by Dr Mcdaniel on 8/23/18.  He states that he felt better the day of the injections but the next day his pain was worse and he has continued with worse pain since the injections.  He also states that he did some light yard work over the weekend that also made his pain worse.    He is having bilateral lower back pain, right greater than left, with pain radiating down the back of both legs to the  knees but on the right he is having pain that goes into the hip and down the side of the thigh to the knees.  He has numbness and tingling in the feet, right greater than left with pain worse in the big toe on the right and burning sensation.  He denies weakness of the legs.    He continues with chronic neck pain with pain into the back of the shoulder and under the shoulder blade and upper arm on the right.  He states that he is not having the numbness on his right arm like he was having prior to the cervical TFESI on the right at C5-6.  He states that this pain is similar to what he was having on the left prior to his neck surgery.  He denies pain below the elbow or into the hand since the epidural steroid injection.  He was seen recently by Dr Mckenna who ordered updated cervical MRI and has a follow up scheduled on 9/20/18.    He was seen by ENT for tinnitus and was given Rx for Xanax that he states kept him up all night and caused other symptoms so he has not taken it since.    His pain is constant and shooting in nature, worse with movement and better with medications, rest, and ice.    Pain scores:  Pain intensity on average is 6 on a scale of 0-10.  Ranges from 4/10 to 9/10.    THE 4 A's OF OPIOID MAINTENANCE ANALGESIA    Analgesia: yes    Activity: improved    Adverse effects: denies    Adherence to Rx protocol: yes    [place] Board of Pharmacy Data Base Reviewed:    YES; compliant    Current pain treatments:   Norco 5/325 mg Q6H prn   Gabapentin 400 mg three capsules three times a day  Tylenol 500 mg Q4H - maybe takes one or two per day  Ibuprofen 200 mg three tabs four times a day  Tizanidine 4 mg - takes two at bedtime  Robaxin 750 mg TID takes during the day - helps  Excedrin Migraine - two per day  Arnecare topical     Past pain treatments:  Norco/vicodin  Oxycodone  Flexeril - was helpful at night  Robaxin - may have helped, didn't make him sleepy      Surgery:  ACDF C6-7 on 7/5/17 with  improvement  Injections:    - 8/23/18 bilateral L3-4 TFESI - seems to have worsened pain (Dr Mcdaniel)  - 6/11/18 TFESI right C5-6 - helped with arm pain and numbness  - bilateral L3-4 TFESI 12/28/17  - Bilateral L4-5 TFESI 10/23/17    Date of last UDS:  10/20/17    Side Effects: insomnia    Medications:  Current Outpatient Prescriptions   Medication Sig Dispense Refill     Acetaminophen (TYLENOL PO) Take 500 mg by mouth every 4 hours as needed for mild pain or fever       ALPRAZolam (XANAX XR) 0.5 MG 24 hr tablet Take 1 tablet (0.5 mg) by mouth At Bedtime 30 tablet 2     gabapentin (NEURONTIN) 400 MG capsule Take 3 capsules (1,200 mg) by mouth 3 times daily 270 capsule 3     HYDROcodone-acetaminophen (NORCO) 5-325 MG per tablet Take 1 tablet by mouth every 6 hours as needed for other (Moderate to Severe Pain) May fill on/after 08/14/2017 NOT to start till 08/16/2017. 120 tablet 0     IBUPROFEN PO        losartan (COZAAR) 50 MG tablet Take 1 tablet (50 mg) by mouth daily 90 tablet 0     methocarbamol (ROBAXIN) 500 MG tablet Take 2 tablets (1,000 mg) by mouth 3 times daily as needed for muscle spasms 180 tablet 1     order for DME Equipment being ordered: TENS Pads as directed 1 Device 0     tiZANidine (ZANAFLEX) 4 MG tablet Take 1 tablet (4 mg) by mouth 2 times daily as needed for muscle spasms 60 tablet 3     cyclobenzaprine (FLEXERIL) 10 MG tablet Take 1 tablet (10 mg) by mouth 2 times daily as needed for muscle spasms Reported on 4/12/2017 (Patient not taking: Reported on 9/11/2018) 60 tablet 1       Medical History: any changes in medical history since they were last seen? No    Review of Systems:  The 14 system ROS was reviewed from the intake questionnaire, and is positive for: dizziness, tinnitus, joint pain, stiffness, back pain, neck pain  Any bowel or bladder problems: denies changes  Mood: good    Physical Exam:  /88  Pulse 73  Constitutional: alert and no distress.  Pt is well nourished, well  developed  Head: Normocephalic. No masses, lesions, tenderness or abnormalities  ENT: EOMI, mucosal surfaces moist.  Neck with full ROM, posture fair  Cardiovascular: No edema or JVD appreciated.  Respiratory: Good diaphragmatic excursion. No wheezes appreciated.  Speaking in complete sentences without shortness of breath.  No accessory muscle use.   Musculoskeletal: extremities normal- no gross deformities noted, normal muscle tone and able to move about the exam room without difficulty.    Skin: no suspicious lesions or rashes appreciated on exposed areas  Neurologic: Gait cautious, stiff, antalgic. Moving all extremities spontaneously, no apparent weakness.    Psychiatric: mentation appears normal, affect full and good eye contact.      Cervical Spine:  Good ROM, mild paraspinal and trapezius tenderness on the right, Spurling's was not performed, facet loading is negative  Lumbar Spine:  Tender lumbar paraspinal muscles, flexes greater than 90 degrees with pulling, lateral bending and extension and lateral rotation is equivocal to positive bilaterally, SLR is equivocal bilaterally    MRI CERVICAL SPINE WITHOUT CONTRAST August 14, 2018 8:44 AM   FINDINGS: Intervertebral disc spacer is seen at the C6-C7 level. The  hardware causes mild susceptibility artifact which limits evaluation  in that area. No new loss of vertebral body height. Cervical spine  alignment appears to be within normal limits. No destructive osseous  lesion.     Moderate loss of intervertebral disc height with disc desiccation and  degenerative endplate changes at C5-C6. Mild degenerative changes at  C3-C4 and C4-C5.      The cervical spinal cord and visualized portions of the thoracic  spinal cord appear normal. The visualized portions of the brainstem  and cerebellum appear normal.     Level by level as follows:     C2-C3: No significant spinal canal or neural foraminal narrowing.     C3-C4: Mild facet hypertrophy and right greater than left  uncinate  spurring results in moderate right and mild left neural foraminal  narrowing. No spinal canal narrowing.      C4-C5: Mild posterior disc bulge and endplate osteophytic spurring and  mild bilateral facet hypertrophy. Findings result in moderate  bilateral neural foraminal narrowing. No significant spinal canal  narrowing.      C5-C6: Mild circumferential disc bulge and endplate osteophytic  spurring along with mild bilateral facet hypertrophy. Findings result  in moderate to severe bilateral neural foraminal narrowing. No  significant spinal canal narrowing.      C6-C7: Postoperative changes of anterior fusion. There does not appear  to be significant spinal canal or neural foraminal narrowing.      C7-T1: No significant spinal canal or neural foraminal narrowing.      Paraspinous soft tissues are unremarkable.       IMPRESSION:    1. Postoperative changes of anterior fusion at C6-C7. No definite  acute complication appreciated by MR.  2. Degenerative changes throughout the other levels as described  above, most pronounced at C5-C6. No significant change since previous  postoperative MR 2/19/2018.     Assessment:   1.  Chronic neck pain  2.  Post cervical fusion neck pain - improved radicular pain  3.  Myofascial pain  4.  Cervical facet joint pain  5.  Chronic lower back pain  6.  Lumbar radiculopathy - improved after injections  7.  Lumbar facet joint pain  8.  Multiple joint pain  9. Cervical radiculopathy     Indra Mehta is a 53 year old male who is seen at the pain clinic for chronic neck and back pain.  His neck is overall better since his epidural steroid injection but he continues with pain into the right shoulder and upper arm.  His lower back pain and leg pain is limiting him the most at this time and he feels it is worse since his epidural steroid injection by Dr Mcdaniel and his symptoms have changed.  His previous lumbar MRI is a year old.  Our treatment plan is as outlined  below.    Plan:  1. Physical Therapy:  Continue home exercise program  2. Clinical Health Psychologist to address issues of relaxation, behavioral change, coping style, and other factors important to improvement.  deferred  3. Diagnostic Studies:    1. Updated lumbar MRI due to change in symptoms ordered today  4. Medication Management:    1. Continue Norco 5/325 QID prn pain  2. Continue Gabapentin 1200 mg TID  3. Continue Robaxin 750 mg 2 tabs TID prn  4. Continue Tizanidine 4 mg QHS  5. Continue Tylenol in safe doses  6. Continue Ibuprofen in safe doses  7. Consider Medical cannabis  5. Further procedures recommended:   1. Would benefit from lumbar epidural steroid injection - will plan after review of updated MRI  2. Cervical trigger point injections may help with neck and shoulder pain  3. Repeat cervical epidural steroid injection if needed  6. Recommendations to PCP: continue current treatment  7. Patient to follow up with Primary Care provider regarding elevated blood pressure.  8. Follow up: 3 months - will plan procedure after MRI    Total time spent was 45 minutes, and more than 50% of face to face time was spent in counseling and/or coordination of care regarding diagnosis, physical activity, medication management, and interventional procedures.    Boni Freeman MD  [facility]

## 2018-09-15 NOTE — ADDENDUM NOTE
Encounter addended by: Janay Fu, PT on: 9/15/2018  7:49 AM<BR>     Actions taken: Sign clinical note

## 2018-09-15 NOTE — PROGRESS NOTES
Outpatient Physical Therapy Discharge Note     Patient: Indra Mehta  : 1965    Beginning/End Dates of Reporting Period:  3/2/2018 to 3/15/2018  Ketan has been seen for a total of 5 visits overall.    Referring Provider: Dr. Mckenna    Therapy Diagnosis: Mechanical neck pain, headaches,  s/p cervical fusion.      Client Self Report: Ketan reports that he flared things up on Tuesday when he was demonstrating a volleyball technique to his team. He has had increased symptoms with pretty constant headaches but it hasn't increased the headaches to zone 9. At today's visit he reports a headache to the eyes /10.     Objective Measurements:  Objective Measure: Outcome Measure  Details: Neck Disability Index (NDI): Is a questionnaire used to assess how neck pain is affecting patient's functional mobility. The lower the score, the less amount of functional disability due to neck pain. Pt scored 30 points out of 50 possible indicating patient is at 60% of disability due to neck pain which is severe disability. Minimally clinically important difference (MCID) is 5 points.    Objective Measure: Frequency of PREP  Details: Only did 2 x yesterday.     Objective Measure: Effect of the PREP  Details: Didn't change much yesterday but realizes he didn't unload enough.     Objective Measure: Symptoms upon awakening   Details: Not asked.      Goals:  Goal Identifier Pain   Goal Description Indra will report at least 50% decrease in overall pain intensity in order to more safely drive.    Target Date 18   Date Met      Progress:     Goal Identifier Numbness   Goal Description Indra will report at least 75% reduction in right UE numbness frequency and intensity in order to allow for improved driving.    Target Date 18   Date Met      Progress:     Goal Identifier NDI #1   Goal Description Indra will score 25 points or less on the Neck Disability Index in order to have clinically meaninful improvement in function due  to decreased neck pain.    Target Date 04/16/18   Date Met      Progress:     Goal Identifier HEP   Goal Description Indra will have comprehensive home programming to self manage symptoms to allow for discharge from skilled PT services.    Target Date 05/31/18   Date Met      Progress:     Goal Identifier NDI #2   Goal Description Indra will score 14 points or less on the Neck Disability Index in order have only minimal disability due to neck pain.    Target Date 05/31/18   Date Met      Progress:     Progress Toward Goals:   Progress this reporting period: Has not met, having a hard time following through with home program.     Plan:  Discharge from therapy.    Discharge: Yes    Reason for Discharge: Patient has failed to schedule further appointments.    Equipment Issued: None    Discharge Plan: Patient to continue home program.    Thank you for this referral.    Janay Fu P.T.

## 2018-09-20 ENCOUNTER — OFFICE VISIT (OUTPATIENT)
Dept: NEUROSURGERY | Facility: CLINIC | Age: 53
End: 2018-09-20
Payer: COMMERCIAL

## 2018-09-20 VITALS
WEIGHT: 210 LBS | BODY MASS INDEX: 27.83 KG/M2 | DIASTOLIC BLOOD PRESSURE: 78 MMHG | SYSTOLIC BLOOD PRESSURE: 136 MMHG | HEIGHT: 73 IN | TEMPERATURE: 98 F

## 2018-09-20 DIAGNOSIS — M54.2 CERVICALGIA: Primary | ICD-10-CM

## 2018-09-20 PROCEDURE — 99213 OFFICE O/P EST LOW 20 MIN: CPT | Performed by: NEUROLOGICAL SURGERY

## 2018-09-20 ASSESSMENT — PAIN SCALES - GENERAL: PAINLEVEL: MODERATE PAIN (5)

## 2018-09-20 NOTE — PROGRESS NOTES
53M s/p C6-7 ACDF in 7/17.  Returns for follow up.  Underwent C5-6 SRIDEVI with Dr. Angle Freeman with marked relief in RUE paresthesias and numbness.  Now only has symptoms with aggressive neck extension.  Occasional LUE symptoms with neck manipulation.  Undergoing MR L Spine next week for back and thigh pain.       Past Medical History:   Diagnosis Date     Back pain      Meniere's disease, unspecified      Pain medication agreement signed 8/20/2010     Past Surgical History:   Procedure Laterality Date     BUNIONECTOMY RT/LT  09/05/08    Both feet     COLONOSCOPY N/A 12/28/2016    Procedure: COMBINED COLONOSCOPY, SINGLE OR MULTIPLE BIOPSY/POLYPECTOMY BY BIOPSY;  Surgeon: Indra Jernigan MD;  Location: [facility]     DISCECTOMY, FUSION CERVICAL ANTERIOR ONE LEVEL, COMBINED N/A 7/5/2017    Procedure: COMBINED DISCECTOMY, FUSION CERVICAL ANTERIOR ONE LEVEL;  Cervical 6-7, Anterior cervical discectomy fusion;  Surgeon: Mike Mckenna MD;  Location: [facility]     HC COLONOSCOPY W/WO BRUSH/WASH  12/27/2005     HC CREATE EARDRUM OPENING,GEN ANESTH  09/27/2007    Pe tube, Left endolymphatic sac enhancement.     HC MASTOIDECTOMY,COMPLETE  09/27/2007     HERNIORRHAPHY UMBILICAL N/A 8/11/2017    Procedure: HERNIORRHAPHY UMBILICAL;  open umbilical hernia repair;  Surgeon: Boni Story MD;  Location: [facility]     INJECT EPIDURAL LUMBAR N/A 11/9/2017    Procedure: INJECT EPIDURAL LUMBAR;  lumbar 4-5 epidural steroid injection ;  Surgeon: Darryn Mcdaniel MD;  Location: [facility]     INJECT EPIDURAL LUMBAR N/A 12/28/2017    Procedure: INJECT EPIDURAL LUMBAR;  lumbar epidural injection;  Surgeon: aDrryn Mcdaniel MD;  Location: [facility]     INJECT EPIDURAL TRANSFORAMINAL Bilateral 8/23/2018    Procedure: INJECT EPIDURAL TRANSFORAMINAL;  transforaminal bilateral lumbar 3-4 steroid injection;  Surgeon: Darryn Mcdaniel MD;  Location: [facility]     PE TUBES  2006    left ear     Social History     Social History     Marital status: Single      "Spouse name: N/A     Number of children: 2     Years of education: N/A     Occupational History      [employer]     Social History Main Topics     Smoking status: Never Smoker     Smokeless tobacco: Never Used     Alcohol use No     Drug use: No     Sexual activity: Yes     Partners: Female     Other Topics Concern     Parent/Sibling W/ Cabg, Mi Or Angioplasty Before 65f 55m? No     Social History Narrative     Family History   Problem Relation Age of Onset     Cancer - colorectal Mother      Diabetes Mother      Cardiovascular Father      Hypertension Father      Mental Illness Sister      Suicide Other         ROS: 10 point ROS neg other than the symptoms noted above in the HPI.    Physical Exam  /78  Temp 98  F (36.7  C) (Temporal)  Ht 1.854 m (6' 1\")  Wt 95.3 kg (210 lb)  BMI 27.71 kg/m2  HEENT:  Normocephalic, atraumatic.  PERRLA.  EOM s intact.  Visual fields full to gross exam  Neck:  Supple, non-tender, without lymphadenopathy.  Heart:  No peripheral edema  Lungs:  No SOB  Abdomen:  Non-distended.   Skin:  Warm and dry.  Extremities:  No edema, cyanosis or clubbing.  Psychiatric:  No apparent distress  Musculoskeletal:  Normal bulk and tone    NEUROLOGICAL EXAMINATION:     Mental status:  Alert and Oriented x 3, speech is fluent.  Cranial nerves:  II-XII intact.   Motor:    Shoulder Abduction:  Right:  5/5   Left:  5/5  Biceps:                      Right:  5/5   Left:  5/5  Triceps:                     Right:  5/5   Left:  5/5  Wrist Extensors:       Right:  5/5   Left:  5/5  Wrist Flexors:           Right:  5/5   Left:  5/5  interosseus :            Right:  5/5   Left:  5/5   Hip Flexor:                Right: 5/5  Left:  5/5  Hip Adductor:             Right:  5/5  Left:  5/5  Hip Abductor:             Right:  5/5  Left:  5/5  Gastroc Soleus:        Right:  5/5  Left:  5/5  Tib/Ant:                      Right:  5/5  Left:  5/5  EHL:                     Right:  5/5  Left:  5/5  Sensation:  " Intact  Reflexes:  Negative Babinski.  Negative Clonus.  Negative Ruby's.  Coordination:  Smooth finger to nose testing.   Negative pronator drift.  Smooth tandem walking.  Incision well healed    A/P:    New MRI showed bilateral severe foraminal stenosis at C5-6, good decompression at C6-7  Given his improvement after recent SRIDEVI, will continue observation at this point

## 2018-09-20 NOTE — MR AVS SNAPSHOT
After Visit Summary   9/20/2018    Indra Mehta    MRN: [id number]           Patient Information     Date Of Birth          1965        Visit Information        Provider Department      9/20/2018 2:40 PM Mike Mckenna MD [facility]        Today's Diagnoses     Cervicalgia    -  1       Follow-ups after your visit        Your next 10 appointments already scheduled     Sep 25, 2018  8:45 AM CDT   (Arrive by 8:30 AM)   MR LUMBAR SPINE W/O CONTRAST with PHMR1   [facility] ([facility])    [address] [place] [address]   [phone]           How do I prepare for my exam? (Food and drink instructions) **If you will be receiving sedation or general anesthesia, please see special notes below.**  How do I prepare for my exam? (Other instructions) Take your medicines as usual, unless your doctor tells you not to. Please remove any body piercings and hair extensions before you arrive. Follow your doctor s orders. If you do not, we may have to postpone your exam. You may or may not receive IV contrast for this exam pending the discretion of the Radiologist.  You do not need to do anything special to prepare. **If you will be receiving sedation or general anesthesia, please see special notes below.**  What should I wear:  The MRI machine uses a strong magnet. Please wear clothes without metal (snaps, zippers). A sweatsuit works well, or we may give you a hospital gown.  How long does the exam take: Most tests take 30 to 60 minutes.  HOWEVER, IF YOUR DOCTOR PRESCRIBES ANESTHESIA please plan on spending four to five hours in the recovery room.  What should I bring: Bring a list of your current medicines to your exam (including vitamins, minerals and over-the-counter drugs). Also bring the results of similar scans you may have had.  Do I need a : **If you will be receiving sedation or general anesthesia, please see special notes below.**   What should I do after the exam? No Restrictions, You may resume normal activities.  What is this test: MRI (magnetic resonance imaging) uses a strong magnet and radio waves to look inside the body. An MRA (magnetic resonance angiogram) does the same thing, but it lets us look at your blood vessels. A computer turns the radio waves into pictures showing cross sections of the body, much like slices of bread. This helps us see any problems more clearly.  Who should I call with questions: Please call the Imaging Department at your exam site with any questions. Directions, parking instructions, and other information is available on our website, [facility].  How do I prepare if I m having sedation or anesthesia? **IMPORTANT** THE INSTRUCTIONS BELOW ARE ONLY FOR THOSE PATIENTS WHO HAVE BEEN TOLD THEY WILL RECEIVE SEDATION OR GENERAL ANESTHESIA DURING THEIR MRI PROCEDURE:  IF YOU WILL RECEIVE SEDATION (take medicine to help you relax during your exam): You must get the medicine from your doctor before you arrive. Bring the medicine to the exam. Do not take it at home. Arrive one hour early. Bring someone who can take you home after the test. Your medicine will make you sleepy. After the exam, you may not drive, take a bus or take a taxi by yourself. No eating 8 hours before your exam. You may have clear liquids up until 4 hours before your exam. (Clear liquids include water, clear tea, black coffee and fruit juice without pulp.)  IF YOU WILL RECEIVE ANESTHESIA (be asleep for your exam): Arrive 1 1/2 hours early. Bring someone who can take you home after the test. You may not drive, take a bus or take a taxi by yourself. No eating 8 hours before your exam. You may have clear liquids up until 4 hours before your exam. (Clear liquids include water, clear tea, black coffee and fruit juice without pulp.) You will spend four to five hours in the recovery room.            Sep 27, 2018  9:30 AM ISABEL Staton with  "Myrtle Cooper, TRINO   [facility]    [address] [facility] [id number] [phone]            Oct 22, 2018  3:45 PM CDT   Return Visit with Rick Farias MD   [facility]    [address] [facility] [id number] [phone]            Dec 10, 2018  3:30 PM CST   Return Visit with Boni Freeman MD   [facility] ([facility])    [address] [facility]  [place] [address] [phone]              Who to contact     If you have questions or need follow up information about today's clinic visit or your schedule please contact [facility] directly at [phone].  Normal or non-critical lab and imaging results will be communicated to you by MyChart, letter or phone within 4 business days after the clinic has received the results. If you do not hear from us within 7 days, please contact the clinic through MyChart or phone. If you have a critical or abnormal lab result, we will notify you by phone as soon as possible.  Submit refill requests through [employer] or call your pharmacy and they will forward the refill request to us. Please allow 3 business days for your refill to be completed.          Additional Information About Your Visit        MyChart Information     [employer] gives you secure access to your electronic health record. If you see a primary care provider, you can also send messages to your care team and make appointments. If you have questions, please call your primary care clinic.  If you do not have a primary care provider, please call [phone] and they will assist you.        Care EveryWhere ID     This is your Care EveryWhere ID. This could be used by other organizations to access your [place] medical records  [id number]        Your Vitals Were     Temperature Height BMI (Body Mass Index)             98  F (36.7  C) (Temporal) 6' 1\" " (1.854 m) 27.71 kg/m2          Blood Pressure from Last 3 Encounters:   09/20/18 136/78   09/11/18 140/88   09/10/18 140/82    Weight from Last 3 Encounters:   09/20/18 210 lb (95.3 kg)   09/10/18 208 lb 3.2 oz (94.4 kg)   08/09/18 211 lb 12.8 oz (96.1 kg)              Today, you had the following     No orders found for display       Primary Care Provider Office Phone # Fax #    Tha Grullon [phone]       [address] [facility] [address]        Equal Access to Services     [facility]: Hadii marva Menendez, jaime menezes, ranulfo delacruz, mikala carmichael . So [facility] [phone].    ATENCIÓN: Si habla español, tiene a lundy disposición servicios gratuitos de asistencia lingüística. [facility] [phone].    We comply with applicable federal civil rights laws and [place] laws. We do not discriminate on the basis of race, color, national origin, age, disability, sex, sexual orientation, or gender identity.            Thank you!     Thank you for choosing [facility]  for your care. Our goal is always to provide you with excellent care. Hearing back from our patients is one way we can continue to improve our services. Please take a few minutes to complete the written survey that you may receive in the mail after your visit with us. Thank you!             Your Updated Medication List - Protect others around you: Learn how to safely use, store and throw away your medicines at www.disposemymeds.org.          This list is accurate as of 9/20/18  3:01 PM.  Always use your most recent med list.                   Brand Name Dispense Instructions for use Diagnosis    ALPRAZolam 0.5 MG 24 hr tablet    XANAX XR    30 tablet    Take 1 tablet (0.5 mg) by mouth At Bedtime    Pulsatile tinnitus       cyclobenzaprine 10 MG tablet    FLEXERIL    60 tablet    Take 1 tablet (10 mg) by mouth 2 times daily as needed for muscle spasms Reported on 4/12/2017     Muscle spasm       gabapentin 400 MG capsule    NEURONTIN    270 capsule    Take 3 capsules (1,200 mg) by mouth 3 times daily    Cervicalgia       HYDROcodone-acetaminophen 5-325 MG per tablet    NORCO    120 tablet    Take 1 tablet by mouth every 6 hours as needed for other (Moderate to Severe Pain) May fill on/after 09/13/2017 NOT to start till 09/15/2017.    S/P cervical spinal fusion       IBUPROFEN PO           losartan 50 MG tablet    COZAAR    90 tablet    Take 1 tablet (50 mg) by mouth daily    Benign essential hypertension       methocarbamol 500 MG tablet    ROBAXIN    180 tablet    Take 2 tablets (1,000 mg) by mouth 3 times daily as needed for muscle spasms    Cervical radiculopathy, Myofascial pain       order for DME     1 Device    Equipment being ordered: TENS Pads as directed    Chronic pain syndrome       tiZANidine 4 MG tablet    ZANAFLEX    60 tablet    Take 1 tablet (4 mg) by mouth 2 times daily as needed for muscle spasms    Muscle spasm       TYLENOL PO      Take 500 mg by mouth every 4 hours as needed for mild pain or fever

## 2018-09-20 NOTE — LETTER
"    9/20/2018         RE: Indra Mehta  [address] [facility] [address]        Dear Colleague,    Thank you for referring your patient, Indra Mehta, to the [facility]. Please see a copy of my visit note below.    Indra Mehta is a 53 year old male who presents for:  Chief Complaint   Patient presents with     Neurologic Problem     2 week follow up, arm going numb        Initial Vitals:  /78  Temp 98  F (36.7  C) (Temporal)  Ht 6' 1\" (1.854 m)  Wt 210 lb (95.3 kg)  BMI 27.71 kg/m2 Estimated body mass index is 27.71 kg/(m^2) as calculated from the following:    Height as of this encounter: 6' 1\" (1.854 m).    Weight as of this encounter: 210 lb (95.3 kg).. Body surface area is 2.22 meters squared. BP completed using cuff size: large  Moderate Pain (5)    Do you feel safe in your environment?  Yes  Do you need any refills today? No    Nursing Comments:         Neelima Umaña      53M s/p C6-7 ACDF in 7/17.  Returns for follow up.  Underwent C5-6 SRIDEVI with Dr. Angle Freeman with marked relief in RUE paresthesias and numbness.  Now only has symptoms with aggressive neck extension.  Occasional LUE symptoms with neck manipulation.  Undergoing MR L Spine next week for back and thigh pain.       Past Medical History:   Diagnosis Date     Back pain      Meniere's disease, unspecified      Pain medication agreement signed 8/20/2010     Past Surgical History:   Procedure Laterality Date     BUNIONECTOMY RT/LT  09/05/08    Both feet     COLONOSCOPY N/A 12/28/2016    Procedure: COMBINED COLONOSCOPY, SINGLE OR MULTIPLE BIOPSY/POLYPECTOMY BY BIOPSY;  Surgeon: Indra Jernigan MD;  Location: [facility]     DISCECTOMY, FUSION CERVICAL ANTERIOR ONE LEVEL, COMBINED N/A 7/5/2017    Procedure: COMBINED DISCECTOMY, FUSION CERVICAL ANTERIOR ONE LEVEL;  Cervical 6-7, Anterior cervical discectomy fusion;  Surgeon: Mike Mckenna MD;  Location: [facility] OR     HC COLONOSCOPY W/WO BRUSH/WASH  " "12/27/2005     HC CREATE EARDRUM OPENING,GEN ANESTH  09/27/2007    Pe tube, Left endolymphatic sac enhancement.     HC MASTOIDECTOMY,COMPLETE  09/27/2007     HERNIORRHAPHY UMBILICAL N/A 8/11/2017    Procedure: HERNIORRHAPHY UMBILICAL;  open umbilical hernia repair;  Surgeon: Boni Story MD;  Location: [facility]     INJECT EPIDURAL LUMBAR N/A 11/9/2017    Procedure: INJECT EPIDURAL LUMBAR;  lumbar 4-5 epidural steroid injection ;  Surgeon: Darryn Mcdaniel MD;  Location: [facility]     INJECT EPIDURAL LUMBAR N/A 12/28/2017    Procedure: INJECT EPIDURAL LUMBAR;  lumbar epidural injection;  Surgeon: Darryn Mcdaniel MD;  Location: [facility]     INJECT EPIDURAL TRANSFORAMINAL Bilateral 8/23/2018    Procedure: INJECT EPIDURAL TRANSFORAMINAL;  transforaminal bilateral lumbar 3-4 steroid injection;  Surgeon: Darryn Mcdaniel MD;  Location: [facility] OR     PE TUBES  2006    left ear     Social History     Social History     Marital status: Single     Spouse name: N/A     Number of children: 2     Years of education: N/A     Occupational History      [employer]     Social History Main Topics     Smoking status: Never Smoker     Smokeless tobacco: Never Used     Alcohol use No     Drug use: No     Sexual activity: Yes     Partners: Female     Other Topics Concern     Parent/Sibling W/ Cabg, Mi Or Angioplasty Before 65f 55m? No     Social History Narrative     Family History   Problem Relation Age of Onset     Cancer - colorectal Mother      Diabetes Mother      Cardiovascular Father      Hypertension Father      Mental Illness Sister      Suicide Other         ROS: 10 point ROS neg other than the symptoms noted above in the HPI.    Physical Exam  /78  Temp 98  F (36.7  C) (Temporal)  Ht 1.854 m (6' 1\")  Wt 95.3 kg (210 lb)  BMI 27.71 kg/m2  HEENT:  Normocephalic, atraumatic.  PERRLA.  EOM s intact.  Visual fields full to gross exam  Neck:  Supple, non-tender, without lymphadenopathy.  Heart:  No peripheral edema  Lungs:  No " SOB  Abdomen:  Non-distended.   Skin:  Warm and dry.  Extremities:  No edema, cyanosis or clubbing.  Psychiatric:  No apparent distress  Musculoskeletal:  Normal bulk and tone    NEUROLOGICAL EXAMINATION:     Mental status:  Alert and Oriented x 3, speech is fluent.  Cranial nerves:  II-XII intact.   Motor:    Shoulder Abduction:  Right:  5/5   Left:  5/5  Biceps:                      Right:  5/5   Left:  5/5  Triceps:                     Right:  5/5   Left:  5/5  Wrist Extensors:       Right:  5/5   Left:  5/5  Wrist Flexors:           Right:  5/5   Left:  5/5  interosseus :            Right:  5/5   Left:  5/5   Hip Flexor:                Right: 5/5  Left:  5/5  Hip Adductor:             Right:  5/5  Left:  5/5  Hip Abductor:             Right:  5/5  Left:  5/5  Gastroc Soleus:        Right:  5/5  Left:  5/5  Tib/Ant:                      Right:  5/5  Left:  5/5  EHL:                     Right:  5/5  Left:  5/5  Sensation:  Intact  Reflexes:  Negative Babinski.  Negative Clonus.  Negative Ruby's.  Coordination:  Smooth finger to nose testing.   Negative pronator drift.  Smooth tandem walking.  Incision well healed    A/P:    New MRI showed bilateral severe foraminal stenosis at C5-6, good decompression at C6-7  Given his improvement after recent SRIDEVI, will continue observation at this point    Again, thank you for allowing me to participate in the care of your patient.        Sincerely,        Mike Mckenna MD

## 2018-09-20 NOTE — PROGRESS NOTES
"Indra Mehta is a 53 year old male who presents for:  Chief Complaint   Patient presents with     Neurologic Problem     2 week follow up, arm going numb        Initial Vitals:  /78  Temp 98  F (36.7  C) (Temporal)  Ht 6' 1\" (1.854 m)  Wt 210 lb (95.3 kg)  BMI 27.71 kg/m2 Estimated body mass index is 27.71 kg/(m^2) as calculated from the following:    Height as of this encounter: 6' 1\" (1.854 m).    Weight as of this encounter: 210 lb (95.3 kg).. Body surface area is 2.22 meters squared. BP completed using cuff size: large  Moderate Pain (5)    Do you feel safe in your environment?  Yes  Do you need any refills today? No    Nursing Comments:         Neelima Umaña    "

## 2018-09-25 ENCOUNTER — HOSPITAL ENCOUNTER (OUTPATIENT)
Dept: MRI IMAGING | Facility: CLINIC | Age: 53
Discharge: HOME | End: 2018-09-25
Attending: ANESTHESIOLOGY | Admitting: ANESTHESIOLOGY
Payer: COMMERCIAL

## 2018-09-25 DIAGNOSIS — M54.16 LUMBAR RADICULOPATHY: ICD-10-CM

## 2018-09-25 PROCEDURE — 72148 MRI LUMBAR SPINE W/O DYE: CPT

## 2018-09-27 ENCOUNTER — HOSPITAL ENCOUNTER (OUTPATIENT)
Dept: PHYSICAL THERAPY | Facility: OTHER | Age: 53
Setting detail: THERAPIES SERIES
End: 2018-09-27
Attending: OTOLARYNGOLOGY
Payer: COMMERCIAL

## 2018-09-27 PROCEDURE — 97110 THERAPEUTIC EXERCISES: CPT | Mod: GP | Performed by: PHYSICAL THERAPIST

## 2018-09-27 PROCEDURE — 97161 PT EVAL LOW COMPLEX 20 MIN: CPT | Mod: GP | Performed by: PHYSICAL THERAPIST

## 2018-09-27 PROCEDURE — 40000840 ZZHC STATISTIC PT VESTIBULAR VISIT: Performed by: PHYSICAL THERAPIST

## 2018-09-27 NOTE — PROGRESS NOTES
09/27/18 0900   Quick Adds   Quick Adds Vestibular Eval   General Information   Start of Care Date 09/27/18   Referring Physician gayle hernandez MD   Orders Evaluate and Treat as Indicated   Order Date 09/11/18   Medical Diagnosis menieres disease left H81.02 dizziness R42   Onset of illness/injury or Date of Surgery 07/27/18   Precautions/Limitations no known precautions/limitations   Surgical/Medical history reviewed Yes   Pertinent history of current problem (include personal factors and/or comorbidities that impact the POC) patient reports that end of july he started to have more episodes of dizzy and was able to hear his heart beating . PMH had surgery for meniers 18 years ago before surgery loss of hearing , ringling in ear and severe dizzy after surgery dizzy was improved but no improvement with hearing or ringling . 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 No   Have you fallen and had an injury in the past year? No   Is patient a fall risk? No   Functional Scales   Functional Scales and Outcomes dizzy 4/100   Cognitive Status Examination   Orientation orientation to person, place and time   Level of Consciousness alert   Range of Motion (ROM)   ROM Comment cervical fusion slight limited AROM    Balance   Balance Comments single leg balance less than 30 seconds    Muscle Tone   Muscle Tone Comments WNL   Cervicogenic Screen   Vertebral Artery Test Normal   Oculomotor Exam   Smooth Pursuit Normal   Saccades Normal   Planned Therapy Interventions   Planned Therapy Interventions balance training;ROM   Clinical Impression    Criteria for Skilled Therapeutic Interventions Met yes, treatment indicated   PT Diagnosis dizzy    Functional limitations due to impairments dizzy scale 4/100   Clinical Presentation Stable/Uncomplicated   Clinical Decision Making (Complexity) Low complexity   Predicted Duration of Therapy Intervention (days/wks) 1x instruction in HEP and will call in 2 weeks , follow up in clinic if needed    Risk & Benefits of therapy have been explained Yes   Patient, Family & other staff in agreement with plan of care Yes   Clinical Impression Comments patient seen due to progressive dizzy feeling , Rx is instruction in HEP    Education Assessment   Preferred Learning Style Listening;Reading;Demonstration   Barriers to Learning No barriers   GOALS   PT Eval Goals 1;2   Goal 1   Goal Identifier 1   Goal Description instruction in HEP and compliant with it 5 of 7 days    Target Date 10/27/18   Goal 2   Goal Identifier 2   Goal Description patient to have overall decrease in dizzy feeling 50%   Target Date 10/27/18   Total Evaluation Time   Total Evaluation Time (Minutes) 15

## 2018-10-08 DIAGNOSIS — Z98.1 S/P CERVICAL SPINAL FUSION: ICD-10-CM

## 2018-10-08 RX ORDER — HYDROCODONE BITARTRATE AND ACETAMINOPHEN 5; 325 MG/1; MG/1
1 TABLET ORAL EVERY 6 HOURS PRN
Qty: 120 TABLET | Refills: 0 | Status: SHIPPED | OUTPATIENT
Start: 2018-10-08 | End: 2018-11-07

## 2018-10-08 NOTE — TELEPHONE ENCOUNTER
Received call from patient requesting refill(s) of HYDROcodone-acetaminophen (NORCO) 5-325 MG per tablet    Last picked up from pharmacy on 9/13/18    Pt last seen by prescribing provider on 9/11/18  Next appt scheduled for 12/10/18     checked in the past 6 months? Yes If no, print current report and give to RN    Last urine drug screen date 10/20/17  Current opioid agreement on file (completed within the last year) Yes Date of opioid agreement: 6/25/17    Processing (pick one and delete the others):  Mail to pharmacy   [id number] - [place], [place]  [facility] [address] [place] [address] [address] [facility] [address]  Phone: [phone] Fax: [phone]    Josiane Riddle MA  [facility]      Will route to nursing pool for review and preparation of prescription(s).

## 2018-10-08 NOTE — TELEPHONE ENCOUNTER
Patient left  10/7 at 6:19 pm    Rx- Norco, mail to [facility] in [place].          Peyton CHURCHILL    [facility]

## 2018-10-08 NOTE — TELEPHONE ENCOUNTER
Medication refill information reviewed.     Due date for HYDROcodone-acetaminophen (NORCO) 5-325 MG per tablet is 10/15/17     Prescriptions prepped for review.     Will route to provider.     Processing (pick one and delete the others):  Mail to pharmacy   [id number] - [place], [place]  [address] [place] [address]  Phone: [phone] Fax: [phone]    Mariano Mcbride, RN  Care Coordinator   [facility]

## 2018-10-08 NOTE — TELEPHONE ENCOUNTER
Signed Prescriptions:                        Disp   Refills    HYDROcodone-acetaminophen (NORCO) 5-325 MG*120 ta*0        Sig: Take 1 tablet by mouth every 6 hours as needed for           moderate to severe pain May fill on/after           10/13/2017 NOT to start till 10/15/2017.  Authorizing Provider: BONI NAYLOR    Reviewed, printed, signed in [place].  Placed in MA basket for processing.    Boni Freeman MD  [facility]

## 2018-10-08 NOTE — TELEPHONE ENCOUNTER
Medication refill information reviewed.     Last due:  May fill on/after 09/13/2017 NOT to start till 09/15/2017    Due date:  10/15/18    Weaning instructions:  N/A    Prescriptions prepped for review.     Heidy Gipson RN-BSN  [facility]

## 2018-10-09 NOTE — ADDENDUM NOTE
Encounter addended by: Myrtle Cooper, PT on: 10/9/2018 12:13 PM<BR>     Actions taken: Episode resolved

## 2018-10-18 ENCOUNTER — TELEPHONE (OUTPATIENT)
Dept: NEUROSURGERY | Facility: CLINIC | Age: 53
End: 2018-10-18

## 2018-10-18 NOTE — TELEPHONE ENCOUNTER
Type of surgery: C5-6 ACDF  Location of surgery: [facility] OR  Date and time of surgery: 12/19/2018 @7:30am   Surgeon: Dr. Mckenna  Pre-Op Appt Date: Discussed   Post-Op Appt Date: To far out/ Discussed    Packet sent out: Yes  Pre-cert/Authorization completed:  Yes  Date: 10/18/2018

## 2018-10-19 NOTE — PROGRESS NOTES
History of Present Illness - Indra Mehta is a 53 year old male presenting in clinic today for a recheck on Patient presents with:  RECHECK  Ear Problem: Meniere's Disease    Patient reports that he tried Xanax without any relief of his pulsatile tinnitus.  He reports that 2 weeks ago he went to bed with very loud pulsatile tinnitus and woke up the next morning without pulsatile tinnitus. Patient does have non pulsatile tinnitus that persists.  She does have unilateral left-sided deafness was previously evaluated.      Body mass index is 27.84 kg/(m^2).    Weight management plan: Patient was referred to their PCP to discuss a diet and exercise plan.    BP Readings from Last 1 Encounters:   09/20/18 136/78     Patient declined BP in clinic today    Indra IS NOT a smoker/uses chewing tobacco.        Past Medical History -   Past Medical History:   Diagnosis Date     Back pain      Meniere's disease, unspecified      Pain medication agreement signed 8/20/2010       Current Medications -   Current Outpatient Prescriptions:      Acetaminophen (TYLENOL PO), Take 500 mg by mouth every 4 hours as needed for mild pain or fever, Disp: , Rfl:      ALPRAZolam (XANAX XR) 0.5 MG 24 hr tablet, Take 1 tablet (0.5 mg) by mouth At Bedtime, Disp: 30 tablet, Rfl: 2     cyclobenzaprine (FLEXERIL) 10 MG tablet, Take 1 tablet (10 mg) by mouth 2 times daily as needed for muscle spasms Reported on 4/12/2017, Disp: 60 tablet, Rfl: 1     gabapentin (NEURONTIN) 400 MG capsule, Take 3 capsules (1,200 mg) by mouth 3 times daily, Disp: 270 capsule, Rfl: 3     HYDROcodone-acetaminophen (NORCO) 5-325 MG per tablet, Take 1 tablet by mouth every 6 hours as needed for moderate to severe pain May fill on/after 10/13/2017 NOT to start till 10/15/2017., Disp: 120 tablet, Rfl: 0     IBUPROFEN PO, , Disp: , Rfl:      losartan (COZAAR) 50 MG tablet, Take 1 tablet (50 mg) by mouth daily, Disp: 90 tablet, Rfl: 0     methocarbamol (ROBAXIN) 500 MG  "tablet, Take 2 tablets (1,000 mg) by mouth 3 times daily as needed for muscle spasms, Disp: 180 tablet, Rfl: 1     order for DME, Equipment being ordered: TENS Pads as directed, Disp: 1 Device, Rfl: 0     tiZANidine (ZANAFLEX) 4 MG tablet, Take 1 tablet (4 mg) by mouth 2 times daily as needed for muscle spasms, Disp: 60 tablet, Rfl: 3    Allergies -   Allergies   Allergen Reactions     No Known Drug Allergies        Social History -   Social History     Social History     Marital status: Single     Spouse name: N/A     Number of children: 2     Years of education: N/A     Occupational History      [employer]     Social History Main Topics     Smoking status: Never Smoker     Smokeless tobacco: Never Used     Alcohol use No     Drug use: No     Sexual activity: Yes     Partners: Female     Other Topics Concern     Parent/Sibling W/ Cabg, Mi Or Angioplasty Before 65f 55m? No     Social History Narrative       Family History -   Family History   Problem Relation Age of Onset     Cancer - colorectal Mother      Diabetes Mother      Cardiovascular Father      Hypertension Father      Mental Illness Sister      Suicide Other        Review of Systems - As per HPI and PMHx, otherwise review of system review of the head and neck negative.    Physical Exam  Pulse 80  Ht 1.854 m (6' 1\")  Wt 95.7 kg (211 lb)  SpO2 98%  BMI 27.84 kg/m2  BMI: Body mass index is 27.84 kg/(m^2).    General - The patient is well nourished and well developed, and appears to have good nutritional status.  Alert and oriented to person and place, answers questions and cooperates with examination appropriately.    SKIN - No suspicious lesions or rashes.  Respiration - No respiratory distress.  Head and Face - Normocephalic and atraumatic, with no gross asymmetry noted of the contour of the facial features.  The facial nerve is intact, with strong symmetric movements.    Voice and Breathing - The patient was breathing comfortably without the use " of accessory muscles. The patients voice was clear and strong, and had appropriate pitch and quality.    Ears - Bilateral pinna and EACs with normal appearing overlying skin. Tympanic membrane intact with good mobility on pneumatic otoscopy bilaterally. Bony landmarks of the ossicular chain are normal. The tympanic membranes are normal in appearance. No retraction, perforation, or masses.  No fluid or purulence was seen in the external canal or the middle ear.     Eyes - Extraocular movements intact.  Sclera were not icteric or injected, conjunctiva were pink and moist.    Mouth - Examination of the oral cavity showed pink, healthy oral mucosa. No lesions or ulcerations noted.  The tongue was mobile and midline, and the dentition were in good condition.      Throat - The walls of the oropharynx were smooth, pink, moist, symmetric, and had no lesions or ulcerations.  The tonsillar pillars and soft palate were symmetric. The uvula was midline on elevation.    Neck - Normal midline excursion of the laryngotracheal complex during swallowing.  Full range of motion on passive movement.  Palpation of the occipital, submental, submandibular, internal jugular chain, and supraclavicular nodes did not demonstrate any abnormal lymph nodes or masses.  The carotid pulse was palpable bilaterally.  Palpation of the thyroid was soft and smooth, with no nodules or goiter appreciated.  The trachea was mobile and midline.    Nose - External contour is symmetric, no gross deflection or scars.  Nasal mucosa is pink and moist with no abnormal mucus.  The septum was midline and non-obstructive, turbinates of normal size and position.  No polyps, masses, or purulence noted on examination.    Neuro - Nonfocal neuro exam is normal, CN 2 through 12 intact, normal gait and muscle tone.      Performed in clinic today:  No procedures preformed in clinic today      A/P - Indra Mehta is a 53 year old male Patient presents with:  RECHECK  Ear  Problem: Meniere's Disease  We discussed persistent tinnitus treatment options.  He does not wish to be on benzodiazepines.  He will try over-the-counter tinnitus relief formula and explained to him the limitations of this product.  However safety of Nicole also stressed.  Otherwise certainly will try and introduce white noise in the related sounds even though again because of due to significant unilateral loss in the left side central deafness this could be quite challenging.  I advised patient to continue reducing the amount of caffeine.    Indra should follow up in 1 year.      At Indra next appointment they will not need a hearing test.      This document serves as a record of the services and decisions personally performed and made by Dr. Rick Farias MD. It was created on his behalf by Yas Reyes, a trained medical scribe. The creation of this document is based the provider's statements to the medical scribe.  Yas Reyes 10/22/2018    Provider:   The information in this document, created by the medical scribe for me, accurately reflects the services I personally performed and the decisions made by me. I have reviewed and approved this document for accuracy prior to leaving the patient care area.  Dr. Rick Farias MD 10/22/2018    Rick Farias MD

## 2018-10-22 ENCOUNTER — OFFICE VISIT (OUTPATIENT)
Dept: OTOLARYNGOLOGY | Facility: CLINIC | Age: 53
End: 2018-10-22
Payer: COMMERCIAL

## 2018-10-22 VITALS — OXYGEN SATURATION: 98 % | WEIGHT: 211 LBS | HEART RATE: 80 BPM | BODY MASS INDEX: 27.96 KG/M2 | HEIGHT: 73 IN

## 2018-10-22 DIAGNOSIS — H91.92 DEAFNESS IN LEFT EAR: ICD-10-CM

## 2018-10-22 DIAGNOSIS — H93.12 TINNITUS, LEFT: Primary | ICD-10-CM

## 2018-10-22 PROCEDURE — 99213 OFFICE O/P EST LOW 20 MIN: CPT | Performed by: OTOLARYNGOLOGY

## 2018-10-22 NOTE — LETTER
10/22/2018         RE: Indra Mehta  [address] [address] [place] [id number]        Dear Colleague,    Thank you for referring your patient, Indra Mehta, to the [facility]. Please see a copy of my visit note below.    History of Present Illness - Indra Mehta is a 53 year old male presenting in clinic today for a recheck on Patient presents with:  RECHECK  Ear Problem: Meniere's Disease    Patient reports that he tried Xanax without any relief of his pulsatile tinnitus.  He reports that 2 weeks ago he went to bed with very loud pulsatile tinnitus and woke up the next morning without pulsatile tinnitus. Patient does have non pulsatile tinnitus that persists.  She does have unilateral left-sided deafness was previously evaluated.      Body mass index is 27.84 kg/(m^2).    Weight management plan: Patient was referred to their PCP to discuss a diet and exercise plan.    BP Readings from Last 1 Encounters:   09/20/18 136/78     Patient declined BP in clinic today    Indra IS NOT a smoker/uses chewing tobacco.        Past Medical History -   Past Medical History:   Diagnosis Date     Back pain      Meniere's disease, unspecified      Pain medication agreement signed 8/20/2010       Current Medications -   Current Outpatient Prescriptions:      Acetaminophen (TYLENOL PO), Take 500 mg by mouth every 4 hours as needed for mild pain or fever, Disp: , Rfl:      ALPRAZolam (XANAX XR) 0.5 MG 24 hr tablet, Take 1 tablet (0.5 mg) by mouth At Bedtime, Disp: 30 tablet, Rfl: 2     cyclobenzaprine (FLEXERIL) 10 MG tablet, Take 1 tablet (10 mg) by mouth 2 times daily as needed for muscle spasms Reported on 4/12/2017, Disp: 60 tablet, Rfl: 1     gabapentin (NEURONTIN) 400 MG capsule, Take 3 capsules (1,200 mg) by mouth 3 times daily, Disp: 270 capsule, Rfl: 3     HYDROcodone-acetaminophen (NORCO) 5-325 MG per tablet, Take 1 tablet by mouth every 6 hours as needed for moderate to severe pain May fill  "on/after 10/13/2017 NOT to start till 10/15/2017., Disp: 120 tablet, Rfl: 0     IBUPROFEN PO, , Disp: , Rfl:      losartan (COZAAR) 50 MG tablet, Take 1 tablet (50 mg) by mouth daily, Disp: 90 tablet, Rfl: 0     methocarbamol (ROBAXIN) 500 MG tablet, Take 2 tablets (1,000 mg) by mouth 3 times daily as needed for muscle spasms, Disp: 180 tablet, Rfl: 1     order for DME, Equipment being ordered: TENS Pads as directed, Disp: 1 Device, Rfl: 0     tiZANidine (ZANAFLEX) 4 MG tablet, Take 1 tablet (4 mg) by mouth 2 times daily as needed for muscle spasms, Disp: 60 tablet, Rfl: 3    Allergies -   Allergies   Allergen Reactions     No Known Drug Allergies        Social History -   Social History     Social History     Marital status: Single     Spouse name: N/A     Number of children: 2     Years of education: N/A     Occupational History      [employer]     Social History Main Topics     Smoking status: Never Smoker     Smokeless tobacco: Never Used     Alcohol use No     Drug use: No     Sexual activity: Yes     Partners: Female     Other Topics Concern     Parent/Sibling W/ Cabg, Mi Or Angioplasty Before 65f 55m? No     Social History Narrative       Family History -   Family History   Problem Relation Age of Onset     Cancer - colorectal Mother      Diabetes Mother      Cardiovascular Father      Hypertension Father      Mental Illness Sister      Suicide Other        Review of Systems - As per HPI and PMHx, otherwise review of system review of the head and neck negative.    Physical Exam  Pulse 80  Ht 1.854 m (6' 1\")  Wt 95.7 kg (211 lb)  SpO2 98%  BMI 27.84 kg/m2  BMI: Body mass index is 27.84 kg/(m^2).    General - The patient is well nourished and well developed, and appears to have good nutritional status.  Alert and oriented to person and place, answers questions and cooperates with examination appropriately.    SKIN - No suspicious lesions or rashes.  Respiration - No respiratory distress.  Head and Face " - Normocephalic and atraumatic, with no gross asymmetry noted of the contour of the facial features.  The facial nerve is intact, with strong symmetric movements.    Voice and Breathing - The patient was breathing comfortably without the use of accessory muscles. The patients voice was clear and strong, and had appropriate pitch and quality.    Ears - Bilateral pinna and EACs with normal appearing overlying skin. Tympanic membrane intact with good mobility on pneumatic otoscopy bilaterally. Bony landmarks of the ossicular chain are normal. The tympanic membranes are normal in appearance. No retraction, perforation, or masses.  No fluid or purulence was seen in the external canal or the middle ear.     Eyes - Extraocular movements intact.  Sclera were not icteric or injected, conjunctiva were pink and moist.    Mouth - Examination of the oral cavity showed pink, healthy oral mucosa. No lesions or ulcerations noted.  The tongue was mobile and midline, and the dentition were in good condition.      Throat - The walls of the oropharynx were smooth, pink, moist, symmetric, and had no lesions or ulcerations.  The tonsillar pillars and soft palate were symmetric. The uvula was midline on elevation.    Neck - Normal midline excursion of the laryngotracheal complex during swallowing.  Full range of motion on passive movement.  Palpation of the occipital, submental, submandibular, internal jugular chain, and supraclavicular nodes did not demonstrate any abnormal lymph nodes or masses.  The carotid pulse was palpable bilaterally.  Palpation of the thyroid was soft and smooth, with no nodules or goiter appreciated.  The trachea was mobile and midline.    Nose - External contour is symmetric, no gross deflection or scars.  Nasal mucosa is pink and moist with no abnormal mucus.  The septum was midline and non-obstructive, turbinates of normal size and position.  No polyps, masses, or purulence noted on examination.    Neuro -  Nonfocal neuro exam is normal, CN 2 through 12 intact, normal gait and muscle tone.      Performed in clinic today:  No procedures preformed in clinic today      A/P - Indra Mehta is a 53 year old male Patient presents with:  RECHECK  Ear Problem: Meniere's Disease  We discussed persistent tinnitus treatment options.  He does not wish to be on benzodiazepines.  He will try over-the-counter tinnitus relief formula and explained to him the limitations of this product.  However safety of Nicole also stressed.  Otherwise certainly will try and introduce white noise in the related sounds even though again because of due to significant unilateral loss in the left side central deafness this could be quite challenging.  I advised patient to continue reducing the amount of caffeine.    Indra should follow up in 1 year.      At Indra next appointment they will not need a hearing test.      This document serves as a record of the services and decisions personally performed and made by Dr. Rick Farias MD. It was created on his behalf by Yas Reyes, a trained medical scribe. The creation of this document is based the provider's statements to the medical scribe.  Yas Reyes 10/22/2018    Provider:   The information in this document, created by the medical scribe for me, accurately reflects the services I personally performed and the decisions made by me. I have reviewed and approved this document for accuracy prior to leaving the patient care area.  Dr. Rick Farias MD 10/22/2018    Rick Farias MD          Again, thank you for allowing me to participate in the care of your patient.        Sincerely,        Rick Farias MD, MD

## 2018-10-22 NOTE — MR AVS SNAPSHOT
After Visit Summary   10/22/2018    Indra Mehta    MRN: [id number]           Patient Information     Date Of Birth          1965        Visit Information        Provider Department      10/22/2018 3:45 PM Rick Farias MD [facility]         Follow-ups after your visit        Your next 10 appointments already scheduled     Dec 10, 2018  3:30 PM CST   Return Visit with Boni Freeman MD   [facility] ([facility])    [address]  [place] [id number] [phone]            Dec 19, 2018   Procedure with Mike Mckenna MD   [facility] ([facility])    [address] [place] [address] [phone]           From [address]: Exit at [address] on [place] side of [place]. Turn right on [address]. Turn left at stoplight on [address]. [facility] will be in view two blocks ahead              Who to contact     If you have questions or need follow up information about today's clinic visit or your schedule please contact [facility] directly at [phone].  Normal or non-critical lab and imaging results will be communicated to you by [email or web address], letter or phone within 4 business days after the clinic has received the results. If you do not hear from us within 7 days, please contact the clinic through [email or web address] or phone. If you have a critical or abnormal lab result, we will notify you by phone as soon as possible.  Submit refill requests through [employer] or call your pharmacy and they will forward the refill request to us. Please allow 3 business days for your refill to be completed.          Additional Information About Your Visit        MyChart Information     [employer] gives you secure access to your electronic health record. If you see a primary care provider, you can also send messages to your care team and make appointments. If you have  "questions, please call your primary care clinic.  If you do not have a primary care provider, please call [phone] and they will assist you.        Care EveryWhere ID     This is your Care EveryWhere ID. This could be used by other organizations to access your [facility] medical records  [id number]        Your Vitals Were     Pulse Height Pulse Oximetry BMI (Body Mass Index)          80 1.854 m (6' 1\") 98% 27.84 kg/m2         Blood Pressure from Last 3 Encounters:   09/20/18 136/78   09/11/18 140/88   09/10/18 140/82    Weight from Last 3 Encounters:   10/22/18 95.7 kg (211 lb)   09/20/18 95.3 kg (210 lb)   09/10/18 94.4 kg (208 lb 3.2 oz)              Today, you had the following     No orders found for display       Primary Care Provider Office Phone # Fax #    Tha Grullon [phone]       [address] [place] [address]        Equal Access to Services     SUSY ARSHAD AH: Hadii aad ku hadasho Soomaali, waaxda luqadaha, qaybta kaalmada adeegyada, waxay shilpain trisha acrmichael . So [facility] [phone].    ATENCIÓN: Si habla español, tiene a lundy disposición servicios gratuitos de asistencia lingüística. Llame al [phone].    We comply with applicable federal civil rights laws and [place] laws. We do not discriminate on the basis of race, color, national origin, age, disability, sex, sexual orientation, or gender identity.            Thank you!     Thank you for choosing [facility]  for your care. Our goal is always to provide you with excellent care. Hearing back from our patients is one way we can continue to improve our services. Please take a few minutes to complete the written survey that you may receive in the mail after your visit with us. Thank you!             Your Updated Medication List - Protect others around you: Learn how to safely use, store and throw away your medicines at www.disposemymeds.org.          This list is accurate as of 10/22/18  5:02 " PM.  Always use your most recent med list.                   Brand Name Dispense Instructions for use Diagnosis    ALPRAZolam 0.5 MG 24 hr tablet    XANAX XR    30 tablet    Take 1 tablet (0.5 mg) by mouth At Bedtime    Pulsatile tinnitus       cyclobenzaprine 10 MG tablet    FLEXERIL    60 tablet    Take 1 tablet (10 mg) by mouth 2 times daily as needed for muscle spasms Reported on 4/12/2017    Muscle spasm       gabapentin 400 MG capsule    NEURONTIN    270 capsule    Take 3 capsules (1,200 mg) by mouth 3 times daily    Cervicalgia       HYDROcodone-acetaminophen 5-325 MG per tablet    NORCO    120 tablet    Take 1 tablet by mouth every 6 hours as needed for moderate to severe pain May fill on/after 10/13/2017 NOT to start till 10/15/2017.    S/P cervical spinal fusion       IBUPROFEN PO           losartan 50 MG tablet    COZAAR    90 tablet    Take 1 tablet (50 mg) by mouth daily    Benign essential hypertension       methocarbamol 500 MG tablet    ROBAXIN    180 tablet    Take 2 tablets (1,000 mg) by mouth 3 times daily as needed for muscle spasms    Cervical radiculopathy, Myofascial pain       order for DME     1 Device    Equipment being ordered: TENS Pads as directed    Chronic pain syndrome       tiZANidine 4 MG tablet    ZANAFLEX    60 tablet    Take 1 tablet (4 mg) by mouth 2 times daily as needed for muscle spasms    Muscle spasm       TYLENOL PO      Take 500 mg by mouth every 4 hours as needed for mild pain or fever

## 2018-10-30 ENCOUNTER — TELEPHONE (OUTPATIENT)
Dept: SURGERY | Facility: CLINIC | Age: 53
End: 2018-10-30

## 2018-10-30 NOTE — TELEPHONE ENCOUNTER
Patient called to setup SRIDEVI, orders verified.  Scheduled for 11/8/18 at 0800 with Dr. Mcdaniel.  Patient instructed to make appt for H&P and to have a  for the procedure.

## 2018-11-05 ENCOUNTER — OFFICE VISIT (OUTPATIENT)
Dept: FAMILY MEDICINE | Facility: OTHER | Age: 53
End: 2018-11-05
Payer: COMMERCIAL

## 2018-11-05 VITALS
SYSTOLIC BLOOD PRESSURE: 124 MMHG | TEMPERATURE: 96.7 F | HEIGHT: 73 IN | BODY MASS INDEX: 27.58 KG/M2 | OXYGEN SATURATION: 98 % | DIASTOLIC BLOOD PRESSURE: 80 MMHG | RESPIRATION RATE: 12 BRPM | HEART RATE: 76 BPM | WEIGHT: 208.1 LBS

## 2018-11-05 DIAGNOSIS — Z01.818 PREOP GENERAL PHYSICAL EXAM: Primary | ICD-10-CM

## 2018-11-05 DIAGNOSIS — M54.50 LUMBAR BACK PAIN: ICD-10-CM

## 2018-11-05 DIAGNOSIS — I10 BENIGN ESSENTIAL HYPERTENSION: ICD-10-CM

## 2018-11-05 LAB
ANION GAP SERPL CALCULATED.3IONS-SCNC: 8 MMOL/L (ref 3–14)
BASOPHILS # BLD AUTO: 0 10E9/L (ref 0–0.2)
BASOPHILS NFR BLD AUTO: 0.4 %
BUN SERPL-MCNC: 15 MG/DL (ref 7–30)
CALCIUM SERPL-MCNC: 8.7 MG/DL (ref 8.5–10.1)
CHLORIDE SERPL-SCNC: 107 MMOL/L (ref 94–109)
CO2 SERPL-SCNC: 27 MMOL/L (ref 20–32)
CREAT SERPL-MCNC: 0.96 MG/DL (ref 0.66–1.25)
DIFFERENTIAL METHOD BLD: NORMAL
EOSINOPHIL # BLD AUTO: 0.1 10E9/L (ref 0–0.7)
EOSINOPHIL NFR BLD AUTO: 1.4 %
ERYTHROCYTE [DISTWIDTH] IN BLOOD BY AUTOMATED COUNT: 13 % (ref 10–15)
GFR SERPL CREATININE-BSD FRML MDRD: 82 ML/MIN/1.7M2
GLUCOSE SERPL-MCNC: 94 MG/DL (ref 70–99)
HCT VFR BLD AUTO: 41.6 % (ref 40–53)
HGB BLD-MCNC: 14 G/DL (ref 13.3–17.7)
LYMPHOCYTES # BLD AUTO: 2.2 10E9/L (ref 0.8–5.3)
LYMPHOCYTES NFR BLD AUTO: 31.1 %
MCH RBC QN AUTO: 29.3 PG (ref 26.5–33)
MCHC RBC AUTO-ENTMCNC: 33.7 G/DL (ref 31.5–36.5)
MCV RBC AUTO: 87 FL (ref 78–100)
MONOCYTES # BLD AUTO: 0.6 10E9/L (ref 0–1.3)
MONOCYTES NFR BLD AUTO: 8.7 %
NEUTROPHILS # BLD AUTO: 4 10E9/L (ref 1.6–8.3)
NEUTROPHILS NFR BLD AUTO: 58.4 %
PLATELET # BLD AUTO: 271 10E9/L (ref 150–450)
POTASSIUM SERPL-SCNC: 3.7 MMOL/L (ref 3.4–5.3)
RBC # BLD AUTO: 4.78 10E12/L (ref 4.4–5.9)
SODIUM SERPL-SCNC: 142 MMOL/L (ref 133–144)
WBC # BLD AUTO: 6.9 10E9/L (ref 4–11)

## 2018-11-05 PROCEDURE — 99214 OFFICE O/P EST MOD 30 MIN: CPT | Performed by: PHYSICIAN ASSISTANT

## 2018-11-05 PROCEDURE — 80048 BASIC METABOLIC PNL TOTAL CA: CPT | Performed by: PHYSICIAN ASSISTANT

## 2018-11-05 PROCEDURE — 36415 COLL VENOUS BLD VENIPUNCTURE: CPT | Performed by: PHYSICIAN ASSISTANT

## 2018-11-05 PROCEDURE — 85025 COMPLETE CBC W/AUTO DIFF WBC: CPT | Performed by: PHYSICIAN ASSISTANT

## 2018-11-05 ASSESSMENT — PAIN SCALES - GENERAL: PAINLEVEL: SEVERE PAIN (6)

## 2018-11-05 NOTE — MR AVS SNAPSHOT
After Visit Summary   11/5/2018    Indra Mehta    MRN: [id number]           Patient Information     Date Of Birth          1965        Visit Information        Provider Department      11/5/2018 3:00 PM Jg Mckay PA-C [facility]        Today's Diagnoses     Preop general physical exam    -  1      Care Instructions      Before Your Surgery      Call your surgeon if there is any change in your health. This includes signs of a cold or flu (such as a sore throat, runny nose, cough, rash or fever).    Do not smoke, drink alcohol or take over the counter medicine (unless your surgeon or primary care doctor tells you to) for the 24 hours before and after surgery.    If you take prescribed drugs: Follow your doctor s orders about which medicines to take and which to stop until after surgery.    Eating and drinking prior to surgery: follow the instructions from your surgeon    Take a shower or bath the night before surgery. Use the soap your surgeon gave you to gently clean your skin. If you do not have soap from your surgeon, use your regular soap. Do not shave or scrub the surgery site.  Wear clean pajamas and have clean sheets on your bed.           Follow-ups after your visit        Your next 10 appointments already scheduled     Nov 08, 2018   Procedure with Darryn Mcdaniel MD   [facility] ([facility])    [address] [place] [address]  [place] [id number] [phone]           From [address]: Exit at [address] on south side of [place]. Turn right on [address]. Turn left at stoplight on [address]. [facility] will be in view two blocks ahead            Nov 08, 2018  8:00 AM CST   XR SURGERY SANDEEP LESS THAN 5 MIN FLUORO W STILLS with PHCARM1   [facility] ([facility])    [address] [id number] [phone]           How do I prepare for my exam? (Food and drink  instructions) No Food and Drink Restrictions.  How do I prepare for my exam? (Other instructions) You do not need to do anything special for this exam.  What should I wear: Wear comfortable clothes.  How long does the exam take: Most scans take less than 5 minutes.  What should I bring: Bring a list of your medicines, including vitamins, minerals and over-the-counter drugs. It is safest to leave personal items at home.  Do I need a :  No  is needed.  What do I need to tell my doctor: Tell your doctor if there s any chance you are pregnant.  What should I do after the exam: No restrictions, You may resume normal activities.  What is this test: An image of a specific body part shown in shades of black and white.  Who should I call with questions: If you have any questions, please call the Imaging Department where you will have your exam. Directions, parking instructions, and other information is available on our website, [email or web address].            Dec 10, 2018  3:30 PM CST   Return Visit with Boni Freeman MD   [facility] ([facility])    [address] [id number] [phone]            Dec 19, 2018   Procedure with Mike Mckenna MD   [facility] ([facility])    [address] [place] [id number] [phone]           From [address]: Exit at [address] on south side of [place]. Turn right on [address]. Turn left at stoplight on [address]. [facility] will be in view two blocks ahead              Who to contact     If you have questions or need follow up information about today's clinic visit or your schedule please contact [facility] directly at [phone].  Normal or non-critical lab and imaging results will be communicated to you by MyChart, letter or phone within 4 business days after the clinic has received the results. If you do not hear  "from us within 7 days, please contact the clinic through [employer] or phone. If you have a critical or abnormal lab result, we will notify you by phone as soon as possible.  Submit refill requests through [employer] or call your pharmacy and they will forward the refill request to us. Please allow 3 business days for your refill to be completed.          Additional Information About Your Visit        [employer] Information     [employer] gives you secure access to your electronic health record. If you see a primary care provider, you can also send messages to your care team and make appointments. If you have questions, please call your primary care clinic.  If you do not have a primary care provider, please call [phone] and they will assist you.        Care EveryWhere ID     This is your Care EveryWhere ID. This could be used by other organizations to access your [facility] medical records  [id number]        Your Vitals Were     Pulse Temperature Respirations Height Pulse Oximetry BMI (Body Mass Index)    76 96.7  F (35.9  C) (Oral) 12 6' 0.75\" (1.848 m) 98% 27.64 kg/m2       Blood Pressure from Last 3 Encounters:   11/05/18 124/80   09/20/18 136/78   09/11/18 140/88    Weight from Last 3 Encounters:   11/05/18 208 lb 1.6 oz (94.4 kg)   10/22/18 211 lb (95.7 kg)   09/20/18 210 lb (95.3 kg)              We Performed the Following     Basic metabolic panel  (Ca, Cl, CO2, Creat, Gluc, K, Na, BUN)     CBC with platelets and differential        Primary Care Provider Office Phone # Fax #    Tha Grullon [phone]       [address] [facility] [address]        Equal Access to Services     [facility] : Hadii marva Menendez, jaime menezes, qamikala romero . So [facility] [phone].    ATENCIÓN: Si habla español, tiene a lundy disposición servicios gratuitos de asistencia lingüística. Llame al [phone].    We comply with applicable federal civil " rights laws and [place] laws. We do not discriminate on the basis of race, color, national origin, age, disability, sex, sexual orientation, or gender identity.            Thank you!     Thank you for choosing [facility]  for your care. Our goal is always to provide you with excellent care. Hearing back from our patients is one way we can continue to improve our services. Please take a few minutes to complete the written survey that you may receive in the mail after your visit with us. Thank you!             Your Updated Medication List - Protect others around you: Learn how to safely use, store and throw away your medicines at www.disposemymeds.org.          This list is accurate as of 11/5/18  3:22 PM.  Always use your most recent med list.                   Brand Name Dispense Instructions for use Diagnosis    ALPRAZolam 0.5 MG 24 hr tablet    XANAX XR    30 tablet    Take 1 tablet (0.5 mg) by mouth At Bedtime    Pulsatile tinnitus       cyclobenzaprine 10 MG tablet    FLEXERIL    60 tablet    Take 1 tablet (10 mg) by mouth 2 times daily as needed for muscle spasms Reported on 4/12/2017    Muscle spasm       gabapentin 400 MG capsule    NEURONTIN    270 capsule    Take 3 capsules (1,200 mg) by mouth 3 times daily    Cervicalgia       HYDROcodone-acetaminophen 5-325 MG per tablet    NORCO    120 tablet    Take 1 tablet by mouth every 6 hours as needed for moderate to severe pain May fill on/after 10/13/2017 NOT to start till 10/15/2017.    S/P cervical spinal fusion       IBUPROFEN PO           losartan 50 MG tablet    COZAAR    90 tablet    Take 1 tablet (50 mg) by mouth daily    Benign essential hypertension       methocarbamol 500 MG tablet    ROBAXIN    180 tablet    Take 2 tablets (1,000 mg) by mouth 3 times daily as needed for muscle spasms    Cervical radiculopathy, Myofascial pain       order for DME     1 Device    Equipment being ordered: TENS Pads as directed    Chronic pain syndrome        tiZANidine 4 MG tablet    ZANAFLEX    60 tablet    Take 1 tablet (4 mg) by mouth 2 times daily as needed for muscle spasms    Muscle spasm       TYLENOL PO      Take 500 mg by mouth every 4 hours as needed for mild pain or fever

## 2018-11-05 NOTE — NURSING NOTE
Health Maintenance Due   Topic Date Due     HIV SCREEN (SYSTEM ASSIGNED)  07/01/1984     LIPID SCREEN Q5 YR MALE (SYSTEM ASSIGNED)  03/29/2018     INFLUENZA VACCINE (1) 09/01/2018     URINE DRUG SCREEN Q1 YR  10/20/2018     Deysi BARDALES LPN

## 2018-11-05 NOTE — PROGRESS NOTES
[facility]  [address] [facility] [id number] [phone]  Dept: [phone]    PRE-OP EVALUATION:  Today's date: 2018    Indra Mehta (: 1965) presents for pre-operative evaluation assessment as requested by Dr. Mcdaniel.  He requires evaluation and anesthesia risk assessment prior to undergoing surgery/procedure for treatment of back problems .    Fax number for surgical facility: [address]   [address]  Tel. [phone] / Fax [phone]    Primary Physician: Tha Grullon  Type of Anesthesia Anticipated: Monitor Anesthesia Care    Patient has a Health Care Directive or Living Will:  NO    Preop Questions 2018   Who is doing your surgery? juana   What are you having done? lumbar steroid injection   Date of Surgery/Procedure:    Facility or Hospital where procedure/surgery will be performed: [facility]   1.  Do you have a history of Heart attack, stroke, stent, coronary bypass surgery, or other heart surgery? No   2.  Do you ever have any pain or discomfort in your chest? No   3.  Do you have a history of  Heart Failure? No   4.   Are you troubled by shortness of breath when:  walking on a level surface, or up a slight hill, or at night? No   5.  Do you currently have a cold, bronchitis or other respiratory infection? No   6.  Do you have a cough, shortness of breath, or wheezing? No   7.  Do you sometimes get pains in the calves of your legs when you walk? No   8. Do you or anyone in your family have previous history of blood clots? No   9.  Do you or does anyone in your family have a serious bleeding problem such as prolonged bleeding following surgeries or cuts? No   10. Have you ever had problems with anemia or been told to take iron pills? No   11. Have you had any abnormal blood loss such as black, tarry or bloody stools? No   12. Have you ever had a blood transfusion? No   13. Have you or  any of your relatives ever had problems with anesthesia? No   14. Do you have sleep apnea, excessive snoring or daytime drowsiness? No   15. Do you have any prosthetic heart valves? No   16. Do you have prosthetic joints? No         HPI:     HPI related to upcoming procedure:   Patient is a 53 year old male who presents today for pre-operative evaluation. He is scheduled for epidural injection on 11/08. Review of records shows that MRI on 09/2018 found DDD at mulitple levels with central stenosis at L2-L3. Patient says that he has had these injections in the past and done well with them. His last injection, however, caused more pain than relief. He believes that the previous injection may have been placed into the meningeal layer. No other health concerns at this time.       See problem list for active medical problems.  Problems all longstanding and stable, except as noted/documented.  See ROS for pertinent symptoms related to these conditions.                                                                                                                                                          .    MEDICAL HISTORY:     Patient Active Problem List    Diagnosis Date Noted     Benign essential hypertension 06/30/2017     Priority: Medium     Myalgia 10/28/2016     Priority: Medium     Bilateral occipital neuralgia 10/28/2016     Priority: Medium     CARDIOVASCULAR SCREENING; LDL GOAL LESS THAN 160 10/31/2010     Priority: Medium     Major depression in complete remission (H) 08/20/2010     Priority: Medium     Chronic pain syndrome 08/20/2010     Priority: Medium     Patient is followed by Tha Grullon MD for ongoing prescription of pain medication.  All refills should be approved by this provider only at face-to-face appointments - not by phone request.    Medication(s): Tramadol.   Maximum quantity per month: 60  Clinic visit frequency required: Q 1 months     Controlled substance agreement:  Encounter-Level CSA  - 10/13/16:               Controlled Substance Agreement - Scan on 10/23/2016  5:07 PM : CONTROLLED SUBSTANCE AGREEMENT 10/13/16 (below)            Pain Clinic evaluation in the past: Yes       Date/Location:   [facility]    DIRE Total Score(s):  No flowsheet data found.    Last [facility] website verification:  none   [email or web address]               Pain medication agreement signed 08/20/2010     Priority: Medium     Meniere's disease 03/22/2007     Priority: Medium     Problem list name updated by automated process. Provider to review        Past Medical History:   Diagnosis Date     Back pain      Meniere's disease, unspecified      Pain medication agreement signed 8/20/2010     Past Surgical History:   Procedure Laterality Date     BUNIONECTOMY RT/LT  09/05/08    Both feet     COLONOSCOPY N/A 12/28/2016    Procedure: COMBINED COLONOSCOPY, SINGLE OR MULTIPLE BIOPSY/POLYPECTOMY BY BIOPSY;  Surgeon: Indra Jernigan MD;  Location: [facility]     DISCECTOMY, FUSION CERVICAL ANTERIOR ONE LEVEL, COMBINED N/A 7/5/2017    Procedure: COMBINED DISCECTOMY, FUSION CERVICAL ANTERIOR ONE LEVEL;  Cervical 6-7, Anterior cervical discectomy fusion;  Surgeon: Mike Mckenna MD;  Location: [facility]     HC COLONOSCOPY W/WO BRUSH/WASH  12/27/2005     HC CREATE EARDRUM OPENING,GEN ANESTH  09/27/2007    Pe tube, Left endolymphatic sac enhancement.     HC MASTOIDECTOMY,COMPLETE  09/27/2007     HERNIORRHAPHY UMBILICAL N/A 8/11/2017    Procedure: HERNIORRHAPHY UMBILICAL;  open umbilical hernia repair;  Surgeon: Boni Story MD;  Location: [facility]     INJECT EPIDURAL LUMBAR N/A 11/9/2017    Procedure: INJECT EPIDURAL LUMBAR;  lumbar 4-5 epidural steroid injection ;  Surgeon: Darryn Mcdaniel MD;  Location: [facility]     INJECT EPIDURAL LUMBAR N/A 12/28/2017    Procedure: INJECT EPIDURAL LUMBAR;  lumbar epidural injection;  Surgeon: Darryn Mcdaniel MD;  Location: [facility]     INJECT EPIDURAL TRANSFORAMINAL Bilateral 8/23/2018     Procedure: INJECT EPIDURAL TRANSFORAMINAL;  transforaminal bilateral lumbar 3-4 steroid injection;  Surgeon: Darryn Mcdaniel MD;  Location: [facility]     PE TUBES  2006    left ear     Current Outpatient Prescriptions   Medication Sig Dispense Refill     Acetaminophen (TYLENOL PO) Take 500 mg by mouth every 4 hours as needed for mild pain or fever       ALPRAZolam (XANAX XR) 0.5 MG 24 hr tablet Take 1 tablet (0.5 mg) by mouth At Bedtime 30 tablet 2     gabapentin (NEURONTIN) 400 MG capsule Take 3 capsules (1,200 mg) by mouth 3 times daily 270 capsule 3     HYDROcodone-acetaminophen (NORCO) 5-325 MG per tablet Take 1 tablet by mouth every 6 hours as needed for moderate to severe pain May fill on/after 10/13/2017 NOT to start till 10/15/2017. 120 tablet 0     IBUPROFEN PO        losartan (COZAAR) 50 MG tablet Take 1 tablet (50 mg) by mouth daily 90 tablet 0     order for DME Equipment being ordered: TENS Pads as directed 1 Device 0     tiZANidine (ZANAFLEX) 4 MG tablet Take 1 tablet (4 mg) by mouth 2 times daily as needed for muscle spasms 60 tablet 3     cyclobenzaprine (FLEXERIL) 10 MG tablet Take 1 tablet (10 mg) by mouth 2 times daily as needed for muscle spasms Reported on 4/12/2017 (Patient not taking: Reported on 11/5/2018) 60 tablet 1     methocarbamol (ROBAXIN) 500 MG tablet Take 2 tablets (1,000 mg) by mouth 3 times daily as needed for muscle spasms (Patient not taking: Reported on 11/5/2018) 180 tablet 1     OTC products: None, except as noted above. Patient advised to hold NSAIDs 3-5 days prior to procedure.     Allergies   Allergen Reactions     No Known Drug Allergies       Latex Allergy: NO    Social History   Substance Use Topics     Smoking status: Never Smoker     Smokeless tobacco: Never Used     Alcohol use No     History   Drug Use No       REVIEW OF SYSTEMS:   Constitutional, HEENT, cardiovascular, pulmonary, gi and gu systems are negative, except as otherwise noted.    EXAM:   /80 (BP  "Location: Right arm, Patient Position: Chair, Cuff Size: Adult Large)  Pulse 76  Temp 96.7  F (35.9  C) (Oral)  Resp 12  Ht 6' 0.75\" (1.848 m)  Wt 208 lb 1.6 oz (94.4 kg)  SpO2 98%  BMI 27.64 kg/m2    GENERAL APPEARANCE: healthy, alert and no distress     EYES: EOMI,  PERRL     HENT: ear canals and TM's normal and nose and mouth without ulcers or lesions     NECK: no adenopathy, no asymmetry, masses, or scars and thyroid normal to palpation     RESP: lungs clear to auscultation - no rales, rhonchi or wheezes     CV: regular rates and rhythm, normal S1 S2, no S3 or S4 and no murmur, click or rub     ABDOMEN:  soft, nontender, no HSM or masses and bowel sounds normal     MS: extremities normal- no gross deformities noted, no evidence of inflammation in joints, FROM in all extremities.     SKIN: no suspicious lesions or rashes     NEURO: Normal strength and tone, sensory exam grossly normal, mentation intact and speech normal     PSYCH: mentation appears normal. and affect normal/bright    DIAGNOSTICS:     Labs Resulted Today:   Results for orders placed or performed in visit on 11/05/18   CBC with platelets and differential   Result Value Ref Range    WBC 6.9 4.0 - 11.0 10e9/L    RBC Count 4.78 4.4 - 5.9 10e12/L    Hemoglobin 14.0 13.3 - 17.7 g/dL    Hematocrit 41.6 40.0 - 53.0 %    MCV 87 78 - 100 fl    MCH 29.3 26.5 - 33.0 pg    MCHC 33.7 31.5 - 36.5 g/dL    RDW 13.0 10.0 - 15.0 %    Platelet Count 271 150 - 450 10e9/L    % Neutrophils 58.4 %    % Lymphocytes 31.1 %    % Monocytes 8.7 %    % Eosinophils 1.4 %    % Basophils 0.4 %    Absolute Neutrophil 4.0 1.6 - 8.3 10e9/L    Absolute Lymphocytes 2.2 0.8 - 5.3 10e9/L    Absolute Monocytes 0.6 0.0 - 1.3 10e9/L    Absolute Eosinophils 0.1 0.0 - 0.7 10e9/L    Absolute Basophils 0.0 0.0 - 0.2 10e9/L    Diff Method Automated Method    Basic metabolic panel  (Ca, Cl, CO2, Creat, Gluc, K, Na, BUN)   Result Value Ref Range    Sodium 142 133 - 144 mmol/L    Potassium " 3.7 3.4 - 5.3 mmol/L    Chloride 107 94 - 109 mmol/L    Carbon Dioxide 27 20 - 32 mmol/L    Anion Gap 8 3 - 14 mmol/L    Glucose 94 70 - 99 mg/dL    Urea Nitrogen 15 7 - 30 mg/dL    Creatinine 0.96 0.66 - 1.25 mg/dL    GFR Estimate 82 >60 mL/min/1.7m2    GFR Estimate If Black >90 >60 mL/min/1.7m2    Calcium 8.7 8.5 - 10.1 mg/dL       Recent Labs   Lab Test  03/15/18   1052  01/26/18   1705  06/30/17   1613  03/24/16   1900   HGB  14.9  15.1   --   15.7   PLT  230  239   --   259   NA   --    --   142  142   POTASSIUM   --    --   3.9  3.5   CR  0.92   --   1.15  1.06        IMPRESSION:   Reason for surgery/procedure: back pain  Diagnosis/reason for consult: Lumbar back pain    The proposed surgical procedure is considered INTERMEDIATE risk.    REVISED CARDIAC RISK INDEX  The patient has the following serious cardiovascular risks for perioperative complications such as (MI, PE, VFib and 3  AV Block):  No serious cardiac risks  INTERPRETATION: 0 risks: Class I (very low risk - 0.4% complication rate)    The patient has the following additional risks for perioperative complications:  No identified additional risks        ICD-10-CM    1. Preop general physical exam Z01.818 CBC with platelets and differential     Basic metabolic panel  (Ca, Cl, CO2, Creat, Gluc, K, Na, BUN)   2. Lumbar back pain M54.5        RECOMMENDATIONS:     --Patient is to take all scheduled medications on the day of surgery EXCEPT for modifications listed below.  NSAIDs    APPROVAL GIVEN to proceed with proposed procedure, without further diagnostic evaluation       Signed Electronically by: Jg Mckay PA-C    Copy of this evaluation report is provided to requesting physician.    [facility] Preop Guidelines    Revised Cardiac Risk Index

## 2018-11-06 ENCOUNTER — TELEPHONE (OUTPATIENT)
Dept: PALLIATIVE MEDICINE | Facility: CLINIC | Age: 53
End: 2018-11-06

## 2018-11-06 DIAGNOSIS — Z98.1 S/P CERVICAL SPINAL FUSION: ICD-10-CM

## 2018-11-06 RX ORDER — LOSARTAN POTASSIUM 50 MG/1
TABLET ORAL
Qty: 90 TABLET | Refills: 0 | Status: SHIPPED | OUTPATIENT
Start: 2018-11-06 | End: 2019-02-25

## 2018-11-06 NOTE — TELEPHONE ENCOUNTER
Routing refill request to provider for review/approval because:  Labs not current:  Potassium    EARLINE Thomas, RN  [facility]

## 2018-11-06 NOTE — TELEPHONE ENCOUNTER
Received call from patient requesting refill(s) of HYDROcodone-acetaminophen (NORCO) 5-325 MG per tablet    Last picked up from pharmacy on 10/13/18    Pt last seen by prescribing provider on 09/11/18  Next appt scheduled for 12/10/18 - note to update DSD and OA on note     checked in the past 6 months? Yes If no, print current report and give to RN    Last urine drug screen date 10/20/17  Current opioid agreement on file (completed within the last year) No Date of opioid agreement: 06/15/17    Processing (pick one and delete the others):      Mail to [facility] pharmacy   [address] [place] [id number]    Will route to nursing pool for review and preparation of prescription(s).

## 2018-11-06 NOTE — TELEPHONE ENCOUNTER
Reason for call:  Medication   If this is a refill request, has the caller requested the refill from the pharmacy already? No  Will the patient be using a [facility]? No  Name of the pharmacy and phone number for the current request: Mailed to [facility] [employer] [facility]    Name of the medication requested: HYDROcodone-acetaminophen (NORCO) 5-325 MG per tablet    Other request: Mailed to Leslie Munson in [facility]    Phone number to reach patient:  Home number on file [phone] (home)      Can we leave a detailed message on this number?  YES       Sterling SANDRA    [facility]

## 2018-11-06 NOTE — TELEPHONE ENCOUNTER
"Requested Prescriptions   Pending Prescriptions Disp Refills     losartan (COZAAR) 50 MG tablet [Pharmacy Med Name: LOSARTAN 50MG TABLET] 90 tablet     Last Written Prescription Date:  8/1/18  Last Fill Quantity: 90,  # refills: 0   Last office visit: 11/5/2018 with prescribing provider:  8/9/18   Future Office Visit:   Next 5 appointments (look out 90 days)     Dec 10, 2018  3:30 PM CST   Return Visit with Boni Freeman MD   [facility] [place] ([facility])    [address]  [place] [address]   [phone]                Sig: TAKE 1 TABLET BY MOUTH EVERY DAY    Angiotensin-II Receptors Failed    11/5/2018  7:27 AM       Failed - Normal serum potassium on file in past 12 months    Recent Labs   Lab Test  11/05/18   1532   POTASSIUM  3.7                   Passed - Blood pressure under 140/90 in past 12 months    BP Readings from Last 3 Encounters:   11/05/18 124/80   09/20/18 136/78   09/11/18 140/88                Passed - Recent (12 mo) or future (30 days) visit within the authorizing provider's specialty    Patient had office visit in the last 12 months or has a visit in the next 30 days with authorizing provider or within the authorizing provider's specialty.  See \"Patient Info\" tab in inbasket, or \"Choose Columns\" in Meds & Orders section of the refill encounter.             Passed - Patient is age 18 or older       Passed - Normal serum creatinine on file in past 12 months    Recent Labs   Lab Test  11/05/18   1532   CR  0.96             "

## 2018-11-07 NOTE — TELEPHONE ENCOUNTER
Medication refill information reviewed.     Due date for HYDROcodone-acetaminophen (NORCO) 5-325 MG per tablet is 11/14/18     Prescriptions prepped for review.     Will route to provider.     Mail to [facility] pharmacy   [address] [place] [address]      Mariano Mcbride, RN  Care Coordinator   [facility]

## 2018-11-07 NOTE — H&P (VIEW-ONLY)
[facility]  [address] [facility] [id number] [phone]  Dept: [phone]    PRE-OP EVALUATION:  Today's date: 2018    Indra Mehta (: 1965) presents for pre-operative evaluation assessment as requested by Dr. Mcdaniel.  He requires evaluation and anesthesia risk assessment prior to undergoing surgery/procedure for treatment of back problems .    Fax number for surgical facility: [address]   [address]  Tel. [phone] / Fax [phone]    Primary Physician: Tha Grullon  Type of Anesthesia Anticipated: Monitor Anesthesia Care    Patient has a Health Care Directive or Living Will:  NO    Preop Questions 2018   Who is doing your surgery? juana   What are you having done? lumbar steroid injection   Date of Surgery/Procedure:    Facility or Hospital where procedure/surgery will be performed: [facility]   1.  Do you have a history of Heart attack, stroke, stent, coronary bypass surgery, or other heart surgery? No   2.  Do you ever have any pain or discomfort in your chest? No   3.  Do you have a history of  Heart Failure? No   4.   Are you troubled by shortness of breath when:  walking on a level surface, or up a slight hill, or at night? No   5.  Do you currently have a cold, bronchitis or other respiratory infection? No   6.  Do you have a cough, shortness of breath, or wheezing? No   7.  Do you sometimes get pains in the calves of your legs when you walk? No   8. Do you or anyone in your family have previous history of blood clots? No   9.  Do you or does anyone in your family have a serious bleeding problem such as prolonged bleeding following surgeries or cuts? No   10. Have you ever had problems with anemia or been told to take iron pills? No   11. Have you had any abnormal blood loss such as black, tarry or bloody stools? No   12. Have you ever had a blood transfusion? No   13. Have you or  any of your relatives ever had problems with anesthesia? No   14. Do you have sleep apnea, excessive snoring or daytime drowsiness? No   15. Do you have any prosthetic heart valves? No   16. Do you have prosthetic joints? No         HPI:     HPI related to upcoming procedure:   Patient is a 53 year old male who presents today for pre-operative evaluation. He is scheduled for epidural injection on 11/08. Review of records shows that MRI on 09/2018 found DDD at mulitple levels with central stenosis at L2-L3. Patient says that he has had these injections in the past and done well with them. His last injection, however, caused more pain than relief. He believes that the previous injection may have been placed into the meningeal layer. No other health concerns at this time.       See problem list for active medical problems.  Problems all longstanding and stable, except as noted/documented.  See ROS for pertinent symptoms related to these conditions.                                                                                                                                                          .    MEDICAL HISTORY:     Patient Active Problem List    Diagnosis Date Noted     Benign essential hypertension 06/30/2017     Priority: Medium     Myalgia 10/28/2016     Priority: Medium     Bilateral occipital neuralgia 10/28/2016     Priority: Medium     CARDIOVASCULAR SCREENING; LDL GOAL LESS THAN 160 10/31/2010     Priority: Medium     Major depression in complete remission (H) 08/20/2010     Priority: Medium     Chronic pain syndrome 08/20/2010     Priority: Medium     Patient is followed by Tha Grullon MD for ongoing prescription of pain medication.  All refills should be approved by this provider only at face-to-face appointments - not by phone request.    Medication(s): Tramadol.   Maximum quantity per month: 60  Clinic visit frequency required: Q 1 months     Controlled substance agreement:  Encounter-Level CSA  - 10/13/16:               Controlled Substance Agreement - Scan on 10/23/2016  5:07 PM : CONTROLLED SUBSTANCE AGREEMENT 10/13/16 (below)            Pain Clinic evaluation in the past: Yes       Date/Location:   [facility]    DIRE Total Score(s):  No flowsheet data found.    Last [facility] website verification:  none   [email or web address]               Pain medication agreement signed 08/20/2010     Priority: Medium     Meniere's disease 03/22/2007     Priority: Medium     Problem list name updated by automated process. Provider to review        Past Medical History:   Diagnosis Date     Back pain      Meniere's disease, unspecified      Pain medication agreement signed 8/20/2010     Past Surgical History:   Procedure Laterality Date     BUNIONECTOMY RT/LT  09/05/08    Both feet     COLONOSCOPY N/A 12/28/2016    Procedure: COMBINED COLONOSCOPY, SINGLE OR MULTIPLE BIOPSY/POLYPECTOMY BY BIOPSY;  Surgeon: Indra Jernigan MD;  Location: [facility]     DISCECTOMY, FUSION CERVICAL ANTERIOR ONE LEVEL, COMBINED N/A 7/5/2017    Procedure: COMBINED DISCECTOMY, FUSION CERVICAL ANTERIOR ONE LEVEL;  Cervical 6-7, Anterior cervical discectomy fusion;  Surgeon: Mike Mckenna MD;  Location: [facility]     HC COLONOSCOPY W/WO BRUSH/WASH  12/27/2005     HC CREATE EARDRUM OPENING,GEN ANESTH  09/27/2007    Pe tube, Left endolymphatic sac enhancement.     HC MASTOIDECTOMY,COMPLETE  09/27/2007     HERNIORRHAPHY UMBILICAL N/A 8/11/2017    Procedure: HERNIORRHAPHY UMBILICAL;  open umbilical hernia repair;  Surgeon: Boni Story MD;  Location: [facility]     INJECT EPIDURAL LUMBAR N/A 11/9/2017    Procedure: INJECT EPIDURAL LUMBAR;  lumbar 4-5 epidural steroid injection ;  Surgeon: Darryn Mcdaniel MD;  Location: [facility]     INJECT EPIDURAL LUMBAR N/A 12/28/2017    Procedure: INJECT EPIDURAL LUMBAR;  lumbar epidural injection;  Surgeon: Darryn Mcdaniel MD;  Location: [facility]     INJECT EPIDURAL TRANSFORAMINAL Bilateral 8/23/2018     Procedure: INJECT EPIDURAL TRANSFORAMINAL;  transforaminal bilateral lumbar 3-4 steroid injection;  Surgeon: Darryn Mcdaniel MD;  Location: [facility]     PE TUBES  2006    left ear     Current Outpatient Prescriptions   Medication Sig Dispense Refill     Acetaminophen (TYLENOL PO) Take 500 mg by mouth every 4 hours as needed for mild pain or fever       ALPRAZolam (XANAX XR) 0.5 MG 24 hr tablet Take 1 tablet (0.5 mg) by mouth At Bedtime 30 tablet 2     gabapentin (NEURONTIN) 400 MG capsule Take 3 capsules (1,200 mg) by mouth 3 times daily 270 capsule 3     HYDROcodone-acetaminophen (NORCO) 5-325 MG per tablet Take 1 tablet by mouth every 6 hours as needed for moderate to severe pain May fill on/after 10/13/2017 NOT to start till 10/15/2017. 120 tablet 0     IBUPROFEN PO        losartan (COZAAR) 50 MG tablet Take 1 tablet (50 mg) by mouth daily 90 tablet 0     order for DME Equipment being ordered: TENS Pads as directed 1 Device 0     tiZANidine (ZANAFLEX) 4 MG tablet Take 1 tablet (4 mg) by mouth 2 times daily as needed for muscle spasms 60 tablet 3     cyclobenzaprine (FLEXERIL) 10 MG tablet Take 1 tablet (10 mg) by mouth 2 times daily as needed for muscle spasms Reported on 4/12/2017 (Patient not taking: Reported on 11/5/2018) 60 tablet 1     methocarbamol (ROBAXIN) 500 MG tablet Take 2 tablets (1,000 mg) by mouth 3 times daily as needed for muscle spasms (Patient not taking: Reported on 11/5/2018) 180 tablet 1     OTC products: None, except as noted above. Patient advised to hold NSAIDs 3-5 days prior to procedure.     Allergies   Allergen Reactions     No Known Drug Allergies       Latex Allergy: NO    Social History   Substance Use Topics     Smoking status: Never Smoker     Smokeless tobacco: Never Used     Alcohol use No     History   Drug Use No       REVIEW OF SYSTEMS:   Constitutional, HEENT, cardiovascular, pulmonary, gi and gu systems are negative, except as otherwise noted.    EXAM:   /80 (BP  "Location: Right arm, Patient Position: Chair, Cuff Size: Adult Large)  Pulse 76  Temp 96.7  F (35.9  C) (Oral)  Resp 12  Ht 6' 0.75\" (1.848 m)  Wt 208 lb 1.6 oz (94.4 kg)  SpO2 98%  BMI 27.64 kg/m2    GENERAL APPEARANCE: healthy, alert and no distress     EYES: EOMI,  PERRL     HENT: ear canals and TM's normal and nose and mouth without ulcers or lesions     NECK: no adenopathy, no asymmetry, masses, or scars and thyroid normal to palpation     RESP: lungs clear to auscultation - no rales, rhonchi or wheezes     CV: regular rates and rhythm, normal S1 S2, no S3 or S4 and no murmur, click or rub     ABDOMEN:  soft, nontender, no HSM or masses and bowel sounds normal     MS: extremities normal- no gross deformities noted, no evidence of inflammation in joints, FROM in all extremities.     SKIN: no suspicious lesions or rashes     NEURO: Normal strength and tone, sensory exam grossly normal, mentation intact and speech normal     PSYCH: mentation appears normal. and affect normal/bright    DIAGNOSTICS:     Labs Resulted Today:   Results for orders placed or performed in visit on 11/05/18   CBC with platelets and differential   Result Value Ref Range    WBC 6.9 4.0 - 11.0 10e9/L    RBC Count 4.78 4.4 - 5.9 10e12/L    Hemoglobin 14.0 13.3 - 17.7 g/dL    Hematocrit 41.6 40.0 - 53.0 %    MCV 87 78 - 100 fl    MCH 29.3 26.5 - 33.0 pg    MCHC 33.7 31.5 - 36.5 g/dL    RDW 13.0 10.0 - 15.0 %    Platelet Count 271 150 - 450 10e9/L    % Neutrophils 58.4 %    % Lymphocytes 31.1 %    % Monocytes 8.7 %    % Eosinophils 1.4 %    % Basophils 0.4 %    Absolute Neutrophil 4.0 1.6 - 8.3 10e9/L    Absolute Lymphocytes 2.2 0.8 - 5.3 10e9/L    Absolute Monocytes 0.6 0.0 - 1.3 10e9/L    Absolute Eosinophils 0.1 0.0 - 0.7 10e9/L    Absolute Basophils 0.0 0.0 - 0.2 10e9/L    Diff Method Automated Method    Basic metabolic panel  (Ca, Cl, CO2, Creat, Gluc, K, Na, BUN)   Result Value Ref Range    Sodium 142 133 - 144 mmol/L    Potassium " 3.7 3.4 - 5.3 mmol/L    Chloride 107 94 - 109 mmol/L    Carbon Dioxide 27 20 - 32 mmol/L    Anion Gap 8 3 - 14 mmol/L    Glucose 94 70 - 99 mg/dL    Urea Nitrogen 15 7 - 30 mg/dL    Creatinine 0.96 0.66 - 1.25 mg/dL    GFR Estimate 82 >60 mL/min/1.7m2    GFR Estimate If Black >90 >60 mL/min/1.7m2    Calcium 8.7 8.5 - 10.1 mg/dL       Recent Labs   Lab Test  03/15/18   1052  01/26/18   1705  06/30/17   1613  03/24/16   1900   HGB  14.9  15.1   --   15.7   PLT  230  239   --   259   NA   --    --   142  142   POTASSIUM   --    --   3.9  3.5   CR  0.92   --   1.15  1.06        IMPRESSION:   Reason for surgery/procedure: back pain  Diagnosis/reason for consult: Lumbar back pain    The proposed surgical procedure is considered INTERMEDIATE risk.    REVISED CARDIAC RISK INDEX  The patient has the following serious cardiovascular risks for perioperative complications such as (MI, PE, VFib and 3  AV Block):  No serious cardiac risks  INTERPRETATION: 0 risks: Class I (very low risk - 0.4% complication rate)    The patient has the following additional risks for perioperative complications:  No identified additional risks        ICD-10-CM    1. Preop general physical exam Z01.818 CBC with platelets and differential     Basic metabolic panel  (Ca, Cl, CO2, Creat, Gluc, K, Na, BUN)   2. Lumbar back pain M54.5        RECOMMENDATIONS:     --Patient is to take all scheduled medications on the day of surgery EXCEPT for modifications listed below.  NSAIDs    APPROVAL GIVEN to proceed with proposed procedure, without further diagnostic evaluation       Signed Electronically by: Jg Mckay PA-C    Copy of this evaluation report is provided to requesting physician.    [place] Preop Guidelines    Revised Cardiac Risk Index

## 2018-11-08 ENCOUNTER — HOSPITAL ENCOUNTER (OUTPATIENT)
Dept: GENERAL RADIOLOGY | Facility: CLINIC | Age: 53
End: 2018-11-08
Attending: ANESTHESIOLOGY | Admitting: ANESTHESIOLOGY
Payer: COMMERCIAL

## 2018-11-08 ENCOUNTER — HOSPITAL ENCOUNTER (OUTPATIENT)
Facility: CLINIC | Age: 53
Discharge: HOME | End: 2018-11-08
Attending: ANESTHESIOLOGY | Admitting: ANESTHESIOLOGY
Payer: COMMERCIAL

## 2018-11-08 ENCOUNTER — ANESTHESIA (OUTPATIENT)
Dept: SURGERY | Facility: CLINIC | Age: 53
End: 2018-11-08
Payer: COMMERCIAL

## 2018-11-08 ENCOUNTER — ANESTHESIA EVENT (OUTPATIENT)
Dept: SURGERY | Facility: CLINIC | Age: 53
End: 2018-11-08
Payer: COMMERCIAL

## 2018-11-08 ENCOUNTER — HISTORY (OUTPATIENT)
Dept: SURGERY | Facility: CLINIC | Age: 53
End: 2018-11-08
Payer: COMMERCIAL

## 2018-11-08 VITALS
TEMPERATURE: 97.9 F | OXYGEN SATURATION: 98 % | DIASTOLIC BLOOD PRESSURE: 96 MMHG | SYSTOLIC BLOOD PRESSURE: 128 MMHG | RESPIRATION RATE: 16 BRPM | HEART RATE: 61 BPM

## 2018-11-08 DIAGNOSIS — M54.16 LUMBAR RADICULOPATHY: ICD-10-CM

## 2018-11-08 PROCEDURE — 25000128 H RX IP 250 OP 636: Performed by: NURSE ANESTHETIST, CERTIFIED REGISTERED

## 2018-11-08 PROCEDURE — 64483 NJX AA&/STRD TFRM EPI L/S 1: CPT | Performed by: ANESTHESIOLOGY

## 2018-11-08 PROCEDURE — 25000125 ZZHC RX 250: Performed by: NURSE ANESTHETIST, CERTIFIED REGISTERED

## 2018-11-08 PROCEDURE — 25000128 H RX IP 250 OP 636: Performed by: ANESTHESIOLOGY

## 2018-11-08 PROCEDURE — 64483 NJX AA&/STRD TFRM EPI L/S 1: CPT | Mod: 50 | Performed by: ANESTHESIOLOGY

## 2018-11-08 PROCEDURE — 25000125 ZZHC RX 250: Performed by: ANESTHESIOLOGY

## 2018-11-08 PROCEDURE — 37000008 ZZH ANESTHESIA TECHNICAL FEE, 1ST 30 MIN: Performed by: ANESTHESIOLOGY

## 2018-11-08 PROCEDURE — 40000277 XR SURGERY CARM FLUORO LESS THAN 5 MIN W STILLS: Mod: TC

## 2018-11-08 RX ORDER — HYDROCODONE BITARTRATE AND ACETAMINOPHEN 5; 325 MG/1; MG/1
1 TABLET ORAL EVERY 6 HOURS PRN
Qty: 120 TABLET | Refills: 0 | Status: SHIPPED | OUTPATIENT
Start: 2018-11-08 | End: 2018-12-10

## 2018-11-08 RX ORDER — TRIAMCINOLONE ACETONIDE 40 MG/ML
INJECTION, SUSPENSION INTRA-ARTICULAR; INTRAMUSCULAR PRN
Status: DISCONTINUED | OUTPATIENT
Start: 2018-11-08 | End: 2018-11-08 | Stop reason: HOSPADM

## 2018-11-08 RX ORDER — LIDOCAINE 40 MG/G
CREAM TOPICAL
Status: DISCONTINUED | OUTPATIENT
Start: 2018-11-08 | End: 2018-11-08 | Stop reason: HOSPADM

## 2018-11-08 RX ORDER — ONDANSETRON 2 MG/ML
4 INJECTION INTRAMUSCULAR; INTRAVENOUS EVERY 30 MIN PRN
Status: DISCONTINUED | OUTPATIENT
Start: 2018-11-08 | End: 2018-11-08 | Stop reason: HOSPADM

## 2018-11-08 RX ORDER — ONDANSETRON 4 MG/1
4 TABLET, ORALLY DISINTEGRATING ORAL EVERY 30 MIN PRN
Status: DISCONTINUED | OUTPATIENT
Start: 2018-11-08 | End: 2018-11-08 | Stop reason: HOSPADM

## 2018-11-08 RX ORDER — PROPOFOL 10 MG/ML
INJECTION, EMULSION INTRAVENOUS PRN
Status: DISCONTINUED | OUTPATIENT
Start: 2018-11-08 | End: 2018-11-08

## 2018-11-08 RX ORDER — SODIUM CHLORIDE, SODIUM LACTATE, POTASSIUM CHLORIDE, CALCIUM CHLORIDE 600; 310; 30; 20 MG/100ML; MG/100ML; MG/100ML; MG/100ML
INJECTION, SOLUTION INTRAVENOUS CONTINUOUS
Status: DISCONTINUED | OUTPATIENT
Start: 2018-11-08 | End: 2018-11-08 | Stop reason: HOSPADM

## 2018-11-08 RX ORDER — ALBUTEROL SULFATE 0.83 MG/ML
2.5 SOLUTION RESPIRATORY (INHALATION) EVERY 4 HOURS PRN
Status: DISCONTINUED | OUTPATIENT
Start: 2018-11-08 | End: 2018-11-08 | Stop reason: HOSPADM

## 2018-11-08 RX ORDER — LIDOCAINE HYDROCHLORIDE 20 MG/ML
INJECTION, SOLUTION INFILTRATION; PERINEURAL PRN
Status: DISCONTINUED | OUTPATIENT
Start: 2018-11-08 | End: 2018-11-08

## 2018-11-08 RX ADMIN — LIDOCAINE HYDROCHLORIDE 1 ML: 10 INJECTION, SOLUTION EPIDURAL; INFILTRATION; INTRACAUDAL; PERINEURAL at 07:22

## 2018-11-08 RX ADMIN — PROPOFOL 80 MG: 10 INJECTION, EMULSION INTRAVENOUS at 08:09

## 2018-11-08 RX ADMIN — PROPOFOL 30 MG: 10 INJECTION, EMULSION INTRAVENOUS at 08:11

## 2018-11-08 RX ADMIN — MIDAZOLAM 2 MG: 1 INJECTION INTRAMUSCULAR; INTRAVENOUS at 08:06

## 2018-11-08 RX ADMIN — PROPOFOL 30 MG: 10 INJECTION, EMULSION INTRAVENOUS at 08:15

## 2018-11-08 RX ADMIN — LIDOCAINE HYDROCHLORIDE 50 MG: 20 INJECTION, SOLUTION INFILTRATION; PERINEURAL at 08:09

## 2018-11-08 ASSESSMENT — PAIN DESCRIPTION - DESCRIPTORS: DESCRIPTORS: ACHING;CONSTANT;DULL;SHARP

## 2018-11-08 NOTE — TELEPHONE ENCOUNTER
Signed Prescriptions:                        Disp   Refills    HYDROcodone-acetaminophen (NORCO) 5-325 MG*120 ta*0        Sig: Take 1 tablet by mouth every 6 hours as needed for           moderate to severe pain May fill on/after           11/12/2017 NOT to start till 11/14/2017.  Authorizing Provider: BONI NAYLOR    Reviewed, printed, signed in [place].  Given to MA for processing.    Boni Freeman MD  [facility]

## 2018-11-08 NOTE — ANESTHESIA PREPROCEDURE EVALUATION
Anesthesia Evaluation     . Pt has had prior anesthetic. Type: General and MAC    No history of anesthetic complications          ROS/MED HX    ENT/Pulmonary:  - neg pulmonary ROS     Neurologic:     (+)neuropathy (bilateral occipital neuralgia) - Bilateral occipital neuralgia, other neuro Menieres disease    Cardiovascular:     (+) Dyslipidemia, hypertension----. : . . . :. . Previous cardiac testing date:results:date: results:ECG reviewed date:6/30/17 results:NSR date: results:          METS/Exercise Tolerance:  >4 METS   Hematologic:  - neg hematologic  ROS       Musculoskeletal:   (+) , , other musculoskeletal- myalgia/back pain      GI/Hepatic:  - neg GI/hepatic ROS       Renal/Genitourinary:  - ROS Renal section negative   (+) Pt has no history of transplant,       Endo:  - neg endo ROS       Psychiatric:     (+) psychiatric history depression      Infectious Disease:  - neg infectious disease ROS       Malignancy:      - no malignancy   Other:    (+) No chance of pregnancy C-spine cleared: Yes, H/O Chronic Pain,H/O chronic opiod use , no other significant disability                    Physical Exam  Normal systems: cardiovascular, pulmonary and dental    Airway   Mallampati: II  TM distance: >3 FB  Neck ROM: full    Dental     Cardiovascular   Rhythm and rate: regular and normal      Pulmonary    breath sounds clear to auscultation                    Anesthesia Plan      History & Physical Review  History and physical reviewed and following examination; no interval change.    ASA Status:  2 .    NPO Status:  > 8 hours    Plan for MAC with Intravenous and Propofol induction. Maintenance will be TIVA.  Reason for MAC:  Deep or markedly invasive procedure (G8)         Postoperative Care      Consents  Anesthetic plan, risks, benefits and alternatives discussed with:  Patient.  Use of blood products discussed: No .   .                          .

## 2018-11-08 NOTE — DISCHARGE INSTRUCTIONS
Home Care Instructions                Procedure:  Epidural Steroid Injection or Joint injection    Activity:    Rest today    Do not work today    Resume normal activity tomorrow    Pain:    You may experience soreness at the injection site for one or two days    You may use an ice pack for 20 minutes every 2 hours for the first 24 hours    You may use a heating pad after the first 24 hours    You may use Tylenol  (acetaminophen) every 4 hours or other pain medicines as directed by your physician    Safety  Sedation medicine, if given may remain active for many hours.    It is important for the next 24 hours that you do not:    Drive a car    Operate machines or power tools    Consume alcohol, including beer    Sign any important papers or legal documents    You may experience numbness radiating into your legs or arms, (depending on the procedure location)  This numbness may last several hours.  Until the numb sensation returns to normal please use caution in walking, climbing stairs, stepping out of your vehicle, etc.    Common side effects of steroids:  Not everyone will experience corticosteroid side effects. If side effects are experienced they will gradually subside in the 7-10 day period following an injection.    Most common side effects include:    Flushed face and/or chest    Feeling of warmth, particularly in face but could be overall feeling of warmth    Increased blood sugar in diabetic patients    Menstrual irregularities may occur.  If taking hormone based birth control an alternate method of birth control is recommended    Sleep disturbances and/or mood swings are possible    Leg cramps    Please contact us if you have:  Severe pain   Fever more than 101.5 degrees Fahrenheit  Signs of infection (redness, swelling or drainage)      If you have questions during normal business hours (8am-5pm Monday-Friday) contact the [facility] at [phone]. If you need help after hours, we recommend that  you go to a hospital emergency room or dial 911.

## 2018-11-08 NOTE — OP NOTE
PRIMARY PROBLEM: Low back pain and leg pains    PROCEDURE: Bilateral L3-4  Transforaminal Epidural Steroid Injection with fluoroscopic guidance and contrast.     PROCEDURE DETAILS: After written informed consent was obtained from the patient, the patient was escorted to the procedure room.  The patient was placed in the prone position.  A  time out  was conducted to verify patient identity, procedure to be performed, side, site, allergies and any special requirements.  The skin over the thoracolumbar region was prepped and draped in normal sterile fashion. Fluoroscopy was used to identify the neural foramen in AP view and the skin was anesthetized with 2 mL of 1% lidocaine with bicarbonate buffer. A 22-gauge Quincke spinal needle was advanced through this location and advanced under fluoroscopic guidance towards the neural foramen.  The target zone was the 6 o clock position of the pedicle.   Prior to entering the foramen, the depth of the needle was gauged with a lateral view on fluoroscopy. While still in a lateral view, the needle was slowly advanced to avoid injury to the spinal nerve.  Then, in the oblique view (approximately 28 degrees), after negative aspiration, 1.5 mL of Omnipaque contrast dye was injected revealing epidural spread without evidence of intravascular or intrathecal spread.  Then a 2.5cc solution of 20 mg of Triamcinolone in 2 mL of  Preservative-Free saline was slowly injected into the epidural space at each segment.  After injection of the medication, as the needle tip was withdrawn, it was flushed with local anesthetic. This was done bilaterally at the L3-4 segment.  The patient was monitored with blood pressure and pulse oximetry machines with the assistance of an RN throughout the procedure.  The patient was alert and responsive to questions throughout the procedure.   The patient tolerated the procedure well and was observed in the post-procedural area.  The patient was dismissed without  apparent complications.     DIAGNOSIS:  1. Disc degeneration causing low back and leg pains  2. lumbar radiculopathy    PLAN:  1. Performed a bilateral L3-4  transforaminal epidural steroid injection.  2. The patient was instructed to follow-up per Dr. Mcdaniel's instructions.      Darryn Mcdaniel MD  Diplomate of the American Board of Anesthesiology, Pain Medicine

## 2018-11-08 NOTE — TELEPHONE ENCOUNTER
The signed norco scripts(s) were mailed to     [id number] - [place], [place] - [address] [place] [id number]  Phone: [phone] Fax: [phone]    Heidy Gipson RN-BSN  [facility]

## 2018-11-08 NOTE — ANESTHESIA CARE TRANSFER NOTE
Patient: Indra Mehta    Procedure(s):  INJECT EPIDURAL TRANSFORAMINAL lumbar 3-4    Diagnosis: lumbar radiculopathy  Diagnosis Additional Information: No value filed.    Anesthesia Type:   MAC     Note:  Airway :Nasal Cannula  Patient transferred to:Phase II  Handoff Report: Identifed the Patient, Identified the Reponsible Provider, Reviewed the pertinent medical history, Discussed the surgical course, Reviewed Intra-OP anesthesia mangement and issues during anesthesia, Set expectations for post-procedure period and Allowed opportunity for questions and acknowledgement of understanding      Vitals: (Last set prior to Anesthesia Care Transfer)    CRNA VITALS  11/8/2018 0756 - 11/8/2018 0855      11/8/2018             Resp Rate (observed): 18                Electronically Signed By: YUMIKO Allison CRNA  November 8, 2018  8:55 AM

## 2018-11-08 NOTE — ANESTHESIA POSTPROCEDURE EVALUATION
Patient: Indra Mehta    Procedure(s):  INJECT EPIDURAL TRANSFORAMINAL lumbar 3-4    Diagnosis:lumbar radiculopathy  Diagnosis Additional Information: No value filed.    Anesthesia Type:  MAC    Note:  Anesthesia Post Evaluation    Patient location during evaluation: Phase 2  Patient participation: Able to fully participate in evaluation  Level of consciousness: awake  Pain management: adequate  Airway patency: patent  Cardiovascular status: acceptable and hemodynamically stable  Respiratory status: acceptable, room air and nonlabored ventilation  Hydration status: stable  PONV: none     Anesthetic complications: None    Comments: Patient was happy with the anesthesia care received and no anesthesia related complications were noted.  I will follow up with the patient again if it is needed.        Last vitals:  Vitals:    11/08/18 0712   BP: (!) 140/95   Pulse: 61   Resp: 16   Temp: 97.9  F (36.6  C)   SpO2: 98%         Electronically Signed By: YUMIKO Allison CRNA  November 8, 2018  8:55 AM

## 2018-11-08 NOTE — IP AVS SNAPSHOT
MRN:[id number]                      After Visit Summary   11/8/2018    Indra Mehta    MRN: [id number]           Thank you!     Thank you for choosing [facility] for your care. Our goal is always to provide you with excellent care. Hearing back from our patients is one way we can continue to improve our services. Please take a few minutes to complete the written survey that you may receive in the mail after you visit with us. Thank you!        Patient Information     Date Of Birth          1965        About your hospital stay     You were admitted on:  November 8, 2018 You last received care in the:  [facility]    You were discharged on:  November 8, 2018       Who to Call     For medical emergencies, please call 911.  For non-urgent questions about your medical care, please call your primary care provider or clinic, [phone]  For questions related to your surgery, please call your surgery clinic        Attending Provider     Provider Specialty    [facility]       Primary Care Provider Office Phone # Fax #    Tha Grullon [phone]      After Care Instructions     Discharge Instructions       Review outpatient procedure discharge instructions as directed by Provider.                  Your next 10 appointments already scheduled     Dec 10, 2018  3:30 PM CST   Return Visit with Boni Freeman MD   [facility] ([facility])    [address]  [place] [address] [phone]            Dec 19, 2018   Procedure with Mike Mckenna MD   [facility] ([facility])    [address] [place] [address]   [phone]           From [address]: Exit at [address] on [place] side of [place]. Turn right on [address]. Turn left at stoplight on [address]. [facility] will be in view two blocks ahead              Further  instructions from your care team       Home Care Instructions                Procedure:  Epidural Steroid Injection or Joint injection    Activity:    Rest today    Do not work today    Resume normal activity tomorrow    Pain:    You may experience soreness at the injection site for one or two days    You may use an ice pack for 20 minutes every 2 hours for the first 24 hours    You may use a heating pad after the first 24 hours    You may use Tylenol  (acetaminophen) every 4 hours or other pain medicines as directed by your physician    Safety  Sedation medicine, if given may remain active for many hours.    It is important for the next 24 hours that you do not:    Drive a car    Operate machines or power tools    Consume alcohol, including beer    Sign any important papers or legal documents    You may experience numbness radiating into your legs or arms, (depending on the procedure location)  This numbness may last several hours.  Until the numb sensation returns to normal please use caution in walking, climbing stairs, stepping out of your vehicle, etc.    Common side effects of steroids:  Not everyone will experience corticosteroid side effects. If side effects are experienced they will gradually subside in the 7-10 day period following an injection.    Most common side effects include:    Flushed face and/or chest    Feeling of warmth, particularly in face but could be overall feeling of warmth    Increased blood sugar in diabetic patients    Menstrual irregularities may occur.  If taking hormone based birth control an alternate method of birth control is recommended    Sleep disturbances and/or mood swings are possible    Leg cramps    Please contact us if you have:  Severe pain   Fever more than 101.5 degrees Fahrenheit  Signs of infection (redness, swelling or drainage)      If you have questions during normal business hours (8am-5pm Monday-Friday) contact the [facility] at [phone]. If you  need help after hours, we recommend that you go to a hospital emergency room or dial 911.                 Pending Results     No orders found from 11/6/2018 to 11/9/2018.            Admission Information     Date & Time Provider Department Dept. Phone    11/8/2018 Darryn Mcdaniel MD [facility] Phase [phone]      Your Vitals Were     Blood Pressure Pulse Temperature Respirations Pulse Oximetry       140/95 61 97.9  F (36.6  C) (Oral) 16 98%       MyChart Information     [email or web address] gives you secure access to your electronic health record. If you see a primary care provider, you can also send messages to your care team and make appointments. If you have questions, please call your primary care clinic.  If you do not have a primary care provider, please call [phone] and they will assist you.        Care EveryWhere ID     This is your Care EveryWhere ID. This could be used by other organizations to access your [facility] medical records  [id number]        Equal Access to Services     KELSEY ARSHAD AH: Hadii marva khano Somary, waaxda luqadaha, qaybta kaalmada adewilder, mikala lainez. So [facility] [phone].    ATENCIÓN: Si habla español, tiene a lundy disposición servicios gratuitos de asistencia lingüística. Llame al [phone].    We comply with applicable federal civil rights laws and [place] laws. We do not discriminate on the basis of race, color, national origin, age, disability, sex, sexual orientation, or gender identity.               Review of your medicines      CONTINUE these medicines which have NOT CHANGED        Dose / Directions    ALPRAZolam 0.5 MG 24 hr tablet   Commonly known as:  XANAX XR   Used for:  Pulsatile tinnitus        Dose:  0.5 mg   Take 1 tablet (0.5 mg) by mouth At Bedtime   Quantity:  30 tablet   Refills:  2       cyclobenzaprine 10 MG tablet   Commonly known as:  FLEXERIL   Used for:  Muscle spasm        Dose:  10 mg   Take 1 tablet (10 mg)  by mouth 2 times daily as needed for muscle spasms Reported on 4/12/2017   Quantity:  60 tablet   Refills:  1       gabapentin 400 MG capsule   Commonly known as:  NEURONTIN   Used for:  Cervicalgia        Dose:  1200 mg   Take 3 capsules (1,200 mg) by mouth 3 times daily   Quantity:  270 capsule   Refills:  3       HYDROcodone-acetaminophen 5-325 MG per tablet   Commonly known as:  NORCO   Used for:  S/P cervical spinal fusion        Dose:  1 tablet   Take 1 tablet by mouth every 6 hours as needed for moderate to severe pain May fill on/after 10/13/2017 NOT to start till 10/15/2017.   Quantity:  120 tablet   Refills:  0       IBUPROFEN PO        Refills:  0       losartan 50 MG tablet   Commonly known as:  COZAAR   Used for:  Benign essential hypertension        TAKE 1 TABLET BY MOUTH EVERY DAY   Quantity:  90 tablet   Refills:  0       methocarbamol 500 MG tablet   Commonly known as:  ROBAXIN   Used for:  Cervical radiculopathy, Myofascial pain        Dose:  1000 mg   Take 2 tablets (1,000 mg) by mouth 3 times daily as needed for muscle spasms   Quantity:  180 tablet   Refills:  1       order for DME   Used for:  Chronic pain syndrome        Equipment being ordered: TENS Pads as directed   Quantity:  1 Device   Refills:  0       tiZANidine 4 MG tablet   Commonly known as:  ZANAFLEX   Used for:  Muscle spasm        Dose:  4 mg   Take 1 tablet (4 mg) by mouth 2 times daily as needed for muscle spasms   Quantity:  60 tablet   Refills:  3       TYLENOL PO        Dose:  500 mg   Take 500 mg by mouth every 4 hours as needed for mild pain or fever   Refills:  0                Protect others around you: Learn how to safely use, store and throw away your medicines at www.disposemymeds.org.             Medication List: This is a list of all your medications and when to take them. Check marks below indicate your daily home schedule. Keep this list as a reference.      Medications           Morning Afternoon Evening Bedtime  As Needed    ALPRAZolam 0.5 MG 24 hr tablet   Commonly known as:  XANAX XR   Take 1 tablet (0.5 mg) by mouth At Bedtime                                cyclobenzaprine 10 MG tablet   Commonly known as:  FLEXERIL   Take 1 tablet (10 mg) by mouth 2 times daily as needed for muscle spasms Reported on 4/12/2017                                gabapentin 400 MG capsule   Commonly known as:  NEURONTIN   Take 3 capsules (1,200 mg) by mouth 3 times daily                                HYDROcodone-acetaminophen 5-325 MG per tablet   Commonly known as:  NORCO   Take 1 tablet by mouth every 6 hours as needed for moderate to severe pain May fill on/after 10/13/2017 NOT to start till 10/15/2017.                                IBUPROFEN PO                                losartan 50 MG tablet   Commonly known as:  COZAAR   TAKE 1 TABLET BY MOUTH EVERY DAY                                methocarbamol 500 MG tablet   Commonly known as:  ROBAXIN   Take 2 tablets (1,000 mg) by mouth 3 times daily as needed for muscle spasms                                order for DME   Equipment being ordered: TENS Pads as directed                                tiZANidine 4 MG tablet   Commonly known as:  ZANAFLEX   Take 1 tablet (4 mg) by mouth 2 times daily as needed for muscle spasms                                TYLENOL PO   Take 500 mg by mouth every 4 hours as needed for mild pain or fever

## 2018-11-08 NOTE — IP AVS SNAPSHOT
[facility]    [address]     [place] [address]    Phone:  [phone]                                       After Visit Summary   11/8/2018    Indra Mehta    MRN: [id number]           After Visit Summary Signature Page     I have received my discharge instructions, and my questions have been answered. I have discussed any challenges I see with this plan with the nurse or doctor.    ..........................................................................................................................................  Patient/Patient Representative Signature      ..........................................................................................................................................  Patient Representative Print Name and Relationship to Patient    ..................................................               ................................................  Date                                   Time    ..........................................................................................................................................  Reviewed by Signature/Title    ...................................................              ..............................................  Date                                               Time          22EPIC Rev 08/18

## 2018-11-09 DIAGNOSIS — M54.2 CERVICALGIA: ICD-10-CM

## 2018-11-09 RX ORDER — GABAPENTIN 400 MG/1
1200 CAPSULE ORAL 3 TIMES DAILY
Qty: 270 CAPSULE | Refills: 3 | Status: SHIPPED | OUTPATIENT
Start: 2018-11-09 | End: 2019-03-28

## 2018-11-09 NOTE — TELEPHONE ENCOUNTER
Received fax request from [facility] pharmacy requesting refill(s) for Gabapentin 400 mg Capsule    Last refilled on 10/28/2018    Pt last seen on 9/11/18  Next appt scheduled for 12/10/18    Will facilitate refill. Routing to nursing for review.      Shanda Dai    [facility]

## 2018-11-09 NOTE — TELEPHONE ENCOUNTER
Signed Prescriptions:                        Disp   Refills    gabapentin (NEURONTIN) 400 MG capsule      270 ca*3        Sig: Take 3 capsules (1,200 mg) by mouth 3 times daily  Authorizing Provider: BONI NAYLOR    Reviewed, signed, e-prescribed.    Boni Freeman MD  [facility]

## 2018-11-12 NOTE — TELEPHONE ENCOUNTER
Patient LM at 1538 requesting a status check on this refill as he states he has not heard anything back. Phone #[phone]       Shanda Dai    [facility]

## 2018-11-13 NOTE — TELEPHONE ENCOUNTER
"Received call from Long at [facility] in [place] who states that they haven't received the rx for Norco yet. Advised that the rx was put into the mail on 11/8 and with no mail yesterday (11/12) due to the holiday there may have been a delay. She states the patient will be out this evening and They are wondering if an \"emergency supply\" can be issued to the patient until they receive the rx in the mail. Phone #[phone] -- pharmacy         Shanda Dai    [facility]    "

## 2018-11-13 NOTE — TELEPHONE ENCOUNTER
Writer called pt and informed that Rx's were mailed on the 8th to [facility] in [place]     Mariano Mcbride, RN  Care Coordinator   [facility] [place]

## 2018-11-14 NOTE — TELEPHONE ENCOUNTER
Patient LM at 0854 stating that his pharmacy has still not received his rx and he is now out of the medication. He would like to know what to do. Please call. Phone #[phone]       Shanda Dai    [facility]

## 2018-11-14 NOTE — TELEPHONE ENCOUNTER
Call to Pt.  Writer suggested that Dr. Angle Freeman may be willing to possibly writer a short Rx, Pt would have to go to [place] to  short Rx.      Pt declined and will wait to see if Rx is at the pharmacy tomorrow or the next day.    Mariano Mcbride, RN  Care Coordinator   [facility] [place]

## 2018-12-10 ENCOUNTER — OFFICE VISIT (OUTPATIENT)
Dept: PALLIATIVE MEDICINE | Facility: CLINIC | Age: 53
End: 2018-12-10
Payer: COMMERCIAL

## 2018-12-10 ENCOUNTER — APPOINTMENT (OUTPATIENT)
Dept: LAB | Facility: CLINIC | Age: 53
End: 2018-12-10
Payer: COMMERCIAL

## 2018-12-10 ENCOUNTER — TELEPHONE (OUTPATIENT)
Dept: NEUROSURGERY | Facility: OTHER | Age: 53
End: 2018-12-10

## 2018-12-10 ENCOUNTER — TELEPHONE (OUTPATIENT)
Dept: PALLIATIVE MEDICINE | Facility: CLINIC | Age: 53
End: 2018-12-10

## 2018-12-10 VITALS
WEIGHT: 205 LBS | DIASTOLIC BLOOD PRESSURE: 85 MMHG | SYSTOLIC BLOOD PRESSURE: 150 MMHG | BODY MASS INDEX: 27.23 KG/M2 | HEART RATE: 69 BPM

## 2018-12-10 DIAGNOSIS — M54.2 CHRONIC NECK AND BACK PAIN: ICD-10-CM

## 2018-12-10 DIAGNOSIS — Z79.891 ENCOUNTER FOR LONG-TERM OPIATE ANALGESIC USE: Primary | ICD-10-CM

## 2018-12-10 DIAGNOSIS — M54.9 CHRONIC NECK AND BACK PAIN: ICD-10-CM

## 2018-12-10 DIAGNOSIS — Z98.1 S/P CERVICAL SPINAL FUSION: ICD-10-CM

## 2018-12-10 DIAGNOSIS — G89.4 CHRONIC PAIN SYNDROME: ICD-10-CM

## 2018-12-10 DIAGNOSIS — G89.29 CHRONIC NECK AND BACK PAIN: ICD-10-CM

## 2018-12-10 DIAGNOSIS — M51.369 DDD (DEGENERATIVE DISC DISEASE), LUMBAR: ICD-10-CM

## 2018-12-10 DIAGNOSIS — M54.12 CERVICAL RADICULOPATHY: ICD-10-CM

## 2018-12-10 DIAGNOSIS — M54.16 LUMBAR RADICULOPATHY: ICD-10-CM

## 2018-12-10 PROCEDURE — 99214 OFFICE O/P EST MOD 30 MIN: CPT | Performed by: ANESTHESIOLOGY

## 2018-12-10 RX ORDER — HYDROCODONE BITARTRATE AND ACETAMINOPHEN 5; 325 MG/1; MG/1
1 TABLET ORAL EVERY 6 HOURS PRN
Qty: 120 TABLET | Refills: 0 | Status: ON HOLD | OUTPATIENT
Start: 2018-12-10 | End: 2018-12-19

## 2018-12-10 ASSESSMENT — PAIN SCALES - GENERAL: PAINLEVEL: SEVERE PAIN (6)

## 2018-12-10 NOTE — TELEPHONE ENCOUNTER
Called patient to discuss pre-op education material emailed to [email or web address]   Patient Education    Education included but not limited to:  - Surgical risks: blood clots, urinating difficulties, nerve damage, infection.  - Pre-operative physical with primary care physician within 30 days of surgical date.   - Pre-operative clearance from other pertaining specialties.   - Discontinue NSAIDS x 7 days prior to surgical date.    surgery. INR needs to be <1.4.  -Do not begin taking NSAIDs (Advil, Motrin, Ibuprofen, Nuprin, Diclofenac, Meloxicam, Aleve, Celebrex, Aspirin, etc.) until 6 weeks after surgery if you had a fusion. May cause bleeding and interfere with bone healing.    -May try Tylenol for pain.  -Smoking cessation  -Discussed being off work after surgery, short term disability, FMLA, etc.   -Forms to be completed    -Pre-op timeline: NPO, shower, medications    -Hospital stay: Checking in, surgery, recovery room, hospital room.    - Post operative pain management: narcotics, muscle relaxants, ice, etc.   -No driving while taking narcotics     -Post operative incision care:   Keep your incision clean and dry.   Okay to shower. No submerging in water until incision healed.   Watch for signs of infection and notify clinic if drainage or fever develops.   - Post operative activity limitations recommended until follow up appointment: no lifting > 10 pounds; limited bending, twisting, overhead reaching.  -If a brace is required per Dr. Mckenna, Orth[facility] will fit you for the brace in the hospital.  - Follow up appointments: Suture/staple removal at 2 weeks post op (TLIF, PSF, crani). (**for Coflex: 6 week post op with xray, 3 months post op.) 6 week post op, 3 months post op, 6 months post op, 1 year post op. You will need to an xray before each appointment. Please call to schedule follow up appointment at [phone].   - Education book was also given to the patient for further review.      Patient verbalized  understanding of above instructions. All questions were answered to the best of my ability and the patient's satisfaction. Patient advised to call with any additional questions or concerns.

## 2018-12-10 NOTE — PATIENT INSTRUCTIONS
Surgery scheduled at [facility] for C5-6 ACDF (anterior cervical discectomy and fusion)       Pre-Operative:  -Surgical risks: blood clots in the leg or lung, problems urinating, nerve damage, drainage from the incision, infection, stiffness.  - Pre-operative physical with primary care physician within 30 days of surgical date. 12/12  -Stop all solid foods and liquids 8 hours before surgery.    -Shower procedure: Please shower with antibacterial soap the night before surgery and the morning of surgery. Refer to information sheet in folder.   - Discontinue Aspirin, NSAIDs (Advil, Ibuprofen, Naproxen, Nuprin, Diclofenac, Meloxicam, Aleve, Celebrex) x 7 days prior to surgical date. After surgery, do not begin taking these medications until given clearance. May cause bleeding and interfere with bone healing.  - May take Tylenol for pain.      Post-Operative:  - Likely Same day procedure, may stay overnight 1 night.  - Post operative pain may require pain medications and muscle relaxants. You will receive medications upon discharge.  -Do NOT drive while taking narcotic pain medication.  -Post operative incision care- Watch for signs of infection: redness, swelling, warmth, drainage, and fever of 101 degrees or higher. Notify clinic [phone].  -Keep incision clean and dry. You may shower. No submerging incision in water such as pools, hot tubs, baths for at least 8 weeks or until incision is healed.   - Post operative activity limitations for 6 weeks after surgery: no lifting > 10 pounds, limited bending, twisting, or overhead reaching. You will be re-evaluated at your follow up appointments.   -If a brace is required per Dr. Mckenna, [facility] will fit you for the brace in the hospital.  -If you are currently employed, you will need to be off work for recovery and healing. Please fax any FMLA/short term disability paperwork to [phone]. You may call our clinic when you'd like to return to work and  we can provide a work letter.   - Follow up appointments: 6 week post op, 3 months post op, 6 months post op, 1 year post op. You will need to an xray before each appointment. Please call to schedule these appointments at [phone].

## 2018-12-10 NOTE — NURSING NOTE
Reviewed and signed new narcotic agreement. Patient was sent to lab with medication list. Handed hard copy of Rx for Norco.

## 2018-12-10 NOTE — PROGRESS NOTES
[facility]    Date of visit: 12/10/2018    Chief complaint:   Chief Complaint   Patient presents with     Pain       Interval history:  Indra Mehta was last seen by me on 9/11/18.      Recommendations/plan at the last visit included:  Plan:  1. Physical Therapy:  Continue home exercise program  2. Clinical Health Psychologist to address issues of relaxation, behavioral change, coping style, and other factors important to improvement.  deferred  3. Diagnostic Studies:    1. Updated lumbar MRI due to change in symptoms ordered today  4. Medication Management:    1. Continue Norco 5/325 QID prn pain  2. Continue Gabapentin 1200 mg TID  3. Continue Robaxin 750 mg 2 tabs TID prn  4. Continue Tizanidine 4 mg QHS  5. Continue Tylenol in safe doses  6. Continue Ibuprofen in safe doses  7. Consider Medical cannabis  5. Further procedures recommended:   1. Would benefit from lumbar epidural steroid injection - will plan after review of updated MRI  2. Cervical trigger point injections may help with neck and shoulder pain  3. Repeat cervical epidural steroid injection if needed  6. Recommendations to PCP: continue current treatment  7. Patient to follow up with Primary Care provider regarding elevated blood pressure.  8. Follow up: 3 months - will plan procedure after MRI    Since his last visit, Indra Mehta reports:  He underwent bilateral L3-4 TFESI by Dr Mcdaniel on 11/8/18 and he thinks that these injections may have helped with the shooting pain.  He is scheduled for ACDF C5-6 by Dr Mckenna on 12/19/18.    He complains of pain across his lower back that goes into his hips bilaterally, right greater than left with intermittent pain radiating down the right lower extremity in the front and side of the thigh.    He complains that he feels that his feet are getting worse,  He has the combination of pressure, pins and needles, burning and the feeling that he is walking on something, worse on the  right.  These symptoms will wake him up at night.    He finds that the Tizanidine is helpful but it doesn't seem to help him sleep all night.  He was recently prescribed Xanax by Dr Farias and he states that he will sleep all night long but he wakes up in much worse pain.    He continues with pain in the back of the right shoulder with numbness and tingling in the right upper extremity into the hand that is worse or brought on with looking up.    Pain scores:  Pain intensity on average is 5 on a scale of 0-10.  Ranges from 3/10 to 8/10.    THE 4 A's OF OPIOID MAINTENANCE ANALGESIA    Analgesia: yes    Activity: improved    Adverse effects: denies    Adherence to Rx protocol: yes    [place] Board of Pharmacy Data Base Reviewed:    YES; compliant    Current pain treatments:   Norco 5/325 mg Q6H prn   Gabapentin 400 mg three capsules three times a day  Tylenol 500 mg Q4H - maybe takes one or two per day  Ibuprofen 200 mg three tabs four times a day  Tizanidine 4 mg - takes two at bedtime  Robaxin 500 mg TID takes during the day - helps  Excedrin Migraine - two per day  Arnecare topical     Past pain treatments:  Norco/vicodin  Oxycodone  Flexeril - was helpful at night  Robaxin - may have helped, didn't make him sleepy      Surgery:  ACDF C6-7 on 7/5/17 with improvement  Injections:    - 11/8/18 bilateral L3-4 TFESI - (Dr Mcdaniel)  - 8/23/18 bilateral L3-4 TFESI - seems to have worsened pain (Dr Mcdaniel)  - 6/11/18 TFESI right C5-6 - helped with arm pain and numbness  - bilateral L3-4 TFESI 12/28/17  - Bilateral L4-5 TFESI 10/23/17    Date of last UDS:  10/20/17    Side Effects: no side effect    Medications:  Current Outpatient Medications   Medication Sig Dispense Refill     Acetaminophen (TYLENOL PO) Take 500 mg by mouth every 4 hours as needed for mild pain or fever       ALPRAZolam (XANAX XR) 0.5 MG 24 hr tablet Take 1 tablet (0.5 mg) by mouth At Bedtime 30 tablet 2     cyclobenzaprine (FLEXERIL) 10 MG tablet  Take 1 tablet (10 mg) by mouth 2 times daily as needed for muscle spasms Reported on 4/12/2017 (Patient not taking: Reported on 11/5/2018) 60 tablet 1     gabapentin (NEURONTIN) 400 MG capsule Take 3 capsules (1,200 mg) by mouth 3 times daily 270 capsule 3     HYDROcodone-acetaminophen (NORCO) 5-325 MG per tablet Take 1 tablet by mouth every 6 hours as needed for moderate to severe pain May fill on/after 11/12/2017 NOT to start till 11/14/2017. 120 tablet 0     IBUPROFEN PO        losartan (COZAAR) 50 MG tablet TAKE 1 TABLET BY MOUTH EVERY DAY 90 tablet 0     methocarbamol (ROBAXIN) 500 MG tablet Take 2 tablets (1,000 mg) by mouth 3 times daily as needed for muscle spasms (Patient not taking: Reported on 11/5/2018) 180 tablet 1     order for DME Equipment being ordered: TENS Pads as directed 1 Device 0     tiZANidine (ZANAFLEX) 4 MG tablet Take 1 tablet (4 mg) by mouth 2 times daily as needed for muscle spasms 60 tablet 3       Medical History: any changes in medical history since they were last seen? No    Review of Systems:  The 14 system ROS was reviewed from the intake questionnaire, and is positive for: joint pain, stiffness, back pain, neck pain, numbness, tingling, stress  Any bowel or bladder problems: denies changes  Mood: good    Physical Exam:  /90   Pulse 69   Wt 93 kg (205 lb)   BMI 27.23 kg/m    Constitutional: alert and no distress.  Pt is well nourished, well developed  Head: Normocephalic. No masses, lesions, tenderness or abnormalities  ENT: EOMI, mucosal surfaces moist.  Neck with full ROM, posture fair  Cardiovascular: No edema or JVD appreciated.  Respiratory: Good diaphragmatic excursion. No wheezes appreciated.  Speaking in complete sentences without shortness of breath.  No accessory muscle use.   Musculoskeletal: extremities normal- no gross deformities noted, normal muscle tone and able to move about the exam room without difficulty.    Skin: no suspicious lesions or rashes  appreciated on exposed areas  Neurologic: Gait normal and non-antalgic. Moving all extremities spontaneously, no apparent weakness.    Psychiatric: mentation appears normal, affect full and good eye contact.      Cervical Spine:  Good ROM, exam limited due to upcoming surgery  Lumbar Spine:  Mild lumbar tenderness and SI joint tenderness on the right, lateral bending and extension and lateral rotation is mildly positive on the right, mild hip bursa tenderness on the right.  Seated SLR is negative bilaterally    Assessment:   1.  Chronic neck pain  2.  Post cervical fusion neck pain - improved radicular pain  3.  Myofascial pain  4.  Cervical facet joint pain  5.  Chronic lower back pain  6.  Lumbar radiculopathy - improved after injections  7.  Lumbar facet joint pain  8.  Multiple joint pain  9. Cervical radiculopathy    Indra Mehta is a 53 year old male who is seen at the pain clinic for chronic neck and back pain.  He is scheduled for ACDF at C5-6 next Wednesday.  He did get some relief of his back and thigh pain after his last epidural steroid injections.  We will hold off on further steroid injections until cleared by the surgeon.  He is stable on his current medications without side effects.  Our treatment plan is as outlined below.    Plan:  1. Physical Therapy:  Deferred - scheduled for surgery on neck on 12/19/18  2. Clinical Health Psychologist to address issues of relaxation, behavioral change, coping style, and other factors important to improvement.  deferred  3. Diagnostic Studies:  Reviewed diagnostic imaging  4. Medication Management:    1. Continue Norco 5/325 QID prn pain - try to back off on dosing prior to surgery so you respond better to medications after surgery  2. Continue Gabapentin 1200 mg TID  3. Continue Robaxin 500 mg TID prn  4. Continue Tizanidine 4 mg 1-2 tabs at bedtime prn  5. Continue Tylenol in safe doses  6. Hold Ibuprofen due to upcoming surgery  7. Consider medical  cannabis  8. Trial of Hemp oils - two drops under the tongue twice a day  5. Further procedures recommended:   1. Consider lumbar transforaminal epidural steroid injection right L4-5 or combination of L3-4 and L4-5 on the right - will wait until allowed by surgery  6. Patient to follow up with Primary Care provider regarding elevated blood pressure.  7. Recommendations to PCP:  Continue current treatment   8. Follow up: 3 months    Total time spent was 30 minutes, and more than 50% of face to face time was spent in counseling and/or coordination of care regarding diagnosis, physical activity, medication management, and interventional procedures.    Boni Freeman MD  [facility]

## 2018-12-10 NOTE — LETTER
Opioid / Opioid Plus Controlled Substance Agreement    I understand that my care provider has prescribed an opioid (narcotic) controlled substance to help manage my condition(s). I am taking this medicine to help me function or work. I know this is strong medicine, and that it can cause serious side effects. Opioid medicine can be sedating, addicting and may cause a dependency on the drug. They can affect my ability to drive or think, and cause depression. They need to be taken exactly as prescribed. Combining opioids with certain medicines or chemicals (such as cocaine, sedatives and tranquilizers, sleeping pills, meth) can be dangerous or even fatal. Also, if I stop opioids suddenly, I may have severe withdrawal symptoms. Last, I understand that opioids do not work for all types of pain nor for all patients. If not helpful, I may be asked to stop them.        The risks, benefits, and side effects of these medicine(s) were explained to me. I agree that:    1. I will take part in other treatments as advised by my care team. This may be psychiatry or counseling, physical therapy, behavioral therapy, group treatment or a referral to a pain clinic. I will reduce or stop my medicine when my care team tells me to do so.  2. I will take my medicines as prescribed. I will not change the dose or schedule unless my care team tells me to. There will be no refills if I  run out early.   I may be contactedwithout warning and asked to complete a urine drug test or pill count at any time.   3. I will keep all my appointments, and understand this is part of the monitoring of opioids. My care team may require an office visit for EVERY opioid/controlled substance refill. If I miss appointments or don t follow instructions, my care team may stop my medicine.  4. I will not ask other providers to prescribe controlled substances, and I will not accept controlled substances from other people. If I need another prescribed controlled  substance for a new reason, I will tell my care team within 1 business day.  5. I will use one pharmacy to fill all of my controlled substance prescriptions, and it is up to me to make sure that I do not run out of my medicines on weekends or holidays. If my care team is willing to refill my opioid prescription without a visit, I must request refills only during office hours, refills may take up to 3 days to process, and it may take up to 5 to 7 days for my medicine to be mailed and ready at my pharmacy. Prescriptions will not be mailed anywhere except my pharmacy.    6. I am responsible for my prescriptions. If the medicine/prescription is lost or stolen, it will not be replaced. I also agree not to share controlled substance medicines with anyone.  7. I agree to not use ANY illegal or recreational drugs. This includes marijuana, cocaine, bath salts or other drugs. I agree not to use alcohol unless my care team says I may.          I agree to give urine samples whenever asked. If I don t give a urine sample, the care team may stop my medicine.    8. If I enroll in the [place] Medical Marijuana program, I will tell my care team. I will also sign an agreement to share my medical records with my care team.   9. I will bring in my list of medicines (or my medicine bottles) each time I come to the clinic.   10. I will tell my care team right away if I become pregnant or have a new medical problem treated outside of my regular clinic.  11. I understand that this medicine can affect my thinking and judgment. It may be unsafe for me to drive, use machinery and do dangerous tasks. I will not do any of these things until I know how the medicine affects me. If my dose changes, I will wait to see how it affects me. I will contact my care team if I have concerns about medicine side effects.    I understand that if I do not follow any of the conditions above, my prescriptions or treatment may be stopped.      I agree that my  provider, clinic care team, and pharmacy may work with any city, state or federal law enforcement agency that investigates the misuse, sale, or other diversion of my controlled medicine. I will allow my provider to discuss my care with or share a copy of this agreement with any other treating provider, pharmacy or emergency room where I receive care. I agree to give up (waive) any right of privacy or confidentiality with respect to these consents.     I have read this agreement and have asked questions about anything I did not understand.      ____________________________________________________    ____________  ________  Patient signature                                                         Date      Time    ____________________________________________________     ____________  ________  Witness                                                           Date      Time    ____________________________________________________  Provider signature

## 2018-12-10 NOTE — PATIENT INSTRUCTIONS
Assessment:   1.  Chronic neck pain  2.  Post cervical fusion neck pain - improved radicular pain  3.  Myofascial pain  4.  Cervical facet joint pain  5.  Chronic lower back pain  6.  Lumbar radiculopathy - improved after injections  7.  Lumbar facet joint pain  8.  Multiple joint pain  9. Cervical radiculopathy      Plan:  1. Physical Therapy:  Deferred - scheduled for surgery on neck on 12/19/18  2. Clinical Health Psychologist to address issues of relaxation, behavioral change, coping style, and other factors important to improvement.  deferred  3. Diagnostic Studies:  Reviewed diagnostic imaging  4. Medication Management:    1. Continue Norco 5/325 QID prn pain - try to back off on dosing prior to surgery so you respond better to medications after surgery  2. Continue Gabapentin 1200 mg TID  3. Continue Robaxin 500 mg TID prn  4. Continue Tizanidine 4 mg 1-2 tabs at bedtime prn  5. Continue Tylenol in safe doses  6. Hold Ibuprofen due to upcoming surgery  7. Consider medical cannabis  8. Trial of Hemp oils - two drops under the tongue twice a day  5. Further procedures recommended:   1. Consider lumbar transforaminal epidural steroid injection right L4-5 or combination of L3-4 and L4-5 on the right - will wait until allowed by surgery  6. Patient to follow up with Primary Care provider regarding elevated blood pressure.  7. Recommendations to PCP:  Continue current treatment   8. Follow up: 3 months      ----------------------------------------------------------------  Clinic Number:  [phone]   Call this number with any questions about your care and for scheduling assistance. Calls are returned Monday through Friday between 8 AM and 4:30 PM. We usually get back to you within 2 business days depending on the issue/request.       Medication refills:    For non-narcotic medications, call your pharmacy directly to request a refill. The pharmacy will contact the [facility] for authorization. Please  allow 3-4 days for these refills to be processed.     For narcotic refills, call the clinic number or send a [employer] message. Please contact us 7-10 days before your refill is due. The message MUST include the name of the specific medication(s) requested and how you would like to receive the prescription(s). The options are as follows:    Pain Clinic staff can mail the prescription to your pharmacy. Please tell us the name of the pharmacy.    You may pick the prescription up at the Pain Clinic (tell us the location) or during a clinic visit with your pain provider    Pain Clinic staff can deliver the prescription to the [place] pharmacy in the clinic building. Please tell us the location.      We believe regular attendance is key to your success in our program.    Any time you are unable to keep your appointment we ask that you call us at least 24 hours in advance to let us know. This will allow us to offer the appointment time to another patient.

## 2018-12-10 NOTE — TELEPHONE ENCOUNTER
Pt would like nursing to give him a call regarding his appt today.      Sterling SANDRA    [facility]

## 2018-12-11 NOTE — TELEPHONE ENCOUNTER
Past Appt time.  Pt not called.    Mariano Mcbride, RN  Care Coordinator   [facility]

## 2018-12-12 ENCOUNTER — TELEPHONE (OUTPATIENT)
Dept: PALLIATIVE MEDICINE | Facility: CLINIC | Age: 53
End: 2018-12-12

## 2018-12-12 ENCOUNTER — OFFICE VISIT (OUTPATIENT)
Dept: FAMILY MEDICINE | Facility: OTHER | Age: 53
End: 2018-12-12
Payer: COMMERCIAL

## 2018-12-12 VITALS
WEIGHT: 205 LBS | RESPIRATION RATE: 16 BRPM | OXYGEN SATURATION: 99 % | BODY MASS INDEX: 27.17 KG/M2 | HEIGHT: 73 IN | TEMPERATURE: 98.2 F | DIASTOLIC BLOOD PRESSURE: 78 MMHG | SYSTOLIC BLOOD PRESSURE: 130 MMHG | HEART RATE: 88 BPM

## 2018-12-12 DIAGNOSIS — Z01.818 PREOP GENERAL PHYSICAL EXAM: Primary | ICD-10-CM

## 2018-12-12 DIAGNOSIS — G89.4 CHRONIC PAIN SYNDROME: ICD-10-CM

## 2018-12-12 DIAGNOSIS — M54.2 NECK PAIN: ICD-10-CM

## 2018-12-12 DIAGNOSIS — I10 BENIGN ESSENTIAL HYPERTENSION: ICD-10-CM

## 2018-12-12 DIAGNOSIS — Z79.891 ENCOUNTER FOR LONG-TERM OPIATE ANALGESIC USE: ICD-10-CM

## 2018-12-12 PROCEDURE — 99214 OFFICE O/P EST MOD 30 MIN: CPT | Performed by: INTERNAL MEDICINE

## 2018-12-12 PROCEDURE — 99000 SPECIMEN HANDLING OFFICE-LAB: CPT | Performed by: ANESTHESIOLOGY

## 2018-12-12 PROCEDURE — 80307 DRUG TEST PRSMV CHEM ANLYZR: CPT | Mod: 90 | Performed by: ANESTHESIOLOGY

## 2018-12-12 ASSESSMENT — MIFFLIN-ST. JEOR: SCORE: 1829.78

## 2018-12-12 ASSESSMENT — PAIN SCALES - GENERAL: PAINLEVEL: MODERATE PAIN (5)

## 2018-12-12 NOTE — PROGRESS NOTES
[facility]  [address] [facility] [address]  [phone]  Dept: [phone]    PRE-OP EVALUATION:  Today's date: 2018    Indra Mehta (: 1965) presents for pre-operative evaluation assessment as requested by Dr. Mckenna.  He requires evaluation and anesthesia risk assessment prior to undergoing surgery/procedure for treatment of neck and back .    Primary Physician: Tha Grullon  Type of Anesthesia Anticipated: General    Patient has a Health Care Directive or Living Will:  NO    Preop Questions 2018   Who is doing your surgery? zora   What are you having done? cervical fusion   Date of Surgery/Procedure: dec 19   Facility or Hospital where procedure/surgery will be performed: [facility]   1.  Do you have a history of Heart attack, stroke, stent, coronary bypass surgery, or other heart surgery? No   2.  Do you ever have any pain or discomfort in your chest? No   3.  Do you have a history of  Heart Failure? No   4.   Are you troubled by shortness of breath when:  walking on a level surface, or up a slight hill, or at night? No   5.  Do you currently have a cold, bronchitis or other respiratory infection? No   6.  Do you have a cough, shortness of breath, or wheezing? No   7.  Do you sometimes get pains in the calves of your legs when you walk? No   8. Do you or anyone in your family have previous history of blood clots? No   9.  Do you or does anyone in your family have a serious bleeding problem such as prolonged bleeding following surgeries or cuts? No   10. Have you ever had problems with anemia or been told to take iron pills? No   11. Have you had any abnormal blood loss such as black, tarry or bloody stools? No   12. Have you ever had a blood transfusion? No   13. Have you or any of your relatives ever had problems with anesthesia? No   14. Do you have sleep apnea, excessive snoring or daytime drowsiness? No   15. Do you have any prosthetic heart valves? No   16.  Do you have prosthetic joints? No         HPI:     HPI related to upcoming procedure: Patient has a history of persistent neck pain with radiculopathy.  He has already had a single level fusion and is now anticipating a second single level fusion.      See problem list for active medical problems.  Problems all longstanding and stable, except as noted/documented.  See ROS for pertinent symptoms related to these conditions.                                                                                                                                                          .    MEDICAL HISTORY:     Patient Active Problem List    Diagnosis Date Noted     Benign essential hypertension 06/30/2017     Priority: Medium     Myalgia 10/28/2016     Priority: Medium     Bilateral occipital neuralgia 10/28/2016     Priority: Medium     CARDIOVASCULAR SCREENING; LDL GOAL LESS THAN 160 10/31/2010     Priority: Medium     Major depression in complete remission (H) 08/20/2010     Priority: Medium     Chronic pain syndrome 08/20/2010     Priority: Medium     Patient is followed by Tha Grullon MD for ongoing prescription of pain medication.  All refills should be approved by this provider only at face-to-face appointments - not by phone request.    Medication(s): Tramadol.   Maximum quantity per month: 60  Clinic visit frequency required: Q 1 months     Controlled substance agreement:  Encounter-Level CSA - 10/13/16:               Controlled Substance Agreement - Scan on 10/23/2016  5:07 PM : CONTROLLED SUBSTANCE AGREEMENT 10/13/16 (below)            Pain Clinic evaluation in the past: Yes       Date/Location:   [facility]    DIRE Total Score(s):  No flowsheet data found.    Last [facility] website verification:  none   [email or web address]               Pain medication agreement signed 08/20/2010     Priority: Medium     Meniere's disease 03/22/2007     Priority: Medium     Problem list name updated by automated  process. Provider to review        Past Medical History:   Diagnosis Date     Back pain      Meniere's disease, unspecified      Pain medication agreement signed 8/20/2010     Past Surgical History:   Procedure Laterality Date     BUNIONECTOMY RT/LT  09/05/08    Both feet     COLONOSCOPY N/A 12/28/2016    Procedure: COMBINED COLONOSCOPY, SINGLE OR MULTIPLE BIOPSY/POLYPECTOMY BY BIOPSY;  Surgeon: Indra Jernigan MD;  [facility]     DISCECTOMY, FUSION CERVICAL ANTERIOR ONE LEVEL, COMBINED N/A 7/5/2017    Procedure: COMBINED DISCECTOMY, FUSION CERVICAL ANTERIOR ONE LEVEL;  Cervical 6-7, Anterior cervical discectomy fusion;  Surgeon: Mike Mckenna MD;  [facility]     HC COLONOSCOPY W/WO BRUSH/WASH  12/27/2005     HC CREATE EARDRUM OPENING,GEN ANESTH  09/27/2007    Pe tube, Left endolymphatic sac enhancement.     HC MASTOIDECTOMY,COMPLETE  09/27/2007     HERNIORRHAPHY UMBILICAL N/A 8/11/2017    Procedure: HERNIORRHAPHY UMBILICAL;  open umbilical hernia repair;  Surgeon: Boni Story MD;  [facility]     INJECT EPIDURAL LUMBAR N/A 11/9/2017    Procedure: INJECT EPIDURAL LUMBAR;  lumbar 4-5 epidural steroid injection ;  Surgeon: Darryn Mcdaniel MD;  [facility]     INJECT EPIDURAL LUMBAR N/A 12/28/2017    Procedure: INJECT EPIDURAL LUMBAR;  lumbar epidural injection;  Surgeon: Darryn Mcdaniel MD;  [facility]     INJECT EPIDURAL TRANSFORAMINAL Bilateral 8/23/2018    Procedure: INJECT EPIDURAL TRANSFORAMINAL;  transforaminal bilateral lumbar 3-4 steroid injection;  Surgeon: Darryn Mcdaniel MD;  [facility]     INJECT EPIDURAL TRANSFORAMINAL Bilateral 11/8/2018    Procedure: INJECT EPIDURAL TRANSFORAMINAL lumbar 3-4;  Surgeon: Darryn Mcdainel MD;  [facility]     PE TUBES  2006    left ear     Current Outpatient Medications   Medication Sig Dispense Refill     ALPRAZolam (XANAX XR) 0.5 MG 24 hr tablet Take 1 tablet (0.5 mg) by mouth At Bedtime 30 tablet 2     gabapentin  (NEURONTIN) 400 MG capsule Take 3 capsules (1,200 mg) by mouth 3 times daily 270 capsule 3     HYDROcodone-acetaminophen (NORCO) 5-325 MG tablet Take 1 tablet by mouth every 6 hours as needed for moderate to severe pain May fill on/after 12/12/2017 NOT to start till 12/14/2017. 120 tablet 0     losartan (COZAAR) 50 MG tablet TAKE 1 TABLET BY MOUTH EVERY DAY 90 tablet 0     methocarbamol (ROBAXIN) 500 MG tablet Take 2 tablets (1,000 mg) by mouth 3 times daily as needed for muscle spasms 180 tablet 1     order for DME Equipment being ordered: TENS Pads as directed 1 Device 0     tiZANidine (ZANAFLEX) 4 MG tablet Take 1 tablet (4 mg) by mouth 2 times daily as needed for muscle spasms 60 tablet 3     Acetaminophen (TYLENOL PO) Take 500 mg by mouth every 4 hours as needed for mild pain or fever       IBUPROFEN PO        OTC products: None, except as noted above    Allergies   Allergen Reactions     No Known Drug Allergies       Latex Allergy: NO    Social History     Tobacco Use     Smoking status: Never Smoker     Smokeless tobacco: Never Used   Substance Use Topics     Alcohol use: No     Alcohol/week: 0.0 oz     History   Drug Use No       REVIEW OF SYSTEMS:   CONSTITUTIONAL: NEGATIVE for fever, chills, change in weight  INTEGUMENTARY/SKIN: NEGATIVE for worrisome rashes, moles or lesions  EYES: NEGATIVE for vision changes or irritation  ENT/MOUTH: NEGATIVE for ear, mouth and throat problems  RESP: NEGATIVE for significant cough or SOB  BREAST: NEGATIVE for masses, tenderness or discharge  CV: NEGATIVE for chest pain, palpitations or peripheral edema  GI: NEGATIVE for nausea, abdominal pain, heartburn, or change in bowel habits  : NEGATIVE for frequency, dysuria, or hematuria  MUSCULOSKELETAL: NEGATIVE for significant arthralgias or myalgia  NEURO: Patient has radicular pain into his right arm and shoulder.  It radiates down into the C6 dermatome.  ENDOCRINE: NEGATIVE for temperature intolerance, skin/hair  "changes  HEME: NEGATIVE for bleeding problems  PSYCHIATRIC: NEGATIVE for changes in mood or affect    EXAM:   /78 (BP Location: Right arm, Patient Position: Sitting, Cuff Size: Adult Regular)   Pulse 88   Temp 98.2  F (36.8  C) (Temporal)   Resp 16   Ht 1.848 m (6' 0.75\")   Wt 93 kg (205 lb)   SpO2 99%   BMI 27.23 kg/m      GENERAL APPEARANCE: healthy, alert and no distress     EYES: EOMI,  PERRL     HENT: ear canals and TM's normal and nose and mouth without ulcers or lesions     NECK: no adenopathy, no asymmetry, masses, or scars and thyroid normal to palpation     RESP: lungs clear to auscultation - no rales, rhonchi or wheezes     CV: regular rates and rhythm, normal S1 S2, no S3 or S4 and no murmur, click or rub     ABDOMEN:  soft, nontender, no HSM or masses and bowel sounds normal     MS: extremities normal- no gross deformities noted, no evidence of inflammation in joints, FROM in all extremities.     SKIN: no suspicious lesions or rashes     NEURO: Normal strength and tone, sensory exam grossly normal, mentation intact and speech normal     PSYCH: mentation appears normal. and affect normal/bright     LYMPHATICS: No cervical adenopathy    DIAGNOSTICS:   Lab work and EKG were recently performed and have been reviewed and are within normal limits.    Recent Labs   Lab Test 11/05/18  1532 03/15/18  1052  06/30/17  1613   HGB 14.0 14.9   < >  --     230   < >  --      --   --  142   POTASSIUM 3.7  --   --  3.9   CR 0.96 0.92  --  1.15    < > = values in this interval not displayed.        IMPRESSION:   Reason for surgery/procedure: Chronic cervical pain due to degenerative disease requiring fusion.  Previous conservative therapy has been failed  Diagnosis/reason for consult: Perioperative risk assessment in a pleasant 53-year-old gentleman with:    1. Preop general physical exam    2. Neck pain    3. Benign essential hypertension    4. Chronic pain syndrome      The proposed surgical " procedure is considered INTERMEDIATE risk.    REVISED CARDIAC RISK INDEX  The patient has the following serious cardiovascular risks for perioperative complications such as (MI, PE, VFib and 3  AV Block):  No serious cardiac risks  INTERPRETATION: 1 risks: Class II (low risk - 0.9% complication rate)    The patient has the following additional risks for perioperative complications:  No identified additional risks      ICD-10-CM    1. Preop general physical exam Z01.818        RECOMMENDATIONS:         --Patient is to take all scheduled medications on the day of surgery .    APPROVAL GIVEN to proceed with proposed procedure, without further diagnostic evaluation       Signed Electronically by: Evangelista See DO    Copy of this evaluation report is provided to requesting physician.    [place] Preop Guidelines    Revised Cardiac Risk Index

## 2018-12-12 NOTE — TELEPHONE ENCOUNTER
[facility] is [facility] for a ALCIDES # for the rx. [phone] Renetta SANDRA    [facility] [place]

## 2018-12-12 NOTE — TELEPHONE ENCOUNTER
Called [facility] and gave [facility] Dr. Angle Freeman's ALCIDES#.    Heidy Gipson, RN-BSN  [facility] [place]

## 2018-12-16 LAB — PAIN DRUG SCR UR W RPTD MEDS: NORMAL

## 2018-12-19 ENCOUNTER — ANESTHESIA (OUTPATIENT)
Dept: SURGERY | Facility: CLINIC | Age: 53
End: 2018-12-19
Payer: COMMERCIAL

## 2018-12-19 ENCOUNTER — HOSPITAL ENCOUNTER (OUTPATIENT)
Facility: CLINIC | Age: 53
Discharge: HOME | End: 2018-12-19
Attending: NEUROLOGICAL SURGERY | Admitting: NEUROLOGICAL SURGERY
Payer: COMMERCIAL

## 2018-12-19 ENCOUNTER — ANESTHESIA EVENT (OUTPATIENT)
Dept: SURGERY | Facility: CLINIC | Age: 53
End: 2018-12-19
Payer: COMMERCIAL

## 2018-12-19 ENCOUNTER — HOSPITAL ENCOUNTER (OUTPATIENT)
Dept: GENERAL RADIOLOGY | Facility: CLINIC | Age: 53
End: 2018-12-19
Attending: NEUROLOGICAL SURGERY | Admitting: NEUROLOGICAL SURGERY
Payer: COMMERCIAL

## 2018-12-19 VITALS
DIASTOLIC BLOOD PRESSURE: 85 MMHG | OXYGEN SATURATION: 92 % | HEART RATE: 70 BPM | RESPIRATION RATE: 12 BRPM | SYSTOLIC BLOOD PRESSURE: 137 MMHG | TEMPERATURE: 98.4 F

## 2018-12-19 DIAGNOSIS — Z98.1 S/P CERVICAL SPINAL FUSION: Primary | ICD-10-CM

## 2018-12-19 DIAGNOSIS — M54.12 CERVICAL RADICULOPATHY: ICD-10-CM

## 2018-12-19 DIAGNOSIS — M79.18 MYOFASCIAL PAIN: ICD-10-CM

## 2018-12-19 PROCEDURE — 25000128 H RX IP 250 OP 636: Performed by: PHYSICIAN ASSISTANT

## 2018-12-19 PROCEDURE — 71000014 ZZH RECOVERY PHASE 1 LEVEL 2 FIRST HR: Performed by: NEUROLOGICAL SURGERY

## 2018-12-19 PROCEDURE — 36000069 ZZH SURGERY LEVEL 5 EA 15 ADDTL MIN: Performed by: NEUROLOGICAL SURGERY

## 2018-12-19 PROCEDURE — 71000027 ZZH RECOVERY PHASE 2 EACH 15 MINS: Performed by: NEUROLOGICAL SURGERY

## 2018-12-19 PROCEDURE — 37000009 ZZH ANESTHESIA TECHNICAL FEE, EACH ADDTL 15 MIN: Performed by: NEUROLOGICAL SURGERY

## 2018-12-19 PROCEDURE — 22551 ARTHRD ANT NTRBDY CERVICAL: CPT | Mod: AS | Performed by: PHYSICIAN ASSISTANT

## 2018-12-19 PROCEDURE — 25000128 H RX IP 250 OP 636: Performed by: NURSE ANESTHETIST, CERTIFIED REGISTERED

## 2018-12-19 PROCEDURE — 36000071 ZZH SURGERY LEVEL 5 W FLUORO 1ST 30 MIN: Performed by: NEUROLOGICAL SURGERY

## 2018-12-19 PROCEDURE — 25000125 ZZHC RX 250: Performed by: NURSE ANESTHETIST, CERTIFIED REGISTERED

## 2018-12-19 PROCEDURE — L8699 PROSTHETIC IMPLANT NOS: HCPCS | Performed by: NEUROLOGICAL SURGERY

## 2018-12-19 PROCEDURE — 40000277 XR SURGERY CARM FLUORO LESS THAN 5 MIN W STILLS

## 2018-12-19 PROCEDURE — 27210794 ZZH OR GENERAL SUPPLY STERILE: Performed by: NEUROLOGICAL SURGERY

## 2018-12-19 PROCEDURE — 25000128 H RX IP 250 OP 636: Performed by: NEUROLOGICAL SURGERY

## 2018-12-19 PROCEDURE — 22853 INSJ BIOMECHANICAL DEVICE: CPT | Performed by: NEUROLOGICAL SURGERY

## 2018-12-19 PROCEDURE — 25000125 ZZHC RX 250: Performed by: NEUROLOGICAL SURGERY

## 2018-12-19 PROCEDURE — 25000566 ZZH SEVOFLURANE, EA 15 MIN: Performed by: NEUROLOGICAL SURGERY

## 2018-12-19 PROCEDURE — 37000008 ZZH ANESTHESIA TECHNICAL FEE, 1ST 30 MIN: Performed by: NEUROLOGICAL SURGERY

## 2018-12-19 PROCEDURE — 22853 INSJ BIOMECHANICAL DEVICE: CPT | Mod: AS | Performed by: PHYSICIAN ASSISTANT

## 2018-12-19 PROCEDURE — 22551 ARTHRD ANT NTRBDY CERVICAL: CPT | Performed by: NEUROLOGICAL SURGERY

## 2018-12-19 PROCEDURE — 27211024 ZZHC OR SUPPLY OTHER OPNP: Performed by: NEUROLOGICAL SURGERY

## 2018-12-19 PROCEDURE — 25000132 ZZH RX MED GY IP 250 OP 250 PS 637: Performed by: PHYSICIAN ASSISTANT

## 2018-12-19 PROCEDURE — 40000306 ZZH STATISTIC PRE PROC ASSESS II: Performed by: NEUROLOGICAL SURGERY

## 2018-12-19 RX ORDER — ONDANSETRON 4 MG/1
4 TABLET, ORALLY DISINTEGRATING ORAL
Status: DISCONTINUED | OUTPATIENT
Start: 2018-12-19 | End: 2018-12-19 | Stop reason: HOSPADM

## 2018-12-19 RX ORDER — HYDROMORPHONE HYDROCHLORIDE 1 MG/ML
.3-.5 INJECTION, SOLUTION INTRAMUSCULAR; INTRAVENOUS; SUBCUTANEOUS EVERY 10 MIN PRN
Status: DISCONTINUED | OUTPATIENT
Start: 2018-12-19 | End: 2018-12-19 | Stop reason: HOSPADM

## 2018-12-19 RX ORDER — MEPERIDINE HYDROCHLORIDE 25 MG/ML
12.5 INJECTION INTRAMUSCULAR; INTRAVENOUS; SUBCUTANEOUS
Status: DISCONTINUED | OUTPATIENT
Start: 2018-12-19 | End: 2018-12-19 | Stop reason: HOSPADM

## 2018-12-19 RX ORDER — CEFAZOLIN SODIUM 1 G/3ML
1 INJECTION, POWDER, FOR SOLUTION INTRAMUSCULAR; INTRAVENOUS SEE ADMIN INSTRUCTIONS
Status: DISCONTINUED | OUTPATIENT
Start: 2018-12-19 | End: 2018-12-19 | Stop reason: HOSPADM

## 2018-12-19 RX ORDER — CEFAZOLIN SODIUM 2 G/100ML
2 INJECTION, SOLUTION INTRAVENOUS
Status: COMPLETED | OUTPATIENT
Start: 2018-12-19 | End: 2018-12-19

## 2018-12-19 RX ORDER — ONDANSETRON 2 MG/ML
INJECTION INTRAMUSCULAR; INTRAVENOUS PRN
Status: DISCONTINUED | OUTPATIENT
Start: 2018-12-19 | End: 2018-12-19

## 2018-12-19 RX ORDER — LIDOCAINE HYDROCHLORIDE AND EPINEPHRINE 10; 10 MG/ML; UG/ML
INJECTION, SOLUTION INFILTRATION; PERINEURAL PRN
Status: DISCONTINUED | OUTPATIENT
Start: 2018-12-19 | End: 2018-12-19 | Stop reason: HOSPADM

## 2018-12-19 RX ORDER — AMOXICILLIN 250 MG
1-2 CAPSULE ORAL DAILY PRN
Qty: 30 TABLET | Refills: 0 | Status: SHIPPED | OUTPATIENT
Start: 2018-12-19 | End: 2019-01-18

## 2018-12-19 RX ORDER — ONDANSETRON 4 MG/1
4 TABLET, ORALLY DISINTEGRATING ORAL EVERY 30 MIN PRN
Status: DISCONTINUED | OUTPATIENT
Start: 2018-12-19 | End: 2018-12-19 | Stop reason: HOSPADM

## 2018-12-19 RX ORDER — ONDANSETRON 2 MG/ML
4 INJECTION INTRAMUSCULAR; INTRAVENOUS EVERY 30 MIN PRN
Status: DISCONTINUED | OUTPATIENT
Start: 2018-12-19 | End: 2018-12-19 | Stop reason: HOSPADM

## 2018-12-19 RX ORDER — SODIUM CHLORIDE, SODIUM LACTATE, POTASSIUM CHLORIDE, CALCIUM CHLORIDE 600; 310; 30; 20 MG/100ML; MG/100ML; MG/100ML; MG/100ML
INJECTION, SOLUTION INTRAVENOUS CONTINUOUS
Status: DISCONTINUED | OUTPATIENT
Start: 2018-12-19 | End: 2018-12-19 | Stop reason: HOSPADM

## 2018-12-19 RX ORDER — DIMENHYDRINATE 50 MG/ML
25 INJECTION, SOLUTION INTRAMUSCULAR; INTRAVENOUS
Status: DISCONTINUED | OUTPATIENT
Start: 2018-12-19 | End: 2018-12-19 | Stop reason: HOSPADM

## 2018-12-19 RX ORDER — METHOCARBAMOL 500 MG/1
1000 TABLET, FILM COATED ORAL 3 TIMES DAILY PRN
Qty: 60 TABLET | Refills: 1 | Status: SHIPPED | OUTPATIENT
Start: 2018-12-19 | End: 2018-12-28

## 2018-12-19 RX ORDER — OXYCODONE HYDROCHLORIDE 5 MG/1
5-10 TABLET ORAL EVERY 4 HOURS PRN
Qty: 50 TABLET | Refills: 0 | Status: SHIPPED | OUTPATIENT
Start: 2018-12-19 | End: 2018-12-21

## 2018-12-19 RX ORDER — FENTANYL CITRATE 50 UG/ML
INJECTION, SOLUTION INTRAMUSCULAR; INTRAVENOUS PRN
Status: DISCONTINUED | OUTPATIENT
Start: 2018-12-19 | End: 2018-12-19

## 2018-12-19 RX ORDER — NALOXONE HYDROCHLORIDE 0.4 MG/ML
.1-.4 INJECTION, SOLUTION INTRAMUSCULAR; INTRAVENOUS; SUBCUTANEOUS
Status: DISCONTINUED | OUTPATIENT
Start: 2018-12-19 | End: 2018-12-19 | Stop reason: HOSPADM

## 2018-12-19 RX ORDER — FENTANYL CITRATE 50 UG/ML
25-50 INJECTION, SOLUTION INTRAMUSCULAR; INTRAVENOUS
Status: DISCONTINUED | OUTPATIENT
Start: 2018-12-19 | End: 2018-12-19 | Stop reason: HOSPADM

## 2018-12-19 RX ORDER — OXYCODONE HYDROCHLORIDE 5 MG/1
5-10 TABLET ORAL
Status: COMPLETED | OUTPATIENT
Start: 2018-12-19 | End: 2018-12-19

## 2018-12-19 RX ORDER — PROPOFOL 10 MG/ML
INJECTION, EMULSION INTRAVENOUS PRN
Status: DISCONTINUED | OUTPATIENT
Start: 2018-12-19 | End: 2018-12-19

## 2018-12-19 RX ORDER — LIDOCAINE 40 MG/G
CREAM TOPICAL
Status: DISCONTINUED | OUTPATIENT
Start: 2018-12-19 | End: 2018-12-19 | Stop reason: HOSPADM

## 2018-12-19 RX ORDER — DEXAMETHASONE SODIUM PHOSPHATE 10 MG/ML
INJECTION, SOLUTION INTRAMUSCULAR; INTRAVENOUS PRN
Status: DISCONTINUED | OUTPATIENT
Start: 2018-12-19 | End: 2018-12-19

## 2018-12-19 RX ORDER — LIDOCAINE HYDROCHLORIDE 20 MG/ML
INJECTION, SOLUTION INFILTRATION; PERINEURAL PRN
Status: DISCONTINUED | OUTPATIENT
Start: 2018-12-19 | End: 2018-12-19

## 2018-12-19 RX ORDER — METHOCARBAMOL 750 MG/1
750-1500 TABLET, FILM COATED ORAL
Status: COMPLETED | OUTPATIENT
Start: 2018-12-19 | End: 2018-12-19

## 2018-12-19 RX ADMIN — MIDAZOLAM 2 MG: 1 INJECTION INTRAMUSCULAR; INTRAVENOUS at 08:36

## 2018-12-19 RX ADMIN — LIDOCAINE HYDROCHLORIDE 100 MG: 20 INJECTION, SOLUTION INFILTRATION; PERINEURAL at 08:45

## 2018-12-19 RX ADMIN — FENTANYL CITRATE 50 MCG: 50 INJECTION INTRAMUSCULAR; INTRAVENOUS at 10:54

## 2018-12-19 RX ADMIN — LIDOCAINE HYDROCHLORIDE 1 ML: 10 INJECTION, SOLUTION EPIDURAL; INFILTRATION; INTRACAUDAL; PERINEURAL at 07:56

## 2018-12-19 RX ADMIN — PHENYLEPHRINE HYDROCHLORIDE 100 MCG: 10 INJECTION, SOLUTION INTRAMUSCULAR; INTRAVENOUS; SUBCUTANEOUS at 09:44

## 2018-12-19 RX ADMIN — OXYCODONE HYDROCHLORIDE 10 MG: 5 TABLET ORAL at 11:01

## 2018-12-19 RX ADMIN — HYDROMORPHONE HYDROCHLORIDE 0.5 MG: 1 INJECTION, SOLUTION INTRAMUSCULAR; INTRAVENOUS; SUBCUTANEOUS at 10:40

## 2018-12-19 RX ADMIN — ONDANSETRON 4 MG: 2 INJECTION INTRAMUSCULAR; INTRAVENOUS at 09:37

## 2018-12-19 RX ADMIN — HYDROMORPHONE HYDROCHLORIDE 0.5 MG: 1 INJECTION, SOLUTION INTRAMUSCULAR; INTRAVENOUS; SUBCUTANEOUS at 11:58

## 2018-12-19 RX ADMIN — SUGAMMADEX 200 MG: 100 INJECTION, SOLUTION INTRAVENOUS at 10:20

## 2018-12-19 RX ADMIN — PROPOFOL 200 MG: 10 INJECTION, EMULSION INTRAVENOUS at 08:45

## 2018-12-19 RX ADMIN — FENTANYL CITRATE 50 MCG: 50 INJECTION, SOLUTION INTRAMUSCULAR; INTRAVENOUS at 08:36

## 2018-12-19 RX ADMIN — ROCURONIUM BROMIDE 10 MG: 10 INJECTION INTRAVENOUS at 09:02

## 2018-12-19 RX ADMIN — DEXAMETHASONE SODIUM PHOSPHATE 10 MG: 10 INJECTION, SOLUTION INTRAMUSCULAR; INTRAVENOUS at 08:52

## 2018-12-19 RX ADMIN — METHOCARBAMOL TABLETS 1500 MG: 750 TABLET, COATED ORAL at 11:59

## 2018-12-19 RX ADMIN — FENTANYL CITRATE 50 MCG: 50 INJECTION INTRAMUSCULAR; INTRAVENOUS at 11:05

## 2018-12-19 RX ADMIN — ROCURONIUM BROMIDE 50 MG: 10 INJECTION INTRAVENOUS at 08:45

## 2018-12-19 RX ADMIN — CEFAZOLIN SODIUM 2 G: 2 INJECTION, SOLUTION INTRAVENOUS at 08:52

## 2018-12-19 RX ADMIN — SODIUM CHLORIDE, POTASSIUM CHLORIDE, SODIUM LACTATE AND CALCIUM CHLORIDE: 600; 310; 30; 20 INJECTION, SOLUTION INTRAVENOUS at 09:51

## 2018-12-19 RX ADMIN — SODIUM CHLORIDE, POTASSIUM CHLORIDE, SODIUM LACTATE AND CALCIUM CHLORIDE: 600; 310; 30; 20 INJECTION, SOLUTION INTRAVENOUS at 07:56

## 2018-12-19 RX ADMIN — FENTANYL CITRATE 50 MCG: 50 INJECTION INTRAMUSCULAR; INTRAVENOUS at 10:43

## 2018-12-19 RX ADMIN — FENTANYL CITRATE 50 MCG: 50 INJECTION, SOLUTION INTRAMUSCULAR; INTRAVENOUS at 08:45

## 2018-12-19 NOTE — OP NOTE
Date of surgery: 12/19/2018  Surgeon: Mike Mckenna MD  Assistant: MAYDA Mooney  Note: Kareem Ayon was present for and assisted with the entire surgery, and his role as an assistant was crucial for his aid in positioning, exposure, suctioning, retraction, and closure    Preoperative diagnosis: Cervical radiculopathy  Postoperative diagnosis: Cervical radiculopathy    Procedure:  1.  C5-6 anterior discectomy and interbody arthrodesis  2.  C5-6 insertion of Jimmy Aero-C intervertebral graft  3.  C5-6 anterior instrumentation with insertion of titanium anchors via titanium plate  4.  Use of intraoperative microscope and fluoroscopy    EBL: 25 mL    Indications: 53-year-old male with history C6-7 ACDF, who presented with progressive right arm pain.  MRI demonstrated right sided C5-6 foraminal stenosis.  Underwent non-operative management with failure to improve.  Risks, benefits, indications, and alternatives were discussed with the patient and family in detail.  All their questions were answered, and they wished to proceed with surgery.    Description of surgery: The patient was positioned supine.  Sterile prepping and draping procedures were performed.  Antibiotics were administered and timeout was performed.  A right horizontal neck incision was performed.  The monopolar was used to divide the platysma, and the Metzenbaum scissors were used to create a plane in the fascia medial to the sternocleidomastoid muscle.  Blunt dissection was used to come down upon the anterior cervical spine.  The spinal needle was used to verify the correct level.  The monopolar was used to elevate the longus coli, and the Trimline retractor was inserted.  The 15 blade was used to perform an annulotomy in the C5-6 disc space.  A complete discectomy was performed with combination of the high-speed drill, curettes, and pituitary rongeurs.  Interbody arthrodesis was performed with a high-speed drill.  The posterior osteophytes were removed  with the drill, and the posterior longitudinal ligament was removed with the Kerrison Aguirre, with decompression of the bilateral neural foramina.  A Jimmy Aero-C intervertebral graft was delivered in the disc space at C5-6.  Anterior instrumentation was performed with insertion of titanium anchors via the titanium plate.  Hemostasis was achieved.  Antibiotic irrigation was performed.  The platysma and dermal layers were closed with 3-0 Vicryl sutures, and the skin was closed with a running subcuticular stitch.  There were no intraprocedural complications.

## 2018-12-19 NOTE — DISCHARGE INSTRUCTIONS
[facility] Same[facility]Day [facility]   Adult Discharge Orders & Instructions     For 24 hours after surgery    1. Get plenty of rest.  A responsible adult must stay with you for at least 24 hours after you leave the hospital.   2. Do not drive or use heavy equipment.  If you have weakness or tingling, don't drive or use heavy equipment until this feeling goes away.  3. Do not drink alcohol.  4. Avoid strenuous or risky activities.  Ask for help when climbing stairs.   5. You may feel lightheaded.  If so, sit for a few minutes before standing.  Have someone help you get up.   6. You may have a slight fever. Call the doctor if your fever is over 100 F (37.7 C) (taken under the tongue) or lasts longer than 24 hours.  7. You may have a dry mouth, a sore throat, muscle aches or trouble sleeping.  These should go away after 24 hours.  8. Do not make important or legal decisions.  Based on the surgery/procedure that you had today, we do not expect that you will have any problems.  However, we want you to know what to do if you have pain, nausea, bleeding,or infection:  To control pain:  Take medicines your physician has prescribed or or over-the counter medicine he or she advises.  Ice packs and periods of rest are often helpful.  For surgery on an arm or leg, raise it on a pillow to ease swelling.  If your pain is not managed with the above methods, contact your physician.  To control nausea:  Take anti-nausea medicine approved by your physician.  Drink clear liquids such as apple juice, ginger ale, broth or 7-Up. Be sure to drink enough fluids.  Move to a regular diet as you feel able.  Rest may also help.  Bleeding:  You may see a little blood on your dressing, about the size of a quarter in the first 24 hours.  If you see this, there is no reason to be alarmed.  However, if this continues to increase in size, apply pressure if able, and notify your physician.  Infection: Please contact your physician if you have any of  the following signs:  redness, swelling, heat, increasing pain or foul-smelling drainage at your surgery site, fever or chills.    Call your doctor for any of the followin.  It has been over 8 to 10 hours since surgery and you are still not able to urinate (pass water).    2.  Headache for over 24 hours.    Nurse advice line: [phone]

## 2018-12-19 NOTE — ANESTHESIA PREPROCEDURE EVALUATION
Anesthesia Pre-Procedure Evaluation    Patient: Indra Mehta   MRN: [id number] : 1965          Preoperative Diagnosis: cervical radiculopathy    Procedure(s):  cervical 5-6 anterior cervical discectomy fusion    Past Medical History:   Diagnosis Date     Back pain      Meniere's disease, unspecified      Pain medication agreement signed 2010     Past Surgical History:   Procedure Laterality Date     BUNIONECTOMY RT/LT  08    Both feet     COLONOSCOPY N/A 2016    Procedure: COMBINED COLONOSCOPY, SINGLE OR MULTIPLE BIOPSY/POLYPECTOMY BY BIOPSY;  Surgeon: Indra Jernigan MD;  [facility]     DISCECTOMY, FUSION CERVICAL ANTERIOR ONE LEVEL, COMBINED N/A 2017    Procedure: COMBINED DISCECTOMY, FUSION CERVICAL ANTERIOR ONE LEVEL;  Cervical 6-7, Anterior cervical discectomy fusion;  Surgeon: Mike Mckenna MD;  [facility]     HC COLONOSCOPY W/WO BRUSH/WASH  2005     HC CREATE EARDRUM OPENING,GEN ANESTH  2007    Pe tube, Left endolymphatic sac enhancement.     HC MASTOIDECTOMY,COMPLETE  2007     HERNIORRHAPHY UMBILICAL N/A 2017    Procedure: HERNIORRHAPHY UMBILICAL;  open umbilical hernia repair;  Surgeon: Boni Story MD;  [facility]     INJECT EPIDURAL LUMBAR N/A 2017    Procedure: INJECT EPIDURAL LUMBAR;  lumbar 4-5 epidural steroid injection ;  Surgeon: Darryn Mcdaniel MD;  [facility]     INJECT EPIDURAL LUMBAR N/A 2017    Procedure: INJECT EPIDURAL LUMBAR;  lumbar epidural injection;  Surgeon: Darryn Mcdaniel MD;  [facility]     INJECT EPIDURAL TRANSFORAMINAL Bilateral 2018    Procedure: INJECT EPIDURAL TRANSFORAMINAL;  transforaminal bilateral lumbar 3-4 steroid injection;  Surgeon: Darryn Mcdaniel MD;  [facility]     INJECT EPIDURAL TRANSFORAMINAL Bilateral 2018    Procedure: INJECT EPIDURAL TRANSFORAMINAL lumbar 3-4;  Surgeon: Darryn Mcdaniel MD;  [facility]     PE TUBES  2006    left ear        Anesthesia Evaluation     . Pt has had prior anesthetic. Type: General and MAC    No history of anesthetic complications          ROS/MED HX    ENT/Pulmonary:  - neg pulmonary ROS     Neurologic:     (+)neuropathy (bilateral occipital neuralgia) - Bilateral occipital neuralgia, other neuro Menieres disease    Cardiovascular:     (+) Dyslipidemia, hypertension----. : . . . :. . Previous cardiac testing date:results:date: results:ECG reviewed date:6/30/17 results:NSR date: results:          METS/Exercise Tolerance:  >4 METS   Hematologic:  - neg hematologic  ROS       Musculoskeletal:   (+) , , other musculoskeletal- myalgia/back pain      GI/Hepatic:  - neg GI/hepatic ROS       Renal/Genitourinary:  - ROS Renal section negative   (+) Pt has no history of transplant,       Endo:  - neg endo ROS       Psychiatric:     (+) psychiatric history depression      Infectious Disease:  - neg infectious disease ROS       Malignancy:      - no malignancy   Other:    (+) No chance of pregnancy C-spine cleared: Yes, H/O Chronic Pain,H/O chronic opiod use , no other significant disability                         Physical Exam  Normal systems: cardiovascular, pulmonary and dental    Airway   Mallampati: II  TM distance: >3 FB  Neck ROM: full    Dental     Cardiovascular   Rhythm and rate: regular and normal      Pulmonary    breath sounds clear to auscultation            Lab Results   Component Value Date    WBC 6.9 11/05/2018    HGB 14.0 11/05/2018    HCT 41.6 11/05/2018     11/05/2018    CRP <2.9 01/26/2018     11/05/2018    POTASSIUM 3.7 11/05/2018    CHLORIDE 107 11/05/2018    CO2 27 11/05/2018    BUN 15 11/05/2018    CR 0.96 11/05/2018    GLC 94 11/05/2018    KRISHAN 8.7 11/05/2018    MAG 2.2 03/19/2007    TSH 2.00 03/24/2016       Preop Vitals  BP Readings from Last 3 Encounters:   12/19/18 127/86   12/12/18 130/78   12/10/18 150/85    Pulse Readings from Last 3 Encounters:   12/19/18 62   12/12/18 88   12/10/18  "69      Resp Readings from Last 3 Encounters:   12/19/18 16   12/12/18 16   11/08/18 16    SpO2 Readings from Last 3 Encounters:   12/19/18 97%   12/12/18 99%   11/08/18 98%      Temp Readings from Last 1 Encounters:   12/19/18 97.8  F (36.6  C) (Oral)    Ht Readings from Last 1 Encounters:   12/12/18 1.848 m (6' 0.75\")      Wt Readings from Last 1 Encounters:   12/12/18 93 kg (205 lb)    Estimated body mass index is 27.23 kg/m  as calculated from the following:    Height as of 12/12/18: 1.848 m (6' 0.75\").    Weight as of 12/12/18: 93 kg (205 lb).       Anesthesia Plan      History & Physical Review  History and physical reviewed and following examination; no interval change.    ASA Status:  2 .    NPO Status:  > 8 hours    Plan for General and ETT with Intravenous and Propofol induction. Maintenance will be Balanced.    PONV prophylaxis:  Ondansetron (or other 5HT-3) and Dexamethasone or Solumedrol  Additional equipment: Videolaryngoscope      Postoperative Care  Postoperative pain management:  IV analgesics, Multi-modal analgesia and Neuraxial analgesia.      Consents  Anesthetic plan, risks, benefits and alternatives discussed with:  Patient.  Use of blood products discussed: No .   .                 YUMIKO Valente CRNA  "

## 2018-12-19 NOTE — ANESTHESIA CARE TRANSFER NOTE
Patient: Indra Mehta    Procedure(s):  cervical 5-6 anterior cervical discectomy fusion    Diagnosis: cervical radiculopathy  Diagnosis Additional Information: No value filed.    Anesthesia Type:   General, ETT     Note:  Airway :Face Mask  Patient transferred to:PACU  Handoff Report: Identifed the Patient, Identified the Reponsible Provider, Reviewed the pertinent medical history, Discussed the surgical course, Reviewed Intra-OP anesthesia mangement and issues during anesthesia, Set expectations for post-procedure period and Allowed opportunity for questions and acknowledgement of understanding      Vitals: (Last set prior to Anesthesia Care Transfer)    CRNA VITALS  12/19/2018 1000 - 12/19/2018 1034      12/19/2018             Pulse:  85    SpO2:  100 %    Resp Rate (observed):  15                Electronically Signed By: YUMIKO Valente CRNA  December 19, 2018  10:34 AM

## 2018-12-19 NOTE — ANESTHESIA POSTPROCEDURE EVALUATION
Patient: Indra Mehta    Procedure(s):  cervical 5-6 anterior cervical discectomy fusion    Diagnosis:cervical radiculopathy  Diagnosis Additional Information: No value filed.    Anesthesia Type:  General, ETT    Note:  Anesthesia Post Evaluation    Patient location during evaluation: Bedside and Phase 2  Patient participation: Able to fully participate in evaluation  Level of consciousness: awake and alert  Pain management: adequate  Airway patency: patent  Cardiovascular status: acceptable  Respiratory status: acceptable  Hydration status: acceptable  PONV: none     Anesthetic complications: None    Comments: Patient was pleased with his care today. No complications noted. Will follow as needed.        Last vitals:  Vitals:    12/19/18 1200 12/19/18 1215 12/19/18 1230   BP: 132/79 127/77 137/85   Pulse: 74 71 70   Resp:  12    Temp:      SpO2: 95% 94% 92%         Electronically Signed By: YUMIKO Valente CRNA  December 19, 2018  4:32 PM

## 2018-12-21 DIAGNOSIS — Z98.1 S/P CERVICAL SPINAL FUSION: ICD-10-CM

## 2018-12-21 RX ORDER — OXYCODONE HYDROCHLORIDE 5 MG/1
5-10 TABLET ORAL EVERY 6 HOURS PRN
Qty: 40 TABLET | Refills: 0 | Status: SHIPPED | OUTPATIENT
Start: 2018-12-22 | End: 2018-12-28

## 2018-12-21 NOTE — PROGRESS NOTES
[facility] Same Day Surgery  Discharge Call Back  Indra Mehta  1965  MRN: [id number]  Home: [phone] (home)   PCP: Tha Grullon    We are calling to see how you're doing since your surgery/procedure with us?   Comments: good, sore but tolerable with pain meds  Clinical Questions  1. Have you had time to look at your discharge instructions? Do you have any questions in regards to the instructions?   Comment: yes, no  2. Do you feel your pain is being controlled with the regimen the surgeon sent you home on? (ie: prescription medications, over the counter pain medications, ice packs)   Comments: yes  3. Have you noticed any drainage on your dressing? Do you know what to do if you have bleeding as a result of your procedure?   Comments: no  4. Have you had any nausea/vomiting? Do you know how to treat this?   Comment: no  5. Have you had any signs/symptoms of infection? (ie: fever, swelling, heat, drainage or redness) Do you know what to do if you have?   Comment: no  6. Do you have a follow up appointment made with your surgeon? Do you have a number to contact them at if you need it?   Comment: yes, yes  Retained Foreign Object (BOB, Hemovac, Penrose, Wound Packing, Vaginal Packing, Nasal Splints, Urethral Stents, Britton Catheter)  1. Do you still have na in place?   2. If the item is still in place, can you review the plan for removal with me? na  Recognition  Is there anyone from your care team that you would like to recognize?   Comments: no  Improvement  Our goal is to be the best. Do you have any suggestions for things that we could improve on?   Comments: no  You may be randomly selected to fill out a [facility] survey. We would appreciate you taking the time to fill this out. It is important to us if you would answer all of the questions on the survey.

## 2018-12-21 NOTE — TELEPHONE ENCOUNTER
Returned call to patient for post-op follow up call. Patient is s/p C5-6 ACDF on 12/19/18. After surgery patient is doing well overall. He reports moderate pain/soreness at bilateral shoulders. Pain is controlled by Robaxin and Oxycodone. He has started to wean off Oxycodone and is currently taking 8 tabs per day. He would like a refill of Oxycodone if possible, since he will run out before [place]. Will forward refill request to Lenard COSTA to have available at  pharmacy for . Start date of 12/22/18.    Patient has been eating and drinking well. No issues with bowel or bladder; advised to increase fluid, fiber intake, and use OTC stool softener if needed. No issues or concerns with incision. Reminded patient to watch for s/sx of infection, use proper incision care, and follow activity restrictions. Patient will call to schedule 6 week post op f/u appt.  All questions were answered to the best of my ability and the patient's satisfaction. Patient verbalized understanding and advised to call with any additional questions or concerns.

## 2018-12-24 DIAGNOSIS — Z98.1 S/P CERVICAL SPINAL FUSION: Primary | ICD-10-CM

## 2018-12-24 DIAGNOSIS — M54.12 CERVICAL RADICULOPATHY: ICD-10-CM

## 2018-12-24 DIAGNOSIS — M79.18 MYOFASCIAL PAIN: ICD-10-CM

## 2018-12-24 RX ORDER — METHYLPREDNISOLONE 4 MG
TABLET, DOSE PACK ORAL
Qty: 21 TABLET | Refills: 0 | Status: SHIPPED | OUTPATIENT
Start: 2018-12-24 | End: 2019-03-14

## 2018-12-24 NOTE — TELEPHONE ENCOUNTER
Discussed with Dr. Charlie Shetty to order medrol dosepak and continue to monitor symptoms. Rx sent to pharmacy. Advised patient to call clinic if he develops weakness or if symptoms change or worsen. Patient voiced agreement with plan.

## 2018-12-24 NOTE — TELEPHONE ENCOUNTER
REASON FOR CALL: Pt LVM stating that he feels tingling on his left arm since his s/p 12/19/2018 cervical fusion. The tingling has also spread to his back and shoulder. Pt noted that he did not have this feeling in his previous fusion, and is wondering if he should feel concern or not. Pt states that it feels like it is getting worst, w/aching and weakness as well.

## 2018-12-24 NOTE — TELEPHONE ENCOUNTER
"Spoke to patient. He is s/p C5-6 ACDF on 12/19/18. He called to report a \"pins and needle feeling\" in his left arm that he noticed a few days after surgery. Then he developed a deeper pain in left arm this past Saturday 12/22/18. He denies weakness, but states it is difficult to make a fist with his left hand. Prior to surgery, his symptoms were focused on the right arm. He's currently taking oxycodone, robaxin, and gabapentin to help manage. Will discuss with care team for recommendation.   "

## 2018-12-24 NOTE — TELEPHONE ENCOUNTER
Patient left message, to discuss Post Op Symptoms.    Patient returning call from RN.    Please advise.

## 2018-12-25 ENCOUNTER — HOSPITAL ENCOUNTER (EMERGENCY)
Facility: CLINIC | Age: 53
Discharge: HOME | End: 2018-12-25
Attending: NURSE PRACTITIONER | Admitting: NURSE PRACTITIONER
Payer: COMMERCIAL

## 2018-12-25 ENCOUNTER — APPOINTMENT (OUTPATIENT)
Dept: GENERAL RADIOLOGY | Facility: CLINIC | Age: 53
End: 2018-12-25
Attending: NURSE PRACTITIONER
Payer: COMMERCIAL

## 2018-12-25 VITALS
BODY MASS INDEX: 27.9 KG/M2 | OXYGEN SATURATION: 98 % | DIASTOLIC BLOOD PRESSURE: 85 MMHG | TEMPERATURE: 98.1 F | SYSTOLIC BLOOD PRESSURE: 145 MMHG | HEART RATE: 76 BPM | WEIGHT: 210 LBS | RESPIRATION RATE: 16 BRPM

## 2018-12-25 DIAGNOSIS — T14.8XXA CRUSH INJURY: ICD-10-CM

## 2018-12-25 PROCEDURE — 73140 X-RAY EXAM OF FINGER(S): CPT | Mod: TC,RT

## 2018-12-25 PROCEDURE — 99283 EMERGENCY DEPT VISIT LOW MDM: CPT | Performed by: NURSE PRACTITIONER

## 2018-12-25 PROCEDURE — 29130 APPL FINGER SPLINT STATIC: CPT | Mod: F6

## 2018-12-25 PROCEDURE — 99284 EMERGENCY DEPT VISIT MOD MDM: CPT | Mod: Z6 | Performed by: NURSE PRACTITIONER

## 2018-12-25 PROCEDURE — 99284 EMERGENCY DEPT VISIT MOD MDM: CPT

## 2018-12-25 PROCEDURE — 25000132 ZZH RX MED GY IP 250 OP 250 PS 637: Performed by: NURSE PRACTITIONER

## 2018-12-25 RX ORDER — OXYCODONE HYDROCHLORIDE 5 MG/1
10 TABLET ORAL ONCE
Status: COMPLETED | OUTPATIENT
Start: 2018-12-25 | End: 2018-12-25

## 2018-12-25 RX ORDER — OXYCODONE HYDROCHLORIDE 5 MG/1
5-10 TABLET ORAL EVERY 6 HOURS PRN
Qty: 12 TABLET | Refills: 0 | Status: SHIPPED | OUTPATIENT
Start: 2018-12-25 | End: 2019-03-14

## 2018-12-25 RX ADMIN — OXYCODONE HYDROCHLORIDE 10 MG: 5 TABLET ORAL at 17:21

## 2018-12-25 ASSESSMENT — ENCOUNTER SYMPTOMS
JOINT SWELLING: 1
COLOR CHANGE: 1
WOUND: 1
ARTHRALGIAS: 1

## 2018-12-25 NOTE — ED PROVIDER NOTES
History     Chief Complaint   Patient presents with     Hand Injury     HPI  Indra Mehta is a 53 year old male who presents to the ED today with c/o right index and middle finger pain after getting them slammed into a door prior to arrival, pain worse with movement, took Tylenol without relief. DT is up to date.  Patient did take a dose of oxycodone this morning which he has on hand from his recent neck surgery.    Problem List:    Patient Active Problem List    Diagnosis Date Noted     Benign essential hypertension 06/30/2017     Priority: Medium     Myalgia 10/28/2016     Priority: Medium     Bilateral occipital neuralgia 10/28/2016     Priority: Medium     CARDIOVASCULAR SCREENING; LDL GOAL LESS THAN 160 10/31/2010     Priority: Medium     Major depression in complete remission (H) 08/20/2010     Priority: Medium     Chronic pain syndrome 08/20/2010     Priority: Medium     Patient is followed by Tha Grullon MD for ongoing prescription of pain medication.  All refills should be approved by this provider only at face-to-face appointments - not by phone request.    Medication(s): Tramadol.   Maximum quantity per month: 60  Clinic visit frequency required: Q 1 months     Controlled substance agreement:  Encounter-Level CSA - 10/13/16:               Controlled Substance Agreement - Scan on 10/23/2016  5:07 PM : CONTROLLED SUBSTANCE AGREEMENT 10/13/16 (below)            Pain Clinic evaluation in the past: Yes       Date/Location:   [facility]    DIRE Total Score(s):  No flowsheet data found.    Last [facility] website verification:  none   [email or web address]               Pain medication agreement signed 08/20/2010     Priority: Medium     Meniere's disease 03/22/2007     Priority: Medium     Problem list name updated by automated process. Provider to review          Past Medical History:    Past Medical History:   Diagnosis Date     Back pain      Meniere's disease, unspecified      Pain  medication agreement signed 8/20/2010       Past Surgical History:    Past Surgical History:   Procedure Laterality Date     BUNIONECTOMY RT/LT  09/05/08    Both feet     COLONOSCOPY N/A 12/28/2016    Procedure: COMBINED COLONOSCOPY, SINGLE OR MULTIPLE BIOPSY/POLYPECTOMY BY BIOPSY;  Surgeon: Indra Jernigan MD;  Location: [facility]     DISCECTOMY, FUSION CERVICAL ANTERIOR ONE LEVEL, COMBINED N/A 7/5/2017    Procedure: COMBINED DISCECTOMY, FUSION CERVICAL ANTERIOR ONE LEVEL;  Cervical 6-7, Anterior cervical discectomy fusion;  Surgeon: Mike Mckenna MD;  Location: [facility]     DISCECTOMY, FUSION CERVICAL ANTERIOR ONE LEVEL, COMBINED N/A 12/19/2018    Procedure: cervical 5-6 anterior cervical discectomy fusion;  Surgeon: Mike Mckenna MD;  Location: [facility] OR     HC COLONOSCOPY W/WO BRUSH/WASH  12/27/2005     HC CREATE EARDRUM OPENING,GEN ANESTH  09/27/2007    Pe tube, Left endolymphatic sac enhancement.     HC MASTOIDECTOMY,COMPLETE  09/27/2007     HERNIORRHAPHY UMBILICAL N/A 8/11/2017    Procedure: HERNIORRHAPHY UMBILICAL;  open umbilical hernia repair;  Surgeon: Boni Story MD;  Location: [facility]     INJECT EPIDURAL LUMBAR N/A 11/9/2017    Procedure: INJECT EPIDURAL LUMBAR;  lumbar 4-5 epidural steroid injection ;  Surgeon: Darryn Mcdaniel MD;  Location: [facility]     INJECT EPIDURAL LUMBAR N/A 12/28/2017    Procedure: INJECT EPIDURAL LUMBAR;  lumbar epidural injection;  Surgeon: Darryn Mcdaniel MD;  Location: [facility]     INJECT EPIDURAL TRANSFORAMINAL Bilateral 8/23/2018    Procedure: INJECT EPIDURAL TRANSFORAMINAL;  transforaminal bilateral lumbar 3-4 steroid injection;  Surgeon: Darryn Mcdaniel MD;  Location: [facility]     INJECT EPIDURAL TRANSFORAMINAL Bilateral 11/8/2018    Procedure: INJECT EPIDURAL TRANSFORAMINAL lumbar 3-4;  Surgeon: Darryn Mcdaniel MD;  Location: [facility]     PE TUBES  2006    left ear       Family History:    Family History   Problem Relation Age of Onset     Cancer - colorectal Mother       Diabetes Mother      Cardiovascular Father      Hypertension Father      Mental Illness Sister      Suicide Other        Social History:  Marital Status:  Single [1]  Social History     Tobacco Use     Smoking status: Never Smoker     Smokeless tobacco: Never Used   Substance Use Topics     Alcohol use: No     Alcohol/week: 0.0 oz     Drug use: No        Medications:      Acetaminophen (TYLENOL PO)   oxyCODONE (ROXICODONE) 5 MG tablet   ALPRAZolam (XANAX XR) 0.5 MG 24 hr tablet   gabapentin (NEURONTIN) 400 MG capsule   losartan (COZAAR) 50 MG tablet   methocarbamol (ROBAXIN) 500 MG tablet   methylPREDNISolone (MEDROL DOSEPAK) 4 MG tablet therapy pack   order for DME   senna-docusate (SENOKOT-S/PERICOLACE) 8.6-50 MG tablet   tiZANidine (ZANAFLEX) 4 MG tablet         Review of Systems   Musculoskeletal: Positive for arthralgias and joint swelling.   Skin: Positive for color change and wound.   All other systems reviewed and are negative.      Physical Exam   BP: 145/85  Pulse: 76  Temp: 98.1  F (36.7  C)  Resp: 16  Weight: 95.3 kg (210 lb)  SpO2: 98 %      Physical Exam   Constitutional: He appears well-developed and well-nourished. No distress.   HENT:   Head: Normocephalic.   Eyes: EOM are normal.   Neck: Normal range of motion.   Cardiovascular: Normal rate.   Pulmonary/Chest: Effort normal.   Musculoskeletal: He exhibits edema and tenderness (right second and third digits with ecchymosis and swelling). He exhibits no deformity.   Neurological: He is alert.   Skin: Skin is warm and dry. Capillary refill takes less than 2 seconds. He is not diaphoretic.   Abrasion to second MCP joint       ED Course        Procedures    Results for orders placed or performed during the hospital encounter of 12/25/18 (from the past 24 hour(s))   XR Finger Right G/E 2 Views    Narrative    XR FINGER RT G/E 2 VW 12/25/2018 5:18 PM    COMPARISON: None.    HISTORY: Slammed fingers in door.      Impression    IMPRESSION: No fractures are  seen in the RIGHT second and third  fingers. Visualized joints appear preserved and in normal alignment.  No erosive changes. No radiodense foreign bodies or soft tissue gas.    JF DEL CID MD       Medications   oxyCODONE (ROXICODONE) tablet 10 mg (10 mg Oral Given 12/25/18 1721)       Assessments & Plan (with Medical Decision Making)  Finger contusions/swelling.  Xray negative for fracture.  Foam metal splint applied to second and third digits after abrasion covered with bacitracin and dressing.   Oxycodone Rx given to patient for pain control.  Patient currently on this for his neck, did allow for increased dosing as discussed for the next 2 days for his finger injury.   Follow up with PCP as needed, reasons to return to the ED discussed, patient agreeable to plan of care and discharged in stable condition with his daughter driving.      I have reviewed the nursing notes.    I have reviewed the findings, diagnosis, plan and need for follow up with the patient.         Medication List      Modified    * oxyCODONE 5 MG tablet  Commonly known as:  ROXICODONE  5-10 mg, Oral, EVERY 6 HOURS PRN  What changed:  Another medication with the same name was added. Make sure you understand how and when to take each.     * oxyCODONE 5 MG tablet  Commonly known as:  ROXICODONE  5-10 mg, Oral, EVERY 6 HOURS PRN  What changed:  You were already taking a medication with the same name, and this prescription was added. Make sure you understand how and when to take each.         * This list has 2 medication(s) that are the same as other medications prescribed for you. Read the directions carefully, and ask your doctor or other care provider to review them with you.                Final diagnoses:   Crush injury - right second and third fingers       12/25/2018   [facility] EMERGENCY DEPARTMENT     Argentina Mcneal, YUMIKO CNP  12/25/18 2817

## 2018-12-25 NOTE — ED AVS SNAPSHOT
[facility] Emergency Department  [address] [place] [address]  Phone:  [phone]  Fax:  [phone]                                    Indra Mehta   MRN: [id number]    Department:  [facility] Emergency Department   Date of Visit:  12/25/2018           After Visit Summary Signature Page    I have received my discharge instructions, and my questions have been answered. I have discussed any challenges I see with this plan with the nurse or doctor.    ..........................................................................................................................................  Patient/Patient Representative Signature      ..........................................................................................................................................  Patient Representative Print Name and Relationship to Patient    ..................................................               ................................................  Date                                   Time    ..........................................................................................................................................  Reviewed by Signature/Title    ...................................................              ..............................................  Date                                               Time          22EPIC Rev 08/18

## 2018-12-25 NOTE — ED TRIAGE NOTES
Pt was moving a pole shed door and it slipped on the tracked.  Crushed his right hand. Bruising of 2nd and 3rd digit noted.

## 2018-12-26 NOTE — TELEPHONE ENCOUNTER
Spoke to patient. He started the medrol dosepak on 12/24/18 and he plans to finish this course of steroid. He denies any weakness and states he is able to use his left arm, but the pain is bothersome. He will continue with oxycodone, robaxin, and medrol dosepak at this point. Advised patient to call us back if symptoms change or worsen. Patient voiced agreement with plan.

## 2018-12-26 NOTE — TELEPHONE ENCOUNTER
REASON FOR CALL: Pt [facility] morning of 12/26 to give a status update. As of this morning, nothing has changed. Pt states that, although he has been taking medication for two days now, he is still feeling pain, weakness, and tingling on his left arm.  Pain is from shoulder to elbow, w/tingling on the arm. He wants to know how long it takes medication to start working, or if it's just not working for him.

## 2018-12-28 ENCOUNTER — TELEPHONE (OUTPATIENT)
Dept: NEUROSURGERY | Facility: CLINIC | Age: 53
End: 2018-12-28

## 2018-12-28 RX ORDER — METHOCARBAMOL 500 MG/1
1000 TABLET, FILM COATED ORAL 3 TIMES DAILY PRN
Qty: 60 TABLET | Refills: 1 | Status: SHIPPED | OUTPATIENT
Start: 2018-12-28 | End: 2019-01-21

## 2018-12-28 RX ORDER — OXYCODONE HYDROCHLORIDE 5 MG/1
5-10 TABLET ORAL EVERY 6 HOURS PRN
Qty: 40 TABLET | Refills: 0 | Status: SHIPPED | OUTPATIENT
Start: 2018-12-28 | End: 2019-01-03

## 2018-12-28 NOTE — TELEPHONE ENCOUNTER
Patient LVM, stated Lt Arm Pain, and some hand numbness.    Patient requesting RX Refill.    Please advise.

## 2018-12-28 NOTE — TELEPHONE ENCOUNTER
Spoke to patient. Reports pin and needles sensation is better but still having pain in neck radiating to shoulder into arm. Patient currently on medrol dose pack. Patient is requesting Robaxin and oxycodone refill. He will run out of both later today. He takes 2 tabs robaxin TID and oxycodone 2 tabs every 6 hours. Patient is requesting  at [facility] if approved.

## 2018-12-28 NOTE — TELEPHONE ENCOUNTER
Prior Authorization Retail Medication Request    Medication/Dose: oxycodone 5mg  ICD code (if different than what is on RX):  n/a  Previously Tried and Failed:  n/a  Rationale:   This is the rejection we get from insurance - DS_LIMIT <= 14 IN 60 DAYS. Pt will need a pa done for this refill and future refills for this medication.       Insurance Name:   Commercial  Insurance ID:  [id number]      Pharmacy Information (if different than what is on RX)  Name:  n/a  Phone:  N/a    On behalf of [facility]    Thank You~  Kasia Ayers [facility]

## 2018-12-31 ENCOUNTER — TELEPHONE (OUTPATIENT)
Dept: NEUROSURGERY | Facility: CLINIC | Age: 53
End: 2018-12-31

## 2018-12-31 NOTE — TELEPHONE ENCOUNTER
Patient contacted the clinic to provide an update regarding the left neck and shoulder pain and left thumb/forefinger numbness he started experiencing after surgery. He was started on a medrol dose pack. Today he states that he is starting to see decreased neck and shoulder pain and decreased numbness in left thumb and forefinger. He states he is going to be out of his oxycodone on Thursday and would like a refill at this time. No provider available in [place] on Wednesday so he will have to wait to refill on Thursday or drive to [place] on Wednesday to  the prescription. Await his response as to where/when he would like to  the prescription.

## 2019-01-02 ENCOUNTER — DOCUMENTATION ONLY (OUTPATIENT)
Dept: NEUROSURGERY | Facility: CLINIC | Age: 54
End: 2019-01-02

## 2019-01-02 NOTE — PROGRESS NOTES
Prior Authorization Retail Medication Request    Medication/Dose: Oxycodone 5mg  ICD code (if different than what is on RX):  Status post cervical fusion (Z98.1)  Previously Tried and Failed:    Rationale:  Pain medication prescribed for post operative recovery from recent cervical spinal fusion    Insurance Name:  [employer]  Insurance ID:  [id number]       Pharmacy Information (if different than what is on RX)  Name:  [facility]  Phone:  [phone]

## 2019-01-03 DIAGNOSIS — Z98.1 S/P CERVICAL SPINAL FUSION: ICD-10-CM

## 2019-01-03 RX ORDER — OXYCODONE HYDROCHLORIDE 5 MG/1
5-10 TABLET ORAL EVERY 6 HOURS PRN
Qty: 40 TABLET | Refills: 0 | Status: SHIPPED | OUTPATIENT
Start: 2019-01-03 | End: 2019-01-08

## 2019-01-03 NOTE — TELEPHONE ENCOUNTER
Prior Authorization Approval    Authorization Effective Date: 12/4/2018  Authorization Expiration Date: 1/3/2020  Medication: oxycodone 5mg-APPROVED  Approved Dose/Quantity:    Reference #: [id number]   Insurance Company: [employer] - Phone [phone] Fax [phone]  Expected CoPay:       CoPay Card Available:      Foundation Assistance Needed:    Which Pharmacy is filling the prescription (Not needed for infusion/clinic administered): [facility] [address]   Pharmacy Notified: Yes  Patient Notified: Yes

## 2019-01-03 NOTE — PROGRESS NOTES
PA submitted to [facility] - documentation is in 12/28/2018 encounter.

## 2019-01-03 NOTE — TELEPHONE ENCOUNTER
Central Prior Authorization Team   Phone: [phone]    PA Initiation    Medication: oxycodone 5mg  Insurance Company: [employer] - Phone [phone] Fax [phone]  Pharmacy Filling the Rx:    Filling Pharmacy Phone:    Filling Pharmacy Fax:    Start Date: 1/3/2019

## 2019-01-03 NOTE — PROGRESS NOTES
[employer] is calling again about this. Stating that the patient is out of his medication and they are hoping this can be done this morning.

## 2019-01-03 NOTE — PROGRESS NOTES
[employer] is calling asking for the prior auth to be submitted. Please submit- please call [phone] option 2. Patient is out of medication- urgent need to be filled

## 2019-01-07 NOTE — TELEPHONE ENCOUNTER
Returned call and spoke to patient. He is s/p C5-6 ACDF on 12/19/18 with Dr. Mckenna. He called to request oxycodone and robaxin refills.    Patient reports increased right arm pain since Friday 1/4/19. He was unclogging a rain gutter at home but then noticed an increase in pain. Denies any falls or weakness. He states he was following our post op activity restrictions. The pain is located behind the right shoulder blade and radiates to the right elbow, with new tingling in the right hand as well. He reports the pain on the left neck and shoulder has improved, being less frequent and not constant pain anymore.     Informed patient there is a robaxin refill available at the  pharmacy. Okay to continue with tylenol and ice as well. Will send oxycodone refill request to care team for approval.     Patient voiced understanding and agreement .

## 2019-01-07 NOTE — TELEPHONE ENCOUNTER
Patient LVM, to request RX refill regarding,oxyCODONE (ROXICODONE) 5 MG tablet. Patient stated he will be out of Medication, sometime tomorrow, 01/08/2019.    Please advise.

## 2019-01-08 NOTE — TELEPHONE ENCOUNTER
Discussed with care team. Per Erica COSTA, would recommend to continue to monitor, symptoms likely do to recent increased activity. Okay to refill oxycodone tomorrow at  pharmacy when care team is at . Advised patient to continue to wean off oxycodone as directed. Recommend to continue with ice, rest, and post op restrictions.     Informed patient to contact our clinic if any weakness or worsening pain develops. Patient voiced understanding and agreement with plan.

## 2019-01-09 RX ORDER — OXYCODONE HYDROCHLORIDE 5 MG/1
5-10 TABLET ORAL EVERY 6 HOURS PRN
Qty: 40 TABLET | Refills: 0 | Status: SHIPPED | OUTPATIENT
Start: 2019-01-09 | End: 2019-01-14

## 2019-01-11 ENCOUNTER — TELEPHONE (OUTPATIENT)
Dept: NEUROSURGERY | Facility: CLINIC | Age: 54
End: 2019-01-11

## 2019-01-11 NOTE — LETTER
[facility]   [facility]  [address]  [address]        1/11/2019    To Whom it May Concern,      Indra Mehta is being seen at our clinic for post-operative follow up care. He is able to return to work on 1/14/19 with the restrictions of:    -No lifting more than 10 pounds  -Limited twisting, bending, overhead reaching  -May work 20 hours per week until follow-up visit on 1/28/19    Indra will be re-evaluated at their next follow up visit on 1/28/19. Please call our clinic with questions or concerns: [phone].      Sincerely,            Mike Mckenna MD

## 2019-01-11 NOTE — TELEPHONE ENCOUNTER
Patient called clinic requesting RTW letter. He would like to return part time on 19, stating the followin hours per week until his follow-up visit on 19  Post op restrictions- no lifting more than 10 lbs, limited bending/twisting.    Patient states the left sided neck and shoulder pain has improved almost completely. He reports some continued pain on the right neck/shoulder blade area, but managed with oxycodone prn.  Denies any weakness. He continues to wean off oxycodone, currently taking 6 tabs per day.      Will write RTW letter for patient. Advised him to continue with post op plan and to call back with any weakness or change in symptoms.    Per patient, will fax letter attn to [facility] #[phone].    Patient voiced agreement with plan.

## 2019-01-14 DIAGNOSIS — Z98.1 S/P CERVICAL SPINAL FUSION: ICD-10-CM

## 2019-01-14 RX ORDER — OXYCODONE HYDROCHLORIDE 5 MG/1
5-10 TABLET ORAL EVERY 6 HOURS PRN
Qty: 40 TABLET | Refills: 0 | Status: SHIPPED | OUTPATIENT
Start: 2019-01-15 | End: 2019-01-21

## 2019-01-14 NOTE — TELEPHONE ENCOUNTER
Patient called to request a refill on his oxycodone. S/p 12/19 cervical fusion, last refill on 1/9 for # 40. He states that he will be out on Wednesday morning and would like to  in [place]. Will send refill request to care team for approval.

## 2019-01-21 ENCOUNTER — TELEPHONE (OUTPATIENT)
Dept: NEUROSURGERY | Facility: CLINIC | Age: 54
End: 2019-01-21

## 2019-01-21 DIAGNOSIS — M79.18 MYOFASCIAL PAIN: ICD-10-CM

## 2019-01-21 DIAGNOSIS — Z98.1 S/P CERVICAL SPINAL FUSION: ICD-10-CM

## 2019-01-21 DIAGNOSIS — Z98.1 STATUS POST CERVICAL SPINAL FUSION: Primary | ICD-10-CM

## 2019-01-21 DIAGNOSIS — M54.12 CERVICAL RADICULOPATHY: ICD-10-CM

## 2019-01-21 RX ORDER — METHOCARBAMOL 500 MG/1
1000 TABLET, FILM COATED ORAL 3 TIMES DAILY PRN
Qty: 60 TABLET | Refills: 1 | Status: SHIPPED | OUTPATIENT
Start: 2019-01-21 | End: 2019-02-14

## 2019-01-21 RX ORDER — OXYCODONE HYDROCHLORIDE 5 MG/1
5-10 TABLET ORAL EVERY 6 HOURS PRN
Qty: 40 TABLET | Refills: 0 | Status: SHIPPED | OUTPATIENT
Start: 2019-01-21 | End: 2019-01-28

## 2019-01-21 NOTE — TELEPHONE ENCOUNTER
Spoke with patient he is S/p C5-6 ACDF on 12/19/18. He is reporting some on going pain in his neck that radiates into his right shoulder and arm. Denies N/T or weakness. He feels that his ROM is limited due to the discomfort. Patient had a previous ACDF in 2017 and states he was feeling better 2 weeks post operatively. I explained that it isn't uncommon for him to have post operative pain at this point in his recovery, especially after having 2 procedures in the same area. Patient understands that it's normal to have post op pain at this point, but is frustrated that things aren't going as fast this time around. He is using approximately 6-8 tablets of oxycodone along with tylenol and Robaxin to help control his pain. He is also using ice, which he states is helpful. Patient is also requesting a refill on his Oxycodone and Robaxin would maria teresa to  in  if approved. Will forward to care team for review.

## 2019-01-21 NOTE — TELEPHONE ENCOUNTER
Patient LVM, regarding increased pain frequency, on his Rt Side.    Ph. [phone]    Please advise.

## 2019-01-25 RX ORDER — METHYLPREDNISOLONE 4 MG
TABLET, DOSE PACK ORAL
Qty: 21 TABLET | Refills: 0 | Status: SHIPPED | OUTPATIENT
Start: 2019-01-25 | End: 2019-03-14

## 2019-01-25 NOTE — TELEPHONE ENCOUNTER
Pts call returned. He notes th at this week his pain has gotten worse.  He had ACDF on 12- he had right neck and right shoulder radicular pain.  Explained that the pain can come and go post surgery.  He is open to trying another medrol dose pack. This is his second since surgery. Recc. Heat and ice as well.

## 2019-01-28 ENCOUNTER — HOSPITAL ENCOUNTER (OUTPATIENT)
Dept: MRI IMAGING | Facility: CLINIC | Age: 54
Discharge: HOME | End: 2019-01-28
Admitting: PHYSICIAN ASSISTANT
Payer: COMMERCIAL

## 2019-01-28 ENCOUNTER — ANCILLARY PROCEDURE (OUTPATIENT)
Dept: GENERAL RADIOLOGY | Facility: CLINIC | Age: 54
End: 2019-01-28
Payer: COMMERCIAL

## 2019-01-28 ENCOUNTER — OFFICE VISIT (OUTPATIENT)
Dept: NEUROSURGERY | Facility: CLINIC | Age: 54
End: 2019-01-28
Payer: COMMERCIAL

## 2019-01-28 VITALS
TEMPERATURE: 97.9 F | DIASTOLIC BLOOD PRESSURE: 80 MMHG | HEIGHT: 73 IN | BODY MASS INDEX: 26.07 KG/M2 | SYSTOLIC BLOOD PRESSURE: 158 MMHG | WEIGHT: 196.7 LBS

## 2019-01-28 DIAGNOSIS — Z98.1 S/P CERVICAL SPINAL FUSION: Primary | ICD-10-CM

## 2019-01-28 DIAGNOSIS — Z98.1 S/P CERVICAL SPINAL FUSION: ICD-10-CM

## 2019-01-28 PROCEDURE — 99207 ZZC NO CHARGE LOS: CPT | Performed by: PHYSICIAN ASSISTANT

## 2019-01-28 PROCEDURE — 25500064 ZZH RX 255 OP 636: Performed by: RADIOLOGY

## 2019-01-28 PROCEDURE — 99024 POSTOP FOLLOW-UP VISIT: CPT | Performed by: PHYSICIAN ASSISTANT

## 2019-01-28 PROCEDURE — A9585 GADOBUTROL INJECTION: HCPCS | Performed by: RADIOLOGY

## 2019-01-28 PROCEDURE — 72040 X-RAY EXAM NECK SPINE 2-3 VW: CPT | Mod: TC

## 2019-01-28 PROCEDURE — 72156 MRI NECK SPINE W/O & W/DYE: CPT

## 2019-01-28 RX ORDER — OXYCODONE HYDROCHLORIDE 5 MG/1
5-10 TABLET ORAL EVERY 6 HOURS PRN
Qty: 60 TABLET | Refills: 0 | Status: SHIPPED | OUTPATIENT
Start: 2019-01-28 | End: 2019-02-04

## 2019-01-28 RX ORDER — GADOBUTROL 604.72 MG/ML
10 INJECTION INTRAVENOUS ONCE
Status: COMPLETED | OUTPATIENT
Start: 2019-01-28 | End: 2019-01-28

## 2019-01-28 RX ADMIN — GADOBUTROL 9 ML: 604.72 INJECTION INTRAVENOUS at 11:27

## 2019-01-28 ASSESSMENT — PAIN SCALES - GENERAL: PAINLEVEL: EXTREME PAIN (8)

## 2019-01-28 ASSESSMENT — MIFFLIN-ST. JEOR: SCORE: 1792.14

## 2019-01-28 NOTE — NURSING NOTE
"Indra Mehta is a 53 year old male who presents for:  Chief Complaint   Patient presents with     Neurologic Problem     s/p cervical fusion DOS: 12/19/18        Initial Vitals:  /80 (BP Location: Right arm, Patient Position: Chair, Cuff Size: Adult Regular)   Temp 97.9  F (36.6  C) (Temporal)   Ht 6' 0.75\" (1.848 m)   Wt 196 lb 11.2 oz (89.2 kg)   BMI 26.13 kg/m   Estimated body mass index is 26.13 kg/m  as calculated from the following:    Height as of this encounter: 6' 0.75\" (1.848 m).    Weight as of this encounter: 196 lb 11.2 oz (89.2 kg).. Body surface area is 2.14 meters squared. BP completed using cuff size: regular  Extreme Pain (8)        Nursing Comments:         Erum Alexis CMA    "

## 2019-01-28 NOTE — LETTER
1/28/2019         RE: Indra Mehta  [address] [address] [place] [id number]        Dear Colleague,    Thank you for referring your patient, Indra Mehta, to the [facility]. Please see a copy of my visit note below.    [facility]  Neurosurgery followup:    HPI: 53-year-old male, 6 weeks out from C5-6 ACDF.  He has a history of a prior C6-7 ACDF but had developed progressively worse right arm pain.  He then underwent C5-6 ACDF with Dr. Mckenna 6 weeks ago.  He states that for the first 10 days he did very well.  However, he experienced a sudden onset last Tuesday of severe right shoulder and then progressively worsening radiating pain into his elbow and ultimately his hand on the right.  This lasted for several days.  He states that last Friday the symptoms subsided somewhat, but he continues with severe pain in his right worse than left shoulder.  He was started on a Medrol Dosepak, but has not noticed significant improvement so far.    Exam:  Constitutional:  Alert, well nourished, NAD.  HEENT: Normocephalic, atraumatic.   Pulm:  Without shortness of breath   CV:  No pitting edema of BLE.     Neurological:  Awake  Alert  Oriented x 3  Motor exam:     Shoulder Abduction:  Right:  5/5    Left:  5/5  Biceps:                      Right:  5/5    Left:  5/5  Triceps:                     Right:  5/5    Left:  5/5  Wrist Extensors:       Right:  5/5    Left:  5/5  Wrist Flexors:           Right:  5/5    Left:  5/5  Intrinsics:                  Right:  5/5    Left:  5/5     Able to spontaneously move U/E bilaterally  Sensation intact throughout all U/E dermatomes  Incision: well healed      A/P:   6 weeks out from C5-6 ACDF.  He is now unfortunately experiencing some radiating right shoulder and arm pain, new since last Tuesday.  He initially had 10 days of good pain relief following his surgery.  He is very uncomfortable and is unable to move his head up or down without significant pain.   We will check a cervical spine MRI for further evaluation.  I also ordered him an [place] collar to help in the interim.  He voiced agreement and understanding. I refilled his oxycodone.           Kareem Ayon PA-C  [facility]  [address] [address]    Tel [phone]  Pager [phone]      Again, thank you for allowing me to participate in the care of your patient.        Sincerely,        Kareem Ayon PA-C

## 2019-01-28 NOTE — PROGRESS NOTES
[facility]  Neurosurgery followup:    HPI: 53-year-old male, 6 weeks out from C5-6 ACDF.  He has a history of a prior C6-7 ACDF but had developed progressively worse right arm pain.  He then underwent C5-6 ACDF with Dr. Mckenna 6 weeks ago.  He states that for the first 10 days he did very well.  However, he experienced a sudden onset last Tuesday of severe right shoulder and then progressively worsening radiating pain into his elbow and ultimately his hand on the right.  This lasted for several days.  He states that last Friday the symptoms subsided somewhat, but he continues with severe pain in his right worse than left shoulder.  He was started on a Medrol Dosepak, but has not noticed significant improvement so far.    Exam:  Constitutional:  Alert, well nourished, NAD.  HEENT: Normocephalic, atraumatic.   Pulm:  Without shortness of breath   CV:  No pitting edema of BLE.     Neurological:  Awake  Alert  Oriented x 3  Motor exam:     Shoulder Abduction:  Right:  5/5    Left:  5/5  Biceps:                      Right:  5/5    Left:  5/5  Triceps:                     Right:  5/5    Left:  5/5  Wrist Extensors:       Right:  5/5    Left:  5/5  Wrist Flexors:           Right:  5/5    Left:  5/5  Intrinsics:                  Right:  5/5    Left:  5/5     Able to spontaneously move U/E bilaterally  Sensation intact throughout all U/E dermatomes  Incision: well healed      A/P:   6 weeks out from C5-6 ACDF.  He is now unfortunately experiencing some radiating right shoulder and arm pain, new since last Tuesday.  He initially had 10 days of good pain relief following his surgery.  He is very uncomfortable and is unable to move his head up or down without significant pain.  We will check a cervical spine MRI for further evaluation.  I also ordered him an [place] collar to help in the interim.  He voiced agreement and understanding. I refilled his oxycodone.           Kareem Ayon PA-C  [facility]  [address]  [address]  [place] [address]    Tel [phone]  Pager [phone]

## 2019-01-31 ENCOUNTER — TELEPHONE (OUTPATIENT)
Dept: NEUROSURGERY | Facility: CLINIC | Age: 54
End: 2019-01-31

## 2019-01-31 NOTE — TELEPHONE ENCOUNTER
Patient called and left a message on the nurse line asking if Kareem COSTA and Dr. Mckenna reviewed his MRI.

## 2019-02-04 DIAGNOSIS — Z98.1 S/P CERVICAL SPINAL FUSION: ICD-10-CM

## 2019-02-04 RX ORDER — OXYCODONE HYDROCHLORIDE 5 MG/1
5-10 TABLET ORAL EVERY 4 HOURS PRN
Qty: 60 TABLET | Refills: 0 | Status: SHIPPED | OUTPATIENT
Start: 2019-02-04 | End: 2019-02-11

## 2019-02-04 NOTE — TELEPHONE ENCOUNTER
Patient is calling to request a refill on his oxycodone. S/p cervical fusion on 12/19, last refill on 1/28 # 60. Patient has increased is usage due to his on-going pain. He is taking 1-2 tablets every 4 hours approximately 8-12 tablets per day. Patient would like to  Rx in  if approved. I informed patient that we do not have a provider in  until Thursday, patient can't wait that long is willing to  at the  in [place]. Will forward message to care team for review.

## 2019-02-04 NOTE — TELEPHONE ENCOUNTER
Called and informed patient that Rx was approved and brought to the pharmacy in [place] to be filled.

## 2019-02-05 ENCOUNTER — TELEPHONE (OUTPATIENT)
Dept: NEUROSURGERY | Facility: CLINIC | Age: 54
End: 2019-02-05

## 2019-02-05 NOTE — LETTER
[facility]   [facility]  [address]  [address]      February 6, 2019    Re: Indra PEREZ Janine        To Whom it May Concern,      Indra is being seen at our clinic for post-operative follow up care. Due to his subsequent post op recovery and on-going pain, we are recommending that he works from home. He is able to return to a regular work schedule from home with the continued restrictions of:    -No heavy lifting greater than 20 pounds until further notice.  -Limited twisting, bending, or overhead reaching.  -Please allow for some flexibility so Indra can come into the office as tolerated.    Indra will be re-evaluated at his next follow up visit on 3/14/19. Please feel free to contact our clinic with any questions or concerns.       Sincerely,            Mike Mckenna MD

## 2019-02-05 NOTE — LETTER
[facility]   [facility]  [address]  [address]        February 6, 2019    Re: Indra PEREZ Janine        To Whom it May Concern,      Indra is being seen at our clinic for post-operative follow up care. Due to his subsequent post op recovery and on-going pain, we are recommending that he works from home. He is able to return to a regular work schedule from home with the continued restrictions of:    -No heavy lifting greater than 20 pounds until further notice.  -Limited twisting, bending, or overhead reaching.    Indra will be re-evaluated at his next follow up visit on 3/14/19. Please feel free to contact our clinic with any questions or concerns.       Sincerely,            Mike Mckenna MD

## 2019-02-05 NOTE — TELEPHONE ENCOUNTER
REASON FOR CALL: Anna Mcgee from patient's work place, [email or web address], is requesting for a new RTW letter be written for pt. She is asking for clinic to also address how many hours patient can work as well. She would like for it to be faxed and email to her.    Fax: [phone]  Email: [email or web address]    If there are any questions, can give her a call at [phone]

## 2019-02-06 DIAGNOSIS — Z98.1 S/P CERVICAL SPINAL FUSION: Primary | ICD-10-CM

## 2019-02-06 NOTE — TELEPHONE ENCOUNTER
Spoke with patient due to his on-going post operative pain patient is requesting to work from home until re-evaluated on 3/14.    Letter to be faxed attn: Anna Mcgee @ [phone]

## 2019-02-11 DIAGNOSIS — Z98.1 S/P CERVICAL SPINAL FUSION: ICD-10-CM

## 2019-02-11 RX ORDER — OXYCODONE HYDROCHLORIDE 5 MG/1
5-10 TABLET ORAL EVERY 4 HOURS PRN
Qty: 60 TABLET | Refills: 0 | Status: SHIPPED | OUTPATIENT
Start: 2019-02-11 | End: 2019-02-21

## 2019-02-11 NOTE — TELEPHONE ENCOUNTER
Patient is calling to request a refill on his oxycodone. S/p cervical fusion on 12/19, last refill on 2/4 for # 60. Patient states he has been taking approximately 8 tablets per day to help with his post op pain. Patient would like to  refill in  if approved.

## 2019-02-13 DIAGNOSIS — Z98.1 S/P CERVICAL SPINAL FUSION: ICD-10-CM

## 2019-02-14 RX ORDER — METHOCARBAMOL 500 MG/1
1000 TABLET, FILM COATED ORAL 3 TIMES DAILY PRN
Qty: 60 TABLET | Refills: 1 | Status: SHIPPED | OUTPATIENT
Start: 2019-02-14 | End: 2019-04-01

## 2019-02-21 DIAGNOSIS — Z98.1 S/P CERVICAL SPINAL FUSION: ICD-10-CM

## 2019-02-21 RX ORDER — OXYCODONE HYDROCHLORIDE 5 MG/1
5-10 TABLET ORAL EVERY 4 HOURS PRN
Qty: 60 TABLET | Refills: 0 | Status: SHIPPED | OUTPATIENT
Start: 2019-02-21 | End: 2019-02-27

## 2019-02-21 NOTE — TELEPHONE ENCOUNTER
Called Ketan and SHARYN indicating prescription was left at [facility].    Ana Paula Cage RN on 2/21/2019 at 2:57 PM

## 2019-02-21 NOTE — TELEPHONE ENCOUNTER
Patient LM on RN line for refill on his oxycodone.     Spoke to patient . He is s/p cervical fusion 12/19/18 by Dr Mckenna.  He took his last pill at 4:30 am today. He reports he had a rough night due to some shoveling yesterday and sore today. He is applying ice with relief. He is currently taking 2 tabs TID, MAX 6 per day. Last refill of oxycodone on 2/11/19 #60. If approved for refill, he will p/u at [facility].

## 2019-02-25 DIAGNOSIS — I10 BENIGN ESSENTIAL HYPERTENSION: ICD-10-CM

## 2019-02-26 RX ORDER — LOSARTAN POTASSIUM 50 MG/1
50 TABLET ORAL DAILY
Qty: 90 TABLET | Refills: 0 | Status: SHIPPED | OUTPATIENT
Start: 2019-02-26 | End: 2019-05-22

## 2019-02-26 NOTE — TELEPHONE ENCOUNTER
Routing refill request to provider for review/approval because:  Labs out of range:  BP    ANGELINA ThomasN, RN  [facility]

## 2019-02-26 NOTE — TELEPHONE ENCOUNTER
"Requested Prescriptions   Pending Prescriptions Disp Refills     losartan (COZAAR) 50 MG tablet 90 tablet 0    Last Written Prescription Date:  11/6/18  Last Fill Quantity: 90,  # refills: 0   Last office visit: 12/12/2018 with prescribing provider:  8/9/18   Future Office Visit:   Next 5 appointments (look out 90 days)    Mar 14, 2019  1:15 PM CDT  Return Visit with Mike Mckenna MD  [facility] [address] [place] [id number] [phone]        Sig: Take 1 tablet (50 mg) by mouth daily    Angiotensin-II Receptors Failed - 2/25/2019  7:23 AM       Failed - Blood pressure under 140/90 in past 12 months    BP Readings from Last 3 Encounters:   01/28/19 158/80   12/25/18 145/85   12/19/18 137/85                Passed - Recent (12 mo) or future (30 days) visit within the authorizing provider's specialty    Patient had office visit in the last 12 months or has a visit in the next 30 days with authorizing provider or within the authorizing provider's specialty.  See \"Patient Info\" tab in inbasket, or \"Choose Columns\" in Meds & Orders section of the refill encounter.             Passed - Medication is active on med list       Passed - Patient is age 18 or older       Passed - Normal serum creatinine on file in past 12 months    Recent Labs   Lab Test 11/05/18  1532   CR 0.96            Passed - Normal serum potassium on file in past 12 months    Recent Labs   Lab Test 11/05/18  1532   POTASSIUM 3.7                    "

## 2019-02-27 DIAGNOSIS — Z98.1 S/P CERVICAL SPINAL FUSION: ICD-10-CM

## 2019-02-27 RX ORDER — OXYCODONE HYDROCHLORIDE 5 MG/1
5-10 TABLET ORAL EVERY 6 HOURS PRN
Qty: 60 TABLET | Refills: 0 | Status: SHIPPED | OUTPATIENT
Start: 2019-03-01 | End: 2019-03-07

## 2019-02-27 NOTE — TELEPHONE ENCOUNTER
Patient called and LM on nurse line requesting a refill on his oxycodone. S/p cervical fusion on 12/19, last refill on 2/21 for # 60. Patient reports that he has had a rough weekend and will be out of medication on Saturday. Patient would like to  refill in [place] will forward request to care team for review.     LVM informing patient that we received his message and will forward refill request to care team.

## 2019-03-07 DIAGNOSIS — Z98.1 S/P CERVICAL SPINAL FUSION: ICD-10-CM

## 2019-03-07 RX ORDER — OXYCODONE HYDROCHLORIDE 5 MG/1
5-10 TABLET ORAL EVERY 6 HOURS PRN
Qty: 60 TABLET | Refills: 0 | Status: SHIPPED | OUTPATIENT
Start: 2019-03-07 | End: 2019-03-14

## 2019-03-07 NOTE — TELEPHONE ENCOUNTER
Called patient to inform him that I'm walking the oxycodone script to [facility]. Re-encouraged weaning from oxycodone.  Ana Paula Cage RN on 3/7/2019 at 1:58 PM

## 2019-03-07 NOTE — TELEPHONE ENCOUNTER
Patient is calling S/p C5-6 ACDF on 12/19/18 to report 8/10 neck, shoulder, and left arm pain over the past 3 days. He states his left hand has been numb and he has also been having horrible headaches. He has taken a medrol dose pack on 12/24/18 and 1/25/19, he states the last course helped some with his pian. Patient is taking oxycodone 6-8 tablets per day currently and will need a refill before this weekend and would like to  in [place]. Will forward to care team for review and recommendations on his pain.

## 2019-03-07 NOTE — TELEPHONE ENCOUNTER
Informed patient that both Kareem COSTA and Dr. Mckenna have reviewed his on-going symptoms and don't have anything further to offer at this time. Will discuss at his visit next week, refill has been forwarded to provider for review and if approved will be available for  in .

## 2019-03-14 ENCOUNTER — OFFICE VISIT (OUTPATIENT)
Dept: NEUROSURGERY | Facility: CLINIC | Age: 54
End: 2019-03-14
Payer: COMMERCIAL

## 2019-03-14 ENCOUNTER — ANCILLARY PROCEDURE (OUTPATIENT)
Dept: GENERAL RADIOLOGY | Facility: CLINIC | Age: 54
End: 2019-03-14
Attending: NEUROLOGICAL SURGERY
Payer: COMMERCIAL

## 2019-03-14 VITALS
WEIGHT: 205.6 LBS | DIASTOLIC BLOOD PRESSURE: 86 MMHG | HEIGHT: 73 IN | BODY MASS INDEX: 27.25 KG/M2 | HEART RATE: 79 BPM | SYSTOLIC BLOOD PRESSURE: 142 MMHG

## 2019-03-14 DIAGNOSIS — Z98.1 S/P CERVICAL SPINAL FUSION: Primary | ICD-10-CM

## 2019-03-14 DIAGNOSIS — Z98.1 S/P CERVICAL SPINAL FUSION: ICD-10-CM

## 2019-03-14 PROCEDURE — 99024 POSTOP FOLLOW-UP VISIT: CPT | Performed by: NURSE PRACTITIONER

## 2019-03-14 PROCEDURE — 72040 X-RAY EXAM NECK SPINE 2-3 VW: CPT | Mod: TC

## 2019-03-14 RX ORDER — OXYCODONE HYDROCHLORIDE 5 MG/1
5-10 TABLET ORAL EVERY 6 HOURS PRN
Qty: 60 TABLET | Refills: 0 | Status: SHIPPED | OUTPATIENT
Start: 2019-03-14 | End: 2019-03-26

## 2019-03-14 ASSESSMENT — MIFFLIN-ST. JEOR: SCORE: 1832.51

## 2019-03-14 ASSESSMENT — PAIN SCALES - GENERAL: PAINLEVEL: SEVERE PAIN (7)

## 2019-03-14 NOTE — PATIENT INSTRUCTIONS
- Physical therapy ordered. They will contact you to schedule.    - May increase activity as tolerated.    - Follow up in 3 months  with xray prior     - Call the clinic at [phone] for increased pain or any other questions and concerns.    - Ketan to follow up with Primary Care provider regarding elevated blood pressure.

## 2019-03-14 NOTE — NURSING NOTE
"Indra Mehta is a 53 year old male who presents for:  Chief Complaint   Patient presents with     Neurologic Problem     C5-6 anterior discectomy, DOS 12/19/18        Initial Vitals:  /86 (BP Location: Left arm, Patient Position: Chair, Cuff Size: Adult Large)   Pulse 79   Ht 6' 0.75\" (1.848 m)   Wt 205 lb 9.6 oz (93.3 kg)   BMI 27.31 kg/m   Estimated body mass index is 27.31 kg/m  as calculated from the following:    Height as of this encounter: 6' 0.75\" (1.848 m).    Weight as of this encounter: 205 lb 9.6 oz (93.3 kg).. Body surface area is 2.19 meters squared. BP completed using cuff size: large  Severe Pain (7)        Nursing Comments:         Cinthia Gallegos    "

## 2019-03-14 NOTE — LETTER
3/14/2019         RE: Indra Mehta  [address] [address] [place] [id number]        Dear Colleague,    Thank you for referring your patient, Indra Mehta, to the [facility]. Please see a copy of my visit note below.    [facility]  Neurosurgery followup:    HPI: 53-year-old male, 3 months out from C5-6 ACDF.  He has a history of a prior C6-7 ACDF but had developed progressively worse right arm pain.  He then underwent C5-6 ACDF with Dr. Mckenna on 12/19/2018. Today, he reports ongoing right arm pain that radiates into his elbow and to his hand. He has tried two rounds of steroids with some results. He also tried an [place] collar which he did not find beneficial and is no longer wearing. He has not had any physical therapy on his neck since surgery. He denies overt weakness.     Exam:  Constitutional:  Alert, well nourished, NAD.  HEENT: Normocephalic, atraumatic.   Pulm:  Without shortness of breath   CV:  No pitting edema of BLE.     Neurological:  Awake  Alert  Oriented x 3  Motor exam:     Shoulder Abduction:  Right:  5/5    Left:  5/5  Biceps:                      Right:  5/5    Left:  5/5  Triceps:                     Right:  5/5    Left:  5/5  Wrist Extensors:       Right:  5/5    Left:  5/5  Wrist Flexors:           Right:  5/5    Left:  5/5  Intrinsics:                  Right:  5/5    Left:  5/5     Able to spontaneously move U/E bilaterally  Sensation intact throughout all U/E dermatomes    Incisions:  Nicely healed.    Imaging:  AP and lateral films reveal stable hardware.     A/P: 53-year-old male, 3 months out from C5-6 ACDF.  He has a history of a prior C6-7 ACDF but had developed progressively worse right arm pain.  He then underwent C5-6 ACDF with Dr. Mckenna on 12/19/2018. Today, he reports ongoing right arm pain that radiates into his elbow and to his hand. He has tried two rounds of steroids with some results. He also tried an [place] collar which he did not find  beneficial and is no longer wearing. He has not had any physical therapy on his neck since surgery. He denies overt weakness. Discussed starting physical therapy. He is agreeable to this. He states he will be out of his oxycodone on Sunday. Refilled today. Discussed ongoing pain medication management to be done by his primary care provider. Follow up in 3 months with xray prior. He verbalized understanding and agreement with the plan.    Patient Instructions   - Physical therapy ordered. They will contact you to schedule.    - May increase activity as tolerated.    - Follow up in 3 months  with xray prior     - Call the clinic at [phone] for increased pain or any other questions and concerns.    - Ketan to follow up with Primary Care provider regarding elevated blood pressure.    Camilla Corona CNP  [facility]  [address] [address]    Tel [phone]  Pager [phone]        Again, thank you for allowing me to participate in the care of your patient.        Sincerely,        Camilla Corona NP

## 2019-03-14 NOTE — PROGRESS NOTES
[facility]  Neurosurgery followup:    HPI: 53-year-old male, 3 months out from C5-6 ACDF.  He has a history of a prior C6-7 ACDF but had developed progressively worse right arm pain.  He then underwent C5-6 ACDF with Dr. Mckenna on 12/19/2018. Today, he reports ongoing right arm pain that radiates into his elbow and to his hand. He has tried two rounds of steroids with some results. He also tried an [place] collar which he did not find beneficial and is no longer wearing. He has not had any physical therapy on his neck since surgery. He denies overt weakness.     Exam:  Constitutional:  Alert, well nourished, NAD.  HEENT: Normocephalic, atraumatic.   Pulm:  Without shortness of breath   CV:  No pitting edema of BLE.     Neurological:  Awake  Alert  Oriented x 3  Motor exam:     Shoulder Abduction:  Right:  5/5    Left:  5/5  Biceps:                      Right:  5/5    Left:  5/5  Triceps:                     Right:  5/5    Left:  5/5  Wrist Extensors:       Right:  5/5    Left:  5/5  Wrist Flexors:           Right:  5/5    Left:  5/5  Intrinsics:                  Right:  5/5    Left:  5/5     Able to spontaneously move U/E bilaterally  Sensation intact throughout all U/E dermatomes    Incisions:  Nicely healed.    Imaging:  AP and lateral films reveal stable hardware.     A/P: 53-year-old male, 3 months out from C5-6 ACDF.  He has a history of a prior C6-7 ACDF but had developed progressively worse right arm pain.  He then underwent C5-6 ACDF with Dr. Mckenna on 12/19/2018. Today, he reports ongoing right arm pain that radiates into his elbow and to his hand. He has tried two rounds of steroids with some results. He also tried an [place] collar which he did not find beneficial and is no longer wearing. He has not had any physical therapy on his neck since surgery. He denies overt weakness. Discussed starting physical therapy. He is agreeable to this. He states he will be out of his oxycodone on Sunday. Refilled  today. Discussed ongoing pain medication management to be done by his primary care provider. Follow up in 3 months with xray prior. He verbalized understanding and agreement with the plan.    Patient Instructions   - Physical therapy ordered. They will contact you to schedule.    - May increase activity as tolerated.    - Follow up in 3 months  with xray prior     - Call the clinic at [phone] for increased pain or any other questions and concerns.    - Ketan to follow up with Primary Care provider regarding elevated blood pressure.    Camilla Corona [facility]  [facility]  [address] [address]    Tel [phone]  Pager [phone]

## 2019-03-26 DIAGNOSIS — Z98.1 S/P CERVICAL SPINAL FUSION: Primary | ICD-10-CM

## 2019-03-26 NOTE — TELEPHONE ENCOUNTER
Prescription Refill Request    Medication: Oxycodone 5 mg    Surgical procedure/date: 12/19/18 s/p cervical spinal fusion with Dr Mckenna     Current regimen/number of tabs per day: Patient reports taking MAX 5 per day and working on weaning down to 4.  Patient will run out by Friday this week.     Last refill:  3/14/19    Pain scale today (0-10):  Neck pain and right shoulder pain. 6/10 pain.      location: [place]    Any patient questions or concerns: none per patient.     Will forward request to care team for approval.

## 2019-03-28 ENCOUNTER — TELEPHONE (OUTPATIENT)
Dept: PALLIATIVE MEDICINE | Facility: CLINIC | Age: 54
End: 2019-03-28

## 2019-03-28 DIAGNOSIS — M54.2 CERVICALGIA: ICD-10-CM

## 2019-03-28 RX ORDER — OXYCODONE HYDROCHLORIDE 5 MG/1
5-10 TABLET ORAL EVERY 6 HOURS PRN
Qty: 40 TABLET | Refills: 0 | Status: SHIPPED | OUTPATIENT
Start: 2019-03-28 | End: 2019-04-03

## 2019-03-28 RX ORDER — GABAPENTIN 400 MG/1
1200 CAPSULE ORAL 3 TIMES DAILY
Qty: 270 CAPSULE | Refills: 3 | Status: SHIPPED | OUTPATIENT
Start: 2019-03-28 | End: 2019-07-15

## 2019-03-28 NOTE — TELEPHONE ENCOUNTER
Fax received from: [facility] Refill authorization requested    Drug: gabapentin (NEURONTIN) 400 MG capsule  Qty: 270  Last filled 02/26/19  Last seen: 12/10/18  Next appointment: No future appointment.     Lillian De La Cruz MA

## 2019-03-28 NOTE — TELEPHONE ENCOUNTER
Signed Prescriptions:                        Disp   Refills    gabapentin (NEURONTIN) 400 MG capsule      270 ca*3        Sig: Take 3 capsules (1,200 mg) by mouth 3 times daily  Authorizing Provider: BONI NAYLOR    Reviewed, signed, e-prescribed.    Boni Freeman MD  [facility]

## 2019-04-01 ENCOUNTER — OFFICE VISIT (OUTPATIENT)
Dept: PODIATRY | Facility: CLINIC | Age: 54
End: 2019-04-01
Payer: COMMERCIAL

## 2019-04-01 ENCOUNTER — HOSPITAL ENCOUNTER (OUTPATIENT)
Dept: PHYSICAL THERAPY | Facility: CLINIC | Age: 54
Setting detail: THERAPIES SERIES
End: 2019-04-01
Attending: NURSE PRACTITIONER
Payer: COMMERCIAL

## 2019-04-01 VITALS
HEIGHT: 73 IN | DIASTOLIC BLOOD PRESSURE: 84 MMHG | WEIGHT: 209.5 LBS | SYSTOLIC BLOOD PRESSURE: 132 MMHG | BODY MASS INDEX: 27.77 KG/M2 | TEMPERATURE: 98.9 F

## 2019-04-01 DIAGNOSIS — G89.4 CHRONIC PAIN SYNDROME: ICD-10-CM

## 2019-04-01 DIAGNOSIS — Z98.1 S/P CERVICAL SPINAL FUSION: ICD-10-CM

## 2019-04-01 DIAGNOSIS — G62.9 NEUROPATHY: ICD-10-CM

## 2019-04-01 DIAGNOSIS — M54.12 CERVICAL RADICULOPATHY: Primary | ICD-10-CM

## 2019-04-01 DIAGNOSIS — M54.16 LUMBAR RADICULOPATHY: ICD-10-CM

## 2019-04-01 DIAGNOSIS — M79.18 MYOFASCIAL PAIN: ICD-10-CM

## 2019-04-01 PROCEDURE — 99215 OFFICE O/P EST HI 40 MIN: CPT | Performed by: PHYSICIAN ASSISTANT

## 2019-04-01 PROCEDURE — 97162 PT EVAL MOD COMPLEX 30 MIN: CPT | Mod: GP | Performed by: PHYSICAL THERAPIST

## 2019-04-01 PROCEDURE — 97110 THERAPEUTIC EXERCISES: CPT | Mod: GP | Performed by: PHYSICAL THERAPIST

## 2019-04-01 RX ORDER — DULOXETIN HYDROCHLORIDE 20 MG/1
20 CAPSULE, DELAYED RELEASE ORAL DAILY
Qty: 30 CAPSULE | Refills: 0 | Status: SHIPPED | OUTPATIENT
Start: 2019-04-01 | End: 2019-04-15

## 2019-04-01 RX ORDER — METHOCARBAMOL 500 MG/1
1000 TABLET, FILM COATED ORAL 3 TIMES DAILY PRN
Qty: 60 TABLET | Refills: 1 | Status: SHIPPED | OUTPATIENT
Start: 2019-04-01 | End: 2019-07-11

## 2019-04-01 ASSESSMENT — PAIN SCALES - GENERAL: PAINLEVEL: EXTREME PAIN (9)

## 2019-04-01 ASSESSMENT — MIFFLIN-ST. JEOR: SCORE: 1850.2

## 2019-04-01 NOTE — LETTER
4/1/2019         RE: Indra Mehta  [id number] [facility] [id number]        Dear Colleague,    Thank you for referring your patient, Indra Mehta, to the [facility]. Please see a copy of my visit note below.    [facility]    CHIEF COMPLAINT:  Pain  -neck and low back pain    INTERVAL HISTORY:  Last seen on 12/10/2018.      Ketan was previously a patient of Dr. Angle Freeman. He is transferring to me due to location.       Recommendations/plan at the last visit included:  1. Physical Therapy:  Deferred - scheduled for surgery on neck on 12/19/18  2. Clinical Health Psychologist to address issues of relaxation, behavioral change, coping style, and other factors important to improvement.  deferred  3. Diagnostic Studies:  Reviewed diagnostic imaging  4. Medication Management:    1. Continue Norco 5/325 QID prn pain - try to back off on dosing prior to surgery so you respond better to medications after surgery  2. Continue Gabapentin 1200 mg TID  3. Continue Robaxin 500 mg TID prn  4. Continue Tizanidine 4 mg 1-2 tabs at bedtime prn  5. Continue Tylenol in safe doses  6. Hold Ibuprofen due to upcoming surgery  7. Consider medical cannabis  8. Trial of Hemp oils - two drops under the tongue twice a day  5. Further procedures recommended:   1. Consider lumbar transforaminal epidural steroid injection right L4-5 or combination of L3-4 and L4-5 on the right - will wait until allowed by surgery  6. Patient to follow up with Primary Care provider regarding elevated blood pressure.  7. Recommendations to PCP:  Continue current treatment   8. Follow up: 3 months    Since his last visit, Indra Mehta reports:  - He states that he now has pain in his right arm that was not there prior to his surgery in December 2018. He states that it helped the numbness but he is having sharp pain in neck, down the shoulder and to the elbow. He does get pain into his left shoulder as well. He  "quite often gets tingling in his left arm and occasional weakness as well. He has started PT through referral with neurosurgery.     - His low back has responded well to the injections. He has started to notice that it is starting to come back, but doesn't feel that he needs an injection quite yet. He did do a lot of standing yesterday, so is aware that part of that pain is from that.     -He is noticing that his mood is getting down. He is feeling \"hopeless\". He has been dealing with pain for 15 years and to have the pain he is having after the surgery is discouraging. His feet are bothering him as well. He states that he never has anytime where there isn't something going on with his feet (shooting pain, burning, cold and numbness). He denies any suicidall ideation.     Pain Information:   Pain quality: Miserable    Pain rating: intensity ranges from 3/10 to 9/10, and averages 5/10 on a 0-10 scale.   Pain today 6/10    SELF CARE:   How often do you practice SELF-CARE (relaxing, stretching, pacing, monitoring posture, taking mini-breaks) in a typical day:  Trying to do 3-4 times/day.    UDS: 12/12/2018    CURRENT RELEVANT PAIN MEDICATIONS:   Gabapentin 400mg-Taking 3 capsules 3 times a day  Tizanidine 4mg-taking 1 at HS (helps get him to sleep)  Tylenol 500mg-1000mg 4x/day  Ibuprofen-800mg 4x/day  Robaxin- 500mg 2 Three times a day  Excedrin Migraine-takes occasionally-does not take on top of the tylenol and ibuprofen  Oxycodone-taking 4/day    Patient is using the medication as prescribed:  YES  Is your medication helpful? YES   Medication side effects? no side effect    Previous Medications: (H--helped; HI--Helped initially; SWH-- somewhat helpful, NH--No help; W--worse; SE--side effects)   Norco/vicodin H  Oxycodone H  Flexeril - was helpful at night  Robaxin - may have helped, didn't make him sleepy  Tizanidine H  Gabapentin ?  Lyrica SE sedation    Past Pain Treatments:  Injections:    - 11/8/18 bilateral L3-4 " TFESI - (Dr Mcdaniel)  - 8/23/18 bilateral L3-4 TFESI - seems to have worsened pain (Dr Mcdaniel)  - 6/11/18 TFESI right C5-6 - helped with arm pain and numbness  - bilateral L3-4 TFESI 12/28/17  - Bilateral L4-5 TFESI 10/23/17  - Bilateral L3-4 TFESI 11/08/2018  Surgery:  ACDF C6-7 on 7/5/17 with improvement; ACDF C5-6 12/19/2018  TENS unit: helpful  Physical therapy in [facility]  Pharmacy Data Base Reviewed:    YES; As expected, no concern for misuse/abuse of controlled medications based on this report.    THE 4 As OF OPIOID MAINTENANCE ANALGESIA    Analgesia: Is pain relief clinically significant? YES   Activity: Is patient functional and able to perform Activities of Daily Living? YES   Adverse effects: Is patient free from adverse side effects from opiates? YES   Adherence to Rx protocol: Is patient adhering to Controlled Substance Agreement and taking medications ONLY as ordered? YES       Is Narcan prescribed for opiate use >50 MME daily? N/A      Daily MME: 30    Medications:  Current Outpatient Medications   Medication Sig Dispense Refill     Acetaminophen (TYLENOL PO) Take 500 mg by mouth every 4 hours as needed for mild pain or fever       ALPRAZolam (XANAX XR) 0.5 MG 24 hr tablet Take 1 tablet (0.5 mg) by mouth At Bedtime (Patient not taking: Reported on 3/14/2019) 30 tablet 2     gabapentin (NEURONTIN) 400 MG capsule Take 3 capsules (1,200 mg) by mouth 3 times daily 270 capsule 3     losartan (COZAAR) 50 MG tablet Take 1 tablet (50 mg) by mouth daily 90 tablet 0     methocarbamol (ROBAXIN) 500 MG tablet Take 2 tablets (1,000 mg) by mouth 3 times daily as needed for muscle spasms 60 tablet 1     order for DME Equipment being ordered: TENS Pads as directed 1 Device 0     oxyCODONE (ROXICODONE) 5 MG tablet Take 1-2 tablets (5-10 mg) by mouth every 6 hours as needed for moderate to severe pain MAX 4 TABS PER DAY 40 tablet 0     tiZANidine (ZANAFLEX) 4 MG tablet Take 1 tablet (4 mg) by mouth 2  "times daily as needed for muscle spasms 60 tablet 3       Review of Systems: A 10-point review of systems was negative, with the exception of chronic pain issues, h/a, hearing loss, ringing in the ears, high blood pressure, depression, anxiety, mood swings and stress.     Social History:  Patient works at [employer]. He works full time. He is single. He lives with his daughter 1/2 time. 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     Family history: Sister with fibromyalgia    PHYSICAL EXAM:     Vitals:   /84   Temp 98.9  F (37.2  C) (Temporal)   Ht 1.848 m (6' 0.75\")   Wt 95 kg (209 lb 8 oz)   BMI 27.83 kg/m     Body mass index is 27.83 kg/m .  6' .75\"  209 lbs 8 oz    Constitutional: healthy, alert and no distress  HEENT: Head atraumatic, normocephalic. Eyes without conjunctival injection or jaundice. Neck supple. No obvious neck masses. PERRLA.  Lymph: no palpable cervical or supraclavicular adenopathy  Cardiovascular: No edema or JVD appreciated. No edema on bilateral lower extremities  Skin: No rash, lesions, or petechiae of exposed skin.   Extremities: No clubbing, cyanosis, or edema to exposed extremities  Abdomen: Soft, non-tender. No palpable organomegaly.  Psychiatric/mental status: Alert, without lethargy or stupor. Appropriate affect. Mood normal.   Neurologic exam:  CN:  Cranial nerves 2-12 are grossly normal.  Motor:  5/5 UE and LE strength.  Reflexes:     Biceps:     R:  2/4 L: 2/4   Brachioradialis   R:  2/4 L: 2/4   Patella:  R:  1/4 L: 1/4   Achilles:  R:  1/4 L: 1/4    Musculoskeletal exam:  Cervical spine:   Flex:  20 degrees   Ext: 15 degrees   Rotation to right: 60 degrees   Rotation to left:: 60 degrees   Rotation/ext to right: painful    Rotation/ext to left:: painful    Tenderness in the cervical spine at midline:  No   Tenderness in the cervical paraspinal muscles:  No   Thoracic spine:    Tenderness in the thoracic spine at midline:  No    Tenderness in the " thoracic paraspinal muscles:  No  Lumbar/Sacral spine:   Forward Flexion:  90 degrees   Ext: 25 degrees   Rotation/ext to right: painful    Rotation/ext to left:: painful    Tenderness in the lumbar spine at midline:  No   Tenderness in the lumbar paraspinal muscles:  No   Straight leg exam:negative   Loyda/Juan Francisco's test:  Right positive Left  positive   Tenderness over piriformis:     Right: negative    Left:  negative   Tenderness over Trochanteric Bursa:     Right: negative      Left:  negative     Sensory exam:   Light touch: normal bilateral upper and lower extremities    Vibration: normal in UE, decreased in LE in feet bilaterally   Sharp: normal in bilateral UE and LE.         DIAGNOSTIC TESTS:  Imaging Studies:   MRI Cervical Spine 01/28/2019  IMPRESSION:    1. New postoperative changes of anterior fusion at the C5-C6 level  compared to previous MR 8/14/2018. No acute complication appreciated.  Postoperative changes at C6-C7 again noted.  2. Mild degenerative disc changes at C3-C4 and C4-C5. No significant  change since previous MR 8/14/2018.     Assessment:  Indra Mehta is a 53 year old male who presents today for follow up regarding his:    1.  Cervical radiulopathy  2.  S/p cervical spine fusion  3.  Myofascial pain  4.  Lumbar radiculopathy  5. Neuropathy  6. Chronic pain syndrome    He continues to have a lot of neck pain radiating down his right arm. He is starting to feel down due to this pain. He is also getting frustrated at the pain in his feet. He is taking 4 Oxycodone/day. He would like to work on decreasing some of the medications he is taking once his pain is under better control. He is starting physical therapy today.     Plan:    Diagnosis reviewed, treatment option addressed, and risk/benifits discussed.  Self-care instructions given.  I am recommending a multidisciplinary treatment plan to help this patient better manage pain.      1. Physical Therapy:  YES, Keep appointment as  scheduled   2. Clinical Health Psychologist:  NO, try guided meditation    3. Diagnostic Studies:  None at this time  4. Medication Management:    1. Continue Gabapentin 1200 TID  2. Continue Robaxin 1000mg TID  3. Continue Tizanidine 4mg at bedtime  4. Continue Oxycodone as prescribed. I will take over this medication after I get the results of the UDS  5. Start Cymbalta 20mg at bedtime  5. Further procedures recommended: none at this time, but could consider repeat LESI. Would need to discuss MERE with neurosurgery.   6. Recommendations to PCP. See above  7. Urine drug screen today.     Follow up with this provider:  2 Weeks     Total time spent face to face was 40 minutes and more than 50% of face to face time was spent in counseling and/or coordination of care regarding the diagnosis and recommendations above.      Chiara Garcia PA-C   [facility]          Again, thank you for allowing me to participate in the care of your patient.        Sincerely,        Chiara Garcia PA-C

## 2019-04-01 NOTE — PATIENT INSTRUCTIONS
After Visit Instructions:     Thank you for coming to [facility] for your care. It is my goal to partner with you to help you reach your optimal state of health.     I am recommending multidisciplinary care at this time.  The focus of care will be to continue gradual rehabilitation and pain management with medication adjustments as needed.    Continue daily self-care, identifying contributing factors, and monitoring variations in pain level. Continue to integrate self-care into your life.      1. Schedule pain psychology assessment/visit: not at this time, but I would like you to try guided meditation. There is an concetta called [address] that can assist with this. Try to do 3-10 daily.   2. Schedule physical therapy assessment/visit: Keep this as scheduled  3. Schedule follow-up with Chiara Garcia PA-C in 2 weeks. You will need to make this appointment.   4. Labs: urine drug screen today  5. Procedures recommended: none at this time   6. Medication recommendations:   1. Oxycodone-continue as currently prescribed. I will take this over after I get your urine drug screen back  2. Methocarbamol-1000mg three times a day as needed  3. Cymbalta 20mg at bedtime  4. Continue your tizanidine at bedtime as needed  5. Continue Gabapentin 1200mg three times a day      Chiara Garcia PA-C  [facility]  [facility]    Contact information: [facility]  Clinic Number:  [phone]   Call this number with any questions about your care and for scheduling assistance. Calls are returned Monday through Friday between 8 AM and 4:30 PM. We usually get back to you within 2 business days depending on the issue/request.           Medication Refills Policy:    For non-narcotic medications, please your pharmacy directly to request a refill and the pharmacy will call the [facility] for authorization. Please allow 3-4 days for these refills.    For narcotic  refills, call the nurse triage line and leave a message requesting your refill along with the name of the pharmacy that you use. Narcotic prescriptions will be mailed to your pharmacy or you may pick them up at the clinic.  Please call 7-10 days before your refill is due  The above policy allows adequate time so that you do not run out of medication.    No Show - Late Cancellation - Late Arrival Policy  We believe regular attendance is key to your success in our program.    Any time you are unable to keep your appointment we ask that you call us at least 24 hours in advance to let us know. This will allow us to offer the appointment time to another patient. The following is our policy for missed appointments. This also applies to appointments cancelled with less than 24 hours notice.    After missing 3 appointments without calling first, we will cancel all of your future appointments at [facility].    At that point, you will not be able to resume services unless approved by your care team  We understand that unforseen circumstances arise, however, out of respect for all concerned and to provide this appointment to another patient, this policy will be enforced.    Please note that most follow up appointments are 30 minutes long. If you arrive late, your provider may not be able to see you for the entire 30 minutes. Please also note that if you arrive more than 15 minutes for any appointment, it may be rescheduled.

## 2019-04-01 NOTE — PROGRESS NOTES
[facility]    CHIEF COMPLAINT:  Pain  -neck and low back pain    INTERVAL HISTORY:  Last seen on 12/10/2018.      Ketan was previously a patient of Dr. Angle Freeman. He is transferring to me due to location.       Recommendations/plan at the last visit included:  1. Physical Therapy:  Deferred - scheduled for surgery on neck on 12/19/18  2. Clinical Health Psychologist to address issues of relaxation, behavioral change, coping style, and other factors important to improvement.  deferred  3. Diagnostic Studies:  Reviewed diagnostic imaging  4. Medication Management:    1. Continue Norco 5/325 QID prn pain - try to back off on dosing prior to surgery so you respond better to medications after surgery  2. Continue Gabapentin 1200 mg TID  3. Continue Robaxin 500 mg TID prn  4. Continue Tizanidine 4 mg 1-2 tabs at bedtime prn  5. Continue Tylenol in safe doses  6. Hold Ibuprofen due to upcoming surgery  7. Consider medical cannabis  8. Trial of Hemp oils - two drops under the tongue twice a day  5. Further procedures recommended:   1. Consider lumbar transforaminal epidural steroid injection right L4-5 or combination of L3-4 and L4-5 on the right - will wait until allowed by surgery  6. Patient to follow up with Primary Care provider regarding elevated blood pressure.  7. Recommendations to PCP:  Continue current treatment   8. Follow up: 3 months    Since his last visit, Indra Mehta reports:  - He states that he now has pain in his right arm that was not there prior to his surgery in December 2018. He states that it helped the numbness but he is having sharp pain in neck, down the shoulder and to the elbow. He does get pain into his left shoulder as well. He quite often gets tingling in his left arm and occasional weakness as well. He has started PT through referral with neurosurgery.     - His low back has responded well to the injections. He has started to notice that it is starting to come  "back, but doesn't feel that he needs an injection quite yet. He did do a lot of standing yesterday, so is aware that part of that pain is from that.     -He is noticing that his mood is getting down. He is feeling \"hopeless\". He has been dealing with pain for 15 years and to have the pain he is having after the surgery is discouraging. His feet are bothering him as well. He states that he never has anytime where there isn't something going on with his feet (shooting pain, burning, cold and numbness). He denies any suicidall ideation.     Pain Information:   Pain quality: Miserable    Pain rating: intensity ranges from 3/10 to 9/10, and averages 5/10 on a 0-10 scale.   Pain today 6/10    SELF CARE:   How often do you practice SELF-CARE (relaxing, stretching, pacing, monitoring posture, taking mini-breaks) in a typical day:  Trying to do 3-4 times/day.    UDS: 12/12/2018    CURRENT RELEVANT PAIN MEDICATIONS:   Gabapentin 400mg-Taking 3 capsules 3 times a day  Tizanidine 4mg-taking 1 at HS (helps get him to sleep)  Tylenol 500mg-1000mg 4x/day  Ibuprofen-800mg 4x/day  Robaxin- 500mg 2 Three times a day  Excedrin Migraine-takes occasionally-does not take on top of the tylenol and ibuprofen  Oxycodone-taking 4/day    Patient is using the medication as prescribed:  YES  Is your medication helpful? YES   Medication side effects? no side effect    Previous Medications: (H--helped; HI--Helped initially; SWH-- somewhat helpful, NH--No help; W--worse; SE--side effects)   Norco/vicodin H  Oxycodone H  Flexeril - was helpful at night  Robaxin - may have helped, didn't make him sleepy  Tizanidine H  Gabapentin ?  Lyrica SE sedation    Past Pain Treatments:  Injections:    - 11/8/18 bilateral L3-4 TFESI - (Dr Mcdaniel)  - 8/23/18 bilateral L3-4 TFESI - seems to have worsened pain (Dr Mcdaniel)  - 6/11/18 TFESI right C5-6 - helped with arm pain and numbness  - bilateral L3-4 TFESI 12/28/17  - Bilateral L4-5 TFESI 10/23/17  - Bilateral " L3-4 TFESI 11/08/2018  Surgery:  ACDF C6-7 on 7/5/17 with improvement; ACDF C5-6 12/19/2018  TENS unit: helpful  Physical therapy in [facility]  Pharmacy Data Base Reviewed:    YES; As expected, no concern for misuse/abuse of controlled medications based on this report.    THE 4 As OF OPIOID MAINTENANCE ANALGESIA    Analgesia: Is pain relief clinically significant? YES   Activity: Is patient functional and able to perform Activities of Daily Living? YES   Adverse effects: Is patient free from adverse side effects from opiates? YES   Adherence to Rx protocol: Is patient adhering to Controlled Substance Agreement and taking medications ONLY as ordered? YES       Is Narcan prescribed for opiate use >50 MME daily? N/A      Daily MME: 30    Medications:  Current Outpatient Medications   Medication Sig Dispense Refill     Acetaminophen (TYLENOL PO) Take 500 mg by mouth every 4 hours as needed for mild pain or fever       ALPRAZolam (XANAX XR) 0.5 MG 24 hr tablet Take 1 tablet (0.5 mg) by mouth At Bedtime (Patient not taking: Reported on 3/14/2019) 30 tablet 2     gabapentin (NEURONTIN) 400 MG capsule Take 3 capsules (1,200 mg) by mouth 3 times daily 270 capsule 3     losartan (COZAAR) 50 MG tablet Take 1 tablet (50 mg) by mouth daily 90 tablet 0     methocarbamol (ROBAXIN) 500 MG tablet Take 2 tablets (1,000 mg) by mouth 3 times daily as needed for muscle spasms 60 tablet 1     order for DME Equipment being ordered: TENS Pads as directed 1 Device 0     oxyCODONE (ROXICODONE) 5 MG tablet Take 1-2 tablets (5-10 mg) by mouth every 6 hours as needed for moderate to severe pain MAX 4 TABS PER DAY 40 tablet 0     tiZANidine (ZANAFLEX) 4 MG tablet Take 1 tablet (4 mg) by mouth 2 times daily as needed for muscle spasms 60 tablet 3       Review of Systems: A 10-point review of systems was negative, with the exception of chronic pain issues, h/a, hearing loss, ringing in the ears, high blood pressure, depression,  "anxiety, mood swings and stress.     Social History:  Patient works at [employer]. He works full time. He is single. He lives with his daughter 1/2 time. He has a [place] foreign exchange student 1/2 time as well.     Family history: Sister with fibromyalgia    PHYSICAL EXAM:     Vitals:   /84   Temp 98.9  F (37.2  C) (Temporal)   Ht 1.848 m (6' 0.75\")   Wt 95 kg (209 lb 8 oz)   BMI 27.83 kg/m    Body mass index is 27.83 kg/m .  6' .75\"  209 lbs 8 oz    Constitutional: healthy, alert and no distress  HEENT: Head atraumatic, normocephalic. Eyes without conjunctival injection or jaundice. Neck supple. No obvious neck masses. PERRLA.  Lymph: no palpable cervical or supraclavicular adenopathy  Cardiovascular: No edema or JVD appreciated. No edema on bilateral lower extremities  Skin: No rash, lesions, or petechiae of exposed skin.   Extremities: No clubbing, cyanosis, or edema to exposed extremities  Abdomen: Soft, non-tender. No palpable organomegaly.  Psychiatric/mental status: Alert, without lethargy or stupor. Appropriate affect. Mood normal.   Neurologic exam:  CN:  Cranial nerves 2-12 are grossly normal.  Motor:  5/5 UE and LE strength.  Reflexes:     Biceps:     R:  2/4 L: 2/4   Brachioradialis   R:  2/4 L: 2/4   Patella:  R:  1/4 L: 1/4   Achilles:  R:  1/4 L: 1/4    Musculoskeletal exam:  Cervical spine:   Flex:  20 degrees   Ext: 15 degrees   Rotation to right: 60 degrees   Rotation to left:: 60 degrees   Rotation/ext to right: painful    Rotation/ext to left:: painful    Tenderness in the cervical spine at midline:  No   Tenderness in the cervical paraspinal muscles:  No   Thoracic spine:    Tenderness in the thoracic spine at midline:  No    Tenderness in the thoracic paraspinal muscles:  No  Lumbar/Sacral spine:   Forward Flexion:  90 degrees   Ext: 25 degrees   Rotation/ext to right: painful    Rotation/ext to left:: painful    Tenderness in the lumbar spine at midline:  No   Tenderness in " the lumbar paraspinal muscles:  No   Straight leg exam:negative   Loyda/Juan Francisco's test:  Right positive Left  positive   Tenderness over piriformis:     Right: negative    Left:  negative   Tenderness over Trochanteric Bursa:     Right: negative      Left:  negative     Sensory exam:   Light touch: normal bilateral upper and lower extremities    Vibration: normal in UE, decreased in LE in feet bilaterally   Sharp: normal in bilateral UE and LE.         DIAGNOSTIC TESTS:  Imaging Studies:   MRI Cervical Spine 01/28/2019  IMPRESSION:    1. New postoperative changes of anterior fusion at the C5-C6 level  compared to previous MR 8/14/2018. No acute complication appreciated.  Postoperative changes at C6-C7 again noted.  2. Mild degenerative disc changes at C3-C4 and C4-C5. No significant  change since previous MR 8/14/2018.     Assessment:  Indra Mehta is a 53 year old male who presents today for follow up regarding his:    1.  Cervical radiulopathy  2.  S/p cervical spine fusion  3.  Myofascial pain  4.  Lumbar radiculopathy  5. Neuropathy  6. Chronic pain syndrome    He continues to have a lot of neck pain radiating down his right arm. He is starting to feel down due to this pain. He is also getting frustrated at the pain in his feet. He is taking 4 Oxycodone/day. He would like to work on decreasing some of the medications he is taking once his pain is under better control. He is starting physical therapy today.     Plan:    Diagnosis reviewed, treatment option addressed, and risk/benifits discussed.  Self-care instructions given.  I am recommending a multidisciplinary treatment plan to help this patient better manage pain.      1. Physical Therapy:  YES, Keep appointment as scheduled   2. Clinical Health Psychologist:  NO, try guided meditation    3. Diagnostic Studies:  None at this time  4. Medication Management:    1. Continue Gabapentin 1200 TID  2. Continue Robaxin 1000mg TID  3. Continue Tizanidine 4mg at  bedtime  4. Continue Oxycodone as prescribed. I will take over this medication after I get the results of the UDS  5. Start Cymbalta 20mg at bedtime  5. Further procedures recommended: none at this time, but could consider repeat LESI. Would need to discuss MERE with neurosurgery.   6. Recommendations to PCP. See above  7. Urine drug screen today.     Follow up with this provider:  2 Weeks     Total time spent face to face was 40 minutes and more than 50% of face to face time was spent in counseling and/or coordination of care regarding the diagnosis and recommendations above.      Chiara Garcia PA-C   [facility]

## 2019-04-01 NOTE — PROGRESS NOTES
" 04/01/19 1400   General Information   Type of Visit Initial OP Ortho PT Evaluation   Start of Care Date 04/01/19   Referring Physician Camilla Corona NP   Patient/Family Goals Statement increased ROM, reduced pain for every day life, sleep better, less reliance on meds   Orders Evaluate and Treat   Date of Order 03/14/19   Insurance Type Other   Insurance Comments/Visits Authorized HP - no limits   Medical Diagnosis S/P cervical spinal fusion Z98.1  - Primary   Surgical/Medical history reviewed Yes   Body Part(s)   Body Part(s) Cervical Spine   Presentation and Etiology   Pertinent history of current problem (include personal factors and/or comorbidities that impact the POC) Per EPIC: \"pt is 3 months out from C5-6 ACDF.  He has a history of a prior C6-7 ACDF but had developed progressively worse right arm pain.  He then underwent C5-6 ACDF with Dr. Mckenna on 12/19/2018. Today, he reports ongoing right arm pain that radiates into his elbow and to his hand. He has tried two rounds of steroids with some results. He also tried an Aspen collar which he did not find beneficial and is no longer wearing. He has not had any physical therapy on his neck since surgery. He denies overt weakness.\"  Pt reports that the latest surgery has introduced newer pains like sharp pains in the neck, pain down to SH blades B and under the SH blades R  > L.  The pain goes down the R post arm to elbow and, on the L side, it feels like a tingling in the general L hand and forearm.  He gets a pulling or spasm of 2nd and 3rd fingers and they pull toward the thumb.  The R arm numbness has generally better since the surgery but otherwise, he reports he thinks he is worse than before.  He has been doing easy ROM ex \"without pushing it\" over the last month.  He \"just can't get the sharp pain to go away\".  Wakes 6x/noc on average.  He has a \"just under one hour\" commute one way to work.  Also reports he has chronic LBP and chronic foot pain " with paraesthesias.  He has recently transferred his care to a pain med specialist.  His next follow up is in the next few weeks with the spine surgeon. No PT since surgery.   Impairments A. Pain;D. Decreased ROM;E. Decreased flexibility;F. Decreased strength and endurance;K. Numbness;L. Tingling   Functional Limitations perform activities of daily living;perform required work activities;perform desired leisure / sports activities   Symptom Location see above   How/Where did it occur From insidious onset   Onset date of current episode/exacerbation 12/19/18   Chronicity Chronic   Pain rating (0-10 point scale) Other   Pain rating comment now: 6.5/10, best: 3/10, worst: 9/10   Pain quality A. Sharp;G. Cramping;H. Other   Pain quality comment sore, aching   Frequency of pain/symptoms A. Constant   Pain/symptoms are: Worse in the morning  (a bit better by mid morning, worse again as day goes on)   Pain/symptoms exacerbated by M. 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 Other   Pain eased by comment ice, ibuprofen, previous ex he still does   Progression of symptoms since onset: Worsened  (didn't have pain in the SH before)   Current / Previous Interventions   Diagnostic Tests: X-ray   X-ray Results Results   X-ray results Cerv Xray from March: Again seen are postoperative changes with intervertebral disc spacers at C5-C6 and C6-C7. Alignment appears similar to prior. No new loss of vertebral body height. Mild degenerative changes and loss of disc height at C4-C5 is unchanged.   Current Level of Function   Patient role/employment history A. Employed   Employment Comments IT work, has stand up/sit down work station, coaches youth girls volleyball   Fall Risk Screen   Fall screen completed by PT   Have you fallen 2 or more times in the past year? No   Have you fallen and had an  injury in the past year? No   Is patient a fall risk? No   Functional Scales   Functional Scales Other   Other Scales  NDI: 60%   Cervical Spine   Observation flat affect, head in hands in waiting room   Integumentary  2 incisions noted ant/R side of cervical area   Cervical Flexion ROM 32   Cervical Extension ROM 29   Cervical Right Side Bending ROM 6   Cervical Left Side Bending ROM 13   Cervical Right Rotation ROM 45   Cervical Left Rotation ROM 40   Cervical/Thoracic/Shoulder ROM Comments SH ROM WFL   Shoulder/Wrist/Hand Strength Comments best of 3 trials of  strength: R 79#/ L 71#   Cervical/Shoulder Special Tests Comments Starting sx: most distal sx: R post elbow: 4/10, L scapular area: 3-4/10, did not have time to do more static/dynamic force analysis   Palpation nodules/tightness throughout cervical and upper back areas, most notably at R > L levator and upper traps   Biceps Reflexes 2/3 B   Triceps Reflexes 0/3 B   Planned Therapy Interventions   Planned Therapy Interventions manual therapy;neuromuscular re-education;ROM;strengthening;stretching   Planned Therapy Interventions Comment ergonomics/postural education   Planned Modality Interventions   Planned Modality Interventions Comments modalities as needed   Clinical Impression   Criteria for Skilled Therapeutic Interventions Met yes, treatment indicated   PT Diagnosis cerv pain, reduced cerv mobility, myofascial tightness   Influenced by the following impairments pain, decreased mobility   Functional limitations due to impairments sporting activities, sleep   Clinical Presentation Evolving/Changing   Clinical Presentation Rationale History: chronic pain, multiple co-morbidities; Examination: NDI score, activity and participation restrictions   Clinical Decision Making (Complexity) Moderate complexity   Therapy Frequency 2 times/Week   Predicted Duration of Therapy Intervention (days/wks) 90 days as needed   Risk & Benefits of therapy have been  explained Yes   Patient, Family & other staff in agreement with plan of care Yes   Clinical Impression Comments Pt is nearly 4 mos s/p ACDF at C5-6 with a hx of another ACDF at C6-7 a few years ago.  He presents with ongoing cerv/scapular pain with referral to the B UE.  He would benefit from skilled PT services to try to centralize sx, reduce myofascial pain and tightness and improve general mobility and overall function.   Education Assessment   Preferred Learning Style Listening;Reading;Demonstration;Pictures/video   Barriers to Learning No barriers   ORTHO GOALS   PT Ortho Eval Goals 1;2   Ortho Goal 1   Goal Identifier 1   Goal Description Pt will score 38% or less on the NDI (Neck Disability Index) indicating improved pain levels and function over time.   Target Date 06/29/19   Ortho Goal 2   Goal Identifier 2   Goal Description Pt will be able to stand for 30 min or greater due to reduced neck pain in order to be more comfortable watching his children's sports and coaching volleyball.   Target Date 06/29/19   Total Evaluation Time   PT Bironna, Moderate Complexity Minutes ([id number]) 30

## 2019-04-02 ENCOUNTER — MYC MEDICAL ADVICE (OUTPATIENT)
Dept: PODIATRY | Facility: CLINIC | Age: 54
End: 2019-04-02

## 2019-04-02 ENCOUNTER — TELEPHONE (OUTPATIENT)
Dept: NEUROSURGERY | Facility: CLINIC | Age: 54
End: 2019-04-02

## 2019-04-02 DIAGNOSIS — Z98.1 S/P CERVICAL SPINAL FUSION: ICD-10-CM

## 2019-04-02 NOTE — TELEPHONE ENCOUNTER
Attempted to reach patient regarding voicemail on nurse line for oxycodone refill. No answer. Recommending him to follow up with his primary care provider regarding ongoing pain management as discussed at the last office visit on 3/14/2019.

## 2019-04-02 NOTE — TELEPHONE ENCOUNTER
Jessee message from patient on 4/2 at 1626:    [facility] about one last refill of the oxycodone.  They refused saying I need to follow up with my primary.  I doubt I can see my primary before Friday.  I guess this means I will be without pain meds for a couple weeks.  Any suggestions?    -------------  Reviewed chart. Pt was seen by MAYDA Ny yesterday and plan includes the following re: medications:    1. Medication Management:    1. Continue Gabapentin 1200 TID  2. Continue Robaxin 1000mg TID  3. Continue Tizanidine 4mg at bedtime  4. Continue Oxycodone as prescribed. I will take over this medication after I get the results of the UDS     ----------------  UDS was obtained yesterday and pt was instructed to return to see Chiara in 2 weeks.     Message sent to pt:    Finn Aceves,     I have reviewed your chart and see that Chiara was planning to take over prescribing of oxycodone once she receives the results of the urine drug screen which typically takes about a week to get back. Based on the date of the last prescription in your chart, you should have enough medication to get to 4/7/19 at 4 tabs/day.  I will have Chiara review your message to see if she has any recommendations prior to seeing you again in about 2 weeks, we will pass that on to you.  Please call [phone] to get that appointment scheduled.     Take care,     ANGELINA StantonN, RN-BC  Patient Care Supervisor/Care Coordinator  [facility]

## 2019-04-02 NOTE — TELEPHONE ENCOUNTER
Patient s/p ACDF on 7/5/17. Lenard approves norco refill, will be available for  at [facility].     Patient recently received percocet from Dr. Story after hernia repair, but patient said he didn't like it very well and would like to switch back to norco. Informed patient to start weaning as well, as he is 6 weeks s/p ACDF. He will f/u with Erica this week in clinic.   
Percocet runs out tomorrow and states he will not be asking to refill that.   Would like to get refill of Norco.  Please call   
no chest pain and no edema.

## 2019-04-03 RX ORDER — OXYCODONE HYDROCHLORIDE 5 MG/1
TABLET ORAL
Qty: 28 TABLET | Refills: 0 | Status: SHIPPED | OUTPATIENT
Start: 2019-04-03 | End: 2019-04-15

## 2019-04-03 NOTE — TELEPHONE ENCOUNTER
1 week refill given. UDS from 12/2018 was reviewed. Script brought [facility].     Chiara Garcia PA-C on 4/3/2019 at 9:50 AM

## 2019-04-03 NOTE — TELEPHONE ENCOUNTER
Please call patient and see how many tablets he has left. He should have enough to get him through 4/7/19. I could give him a 1 week supply, but I need to know how many tablets he has left.     Chiara Garcia PA-C on 4/3/2019 at 9:25 AM

## 2019-04-03 NOTE — TELEPHONE ENCOUNTER
Spoke to patient lilibething med request. He has enough medication to get him through 4/7/2019 and he will be going out of town. I informed him that Due to the situation she will fill a prescription today for a 1 week supply. He was very appreciative.  Sarahy Strauss CMA

## 2019-04-05 LAB — PAIN DRUG SCR UR W RPTD MEDS: NORMAL

## 2019-04-11 NOTE — PROGRESS NOTES
[facility]    CHIEF COMPLAINT:  Pain  -neck and low back pain    INTERVAL HISTORY:  Last seen on 4/1/2018.      Ketan was previously a patient of Dr. Angle Freeman. He is transferring to me due to location.       Recommendations/plan at the last visit included:  1. Physical Therapy:  YES, Keep appointment as scheduled   2. Clinical Health Psychologist:  NO, try guided meditation    3. Diagnostic Studies:  None at this time  4. Medication Management:    1. Continue Gabapentin 1200 TID  2. Continue Robaxin 1000mg TID  3. Continue Tizanidine 4mg at bedtime  4. Continue Oxycodone as prescribed. I will take over this medication after I get the results of the UDS  5. Start Cymbalta 20mg at bedtime  5. Further procedures recommended: none at this time, but could consider repeat LESI. Would need to discuss MERE with neurosurgery.   6. Recommendations to PCP. See above  7. Urine drug screen today.     Follow up with this provider:  2 Weeks    Since his last visit, Indra Mehta reports:  -Things are overall the same. He is getting over a cold.      He states that he hasn't had tingling in his left hand in a week. He continues to have sharp pain in his neck and into his shoulder. He is also having less frequent pain down his right arm. He continues to have trouble sleeping due to either the pain in his neck or his low back.     He had his initial PT session and has it scheduled twice weekly for the next 2 weeks.       Pain Information:   Pain quality: Miserable    Pain rating: intensity ranges from 3/10 to 8/10, and averages 5/10 on a 0-10 scale.   Pain today 6/10    SELF CARE:   How often do you practice SELF-CARE (relaxing, stretching, pacing, monitoring posture, taking mini-breaks) in a typical day:  Trying to do 3-4 times/day. Started doing PT exercises as well    UDS: 4/1/19-reviewed and appropriate  Controlled Substance Agreement signed: 4/15/2019    CURRENT RELEVANT PAIN MEDICATIONS:   Gabapentin  400mg-Taking 3 capsules 3 times a day  Tizanidine 4mg-taking 1 at HS (helps get him to sleep)  Tylenol 500mg-1000mg 4x/day  Ibuprofen-800mg 4x/day  Robaxin- 500mg 2 Three times a day  Excedrin Migraine-takes occasionally-does not take on top of the tylenol and ibuprofen  Oxycodone-taking 4/day  Xanax-does not use daily, last use was about 3 weeks ago  Cymbalta 20mg at bedtime    Patient is using the medication as prescribed:  YES  Is your medication helpful? YES   Medication side effects? no side effect    Previous Medications: (H--helped; HI--Helped initially; SWH-- somewhat helpful, NH--No help; W--worse; SE--side effects)   Norco/vicodin H  Oxycodone H  Flexeril - was helpful at night  Robaxin - may have helped, didn't make him sleepy  Tizanidine H  Gabapentin ?  Lyricelsie RAMIREZ sedation    Past Pain Treatments:  Injections:    - 11/8/18 bilateral L3-4 TFESI - (Dr Mcdaniel)  - 8/23/18 bilateral L3-4 TFESI - seems to have worsened pain (Dr Mcdaniel)  - 6/11/18 TFESI right C5-6 - helped with arm pain and numbness  - bilateral L3-4 TFESI 12/28/17  - Bilateral L4-5 TFESI 10/23/17  - Bilateral L3-4 TFESI 11/08/2018  Surgery:  ACDF C6-7 on 7/5/17 with improvement; ACDF C5-6 12/19/2018  TENS unit: helpful  Physical therapy in [facility] Board of Pharmacy Data Base Reviewed:    YES; As expected, no concern for misuse/abuse of controlled medications based on this report.    THE 4 As OF OPIOID MAINTENANCE ANALGESIA    Analgesia: Is pain relief clinically significant? YES   Activity: Is patient functional and able to perform Activities of Daily Living? YES   Adverse effects: Is patient free from adverse side effects from opiates? YES   Adherence to Rx protocol: Is patient adhering to Controlled Substance Agreement and taking medications ONLY as ordered? YES       Is Narcan prescribed for opiate use >50 MME daily? N/A      Daily MME: 30    Medications:  Current Outpatient Medications   Medication Sig Dispense Refill      "Acetaminophen (TYLENOL PO) Take 500 mg by mouth every 4 hours as needed for mild pain or fever       ALPRAZolam (XANAX XR) 0.5 MG 24 hr tablet Take 1 tablet (0.5 mg) by mouth At Bedtime 30 tablet 2     DULoxetine (CYMBALTA) 20 MG capsule Take 1 capsule (20 mg) by mouth daily 30 capsule 0     gabapentin (NEURONTIN) 400 MG capsule Take 3 capsules (1,200 mg) by mouth 3 times daily 270 capsule 3     losartan (COZAAR) 50 MG tablet Take 1 tablet (50 mg) by mouth daily 90 tablet 0     methocarbamol (ROBAXIN) 500 MG tablet Take 2 tablets (1,000 mg) by mouth 3 times daily as needed for muscle spasms 60 tablet 1     order for DME Equipment being ordered: TENS Pads as directed 1 Device 0     oxyCODONE (ROXICODONE) 5 MG tablet Take 1 tab every 4-6 hours as needed for severe pain. MAX 4 TABS PER DAY. Okay to fill 4/5/19. To last until 4/15/19. 28 tablet 0     tiZANidine (ZANAFLEX) 4 MG tablet Take 1 tablet (4 mg) by mouth 2 times daily as needed for muscle spasms 60 tablet 3       Review of Systems: A 10-point review of systems was negative, with the exception of chronic pain issues, earache, ringing in the ears, high blood pressure, depression, and mood swings.     Social History:  No changes since previous visit    Family history: no changes since previous visit    PHYSICAL EXAM:     Vitals:   /82   Temp 98.6  F (37  C) (Temporal)   Ht 1.848 m (6' 0.75\")   Wt 95.7 kg (211 lb)   BMI 28.03 kg/m    Body mass index is 28.03 kg/m .  6' .75\"  211 lbs 0 oz    Constitutional: healthy, alert and no distress  HEENT: Head atraumatic, normocephalic. Eyes without conjunctival injection or jaundice. Neck supple. No obvious neck masses.   Skin: No rash, lesions, or petechiae of exposed skin.   Psychiatric/mental status: Alert, without lethargy or stupor. Appropriate affect. Mood normal.     DIAGNOSTIC TESTS:  No new imaging to review.    Assessment:  Indra Mehta is a 53 year old male who presents today for follow up regarding " his:    1.  Cervical radiulopathy  2.  S/p cervical spine fusion  3.  Myofascial pain  4.  Lumbar radiculopathy  5. Neuropathy  6. Chronic pain syndrome    Some slight improvement in pain in arms. Feels he may be due for a low back injection, but would like to wait a couple more weeks. Started the Cymbalta but has not noticed a huge change in his pain. Continues to take 4 oxycodone/day.     Plan:    Diagnosis reviewed, treatment option addressed, and risk/benifits discussed.  Self-care instructions given.  I am recommending a multidisciplinary treatment plan to help this patient better manage pain.      1. Physical Therapy:  YES, Keep appointments as scheduled   2. Clinical Health Psychologist:  NO, continue guided meditation    3. Diagnostic Studies:  None at this time  4. Medication Management:    1. Continue Gabapentin 1200 TID  2. Continue Robaxin 1000mg TID  3. Continue Tizanidine 4mg at bedtime  4. Continue Oxycodone 4/day  5. Increase Cymbalta 20mg BID  5. Further procedures recommended: none at this time, but could consider repeat LESI. Would need to discuss MERE with neurosurgery.   6. Recommendations to PCP. See above  7. Controlled substance agreement signed today.  8. Ketan to follow up with Primary Care provider regarding elevated blood pressure.      Follow up with this provider:  4 Weeks     Total time spent face to face was 20 minutes and more than 50% of face to face time was spent in counseling and/or coordination of care regarding the diagnosis and recommendations above.      Chiara Garcia PA-C   [facility]

## 2019-04-15 ENCOUNTER — OFFICE VISIT (OUTPATIENT)
Dept: PODIATRY | Facility: CLINIC | Age: 54
End: 2019-04-15
Payer: COMMERCIAL

## 2019-04-15 VITALS
TEMPERATURE: 98.6 F | SYSTOLIC BLOOD PRESSURE: 156 MMHG | BODY MASS INDEX: 27.96 KG/M2 | DIASTOLIC BLOOD PRESSURE: 82 MMHG | WEIGHT: 211 LBS | HEIGHT: 73 IN

## 2019-04-15 DIAGNOSIS — G89.4 CHRONIC PAIN SYNDROME: ICD-10-CM

## 2019-04-15 DIAGNOSIS — M54.12 CERVICAL RADICULOPATHY: Primary | ICD-10-CM

## 2019-04-15 DIAGNOSIS — M79.18 MYOFASCIAL PAIN: ICD-10-CM

## 2019-04-15 DIAGNOSIS — Z98.1 S/P CERVICAL SPINAL FUSION: ICD-10-CM

## 2019-04-15 DIAGNOSIS — M54.16 LUMBAR RADICULOPATHY: ICD-10-CM

## 2019-04-15 DIAGNOSIS — G62.9 NEUROPATHY: ICD-10-CM

## 2019-04-15 PROCEDURE — 99213 OFFICE O/P EST LOW 20 MIN: CPT | Performed by: PHYSICIAN ASSISTANT

## 2019-04-15 RX ORDER — OXYCODONE HYDROCHLORIDE 5 MG/1
TABLET ORAL
Qty: 120 TABLET | Refills: 0 | Status: SHIPPED | OUTPATIENT
Start: 2019-04-15 | End: 2019-05-13

## 2019-04-15 RX ORDER — DULOXETIN HYDROCHLORIDE 20 MG/1
20 CAPSULE, DELAYED RELEASE ORAL 2 TIMES DAILY
Qty: 60 CAPSULE | Refills: 0 | Status: SHIPPED | OUTPATIENT
Start: 2019-04-15 | End: 2019-06-13

## 2019-04-15 ASSESSMENT — MIFFLIN-ST. JEOR: SCORE: 1857

## 2019-04-15 ASSESSMENT — PAIN SCALES - GENERAL: PAINLEVEL: SEVERE PAIN (6)

## 2019-04-15 NOTE — LETTER
4/15/2019         RE: Indra Mehta  [address] [address] [place] [id number]        Dear Colleague,    Thank you for referring your patient, Indra Mehta, to the [facility]. Please see a copy of my visit note below.    [facility]    CHIEF COMPLAINT:  Pain  -neck and low back pain    INTERVAL HISTORY:  Last seen on 4/1/2018.      Ketan was previously a patient of Dr. Angle Freeman. He is transferring to me due to location.       Recommendations/plan at the last visit included:  1. Physical Therapy:  YES, Keep appointment as scheduled   2. Clinical Health Psychologist:  NO, try guided meditation    3. Diagnostic Studies:  None at this time  4. Medication Management:    1. Continue Gabapentin 1200 TID  2. Continue Robaxin 1000mg TID  3. Continue Tizanidine 4mg at bedtime  4. Continue Oxycodone as prescribed. I will take over this medication after I get the results of the UDS  5. Start Cymbalta 20mg at bedtime  5. Further procedures recommended: none at this time, but could consider repeat LESI. Would need to discuss MERE with neurosurgery.   6. Recommendations to PCP. See above  7. Urine drug screen today.     Follow up with this provider:  2 Weeks    Since his last visit, Indra Mehta reports:  -Things are overall the same. He is getting over a cold.      He states that he hasn't had tingling in his left hand in a week. He continues to have sharp pain in his neck and into his shoulder. He is also having less frequent pain down his right arm. He continues to have trouble sleeping due to either the pain in his neck or his low back.     He had his initial PT session and has it scheduled twice weekly for the next 2 weeks.       Pain Information:   Pain quality: Miserable    Pain rating: intensity ranges from 3/10 to 8/10, and averages 5/10 on a 0-10 scale.   Pain today 6/10    SELF CARE:   How often do you practice SELF-CARE (relaxing, stretching, pacing, monitoring posture,  taking mini-breaks) in a typical day:  Trying to do 3-4 times/day. Started doing PT exercises as well    UDS: 4/1/19-reviewed and appropriate  Controlled Substance Agreement signed: 4/15/2019    CURRENT RELEVANT PAIN MEDICATIONS:   Gabapentin 400mg-Taking 3 capsules 3 times a day  Tizanidine 4mg-taking 1 at HS (helps get him to sleep)  Tylenol 500mg-1000mg 4x/day  Ibuprofen-800mg 4x/day  Robaxin- 500mg 2 Three times a day  Excedrin Migraine-takes occasionally-does not take on top of the tylenol and ibuprofen  Oxycodone-taking 4/day  Xanax-does not use daily, last use was about 3 weeks ago  Cymbalta 20mg at bedtime    Patient is using the medication as prescribed:  YES  Is your medication helpful? YES   Medication side effects? no side effect    Previous Medications: (H--helped; HI--Helped initially; SWH-- somewhat helpful, NH--No help; W--worse; SE--side effects)   Norco/vicodin H  Oxycodone H  Flexeril - was helpful at night  Robaxin - may have helped, didn't make him sleepy  Tizanidine H  Gabapentin ?  Lyrica SE sedation    Past Pain Treatments:  Injections:    - 11/8/18 bilateral L3-4 TFESI - (Dr Mcdaniel)  - 8/23/18 bilateral L3-4 TFESI - seems to have worsened pain (Dr Mcdaniel)  - 6/11/18 TFESI right C5-6 - helped with arm pain and numbness  - bilateral L3-4 TFESI 12/28/17  - Bilateral L4-5 TFESI 10/23/17  - Bilateral L3-4 TFESI 11/08/2018  Surgery:  ACDF C6-7 on 7/5/17 with improvement; ACDF C5-6 12/19/2018  TENS unit: helpful  Physical therapy in [facility] Board of Pharmacy Data Base Reviewed:    YES; As expected, no concern for misuse/abuse of controlled medications based on this report.    THE 4 As OF OPIOID MAINTENANCE ANALGESIA    Analgesia: Is pain relief clinically significant? YES   Activity: Is patient functional and able to perform Activities of Daily Living? YES   Adverse effects: Is patient free from adverse side effects from opiates? YES   Adherence to Rx protocol: Is patient adhering to  "Controlled Substance Agreement and taking medications ONLY as ordered? YES       Is Narcan prescribed for opiate use >50 MME daily? N/A      Daily MME: 30    Medications:  Current Outpatient Medications   Medication Sig Dispense Refill     Acetaminophen (TYLENOL PO) Take 500 mg by mouth every 4 hours as needed for mild pain or fever       ALPRAZolam (XANAX XR) 0.5 MG 24 hr tablet Take 1 tablet (0.5 mg) by mouth At Bedtime 30 tablet 2     DULoxetine (CYMBALTA) 20 MG capsule Take 1 capsule (20 mg) by mouth daily 30 capsule 0     gabapentin (NEURONTIN) 400 MG capsule Take 3 capsules (1,200 mg) by mouth 3 times daily 270 capsule 3     losartan (COZAAR) 50 MG tablet Take 1 tablet (50 mg) by mouth daily 90 tablet 0     methocarbamol (ROBAXIN) 500 MG tablet Take 2 tablets (1,000 mg) by mouth 3 times daily as needed for muscle spasms 60 tablet 1     order for DME Equipment being ordered: TENS Pads as directed 1 Device 0     oxyCODONE (ROXICODONE) 5 MG tablet Take 1 tab every 4-6 hours as needed for severe pain. MAX 4 TABS PER DAY. Okay to fill 4/5/19. To last until 4/15/19. 28 tablet 0     tiZANidine (ZANAFLEX) 4 MG tablet Take 1 tablet (4 mg) by mouth 2 times daily as needed for muscle spasms 60 tablet 3       Review of Systems: A 10-point review of systems was negative, with the exception of chronic pain issues, earache, ringing in the ears, high blood pressure, depression, and mood swings.     Social History:  No changes since previous visit    Family history: no changes since previous visit    PHYSICAL EXAM:     Vitals:   /82   Temp 98.6  F (37  C) (Temporal)   Ht 1.848 m (6' 0.75\")   Wt 95.7 kg (211 lb)   BMI 28.03 kg/m     Body mass index is 28.03 kg/m .  6' .75\"  211 lbs 0 oz    Constitutional: healthy, alert and no distress  HEENT: Head atraumatic, normocephalic. Eyes without conjunctival injection or jaundice. Neck supple. No obvious neck masses.   Skin: No rash, lesions, or petechiae of exposed skin. "   Psychiatric/mental status: Alert, without lethargy or stupor. Appropriate affect. Mood normal.     DIAGNOSTIC TESTS:  No new imaging to review.    Assessment:  Indra Mehta is a 53 year old male who presents today for follow up regarding his:    1.  Cervical radiulopathy  2.  S/p cervical spine fusion  3.  Myofascial pain  4.  Lumbar radiculopathy  5. Neuropathy  6. Chronic pain syndrome    Some slight improvement in pain in arms. Feels he may be due for a low back injection, but would like to wait a couple more weeks. Started the Cymbalta but has not noticed a huge change in his pain. Continues to take 4 oxycodone/day.     Plan:    Diagnosis reviewed, treatment option addressed, and risk/benifits discussed.  Self-care instructions given.  I am recommending a multidisciplinary treatment plan to help this patient better manage pain.      1. Physical Therapy:  YES, Keep appointments as scheduled   2. Clinical Health Psychologist:  NO, continue guided meditation    3. Diagnostic Studies:  None at this time  4. Medication Management:    1. Continue Gabapentin 1200 TID  2. Continue Robaxin 1000mg TID  3. Continue Tizanidine 4mg at bedtime  4. Continue Oxycodone 4/day  5. Increase Cymbalta 20mg BID  5. Further procedures recommended: none at this time, but could consider repeat LESI. Would need to discuss MERE with neurosurgery.   6. Recommendations to PCP. See above  7. Controlled substance agreement signed today.  8. Ketan to follow up with Primary Care provider regarding elevated blood pressure.      Follow up with this provider:  4 Weeks     Total time spent face to face was 20 minutes and more than 50% of face to face time was spent in counseling and/or coordination of care regarding the diagnosis and recommendations above.      Chiara Garcia PA-C   [facility]        Again, thank you for allowing me to participate in the care of your patient.        Sincerely,        Chiara Garcia,  IGOR

## 2019-04-15 NOTE — PATIENT INSTRUCTIONS
After Visit Instructions:     Thank you for coming to [facility] for your care. It is my goal to partner with you to help you reach your optimal state of health.     I am recommending multidisciplinary care at this time.  The focus of care will be to continue gradual rehabilitation and pain management with medication adjustments as needed.    Continue daily self-care, identifying contributing factors, and monitoring variations in pain level. Continue to integrate self-care into your life.        1. Schedule physical therapy assessment/visit: Continue your current plan  2. Schedule follow-up with Chiara Garcia PA-C in 4 weeks. You will need to make this appointment.   3. Procedures recommended: Will wait, but we can consider repeating LESI  4. Medication recommendations:   1. Oxycodone 5mg every 4-6 hours as needed. Max 4/day  2. Continue Gabapentin 1200 TID  3.   Continue Robaxin 1000mg TID  4. Continue Tizanidine 4mg at bedtime  5.   Increase Cymbalta to 20mg twice daily.      Chiara Garcia PA-C  [facility]    Contact information: [facility]  Clinic Number:  [phone]   Call this number with any questions about your care and for scheduling assistance. Calls are returned Monday through Friday between 8 AM and 4:30 PM. We usually get back to you within 2 business days depending on the issue/request.           Medication Refills Policy:    For non-narcotic medications, please your pharmacy directly to request a refill and the pharmacy will call the [facility] for authorization. Please allow 3-4 days for these refills.    For narcotic refills, call the nurse triage line and leave a message requesting your refill along with the name of the pharmacy that you use. Narcotic prescriptions will be mailed to your pharmacy or you may pick them up at the clinic.  Please call 7-10 days before your refill is due  The  above policy allows adequate time so that you do not run out of medication.    No Show - Late Cancellation - Late Arrival Policy  We believe regular attendance is key to your success in our program.    Any time you are unable to keep your appointment we ask that you call us at least 24 hours in advance to let us know. This will allow us to offer the appointment time to another patient. The following is our policy for missed appointments. This also applies to appointments cancelled with less than 24 hours notice.    After missing 3 appointments without calling first, we will cancel all of your future appointments at [facility].    At that point, you will not be able to resume services unless approved by your care team  We understand that unforseen circumstances arise, however, out of respect for all concerned and to provide this appointment to another patient, this policy will be enforced.    Please note that most follow up appointments are 30 minutes long. If you arrive late, your provider may not be able to see you for the entire 30 minutes. Please also note that if you arrive more than 15 minutes for any appointment, it may be rescheduled.

## 2019-04-15 NOTE — LETTER
[facility]  04/15/19    Patient: Indra Mehta  YOB: 1965  Medical Record Number: [id number]                                                                  Opioid / Opioid Plus Controlled Substance Agreement    I understand that my care provider has prescribed an opioid (narcotic) controlled substance to help manage my condition(s). I am taking this medicine to help me function or work. I know this is strong medicine, and that it can cause serious side effects. Opioid medicine can be sedating, addicting and may cause a dependency on the drug. They can affect my ability to drive or think, and cause depression. They need to be taken exactly as prescribed. Combining opioids with certain medicines or chemicals (such as cocaine, sedatives and tranquilizers, sleeping pills, meth) can be dangerous or even fatal. Also, if I stop opioids suddenly, I may have severe withdrawal symptoms. Last, I understand that opioids do not work for all types of pain nor for all patients. If not helpful, I may be asked to stop them.    I am also being prescribed a benzodiazepine (tranquilizer) controlled substance.   I understand this type of medication is sedating, and can increase the risk of death when taken together with opioids.  I have talked to my care team about the option of having a prescription for Narcan to use to reverse the opioid medicine, in case I get too sleepy. I will be very careful to take my medicines only as directed.    The risks, benefits, and side effects of these medicine(s) were explained to me. I agree that:    1. I will take part in other treatments as advised by my care team. This may be psychiatry or counseling, physical therapy, behavioral therapy, group treatment or a referral to a pain clinic. I will reduce or stop my medicine when my care team tells me to do so.  2. I will take my medicines as prescribed. I will not change the dose or schedule unless my  care team tells me to. There will be no refills if I  run out early.   I may be contactedwithout warning and asked to complete a urine drug test or pill count at any time.   3. I will keep all my appointments, and understand this is part of the monitoring of opioids. My care team may require an office visit for EVERY opioid/controlled substance refill. If I miss appointments or don t follow instructions, my care team may stop my medicine.  4. I will not ask other providers to prescribe controlled substances, and I will not accept controlled substances from other people. If I need another prescribed controlled substance for a new reason, I will tell my care team within 1 business day.  5. I will use one pharmacy to fill all of my controlled substance prescriptions, and it is up to me to make sure that I do not run out of my medicines on weekends or holidays. If my care team is willing to refill my opioid prescription without a visit, I must request refills only during office hours, refills may take up to 3 days to process, and it may take up to 5 to 7 days for my medicine to be mailed and ready at my pharmacy. Prescriptions will not be mailed anywhere except my pharmacy.        [id number]  Rev 12/18         Registration to scan to EHR                             Page 1 of 2               Controlled Substance Agreement Opioid        [facility]  04/15/19  Patient: Indra Mehta  YOB: 1965  Medical Record Number: [id number]                                                                  6. I am responsible for my prescriptions. If the medicine/prescription is lost or stolen, it will not be replaced. I also agree not to share controlled substance medicines with anyone.  7. I agree to not use ANY illegal or recreational drugs. This includes marijuana, cocaine, bath salts or other drugs. I agree not to use alcohol unless my care team says I may.          I agree to give urine  samples whenever asked. If I don t give a urine sample, the care team may stop my medicine.    8. If I enroll in the [place] Medical Marijuana program, I will tell my care team. I will also sign an agreement to share my medical records with my care team.   9. I will bring in my list of medicines (or my medicine bottles) each time I come to the clinic.   10. I will tell my care team right away if I become pregnant or have a new medical problem treated outside of my regular clinic.  11. I understand that this medicine can affect my thinking and judgment. It may be unsafe for me to drive, use machinery and do dangerous tasks. I will not do any of these things until I know how the medicine affects me. If my dose changes, I will wait to see how it affects me. I will contact my care team if I have concerns about medicine side effects.    I understand that if I do not follow any of the conditions above, my prescriptions or treatment may be stopped.      I agree that my provider, clinic care team, and pharmacy may work with any city, state or federal law enforcement agency that investigates the misuse, sale, or other diversion of my controlled medicine. I will allow my provider to discuss my care with or share a copy of this agreement with any other treating provider, pharmacy or emergency room where I receive care. I agree to give up (waive) any right of privacy or confidentiality with respect to these consents.     I have read this agreement and have asked questions about anything I did not understand.      ________________________________________________________________________  Patient signature - Date/Time -  Indra Mehta                                      ________________________________________________________________________  Witness signature                                                            ________________________________________________________________________  Provider signature Jasmyn IYER  IGOR Garcia      [id number]  Rev 12/18         Registration to scan to EHR                         Page 2 of 2                   Controlled Substance Agreement Opioid           Page 1 of 2  Opioid Pain Medicines (also known as Narcotics)  What You Need to Know    What are opioids?   Opioids are pain medicines that must be prescribed by a doctor.  They are also known as narcotics.    Examples are:     morphine (MS Contin, Lucia)    oxycodone (Oxycontin)    oxycodone and acetaminophen (Percocet)    hydrocodone and acetaminophen (Vicodin, Norco)     fentanyl patch (Duragesic)     hydromorphone (Dilaudid)     methadone     What do opioids do well?   Opioids are best for short-term pain after a surgery or injury. They also work well for cancer pain. Unlike other pain medicines, they do not cause liver or kidney failure or ulcers. They may help some people with long-lasting (chronic) pain.     What do opioids NOT do well?   Opioids never get rid of pain entirely, and they do not work well for most patients with chronic pain. Opioids do not reduce swelling, one of the causes of pain. They also don t work well for nerve pain.                           For informational purposes only.  Not to replace the advice of your care provider.  Copyright 201 [facility]. All right reserved. [employer] [id number] 02/18.      Page 2 of 2    Risks and side effects   Talk to your doctor before you start or decide to keep taking one of these medicines. Side effects include:    Lowering your breathing rate enough to cause death    Overdose, including death, especially if taking higher than prescribed doses    Long-term opioid use    Worse depression symptoms; less pleasure in things you usually enjoy    Feeling tired or sluggish    Slower thoughts or cloudy thinking    Being more sensitive to pain over time; pain is harder to control    Trouble sleeping or restless sleep    Changes in hormone levels (for example, less  testosterone)    Changes in sex drive or ability to have sex    Constipation    Unsafe driving    Itching and sweating    Feeling dizzy    Nausea, vomiting and dry mouth    What else should I know about opioids?  When someone takes opioids for too long or too often, they become dependent. This means that if you stop or reduce the medicine too quickly, you will have withdrawal symptoms.    Dependence is not the same as addiction. Addiction is when people keep using a substance that harms their body, their mind or their relations with others. If you have a history of drug or alcohol abuse, taking opioids can cause a relapse.    Over time, opioids don t work as well. Most people will need higher and higher doses. The higher the dose, the more serious the side effects. We don t know the long-term effects of opioids.      Prescribed opioids aren't the best way to manage chronic pain    Other ways to manage pain include:      Ibuprofen or acetaminophen.  You should always try this first.      Treat health problems that may be causing pain.      acupuncture or massage, deep breathing, meditation, visual imagery, aromatherapy.      Use heat or ice at the pain site      Physical therapy and exercise      Stop smoking      See a counselor or therapist                                                  People who have used opioids for a long time may have a lower quality of life, worse depression, higher levels of pain and more visits to doctors.    Never share your opioids with others. Be sure to store opioids in a secure place, locked if possible.Young children can easily swallow them and overdose.     You can overdose on opioids.  Signs of overdose include decrease or loss of consciousness, slowed breathing, trouble waking and blue lips.  If someone is worried about overdose, they should call 911.    If you are at risk for overdose, you may get naloxone (Narcan, a medicine that reverses the effects of opioids.  If you  overdose, a friend or family member can give you Narcan while waiting for the ambulance.  They need to know the signs of overdose and how to give Narcan.    While you're taking opioids:    Don't use alcohol or street drugs. Taking them together can cause death.    Don't take any of these medicines unless your doctor says its okay.  Taking these with opioids can cause death.    Benzodiazepines (such as lorazepam         or diazepam)    Muscle relaxers (such as cyclobenzaprine)    sleeping pills    other opioids    Safe disposal of opioids  Find your area drug take-back program, your pharmacy mail-back program, buy a special disposal bag (such as Deterra) from your pharmacy or flush them down the toilet.  Use the guidelines at:  www.fda.gov/drugs/resourcesforyou

## 2019-04-23 ENCOUNTER — HOSPITAL ENCOUNTER (OUTPATIENT)
Dept: PHYSICAL THERAPY | Facility: CLINIC | Age: 54
Setting detail: THERAPIES SERIES
End: 2019-04-23
Attending: NURSE PRACTITIONER
Payer: COMMERCIAL

## 2019-04-23 PROCEDURE — 97140 MANUAL THERAPY 1/> REGIONS: CPT | Mod: GP | Performed by: PHYSICAL THERAPIST

## 2019-04-23 PROCEDURE — 97110 THERAPEUTIC EXERCISES: CPT | Mod: GP | Performed by: PHYSICAL THERAPIST

## 2019-04-25 ENCOUNTER — HOSPITAL ENCOUNTER (OUTPATIENT)
Dept: PHYSICAL THERAPY | Facility: CLINIC | Age: 54
Setting detail: THERAPIES SERIES
End: 2019-04-25
Attending: NURSE PRACTITIONER
Payer: COMMERCIAL

## 2019-04-25 PROCEDURE — 97110 THERAPEUTIC EXERCISES: CPT | Mod: GP | Performed by: PHYSICAL THERAPIST

## 2019-04-25 PROCEDURE — 97140 MANUAL THERAPY 1/> REGIONS: CPT | Mod: GP | Performed by: PHYSICAL THERAPIST

## 2019-04-29 ENCOUNTER — HOSPITAL ENCOUNTER (OUTPATIENT)
Dept: PHYSICAL THERAPY | Facility: CLINIC | Age: 54
Setting detail: THERAPIES SERIES
End: 2019-04-29
Attending: NURSE PRACTITIONER
Payer: COMMERCIAL

## 2019-04-29 PROCEDURE — 97140 MANUAL THERAPY 1/> REGIONS: CPT | Mod: GP | Performed by: PHYSICAL THERAPIST

## 2019-04-29 PROCEDURE — 97110 THERAPEUTIC EXERCISES: CPT | Mod: GP | Performed by: PHYSICAL THERAPIST

## 2019-05-06 ENCOUNTER — HOSPITAL ENCOUNTER (OUTPATIENT)
Dept: PHYSICAL THERAPY | Facility: CLINIC | Age: 54
Setting detail: THERAPIES SERIES
End: 2019-05-06
Attending: NURSE PRACTITIONER
Payer: COMMERCIAL

## 2019-05-06 PROCEDURE — 97140 MANUAL THERAPY 1/> REGIONS: CPT | Mod: GP | Performed by: PHYSICAL THERAPIST

## 2019-05-09 NOTE — PROGRESS NOTES
"[facility]    CHIEF COMPLAINT:  Pain  -neck and low back pain    INTERVAL HISTORY:  Last seen on 4/15/2018.      Ketan was previously a patient of Dr. Angle Freeman. He is transferring to me due to location.       Recommendations/plan at the last visit included:  1. Physical Therapy:  YES, Keep appointments as scheduled   2. Clinical Health Psychologist:  NO, continue guided meditation    3. Diagnostic Studies:  None at this time  4. Medication Management:    1. Continue Gabapentin 1200 TID  2. Continue Robaxin 1000mg TID  3. Continue Tizanidine 4mg at bedtime  4. Continue Oxycodone 4/day  5. Increase Cymbalta 20mg BID  5. Further procedures recommended: none at this time, but could consider repeat LESI. Would need to discuss MERE with neurosurgery.   6. Recommendations to PCP. See above  7. Controlled substance agreement signed today.  8. Ketan to follow up with Primary Care provider regarding elevated blood pressure.      Follow up with this provider:  4 Weeks     Since his last visit, Indra Mehta reports:  -He hurt his muscles a week ago. He states that he missed 2 days of work due to the increased pain. He states that he is now almost back to his normal. Today is \"bad' due to sleeping past his alarm and did not take any of his morning meds or his pain meds today. He has been doing PT. The exercises hurt his neck more at first. The tingling in his arm continues to improve. He still has some pain in his right shoulder, but thinks that this has been better. His lower back took the brunt of his recent muscle injury.     He has been taking the Cymbalta twice daily. He had parts of his day where his feet felt \"normal\". In the last week or so it has been less improved and he is noticing more tingling.       Pain Information:   Pain quality: Exhausting    Pain rating: intensity ranges from 4/10 to 9/10, and averages 6/10 on a 0-10 scale.   Pain today 6/10    SELF CARE:   How often do you practice " SELF-CARE (relaxing, stretching, pacing, monitoring posture, taking mini-breaks) in a typical day:  Trying to do 3-4 times/day. Started doing PT exercises as well    UDS: 4/1/19-reviewed and appropriate  Controlled Substance Agreement signed: 4/15/2019    CURRENT RELEVANT PAIN MEDICATIONS:  Gabapentin 400mg-Taking 3 capsules 3 times a day  Tizanidine 4mg-taking 1 at HS (helps get him to sleep)  Tylenol 500mg-1000mg 4x/day  Ibuprofen-800mg 4x/day  Robaxin- 500mg 2 Three times a day  Excedrin Migraine-takes occasionally-does not take on top of the tylenol and ibuprofen  Oxycodone-taking 4/day  Xanax-does not use daily, last use was about 3 weeks ago  Cymbalta 20mg twice daily    Patient is using the medication as prescribed:  YES  Is your medication helpful? YES   Medication side effects? no side effect    Previous Medications: (H--helped; HI--Helped initially; SWH-- somewhat helpful, NH--No help; W--worse; SE--side effects)   Norco/vicodin H  Oxycodone H  Flexeril - was helpful at night  Robaxin - may have helped, didn't make him sleepy  Tizanidine H  Gabapentin ?  Lyrica SE sedation    Past Pain Treatments:  Injections:    - 11/8/18 bilateral L3-4 TFESI - (Dr Mcdaniel)  - 8/23/18 bilateral L3-4 TFESI - seems to have worsened pain (Dr Mcdaniel)  - 6/11/18 TFESI right C5-6 - helped with arm pain and numbness  - bilateral L3-4 TFESI 12/28/17  - Bilateral L4-5 TFESI 10/23/17  - Bilateral L3-4 TFESI 11/08/2018  Surgery:  ACDF C6-7 on 7/5/17 with improvement; ACDF C5-6 12/19/2018  TENS unit: helpful  Physical therapy in [facility] Board of Pharmacy Data Base Reviewed:    YES; As expected, no concern for misuse/abuse of controlled medications based on this report.    THE 4 As OF OPIOID MAINTENANCE ANALGESIA    Analgesia: Is pain relief clinically significant? YES   Activity: Is patient functional and able to perform Activities of Daily Living? YES   Adverse effects: Is patient free from adverse side effects from opiates?  "YES   Adherence to Rx protocol: Is patient adhering to Controlled Substance Agreement and taking medications ONLY as ordered? YES       Is Narcan prescribed for opiate use >50 MME daily? N/A      Daily MME: 30    Medications:  Current Outpatient Medications   Medication Sig Dispense Refill     Acetaminophen (TYLENOL PO) Take 500 mg by mouth every 4 hours as needed for mild pain or fever       ALPRAZolam (XANAX XR) 0.5 MG 24 hr tablet Take 1 tablet (0.5 mg) by mouth At Bedtime 30 tablet 2     DULoxetine (CYMBALTA) 20 MG capsule Take 1 capsule (20 mg) by mouth 2 times daily 60 capsule 0     gabapentin (NEURONTIN) 400 MG capsule Take 3 capsules (1,200 mg) by mouth 3 times daily 270 capsule 3     losartan (COZAAR) 50 MG tablet Take 1 tablet (50 mg) by mouth daily 90 tablet 0     methocarbamol (ROBAXIN) 500 MG tablet Take 2 tablets (1,000 mg) by mouth 3 times daily as needed for muscle spasms 60 tablet 1     order for DME Equipment being ordered: TENS Pads as directed 1 Device 0     oxyCODONE (ROXICODONE) 5 MG tablet Take 1 tab every 4-6 hours as needed for severe pain. MAX 4 TABS PER DAY. Okay to fill 4/15/19. To last until 5/15/19. 120 tablet 0     tiZANidine (ZANAFLEX) 4 MG tablet Take 1 tablet (4 mg) by mouth 2 times daily as needed for muscle spasms 60 tablet 3       Review of Systems: A 10-point review of systems was negative, with the exception of chronic pain issues, earache, ringing in the ears, high blood pressure, depression, and mood swings.     Social History:  No changes since previous visit    Family history: no changes since previous visit    PHYSICAL EXAM:     Vitals:   /90   Temp 98.8  F (37.1  C) (Temporal)   Ht 1.848 m (6' 0.75\")   Wt 95 kg (209 lb 8 oz)   BMI 27.83 kg/m    Body mass index is 27.83 kg/m .  6' .75\"  209 lbs 8 oz    Constitutional: healthy, alert and no distress  HEENT: Head atraumatic, normocephalic. Eyes without conjunctival injection or jaundice. Neck supple. No obvious " neck masses.   Skin: No rash, lesions, or petechiae of exposed skin.   Psychiatric/mental status: Alert, without lethargy or stupor. Appropriate affect. Mood normal.     DIAGNOSTIC TESTS:  No new imaging to review.    Assessment:  Indra Mehta is a 53 year old male who presents today for follow up regarding his:    1.  Cervical radiulopathy  2.  S/p cervical spine fusion  3.  Myofascial pain  4.  Lumbar radiculopathy  5. Neuropathy  6. Chronic pain syndrome    Continued improved in pain in arms. Improvement of the burning in his feet until he had a recent low back injury. Injury sounds muscular and will likely continue to improve.  Feels he may be due for a low back injection. Continues to take 4 oxycodone/day and finds it to be helpful.     Plan:    Diagnosis reviewed, treatment option addressed, and risk/benifits discussed.  Self-care instructions given.  I am recommending a multidisciplinary treatment plan to help this patient better manage pain.      1. Physical Therapy:  YES, Keep appointments as scheduled   2. Clinical Health Psychologist:  NO, continue guided meditation    3. Diagnostic Studies:  None at this time  4. Medication Management:    1. Continue Gabapentin 1200 TID  2. Continue Robaxin 1000mg TID  3. Continue Tizanidine 4mg at bedtime  4. Continue Oxycodone 4/day  5. Continue Cymbalta 20mg BID until due for a refill, then we will increase to 30mg BID  5. Further procedures recommended:  repeat LESI  6. Recommendations to PCP. See above  7. Ketan to follow up with Primary Care provider regarding elevated blood pressure.      Follow up with this provider:  4 Weeks     Total time spent face to face was 20 minutes and more than 50% of face to face time was spent in counseling and/or coordination of care regarding the diagnosis and recommendations above.      Chiara Garcia PA-C   [facility]

## 2019-05-13 ENCOUNTER — OFFICE VISIT (OUTPATIENT)
Dept: PODIATRY | Facility: CLINIC | Age: 54
End: 2019-05-13
Payer: COMMERCIAL

## 2019-05-13 VITALS
BODY MASS INDEX: 27.77 KG/M2 | TEMPERATURE: 98.8 F | SYSTOLIC BLOOD PRESSURE: 156 MMHG | DIASTOLIC BLOOD PRESSURE: 88 MMHG | HEIGHT: 73 IN | WEIGHT: 209.5 LBS

## 2019-05-13 DIAGNOSIS — M79.18 MYOFASCIAL PAIN: ICD-10-CM

## 2019-05-13 DIAGNOSIS — G62.9 NEUROPATHY: ICD-10-CM

## 2019-05-13 DIAGNOSIS — H93.A9 PULSATILE TINNITUS: ICD-10-CM

## 2019-05-13 DIAGNOSIS — M54.16 LUMBAR RADICULOPATHY: ICD-10-CM

## 2019-05-13 DIAGNOSIS — G89.4 CHRONIC PAIN SYNDROME: ICD-10-CM

## 2019-05-13 DIAGNOSIS — Z98.1 S/P CERVICAL SPINAL FUSION: ICD-10-CM

## 2019-05-13 DIAGNOSIS — M54.12 CERVICAL RADICULOPATHY: Primary | ICD-10-CM

## 2019-05-13 PROCEDURE — 99213 OFFICE O/P EST LOW 20 MIN: CPT | Performed by: PHYSICIAN ASSISTANT

## 2019-05-13 RX ORDER — OXYCODONE HYDROCHLORIDE 5 MG/1
TABLET ORAL
Qty: 120 TABLET | Refills: 0 | Status: SHIPPED | OUTPATIENT
Start: 2019-05-13 | End: 2019-06-13

## 2019-05-13 RX ORDER — ALPRAZOLAM 0.5 MG/1
0.5 TABLET, EXTENDED RELEASE ORAL AT BEDTIME
Qty: 30 TABLET | Refills: 3 | Status: SHIPPED | OUTPATIENT
Start: 2019-05-13 | End: 2019-12-24

## 2019-05-13 ASSESSMENT — MIFFLIN-ST. JEOR: SCORE: 1850.2

## 2019-05-13 ASSESSMENT — PAIN SCALES - GENERAL: PAINLEVEL: SEVERE PAIN (6)

## 2019-05-13 NOTE — LETTER
"    5/13/2019         RE: Indra Mehta  [address] [facility] [address]        Dear Colleague,    Thank you for referring your patient, Indra Mehta, to the [facility]. Please see a copy of my visit note below.    [facility]    CHIEF COMPLAINT:  Pain  -neck and low back pain    INTERVAL HISTORY:  Last seen on 4/15/2018.      Ketan was previously a patient of Dr. Angle Freeman. He is transferring to me due to location.       Recommendations/plan at the last visit included:  1. Physical Therapy:  YES, Keep appointments as scheduled   2. Clinical Health Psychologist:  NO, continue guided meditation    3. Diagnostic Studies:  None at this time  4. Medication Management:    1. Continue Gabapentin 1200 TID  2. Continue Robaxin 1000mg TID  3. Continue Tizanidine 4mg at bedtime  4. Continue Oxycodone 4/day  5. Increase Cymbalta 20mg BID  5. Further procedures recommended: none at this time, but could consider repeat LESI. Would need to discuss MERE with neurosurgery.   6. Recommendations to PCP. See above  7. Controlled substance agreement signed today.  8. Ketan to follow up with Primary Care provider regarding elevated blood pressure.      Follow up with this provider:  4 Weeks     Since his last visit, Indra Mehta reports:  -He hurt his muscles a week ago. He states that he missed 2 days of work due to the increased pain. He states that he is now almost back to his normal. Today is \"bad' due to sleeping past his alarm and did not take any of his morning meds or his pain meds today. He has been doing PT. The exercises hurt his neck more at first. The tingling in his arm continues to improve. He still has some pain in his right shoulder, but thinks that this has been better. His lower back took the brunt of his recent muscle injury.     He has been taking the Cymbalta twice daily. He had parts of his day where his feet felt \"normal\". In the last week or so it has been less " improved and he is noticing more tingling.       Pain Information:   Pain quality: Exhausting    Pain rating: intensity ranges from 4/10 to 9/10, and averages 6/10 on a 0-10 scale.   Pain today 6/10    SELF CARE:   How often do you practice SELF-CARE (relaxing, stretching, pacing, monitoring posture, taking mini-breaks) in a typical day:  Trying to do 3-4 times/day. Started doing PT exercises as well    UDS: 4/1/19-reviewed and appropriate  Controlled Substance Agreement signed: 4/15/2019    CURRENT RELEVANT PAIN MEDICATIONS:  Gabapentin 400mg-Taking 3 capsules 3 times a day  Tizanidine 4mg-taking 1 at HS (helps get him to sleep)  Tylenol 500mg-1000mg 4x/day  Ibuprofen-800mg 4x/day  Robaxin- 500mg 2 Three times a day  Excedrin Migraine-takes occasionally-does not take on top of the tylenol and ibuprofen  Oxycodone-taking 4/day  Xanax-does not use daily, last use was about 3 weeks ago  Cymbalta 20mg twice daily    Patient is using the medication as prescribed:  YES  Is your medication helpful? YES   Medication side effects? no side effect    Previous Medications: (H--helped; HI--Helped initially; SWH-- somewhat helpful, NH--No help; W--worse; SE--side effects)   Norco/vicodin H  Oxycodone H  Flexeril - was helpful at night  Robaxin - may have helped, didn't make him sleepy  Tizanidine H  Gabapentin ?  Lyrica SE sedation    Past Pain Treatments:  Injections:    - 11/8/18 bilateral L3-4 TFESI - (Dr Mcdaniel)  - 8/23/18 bilateral L3-4 TFESI - seems to have worsened pain (Dr Mcdaniel)  - 6/11/18 TFESI right C5-6 - helped with arm pain and numbness  - bilateral L3-4 TFESI 12/28/17  - Bilateral L4-5 TFESI 10/23/17  - Bilateral L3-4 TFESI 11/08/2018  Surgery:  ACDF C6-7 on 7/5/17 with improvement; ACDF C5-6 12/19/2018  TENS unit: helpful  Physical therapy in [facility] Board of Pharmacy Data Base Reviewed:    YES; As expected, no concern for misuse/abuse of controlled medications based on this report.    THE 4 As OF  "OPIOID MAINTENANCE ANALGESIA    Analgesia: Is pain relief clinically significant? YES   Activity: Is patient functional and able to perform Activities of Daily Living? YES   Adverse effects: Is patient free from adverse side effects from opiates? YES   Adherence to Rx protocol: Is patient adhering to Controlled Substance Agreement and taking medications ONLY as ordered? YES       Is Narcan prescribed for opiate use >50 MME daily? N/A      Daily MME: 30    Medications:  Current Outpatient Medications   Medication Sig Dispense Refill     Acetaminophen (TYLENOL PO) Take 500 mg by mouth every 4 hours as needed for mild pain or fever       ALPRAZolam (XANAX XR) 0.5 MG 24 hr tablet Take 1 tablet (0.5 mg) by mouth At Bedtime 30 tablet 2     DULoxetine (CYMBALTA) 20 MG capsule Take 1 capsule (20 mg) by mouth 2 times daily 60 capsule 0     gabapentin (NEURONTIN) 400 MG capsule Take 3 capsules (1,200 mg) by mouth 3 times daily 270 capsule 3     losartan (COZAAR) 50 MG tablet Take 1 tablet (50 mg) by mouth daily 90 tablet 0     methocarbamol (ROBAXIN) 500 MG tablet Take 2 tablets (1,000 mg) by mouth 3 times daily as needed for muscle spasms 60 tablet 1     order for DME Equipment being ordered: TENS Pads as directed 1 Device 0     oxyCODONE (ROXICODONE) 5 MG tablet Take 1 tab every 4-6 hours as needed for severe pain. MAX 4 TABS PER DAY. Okay to fill 4/15/19. To last until 5/15/19. 120 tablet 0     tiZANidine (ZANAFLEX) 4 MG tablet Take 1 tablet (4 mg) by mouth 2 times daily as needed for muscle spasms 60 tablet 3       Review of Systems: A 10-point review of systems was negative, with the exception of chronic pain issues, earache, ringing in the ears, high blood pressure, depression, and mood swings.     Social History:  No changes since previous visit    Family history: no changes since previous visit    PHYSICAL EXAM:     Vitals:   /90   Temp 98.8  F (37.1  C) (Temporal)   Ht 1.848 m (6' 0.75\")   Wt 95 kg (209 lb 8 " "oz)   BMI 27.83 kg/m     Body mass index is 27.83 kg/m .  6' .75\"  209 lbs 8 oz    Constitutional: healthy, alert and no distress  HEENT: Head atraumatic, normocephalic. Eyes without conjunctival injection or jaundice. Neck supple. No obvious neck masses.   Skin: No rash, lesions, or petechiae of exposed skin.   Psychiatric/mental status: Alert, without lethargy or stupor. Appropriate affect. Mood normal.     DIAGNOSTIC TESTS:  No new imaging to review.    Assessment:  Indra Mehta is a 53 year old male who presents today for follow up regarding his:    1.  Cervical radiulopathy  2.  S/p cervical spine fusion  3.  Myofascial pain  4.  Lumbar radiculopathy  5. Neuropathy  6. Chronic pain syndrome    Continued improved in pain in arms. Improvement of the burning in his feet until he had a recent low back injury. Injury sounds muscular and will likely continue to improve.  Feels he may be due for a low back injection. Continues to take 4 oxycodone/day and finds it to be helpful.     Plan:    Diagnosis reviewed, treatment option addressed, and risk/benifits discussed.  Self-care instructions given.  I am recommending a multidisciplinary treatment plan to help this patient better manage pain.      1. Physical Therapy:  YES, Keep appointments as scheduled   2. Clinical Health Psychologist:  NO, continue guided meditation    3. Diagnostic Studies:  None at this time  4. Medication Management:    1. Continue Gabapentin 1200 TID  2. Continue Robaxin 1000mg TID  3. Continue Tizanidine 4mg at bedtime  4. Continue Oxycodone 4/day  5. Continue Cymbalta 20mg BID until due for a refill, then we will increase to 30mg BID  5. Further procedures recommended:  repeat LESI  6. Recommendations to PCP. See above  7. Ketan to follow up with Primary Care provider regarding elevated blood pressure.      Follow up with this provider:  4 Weeks     Total time spent face to face was 20 minutes and more than 50% of face to face time was " spent in counseling and/or coordination of care regarding the diagnosis and recommendations above.      Chiara Garcia PA-C   [facility]      Again, thank you for allowing me to participate in the care of your patient.        Sincerely,        Chiara Garcia PA-C

## 2019-05-13 NOTE — TELEPHONE ENCOUNTER
Chief Complaint   Patient presents with     Refill Request     ALPRAZolam (XANAX XR) 0.5 MG 24 hr tablet      Refill request received via fax by pharmacy          Pending Prescriptions:                       Disp   Refills    ALPRAZolam (XANAX XR) 0.5 MG 24 hr tablet 30 tab*2            Sig: Take 1 tablet (0.5 mg) by mouth At Bedtime         Last Written Prescription Date: 9/10/18   Last Fill Quantity: 30,   # refills: 2  Last Office Visit with prescribing provider: 10/22/18  Future Office visit:    Next 5 appointments (look out 90 days)    May 13, 2019  3:00 PM CDT  Return Visit with Chiara Garcia PA-C  [facility] [address] [place] [id number] [phone]         Erum Alexis CMA

## 2019-05-13 NOTE — PATIENT INSTRUCTIONS
After Visit Instructions:     Thank you for coming to [facility] for your care. It is my goal to partner with you to help you reach your optimal state of health.     I am recommending multidisciplinary care at this time.  The focus of care will be to continue gradual rehabilitation and pain management with medication adjustments as needed.    Continue daily self-care, identifying contributing factors, and monitoring variations in pain level. Continue to integrate self-care into your life.      1. Schedule physical therapy assessment/visit: continue current plan  2. Schedule follow-up with Chiara Garcia PA-C in 4 weeks. You will need to make this appointment.   3. Procedures recommended: referred for epidural steroid injection. We will call you to schedule.    4. Medication recommendations:   1. Continue Gabapentin 1200 three times a day  2. Continue Robaxin 1000mg three times a day  3. Continue Tizanidine 4mg at bedtime  4. Continue Oxycodone 4/day  5. Increase Cymbalta to 30mg twice daily-call me when you are ready for a refill and we'll increase it at that time      Chiara Garcia PA-C  [facility]  [place]    Contact information: [facility]  Clinic Number:  [phone]   Call this number with any questions about your care and for scheduling assistance. Calls are returned Monday through Friday between 8 AM and 4:30 PM. We usually get back to you within 2 business days depending on the issue/request.           Medication Refills Policy:    For non-narcotic medications, please your pharmacy directly to request a refill and the pharmacy will call the [facility] for authorization. Please allow 3-4 days for these refills.    For narcotic refills, call the nurse triage line and leave a message requesting your refill along with the name of the pharmacy that you use. Narcotic prescriptions will be mailed to your pharmacy or  you may pick them up at the clinic.  Please call 7-10 days before your refill is due  The above policy allows adequate time so that you do not run out of medication.    No Show - Late Cancellation - Late Arrival Policy  We believe regular attendance is key to your success in our program.    Any time you are unable to keep your appointment we ask that you call us at least 24 hours in advance to let us know. This will allow us to offer the appointment time to another patient. The following is our policy for missed appointments. This also applies to appointments cancelled with less than 24 hours notice.    After missing 3 appointments without calling first, we will cancel all of your future appointments at [facility].    At that point, you will not be able to resume services unless approved by your care team  We understand that unforseen circumstances arise, however, out of respect for all concerned and to provide this appointment to another patient, this policy will be enforced.    Please note that most follow up appointments are 30 minutes long. If you arrive late, your provider may not be able to see you for the entire 30 minutes. Please also note that if you arrive more than 15 minutes for any appointment, it may be rescheduled.

## 2019-05-14 ENCOUNTER — TELEPHONE (OUTPATIENT)
Dept: SURGERY | Facility: CLINIC | Age: 54
End: 2019-05-14

## 2019-05-20 ENCOUNTER — MYC MEDICAL ADVICE (OUTPATIENT)
Dept: PODIATRY | Facility: CLINIC | Age: 54
End: 2019-05-20

## 2019-05-20 DIAGNOSIS — M54.2 CERVICALGIA: Primary | ICD-10-CM

## 2019-05-21 DIAGNOSIS — I10 BENIGN ESSENTIAL HYPERTENSION: ICD-10-CM

## 2019-05-21 RX ORDER — DULOXETIN HYDROCHLORIDE 30 MG/1
30 CAPSULE, DELAYED RELEASE ORAL 2 TIMES DAILY
Qty: 60 CAPSULE | Refills: 1 | Status: SHIPPED | OUTPATIENT
Start: 2019-05-21 | End: 2019-06-19

## 2019-05-21 NOTE — TELEPHONE ENCOUNTER
Prescription sent at a dosing of 30mg twice daily to [facility] in [place].     Chiara Garcia PA-C on 5/21/2019 at 2:28 PM

## 2019-05-21 NOTE — TELEPHONE ENCOUNTER
Pended Cymbalta 30 MG bid. Routing to provider to review.  -------------------------  Plan:     Diagnosis reviewed, treatment option addressed, and risk/benifits discussed.  Self-care instructions given.  I am recommending a multidisciplinary treatment plan to help this patient better manage pain.       1. Physical Therapy:  YES, Keep appointments as scheduled   2. Clinical Health Psychologist:  NO, continue guided meditation    3. Diagnostic Studies:  None at this time  4. Medication Management:    1.   Continue Gabapentin 1200 TID  2.   Continue Robaxin 1000mg TID  3.   Continue Tizanidine 4mg at bedtime  4.   Continue Oxycodone 4/day  5.   Continue Cymbalta 20mg BID until due for a refill, then we will increase to 30mg BID  5. Further procedures recommended:  repeat LESI  6. Recommendations to PCP. See above  7. Ketan to follow up with Primary Care provider regarding elevated blood pressure.  -----------------------------------

## 2019-05-22 RX ORDER — LOSARTAN POTASSIUM 50 MG/1
50 TABLET ORAL DAILY
Qty: 90 TABLET | Refills: 0 | Status: SHIPPED | OUTPATIENT
Start: 2019-05-22 | End: 2019-08-20

## 2019-05-22 NOTE — TELEPHONE ENCOUNTER
"Requested Prescriptions   Pending Prescriptions Disp Refills     losartan (COZAAR) 50 MG tablet 90 tablet 0     Sig: Take 1 tablet (50 mg) by mouth daily   Last Written Prescription Date:  2/26/19  Last Fill Quantity: 90,  # refills: 0   Last office visit: 12/12/2018 with prescribing provider:  12/12/18   Future Office Visit:   Next 5 appointments (look out 90 days)    Jun 11, 2019 10:40 AM CDT  Pre-Op physical with Jg Mckay PA-C  [facility]) [address] [facility] [address]  [phone]   Jun 13, 2019  8:00 AM CDT  Return Visit with Chiara Garcia PA-C  [facility] [address] [facility] [id number] [phone]           Angiotensin-II Receptors Failed - 5/21/2019  3:28 PM        Failed - Blood pressure under 140/90 in past 12 months     BP Readings from Last 3 Encounters:   05/13/19 156/88   04/15/19 156/82   04/01/19 132/84                 Passed - Recent (12 mo) or future (30 days) visit within the authorizing provider's specialty     Patient had office visit in the last 12 months or has a visit in the next 30 days with authorizing provider or within the authorizing provider's specialty.  See \"Patient Info\" tab in inbasket, or \"Choose Columns\" in Meds & Orders section of the refill encounter.              Passed - Medication is active on med list        Passed - Patient is age 18 or older        Passed - Normal serum creatinine on file in past 12 months     Recent Labs   Lab Test 11/05/18  1532   CR 0.96             Passed - Normal serum potassium on file in past 12 months     Recent Labs   Lab Test 11/05/18  1532   POTASSIUM 3.7                    "

## 2019-05-22 NOTE — TELEPHONE ENCOUNTER
Patient returned call to schedule TESI  Date: 6/14/19  Time: 1230   Dr. Mcdaniel    Instructed pt to have H&P and  for procedure.

## 2019-05-22 NOTE — TELEPHONE ENCOUNTER
Routing refill request to provider for review/approval because:  Labs out of range:  BP    ANGELINA ThomasN, RN  [facility]

## 2019-06-11 ENCOUNTER — OFFICE VISIT (OUTPATIENT)
Dept: FAMILY MEDICINE | Facility: OTHER | Age: 54
End: 2019-06-11
Payer: COMMERCIAL

## 2019-06-11 VITALS
BODY MASS INDEX: 27.18 KG/M2 | DIASTOLIC BLOOD PRESSURE: 88 MMHG | WEIGHT: 204.6 LBS | RESPIRATION RATE: 18 BRPM | TEMPERATURE: 97.5 F | HEART RATE: 71 BPM | SYSTOLIC BLOOD PRESSURE: 134 MMHG | OXYGEN SATURATION: 99 %

## 2019-06-11 DIAGNOSIS — M54.50 LUMBAR BACK PAIN: Primary | ICD-10-CM

## 2019-06-11 DIAGNOSIS — Z01.818 PREOP GENERAL PHYSICAL EXAM: ICD-10-CM

## 2019-06-11 LAB
ANION GAP SERPL CALCULATED.3IONS-SCNC: 6 MMOL/L (ref 3–14)
BASOPHILS # BLD AUTO: 0 10E9/L (ref 0–0.2)
BASOPHILS NFR BLD AUTO: 0.3 %
BUN SERPL-MCNC: 11 MG/DL (ref 7–30)
CALCIUM SERPL-MCNC: 8.6 MG/DL (ref 8.5–10.1)
CHLORIDE SERPL-SCNC: 107 MMOL/L (ref 94–109)
CO2 SERPL-SCNC: 28 MMOL/L (ref 20–32)
CREAT SERPL-MCNC: 0.95 MG/DL (ref 0.66–1.25)
DIFFERENTIAL METHOD BLD: NORMAL
EOSINOPHIL # BLD AUTO: 0.1 10E9/L (ref 0–0.7)
EOSINOPHIL NFR BLD AUTO: 0.8 %
ERYTHROCYTE [DISTWIDTH] IN BLOOD BY AUTOMATED COUNT: 13.5 % (ref 10–15)
GFR SERPL CREATININE-BSD FRML MDRD: >90 ML/MIN/{1.73_M2}
GLUCOSE SERPL-MCNC: 86 MG/DL (ref 70–99)
HCT VFR BLD AUTO: 42.7 % (ref 40–53)
HGB BLD-MCNC: 14.5 G/DL (ref 13.3–17.7)
LYMPHOCYTES # BLD AUTO: 1.6 10E9/L (ref 0.8–5.3)
LYMPHOCYTES NFR BLD AUTO: 25.9 %
MCH RBC QN AUTO: 29.3 PG (ref 26.5–33)
MCHC RBC AUTO-ENTMCNC: 34 G/DL (ref 31.5–36.5)
MCV RBC AUTO: 86 FL (ref 78–100)
MONOCYTES # BLD AUTO: 0.4 10E9/L (ref 0–1.3)
MONOCYTES NFR BLD AUTO: 7.2 %
NEUTROPHILS # BLD AUTO: 4 10E9/L (ref 1.6–8.3)
NEUTROPHILS NFR BLD AUTO: 65.8 %
PLATELET # BLD AUTO: 248 10E9/L (ref 150–450)
POTASSIUM SERPL-SCNC: 3.9 MMOL/L (ref 3.4–5.3)
RBC # BLD AUTO: 4.95 10E12/L (ref 4.4–5.9)
SODIUM SERPL-SCNC: 141 MMOL/L (ref 133–144)
WBC # BLD AUTO: 6.1 10E9/L (ref 4–11)

## 2019-06-11 PROCEDURE — 85025 COMPLETE CBC W/AUTO DIFF WBC: CPT | Performed by: PHYSICIAN ASSISTANT

## 2019-06-11 PROCEDURE — 80048 BASIC METABOLIC PNL TOTAL CA: CPT | Performed by: PHYSICIAN ASSISTANT

## 2019-06-11 PROCEDURE — 36415 COLL VENOUS BLD VENIPUNCTURE: CPT | Performed by: PHYSICIAN ASSISTANT

## 2019-06-11 PROCEDURE — 99214 OFFICE O/P EST MOD 30 MIN: CPT | Performed by: PHYSICIAN ASSISTANT

## 2019-06-11 PROCEDURE — 93000 ELECTROCARDIOGRAM COMPLETE: CPT | Performed by: PHYSICIAN ASSISTANT

## 2019-06-11 ASSESSMENT — PAIN SCALES - GENERAL: PAINLEVEL: NO PAIN (0)

## 2019-06-11 NOTE — PROGRESS NOTES
[facility]  [address] [facility] [id number] [phone]  Dept: [phone]    PRE-OP EVALUATION:  Today's date: 2019    Indra Mehta (: 1965) presents for pre-operative evaluation assessment as requested by Dr. Mcdaniel.  He requires evaluation and anesthesia risk assessment prior to undergoing surgery/procedure for treatment of lumbar 3-4 bilateral    Fax number for surgical facility:   Primary Physician: Tha Grullon  Type of Anesthesia Anticipated: Monitor anesthesia care    Patient has a Health Care Directive or Living Will:  NO    Preop Questions 2019   Who is doing your surgery? juana   What are you having done? steroid injection lower back   Date of Surgery/Procedure:    Facility or Hospital where procedure/surgery will be performed: [place]   1.  Do you have a history of Heart attack, stroke, stent, coronary bypass surgery, or other heart surgery? No   2.  Do you ever have any pain or discomfort in your chest? No   3.  Do you have a history of  Heart Failure? No   4.   Are you troubled by shortness of breath when:  walking on a level surface, or up a slight hill, or at night? No   5.  Do you currently have a cold, bronchitis or other respiratory infection? No   6.  Do you have a cough, shortness of breath, or wheezing? No   7.  Do you sometimes get pains in the calves of your legs when you walk? No   8. Do you or anyone in your family have previous history of blood clots? No   9.  Do you or does anyone in your family have a serious bleeding problem such as prolonged bleeding following surgeries or cuts? No   10. Have you ever had problems with anemia or been told to take iron pills? No   11. Have you had any abnormal blood loss such as black, tarry or bloody stools? No   12. Have you ever had a blood transfusion? No   13. Have you or any of your relatives ever had problems with anesthesia? No   14. Do you have sleep apnea, excessive snoring or daytime  drowsiness? YES - been told he stops breathing at night, snores. Sleep apnea and daytime drowsiness due to back pain   15. Do you have any prosthetic heart valves? No   16. Do you have prosthetic joints? No         HPI:     HPI related to upcoming procedure:   Patient is a 53 year old male who presents for pre-operative evaluation for upcoming SRIDEVI in lumbar spine. He has had these before and tolerated without issue. Back pain ongoing for 20+ years, denies new injury. Patient has history of frequent loose stools. Recently noticed blood in bowl following bowel movement. Denies pain, constipation, trauma to the rectum or anus, fever, personal or family history of inflammatory disease. Review of chart shows that recent colonoscopy in 2016 returned normal. Discussed with patient repeat colonoscopy to further evaluate this. He will consider. No other health concerns.       See problem list for active medical problems.  Problems all longstanding and stable, except as noted/documented.  See ROS for pertinent symptoms related to these conditions.      MEDICAL HISTORY:     Patient Active Problem List    Diagnosis Date Noted     Benign essential hypertension 06/30/2017     Priority: Medium     Myalgia 10/28/2016     Priority: Medium     Bilateral occipital neuralgia 10/28/2016     Priority: Medium     CARDIOVASCULAR SCREENING; LDL GOAL LESS THAN 160 10/31/2010     Priority: Medium     Major depression in complete remission (H) 08/20/2010     Priority: Medium     Chronic pain syndrome 08/20/2010     Priority: Medium     Patient is followed by Tha Grullon MD for ongoing prescription of pain medication.  All refills should be approved by this provider only at face-to-face appointments - not by phone request.    Medication(s): Tramadol.   Maximum quantity per month: 60  Clinic visit frequency required: Q 1 months     Controlled substance agreement:  Encounter-Level CSA - 10/13/16:               Controlled Substance Agreement -  Scan on 10/23/2016  5:07 PM : CONTROLLED SUBSTANCE AGREEMENT 10/13/16 (below)            Pain Clinic evaluation in the past: Yes       Date/Location:   [facility]    DIRE Total Score(s):  No flowsheet data found.    Last [facility] website verification:  none   [email or web address]               Pain medication agreement signed 08/20/2010     Priority: Medium     Meniere's disease 03/22/2007     Priority: Medium     Problem list name updated by automated process. Provider to review        Past Medical History:   Diagnosis Date     Back pain      Meniere's disease, unspecified      Pain medication agreement signed 8/20/2010     Past Surgical History:   Procedure Laterality Date     BUNIONECTOMY RT/LT  09/05/08    Both feet     COLONOSCOPY N/A 12/28/2016    Procedure: COMBINED COLONOSCOPY, SINGLE OR MULTIPLE BIOPSY/POLYPECTOMY BY BIOPSY;  Surgeon: Indra Jernigan MD;  Location: [facility]     DISCECTOMY, FUSION CERVICAL ANTERIOR ONE LEVEL, COMBINED N/A 7/5/2017    Procedure: COMBINED DISCECTOMY, FUSION CERVICAL ANTERIOR ONE LEVEL;  Cervical 6-7, Anterior cervical discectomy fusion;  Surgeon: Mike Mckenna MD;  Location: [facility]     DISCECTOMY, FUSION CERVICAL ANTERIOR ONE LEVEL, COMBINED N/A 12/19/2018    Procedure: cervical 5-6 anterior cervical discectomy fusion;  Surgeon: Mike Mckenna MD;  Location: [facility]     HC COLONOSCOPY W/WO BRUSH/WASH  12/27/2005     HC CREATE EARDRUM OPENING,GEN ANESTH  09/27/2007    Pe tube, Left endolymphatic sac enhancement.     HC MASTOIDECTOMY,COMPLETE  09/27/2007     HERNIORRHAPHY UMBILICAL N/A 8/11/2017    Procedure: HERNIORRHAPHY UMBILICAL;  open umbilical hernia repair;  Surgeon: Boni Story MD;  [facility]     INJECT EPIDURAL LUMBAR N/A 11/9/2017    Procedure: INJECT EPIDURAL LUMBAR;  lumbar 4-5 epidural steroid injection ;  Surgeon: Darryn Mcdaniel MD;  [facility]     INJECT EPIDURAL LUMBAR N/A 12/28/2017    Procedure: INJECT EPIDURAL LUMBAR;   lumbar epidural injection;  Surgeon: Darryn Mcdaniel MD;  [facility]     INJECT EPIDURAL TRANSFORAMINAL Bilateral 8/23/2018    Procedure: INJECT EPIDURAL TRANSFORAMINAL;  transforaminal bilateral lumbar 3-4 steroid injection;  Surgeon: Darryn Mcdaniel MD;  [facility]     INJECT EPIDURAL TRANSFORAMINAL Bilateral 11/8/2018    Procedure: INJECT EPIDURAL TRANSFORAMINAL lumbar 3-4;  Surgeon: Darryn Mcdaniel MD;  Location: [facility]     PE TUBES  2006    left ear     Current Outpatient Medications   Medication Sig Dispense Refill     Acetaminophen (TYLENOL PO) Take 500 mg by mouth every 4 hours as needed for mild pain or fever       ALPRAZolam (XANAX XR) 0.5 MG 24 hr tablet Take 1 tablet (0.5 mg) by mouth At Bedtime 30 tablet 3     DULoxetine (CYMBALTA) 20 MG capsule Take 1 capsule (20 mg) by mouth 2 times daily 60 capsule 0     DULoxetine (CYMBALTA) 30 MG capsule Take 1 capsule (30 mg) by mouth 2 times daily 60 capsule 1     gabapentin (NEURONTIN) 400 MG capsule Take 3 capsules (1,200 mg) by mouth 3 times daily 270 capsule 3     losartan (COZAAR) 50 MG tablet Take 1 tablet (50 mg) by mouth daily 90 tablet 0     methocarbamol (ROBAXIN) 500 MG tablet Take 2 tablets (1,000 mg) by mouth 3 times daily as needed for muscle spasms 60 tablet 1     order for DME Equipment being ordered: TENS Pads as directed 1 Device 0     oxyCODONE (ROXICODONE) 5 MG tablet Take 1 tab every 4-6 hours as needed for severe pain. MAX 4 TABS PER DAY. Okay to fill 5/13/19. To last until 6/14/19. 120 tablet 0     tiZANidine (ZANAFLEX) 4 MG tablet Take 1 tablet (4 mg) by mouth 2 times daily as needed for muscle spasms 60 tablet 3     OTC products: NSAIDS    Allergies   Allergen Reactions     No Known Drug Allergies       Latex Allergy: NO    Social History     Tobacco Use     Smoking status: Never Smoker     Smokeless tobacco: Never Used   Substance Use Topics     Alcohol use: No     Alcohol/week: 0.0 oz     History   Drug Use No       REVIEW OF  SYSTEMS:   Constitutional, HEENT, cardiovascular, pulmonary, gi and gu systems are negative, except as otherwise noted.    EXAM:   There were no vitals taken for this visit.    GENERAL APPEARANCE: healthy, alert and no distress     EYES: EOMI,  PERRL     HENT: ear canals and TM's normal and nose and mouth without ulcers or lesions     RESP: lungs clear to auscultation - no rales, rhonchi or wheezes     CV: regular rates and rhythm, normal S1 S2, no S3 or S4 and no murmur, click or rub     ABDOMEN:  soft, nontender, no HSM or masses and bowel sounds normal     MS: extremities normal- no gross deformities noted, no evidence of inflammation in joints, FROM in all extremities.     NEURO: Normal strength and tone, sensory exam grossly normal, mentation intact and speech normal     PSYCH: mentation appears normal. and affect normal/bright    DIAGNOSTICS:     EKG: appears normal, NSR, normal axis, normal intervals, no acute ST/T changes c/w ischemia, no LVH by voltage criteria, unchanged from previous tracings  Labs Resulted Today:   Results for orders placed or performed in visit on 06/11/19   CBC with platelets and differential   Result Value Ref Range    WBC 6.1 4.0 - 11.0 10e9/L    RBC Count 4.95 4.4 - 5.9 10e12/L    Hemoglobin 14.5 13.3 - 17.7 g/dL    Hematocrit 42.7 40.0 - 53.0 %    MCV 86 78 - 100 fl    MCH 29.3 26.5 - 33.0 pg    MCHC 34.0 31.5 - 36.5 g/dL    RDW 13.5 10.0 - 15.0 %    Platelet Count 248 150 - 450 10e9/L    % Neutrophils 65.8 %    % Lymphocytes 25.9 %    % Monocytes 7.2 %    % Eosinophils 0.8 %    % Basophils 0.3 %    Absolute Neutrophil 4.0 1.6 - 8.3 10e9/L    Absolute Lymphocytes 1.6 0.8 - 5.3 10e9/L    Absolute Monocytes 0.4 0.0 - 1.3 10e9/L    Absolute Eosinophils 0.1 0.0 - 0.7 10e9/L    Absolute Basophils 0.0 0.0 - 0.2 10e9/L    Diff Method Automated Method        Recent Labs   Lab Test 11/05/18  1532 03/15/18  1052  06/30/17  1613   HGB 14.0 14.9   < >  --     230   < >  --      --    --  142   POTASSIUM 3.7  --   --  3.9   CR 0.96 0.92  --  1.15    < > = values in this interval not displayed.        IMPRESSION:   Reason for surgery/procedure: lumbar back pain  Diagnosis/reason for consult: lumbar back pain    The proposed surgical procedure is considered INTERMEDIATE risk.    REVISED CARDIAC RISK INDEX  The patient has the following serious cardiovascular risks for perioperative complications such as (MI, PE, VFib and 3  AV Block):  No serious cardiac risks  INTERPRETATION: 0 risks: Class I (very low risk - 0.4% complication rate)    The patient has the following additional risks for perioperative complications:  No identified additional risks      ICD-10-CM    1. Lumbar back pain M54.5 EKG 12-lead complete w/read - Clinics   2. Preop general physical exam Z01.818 CBC with platelets and differential     Basic metabolic panel  (Ca, Cl, CO2, Creat, Gluc, K, Na, BUN)       RECOMMENDATIONS:     --Patient is to hold all scheduled medications on the day of surgery EXCEPT for modifications listed below.      APPROVAL GIVEN to proceed with proposed procedure, without further diagnostic evaluation       Signed Electronically by: Jg Mckay PA-C    Copy of this evaluation report is provided to requesting physician.    [facility] Preop Guidelines    Revised Cardiac Risk Index

## 2019-06-12 NOTE — PROGRESS NOTES
[facility]    CHIEF COMPLAINT:  Pain  -neck and low back pain    INTERVAL HISTORY:  Last seen on 5/13/2018.      Ketan was previously a patient of Dr. Anlge Freeman. He is transferring to me due to location.       Recommendations/plan at the last visit included:  1. Physical Therapy:  YES, Keep appointments as scheduled   2. Clinical Health Psychologist:  NO, continue guided meditation    3. Diagnostic Studies:  None at this time  4. Medication Management:    1. Continue Gabapentin 1200 TID  2. Continue Robaxin 1000mg TID  3. Continue Tizanidine 4mg at bedtime  4. Continue Oxycodone 4/day  5. Continue Cymbalta 20mg BID until due for a refill, then we will increase to 30mg BID  5. Further procedures recommended:  repeat LESI  6. Recommendations to PCP. See above  7. Ketan to follow up with Primary Care provider regarding elevated blood pressure.      Follow up with this provider:  4 Weeks     Since his last visit, Indra Mehta reports:  -Overall things have been worse.    2 days after his last visit he started to feel some mid back pain. After another day or 2 he started to feel more pain on the right side. This usually goes away after a couple of days. This time it got worse. Then he wasn't sure if it was back pain. The whole thing lasted about 10 days. It got pretty intense then slowly went away.     He had a good time in [place] but states that he was really sore with sitting in the canoe and sleeping on an air mattress. When he got home, he had to do a bunch of mowing. He had to go under a branch and he started to come up too soon. He hit the branch and his head was pushed back and sideways. He has had increased pain around the base of his skull. If the sits with his head straight he starts having tingling in both of his arms. If he puts his head down the tingling instantly starts to go away.     The pain under his right shoulder has been pretty constant for the last 30 days. He has also noticed  more pain in the right arm. He has his injection for his low back tomorrow. He is having more pain going down his legs. He states that he was unable to sit for any length of time. He had a lot of sitting yesterday therefore has had more pain. He also sometimes feels that his hip joint is coming out. The foot pain has not changed. He is not sure that the cymbalta is helping much. He states that all night long either his feet hurt or tingling or burning hot or icy cold.     He states that he is having trouble sleeping and is wondering if he should have a sleep study. He states that he gets a pocket in the back of this throat that fills up with air. At times he feels that he will choke. He had an episode where he was sleeping and sniffed and coughed and then had to take the back of his tongue to try to flatten this out. He is also wondering if he has sleep apnea.      Pain Information:   Pain quality: Miserable    Pain rating: intensity ranges from 3/10 to 9/10, and averages 5/10 on a 0-10 scale.   Pain today 4/10    SELF CARE:   How often do you practice SELF-CARE (relaxing, stretching, pacing, monitoring posture, taking mini-breaks) in a typical day:  Trying to do 3-4 times/day. Was much more active with fishing in [place].     UDS: 4/1/19-reviewed and appropriate  Controlled Substance Agreement signed: 4/15/2019    CURRENT RELEVANT PAIN MEDICATIONS:  Gabapentin 400mg-Taking 3 capsules 3 times a day  Tizanidine 4mg-taking 1 at HS (helps get him to sleep-does not take with Robaxin)  Tylenol 500mg-1000mg 4x/day  Ibuprofen-800mg 4x/day  Robaxin- 500mg 2 Three times a day  Excedrin Migraine-takes occasionally-does not take on top of the tylenol and ibuprofen  Oxycodone-taking 4/day  Xanax-does not use daily, last use was about 3 weeks ago  Cymbalta 30mg twice daily    Patient is using the medication as prescribed:  YES  Is your medication helpful? YES   Medication side effects? no side effect    Previous Medications:  (H--helped; HI--Helped initially; SWH-- somewhat helpful, NH--No help; W--worse; SE--side effects)   Norco/vicodin H  Oxycodone H  Flexeril - was helpful at night  Robaxin - may have helped, didn't make him sleepy  Tizanidine H  Gabapentin ?  Lyrica SE sedation    Past Pain Treatments:  Injections:    - 11/8/18 bilateral L3-4 TFESI - (Dr Mcdaniel)  - 8/23/18 bilateral L3-4 TFESI - seems to have worsened pain (Dr Mcdaniel)  - 6/11/18 TFESI right C5-6 - helped with arm pain and numbness  - bilateral L3-4 TFESI 12/28/17  - Bilateral L4-5 TFESI 10/23/17  - Bilateral L3-4 TFESI 11/08/2018  Surgery:  ACDF C6-7 on 7/5/17 with improvement; ACDF C5-6 12/19/2018  TENS unit: helpful  Physical therapy in [facility] Board of Pharmacy Data Base Reviewed:    YES; As expected, no concern for misuse/abuse of controlled medications based on this report.    THE 4 As OF OPIOID MAINTENANCE ANALGESIA    Analgesia: Is pain relief clinically significant? YES   Activity: Is patient functional and able to perform Activities of Daily Living? YES   Adverse effects: Is patient free from adverse side effects from opiates? YES   Adherence to Rx protocol: Is patient adhering to Controlled Substance Agreement and taking medications ONLY as ordered? YES       Is Narcan prescribed for opiate use >50 MME daily? N/A      Daily MME: 30    Medications:  Current Outpatient Medications   Medication Sig Dispense Refill     Acetaminophen (TYLENOL PO) Take 500 mg by mouth every 4 hours as needed for mild pain or fever       ALPRAZolam (XANAX XR) 0.5 MG 24 hr tablet Take 1 tablet (0.5 mg) by mouth At Bedtime 30 tablet 3     DULoxetine (CYMBALTA) 30 MG capsule Take 1 capsule (30 mg) by mouth 2 times daily 60 capsule 1     gabapentin (NEURONTIN) 400 MG capsule Take 3 capsules (1,200 mg) by mouth 3 times daily 270 capsule 3     losartan (COZAAR) 50 MG tablet Take 1 tablet (50 mg) by mouth daily 90 tablet 0     methocarbamol (ROBAXIN) 500 MG tablet Take 2  "tablets (1,000 mg) by mouth 3 times daily as needed for muscle spasms 60 tablet 1     order for DME Equipment being ordered: TENS Pads as directed 1 Device 0     oxyCODONE (ROXICODONE) 5 MG tablet Take 1 tab every 4-6 hours as needed for severe pain. MAX 4 TABS PER DAY. Okay to fill 5/13/19. To last until 6/14/19. 120 tablet 0     tiZANidine (ZANAFLEX) 4 MG tablet Take 1 tablet (4 mg) by mouth 2 times daily as needed for muscle spasms 60 tablet 3       Review of Systems: A 10-point review of systems was negative, with the exception of chronic pain issues, ringing in the ears, high blood pressure, numbness and tingling, depression, stress, and mood swings.     Social History:  No changes since previous visit    Family history: no changes since previous visit    PHYSICAL EXAM:     Vitals:   /84   Temp 98.6  F (37  C) (Temporal)   Ht 1.848 m (6' 0.75\")   Wt 94.1 kg (207 lb 8 oz)   BMI 27.56 kg/m    Body mass index is 27.56 kg/m .  6' .75\"  207 lbs 8 oz      Constitutional: healthy, alert and no distress  HEENT: Head atraumatic, normocephalic. Eyes without conjunctival injection or jaundice. Neck supple. No obvious neck masses.   Skin: No rash, lesions, or petechiae of exposed skin.   Psychiatric/mental status: Alert, without lethargy or stupor. Appropriate affect. Mood normal.     DIAGNOSTIC TESTS:  No new imaging to review.    Assessment:  Indra Mehta is a 53 year old male who presents today for follow up regarding his:    1.  Cervicalgia  2.  S/p cervical spine fusion  3.  Myofascial pain  4.  Lumbar radiculopathy  5. Neuropathy  6. Chronic pain syndrome    Worsening pain this last month due to his fishing trip.  Discussed with patient that this increased pain will likely improve and was from the increased activity.    Recommend that the patient talk to his primary care provider regarding the concerns about sleep apnea and the throat issues.      Given the increased symptoms of numbness and tingling " in his arms when he has his neck up and forward we will get an x-ray given the recent surgery.    He is having continued sensations of neuropathy in his feet.  Has not noticed a significant benefit from the Cymbalta.    Plan:    Diagnosis reviewed, treatment option addressed, and risk/benifits discussed.  Self-care instructions given.  I am recommending a multidisciplinary treatment plan to help this patient better manage pain.      1. Physical Therapy:  YES, Keep appointments as scheduled   2. Clinical Health Psychologist:  NO, continue guided meditation    3. Diagnostic Studies: xray cervical spine  4. Medication Management:    1. Continue Gabapentin 1200 TID  2. Continue Robaxin 1000mg TID  3. Continue Tizanidine 4mg at bedtime  4. Continue Oxycodone 4/day  5. Increase Cymbalta 30mg in AM and 60mg at HS  5. Further procedures recommended:  Keep injection with Dr. Mcdaniel as scheduled  6. Recommendations to PCP. See above  7. Ketan to follow up with Primary Care provider regarding elevated blood pressure.      Follow up with this provider:  4 Weeks     Total time spent face to face was 20 minutes and more than 50% of face to face time was spent in counseling and/or coordination of care regarding the diagnosis and recommendations above.      Chiara Garcia PA-C   [facility]

## 2019-06-13 ENCOUNTER — OFFICE VISIT (OUTPATIENT)
Dept: PALLIATIVE MEDICINE | Facility: CLINIC | Age: 54
End: 2019-06-13
Payer: COMMERCIAL

## 2019-06-13 ENCOUNTER — HOSPITAL ENCOUNTER (OUTPATIENT)
Dept: GENERAL RADIOLOGY | Facility: CLINIC | Age: 54
Discharge: HOME | End: 2019-06-13
Attending: PHYSICIAN ASSISTANT | Admitting: PHYSICIAN ASSISTANT
Payer: COMMERCIAL

## 2019-06-13 VITALS
DIASTOLIC BLOOD PRESSURE: 86 MMHG | WEIGHT: 207.5 LBS | HEIGHT: 73 IN | SYSTOLIC BLOOD PRESSURE: 160 MMHG | BODY MASS INDEX: 27.5 KG/M2 | TEMPERATURE: 98.6 F

## 2019-06-13 DIAGNOSIS — G89.4 CHRONIC PAIN SYNDROME: ICD-10-CM

## 2019-06-13 DIAGNOSIS — M54.16 LUMBAR RADICULOPATHY: ICD-10-CM

## 2019-06-13 DIAGNOSIS — M79.18 MYOFASCIAL PAIN: ICD-10-CM

## 2019-06-13 DIAGNOSIS — Z98.1 S/P CERVICAL SPINAL FUSION: ICD-10-CM

## 2019-06-13 DIAGNOSIS — G62.9 NEUROPATHY: ICD-10-CM

## 2019-06-13 DIAGNOSIS — M54.2 CERVICALGIA: ICD-10-CM

## 2019-06-13 DIAGNOSIS — M54.2 CERVICALGIA: Primary | ICD-10-CM

## 2019-06-13 PROCEDURE — 99213 OFFICE O/P EST LOW 20 MIN: CPT | Performed by: PHYSICIAN ASSISTANT

## 2019-06-13 PROCEDURE — 72040 X-RAY EXAM NECK SPINE 2-3 VW: CPT | Mod: TC

## 2019-06-13 RX ORDER — OXYCODONE HYDROCHLORIDE 5 MG/1
TABLET ORAL
Qty: 120 TABLET | Refills: 0 | Status: SHIPPED | OUTPATIENT
Start: 2019-06-13 | End: 2019-07-11

## 2019-06-13 ASSESSMENT — PAIN SCALES - GENERAL: PAINLEVEL: MODERATE PAIN (4)

## 2019-06-13 ASSESSMENT — MIFFLIN-ST. JEOR: SCORE: 1841.12

## 2019-06-13 NOTE — PATIENT INSTRUCTIONS
After Visit Instructions:     Thank you for coming to [facility] for your care. It is my goal to partner with you to help you reach your optimal state of health.     I am recommending multidisciplinary care at this time.  The focus of care will be to continue gradual rehabilitation and pain management with medication adjustments as needed.    Continue daily self-care, identifying contributing factors, and monitoring variations in pain level. Continue to integrate self-care into your life.      1. Schedule physical therapy assessment/visit: continue current plan  2. Schedule follow-up with Chiara Garcia PA-C in 4 weeks. You will need to make this appointment.   3. Imaging: xray of your cervical spine  4. Talk to your primary care about about the throat issues and concern for sleep apnea  5. Procedures recommended: Keep appointment tomorrow as scheduled   6. Medication recommendations:   1. Increase Cymbalta to 30mg in AM and 60mg at bedtime  2. Continue Oxycodone 4/day.   3. Continue Gabapentin 1200 three times a day  4. Continue Robaxin 1000mg three times a day  5. Continue Tizanidine 4mg at bedtime        Chiara Garcia PA-C  [facility]    Contact information: [facility]  Clinic Number:  [phone]   Call this number with any questions about your care and for scheduling assistance. Calls are returned Monday through Friday between 8 AM and 4:30 PM. We usually get back to you within 2 business days depending on the issue/request.           Medication Refills Policy:    For non-narcotic medications, please your pharmacy directly to request a refill and the pharmacy will call the Pain Management Center for authorization. Please allow 3-4 days for these refills.    For narcotic refills, call the nurse triage line and leave a message requesting your refill along with the name of the pharmacy that you use. Narcotic  prescriptions will be mailed to your pharmacy or you may pick them up at the clinic.  Please call 7-10 days before your refill is due  The above policy allows adequate time so that you do not run out of medication.    No Show - Late Cancellation - Late Arrival Policy  We believe regular attendance is key to your success in our program.    Any time you are unable to keep your appointment we ask that you call us at least 24 hours in advance to let us know. This will allow us to offer the appointment time to another patient. The following is our policy for missed appointments. This also applies to appointments cancelled with less than 24 hours notice.    After missing 3 appointments without calling first, we will cancel all of your future appointments at [facility].    At that point, you will not be able to resume services unless approved by your care team  We understand that unforseen circumstances arise, however, out of respect for all concerned and to provide this appointment to another patient, this policy will be enforced.    Please note that most follow up appointments are 30 minutes long. If you arrive late, your provider may not be able to see you for the entire 30 minutes. Please also note that if you arrive more than 15 minutes for any appointment, it may be rescheduled.

## 2019-06-14 ENCOUNTER — ANESTHESIA (OUTPATIENT)
Dept: SURGERY | Facility: CLINIC | Age: 54
End: 2019-06-14
Payer: COMMERCIAL

## 2019-06-14 ENCOUNTER — ANESTHESIA EVENT (OUTPATIENT)
Dept: SURGERY | Facility: CLINIC | Age: 54
End: 2019-06-14
Payer: COMMERCIAL

## 2019-06-14 ENCOUNTER — HOSPITAL ENCOUNTER (OUTPATIENT)
Facility: CLINIC | Age: 54
Discharge: HOME | End: 2019-06-14
Attending: ANESTHESIOLOGY | Admitting: ANESTHESIOLOGY
Payer: COMMERCIAL

## 2019-06-14 ENCOUNTER — HOSPITAL ENCOUNTER (OUTPATIENT)
Dept: GENERAL RADIOLOGY | Facility: CLINIC | Age: 54
End: 2019-06-14
Attending: ANESTHESIOLOGY | Admitting: ANESTHESIOLOGY
Payer: COMMERCIAL

## 2019-06-14 ENCOUNTER — TELEPHONE (OUTPATIENT)
Dept: PALLIATIVE MEDICINE | Facility: CLINIC | Age: 54
End: 2019-06-14

## 2019-06-14 VITALS
OXYGEN SATURATION: 100 % | RESPIRATION RATE: 16 BRPM | SYSTOLIC BLOOD PRESSURE: 126 MMHG | DIASTOLIC BLOOD PRESSURE: 83 MMHG | TEMPERATURE: 98.4 F | HEART RATE: 80 BPM

## 2019-06-14 DIAGNOSIS — M54.16 LUMBAR RADICULOPATHY: ICD-10-CM

## 2019-06-14 PROCEDURE — 25000128 H RX IP 250 OP 636: Performed by: ANESTHESIOLOGY

## 2019-06-14 PROCEDURE — 64483 NJX AA&/STRD TFRM EPI L/S 1: CPT | Performed by: ANESTHESIOLOGY

## 2019-06-14 PROCEDURE — 25000125 ZZHC RX 250: Performed by: NURSE ANESTHETIST, CERTIFIED REGISTERED

## 2019-06-14 PROCEDURE — 25000128 H RX IP 250 OP 636: Performed by: NURSE ANESTHETIST, CERTIFIED REGISTERED

## 2019-06-14 PROCEDURE — 40000277 XR SURGERY CARM FLUORO LESS THAN 5 MIN W STILLS: Mod: TC

## 2019-06-14 PROCEDURE — 37000008 ZZH ANESTHESIA TECHNICAL FEE, 1ST 30 MIN: Performed by: ANESTHESIOLOGY

## 2019-06-14 RX ORDER — LIDOCAINE 40 MG/G
CREAM TOPICAL
Status: DISCONTINUED | OUTPATIENT
Start: 2019-06-14 | End: 2019-06-14 | Stop reason: HOSPADM

## 2019-06-14 RX ORDER — SODIUM CHLORIDE, SODIUM LACTATE, POTASSIUM CHLORIDE, CALCIUM CHLORIDE 600; 310; 30; 20 MG/100ML; MG/100ML; MG/100ML; MG/100ML
INJECTION, SOLUTION INTRAVENOUS CONTINUOUS
Status: CANCELLED | OUTPATIENT
Start: 2019-06-14

## 2019-06-14 RX ORDER — ONDANSETRON 2 MG/ML
4 INJECTION INTRAMUSCULAR; INTRAVENOUS EVERY 30 MIN PRN
Status: CANCELLED | OUTPATIENT
Start: 2019-06-14

## 2019-06-14 RX ORDER — IOPAMIDOL 612 MG/ML
INJECTION, SOLUTION INTRATHECAL PRN
Status: DISCONTINUED | OUTPATIENT
Start: 2019-06-14 | End: 2019-06-14 | Stop reason: HOSPADM

## 2019-06-14 RX ORDER — ONDANSETRON 4 MG/1
4 TABLET, ORALLY DISINTEGRATING ORAL EVERY 30 MIN PRN
Status: CANCELLED | OUTPATIENT
Start: 2019-06-14

## 2019-06-14 RX ORDER — LIDOCAINE HYDROCHLORIDE 20 MG/ML
INJECTION, SOLUTION INFILTRATION; PERINEURAL PRN
Status: DISCONTINUED | OUTPATIENT
Start: 2019-06-14 | End: 2019-06-14

## 2019-06-14 RX ORDER — TRIAMCINOLONE ACETONIDE 40 MG/ML
INJECTION, SUSPENSION INTRA-ARTICULAR; INTRAMUSCULAR PRN
Status: DISCONTINUED | OUTPATIENT
Start: 2019-06-14 | End: 2019-06-14 | Stop reason: HOSPADM

## 2019-06-14 RX ORDER — ALBUTEROL SULFATE 0.83 MG/ML
2.5 SOLUTION RESPIRATORY (INHALATION) EVERY 4 HOURS PRN
Status: CANCELLED | OUTPATIENT
Start: 2019-06-14

## 2019-06-14 RX ORDER — PROPOFOL 10 MG/ML
INJECTION, EMULSION INTRAVENOUS PRN
Status: DISCONTINUED | OUTPATIENT
Start: 2019-06-14 | End: 2019-06-14

## 2019-06-14 RX ADMIN — PROPOFOL 30 MG: 10 INJECTION, EMULSION INTRAVENOUS at 13:04

## 2019-06-14 RX ADMIN — PROPOFOL 30 MG: 10 INJECTION, EMULSION INTRAVENOUS at 13:07

## 2019-06-14 RX ADMIN — PROPOFOL 30 MG: 10 INJECTION, EMULSION INTRAVENOUS at 13:01

## 2019-06-14 RX ADMIN — LIDOCAINE HYDROCHLORIDE 5 ML: 10 INJECTION, SOLUTION EPIDURAL; INFILTRATION; INTRACAUDAL; PERINEURAL at 11:51

## 2019-06-14 RX ADMIN — PROPOFOL 30 MG: 10 INJECTION, EMULSION INTRAVENOUS at 12:59

## 2019-06-14 RX ADMIN — PROPOFOL 30 MG: 10 INJECTION, EMULSION INTRAVENOUS at 13:00

## 2019-06-14 RX ADMIN — PROPOFOL 30 MG: 10 INJECTION, EMULSION INTRAVENOUS at 13:09

## 2019-06-14 RX ADMIN — LIDOCAINE HYDROCHLORIDE 40 MG: 20 INJECTION, SOLUTION INFILTRATION; PERINEURAL at 12:58

## 2019-06-14 RX ADMIN — PROPOFOL 60 MG: 10 INJECTION, EMULSION INTRAVENOUS at 12:58

## 2019-06-14 NOTE — ANESTHESIA CARE TRANSFER NOTE
Patient: Indra Mehta    Procedure(s):  INJECTION, EPIDURAL, TRANSFORAMINAL LUMBAR 3-4 BILATERAL    Diagnosis: lumbar radiculopathy  Diagnosis Additional Information: No value filed.    Anesthesia Type:   MAC     Note:  Airway :Nasal Cannula  Patient transferred to:Phase II  Handoff Report: Identifed the Patient, Identified the Reponsible Provider, Reviewed the pertinent medical history, Discussed the surgical course, Reviewed Intra-OP anesthesia mangement and issues during anesthesia, Set expectations for post-procedure period and Allowed opportunity for questions and acknowledgement of understanding      Vitals: (Last set prior to Anesthesia Care Transfer)    CRNA VITALS  6/14/2019 1242 - 6/14/2019 1320      6/14/2019             Resp Rate (observed):  12                Electronically Signed By: YUMIKO Allison CRNA  June 14, 2019  1:20 PM

## 2019-06-14 NOTE — OP NOTE
PRIMARY PROBLEM: Low back pain bilateral leg pains.    PROCEDURE: Bilateral L3-4  Transforaminal Epidural Steroid Injections with fluoroscopic guidance and contrast.     PROCEDURE DETAILS: After written informed consent was obtained from the patient, the patient was escorted to the procedure room.  The patient was placed in the prone position.  A  time out  was conducted to verify patient identity, procedure to be performed, side, site, allergies and any special requirements.  The skin over the thoracolumbar region was prepped and draped in normal sterile fashion. Fluoroscopy was used to identify the neural foramen in AP view and the skin was anesthetized with 2 mL of 1% lidocaine with bicarbonate buffer. A 22-gauge Quincke spinal needle was advanced through this location and advanced under fluoroscopic guidance towards the neural foramen.  The target zone was the 6 o clock position of the pedicle.   Prior to entering the foramen, the depth of the needle was gauged with a lateral view on fluoroscopy. While still in a lateral view, the needle was slowly advanced to avoid injury to the spinal nerve.  Then, in the oblique view (approximately 28 degrees), after negative aspiration, 1.5 mL of Omnipaque contrast dye was injected revealing epidural spread without evidence of intravascular or intrathecal spread.  Then a 3cc solution of 20 mg of Triamcinolone in 2.5 mL of  Preservative-Free saline was slowly injected into the epidural space at each segment.  This was done bilaterally at the L3-4 levels.  After injection of the medication, as the needle tip was withdrawn, it was flushed with local anesthetic.   The patient was monitored with blood pressure and pulse oximetry machines with the assistance of an RN throughout the procedure.  The patient was alert and responsive to questions throughout the procedure.   The patient tolerated the procedure well and was observed in the post-procedural area.  The patient was  dismissed without apparent complications.     DIAGNOSIS:  1.  History of long-term pain relief from previous epidural injections  2.  L3-4 disc herniation causing back and leg pains    PLAN:  1. Performed bilateral L3-4  transforaminal epidural steroid injections.  2. The patient was instructed to follow-up per Dr. Mcdaniel's instructions.      Darryn Mcdaniel MD  Diplomate of the American Board of Anesthesiology, Pain Medicine

## 2019-06-14 NOTE — ANESTHESIA POSTPROCEDURE EVALUATION
Patient: Indra Mehta    Procedure(s):  INJECTION, EPIDURAL, TRANSFORAMINAL LUMBAR 3-4 BILATERAL    Diagnosis:lumbar radiculopathy  Diagnosis Additional Information: No value filed.    Anesthesia Type:  MAC    Note:  Anesthesia Post Evaluation    Patient location during evaluation: Phase 2  Patient participation: Able to fully participate in evaluation  Level of consciousness: awake  Pain management: adequate  Airway patency: patent  Cardiovascular status: acceptable and hemodynamically stable  Respiratory status: acceptable, room air and nonlabored ventilation  Hydration status: stable  PONV: none     Anesthetic complications: None    Comments: Patient was happy with the anesthesia care received and no anesthesia related complications were noted.  I will follow up with the patient again if it is needed.        Last vitals:  Vitals:    06/14/19 1100 06/14/19 1315   BP: (!) 145/99 126/83   Pulse: 80    Resp: 18 16   Temp: 98.4  F (36.9  C)    SpO2: 99% 100%         Electronically Signed By: YUMIKO Allison CRNA  June 14, 2019  1:22 PM

## 2019-06-14 NOTE — ANESTHESIA PREPROCEDURE EVALUATION
Anesthesia Pre-Procedure Evaluation    Patient: Indra Mehta   MRN: [id number] : 1965          Preoperative Diagnosis: cervical radiculopathy    Procedure(s):  cervical 5-6 anterior cervical discectomy fusion    Past Medical History:   Diagnosis Date     Back pain      Meniere's disease, unspecified      Pain medication agreement signed 2010     Past Surgical History:   Procedure Laterality Date     BUNIONECTOMY RT/LT  08    Both feet     COLONOSCOPY N/A 2016    Procedure: COMBINED COLONOSCOPY, SINGLE OR MULTIPLE BIOPSY/POLYPECTOMY BY BIOPSY;  Surgeon: Indra Jernigan MD;  Location: [facility]     DISCECTOMY, FUSION CERVICAL ANTERIOR ONE LEVEL, COMBINED N/A 2017    Procedure: COMBINED DISCECTOMY, FUSION CERVICAL ANTERIOR ONE LEVEL;  Cervical 6-7, Anterior cervical discectomy fusion;  Surgeon: Mike Mckenna MD;  Location: [facility]     DISCECTOMY, FUSION CERVICAL ANTERIOR ONE LEVEL, COMBINED N/A 2018    Procedure: cervical 5-6 anterior cervical discectomy fusion;  Surgeon: Mike Mckenna MD;  Location: [facility]     HC COLONOSCOPY W/WO BRUSH/WASH  2005     HC CREATE EARDRUM OPENING,GEN ANESTH  2007    Pe tube, Left endolymphatic sac enhancement.     HC MASTOIDECTOMY,COMPLETE  2007     HERNIORRHAPHY UMBILICAL N/A 2017    Procedure: HERNIORRHAPHY UMBILICAL;  open umbilical hernia repair;  Surgeon: Boni Story MD;  Location: [facility]     INJECT EPIDURAL LUMBAR N/A 2017    Procedure: INJECT EPIDURAL LUMBAR;  lumbar 4-5 epidural steroid injection ;  Surgeon: Darryn Mcdaniel MD;  Location[facility]     INJECT EPIDURAL LUMBAR N/A 2017    Procedure: INJECT EPIDURAL LUMBAR;  lumbar epidural injection;  Surgeon: Darryn Mcdaniel MD;  [facility]     INJECT EPIDURAL TRANSFORAMINAL Bilateral 2018    Procedure: INJECT EPIDURAL TRANSFORAMINAL;  transforaminal bilateral lumbar 3-4 steroid injection;  Surgeon: Darryn Mcdaniel MD;  [facility]      INJECT EPIDURAL TRANSFORAMINAL Bilateral 11/8/2018    Procedure: INJECT EPIDURAL TRANSFORAMINAL lumbar 3-4;  Surgeon: Darryn Mcdaniel MD;  Location: [facility]     PE TUBES  2006    left ear       Anesthesia Evaluation     . Pt has had prior anesthetic. Type: General and MAC    No history of anesthetic complications          ROS/MED HX    ENT/Pulmonary:  - neg pulmonary ROS     Neurologic:     (+)neuropathy (bilateral occipital neuralgia) - Bilateral occipital neuralgia, other neuro Menieres disease    Cardiovascular:     (+) Dyslipidemia, hypertension----. : . . . :. . Previous cardiac testing date:results:date: results:ECG reviewed date:6-11-19 results:NSR date: results:          METS/Exercise Tolerance:  >4 METS   Hematologic:  - neg hematologic  ROS       Musculoskeletal:   (+)  other musculoskeletal- myalgia/back pain      GI/Hepatic:  - neg GI/hepatic ROS      (-) GERD   Renal/Genitourinary:  - ROS Renal section negative   (+) Pt has no history of transplant,       Endo:  - neg endo ROS       Psychiatric:     (+) psychiatric history depression      Infectious Disease:  - neg infectious disease ROS       Malignancy:      - no malignancy   Other:    (+) No chance of pregnancy C-spine cleared: Yes, H/O Chronic Pain,H/O chronic opiod use , no other significant disability                           Physical Exam  Normal systems: cardiovascular, pulmonary and dental    Airway   Mallampati: II  TM distance: >3 FB  Neck ROM: full    Dental     Cardiovascular   Rhythm and rate: regular and normal      Pulmonary    breath sounds clear to auscultation            Lab Results   Component Value Date    WBC 6.1 06/11/2019    HGB 14.5 06/11/2019    HCT 42.7 06/11/2019     06/11/2019    CRP <2.9 01/26/2018     06/11/2019    POTASSIUM 3.9 06/11/2019    CHLORIDE 107 06/11/2019    CO2 28 06/11/2019    BUN 11 06/11/2019    CR 0.95 06/11/2019    GLC 86 06/11/2019    KRISHAN 8.6 06/11/2019    MAG 2.2 03/19/2007    TSH 2.00  "03/24/2016       Preop Vitals  BP Readings from Last 3 Encounters:   06/14/19 (!) 145/99   06/13/19 160/86   06/11/19 134/88    Pulse Readings from Last 3 Encounters:   06/14/19 80   06/11/19 71   03/14/19 79      Resp Readings from Last 3 Encounters:   06/14/19 18   06/11/19 18   12/25/18 16    SpO2 Readings from Last 3 Encounters:   06/14/19 99%   06/11/19 99%   12/25/18 98%      Temp Readings from Last 1 Encounters:   06/14/19 98.4  F (36.9  C)    Ht Readings from Last 1 Encounters:   06/13/19 1.848 m (6' 0.75\")      Wt Readings from Last 1 Encounters:   06/13/19 94.1 kg (207 lb 8 oz)    Estimated body mass index is 27.56 kg/m  as calculated from the following:    Height as of 6/13/19: 1.848 m (6' 0.75\").    Weight as of 6/13/19: 94.1 kg (207 lb 8 oz).       Anesthesia Plan      History & Physical Review  History and physical reviewed and following examination; no interval change.    ASA Status:  2 .    NPO Status:  > 8 hours    Plan for MAC with Propofol and Intravenous induction. Maintenance will be TIVA.  Reason for MAC:  Deep or markedly invasive procedure (G8)         Postoperative Care      Consents  Anesthetic plan, risks, benefits and alternatives discussed with:  Patient.  Use of blood products discussed: No .   .                   YUMIKO Allison CRNA  "

## 2019-06-14 NOTE — DISCHARGE INSTRUCTIONS
Home Care Instructions                Procedure:  Epidural Steroid Injection or Joint injection    Activity:    Rest today    Do not work today    Resume normal activity tomorrow    Pain:    You may experience soreness at the injection site for one or two days    You may use an ice pack for 20 minutes every 2 hours for the first 24 hours    You may use a heating pad after the first 24 hours    You may use Tylenol  (acetaminophen) every 4 hours or other pain medicines as directed by your physician    Safety  Sedation medicine, if given may remain active for many hours.    It is important for the next 24 hours that you do not:    Drive a car    Operate machines or power tools    Consume alcohol, including beer    Sign any important papers or legal documents    You may experience numbness radiating into your legs or arms, (depending on the procedure location)  This numbness may last several hours.  Until the numb sensation returns to normal please use caution in walking, climbing stairs, stepping out of your vehicle, etc.    Common side effects of steroids:  Not everyone will experience corticosteroid side effects. If side effects are experienced they will gradually subside in the 7-10 day period following an injection.    Most common side effects include:    Flushed face and/or chest    Feeling of warmth, particularly in face but could be overall feeling of warmth    Increased blood sugar in diabetic patients    Menstrual irregularities may occur.  If taking hormone based birth control an alternate method of birth control is recommended    Sleep disturbances and/or mood swings are possible    Leg cramps    Please contact us if you have:  Severe pain   Fever more than 101.5 degrees Fahrenheit  Signs of infection (redness, swelling or drainage)      If you have questions during normal business hours (8am-5pm Monday-Friday) contact the [facility] at [phone]. If you need help after hours, we recommend that  you go to a hospital emergency room or dial 911.       [facility]  Same-Day Surgery   Adult Discharge Orders & Instructions     For 24 hours after surgery    1. Get plenty of rest.  A responsible adult must stay with you for at least 24 hours after you leave the hospital.   2. Do not drive or use heavy equipment.  If you have weakness or tingling, don't drive or use heavy equipment until this feeling goes away.  3. Do not drink alcohol.  4. Avoid strenuous or risky activities.  Ask for help when climbing stairs.   5. You may feel lightheaded.  IF so, sit for a few minutes before standing.  Have someone help you get up.   6. If you have nausea (feel sick to your stomach): Drink only clear liquids such as apple juice, ginger ale, broth or 7-Up.  Rest may also help.  Be sure to drink enough fluids.  Move to a regular diet as you feel able.  7. You may have a slight fever. Call the doctor if your fever is over 100 F (37.7 C) (taken under the tongue) or lasts longer than 24 hours.  8. You may have a dry mouth, a sore throat, muscle aches or trouble sleeping.  These should go away after 24 hours.  9. Do not make important or legal decisions.   Call your doctor for any of the followin.  Signs of infection (fever, growing tenderness at the surgery site, a large amount of drainage or bleeding, severe pain, foul-smelling drainage, redness, swelling).    2. It has been over 8 to 10 hours since surgery and you are still not able to urinate (pass water).    3.  Headache for over 24 hours.

## 2019-06-18 ENCOUNTER — MYC MEDICAL ADVICE (OUTPATIENT)
Dept: FAMILY MEDICINE | Facility: OTHER | Age: 54
End: 2019-06-18

## 2019-06-18 DIAGNOSIS — G47.10 EXCESSIVE SLEEPINESS: Primary | ICD-10-CM

## 2019-06-19 ENCOUNTER — MYC MEDICAL ADVICE (OUTPATIENT)
Dept: FAMILY MEDICINE | Facility: OTHER | Age: 54
End: 2019-06-19

## 2019-06-19 DIAGNOSIS — M54.2 CERVICALGIA: ICD-10-CM

## 2019-06-19 RX ORDER — DULOXETIN HYDROCHLORIDE 30 MG/1
CAPSULE, DELAYED RELEASE ORAL
Qty: 90 CAPSULE | Refills: 1 | Status: SHIPPED | OUTPATIENT
Start: 2019-06-19 | End: 2019-08-12

## 2019-06-19 NOTE — TELEPHONE ENCOUNTER
Fax received from [facility] requesting a refill of DULoxetine (CYMBALTA) 30 MG capsule on behalf of Indra Mehta.  Last refill: 5/21/19  # 60 with 1 refills at [facility].  Last office visit:  6/13/19  Next office visit:  7/11/19      Upon communication with pharmacy to clarify the need for a refill the writer was informed that he is part of an Rx synchronization program so the full qty of his medication was not prescribed until 6/3/2019.    This is an appropriate refill, and has been e-prescribed. Sarahy Strauss, [facility]

## 2019-07-08 NOTE — PROGRESS NOTES
[facility]    CHIEF COMPLAINT:  Pain  -neck and low back pain    INTERVAL HISTORY:  Last seen on 6/13/2018.      Ketan was previously a patient of Dr. Angle Freeman. He is transferring to me due to location.       Recommendations/plan at the last visit included:  1. Physical Therapy:  YES, Keep appointments as scheduled   2. Clinical Health Psychologist:  NO, continue guided meditation    3. Diagnostic Studies: xray cervical spine  4. Medication Management:    1. Continue Gabapentin 1200 TID  2. Continue Robaxin 1000mg TID  3. Continue Tizanidine 4mg at bedtime  4. Continue Oxycodone 4/day  5. Increase Cymbalta 30mg in AM and 60mg at HS  5. Further procedures recommended:  Keep injection with Dr. Mcdaniel as scheduled  6. Recommendations to PCP. See above  7. Ketan to follow up with Primary Care provider regarding elevated blood pressure.      Follow up with this provider:  4 Weeks     Since his last visit, Indra CHRIS Janine reports:  -Things have not been going well for him. He opened his pill bottle and the strainer wasn't in his drain, therefore he lost 23 pills. He ran out of his medications on Tuesday.     He reports that worsening numbness and tingling in his hands. He had this prior to his surgery. He states that he had a stretch of a couple of days where the pain in his arm went down to his hand. He has not scheduled a follow up with Dr. Mckenna yet.     He had a L3-4 Transforaminal epidural steroid injection with Dr. Mcdaniel on 6/14/2019. This went well. The pain that was going into his hips and legs is almost resolved. His biggest complaint with his low back is when he is laying in bed.     He hopped on a shovel and he was on the ball of his foot and he slipped off the shovel. He now has black and blue under the toenails. He states that it took almost a full 4 weeks before he could walk without a limp.       Pain Information:   Pain quality: Exhausting     Pain rating: intensity ranges from 4/10 to  8/10, and averages 6/10 on a 0-10 scale.   Pain today 7/10    SELF CARE:   How often do you practice SELF-CARE (relaxing, stretching, pacing, monitoring posture, taking mini-breaks) in a typical day:  Has been doing some landscaping    UDS: 4/1/19-reviewed and appropriate  Controlled Substance Agreement signed: 4/15/2019    CURRENT RELEVANT PAIN MEDICATIONS:  Gabapentin 400mg-Taking 3 capsules 3 times a day  Tizanidine 4mg-taking 1 at HS (helps get him to sleep-does not take with Robaxin)  Tylenol 500mg-1000mg 4x/day  Ibuprofen-800mg 4x/day  Robaxin- 500mg 2 Three times a day  Excedrin Migraine-takes occasionally-does not take on top of the tylenol and ibuprofen  Oxycodone-taking 4/day  Xanax-does not use daily, last use was about 3 weeks ago  Cymbalta 30mg twice daily    Patient is using the medication as prescribed:  YES  Is your medication helpful? YES   Medication side effects? no side effect    Previous Medications: (H--helped; HI--Helped initially; SWH-- somewhat helpful, NH--No help; W--worse; SE--side effects)   Norco/vicodin H  Oxycodone H  Flexeril - was helpful at night  Robaxin - may have helped, didn't make him sleepy  Tizanidine H  Gabapentin ?  Lyrica SE sedation    Past Pain Treatments:  Injections:    - 11/8/18 bilateral L3-4 TFESI - (Dr Mcdaniel)  - 8/23/18 bilateral L3-4 TFESI - seems to have worsened pain (Dr Mcdaniel)  - 6/11/18 TFESI right C5-6 - helped with arm pain and numbness  - bilateral L3-4 TFESI 12/28/17  - Bilateral L4-5 TFESI 10/23/17  - Bilateral L3-4 TFESI 11/08/2018  -Bilateral L3-4 TFESI 6/14/2019  Surgery:  ACDF C6-7 on 7/5/17 with improvement; ACDF C5-6 12/19/2018  TENS unit: helpful  Physical therapy in [facility] Board of Pharmacy Data Base Reviewed:    YES; As expected, no concern for misuse/abuse of controlled medications based on this report.    THE 4 As OF OPIOID MAINTENANCE ANALGESIA    Analgesia: Is pain relief clinically significant? YES   Activity: Is patient  "functional and able to perform Activities of Daily Living? YES   Adverse effects: Is patient free from adverse side effects from opiates? YES   Adherence to Rx protocol: Is patient adhering to Controlled Substance Agreement and taking medications ONLY as ordered? YES       Is Narcan prescribed for opiate use >50 MME daily? N/A      Daily MME: 30    Medications:  Current Outpatient Medications   Medication Sig Dispense Refill     Acetaminophen (TYLENOL PO) Take 500 mg by mouth every 4 hours as needed for mild pain or fever       ALPRAZolam (XANAX XR) 0.5 MG 24 hr tablet Take 1 tablet (0.5 mg) by mouth At Bedtime 30 tablet 3     DULoxetine (CYMBALTA) 30 MG capsule Take 30mg in AM and 60mg at bedtime 90 capsule 1     gabapentin (NEURONTIN) 400 MG capsule Take 3 capsules (1,200 mg) by mouth 3 times daily 270 capsule 3     losartan (COZAAR) 50 MG tablet Take 1 tablet (50 mg) by mouth daily 90 tablet 0     methocarbamol (ROBAXIN) 500 MG tablet Take 2 tablets (1,000 mg) by mouth 3 times daily as needed for muscle spasms 60 tablet 1     order for DME Equipment being ordered: TENS Pads as directed 1 Device 0     oxyCODONE (ROXICODONE) 5 MG tablet Take 1 tab every 4-6 hours as needed for severe pain. MAX 4 TABS PER DAY. Okay to fill 6/13/19. To last until 7/14/19. 120 tablet 0     tiZANidine (ZANAFLEX) 4 MG tablet Take 1 tablet (4 mg) by mouth 2 times daily as needed for muscle spasms 60 tablet 3       Review of Systems: A 10-point review of systems was negative, with the exception of chronic pain issues, ringing in the ears, high blood pressure, numbness and tingling, and stress.     Social History:  No changes since previous visit    Family history: no changes since previous visit    PHYSICAL EXAM:     Vitals:   BP (!) 154/94   Temp 98.7  F (37.1  C) (Temporal)   Ht 1.848 m (6' 0.75\")   Wt 93.2 kg (205 lb 8 oz)   BMI 27.30 kg/m    Body mass index is 27.3 kg/m .  6' .75\"  205 lbs 8 oz      Constitutional: healthy, alert " and no distress  HEENT: Head atraumatic, normocephalic. Eyes without conjunctival injection or jaundice. Neck supple. No obvious neck masses.   Skin: No rash, lesions, or petechiae of exposed skin.   Psychiatric/mental status: Alert, without lethargy or stupor. Appropriate affect. Mood normal.     DIAGNOSTIC TESTS:  No new imaging to review.    Assessment:  Indra Mehta is a 53 year old male who presents today for follow up regarding his:    1.  Cervicalgia  2.  S/p cervical spine fusion  3.  Myofascial pain  4.  Lumbar radiculopathy  5. Neuropathy  6. Chronic pain syndrome    Increasing pain and symptoms in his arms. He reports feeling like he did prior to surgery. He did run out of his medications early due to dropping them down the sink. I did discuss with the patient, that I will allow for an early refill this month, but I will not allow for anymore. He verbalized his understanding.     Given the increased symptoms in his arms, he should have a follow up with Dr. Mckenna.       Plan:    Diagnosis reviewed, treatment option addressed, and risk/benifits discussed.  Self-care instructions given.  I am recommending a multidisciplinary treatment plan to help this patient better manage pain.      1. Physical Therapy:  YES, Keep appointments as scheduled   2. Clinical Health Psychologist:  NO, continue guided meditation    3. Diagnostic Studies: none  4. Medication Management:    1. Continue Gabapentin 1200 TID  2. Continue Robaxin 1000mg TID  3. Continue Tizanidine 4mg at bedtime  4. Continue Oxycodone 4/day  5. ContinueCymbalta 30mg in AM and 60mg at HS  5. Further procedures recommended:  None at this time  6. Recommendations to PCP. See above  7. Ketan to follow up with Primary Care provider regarding elevated blood pressure.      Follow up with this provider:  4 Weeks     Total time spent face to face was 15 minutes and more than 50% of face to face time was spent in counseling and/or coordination of care  regarding the diagnosis and recommendations above.      Chiara Garcia PA-C   [facility]

## 2019-07-11 ENCOUNTER — OFFICE VISIT (OUTPATIENT)
Dept: PALLIATIVE MEDICINE | Facility: CLINIC | Age: 54
End: 2019-07-11
Payer: COMMERCIAL

## 2019-07-11 VITALS
WEIGHT: 205.5 LBS | HEIGHT: 73 IN | SYSTOLIC BLOOD PRESSURE: 154 MMHG | DIASTOLIC BLOOD PRESSURE: 88 MMHG | TEMPERATURE: 98.7 F | BODY MASS INDEX: 27.23 KG/M2

## 2019-07-11 DIAGNOSIS — G89.4 CHRONIC PAIN SYNDROME: ICD-10-CM

## 2019-07-11 DIAGNOSIS — M54.2 CERVICALGIA: Primary | ICD-10-CM

## 2019-07-11 DIAGNOSIS — M79.18 MYOFASCIAL PAIN: ICD-10-CM

## 2019-07-11 DIAGNOSIS — Z98.1 S/P CERVICAL SPINAL FUSION: ICD-10-CM

## 2019-07-11 DIAGNOSIS — M54.16 LUMBAR RADICULOPATHY: ICD-10-CM

## 2019-07-11 DIAGNOSIS — G62.9 NEUROPATHY: ICD-10-CM

## 2019-07-11 PROCEDURE — 99214 OFFICE O/P EST MOD 30 MIN: CPT | Performed by: PHYSICIAN ASSISTANT

## 2019-07-11 RX ORDER — METHOCARBAMOL 500 MG/1
1000 TABLET, FILM COATED ORAL 3 TIMES DAILY PRN
Qty: 60 TABLET | Refills: 1 | Status: SHIPPED | OUTPATIENT
Start: 2019-07-11 | End: 2019-07-30

## 2019-07-11 RX ORDER — OXYCODONE HYDROCHLORIDE 5 MG/1
TABLET ORAL
Qty: 120 TABLET | Refills: 0 | Status: SHIPPED | OUTPATIENT
Start: 2019-07-11 | End: 2019-08-08

## 2019-07-11 ASSESSMENT — MIFFLIN-ST. JEOR: SCORE: 1827.05

## 2019-07-11 ASSESSMENT — PAIN SCALES - GENERAL: PAINLEVEL: SEVERE PAIN (7)

## 2019-07-11 NOTE — PATIENT INSTRUCTIONS
After Visit Instructions:     Thank you for coming to [facility] for your care. It is my goal to partner with you to help you reach your optimal state of health.     I am recommending multidisciplinary care at this time.  The focus of care will be to continue gradual rehabilitation and pain management with medication adjustments as needed.    Continue daily self-care, identifying contributing factors, and monitoring variations in pain level. Continue to integrate self-care into your life.        Schedule physical therapy assessment/visit: schedule with PT    Schedule follow-up with Chiara Garcia PA-C in 4 weeks. You will need to make this appointment.     Referrals: schedule a follow up with Dr. Mckenna     Medication recommendations:     Continue current regimen      Chiara Garcia PA-C  [facility]    Contact information: [facility]  Clinic Number:  [phone]     Call with any questions about your care and for scheduling assistance.     Calls are returned Monday through Friday between 8 AM and 4:30 PM. We usually get back to you within 2 business days depending on the issue/request.    If we are prescribing your medications:    For opioid medication refills, call the clinic or send a YES.TAP message 7 days in advance.  Please include:    Name of requested medication    Name of the pharmacy.    For non-opioid medications, call your pharmacy directly to request a refill. Please allow 3-4 days to be processed.     Per [place] State Law:    All controlled substance prescriptions must be filled within 30 days of being written.      For those controlled substances allowing refills, pickup must occur within 30 days of last fill.      We believe regular attendance is key to your success in our program!      Any time you are unable to keep your appointment we ask that you call us at least 24 hours in advance to cancel.This will allow us  to offer the appointment time to another patient.   Multiple missed appointments may lead to dismissal from the clinic.

## 2019-07-15 DIAGNOSIS — M54.2 CERVICALGIA: ICD-10-CM

## 2019-07-15 RX ORDER — GABAPENTIN 400 MG/1
1200 CAPSULE ORAL 3 TIMES DAILY
Qty: 270 CAPSULE | Refills: 3 | Status: SHIPPED | OUTPATIENT
Start: 2019-07-15 | End: 2019-11-11

## 2019-07-15 NOTE — TELEPHONE ENCOUNTER
Received fax request from [facility] pharmacy requesting refill(s) for gabapentin (NEURONTIN) 400 MG capsule    Last refilled on 06/21/19    Pt last seen on 06/13/19  Next appt scheduled for 08/08/19    Will facilitate refill.

## 2019-07-17 ENCOUNTER — HOSPITAL ENCOUNTER (OUTPATIENT)
Dept: PHYSICAL THERAPY | Facility: CLINIC | Age: 54
Setting detail: THERAPIES SERIES
End: 2019-07-17
Attending: NURSE PRACTITIONER
Payer: COMMERCIAL

## 2019-07-17 PROCEDURE — 97140 MANUAL THERAPY 1/> REGIONS: CPT | Mod: GP | Performed by: PHYSICAL THERAPIST

## 2019-07-17 PROCEDURE — 97035 APP MDLTY 1+ULTRASOUND EA 15: CPT | Mod: GP | Performed by: PHYSICAL THERAPIST

## 2019-07-22 ENCOUNTER — MYC MEDICAL ADVICE (OUTPATIENT)
Dept: PALLIATIVE MEDICINE | Facility: CLINIC | Age: 54
End: 2019-07-22

## 2019-07-22 DIAGNOSIS — M54.12 CERVICAL RADICULOPATHY: Primary | ICD-10-CM

## 2019-07-25 ENCOUNTER — MYC MEDICAL ADVICE (OUTPATIENT)
Dept: PALLIATIVE MEDICINE | Facility: CLINIC | Age: 54
End: 2019-07-25

## 2019-07-25 ENCOUNTER — TELEPHONE (OUTPATIENT)
Dept: PALLIATIVE MEDICINE | Facility: CLINIC | Age: 54
End: 2019-07-25

## 2019-07-25 ENCOUNTER — HOSPITAL ENCOUNTER (OUTPATIENT)
Dept: MRI IMAGING | Facility: CLINIC | Age: 54
Discharge: HOME | End: 2019-07-25
Attending: PHYSICIAN ASSISTANT | Admitting: PHYSICIAN ASSISTANT
Payer: COMMERCIAL

## 2019-07-25 DIAGNOSIS — M54.12 CERVICAL RADICULOPATHY: ICD-10-CM

## 2019-07-25 PROCEDURE — 72141 MRI NECK SPINE W/O DYE: CPT

## 2019-07-25 NOTE — TELEPHONE ENCOUNTER
See MyChart below. 1 month follow up scheduled for 8/8/19. Plan from 7/11/19 OV pasted below.  --------------------  Plan:     Diagnosis reviewed, treatment option addressed, and risk/benifits discussed.  Self-care instructions given.  I am recommending a multidisciplinary treatment plan to help this patient better manage pain.       1. Physical Therapy:  YES, Keep appointments as scheduled   2. Clinical Health Psychologist:  NO, continue guided meditation    3. Diagnostic Studies: none  4. Medication Management:    1.   Continue Gabapentin 1200 TID  2.   Continue Robaxin 1000mg TID  3.   Continue Tizanidine 4mg at bedtime  4.   Continue Oxycodone 4/day  5.   ContinueCymbalta 30mg in AM and 60mg at HS  5. Further procedures recommended:  None at this time  6. Recommendations to PCP. See above  7. Ketan to follow up with Primary Care provider regarding elevated blood pressure.      Follow up with this provider:  4 Weeks  ----------------------------------------------     EARLINE Perry, RN  Care Coordinator  [facility]

## 2019-07-25 NOTE — TELEPHONE ENCOUNTER
Please let patient know that I review his MRI. He does have some stenosis that could be causing the radiating pain that he is having. I think he has an appointment with Dr. Mckenna and I would recommend that he keep this appointment.     Chiara Garcia PA-C on 7/25/2019 at 11:00 AM

## 2019-07-26 ENCOUNTER — MYC MEDICAL ADVICE (OUTPATIENT)
Dept: PALLIATIVE MEDICINE | Facility: CLINIC | Age: 54
End: 2019-07-26

## 2019-07-29 DIAGNOSIS — Z98.1 S/P CERVICAL SPINAL FUSION: ICD-10-CM

## 2019-07-29 NOTE — TELEPHONE ENCOUNTER
Fax received from [facility] requesting a refill of methocarbamol (ROBAXIN) 500 MG tablet on behalf of Indra Mehta.  Last refill: 7/11/19  # 60 with 1 refills at [facility].  Last office visit:  7/11/19  Next office visit:  8/8/19    NOTE: Upon speaking to pharmacist, they would like to have a Rx on file so it will be ready for patient prior to needing it.    This is an appropriate refill, and has been e-prescribed. Sarahy Strauss, [facility]

## 2019-07-29 NOTE — TELEPHONE ENCOUNTER
[facility] [employer] message from patient on Friday 7/26 at 1227:      Did she say anything about a possible cause for the numbness and tingling in my arm?   ----------  Message sent to pt:      Chiara Sy did not saying anything about the possible cause for the numbness and tingling in your arm. Cinthia had relayed all of the details in Chiara's reply.  I will have her review your question but I see that you have an appointment with her on the morning of 8/8 as well which will be a good time to get all of your questions addressed.     Take care,     Erica Navarro, ANGELINAN, RN-BC  Patient Care Supervisor/Care Coordinator  [facility] [place]

## 2019-07-30 ENCOUNTER — HOSPITAL ENCOUNTER (OUTPATIENT)
Dept: PHYSICAL THERAPY | Facility: CLINIC | Age: 54
Setting detail: THERAPIES SERIES
End: 2019-07-30
Attending: NURSE PRACTITIONER
Payer: COMMERCIAL

## 2019-07-30 PROCEDURE — 97140 MANUAL THERAPY 1/> REGIONS: CPT | Mod: GP | Performed by: PHYSICAL THERAPIST

## 2019-07-30 PROCEDURE — 97035 APP MDLTY 1+ULTRASOUND EA 15: CPT | Mod: GP | Performed by: PHYSICAL THERAPIST

## 2019-07-30 RX ORDER — METHOCARBAMOL 500 MG/1
1000 TABLET, FILM COATED ORAL 3 TIMES DAILY PRN
Qty: 60 TABLET | Refills: 3 | Status: SHIPPED | OUTPATIENT
Start: 2019-07-30 | End: 2019-11-25

## 2019-07-30 NOTE — PROGRESS NOTES
Outpatient Physical Therapy Progress Note     Patient: Indra Mehta    : 1965    Beginning/End Dates of Reporting Period: 2019 to 2019    Referring Provider: Camilla Corona NP    Therapy Diagnosis: cerv pain, reduced cerv mobility, myofascial tightness     Client Self Report: The tingling start several weeks ago and the SH blade and arm pain started about 2 mos ago.  R > L arm numbness - going into hand on the R side.  3rd and 4th and thumb on R side are flared up - tingling.  Pain under R SH blade.  If chin is up like with shaving, within seconds, can go into the fingers.  On the L side, it generally just goes behind the elbow and under the SH blade.  The R side was the original pain and numbness that led to the most recent surgery.  The L side was the problem side for the first surgery about 4 years ago.  Aug 8th will see Dr. Mckenna again.  Can get sharp pains that linger or go away quickly.  Generally has mm pain all the time.  Did have an episode where was mowing and branch hit top of head and kind of compressed down.  Had xray for that.  Had a lower back injection that helped the lower hips and down the legs.  HA persist.  Has been doing most of the ex, the band ones are harder to get to.  The ex are tolerated okay - think they have helped reduce the HA.  Feels the mm are getting stronger.    Objective Measurements:  Objective Measure: pain  Details: pain: neck: 5/10 (fairly normal number), R scapula: 0/10 (can increase quickly), R thumb/foream: 4/10, HA: 2/10    Pt had not been seen since 19 and returns today with a worsening of sx.    Goals:  Goal Identifier 1   Goal Description Pt will score 38% or less on the NDI (Neck Disability Index) indicating improved pain levels and function over time.   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   Target Date 06/29/19   Date Met      Progress:     Progress Toward Goals: Goals remain in progress.  Pt returns with a flare of sx, worsening paraesthesias and HA.  Will resume PT for now and pt also plans to follow up with his neurosurgeon.     Plan: Continue therapy per current plan of care.    Discharge: No

## 2019-08-01 ENCOUNTER — HOSPITAL ENCOUNTER (OUTPATIENT)
Dept: PHYSICAL THERAPY | Facility: CLINIC | Age: 54
Setting detail: THERAPIES SERIES
End: 2019-08-01
Attending: NURSE PRACTITIONER
Payer: COMMERCIAL

## 2019-08-01 PROCEDURE — 97140 MANUAL THERAPY 1/> REGIONS: CPT | Mod: GP | Performed by: PHYSICAL THERAPIST

## 2019-08-01 PROCEDURE — 97035 APP MDLTY 1+ULTRASOUND EA 15: CPT | Mod: GP | Performed by: PHYSICAL THERAPIST

## 2019-08-05 NOTE — PROGRESS NOTES
[facility]    CHIEF COMPLAINT:  Pain  -neck and low back pain    INTERVAL HISTORY:  Last seen on 7/11/2018.      Ketan was previously a patient of Dr. Angle Freeman. He is transferring to me due to location.       Recommendations/plan at the last visit included:  1. Physical Therapy:  YES, Keep appointments as scheduled   2. Clinical Health Psychologist:  NO, continue guided meditation    3. Diagnostic Studies: none  4. Medication Management:    1. Continue Gabapentin 1200 TID  2. Continue Robaxin 1000mg TID  3. Continue Tizanidine 4mg at bedtime  4. Continue Oxycodone 4/day  5. ContinueCymbalta 30mg in AM and 60mg at HS  5. Further procedures recommended:  None at this time  6. Recommendations to PCP. See above  7. Ketan to follow up with Primary Care provider regarding elevated blood pressure.      Follow up with this provider:  4 Weeks     Since his last visit, Indra Mehta reports:  -His overall condition is worse.     He states that is right arm is steadily getting worse. He states that he is having a hard time getting the tingling to stop. He has to put his head down in order to get the tingling to stop. He also reports constant right shoulder pain. He states that the left side continues to improve. He states that because he always has to keep his head forward, he is having more neck/muscle pain.     He states his low back is doing okay. He states that this is a little more bothersome from having to slouch, again due to the neck/arm issues.     Pain Information:   Pain quality: aching, numb, unbearable, burning, tiring, shooting, exhausting , throbbing, penetrating, stabbing, gnawing, miserable and nagging    Pain rating: intensity ranges from 4/10 to 8/10, and averages 6/10 on a 0-10 scale.   Pain today 5/10    SELF CARE:   How often do you practice SELF-CARE (relaxing, stretching, pacing, monitoring posture, taking mini-breaks) in a typical day:  He is trying, but he has been really busy  at work-has been easier to rest on the weekends    UDS: 4/1/19-reviewed and appropriate  Controlled Substance Agreement signed: 4/15/2019    CURRENT RELEVANT PAIN MEDICATIONS:  Gabapentin 400mg-Taking 3 capsules 3 times a day  Tizanidine 4mg-taking 1 at HS (helps get him to sleep-does not take with Robaxin)  Tylenol 500mg-1000mg 4x/day  Ibuprofen-800mg 4x/day  Robaxin- 500mg 2 Three times a day  Excedrin Migraine-takes occasionally-does not take on top of the tylenol and ibuprofen  Oxycodone-taking 4/day  Xanax-does not use daily, last use was about 3 weeks ago  Cymbalta 30mg twice daily    Patient is using the medication as prescribed:  YES  Is your medication helpful? YES   Medication side effects? no side effect    Previous Medications: (H--helped; HI--Helped initially; SWH-- somewhat helpful, NH--No help; W--worse; SE--side effects)   Norco/vicodin H  Oxycodone H  Flexeril - was helpful at night  Robaxin - may have helped, didn't make him sleepy  Tizanidine H  Gabapentin ?  Lyricelsie RAMIREZ sedation    Past Pain Treatments:  Injections:    - 11/8/18 bilateral L3-4 TFESI - (Dr Mcdaniel)  - 8/23/18 bilateral L3-4 TFESI - seems to have worsened pain (Dr Mcdaniel)  - 6/11/18 TFESI right C5-6 - helped with arm pain and numbness  - bilateral L3-4 TFESI 12/28/17  - Bilateral L4-5 TFESI 10/23/17  - Bilateral L3-4 TFESI 11/08/2018  -Bilateral L3-4 TFESI 6/14/2019  Surgery:  ACDF C6-7 on 7/5/17 with improvement; ACDF C5-6 12/19/2018  TENS unit: helpful  Physical therapy in [facility] Board of Pharmacy Data Base Reviewed:    YES; As expected, no concern for misuse/abuse of controlled medications based on this report.    THE 4 As OF OPIOID MAINTENANCE ANALGESIA    Analgesia: Is pain relief clinically significant? YES   Activity: Is patient functional and able to perform Activities of Daily Living? YES   Adverse effects: Is patient free from adverse side effects from opiates? YES   Adherence to Rx protocol: Is patient adhering to  "Controlled Substance Agreement and taking medications ONLY as ordered? YES       Is Narcan prescribed for opiate use >50 MME daily? N/A      Daily MME: 30    Medications:  Current Outpatient Medications   Medication Sig Dispense Refill     Acetaminophen (TYLENOL PO) Take 500 mg by mouth every 4 hours as needed for mild pain or fever       ALPRAZolam (XANAX XR) 0.5 MG 24 hr tablet Take 1 tablet (0.5 mg) by mouth At Bedtime 30 tablet 3     DULoxetine (CYMBALTA) 30 MG capsule Take 30mg in AM and 60mg at bedtime 90 capsule 1     gabapentin (NEURONTIN) 400 MG capsule Take 3 capsules (1,200 mg) by mouth 3 times daily 270 capsule 3     losartan (COZAAR) 50 MG tablet Take 1 tablet (50 mg) by mouth daily 90 tablet 0     methocarbamol (ROBAXIN) 500 MG tablet Take 2 tablets (1,000 mg) by mouth 3 times daily as needed for muscle spasms 60 tablet 3     order for DME Equipment being ordered: TENS Pads as directed 1 Device 0     oxyCODONE (ROXICODONE) 5 MG tablet Take 1 tab every 4-6 hours as needed for severe pain. MAX 4 TABS PER DAY. Okay to fill 8/08/19. To start 8/10/19 and last until 9/9/2019. 120 tablet 0     tiZANidine (ZANAFLEX) 4 MG tablet Take 1 tablet (4 mg) by mouth 2 times daily as needed for muscle spasms 60 tablet 3       Review of Systems: A 10-point review of systems was negative, with the exception of chronic pain issues, ringing in the ears, high blood pressure, abdominal pain, diarrhea, numbness and tingling, and stress.     Social History:  No changes since previous visit    Family history: no changes since previous visit    PHYSICAL EXAM:     Vitals:   BP (!) 150/86   Temp 98.2  F (36.8  C) (Temporal)   Ht 1.848 m (6' 0.75\")   Wt 93 kg (205 lb)   BMI 27.23 kg/m    Body mass index is 27.23 kg/m .  6' .75\"  205 lbs 0 oz      Constitutional: healthy, alert and no distress  HEENT: Head atraumatic, normocephalic. Eyes without conjunctival injection or jaundice. Neck supple. No obvious neck masses.   Skin: No " rash, lesions, or petechiae of exposed skin.   Psychiatric/mental status: Alert, without lethargy or stupor. Appropriate affect. Mood normal.     DIAGNOSTIC TESTS:  MRI Cervical Spine 7/25/2019  IMPRESSION:  1. Redemonstrated postoperative changes of anterior spinal fusion at C5-C6 and C6-C7.  2. Multilevel degenerative disc disease, uncovertebral and facet  arthrosis elsewhere in the cervical spine is most pronounced at C5-C6, where the findings result in moderate or moderate/severe right neural foraminal stenosis, primarily on the basis of uncovertebral and facet arthrosis. Correlate clinically for right C6 radiculopathy.    Assessment:  Indra Mehta is a 53 year old male who presents today for follow up regarding his:    1.  Cervicalgia  2.  S/p cervical spine fusion  3.  Myofascial pain  4.  Lumbar radiculopathy  5. Neuropathy  6. Chronic pain syndrome    He continues to have worsening arm pain. He will be meeting with neurosurgery today to discuss options. We also discussed trigger point injections for the muscle pain he is having from having to compensate for the tingling in his arm.         Plan:    Diagnosis reviewed, treatment option addressed, and risk/benifits discussed.  Self-care instructions given.  I am recommending a multidisciplinary treatment plan to help this patient better manage pain.      1. Physical Therapy:  YES, Keep appointments as scheduled   2. Clinical Health Psychologist:  NO, continue guided meditation    3. Diagnostic Studies: none  4. Medication Management:    1. Continue Gabapentin 1200 TID  2. Continue Robaxin 1000mg TID  3. Continue Tizanidine 4mg at bedtime  4. Continue Oxycodone 4/day, to last until 9/9/2019.  5. Continue Cymbalta 30mg in AM and  60mg at HS  5. Further procedures recommended:  Will await for recommendations from Dr. Mckenna. Could consider trigger point injections for myofascial pain.  6. Recommendations to PCP. See above  7. Ketan to follow up with Primary Care  provider regarding elevated blood pressure.      Follow up with this provider:  4 Weeks     Total time spent face to face was 15 minutes and more than 50% of face to face time was spent in counseling and/or coordination of care regarding the diagnosis and recommendations above.      Chiara Garcia PA-C   [facility]

## 2019-08-06 ENCOUNTER — OFFICE VISIT (OUTPATIENT)
Dept: SLEEP MEDICINE | Facility: CLINIC | Age: 54
End: 2019-08-06
Attending: FAMILY MEDICINE
Payer: COMMERCIAL

## 2019-08-06 ENCOUNTER — OFFICE VISIT (OUTPATIENT)
Dept: SLEEP MEDICINE | Facility: CLINIC | Age: 54
End: 2019-08-06
Attending: INTERNAL MEDICINE
Payer: COMMERCIAL

## 2019-08-06 VITALS
HEIGHT: 72 IN | WEIGHT: 205 LBS | DIASTOLIC BLOOD PRESSURE: 72 MMHG | HEART RATE: 67 BPM | OXYGEN SATURATION: 98 % | SYSTOLIC BLOOD PRESSURE: 118 MMHG | BODY MASS INDEX: 27.77 KG/M2

## 2019-08-06 DIAGNOSIS — G47.10 EXCESSIVE SLEEPINESS: ICD-10-CM

## 2019-08-06 DIAGNOSIS — G47.33 OSA (OBSTRUCTIVE SLEEP APNEA): ICD-10-CM

## 2019-08-06 DIAGNOSIS — G47.33 OSA (OBSTRUCTIVE SLEEP APNEA): Primary | ICD-10-CM

## 2019-08-06 PROCEDURE — G0399 HOME SLEEP TEST/TYPE 3 PORTA: HCPCS | Performed by: INTERNAL MEDICINE

## 2019-08-06 PROCEDURE — 99205 OFFICE O/P NEW HI 60 MIN: CPT | Performed by: INTERNAL MEDICINE

## 2019-08-06 ASSESSMENT — MIFFLIN-ST. JEOR: SCORE: 1812.87

## 2019-08-06 NOTE — NURSING NOTE
Does Indra have a CPAP/Bipap?  No   Weight management plan: Patient was referred to their PCP to discuss a diet and exercise plan.

## 2019-08-06 NOTE — PATIENT INSTRUCTIONS
Drive Safe... Drive Alive     Sleep health profoundly affects your health, mood, and your safety. 33% of the population (one in three of us) is not getting enough sleep and many have a sleep disorder. Not getting enough sleep or having an untreated / undertreated sleep condition may make us sleepy without even knowing it. In fact, our driving could be dramatically impaired due to our sleep health. As your provider, here are some things I would like you to know about driving:     Here are some warning signs for impairment and dangerous drowsy driving:              -Having been awake more than 16 hours               -Looking tired               -Eyelid drooping              -Head nodding (it could be too late at this point)              -Driving for more than 30 minutes     Some things you could do to make the driving safer if you are experiencing some drowsiness:              -Stop driving and rest              -Call for transportation              -Make sure your sleep disorder is adequately treated     Some things that have been shown NOT to work when experiencing drowsiness while driving:              -Turning on the radio              -Opening windows              -Eating any  distracting  /  entertaining  foods (e.g., sunflower seeds, candy, or any other)              -Talking on the phone      Your decision may not only impact your life, but also the life of others. Please, remember to drive safe for yourself and all of us.    Krishna Beltre

## 2019-08-06 NOTE — PROGRESS NOTES
SLEEP MEDICINE CLINIC NOTE   [facility]  Indra Mehta 54 year old male  : 1965  MRN: [id number]      PRIMARY CARE PROVIDER: Tha Grullon    REFERRING PROVIDER: Krishna Beltre MD  [address] [place] [address] [address]  [place] [address]    Visit Date:   2019      REASON FOR VISIT:  Evaluation of sleep apnea.      HISTORY OF PRESENT ILLNESS:  The patient is a very pleasant 54-year-old gentleman with history of major depression, Meniere's disease, hypertension, bilateral occipital neuralgia and chronic neck pain, currently on daily opiates and muscle relaxants who is seen in clinic for evaluation of difficulty initiating sleep, nonrestorative sleep, excessive daytime sleepiness and loud snoring.      The patient reports that he has been experiencing these symptoms for a number of years.  He describes his current routine as going to bed between 9:30 and 10:00 p.m.  It generally takes him about 45 minutes to an hour to fall asleep.  He usually lies in bed trying to fall asleep.  He is not able to ascribe any particular reason for his inability to fall asleep and denies significant ruminating thoughts or significant pain.  Once he falls asleep, he reports waking up 3-4 times throughout the night, either because of neck or back pain, which requires him to reposition or spontaneously.  Then, he finally wakes up spontaneously between 5:30 and 6:00 a.m.  He generally does not feel rested upon awakening.  Some days, he has sleep inertia.  He does report excessive daytime sleepiness, with an [place] sleepiness score of 15 with no chances of falling asleep while driving.  The patient does not take any scheduled naps during the day.  He follows a similar schedule most days of the week.      He denies any features of motor restlessness suggestive of restless legs syndrome.  He denies any frequent dreams, nightmares, dream enactment behavior.  He does have significant bruxism.  About 2-3  times per week, he wakes up with a headache in the morning.  He reports snoring, which is very loud in intensity.  He has frequent snort arousals, witnessed apneas, sore throat upon awakening and difficulty breathing through his nose.  He does not have GERD.  He prefers to sleep on his sides.      He denies any features of hypocretin deficiency, including cataplexy, sleep paralysis or hypnagogic or hypnopompic hallucinations.      SOCIAL HISTORY:  The patient is  and lives at home by himself, but has custody of his 2 daughters, who spend half of the week with him.  He denies smoking or any alcohol use.  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      FAMILY HISTORY:  He reports that his father passed away from abdominal aortic aneurysm.  Mother had colon cancer.  Two of his brothers snore very loudly, but have not been tested for obstructive sleep apnea.      PAST SURGICAL HISTORY:  Includes a cervical fusion, ear surgery for treatment of Meniere's disease.      PAST MEDICAL HISTORY:  Includes chronic neck and back pain, hypertension, Meniere's disease, major depression.      MEDICATIONS:  Include ibuprofen, Tylenol, methocarbamol, gabapentin, oxycodone 5 mg 4 times a day, Duloxetine, losartan, tizanidine and alprazolam 0.5 mg at bedtime for muscle tension.      REVIEW OF SYSTEMS:  A complete 14-point review of systems was performed and found negative except as noted above.      PHYSICAL EXAMINATION:   GENERAL:  Pleasant gentleman sitting comfortably and speaking full sentences without any discomfort.   HEENT:  Mallampati class 3 airway with a large tongue and prominent peripheral scalloping.  Type 1 malocclusion noted.  No obstruction to flow in the nares noted.   NECK:  Circumference 40 cm.   PULMONARY:  Normal  intensity breath sounds bilaterally with no added sounds.   CARDIOVASCULAR:  S1, S2 normal.  No murmur, rubs or gallops.  Regular rate and rhythm.   ABDOMEN:  Soft, nontender, nondistended, normoactive  bowel sounds.   MUSCULOSKELETAL:  No lower extremity edema or upper extremity tremors.   NEUROLOGIC:  Grossly intact.   PSYCHIATRIC:  Normal affect.   SKIN:  No rashes on limited exam.      ASSESSMENT AND PLAN:  The patient is a 54-year-old gentleman who is being seen in clinic for symptoms suggestive of obstructive sleep apnea.  We reviewed in detail the pathophysiology of SANDEEP as well as its various treatment options.  We discussed diagnostic testing with either a home sleep apnea test or an in-lab polysomnogram given high pretest probability without any significant contraindication.  The patient is a good candidate for home sleep apnea test.  This will be ordered for him.  He will return to clinic after the test is done to review the results and discuss treatment options.      He was counseled regarding the importance of maintaining healthy weight and not driving and operating heavy machinery if drowsy or sleepy.         I spent a total of 60 minutes face to face with Indra CHRIS Jose Gjeannie during today's office visit. Over 50% of this time was spent counseling the patient and/or coordinating care regarding their sleep disorder.     Krishna Beltre MD   of Medicine  Pulmonary, Critical Care and Sleep Medicine  [facility]  Pager: [phone]                 D: 2019   T: 2019   MT: AMANDA      Name:     INDRA ESQUEDA   MRN:      [id number]        Account:      [id number]   :      1965           Visit Date:   2019      Document: [id number]

## 2019-08-07 ENCOUNTER — OFFICE VISIT (OUTPATIENT)
Dept: SLEEP MEDICINE | Facility: CLINIC | Age: 54
End: 2019-08-07
Payer: COMMERCIAL

## 2019-08-07 DIAGNOSIS — R06.83 SNORING: Primary | ICD-10-CM

## 2019-08-07 DIAGNOSIS — Z98.1 S/P CERVICAL SPINAL FUSION: Primary | ICD-10-CM

## 2019-08-07 PROCEDURE — G0399 HOME SLEEP TEST/TYPE 3 PORTA: HCPCS | Performed by: INTERNAL MEDICINE

## 2019-08-08 ENCOUNTER — OFFICE VISIT (OUTPATIENT)
Dept: PALLIATIVE MEDICINE | Facility: CLINIC | Age: 54
End: 2019-08-08
Payer: COMMERCIAL

## 2019-08-08 ENCOUNTER — OFFICE VISIT (OUTPATIENT)
Dept: NEUROSURGERY | Facility: CLINIC | Age: 54
End: 2019-08-08
Payer: COMMERCIAL

## 2019-08-08 ENCOUNTER — ANCILLARY PROCEDURE (OUTPATIENT)
Dept: GENERAL RADIOLOGY | Facility: CLINIC | Age: 54
End: 2019-08-08
Attending: NEUROLOGICAL SURGERY
Payer: COMMERCIAL

## 2019-08-08 VITALS
DIASTOLIC BLOOD PRESSURE: 86 MMHG | HEIGHT: 73 IN | BODY MASS INDEX: 27.17 KG/M2 | SYSTOLIC BLOOD PRESSURE: 150 MMHG | WEIGHT: 205 LBS | TEMPERATURE: 98.2 F

## 2019-08-08 VITALS
TEMPERATURE: 98.2 F | WEIGHT: 205 LBS | SYSTOLIC BLOOD PRESSURE: 150 MMHG | DIASTOLIC BLOOD PRESSURE: 86 MMHG | BODY MASS INDEX: 27.17 KG/M2 | HEIGHT: 73 IN

## 2019-08-08 DIAGNOSIS — Z98.1 S/P CERVICAL SPINAL FUSION: Primary | ICD-10-CM

## 2019-08-08 DIAGNOSIS — Z98.1 S/P CERVICAL SPINAL FUSION: ICD-10-CM

## 2019-08-08 PROCEDURE — 99213 OFFICE O/P EST LOW 20 MIN: CPT | Performed by: PHYSICIAN ASSISTANT

## 2019-08-08 PROCEDURE — 99213 OFFICE O/P EST LOW 20 MIN: CPT | Performed by: NEUROLOGICAL SURGERY

## 2019-08-08 PROCEDURE — 72040 X-RAY EXAM NECK SPINE 2-3 VW: CPT | Mod: TC

## 2019-08-08 RX ORDER — OXYCODONE HYDROCHLORIDE 5 MG/1
TABLET ORAL
Qty: 120 TABLET | Refills: 0 | Status: SHIPPED | OUTPATIENT
Start: 2019-08-08 | End: 2019-09-09

## 2019-08-08 ASSESSMENT — MIFFLIN-ST. JEOR
SCORE: 1824.78
SCORE: 1824.78

## 2019-08-08 ASSESSMENT — PAIN SCALES - GENERAL
PAINLEVEL: MODERATE PAIN (5)
PAINLEVEL: MODERATE PAIN (5)

## 2019-08-08 NOTE — PATIENT INSTRUCTIONS
Today's Visit    1. Ordered a right C5-6 transformational Epidural Steroid Injection. [facility] will call you within 48 hours to schedule this. If you don't here from them, please schedule by calling Operating Room scheduling team s phone number: [phone], option 2. If no decline in symptoms 2 weeks after injections, call our clinic and talk to a nurse.    Please call our clinic with any questions or concerns    Ana Paula HAWKINS RN ([facility] Nurse)  [facility] -- [address] [address]  Phone:  [phone]   Fax:  [phone]

## 2019-08-08 NOTE — PROGRESS NOTES
Returns for follow up.  3 months of worsening right arm pain from shoulder to thumb.  Worse with neck extension.        Exam:  Constitutional:  Alert, well nourished, NAD.  HEENT: Normocephalic, atraumatic.   Pulm:  Without shortness of breath   CV:  No pitting edema of BLE.     Neurological:  Awake  Alert  Oriented x 3  Motor exam:     Shoulder Abduction:  Right:  5/5    Left:  5/5  Biceps:                      Right:  5/5    Left:  5/5  Triceps:                     Right:  5/5    Left:  5/5  Wrist Extensors:       Right:  5/5    Left:  5/5  Wrist Flexors:           Right:  5/5    Left:  5/5  Intrinsics:                  Right:  5/5    Left:  5/5     Able to spontaneously move U/E bilaterally  Sensation intact throughout all U/E dermatomes    Incisions:  Nicely healed.    Imaging:  AP and lateral films reveal stable hardware.     A/P: 53-year-old male, 3 months out from C5-6 ACDF.  He has a history of a prior C6-7 ACDF but had developed progressively worse right arm pain.     New MRI with post-surgical changes, some continued moderate right foraminal stenosis  Discussed that this usually wouldn't cause pain as the segment is fused  Will try right C5-6 SRIDEVI

## 2019-08-08 NOTE — LETTER
8/8/2019         RE: Indra Mehta  [id number] [facility] [id number]        Dear Colleague,    Thank you for referring your patient, Indra Mehta, to the [facility]. Please see a copy of my visit note below.    Returns for follow up.  3 months of worsening right arm pain from shoulder to thumb.  Worse with neck extension.        Exam:  Constitutional:  Alert, well nourished, NAD.  HEENT: Normocephalic, atraumatic.   Pulm:  Without shortness of breath   CV:  No pitting edema of BLE.     Neurological:  Awake  Alert  Oriented x 3  Motor exam:     Shoulder Abduction:  Right:  5/5    Left:  5/5  Biceps:                      Right:  5/5    Left:  5/5  Triceps:                     Right:  5/5    Left:  5/5  Wrist Extensors:       Right:  5/5    Left:  5/5  Wrist Flexors:           Right:  5/5    Left:  5/5  Intrinsics:                  Right:  5/5    Left:  5/5     Able to spontaneously move U/E bilaterally  Sensation intact throughout all U/E dermatomes    Incisions:  Nicely healed.    Imaging:  AP and lateral films reveal stable hardware.     A/P: 53-year-old male, 3 months out from C5-6 ACDF.  He has a history of a prior C6-7 ACDF but had developed progressively worse right arm pain.     New MRI with post-surgical changes, some continued moderate right foraminal stenosis  Discussed that this usually wouldn't cause pain as the segment is fused  Will try right C5-6 SRIDEVI      Again, thank you for allowing me to participate in the care of your patient.        Sincerely,        Mike Mckenna MD

## 2019-08-08 NOTE — NURSING NOTE
"Indra Mehta is a 54 year old male who presents for:  Chief Complaint   Patient presents with     Neurologic Problem        Initial Vitals:  BP (!) 150/86   Temp 98.2  F (36.8  C) (Temporal)   Ht 1.848 m (6' 0.75\")   Wt 93 kg (205 lb)   BMI 27.23 kg/m   Estimated body mass index is 27.23 kg/m  as calculated from the following:    Height as of this encounter: 1.848 m (6' 0.75\").    Weight as of this encounter: 93 kg (205 lb).. Body surface area is 2.18 meters squared. BP completed using cuff size: regular  Moderate Pain (5)        Nursing Comments:         Cinthia Gallegos  "

## 2019-08-08 NOTE — PATIENT INSTRUCTIONS
After Visit Instructions:     Thank you for coming to [facility] for your care. It is my goal to partner with you to help you reach your optimal state of health.     I am recommending multidisciplinary care at this time.  The focus of care will be to continue gradual rehabilitation and pain management with medication adjustments as needed.    Continue daily self-care, identifying contributing factors, and monitoring variations in pain level. Continue to integrate self-care into your life.        Schedule physical therapy assessment/visit: continue current plan    Schedule follow-up with Chiara Garcia PA-C in 4 weeks. You will need to make this appointment.     Procedures recommended: trigger points if needed     Medication recommendations:     Continue current medications    Oxycodone to last until 9/9/2019      Chiara Garcia PA-C  [facility]    Contact information: [facility]  Clinic Number:  [phone]     Call with any questions about your care and for scheduling assistance.     Calls are returned Monday through Friday between 8 AM and 4:30 PM. We usually get back to you within 2 business days depending on the issue/request.    If we are prescribing your medications:    For opioid medication refills, call the clinic or send a [employer] message 7 days in advance.  Please include:    Name of requested medication    Name of the pharmacy.    For non-opioid medications, call your pharmacy directly to request a refill. Please allow 3-4 days to be processed.     Per [place] State Law:    All controlled substance prescriptions must be filled within 30 days of being written.      For those controlled substances allowing refills, pickup must occur within 30 days of last fill.      We believe regular attendance is key to your success in our program!      Any time you are unable to keep your appointment we ask that you call us at least  24 hours in advance to cancel.This will allow us to offer the appointment time to another patient.   Multiple missed appointments may lead to dismissal from the clinic.

## 2019-08-09 ENCOUNTER — TELEPHONE (OUTPATIENT)
Dept: SURGERY | Facility: CLINIC | Age: 54
End: 2019-08-09

## 2019-08-09 NOTE — PROCEDURES
"HOME SLEEP STUDY INTERPRETATION    Patient: Indra Mehta  MRN: [id number]  YOB: 1965  Study Date: 8/7/2019  Referring Provider: Tha Grullon  Ordering Provider: Krishna Beltre MD     Indications for Home Study: Indra Mehta is a 54 year old male with a history of major depression, Meniere's disease, hypertension, bilateral occipital neuralgia and chronic neck pain, currently on daily opiates and muscle relaxants who presents with symptoms suggestive of obstructive sleep apnea.    Estimated body mass index is 27.23 kg/m  as calculated from the following:    Height as of 8/8/19: 1.848 m (6' 0.75\").    Weight as of 8/8/19: 93 kg (205 lb).  Total score - [place]: 13 (8/6/2019 10:00 AM)  [employer]ang Total Score: 6 (8/6/2019 10:00 AM)    Data: A full night home sleep study was performed recording the standard physiologic parameters including body position, movement, sound, nasal pressure, thermal oral airflow, chest and abdominal movements with respiratory inductance plethysmography, and oxygen saturation by pulse oximetry. Pulse rate was estimated by oximetry recording. This study was considered adequate based on > 4 hours of quality oximetry and respiratory recording. As specified by the AASM Manual for the Scoring of Sleep and Associated events, version 2.3, Rule VIII.D 1B, 4% oxygen desaturation scoring for hypopneas is used as a standard of care on all home sleep apnea testing.    Analysis Time:  452 minutes    Respiration:   Sleep Associated Hypoxemia: sustained hypoxemia was present. Baseline oxygen saturation was 95%.  Time with saturation less than or equal to 88% was 18 minutes. The lowest oxygen saturation was 78%.   Snoring: Snoring was present - loud and continuous.   Respiratory events: The home study revealed a presence of 57 obstructive apneas and 6 mixed and central apneas. There were 29 hypopneas resulting in a combined apnea/hypopnea index [AHI] of 12.2 events per hour.  AHI was " 40.2, 3.3 per hour on left side.   Pattern: Excluding events noted above, respiratory rate and pattern was Normal.    Position: Percent of time spent: supine - 24.8%, prone - 4.3%, on left - 56.1%, on right - 13.8%.    Heart Rate: By pulse oximetry normal rate was noted.     Assessment:   Mild obstructive sleep apnea.  Sleep associated hypoxemia was present.    Recommendations:  Consider auto-CPAP at 5-15 cmH2O, oral appliance therapy or positional therapy.  Suggest optimizing sleep hygiene and avoiding sleep deprivation.  Weight management.    Diagnosis Code(s): Obstructive Sleep Apnea G47.33, Hypoxemia G47.36, Snoring R06.83    Krishna Beltre MD, August 9, 2019   Diplomate, American Board of Internal Medicine, Sleep Medicine

## 2019-08-09 NOTE — PROGRESS NOTES
This HSAT was performed using a Noxturnal T3 device which recorded snore, sound, movement activity, body position, nasal pressure, oronasal thermal airflow, pulse, oximetry and both chest and abdominal respiratory effort. HSAT data was restricted to the time patient states they were in bed.     HSAT was scored using 1B 4% hypopnea rule.     AHI: 12.2. Snoring was reported as loud.  Time with SpO2 below 89% was 18.0 minutes.   Overall signal quality was good     Pt will follow up with sleep provider to determine appropriate therapy.     Ordering Patt STAFFORD Umesh, MD Charles O., BA, RPS, RST System Clinical Specialist 08/09/2019

## 2019-08-09 NOTE — PROGRESS NOTES
Patient presented to clinic for  and demonstration of the HST. Patient was set up and instructed use. Patient verbalized understanding and demonstrated set up back to me. Patient will be returning device by 8/7/2019.    Patient was given sleep logs and written instructions for use.

## 2019-08-09 NOTE — TELEPHONE ENCOUNTER
Contacted patient to schedule SRIDEVI  Date:8/22/19  Time:1500  Dr. Mcdaniel    Instructed pt to have H&P and  for procedure.

## 2019-08-12 ENCOUNTER — HOSPITAL ENCOUNTER (OUTPATIENT)
Dept: PHYSICAL THERAPY | Facility: CLINIC | Age: 54
Setting detail: THERAPIES SERIES
End: 2019-08-12
Attending: NURSE PRACTITIONER
Payer: COMMERCIAL

## 2019-08-12 DIAGNOSIS — M54.2 CERVICALGIA: ICD-10-CM

## 2019-08-12 PROCEDURE — 97140 MANUAL THERAPY 1/> REGIONS: CPT | Mod: GP | Performed by: PHYSICAL THERAPIST

## 2019-08-12 PROCEDURE — 97035 APP MDLTY 1+ULTRASOUND EA 15: CPT | Mod: GP | Performed by: PHYSICAL THERAPIST

## 2019-08-12 RX ORDER — DULOXETIN HYDROCHLORIDE 30 MG/1
CAPSULE, DELAYED RELEASE ORAL
Qty: 90 CAPSULE | Refills: 1 | Status: SHIPPED | OUTPATIENT
Start: 2019-08-12 | End: 2019-08-16

## 2019-08-16 ENCOUNTER — OFFICE VISIT (OUTPATIENT)
Dept: FAMILY MEDICINE | Facility: OTHER | Age: 54
End: 2019-08-16
Payer: COMMERCIAL

## 2019-08-16 VITALS
WEIGHT: 200 LBS | TEMPERATURE: 97 F | SYSTOLIC BLOOD PRESSURE: 132 MMHG | BODY MASS INDEX: 26.51 KG/M2 | HEIGHT: 73 IN | DIASTOLIC BLOOD PRESSURE: 80 MMHG | OXYGEN SATURATION: 96 % | HEART RATE: 102 BPM | RESPIRATION RATE: 16 BRPM

## 2019-08-16 DIAGNOSIS — I10 BENIGN ESSENTIAL HYPERTENSION: ICD-10-CM

## 2019-08-16 DIAGNOSIS — Z01.818 PREOP GENERAL PHYSICAL EXAM: Primary | ICD-10-CM

## 2019-08-16 DIAGNOSIS — M50.30 DDD (DEGENERATIVE DISC DISEASE), CERVICAL: ICD-10-CM

## 2019-08-16 DIAGNOSIS — G89.4 CHRONIC PAIN SYNDROME: ICD-10-CM

## 2019-08-16 PROCEDURE — 99214 OFFICE O/P EST MOD 30 MIN: CPT | Performed by: INTERNAL MEDICINE

## 2019-08-16 ASSESSMENT — PAIN SCALES - GENERAL: PAINLEVEL: SEVERE PAIN (6)

## 2019-08-16 ASSESSMENT — ANXIETY QUESTIONNAIRES
7. FEELING AFRAID AS IF SOMETHING AWFUL MIGHT HAPPEN: SEVERAL DAYS
3. WORRYING TOO MUCH ABOUT DIFFERENT THINGS: SEVERAL DAYS
6. BECOMING EASILY ANNOYED OR IRRITABLE: SEVERAL DAYS
1. FEELING NERVOUS, ANXIOUS, OR ON EDGE: SEVERAL DAYS
GAD7 TOTAL SCORE: 5
5. BEING SO RESTLESS THAT IT IS HARD TO SIT STILL: NOT AT ALL
2. NOT BEING ABLE TO STOP OR CONTROL WORRYING: NOT AT ALL
IF YOU CHECKED OFF ANY PROBLEMS ON THIS QUESTIONNAIRE, HOW DIFFICULT HAVE THESE PROBLEMS MADE IT FOR YOU TO DO YOUR WORK, TAKE CARE OF THINGS AT HOME, OR GET ALONG WITH OTHER PEOPLE: NOT DIFFICULT AT ALL

## 2019-08-16 ASSESSMENT — MIFFLIN-ST. JEOR: SCORE: 1802.1

## 2019-08-16 ASSESSMENT — PATIENT HEALTH QUESTIONNAIRE - PHQ9
5. POOR APPETITE OR OVEREATING: SEVERAL DAYS
SUM OF ALL RESPONSES TO PHQ QUESTIONS 1-9: 7

## 2019-08-16 NOTE — PROGRESS NOTES
[facility]  [address] [facility] [id number] [phone]  Dept: [phone]    PRE-OP EVALUATION:  Today's date: 2019    Indra Mehta (: 1965) presents for pre-operative evaluation assessment as requested by Dr. arias.  He requires evaluation and anesthesia risk assessment prior to undergoing surgery/procedure for treatment of back pain .      Primary Physician: Tha Grullon  Type of Anesthesia Anticipated: Local with MAC    Patient has a Health Care Directive or Living Will:  NO    Preop Questions 2019   Who is doing your surgery? juana   What are you having done? cervical epidermal injection   Date of Surgery/Procedure:    Facility or Hospital where procedure/surgery will be performed: [facility]   1.  Do you have a history of Heart attack, stroke, stent, coronary bypass surgery, or other heart surgery? No   2.  Do you ever have any pain or discomfort in your chest? No   3.  Do you have a history of  Heart Failure? No   4.   Are you troubled by shortness of breath when:  walking on a level surface, or up a slight hill, or at night? No   5.  Do you currently have a cold, bronchitis or other respiratory infection? No   6.  Do you have a cough, shortness of breath, or wheezing? No   7.  Do you sometimes get pains in the calves of your legs when you walk? No   8. Do you or anyone in your family have previous history of blood clots? No   9.  Do you or does anyone in your family have a serious bleeding problem such as prolonged bleeding following surgeries or cuts? No   10. Have you ever had problems with anemia or been told to take iron pills? No   11. Have you had any abnormal blood loss such as black, tarry or bloody stools? No   12. Have you ever had a blood transfusion? No   13. Have you or any of your relatives ever had problems with anesthesia? No   14. Do you have sleep apnea, excessive snoring or daytime drowsiness? No   15. Do you have any prosthetic heart  valves? No   16. Do you have prosthetic joints? No         HPI:     HPI related to upcoming procedure: The patient has a history of chronic neck pain.  He has had previous cervical fusions and has been working with the pain clinic for a long time.  He has now been scheduled for epidural injections of the cervical spine.    See problem list for active medical problems.  Problems all longstanding and stable, except as noted/documented.  See ROS for pertinent symptoms related to these conditions.        See problem list for active medical problems.  Problems all longstanding and stable, except as noted/documented.  See ROS for pertinent symptoms related to these conditions.      MEDICAL HISTORY:     Patient Active Problem List    Diagnosis Date Noted     Benign essential hypertension 06/30/2017     Priority: Medium     Myalgia 10/28/2016     Priority: Medium     Bilateral occipital neuralgia 10/28/2016     Priority: Medium     CARDIOVASCULAR SCREENING; LDL GOAL LESS THAN 160 10/31/2010     Priority: Medium     Major depression in complete remission (H) 08/20/2010     Priority: Medium     Chronic pain syndrome 08/20/2010     Priority: Medium     Patient is followed by Tha Grullon MD for ongoing prescription of pain medication.  All refills should be approved by this provider only at face-to-face appointments - not by phone request.    Medication(s): Tramadol.   Maximum quantity per month: 60  Clinic visit frequency required: Q 1 months     Controlled substance agreement:  Encounter-Level CSA - 10/13/16:               Controlled Substance Agreement - Scan on 10/23/2016  5:07 PM : CONTROLLED SUBSTANCE AGREEMENT 10/13/16 (below)            Pain Clinic evaluation in the past: Yes       Date/Location:   [facility]    DIRE Total Score(s):  No flowsheet data found.    Last [facility] website verification:  none   [email or web address]               Pain medication agreement signed 08/20/2010     Priority: Medium      Meniere's disease 03/22/2007     Priority: Medium     Problem list name updated by automated process. Provider to review        Past Medical History:   Diagnosis Date     Back pain      Meniere's disease, unspecified      Pain medication agreement signed 8/20/2010     Past Surgical History:   Procedure Laterality Date     BUNIONECTOMY RT/LT  09/05/08    Both feet     COLONOSCOPY N/A 12/28/2016    Procedure: COMBINED COLONOSCOPY, SINGLE OR MULTIPLE BIOPSY/POLYPECTOMY BY BIOPSY;  Surgeon: Indra Jernigan MD;  Location: [facility]     DISCECTOMY, FUSION CERVICAL ANTERIOR ONE LEVEL, COMBINED N/A 7/5/2017    Procedure: COMBINED DISCECTOMY, FUSION CERVICAL ANTERIOR ONE LEVEL;  Cervical 6-7, Anterior cervical discectomy fusion;  Surgeon: Mike Mckenna MD;  Location: [facility]     DISCECTOMY, FUSION CERVICAL ANTERIOR ONE LEVEL, COMBINED N/A 12/19/2018    Procedure: cervical 5-6 anterior cervical discectomy fusion;  Surgeon: Mike Mckenna MD;  Location: [facility]     HC COLONOSCOPY W/WO BRUSH/WASH  12/27/2005     HC CREATE EARDRUM OPENING,GEN ANESTH  09/27/2007    Pe tube, Left endolymphatic sac enhancement.     HC MASTOIDECTOMY,COMPLETE  09/27/2007     HERNIORRHAPHY UMBILICAL N/A 8/11/2017    Procedure: HERNIORRHAPHY UMBILICAL;  open umbilical hernia repair;  Surgeon: Boni Story MD;  Location: [facility]     INJECT EPIDURAL LUMBAR N/A 11/9/2017    Procedure: INJECT EPIDURAL LUMBAR;  lumbar 4-5 epidural steroid injection ;  Surgeon: Darryn Mcdaniel MD;  [facility]     INJECT EPIDURAL LUMBAR N/A 12/28/2017    Procedure: INJECT EPIDURAL LUMBAR;  lumbar epidural injection;  Surgeon: Darryn Mcdaniel MD;  [facility]     INJECT EPIDURAL TRANSFORAMINAL Bilateral 8/23/2018    Procedure: INJECT EPIDURAL TRANSFORAMINAL;  transforaminal bilateral lumbar 3-4 steroid injection;  Surgeon: Darryn Mcdaniel MD;  [facility]     INJECT EPIDURAL TRANSFORAMINAL Bilateral 11/8/2018    Procedure: INJECT EPIDURAL  TRANSFORAMINAL lumbar 3-4;  Surgeon: Darryn Mcdaniel MD;  [facility]     INJECT EPIDURAL TRANSFORAMINAL Bilateral 6/14/2019    Procedure: INJECTION, EPIDURAL, TRANSFORAMINAL LUMBAR 3-4 BILATERAL;  Surgeon: Darryn Mcdaniel MD;  Location: [facility]     PE TUBES  2006    left ear     Current Outpatient Medications   Medication Sig Dispense Refill     Acetaminophen (TYLENOL PO) Take 500 mg by mouth every 4 hours as needed for mild pain or fever       ALPRAZolam (XANAX XR) 0.5 MG 24 hr tablet Take 1 tablet (0.5 mg) by mouth At Bedtime 30 tablet 3     gabapentin (NEURONTIN) 400 MG capsule Take 3 capsules (1,200 mg) by mouth 3 times daily 270 capsule 3     losartan (COZAAR) 50 MG tablet Take 1 tablet (50 mg) by mouth daily 90 tablet 0     methocarbamol (ROBAXIN) 500 MG tablet Take 2 tablets (1,000 mg) by mouth 3 times daily as needed for muscle spasms 60 tablet 3     order for DME Equipment being ordered: TENS Pads as directed 1 Device 0     oxyCODONE (ROXICODONE) 5 MG tablet Take 1 tab every 4-6 hours as needed for severe pain. MAX 4 TABS PER DAY. Okay to fill 8/08/19. To start 8/10/19 and last until 9/9/2019. 120 tablet 0     tiZANidine (ZANAFLEX) 4 MG tablet Take 1 tablet (4 mg) by mouth 2 times daily as needed for muscle spasms 60 tablet 3     OTC products: None, except as noted above    Allergies   Allergen Reactions     No Known Drug Allergies       Latex Allergy: NO    Social History     Tobacco Use     Smoking status: Never Smoker     Smokeless tobacco: Never Used   Substance Use Topics     Alcohol use: No     Alcohol/week: 0.0 oz     History   Drug Use No       REVIEW OF SYSTEMS:   CONSTITUTIONAL: NEGATIVE for fever, chills, change in weight  ENT/MOUTH: NEGATIVE for ear, mouth and throat problems  RESP: NEGATIVE for significant cough or SOB  CV: NEGATIVE for chest pain, palpitations or peripheral edema    EXAM:   /80 (BP Location: Left arm, Patient Position: Sitting, Cuff Size: Adult Regular)   Pulse 102   " Temp 97  F (36.1  C) (Temporal)   Resp 16   Ht 1.848 m (6' 0.75\")   Wt 90.7 kg (200 lb)   SpO2 96%   BMI 26.57 kg/m    GENERAL APPEARANCE: healthy, alert and no distress  HENT: ear canals and TM's normal and nose and mouth without ulcers or lesions  RESP: lungs clear to auscultation - no rales, rhonchi or wheezes  CV: regular rate and rhythm, normal S1 S2, no S3 or S4 and no murmur, click or rub   ABDOMEN: soft, nontender, no HSM or masses and bowel sounds normal  NEURO: Normal strength and tone, sensory exam grossly normal, mentation intact and speech normal    DIAGNOSTICS:   No labs or EKG required for low risk surgery (cataract, skin procedure, breast biopsy, etc)    Recent Labs   Lab Test 06/11/19  1113 11/05/18  1532   HGB 14.5 14.0    271    142   POTASSIUM 3.9 3.7   CR 0.95 0.96        IMPRESSION:   Reason for surgery/procedure: Degenerative disc disease of the cervical spine requiring epidural injection for pain management  Diagnosis/reason for consult: Perioperative risk stratification in a pleasant 54-year-old gentleman with:  1. DDD (degenerative disc disease), cervical    2. Preop general physical exam    3. Benign essential hypertension    4. Chronic pain syndrome    The proposed surgical procedure is considered LOW risk.    REVISED CARDIAC RISK INDEX  The patient has the following serious cardiovascular risks for perioperative complications such as (MI, PE, VFib and 3  AV Block):  No serious cardiac risks  INTERPRETATION: 0 risks: Class I (very low risk - 0.4% complication rate)    The patient has the following additional risks for perioperative complications:  No identified additional risks      ICD-10-CM    1. Preop general physical exam Z01.818        RECOMMENDATIONS:         --Patient is to take all scheduled medications on the day of surgery.      APPROVAL GIVEN to proceed with proposed procedure, without further diagnostic evaluation       Signed Electronically by: Evangelista" Pablo See, DO    Copy of this evaluation report is provided to requesting physician.    [place] Preop Guidelines    Revised Cardiac Risk Index

## 2019-08-17 ASSESSMENT — ANXIETY QUESTIONNAIRES: GAD7 TOTAL SCORE: 5

## 2019-08-19 ENCOUNTER — HOSPITAL ENCOUNTER (OUTPATIENT)
Dept: PHYSICAL THERAPY | Facility: CLINIC | Age: 54
Setting detail: THERAPIES SERIES
End: 2019-08-19
Attending: NURSE PRACTITIONER
Payer: COMMERCIAL

## 2019-08-19 PROCEDURE — 97140 MANUAL THERAPY 1/> REGIONS: CPT | Mod: GP | Performed by: PHYSICAL THERAPIST

## 2019-08-20 DIAGNOSIS — I10 BENIGN ESSENTIAL HYPERTENSION: ICD-10-CM

## 2019-08-20 RX ORDER — LOSARTAN POTASSIUM 50 MG/1
50 TABLET ORAL DAILY
Qty: 90 TABLET | Refills: 0 | Status: SHIPPED | OUTPATIENT
Start: 2019-08-20 | End: 2019-11-12

## 2019-08-20 NOTE — TELEPHONE ENCOUNTER
"Requested Prescriptions   Pending Prescriptions Disp Refills     losartan (COZAAR) 50 MG tablet 90 tablet 0     Sig: Take 1 tablet (50 mg) by mouth daily   Last Written Prescription Date:  5/22/19  Last Fill Quantity: 90,  # refills: 0   Last office visit: 8/16/2019 with prescribing provider:  8/16/19   Future Office Visit:   Next 5 appointments (look out 90 days)    Sep 09, 2019  8:00 AM CDT  Return Visit with Chiara Garcia PA-C  [facility] [address] [place] [address] [id number] [phone]           Angiotensin-II Receptors Passed - 8/20/2019 11:36 AM        Passed - Last blood pressure under 140/90 in past 12 months     BP Readings from Last 3 Encounters:   08/16/19 132/80   08/08/19 (!) 150/86   08/08/19 (!) 150/86                 Passed - Recent (12 mo) or future (30 days) visit within the authorizing provider's specialty     Patient had office visit in the last 12 months or has a visit in the next 30 days with authorizing provider or within the authorizing provider's specialty.  See \"Patient Info\" tab in inbasket, or \"Choose Columns\" in Meds & Orders section of the refill encounter.              Passed - Medication is active on med list        Passed - Patient is age 18 or older        Passed - Normal serum creatinine on file in past 12 months     Recent Labs   Lab Test 06/11/19  1113   CR 0.95             Passed - Normal serum potassium on file in past 12 months     Recent Labs   Lab Test 06/11/19  1113   POTASSIUM 3.9                    "

## 2019-08-20 NOTE — TELEPHONE ENCOUNTER
Prescription approved per [facility] Refill Protocol.    ANGELINA ThomasN, RN  [facility]

## 2019-08-22 ENCOUNTER — HOSPITAL ENCOUNTER (OUTPATIENT)
Facility: CLINIC | Age: 54
Discharge: HOME | End: 2019-08-22
Attending: ANESTHESIOLOGY | Admitting: ANESTHESIOLOGY
Payer: COMMERCIAL

## 2019-08-22 ENCOUNTER — ANESTHESIA EVENT (OUTPATIENT)
Dept: SURGERY | Facility: CLINIC | Age: 54
End: 2019-08-22
Payer: COMMERCIAL

## 2019-08-22 ENCOUNTER — HOSPITAL ENCOUNTER (OUTPATIENT)
Dept: GENERAL RADIOLOGY | Facility: CLINIC | Age: 54
End: 2019-08-22
Attending: ANESTHESIOLOGY | Admitting: ANESTHESIOLOGY
Payer: COMMERCIAL

## 2019-08-22 ENCOUNTER — ANESTHESIA (OUTPATIENT)
Dept: SURGERY | Facility: CLINIC | Age: 54
End: 2019-08-22
Payer: COMMERCIAL

## 2019-08-22 VITALS
SYSTOLIC BLOOD PRESSURE: 155 MMHG | OXYGEN SATURATION: 96 % | HEART RATE: 60 BPM | DIASTOLIC BLOOD PRESSURE: 95 MMHG | RESPIRATION RATE: 16 BRPM | TEMPERATURE: 97.9 F

## 2019-08-22 DIAGNOSIS — Z98.1 S/P CERVICAL SPINAL FUSION: ICD-10-CM

## 2019-08-22 PROCEDURE — 25000125 ZZHC RX 250: Performed by: NURSE ANESTHETIST, CERTIFIED REGISTERED

## 2019-08-22 PROCEDURE — 25000128 H RX IP 250 OP 636: Performed by: NURSE ANESTHETIST, CERTIFIED REGISTERED

## 2019-08-22 PROCEDURE — 37000008 ZZH ANESTHESIA TECHNICAL FEE, 1ST 30 MIN: Performed by: ANESTHESIOLOGY

## 2019-08-22 PROCEDURE — 62321 NJX INTERLAMINAR CRV/THRC: CPT | Performed by: ANESTHESIOLOGY

## 2019-08-22 PROCEDURE — 25000128 H RX IP 250 OP 636: Performed by: ANESTHESIOLOGY

## 2019-08-22 PROCEDURE — 40000277 XR SURGERY CARM FLUORO LESS THAN 5 MIN W STILLS: Mod: TC

## 2019-08-22 RX ORDER — NALOXONE HYDROCHLORIDE 0.4 MG/ML
.1-.4 INJECTION, SOLUTION INTRAMUSCULAR; INTRAVENOUS; SUBCUTANEOUS
Status: DISCONTINUED | OUTPATIENT
Start: 2019-08-22 | End: 2019-08-22 | Stop reason: HOSPADM

## 2019-08-22 RX ORDER — LIDOCAINE HYDROCHLORIDE 20 MG/ML
INJECTION, SOLUTION INFILTRATION; PERINEURAL PRN
Status: DISCONTINUED | OUTPATIENT
Start: 2019-08-22 | End: 2019-08-22

## 2019-08-22 RX ORDER — ONDANSETRON 2 MG/ML
4 INJECTION INTRAMUSCULAR; INTRAVENOUS EVERY 30 MIN PRN
Status: DISCONTINUED | OUTPATIENT
Start: 2019-08-22 | End: 2019-08-22 | Stop reason: HOSPADM

## 2019-08-22 RX ORDER — TRIAMCINOLONE ACETONIDE 40 MG/ML
INJECTION, SUSPENSION INTRA-ARTICULAR; INTRAMUSCULAR PRN
Status: DISCONTINUED | OUTPATIENT
Start: 2019-08-22 | End: 2019-08-22 | Stop reason: HOSPADM

## 2019-08-22 RX ORDER — ALBUTEROL SULFATE 0.83 MG/ML
2.5 SOLUTION RESPIRATORY (INHALATION) EVERY 4 HOURS PRN
Status: DISCONTINUED | OUTPATIENT
Start: 2019-08-22 | End: 2019-08-22 | Stop reason: HOSPADM

## 2019-08-22 RX ORDER — LIDOCAINE 40 MG/G
CREAM TOPICAL
Status: DISCONTINUED | OUTPATIENT
Start: 2019-08-22 | End: 2019-08-22 | Stop reason: HOSPADM

## 2019-08-22 RX ORDER — PROPOFOL 10 MG/ML
INJECTION, EMULSION INTRAVENOUS PRN
Status: DISCONTINUED | OUTPATIENT
Start: 2019-08-22 | End: 2019-08-22

## 2019-08-22 RX ORDER — ONDANSETRON 4 MG/1
4 TABLET, ORALLY DISINTEGRATING ORAL EVERY 30 MIN PRN
Status: DISCONTINUED | OUTPATIENT
Start: 2019-08-22 | End: 2019-08-22 | Stop reason: HOSPADM

## 2019-08-22 RX ORDER — SODIUM CHLORIDE, SODIUM LACTATE, POTASSIUM CHLORIDE, CALCIUM CHLORIDE 600; 310; 30; 20 MG/100ML; MG/100ML; MG/100ML; MG/100ML
INJECTION, SOLUTION INTRAVENOUS CONTINUOUS
Status: DISCONTINUED | OUTPATIENT
Start: 2019-08-22 | End: 2019-08-22 | Stop reason: HOSPADM

## 2019-08-22 RX ORDER — IOPAMIDOL 612 MG/ML
INJECTION, SOLUTION INTRATHECAL PRN
Status: DISCONTINUED | OUTPATIENT
Start: 2019-08-22 | End: 2019-08-22 | Stop reason: HOSPADM

## 2019-08-22 RX ORDER — MEPERIDINE HYDROCHLORIDE 25 MG/ML
12.5 INJECTION INTRAMUSCULAR; INTRAVENOUS; SUBCUTANEOUS
Status: DISCONTINUED | OUTPATIENT
Start: 2019-08-22 | End: 2019-08-22 | Stop reason: HOSPADM

## 2019-08-22 RX ADMIN — LIDOCAINE HYDROCHLORIDE 40 MG: 20 INJECTION, SOLUTION INFILTRATION; PERINEURAL at 15:55

## 2019-08-22 RX ADMIN — LIDOCAINE HYDROCHLORIDE 0.1 ML: 10 INJECTION, SOLUTION EPIDURAL; INFILTRATION; INTRACAUDAL; PERINEURAL at 14:41

## 2019-08-22 RX ADMIN — PROPOFOL 50 MG: 10 INJECTION, EMULSION INTRAVENOUS at 15:56

## 2019-08-22 RX ADMIN — PROPOFOL 50 MG: 10 INJECTION, EMULSION INTRAVENOUS at 15:58

## 2019-08-22 NOTE — ANESTHESIA CARE TRANSFER NOTE
Patient: Indra Mehta    Procedure(s):  INJECTION, SPINE, CERVICAL 6-7 EPIDURAL    Diagnosis: S/P cervical spinal fusion  - Primary  Diagnosis Additional Information: No value filed.    Anesthesia Type:   MAC     Note:  Airway :Room Air  Patient transferred to:PACU  Handoff Report: Identifed the Patient, Identified the Reponsible Provider, Reviewed the pertinent medical history, Discussed the surgical course, Reviewed Intra-OP anesthesia mangement and issues during anesthesia, Set expectations for post-procedure period and Allowed opportunity for questions and acknowledgement of understanding      Vitals: (Last set prior to Anesthesia Care Transfer)    CRNA VITALS  8/22/2019 1534 - 8/22/2019 1612      8/22/2019             SpO2:  99 %    Resp Rate (observed):  13                Electronically Signed By: YUMIKO Atkinson CRNA  August 22, 2019  4:12 PM

## 2019-08-22 NOTE — ANESTHESIA POSTPROCEDURE EVALUATION
Patient: Indra Mehta    Procedure(s):  INJECTION, SPINE, CERVICAL 6-7 EPIDURAL    Diagnosis:S/P cervical spinal fusion  - Primary  Diagnosis Additional Information: No value filed.    Anesthesia Type:  MAC    Note:  Anesthesia Post Evaluation    Patient location during evaluation: Phase 2  Patient participation: Able to fully participate in evaluation  Level of consciousness: awake and alert  Pain management: adequate  Airway patency: patent  Cardiovascular status: acceptable and stable  Respiratory status: acceptable and room air  Hydration status: acceptable  PONV: none     Anesthetic complications: None    Comments:  Patient was happy with the anesthesia care received and no anesthesia related complications were noted.  I will follow up with the patient again if it is needed.        Last vitals:  Vitals:    08/22/19 1430   BP: (!) 144/97   Pulse: 72   Resp: 16   Temp: 97.9  F (36.6  C)   SpO2: 98%         Electronically Signed By: YUMIKO Atkinson CRNA  August 22, 2019  4:13 PM

## 2019-08-22 NOTE — OP NOTE
CHIEF COMPLAINT: Right arm pain and neck pain secondary to a C6 radiculopathy with an in situ C5-C7 fusion  PROCEDURE: C6-7 Interlaminar epidural steroid injection using fluoroscopic guidance with contrast dye.   PROCEDURE DETAILS: After written informed consent was obtained from the patient, the patient was escorted to the procedure room.  The patient was placed in the prone position.  A  time out  was conducted to verify patient identity, procedure to be performed, side, site, allergies and any special requirements.  The skin over the neck and upper back region were prepped and draped in normal sterile fashion. Fluoroscopy was used to identify the C6-7 interspace in an AP view and the skin was anesthetized with 2 mL of 1% lidocaine with bicarbonate buffer.  A 20-gauge 3-1/2 inch Tuohy needle was advanced using the loss of resistance technique with preservative free normal saline with fluoroscopic guidance. After negative aspiration for CSF and blood, 1.5 cc of Isovue contrast dye was injected revealing the appropriate cervical epidurogram without evidence of intrathecal or intravascular spread. Following this, a 3-mL solution of 40 mg of triamcinolone with 2 cc preservative-free normal saline was slowly injected.  After injection of the medication, as the needle tip was withdrawn, it was flushed with local anesthetic.  The patient was monitored with blood pressure and pulse oximetry machines with the assistance of an RN throughout the procedure.  The patient was alert and responsive to questions throughout the procedure.   The patient tolerated the procedure well and was observed in the post-procedural area.  The patient was dismissed without apparent complications.     DIAGNOSIS:  1.  Right arm pain and neck pain secondary to a C6 radiculopathy  PLAN:  1. Performed a C6-7 interlaminar epidural steroid injection.   2. The patient was instructed to call the [place] [facility] clinic if today's procedure is not  helpful.    Darryn Mcdaniel MD  Diplomate of the American Board of Anesthesiology, Pain Medicine

## 2019-08-22 NOTE — DISCHARGE INSTRUCTIONS
Home Care Instructions                Procedure:  Epidural Steroid Injection or Joint injection    Activity:    Rest today    Do not work today    Resume normal activity tomorrow    Pain:    You may experience soreness at the injection site for one or two days    You may use an ice pack for 20 minutes every 2 hours for the first 24 hours    You may use a heating pad after the first 24 hours    You may use Tylenol  (acetaminophen) every 4 hours or other pain medicines as directed by your physician    Safety  Sedation medicine, if given may remain active for many hours.    It is important for the next 24 hours that you do not:    Drive a car    Operate machines or power tools    Consume alcohol, including beer    Sign any important papers or legal documents    You may experience numbness radiating into your legs or arms, (depending on the procedure location)  This numbness may last several hours.  Until the numb sensation returns to normal please use caution in walking, climbing stairs, stepping out of your vehicle, etc.    Common side effects of steroids:  Not everyone will experience corticosteroid side effects. If side effects are experienced they will gradually subside in the 7-10 day period following an injection.    Most common side effects include:    Flushed face and/or chest    Feeling of warmth, particularly in face but could be overall feeling of warmth    Increased blood sugar in diabetic patients    Menstrual irregularities may occur.  If taking hormone based birth control an alternate method of birth control is recommended    Sleep disturbances and/or mood swings are possible    Leg cramps    Please contact us if you have:  Severe pain   Fever more than 101.5 degrees Fahrenheit  Signs of infection (redness, swelling or drainage)      If you have questions during normal business hours (8am-5pm Monday-Friday) contact the [facility] at [phone]. If you need help after hours, we recommend that  you go to a hospital emergency room or dial 911.

## 2019-08-22 NOTE — ANESTHESIA PREPROCEDURE EVALUATION
Anesthesia Pre-Procedure Evaluation    Patient: Indra Mehta   MRN: [id number] : 1965          Preoperative Diagnosis: cervical radiculopathy    Procedure(s):  cervical 5-6 anterior cervical discectomy fusion    Past Medical History:   Diagnosis Date     Back pain      Meniere's disease, unspecified      Pain medication agreement signed 2010     Past Surgical History:   Procedure Laterality Date     BUNIONECTOMY RT/LT  08    Both feet     COLONOSCOPY N/A 2016    Procedure: COMBINED COLONOSCOPY, SINGLE OR MULTIPLE BIOPSY/POLYPECTOMY BY BIOPSY;  Surgeon: Indra Jernigan MD;  Location: [facility]     DISCECTOMY, FUSION CERVICAL ANTERIOR ONE LEVEL, COMBINED N/A 2017    Procedure: COMBINED DISCECTOMY, FUSION CERVICAL ANTERIOR ONE LEVEL;  Cervical 6-7, Anterior cervical discectomy fusion;  Surgeon: Mike Mckenna MD;  Location: [facility]     DISCECTOMY, FUSION CERVICAL ANTERIOR ONE LEVEL, COMBINED N/A 2018    Procedure: cervical 5-6 anterior cervical discectomy fusion;  Surgeon: Mike Mckenna MD;  Location: [facility]     HC COLONOSCOPY W/WO BRUSH/WASH  2005     HC CREATE EARDRUM OPENING,GEN ANESTH  2007    Pe tube, Left endolymphatic sac enhancement.     HC MASTOIDECTOMY,COMPLETE  2007     HERNIORRHAPHY UMBILICAL N/A 2017    Procedure: HERNIORRHAPHY UMBILICAL;  open umbilical hernia repair;  Surgeon: Boni Story MD;  [facility]: [facility]     INJECT EPIDURAL LUMBAR N/A 2017    Procedure: INJECT EPIDURAL LUMBAR;  lumbar 4-5 epidural steroid injection ;  Surgeon: Darryn Mcdaniel MD;  [facility]     INJECT EPIDURAL LUMBAR N/A 2017    Procedure: INJECT EPIDURAL LUMBAR;  lumbar epidural injection;  Surgeon: Darryn Mcdaniel MD;  [facility]     INJECT EPIDURAL TRANSFORAMINAL Bilateral 2018    Procedure: INJECT EPIDURAL TRANSFORAMINAL;  transforaminal bilateral lumbar 3-4 steroid injection;  Surgeon: Darryn Mcdaniel MD;  [facility]      INJECT EPIDURAL TRANSFORAMINAL Bilateral 11/8/2018    Procedure: INJECT EPIDURAL TRANSFORAMINAL lumbar 3-4;  Surgeon: Darryn Mcdaniel MD;  [facility]     INJECT EPIDURAL TRANSFORAMINAL Bilateral 6/14/2019    Procedure: INJECTION, EPIDURAL, TRANSFORAMINAL LUMBAR 3-4 BILATERAL;  Surgeon: Darryn Mcdaniel MD;  [facility]     PE TUBES  2006    left ear       Anesthesia Evaluation     . Pt has had prior anesthetic. Type: General and MAC    No history of anesthetic complications          ROS/MED HX    ENT/Pulmonary:  - neg pulmonary ROS     Neurologic:     (+)neuropathy (bilateral occipital neuralgia) - Bilateral occipital neuralgia, other neuro Menieres disease    Cardiovascular:     (+) Dyslipidemia, hypertension----. : . . . :. . Previous cardiac testing date:results:date: results:ECG reviewed date:6-11-19 results:NSR date: results:          METS/Exercise Tolerance:  >4 METS   Hematologic:  - neg hematologic  ROS       Musculoskeletal:   (+)  other musculoskeletal- myalgia/back pain      GI/Hepatic:  - neg GI/hepatic ROS      (-) GERD   Renal/Genitourinary:  - ROS Renal section negative   (+) Pt has no history of transplant,       Endo:  - neg endo ROS       Psychiatric:     (+) psychiatric history depression      Infectious Disease:  - neg infectious disease ROS       Malignancy:      - no malignancy   Other:    (+) No chance of pregnancy C-spine cleared: Yes, H/O Chronic Pain,H/O chronic opiod use , no other significant disability                           Physical Exam  Normal systems: cardiovascular, pulmonary and dental    Airway   Mallampati: II  TM distance: >3 FB  Neck ROM: full    Dental     Cardiovascular   Rhythm and rate: regular and normal      Pulmonary    breath sounds clear to auscultation            Lab Results   Component Value Date    WBC 6.1 06/11/2019    HGB 14.5 06/11/2019    HCT 42.7 06/11/2019     06/11/2019    CRP <2.9 01/26/2018     06/11/2019    POTASSIUM 3.9 06/11/2019  "   CHLORIDE 107 06/11/2019    CO2 28 06/11/2019    BUN 11 06/11/2019    CR 0.95 06/11/2019    GLC 86 06/11/2019    KRISHAN 8.6 06/11/2019    MAG 2.2 03/19/2007    TSH 2.00 03/24/2016       Preop Vitals  BP Readings from Last 3 Encounters:   08/16/19 132/80   08/08/19 (!) 150/86   08/08/19 (!) 150/86    Pulse Readings from Last 3 Encounters:   08/16/19 102   08/06/19 67   06/14/19 80      Resp Readings from Last 3 Encounters:   08/16/19 16   06/14/19 16   06/11/19 18    SpO2 Readings from Last 3 Encounters:   08/16/19 96%   08/06/19 98%   06/14/19 100%      Temp Readings from Last 1 Encounters:   08/16/19 97  F (36.1  C) (Temporal)    Ht Readings from Last 1 Encounters:   08/16/19 1.848 m (6' 0.75\")      Wt Readings from Last 1 Encounters:   08/16/19 90.7 kg (200 lb)    Estimated body mass index is 26.57 kg/m  as calculated from the following:    Height as of 8/16/19: 1.848 m (6' 0.75\").    Weight as of 8/16/19: 90.7 kg (200 lb).       Anesthesia Plan      History & Physical Review  History and physical reviewed and following examination; no interval change.    ASA Status:  2 .    NPO Status:  > 8 hours    Plan for MAC with Propofol and Intravenous induction. Maintenance will be TIVA.  Reason for MAC:  Deep or markedly invasive procedure (G8)         Postoperative Care      Consents  Anesthetic plan, risks, benefits and alternatives discussed with:  Patient.  Use of blood products discussed: No .   .                   YUMIKO Dinh CRNA  "

## 2019-08-26 ENCOUNTER — HOSPITAL ENCOUNTER (OUTPATIENT)
Dept: PHYSICAL THERAPY | Facility: CLINIC | Age: 54
Setting detail: THERAPIES SERIES
End: 2019-08-26
Attending: NURSE PRACTITIONER
Payer: COMMERCIAL

## 2019-08-26 PROCEDURE — 97140 MANUAL THERAPY 1/> REGIONS: CPT | Mod: GP | Performed by: PHYSICAL THERAPIST

## 2019-08-27 NOTE — PROGRESS NOTES
"[facility]    CHIEF COMPLAINT:  Pain  -neck and low back pain    INTERVAL HISTORY:  Last seen on 8/8/2018.      Ketan was previously a patient of Dr. Angle Freeman. He is transferred to me due to location.       Recommendations/plan at the last visit included:  1. Physical Therapy:  YES, Keep appointments as scheduled   2. Clinical Health Psychologist:  NO, continue guided meditation    3. Diagnostic Studies: none  4. Medication Management:    1. Continue Gabapentin 1200 TID  2. Continue Robaxin 1000mg TID  3. Continue Tizanidine 4mg at bedtime  4. Continue Oxycodone 4/day, to last until 9/9/2019.  5. Continue Cymbalta 30mg in AM and 60mg at HS  5. Further procedures recommended:  Will await for recommendations from Dr. Mckenna. Could consider trigger point injections for myofascial pain.  6. Recommendations to PCP. See above  7. Ketan to follow up with Primary Care provider regarding elevated blood pressure.      Follow up with this provider:  4 Weeks     Since his last visit, Indra Mehta reports:  -Overall he feels he is doing worse.    He had a C6-7 epidural with Dr. Mcdaniel on 8/22/2019. He states that this did not help. He states that he feels his pain has gotten worse. He states that he has had many days where he cannot control the pins and needles, even with his head staying low. Before, he states that he would be able to get this to stop easier. On Sunday he states that he was up at 3:30 due to arm pain. He states that it gets to the point where it is a high level ache and the whole arm throbs. He states that with having to keep his head down, he has headaches and more pain down his back and left shoulder pain. He states that this is getting to \"suck\". At times he feels that it gets to the point where he is not even able to use his arm.    He is having some low back pain, but feels that this is due to having to slouch at work. He feels this is more muscle related. He is not getting any pain " into his legs.    He is getting his CPAP today.    Pain Information:   Pain quality: miserable    Pain rating: intensity ranges from 5/10 to 9/10, and averages 6/10 on a 0-10 scale.   Pain today 7/10    SELF CARE:   How often do you practice SELF-CARE (relaxing, stretching, pacing, monitoring posture, taking mini-breaks) in a typical day:  He is taking time to rest. He did mow the lawn but would need to stop.    UDS: 4/1/19-reviewed and appropriate  Controlled Substance Agreement signed: 4/15/2019    CURRENT RELEVANT PAIN MEDICATIONS:  Gabapentin 400mg-Taking 3 capsules 3 times a day  Tizanidine 4mg-taking 1 at HS (helps get him to sleep-does not take with Robaxin)  Tylenol 500mg-1000mg 4x/day  Ibuprofen-800mg 4x/day  Robaxin- 500mg 2 Three times a day  Excedrin Migraine-takes occasionally-does not take on top of the tylenol and ibuprofen  Oxycodone-taking 4/day  Xanax-does not use daily, last use was about 3 weeks ago  Cymbalta 30mg twice daily    Patient is using the medication as prescribed:  YES  Is your medication helpful? YES   Medication side effects? no side effect    Previous Medications: (H--helped; HI--Helped initially; SWH-- somewhat helpful, NH--No help; W--worse; SE--side effects)   Norco/vicodin H  Oxycodone H  Flexeril - was helpful at night  Robaxin - may have helped, didn't make him sleepy  Tizanidine H  Gabapentin ?  Lyrica SE sedation    Past Pain Treatments:  Injections:    - 11/8/18 bilateral L3-4 TFESI - (Dr Mcdaniel)  - 8/23/18 bilateral L3-4 TFESI - seems to have worsened pain (Dr Mcdaniel)  - 6/11/18 TFESI right C5-6 - helped with arm pain and numbness  - bilateral L3-4 TFESI 12/28/17  - Bilateral L4-5 TFESI 10/23/17  - Bilateral L3-4 TFESI 11/08/2018  -Bilateral L3-4 TFESI 6/14/2019  -C6-7 SRIDEVI 8/22/2019  Surgery:  ACDF C6-7 on 7/5/17 with improvement; ACDF C5-6 12/19/2018  TENS unit: helpful  Physical therapy in [facility] Board of Pharmacy Data Base Reviewed:    YES; As expected, no  concern for misuse/abuse of controlled medications based on this report.    THE 4 As OF OPIOID MAINTENANCE ANALGESIA    Analgesia: Is pain relief clinically significant? YES   Activity: Is patient functional and able to perform Activities of Daily Living? YES   Adverse effects: Is patient free from adverse side effects from opiates? YES   Adherence to Rx protocol: Is patient adhering to Controlled Substance Agreement and taking medications ONLY as ordered? YES       Is Narcan prescribed for opiate use >50 MME daily? N/A      Daily MME: 30    Medications:  Current Outpatient Medications   Medication Sig Dispense Refill     Acetaminophen (TYLENOL PO) Take 500 mg by mouth every 4 hours as needed for mild pain or fever       ALPRAZolam (XANAX XR) 0.5 MG 24 hr tablet Take 1 tablet (0.5 mg) by mouth At Bedtime 30 tablet 3     DULoxetine (CYMBALTA) 30 MG capsule 30 mg       gabapentin (NEURONTIN) 400 MG capsule Take 3 capsules (1,200 mg) by mouth 3 times daily 270 capsule 3     losartan (COZAAR) 50 MG tablet Take 1 tablet (50 mg) by mouth daily 90 tablet 0     methocarbamol (ROBAXIN) 500 MG tablet Take 2 tablets (1,000 mg) by mouth 3 times daily as needed for muscle spasms 60 tablet 3     order for DME Equipment being ordered: TENS Pads as directed 1 Device 0     oxyCODONE (ROXICODONE) 5 MG tablet Take 1 tab every 4-6 hours as needed for severe pain. MAX 4 TABS PER DAY. Okay to fill 9/09/19. To start 9/09/19 and last until 10/9/2019. 120 tablet 0     tiZANidine (ZANAFLEX) 4 MG tablet Take 1 tablet (4 mg) by mouth 2 times daily as needed for muscle spasms 60 tablet 3       Review of Systems: A 10-point review of systems was negative, with the exception of chronic pain issues, headache, ringing in the ears, high blood pressure, weakness,  numbness and tingling, mood swings and stress.     Social History:  No changes since previous visit    Family history: no changes since previous visit    PHYSICAL EXAM:      Vitals:   BP  "(!) 140/86   Temp 98.3  F (36.8  C) (Temporal)   Ht 1.848 m (6' 0.75\")   Wt 92.5 kg (204 lb)   BMI 27.10 kg/m    Body mass index is 27.1 kg/m .  6' .75\"  204 lbs 0 oz      Constitutional: healthy, alert and no distress  HEENT: Head atraumatic, normocephalic. Eyes without conjunctival injection or jaundice. Neck supple. No obvious neck masses.   Skin: No rash, lesions, or petechiae of exposed skin.   Psychiatric/mental status: Alert, without lethargy or stupor. Appropriate affect. Mood normal.     DIAGNOSTIC TESTS:  No new imaging to review    Assessment:  Indra Mehta is a 53 year old male who presents today for follow up regarding his:    1.  Cervical radiculopathy  2.  S/p cervical spine fusion  3.  Myofascial pain  4.  Lumbar radiculopathy  5. Neuropathy  6. Chronic pain syndrome    The patient's pain has continued to worsen in the arm.  He does state that he is low back is a little sore but feels it is more due to having to sit awkwardly at work and in meetings to compensate for his neck.  It is becoming more difficult for him to get rid of the numbness and tingling/pins-and-needles sensation that he has to on his right arm.  He states that this is getting to be exhausting for him.  We did talk about how pain can activate the nervous system and how other factors can also activate the nervous system causing an increase in his pain.  1 of the aspects of this would be his mood/depression.  We did discuss having him talk with a counselor for relaxation techniques/cognitive behavioral therapy.  He is not currently interested in that but will think about it and let me know if he would like to pursue that.  I also talked about the amount of medications that he is on.  Discussed that 1 of the things we might want to try is actually reducing the amount of medication that he takes to see if he will start feeling better.  I would probably start with tapering his gabapentin.  Certainly if we started a taper and his " pain increased we would go back up on the dose.  Will wait on making any changes until the patient speaks with Dr. Mckenna regarding the epidural injection.      Plan:    Diagnosis reviewed, treatment option addressed, and risk/benifits discussed.  Self-care instructions given.  I am recommending a multidisciplinary treatment plan to help this patient better manage pain.      1. Physical Therapy:  YES, Keep appointments as scheduled   2. Clinical Health Psychologist:  NO, continue guided meditation, we should consider cognitive behavioral therapy/relaxation techniques, patient will consider this option.  3. Diagnostic Studies: none  4. Medication Management:    1. Continue Gabapentin 1200 TID  2. Continue Robaxin 1000mg TID  3. Continue Tizanidine 4mg at bedtime  4. Continue Oxycodone 4/day, to last until 10/9/2019.  5. Continue Cymbalta 30mg in AM and  60mg at HS  5. Further procedures recommended: We could consider some trigger point injections for his myofascial pain  6. Recommendations to PCP. See above  7. Ketan to follow up with Primary Care provider regarding elevated blood pressure.    Follow up with this provider:  4 Weeks     Total time spent face to face was 15 minutes and more than 50% of face to face time was spent in counseling and/or coordination of care regarding the diagnosis and recommendations above.      Chiara Garcia PA-C   [facility]

## 2019-09-04 ENCOUNTER — MYC MEDICAL ADVICE (OUTPATIENT)
Dept: PALLIATIVE MEDICINE | Facility: CLINIC | Age: 54
End: 2019-09-04

## 2019-09-04 ENCOUNTER — OFFICE VISIT (OUTPATIENT)
Dept: SLEEP MEDICINE | Facility: CLINIC | Age: 54
End: 2019-09-04
Payer: COMMERCIAL

## 2019-09-04 VITALS
OXYGEN SATURATION: 99 % | DIASTOLIC BLOOD PRESSURE: 72 MMHG | SYSTOLIC BLOOD PRESSURE: 126 MMHG | WEIGHT: 200 LBS | HEART RATE: 75 BPM | BODY MASS INDEX: 27.09 KG/M2 | HEIGHT: 72 IN

## 2019-09-04 DIAGNOSIS — G47.33 OSA (OBSTRUCTIVE SLEEP APNEA): Primary | ICD-10-CM

## 2019-09-04 PROCEDURE — 99213 OFFICE O/P EST LOW 20 MIN: CPT | Performed by: INTERNAL MEDICINE

## 2019-09-04 RX ORDER — DULOXETIN HYDROCHLORIDE 30 MG/1
30 CAPSULE, DELAYED RELEASE ORAL
COMMUNITY
Start: 2019-08-23 | End: 2020-01-16

## 2019-09-04 ASSESSMENT — MIFFLIN-ST. JEOR: SCORE: 1790.19

## 2019-09-04 NOTE — PATIENT INSTRUCTIONS
Your Body mass index is 27.12 kg/m .  Weight management is a personal decision.  If you are interested in exploring weight loss strategies, the following discussion covers the approaches that may be successful. Body mass index (BMI) is one way to tell whether you are at a healthy weight, overweight, or obese. It measures your weight in relation to your height.  A BMI of 18.5 to 24.9 is in the healthy range. A person with a BMI of 25 to 29.9 is considered overweight, and someone with a BMI of 30 or greater is considered obese. More than two-thirds of American adults are considered overweight or obese.  Being overweight or obese increases the risk for further weight gain. Excess weight may lead to heart disease and diabetes.  Creating and following plans for healthy eating and physical activity may help you improve your health.  Weight control is part of healthy lifestyle and includes exercise, emotional health, and healthy eating habits. Careful eating habits lifelong are the mainstay of weight control. Though there are significant health benefits from weight loss, long-term weight loss with diet alone may be very difficult to achieve- studies show long-term success with dietary management in less than 10% of people. Attaining a healthy weight may be especially difficult to achieve in those with severe obesity. In some cases, medications, devices and surgical management might be considered.  What can you do?  If you are overweight or obese and are interested in methods for weight loss, you should discuss this with your provider.     Consider reducing daily calorie intake by 500 calories.     Keep a food journal.     Avoiding skipping meals, consider cutting portions instead.    Diet combined with exercise helps maintain muscle while optimizing fat loss. Strength training is particularly important for building and maintaining muscle mass. Exercise helps reduce stress, increase energy, and improves fitness.  Increasing exercise without diet control, however, may not burn enough calories to loose weight.       Start walking three days a week 10-20 minutes at a time    Work towards walking thirty minutes five days a week     Eventually, increase the speed of your walking for 1-2 minutes at time    In addition, we recommend that you review healthy lifestyles and methods for weight loss available through the National Institutes of Health patient information sites:  http://win.niddk.nih.gov/publications/index.htm    And look into health and wellness programs that may be available through your health insurance provider, employer, local community center, or [employer]ura [employer].

## 2019-09-05 ENCOUNTER — HOSPITAL ENCOUNTER (OUTPATIENT)
Dept: PHYSICAL THERAPY | Facility: CLINIC | Age: 54
Setting detail: THERAPIES SERIES
End: 2019-09-05
Attending: NURSE PRACTITIONER
Payer: COMMERCIAL

## 2019-09-05 PROCEDURE — 97140 MANUAL THERAPY 1/> REGIONS: CPT | Mod: GP | Performed by: PHYSICAL THERAPIST

## 2019-09-09 ENCOUNTER — DOCUMENTATION ONLY (OUTPATIENT)
Dept: SLEEP MEDICINE | Facility: CLINIC | Age: 54
End: 2019-09-09
Payer: COMMERCIAL

## 2019-09-09 ENCOUNTER — OFFICE VISIT (OUTPATIENT)
Dept: PALLIATIVE MEDICINE | Facility: CLINIC | Age: 54
End: 2019-09-09
Payer: COMMERCIAL

## 2019-09-09 VITALS
HEIGHT: 73 IN | WEIGHT: 204 LBS | BODY MASS INDEX: 27.04 KG/M2 | DIASTOLIC BLOOD PRESSURE: 86 MMHG | SYSTOLIC BLOOD PRESSURE: 140 MMHG | TEMPERATURE: 98.3 F

## 2019-09-09 DIAGNOSIS — Z98.1 S/P CERVICAL SPINAL FUSION: ICD-10-CM

## 2019-09-09 DIAGNOSIS — G62.9 NEUROPATHY: ICD-10-CM

## 2019-09-09 DIAGNOSIS — M79.18 MYOFASCIAL PAIN: ICD-10-CM

## 2019-09-09 DIAGNOSIS — M54.12 CERVICAL RADICULOPATHY: Primary | ICD-10-CM

## 2019-09-09 DIAGNOSIS — G89.4 CHRONIC PAIN SYNDROME: ICD-10-CM

## 2019-09-09 DIAGNOSIS — M54.16 LUMBAR RADICULOPATHY: ICD-10-CM

## 2019-09-09 PROCEDURE — 99214 OFFICE O/P EST MOD 30 MIN: CPT | Performed by: PHYSICIAN ASSISTANT

## 2019-09-09 RX ORDER — OXYCODONE HYDROCHLORIDE 5 MG/1
TABLET ORAL
Qty: 120 TABLET | Refills: 0 | Status: SHIPPED | OUTPATIENT
Start: 2019-09-09 | End: 2019-10-04

## 2019-09-09 ASSESSMENT — PAIN SCALES - GENERAL: PAINLEVEL: SEVERE PAIN (7)

## 2019-09-09 ASSESSMENT — MIFFLIN-ST. JEOR: SCORE: 1820.25

## 2019-09-09 NOTE — PROGRESS NOTES
Patient was offered choice of vendor and chose [facility].  Patient Indra Mehta was set up at [place] on September 9, 2019. Patient received a Resmed AirSense 10 Auto. Pressures were set at 5-15 cm H2O.   Patient s ramp is 5 cm H2O for Off and FLEX/EPR is EPR.  Patient received a Telma Respironics Mask name: WISP  Nasal mask size Large, heated tubing and heated humidifier.  Patient is enrolled in the STM Program and does not need to meet compliance. Patient has a follow up on 10/30/19 with Dr. Beltre.    Corin Salinas

## 2019-09-09 NOTE — PATIENT INSTRUCTIONS
After Visit Instructions:     Thank you for coming to [facility] for your care. It is my goal to partner with you to help you reach your optimal state of health.     I am recommending multidisciplinary care at this time.  The focus of care will be to continue gradual rehabilitation and pain management with medication adjustments as needed.    Continue daily self-care, identifying contributing factors, and monitoring variations in pain level. Continue to integrate self-care into your life.        Schedule pain psychology assessment/visit: Continue current plan    Schedule physical therapy assessment/visit: Continue guided meditation    Schedule follow-up with Chiara Garcia PA-C in 4 weeks. You will need to make this appointment.     Procedures recommended: if your low back muscles continue to be irritated we could try trigger point injections      Medication recommendations:   1. Continue Gabapentin 1200 TID  2. Continue Robaxin 1000mg TID  3. Continue Tizanidine 4mg at bedtime  4. Continue Oxycodone 4/day, to last until 9/9/2019.  5. Continue Cymbalta 30mg in AM and 60mg at HS      Chiara Garcia PA-C  [facility]  [facility]    Contact information: [facility]  Clinic Number:  [phone]     Call with any questions about your care and for scheduling assistance.     Calls are returned Monday through Friday between 8 AM and 4:30 PM. We usually get back to you within 2 business days depending on the issue/request.    If we are prescribing your medications:    For opioid medication refills, call the clinic or send a [employer] message 7 days in advance.  Please include:    Name of requested medication    Name of the pharmacy.    For non-opioid medications, call your pharmacy directly to request a refill. Please allow 3-4 days to be processed.     Per [place] State Law:    All controlled substance prescriptions must be filled within 30 days of  being written.      For those controlled substances allowing refills, pickup must occur within 30 days of last fill.      We believe regular attendance is key to your success in our program!      Any time you are unable to keep your appointment we ask that you call us at least 24 hours in advance to cancel.This will allow us to offer the appointment time to another patient.   Multiple missed appointments may lead to dismissal from the clinic.

## 2019-09-12 ENCOUNTER — HOSPITAL ENCOUNTER (OUTPATIENT)
Dept: PHYSICAL THERAPY | Facility: CLINIC | Age: 54
Setting detail: THERAPIES SERIES
End: 2019-09-12
Attending: NURSE PRACTITIONER
Payer: COMMERCIAL

## 2019-09-12 ENCOUNTER — DOCUMENTATION ONLY (OUTPATIENT)
Dept: SLEEP MEDICINE | Facility: CLINIC | Age: 54
End: 2019-09-12

## 2019-09-12 DIAGNOSIS — G47.33 OSA (OBSTRUCTIVE SLEEP APNEA): Primary | ICD-10-CM

## 2019-09-12 PROCEDURE — 97140 MANUAL THERAPY 1/> REGIONS: CPT | Mod: GP | Performed by: PHYSICAL THERAPIST

## 2019-09-12 NOTE — PROGRESS NOTES
3 DAY STM VISIT    Diagnostic AHI:   12.2 HST    Patient contacted for 3 day STM visit  Subjective measures:  Pt feels that he is struggling a bit to breathe through his nose at night.  He started using the breathright strips and this has helped.  He would like to have surgery on his adenoids and will be reaching out to ENT.  He is having some discomfort with the starting pressure being too low and this is prohibiting him from falling asleep.      Device type: Auto-CPAP  PAP settings from order::  CPAP min 5 cm  H20       CPAP max 15 cm  H20        Mask type:    Nasal Mask     Device settings from machine      Min CPAP 5.0            Max CPAP 15.0           Assessment: Nightly usage over four hours.  Action plan: Pt to have f/u 14 day [facility] visit.  Patient has a follow up visit scheduled:   yes within 31-90 days of set up.    Total time spent on remote patient monitoring data analysis and patient contact today:   17 minutes

## 2019-09-17 ENCOUNTER — TRANSFERRED RECORDS (OUTPATIENT)
Dept: HEALTH INFORMATION MANAGEMENT | Facility: CLINIC | Age: 54
End: 2019-09-17

## 2019-09-18 ENCOUNTER — MYC MEDICAL ADVICE (OUTPATIENT)
Dept: FAMILY MEDICINE | Facility: OTHER | Age: 54
End: 2019-09-18

## 2019-09-18 DIAGNOSIS — M79.10 MYALGIA: Primary | ICD-10-CM

## 2019-09-20 ENCOUNTER — APPOINTMENT (OUTPATIENT)
Dept: SLEEP MEDICINE | Facility: CLINIC | Age: 54
End: 2019-09-20
Payer: COMMERCIAL

## 2019-09-20 DIAGNOSIS — M79.10 MYALGIA: ICD-10-CM

## 2019-09-20 PROCEDURE — 86618 LYME DISEASE ANTIBODY: CPT | Performed by: FAMILY MEDICINE

## 2019-09-20 PROCEDURE — 36415 COLL VENOUS BLD VENIPUNCTURE: CPT | Performed by: FAMILY MEDICINE

## 2019-09-21 ENCOUNTER — MYC MEDICAL ADVICE (OUTPATIENT)
Dept: PALLIATIVE MEDICINE | Facility: CLINIC | Age: 54
End: 2019-09-21

## 2019-09-23 LAB — B BURGDOR IGG+IGM SER QL: 0.31 (ref 0–0.89)

## 2019-09-23 NOTE — TELEPHONE ENCOUNTER
Jessee message from patient on Saturday 9/21 at 2011:    My neck and arms are getting steadily worse.  There are times when I cannot stop the pins and needles in my right arm.  This is leading to a lot of pain in that arm.  My neck itself hurts bad all the time I'm hoping because of the position I have it in to try to relieve the issues in my right arm.  My left arm has added into the mix with pain in the shoulder and upper arm.     If this continues to progress like it has, within a month or two I will not be able to drive, work, or properly take care of myself or my dogs.     I haven't heard from Clarisa about the EMG or next steps.   -------------  Reviewed chart. Pt sent a message to Dr. Mckenna as well. Dr. Mckenna ordered the EMG on 9/9/19.     Pt was last seen by Chiara Garcia on 9/9.     Pt scheduled for a return visit with Chiara on 10/4.     Will route this to Chiara as FYI.     Responded to message from patient dated 9/24.     Erica Navarro, ANGELINAN, RN-BC  Patient Care Supervisor/Care Coordinator  [facility]

## 2019-09-24 ENCOUNTER — MYC MEDICAL ADVICE (OUTPATIENT)
Dept: PALLIATIVE MEDICINE | Facility: CLINIC | Age: 54
End: 2019-09-24

## 2019-09-24 ENCOUNTER — DOCUMENTATION ONLY (OUTPATIENT)
Dept: SLEEP MEDICINE | Facility: CLINIC | Age: 54
End: 2019-09-24
Payer: COMMERCIAL

## 2019-09-24 NOTE — TELEPHONE ENCOUNTER
Jessee message from patient on 9/24 at 1230:    I see Dr. Mckenna on the third to go over the emg test and hopefully get a next step. I have sent my files to the [facility] in [place] and will probably hear back from them before my appointment next week.  I don't think dr Mckenna is aware of how bad this has become. I have my chin on my chest, or very close, almost all the time.  This in itself is causing all sorts of extra pain in my neck, back, head, and I think my left shoulder.   ---------------  Message sent to pt:    Finn Aceves,     I am glad that you have an appointment scheduled with Dr. Mckenna to discuss next steps. I am sorry to hear that things are getting worse and I will be sure to share this message with Chiara Garcia. It will be good for her to see you on 10/4 as scheduled.     Take care,     Erica Navarro, ANGELINAN, RN-BC  Patient Care Supervisor/Care Coordinator  [facility]

## 2019-09-24 NOTE — PROGRESS NOTES
14  DAY STM VISIT    Diagnostic AHI:  12.2 HST    Subjective measures:   Patient switched masks he now has a Full Face mask and patient feels like it doesn't fit right. Patient states he has trouble exhaling and opening his mouth.     Assessment: Pt not meeting objective benchmarks for compliance  Patient failing following subjective benchmarks: mask discomfort. Turned patient response from standard to soft and EPR to 3.      Action plan: pt to have 30 day STM visit.      Device type: Auto-CPAP ()    PAP settings: CPAP min 7.0 (Minimum allowable pressure in cmH2O) cm  H20       CPAP max 15.0 (Maximum allowable pressure in cmH2O) cm  H20            95th% pressure 11.3 cm  H20     Mask type:  Nasal Mask    Objective measures: 14 day rolling measures      Compliance  28 %      Leak  16.9 lpm  last  upload      AHI 4.54   last  upload      Average number of minutes 184      Objective measure goal  Compliance   Goal >70%  Leak   Goal < 24 lpm  AHI  Goal < 5  Usage  Goal >240      Total time spent on remote patient monitoring data analysis and patient contact today:   16 minutes

## 2019-09-25 DIAGNOSIS — M62.838 MUSCLE SPASM: ICD-10-CM

## 2019-09-25 NOTE — TELEPHONE ENCOUNTER
Received fax request from   [id number] - [place], [place] - [address] [place] [address]  Phone: [phone] Fax: [phone]     pharmacy requesting refill(s) for Tizanidine 4mg    Last refilled on 07/28/19    Pt last seen on 09/09/19  Next appt scheduled for 10/04/19    Will facilitate refill.          --------------------------  Mindy Braden, [facility]  September 25, 2019

## 2019-09-28 ENCOUNTER — HEALTH MAINTENANCE LETTER (OUTPATIENT)
Age: 54
End: 2019-09-28

## 2019-10-03 ENCOUNTER — OFFICE VISIT (OUTPATIENT)
Dept: NEUROSURGERY | Facility: OTHER | Age: 54
End: 2019-10-03
Payer: COMMERCIAL

## 2019-10-03 ENCOUNTER — HOSPITAL ENCOUNTER (INPATIENT)
Facility: CLINIC | Age: 54
Setting detail: SURGERY ADMIT
End: 2019-10-03
Attending: NEUROLOGICAL SURGERY | Admitting: NEUROLOGICAL SURGERY
Payer: COMMERCIAL

## 2019-10-03 VITALS
BODY MASS INDEX: 27.04 KG/M2 | DIASTOLIC BLOOD PRESSURE: 64 MMHG | WEIGHT: 204 LBS | HEIGHT: 73 IN | TEMPERATURE: 98.2 F | SYSTOLIC BLOOD PRESSURE: 130 MMHG

## 2019-10-03 DIAGNOSIS — M54.12 CERVICAL RADICULOPATHY: Primary | ICD-10-CM

## 2019-10-03 PROCEDURE — 99213 OFFICE O/P EST LOW 20 MIN: CPT | Performed by: NEUROLOGICAL SURGERY

## 2019-10-03 ASSESSMENT — PAIN SCALES - GENERAL: PAINLEVEL: MODERATE PAIN (5)

## 2019-10-03 ASSESSMENT — MIFFLIN-ST. JEOR: SCORE: 1820.25

## 2019-10-03 NOTE — PROGRESS NOTES
"Indra Mehta is a 54 year old male who presents for:  Chief Complaint   Patient presents with     Neurologic Problem     EMG results         Initial Vitals:  /64   Temp 98.2  F (36.8  C) (Temporal)   Ht 6' 0.75\" (1.848 m)   Wt 204 lb (92.5 kg)   BMI 27.10 kg/m   Estimated body mass index is 27.1 kg/m  as calculated from the following:    Height as of this encounter: 6' 0.75\" (1.848 m).    Weight as of this encounter: 204 lb (92.5 kg).. Body surface area is 2.18 meters squared. BP completed using cuff size: regular  Moderate Pain (5)        Nursing Comments:         Gianfranco Church CMA\    "

## 2019-10-03 NOTE — PATIENT INSTRUCTIONS
C5-7 posterior decompression and fusion     Surgery scheduled at [facility] for C5-7 posterior decompression and fusion     Pre-Operative:  -Surgical risks: blood clots in the leg or lung, problems urinating, nerve damage, drainage from the incision, infection, stiffness.  Smoking Cessation: You are advised to quit smoking immediately through recovery to help with healing and reduce risk of complications. Printout given.  - Pre-operative physical with primary care physician within 30 days of surgical date.    Spine Fusion Education: [email or web address]  - Discontinue Aspirin, NSAIDs (Advil, Ibuprofen, Naproxen, Nuprin, Diclofenac, Meloxicam, Aleve, Celebrex) x 7 days prior to surgical date. After surgery, do not begin taking these medications until given clearance. May cause bleeding and interfere with bone healing.  - May take Tylenol for pain.  -Shower procedure: Please shower with antibacterial soap the night before surgery and the morning of surgery. Refer to information sheet in folder.   -Stop all solid foods and liquids 8 hours before surgery.     Post-Operative:  - 2-4 night hospitalization stay  - Post operative pain may require pain medications and muscle relaxants. You will receive medications upon discharge. For refills, please call our clinic. A nurse will call you back to obtain a pain assessment. Please call 3-4 business days before you run out.  -Do NOT drive while taking narcotic pain medication.  -Post operative incision care- Watch for signs of infection: redness, swelling, warmth, drainage, and fever of 101 degrees or higher. Notify clinic [phone].  -Keep incision clean and dry. You may shower. No submerging incision in water such as pools, hot tubs, baths for at least 8 weeks or until incision is healed.   - Post operative activity limitations for 6 weeks after surgery: no lifting > 10 pounds, no bending, twisting, or overhead reaching. You will be re-evaluated at  your follow up appointments.   -If a brace is required per Dr. Mckenna, [facility] will fit you for the brace in the hospital.  -If you are currently employed, you will need to be off work for recovery and healing. Please fax any FMLA/short term disability paperwork to [phone]. You may call our clinic when you'd like to return to work and we can provide a work letter.   - Follow up appointments: 2 weeks post op for suture/staple removal. Then 6 week post op, 3 months post op, 6 months post op, 1 year post op with an xray before each appointment. Please call to schedule these appointments at [phone].   -Surgery folder provided to patient.    Ana Paula HAWKINS RN ([facility] Nurse)  [facility]  [address] [address]  T:  [phone]  F:  [phone]

## 2019-10-03 NOTE — PROGRESS NOTES
Returns for follow up.  3 months of worsening right arm pain from shoulder to thumb.  Worse with neck extension.      Returns again for follow up.  Worsening, continued severe right arm pain, worse with neck extension, radiation to the entire arm and hand.  Limits daily activities markedly.  PT and SRIDEVI without improvement.  EMG with mild bilateral carpal tunnel.  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    Exam:  Constitutional:  Alert, well nourished, NAD.  HEENT: Normocephalic, atraumatic.   Pulm:  Without shortness of breath   CV:  No pitting edema of BLE.     Neurological:  Awake  Alert  Oriented x 3  Motor exam:     Shoulder Abduction:  Right:  4+/5    Left:  5/5  Biceps:                      Right:  5/5    Left:  5/5  Triceps:                     Right:  5/5    Left:  5/5  Wrist Extensors:       Right:  5/5    Left:  5/5  Wrist Flexors:           Right:  5/5    Left:  5/5  Intrinsics:                  Right:  4+/5    Left:  5/5     Able to spontaneously move U/E bilaterally  Numbness in right forearm to thumb    Incisions:  Nicely healed.    Imaging:  AP and lateral films reveal stable hardware.     A/P: 53-year-old male, 10 months out from C5-6 ACDF.  He has a history of a prior C6-7 ACDF but had developed progressively worse right arm pain.     Worsening right arm pain  Extensive non-surgical management without improvement  Will plan for C5-7 posterior decompression and fusion  Risks and benefits discussed

## 2019-10-03 NOTE — LETTER
"    10/3/2019         RE: Indra Mehta  [id number] [facility] [address]        Dear Colleague,    Thank you for referring your patient, Indra Mehta, to the [facility]. Please see a copy of my visit note below.    Indra Mehta is a 54 year old male who presents for:  Chief Complaint   Patient presents with     Neurologic Problem     EMG results         Initial Vitals:  /64   Temp 98.2  F (36.8  C) (Temporal)   Ht 6' 0.75\" (1.848 m)   Wt 204 lb (92.5 kg)   BMI 27.10 kg/m    Estimated body mass index is 27.1 kg/m  as calculated from the following:    Height as of this encounter: 6' 0.75\" (1.848 m).    Weight as of this encounter: 204 lb (92.5 kg).. Body surface area is 2.18 meters squared. BP completed using cuff size: regular  Moderate Pain (5)        Nursing Comments:         Gianfranco Church CMA\      Returns for follow up.  3 months of worsening right arm pain from shoulder to thumb.  Worse with neck extension.      Returns again for follow up.  Worsening, continued severe right arm pain, worse with neck extension, radiation to the entire arm and hand.  Limits daily activities markedly.  PT and SRIDEVI without improvement.  EMG with mild bilateral carpal tunnel.  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    Exam:  Constitutional:  Alert, well nourished, NAD.  HEENT: Normocephalic, atraumatic.   Pulm:  Without shortness of breath   CV:  No pitting edema of BLE.     Neurological:  Awake  Alert  Oriented x 3  Motor exam:     Shoulder Abduction:  Right:  4+/5    Left:  5/5  Biceps:                      Right:  5/5    Left:  5/5  Triceps:                     Right:  5/5    Left:  5/5  Wrist Extensors:       Right:  5/5    Left:  5/5  Wrist Flexors:           Right:  5/5    Left:  5/5  Intrinsics:                  Right:  4+/5    Left:  5/5     Able to spontaneously move U/E bilaterally  Numbness in right forearm to thumb    Incisions:  Nicely healed.    Imaging: "  AP and lateral films reveal stable hardware.     A/P: 53-year-old male, 3 months out from C5-6 ACDF.  He has a history of a prior C6-7 ACDF but had developed progressively worse right arm pain.     Worsening right arm pain  Extensive non-surgical management without improvement  Will plan for C5-7 posterior decompression and fusion  Risks and benefits discussed      Again, thank you for allowing me to participate in the care of your patient.        Sincerely,        Mike Mckenna MD

## 2019-10-03 NOTE — NURSING NOTE
Patient Education    Education included but not limited to:  - Surgical risks: blood clots, urinating difficulties, nerve damage, infection.  - Pre-operative physical with primary care physician within 30 days of surgical date.   - Pre-operative clearance from other pertaining specialties.   - Discontinue NSAIDS x 7 days prior to surgical date.   -Do not begin taking NSAIDs (Advil, Motrin, Ibuprofen, Nuprin, Diclofenac, Meloxicam, Aleve, Celebrex, Aspirin, etc.) until 6 weeks after surgery if you had a fusion. May cause bleeding and interfere with bone healing.    -May try Tylenol for pain.  - Smoking cessation: na  -Discussed being off work after surgery, short term disability, FMLA, etc.   -Forms to be completed    -Pre-op timeline: NPO, shower, medications    -Hospital stay: Checking in, surgery, recovery room, hospital room.    - Post operative pain management: narcotics, muscle relaxants, ice, etc.   -No driving while taking narcotics     -Post operative incision care:   Keep your incision clean and dry.   Okay to shower. No submerging in water until incision healed.   Watch for signs of infection and notify clinic if drainage or fever develops.   - Post operative activity limitations recommended until follow up appointment: no lifting > 10 pounds; limited bending, twisting, overhead reaching.  -If a brace is required per Dr. Mckenna, [facility] will fit you for the brace in the hospital.  - Follow up appointments: Suture/staple removal at 2 weeks post op 6 week post op, 3 months post op, 6 months post op, 1 year post op. You will need to an xray before each appointment. Please call to schedule follow up appointment at [phone].   - Education book was also given to the patient for further review.   [facility] folder provided to patient.     Patient verbalized understanding of above instructions. All questions were answered to the best of my ability and the patient's satisfaction. Patient advised to call with  any additional questions or concerns.    Ana Paula Cage, RN ([facility] Nurse)  [facility]  [address] [address]  [place] [address]  T:  [phone]  F:  [phone]

## 2019-10-04 ENCOUNTER — TELEPHONE (OUTPATIENT)
Dept: PALLIATIVE MEDICINE | Facility: CLINIC | Age: 54
End: 2019-10-04

## 2019-10-04 DIAGNOSIS — Z98.1 S/P CERVICAL SPINAL FUSION: ICD-10-CM

## 2019-10-04 RX ORDER — OXYCODONE HYDROCHLORIDE 5 MG/1
TABLET ORAL
Qty: 120 TABLET | Refills: 0 | Status: SHIPPED | OUTPATIENT
Start: 2019-10-04 | End: 2019-12-19

## 2019-10-04 NOTE — TELEPHONE ENCOUNTER
Patient would like oxy sent to [facility].  Sarahy Strauss, [facility]

## 2019-10-09 NOTE — PROGRESS NOTES
ENT Consultation    Indra Mehta who is a 54 year old male seen in consultation at the request of self.      History of Present Illness - Indra Mehta is a 54 year old male with a history of left Ménière's disease had endolymphatic sac surgery in the past presents today because of ear issues nasal issues.  He does wear CPAP auto CPAP 5-15 range and feels even though the normal nose seems to be breathing but it takes longer time to get air in with nasal pillows he felt was insufficient ear going in.  Is concerned about his adenoids.  Denies any nasal trauma.  Sense of smell appears to be intact.  He feels he can breathe better on the right than the left side mostly.  He has tried nasal sprays like Flonase without much relief.  Also his ears both pop and often he feels echoing his ears and amplification his own voice.  He denies any new vertigo or tinnitus.  Denies any new changes in his hearing.  Hearing appears lower vibratory sounds like tires that are becoming much more uncomfortable for his ears amplify these years.  He feels popping his ear feels better for short period time fleeting seconds then the problems come back.  Denies any allergies seasonal perennial but has not been tested.      There is no height or weight on file to calculate BMI.        BP Readings from Last 1 Encounters:   10/03/19 130/64   By  BP noted to be well controlled today in office.     Indra IS NOT a smoker/uses chewing tobacco.        Past Medical History -   Past Medical History:   Diagnosis Date     Back pain      Meniere's disease, unspecified      Pain medication agreement signed 8/20/2010       Current Medications -   Current Outpatient Medications:      Acetaminophen (TYLENOL PO), Take 500 mg by mouth every 4 hours as needed for mild pain or fever, Disp: , Rfl:      ALPRAZolam (XANAX XR) 0.5 MG 24 hr tablet, Take 1 tablet (0.5 mg) by mouth At Bedtime, Disp: 30 tablet, Rfl: 3     DULoxetine (CYMBALTA) 30 MG capsule, 30  mg, Disp: , Rfl:      gabapentin (NEURONTIN) 400 MG capsule, Take 3 capsules (1,200 mg) by mouth 3 times daily, Disp: 270 capsule, Rfl: 3     losartan (COZAAR) 50 MG tablet, Take 1 tablet (50 mg) by mouth daily, Disp: 90 tablet, Rfl: 0     methocarbamol (ROBAXIN) 500 MG tablet, Take 2 tablets (1,000 mg) by mouth 3 times daily as needed for muscle spasms, Disp: 60 tablet, Rfl: 3     order for DME, Equipment being ordered: TENS Pads as directed, Disp: 1 Device, Rfl: 0     oxyCODONE (ROXICODONE) 5 MG tablet, Take 1 tab every 4-6 hours as needed for severe pain. MAX 4 TABS PER DAY. Okay to fill 10/4/19. To start 10/09/19 and last until 11/8/2019., Disp: 120 tablet, Rfl: 0     tiZANidine (ZANAFLEX) 4 MG tablet, Take 1 tablet (4 mg) by mouth 2 times daily as needed for muscle spasms, Disp: 60 tablet, Rfl: 3    Allergies -   Allergies   Allergen Reactions     No Known Drug Allergies        Social History -   Social History     Socioeconomic History     Marital status: Single     Spouse name: Not on file     Number of children: 2     Years of education: Not on file     Highest education level: Not on file   Occupational History     Employer: [employer]   Social Needs     Financial resource strain: Not on file     Food insecurity:     Worry: Not on file     Inability: Not on file     Transportation needs:     Medical: Not on file     Non-medical: Not on file   Tobacco Use     Smoking status: Never Smoker     Smokeless tobacco: Never Used   Substance and Sexual Activity     Alcohol use: No     Alcohol/week: 0.0 standard drinks     Drug use: No     Sexual activity: Yes     Partners: Female   Lifestyle     Physical activity:     Days per week: Not on file     Minutes per session: Not on file     Stress: Not on file   Relationships     Social connections:     Talks on phone: Not on file     Gets together: Not on file     Attends Taoism service: Not on file     Active member of club or organization: Not on file      Attends meetings of clubs or organizations: Not on file     Relationship status: Not on file     Intimate partner violence:     Fear of current or ex partner: Not on file     Emotionally abused: Not on file     Physically abused: Not on file     Forced sexual activity: Not on file   Other Topics Concern     Parent/sibling w/ CABG, MI or angioplasty before 65F 55M? No   Social History Narrative     Not on file       Family History -   Family History   Problem Relation Age of Onset     Cancer - colorectal Mother      Diabetes Mother      Cardiovascular Father      Hypertension Father      Mental Illness Sister      Suicide Other        Review of Systems - As per HPI and PMHx, otherwise review of system review of the head and neck negative. Otherwise 10+ review of system is negative    Physical Exam  There were no vitals taken for this visit.  BMI: There is no height or weight on file to calculate BMI.    General - The patient is well nourished and well developed, and appears to have good nutritional status.  Alert and oriented to person and place, answers questions and cooperates with examination appropriately.    SKIN - No suspicious lesions or rashes.  Respiration - No respiratory distress.  Head and Face - Normocephalic and atraumatic, with no gross asymmetry noted of the contour of the facial features.  The facial nerve is intact, with strong symmetric movements.    Voice and Breathing - The patient was breathing comfortably without the use of accessory muscles. The patients voice was clear and strong, and had appropriate pitch and quality.    Ears - Bilateral pinna and EACs with normal appearing overlying skin. Tympanic membrane intact with good mobility on pneumatic otoscopy bilaterally. Bony landmarks of the ossicular chain are normal. The tympanic membranes are normal in appearance. No retraction, perforation, or masses.  No fluid or purulence was seen in the external canal or the middle ear.     Eyes -  Extraocular movements intact.  Sclera were not icteric or injected, conjunctiva were pink and moist.    Mouth - Examination of the oral cavity showed pink, healthy oral mucosa. No lesions or ulcerations noted.  The tongue was mobile and midline, and the dentition were in good condition.      Throat - The walls of the oropharynx were smooth, pink, moist, symmetric, and had no lesions or ulcerations.  The tonsillar pillars and soft palate were symmetric.  The uvula was midline on elevation.    Neck - Normal midline excursion of the laryngotracheal complex during swallowing.  Full range of motion on passive movement.  Palpation of the occipital, submental, submandibular, internal jugular chain, and supraclavicular nodes did not demonstrate any abnormal lymph nodes or masses.  The carotid pulse was palpable bilaterally.  Palpation of the thyroid was soft and smooth, with no nodules or goiter appreciated.  The trachea was mobile and midline.    Nose - External contour is symmetric, no gross deflection or scars.  Nasal mucosa is pink and moist with no abnormal mucus.  The septum was slightly deviated to the right side superiorly and non-obstructive, turbinates of larger size and position.  No polyps, masses, or purulence noted on examination.    Neuro - Nonfocal neuro exam is normal, CN 2 through 12 intact, normal gait and muscle tone.      Performed in clinic today:  To further evaluate the nasal cavity, I performed rigid nasal endoscopy.  I first sprayed the nasal cavity bilaterally with a mix of lidocaine and neosynephrine.  I then began on the left side using a 2.7mm, 30 degree rigid nasal endoscope.  The septum was deviated and the nasal airway was open.  No abnormal secretions, purulence, or polyps were noted. The left middle turbinate and middle meatus were clearly visualized and normal in appearance.  Looking up, the olfactory cleft was unobstructed.  Going further back, the sphenoethmoid recess was normal in  appearance, with healthy appearing mucosa on the face of the sphenoid.  The nasopharynx was unremarkable, and the eustachian tube opening on this side was unobstructed.    I then turned my attention to the right side.  Once again, the septum was deviated, and the airway was open.  No abnormal secretions, purulence, polyps were noted.  The right middle turbinate and middle meatus were clearly visualized and normal in appearance.  Looking up, the olfactory cleft was unobstructed.  Going further back the right sphenoethmoid recess was normal in appearance, and eustachian tube opening was unobstructed.   Black - [id number] Lingvisttamed      A/P - Indra Mehta is a 54 year old male with previous history of Ménière's disease but now more of a symptoms with eustachian tube issue.  Some of the symptoms favor patulous eustachian tube infect on exam the eustachian tubes appear to be normal but easily opening with swallowing.  Certainly no adenoid tissue was appreciated.  In regard to his nasal obstruction at appears that the decongestion of the turbinates has helped to a significant degree bilaterally.  Considering he has failed fluticasone in the past we discussed potentially performing submucous resection of turbinates in the office setting with [facility].  We also discussed trying to see if opening his nose and improving with nasal ventilation not only will help his use of CPAP but also potentially eustachian tube function.  This point considering potential patulous eustachian tube this may be a conservative undertaking that may be helpful.  We will not try any other options right now.  We will proceed with a submucous resection of turbinates after discussing this with him.      Rick Farias MD  Ketan to follow up with Primary Care provider regarding elevated blood pressure.

## 2019-10-10 ENCOUNTER — DOCUMENTATION ONLY (OUTPATIENT)
Dept: SLEEP MEDICINE | Facility: CLINIC | Age: 54
End: 2019-10-10
Payer: COMMERCIAL

## 2019-10-14 ENCOUNTER — OFFICE VISIT (OUTPATIENT)
Dept: FAMILY MEDICINE | Facility: OTHER | Age: 54
End: 2019-10-14
Payer: COMMERCIAL

## 2019-10-14 VITALS
SYSTOLIC BLOOD PRESSURE: 132 MMHG | DIASTOLIC BLOOD PRESSURE: 78 MMHG | BODY MASS INDEX: 28.36 KG/M2 | HEART RATE: 80 BPM | WEIGHT: 214 LBS | TEMPERATURE: 97.2 F | HEIGHT: 73 IN | RESPIRATION RATE: 20 BRPM

## 2019-10-14 DIAGNOSIS — Z01.818 PREOP GENERAL PHYSICAL EXAM: Primary | ICD-10-CM

## 2019-10-14 DIAGNOSIS — M54.12 CERVICAL RADICULOPATHY: ICD-10-CM

## 2019-10-14 LAB
ALBUMIN UR-MCNC: NEGATIVE MG/DL
ANION GAP SERPL CALCULATED.3IONS-SCNC: 6 MMOL/L (ref 3–14)
APPEARANCE UR: CLEAR
BASOPHILS # BLD AUTO: 0 10E9/L (ref 0–0.2)
BASOPHILS NFR BLD AUTO: 0.3 %
BILIRUB UR QL STRIP: NEGATIVE
BUN SERPL-MCNC: 12 MG/DL (ref 7–30)
CALCIUM SERPL-MCNC: 8.9 MG/DL (ref 8.5–10.1)
CHLORIDE SERPL-SCNC: 103 MMOL/L (ref 94–109)
CO2 SERPL-SCNC: 32 MMOL/L (ref 20–32)
COLOR UR AUTO: YELLOW
CREAT SERPL-MCNC: 0.94 MG/DL (ref 0.66–1.25)
DIFFERENTIAL METHOD BLD: NORMAL
EOSINOPHIL # BLD AUTO: 0.1 10E9/L (ref 0–0.7)
EOSINOPHIL NFR BLD AUTO: 0.9 %
ERYTHROCYTE [DISTWIDTH] IN BLOOD BY AUTOMATED COUNT: 12.8 % (ref 10–15)
GFR SERPL CREATININE-BSD FRML MDRD: >90 ML/MIN/{1.73_M2}
GLUCOSE SERPL-MCNC: 110 MG/DL (ref 70–99)
GLUCOSE UR STRIP-MCNC: NEGATIVE MG/DL
HCT VFR BLD AUTO: 44.9 % (ref 40–53)
HGB BLD-MCNC: 15.4 G/DL (ref 13.3–17.7)
HGB UR QL STRIP: NEGATIVE
KETONES UR STRIP-MCNC: NEGATIVE MG/DL
LEUKOCYTE ESTERASE UR QL STRIP: NEGATIVE
LYMPHOCYTES # BLD AUTO: 1.4 10E9/L (ref 0.8–5.3)
LYMPHOCYTES NFR BLD AUTO: 18.3 %
MCH RBC QN AUTO: 31.1 PG (ref 26.5–33)
MCHC RBC AUTO-ENTMCNC: 34.3 G/DL (ref 31.5–36.5)
MCV RBC AUTO: 91 FL (ref 78–100)
MONOCYTES # BLD AUTO: 0.6 10E9/L (ref 0–1.3)
MONOCYTES NFR BLD AUTO: 8.4 %
NEUTROPHILS # BLD AUTO: 5.4 10E9/L (ref 1.6–8.3)
NEUTROPHILS NFR BLD AUTO: 72.1 %
NITRATE UR QL: NEGATIVE
PH UR STRIP: 7 PH (ref 5–7)
PLATELET # BLD AUTO: 304 10E9/L (ref 150–450)
POTASSIUM SERPL-SCNC: 3.9 MMOL/L (ref 3.4–5.3)
RBC # BLD AUTO: 4.95 10E12/L (ref 4.4–5.9)
SODIUM SERPL-SCNC: 141 MMOL/L (ref 133–144)
SOURCE: NORMAL
SP GR UR STRIP: 1.01 (ref 1–1.03)
UROBILINOGEN UR STRIP-ACNC: 0.2 EU/DL (ref 0.2–1)
WBC # BLD AUTO: 7.5 10E9/L (ref 4–11)

## 2019-10-14 PROCEDURE — 40000868 ZZHCL STATISTIC MRSA/MSSA PRESURGICAL SCREEN ID: Performed by: PHYSICIAN ASSISTANT

## 2019-10-14 PROCEDURE — 36415 COLL VENOUS BLD VENIPUNCTURE: CPT | Performed by: PHYSICIAN ASSISTANT

## 2019-10-14 PROCEDURE — 40000869 ZZHCL STATISTIC MRSA/MSSA PRESURGICAL SCREEN CULTURE: Performed by: PHYSICIAN ASSISTANT

## 2019-10-14 PROCEDURE — 80048 BASIC METABOLIC PNL TOTAL CA: CPT | Performed by: PHYSICIAN ASSISTANT

## 2019-10-14 PROCEDURE — 99214 OFFICE O/P EST MOD 30 MIN: CPT | Performed by: PHYSICIAN ASSISTANT

## 2019-10-14 PROCEDURE — 85025 COMPLETE CBC W/AUTO DIFF WBC: CPT | Performed by: PHYSICIAN ASSISTANT

## 2019-10-14 PROCEDURE — 81003 URINALYSIS AUTO W/O SCOPE: CPT | Performed by: PHYSICIAN ASSISTANT

## 2019-10-14 ASSESSMENT — MIFFLIN-ST. JEOR: SCORE: 1865.61

## 2019-10-14 ASSESSMENT — PAIN SCALES - GENERAL: PAINLEVEL: SEVERE PAIN (6)

## 2019-10-14 NOTE — NURSING NOTE
Health Maintenance Due   Topic Date Due     HIV SCREENING  [birth year]01/1981     PREVENTIVE CARE VISIT  11/29/2006     ZOSTER IMMUNIZATION (1 of 2) 07/01/2016     LIPID  03/29/2018     INFLUENZA VACCINE (1) 09/01/2019     Deysi BARDALES LPN

## 2019-10-14 NOTE — PROGRESS NOTES
[facility]  [address] [facility] [id number] [phone]  Dept: [phone]    PRE-OP EVALUATION:  Today's date: 10/14/2019    Indra Mehta (: 1965) presents for pre-operative evaluation assessment as requested by Dr. Mckenna.  He requires evaluation and anesthesia risk assessment prior to undergoing surgery/procedure for treatment of Neck problems .    Fax number for surgical facility:   Primary Physician: Tha Grullon  Type of Anesthesia Anticipated: General    Patient has a Health Care Directive or Living Will:  NO    Preop Questions 10/14/2019   Who is doing your surgery? Dr. Mckenna   What are you having done? C5-C7 POSTERIOR DECOMPRESSION AND FUSION (SPENCER PINS,STEALTH,O ARM,PAVAN/K2M  (DAVID CAMPOS), MIDAS BRIONNA. LEICA MICROSCOPE)   Date of Surgery/Procedure:    Facility or Hospital where procedure/surgery will be performed: [facility]   1.  Do you have a history of Heart attack, stroke, stent, coronary bypass surgery, or other heart surgery? No   2.  Do you ever have any pain or discomfort in your chest? No   3.  Do you have a history of  Heart Failure? No   4.   Are you troubled by shortness of breath when:  walking on a level surface, or up a slight hill, or at night? No   5.  Do you currently have a cold, bronchitis or other respiratory infection? No   6.  Do you have a cough, shortness of breath, or wheezing? No   7.  Do you sometimes get pains in the calves of your legs when you walk? No   8. Do you or anyone in your family have previous history of blood clots? No   9.  Do you or does anyone in your family have a serious bleeding problem such as prolonged bleeding following surgeries or cuts? No   10. Have you ever had problems with anemia or been told to take iron pills? No   11. Have you had any abnormal blood loss such as black, tarry or bloody stools? No   12. Have you ever had a blood transfusion? No   13. Have you or any of your  relatives ever had problems with anesthesia? No   14. Do you have sleep apnea, excessive snoring or daytime drowsiness? YES - sleep apnea   15. Do you have any prosthetic heart valves? No   16. Do you have prosthetic joints? No         HPI:     HPI related to upcoming procedure:   Patient is a 54 year old male who presents today for pre-operative evaluation. He is scheduled for C5-7 posterior decompression and fusion on 11/05/2019 with Dr. Mckenna. Patient has ongoing history of cervical radicular pain with prior fusion of the lower cervical spine. He has been experiencing numbness and tingling with minimal extension of his neck and is not receiving any benefit from conservative therapies. In addition, the patient notes that the symptoms are interfering with sleep and work. 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       See problem list for active medical problems.  Problems all longstanding and stable, except as noted/documented.  See ROS for pertinent symptoms related to these conditions.      MEDICAL HISTORY:     Patient Active Problem List    Diagnosis Date Noted     Benign essential hypertension 06/30/2017     Priority: Medium     Myalgia 10/28/2016     Priority: Medium     Bilateral occipital neuralgia 10/28/2016     Priority: Medium     CARDIOVASCULAR SCREENING; LDL GOAL LESS THAN 160 10/31/2010     Priority: Medium     Major depression in complete remission (H) 08/20/2010     Priority: Medium     Chronic pain syndrome 08/20/2010     Priority: Medium     Patient is followed by Tha Grullon MD for ongoing prescription of pain medication.  All refills should be approved by this provider only at face-to-face appointments - not by phone request.    Medication(s): Tramadol.   Maximum quantity per month: 60  Clinic visit frequency required: Q 1 months     Controlled substance agreement:  Encounter-Level CSA - 10/13/16:               Controlled Substance  Agreement - Scan on 10/23/2016  5:07 PM : CONTROLLED SUBSTANCE AGREEMENT 10/13/16 (below)            Pain Clinic evaluation in the past: Yes       Date/Location:   [facility]    DIRE Total Score(s):  No flowsheet data found.    Last [facility] website verification:  none   [email or web address]               Pain medication agreement signed 08/20/2010     Priority: Medium     Meniere's disease 03/22/2007     Priority: Medium     Problem list name updated by automated process. Provider to review        Past Medical History:   Diagnosis Date     Back pain      Meniere's disease, unspecified      Pain medication agreement signed 8/20/2010     Past Surgical History:   Procedure Laterality Date     BUNIONECTOMY RT/LT  09/05/08    Both feet     COLONOSCOPY N/A 12/28/2016    Procedure: COMBINED COLONOSCOPY, SINGLE OR MULTIPLE BIOPSY/POLYPECTOMY BY BIOPSY;  Surgeon: Indra Jernigan MD;  Location: [facility]     DISCECTOMY, FUSION CERVICAL ANTERIOR ONE LEVEL, COMBINED N/A 7/5/2017    Procedure: COMBINED DISCECTOMY, FUSION CERVICAL ANTERIOR ONE LEVEL;  Cervical 6-7, Anterior cervical discectomy fusion;  Surgeon: Mike Mckenna MD;  Location: [facility]     DISCECTOMY, FUSION CERVICAL ANTERIOR ONE LEVEL, COMBINED N/A 12/19/2018    Procedure: cervical 5-6 anterior cervical discectomy fusion;  Surgeon: Mike Mckenna MD;  Location: [facility]     HC COLONOSCOPY W/WO BRUSH/WASH  12/27/2005     HC CREATE EARDRUM OPENING,GEN ANESTH  09/27/2007    Pe tube, Left endolymphatic sac enhancement.     HC MASTOIDECTOMY,COMPLETE  09/27/2007     HERNIORRHAPHY UMBILICAL N/A 8/11/2017    Procedure: HERNIORRHAPHY UMBILICAL;  open umbilical hernia repair;  Surgeon: Boni Story MD;  Location: [facility]     INJECT EPIDURAL CERVICAL N/A 8/22/2019    Procedure: INJECTION, SPINE, CERVICAL 6-7 EPIDURAL;  Surgeon: Darryn Mcdaniel MD;  [facility]: [facility]     INJECT EPIDURAL LUMBAR N/A 11/9/2017    Procedure: INJECT EPIDURAL LUMBAR;  lumbar  4-5 epidural steroid injection ;  Surgeon: Darryn Mcdaniel MD;  [facility]     INJECT EPIDURAL LUMBAR N/A 12/28/2017    Procedure: INJECT EPIDURAL LUMBAR;  lumbar epidural injection;  Surgeon: Darryn Mcdaniel MD;  [facility]     INJECT EPIDURAL TRANSFORAMINAL Bilateral 8/23/2018    Procedure: INJECT EPIDURAL TRANSFORAMINAL;  transforaminal bilateral lumbar 3-4 steroid injection;  Surgeon: Darryn Mcdaniel MD;  [facility]     INJECT EPIDURAL TRANSFORAMINAL Bilateral 11/8/2018    Procedure: INJECT EPIDURAL TRANSFORAMINAL lumbar 3-4;  Surgeon: Darryn Mcdaniel MD;  [facility]     INJECT EPIDURAL TRANSFORAMINAL Bilateral 6/14/2019    Procedure: INJECTION, EPIDURAL, TRANSFORAMINAL LUMBAR 3-4 BILATERAL;  Surgeon: Darryn Mcdaniel MD;  Location: [facility]     PE TUBES  2006    left ear     Current Outpatient Medications   Medication Sig Dispense Refill     Acetaminophen (TYLENOL PO) Take 500 mg by mouth every 4 hours as needed for mild pain or fever       gabapentin (NEURONTIN) 400 MG capsule Take 3 capsules (1,200 mg) by mouth 3 times daily 270 capsule 3     losartan (COZAAR) 50 MG tablet Take 1 tablet (50 mg) by mouth daily 90 tablet 0     methocarbamol (ROBAXIN) 500 MG tablet Take 2 tablets (1,000 mg) by mouth 3 times daily as needed for muscle spasms 60 tablet 3     order for DME Equipment being ordered: TENS Pads as directed 1 Device 0     oxyCODONE (ROXICODONE) 5 MG tablet Take 1 tab every 4-6 hours as needed for severe pain. MAX 4 TABS PER DAY. Okay to fill 10/4/19. To start 10/09/19 and last until 11/8/2019. 120 tablet 0     tiZANidine (ZANAFLEX) 4 MG tablet Take 1 tablet (4 mg) by mouth 2 times daily as needed for muscle spasms 60 tablet 3     ALPRAZolam (XANAX XR) 0.5 MG 24 hr tablet Take 1 tablet (0.5 mg) by mouth At Bedtime (Patient not taking: Reported on 10/14/2019) 30 tablet 3     DULoxetine (CYMBALTA) 30 MG capsule 30 mg       OTC products: no recent use of OTC ASA, NSAIDS or  "Steroids    Allergies   Allergen Reactions     No Known Drug Allergies       Latex Allergy: NO    Social History     Tobacco Use     Smoking status: Never Smoker     Smokeless tobacco: Never Used   Substance Use Topics     Alcohol use: No     Alcohol/week: 0.0 standard drinks     History   Drug Use No       REVIEW OF SYSTEMS:   Constitutional, neuro, ENT, endocrine, pulmonary, cardiac, gastrointestinal, genitourinary, musculoskeletal, integument and psychiatric systems are negative, except as otherwise noted.    EXAM:   /78 (BP Location: Right arm, Patient Position: Chair, Cuff Size: Adult Large)   Pulse 80   Temp 97.2  F (36.2  C) (Oral)   Resp 20   Ht 1.848 m (6' 0.75\")   Wt 97.1 kg (214 lb)   BMI 28.43 kg/m      GENERAL APPEARANCE: healthy, alert and no distress     EYES: EOMI,  PERRL     HENT: ear canals and TM's normal and nose and mouth without ulcers or lesions     NECK: no adenopathy, no asymmetry, masses, or scars and thyroid normal to palpation     RESP: lungs clear to auscultation - no rales, rhonchi or wheezes     CV: regular rates and rhythm, normal S1 S2, no S3 or S4 and no murmur, click or rub     ABDOMEN:  soft, nontender, no HSM or masses and bowel sounds normal     MS: extremities normal- no gross deformities noted, no evidence of inflammation in joints, FROM in all extremities.     SKIN: no suspicious lesions or rashes     NEURO: Normal strength and tone, sensory exam grossly normal, mentation intact and speech normal     PSYCH: mentation appears normal. and affect normal/bright     LYMPHATICS: No cervical adenopathy    DIAGNOSTICS:     Labs Resulted Today:   Results for orders placed or performed in visit on 10/14/19   Basic metabolic panel  (Ca, Cl, CO2, Creat, Gluc, K, Na, BUN)   Result Value Ref Range    Sodium 141 133 - 144 mmol/L    Potassium 3.9 3.4 - 5.3 mmol/L    Chloride 103 94 - 109 mmol/L    Carbon Dioxide 32 20 - 32 mmol/L    Anion Gap 6 3 - 14 mmol/L    Glucose 110 (H) 70 " - 99 mg/dL    Urea Nitrogen 12 7 - 30 mg/dL    Creatinine 0.94 0.66 - 1.25 mg/dL    GFR Estimate >90 >60 mL/min/[1.73_m2]    GFR Estimate If Black >90 >60 mL/min/[1.73_m2]    Calcium 8.9 8.5 - 10.1 mg/dL   CBC with platelets and differential   Result Value Ref Range    WBC 7.5 4.0 - 11.0 10e9/L    RBC Count 4.95 4.4 - 5.9 10e12/L    Hemoglobin 15.4 13.3 - 17.7 g/dL    Hematocrit 44.9 40.0 - 53.0 %    MCV 91 78 - 100 fl    MCH 31.1 26.5 - 33.0 pg    MCHC 34.3 31.5 - 36.5 g/dL    RDW 12.8 10.0 - 15.0 %    Platelet Count 304 150 - 450 10e9/L    % Neutrophils 72.1 %    % Lymphocytes 18.3 %    % Monocytes 8.4 %    % Eosinophils 0.9 %    % Basophils 0.3 %    Absolute Neutrophil 5.4 1.6 - 8.3 10e9/L    Absolute Lymphocytes 1.4 0.8 - 5.3 10e9/L    Absolute Monocytes 0.6 0.0 - 1.3 10e9/L    Absolute Eosinophils 0.1 0.0 - 0.7 10e9/L    Absolute Basophils 0.0 0.0 - 0.2 10e9/L    Diff Method Automated Method    UA reflex to Microscopic and Culture   Result Value Ref Range    Color Urine Yellow     Appearance Urine Clear     Glucose Urine Negative NEG^Negative mg/dL    Bilirubin Urine Negative NEG^Negative    Ketones Urine Negative NEG^Negative mg/dL    Specific Gravity Urine 1.010 1.003 - 1.035    Blood Urine Negative NEG^Negative    pH Urine 7.0 5.0 - 7.0 pH    Protein Albumin Urine Negative NEG^Negative mg/dL    Urobilinogen Urine 0.2 0.2 - 1.0 EU/dL    Nitrite Urine Negative NEG^Negative    Leukocyte Esterase Urine Negative NEG^Negative    Source Midstream Urine        Recent Labs   Lab Test 06/11/19  1113 11/05/18  1532   HGB 14.5 14.0    271    142   POTASSIUM 3.9 3.7   CR 0.95 0.96        IMPRESSION:   Reason for surgery/procedure: Radicular cervical pain  Diagnosis/reason for consult: Cervical radiculopathy    The proposed surgical procedure is considered INTERMEDIATE risk.    REVISED CARDIAC RISK INDEX  The patient has the following serious cardiovascular risks for perioperative complications such as (MI,  PE, VFib and 3  AV Block):  No serious cardiac risks  INTERPRETATION: 0 risks: Class I (very low risk - 0.4% complication rate)    The patient has the following additional risks for perioperative complications:  No identified additional risks      ICD-10-CM    1. Preop general physical exam Z01.818 Basic metabolic panel  (Ca, Cl, CO2, Creat, Gluc, K, Na, BUN)     CBC with platelets and differential     MRSA MSSA Presurgical Screen     UA reflex to Microscopic and Culture   2. Cervical radiculopathy M54.12        RECOMMENDATIONS:     --Patient is to take all scheduled medications on the day of surgery EXCEPT for modifications listed below.    ACE Inhibitor or Angiotensin Receptor Blocker (ARB) Use  Ace inhibitor or Angiotensin Receptor Blocker (ARB) and should HOLD this medication for the 24 hours prior to surgery.      APPROVAL GIVEN to proceed with proposed procedure, without further diagnostic evaluation       Signed Electronically by: Jg Mckay PA-C    Copy of this evaluation report is provided to requesting physician.    [facility] Preop Guidelines    Revised Cardiac Risk Index

## 2019-10-15 LAB
BACTERIA SPEC CULT: NORMAL
BACTERIA SPEC CULT: NORMAL
SPECIMEN SOURCE: NORMAL

## 2019-10-17 ENCOUNTER — DOCUMENTATION ONLY (OUTPATIENT)
Facility: CLINIC | Age: 54
End: 2019-10-17

## 2019-10-17 NOTE — PROGRESS NOTES
10/17/2019    [employer] Forms: yes    Faxed: [phone]      Type of form: Completed FMLA forms and faxed them to Lyudmila Meade at [employer] at the number listed above.     Placed a copy in the bin and sent the original to medical records

## 2019-10-17 NOTE — PROGRESS NOTES
10/17/2019    [employer] Forms: yes    Faxed: [phone]     Type of form: Completed FMLA forms for [facility] and faxed them to them at the number listed above.     Placed a copy in the bin and sent the original to medical records

## 2019-10-21 ENCOUNTER — OFFICE VISIT (OUTPATIENT)
Dept: OTOLARYNGOLOGY | Facility: CLINIC | Age: 54
End: 2019-10-21
Payer: COMMERCIAL

## 2019-10-21 VITALS
DIASTOLIC BLOOD PRESSURE: 90 MMHG | HEIGHT: 73 IN | WEIGHT: 214 LBS | BODY MASS INDEX: 28.36 KG/M2 | SYSTOLIC BLOOD PRESSURE: 144 MMHG

## 2019-10-21 DIAGNOSIS — J34.89 NASAL OBSTRUCTION: ICD-10-CM

## 2019-10-21 DIAGNOSIS — J34.3 HYPERTROPHY OF BOTH INFERIOR NASAL TURBINATES: Primary | ICD-10-CM

## 2019-10-21 DIAGNOSIS — H69.93 DYSFUNCTION OF BOTH EUSTACHIAN TUBES: ICD-10-CM

## 2019-10-21 PROCEDURE — 31231 NASAL ENDOSCOPY DX: CPT | Performed by: OTOLARYNGOLOGY

## 2019-10-21 PROCEDURE — 99214 OFFICE O/P EST MOD 30 MIN: CPT | Mod: 25 | Performed by: OTOLARYNGOLOGY

## 2019-10-21 ASSESSMENT — PAIN SCALES - GENERAL: PAINLEVEL: NO PAIN (0)

## 2019-10-21 ASSESSMENT — MIFFLIN-ST. JEOR: SCORE: 1865.61

## 2019-10-21 NOTE — LETTER
10/21/2019         RE: Indra Mehta  [address] [facility] [id number]        Dear Colleague,    Thank you for referring your patient, Indra Mehta, to the [facility]. Please see a copy of my visit note below.    ENT Consultation    Indra Mehta who is a 54 year old male seen in consultation at the request of self.      History of Present Illness - Indra Mehta is a 54 year old male with a history of left Ménière's disease had endolymphatic sac surgery in the past presents today because of ear issues nasal issues.  He does wear CPAP auto CPAP 5-15 range and feels even though the normal nose seems to be breathing but it takes longer time to get air in with nasal pillows he felt was insufficient ear going in.  Is concerned about his adenoids.  Denies any nasal trauma.  Sense of smell appears to be intact.  He feels he can breathe better on the right than the left side mostly.  He has tried nasal sprays like Flonase without much relief.  Also his ears both pop and often he feels echoing his ears and amplification his own voice.  He denies any new vertigo or tinnitus.  Denies any new changes in his hearing.  Hearing appears lower vibratory sounds like tires that are becoming much more uncomfortable for his ears amplify these years.  He feels popping his ear feels better for short period time fleeting seconds then the problems come back.  Denies any allergies seasonal perennial but has not been tested.      There is no height or weight on file to calculate BMI.        BP Readings from Last 1 Encounters:   10/03/19 130/64   By  BP noted to be well controlled today in office.     Indra IS NOT a smoker/uses chewing tobacco.        Past Medical History -   Past Medical History:   Diagnosis Date     Back pain      Meniere's disease, unspecified      Pain medication agreement signed 8/20/2010       Current Medications -   Current Outpatient Medications:      Acetaminophen (TYLENOL  PO), Take 500 mg by mouth every 4 hours as needed for mild pain or fever, Disp: , Rfl:      ALPRAZolam (XANAX XR) 0.5 MG 24 hr tablet, Take 1 tablet (0.5 mg) by mouth At Bedtime, Disp: 30 tablet, Rfl: 3     DULoxetine (CYMBALTA) 30 MG capsule, 30 mg, Disp: , Rfl:      gabapentin (NEURONTIN) 400 MG capsule, Take 3 capsules (1,200 mg) by mouth 3 times daily, Disp: 270 capsule, Rfl: 3     losartan (COZAAR) 50 MG tablet, Take 1 tablet (50 mg) by mouth daily, Disp: 90 tablet, Rfl: 0     methocarbamol (ROBAXIN) 500 MG tablet, Take 2 tablets (1,000 mg) by mouth 3 times daily as needed for muscle spasms, Disp: 60 tablet, Rfl: 3     order for DME, Equipment being ordered: TENS Pads as directed, Disp: 1 Device, Rfl: 0     oxyCODONE (ROXICODONE) 5 MG tablet, Take 1 tab every 4-6 hours as needed for severe pain. MAX 4 TABS PER DAY. Okay to fill 10/4/19. To start 10/09/19 and last until 11/8/2019., Disp: 120 tablet, Rfl: 0     tiZANidine (ZANAFLEX) 4 MG tablet, Take 1 tablet (4 mg) by mouth 2 times daily as needed for muscle spasms, Disp: 60 tablet, Rfl: 3    Allergies -   Allergies   Allergen Reactions     No Known Drug Allergies        Social History -   Social History     Socioeconomic History     Marital status: Single     Spouse name: Not on file     Number of children: 2     Years of education: Not on file     Highest education level: Not on file   Occupational History     Employer: [employer]   Social Needs     Financial resource strain: Not on file     Food insecurity:     Worry: Not on file     Inability: Not on file     Transportation needs:     Medical: Not on file     Non-medical: Not on file   Tobacco Use     Smoking status: Never Smoker     Smokeless tobacco: Never Used   Substance and Sexual Activity     Alcohol use: No     Alcohol/week: 0.0 standard drinks     Drug use: No     Sexual activity: Yes     Partners: Female   Lifestyle     Physical activity:     Days per week: Not on file     Minutes per session:  Not on file     Stress: Not on file   Relationships     Social connections:     Talks on phone: Not on file     Gets together: Not on file     Attends Taoist service: Not on file     Active member of club or organization: Not on file     Attends meetings of clubs or organizations: Not on file     Relationship status: Not on file     Intimate partner violence:     Fear of current or ex partner: Not on file     Emotionally abused: Not on file     Physically abused: Not on file     Forced sexual activity: Not on file   Other Topics Concern     Parent/sibling w/ CABG, MI or angioplasty before 65F 55M? No   Social History Narrative     Not on file       Family History -   Family History   Problem Relation Age of Onset     Cancer - colorectal Mother      Diabetes Mother      Cardiovascular Father      Hypertension Father      Mental Illness Sister      Suicide Other        Review of Systems - As per HPI and PMHx, otherwise review of system review of the head and neck negative. Otherwise 10+ review of system is negative    Physical Exam  There were no vitals taken for this visit.  BMI: There is no height or weight on file to calculate BMI.    General - The patient is well nourished and well developed, and appears to have good nutritional status.  Alert and oriented to person and place, answers questions and cooperates with examination appropriately.    SKIN - No suspicious lesions or rashes.  Respiration - No respiratory distress.  Head and Face - Normocephalic and atraumatic, with no gross asymmetry noted of the contour of the facial features.  The facial nerve is intact, with strong symmetric movements.    Voice and Breathing - The patient was breathing comfortably without the use of accessory muscles. The patients voice was clear and strong, and had appropriate pitch and quality.    Ears - Bilateral pinna and EACs with normal appearing overlying skin. Tympanic membrane intact with good mobility on pneumatic otoscopy  bilaterally. Bony landmarks of the ossicular chain are normal. The tympanic membranes are normal in appearance. No retraction, perforation, or masses.  No fluid or purulence was seen in the external canal or the middle ear.     Eyes - Extraocular movements intact.  Sclera were not icteric or injected, conjunctiva were pink and moist.    Mouth - Examination of the oral cavity showed pink, healthy oral mucosa. No lesions or ulcerations noted.  The tongue was mobile and midline, and the dentition were in good condition.      Throat - The walls of the oropharynx were smooth, pink, moist, symmetric, and had no lesions or ulcerations.  The tonsillar pillars and soft palate were symmetric.  The uvula was midline on elevation.    Neck - Normal midline excursion of the laryngotracheal complex during swallowing.  Full range of motion on passive movement.  Palpation of the occipital, submental, submandibular, internal jugular chain, and supraclavicular nodes did not demonstrate any abnormal lymph nodes or masses.  The carotid pulse was palpable bilaterally.  Palpation of the thyroid was soft and smooth, with no nodules or goiter appreciated.  The trachea was mobile and midline.    Nose - External contour is symmetric, no gross deflection or scars.  Nasal mucosa is pink and moist with no abnormal mucus.  The septum was slightly deviated to the right side superiorly and non-obstructive, turbinates of larger size and position.  No polyps, masses, or purulence noted on examination.    Neuro - Nonfocal neuro exam is normal, CN 2 through 12 intact, normal gait and muscle tone.      Performed in clinic today:  To further evaluate the nasal cavity, I performed rigid nasal endoscopy.  I first sprayed the nasal cavity bilaterally with a mix of lidocaine and neosynephrine.  I then began on the left side using a 2.7mm, 30 degree rigid nasal endoscope.  The septum was deviated and the nasal airway was open.  No abnormal secretions,  purulence, or polyps were noted. The left middle turbinate and middle meatus were clearly visualized and normal in appearance.  Looking up, the olfactory cleft was unobstructed.  Going further back, the sphenoethmoid recess was normal in appearance, with healthy appearing mucosa on the face of the sphenoid.  The nasopharynx was unremarkable, and the eustachian tube opening on this side was unobstructed.    I then turned my attention to the right side.  Once again, the septum was deviated, and the airway was open.  No abnormal secretions, purulence, polyps were noted.  The right middle turbinate and middle meatus were clearly visualized and normal in appearance.  Looking up, the olfactory cleft was unobstructed.  Going further back the right sphenoethmoid recess was normal in appearance, and eustachian tube opening was unobstructed.   Black - [phone] [facility]      A/P - Indra Mehta is a 54 year old male with previous history of Ménière's disease but now more of a symptoms with eustachian tube issue.  Some of the symptoms favor patulous eustachian tube infect on exam the eustachian tubes appear to be normal but easily opening with swallowing.  Certainly no adenoid tissue was appreciated.  In regard to his nasal obstruction at appears that the decongestion of the turbinates has helped to a significant degree bilaterally.  Considering he has failed fluticasone in the past we discussed potentially performing submucous resection of turbinates in the office setting with Coblator.  We also discussed trying to see if opening his nose and improving with nasal ventilation not only will help his use of CPAP but also potentially eustachian tube function.  This point considering potential patulous eustachian tube this may be a conservative undertaking that may be helpful.  We will not try any other options right now.  We will proceed with a submucous resection of turbinates after discussing this with him.      Rick  MD Dinora  Ketan to follow up with Primary Care provider regarding elevated blood pressure.      Again, thank you for allowing me to participate in the care of your patient.        Sincerely,        Rick Farias MD, MD

## 2019-10-22 ENCOUNTER — TELEPHONE (OUTPATIENT)
Dept: NEUROSURGERY | Facility: CLINIC | Age: 54
End: 2019-10-22

## 2019-10-22 NOTE — TELEPHONE ENCOUNTER
Type of surgery: C5-7 posterior decompression and fusion   Location of surgery: [facility]  Date and time of surgery: 11/5/19 @2:30pm  Surgeon: MD Clarisa  Pre-Op Appt Date: 10/14/19 tammi/ Jg Mckay  Post-Op Appt Date: 12/19/19 tammi/ Cj   Packet sent out: Yes  Pre-cert/Authorization completed:  Yes  Date: 10/22/19 LAURENCE

## 2019-10-22 NOTE — PROGRESS NOTES
Outpatient Physical Therapy Discharge Note     Patient: Indra Mehta    : 1965    Beginning/End Dates of Reporting Period:  19 to 19    Referring Provider: Gulbranson    Therapy Diagnosis: cerv pain, reduced cerv mobility, myofascial tightness     Client Self Report: Surgeon wants to order another EMG, will have it Tuesday.  Feels frustrated with that plan.  3 weeks out from injection.  Feels like he has almost injured the SH - not just the nerve issues.  Can't sleep on sides but has a new CPAP so mainly has to sleep on back anyways.  Just got the CPAP on Monday.    Objective Measurements:  Objective Measure: pain  Details: 5-6/10, has had pain into both SH    Goals:  Goal Identifier 1   Goal Description Pt will score 38% or less on the NDI (Neck Disability Index) indicating improved pain levels and function over time.   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   Target Date 09/15/19   Date Met      Progress:     Progress Toward Goals: Pt will be having an additional neck surgery.  Will discharge from PT at this time.    Plan: Discharge from therapy.    Reason for Discharge: Change in medical status.    Equipment Issued: theraband    Discharge Plan: Patient to continue home program.

## 2019-11-07 ENCOUNTER — DOCUMENTATION ONLY (OUTPATIENT)
Dept: SLEEP MEDICINE | Facility: CLINIC | Age: 54
End: 2019-11-07
Payer: COMMERCIAL

## 2019-11-07 NOTE — PROGRESS NOTES
STM Recheck: Patient not using his CPAP and rescheduled with his Provider for 12/11/2019. Will recheck after he see's his Provider.    none

## 2019-11-11 DIAGNOSIS — M54.2 CERVICALGIA: ICD-10-CM

## 2019-11-11 RX ORDER — GABAPENTIN 400 MG/1
1200 CAPSULE ORAL 3 TIMES DAILY
Qty: 270 CAPSULE | Refills: 3 | Status: SHIPPED | OUTPATIENT
Start: 2019-11-11 | End: 2020-03-06

## 2019-11-11 NOTE — TELEPHONE ENCOUNTER
"Fax received from [facility] requesting a refill of gabapentin (NEURONTIN) 400 MG capsule on behalf of Indra Mehta \"Ketan\".  Last refill: 7/15/19  # 270 with 3 refills at [facility].  Last office visit:  10/04/19  Next office visit:  None scheduled    This is an appropriate refill, and has been pended.     Sarahy Strauss [facility]            "

## 2019-11-12 DIAGNOSIS — I10 BENIGN ESSENTIAL HYPERTENSION: ICD-10-CM

## 2019-11-12 RX ORDER — LOSARTAN POTASSIUM 50 MG/1
50 TABLET ORAL DAILY
Qty: 90 TABLET | Refills: 0 | Status: SHIPPED | OUTPATIENT
Start: 2019-11-12 | End: 2020-02-11

## 2019-11-12 NOTE — TELEPHONE ENCOUNTER
Pending Prescriptions:                       Disp   Refills    losartan (COZAAR) 50 MG tablet             90 tab*0        Sig: Take 1 tablet (50 mg) by mouth daily      Routing refill request to provider for review/approval because:  Labs out of range:  BP    BP Readings from Last 3 Encounters:   10/21/19 (!) 144/90   10/14/19 132/78   10/03/19 130/64     Taylor Solis RN BSN

## 2019-11-14 NOTE — PROGRESS NOTES
History of Present Illness - Indra Mehta is a 54 year old male presenting in clinic today for a recheck on Patient presents with:  RECHECK: Hypertrophy of both inferior nasal turbinates   Procedure: in office procedure    Patient has significant nasal congestion obstruction due to hypertrophy of inferior turbinates.  He has tried medical therapies without much success.  He understands risks and benefits of the procedure submucous resection of turbinates wishes to have it done.    BP Readings from Last 1 Encounters:   10/21/19 (!) 144/90       BP noted to be elevated today in office.  Patient to follow up with Primary Care provider regarding elevated blood pressure.    Indra IS NOT a smoker/uses chewing tobacco.       Past Medical History -   Past Medical History:   Diagnosis Date     Back pain      Meniere's disease, unspecified      Pain medication agreement signed 8/20/2010       Current Medications -   Current Outpatient Medications:      Acetaminophen (TYLENOL PO), Take 500 mg by mouth every 4 hours as needed for mild pain or fever, Disp: , Rfl:      ALPRAZolam (XANAX XR) 0.5 MG 24 hr tablet, Take 1 tablet (0.5 mg) by mouth At Bedtime, Disp: 30 tablet, Rfl: 3     DULoxetine (CYMBALTA) 30 MG capsule, 30 mg, Disp: , Rfl:      gabapentin (NEURONTIN) 400 MG capsule, Take 3 capsules (1,200 mg) by mouth 3 times daily, Disp: 270 capsule, Rfl: 3     losartan (COZAAR) 50 MG tablet, Take 1 tablet (50 mg) by mouth daily, Disp: 90 tablet, Rfl: 0     methocarbamol (ROBAXIN) 500 MG tablet, Take 2 tablets (1,000 mg) by mouth 3 times daily as needed for muscle spasms, Disp: 60 tablet, Rfl: 3     order for DME, Equipment being ordered: TENS Pads as directed, Disp: 1 Device, Rfl: 0     oxyCODONE (ROXICODONE) 5 MG tablet, Take 1 tab every 4-6 hours as needed for severe pain. MAX 4 TABS PER DAY. Okay to fill 10/4/19. To start 10/09/19 and last until 11/8/2019., Disp: 120 tablet, Rfl: 0     tiZANidine (ZANAFLEX) 4 MG tablet,  Take 1 tablet (4 mg) by mouth 2 times daily as needed for muscle spasms, Disp: 60 tablet, Rfl: 3    Allergies -   Allergies   Allergen Reactions     No Known Drug Allergies        Social History -   Social History     Socioeconomic History     Marital status: Single     Spouse name: Not on file     Number of children: 2     Years of education: Not on file     Highest education level: Not on file   Occupational History     Employer: [employer]   Social Needs     Financial resource strain: Not on file     Food insecurity:     Worry: Not on file     Inability: Not on file     Transportation needs:     Medical: Not on file     Non-medical: Not on file   Tobacco Use     Smoking status: Never Smoker     Smokeless tobacco: Never Used   Substance and Sexual Activity     Alcohol use: No     Alcohol/week: 0.0 standard drinks     Drug use: No     Sexual activity: Yes     Partners: Female   Lifestyle     Physical activity:     Days per week: Not on file     Minutes per session: Not on file     Stress: Not on file   Relationships     Social connections:     Talks on phone: Not on file     Gets together: Not on file     Attends Gnosticist service: Not on file     Active member of club or organization: Not on file     Attends meetings of clubs or organizations: Not on file     Relationship status: Not on file     Intimate partner violence:     Fear of current or ex partner: Not on file     Emotionally abused: Not on file     Physically abused: Not on file     Forced sexual activity: Not on file   Other Topics Concern     Parent/sibling w/ CABG, MI or angioplasty before 65F 55M? No   Social History Narrative     Not on file       Family History -   Family History   Problem Relation Age of Onset     Cancer - colorectal Mother      Diabetes Mother      Cardiovascular Father      Hypertension Father      Mental Illness Sister      Suicide Other        Review of Systems - As per HPI and PMHx, otherwise review of system review of  the head and neck negative. Otherwise 10+ review of system is negative    Physical Exam  There were no vitals taken for this visit.  BMI: There is no height or weight on file to calculate BMI.    General - The patient is well nourished and well developed, and appears to have good nutritional status.  Alert and oriented to person and place, answers questions and cooperates with examination appropriately.    SKIN - No suspicious lesions or rashes.  Respiration - No respiratory distress.  Head and Face - Normocephalic and atraumatic, with no gross asymmetry noted of the contour of the facial features.  The facial nerve is intact, with strong symmetric movements.    Voice and Breathing - The patient was breathing comfortably without the use of accessory muscles. The patients voice was clear and strong, and had appropriate pitch and quality.    Ears - Bilateral pinna and EACs with normal appearing overlying skin. Tympanic membrane intact with good mobility on pneumatic otoscopy bilaterally. Bony landmarks of the ossicular chain are normal. The tympanic membranes are normal in appearance. No retraction, perforation, or masses.  No fluid or purulence was seen in the external canal or the middle ear.     Eyes - Extraocular movements intact.  Sclera were not icteric or injected, conjunctiva were pink and moist.    Mouth - Examination of the oral cavity showed pink, healthy oral mucosa. No lesions or ulcerations noted.  The tongue was mobile and midline, and the dentition were in good condition.      Throat - The walls of the oropharynx were smooth, pink, moist, symmetric, and had no lesions or ulcerations.  The   Performed in clinic today:  Submucous resection of inferior turbinates bilaterally.  Patient appropriate position prepped and draped in the procedure room.  Using tetracaine gel on cotton 4% on each side in addition to jaron lido spray on top of the cotton we will leave those for period of 15 minutes bilaterally.   Subsequently under the guidance of rigid pediatric scope I injected each inferior turbinate that is appreciated is quite large with 1% lidocaine 1-100,000 epinephrine bilaterally.  A total 5 mL's I used for injection.  Once proper local anesthetic is achieved I started on the left side.  Using capital elevator to make an incision and then develop a tunnel submucosally.  I placed Coblator tip of the turbinate or wand with setting of 7 and 3 I go back and forth submucosally removing some of the tissue.  Patient tolerated well.  The same procedure was repeated in the right side where again the tunnel is made and the Coblator tip is introduced.  Again I removed tissue those settings.  After the tissue successfully removed he use blunt edge of the [place] elevator to outfracture each inferior turbinate.  Patient tolerated well little bit of vasovagal reaction that is very momentary.  Hemostasis well controlled.   estimated blood loss was less than 1 mL.    [place]  A/P - Indra Mehta is a 54 year old male Patient presents with:  RECHECK: Hypertrophy of both inferior nasal turbinates   Procedure: in office procedure    Patient will start saline irrigations tonight use it twice daily acetaminophen can be used for discomfort.  He will see us back in 2 weeks.      Rick Farias MD

## 2019-11-18 ENCOUNTER — OFFICE VISIT (OUTPATIENT)
Dept: OTOLARYNGOLOGY | Facility: CLINIC | Age: 54
End: 2019-11-18
Payer: COMMERCIAL

## 2019-11-18 ENCOUNTER — ANCILLARY PROCEDURE (OUTPATIENT)
Dept: GENERAL RADIOLOGY | Facility: OTHER | Age: 54
End: 2019-11-18
Attending: NURSE PRACTITIONER
Payer: COMMERCIAL

## 2019-11-18 VITALS
WEIGHT: 214 LBS | BODY MASS INDEX: 28.36 KG/M2 | HEIGHT: 73 IN | SYSTOLIC BLOOD PRESSURE: 128 MMHG | DIASTOLIC BLOOD PRESSURE: 90 MMHG

## 2019-11-18 DIAGNOSIS — Z98.1 S/P CERVICAL SPINAL FUSION: ICD-10-CM

## 2019-11-18 DIAGNOSIS — J34.3 HYPERTROPHY OF BOTH INFERIOR NASAL TURBINATES: Primary | ICD-10-CM

## 2019-11-18 PROCEDURE — 72040 X-RAY EXAM NECK SPINE 2-3 VW: CPT | Mod: FY

## 2019-11-18 PROCEDURE — 30140 RESECT INFERIOR TURBINATE: CPT | Mod: 50 | Performed by: OTOLARYNGOLOGY

## 2019-11-18 PROCEDURE — 99207 ZZC DROP WITH A PROCEDURE: CPT | Performed by: OTOLARYNGOLOGY

## 2019-11-18 ASSESSMENT — MIFFLIN-ST. JEOR: SCORE: 1865.61

## 2019-11-18 NOTE — LETTER
MRN:[id number]                      After Visit Summary   11/20/2018    Aaliyah Hayes    MRN: [id number]           Thank you!     Thank you for choosing [facility] for your care. Our goal is always to provide you with excellent care. Hearing back from our patients is one way we can continue to improve our services. Please take a few minutes to complete the written survey that you may receive in the mail after you visit. If you would like to speak to someone directly about your visit please contact Patient Relations at [phone]. Thank you!          Patient Information     Date Of Birth          1951        About your hospital stay     You were admitted on:  November 20, 2018 You last received care in the:  [facility]    You were discharged on:  November 21, 2018        Reason for your hospital stay       S/p left reverse total shoulder                  Who to Call     For medical emergencies, please call 911.  For non-urgent questions about your medical care, please call your primary care provider or clinic, [phone]  For questions related to your surgery, please call your surgery clinic        Attending Provider     Provider Specialty    Cricket Shah MD Orthopaedic Surgery       Primary Care Provider Office Phone # Fax #    Jessie High [phone]      After Care Instructions     Activity       Your activity upon discharge: NWB to LUE x 4 weeks, keep sling in place            Diet       Follow this diet upon discharge: regular            Discharge Instructions       Patient is to remain NWB to LUE x 4 weeks and keep sling in place. Only come out of sling for daily Codmans and pendulum swing exercises.   They are to keep the dressing in place at all times and do not remove it.   You can shower. Do not at any point in time, submerge the incision or soak the incision.   Monitor the wound closely for any foul smelling or      11/18/2019         RE: Indra Mehta  [address] [facility] [address]        Dear Colleague,    Thank you for referring your patient, Indra Mehta, to the [facility]. Please see a copy of my visit note below.    History of Present Illness - Indra Mehta is a 54 year old male presenting in clinic today for a recheck on Patient presents with:  RECHECK: Hypertrophy of both inferior nasal turbinates   Procedure: in office procedure    Patient has significant nasal congestion obstruction due to hypertrophy of inferior turbinates.  He has tried medical therapies without much success.  He understands risks and benefits of the procedure submucous resection of turbinates wishes to have it done.    BP Readings from Last 1 Encounters:   10/21/19 (!) 144/90       BP noted to be elevated today in office.  Patient to follow up with Primary Care provider regarding elevated blood pressure.    Indra IS NOT a smoker/uses chewing tobacco.       Past Medical History -   Past Medical History:   Diagnosis Date     Back pain      Meniere's disease, unspecified      Pain medication agreement signed 8/20/2010       Current Medications -   Current Outpatient Medications:      Acetaminophen (TYLENOL PO), Take 500 mg by mouth every 4 hours as needed for mild pain or fever, Disp: , Rfl:      ALPRAZolam (XANAX XR) 0.5 MG 24 hr tablet, Take 1 tablet (0.5 mg) by mouth At Bedtime, Disp: 30 tablet, Rfl: 3     DULoxetine (CYMBALTA) 30 MG capsule, 30 mg, Disp: , Rfl:      gabapentin (NEURONTIN) 400 MG capsule, Take 3 capsules (1,200 mg) by mouth 3 times daily, Disp: 270 capsule, Rfl: 3     losartan (COZAAR) 50 MG tablet, Take 1 tablet (50 mg) by mouth daily, Disp: 90 tablet, Rfl: 0     methocarbamol (ROBAXIN) 500 MG tablet, Take 2 tablets (1,000 mg) by mouth 3 times daily as needed for muscle spasms, Disp: 60 tablet, Rfl: 3     order for DME, Equipment being ordered: TENS Pads as directed, Disp: 1 Device, Rfl:  0     oxyCODONE (ROXICODONE) 5 MG tablet, Take 1 tab every 4-6 hours as needed for severe pain. MAX 4 TABS PER DAY. Okay to fill 10/4/19. To start 10/09/19 and last until 11/8/2019., Disp: 120 tablet, Rfl: 0     tiZANidine (ZANAFLEX) 4 MG tablet, Take 1 tablet (4 mg) by mouth 2 times daily as needed for muscle spasms, Disp: 60 tablet, Rfl: 3    Allergies -   Allergies   Allergen Reactions     No Known Drug Allergies        Social History -   Social History     Socioeconomic History     Marital status: Single     Spouse name: Not on file     Number of children: 2     Years of education: Not on file     Highest education level: Not on file   Occupational History     Employer: [employer]   Social Needs     Financial resource strain: Not on file     Food insecurity:     Worry: Not on file     Inability: Not on file     Transportation needs:     Medical: Not on file     Non-medical: Not on file   Tobacco Use     Smoking status: Never Smoker     Smokeless tobacco: Never Used   Substance and Sexual Activity     Alcohol use: No     Alcohol/week: 0.0 standard drinks     Drug use: No     Sexual activity: Yes     Partners: Female   Lifestyle     Physical activity:     Days per week: Not on file     Minutes per session: Not on file     Stress: Not on file   Relationships     Social connections:     Talks on phone: Not on file     Gets together: Not on file     Attends Judaism service: Not on file     Active member of club or organization: Not on file     Attends meetings of clubs or organizations: Not on file     Relationship status: Not on file     Intimate partner violence:     Fear of current or ex partner: Not on file     Emotionally abused: Not on file     Physically abused: Not on file     Forced sexual activity: Not on file   Other Topics Concern     Parent/sibling w/ CABG, MI or angioplasty before 65F 55M? No   Social History Narrative     Not on file       Family History -   Family History   Problem Relation Age  abnormal discharge. Any temperatures over 101.5 with chills, or any redness about the incision please contact our office immediately. If you experience any chest pains, or shortness of breath, go directly to the emergency department.   Follow up with Dr. Shah in approximately 2 weeks and call if you have any complaints between now and your follow up.   Call [phone] to schedule your follow up if you do not already have one scheduled.     Take aspirin 325 mg PO 2x daily for 4 weeks for prevention of blood clots. After completion of the 4 weeks you can resume your baby aspirin daily 81 mg.   Take oxycodone/Tylenol for pain control  Take senokot to help with constipation            Wound care and dressings       Instructions to care for your wound at home: Leave dressing intact if Aquacel and remove at POD #7-10.                  Follow-up Appointments     Follow-up and recommended labs and tests        Please follow up with Dr. Shah with [facility] in 2 weeks. Call his care coordinator Micki at [phone] to make an appointment. No follow up labs or test are needed.                  Additional Services     Home Care PT Referral for Hospital Discharge       PT to eval and treat    Your provider has ordered home care - physical therapy. If you have not been contacted within 2 days of your discharge please call the department phone number listed on the top of this document.                  Pending Results     No orders found for last 3 day(s).            Statement of Approval     Ordered          11/21/18 4620  I have reviewed and agree with all the recommendations and orders detailed in this document.  EFFECTIVE NOW     Approved and electronically signed by:  Milla Godfrey PA-C           11/21/18 0378  I have reviewed and agree with all the recommendations and orders detailed in this document.  EFFECTIVE NOW     Approved and electronically signed by:  Milla Godfrey PA-C         "     Admission Information     Date & Time Provider Department Dept. Phone    11/20/2018 Cricket Shah MD [facility] [phone]      Your Vitals Were     Blood Pressure Pulse Temperature Respirations Height Weight    115/67 84 98.5  F (36.9  C) (Oral) 18 1.575 m (5' 2\") 60.8 kg (134 lb)    Pulse Oximetry BMI (Body Mass Index)                95% 24.51 kg/m2          MyChart Information     [facility] gives you secure access to your electronic health record. If you see a primary care provider, you can also send messages to your care team and make appointments. If you have questions, please call your primary care clinic.  If you do not have a primary care provider, please call [phone] and they will assist you.        Care EveryWhere ID     This is your Care EveryWhere ID. This could be used by other organizations to access your [place] medical records  [id number]        Equal Access to Services     NEELA FENRANDO AH: Columba Ware, joyce scott, tamica kaalmasweta jack, shaq traore. So [facility] [phone].    ATENCIÓN: Si habla español, tiene a coates disposición servicios gratuitos de asistencia lingüística. Llame al [phone].    We comply with applicable federal civil rights laws and [place] laws. We do not discriminate on the basis of race, color, national origin, age, disability, sex, sexual orientation, or gender identity.               Review of your medicines      START taking        Dose / Directions    acetaminophen 325 MG tablet   Commonly known as:  TYLENOL        Dose:  650 mg   Take 2 tablets (650 mg) by mouth every 6 hours as needed for mild pain   Quantity:  100 tablet   Refills:  0       aspirin 325 MG tablet   Commonly known as:  ASA   Replaces:  ASPIRIN ADULT LOW STRENGTH PO        Dose:  325 mg   Take 1 tablet (325 mg) by mouth 2 times daily   Quantity:  60 tablet   Refills:  0       HYDROcodone-acetaminophen 5-325 MG tablet " of Onset     Cancer - colorectal Mother      Diabetes Mother      Cardiovascular Father      Hypertension Father      Mental Illness Sister      Suicide Other        Review of Systems - As per HPI and PMHx, otherwise review of system review of the head and neck negative. Otherwise 10+ review of system is negative    Physical Exam  There were no vitals taken for this visit.  BMI: There is no height or weight on file to calculate BMI.    General - The patient is well nourished and well developed, and appears to have good nutritional status.  Alert and oriented to person and place, answers questions and cooperates with examination appropriately.    SKIN - No suspicious lesions or rashes.  Respiration - No respiratory distress.  Head and Face - Normocephalic and atraumatic, with no gross asymmetry noted of the contour of the facial features.  The facial nerve is intact, with strong symmetric movements.    Voice and Breathing - The patient was breathing comfortably without the use of accessory muscles. The patients voice was clear and strong, and had appropriate pitch and quality.    Ears - Bilateral pinna and EACs with normal appearing overlying skin. Tympanic membrane intact with good mobility on pneumatic otoscopy bilaterally. Bony landmarks of the ossicular chain are normal. The tympanic membranes are normal in appearance. No retraction, perforation, or masses.  No fluid or purulence was seen in the external canal or the middle ear.     Eyes - Extraocular movements intact.  Sclera were not icteric or injected, conjunctiva were pink and moist.    Mouth - Examination of the oral cavity showed pink, healthy oral mucosa. No lesions or ulcerations noted.  The tongue was mobile and midline, and the dentition were in good condition.      Throat - The walls of the oropharynx were smooth, pink, moist, symmetric, and had no lesions or ulcerations.  The   Performed in clinic today:  Submucous resection of inferior turbinates    Commonly known as:  NORCO        Dose:  1-2 tablet   Take 1-2 tablets by mouth every 4 hours as needed for pain   Quantity:  30 tablet   Refills:  0       ondansetron 4 MG tablet   Commonly known as:  ZOFRAN   Used for:  Nausea        Dose:  4 mg   Take 1 tablet (4 mg) by mouth every 8 hours as needed for nausea   Quantity:  18 tablet   Refills:  0       senna-docusate 8.6-50 MG per tablet   Commonly known as:  SENOKOT-S;PERICOLACE        Dose:  2 tablet   Take 2 tablets by mouth 2 times daily   Quantity:  100 tablet   Refills:  0         CONTINUE these medicines which have NOT CHANGED        Dose / Directions    atenolol 25 MG tablet   Commonly known as:  TENORMIN   Used for:  Benign essential hypertension        Dose:  12.5 mg   Take 0.5 tablets (12.5 mg) by mouth daily   Quantity:  90 tablet   Refills:  0       calcium carbonate 500 mg (elemental) 1250 (500 Ca) MG Tabs tablet   Commonly known as:  OSCAL 500        Dose:  1250 mg   Take 1,250 mg by mouth daily   Refills:  0       chlorthalidone 25 MG tablet   Commonly known as:  HYGROTON   Used for:  Benign essential hypertension        TAKE ONE TABLET BY MOUTH ONCE DAILY   Quantity:  90 tablet   Refills:  3       ferrous sulfate 142 (45 Fe) MG Tbcr   Commonly known as:  SLO-FE   Used for:  Anemia, unspecified type        Dose:  142 mg   Take 1 tablet (142 mg) by mouth 2 times daily   Quantity:  60 tablet   Refills:  1       lisinopril 10 MG tablet   Commonly known as:  PRINIVIL/ZESTRIL   Used for:  Benign essential hypertension        Dose:  10 mg   Take 1 tablet (10 mg) by mouth daily   Quantity:  90 tablet   Refills:  3       Multi-vitamin Tabs tablet        Dose:  1 tablet   Take 1 tablet by mouth every morning   Quantity:  100 tablet   Refills:  3       OMEGA 3 500 500 MG Caps        Dose:  500 mg   Take 500 mg by mouth daily   Refills:  0       potassium gluconate 2.5 MEQ Tabs        Dose:  5 mEq   Take 5 mEq by mouth daily   Refills:  0       valACYclovir  bilaterally.  Patient appropriate position prepped and draped in the procedure room.  Using tetracaine gel on cotton 4% on each side in addition to jaron lido spray on top of the cotton we will leave those for period of 15 minutes bilaterally.  Subsequently under the guidance of rigid pediatric scope I injected each inferior turbinate that is appreciated is quite large with 1% lidocaine 1-100,000 epinephrine bilaterally.  A total 5 mL's I used for injection.  Once proper local anesthetic is achieved I started on the left side.  Using capital elevator to make an incision and then develop a tunnel submucosally.  I placed Coblator tip of the turbinate or wand with setting of 7 and 3 I go back and forth submucosally removing some of the tissue.  Patient tolerated well.  The same procedure was repeated in the right side where again the tunnel is made and the Coblator tip is introduced.  Again I removed tissue those settings.  After the tissue successfully removed he use blunt edge of the Carver elevator to outfracture each inferior turbinate.  Patient tolerated well little bit of vasovagal reaction that is very momentary.  Hemostasis well controlled.   estimated blood loss was less than 1 mL.    [facility]  A/P - Indra Mehta is a 54 year old male Patient presents with:  RECHECK: Hypertrophy of both inferior nasal turbinates   Procedure: in office procedure    Patient will start saline irrigations tonight use it twice daily acetaminophen can be used for discomfort.  He will see us back in 2 weeks.      Rick Farias MD        Again, thank you for allowing me to participate in the care of your patient.        Sincerely,        Rick Farias MD, MD     1000 mg tablet   Commonly known as:  VALTREX   Used for:  Herpetic ritesh        Dose:  2000 mg   Take 2 tablets (2,000 mg) by mouth 2 times daily As needed for herpetic ritesh   Quantity:  30 tablet   Refills:  3       vitamin B complex with vitamin C Tabs tablet        Dose:  1 tablet   Take 1 tablet by mouth daily   Refills:  0       VITAMIN C PO        Dose:  1000 mg   Take 1,000 mg by mouth daily   Refills:  0       vitamin E 50 UNIT/ML Soln drops        Dose:  100 Units   Take 100 Units by mouth daily   Refills:  0         STOP taking     ASPIRIN ADULT LOW STRENGTH PO   Replaced by:  aspirin 325 MG tablet                Where to get your medicines      These medications were sent to [place], [place] - [address] [address] [facility]  [address] [address] [address]     Phone:  [phone]     ondansetron 4 MG tablet         Some of these will need a paper prescription and others can be bought over the counter. Ask your nurse if you have questions.     Bring a paper prescription for each of these medications     acetaminophen 325 MG tablet    aspirin 325 MG tablet    HYDROcodone-acetaminophen 5-325 MG tablet    senna-docusate 8.6-50 MG per tablet                Protect others around you: Learn how to safely use, store and throw away your medicines at www.disposemymeds.org.        Information about OPIOIDS     PRESCRIPTION OPIOIDS: WHAT YOU NEED TO KNOW   We gave you an opioid (narcotic) pain medicine. It is important to manage your pain, but opioids are not always the best choice. You should first try all the other options your care team gave you. Take this medicine for as short a time (and as few doses) as possible.    Some activities can increase your pain, such as bandage changes or therapy sessions. It may help to take your pain medicine 30 to 60 minutes before these activities. Reduce your stress by getting enough sleep, working on hobbies you enjoy and practicing  relaxation or meditation. Talk to your care team about ways to manage your pain beyond prescription opioids.    These medicines have risks:    DO NOT drive when on new or higher doses of pain medicine. These medicines can affect your alertness and reaction times, and you could be arrested for driving under the influence (DUI). If you need to use opioids long-term, talk to your care team about driving.    DO NOT operate heavy machinery    DO NOT do any other dangerous activities while taking these medicines.    DO NOT drink any alcohol while taking these medicines.     If the opioid prescribed includes acetaminophen, DO NOT take with any other medicines that contain acetaminophen. Read all labels carefully. Look for the word  acetaminophen  or  Tylenol.  Ask your pharmacist if you have questions or are unsure.    You can get addicted to pain medicines, especially if you have a history of addiction (chemical, alcohol or substance dependence). Talk to your care team about ways to reduce this risk.    All opioids tend to cause constipation. Drink plenty of water and eat foods that have a lot of fiber, such as fruits, vegetables, prune juice, apple juice and high-fiber cereal. Take a laxative (Miralax, milk of magnesia, Colace, Senna) if you don t move your bowels at least every other day. Other side effects include upset stomach, sleepiness, dizziness, throwing up, tolerance (needing more of the medicine to have the same effect), physical dependence and slowed breathing.    Store your pills in a secure place, locked if possible. We will not replace any lost or stolen medicine. If you don t finish your medicine, please throw away (dispose) as directed by your pharmacist. The [place] Pollution Control Agency has more information about safe disposal: https://www.pca.[facility].[place].us/living-green/managing-unwanted-medications             Medication List: This is a list of all your medications and when to take them. Check marks  below indicate your daily home schedule. Keep this list as a reference.      Medications           Morning Afternoon Evening Bedtime As Needed    acetaminophen 325 MG tablet   Commonly known as:  TYLENOL   Take 2 tablets (650 mg) by mouth every 6 hours as needed for mild pain                                aspirin 325 MG tablet   Commonly known as:  ASA   Take 1 tablet (325 mg) by mouth 2 times daily                                atenolol 25 MG tablet   Commonly known as:  TENORMIN   Take 0.5 tablets (12.5 mg) by mouth daily   Last time this was given:  12.5 mg on 11/21/2018  9:10 AM                                calcium carbonate 500 mg (elemental) 1250 (500 Ca) MG Tabs tablet   Commonly known as:  OSCAL 500   Take 1,250 mg by mouth daily                                chlorthalidone 25 MG tablet   Commonly known as:  HYGROTON   TAKE ONE TABLET BY MOUTH ONCE DAILY                                ferrous sulfate 142 (45 Fe) MG Tbcr   Commonly known as:  SLO-FE   Take 1 tablet (142 mg) by mouth 2 times daily   Last time this was given:  140 mg on 11/21/2018  9:10 AM                                HYDROcodone-acetaminophen 5-325 MG tablet   Commonly known as:  NORCO   Take 1-2 tablets by mouth every 4 hours as needed for pain   Last time this was given:  2 tablets on 11/21/2018  5:26 AM                                lisinopril 10 MG tablet   Commonly known as:  PRINIVIL/ZESTRIL   Take 1 tablet (10 mg) by mouth daily   Last time this was given:  10 mg on 11/21/2018  9:10 AM                                Multi-vitamin Tabs tablet   Take 1 tablet by mouth every morning                                OMEGA 3 500 500 MG Caps   Take 500 mg by mouth daily                                ondansetron 4 MG tablet   Commonly known as:  ZOFRAN   Take 1 tablet (4 mg) by mouth every 8 hours as needed for nausea                                potassium gluconate 2.5 MEQ Tabs   Take 5 mEq by mouth daily                                 senna-docusate 8.6-50 MG per tablet   Commonly known as:  SENOKOT-S;PERICOLACE   Take 2 tablets by mouth 2 times daily   Last time this was given:  1 tablet on 11/21/2018  9:10 AM                                valACYclovir 1000 mg tablet   Commonly known as:  VALTREX   Take 2 tablets (2,000 mg) by mouth 2 times daily As needed for herpetic ritesh                                vitamin B complex with vitamin C Tabs tablet   Take 1 tablet by mouth daily                                VITAMIN C PO   Take 1,000 mg by mouth daily                                vitamin E 50 UNIT/ML Soln drops   Take 100 Units by mouth daily

## 2019-11-25 DIAGNOSIS — Z98.1 S/P CERVICAL SPINAL FUSION: ICD-10-CM

## 2019-11-25 RX ORDER — METHOCARBAMOL 500 MG/1
1000 TABLET, FILM COATED ORAL 3 TIMES DAILY PRN
Qty: 60 TABLET | Refills: 3 | Status: SHIPPED | OUTPATIENT
Start: 2019-11-25 | End: 2020-01-10

## 2019-11-26 NOTE — PROGRESS NOTES
History of Present Illness - Indra Mehta is a 54 year old male presenting in clinic today for a recheck on Patient presents with:  RECHECK: Hypertrophy of both inferior nasal turbinates     Patient is status post submucous resection of turbinates done in the office.  He is doing much much better breathing much better through his nose.  Still has eustachian tube symptoms on the right at this point.      BP Readings from Last 1 Encounters:   11/18/19 (!) 128/90       BP noted to be elevated today in office.  Patient to follow up with Primary Care provider regarding elevated blood pressure.    Indra IS NOT a smoker/uses chewing tobacco.       Past Medical History -   Past Medical History:   Diagnosis Date     Back pain      Meniere's disease, unspecified      Pain medication agreement signed 8/20/2010       Current Medications -   Current Outpatient Medications:      Acetaminophen (TYLENOL PO), Take 500 mg by mouth every 4 hours as needed for mild pain or fever, Disp: , Rfl:      ALPRAZolam (XANAX XR) 0.5 MG 24 hr tablet, Take 1 tablet (0.5 mg) by mouth At Bedtime, Disp: 30 tablet, Rfl: 3     DULoxetine (CYMBALTA) 30 MG capsule, 30 mg, Disp: , Rfl:      gabapentin (NEURONTIN) 400 MG capsule, Take 3 capsules (1,200 mg) by mouth 3 times daily, Disp: 270 capsule, Rfl: 3     losartan (COZAAR) 50 MG tablet, Take 1 tablet (50 mg) by mouth daily, Disp: 90 tablet, Rfl: 0     methocarbamol (ROBAXIN) 500 MG tablet, Take 2 tablets (1,000 mg) by mouth 3 times daily as needed for muscle spasms, Disp: 60 tablet, Rfl: 3     order for DME, Equipment being ordered: TENS Pads as directed, Disp: 1 Device, Rfl: 0     oxyCODONE (ROXICODONE) 5 MG tablet, Take 1 tab every 4-6 hours as needed for severe pain. MAX 4 TABS PER DAY. Okay to fill 10/4/19. To start 10/09/19 and last until 11/8/2019., Disp: 120 tablet, Rfl: 0     tiZANidine (ZANAFLEX) 4 MG tablet, Take 1 tablet (4 mg) by mouth 2 times daily as needed for muscle spasms, Disp:  60 tablet, Rfl: 3    Allergies -   Allergies   Allergen Reactions     No Known Drug Allergies        Social History -   Social History     Socioeconomic History     Marital status: Single     Spouse name: Not on file     Number of children: 2     Years of education: Not on file     Highest education level: Not on file   Occupational History     Employer: [employer]   Social Needs     Financial resource strain: Not on file     Food insecurity:     Worry: Not on file     Inability: Not on file     Transportation needs:     Medical: Not on file     Non-medical: Not on file   Tobacco Use     Smoking status: Never Smoker     Smokeless tobacco: Never Used   Substance and Sexual Activity     Alcohol use: No     Alcohol/week: 0.0 standard drinks     Drug use: No     Sexual activity: Yes     Partners: Female   Lifestyle     Physical activity:     Days per week: Not on file     Minutes per session: Not on file     Stress: Not on file   Relationships     Social connections:     Talks on phone: Not on file     Gets together: Not on file     Attends Rastafari service: Not on file     Active member of club or organization: Not on file     Attends meetings of clubs or organizations: Not on file     Relationship status: Not on file     Intimate partner violence:     Fear of current or ex partner: Not on file     Emotionally abused: Not on file     Physically abused: Not on file     Forced sexual activity: Not on file   Other Topics Concern     Parent/sibling w/ CABG, MI or angioplasty before 65F 55M? No   Social History Narrative     Not on file       Family History -   Family History   Problem Relation Age of Onset     Cancer - colorectal Mother      Diabetes Mother      Cardiovascular Father      Hypertension Father      Mental Illness Sister      Suicide Other        Review of Systems - As per HPI and PMHx, otherwise review of system review of the head and neck negative. Otherwise 10+ review of system is  negative    Physical Exam  There were no vitals taken for this visit.  BMI: There is no height or weight on file to calculate BMI.    General - The patient is well nourished and well developed, and appears to have good nutritional status.  Alert and oriented to person and place, answers questions and cooperates with examination appropriately.    SKIN - No suspicious lesions or rashes.  Respiration - No respiratory distress.  Head and Face - Normocephalic and atraumatic, with no gross asymmetry noted of the contour of the facial features.  The facial nerve is intact, with strong symmetric movements.    Voice and Breathing - The patient was breathing comfortably without the use of accessory muscles. The patients voice was clear and strong, and had appropriate pitch and quality.    Ears - Bilateral pinna and EACs with normal appearing overlying skin. Tympanic membrane intact with good mobility on pneumatic otoscopy bilaterally. Bony landmarks of the ossicular chain are normal. The tympanic membranes are normal in appearance. No retraction, perforation, or masses.  No fluid or purulence was seen in the external canal or the middle ear.     Eyes - Extraocular movements intact.  Sclera were not icteric or injected, conjunctiva were pink and moist.    Mouth - Examination of the oral cavity showed pink, healthy oral mucosa. No lesions or ulcerations noted.  The tongue was mobile and midline, and the dentition were in good condition.      Throat - The walls of the oropharynx were smooth, pink, moist, symmetric, and had no lesions or ulcerations.  The tonsillar pillars and soft palate were symmetric.  Nose - External contour is symmetric, no gross deflection or scars.  Nasal mucosa is pink and moist with no abnormal mucus.  The septum was midline and non-obstructive, turbinates of normal size and position.  No polyps, masses, or purulence noted on examination.  Some treatment scabs were removed today without any difficulty.   He is breathing much much better.    Neuro - Nonfocal neuro exam is normal, CN 2 through 12 intact, normal gait and muscle tone.      Performed in clinic today:  No procedures preformed in clinic today      A/P - Indra Mehta is a 54 year old male Patient presents with:  RECHECK: Hypertrophy of both inferior nasal turbinates     Patient is doing very well after submucous resection of turbinates.  Scabs are removed today.  He will continue irrigating for another week.  In regard to his right eustachian tube dysfunction will follow conservatively see how improving airflow through the nose will help with his eustachian tube and he will continue eustachian tube exercises besides.  I would like to see him back in 2 months.          Rick Farias MD

## 2019-11-27 NOTE — TELEPHONE ENCOUNTER
Per Erica Mitchell PA-C, referred patient to pain mgmt for comprehensive care. He's gone to  [facility] in the past and would like to continue with them. Placed order. Advised patient to call back with further questions or concerns.    [FreeTextEntry1] : patient will follow up  if needed. Warning signs, follow up, and restrictions were discussed with the patient.

## 2019-12-02 ENCOUNTER — OFFICE VISIT (OUTPATIENT)
Dept: OTOLARYNGOLOGY | Facility: CLINIC | Age: 54
End: 2019-12-02
Payer: COMMERCIAL

## 2019-12-02 VITALS
HEIGHT: 73 IN | SYSTOLIC BLOOD PRESSURE: 132 MMHG | BODY MASS INDEX: 28.36 KG/M2 | WEIGHT: 214 LBS | DIASTOLIC BLOOD PRESSURE: 86 MMHG

## 2019-12-02 DIAGNOSIS — J34.3 HYPERTROPHY OF INFERIOR NASAL TURBINATE: Primary | ICD-10-CM

## 2019-12-02 DIAGNOSIS — H69.91 CHRONIC DYSFUNCTION OF RIGHT EUSTACHIAN TUBE: ICD-10-CM

## 2019-12-02 PROCEDURE — 99213 OFFICE O/P EST LOW 20 MIN: CPT | Performed by: OTOLARYNGOLOGY

## 2019-12-02 ASSESSMENT — MIFFLIN-ST. JEOR: SCORE: 1865.61

## 2019-12-02 NOTE — LETTER
12/2/2019         RE: Indra Mehta  [address] [facility] [id number]        Dear Colleague,    Thank you for referring your patient, Indra Mehta, to the [facility]. Please see a copy of my visit note below.    History of Present Illness - Indra Mehta is a 54 year old male presenting in clinic today for a recheck on Patient presents with:  RECHECK: Hypertrophy of both inferior nasal turbinates     Patient is status post submucous resection of turbinates done in the office.  He is doing much much better breathing much better through his nose.  Still has eustachian tube symptoms on the right at this point.      BP Readings from Last 1 Encounters:   11/18/19 (!) 128/90       BP noted to be elevated today in office.  Patient to follow up with Primary Care provider regarding elevated blood pressure.    Indra IS NOT a smoker/uses chewing tobacco.       Past Medical History -   Past Medical History:   Diagnosis Date     Back pain      Meniere's disease, unspecified      Pain medication agreement signed 8/20/2010       Current Medications -   Current Outpatient Medications:      Acetaminophen (TYLENOL PO), Take 500 mg by mouth every 4 hours as needed for mild pain or fever, Disp: , Rfl:      ALPRAZolam (XANAX XR) 0.5 MG 24 hr tablet, Take 1 tablet (0.5 mg) by mouth At Bedtime, Disp: 30 tablet, Rfl: 3     DULoxetine (CYMBALTA) 30 MG capsule, 30 mg, Disp: , Rfl:      gabapentin (NEURONTIN) 400 MG capsule, Take 3 capsules (1,200 mg) by mouth 3 times daily, Disp: 270 capsule, Rfl: 3     losartan (COZAAR) 50 MG tablet, Take 1 tablet (50 mg) by mouth daily, Disp: 90 tablet, Rfl: 0     methocarbamol (ROBAXIN) 500 MG tablet, Take 2 tablets (1,000 mg) by mouth 3 times daily as needed for muscle spasms, Disp: 60 tablet, Rfl: 3     order for DME, Equipment being ordered: TENS Pads as directed, Disp: 1 Device, Rfl: 0     oxyCODONE (ROXICODONE) 5 MG tablet, Take 1 tab every 4-6 hours as needed  for severe pain. MAX 4 TABS PER DAY. Okay to fill 10/4/19. To start 10/09/19 and last until 11/8/2019., Disp: 120 tablet, Rfl: 0     tiZANidine (ZANAFLEX) 4 MG tablet, Take 1 tablet (4 mg) by mouth 2 times daily as needed for muscle spasms, Disp: 60 tablet, Rfl: 3    Allergies -   Allergies   Allergen Reactions     No Known Drug Allergies        Social History -   Social History     Socioeconomic History     Marital status: Single     Spouse name: Not on file     Number of children: 2     Years of education: Not on file     Highest education level: Not on file   Occupational History     Employer: [email or web address]   Social Needs     Financial resource strain: Not on file     Food insecurity:     Worry: Not on file     Inability: Not on file     Transportation needs:     Medical: Not on file     Non-medical: Not on file   Tobacco Use     Smoking status: Never Smoker     Smokeless tobacco: Never Used   Substance and Sexual Activity     Alcohol use: No     Alcohol/week: 0.0 standard drinks     Drug use: No     Sexual activity: Yes     Partners: Female   Lifestyle     Physical activity:     Days per week: Not on file     Minutes per session: Not on file     Stress: Not on file   Relationships     Social connections:     Talks on phone: Not on file     Gets together: Not on file     Attends Confucianist service: Not on file     Active member of club or organization: Not on file     Attends meetings of clubs or organizations: Not on file     Relationship status: Not on file     Intimate partner violence:     Fear of current or ex partner: Not on file     Emotionally abused: Not on file     Physically abused: Not on file     Forced sexual activity: Not on file   Other Topics Concern     Parent/sibling w/ CABG, MI or angioplasty before 65F 55M? No   Social History Narrative     Not on file       Family History -   Family History   Problem Relation Age of Onset     Cancer - colorectal Mother      Diabetes Mother       Cardiovascular Father      Hypertension Father      Mental Illness Sister      Suicide Other        Review of Systems - As per HPI and PMHx, otherwise review of system review of the head and neck negative. Otherwise 10+ review of system is negative    Physical Exam  There were no vitals taken for this visit.  BMI: There is no height or weight on file to calculate BMI.    General - The patient is well nourished and well developed, and appears to have good nutritional status.  Alert and oriented to person and place, answers questions and cooperates with examination appropriately.    SKIN - No suspicious lesions or rashes.  Respiration - No respiratory distress.  Head and Face - Normocephalic and atraumatic, with no gross asymmetry noted of the contour of the facial features.  The facial nerve is intact, with strong symmetric movements.    Voice and Breathing - The patient was breathing comfortably without the use of accessory muscles. The patients voice was clear and strong, and had appropriate pitch and quality.    Ears - Bilateral pinna and EACs with normal appearing overlying skin. Tympanic membrane intact with good mobility on pneumatic otoscopy bilaterally. Bony landmarks of the ossicular chain are normal. The tympanic membranes are normal in appearance. No retraction, perforation, or masses.  No fluid or purulence was seen in the external canal or the middle ear.     Eyes - Extraocular movements intact.  Sclera were not icteric or injected, conjunctiva were pink and moist.    Mouth - Examination of the oral cavity showed pink, healthy oral mucosa. No lesions or ulcerations noted.  The tongue was mobile and midline, and the dentition were in good condition.      Throat - The walls of the oropharynx were smooth, pink, moist, symmetric, and had no lesions or ulcerations.  The tonsillar pillars and soft palate were symmetric.  Nose - External contour is symmetric, no gross deflection or scars.  Nasal mucosa is pink  and moist with no abnormal mucus.  The septum was midline and non-obstructive, turbinates of normal size and position.  No polyps, masses, or purulence noted on examination.  Some treatment scabs were removed today without any difficulty.  He is breathing much much better.    Neuro - Nonfocal neuro exam is normal, CN 2 through 12 intact, normal gait and muscle tone.      Performed in clinic today:  No procedures preformed in clinic today      A/P - Indra Mehta is a 54 year old male Patient presents with:  RECHECK: Hypertrophy of both inferior nasal turbinates     Patient is doing very well after submucous resection of turbinates.  Scabs are removed today.  He will continue irrigating for another week.  In regard to his right eustachian tube dysfunction will follow conservatively see how improving airflow through the nose will help with his eustachian tube and he will continue eustachian tube exercises besides.  I would like to see him back in 2 months.          Rick Farias MD        Again, thank you for allowing me to participate in the care of your patient.        Sincerely,        Rick Farias MD, MD

## 2019-12-05 ENCOUNTER — DOCUMENTATION ONLY (OUTPATIENT)
Dept: SLEEP MEDICINE | Facility: CLINIC | Age: 54
End: 2019-12-05

## 2019-12-05 NOTE — PROGRESS NOTES
12/5/1962-[id number] CALLED STATING THAT HE DOESN'T FEEL AS THOUGH HE'S GETTING ENOUGH PRESSURE AND WANTED TO INCREASE IT TO 9-16CM H2O. I WENT AHEAD AND DID THAT AND HE IS BEING SEEN NEXT WEEK 12/11 WITH DR CLEVELAND SO HE WILL CONSULT WITH PROVIDER AT THAT TIME ABOUT CONCERNS

## 2019-12-10 ENCOUNTER — MYC MEDICAL ADVICE (OUTPATIENT)
Dept: PALLIATIVE MEDICINE | Facility: CLINIC | Age: 54
End: 2019-12-10

## 2019-12-11 ENCOUNTER — OFFICE VISIT (OUTPATIENT)
Dept: SLEEP MEDICINE | Facility: CLINIC | Age: 54
End: 2019-12-11
Payer: COMMERCIAL

## 2019-12-11 VITALS
HEART RATE: 82 BPM | SYSTOLIC BLOOD PRESSURE: 120 MMHG | BODY MASS INDEX: 28.99 KG/M2 | HEIGHT: 72 IN | WEIGHT: 214 LBS | DIASTOLIC BLOOD PRESSURE: 72 MMHG | OXYGEN SATURATION: 99 %

## 2019-12-11 DIAGNOSIS — G47.33 OSA (OBSTRUCTIVE SLEEP APNEA): Primary | ICD-10-CM

## 2019-12-11 PROCEDURE — 99213 OFFICE O/P EST LOW 20 MIN: CPT | Performed by: INTERNAL MEDICINE

## 2019-12-11 ASSESSMENT — MIFFLIN-ST. JEOR: SCORE: 1853.7

## 2019-12-11 NOTE — PATIENT INSTRUCTIONS
Your Body mass index is 29.02 kg/m .  Weight management is a personal decision.  If you are interested in exploring weight loss strategies, the following discussion covers the approaches that may be successful. Body mass index (BMI) is one way to tell whether you are at a healthy weight, overweight, or obese. It measures your weight in relation to your height.  A BMI of 18.5 to 24.9 is in the healthy range. A person with a BMI of 25 to 29.9 is considered overweight, and someone with a BMI of 30 or greater is considered obese. More than two-thirds of American adults are considered overweight or obese.  Being overweight or obese increases the risk for further weight gain. Excess weight may lead to heart disease and diabetes.  Creating and following plans for healthy eating and physical activity may help you improve your health.  Weight control is part of healthy lifestyle and includes exercise, emotional health, and healthy eating habits. Careful eating habits lifelong are the mainstay of weight control. Though there are significant health benefits from weight loss, long-term weight loss with diet alone may be very difficult to achieve- studies show long-term success with dietary management in less than 10% of people. Attaining a healthy weight may be especially difficult to achieve in those with severe obesity. In some cases, medications, devices and surgical management might be considered.  What can you do?  If you are overweight or obese and are interested in methods for weight loss, you should discuss this with your provider.     Consider reducing daily calorie intake by 500 calories.     Keep a food journal.     Avoiding skipping meals, consider cutting portions instead.    Diet combined with exercise helps maintain muscle while optimizing fat loss. Strength training is particularly important for building and maintaining muscle mass. Exercise helps reduce stress, increase energy, and improves fitness.  Increasing exercise without diet control, however, may not burn enough calories to loose weight.       Start walking three days a week 10-20 minutes at a time    Work towards walking thirty minutes five days a week     Eventually, increase the speed of your walking for 1-2 minutes at time    In addition, we recommend that you review healthy lifestyles and methods for weight loss available through the National Institutes of Health patient information sites:  http://win.niddk.nih.gov/publications/index.htm    And look into health and wellness programs that may be available through your health insurance provider, employer, local community center, or laura club.    Weight management plan: Patient was referred to their PCP to discuss a diet and exercise plan.

## 2019-12-11 NOTE — PROGRESS NOTES
SLEEP MEDICINE CLINIC NOTE   [facility]  Indra Mehta 54 year old male  : 1965  MRN: [id number]      PRIMARY CARE PROVIDER: Tha Grullon    Visit Date:   2019      REASON FOR VISIT:  Followup of obstructive sleep apnea.      HISTORY OF PRESENT ILLNESS:  The patient is a very pleasant 54-year-old gentleman who was diagnosed with overall mild obstructive sleep apnea with an AHI of 12.2 on a home sleep apnea test performed in 2019.  His supine AHI was 40 and lateral AHI was 3.3.  He also had significant sleep-associated hypoxemia with 18 minutes spent below 88% with brian oxygen saturation of 78%.  The patient was prescribed an auto CPAP with a range of 5-15 cm of water, which was set up in September, but the patient was initially not able to use it.  He was using a nasal pillow mask at that time.  He was seen by ENT and underwent submucous resection of turbinates and since then he reports significant improvement in his nasal congestion.      About 6 weeks ago the patient underwent a cervical spine decompression and fusion which was complicated by poor wound healing and breakage of internal sutures.  The patient remains in a hard collar during the day and a soft collar at night.  This is supposed to go on for another 6 weeks.  He reports that he has been going to sleep between 7:30 and 10:00 p.m. and sleeps without any discomfort until 4:00 a.m. or 6:00 a.m.  He has noted significant improvement in his nonrestorative sleep now that he has been using a CPAP over the last month or so.  He does report persistent excessive daytime sleepiness, but he attributes that to the use of muscle relaxants and other opiate medications that he is using for his neck pain.  He has noticed improvement in his sleep continuity with the use of CPAP as well.  He is now switched to a full face mask and denied any significant difficulties with the mask interface.      I reviewed his CPAP  compliance over a 30-day period from  to 2019 out of which the CPAP was only used for the last 11 days.  The patient reports that he was not able to use the CPAP prior to that due to neck pain and the surgery on his cervical spine.  Now that he has gotten used to the CPAP, he reports 100% compliance.  His average daily use is 6-1/2 hours.  The device settings are 7-15 cm of water, median pressure is 10.3, 95th percentile pressure is 13.3.  Median leak is 1.7 liters per minute.  His remnant AHI is 1.2 events per hour.      ASSESSMENT AND PLAN:  Overall, the patient has mild supine predominant obstructive sleep apnea which is very well treated with the CPAP.  The patient has noted significant improvement in his sleep continuity and daytime dysfunction.  He was advised to continue using his CPAP every time he goes to sleep, including during naps if any.  He was counseled regarding the importance of maintaining a healthy weight and avoid sleep deprivation.  He was advised not to drive or operate heavy machinery if drowsy or sleepy.  He will return to clinic on an annual basis.      I spent a total of 25 minutes face to face with Magy Esqueda during today's office visit. Over 50% of this time was spent counseling the patient and/or coordinating care regarding their sleep disorder.     Krishna Beltre MD   of Medicine  Pulmonary, Critical Care and Sleep Medicine  [facility]  Pager: [phone]                  D: 2019   T: 2019   MT: VERNON      Name:     MAGY ESQUEDA   MRN:      [id number]        Account:      [id number]   :      1965           Visit Date:   2019      Document: [id number]

## 2019-12-11 NOTE — NURSING NOTE
Does Indra have a CPAP/Bipap?  Yes             Type of mask: full     [facility]: [place] [phone]    [email or web address]     Weight management plan: Patient was referred to their PCP to discuss a diet and exercise plan.

## 2019-12-13 ENCOUNTER — DOCUMENTATION ONLY (OUTPATIENT)
Dept: SLEEP MEDICINE | Facility: CLINIC | Age: 54
End: 2019-12-13

## 2019-12-13 NOTE — PROGRESS NOTES
STM recheck     Diagnostic AHI:   12.2 HST    Data only recheck     Assessment: Pt meeting objective benchmarks.       Action plan: follow up per provider request      Device type: Auto-CPAP    PAP settings: CPAP min 9.0 cm  H20       CPAP max 16.0 cm  H20           95th% pressure 12.9 cm  H20     Mask type:  Full Face Mask    Objective measures: 14 day rolling measures      Compliance  92 %      Leak  12.48 lpm  last  upload      AHI 1.48   last  upload      Average number of minutes 381      Objective measure goal  Compliance   Goal >70%  Leak   Goal < 24 lpm  AHI  Goal < 5  Usage  Goal >240      Total time spent on accessing, reviewing and interpreting remote patient PAP therapy data:   10 minutes

## 2019-12-17 DIAGNOSIS — H93.A9 PULSATILE TINNITUS: ICD-10-CM

## 2019-12-17 NOTE — TELEPHONE ENCOUNTER
Chief Complaint   Patient presents with     Refill Request      Refill request received via fax by pharmacy              Pending Prescriptions:                       Disp   Refills    ALPRAZolam (XANAX XR) 0.5 MG 24 hr tablet 30 tab*3            Sig: Take 1 tablet (0.5 mg) by mouth At Bedtime         Last Written Prescription Date:  05/13/19  Last Fill Quantity: 30   # refills:3   Last Office Visit with prescribing provider:  Future Office visit: 12/2/2019  Next 5 appointments (look out 90 days)    Dec 19, 2019  9:30 AM CST  [facility]-C  [facility] [address] [place] [address] [id number] [phone]   Feb 03, 2020  8:00 AM CST  Return Visit with Rick Farias MD  [facility] [address] [place] [address] [id number] [phone]

## 2019-12-18 NOTE — PROGRESS NOTES
[facility]    CHIEF COMPLAINT:  Pain  -neck and low back pain    INTERVAL HISTORY:  Last seen on 9/9/2018.      Ketan was previously a patient of Dr. Angle Freeman. He is transferred to me due to location.       Recommendations/plan at the last visit included:  1. Physical Therapy:  YES, Keep appointments as scheduled   2. Clinical Health Psychologist:  NO, continue guided meditation, we should consider cognitive behavioral therapy/relaxation techniques, patient will consider this option.  3. Diagnostic Studies: none  4. Medication Management:    1. Continue Gabapentin 1200 TID  2. Continue Robaxin 1000mg TID  3. Continue Tizanidine 4mg at bedtime  4. Continue Oxycodone 4/day, to last until 10/9/2019.  5. Continue Cymbalta 30mg in AM and 60mg at HS  5. Further procedures recommended: We could consider some trigger point injections for his myofascial pain  6. Recommendations to PCP. See above  7. Ketan to follow up with Primary Care provider regarding elevated blood pressure.    Follow up with this provider:  4 Weeks     Since his last visit, Indra Mehta reports:      He had a posterior C5-7 fusion. Lateral mass screws, right C5-6 medial facetectomy, right C6 foraminotomy on 10/29/2019 with Dr Wesley at [place]. He no longer has any pain or numbness in his right arm. At week 2, he tore out the sutures in the muscles. He states that he still has a lot pain, 90% muscle pain and 10% neck pain. He states that things are slowly getting better as he does not have as much pain towards the shoulders. He also has a lot more movement with his arms. He has not had any pain medications since Sunday night. He has had some withdrawals. The plan is for a total of 9 weeks in the neck brace. He is on week 7. He does take it off at times, for icing an heat. He states that by the end of the day the muscles are really tight.       Pain Information:   Pain quality: unbearable     Pain rating: intensity ranges  from 4/10 to 10/10, and averages 6/10 on a 0-10 scale.   Pain today 5/10    SELF CARE:   How often do you practice SELF-CARE (relaxing, stretching, pacing, monitoring posture, taking mini-breaks) in a typical day:  He is taking time to rest and using ice to his neck.     UDS: 4/1/19-reviewed and appropriate  Controlled Substance Agreement signed: 4/15/2019    CURRENT RELEVANT PAIN MEDICATIONS:  Gabapentin 400mg-Taking 3 capsules 3 times a day  Tizanidine 4mg-taking 1 at HS (helps get him to sleep-does not take with Robaxin)  Tylenol 500mg-1000mg 4x/day  Robaxin- 500mg 2 Three times a day  Oxycodone-taking 4/day  Xanax-does not use daily, last use was about 3 weeks ago  Cymbalta 30mg twice daily    Patient is using the medication as prescribed:  YES  Is your medication helpful? YES   Medication side effects? no side effect    Previous Medications: (H--helped; HI--Helped initially; SWH-- somewhat helpful, NH--No help; W--worse; SE--side effects)   Norco/vicodin H  Oxycodone H  Flexeril - was helpful at night  Robaxin - may have helped, didn't make him sleepy  Tizanidine H  Gabapentin ?  Lyrica SE sedation    Past Pain Treatments:  Injections:    - 11/8/18 bilateral L3-4 TFESI - (Dr Mcdaniel)  - 8/23/18 bilateral L3-4 TFESI - seems to have worsened pain (Dr Mcdaniel)  - 6/11/18 TFESI right C5-6 - helped with arm pain and numbness  - bilateral L3-4 TFESI 12/28/17  - Bilateral L4-5 TFESI 10/23/17  - Bilateral L3-4 TFESI 11/08/2018  -Bilateral L3-4 TFESI 6/14/2019  -C6-7 SRIDEVI 8/22/2019  Surgery:  ACDF C6-7 on 7/5/17 with improvement; ACDF C5-6 12/19/2018  TENS unit: helpful  Physical therapy in [facility] Board of Pharmacy Data Base Reviewed:    YES; As expected, no concern for misuse/abuse of controlled medications based on this report.    THE 4 As OF OPIOID MAINTENANCE ANALGESIA    Analgesia: Is pain relief clinically significant? YES   Activity: Is patient functional and able to perform Activities of Daily Living? YES  "  Adverse effects: Is patient free from adverse side effects from opiates? YES   Adherence to Rx protocol: Is patient adhering to Controlled Substance Agreement and taking medications ONLY as ordered? YES       Is Narcan prescribed for opiate use >50 MME daily? N/A      Daily MME: 30    Medications:  Current Outpatient Medications   Medication Sig Dispense Refill     Acetaminophen (TYLENOL PO) Take 500 mg by mouth every 4 hours as needed for mild pain or fever       ALPRAZolam (XANAX XR) 0.5 MG 24 hr tablet Take 1 tablet (0.5 mg) by mouth At Bedtime 30 tablet 3     DULoxetine (CYMBALTA) 30 MG capsule 30 mg       gabapentin (NEURONTIN) 400 MG capsule Take 3 capsules (1,200 mg) by mouth 3 times daily 270 capsule 3     losartan (COZAAR) 50 MG tablet Take 1 tablet (50 mg) by mouth daily 90 tablet 0     methocarbamol (ROBAXIN) 500 MG tablet Take 2 tablets (1,000 mg) by mouth 3 times daily as needed for muscle spasms 60 tablet 3     order for DME Equipment being ordered: TENS Pads as directed 1 Device 0     oxyCODONE (ROXICODONE) 5 MG tablet Take 1 tab every 4-6 hours as needed for severe pain. MAX 3 TABS PER DAY. Okay to fill/start 12/19/2019. 90 tablet 0     tiZANidine (ZANAFLEX) 4 MG tablet Take 1 tablet (4 mg) by mouth At Bedtime 90 tablet 0       Review of Systems: A 10-point review of systems was negative, with the exception of chronic pain issues, headache, ringing in the ears, high blood pressure, weakness,  numbness and tingling, mood swings and stress.     Social History:  No changes since previous visit    Family history: no changes since previous visit    PHYSICAL EXAM:      Vitals:   /78   Temp 98.4  F (36.9  C) (Temporal)   Ht 1.848 m (6' 0.75\")   Wt 98 kg (216 lb)   BMI 28.69 kg/m    Body mass index is 28.69 kg/m .  6' .75\"  216 lbs 0 oz      Constitutional: healthy, alert and no distress  HEENT: Head atraumatic, normocephalic. Eyes without conjunctival injection or jaundice. Neck supple. No " obvious neck masses.   Skin: No rash, lesions, or petechiae of exposed skin.   Psychiatric/mental status: Alert, without lethargy or stupor. Appropriate affect. Mood normal.     DIAGNOSTIC TESTS:  No new imaging to review    Assessment:  Indra Mehta is a 53 year old male who presents today for follow up regarding his:    1.  Cervical radiculopathy  2.  S/p cervical spine fusion  3.  Myofascial pain  4.  Lumbar radiculopathy  5. Neuropathy  6. Chronic pain syndrome    Since the patient's last visit he had another cervical spine surgery.  He then tore the muscles where the sutures were at.  He is wearing a neck brace.  The plan is for him to wear this neck brace for another couple of weeks.  He has not been on any oxycodone since Sunday.  We will restart this but will lower the dose to have him take 2-3 a day.  He would ultimately like to be off of this medication which I think is reasonable.  We did also briefly talk about getting him off of the gabapentin due to some memory issues.  I think as he is a still in the postsurgical phase he should continue this for now.  When he comes back and sees me next month we will talk about how to taper off of it.  He had side effects to Lyrica in the past but states he is willing to try it again.  We will can engage of this as we go and if we should restart it.  Topamax would be another option however he would need to make sure that he stays very well-hydrated with it.      Plan:    Diagnosis reviewed, treatment option addressed, and risk/benifits discussed.  Self-care instructions given.  I am recommending a multidisciplinary treatment plan to help this patient better manage pain.      1. Physical Therapy:  Not at this time  2. Clinical Health Psychologist:  NO, continue guided meditation, we could consider cognitive behavioral therapy/relaxation techniques, patient will consider this option.  3. Diagnostic Studies: none  4. Medication Management:    1. Continue Gabapentin  1200 TID-will discuss potential taper at next visit  2. Continue Robaxin 1000mg TID  3. Continue Tizanidine 4mg at bedtime  4. Continue Oxycodone 3/day  5. Continue Cymbalta 30mg in AM and  60mg at HS  5. Further procedures recommended: none at this timee  6. Recommendations to PCP. See above      Follow up with this provider:  4 Weeks     Total time spent face to face was 10 minutes and more than 50% of face to face time was spent in counseling and/or coordination of care regarding the diagnosis and recommendations above.      Chiara Garcia PA-C   [facility]

## 2019-12-19 ENCOUNTER — HOSPITAL ENCOUNTER (OUTPATIENT)
Dept: PHYSICAL THERAPY | Facility: OTHER | Age: 54
Setting detail: THERAPIES SERIES
End: 2019-12-19
Attending: NURSE PRACTITIONER
Payer: COMMERCIAL

## 2019-12-19 ENCOUNTER — OFFICE VISIT (OUTPATIENT)
Dept: PALLIATIVE MEDICINE | Facility: CLINIC | Age: 54
End: 2019-12-19
Payer: COMMERCIAL

## 2019-12-19 VITALS
SYSTOLIC BLOOD PRESSURE: 128 MMHG | HEIGHT: 73 IN | DIASTOLIC BLOOD PRESSURE: 78 MMHG | TEMPERATURE: 98.4 F | WEIGHT: 216 LBS | BODY MASS INDEX: 28.63 KG/M2

## 2019-12-19 DIAGNOSIS — M79.18 MYOFASCIAL PAIN: ICD-10-CM

## 2019-12-19 DIAGNOSIS — M54.16 LUMBAR RADICULOPATHY: ICD-10-CM

## 2019-12-19 DIAGNOSIS — M54.2 CERVICALGIA: Primary | ICD-10-CM

## 2019-12-19 DIAGNOSIS — G62.9 NEUROPATHY: ICD-10-CM

## 2019-12-19 DIAGNOSIS — Z98.1 S/P CERVICAL SPINAL FUSION: ICD-10-CM

## 2019-12-19 DIAGNOSIS — G89.4 CHRONIC PAIN SYNDROME: ICD-10-CM

## 2019-12-19 PROCEDURE — 97161 PT EVAL LOW COMPLEX 20 MIN: CPT | Mod: GP | Performed by: PHYSICAL THERAPIST

## 2019-12-19 PROCEDURE — 99213 OFFICE O/P EST LOW 20 MIN: CPT | Performed by: PHYSICIAN ASSISTANT

## 2019-12-19 PROCEDURE — 97110 THERAPEUTIC EXERCISES: CPT | Mod: GP | Performed by: PHYSICAL THERAPIST

## 2019-12-19 RX ORDER — OXYCODONE HYDROCHLORIDE 5 MG/1
TABLET ORAL
Qty: 90 TABLET | Refills: 0 | Status: SHIPPED | OUTPATIENT
Start: 2019-12-19 | End: 2020-01-16

## 2019-12-19 ASSESSMENT — PAIN SCALES - GENERAL: PAINLEVEL: MODERATE PAIN (5)

## 2019-12-19 ASSESSMENT — MIFFLIN-ST. JEOR: SCORE: 1874.68

## 2019-12-19 NOTE — PROGRESS NOTES
12/19/19 1426   General Information   Type of Visit Initial OP Ortho PT Evaluation   Start of Care Date 12/19/19   Referring Physician amauri chang    Patient/Family Goals Statement neck pain after fusion 10/29/19   Orders Evaluate and Treat   Orders Comment wear collar durning therapy avoid bending and twisting gentle therapy for muscle pain as tolerated   Date of Order 12/12/19   Certification Required? No   Medical Diagnosis cervical fusion C5-7 10/29/2019   Surgical/Medical history reviewed Yes   Precautions/Limitations spinal precautions   Body Part(s)   Body Part(s) Cervical Spine   Presentation and Etiology   Pertinent history of current problem (include personal factors and/or comorbidities that impact the POC) patient reports he has had 3 neck surgeries 2 years C6-7 fusion left side , 1 year ago C5-6 right side and then oct 2019. prior to last surgery was having right radiular symptoms , after surgery right radiuclar symptoms resolved . has follow up in [place] feb 2020PMH  none reported . left ear deaf    Impairments A. Pain   Functional Limitations perform activities of daily living;perform required work activities;perform desired leisure / sports activities   Symptom Location neck    Onset date of current episode/exacerbation 10/29/19   Chronicity New   Pain rating (0-10 point scale) Best (/10);Worst (/10)   Best (/10) 4/10   Worst (/10) 8/10   Pain quality A. Sharp;B. Dull;C. Aching   Frequency of pain/symptoms A. Constant   Pain/symptoms exacerbated by J. ADL;K. Home tasks;G. Certain positions   Pain/symptoms eased by C. Rest   Progression of symptoms since onset: Improved   Current Level of Function   Current Community Support Family/friend caregiver   Patient role/employment history A. Employed   Fall Risk Screen   Fall screen completed by PT   Have you fallen 2 or more times in the past year? No   Have you fallen and had an injury in the past year? No   Is patient a fall risk? No   Cervical Spine    Observation patient with collar on  orders states to leave collar on in therapy so did not remove or measure ROM of neck . B UE AROM WNLs .    Palpation tender in B upper trap and medial scapular border    Shoulder AROM Screen WNL did not MMT    Planned Therapy Interventions   Planned Therapy Interventions ADL retraining;ROM;strengthening   Planned Therapy Interventions Comment manual therapy as needed   Planned Modality Interventions   Planned Modality Interventions Comments modalities as needed    Clinical Impression   Criteria for Skilled Therapeutic Interventions Met yes, treatment indicated   PT Diagnosis s/p cervical fusion C5-7 10/29/2019   Functional limitations due to impairments decrease tolerance to activity    Clinical Presentation Stable/Uncomplicated   Clinical Presentation Rationale patient seen s/p cervical fusion C5-7 10/29/2019 patient seen due to pain and decrease tolerance to activity , he requested 1x instruction in HEP for stretching , explain to patient that per his orders and current time after surgery that he should not be working on stretching or ROM  of his neck . he was instructed in gentle ROM and stabilization exercise with B UE and use of theracane for manual work to tight muscles    Clinical Decision Making (Complexity) Low complexity   Predicted Duration of Therapy Intervention (days/wks) patient requested 1x instruction in HEP    Risk & Benefits of therapy have been explained Yes   Patient, Family & other staff in agreement with plan of care Yes   Education Assessment   Preferred Learning Style Listening;Demonstration   Barriers to Learning No barriers   ORTHO GOALS   PT Ortho Eval Goals 1   Ortho Goal 1   Goal Identifier 1   Goal Description instruction in HEP for gentle ROM and stabilization for B UE and manual Rx for tight muscles    Target Date 01/19/20   Total Evaluation Time   PT Eval, Low Complexity Minutes (28909) 15

## 2019-12-19 NOTE — PATIENT INSTRUCTIONS
After Visit Instructions:     Thank you for coming to [facility] for your care. It is my goal to partner with you to help you reach your optimal state of health.     I am recommending multidisciplinary care at this time.  The focus of care will be to continue gradual rehabilitation and pain management with medication adjustments as needed.    Continue daily self-care, identifying contributing factors, and monitoring variations in pain level. Continue to integrate self-care into your life.        Schedule follow-up with Chiara Garcia PA-C in 4 weeks. You will need to make this appointment.     Medication recommendations:     Oxycodone 5mg: take 1 tablet every 4-6 hours as needed for severe pain. Max of 3/day.    Tizanidine 4mg-take 1 at bedtime    Gabapentin-no changes. Lets talk about tapering this at your next visit.       Chiara Garcia PA-C  [facility]    Contact information: [facility]  Clinic Number:  [phone]     Call with any questions about your care and for scheduling assistance.     Calls are returned Monday through Friday between 8 AM and 4:30 PM. We usually get back to you within 2 business days depending on the issue/request.    If we are prescribing your medications:    For opioid medication refills, call the clinic or send a [employer] message 7 days in advance.  Please include:    Name of requested medication    Name of the pharmacy.    For non-opioid medications, call your pharmacy directly to request a refill. Please allow 3-4 days to be processed.     Per [place] State Law:    All controlled substance prescriptions must be filled within 30 days of being written.      For those controlled substances allowing refills, pickup must occur within 30 days of last fill.      We believe regular attendance is key to your success in our program!      Any time you are unable to keep your appointment we ask that you call  us at least 24 hours in advance to cancel.This will allow us to offer the appointment time to another patient.   Multiple missed appointments may lead to dismissal from the clinic.

## 2019-12-20 ENCOUNTER — MYC MEDICAL ADVICE (OUTPATIENT)
Dept: PALLIATIVE MEDICINE | Facility: CLINIC | Age: 54
End: 2019-12-20

## 2019-12-24 RX ORDER — ALPRAZOLAM 0.5 MG/1
0.5 TABLET, EXTENDED RELEASE ORAL AT BEDTIME
Qty: 30 TABLET | Refills: 3 | Status: SHIPPED | OUTPATIENT
Start: 2019-12-24 | End: 2020-09-17

## 2019-12-30 ENCOUNTER — MYC MEDICAL ADVICE (OUTPATIENT)
Dept: PALLIATIVE MEDICINE | Facility: CLINIC | Age: 54
End: 2019-12-30

## 2020-01-10 DIAGNOSIS — Z98.1 S/P CERVICAL SPINAL FUSION: ICD-10-CM

## 2020-01-10 RX ORDER — METHOCARBAMOL 500 MG/1
1000 TABLET, FILM COATED ORAL 3 TIMES DAILY PRN
Qty: 60 TABLET | Refills: 3 | Status: SHIPPED | OUTPATIENT
Start: 2020-01-10 | End: 2020-01-16

## 2020-01-10 NOTE — TELEPHONE ENCOUNTER
"Fax received from [facility] requesting a refill of methocarbamol (ROBAXIN) 500 MG tablet on behalf of Indra Mehta \"Ketan\".  Last refill: 1/10/2020  # 60 with 0 refills at [facility].  Last office visit:  12/19/2019  Next office visit:  1/16/2020    This is an appropriate refill, and has been pended.     Sarahy Strauss, [facility]            "

## 2020-01-15 NOTE — PROGRESS NOTES
"[facility]    CHIEF COMPLAINT:  Pain  -neck and low back pain    INTERVAL HISTORY:  Last seen on 12/19/2018.      Recommendations/plan at the last visit included:  1. Physical Therapy:  Not at this time  2. Clinical Health Psychologist:  NO, continue guided meditation, we could consider cognitive behavioral therapy/relaxation techniques, patient will consider this option.  3. Diagnostic Studies: none  4. Medication Management:    1. Continue Gabapentin 1200 TID-will discuss potential taper at next visit  2. Continue Robaxin 1000mg TID  3. Continue Tizanidine 4mg at bedtime  4. Continue Oxycodone 3/day  5. Continue Cymbalta 30mg in AM and 60mg at HS  5. Further procedures recommended: none at this timee  6. Recommendations to PCP. See above      Follow up with this provider:  4 Weeks      Since his last visit, Indra Mehta reports:      Patient went to the [place] due to heavy weakness in the left hand. He states that around eri he was at the grocery store and he went to grab some from the freezer. He went to reach in and he felt \"like a really bad tear or a crunching\" on the left side of his neck. He states that shortly after that he got some weakness in his left hand. He states that he is unable to lean forward. He states that he gets a sharp pain at the shoulder/neck. He states that it is a sharp deep pain. After this happens enough times, he will get a deep ache down his shoulder and occasionally to the elbow. He does however feel that this has gotten better since it initially happened.     The pain on the right from the torn muscles has improved. He states that his low back is starting to hurt locally. He is not having any pain going down his legs.       Pain Information:   Pain quality: Stabbing      Pain rating: intensity ranges from 4/10 to 9/10, and averages 6/10 on a 0-10 scale.   Pain today 6/10    SELF CARE:   How often do you practice SELF-CARE (relaxing, stretching, " pacing, monitoring posture, taking mini-breaks) in a typical day: Focusing on his posture    UDS: 4/1/19-reviewed and appropriate  Controlled Substance Agreement signed: 4/15/2019    CURRENT RELEVANT PAIN MEDICATIONS:  Gabapentin 400mg-Taking 3 capsules 3 times a day  Tizanidine 4mg-taking 1 at HS   Tylenol 500mg-1000mg 4x/day  Oxycodone-taking 4/day  Xanax-does not use daily, last use was about 3 weeks ago    Patient is using the medication as prescribed:  YES  Is your medication helpful? YES   Medication side effects? no side effect    Previous Medications: (H--helped; HI--Helped initially; SWH-- somewhat helpful, NH--No help; W--worse; SE--side effects)   Norco/vicodin H  Oxycodone H  Flexeril - was helpful at night  Robaxin - not helpful  Tizanidine H  Gabapentin ?  Lyrica SE sedation  Cymbalta ?    Past Pain Treatments:  Injections:    - 11/8/18 bilateral L3-4 TFESI - (Dr Mcdaniel)  - 8/23/18 bilateral L3-4 TFESI - seems to have worsened pain (Dr Mcdaniel)  - 6/11/18 TFESI right C5-6 - helped with arm pain and numbness  - bilateral L3-4 TFESI 12/28/17  - Bilateral L4-5 TFESI 10/23/17  - Bilateral L3-4 TFESI 11/08/2018  -Bilateral L3-4 TFESI 6/14/2019  -C6-7 SRIDEVI 8/22/2019  Surgery:  ACDF C6-7 on 7/5/17 with improvement; ACDF C5-6 12/19/2018, posterior C5-7 fusion. Lateral mass screws, right C5-6 medial facetectomy, right C6 foraminotomy on 10/29/2019  TENS unit: helpful  Physical therapy in [facility] Board of Pharmacy Data Base Reviewed:    YES; As expected, no concern for misuse/abuse of controlled medications based on this report.    THE 4 As OF OPIOID MAINTENANCE ANALGESIA    Analgesia: Is pain relief clinically significant? YES   Activity: Is patient functional and able to perform Activities of Daily Living? YES   Adverse effects: Is patient free from adverse side effects from opiates? YES   Adherence to Rx protocol: Is patient adhering to Controlled Substance Agreement and taking medications ONLY as  "ordered? YES       Is Narcan prescribed for opiate use >50 MME daily? N/A      Daily MME: 30    Medications:  Current Outpatient Medications   Medication Sig Dispense Refill     Acetaminophen (TYLENOL PO) Take 500 mg by mouth every 4 hours as needed for mild pain or fever       ALPRAZolam (XANAX XR) 0.5 MG 24 hr tablet Take 1 tablet (0.5 mg) by mouth At Bedtime 30 tablet 3     gabapentin (NEURONTIN) 400 MG capsule Take 3 capsules (1,200 mg) by mouth 3 times daily 270 capsule 3     losartan (COZAAR) 50 MG tablet Take 1 tablet (50 mg) by mouth daily 90 tablet 0     order for DME Equipment being ordered: TENS Pads as directed 1 Device 0     oxyCODONE (ROXICODONE) 5 MG tablet Take 1 tab every 4-6 hours as needed for severe pain. MAX 4 TABS PER DAY. Okay to fill 1/16/2020 start 1/18/2020. 100 tablet 0     tiZANidine (ZANAFLEX) 4 MG tablet Take 1 tablet (4 mg) by mouth At Bedtime 90 tablet 0       Review of Systems: A 10-point review of systems was negative, with the exception of chronic pain issues, headache, ringing in the ears, high blood pressure, weakness,  numbness and tingling, mood swings and stress.     Social History:  No changes since previous visit    Family history: no changes since previous visit    PHYSICAL EXAM:      Vitals:   /80   Temp 99  F (37.2  C) (Temporal)   Ht 1.848 m (6' 0.75\")   Wt 98.2 kg (216 lb 8 oz)   BMI 28.76 kg/m    Body mass index is 28.76 kg/m .  6' .75\"  216 lbs 8 oz      Constitutional: healthy, alert and no distress  HEENT: Head atraumatic, normocephalic. Eyes without conjunctival injection or jaundice. Neck supple. No obvious neck masses.   Skin: No rash, lesions, or petechiae of exposed skin.   Psychiatric/mental status: Alert, without lethargy or stupor. Appropriate affect. Mood normal. Tearful at times.    DIAGNOSTIC TESTS:  No new imaging to review    Assessment:  Indra Mehta is a 53 year old male who presents today for follow up regarding his:    1.  " Cervicalgia  2.  S/p cervical spine fusion  3.  Myofascial pain  4. Chronic low back pain  5. Neuropathy  6. Chronic pain syndrome    Patient had an episode where he was reaching into the freezer at the grocery store and felt a sharp pain on his left neck.  He was evaluated for this at the [facility] this week.  They thought that the continued pain is more secondary to guarding.  Physical therapy was recommended.  Patient is very frustrated at the continued pain that he has after having multiple surgeries.  He states his last surgery took a lot out of him.  We talked about potentially having him [place]  He is very interested in this.  He does have low back pain as well and I think that this could benefit that pain as well.    I did agree to increase his oxycodone to 100 tablets this month.  Therefore he can take up to 4 tablets in some days when his pain is more severe.  He states that he stopped the Cymbalta a long time ago.  He also stopped taking the Robaxin.  No change in his pain with stopping these medications.          Plan:    Diagnosis reviewed, treatment option addressed, and risk/benifits discussed.  Self-care instructions given.  I am recommending a multidisciplinary treatment plan to help this patient better manage pain.      1. Physical Therapy:  Not at this time  2. Clinical Health Psychologist:  NO, continue guided meditation, we could consider cognitive behavioral therapy/relaxation techniques, patient will consider this option.  3. Diagnostic Studies: none  4. Medication Management:    1. Continue Gabapentin 1200 TID  2. Stop Robaxin  3. Continue Tizanidine 4mg at bedtime  4. Continue Oxycodone 3-4/day  5. Further procedures recommended: none at this time  6. Recommendations to PCP. See above      Follow up with this provider:  8 Weeks     Total time spent face to face was 24 minutes and more than 50% of face to face time was spent in counseling and/or coordination of  care regarding the diagnosis and recommendations above.      Chiara Garcia PA-C   [facility]

## 2020-01-16 ENCOUNTER — TELEPHONE (OUTPATIENT)
Dept: PALLIATIVE MEDICINE | Facility: CLINIC | Age: 55
End: 2020-01-16

## 2020-01-16 ENCOUNTER — TELEPHONE (OUTPATIENT)
Dept: FAMILY MEDICINE | Facility: OTHER | Age: 55
End: 2020-01-16

## 2020-01-16 ENCOUNTER — OFFICE VISIT (OUTPATIENT)
Dept: PALLIATIVE MEDICINE | Facility: CLINIC | Age: 55
End: 2020-01-16
Payer: COMMERCIAL

## 2020-01-16 ENCOUNTER — MYC MEDICAL ADVICE (OUTPATIENT)
Dept: PALLIATIVE MEDICINE | Facility: CLINIC | Age: 55
End: 2020-01-16

## 2020-01-16 VITALS
DIASTOLIC BLOOD PRESSURE: 80 MMHG | TEMPERATURE: 99 F | BODY MASS INDEX: 28.69 KG/M2 | SYSTOLIC BLOOD PRESSURE: 138 MMHG | WEIGHT: 216.5 LBS | HEIGHT: 73 IN

## 2020-01-16 DIAGNOSIS — M79.18 MYOFASCIAL PAIN: ICD-10-CM

## 2020-01-16 DIAGNOSIS — M54.2 CERVICALGIA: Primary | ICD-10-CM

## 2020-01-16 DIAGNOSIS — G62.9 NEUROPATHY: ICD-10-CM

## 2020-01-16 DIAGNOSIS — G89.29 CHRONIC BILATERAL LOW BACK PAIN WITHOUT SCIATICA: ICD-10-CM

## 2020-01-16 DIAGNOSIS — Z98.1 S/P CERVICAL SPINAL FUSION: ICD-10-CM

## 2020-01-16 DIAGNOSIS — M54.50 CHRONIC BILATERAL LOW BACK PAIN WITHOUT SCIATICA: ICD-10-CM

## 2020-01-16 DIAGNOSIS — G89.4 CHRONIC PAIN SYNDROME: ICD-10-CM

## 2020-01-16 PROCEDURE — 99214 OFFICE O/P EST MOD 30 MIN: CPT | Performed by: PHYSICIAN ASSISTANT

## 2020-01-16 RX ORDER — OXYCODONE HYDROCHLORIDE 5 MG/1
TABLET ORAL
Qty: 100 TABLET | Refills: 0 | Status: SHIPPED | OUTPATIENT
Start: 2020-01-16 | End: 2020-02-12

## 2020-01-16 ASSESSMENT — MIFFLIN-ST. JEOR: SCORE: 1876.95

## 2020-01-16 ASSESSMENT — PAIN SCALES - GENERAL: PAINLEVEL: SEVERE PAIN (6)

## 2020-01-16 NOTE — TELEPHONE ENCOUNTER
**HIGH PRIORITY**    Prior Authorization Specialty Medication Request    Medication/Dose: oxyCODONE (ROXICODONE) 5 MG tablet    Previous Prior Auth  1/3/2020    Insurance Name: [employer] ACCESS   Insurance ID: [id number]  Insurance Phone Number: [phone]     Sarahy Strauss MA on 2020 at 9:27 AM

## 2020-01-16 NOTE — PATIENT INSTRUCTIONS
After Visit Instructions:     Thank you for coming to [facility] for your care. It is my goal to partner with you to help you reach your optimal state of health.     I am recommending multidisciplinary care at this time.  The focus of care will be to continue gradual rehabilitation and pain management with medication adjustments as needed.    Continue daily self-care, identifying contributing factors, and monitoring variations in pain level. Continue to integrate self-care into your life.        Schedule physical therapy assessment/visit: referred for pool therapy    Schedule follow-up with Chiara Garcia PA-C in 8 weeks. You will need to make this appointment.     Procedures recommended: let me know when you would like a low back epidural     Medication recommendations:     Oxycodone 5m tablet every 6-8 hours as needed for severe pain. Max of 4 tablets/day. 100 tablets to last 30 days    Stop Robaxin    Continue Tizanidine 4mg at bedtime        Chiara Garcia PA-C  [facility]  [facility]    Contact information: [facility] [place]  Clinic Number:  [phone]     Call with any questions about your care and for scheduling assistance.     Calls are returned Monday through Friday between 8 AM and 4:30 PM. We usually get back to you within 2 business days depending on the issue/request.    If we are prescribing your medications:    For opioid medication refills, call the clinic or send a [employer] message 7 days in advance.  Please include:    Name of requested medication    Name of the pharmacy.    For non-opioid medications, call your pharmacy directly to request a refill. Please allow 3-4 days to be processed.     Per [place] State Law:    All controlled substance prescriptions must be filled within 30 days of being written.      For those controlled substances allowing refills, pickup must occur within 30 days of last fill.      We believe  regular attendance is key to your success in our program!      Any time you are unable to keep your appointment we ask that you call us at least 24 hours in advance to cancel.This will allow us to offer the appointment time to another patient.   Multiple missed appointments may lead to dismissal from the clinic.

## 2020-01-16 NOTE — TELEPHONE ENCOUNTER
Jessee message from patient on 1/16 at 1313:      The pharmacy did confirm that the oxy needs a pre authorization I am wondering if you were able to get that to the insurance so that it can get refilled before next week   --------------  Noble Aceves,     A prior authorization has already been initiated. We will let you know the status when we get a response.     Thank you,     ANGELINA StantonN, RN-BC  Patient Care Supervisor  [facility] [place]

## 2020-01-16 NOTE — TELEPHONE ENCOUNTER
Central Prior Authorization Team   Phone: [phone]      PA Initiation    Medication: oxyCODONE (ROXICODONE) 5 MG tablet-Initiated  Insurance Company: [employer] - Phone [phone] Fax [phone]  Pharmacy Filling the Rx: [id number] - [place], [place] - [address] [place] [address]  Filling Pharmacy Phone: [phone]  Filling Pharmacy Fax:    Start Date: 1/16/2020

## 2020-01-17 NOTE — TELEPHONE ENCOUNTER
Prior Authorization Approval    Authorization Effective Date: 12/18/2019  Authorization Expiration Date: 1/16/2021  Medication: oxyCODONE (ROXICODONE) 5 MG tablet-APPROVED  Approved Dose/Quantity:   Reference #:     Insurance Company: [employer] [place] - Phone [phone] Fax [phone]  Expected CoPay:       CoPay Card Available:      Foundation Assistance Needed:    Which Pharmacy is filling the prescription (Not needed for infusion/clinic administered): [id number] - [address]  Pharmacy Notified: Yes  Patient Notified: No    Pharmacy will notify patient when medication is ready.

## 2020-01-21 ENCOUNTER — HOSPITAL ENCOUNTER (OUTPATIENT)
Dept: PHYSICAL THERAPY | Facility: CLINIC | Age: 55
Setting detail: THERAPIES SERIES
End: 2020-01-21
Attending: PHYSICIAN ASSISTANT
Payer: COMMERCIAL

## 2020-01-21 DIAGNOSIS — G89.29 CHRONIC BILATERAL LOW BACK PAIN WITHOUT SCIATICA: ICD-10-CM

## 2020-01-21 DIAGNOSIS — G89.4 CHRONIC PAIN SYNDROME: ICD-10-CM

## 2020-01-21 DIAGNOSIS — M54.50 CHRONIC BILATERAL LOW BACK PAIN WITHOUT SCIATICA: ICD-10-CM

## 2020-01-21 DIAGNOSIS — M79.18 MYOFASCIAL PAIN: ICD-10-CM

## 2020-01-21 DIAGNOSIS — Z98.1 S/P CERVICAL SPINAL FUSION: ICD-10-CM

## 2020-01-21 DIAGNOSIS — G62.9 NEUROPATHY: ICD-10-CM

## 2020-01-21 DIAGNOSIS — M54.2 CERVICALGIA: ICD-10-CM

## 2020-01-21 PROCEDURE — 97162 PT EVAL MOD COMPLEX 30 MIN: CPT | Mod: GP | Performed by: PHYSICAL THERAPIST

## 2020-01-21 NOTE — PROGRESS NOTES
"   01/21/20 0800   General Information   Type of Visit Initial OP Ortho PT Evaluation   Start of Care Date 01/21/20   Referring Physician Chiara Garcia PA-C   Patient/Family Goals Statement play volleyball eventually, resolve hand sx,    Orders Evaluate and Treat   Orders Comment Low back and neck pain. S/p cervical fusion 10/2019, pool therapy   Date of Order 01/16/20   Certification Required? No   Medical Diagnosis Cervicalgia - primary, s/p cervical fusion, myofascial pain, neuropathy, chronic pain syndrome, chronic B LBP without sciatica   Surgical/Medical history reviewed Yes   Body Part(s)   Body Part(s) Cervical Spine   Presentation and Etiology   Pertinent history of current problem (include personal factors and/or comorbidities that impact the POC) Pt underwent a C6-7 fusion a few years ago followed by a C5-C6 on 12/19/18.  He then ended up going through [facility] and had an additional surgery on 10/29/2019 where they did a posterior C5-7 fusion with a lateral mass screws, right C5-6 medial facetectomy, & right C6 foraminotomy.  Following that surgery, pt states he injured the surgical area 2x.  Once about 2 wks after surgery when he \"tore sutures\" after shaking out a small throw rug and another time on 12/23/19 when he reached awkwardly into a low freezer shelf while shopping.  Pt was wearing his collar during both of these incidences.  Since surgery, pt states the R UE sx have been \"fixed\".  However, he has been noting new L forearm weakness/ \"funny feeling\" ever since the 2nd reinjury in December.  Pt states his neurologist told him it was from muscle guarding.  Pt feels his fine motor control on the L side is affected.  He is R handed.  He follows with a pain provider and sees her every 1-2 mos.  He reports he has had some clear drainage from his post incision as recently as 3 weeks ago.  Pt had one visit of PT since her surgery in October but did not feel it was very useful.  He reports he wears " "his hard collar prn, he has a 30# wt limit.  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  He reports he also has a hx of chronic lumbar pain which is usually managed with injections. He gets \"localized\" lower lumbar pain that then radiates to hips and down the leg when he is \"due for an injection.  He has a final surgical recheck in about 6 weeks down at [place].  He has started working out a [employer] - mostly doing legs and back, he has started with some upper body ex without wt. He feels supervised activity to start a pool program would be good for him as he knows he can overdo things easily.  PmHx: chronic pain syndrome, HTN, depression, Meniere's Disease.   Impairments A. Pain;D. Decreased ROM;E. Decreased flexibility;F. Decreased strength and endurance;K. Numbness   Functional Limitations perform activities of daily living;perform required work activities;perform desired leisure / sports activities   Symptom Location B neck, HA (occipital and goes up and can goes into eyes), post L arm down to elbow, behind L ear, L scapula, whole L hand and forearm, R side of neck only if overreaches   How/Where did it occur From insidious onset   Onset date of current episode/exacerbation 10/29/19   Chronicity Chronic   Pain rating (0-10 point scale) Other   Pain rating comment now: 4-5/10, best: 2/10, worst: 8/10   Pain quality H. Other   Pain quality comment sharp, can be ripping, deep ache as day goes on   Frequency of pain/symptoms A. Constant   Pain/symptoms are:   (as day goes on, can be worse in am)   Pain/symptoms exacerbated by M. Other   Pain exacerbation comment as the day goes on, head down, head still for too long, turn too far, reach too far   Pain/symptoms eased by K. Other   Pain eased by comment wearing collar, ice, changing positions often   Progression of symptoms since onset: Improved   Current Level of Function   Current Community Support   (12th grade " daughter at home)   Patient role/employment history A. Employed   Employment Comments  - sitting/computer work/meetings; hasn't worked a full day since surgery, was completely off for 6 weeks, then worked part-time from home for 3 weeks, has since gone back to full-time and physically going into work but hasn't made it a full day yet, wears hard collar with driving to work   Living environment [place]   Fall Risk Screen   Fall screen completed by PT   Have you fallen 2 or more times in the past year? No   Have you fallen and had an injury in the past year? No   Is patient a fall risk? No   Functional Scales   Functional Scales Other   Other Scales  NDI: 64%   Cervical Spine   Observation no acute distress, repetitively bouncing legs on floor during conversation   Integumentary  post cerv incision shows no signs of infection/no drainage, one small area in center of incision with small nodule underneath, tightness noted all along incision; 2 well healed ant cerv incisions   Shoulder AROM Screen flex and abd WNL B   Cervical/Thoracic/Shoulder ROM Comments CROM: flex: 40, ext: 34, SB: R 10/ L 10, rot: R 32/ L 27, protrusion: WNL, retraction: mod loss; increased pain with L > R SB and retraction   Shoulder/Wrist/Hand Strength Comments best of 3 trials in 3rd  position: R 103#/ L 42#   Biceps Reflexes 2/3 B   Triceps Reflexes 0/3 B   Posture decreased cerv lordosis   Shoulder Shrug (C2-C4) Strength 5/5 B   Shoulder Abd (C5) Strength 5/5 B   Elbow Flexion (C5, C6) Strength 4/5 L, 5/5 R   Elbow Extension (C7) Strength 4+/5 L, 5/5 R   Wrist Extension (C6) Strength 4+/5 B   Thumb Abd (C8) Strength 4/5 L, 4+/5 R   Planned Modality Interventions   Planned Modality Interventions Comments modalities as needed   Clinical Impression   Criteria for Skilled Therapeutic Interventions Met yes, treatment indicated   PT Diagnosis cervical pain, L UE weakness, myofascial pain, decreased cerv and  lumb mobility   Influenced by the following impairments pain, decreased ROM, weakness   Functional limitations due to impairments driving, work duties, lifting, sports, sleeping   Clinical Presentation Evolving/Changing   Clinical Presentation Rationale History: multiple surgeries hx, chronic pain, other medical co-morbidities; Examination: NDI score, activity limitations   Clinical Decision Making (Complexity) Moderate complexity   Therapy Frequency 2 times/Week   Predicted Duration of Therapy Intervention (days/wks) 90 days   (as needed)   Risk & Benefits of therapy have been explained Yes   Patient, Family & other staff in agreement with plan of care Yes   Clinical Impression Comments Pt is about 3 mos post-op post cerv C5-7 fusion surgery with hx of 2 previous cerv fusions at the same level.  He has ongoing pain and some L UE weakness as well as presents with chronic LBP.  He would benefit from skilled PT services utlizing aquatic therapy to address these impairments.  The buoyancy will help pt ease back into exercising, the warm temperatures can provide pain relief and the viscosity can promote strengthening.   Education Assessment   Preferred Learning Style Listening;Reading;Demonstration;Pictures/video   Barriers to Learning No barriers   ORTHO GOALS   PT Ortho Eval Goals 1;2;3   Ortho Goal 1   Goal Identifier 1   Goal Description Pt will report is sleep is disturbed for no more than 3 hours per noc indicating improved sleeping habits.   Target Date 04/19/20   Ortho Goal 2   Goal Identifier 2   Goal Description Pt will score 30% or less on the NDI (Neck Disability Index) indicataing a clinically meaningful improvement in pain and function over time.   Target Date 04/19/20   Ortho Goal 3   Goal Identifier 3   Goal Description Pt will have a plan in place to carry out his aquatic exercise program as appropriate in order to better self-manage chronic pain and sx.   Target Date 04/19/20   Total Evaluation Time    PT Eval, Moderate Complexity Minutes (20504) 50

## 2020-01-29 ENCOUNTER — HOSPITAL ENCOUNTER (OUTPATIENT)
Dept: PHYSICAL THERAPY | Facility: CLINIC | Age: 55
Setting detail: THERAPIES SERIES
End: 2020-01-29
Attending: PHYSICIAN ASSISTANT
Payer: COMMERCIAL

## 2020-01-29 PROCEDURE — 97113 AQUATIC THERAPY/EXERCISES: CPT | Mod: GP | Performed by: PHYSICAL THERAPIST

## 2020-01-29 NOTE — PROGRESS NOTES
History of Present Illness - Indra Mehta is a 54 year old male presents for evaluation of ear pressure eustachian tube dysfunction.  He has had tubes in the past which helped to a degree.  His eustachian tube dysfunction is bilateral but worse on the right always than the left.  Submucous resection of turbinates that was done recently has helped significantly with nasal breathing may be slightly with eustachian tube symptoms but not relieved no symptoms completely.      Past Medical History -   Past Medical History:   Diagnosis Date     Back pain      Meniere's disease, unspecified      Pain medication agreement signed 8/20/2010       Current Medications -   Current Outpatient Medications:      Acetaminophen (TYLENOL PO), Take 500 mg by mouth every 4 hours as needed for mild pain or fever, Disp: , Rfl:      ALPRAZolam (XANAX XR) 0.5 MG 24 hr tablet, Take 1 tablet (0.5 mg) by mouth At Bedtime, Disp: 30 tablet, Rfl: 3     gabapentin (NEURONTIN) 400 MG capsule, Take 3 capsules (1,200 mg) by mouth 3 times daily, Disp: 270 capsule, Rfl: 3     losartan (COZAAR) 50 MG tablet, Take 1 tablet (50 mg) by mouth daily, Disp: 90 tablet, Rfl: 0     order for DME, Equipment being ordered: TENS Pads as directed, Disp: 1 Device, Rfl: 0     oxyCODONE (ROXICODONE) 5 MG tablet, Take 1 tab every 4-6 hours as needed for severe pain. MAX 4 TABS PER DAY. Okay to fill 1/16/2020 start 1/18/2020., Disp: 100 tablet, Rfl: 0     tiZANidine (ZANAFLEX) 4 MG tablet, Take 1 tablet (4 mg) by mouth At Bedtime, Disp: 90 tablet, Rfl: 0    Allergies -   Allergies   Allergen Reactions     No Known Drug Allergies        Social History -   Social History     Socioeconomic History     Marital status: Single     Spouse name: Not on file     Number of children: 2     Years of education: Not on file     Highest education level: Not on file   Occupational History     Employer: [employer]   Social Needs     Financial resource strain: Not on file      Food insecurity:     Worry: Not on file     Inability: Not on file     Transportation needs:     Medical: Not on file     Non-medical: Not on file   Tobacco Use     Smoking status: Never Smoker     Smokeless tobacco: Never Used   Substance and Sexual Activity     Alcohol use: No     Alcohol/week: 0.0 standard drinks     Drug use: No     Sexual activity: Yes     Partners: Female   Lifestyle     Physical activity:     Days per week: Not on file     Minutes per session: Not on file     Stress: Not on file   Relationships     Social connections:     Talks on phone: Not on file     Gets together: Not on file     Attends Zoroastrian service: Not on file     Active member of club or organization: Not on file     Attends meetings of clubs or organizations: Not on file     Relationship status: Not on file     Intimate partner violence:     Fear of current or ex partner: Not on file     Emotionally abused: Not on file     Physically abused: Not on file     Forced sexual activity: Not on file   Other Topics Concern     Parent/sibling w/ CABG, MI or angioplasty before 65F 55M? No   Social History Narrative     Not on file       Family History -   Family History   Problem Relation Age of Onset     Cancer - colorectal Mother      Diabetes Mother      Cardiovascular Father      Hypertension Father      Mental Illness Sister      Suicide Other        Review of Systems - As per HPI and PMHx, otherwise review of system review of the head and neck negative. Otherwise 10+ review systems negative.    Physical Exam  There were no vitals taken for this visit.  BMI: There is no height or weight on file to calculate BMI.    General - The patient is well nourished and well developed, and appears to have good nutritional status.  Alert and oriented to person and place, answers questions and cooperates with examination appropriately.    SKIN - No suspicious lesions or rashes.  Respiration - No respiratory distress.  Head and Face - Normocephalic  and atraumatic, with no gross asymmetry noted of the contour of the facial features.  The facial nerve is intact, with strong symmetric movements.    Voice and Breathing - The patient was breathing comfortably without the use of accessory muscles. The patients voice was clear and strong, and had appropriate pitch and quality.    Ears - Bilateral pinna and EACs with normal appearing overlying skin. Tympanic membrane intact with good mobility on pneumatic otoscopy bilaterally. Bony landmarks of the ossicular chain are normal. The tympanic membranes are normal in appearance. No retraction, perforation, or masses.  No fluid or purulence was seen in the external canal or the middle ear.     Eyes - Extraocular movements intact.  Sclera were not icteric or injected, conjunctiva were pink and moist.    Mouth - Examination of the oral cavity showed pink, healthy oral mucosa. No lesions or ulcerations noted.  The tongue was mobile and midline, and the dentition were in good condition.      Throat - The walls of the oropharynx were smooth, pink, moist, symmetric, and had no lesions or ulcerations.  The tonsillar pillars and soft palate were symmetric.   Neck - Normal midline excursion of the laryngotracheal complex during swallowing.  Full range of motion on passive movement.  Palpation of the occipital, submental, submandibular, internal jugular chain, and supraclavicular nodes did not demonstrate any abnormal lymph nodes or masses.  The carotid pulse was palpable bilaterally.  Palpation of the thyroid was soft and smooth, with no nodules or goiter appreciated.  The trachea was mobile and midline.    Nose - External contour is symmetric, no gross deflection or scars.  Nasal mucosa is pink and moist with no abnormal mucus.  The septum was midline and non-obstructive, turbinates of smaller size and position.  No polyps, masses, or purulence noted on examination.  Nasal vestibules were bilaterally open.    Neuro - Nonfocal neuro  exam is normal, CN 2 through 12 intact, normal gait and muscle tone.      Performed in clinic today:  No procedures preformed in clinic today          A/P - Indra Mehta is a 54 year old male Patient presents with:  RECHECK: turbinates        We discussed at length his eustachian tube dysfunction exercises that would be appropriate.  Discussed potential replacement of his tubes.  He will think about those he is traveling to [place] will go snorkeling and then when he gets back he will think about potentially tube replacement will get back in touch with us regarding his decision.        Rick Farias MD

## 2020-01-30 ENCOUNTER — OFFICE VISIT (OUTPATIENT)
Dept: OTOLARYNGOLOGY | Facility: CLINIC | Age: 55
End: 2020-01-30
Payer: COMMERCIAL

## 2020-01-30 VITALS
DIASTOLIC BLOOD PRESSURE: 78 MMHG | SYSTOLIC BLOOD PRESSURE: 126 MMHG | HEIGHT: 73 IN | BODY MASS INDEX: 28.73 KG/M2 | WEIGHT: 216.8 LBS

## 2020-01-30 DIAGNOSIS — H69.93 EUSTACHIAN TUBE DYSFUNCTION, BILATERAL: Primary | ICD-10-CM

## 2020-01-30 PROCEDURE — 99213 OFFICE O/P EST LOW 20 MIN: CPT | Performed by: OTOLARYNGOLOGY

## 2020-01-30 ASSESSMENT — MIFFLIN-ST. JEOR: SCORE: 1878.31

## 2020-01-30 NOTE — LETTER
1/30/2020         RE: Indra Mehta  [address] [facility] [address]        Dear Colleague,    Thank you for referring your patient, Indra Mehta, to the [facility]. Please see a copy of my visit note below.    History of Present Illness - Indra Mehta is a 54 year old male presents for evaluation of ear pressure eustachian tube dysfunction.  He has had tubes in the past which helped to a degree.  His eustachian tube dysfunction is bilateral but worse on the right always than the left.  Submucous resection of turbinates that was done recently has helped significantly with nasal breathing may be slightly with eustachian tube symptoms but not relieved no symptoms completely.      Past Medical History -   Past Medical History:   Diagnosis Date     Back pain      Meniere's disease, unspecified      Pain medication agreement signed 8/20/2010       Current Medications -   Current Outpatient Medications:      Acetaminophen (TYLENOL PO), Take 500 mg by mouth every 4 hours as needed for mild pain or fever, Disp: , Rfl:      ALPRAZolam (XANAX XR) 0.5 MG 24 hr tablet, Take 1 tablet (0.5 mg) by mouth At Bedtime, Disp: 30 tablet, Rfl: 3     gabapentin (NEURONTIN) 400 MG capsule, Take 3 capsules (1,200 mg) by mouth 3 times daily, Disp: 270 capsule, Rfl: 3     losartan (COZAAR) 50 MG tablet, Take 1 tablet (50 mg) by mouth daily, Disp: 90 tablet, Rfl: 0     order for DME, Equipment being ordered: TENS Pads as directed, Disp: 1 Device, Rfl: 0     oxyCODONE (ROXICODONE) 5 MG tablet, Take 1 tab every 4-6 hours as needed for severe pain. MAX 4 TABS PER DAY. Okay to fill 1/16/2020 start 1/18/2020., Disp: 100 tablet, Rfl: 0     tiZANidine (ZANAFLEX) 4 MG tablet, Take 1 tablet (4 mg) by mouth At Bedtime, Disp: 90 tablet, Rfl: 0    Allergies -   Allergies   Allergen Reactions     No Known Drug Allergies        Social History -   Social History     Socioeconomic History     Marital status: Single      Spouse name: Not on file     Number of children: 2     Years of education: Not on file     Highest education level: Not on file   Occupational History     Employer: [employer]   Social Needs     Financial resource strain: Not on file     Food insecurity:     Worry: Not on file     Inability: Not on file     Transportation needs:     Medical: Not on file     Non-medical: Not on file   Tobacco Use     Smoking status: Never Smoker     Smokeless tobacco: Never Used   Substance and Sexual Activity     Alcohol use: No     Alcohol/week: 0.0 standard drinks     Drug use: No     Sexual activity: Yes     Partners: Female   Lifestyle     Physical activity:     Days per week: Not on file     Minutes per session: Not on file     Stress: Not on file   Relationships     Social connections:     Talks on phone: Not on file     Gets together: Not on file     Attends Evangelical service: Not on file     Active member of club or organization: Not on file     Attends meetings of clubs or organizations: Not on file     Relationship status: Not on file     Intimate partner violence:     Fear of current or ex partner: Not on file     Emotionally abused: Not on file     Physically abused: Not on file     Forced sexual activity: Not on file   Other Topics Concern     Parent/sibling w/ CABG, MI or angioplasty before 65F 55M? No   Social History Narrative     Not on file       Family History -   Family History   Problem Relation Age of Onset     Cancer - colorectal Mother      Diabetes Mother      Cardiovascular Father      Hypertension Father      Mental Illness Sister      Suicide Other        Review of Systems - As per HPI and PMHx, otherwise review of system review of the head and neck negative. Otherwise 10+ review systems negative.    Physical Exam  There were no vitals taken for this visit.  BMI: There is no height or weight on file to calculate BMI.    General - The patient is well nourished and well developed, and appears to have  good nutritional status.  Alert and oriented to person and place, answers questions and cooperates with examination appropriately.    SKIN - No suspicious lesions or rashes.  Respiration - No respiratory distress.  Head and Face - Normocephalic and atraumatic, with no gross asymmetry noted of the contour of the facial features.  The facial nerve is intact, with strong symmetric movements.    Voice and Breathing - The patient was breathing comfortably without the use of accessory muscles. The patients voice was clear and strong, and had appropriate pitch and quality.    Ears - Bilateral pinna and EACs with normal appearing overlying skin. Tympanic membrane intact with good mobility on pneumatic otoscopy bilaterally. Bony landmarks of the ossicular chain are normal. The tympanic membranes are normal in appearance. No retraction, perforation, or masses.  No fluid or purulence was seen in the external canal or the middle ear.     Eyes - Extraocular movements intact.  Sclera were not icteric or injected, conjunctiva were pink and moist.    Mouth - Examination of the oral cavity showed pink, healthy oral mucosa. No lesions or ulcerations noted.  The tongue was mobile and midline, and the dentition were in good condition.      Throat - The walls of the oropharynx were smooth, pink, moist, symmetric, and had no lesions or ulcerations.  The tonsillar pillars and soft palate were symmetric.   Neck - Normal midline excursion of the laryngotracheal complex during swallowing.  Full range of motion on passive movement.  Palpation of the occipital, submental, submandibular, internal jugular chain, and supraclavicular nodes did not demonstrate any abnormal lymph nodes or masses.  The carotid pulse was palpable bilaterally.  Palpation of the thyroid was soft and smooth, with no nodules or goiter appreciated.  The trachea was mobile and midline.    Nose - External contour is symmetric, no gross deflection or scars.  Nasal mucosa is  pink and moist with no abnormal mucus.  The septum was midline and non-obstructive, turbinates of smaller size and position.  No polyps, masses, or purulence noted on examination.  Nasal vestibules were bilaterally open.    Neuro - Nonfocal neuro exam is normal, CN 2 through 12 intact, normal gait and muscle tone.      Performed in clinic today:  No procedures preformed in clinic today          A/P - Indra Mehta is a 54 year old male Patient presents with:  RECHECK: turbinates        We discussed at length his eustachian tube dysfunction exercises that would be appropriate.  Discussed potential replacement of his tubes.  He will think about those he is traveling to [place] will go snorkeling and then when he gets back he will think about potentially tube replacement will get back in touch with us regarding his decision.        Rick Farias MD        Again, thank you for allowing me to participate in the care of your patient.        Sincerely,        Rick Farias MD, MD

## 2020-02-04 ENCOUNTER — HOSPITAL ENCOUNTER (OUTPATIENT)
Dept: PHYSICAL THERAPY | Facility: CLINIC | Age: 55
Setting detail: THERAPIES SERIES
End: 2020-02-04
Attending: PHYSICIAN ASSISTANT
Payer: COMMERCIAL

## 2020-02-04 PROCEDURE — 97113 AQUATIC THERAPY/EXERCISES: CPT | Mod: GP | Performed by: PHYSICAL THERAPIST

## 2020-02-05 ENCOUNTER — MYC MEDICAL ADVICE (OUTPATIENT)
Dept: PALLIATIVE MEDICINE | Facility: CLINIC | Age: 55
End: 2020-02-05

## 2020-02-05 DIAGNOSIS — G89.4 CHRONIC PAIN SYNDROME: Primary | ICD-10-CM

## 2020-02-05 DIAGNOSIS — G62.9 NEUROPATHY: ICD-10-CM

## 2020-02-05 DIAGNOSIS — M54.16 LUMBAR RADICULOPATHY: ICD-10-CM

## 2020-02-05 DIAGNOSIS — G89.29 CHRONIC BILATERAL LOW BACK PAIN WITHOUT SCIATICA: ICD-10-CM

## 2020-02-05 DIAGNOSIS — M54.50 CHRONIC BILATERAL LOW BACK PAIN WITHOUT SCIATICA: ICD-10-CM

## 2020-02-05 DIAGNOSIS — M54.2 CERVICALGIA: ICD-10-CM

## 2020-02-06 ENCOUNTER — MYC MEDICAL ADVICE (OUTPATIENT)
Dept: PALLIATIVE MEDICINE | Facility: CLINIC | Age: 55
End: 2020-02-06

## 2020-02-06 ENCOUNTER — HOSPITAL ENCOUNTER (OUTPATIENT)
Dept: PHYSICAL THERAPY | Facility: CLINIC | Age: 55
Setting detail: THERAPIES SERIES
End: 2020-02-06
Attending: PHYSICIAN ASSISTANT
Payer: COMMERCIAL

## 2020-02-06 PROCEDURE — 97113 AQUATIC THERAPY/EXERCISES: CPT | Mod: GP | Performed by: PHYSICAL THERAPIST

## 2020-02-06 RX ORDER — NORTRIPTYLINE HCL 10 MG
10 CAPSULE ORAL AT BEDTIME
Qty: 30 CAPSULE | Refills: 0 | Status: SHIPPED | OUTPATIENT
Start: 2020-02-06 | End: 2020-03-03

## 2020-02-07 NOTE — TELEPHONE ENCOUNTER
FYI. Patient prefers to do land therapy than pool therapy. He will let us know if he needs a new order.    ANGELINA PerryN, RN  Care Coordinator  [facility]

## 2020-02-10 DIAGNOSIS — I10 BENIGN ESSENTIAL HYPERTENSION: ICD-10-CM

## 2020-02-11 ENCOUNTER — HOSPITAL ENCOUNTER (OUTPATIENT)
Dept: PHYSICAL THERAPY | Facility: CLINIC | Age: 55
Setting detail: THERAPIES SERIES
End: 2020-02-11
Attending: PHYSICIAN ASSISTANT
Payer: COMMERCIAL

## 2020-02-11 PROCEDURE — 97110 THERAPEUTIC EXERCISES: CPT | Mod: GP | Performed by: PHYSICAL THERAPIST

## 2020-02-11 RX ORDER — LOSARTAN POTASSIUM 50 MG/1
50 TABLET ORAL DAILY
Qty: 90 TABLET | Refills: 0 | Status: SHIPPED | OUTPATIENT
Start: 2020-02-11 | End: 2020-06-10

## 2020-02-11 NOTE — TELEPHONE ENCOUNTER
Routing refill request to provider for review/approval because:  Has not seen PCP since 8/2018, will have PCP review.     ANGELINA ThomasN, RN  [facility]

## 2020-02-11 NOTE — TELEPHONE ENCOUNTER
"Requested Prescriptions   Pending Prescriptions Disp Refills     losartan (COZAAR) 50 MG tablet 90 tablet 0     Sig: Take 1 tablet (50 mg) by mouth daily   Last Written Prescription Date:  11/12/19  Last Fill Quantity: 90,  # refills: 0   Last office visit: 10/14/2019 with prescribing provider:  8/9/18   Future Office Visit:   Next 5 appointments (look out 90 days)    Mar 12, 2020  8:00 AM CDT  Return Visit with Chiara Garcia PA-C  [facility] [address] [place] [id number] [phone]           Angiotensin-II Receptors Passed - 2/11/2020 10:52 AM        Passed - Last blood pressure under 140/90 in past 12 months     BP Readings from Last 3 Encounters:   01/30/20 126/78   01/16/20 138/80   12/19/19 128/78                 Passed - Recent (12 mo) or future (30 days) visit within the authorizing provider's specialty     Patient has had an office visit with the authorizing provider or a provider within the authorizing providers department within the previous 12 mos or has a future within next 30 days. See \"Patient Info\" tab in inbasket, or \"Choose Columns\" in Meds & Orders section of the refill encounter.              Passed - Medication is active on med list        Passed - Patient is age 18 or older        Passed - Normal serum creatinine on file in past 12 months     Recent Labs   Lab Test 10/14/19  1602   CR 0.94             Passed - Normal serum potassium on file in past 12 months     Recent Labs   Lab Test 10/14/19  1602   POTASSIUM 3.9                    "

## 2020-02-12 ENCOUNTER — MYC REFILL (OUTPATIENT)
Dept: PALLIATIVE MEDICINE | Facility: CLINIC | Age: 55
End: 2020-02-12

## 2020-02-12 DIAGNOSIS — Z98.1 S/P CERVICAL SPINAL FUSION: ICD-10-CM

## 2020-02-12 DIAGNOSIS — M54.50 CHRONIC BILATERAL LOW BACK PAIN WITHOUT SCIATICA: ICD-10-CM

## 2020-02-12 DIAGNOSIS — G62.9 NEUROPATHY: ICD-10-CM

## 2020-02-12 DIAGNOSIS — G89.29 CHRONIC BILATERAL LOW BACK PAIN WITHOUT SCIATICA: ICD-10-CM

## 2020-02-12 DIAGNOSIS — G89.4 CHRONIC PAIN SYNDROME: ICD-10-CM

## 2020-02-12 DIAGNOSIS — M54.2 CERVICALGIA: ICD-10-CM

## 2020-02-12 DIAGNOSIS — M79.18 MYOFASCIAL PAIN: ICD-10-CM

## 2020-02-12 RX ORDER — OXYCODONE HYDROCHLORIDE 5 MG/1
TABLET ORAL
Qty: 100 TABLET | Refills: 0 | Status: CANCELLED | OUTPATIENT
Start: 2020-02-12

## 2020-02-12 RX ORDER — OXYCODONE HYDROCHLORIDE 5 MG/1
TABLET ORAL
Qty: 100 TABLET | Refills: 0 | Status: SHIPPED | OUTPATIENT
Start: 2020-02-12 | End: 2020-03-12

## 2020-02-12 NOTE — TELEPHONE ENCOUNTER
Medication refill information reviewed.     Due date for oxyCODONE (ROXICODONE) 5 MG tablet is 2/17/20    Prescriptions prepped for review.     Will route to provider.

## 2020-02-12 NOTE — TELEPHONE ENCOUNTER
Received call from patient requesting refill(s) of oxyCODONE (ROXICODONE) 5 MG tablet    Last dispensed from pharmacy on 01/17/20    Pt last seen by prescribing provider on 01/16/20  Next appt scheduled for 03/12/20- note for UDS and OA added to appointment     checked in the past 6 months? Yes If no, print current report and give to RN    Last urine drug screen date 04/01/19  Current opioid agreement on file (completed within the last year) Yes Date of opioid agreement: 04/15/19    Processing (pick one and delete the others):      E-prescribe to pharmacy-[id number] - [place], [place] - [address] [place] [address] [address]     Will route to nursing pool for review and preparation of prescription(s).

## 2020-02-12 NOTE — TELEPHONE ENCOUNTER
Jessee message from patient on 2/12 at 1219:    Indra Mehta would like a refill of the following medications:         oxyCODONE (ROXICODONE) 5 MG tablet [Chiara Garcia PA-C]       Patient Comment: Not sure if you are still out Thursday afternoons and Fridays so I'm putting this in today     Preferred pharmacy: [id number] - [place], [place] - [address] [place] [address]   ----------------    Will route to MA pool for assistance with gathering opioid refill information.    ANGELINA StantonN, RN-BC  Patient Care Supervisor/Care Coordinator  [facility]

## 2020-02-13 ENCOUNTER — HOSPITAL ENCOUNTER (OUTPATIENT)
Dept: PHYSICAL THERAPY | Facility: CLINIC | Age: 55
Setting detail: THERAPIES SERIES
End: 2020-02-13
Attending: PHYSICIAN ASSISTANT
Payer: COMMERCIAL

## 2020-02-13 PROCEDURE — 97140 MANUAL THERAPY 1/> REGIONS: CPT | Mod: GP | Performed by: PHYSICAL THERAPIST

## 2020-02-24 ENCOUNTER — HOSPITAL ENCOUNTER (OUTPATIENT)
Dept: PHYSICAL THERAPY | Facility: CLINIC | Age: 55
Setting detail: THERAPIES SERIES
End: 2020-02-24
Attending: PHYSICIAN ASSISTANT
Payer: COMMERCIAL

## 2020-02-24 PROCEDURE — 97140 MANUAL THERAPY 1/> REGIONS: CPT | Mod: GP | Performed by: PHYSICAL THERAPIST

## 2020-02-26 NOTE — PROGRESS NOTES
"  Outpatient Physical Therapy Discharge Note     Patient: Indra Mehta    : 1965    Beginning/End Dates of Reporting Period: 20 - 20    Referring Provider: Jose Villarreal Diagnosis: cervical pain, L UE weakness, myofascial pain, decreased cerv and lumb mobility     Client Self Report: Fri am woke to neck soreness but then all mm/joints hurt/stiff and sore through Saturday.  Never turned into anything, thought it was maybe the flu but wasn't.  Better since .  Ex are going good, no questions.  Felt good rest of day after here.    Objective Measurements:  Objective Measure: pain  Details: 5/10     Goals:  Goal Identifier 1   Goal Description Pt will report is sleep is disturbed for no more than 3 hours per noc indicating improved sleeping habits.   Target Date 20   Date Met      Progress:     Goal Identifier 2   Goal Description Pt will score 30% or less on the NDI (Neck Disability Index) indicataing a clinically meaningful improvement in pain and function over time.   Target Date 20   Date Met      Progress:     Goal Identifier 3   Goal Description Pt will have a plan in place to carry out his aquatic exercise program as appropriate in order to better self-manage chronic pain and sx.  Pt thought he was getting a \"better workout\" at the gym doing machines vs the pool so didn't plan on specifically continuing his pool program.   Target Date 20   Date Met      Progress:     Progress Toward Goals: Goals were ongoing as of last visit, did not know pt was not going to continue with PT so did not get a final NDI/assessment of goals.  Pt did feel like he had a good home program and was tolerating some ex at the gym well.  Will discharge at this time.    Plan: Discharge from therapy.    Reason for Discharge: pt chooses to cont on own    Equipment Issued: none    Discharge Plan: Patient to continue home program.  "

## 2020-03-03 ENCOUNTER — MYC MEDICAL ADVICE (OUTPATIENT)
Dept: PALLIATIVE MEDICINE | Facility: CLINIC | Age: 55
End: 2020-03-03

## 2020-03-03 DIAGNOSIS — M54.16 LUMBAR RADICULOPATHY: ICD-10-CM

## 2020-03-03 DIAGNOSIS — G89.29 CHRONIC BILATERAL LOW BACK PAIN WITHOUT SCIATICA: ICD-10-CM

## 2020-03-03 DIAGNOSIS — G62.9 NEUROPATHY: ICD-10-CM

## 2020-03-03 DIAGNOSIS — M54.2 CERVICALGIA: ICD-10-CM

## 2020-03-03 DIAGNOSIS — G89.4 CHRONIC PAIN SYNDROME: ICD-10-CM

## 2020-03-03 DIAGNOSIS — M54.50 CHRONIC BILATERAL LOW BACK PAIN WITHOUT SCIATICA: ICD-10-CM

## 2020-03-03 RX ORDER — NORTRIPTYLINE HCL 10 MG
10 CAPSULE ORAL AT BEDTIME
Qty: 30 CAPSULE | Refills: 1 | Status: SHIPPED | OUTPATIENT
Start: 2020-03-03 | End: 2020-03-12

## 2020-03-03 NOTE — TELEPHONE ENCOUNTER
nortriptyline (PAMELOR) 10 MG capsule    Last written rx date: 2/6/2020  Last refill date and quantity: 2/7/2020 for 30 tablets  Last OV: 1/16/2020  Next appointment: 3/12/2020    Elida Knight CMA, March 3, 2020

## 2020-03-06 DIAGNOSIS — M54.2 CERVICALGIA: ICD-10-CM

## 2020-03-06 RX ORDER — GABAPENTIN 400 MG/1
1200 CAPSULE ORAL 3 TIMES DAILY
Qty: 270 CAPSULE | Refills: 3 | Status: SHIPPED | OUTPATIENT
Start: 2020-03-06 | End: 2020-03-12

## 2020-03-06 NOTE — TELEPHONE ENCOUNTER
gabapentin  Last Written Prescription Date:  11/11/19  Last Fill Quantity: 270 cap,  # refills: 3   Last office visit: 1/16/2020 with prescribing provider:     Future Office Visit:   Next 5 appointments (look out 90 days)    Mar 12, 2020  8:00 AM CDT  Return Visit with Chiara Garcia PA-C  [facility]) [address] [place] [id number] [phone]           There are no medications in this encounter.           Gianfranco Church MA on 3/6/2020 at 11:33 AM

## 2020-03-12 ENCOUNTER — OFFICE VISIT (OUTPATIENT)
Dept: PALLIATIVE MEDICINE | Facility: CLINIC | Age: 55
End: 2020-03-12
Payer: COMMERCIAL

## 2020-03-12 VITALS
BODY MASS INDEX: 28.96 KG/M2 | WEIGHT: 218 LBS | SYSTOLIC BLOOD PRESSURE: 158 MMHG | DIASTOLIC BLOOD PRESSURE: 88 MMHG | TEMPERATURE: 98.9 F

## 2020-03-12 DIAGNOSIS — G89.4 CHRONIC PAIN SYNDROME: ICD-10-CM

## 2020-03-12 DIAGNOSIS — M79.18 MYOFASCIAL PAIN: ICD-10-CM

## 2020-03-12 DIAGNOSIS — M54.50 CHRONIC BILATERAL LOW BACK PAIN WITHOUT SCIATICA: ICD-10-CM

## 2020-03-12 DIAGNOSIS — G62.9 NEUROPATHY: ICD-10-CM

## 2020-03-12 DIAGNOSIS — G89.29 CHRONIC BILATERAL LOW BACK PAIN WITHOUT SCIATICA: ICD-10-CM

## 2020-03-12 DIAGNOSIS — Z98.1 S/P CERVICAL SPINAL FUSION: ICD-10-CM

## 2020-03-12 DIAGNOSIS — M54.2 CERVICALGIA: ICD-10-CM

## 2020-03-12 PROCEDURE — 99214 OFFICE O/P EST MOD 30 MIN: CPT | Performed by: PHYSICIAN ASSISTANT

## 2020-03-12 RX ORDER — GABAPENTIN 400 MG/1
1200 CAPSULE ORAL 3 TIMES DAILY
Qty: 270 CAPSULE | Refills: 3 | Status: SHIPPED | OUTPATIENT
Start: 2020-03-12 | End: 2020-07-31

## 2020-03-12 RX ORDER — NORTRIPTYLINE HCL 25 MG
25 CAPSULE ORAL AT BEDTIME
Qty: 30 CAPSULE | Refills: 0 | Status: SHIPPED | OUTPATIENT
Start: 2020-03-12 | End: 2020-03-18

## 2020-03-12 RX ORDER — OXYCODONE HYDROCHLORIDE 5 MG/1
TABLET ORAL
Qty: 100 TABLET | Refills: 0 | Status: SHIPPED | OUTPATIENT
Start: 2020-03-12 | End: 2020-04-15 | Stop reason: ALTCHOICE

## 2020-03-12 ASSESSMENT — PAIN SCALES - GENERAL: PAINLEVEL: SEVERE PAIN (7)

## 2020-03-12 NOTE — PATIENT INSTRUCTIONS
After Visit Instructions:     Thank you for coming to [facility] for your care. It is my goal to partner with you to help you reach your optimal state of health.     I am recommending multidisciplinary care at this time.  The focus of care will be to continue gradual rehabilitation and pain management with medication adjustments as needed.    Continue daily self-care, identifying contributing factors, and monitoring variations in pain level. Continue to integrate self-care into your life.        Schedule follow-up with Chiara Garcia PA-C in 4 weeks. You will need to make this appointment.     Medication recommendations:     Nortriptyline 10mg: okay to take 2 of these until you can fill the 25mg. Take these at night    Oxycodone 1 tablet every 4-6 hours as needed for severe pain    Consider Butrans    Continue Tizanidine 4mg at bedtime      Chiara Garcia PA-C  [facility]  [facility]    Contact information: [facility]  Clinic Number:  [phone]     Call with any questions about your care and for scheduling assistance.     Calls are returned Monday through Friday between 8 AM and 4:30 PM. We usually get back to you within 2 business days depending on the issue/request.    If we are prescribing your medications:    For opioid medication refills, call the clinic or send a [employer] message 7 days in advance.  Please include:    Name of requested medication    Name of the pharmacy.    For non-opioid medications, call your pharmacy directly to request a refill. Please allow 3-4 days to be processed.     Per [place] State Law:    All controlled substance prescriptions must be filled within 30 days of being written.      For those controlled substances allowing refills, pickup must occur within 30 days of last fill.      We believe regular attendance is key to your success in our program!      Any time you are unable to keep your appointment we  ask that you call us at least 24 hours in advance to cancel.This will allow us to offer the appointment time to another patient.   Multiple missed appointments may lead to dismissal from the clinic.

## 2020-03-18 DIAGNOSIS — G89.29 CHRONIC BILATERAL LOW BACK PAIN WITHOUT SCIATICA: ICD-10-CM

## 2020-03-18 DIAGNOSIS — G89.4 CHRONIC PAIN SYNDROME: ICD-10-CM

## 2020-03-18 DIAGNOSIS — G62.9 NEUROPATHY: ICD-10-CM

## 2020-03-18 DIAGNOSIS — M54.2 CERVICALGIA: ICD-10-CM

## 2020-03-18 DIAGNOSIS — M54.50 CHRONIC BILATERAL LOW BACK PAIN WITHOUT SCIATICA: ICD-10-CM

## 2020-03-18 RX ORDER — NORTRIPTYLINE HCL 25 MG
25 CAPSULE ORAL AT BEDTIME
Qty: 30 CAPSULE | Refills: 0 | Status: SHIPPED | OUTPATIENT
Start: 2020-03-18 | End: 2020-04-15 | Stop reason: DRUGHIGH

## 2020-03-18 NOTE — TELEPHONE ENCOUNTER
nortiptyline  Last Written Prescription Date:  3/12/20  Last Fill Quantity: 30,  # refills: 0   Last office visit: 3/12/2020 with prescribing provider:  3/12/2020   Future Office Visit:   Next 5 appointments (look out 90 days)    Apr 15, 2020  8:00 AM CDT  Return Visit with Chiara Garcia PA-C  [facility]) [address] [place] [id number] [phone]           Requested Prescriptions   Pending Prescriptions Disp Refills     nortriptyline (PAMELOR) 25 MG capsule 30 capsule 0     Sig: Take 1 capsule (25 mg) by mouth At Bedtime       There is no refill protocol information for this order              Danica KIMBLE, Lead RN, BSN. . .  3/18/2020, 9:34 AM

## 2020-03-21 NOTE — PROGRESS NOTES
MTM ENCOUNTER  SUBJECTIVE/OBJECTIVE:                           Indra Mehta is a 54 year old male called for an initial visit. He was referred to me from [employer], his insurance plan. PCP is Dr. Grullon.    Chief Complaint: Nerve foot pain.    Allergies/ADRs: Reviewed in Epic  Tobacco:  reports that he has never smoked. He has never used smokeless tobacco.  Alcohol: not currently using  Caffeine: 2 sodas/day  Activity: Variable, goes to gym 3-4 times weekly, does stationary bike and light weights, otherwise walking is limited by pain.  PMH: Reviewed in Epic    Medication Adherence/Access:   Patient takes medications directly from bottles.  Patient takes medications 3 time(s) per day.   Per patient, misses medication 0 times per week.   The patient fills medications at [facility]: NO, fills medications at [facility].    Pain: Pt is currently taking gabapentin 1200mg TID (not as helpful as it used to be), nortriptyline 25mg at bedtime (started last month and dose increased last week), oxycodone 5mg TID (working on tapering down), tizanidine 4mg at bedtime (helpful to relax muscles for sleep), acetaminophen 1000mg 3 times daily, ibuprofen 800mg 3 times daily, and alprazolam ER 0.5mg PRN (originally prescribed for tinnitus, uses 1-2 times every 1-2 weeks at bedtime for sleep). Therapies tried in the past include: duloxetine wasn't effective and Lyrica caused sedation. Had neck surgery last October and then had 2 injuries thereafter. Follows with pain provider Chiara Garcia PA-C.    Hypertension: Current medications include losartan 50mg daily. Pt states last clinic BP was high because he was in pain and hadn't taken his morning meds. Patient does not self-monitor BP.  Patient reports no current medication side effects.    BP Readings from Last 3 Encounters:   03/12/20 (!) 158/88   01/30/20 126/78   01/16/20 138/80     Today's Vitals: None taken (telemed)    Last Comprehensive Metabolic Panel:  Sodium    Date Value Ref Range Status   10/14/2019 141 133 - 144 mmol/L Final     Potassium   Date Value Ref Range Status   10/14/2019 3.9 3.4 - 5.3 mmol/L Final     Chloride   Date Value Ref Range Status   10/14/2019 103 94 - 109 mmol/L Final     Carbon Dioxide   Date Value Ref Range Status   10/14/2019 32 20 - 32 mmol/L Final     Anion Gap   Date Value Ref Range Status   10/14/2019 6 3 - 14 mmol/L Final     Glucose   Date Value Ref Range Status   10/14/2019 110 (H) 70 - 99 mg/dL Final     Urea Nitrogen   Date Value Ref Range Status   10/14/2019 12 7 - 30 mg/dL Final     Creatinine   Date Value Ref Range Status   10/14/2019 0.94 0.66 - 1.25 mg/dL Final     GFR Estimate   Date Value Ref Range Status   10/14/2019 >90 >60 mL/min/[1.73_m2] Final     Comment:     Non  GFR Calc  Starting 12/18/2018, serum creatinine based estimated GFR (eGFR) will be   calculated using the Chronic Kidney Disease Epidemiology Collaboration   (CKD-EPI) equation.       Calcium   Date Value Ref Range Status   10/14/2019 8.9 8.5 - 10.1 mg/dL Final     ASSESSMENT:                            Medication Adherence: good, no issues identified    Pain: Needs Improvement. Educated pt about risks for respiratory depression with concomitant gabapentin, oxycodone, tizanidine, and alprazolam. Questionable efficacy for gabapentin and nortriptyline. I reached out to the [facility] pharmacist who specializes in pain for ideas. We could consider trying lamotrigine or ketamine troches PRN.    Hypertension: Unimproved. Patient is not meeting BP goal of < 140/90mmHg. Will continue monitoring.     PLAN:                            1. Considerations for pain provider: Trying lamotrigine or ketamine troches PRN?    I spent 30 minutes with this patient today. I offer these suggestions for consideration by Chiara Garcia PA-C. A copy of the visit note was provided to the patient's primary care provider.    Will follow up in 1 month.    The patient declined a  summary of these recommendations.     Ayaka Almazan, PharmD  Medication Therapy Management Pharmacist  Pager: [phone]         Mohs Histo Method Verbiage: Each section was then chromacoded and processed in the Mohs lab using the Mohs protocol and submitted for frozen section.

## 2020-03-23 ENCOUNTER — DOCUMENTATION ONLY (OUTPATIENT)
Dept: SLEEP MEDICINE | Facility: CLINIC | Age: 55
End: 2020-03-23

## 2020-03-23 ENCOUNTER — ALLIED HEALTH/NURSE VISIT (OUTPATIENT)
Dept: PHARMACY | Facility: OTHER | Age: 55
End: 2020-03-23
Payer: COMMERCIAL

## 2020-03-23 DIAGNOSIS — G89.4 CHRONIC PAIN SYNDROME: Primary | ICD-10-CM

## 2020-03-23 DIAGNOSIS — I10 BENIGN ESSENTIAL HYPERTENSION: ICD-10-CM

## 2020-03-23 PROCEDURE — 99607 MTMS BY PHARM ADDL 15 MIN: CPT | Performed by: PHARMACIST

## 2020-03-23 PROCEDURE — 99605 MTMS BY PHARM NP 15 MIN: CPT | Performed by: PHARMACIST

## 2020-03-23 RX ORDER — IBUPROFEN 800 MG/1
800 TABLET, FILM COATED ORAL 3 TIMES DAILY
COMMUNITY
End: 2023-10-10

## 2020-03-23 NOTE — Clinical Note
Noble Guadarrama - I spoke with Ketan today for an MTM visit. I'm wondering if you've considered trying lamotrigine? Another idea might be ketamine troches PRN, however not sure if your clinic does that or not. Let me know your thoughts!    Ayaka Almazan, PharmD  Medication Therapy Management Pharmacist  Pager: [phone]

## 2020-03-23 NOTE — PROGRESS NOTES
6 month [facility]    STM Recheck Visit     Diagnostic AHI:  12.2 HST    Subjective measures: Patient reports doing well. No issue with mask fit or pressures.       Assessment: Pt not meeting objective benchmarks for compliance  Patient meeting subjective benchmarks.   Action plan:   pt to follow up per provider request       Device type: Auto-CPAP  PAP settings: CPAP min 9.0 cm  H20     CPAP max 16.0 cm  H20    95th% pressure 13.1 cm  H20   Objective measures: 14 day rolling measures      Compliance  50 %      Leak  7.2 lpm  last  upload      AHI 2.2   last  upload      Average number of minutes 186      Objective measure goal  Compliance   Goal >70%  Leak   Goal < 24 lpm  AHI  Goal < 5  Usage  Goal >240    Total time spent on accessing, reviewing and interpreting remote patient PAP therapy data:   10 minutes      Total time spent with direct patient communication :   2 minutes

## 2020-04-10 DIAGNOSIS — G89.4 CHRONIC PAIN SYNDROME: Primary | ICD-10-CM

## 2020-04-10 NOTE — TELEPHONE ENCOUNTER
tiZANidine (ZANAFLEX) 4 MG tablet  Last Written Prescription Date:  12/19/2019  Last Fill Quantity: 90 tab,  # refills: 0   Last office visit: 3/12/2020 with prescribing provider:     Future Office Visit:   Next 5 appointments (look out 90 days)    Apr 15, 2020  8:00 AM CDT  Return Visit with Chiara Garcia PA-C  [facility] ([facility]) [address] [place] [id number] [phone]           There are no medications in this encounter.           Gianfranco Church MA on 4/10/2020 at 8:50 AM

## 2020-04-15 ENCOUNTER — VIRTUAL VISIT (OUTPATIENT)
Dept: PALLIATIVE MEDICINE | Facility: CLINIC | Age: 55
End: 2020-04-15
Payer: COMMERCIAL

## 2020-04-15 VITALS — BODY MASS INDEX: 28.56 KG/M2 | WEIGHT: 215 LBS

## 2020-04-15 DIAGNOSIS — M54.2 CERVICALGIA: ICD-10-CM

## 2020-04-15 DIAGNOSIS — Z98.1 S/P CERVICAL SPINAL FUSION: ICD-10-CM

## 2020-04-15 DIAGNOSIS — G89.4 CHRONIC PAIN SYNDROME: ICD-10-CM

## 2020-04-15 DIAGNOSIS — G62.9 NEUROPATHY: ICD-10-CM

## 2020-04-15 DIAGNOSIS — M79.18 MYOFASCIAL PAIN: ICD-10-CM

## 2020-04-15 DIAGNOSIS — M54.50 CHRONIC BILATERAL LOW BACK PAIN WITHOUT SCIATICA: ICD-10-CM

## 2020-04-15 DIAGNOSIS — G89.29 CHRONIC BILATERAL LOW BACK PAIN WITHOUT SCIATICA: ICD-10-CM

## 2020-04-15 PROCEDURE — 99213 OFFICE O/P EST LOW 20 MIN: CPT | Mod: TEL | Performed by: PHYSICIAN ASSISTANT

## 2020-04-15 RX ORDER — OXYCODONE HYDROCHLORIDE 5 MG/1
TABLET ORAL
Qty: 100 TABLET | Refills: 0 | Status: SHIPPED | OUTPATIENT
Start: 2020-04-15 | End: 2020-05-14

## 2020-04-15 RX ORDER — NORTRIPTYLINE HYDROCHLORIDE 50 MG/1
50 CAPSULE ORAL AT BEDTIME
Qty: 30 CAPSULE | Refills: 0 | Status: SHIPPED | OUTPATIENT
Start: 2020-04-15 | End: 2020-04-30

## 2020-04-15 ASSESSMENT — PAIN SCALES - GENERAL: PAINLEVEL: SEVERE PAIN (6)

## 2020-04-15 NOTE — PATIENT INSTRUCTIONS
After Visit Instructions:     Thank you for coming to [facility] for your care. It is my goal to partner with you to help you reach your optimal state of health.     I am recommending multidisciplinary care at this time.  The focus of care will be to continue gradual rehabilitation and pain management with medication adjustments as needed.    Continue daily self-care, identifying contributing factors, and monitoring variations in pain level. Continue to integrate self-care into your life.        Schedule follow-up with Chiara Garcia PA-C in 4 weeks. You will need to make this appointment.     Medication recommendations:     Continue Oxycodone 5mg, 3-4/day    Continue Tizanidine 4mg at bedtime    Increase Nortriptyline to 50mg at bedtime    Consider butrans in future    Consider lamictal in future    Continue Gabapentin 1200mg three times daily      Chiara Garcia PA-C  [facility]  [facility]    Contact information: [facility] [place]  Clinic Number:  [phone]     Call with any questions about your care and for scheduling assistance.     Calls are returned Monday through Friday between 8 AM and 4:30 PM. We usually get back to you within 2 business days depending on the issue/request.    If we are prescribing your medications:    For opioid medication refills, call the clinic or send a [employer] message 7 days in advance.  Please include:    Name of requested medication    Name of the pharmacy.    For non-opioid medications, call your pharmacy directly to request a refill. Please allow 3-4 days to be processed.     Per [place] State Law:    All controlled substance prescriptions must be filled within 30 days of being written.      For those controlled substances allowing refills, pickup must occur within 30 days of last fill.      We believe regular attendance is key to your success in our program!      Any time you are unable to keep your  appointment we ask that you call us at least 24 hours in advance to cancel.This will allow us to offer the appointment time to another patient.   Multiple missed appointments may lead to dismissal from the clinic.

## 2020-04-15 NOTE — PROGRESS NOTES
"Indra Mehta is a 54 year old male who is being evaluated via a billable telephone visit.      The patient has been notified of following:     \"This telephone visit will be conducted via a call between you and your physician/provider. We have found that certain health care needs can be provided without the need for a physical exam.  This service lets us provide the care you need with a short phone conversation.  If a prescription is necessary we can send it directly to your pharmacy.  If lab work is needed we can place an order for that and you can then stop by our lab to have the test done at a later time.    Telephone visits are billed at different rates depending on your insurance coverage. During this emergency period, for some insurers they may be billed the same as an in-person visit.  Please reach out to your insurance provider with any questions.    If during the course of the call the physician/provider feels a telephone visit is not appropriate, you will not be charged for this service.\"    Patient has given verbal consent for Telephone visit?  Yes    How would you like to obtain your AVS? Sulema    Additional provider notes:   CHIEF COMPLAINT:  Pain  -neck and low back pain     INTERVAL HISTORY:  Last seen on 03/12/2020.       Recommendations/plan at the last visit included:  1. Physical Therapy:  Not at this time, but he should continue his exercises  2. Clinical Health Psychologist:  NO, continue guided meditation, we could consider cognitive behavioral therapy/relaxation techniques, patient will consider this option.  3. Diagnostic Studies: none  4. Medication Management:    1.   Continue Gabapentin 1200 TID  2.   Increase nortriptyline to 25 mg at bedtime.  He did just  a prescription for the 10 mg therefore he can take 2 of these at bedtime.  3.   Continue Tizanidine 4mg at bedtime  4.   Continue Oxycodone 3-4/day, okay for an early refill as the patient is leaving for [place] " tomorrow  5. Further procedures recommended: none at this time, for considering a neck injection would want to get the okay from his surgeon  6. Recommendations to PCP. See above  7. The patient will need an updated urine drug screen and opiate agreement at his next visit     Follow up with this provider:  4 Weeks     Current Visit:   He states that he has been doing alright. He went to [place]. He states that it went well. He states that he did have more pain due to increased activity. He did go snorkeling and states that this was hard on his neck. He had sore feet and low back with the increased walking. He thinks that the working from home is harder on him than his vacation. He states that at work he has all the ergonomic chairs and he does not have that at home. He states that he has had some pain in his left arm/shoulder with some weakness.  He states that by the end of the day his neck is pretty sore. He feels that this is related to the muscles.     He states that his feet are getting worse. He states that he does not have any time where it is not uncomfortable to walk. He states that his left foot is tingling, the right foot is tingling and painful. He continues to take the Nortriptyline and is tolerating it well. He has not noticed a change with it.       UDS: 4/1/19-reviewed and appropriate  Controlled Substance Agreement signed: 4/15/2019    THE 4 As OF OPIOID MAINTENANCE ANALGESIA    Analgesia: Is pain relief clinically significant? YES   Activity: Is patient functional and able to perform Activities of Daily Living? YES   Adverse effects: Is patient free from adverse side effects from opiates? YES   Adherence to Rx protocol: Is patient adhering to Controlled Substance Agreement and taking medications ONLY as ordered? YES     Assessment:  Indra Mehta is a 53 year old male who presents today for follow up regarding his:      1.  Cervicalgia   2.  S/p cervical spine fusion   3.  Myofascial pain   4.  Chronic low back pain   5. Neuropathy    6. Chronic pain syndrome     Patient is having worsening neuropathy in his feet. He also is noticing more muscle soreness with working from home. Encouraged him to move consistently throughout the day as well as stretch. Will increase Nortriptyline at bedtime. Will discuss Lamictal with my colleagues. Consider Butrans in future.         Plan:     Diagnosis reviewed, treatment option addressed, and risk/benifits discussed.  Self-care instructions given.  I am recommending a multidisciplinary treatment plan to help this patient better manage pain.       5. Physical Therapy:  Not at this time, but he should continue his exercises  6. Clinical Health Psychologist:  NO, continue guided meditation, we could consider cognitive behavioral therapy/relaxation techniques, patient will consider this option.  7. Diagnostic Studies: none  8. Medication Management:    1.   Continue Gabapentin 1200 TID  2.   Increase nortriptyline to 50 mg at bedtime.    3.   Continue Tizanidine 4mg at bedtime  4.   Continue Oxycodone 3-4/day,  8. Further procedures recommended: none at this time, for considering a neck injection would want to get the okay from his surgeon  9. Recommendations to PCP. See above  10. The patient will need an updated urine drug screen and opiate agreement at his next visit in clinic        Follow up with this provider:  4 Weeks   Phone call duration: 12 minutes    Chiara Garcia PA-C   [facility]

## 2020-04-30 DIAGNOSIS — G89.4 CHRONIC PAIN SYNDROME: ICD-10-CM

## 2020-04-30 DIAGNOSIS — G89.29 CHRONIC BILATERAL LOW BACK PAIN WITHOUT SCIATICA: ICD-10-CM

## 2020-04-30 DIAGNOSIS — M54.2 CERVICALGIA: ICD-10-CM

## 2020-04-30 DIAGNOSIS — M54.50 CHRONIC BILATERAL LOW BACK PAIN WITHOUT SCIATICA: ICD-10-CM

## 2020-04-30 DIAGNOSIS — G62.9 NEUROPATHY: ICD-10-CM

## 2020-04-30 DIAGNOSIS — M79.18 MYOFASCIAL PAIN: ICD-10-CM

## 2020-04-30 DIAGNOSIS — Z98.1 S/P CERVICAL SPINAL FUSION: ICD-10-CM

## 2020-04-30 RX ORDER — NORTRIPTYLINE HYDROCHLORIDE 50 MG/1
50 CAPSULE ORAL AT BEDTIME
Qty: 30 CAPSULE | Refills: 0 | Status: SHIPPED | OUTPATIENT
Start: 2020-04-30 | End: 2020-06-24

## 2020-04-30 NOTE — TELEPHONE ENCOUNTER
Chief Complaint   Patient presents with     Refill Request     nortriptyline (PAMELOR) 50 MG capsule       Refill request received via fax by pharmacy   [id number] - [place], [place] - [address] [place] [address]       Pending Prescriptions:                       Disp   Refills    nortriptyline (PAMELOR) 50 MG capsule     30 cap*0            Sig: Take 1 capsule (50 mg) by mouth At Bedtime         Last Written Prescription Date:  04/15/2020  Last Fill Quantity: 30,   # refills: 0  Last Office Visit with prescribing provider: 04/15/2020  Future Office visit: 05/14/2020      Darcie Moreno CMA on 4/30/2020 at 10:19 AM

## 2020-04-30 NOTE — TELEPHONE ENCOUNTER
Signed Prescriptions:                        Disp   Refills    nortriptyline (PAMELOR) 50 MG capsule      30 cap*0        Sig: Take 1 capsule (50 mg) by mouth At Bedtime  Authorizing Provider: KENA DIAZ      Covering for provider who is out of the office.  Refill appears appropriate and was sent to requested pharmacy.    Kena Diaz MD  [facility]

## 2020-05-14 ENCOUNTER — VIRTUAL VISIT (OUTPATIENT)
Dept: PALLIATIVE MEDICINE | Facility: CLINIC | Age: 55
End: 2020-05-14
Payer: COMMERCIAL

## 2020-05-14 VITALS — BODY MASS INDEX: 28.37 KG/M2 | HEIGHT: 73 IN

## 2020-05-14 DIAGNOSIS — G62.9 NEUROPATHY: ICD-10-CM

## 2020-05-14 DIAGNOSIS — M54.50 CHRONIC BILATERAL LOW BACK PAIN WITHOUT SCIATICA: ICD-10-CM

## 2020-05-14 DIAGNOSIS — M54.16 LUMBAR RADICULOPATHY: Primary | ICD-10-CM

## 2020-05-14 DIAGNOSIS — M54.2 CERVICALGIA: ICD-10-CM

## 2020-05-14 DIAGNOSIS — G89.4 CHRONIC PAIN SYNDROME: ICD-10-CM

## 2020-05-14 DIAGNOSIS — M79.18 MYOFASCIAL PAIN: ICD-10-CM

## 2020-05-14 DIAGNOSIS — Z98.1 S/P CERVICAL SPINAL FUSION: ICD-10-CM

## 2020-05-14 DIAGNOSIS — G89.29 CHRONIC BILATERAL LOW BACK PAIN WITHOUT SCIATICA: ICD-10-CM

## 2020-05-14 PROCEDURE — 99214 OFFICE O/P EST MOD 30 MIN: CPT | Mod: TEL | Performed by: PHYSICIAN ASSISTANT

## 2020-05-14 RX ORDER — OXYCODONE HYDROCHLORIDE 5 MG/1
TABLET ORAL
Qty: 105 TABLET | Refills: 0 | Status: SHIPPED | OUTPATIENT
Start: 2020-05-14 | End: 2020-06-09

## 2020-05-14 ASSESSMENT — PAIN SCALES - GENERAL: PAINLEVEL: SEVERE PAIN (6)

## 2020-05-14 NOTE — PROGRESS NOTES
"Indra Mehta is a 54 year old male who is being evaluated via a billable telephone visit.      The patient has been notified of following:     \"This telephone visit will be conducted via a call between you and your physician/provider. We have found that certain health care needs can be provided without the need for a physical exam.  This service lets us provide the care you need with a short phone conversation.  If a prescription is necessary we can send it directly to your pharmacy.  If lab work is needed we can place an order for that and you can then stop by our lab to have the test done at a later time.    Telephone visits are billed at different rates depending on your insurance coverage. During this emergency period, for some insurers they may be billed the same as an in-person visit.  Please reach out to your insurance provider with any questions.    If during the course of the call the physician/provider feels a telephone visit is not appropriate, you will not be charged for this service.\"    Patient has given verbal consent for Telephone visit?  Yes      How would you like to obtain your AVS? Sulema     Additional provider notes:   CHIEF COMPLAINT:  Pain  -neck and low back pain     INTERVAL HISTORY:  Last seen on 04/15/2020.       Recommendations/plan at the last visit included:  1. Physical Therapy:  Not at this time, but he should continue his exercises  2. Clinical Health Psychologist:  NO, continue guided meditation, we could consider cognitive behavioral therapy/relaxation techniques, patient will consider this option.  3. Diagnostic Studies: none  4. Medication Management:    1.   Continue Gabapentin 1200 TID  2.   Increase nortriptyline to 50 mg at bedtime.    3.   Continue Tizanidine 4mg at bedtime  4.   Continue Oxycodone 3-4/day,  5. Further procedures recommended: none at this time, for considering a neck injection would want to get the okay from his surgeon  6. Recommendations to PCP. See " "above  7. The patient will need an updated urine drug screen and opiate agreement at his next visit in clinic    Follow up with this provider:  4 Weeks     Current Visit:     Patient states that his neck is about the same. He thinks that it is getting a little better, but he still has a lot of pain if his head is forward. He states that it feels that part of the pain is deeper. The pain is worse at the end of the day with being up and active. If he is upright it hurts less than it did before. He states that his lower back continues to get worse. He states that in the last month he has had 10-12 days where he has had pain in his right leg going down to the knee. He states that he has pain on both sides into the hips. He states that by the end of the day, his pelvic area \"aches\". He thinks its a combination of his hips and lower back.     As the weather has gotten nicer he has been outside doing some landscaping. He states that this has been tough. He reports needing multiple breaks. He will ice and sit for awhile and then try to do a little more. He states that his joints are getting worse with swelling and pain with his increased activity. He has noticed it with his elbows and hands.     UDS: 4/1/19-reviewed and appropriate  Controlled Substance Agreement signed: 4/15/2019    THE 4 As OF OPIOID MAINTENANCE ANALGESIA    Analgesia: Is pain relief clinically significant? YES   Activity: Is patient functional and able to perform Activities of Daily Living? YES   Adverse effects: Is patient free from adverse side effects from opiates? YES   Adherence to Rx protocol: Is patient adhering to Controlled Substance Agreement and taking medications ONLY as ordered? YES     Assessment:  Indra Mehta is a 54 year old male who presents today for follow up regarding his:      1.  Cervicalgia   2.  S/p cervical spine fusion   3.  Myofascial pain   4. Chronic low back pain   5. Neuropathy    6. Chronic pain " syndrome     Worsening low back pain. Trying to increase activity but noticing a lot of issues with his low back and joints. He will monitor his joints and follow up with his PCP if they continue to swell. He has had significant benefit in the past from epidurals. Will plan for one as soon as able.         Plan:     Diagnosis reviewed, treatment option addressed, and risk/benifits discussed.  Self-care instructions given.  I am recommending a multidisciplinary treatment plan to help this patient better manage pain.       1. Physical Therapy:  Not at this time, but he should continue his exercises  2. Clinical Health Psychologist:  NO, continue guided meditation, we could consider cognitive behavioral therapy/relaxation techniques, patient will consider this option.  3. Diagnostic Studies: none  4. Medication Management:    1.   Continue Gabapentin 1200 TID  2.   Continue nortriptyline to 50 mg at bedtime.    3.   Continue Tizanidine 4mg at bedtime  4.   Continue Oxycodone 3-4/day, okay to increase to 105 tablets for 30 days  5. Further procedures recommended: plan for L3-4 bilateral  transforaminal epidural when able  6. Recommendations to PCP. See above    Follow up with this provider:  8 Weeks   Phone call duration: 16 minutes    Chiara Garcia PA-C   [facility]

## 2020-05-14 NOTE — PATIENT INSTRUCTIONS
After Visit Instructions:     Thank you for coming to [facility] for your care. It is my goal to partner with you to help you reach your optimal state of health.     I am recommending multidisciplinary care at this time.  The focus of care will be to continue gradual rehabilitation and pain management with medication adjustments as needed.    Continue daily self-care, identifying contributing factors, and monitoring variations in pain level. Continue to integrate self-care into your life.          Schedule follow-up with Chiara Garcia PA-C in 8 weeks. You will need to make this appointment.     Procedures recommended: plan for bilateral L3-4 transforaminal epidural steroid injection when able     Medication recommendations:  1.   Continue Gabapentin 1200 TID  2.   Continue nortriptyline to 50 mg at bedtime.    3.   Continue Tizanidine 4mg at bedtime  4.   Continue Oxycodone 3-4/day, okay to increase to 105 tablets for 30 days      Chiara Garcia PA-C  [facility]  [place]    Contact information: [facility] [place]  Clinic Number:  [phone]     Call with any questions about your care and for scheduling assistance.     Calls are returned Monday through Friday between 8 AM and 4:30 PM. We usually get back to you within 2 business days depending on the issue/request.    If we are prescribing your medications:    For opioid medication refills, call the clinic or send a [employer] message 7 days in advance.  Please include:    Name of requested medication    Name of the pharmacy.    For non-opioid medications, call your pharmacy directly to request a refill. Please allow 3-4 days to be processed.     Per [place] State Law:    All controlled substance prescriptions must be filled within 30 days of being written.      For those controlled substances allowing refills, pickup must occur within 30 days of last fill.      We believe regular attendance is  key to your success in our program!      Any time you are unable to keep your appointment we ask that you call us at least 24 hours in advance to cancel.This will allow us to offer the appointment time to another patient.   Multiple missed appointments may lead to dismissal from the clinic.

## 2020-05-19 ENCOUNTER — TELEPHONE (OUTPATIENT)
Dept: SURGERY | Facility: CLINIC | Age: 55
End: 2020-05-19

## 2020-05-19 ENCOUNTER — OFFICE VISIT (OUTPATIENT)
Dept: FAMILY MEDICINE | Facility: CLINIC | Age: 55
End: 2020-05-19
Attending: ANESTHESIOLOGY
Payer: COMMERCIAL

## 2020-05-19 DIAGNOSIS — Z11.59 ENCOUNTER FOR SCREENING FOR OTHER VIRAL DISEASES: ICD-10-CM

## 2020-05-19 DIAGNOSIS — Z11.59 ENCOUNTER FOR SCREENING FOR OTHER VIRAL DISEASES: Primary | ICD-10-CM

## 2020-05-19 PROCEDURE — 87635 SARS-COV-2 COVID-19 AMP PRB: CPT | Mod: 90 | Performed by: ANESTHESIOLOGY

## 2020-05-19 PROCEDURE — 99000 SPECIMEN HANDLING OFFICE-LAB: CPT | Performed by: ANESTHESIOLOGY

## 2020-05-19 PROCEDURE — 99207 ZZC NO CHARGE NURSE ONLY: CPT

## 2020-05-19 NOTE — PROGRESS NOTES
Patient is here for pre-op COVID swab. Swab completed with no concerns. Specimen walked down to lab.    Enzo Mendez CMA

## 2020-05-19 NOTE — TELEPHONE ENCOUNTER
Pt scheduled for SRIDEVI   Date: 5/22/20  Time: 0830   Dr. Mcdaniel    Instructed pt to have H&P and  for procedure. Informed patient he will be notified to schedule COVID testing

## 2020-05-20 LAB
SARS-COV-2 RNA SPEC QL NAA+PROBE: NOT DETECTED
SPECIMEN SOURCE: NORMAL

## 2020-05-21 ENCOUNTER — OFFICE VISIT (OUTPATIENT)
Dept: FAMILY MEDICINE | Facility: CLINIC | Age: 55
End: 2020-05-21
Payer: COMMERCIAL

## 2020-05-21 ENCOUNTER — ANESTHESIA EVENT (OUTPATIENT)
Dept: SURGERY | Facility: CLINIC | Age: 55
End: 2020-05-21
Payer: COMMERCIAL

## 2020-05-21 VITALS
WEIGHT: 223 LBS | BODY MASS INDEX: 29.55 KG/M2 | HEIGHT: 73 IN | HEART RATE: 68 BPM | RESPIRATION RATE: 14 BRPM | TEMPERATURE: 97.8 F | OXYGEN SATURATION: 98 % | DIASTOLIC BLOOD PRESSURE: 88 MMHG | SYSTOLIC BLOOD PRESSURE: 124 MMHG

## 2020-05-21 DIAGNOSIS — Z01.818 PREOP GENERAL PHYSICAL EXAM: Primary | ICD-10-CM

## 2020-05-21 PROCEDURE — 99213 OFFICE O/P EST LOW 20 MIN: CPT | Performed by: NURSE PRACTITIONER

## 2020-05-21 ASSESSMENT — PAIN SCALES - GENERAL: PAINLEVEL: MODERATE PAIN (5)

## 2020-05-21 ASSESSMENT — MIFFLIN-ST. JEOR: SCORE: 1910.4

## 2020-05-21 NOTE — PATIENT INSTRUCTIONS
Before Your Surgery      Call your surgeon if there is any change in your health. This includes signs of a cold or flu (such as a sore throat, runny nose, cough, rash or fever).    Do not smoke, drink alcohol or take over the counter medicine (unless your surgeon or primary care doctor tells you to) for the 24 hours before and after surgery.    If you take prescribed drugs: Follow your doctor s orders about which medicines to take and which to stop until after surgery.    Eating and drinking prior to surgery: follow the instructions from your surgeon    Take a shower or bath the night before surgery. Use the soap your surgeon gave you to gently clean your skin. If you do not have soap from your surgeon, use your regular soap. Do not shave or scrub the surgery site.  Wear clean pajamas and have clean sheets on your bed.     Hold Losartan day of surgery.

## 2020-05-21 NOTE — PROGRESS NOTES
[address] [address]  [phone]  Dept: [phone]    PRE-OP EVALUATION:  Today's date: 2020    Indra Mehta (: 1965) presents for pre-operative evaluation assessment as requested by Dr. Mcdaniel.  He requires evaluation and anesthesia risk assessment prior to undergoing surgery/procedure for treatment of back pain .    Primary Physician: Tha Grullon  Type of Anesthesia Anticipated: to be determined    Patient has a Health Care Directive or Living Will:  NO     Preop Questions 2020   Who is doing your surgery? Dr Mcdaniel   What are you having done? lumbar 3-4 bilateral transforaminal epidural steroid injection    Date of Surgery/Procedure: 20   Facility or Hospital where procedure/surgery will be performed: [facility]   1.  Do you have a history of Heart attack, stroke, stent, coronary bypass surgery, or other heart surgery? No   2.  Do you ever have any pain or discomfort in your chest? No   3.  Do you have a history of  Heart Failure? No   4.   Are you troubled by shortness of breath when:  walking on a level surface, or up a slight hill, or at night? No   5.  Do you currently have a cold, bronchitis or other respiratory infection? No   6.  Do you have a cough, shortness of breath, or wheezing? No   7.  Do you sometimes get pains in the calves of your legs when you walk? No   8. Do you or anyone in your family have previous history of blood clots? No   9.  Do you or does anyone in your family have a serious bleeding problem such as prolonged bleeding following surgeries or cuts? No   10. Have you ever had problems with anemia or been told to take iron pills? No   11. Have you had any abnormal blood loss such as black, tarry or bloody stools? No   12. Have you ever had a blood transfusion? No   13. Have you or any of your relatives ever had problems with anesthesia? No   14. Do you have sleep apnea, excessive snoring or daytime  drowsiness? YES - Sleep apnea    15. Do you have any prosthetic heart valves? No   16. Do you have prosthetic joints? No         HPI:     HPI related to upcoming procedure: History of chronic back pain, low, with bilateral radicular pain. Plan for Lumbar epidural injection with Dr. Mcdaniel.     No NSAIDS or Steroids.           See problem list for active medical problems.  Problems all longstanding and stable, except as noted/documented.  See ROS for pertinent symptoms related to these conditions.      MEDICAL HISTORY:     Patient Active Problem List    Diagnosis Date Noted     Benign essential hypertension 06/30/2017     Priority: Medium     Myalgia 10/28/2016     Priority: Medium     Bilateral occipital neuralgia 10/28/2016     Priority: Medium     CARDIOVASCULAR SCREENING; LDL GOAL LESS THAN 160 10/31/2010     Priority: Medium     Major depression in complete remission (H) 08/20/2010     Priority: Medium     Chronic pain syndrome 08/20/2010     Priority: Medium     Patient is followed by Tha Grullon MD for ongoing prescription of pain medication.  All refills should be approved by this provider only at face-to-face appointments - not by phone request.    Medication(s): Tramadol.   Maximum quantity per month: 60  Clinic visit frequency required: Q 1 months     Controlled substance agreement:  Encounter-Level CSA - 10/13/16:               Controlled Substance Agreement - Scan on 10/23/2016  5:07 PM : CONTROLLED SUBSTANCE AGREEMENT 10/13/16 (below)            Pain Clinic evaluation in the past: Yes       Date/Location:   [facility]    DIRE Total Score(s):  No flowsheet data found.    Last [facility] website verification:  none   [email or web address]               Pain medication agreement signed 08/20/2010     Priority: Medium     Meniere's disease 03/22/2007     Priority: Medium     Problem list name updated by automated process. Provider to review        Past Medical History:   Diagnosis Date      Back pain      Meniere's disease, unspecified      Pain medication agreement signed 8/20/2010     Past Surgical History:   Procedure Laterality Date     BUNIONECTOMY RT/LT  09/05/08    Both feet     COLONOSCOPY N/A 12/28/2016    Procedure: COMBINED COLONOSCOPY, SINGLE OR MULTIPLE BIOPSY/POLYPECTOMY BY BIOPSY;  Surgeon: Indra Jernigan MD;  [facility]     DISCECTOMY, FUSION CERVICAL ANTERIOR ONE LEVEL, COMBINED N/A 7/5/2017    Procedure: COMBINED DISCECTOMY, FUSION CERVICAL ANTERIOR ONE LEVEL;  Cervical 6-7, Anterior cervical discectomy fusion;  Surgeon: Mike Mckenna MD;  [facility]     DISCECTOMY, FUSION CERVICAL ANTERIOR ONE LEVEL, COMBINED N/A 12/19/2018    Procedure: cervical 5-6 anterior cervical discectomy fusion;  Surgeon: Mike Mckenna MD;  [facility]     HC COLONOSCOPY W/WO BRUSH/WASH  12/27/2005     HC CREATE EARDRUM OPENING,GEN ANESTH  09/27/2007    Pe tube, Left endolymphatic sac enhancement.     HC MASTOIDECTOMY,COMPLETE  09/27/2007     HERNIORRHAPHY UMBILICAL N/A 8/11/2017    Procedure: HERNIORRHAPHY UMBILICAL;  open umbilical hernia repair;  Surgeon: Boni Story MD;  [facility]     INJECT EPIDURAL CERVICAL N/A 8/22/2019    Procedure: INJECTION, SPINE, CERVICAL 6-7 EPIDURAL;  Surgeon: Darryn Mcdaniel MD;  [facility]     INJECT EPIDURAL LUMBAR N/A 11/9/2017    Procedure: INJECT EPIDURAL LUMBAR;  lumbar 4-5 epidural steroid injection ;  Surgeon: Darryn Mcdaniel MD;  [facility]     INJECT EPIDURAL LUMBAR N/A 12/28/2017    Procedure: INJECT EPIDURAL LUMBAR;  lumbar epidural injection;  Surgeon: Darryn Mcdaniel MD;  [facility]     INJECT EPIDURAL TRANSFORAMINAL Bilateral 8/23/2018    Procedure: INJECT EPIDURAL TRANSFORAMINAL;  transforaminal bilateral lumbar 3-4 steroid injection;  Surgeon: Darryn Mcdaniel MD;  [facility]     INJECT EPIDURAL TRANSFORAMINAL Bilateral 11/8/2018    Procedure: INJECT EPIDURAL TRANSFORAMINAL lumbar 3-4;  Surgeon:  "Darryn Mcdaniel MD;  [facility]     INJECT EPIDURAL TRANSFORAMINAL Bilateral 6/14/2019    Procedure: INJECTION, EPIDURAL, TRANSFORAMINAL LUMBAR 3-4 BILATERAL;  Surgeon: Darryn Mcdaniel MD;  [facility]     PE TUBES  2006    left ear     Current Outpatient Medications   Medication Sig Dispense Refill     Acetaminophen (TYLENOL PO) Take 1,000 mg by mouth 3 times daily        ALPRAZolam (XANAX XR) 0.5 MG 24 hr tablet Take 1 tablet (0.5 mg) by mouth At Bedtime 30 tablet 3     gabapentin (NEURONTIN) 400 MG capsule Take 3 capsules (1,200 mg) by mouth 3 times daily 270 capsule 3     ibuprofen (ADVIL/MOTRIN) 800 MG tablet Take 800 mg by mouth 3 times daily       losartan (COZAAR) 50 MG tablet Take 1 tablet (50 mg) by mouth daily 90 tablet 0     nortriptyline (PAMELOR) 50 MG capsule Take 1 capsule (50 mg) by mouth At Bedtime 30 capsule 0     order for DME Equipment being ordered: TENS Pads as directed 1 Device 0     oxyCODONE (ROXICODONE) 5 MG tablet Take 1 tablet every 4-6 hours as needed for severe pain. Max 3-4/day. Fill 5/14/2020, start 5/17/2020 105 tablet 0     tiZANidine (ZANAFLEX) 4 MG tablet Take 1 tablet (4 mg) by mouth At Bedtime 90 tablet 0     OTC products: no recent use of OTC ASA, NSAIDS or Steroids    Allergies   Allergen Reactions     No Known Drug Allergies       Latex Allergy: NO    Social History     Tobacco Use     Smoking status: Never Smoker     Smokeless tobacco: Never Used   Substance Use Topics     Alcohol use: No     Alcohol/week: 0.0 standard drinks     History   Drug Use No       REVIEW OF SYSTEMS:   Constitutional, HEENT, cardiovascular, pulmonary, gi and gu systems are negative, except as otherwise noted.    EXAM:   /88 (BP Location: Right arm, Patient Position: Sitting, Cuff Size: Adult Large)   Pulse 68   Temp 97.8  F (36.6  C) (Temporal)   Resp 14   Ht 1.854 m (6' 1\")   Wt 101.2 kg (223 lb)   SpO2 98%   BMI 29.42 kg/m      GENERAL APPEARANCE: healthy, alert and no " distress     EYES: EOMI,  PERRL     HENT: ear canals and TM's normal and nose and mouth without ulcers or lesions     NECK: no adenopathy, no asymmetry, masses, or scars and thyroid normal to palpation     RESP: lungs clear to auscultation - no rales, rhonchi or wheezes     CV: regular rates and rhythm, normal S1 S2, no S3 or S4 and no murmur, click or rub     ABDOMEN:  soft, nontender, no HSM or masses and bowel sounds normal     MS: extremities normal- no gross deformities noted, no evidence of inflammation in joints, FROM in all extremities.     SKIN: no suspicious lesions or rashes     NEURO: Normal strength and tone, sensory exam grossly normal, mentation intact and speech normal     PSYCH: mentation appears normal. and affect normal/bright     LYMPHATICS: No cervical adenopathy    DIAGNOSTICS:   No labs or EKG required for low risk surgery (cataract, skin procedure, breast biopsy, etc)    Recent Labs   Lab Test 10/14/19  1602 06/11/19  1113   HGB 15.4 14.5    248    141   POTASSIUM 3.9 3.9   CR 0.94 0.95        IMPRESSION:   Reason for surgery/procedure: Chronic low back pain with bilateral radiculopathy plan for  Plan for Bilateral Lumbar epidural injection with Dr. Mcdaniel.       The proposed surgical procedure is considered LOW risk.    REVISED CARDIAC RISK INDEX  The patient has the following serious cardiovascular risks for perioperative complications such as (MI, PE, VFib and 3  AV Block):  No serious cardiac risks  INTERPRETATION: 0 risks: Class I (very low risk - 0.4% complication rate)    The patient has the following additional risks for perioperative complications:  No identified additional risks      ICD-10-CM    1. Preop general physical exam  Z01.818        RECOMMENDATIONS:         --Patient is to take all scheduled medications on the day of surgery EXCEPT for modifications listed below.  - Losartan.     APPROVAL GIVEN to proceed with proposed procedure, without further diagnostic  evaluation       Signed Electronically by: Javier Riley NP    Copy of this evaluation report is provided to requesting physician.    [facility] Preop Guidelines    Revised Cardiac Risk Index

## 2020-05-21 NOTE — NURSING NOTE
Health Maintenance Due   Topic Date Due     HIV SCREENING  07/01/1981     PREVENTIVE CARE VISIT  11/29/2006     ZOSTER IMMUNIZATION (1 of 2) 07/01/2016     LIPID  03/29/2018     PHQ-9  02/16/2020     URINE DRUG SCREEN  04/01/2020     Jackie Alas LPN........5/21/2020 3:28 PM

## 2020-05-22 ENCOUNTER — HOSPITAL ENCOUNTER (OUTPATIENT)
Facility: CLINIC | Age: 55
Discharge: HOME | End: 2020-05-22
Attending: ANESTHESIOLOGY | Admitting: ANESTHESIOLOGY
Payer: COMMERCIAL

## 2020-05-22 ENCOUNTER — HOSPITAL ENCOUNTER (OUTPATIENT)
Dept: GENERAL RADIOLOGY | Facility: CLINIC | Age: 55
End: 2020-05-22
Attending: ANESTHESIOLOGY | Admitting: ANESTHESIOLOGY
Payer: COMMERCIAL

## 2020-05-22 ENCOUNTER — ANESTHESIA (OUTPATIENT)
Dept: SURGERY | Facility: CLINIC | Age: 55
End: 2020-05-22
Payer: COMMERCIAL

## 2020-05-22 VITALS
HEART RATE: 85 BPM | RESPIRATION RATE: 16 BRPM | SYSTOLIC BLOOD PRESSURE: 142 MMHG | OXYGEN SATURATION: 96 % | TEMPERATURE: 97.7 F | DIASTOLIC BLOOD PRESSURE: 94 MMHG

## 2020-05-22 DIAGNOSIS — M54.16 LUMBAR RADICULOPATHY: ICD-10-CM

## 2020-05-22 PROCEDURE — 25000128 H RX IP 250 OP 636: Performed by: ANESTHESIOLOGY

## 2020-05-22 PROCEDURE — 64483 NJX AA&/STRD TFRM EPI L/S 1: CPT | Mod: 50 | Performed by: ANESTHESIOLOGY

## 2020-05-22 PROCEDURE — 25000125 ZZHC RX 250: Performed by: NURSE ANESTHETIST, CERTIFIED REGISTERED

## 2020-05-22 PROCEDURE — 25000128 H RX IP 250 OP 636: Performed by: NURSE ANESTHETIST, CERTIFIED REGISTERED

## 2020-05-22 PROCEDURE — 37000008 ZZH ANESTHESIA TECHNICAL FEE, 1ST 30 MIN: Performed by: ANESTHESIOLOGY

## 2020-05-22 PROCEDURE — 40000277 XR SURGERY CARM FLUORO LESS THAN 5 MIN W STILLS: Mod: TC

## 2020-05-22 RX ORDER — ALBUTEROL SULFATE 0.83 MG/ML
2.5 SOLUTION RESPIRATORY (INHALATION) EVERY 4 HOURS PRN
Status: DISCONTINUED | OUTPATIENT
Start: 2020-05-22 | End: 2020-05-22 | Stop reason: HOSPADM

## 2020-05-22 RX ORDER — NALOXONE HYDROCHLORIDE 0.4 MG/ML
.1-.4 INJECTION, SOLUTION INTRAMUSCULAR; INTRAVENOUS; SUBCUTANEOUS
Status: DISCONTINUED | OUTPATIENT
Start: 2020-05-22 | End: 2020-05-22 | Stop reason: HOSPADM

## 2020-05-22 RX ORDER — IOPAMIDOL 612 MG/ML
INJECTION, SOLUTION INTRATHECAL PRN
Status: DISCONTINUED | OUTPATIENT
Start: 2020-05-22 | End: 2020-05-22 | Stop reason: HOSPADM

## 2020-05-22 RX ORDER — ONDANSETRON 4 MG/1
4 TABLET, ORALLY DISINTEGRATING ORAL EVERY 30 MIN PRN
Status: DISCONTINUED | OUTPATIENT
Start: 2020-05-22 | End: 2020-05-22 | Stop reason: HOSPADM

## 2020-05-22 RX ORDER — ONDANSETRON 2 MG/ML
4 INJECTION INTRAMUSCULAR; INTRAVENOUS EVERY 30 MIN PRN
Status: DISCONTINUED | OUTPATIENT
Start: 2020-05-22 | End: 2020-05-22 | Stop reason: HOSPADM

## 2020-05-22 RX ORDER — LIDOCAINE HYDROCHLORIDE 20 MG/ML
INJECTION, SOLUTION INFILTRATION; PERINEURAL PRN
Status: DISCONTINUED | OUTPATIENT
Start: 2020-05-22 | End: 2020-05-22

## 2020-05-22 RX ORDER — HYDRALAZINE HYDROCHLORIDE 20 MG/ML
2.5-5 INJECTION INTRAMUSCULAR; INTRAVENOUS EVERY 10 MIN PRN
Status: DISCONTINUED | OUTPATIENT
Start: 2020-05-22 | End: 2020-05-22 | Stop reason: HOSPADM

## 2020-05-22 RX ORDER — PROPOFOL 10 MG/ML
INJECTION, EMULSION INTRAVENOUS PRN
Status: DISCONTINUED | OUTPATIENT
Start: 2020-05-22 | End: 2020-05-22

## 2020-05-22 RX ORDER — LIDOCAINE 40 MG/G
CREAM TOPICAL
Status: DISCONTINUED | OUTPATIENT
Start: 2020-05-22 | End: 2020-05-22 | Stop reason: HOSPADM

## 2020-05-22 RX ORDER — TRIAMCINOLONE ACETONIDE 40 MG/ML
INJECTION, SUSPENSION INTRA-ARTICULAR; INTRAMUSCULAR PRN
Status: DISCONTINUED | OUTPATIENT
Start: 2020-05-22 | End: 2020-05-22 | Stop reason: HOSPADM

## 2020-05-22 RX ADMIN — PROPOFOL 30 MG: 10 INJECTION, EMULSION INTRAVENOUS at 08:48

## 2020-05-22 RX ADMIN — PROPOFOL 30 MG: 10 INJECTION, EMULSION INTRAVENOUS at 08:46

## 2020-05-22 RX ADMIN — LIDOCAINE HYDROCHLORIDE 60 MG: 20 INJECTION, SOLUTION INFILTRATION; PERINEURAL at 08:38

## 2020-05-22 RX ADMIN — PROPOFOL 40 MG: 10 INJECTION, EMULSION INTRAVENOUS at 08:38

## 2020-05-22 RX ADMIN — LIDOCAINE HYDROCHLORIDE 0.1 ML: 10 INJECTION, SOLUTION EPIDURAL; INFILTRATION; INTRACAUDAL; PERINEURAL at 07:58

## 2020-05-22 RX ADMIN — PROPOFOL 30 MG: 10 INJECTION, EMULSION INTRAVENOUS at 08:43

## 2020-05-22 RX ADMIN — PROPOFOL 60 MG: 10 INJECTION, EMULSION INTRAVENOUS at 08:40

## 2020-05-22 NOTE — ANESTHESIA PREPROCEDURE EVALUATION
Anesthesia Pre-Procedure Evaluation    Patient: Indra Mehta   MRN: [id number] : 1965          Preoperative Diagnosis: cervical radiculopathy    Procedure(s):  cervical 5-6 anterior cervical discectomy fusion    Past Medical History:   Diagnosis Date     Back pain      Meniere's disease, unspecified      Pain medication agreement signed 2010     Past Surgical History:   Procedure Laterality Date     BUNIONECTOMY RT/LT  08    Both feet     COLONOSCOPY N/A 2016    Procedure: COMBINED COLONOSCOPY, SINGLE OR MULTIPLE BIOPSY/POLYPECTOMY BY BIOPSY;  Surgeon: Indra Jernigan MD;  [facility]     DISCECTOMY, FUSION CERVICAL ANTERIOR ONE LEVEL, COMBINED N/A 2017    Procedure: COMBINED DISCECTOMY, FUSION CERVICAL ANTERIOR ONE LEVEL;  Cervical 6-7, Anterior cervical discectomy fusion;  Surgeon: Mike Mckenna MD;  [facility]     DISCECTOMY, FUSION CERVICAL ANTERIOR ONE LEVEL, COMBINED N/A 2018    Procedure: cervical 5-6 anterior cervical discectomy fusion;  Surgeon: Mike Mckenna MD;  [facility]     HC COLONOSCOPY W/WO BRUSH/WASH  2005     HC CREATE EARDRUM OPENING,GEN ANESTH  2007    Pe tube, Left endolymphatic sac enhancement.     HC MASTOIDECTOMY,COMPLETE  2007     HERNIORRHAPHY UMBILICAL N/A 2017    Procedure: HERNIORRHAPHY UMBILICAL;  open umbilical hernia repair;  Surgeon: Boni Story MD;  [facility]     INJECT EPIDURAL CERVICAL N/A 2019    Procedure: INJECTION, SPINE, CERVICAL 6-7 EPIDURAL;  Surgeon: Darryn Mcdaniel MD;  [facility]     INJECT EPIDURAL LUMBAR N/A 2017    Procedure: INJECT EPIDURAL LUMBAR;  lumbar 4-5 epidural steroid injection ;  Surgeon: Darryn Mcdaniel MD;  [facility]     INJECT EPIDURAL LUMBAR N/A 2017    Procedure: INJECT EPIDURAL LUMBAR;  lumbar epidural injection;  Surgeon: Darryn Mcdaniel MD;  [facility]     INJECT EPIDURAL TRANSFORAMINAL Bilateral 2018     Procedure: INJECT EPIDURAL TRANSFORAMINAL;  transforaminal bilateral lumbar 3-4 steroid injection;  Surgeon: Darryn Mcdaniel MD;  [facility]     INJECT EPIDURAL TRANSFORAMINAL Bilateral 11/8/2018    Procedure: INJECT EPIDURAL TRANSFORAMINAL lumbar 3-4;  Surgeon: Darryn Mcdaniel MD;  [facility]     INJECT EPIDURAL TRANSFORAMINAL Bilateral 6/14/2019    Procedure: INJECTION, EPIDURAL, TRANSFORAMINAL LUMBAR 3-4 BILATERAL;  Surgeon: Darryn Mcdaniel MD;  [facility]     PE TUBES  2006    left ear       Anesthesia Evaluation     . Pt has had prior anesthetic. Type: General and MAC    No history of anesthetic complications          ROS/MED HX    ENT/Pulmonary:  - neg pulmonary ROS     Neurologic:     (+)neuropathy (bilateral occipital neuralgia) - Bilateral occipital neuralgia, other neuro Menieres disease    Cardiovascular:     (+) Dyslipidemia, hypertension----. : . . . :. . Previous cardiac testing date:results:date: results:ECG reviewed date:6-11-19 results:NSR date: results:          METS/Exercise Tolerance:  >4 METS   Hematologic:  - neg hematologic  ROS       Musculoskeletal:   (+)  other musculoskeletal- myalgia/back pain      GI/Hepatic:  - neg GI/hepatic ROS      (-) GERD   Renal/Genitourinary:  - ROS Renal section negative   (+) Pt has no history of transplant,       Endo:  - neg endo ROS       Psychiatric:     (+) psychiatric history depression      Infectious Disease:  - neg infectious disease ROS       Malignancy:      - no malignancy   Other:    (+) No chance of pregnancy C-spine cleared: Yes, H/O Chronic Pain,H/O chronic opiod use , no other significant disability                           Physical Exam  Normal systems: cardiovascular, pulmonary and dental    Airway   Mallampati: II  TM distance: >3 FB  Neck ROM: full    Dental     Cardiovascular   Rhythm and rate: regular and normal      Pulmonary    breath sounds clear to auscultation            Lab Results   Component Value Date    WBC 7.5  "10/14/2019    HGB 15.4 10/14/2019    HCT 44.9 10/14/2019     10/14/2019    CRP <2.9 01/26/2018     10/14/2019    POTASSIUM 3.9 10/14/2019    CHLORIDE 103 10/14/2019    CO2 32 10/14/2019    BUN 12 10/14/2019    CR 0.94 10/14/2019     (H) 10/14/2019    KRISHAN 8.9 10/14/2019    MAG 2.2 03/19/2007    TSH 2.00 03/24/2016       Preop Vitals  BP Readings from Last 3 Encounters:   05/21/20 124/88   03/12/20 (!) 158/88   01/30/20 126/78    Pulse Readings from Last 3 Encounters:   05/21/20 68   12/11/19 82   10/14/19 80      Resp Readings from Last 3 Encounters:   05/21/20 14   10/14/19 20   08/22/19 16    SpO2 Readings from Last 3 Encounters:   05/21/20 98%   12/11/19 99%   09/04/19 99%      Temp Readings from Last 1 Encounters:   05/21/20 97.8  F (36.6  C) (Temporal)    Ht Readings from Last 1 Encounters:   05/21/20 1.854 m (6' 1\")      Wt Readings from Last 1 Encounters:   05/21/20 101.2 kg (223 lb)    Estimated body mass index is 29.42 kg/m  as calculated from the following:    Height as of 5/21/20: 1.854 m (6' 1\").    Weight as of 5/21/20: 101.2 kg (223 lb).       Anesthesia Plan      History & Physical Review  History and physical reviewed and following examination; no interval change.    ASA Status:  2 .    NPO Status:  > 8 hours    Plan for MAC with Propofol and Intravenous induction. Maintenance will be TIVA.  Reason for MAC:  Deep or markedly invasive procedure (G8)    Pt has had this type of procedure multiple times before and has done well with the anesthesia.        Postoperative Care  Postoperative pain management:  IV analgesics.      Consents  Anesthetic plan, risks, benefits and alternatives discussed with:  Patient.  Use of blood products discussed: No .   .                   YUMIKO Allison CRNA  "

## 2020-05-22 NOTE — OP NOTE
PRIMARY PROBLEM: Low back pain and bilateral leg pains    PROCEDURE: Repeat L3-4  Transforaminal Epidural Steroid Injections with fluoroscopic guidance and contrast.     PROCEDURE DETAILS: After written informed consent was obtained from the patient, the patient was escorted to the procedure room.  The patient was placed in the prone position.  A  time out  was conducted to verify patient identity, procedure to be performed, side, site, allergies and any special requirements.  The skin over the thoracolumbar region was prepped and draped in normal sterile fashion. Fluoroscopy was used to identify the neural foramen in AP view and the skin was anesthetized with 2 mL of 1% lidocaine with bicarbonate buffer. A 22-gauge Quincke spinal needle was advanced through this location and advanced under fluoroscopic guidance towards the neural foramen.  The target zone was the 6 o clock position of the pedicle.   Prior to entering the foramen, the depth of the needle was gauged with a lateral view on fluoroscopy. While still in a lateral view, the needle was slowly advanced to avoid injury to the spinal nerve.  Then, in the oblique view (approximately 28 degrees), after negative aspiration, 1.5 mL of Omnipaque contrast dye was injected revealing epidural spread without evidence of intravascular or intrathecal spread.  Then a 2.5cc solution of 20 mg of Triamcinolone in 2 mL of  Preservative-Free saline was slowly injected into the epidural space at each segment.  This was done bilaterally at the L3-4 segments.  After injection of the medication, as the needle tip was withdrawn, it was flushed with local anesthetic.   The patient was monitored with blood pressure and pulse oximetry machines with the assistance of an RN throughout the procedure.  The patient was alert and responsive to questions throughout the procedure.   The patient tolerated the procedure well and was observed in the post-procedural area.  The patient was  dismissed without apparent complications.     DIAGNOSIS:  1.  Lateral recess stenosis causing back pain and leg pains at the L3-4 segment with a history of close to 1 year of pain relief from the previous injection    PLAN:  1. Performed repeat L3-4  transforaminal epidural steroid injections.  2. The patient was instructed to follow-up per Dr. Mcdaniel's instructions.      Darryn Mcdaniel MD  Diplomate of the American Board of Anesthesiology, Pain Medicine

## 2020-05-22 NOTE — ANESTHESIA POSTPROCEDURE EVALUATION
Patient: Indra Mehta    Procedure(s):  lumbar 3-4 bilateral transforaminal epidural steroid injection    Diagnosis:Lumbar radiculopathy [M54.16]  Diagnosis Additional Information: No value filed.    Anesthesia Type:  MAC    Note:  Anesthesia Post Evaluation    Patient location during evaluation: Phase 2  Patient participation: Able to fully participate in evaluation  Level of consciousness: awake  Pain management: adequate  Airway patency: patent  Cardiovascular status: acceptable and hemodynamically stable  Respiratory status: acceptable, room air and nonlabored ventilation  Hydration status: stable  PONV: none     Anesthetic complications: None    Comments: Patient was happy with the anesthesia care received and no anesthesia related complications were noted.  I will follow up with the patient again if it is needed.        Last vitals:  Vitals:    05/22/20 0756   BP: (!) 149/95   Resp: 16   Temp: 97.7  F (36.5  C)         Electronically Signed By: YUMIKO Allison CRNA  May 22, 2020  8:56 AM

## 2020-05-22 NOTE — DISCHARGE INSTRUCTIONS
Home Care Instructions                Procedure: Epidural injection or joint injection    Activity:    Rest today    Do not work today    Resume normal activity tomorrow    Pain:    You may experience soreness at the injection site for 1 to 3 days.    You may use an ice pack for 20 minutes every 2 hours for the first 24 hours    You may use a heating pad after the first 24 hours    You may use Tylenol  (acetaminophen) every 4 hours or other pain medicines as directed by your physician    Safety  Sedation medicine, if given may remain active for many hours.    It is important for the next 24 hours that you do not:    Drive a car    Operate machines or power tools    Consume alcohol, including beer    Sign any important papers or legal documents    You may experience numbness radiating into your legs or arms, (depending on the procedure location)  This numbness may last several hours.  Until the numb sensation returns to normal please use caution in walking, climbing stairs, stepping out of your vehicle, etc.    Common side effects of steroids:  Not everyone will experience corticosteroid side effects. If side effects are experienced they will gradually subside in the 7-10 day period following an injection.    Most common side effects include:    Flushed face and/or chest    Feeling of warmth, particularly in face but could be overall feeling of warmth    Increased blood sugar in diabetic patients    Menstrual irregularities may occur.  If taking hormone based birth control an alternate method of birth control is recommended    Sleep disturbances and/or mood swings are possible    Leg cramps    Please contact us if you have:  Severe pain   Fever more than 101.5 degrees Fahrenheit  Signs of infection (redness, swelling or drainage)      If you have questions during normal business hours (8am-5pm Monday-Friday) contact the [facility] at [phone]. If you need help after hours, we recommend that you go to a  hospital emergency room or dial 911.

## 2020-06-02 ENCOUNTER — MEDICAL CORRESPONDENCE (OUTPATIENT)
Dept: HEALTH INFORMATION MANAGEMENT | Facility: CLINIC | Age: 55
End: 2020-06-02

## 2020-06-02 ENCOUNTER — MYC MEDICAL ADVICE (OUTPATIENT)
Dept: PALLIATIVE MEDICINE | Facility: CLINIC | Age: 55
End: 2020-06-02

## 2020-06-05 ENCOUNTER — ANCILLARY PROCEDURE (OUTPATIENT)
Dept: GENERAL RADIOLOGY | Facility: CLINIC | Age: 55
End: 2020-06-05
Attending: NURSE PRACTITIONER
Payer: COMMERCIAL

## 2020-06-05 DIAGNOSIS — Z98.1 STATUS POST CERVICAL SPINAL FUSION: ICD-10-CM

## 2020-06-05 PROCEDURE — 72040 X-RAY EXAM NECK SPINE 2-3 VW: CPT | Mod: TC

## 2020-06-08 DIAGNOSIS — I10 BENIGN ESSENTIAL HYPERTENSION: ICD-10-CM

## 2020-06-09 ENCOUNTER — MYC REFILL (OUTPATIENT)
Dept: PALLIATIVE MEDICINE | Facility: CLINIC | Age: 55
End: 2020-06-09

## 2020-06-09 DIAGNOSIS — G89.4 CHRONIC PAIN SYNDROME: ICD-10-CM

## 2020-06-09 DIAGNOSIS — G89.29 CHRONIC BILATERAL LOW BACK PAIN WITHOUT SCIATICA: ICD-10-CM

## 2020-06-09 DIAGNOSIS — M54.50 CHRONIC BILATERAL LOW BACK PAIN WITHOUT SCIATICA: ICD-10-CM

## 2020-06-09 DIAGNOSIS — M79.18 MYOFASCIAL PAIN: ICD-10-CM

## 2020-06-09 DIAGNOSIS — Z98.1 S/P CERVICAL SPINAL FUSION: ICD-10-CM

## 2020-06-09 DIAGNOSIS — M54.2 CERVICALGIA: ICD-10-CM

## 2020-06-09 DIAGNOSIS — G62.9 NEUROPATHY: ICD-10-CM

## 2020-06-09 RX ORDER — OXYCODONE HYDROCHLORIDE 5 MG/1
TABLET ORAL
Qty: 105 TABLET | Refills: 0 | Status: CANCELLED | OUTPATIENT
Start: 2020-06-09

## 2020-06-09 NOTE — TELEPHONE ENCOUNTER
Routing to provider to review medication prepped per below      Oxycodone 5 mg, #105, Refill: No  Sig:  Take 1 tablet every 4-6 hours as needed for severe pain. Max 3-4/day. Fill 06/13/2020, start 06/16/2020  Last picked up 05/14/2020 with start on 05/17/2020  Due 06/16/2020    Per last OV note 05/14/2020:  Continue Oxycodone 3-4/day, okay to increase to 105 tablets for 30 days    Follow up scheduled 07/14/2020    UDS and OA 04/15/2019    Alysia Martínez RN  Care Coordinator  [facility]

## 2020-06-09 NOTE — TELEPHONE ENCOUNTER
Received mychart message from patient requesting refill(s) of oxyCODONE (ROXICODONE) 5 MG tablet      Last picked up from pharmacy on 05/14/20    Pt last seen by prescribing provider on 05/14/20    Next appt scheduled for 07/14/20     checked in the past 6 months? Yes If no, print current report and give to RN    Last urine drug screen date 04/01/19  Current opioid agreement on file (completed within the last year) No Date of opioid agreement: 04/15/19    Processing (pick one and delete the others):      E-prescribe to    [id number] - [place], [place]  [address] [place] [address] [address] [facility] [address]  Phone: [phone] Fax: [phone]    Will route to nursing pool for review and preparation of prescription(s).    Sofiya Roa [facility]  [place]

## 2020-06-09 NOTE — TELEPHONE ENCOUNTER
Jessee message from patient on 06/09/2020 at 1117: Indra Mehta would like a refill of the following medications:         oxyCODONE (ROXICODONE) 5 MG tablet [Chiara Garcia PA-C]     Preferred pharmacy: [id number] - [place], [place] - [address]   _______________________________________    Will route to MA pool for assistance with gathering opioid refill information.    Alysia Martínez RN  Care Coordinator  [facility]

## 2020-06-10 RX ORDER — OXYCODONE HYDROCHLORIDE 5 MG/1
TABLET ORAL
Qty: 105 TABLET | Refills: 0 | Status: SHIPPED | OUTPATIENT
Start: 2020-06-10 | End: 2020-07-14

## 2020-06-10 RX ORDER — LOSARTAN POTASSIUM 50 MG/1
50 TABLET ORAL DAILY
Qty: 90 TABLET | Refills: 0 | Status: SHIPPED | OUTPATIENT
Start: 2020-06-10 | End: 2020-08-13

## 2020-06-10 NOTE — TELEPHONE ENCOUNTER
Routing refill request to provider for review/approval because:  Labs out of range:  BP  Last Written Prescription Date:  2/11/2020  Last Fill Quantity: 90,  # refills: 0   Last office visit: 10/14/2019 with prescribing provider:     Future Office Visit:      ANGELINA LouiseN, RN

## 2020-06-24 DIAGNOSIS — G89.29 CHRONIC BILATERAL LOW BACK PAIN WITHOUT SCIATICA: ICD-10-CM

## 2020-06-24 DIAGNOSIS — M54.50 CHRONIC BILATERAL LOW BACK PAIN WITHOUT SCIATICA: ICD-10-CM

## 2020-06-24 DIAGNOSIS — M54.2 CERVICALGIA: ICD-10-CM

## 2020-06-24 DIAGNOSIS — G62.9 NEUROPATHY: ICD-10-CM

## 2020-06-24 DIAGNOSIS — G89.4 CHRONIC PAIN SYNDROME: ICD-10-CM

## 2020-06-24 DIAGNOSIS — M79.18 MYOFASCIAL PAIN: ICD-10-CM

## 2020-06-24 DIAGNOSIS — Z98.1 S/P CERVICAL SPINAL FUSION: ICD-10-CM

## 2020-06-24 RX ORDER — NORTRIPTYLINE HYDROCHLORIDE 50 MG/1
50 CAPSULE ORAL AT BEDTIME
Qty: 30 CAPSULE | Refills: 2 | Status: SHIPPED | OUTPATIENT
Start: 2020-06-24 | End: 2020-09-29

## 2020-06-24 NOTE — TELEPHONE ENCOUNTER
Received fax request from   [id number] - [place], [place] - [address] [place] [address] [address] [facility] [address]  Phone: [phone] Fax: [phone]     pharmacy requesting refill(s) for nortriptyline (PAMELOR) 50 MG capsule     Last refilled on 05/22/20    Pt last seen on 05/14/20    Next appt scheduled for 07/14/20    Will facilitate refill.    Sofiya Roa CMA  [facility]  [place]

## 2020-07-13 NOTE — PROGRESS NOTES
"Indra Mehta is a 54 year old male who is being evaluated via a billable telephone visit.      The patient has been notified of following:     \"This telephone visit will be conducted via a call between you and your physician/provider. We have found that certain health care needs can be provided without the need for a physical exam.  This service lets us provide the care you need with a short phone conversation.  If a prescription is necessary we can send it directly to your pharmacy.  If lab work is needed we can place an order for that and you can then stop by our lab to have the test done at a later time.    Telephone visits are billed at different rates depending on your insurance coverage. During this emergency period, for some insurers they may be billed the same as an in-person visit.  Please reach out to your insurance provider with any questions.    If during the course of the call the physician/provider feels a telephone visit is not appropriate, you will not be charged for this service.\"    Patient has given verbal consent for Telephone visit?  Yes      How would you like to obtain your AVS? Sulema     CHIEF COMPLAINT:  Pain  -neck and low back pain     INTERVAL HISTORY:  Last seen on 05/14/2020.       Recommendations/plan at the last visit included:  1. Physical Therapy:  Not at this time, but he should continue his exercises  2. Clinical Health Psychologist:  NO, continue guided meditation, we could consider cognitive behavioral therapy/relaxation techniques, patient will consider this option.  3. Diagnostic Studies: none  4. Medication Management:    1.   Continue Gabapentin 1200 TID  2.   Continue nortriptyline to 50 mg at bedtime.    3.   Continue Tizanidine 4mg at bedtime  4.   Continue Oxycodone 3-4/day, okay to increase to 105 tablets for 30 days  5. Further procedures recommended: plan for L3-4 bilateral  transforaminal epidural when able  6. Recommendations to PCP. See above     Follow up " with this provider:  8 Weeks    Since his last visit, Indra PEREZ Janine reports:    He that he feels worse but feels that this is due to increased activity as he is hosting his daughters graduation party. He had an injection and this did not help as much as the previous one did. He states that the injection did less the intensity of the pain in the legs but hasn't helped much for the pain in his back. He has a lot of pain when bending over. He states that his neck is the same. He does not feel that it is improving. He reached out to his surgeon as he was having weakness in his hand.     He states that he is having issues with his ankles and knees and thinking he may need to see orthopedics. He has noticed it more in the last 4 weeks.     He continues to have the tingling in his feet as well. He states that sometimes he doesn't want to walk due to the pain in his feet.       Pain Information:                   Pain today 5/10       UDS: 4/1/19-reviewed and appropriate  Controlled Substance Agreement signed: 4/15/2019     CURRENT RELEVANT PAIN MEDICATIONS:  Gabapentin 400mg-Taking 3 capsules 3 times a day  Tizanidine 4mg-taking 1 at HS   Tylenol 500mg-1000mg 4x/day  Oxycodone-taking 3-4/day  Xanax-does not use daily, last use was about 3 weeks ago  Nortriptyline 10mg at bedtime     Patient is using the medication as prescribed:  YES  Is your medication helpful?     YES   Medication side effects? no side effect     Previous Medications: (H--helped; HI--Helped initially; SWH-- somewhat helpful, NH--No help; W--worse; SE--side effects)   Norco/vicodin H  Oxycodone H  Flexeril - was helpful at night  Robaxin - not helpful  Tizanidine H  Gabapentin ?  Lyrica SE sedation  Cymbalta ?     Past Pain Treatments:  Injections:    - 11/8/18 bilateral L3-4 TFESI - (Dr Mcdaniel)  - 8/23/18 bilateral L3-4 TFESI - seems to have worsened pain (Dr Mcdaniel)  - 6/11/18 TFESI right C5-6 - helped with arm pain and numbness  - bilateral L3-4  TFESI 12/28/17  - Bilateral L4-5 TFESI 10/23/17  - Bilateral L3-4 TFESI 11/08/2018  -Bilateral L3-4 TFESI 6/14/2019  -C6-7 SRIDEVI 8/22/2019  -L3-4 bilateral TLESI  Surgery:  ACDF C6-7 on 7/5/17 with improvement; ACDF C5-6 12/19/2018, posterior C5-7 fusion. Lateral mass screws, right C5-6 medial facetectomy, right C6 foraminotomy on 10/29/2019  TENS unit: helpful  Physical therapy in [facility] Board of Pharmacy Data Base Reviewed:    YES; As expected, no concern for misuse/abuse of controlled medications based on this report.     THE 4 As OF OPIOID MAINTENANCE ANALGESIA    Analgesia: Is pain relief clinically significant? YES   Activity: Is patient functional and able to perform Activities of Daily Living? YES   Adverse effects: Is patient free from adverse side effects from opiates? YES   Adherence to Rx protocol: Is patient adhering to Controlled Substance Agreement and taking medications ONLY as ordered? YES         Is Narcan prescribed for opiate use >50 MME daily? N/A        Daily MME: 30     Medications:  Current Outpatient Prescriptions          Current Outpatient Medications   Medication Sig Dispense Refill     Acetaminophen (TYLENOL PO) Take 500 mg by mouth every 4 hours as needed for mild pain or fever         ALPRAZolam (XANAX XR) 0.5 MG 24 hr tablet Take 1 tablet (0.5 mg) by mouth At Bedtime 30 tablet 3     gabapentin (NEURONTIN) 400 MG capsule Take 3 capsules (1,200 mg) by mouth 3 times daily 270 capsule 3     losartan (COZAAR) 50 MG tablet Take 1 tablet (50 mg) by mouth daily 90 tablet 0     nortriptyline (PAMELOR) 25 MG capsule Take 1 capsule (25 mg) by mouth At Bedtime 30 capsule 0     order for DME Equipment being ordered: TENS Pads as directed 1 Device 0     oxyCODONE (ROXICODONE) 5 MG tablet Take 1 tab every 4-6 hours as needed for severe pain. MAX 4 TABS PER DAY. Okay to fill 3/12/20 start 3/18/20. 100 tablet 0     tiZANidine (ZANAFLEX) 4 MG tablet Take 1 tablet (4 mg) by mouth At Bedtime 90  tablet 0               Assessment:  Indra Mehta is a 55 year old male who presents today for follow up regarding his:      1.  Cervicalgia   2.  S/p cervical spine fusion   3.  Myofascial pain   4. Chronic low back pain   5. Neuropathy    6. Chronic pain syndrome     Worsening pain with increased activity. Continues to have a lot of issues with his feet. We discussed a spinal cord stimulator and he is interested in that. Will have him get a consult with Dr. Pulido. No medications changes.         Plan:     Diagnosis reviewed, treatment option addressed, and risk/benifits discussed.  Self-care instructions given.  I am recommending a multidisciplinary treatment plan to help this patient better manage pain.       1. Physical Therapy:  Not at this time, but he should continue his exercises  2. Clinical Health Psychologist:  NO, continue guided meditation, we could consider cognitive behavioral therapy/relaxation techniques, patient will consider this option.  3. Diagnostic Studies: none  4. Medication Management:    1.   Continue Gabapentin 1200 TID  2.   Continue nortriptyline to 50 mg at bedtime.    3.   Continue Tizanidine 4mg at bedtime  4.   Continue Oxycodone 3-4/day, continue 105 tablets for 30 days  5. Further procedures recommended: referred for consult for spinal cord stimulator  6. Recommendations to PCP. See above    Follow up with this provider:  4 Weeks     Phone call duration: 12 minutes    Chiara Garcia PA-C   [facility]

## 2020-07-14 ENCOUNTER — VIRTUAL VISIT (OUTPATIENT)
Dept: PALLIATIVE MEDICINE | Facility: CLINIC | Age: 55
End: 2020-07-14
Payer: COMMERCIAL

## 2020-07-14 VITALS — WEIGHT: 220 LBS | HEIGHT: 73 IN | BODY MASS INDEX: 29.16 KG/M2

## 2020-07-14 DIAGNOSIS — Z98.1 S/P CERVICAL SPINAL FUSION: ICD-10-CM

## 2020-07-14 DIAGNOSIS — G89.4 CHRONIC PAIN SYNDROME: ICD-10-CM

## 2020-07-14 DIAGNOSIS — G62.9 NEUROPATHY: ICD-10-CM

## 2020-07-14 DIAGNOSIS — M79.18 MYOFASCIAL PAIN: ICD-10-CM

## 2020-07-14 DIAGNOSIS — G89.29 CHRONIC BILATERAL LOW BACK PAIN WITHOUT SCIATICA: ICD-10-CM

## 2020-07-14 DIAGNOSIS — M54.50 CHRONIC BILATERAL LOW BACK PAIN WITHOUT SCIATICA: ICD-10-CM

## 2020-07-14 DIAGNOSIS — M54.2 CERVICALGIA: ICD-10-CM

## 2020-07-14 PROCEDURE — 99214 OFFICE O/P EST MOD 30 MIN: CPT | Mod: TEL | Performed by: PHYSICIAN ASSISTANT

## 2020-07-14 RX ORDER — OXYCODONE HYDROCHLORIDE 5 MG/1
TABLET ORAL
Qty: 105 TABLET | Refills: 0 | Status: SHIPPED | OUTPATIENT
Start: 2020-07-14 | End: 2020-08-10

## 2020-07-14 ASSESSMENT — MIFFLIN-ST. JEOR: SCORE: 1891.79

## 2020-07-14 ASSESSMENT — PAIN SCALES - GENERAL: PAINLEVEL: MODERATE PAIN (5)

## 2020-07-15 ENCOUNTER — TELEPHONE (OUTPATIENT)
Dept: PALLIATIVE MEDICINE | Facility: CLINIC | Age: 55
End: 2020-07-15

## 2020-07-15 NOTE — TELEPHONE ENCOUNTER
LVM to schedule Procedure Order Spinal Cord Stimulation - office visit needed first. Lumbar and lower extremity only.    Shanda DE LA CRUZ    [facility]

## 2020-07-17 DIAGNOSIS — M54.2 CERVICALGIA: ICD-10-CM

## 2020-07-17 DIAGNOSIS — M79.18 MYOFASCIAL PAIN: ICD-10-CM

## 2020-07-17 DIAGNOSIS — M54.50 CHRONIC BILATERAL LOW BACK PAIN WITHOUT SCIATICA: ICD-10-CM

## 2020-07-17 DIAGNOSIS — G89.4 CHRONIC PAIN SYNDROME: ICD-10-CM

## 2020-07-17 DIAGNOSIS — G89.29 CHRONIC BILATERAL LOW BACK PAIN WITHOUT SCIATICA: ICD-10-CM

## 2020-07-17 DIAGNOSIS — Z98.1 S/P CERVICAL SPINAL FUSION: ICD-10-CM

## 2020-07-17 DIAGNOSIS — G62.9 NEUROPATHY: ICD-10-CM

## 2020-07-17 PROCEDURE — 80307 DRUG TEST PRSMV CHEM ANLYZR: CPT | Mod: 90 | Performed by: PHYSICIAN ASSISTANT

## 2020-07-17 PROCEDURE — 99000 SPECIMEN HANDLING OFFICE-LAB: CPT | Performed by: PHYSICIAN ASSISTANT

## 2020-07-20 LAB — PAIN DRUG SCR UR W RPTD MEDS: NORMAL

## 2020-07-31 DIAGNOSIS — M54.2 CERVICALGIA: ICD-10-CM

## 2020-07-31 RX ORDER — GABAPENTIN 400 MG/1
1200 CAPSULE ORAL 3 TIMES DAILY
Qty: 270 CAPSULE | Refills: 3 | Status: SHIPPED | OUTPATIENT
Start: 2020-07-31 | End: 2020-11-24

## 2020-07-31 NOTE — TELEPHONE ENCOUNTER
Chief Complaint   Patient presents with     Refill Request     gabapentin (NEURONTIN) 400 MG capsule      Refill request received via fax by pharmacy   [id number] - [place], [place] - [address] [place] [address]      Pending Prescriptions:                       Disp   Refills    gabapentin (NEURONTIN) 400 MG capsule     270 ca*3            Sig: Take 3 capsules (1,200 mg) by mouth 3 times daily         Last Written Prescription Date:  03/12/2020  Last Fill Quantity: 270,   # refills: 3  Last Office Visit with prescribing provider: 07/14/2020  Future Office visit:   08/10/2020 - Chiara  Next 5 appointments (look out 90 days)    Aug 06, 2020  9:00 AM CDT  Return Visit with Fabian Pulido MD  [facility] [place] ([facility] [place]) [address] [facility]  [place] [address] [phone]

## 2020-08-06 ENCOUNTER — OFFICE VISIT (OUTPATIENT)
Dept: PALLIATIVE MEDICINE | Facility: CLINIC | Age: 55
End: 2020-08-06
Payer: COMMERCIAL

## 2020-08-06 VITALS
SYSTOLIC BLOOD PRESSURE: 158 MMHG | HEART RATE: 104 BPM | BODY MASS INDEX: 29.03 KG/M2 | WEIGHT: 220 LBS | DIASTOLIC BLOOD PRESSURE: 90 MMHG

## 2020-08-06 DIAGNOSIS — M54.16 LUMBAR RADICULOPATHY: Primary | ICD-10-CM

## 2020-08-06 PROCEDURE — 99207 ZZC DOWN CODE DUE TO SUBSEQUENT EXAM: CPT | Performed by: PAIN MEDICINE

## 2020-08-06 PROCEDURE — 99214 OFFICE O/P EST MOD 30 MIN: CPT | Performed by: PAIN MEDICINE

## 2020-08-06 ASSESSMENT — PAIN SCALES - GENERAL: PAINLEVEL: SEVERE PAIN (6)

## 2020-08-06 NOTE — PATIENT INSTRUCTIONS
----------------------------------------------------------------  [facility] Number:  [phone]     Call with any questions about your care and for scheduling assistance.     Calls are returned Monday through Friday between 8 AM and 4:30 PM. We usually get back to you within 2 business days depending on the issue/request.    If we are prescribing your medications:    For opioid medication refills, call the clinic or send a [employer] ([email or web address]) message 7 days in advance.  Please include:    Name of requested medication    Name of the pharmacy.    For non-opioid medications, call your pharmacy directly to request a refill. Please allow 3-4 days to be processed.     Per [place] State Law:    All controlled substance prescriptions must be filled within 30 days of being written.      For those controlled substances allowing refills, pickup must occur within 30 days of last fill.      We believe regular attendance is key to your success in our program!      Any time you are unable to keep your appointment we ask that you call us at least 24 hours in advance to cancel.This will allow us to offer the appointment time to another patient.     Multiple missed appointments may lead to dismissal from the clinic.

## 2020-08-06 NOTE — PROGRESS NOTES
[facility] Consultation    Date of visit: 8/6/2020    Reason for consultation:    Primary Care Provider is Tha Grullon.      Please see the [facility] health questionnaire which the patient completed and reviewed with me in detail.    Chief Complaint:    No chief complaint on file.    MME prescribed prior to seeing patient:  Current MME:    Pain history: acute on chronic bilatarel L3-4 TRANSFORAMINAL EPIDURAL STEROID INJECTION . Cervical ACDF 1/14/20  Indra Mehta is a 55 year old male who first started having problems with pain chronic>10 yrs. The pain is bilat LBP L>R. The pain has been getting progressively worse. Denies any inciting event. The pt reports the pain is constant in the back. The pain is intermittent into his lat hip/leg and post lat left thigh/leg. The pain is a deep aching/throbbing pain in his back. The pain is also sharp shooting pain into his Lower ext. The pt currently denies burning. The pt denies numbness. The pt reports occasionally has tingling. Of note he also feels  He is constant tingling/burnig in his feet bilat. This been going on for yrs. He does not feel this is associated with his back. The pt does not have a hx of DM/ETOH. He has never been dx with peripheral neuropathy. The pt reports sometimes he feels he is walking on gravel.    Nothing specific makes the feet worse.   The LBP pain is worse with flexion. The pain is worse with lifting. The pain is worse in certain position in bed. The pt reports some with ice. The pt reports some benefit with rest. The pt reports some benefit in certain positions.     The pt denies any recent falls. Denies any overt weakness. Denies any incontinence    The pt is here to discuss possible options for tx. The pt feels his symptoms has gottent so bad.  Pain rating: intensity  Averages 8/10 on a 0-10 scale.      - 5/22/20-  L3-4 TRANSFORAMINAL EPIDURAL STEROID INJECTION  no benefit   - 11/8/18  bilateral L3-4 TFESI -    CURRENT RELEVANT PAIN MEDICATIONS:  Gabapentin 400mg-Taking 3 capsules 3 times a day  Tizanidine 4mg-taking 1 at HS   Tylenol 500mg-1000mg 4x/day  Oxycodone-taking 3-4/day  Xanax-does not use daily, last use was about 3 weeks ago  Nortriptyline 10mg at bedtime     Patient is using the medication as prescribed:  YES  Is your medication helpful?     YES   Medication side effects? no side effect     Previous Medications: (H--helped; HI--Helped initially; [facility]WH-- somewhat helpful, NH--No help; W--worse; SE--side effects)   Norco/vicodin  Oxycodone H  Flexeril - was helpful at night  Robaxin - not helpful  Tizanidine H  Gabapentin ?  Lyrica SE sedation  Cymbalta ?     Past Pain Treatments:  Injections:    - 5/22/20- no benefit   - 11/8/18 bilateral L3-4 TFESI - (Dr Mcdaniel) amazIng benefit   - 8/23/18 bilateral L3-4 TFESI - seems to have worsened pain (Dr Mcdaniel)  - 6/11/18 TFESI right C5-6 - helped with arm pain and numbness  - bilateral L3-4 TFESI 12/28/17  - Bilateral L4-5 TFESI 10/23/17  - Bilateral L3-4 TFESI 11/08/2018  -Bilateral L3-4 TFESI 6/14/2019  -C6-7 SRIDEVI 8/22/2019  -L3-4 bilateral TLESI  Surgery:  ACDF C6-7 on 7/5/17 with improvement; ACDF C5-6 12/19/2018, posterior C5-7 fusion. Lateral mass screws, right C5-6 medial facetectomy, right C6 foraminotomy on 10/29/2019  TENS unit: helpful  Physical therapy in now        Past Medical History:  Past Medical History:   Diagnosis Date     Back pain      Meniere's disease, unspecified      Pain medication agreement signed 8/20/2010     Past Surgical History:  Past Surgical History:   Procedure Laterality Date     BUNIONECTOMY RT/LT  09/05/08    Both feet     COLONOSCOPY N/A 12/28/2016    Procedure: COMBINED COLONOSCOPY, SINGLE OR MULTIPLE BIOPSY/POLYPECTOMY BY BIOPSY;  Surgeon: Indra Jernigan MD;  Location: [facility]     DISCECTOMY, FUSION CERVICAL ANTERIOR ONE LEVEL, COMBINED N/A 7/5/2017    Procedure: COMBINED DISCECTOMY, FUSION CERVICAL  ANTERIOR ONE LEVEL;  Cervical 6-7, Anterior cervical discectomy fusion;  Surgeon: Mike Mckenna MD;  Location: [facility]     DISCECTOMY, FUSION CERVICAL ANTERIOR ONE LEVEL, COMBINED N/A 12/19/2018    Procedure: cervical 5-6 anterior cervical discectomy fusion;  Surgeon: Mike Mckenna MD;  Location: [facility]     HC COLONOSCOPY W/WO BRUSH/WASH  12/27/2005     HC CREATE EARDRUM OPENING,GEN ANESTH  09/27/2007    Pe tube, Left endolymphatic sac enhancement.     HC MASTOIDECTOMY,COMPLETE  09/27/2007     HERNIORRHAPHY UMBILICAL N/A 8/11/2017    Procedure: HERNIORRHAPHY UMBILICAL;  open umbilical hernia repair;  Surgeon: Boni Story MD;  Location: [facility]     INJECT EPIDURAL CERVICAL N/A 8/22/2019    Procedure: INJECTION, SPINE, CERVICAL 6-7 EPIDURAL;  Surgeon: Darryn Mcdaniel MD;  Location: [facility]     INJECT EPIDURAL LUMBAR N/A 11/9/2017    Procedure: INJECT EPIDURAL LUMBAR;  lumbar 4-5 epidural steroid injection ;  Surgeon: Darryn Mcdaniel MD;  [facility]     INJECT EPIDURAL LUMBAR N/A 12/28/2017    Procedure: INJECT EPIDURAL LUMBAR;  lumbar epidural injection;  Surgeon: Darryn Mcdaniel MD;  [facility]     INJECT EPIDURAL TRANSFORAMINAL Bilateral 8/23/2018    Procedure: INJECT EPIDURAL TRANSFORAMINAL;  transforaminal bilateral lumbar 3-4 steroid injection;  Surgeon: Darryn Mcdaniel MD;  [facility]     INJECT EPIDURAL TRANSFORAMINAL Bilateral 11/8/2018    Procedure: INJECT EPIDURAL TRANSFORAMINAL lumbar 3-4;  Surgeon: Darryn Mcdaniel MD;  [facility]     INJECT EPIDURAL TRANSFORAMINAL Bilateral 6/14/2019    Procedure: INJECTION, EPIDURAL, TRANSFORAMINAL LUMBAR 3-4 BILATERAL;  Surgeon: Darryn Mcdaniel MD;  [facility]     INJECT EPIDURAL TRANSFORAMINAL Bilateral 5/22/2020    Procedure: lumbar 3-4 bilateral transforaminal epidural steroid injection;  Surgeon: Darryn Mcdaniel MD;  Location: [facility]     PE TUBES  2006    left ear     Medications:  Current Outpatient Medications   Medication Sig  Dispense Refill     Acetaminophen (TYLENOL PO) Take 1,000 mg by mouth 3 times daily        ALPRAZolam (XANAX XR) 0.5 MG 24 hr tablet Take 1 tablet (0.5 mg) by mouth At Bedtime 30 tablet 3     gabapentin (NEURONTIN) 400 MG capsule Take 3 capsules (1,200 mg) by mouth 3 times daily 270 capsule 3     ibuprofen (ADVIL/MOTRIN) 800 MG tablet Take 800 mg by mouth 3 times daily       losartan (COZAAR) 50 MG tablet TAKE 1 TABLET (50 MG) BY MOUTH DAILY 90 tablet 0     nortriptyline (PAMELOR) 50 MG capsule Take 1 capsule (50 mg) by mouth At Bedtime 30 capsule 2     order for DME Equipment being ordered: TENS Pads as directed 1 Device 0     oxyCODONE (ROXICODONE) 5 MG tablet Take 1 tablet every 4-6 hours as needed for severe pain. Max 3-4/day. Fill 07/14/2020, start 07/16/2020 105 tablet 0     tiZANidine (ZANAFLEX) 4 MG tablet Take 1 tablet (4 mg) by mouth At Bedtime 90 tablet 0     Allergies:     Allergies   Allergen Reactions     No Known Drug Allergies      Social History:  Home situation: [facility]  Occupation/Schooling: y      Family history:  Family History   Problem Relation Age of Onset     Cancer - colorectal Mother      Diabetes Mother      Cardiovascular Father      Hypertension Father      Mental Illness Sister      Suicide Other      Family history of headaches: n    Review of Systems:  Skin: negative  Eyes: negative  Ears/Nose/Throat: negative  Respiratory: No shortness of breath, dyspnea on exertion, cough, or hemoptysis  Cardiovascular: negative  Gastrointestinal: negative  Genitourinary: negative  Musculoskeletal: negative  Neurologic: negative  Psychiatric: negative  Hematologic/Lymphatic/Immunologic: negative  Endocrine: negative    Physical Exam:  There were no vitals filed for this visit.  Exam:  Constitutional: healthy, alert and no distress  Head: normocephalic. Atraumatic.   Eyes: no redness or jaundice noted   ENT: mask   Cardiovascular: Negative JVD  Respiratory: Speaking in full sentences no accessory  muscles use     Psychiatric: mentation appears normal and affect normal/bright    Musculoskeletal exam:  Gait/Station/Posture: wnl         Lumbar spine:     ROM: wnl   Myofascial tenderness:  mild   Reyes's: mild               Straight leg exam: neg   RADHA/FADIR: neg              SI: neg   Greater trochanteric bursa: neg  Neurologic exam:  CN:  Cranial nerves 2-12 are normal  Motor:  5/5  LE strength  Reflexes:        Achilles:  +2    Sensory:  (upper and lower extremities):   Light touch: noramal    Allodynia: absent    Dysethesia:+feet   Hyperalgesia: absent    Diagnostic tests:     T12-L1: Normal disc space. Mild right posterior facet degenerative  change. No change.     L1-L2: Signal loss, mild disc space narrowing and mild disc bulging  without disc protrusion or central stenosis. Mild right and minimal  left foraminal stenosis. Posterior facets are unremarkable. No change.     L2-L3: Very early signal loss and mild disc bulging combine with  dorsal epidural fat and thickening of the ligamentum flavum to yield  borderline mild central stenosis. Mild right and minimal left  foraminal stenosis. Posterior facets are unremarkable. No change.     L3-L4: Normal.     L4-L5: Very early signal loss and minimal disc bulging without disc  protrusion or central stenosis. Mild right foraminal narrowing.  Posterior facets are unremarkable. No change.     L5-S1: Normal.     Paraspinal soft tissues are normal.                                                                      IMPRESSION:   1. Early degenerative disc disease at multiple levels. No significant  change since the prior exam.  2. Borderline mild central stenosis at L2-L3 related to multiple  factors. No significant central stenosis throughout the lumbar spine.  3. Mild foraminal narrowing at some levels as described above,  especially on the right.           D.I.R.E Score: Patient Selection for Chronic Opioid Analgesia    For each factor, rate the patient's score  from 1 - 3 based on the explanations on the right.       Diagnosis             2         1 = Benign chronic condition with minimal objective findings or no definite medical diagnosis.  Examples:  fibromyalgia, migraine, headaches, non-specific back pain.  2 = Slowly progressive condition concordant with moderate pain, or fixed condition with moderate objective findings.  Examples: failed back surgery syndrome, back pain with moderate degenerative changes, neuropathic pain.  3 = Advanced condition concordant with severe pain with objective findings.  Examples: severe ischemic vascular disease, advanced neuropathy, severe spinal stenosis.    Intractability             2         1 = Few therapies have been tried and the patient takes a passive role in his/her pain management process.   2 = Most costomary treatments have been tried but the patient is not fully engaged in the pain management process, or barriers prevent (insurance, transportation, medical illness)  3 = Patient fully engaged in a spectrum of appropriate treatments but with inadequate response.    Risk   (Risk = Total of P+C+R+S below)       Psychological             2         1 = Serious personality dysfunction or mental illness interfering with care.  Examples: personality disorder, severe affective disorder, significant personality issues.  2 = Personality or mental health interferes moderately.  Example: depression or anxiety disorder.  3 = Good communication with the clinic.  No significant personality dysfunction or mental illness.       Chemical      Health             2         1 = Active or very recent use of illicit drugs, excessive alcohol, or prescription drug abuse.  2 = Chemical coper (uses medications to cope with stress) or history of chemical dependency in remission.  3 = No CD history.  Not drug-focused or chemically reliant       Reliability             2         1 = History of numerous problems: medication misuse, missed appointments,  rarely follows through.  2 = Occasional difficulties with compliance, but generally reliable.  3 = Highly reliable patient with medications, appointments and treatment.       Social      Support             2         1= Life in chaos.  Little family support and few close relationships.  Loss of most normal life roles.  2 = Reduction in some relationships and life roles.  3 = Supportive family/close relationships.  Involved in work or school and no social isolation.    Efficacy score             2         1 = Poor function or minimal pain relief despite moderate to high doses.  2 = Moderate benefit with function improved in a number of ways (or insufficient info - hasn't tried opioid yet or very low doses or too short a trial.  3 = Good improvement in pain and function and quality of life with stable doses over time.                                    14    Total score = D + I + R + E    Score 7-13: Not a suitable candidate for long-term opioid analgesia  Score 14-21: May be a good candidate      Assessment/Plan:  Indra Mehta is a 55 year old male who presents with the complaints of bilateral L>R radiating to his LE.   There are no diagnoses linked to this encounter.     - Further procedures recommended:    - Ordered Spinal cord stimulator   - Medication Management:    - continue current regimen   - Clinical Health Psychologist to address issues of relaxation, behavioral change, coping style, and other factors important to improvement:    Order placement for SCS placement   - Follow up:    - SCS trial    - Will forward to nursing staff to help with process        Total time spent was 45 minutes, and more than 50% of face to face time was spent counseling and/or coordination of care regarding principles of multidisciplinary care and medication management bilateral LBP radiating to his le    Fabian Pulido MD  [facility]  This note was created with voice recognition software, and while  reviewed for accuracy, typos may remain.

## 2020-08-07 ENCOUNTER — MYC MEDICAL ADVICE (OUTPATIENT)
Dept: PALLIATIVE MEDICINE | Facility: CLINIC | Age: 55
End: 2020-08-07

## 2020-08-07 DIAGNOSIS — M54.16 LUMBAR RADICULOPATHY: Primary | ICD-10-CM

## 2020-08-07 NOTE — TELEPHONE ENCOUNTER
Pt was seen in clinic yesterday for SPINAL CORD STIMULATOR evaluation. Provider sent a staff message to nursing and  with the following:    Will need psych eval   Will need scs approval   F/unit(s) for scs trial    ---------------  Will route to teams for appropriate follow up.     ANGELINA StantonN, RN-BC  Patient Care Supervisor  [facility] [place]

## 2020-08-07 NOTE — LETTER
2021    Indra Mehta  [address] [address] [place] [id number]      To Whom it May Concern,  I am writing this letter to appeal coverage of a spinal cord stimulator trial for my patient, Indra Mehta,  1965.  I am currently one of the treating physicians at [facility].  The patient has a condition called lumbar radiculopathy.  My patient has suffered from this condition for greater than 14 years.  The patient's symptoms are consistent with a lumbar radiculopathy.  His symptoms include bilateral low back pain radiating to his lower extremity.  Additionally, the patient has numbness, tingling, and burning.  My patient has tried and failed multiple other therapies.  The patient has been treated by multiple providers.  The patient has been very active in his care.  Patient has had numerous lumbar epidurals with varying benefit.  Patient has also been on a plethora of different medications from a variety of different classes with minimal benefit.  Of note patient has had a cervical fusion, which ultimately worsened his pain.  As result, patient is very hesitant to have any other major lumbar surgery.  Currently, the patient's symptoms are getting progressively worse. The patient's symptoms significantly affect his quality of life.  The pain significantly affects his ability to take care of his daily responsibilities.  Additionally, the patient is currently on opioid medications, and he would like to be able to titrate off these medications.    Spinal cord stimulation has been proven with random control trials to be an effective treatment option for patients with radicular pain.  Spinal cord stimulation is a safe, effective, and reversible treatment option for lumbar radiculopathy.  Given patient's history, I feel the patient is an excellent candidate for spinal cord stimulation.  I hope that you reconsider your noncoverage decision, so that my patient may receive  this life-changing therapy.  Please feel free to contact me with any additional questions.    Fabian Pulido MD  [facility]

## 2020-08-10 ENCOUNTER — MYC MEDICAL ADVICE (OUTPATIENT)
Dept: FAMILY MEDICINE | Facility: OTHER | Age: 55
End: 2020-08-10

## 2020-08-10 ENCOUNTER — VIRTUAL VISIT (OUTPATIENT)
Dept: PALLIATIVE MEDICINE | Facility: CLINIC | Age: 55
End: 2020-08-10
Payer: COMMERCIAL

## 2020-08-10 VITALS — BODY MASS INDEX: 29.82 KG/M2 | HEIGHT: 73 IN | WEIGHT: 225 LBS

## 2020-08-10 DIAGNOSIS — M79.18 MYOFASCIAL PAIN: ICD-10-CM

## 2020-08-10 DIAGNOSIS — M54.50 CHRONIC BILATERAL LOW BACK PAIN WITHOUT SCIATICA: ICD-10-CM

## 2020-08-10 DIAGNOSIS — M54.2 CERVICALGIA: ICD-10-CM

## 2020-08-10 DIAGNOSIS — G62.9 NEUROPATHY: ICD-10-CM

## 2020-08-10 DIAGNOSIS — Z98.1 S/P CERVICAL SPINAL FUSION: ICD-10-CM

## 2020-08-10 DIAGNOSIS — G89.4 CHRONIC PAIN SYNDROME: ICD-10-CM

## 2020-08-10 DIAGNOSIS — G89.29 CHRONIC BILATERAL LOW BACK PAIN WITHOUT SCIATICA: ICD-10-CM

## 2020-08-10 PROCEDURE — 99213 OFFICE O/P EST LOW 20 MIN: CPT | Mod: TEL | Performed by: PHYSICIAN ASSISTANT

## 2020-08-10 RX ORDER — OXYCODONE HYDROCHLORIDE 5 MG/1
TABLET ORAL
Qty: 105 TABLET | Refills: 0 | Status: SHIPPED | OUTPATIENT
Start: 2020-08-10 | End: 2020-09-11

## 2020-08-10 ASSESSMENT — MIFFLIN-ST. JEOR: SCORE: 1914.47

## 2020-08-10 ASSESSMENT — PAIN SCALES - GENERAL: PAINLEVEL: SEVERE PAIN (6)

## 2020-08-10 NOTE — PROGRESS NOTES
"Indra Mehta is a 54 year old male who is being evaluated via a billable telephone visit.      The patient has been notified of following:     \"This telephone visit will be conducted via a call between you and your physician/provider. We have found that certain health care needs can be provided without the need for a physical exam.  This service lets us provide the care you need with a short phone conversation.  If a prescription is necessary we can send it directly to your pharmacy.  If lab work is needed we can place an order for that and you can then stop by our lab to have the test done at a later time.    Telephone visits are billed at different rates depending on your insurance coverage. During this emergency period, for some insurers they may be billed the same as an in-person visit.  Please reach out to your insurance provider with any questions.    If during the course of the call the physician/provider feels a telephone visit is not appropriate, you will not be charged for this service.\"    Patient has given verbal consent for Telephone visit?  Yes    How would you like to obtain your AVS? Sulmea     CHIEF COMPLAINT:  Pain  -neck and low back pain     INTERVAL HISTORY:  Last seen on 07/14/2020.       Recommendations/plan at the last visit included:  1. Physical Therapy:  Not at this time, but he should continue his exercises  2. Clinical Health Psychologist:  NO, continue guided meditation, we could consider cognitive behavioral therapy/relaxation techniques, patient will consider this option.  3. Diagnostic Studies: none  4. Medication Management:    1.   Continue Gabapentin 1200 TID  2.   Continue nortriptyline to 50 mg at bedtime.    3.   Continue Tizanidine 4mg at bedtime  4.   Continue Oxycodone 3-4/day, continue 105 tablets for 30 days  5. Further procedures recommended: referred for consult for spinal cord stimulator  6. Recommendations to PCP. See above     Follow up with this " "provider:  4 Weeks     Since his last visit, Indra Mehta reports:    He saw Dr. Pulido. He is going to move forward with the SCS trial. He has his psych eval on the 21st. The plan will be to proceed once he completes that visit.  He is hopeful that it'll help the low back and pain in the feet. He states that yesterday he didn't hardly walk due to the pain in feet from the balls of his feet to the tip of his toes. He states it is more bearable today, but he feels his feet are worse daily. He states that his lower back is worse as well, but not as bad as it was before his injection.     He states that he had some pain in his right shoulder the last couple weeks on and off. He states that it wasn't unbearable but was more noticeable. It did not go down his arm. He states that the pain from his neck seems to wander. The left hand weakness has been better.     He states that his knees are \"really getting bad\". He has not had any work up on his knees.      Pain Information:                   Pain today 6/10       UDS: 07/17/2020-reviewed and appropriate  Controlled Substance Agreement signed: 4/15/2019     CURRENT RELEVANT PAIN MEDICATIONS:  Gabapentin 400mg-Taking 3 capsules 3 times a day  Tizanidine 4mg-taking 1 at HS   Tylenol 500mg-1000mg 4x/day  Oxycodone-taking 3-4/day  Xanax-does not use daily, last use was about 3 weeks ago  Nortriptyline 50mg at bedtime     Patient is using the medication as prescribed:  YES  Is your medication helpful?     YES   Medication side effects? no side effect     Previous Medications: (H--helped; HI--Helped initially; SWH-- somewhat helpful, NH--No help; W--worse; SE--side effects)   Norco/vicodin H  Oxycodone H  Flexeril - was helpful at night  Robaxin - not helpful  Tizanidine H  Gabapentin ?  Lyrica SE sedation  Cymbalta ?     Past Pain Treatments:  Injections:    - 11/8/18 bilateral L3-4 TFESI - (Dr Mcdaniel)  - 8/23/18 bilateral L3-4 TFESI - seems to have worsened pain (Dr" Viloeta)  - 6/11/18 TFESI right C5-6 - helped with arm pain and numbness  - bilateral L3-4 TFESI 12/28/17  - Bilateral L4-5 TFESI 10/23/17  - Bilateral L3-4 TFESI 11/08/2018  -Bilateral L3-4 TFESI 6/14/2019  -C6-7 SRIDEVI 8/22/2019  -L3-4 bilateral TLESI  Surgery:  ACDF C6-7 on 7/5/17 with improvement; ACDF C5-6 12/19/2018, posterior C5-7 fusion. Lateral mass screws, right C5-6 medial facetectomy, right C6 foraminotomy on 10/29/2019  TENS unit: helpful  Physical therapy in [facility]  Pharmacy Data Base Reviewed:    YES; As expected, no concern for misuse/abuse of controlled medications based on this report.     THE 4 As OF OPIOID MAINTENANCE ANALGESIA    Analgesia: Is pain relief clinically significant? YES   Activity: Is patient functional and able to perform Activities of Daily Living? YES   Adverse effects: Is patient free from adverse side effects from opiates? YES   Adherence to Rx protocol: Is patient adhering to Controlled Substance Agreement and taking medications ONLY as ordered? YES         Is Narcan prescribed for opiate use >50 MME daily? N/A        Daily MME: 30     Medications:  Current Outpatient Prescriptions          Current Outpatient Medications   Medication Sig Dispense Refill     Acetaminophen (TYLENOL PO) Take 500 mg by mouth every 4 hours as needed for mild pain or fever         ALPRAZolam (XANAX XR) 0.5 MG 24 hr tablet Take 1 tablet (0.5 mg) by mouth At Bedtime 30 tablet 3     gabapentin (NEURONTIN) 400 MG capsule Take 3 capsules (1,200 mg) by mouth 3 times daily 270 capsule 3     losartan (COZAAR) 50 MG tablet Take 1 tablet (50 mg) by mouth daily 90 tablet 0     nortriptyline (PAMELOR) 25 MG capsule Take 1 capsule (25 mg) by mouth At Bedtime 30 capsule 0     order for DME Equipment being ordered: TENS Pads as directed 1 Device 0     oxyCODONE (ROXICODONE) 5 MG tablet Take 1 tab every 4-6 hours as needed for severe pain. MAX 4 TABS PER DAY. Okay to fill 3/12/20 start 3/18/20. 100  tablet 0     tiZANidine (ZANAFLEX) 4 MG tablet Take 1 tablet (4 mg) by mouth At Bedtime 90 tablet 0               Assessment:  Indra Mehta is a 55 year old male who presents today for follow up regarding his:      1.  Cervicalgia   2.  S/p cervical spine fusion   3.  Myofascial pain   4. Chronic low back pain   5. Neuropathy    6. Chronic pain syndrome     Worsening pain in multiple areas. He should discuss his blood pressure and knee issues with his primary care.     The hope is that he will be proceeding with the SCS trial. Will continue current regimen for now. He did have some concerns of sedation in the morning. We did discuss decreasing his Nortriptyline, but he would like to continue the current dose for now and monitor.           Plan:     Diagnosis reviewed, treatment option addressed, and risk/benifits discussed.  Self-care instructions given.  I am recommending a multidisciplinary treatment plan to help this patient better manage pain.       1. Physical Therapy:  Not at this time, but he should continue his exercises  2. Clinical Health Psychologist:  NO  3. Diagnostic Studies: none  4. Medication Management:    1.   Continue Gabapentin 1200 TID  2.   Continue nortriptyline to 50 mg at bedtime.  We can reduce to 25mg if continued sedation in morning  3.   Continue Tizanidine 4mg at bedtime  4.   Continue Oxycodone 3-4/day, continue 105 tablets for 30 days  5. Further procedures recommended: Plan for SCS trial once approved  6. Recommendations to PCP. See above    Follow up with this provider:  4 Weeks     Phone call duration: 12 minutes    Chiara Garcia PA-C   [facility]

## 2020-08-11 ENCOUNTER — TELEPHONE (OUTPATIENT)
Dept: PALLIATIVE MEDICINE | Facility: CLINIC | Age: 55
End: 2020-08-11

## 2020-08-11 NOTE — TELEPHONE ENCOUNTER
Pt doesn't need eval with Dr. Pulido as this was already done, Pt needs pain psych eval, Pt stated that this is already scheduled.    No further action needed.    Mariano Mcbride, RN  Care Coordinator   [facility] [place]

## 2020-08-11 NOTE — TELEPHONE ENCOUNTER
Phoned pt to schedule spinal cord stimulator eval-okay to do virtual. Pt confused, stated he has already met with Dr Pulido. He is requesting a nurse call him to confirm what is needed.      Sterling SANDRA    [facility] [place]

## 2020-08-11 NOTE — TELEPHONE ENCOUNTER
A new pain psych order was placed.  Routed to schedulers to call pt.    Heidy Gipson RN-BSN  [facility]

## 2020-08-13 ENCOUNTER — MYC MEDICAL ADVICE (OUTPATIENT)
Dept: FAMILY MEDICINE | Facility: CLINIC | Age: 55
End: 2020-08-13

## 2020-08-13 DIAGNOSIS — I10 BENIGN ESSENTIAL HYPERTENSION: ICD-10-CM

## 2020-08-13 RX ORDER — LOSARTAN POTASSIUM 50 MG/1
100 TABLET ORAL DAILY
Qty: 180 TABLET | Refills: 0 | Status: SHIPPED | OUTPATIENT
Start: 2020-08-13 | End: 2021-04-06

## 2020-08-14 ENCOUNTER — TELEPHONE (OUTPATIENT)
Dept: FAMILY MEDICINE | Facility: OTHER | Age: 55
End: 2020-08-14

## 2020-08-14 ENCOUNTER — OFFICE VISIT (OUTPATIENT)
Dept: FAMILY MEDICINE | Facility: CLINIC | Age: 55
End: 2020-08-14
Payer: COMMERCIAL

## 2020-08-14 VITALS
RESPIRATION RATE: 20 BRPM | SYSTOLIC BLOOD PRESSURE: 154 MMHG | OXYGEN SATURATION: 100 % | WEIGHT: 220 LBS | BODY MASS INDEX: 29.03 KG/M2 | HEART RATE: 101 BPM | TEMPERATURE: 98.1 F | DIASTOLIC BLOOD PRESSURE: 93 MMHG

## 2020-08-14 DIAGNOSIS — I10 BENIGN ESSENTIAL HYPERTENSION: ICD-10-CM

## 2020-08-14 DIAGNOSIS — G89.4 CHRONIC PAIN SYNDROME: ICD-10-CM

## 2020-08-14 DIAGNOSIS — S93.692A RUPTURE OF PLANTAR FASCIA OF LEFT FOOT, INITIAL ENCOUNTER: Primary | ICD-10-CM

## 2020-08-14 PROCEDURE — 99213 OFFICE O/P EST LOW 20 MIN: CPT | Performed by: INTERNAL MEDICINE

## 2020-08-14 RX ORDER — INDOMETHACIN 50 MG/1
50 CAPSULE ORAL 2 TIMES DAILY WITH MEALS
Qty: 10 CAPSULE | Refills: 0 | Status: SHIPPED | OUTPATIENT
Start: 2020-08-14 | End: 2020-09-21

## 2020-08-14 ASSESSMENT — PATIENT HEALTH QUESTIONNAIRE - PHQ9: SUM OF ALL RESPONSES TO PHQ QUESTIONS 1-9: 3

## 2020-08-14 ASSESSMENT — PAIN SCALES - GENERAL: PAINLEVEL: EXTREME PAIN (9)

## 2020-08-14 NOTE — TELEPHONE ENCOUNTER
Please call patient to triage symptoms.  Message Regina Cornelio for F2F otherwise OK to wait for Monday.  Electronically signed by Tha Grullon MD

## 2020-08-14 NOTE — PROGRESS NOTES
Subjective     Indra Mehta is a 55 year old male who presents to clinic today for the following health issues:    HPI       Musculoskeletal problem/pain      Duration: 1 day    Description  Location: left foot    Intensity:  severe    Accompanying signs and symptoms: swelling    History  Previous similar problem: no   Previous evaluation:  none    Precipitating or alleviating factors:  Trauma or overuse: YES  Aggravating factors include: walking    Therapies tried and outcome: ice, acetaminophen and Ibuprofen                    Chief Complaint         The patient is a pleasant 55-year-old gentleman who presents today with severe pain on the plantar aspect of his left foot.  He notes that he was starting to walk promptly the other day and felt a snapping sensation in the bottom of his foot as he pushed off.  The pain started at the calcaneus but now extends to the entire plantar area.  No redness or evidence of bruising is noted at this time.  The arch appears to be maintained.  No ankle injury is noted.  Contralateral foot is doing well.  He notes that he has tried over-the-counter medication with minimal benefit.  He does have a history of chronic discomfort and is already on gabapentin 1200 mg 3 times daily for pain.  He is also on Pamelor at bedtime and uses oxycodone frequently for his pain management.  These medications are all managed and maintained by the pain clinics physician.                         PAST, FAMILY,SOCIAL HISTORY:     Medical  History:   has a past medical history of Back pain, Meniere's disease, unspecified, and Pain medication agreement signed (8/20/2010).     Surgical History:   has a past surgical history that includes Colonoscopy w/wo Brush **Performed** (12/27/2005); pe tubes (2006); MASTOIDECTOMY,COMPLETE (09/27/2007); CREATE EARDRUM OPENING,GEN ANESTH (09/27/2007); Colonoscopy (N/A, 12/28/2016); bunionectomy rt/lt (09/05/08); Discectomy, fusion cervical anterior one level,  combined (N/A, 7/5/2017); Herniorrhaphy umbilical (N/A, 8/11/2017); Inject epidural lumbar (N/A, 11/9/2017); Inject epidural lumbar (N/A, 12/28/2017); Inject epidural transforaminal (Bilateral, 8/23/2018); Inject epidural transforaminal (Bilateral, 11/8/2018); Discectomy, fusion cervical anterior one level, combined (N/A, 12/19/2018); Inject epidural transforaminal (Bilateral, 6/14/2019); Inject epidural cervical (N/A, 8/22/2019); and Inject epidural transforaminal (Bilateral, 5/22/2020).     Social History:   reports that he has never smoked. He has never used smokeless tobacco. He reports that he does not drink alcohol or use drugs.     Family History:  family history includes Cancer - colorectal in his mother; Cardiovascular in his father; Diabetes in his mother; Hypertension in his father; Mental Illness in his sister; Suicide in an other family member.            MEDICATIONS  Current Outpatient Medications   Medication Sig Dispense Refill     Acetaminophen (TYLENOL PO) Take 1,000 mg by mouth 3 times daily        gabapentin (NEURONTIN) 400 MG capsule Take 3 capsules (1,200 mg) by mouth 3 times daily 270 capsule 3     ibuprofen (ADVIL/MOTRIN) 800 MG tablet Take 800 mg by mouth 3 times daily       indomethacin (INDOCIN) 50 MG capsule Take 1 capsule (50 mg) by mouth 2 times daily (with meals) 10 capsule 0     losartan (COZAAR) 50 MG tablet Take 2 tablets (100 mg) by mouth daily 180 tablet 0     nortriptyline (PAMELOR) 50 MG capsule Take 1 capsule (50 mg) by mouth At Bedtime 30 capsule 2     order for DME Equipment being ordered: TENS Pads as directed 1 Device 0     oxyCODONE (ROXICODONE) 5 MG tablet Take 1 tablet every 4-6 hours as needed for severe pain. Max 3-4/day. Fill 08/13/2020, start 08/15/2020 105 tablet 0     tiZANidine (ZANAFLEX) 4 MG tablet Take 1 tablet (4 mg) by mouth At Bedtime 90 tablet 2     ALPRAZolam (XANAX XR) 0.5 MG 24 hr tablet Take 1 tablet (0.5 mg) by mouth At Bedtime (Patient not taking:  Reported on 8/6/2020) 30 tablet 3         --------------------------------------------------------------------------------------------------------------------                              Review of Systems       LUNGS: Pt denies: cough, excess sputum, hemoptysis, or shortness of breath.   HEART: Pt denies: chest pain, arrhythmia, syncope, tachy or bradyarrhythmia.   GI: Pt denies: nausea, vomiting, diarrhea, constipation, melena, or hematochezia.   NEURO: Pt denies: seizures, strokes, diplopia, weakness, paraesthesias, or paralysis.   SKIN: Pt denies: itching, rashes, discoloration, or specific lesions of concern. Denies recent hair loss.   PSYCH: The patient denies significant depression, anxiety, mood imbalance. Specifically denies any suicidal ideation.        Examination    BP (!) 154/93   Pulse 101   Temp 98.1  F (36.7  C) (Temporal)   Resp 20   Wt 99.8 kg (220 lb)   SpO2 100%   BMI 29.03 kg/m      Constitutional: The patient appears to be in no acute distress. The patient appears to be adequately hydrated. No acute respiratory or hemodynamic distress is noted at this time.   LUNGS: clear bilaterally, airflow is brisk, no intercostal retraction or stridor is noted. No coughing is noted during visit.   HEART:  regular without rubs, clicks, gallops, or murmurs. PMI is nondisplaced. Upstrokes are brisk. S1,S2 are heard.   GI: Abdomen is soft, without rebound, guarding or tenderness. Bowel sounds are appropriate. No renal bruits are heard.   NEURO: Pt is alert and appropriate. No neurologic lateralization is noted. Cranial nerves 2-12 are intact. Peripheral sensory and motor function are grossly normal.    SKIN:  warm and dry. No erythema, or rashes are noted. No specific lesions of concern are noted.    PSYCH: The patient appears grossly appropriate. Maintains good eye contact, does not have any jittery or atypical motion. Displays appropriate affect.   MS: Minimal crepitance is noted in the extremities. No  deformity is present. Muscle strength is appropriate and equal bilaterally. No acute joint erythema or swelling is present.  Severe tenderness with palpation of plantar fascia is noted.  No disruption is evident.  No signs of hematoma are noted.         Decision Making       1. Rupture of plantar fascia of left foot, initial encounter  Appears to be incomplete tear.  Will place patient on a short course indomethacin, recommend elevation of foot, and no ambulation.  He will then follow-up with orthopedics.  - Orthopedic & Spine  Referral; Future  - indomethacin (INDOCIN) 50 MG capsule; Take 1 capsule (50 mg) by mouth 2 times daily (with meals)  Dispense: 10 capsule; Refill: 0    2. Chronic pain syndrome  Continue with medications as directed by pain specialist    3. Benign essential hypertension  Currently somewhat elevated though patient is in discomfort.  Follow closely and follow-up with primary care provider.                             FOLLOW UP   I have asked the patient to make an appointment for followup with me as needed.            I have carefully explained the diagnosis and treatment options to the patient.  The patient has displayed an understanding of the above, and all subsequent questions were answered.      DO RONY Awad    Portions of this note were produced using [employer]  Although every attempt at real-time proof reading has been made, occasional grammar/syntax errors may have been missed.

## 2020-08-14 NOTE — TELEPHONE ENCOUNTER
Reason for call:  Patient reporting a symptom    Symptom or request: Patient was playing with his dog and pushed off with his foot and felt something pop. Now he is not able to walk on it. I do not see any face to face visits available today. He is wondering if he should wait until Monday or go to the ED    Duration (how long have symptoms been present): last night    Have you been treated for this before? No    Additional comments:     Phone Number patient can be reached at:  Home number on file [phone] (home)    Best Time:  any    Can we leave a detailed message on this number:  YES    Call taken on 8/14/2020 at 8:18 AM by Namita Grullon CNA

## 2020-08-14 NOTE — TELEPHONE ENCOUNTER
Called and spoke to the patient. He said last night he was playing with his dog. He went to take off running, pushed off with his left foot and suddenly felt and heard a pop. Patient said his daughter also heard the pop. At first that pain was in the heel. He said he couldn't even touch his heel because of the pain. Then the pain moved to the ball of his foot. Can't really bear any weight. Has crutches he has been using. Patient said he can walk slightly on the side of his foot but that is it.     RECOMMENDED DISPOSITION:  See in 4 hours, another person to drive - since patient can't bear weight, he should be seen. He is scheduled this morning with Dr. See  Will comply with recommendation: YES   If further questions/concerns or if Sx do not improve, worsen or new Sx develop, call your PCP or [facility] as soon as possible.    NOTES:  Disposition was determined by the first positive assessment question, therefore all previous assessment questions were negative.  Informed to check provider manual or call insurance company to assure coverage.    Guideline used: Foot Problems  Telephone Triage Protocols for Nurses, Fifth Edition, Sarahy Cooper, RN, RN

## 2020-08-17 ENCOUNTER — ANCILLARY PROCEDURE (OUTPATIENT)
Dept: GENERAL RADIOLOGY | Facility: CLINIC | Age: 55
End: 2020-08-17
Attending: PODIATRIST
Payer: COMMERCIAL

## 2020-08-17 ENCOUNTER — OFFICE VISIT (OUTPATIENT)
Dept: PODIATRY | Facility: CLINIC | Age: 55
End: 2020-08-17
Payer: COMMERCIAL

## 2020-08-17 VITALS
BODY MASS INDEX: 29.16 KG/M2 | WEIGHT: 220 LBS | SYSTOLIC BLOOD PRESSURE: 150 MMHG | HEIGHT: 73 IN | DIASTOLIC BLOOD PRESSURE: 88 MMHG

## 2020-08-17 DIAGNOSIS — Z98.1 S/P CERVICAL SPINAL FUSION: ICD-10-CM

## 2020-08-17 DIAGNOSIS — M54.12 CERVICAL RADICULOPATHY: ICD-10-CM

## 2020-08-17 DIAGNOSIS — S93.692A RUPTURE OF PLANTAR FASCIA OF LEFT FOOT, INITIAL ENCOUNTER: ICD-10-CM

## 2020-08-17 DIAGNOSIS — G89.4 CHRONIC PAIN SYNDROME: ICD-10-CM

## 2020-08-17 DIAGNOSIS — M54.16 LUMBAR RADICULOPATHY: ICD-10-CM

## 2020-08-17 DIAGNOSIS — S93.692A RUPTURE OF PLANTAR FASCIA OF LEFT FOOT, INITIAL ENCOUNTER: Primary | ICD-10-CM

## 2020-08-17 PROCEDURE — 73650 X-RAY EXAM OF HEEL: CPT | Mod: TC

## 2020-08-17 PROCEDURE — 99203 OFFICE O/P NEW LOW 30 MIN: CPT | Performed by: PODIATRIST

## 2020-08-17 ASSESSMENT — PAIN SCALES - GENERAL: PAINLEVEL: MODERATE PAIN (5)

## 2020-08-17 ASSESSMENT — MIFFLIN-ST. JEOR: SCORE: 1891.79

## 2020-08-17 NOTE — NURSING NOTE
Dispensed 1 Pneumatic Walking Brace, Size medium, with [facility] agreement signed by patient. Elida Knight CMA, August 17, 2020

## 2020-08-17 NOTE — TELEPHONE ENCOUNTER
Behavioral eval scheduled for 8/21/20.    Heidy Gipson, RN-BSN  [facility]

## 2020-08-17 NOTE — PROGRESS NOTES
"HPI:  Indra Mehta is a 55 year old male who is seen in consultation at the request of Evangelista See DO    Pt presents for eval of:   (Onset, Location, L/R, Character, Treatments, Injury if yes)    XR Left calcaneal today 8/17/2020    Right achilles surgery approx 2010     Onset 8/13/2020, while taking a few steps felt and heard a \"pop\" left heel. Unable to weight bear. Presents today with plantar Left foot pain, NWB w/crutches. Constant, dull ache, pain 5, swelling, bruising. Sharp pain 9 certain ROM or pressure.  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    Weight management plan: Patient was referred to their PCP to discuss a diet and exercise plan.     Ketan to follow up with Primary Care provider regarding elevated blood pressure.      ROS: 10 point ROS neg other than the symptoms noted above in the HPI.    Patient Active Problem List   Diagnosis     Meniere's disease     Major depression in complete remission (H)     Chronic pain syndrome     CARDIOVASCULAR SCREENING; LDL GOAL LESS THAN 160     Pain medication agreement signed     Myalgia     Bilateral occipital neuralgia     Benign essential hypertension       PAST MEDICAL HISTORY:   Past Medical History:   Diagnosis Date     Back pain      Meniere's disease, unspecified      Pain medication agreement signed 8/20/2010     PAST SURGICAL HISTORY:   Past Surgical History:   Procedure Laterality Date     BUNIONECTOMY RT/LT  09/05/08    Both feet     COLONOSCOPY N/A 12/28/2016    Procedure: COMBINED COLONOSCOPY, SINGLE OR MULTIPLE BIOPSY/POLYPECTOMY BY BIOPSY;  Surgeon: Indra Jernigan MD;  Location: [facility]     DISCECTOMY, FUSION CERVICAL ANTERIOR ONE LEVEL, COMBINED N/A 7/5/2017    Procedure: COMBINED DISCECTOMY, FUSION CERVICAL ANTERIOR ONE LEVEL;  Cervical 6-7, Anterior cervical discectomy fusion;  Surgeon: Mike Mckenna MD;  [facility]     DISCECTOMY, FUSION " CERVICAL ANTERIOR ONE LEVEL, COMBINED N/A 12/19/2018    Procedure: cervical 5-6 anterior cervical discectomy fusion;  Surgeon: Mike Mckenna MD;  [facility]     HC COLONOSCOPY W/WO BRUSH/WASH  12/27/2005     HC CREATE EARDRUM OPENING,GEN ANESTH  09/27/2007    Pe tube, Left endolymphatic sac enhancement.     HC MASTOIDECTOMY,COMPLETE  09/27/2007     HERNIORRHAPHY UMBILICAL N/A 8/11/2017    Procedure: HERNIORRHAPHY UMBILICAL;  open umbilical hernia repair;  Surgeon: Boni Story MD;  [facility]     INJECT EPIDURAL CERVICAL N/A 8/22/2019    Procedure: INJECTION, SPINE, CERVICAL 6-7 EPIDURAL;  Surgeon: Darryn Mcdaniel MD;  [facility]     INJECT EPIDURAL LUMBAR N/A 11/9/2017    Procedure: INJECT EPIDURAL LUMBAR;  lumbar 4-5 epidural steroid injection ;  Surgeon: Darryn Mcdaniel MD;  [facility]     INJECT EPIDURAL LUMBAR N/A 12/28/2017    Procedure: INJECT EPIDURAL LUMBAR;  lumbar epidural injection;  Surgeon: Darryn Mcdaniel MD;  [facility]     INJECT EPIDURAL TRANSFORAMINAL Bilateral 8/23/2018    Procedure: INJECT EPIDURAL TRANSFORAMINAL;  transforaminal bilateral lumbar 3-4 steroid injection;  Surgeon: Darryn Mcdaniel MD;  [facility]     INJECT EPIDURAL TRANSFORAMINAL Bilateral 11/8/2018    Procedure: INJECT EPIDURAL TRANSFORAMINAL lumbar 3-4;  Surgeon: Darryn Mcdaniel MD;  [facility]     INJECT EPIDURAL TRANSFORAMINAL Bilateral 6/14/2019    Procedure: INJECTION, EPIDURAL, TRANSFORAMINAL LUMBAR 3-4 BILATERAL;  Surgeon: Darryn Mcdaniel MD;  [facility]     INJECT EPIDURAL TRANSFORAMINAL Bilateral 5/22/2020    Procedure: lumbar 3-4 bilateral transforaminal epidural steroid injection;  Surgeon: Darryn Mcdaniel MD;  [facility]     PE TUBES  2006    left ear     MEDICATIONS:   Current Outpatient Medications:      Acetaminophen (TYLENOL PO), Take 1,000 mg by mouth 3 times daily , Disp: , Rfl:      ALPRAZolam (XANAX XR) 0.5 MG 24 hr tablet, Take 1 tablet (0.5 mg) by mouth  At Bedtime, Disp: 30 tablet, Rfl: 3     gabapentin (NEURONTIN) 400 MG capsule, Take 3 capsules (1,200 mg) by mouth 3 times daily, Disp: 270 capsule, Rfl: 3     ibuprofen (ADVIL/MOTRIN) 800 MG tablet, Take 800 mg by mouth 3 times daily, Disp: , Rfl:      indomethacin (INDOCIN) 50 MG capsule, Take 1 capsule (50 mg) by mouth 2 times daily (with meals), Disp: 10 capsule, Rfl: 0     losartan (COZAAR) 50 MG tablet, Take 2 tablets (100 mg) by mouth daily, Disp: 180 tablet, Rfl: 0     nortriptyline (PAMELOR) 50 MG capsule, Take 1 capsule (50 mg) by mouth At Bedtime, Disp: 30 capsule, Rfl: 2     order for DME, Equipment being ordered: TENS Pads as directed, Disp: 1 Device, Rfl: 0     oxyCODONE (ROXICODONE) 5 MG tablet, Take 1 tablet every 4-6 hours as needed for severe pain. Max 3-4/day. Fill 08/13/2020, start 08/15/2020, Disp: 105 tablet, Rfl: 0     tiZANidine (ZANAFLEX) 4 MG tablet, Take 1 tablet (4 mg) by mouth At Bedtime, Disp: 90 tablet, Rfl: 2  ALLERGIES:    Allergies   Allergen Reactions     No Known Drug Allergies      SOCIAL HISTORY:   Social History     Socioeconomic History     Marital status: Single     Spouse name: Not on file     Number of children: 2     Years of education: Not on file     Highest education level: Not on file   Occupational History     Employer: [employer]   Social Needs     Financial resource strain: Not on file     Food insecurity     Worry: Not on file     Inability: Not on file     Transportation needs     Medical: Not on file     Non-medical: Not on file   Tobacco Use     Smoking status: Never Smoker     Smokeless tobacco: Never Used   Substance and Sexual Activity     Alcohol use: No     Alcohol/week: 0.0 standard drinks     Drug use: No     Sexual activity: Yes     Partners: Female   Lifestyle     Physical activity     Days per week: Not on file     Minutes per session: Not on file     Stress: Not on file   Relationships     Social connections     Talks on phone: Not on file      "Gets together: Not on file     Attends Taoist service: Not on file     Active member of club or organization: Not on file     Attends meetings of clubs or organizations: Not on file     Relationship status: Not on file     Intimate partner violence     Fear of current or ex partner: Not on file     Emotionally abused: Not on file     Physically abused: Not on file     Forced sexual activity: Not on file   Other Topics Concern     Parent/sibling w/ CABG, MI or angioplasty before 65F 55M? No   Social History Narrative     Not on file     FAMILY HISTORY:   Family History   Problem Relation Age of Onset     Cancer - colorectal Mother      Diabetes Mother      Cardiovascular Father      Hypertension Father      Mental Illness Sister      Suicide Other        EXAM:Vitals: BP (!) 150/88 (BP Location: Left arm, Patient Position: Sitting, Cuff Size: Adult Regular)   Ht 1.854 m (6' 1\")   Wt 99.8 kg (220 lb)   BMI 29.03 kg/m    BMI= Body mass index is 29.03 kg/m .    General appearance: Patient is alert and fully cooperative with history & exam.  No sign of distress is noted during the visit.     Psychiatric: Affect is pleasant & appropriate.  Patient appears motivated to improve health.     Respiratory: Breathing is regular & unlabored while sitting.     HEENT: Hearing is intact to spoken word.  Speech is clear.  No gross evidence of visual impairment that would impact ambulation.     Vascular: DP & PT 2/4 & regular bilaterally.  No significant edema, rubor or varicosities noted.  CFT and skin temperature is normal to both lower extremities.       Neurologic: Lower extremity sensation is intact to light touch.  No evidence of weakness in the lower extremities.  No evidence of neuropathy and negative tinel sign.     Dermatologic: Skin is intact to both lower extremities without significant lesions, rash or abrasion.  Normal texture turgor and tone. No paronychia or evidence of soft tissue infection is " noted.    Musculoskeletal: Patient is ambulatory without assistive device or brace. Pain is noted with firm palpation along the medial band of the plantar fascia left foot most notably at the origination upon the calcaneus not through the arch.  No pain with compression of the calcaneus medial to lateral or with palpation of the achilles, peroneal or posterior tibial tendons.  Slightly more than 0  of ankle joint dorsiflexion without crepitus or pain throughout the ankle, subtalar or midtarsal joints.  No pain or limitations throughout manual muscle strength testing plus 5/5 to all four quadrants bilateral.  No palpable edema noted.     There is noticeable limited range of motion to the first MPJ with bony prominence throughout consistent with chronic hallux limitus.    Radiographs:  Osteophyte noted about the plantar calcaneus consistent with plantar fasciitis.     ASSESSMENT:       ICD-10-CM    1. Rupture of plantar fascia of left foot, initial encounter  S93.692A Orthopedic & Spine  Referral     XR Calcaneus Left G/E 2 Views   2. Cervical radiculopathy  M54.12    3. S/P cervical spinal fusion  Z98.1    4. Lumbar radiculopathy  M54.16    5. Chronic pain syndrome  G89.4        PLAN:  Reviewed [facility] in [facility] and discussed etiology and treatment options.      Treatments:  8/17/2020  Dispensed a fracture boot can begin weightbearing to tolerance and discontinue crutches as tolerated.  Continue the fracture boot even during sleep and rest until pain-free x5 days  Obtained and interpreted radiographs.   When he progresses back to regular shoe gear we will utilize stiff sturdy shoes not flexible shoes.  Patient is already utilizing oral anti-inflammatories and has narcotics chronic pain contract associated with spinal disease  Follow-up with me in 3 weeks to confirm his left foot symptoms are resolved  This may take as long as 6 weeks to recuperate.  He is working from home and refuses the work release  letter    Mariano Masters DPM

## 2020-08-18 NOTE — PROGRESS NOTES
"Indra Mehta is a 55 year old male who is being evaluated via a billable video visit.      The patient has been notified of following:     \"This video visit will be conducted via a call between you and your physician/provider. We have found that certain health care needs can be provided without the need for an in-person physical exam.  This service lets us provide the care you need with a video conversation.  If a prescription is necessary we can send it directly to your pharmacy.  If lab work is needed we can place an order for that and you can then stop by our lab to have the test done at a later time.    Video visits are billed at different rates depending on your insurance coverage.  Please reach out to your insurance provider with any questions.    If during the course of the call the physician/provider feels a video visit is not appropriate, you will not be charged for this service.\"    Patient has given verbal consent for Video visit? Yes    Video Start Time: 1:30 PM          August 18, 2020     Pain Diagnoses per pain provider:   Chronic pain syndrome      Chronic bilateral low back pain without sciatica      Neuropathy      Myofascial pain     Psychologist Spinal Cord Stimulator (SCS) Evaluation       Does the patient:    Have a reasonable understanding of what the SCS actually is? (as below)    - Understand that the SCS is implanted surgically, beneath their skin, with wires placed into their spinal column? YES  - Understand that the SCS is made of metal, and that wires may remain in their body permanently? YES  - Understand that the SCS is battery powered, and the battery likely will need to be replaced after 7-10 years? YES  - Understand that the permanent implant is preceded by a trial, where the wires are placed into the spine, but they come out to the surface, and are plugged into an external battery that the patient wears for the duration of the trial (5-7 days)? YES       Know what the SCS is " doing, and what it is not doing? (as below)    - Know that the SCS is distracting their nervous system from the pain they otherwise would feel? YES  - Know that it is not curing the source of the problem? YES  - know the battery needs to be charged weekly, and sometimes daily (depending on how much power they use), and eventually replaced after 7-10 years involving another small surgery? YES  - Know there are generally several months of reprogramming and fine-tuning that are needed after the implant to get the settings  just right ? YES  - Know they will have a remote control, need to learn how to use it to get the most out of the stimulator? YES    Understand the general process start to finish including:    - an understanding their health insurance is presented with all their past treatments tried (meds, procedures, therapy, etc.) and they give approval or denial for the SCS trial? YES    -appealing denials can take up to 30 additional days to the process? YES    - if the trial is approved and then scheduled that the scheduling is contingent upon appointment availability? YES    - that the trial takes generally 1 week, at end of trial the patient decides if there was good stimulation/coverage of the painful areas? YES    -an understanding there are bench marks for proceeding with implant such as the need to have at least 50% relief of pain, 50% reduction in medication doses and 50% improvement in function? YES    -an understanding that request for prior approval from the insurance of the permanent SCS implant is required? YES    -an understanding that only when approval is granted is there an attempt to schedule patient and that this schedule is also contingent on provider availability?  YES    -an understanding that the implant procedure is done in an outpatient surgery center and that s/he goes home from the surgery center the same day? YES    -an understanding that s/he returns to clinic approximately 1 week  post op and has the reprograming done by device company at that time? YES    -an understanding that s/he returns to clinic at 2-3 weeks post op; has additional reprogramming, and that some restrictions are lifted for driving, lifting, bending, twisting? YES    -an understanding that s/he returns to clinic at 6 weeks post-op; fine-turning of programming (if needed), and all restrictions are lifted? YES    -an understanding that additional, as needed, visits for more reprogramming can be done out of the office, with just the device company? YES    - an understanding that generally at this point the pain provider will begin again with things like PT and start to make changes in medications (weaning off nerve-pain meds, and narcotics) if we have not already started this process. YES      Current:    Patient reports difficulty with function in relationship to their pain. Patient reports onset of their pain symptoms about 20 years ago. He believes degenerative disk issues, bulging disks, and playing sports as a youth contributes factor to their pain. His pain is generally located lower back with nerve pain radiating bilaterally into his feet.  Factors that increase their pain include lifting, bending, certain positions in bed. He finds relief from their pain using ice, stretches, takes maximum dose of ibuprofen and tylenol, oxycodone, gabapentin, nortriptyline, taking breaks. Patient reports their pain ranges in intensity from 5 to 9/10, averages 6 to 7 out of 10. They describe their pain as burning and shooting, constant 'pain,' dull, deep ache . Pain interferes with the following:     Relationships with important others:  Doesn't socialize as he'd like due to pain, being active with others is tough, feels crabby more than he'd like due to pain and worries this impacts others. His pain impacts his ability to  volleyball for local community.   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 He has not had access to this, however he reports he makes efforts to get up and move and adjust position frequently.   Overall Quality of Life:  Quite a bit - he is unable to enjoy activities and hobbies due to his pain such as hiking, walking, canoeing, basketball, volleyball. Notes his mood is more crabby.     Ability to complete ADLS: Independent, needs to take a lot of breaks to complete.     Patient spends more time than he'd like talking to others about their pain. Patient spends all the time thinking about their pain in a day. Patient is fairly able to pace their activity or obey limitations their chronic pain places on them. Patient's pain negatively impacts their ability to relax, states he actively works to engage in relaxing activities to manage his pain and occasionally struggles to relax despite best efforts. Patient has worked with a pain clinic in the past: [facility] for some time, clinic in [facility]. As a result of their pain, they rate their stress level as medium. Patient reports current stressors include pain, job stress, covid and current world situation, everyday stress.    Patient reports the following as it relates to how their pain impacts their sleep hygiene (endorsed in BOLD):  Difficulty falling asleep / problems mid-awakenings / poor quality of sleep / daytime sleepiness or fatigue / napping    Patient reports obtaining approximately 2-5 hours of sleep per night. This sleep is greatly affected by his pain - he struggles to stay asleep, fall asleep, and struggles to find a comfortable position in bed. He has a CPAP, but has not been using it regularly lately due to feeling claustrophobic, thinks he will try this again as it did help with his sleep.   Patient reports their exercise regimen currently includes previously light weights and stationary bike at  [employer] prior to covid -has not engaged in much activity at home due to difficulty. Knows exercise helps however has been struggling to get back into the routine.    Current/Past Coping Skills for Pain Management: Injections, OTC and prescibed medications, ice, changing positions, stretching, exercise, PT several times, facet injections.    Current/Past Mental Health History:   Patient reports being diagnosed with depression in the past during his divorce. Patient reports no history of hospitalization for mental health reasons. States he has learned to manage his mood and he feels it has improved significantly.    Patient reports the following psychotropic medications are currently prescribed: nothing  and the following have been prescribed in the past: tried antidepressants. Patient reports n/a side effects from their medications. Patient's mental health history and support includes marriage counseling. Patient reports no history of suicidal ideation. Patient reports no homicidal ideation. Patient reports no history of abuse. Patient denies symptoms of ga, psychosis, disordered eating, PTSD. Other symptoms patient reports include more easily irritable, at times feels helpless and hopeless about his pain.    Current/Past Chemical Health History:      Patient reports occasional alcohol use in social settings. Reports during his divorce he was drinking more than he liked and stopped. Patient reports no recreational drug use. Patient reports no tobacco use. Patient reports 1-2 caffeine use - mountain dew - cut down from 4-5 per day. Patient reports no previous chemical dependency or other addictive behavior treatment.          Engagement and Compliance with Current Pain Treatment:   Patient has been engaged with and compliant with all aspects of the [facility] pain services program.    Summary/ Recommendations:  Patient is a 55 year old with chronic back pain for 20 or so years. He reports sports and degenerative  changes have likely contributed to his current chronic pain. He appears to have a solid understanding of the SCS trial and reports feeling comfortable asking his provider about any additional concerns about the procedure as they arise. he appears to be capable of coping well given the limitations associated with his chronic pain.  I support him proceeding to a trial for the SCS.     ideo-Visit Details    Type of service:  Video Visit    Video End Time (time video stopped): 2:20 PM    Originating Location (pt. Location): Home    Distant Location (provider location):  [facility]     Mode of Communication:  Video Conference via Karis Hancock PsyD LP  Outpatient Clinic Therapist  [facility]    Disclaimer: This note consists of symbols derived from keyboarding, dictation and/or voice recognition software. As a result, there may be errors in the script that have gone undetected. Please consider this when interpreting information found in this chart.

## 2020-08-21 ENCOUNTER — VIRTUAL VISIT (OUTPATIENT)
Dept: PALLIATIVE MEDICINE | Facility: CLINIC | Age: 55
End: 2020-08-21
Payer: COMMERCIAL

## 2020-08-21 DIAGNOSIS — M79.18 MYOFASCIAL PAIN: ICD-10-CM

## 2020-08-21 DIAGNOSIS — M54.50 CHRONIC BILATERAL LOW BACK PAIN WITHOUT SCIATICA: Primary | ICD-10-CM

## 2020-08-21 DIAGNOSIS — G62.9 NEUROPATHY: ICD-10-CM

## 2020-08-21 DIAGNOSIS — G89.4 CHRONIC PAIN SYNDROME: ICD-10-CM

## 2020-08-21 DIAGNOSIS — G89.29 CHRONIC BILATERAL LOW BACK PAIN WITHOUT SCIATICA: Primary | ICD-10-CM

## 2020-08-21 PROCEDURE — 96156 HLTH BHV ASSMT/REASSESSMENT: CPT | Performed by: PSYCHOLOGIST

## 2020-08-24 NOTE — TELEPHONE ENCOUNTER
Routed to Peyton Toscano to check insurance coverage for spinal cord stimulator trial.      Approved by interventional team? Yes   a pain psychologist?  yes 8/21/20        Diagnosis:  Chronic pain syndrome, lumbar radiculapthy    Insurance: [employer] OPEN ACCESS     Vendor:  [employer]    Anticoagulants:  No    Pre-trial IV Antibiotic to be used:  Ancef 2 gm    Other No    Pain Provider:  IGOR Ny RN-BSN  [facility]

## 2020-08-24 NOTE — TELEPHONE ENCOUNTER
Faxed completed PA form and supporting documentation for SCS TRIAL to .  Right fax confirmation          Peyton CHURCHILL    [facility]

## 2020-09-01 ENCOUNTER — MYC MEDICAL ADVICE (OUTPATIENT)
Dept: PALLIATIVE MEDICINE | Facility: CLINIC | Age: 55
End: 2020-09-01

## 2020-09-01 ENCOUNTER — MYC MEDICAL ADVICE (OUTPATIENT)
Dept: PODIATRY | Facility: CLINIC | Age: 55
End: 2020-09-01

## 2020-09-02 ENCOUNTER — OFFICE VISIT (OUTPATIENT)
Dept: FAMILY MEDICINE | Facility: CLINIC | Age: 55
End: 2020-09-02
Payer: COMMERCIAL

## 2020-09-02 ENCOUNTER — HOSPITAL ENCOUNTER (OUTPATIENT)
Dept: GENERAL RADIOLOGY | Facility: CLINIC | Age: 55
End: 2020-09-02
Attending: FAMILY MEDICINE
Payer: COMMERCIAL

## 2020-09-02 VITALS
WEIGHT: 221.4 LBS | TEMPERATURE: 98.3 F | SYSTOLIC BLOOD PRESSURE: 160 MMHG | OXYGEN SATURATION: 96 % | RESPIRATION RATE: 12 BRPM | DIASTOLIC BLOOD PRESSURE: 88 MMHG | BODY MASS INDEX: 29.21 KG/M2 | HEART RATE: 92 BPM

## 2020-09-02 DIAGNOSIS — M25.561 CHRONIC PAIN OF BOTH KNEES: ICD-10-CM

## 2020-09-02 DIAGNOSIS — I10 BENIGN ESSENTIAL HYPERTENSION: Primary | ICD-10-CM

## 2020-09-02 DIAGNOSIS — M25.562 CHRONIC PAIN OF BOTH KNEES: ICD-10-CM

## 2020-09-02 DIAGNOSIS — G89.29 CHRONIC PAIN OF BOTH KNEES: ICD-10-CM

## 2020-09-02 PROCEDURE — 73565 X-RAY EXAM OF KNEES: CPT | Mod: TC

## 2020-09-02 PROCEDURE — 99214 OFFICE O/P EST MOD 30 MIN: CPT | Performed by: FAMILY MEDICINE

## 2020-09-02 RX ORDER — HYDROCHLOROTHIAZIDE 25 MG/1
25 TABLET ORAL DAILY
Qty: 30 TABLET | Refills: 1 | Status: SHIPPED | OUTPATIENT
Start: 2020-09-02 | End: 2020-10-21

## 2020-09-02 NOTE — TELEPHONE ENCOUNTER
Incoming fax from     PA denied.  Reason given:      Patient does not meet PA criteria.(see below)  Patient notified:  Letter sent by insurance company and letter forwarded to [employer].      Reason:  The pain is not from: failed back syndrome, complex regional pain syndrome or diabetic peripheral neuropathy.     Use of the spinal cord stimulator for any other condition other than those listed above, is considered investigational. Patients health plan does not cover investigational services.        Routing to review and advise if an appeal will be done      Peyton CHURCHILL    [facility]

## 2020-09-02 NOTE — PROGRESS NOTES
Subjective     Indra Mehta is a 55 year old male who presents to clinic today for the following health issues:    HPI       Hypertension Follow-up      Do you check your blood pressure regularly outside of the clinic? No     Are you following a low salt diet? Yes    Are your blood pressures ever more than 140 on the top number (systolic) OR more   than 90 on the bottom number (diastolic), for example 140/90? Yes      How many servings of fruits and vegetables do you eat daily?  2-3    On average, how many sweetened beverages do you drink each day (Examples: soda, juice, sweet tea, etc.  Do NOT count diet or artificially sweetened beverages)?   2    How many days per week do you exercise enough to make your heart beat faster? 3 or less    How many minutes a day do you exercise enough to make your heart beat faster? 9 or less    How many days per week do you miss taking your medication? 0    Musculoskeletal problem/pain  Onset/Duration: ongoing  Description  Location: knee - bilateral  Joint Swelling: no  Redness: no  Pain: YES  Warmth: YES  Intensity:  moderate  Progression of Symptoms:  constant  Accompanying signs and symptoms:   Fevers: no  Numbness/tingling/weakness: no  History  Trauma to the area: no  Recent illness:  no  Previous similar problem: no  Previous evaluation:  no  Precipitating or alleviating factors:  Aggravating factors include: sitting, walking and climbing stairs  Therapies tried and outcome: stretching, acetaminophen and Ibuprofen    Patient Active Problem List   Diagnosis     Meniere's disease     Major depression in complete remission (H)     Chronic pain syndrome     CARDIOVASCULAR SCREENING; LDL GOAL LESS THAN 160     Pain medication agreement signed     Myalgia     Bilateral occipital neuralgia     Benign essential hypertension      Current Outpatient Medications   Medication Sig Dispense Refill     Acetaminophen (TYLENOL PO) Take 1,000 mg by mouth 3 times daily        ALPRAZolam  (XANAX XR) 0.5 MG 24 hr tablet Take 1 tablet (0.5 mg) by mouth At Bedtime 30 tablet 3     gabapentin (NEURONTIN) 400 MG capsule Take 3 capsules (1,200 mg) by mouth 3 times daily 270 capsule 3     ibuprofen (ADVIL/MOTRIN) 800 MG tablet Take 800 mg by mouth 3 times daily       losartan (COZAAR) 50 MG tablet Take 2 tablets (100 mg) by mouth daily 180 tablet 0     nortriptyline (PAMELOR) 50 MG capsule Take 1 capsule (50 mg) by mouth At Bedtime 30 capsule 2     order for DME Equipment being ordered: TENS Pads as directed 1 Device 0     oxyCODONE (ROXICODONE) 5 MG tablet Take 1 tablet every 4-6 hours as needed for severe pain. Max 3-4/day. Fill 08/13/2020, start 08/15/2020 105 tablet 0     tiZANidine (ZANAFLEX) 4 MG tablet Take 1 tablet (4 mg) by mouth At Bedtime 90 tablet 2     indomethacin (INDOCIN) 50 MG capsule Take 1 capsule (50 mg) by mouth 2 times daily (with meals) (Patient not taking: Reported on 9/2/2020) 10 capsule 0       Review of Systems   CONSTITUTIONAL: NEGATIVE for fever, chills, change in weight  ENT/MOUTH: NEGATIVE for ear, mouth and throat problems  RESP: NEGATIVE for significant cough or SOB  CV: NEGATIVE for chest pain, palpitations or peripheral edema  MUSCULOSKELETAL: POSITIVE  for back pain chronic, joint pain bilateral knee locking and clicking and radicular pain chronic and NEGATIVE for joint instability, joint swelling and joint warmth  ROS otherwise negative      Objective    BP (!) 160/88 (BP Location: Right arm, Patient Position: Sitting, Cuff Size: Adult Large)   Pulse 92   Temp 98.3  F (36.8  C) (Temporal)   Resp 12   Wt 100.4 kg (221 lb 6.4 oz)   SpO2 96%   BMI 29.21 kg/m    Body mass index is 29.21 kg/m .  Physical Exam   GENERAL: healthy, alert and no distress  NECK: no adenopathy, no asymmetry, masses, or scars and thyroid normal to palpation  RESP: lungs clear to auscultation - no rales, rhonchi or wheezes  CV: regular rate and rhythm, normal S1 S2, no S3 or S4, no murmur, click  or rub, no peripheral edema and peripheral pulses strong  ABDOMEN: soft, nontender, no hepatosplenomegaly, no masses and bowel sounds normal  MS: Knees reveal full range of motion, bilateral medial joint tenderness, no masses, effusion or ligamentous instability.      Xray - bilateral knees standing.  Bilateral medial compartment narrowing. No effusion.         Assessment & Plan     Benign essential hypertension  Chronic, poorly controlled. Add hydrochlorothiazide and recheck in 1 month.  - hydrochlorothiazide (HYDRODIURIL) 25 MG tablet; Take 1 tablet (25 mg) by mouth daily    Chronic pain of both knees  Acute on chronic. Recent locking and pain without effusion or instability. Most likely medial meniscus erosion vs tear. Currently taking NSAID for lumbar disc disease. Will refer to [facility] for possible injection with PT.  - XR Knee AP Standing Bilateral; Future  - Orthopedic & Spine  Referral; Future       CONSULTATION/REFERRAL to [facility]  FUTURE APPOINTMENTS:       - Follow-up visit in 1 month recheck BP    Return in about 4 weeks (around 9/30/2020) for BP Recheck.    Tha Grullon MD  [facility]

## 2020-09-02 NOTE — TELEPHONE ENCOUNTER
See the 9/1/20 mychart encounter for more information. Pt was informed of status on 8/24/20 and again today.    Heidy Gipson, RN-BSN  [facility]

## 2020-09-03 NOTE — TELEPHONE ENCOUNTER
Jessicat sent to pt:  From: Heidy Gipson RN      Created: 9/3/2020 1:37 PM      Hi Ketan,  Unfortunately your [employer] insurance has denied covered for the spinal cord stimulator trial.  You can follow up with Chiara Garcia PA-C for further options.     Heidy Gipson RN-BSN  [facility]

## 2020-09-08 ENCOUNTER — ANCILLARY PROCEDURE (OUTPATIENT)
Dept: GENERAL RADIOLOGY | Facility: CLINIC | Age: 55
End: 2020-09-08
Attending: PHYSICAL MEDICINE & REHABILITATION
Payer: COMMERCIAL

## 2020-09-08 ENCOUNTER — OFFICE VISIT (OUTPATIENT)
Dept: ORTHOPEDICS | Facility: CLINIC | Age: 55
End: 2020-09-08
Attending: FAMILY MEDICINE
Payer: COMMERCIAL

## 2020-09-08 VITALS
WEIGHT: 221.4 LBS | HEIGHT: 73 IN | SYSTOLIC BLOOD PRESSURE: 131 MMHG | BODY MASS INDEX: 29.34 KG/M2 | DIASTOLIC BLOOD PRESSURE: 88 MMHG

## 2020-09-08 DIAGNOSIS — G89.29 CHRONIC PAIN OF BOTH KNEES: ICD-10-CM

## 2020-09-08 DIAGNOSIS — M25.562 CHRONIC PAIN OF BOTH KNEES: ICD-10-CM

## 2020-09-08 DIAGNOSIS — M25.561 CHRONIC PAIN OF BOTH KNEES: ICD-10-CM

## 2020-09-08 PROCEDURE — 99204 OFFICE O/P NEW MOD 45 MIN: CPT | Performed by: PHYSICAL MEDICINE & REHABILITATION

## 2020-09-08 PROCEDURE — 73560 X-RAY EXAM OF KNEE 1 OR 2: CPT | Mod: TC

## 2020-09-08 ASSESSMENT — MIFFLIN-ST. JEOR: SCORE: 1898.14

## 2020-09-08 NOTE — PROGRESS NOTES
"Sports Medicine Clinic Visit    PCP: Tha Grullon    CC: Patient presents with:  Left Knee - Pain  Right Knee - Pain      HPI:  Indra Mehta is a 55 year old male who is seen in consultation at the request of Dr. Grullon.   He notes bilateral knee pain that began \"years\" ago, worsening in the past 6 months.  He notes pain over the medial knee.  He rates the pain at a  9/10 at its worst and a 2/10 currently.  Symptoms are relieved with ice. Symptoms are worsened by stairs, sit to stand transition. He endorses mild swelling, catching, popping, and instability/weakness.   He denies locking, numbness, and tingling.  Other treatment has included heat, Tylenol and ibuprofen.       Review of Systems:  Musculoskeletal: as above  Remainder of review of systems is negative including constitutional, eyes, ENT, CV, pulmonary, GI, , endocrine, skin, hematologic, and neurologic except as noted in HPI or medical history.    History reviewed. No pertinent past surgical/medical/family/social history other than as mentioned in HPI.    Patient Active Problem List   Diagnosis     Meniere's disease     Major depression in complete remission (H)     Chronic pain syndrome     CARDIOVASCULAR SCREENING; LDL GOAL LESS THAN 160     Pain medication agreement signed     Myalgia     Bilateral occipital neuralgia     Benign essential hypertension     Past Medical History:   Diagnosis Date     Back pain      Meniere's disease, unspecified      Pain medication agreement signed 8/20/2010     Past Surgical History:   Procedure Laterality Date     BUNIONECTOMY RT/LT  09/05/08    Both feet     COLONOSCOPY N/A 12/28/2016    Procedure: COMBINED COLONOSCOPY, SINGLE OR MULTIPLE BIOPSY/POLYPECTOMY BY BIOPSY;  Surgeon: Indra Jernigan MD;  [facility]     DISCECTOMY, FUSION CERVICAL ANTERIOR ONE LEVEL, COMBINED N/A 7/5/2017    Procedure: COMBINED DISCECTOMY, FUSION CERVICAL ANTERIOR ONE LEVEL;  Cervical 6-7, Anterior cervical discectomy " fusion;  Surgeon: Mike Mckenna MD;  [facility]     DISCECTOMY, FUSION CERVICAL ANTERIOR ONE LEVEL, COMBINED N/A 12/19/2018    Procedure: cervical 5-6 anterior cervical discectomy fusion;  Surgeon: Mike Mckenna MD;  [facility]     HC COLONOSCOPY W/WO BRUSH/WASH  12/27/2005     HC CREATE EARDRUM OPENING,GEN ANESTH  09/27/2007    Pe tube, Left endolymphatic sac enhancement.     HC MASTOIDECTOMY,COMPLETE  09/27/2007     HERNIORRHAPHY UMBILICAL N/A 8/11/2017    Procedure: HERNIORRHAPHY UMBILICAL;  open umbilical hernia repair;  Surgeon: Boni Story MD;  [facility]     INJECT EPIDURAL CERVICAL N/A 8/22/2019    Procedure: INJECTION, SPINE, CERVICAL 6-7 EPIDURAL;  Surgeon: Darryn Mcdaniel MD;  [facility]     INJECT EPIDURAL LUMBAR N/A 11/9/2017    Procedure: INJECT EPIDURAL LUMBAR;  lumbar 4-5 epidural steroid injection ;  Surgeon: Darryn Mcdaniel MD;  [facility]     INJECT EPIDURAL LUMBAR N/A 12/28/2017    Procedure: INJECT EPIDURAL LUMBAR;  lumbar epidural injection;  Surgeon: Darryn Mcdaniel MD;  [facility]     INJECT EPIDURAL TRANSFORAMINAL Bilateral 8/23/2018    Procedure: INJECT EPIDURAL TRANSFORAMINAL;  transforaminal bilateral lumbar 3-4 steroid injection;  Surgeon: Darryn Mcdaniel MD;  [facility]     INJECT EPIDURAL TRANSFORAMINAL Bilateral 11/8/2018    Procedure: INJECT EPIDURAL TRANSFORAMINAL lumbar 3-4;  Surgeon: Darryn Mcdaniel MD;  [facility]     INJECT EPIDURAL TRANSFORAMINAL Bilateral 6/14/2019    Procedure: INJECTION, EPIDURAL, TRANSFORAMINAL LUMBAR 3-4 BILATERAL;  Surgeon: Darryn Mcdaniel MD;  [facility]     INJECT EPIDURAL TRANSFORAMINAL Bilateral 5/22/2020    Procedure: lumbar 3-4 bilateral transforaminal epidural steroid injection;  Surgeon: Darryn Mcdaniel MD;  [facility]     PE TUBES  2006    left ear     Family History   Problem Relation Age of Onset     Cancer - colorectal Mother      Diabetes Mother      Cardiovascular Father       Hypertension Father      Mental Illness Sister      Suicide Other      Social History     Socioeconomic History     Marital status: Single     Spouse name: Not on file     Number of children: 2     Years of education: Not on file     Highest education level: Not on file   Occupational History     Employer: [employer]   Social Needs     Financial resource strain: Not on file     Food insecurity     Worry: Not on file     Inability: Not on file     Transportation needs     Medical: Not on file     Non-medical: Not on file   Tobacco Use     Smoking status: Never Smoker     Smokeless tobacco: Never Used   Substance and Sexual Activity     Alcohol use: No     Alcohol/week: 0.0 standard drinks     Drug use: No     Sexual activity: Yes     Partners: Female   Lifestyle     Physical activity     Days per week: Not on file     Minutes per session: Not on file     Stress: Not on file   Relationships     Social connections     Talks on phone: Not on file     Gets together: Not on file     Attends Christian service: Not on file     Active member of club or organization: Not on file     Attends meetings of clubs or organizations: Not on file     Relationship status: Not on file     Intimate partner violence     Fear of current or ex partner: Not on file     Emotionally abused: Not on file     Physically abused: Not on file     Forced sexual activity: Not on file   Other Topics Concern     Parent/sibling w/ CABG, MI or angioplasty before 65F 55M? 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     Current Outpatient Medications   Medication     Acetaminophen (TYLENOL PO)     ALPRAZolam (XANAX XR) 0.5 MG 24 hr tablet     gabapentin (NEURONTIN) 400 MG capsule     hydrochlorothiazide (HYDRODIURIL) 25 MG tablet     ibuprofen (ADVIL/MOTRIN) 800 MG tablet     indomethacin (INDOCIN) 50 MG capsule     losartan (COZAAR) 50 MG tablet     nortriptyline (PAMELOR) 50 MG capsule     order for [facility] [place] [facility]     oxyCODONE  "(ROXICODONE) 5 MG tablet     tiZANidine (ZANAFLEX) 4 MG tablet     No current facility-administered medications for this visit.      Allergies   Allergen Reactions     No Known Drug Allergies          Objective:  /88   Ht 1.854 m (6' 1\")   Wt 100.4 kg (221 lb 6.4 oz)   BMI 29.21 kg/m      General: Alert and in no distress    Head: Normocephalic, atraumatic  Eyes: no scleral icterus or conjunctival erythema   Skin: no erythema, petechiae, or jaundice  CV: regular rhythm by palpation, 2+ distal pulses  Resp: normal respiratory effort without conversational dyspnea   Psych: normal mood and affect    Gait: Non-antalgic, appropriate coordination and balance   Neuro: Motor strength and sensation as noted below    Musculoskeletal:    Bilateral Knee exam    Inspection:  No erythema, ecchymosis, warmth, or edema    Palpation: No tenderness to palpation over the bilateral knees    Knee ROM: Full seated active knee extension and flexion bilaterally.  Right knee extension is mildly painful.      Strength:    Left:  5/5 hip flexion/abduction/adduction, 5/5 knee extension/flexion, 5/5 ankle dorsiflexion/plantarflexion  Right:  5/5 hip flexion/abduction/adduction, 5/5 knee extension/flexion, 5/5 ankle dorsiflexion/plantarflexion    Sensation:  Intact to light touch in the bilateral lower extremities      Radiology:  X-rays ordered and independent visualization of images performed and reviewed with Ketan.    KNEE AP STANDING BILATERAL   9/2/2020 2:34 PM      HISTORY: Chronic pain of both knees.      COMPARISON: None.     FINDINGS: There is no fracture or significant joint effusion. Joint  spaces are well maintained.                                                                      IMPRESSION:  Negative single view of the bilateral knees.     HILDA DOUGLAS MD      KNEE BILATERAL ONE TO TWO VIEWS September 8, 2020 10:23 AM      HISTORY: Chronic pain of both knees.     COMPARISON: AP bilateral knee x-rays dated " 9/2/2020.     FINDINGS: Moderate enthesopathic spurring of the bilateral quadriceps  tendons into the bilateral patellae are noted. No fracture,  malalignment, joint space loss, or effusion is otherwise seen.                                                                      IMPRESSION:  1. Moderate enthesopathic spurring of the bilateral quadriceps tendon  insertions into the bilateral patellae.  2. Otherwise negative bilateral knee x-rays.       Assessment:  1. Chronic pain of both knees        Plan:  Discussed the assessment with the patient and developed a plan together:  -Physical therapy referral placed.  Please do 5-6 days of exercises per week between formal sessions and the home exercises they provide.  -Patient's preferred over the counter medication as directed on packaging as needed for pain or soreness.  -Ice 15-20 minutes for pain relief or swelling as needed.  -Participate as tolerated in activities.      -Also discussed steroid injections    -Follow up as needed if symptoms fail to improve or worsen.  Please call with questions or concerns.      Yoanna Xie MD, CAQ Sports Medicine  [facility]

## 2020-09-08 NOTE — LETTER
"    9/8/2020         RE: Indra Mehta  [id number] [facility] [id number]        Dear Colleague,    Thank you for referring your patient, Indra Mehta, to the [facility]. Please see a copy of my visit note below.    Sports Medicine Clinic Visit    PCP: Tha Grullon    CC: Patient presents with:  Left Knee - Pain  Right Knee - Pain      HPI:  Indra Mehta is a 55 year old male who is seen in consultation at the request of Dr. Grullon.   He notes bilateral knee pain that began \"years\" ago, worsening in the past 6 months.  He notes pain over the medial knee.  He rates the pain at a  9/10 at its worst and a 2/10 currently.  Symptoms are relieved with ice. Symptoms are worsened by stairs, sit to stand transition. He endorses mild swelling, catching, popping, and instability/weakness.   He denies locking, numbness, and tingling.  Other treatment has included heat, Tylenol and ibuprofen.       Review of Systems:  Musculoskeletal: as above  Remainder of review of systems is negative including constitutional, eyes, ENT, CV, pulmonary, GI, , endocrine, skin, hematologic, and neurologic except as noted in HPI or medical history.    History reviewed. No pertinent past surgical/medical/family/social history other than as mentioned in HPI.    Patient Active Problem List   Diagnosis     Meniere's disease     Major depression in complete remission (H)     Chronic pain syndrome     CARDIOVASCULAR SCREENING; LDL GOAL LESS THAN 160     Pain medication agreement signed     Myalgia     Bilateral occipital neuralgia     Benign essential hypertension     Past Medical History:   Diagnosis Date     Back pain      Meniere's disease, unspecified      Pain medication agreement signed 8/20/2010     Past Surgical History:   Procedure Laterality Date     BUNIONECTOMY RT/LT  09/05/08    Both feet     COLONOSCOPY N/A 12/28/2016    Procedure: COMBINED COLONOSCOPY, SINGLE OR MULTIPLE BIOPSY/POLYPECTOMY BY BIOPSY; "  Surgeon: Indra Jernigan MD;  [facility]     DISCECTOMY, FUSION CERVICAL ANTERIOR ONE LEVEL, COMBINED N/A 7/5/2017    Procedure: COMBINED DISCECTOMY, FUSION CERVICAL ANTERIOR ONE LEVEL;  Cervical 6-7, Anterior cervical discectomy fusion;  Surgeon: Mike Mckenna MD;  [facility]     DISCECTOMY, FUSION CERVICAL ANTERIOR ONE LEVEL, COMBINED N/A 12/19/2018    Procedure: cervical 5-6 anterior cervical discectomy fusion;  Surgeon: Mike Mckenna MD;  [facility]     HC COLONOSCOPY W/WO BRUSH/WASH  12/27/2005     HC CREATE EARDRUM OPENING,GEN ANESTH  09/27/2007    Pe tube, Left endolymphatic sac enhancement.     HC MASTOIDECTOMY,COMPLETE  09/27/2007     HERNIORRHAPHY UMBILICAL N/A 8/11/2017    Procedure: HERNIORRHAPHY UMBILICAL;  open umbilical hernia repair;  Surgeon: Boni Story MD;  [facility]     INJECT EPIDURAL CERVICAL N/A 8/22/2019    Procedure: INJECTION, SPINE, CERVICAL 6-7 EPIDURAL;  Surgeon: Darryn Mcdaniel MD;  [facility]     INJECT EPIDURAL LUMBAR N/A 11/9/2017    Procedure: INJECT EPIDURAL LUMBAR;  lumbar 4-5 epidural steroid injection ;  Surgeon: Darryn Mcdaniel MD;  [facility]     INJECT EPIDURAL LUMBAR N/A 12/28/2017    Procedure: INJECT EPIDURAL LUMBAR;  lumbar epidural injection;  Surgeon: Darryn Mcdaniel MD;  [facility]     INJECT EPIDURAL TRANSFORAMINAL Bilateral 8/23/2018    Procedure: INJECT EPIDURAL TRANSFORAMINAL;  transforaminal bilateral lumbar 3-4 steroid injection;  Surgeon: Darryn Mcdaniel MD;  [facility]     INJECT EPIDURAL TRANSFORAMINAL Bilateral 11/8/2018    Procedure: INJECT EPIDURAL TRANSFORAMINAL lumbar 3-4;  Surgeon: Darryn Mcdaniel MD;  [facility]     INJECT EPIDURAL TRANSFORAMINAL Bilateral 6/14/2019    Procedure: INJECTION, EPIDURAL, TRANSFORAMINAL LUMBAR 3-4 BILATERAL;  Surgeon: Darryn Mcdaniel MD;  [facility]     INJECT EPIDURAL TRANSFORAMINAL Bilateral 5/22/2020    Procedure: lumbar 3-4 bilateral  transforaminal epidural steroid injection;  Surgeon: Darryn Mcdaniel MD;  [facility]     PE TUBES  2006    left ear     Family History   Problem Relation Age of Onset     Cancer - colorectal Mother      Diabetes Mother      Cardiovascular Father      Hypertension Father      Mental Illness Sister      Suicide Other      Social History     Socioeconomic History     Marital status: Single     Spouse name: Not on file     Number of children: 2     Years of education: Not on file     Highest education level: Not on file   Occupational History     Employer: [employer]   Social Needs     Financial resource strain: Not on file     Food insecurity     Worry: Not on file     Inability: Not on file     Transportation needs     Medical: Not on file     Non-medical: Not on file   Tobacco Use     Smoking status: Never Smoker     Smokeless tobacco: Never Used   Substance and Sexual Activity     Alcohol use: No     Alcohol/week: 0.0 standard drinks     Drug use: No     Sexual activity: Yes     Partners: Female   Lifestyle     Physical activity     Days per week: Not on file     Minutes per session: Not on file     Stress: Not on file   Relationships     Social connections     Talks on phone: Not on file     Gets together: Not on file     Attends Druze service: Not on file     Active member of club or organization: Not on file     Attends meetings of clubs or organizations: Not on file     Relationship status: Not on file     Intimate partner violence     Fear of current or ex partner: Not on file     Emotionally abused: Not on file     Physically abused: Not on file     Forced sexual activity: Not on file   Other Topics Concern     Parent/sibling w/ CABG, MI or angioplasty before 65F 55M? 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     Current Outpatient Medications   Medication     Acetaminophen (TYLENOL PO)     ALPRAZolam (XANAX XR) 0.5 MG 24 hr tablet     gabapentin (NEURONTIN)  "400 MG capsule     hydrochlorothiazide (HYDRODIURIL) 25 MG tablet     ibuprofen (ADVIL/MOTRIN) 800 MG tablet     indomethacin (INDOCIN) 50 MG capsule     losartan (COZAAR) 50 MG tablet     nortriptyline (PAMELOR) 50 MG capsule     order for DME     oxyCODONE (ROXICODONE) 5 MG tablet     tiZANidine (ZANAFLEX) 4 MG tablet     No current facility-administered medications for this visit.      Allergies   Allergen Reactions     No Known Drug Allergies          Objective:  /88   Ht 1.854 m (6' 1\")   Wt 100.4 kg (221 lb 6.4 oz)   BMI 29.21 kg/m      General: Alert and in no distress    Head: Normocephalic, atraumatic  Eyes: no scleral icterus or conjunctival erythema   Skin: no erythema, petechiae, or jaundice  CV: regular rhythm by palpation, 2+ distal pulses  Resp: normal respiratory effort without conversational dyspnea   Psych: normal mood and affect    Gait: Non-antalgic, appropriate coordination and balance   Neuro: Motor strength and sensation as noted below    Musculoskeletal:    Bilateral Knee exam    Inspection:  No erythema, ecchymosis, warmth, or edema    Palpation: No tenderness to palpation over the bilateral knees    Knee ROM: Full seated active knee extension and flexion bilaterally.  Right knee extension is mildly painful.      Strength:    Left:  5/5 hip flexion/abduction/adduction, 5/5 knee extension/flexion, 5/5 ankle dorsiflexion/plantarflexion  Right:  5/5 hip flexion/abduction/adduction, 5/5 knee extension/flexion, 5/5 ankle dorsiflexion/plantarflexion    Sensation:  Intact to light touch in the bilateral lower extremities      Radiology:  X-rays ordered and independent visualization of images performed and reviewed with Ketan.    KNEE AP STANDING BILATERAL   9/2/2020 2:34 PM      HISTORY: Chronic pain of both knees.      COMPARISON: None.     FINDINGS: There is no fracture or significant joint effusion. Joint  spaces are well maintained.                                                         "              IMPRESSION:  Negative single view of the bilateral knees.     HILDA DOUGLAS MD      KNEE BILATERAL ONE TO TWO VIEWS September 8, 2020 10:23 AM      HISTORY: Chronic pain of both knees.     COMPARISON: AP bilateral knee x-rays dated 9/2/2020.     FINDINGS: Moderate enthesopathic spurring of the bilateral quadriceps  tendons into the bilateral patellae are noted. No fracture,  malalignment, joint space loss, or effusion is otherwise seen.                                                                      IMPRESSION:  1. Moderate enthesopathic spurring of the bilateral quadriceps tendon  insertions into the bilateral patellae.  2. Otherwise negative bilateral knee x-rays.       Assessment:  1. Chronic pain of both knees        Plan:  Discussed the assessment with the patient and developed a plan together:  -Physical therapy referral placed.  Please do 5-6 days of exercises per week between formal sessions and the home exercises they provide.  -Patient's preferred over the counter medication as directed on packaging as needed for pain or soreness.  -Ice 15-20 minutes for pain relief or swelling as needed.  -Participate as tolerated in activities.      -Also discussed steroid injections    -Follow up as needed if symptoms fail to improve or worsen.  Please call with questions or concerns.      Yoanna Xie MD, [facility] Sports Medicine  [facility]    Again, thank you for allowing me to participate in the care of your patient.        Sincerely,        Melissa Xie MD

## 2020-09-08 NOTE — PATIENT INSTRUCTIONS
-Physical therapy referral placed.  Please do 5-6 days of exercises per week between formal sessions and the home exercises they provide.  -Patient's preferred over the counter medication as directed on packaging as needed for pain or soreness.  -Ice 15-20 minutes for pain relief or swelling as needed.  -Participate as tolerated in activities.      -Also discussed steroid injections    -Follow up as needed if symptoms fail to improve or worsen.  Please call with questions or concerns.

## 2020-09-11 ENCOUNTER — VIRTUAL VISIT (OUTPATIENT)
Dept: PALLIATIVE MEDICINE | Facility: CLINIC | Age: 55
End: 2020-09-11
Payer: COMMERCIAL

## 2020-09-11 VITALS — BODY MASS INDEX: 29.29 KG/M2 | HEIGHT: 73 IN | WEIGHT: 221 LBS

## 2020-09-11 DIAGNOSIS — M54.50 CHRONIC BILATERAL LOW BACK PAIN WITHOUT SCIATICA: Primary | ICD-10-CM

## 2020-09-11 DIAGNOSIS — M54.16 LUMBAR RADICULOPATHY: ICD-10-CM

## 2020-09-11 DIAGNOSIS — M54.2 CERVICALGIA: ICD-10-CM

## 2020-09-11 DIAGNOSIS — Z98.1 S/P CERVICAL SPINAL FUSION: ICD-10-CM

## 2020-09-11 DIAGNOSIS — M79.18 MYOFASCIAL PAIN: ICD-10-CM

## 2020-09-11 DIAGNOSIS — G89.4 CHRONIC PAIN SYNDROME: ICD-10-CM

## 2020-09-11 DIAGNOSIS — G62.9 NEUROPATHY: ICD-10-CM

## 2020-09-11 DIAGNOSIS — G89.29 CHRONIC BILATERAL LOW BACK PAIN WITHOUT SCIATICA: Primary | ICD-10-CM

## 2020-09-11 PROCEDURE — 99214 OFFICE O/P EST MOD 30 MIN: CPT | Mod: TEL | Performed by: PHYSICIAN ASSISTANT

## 2020-09-11 RX ORDER — OXYCODONE HYDROCHLORIDE 5 MG/1
TABLET ORAL
Qty: 120 TABLET | Refills: 0 | Status: SHIPPED | OUTPATIENT
Start: 2020-09-11 | End: 2020-10-09

## 2020-09-11 ASSESSMENT — MIFFLIN-ST. JEOR: SCORE: 1896.33

## 2020-09-11 ASSESSMENT — PAIN SCALES - GENERAL: PAINLEVEL: SEVERE PAIN (6)

## 2020-09-11 NOTE — PATIENT INSTRUCTIONS
After Visit Instructions:     Thank you for coming to [facility] for your care. It is my goal to partner with you to help you reach your optimal state of health.     I am recommending multidisciplinary care at this time.  The focus of care will be to continue gradual rehabilitation and pain management with medication adjustments as needed.    Continue daily self-care, identifying contributing factors, and monitoring variations in pain level. Continue to integrate self-care into your life.        Schedule follow-up with Chiara Garcia PA-C in 4 weeks.  Procedures recommended: Plan for SCS trial when approved. Referred for L3-4 bilateral transforaminal epidural steroid injection. To call and schedule your procedure, you can call: [phone], then hit option 2     Medication recommendations:    1.   Continue Gabapentin 1200 TID   2.   Continue nortriptyline to 50 mg at bedtime.     3.   Continue Tizanidine 4mg at bedtime   4.   Okay to increase Oxycodone 4/day, 120 tablets for 30 days      Chiara Garcia PA-C  [facility]  [place]    Contact information: [facility]  Clinic Number:  [phone]     Call with any questions about your care and for scheduling assistance.     Calls are returned Monday through Friday between 8 AM and 4:30 PM. We usually get back to you within 2 business days depending on the issue/request.    If we are prescribing your medications:    For opioid medication refills, call the clinic or send a [employer] message 7 days in advance.  Please include:    Name of requested medication    Name of the pharmacy.    For non-opioid medications, call your pharmacy directly to request a refill. Please allow 3-4 days to be processed.     Per [place] State Law:    All controlled substance prescriptions must be filled within 30 days of being written.      For those controlled substances allowing refills, pickup must occur within 30  days of last fill.      We believe regular attendance is key to your success in our program!      Any time you are unable to keep your appointment we ask that you call us at least 24 hours in advance to cancel.This will allow us to offer the appointment time to another patient.   Multiple missed appointments may lead to dismissal from the clinic.

## 2020-09-11 NOTE — PROGRESS NOTES
"Indra Mehta is a 54 year old male who is being evaluated via a billable telephone visit.      The patient has been notified of following:     \"This telephone visit will be conducted via a call between you and your physician/provider. We have found that certain health care needs can be provided without the need for a physical exam.  This service lets us provide the care you need with a short phone conversation.  If a prescription is necessary we can send it directly to your pharmacy.  If lab work is needed we can place an order for that and you can then stop by our lab to have the test done at a later time.    Telephone visits are billed at different rates depending on your insurance coverage. During this emergency period, for some insurers they may be billed the same as an in-person visit.  Please reach out to your insurance provider with any questions.    If during the course of the call the physician/provider feels a telephone visit is not appropriate, you will not be charged for this service.\"    Patient has given verbal consent for Telephone visit?  Yes    How would you like to obtain your AVS? Sulema     CHIEF COMPLAINT:  Pain  -neck and low back pain     INTERVAL HISTORY:  Last seen on 08/10/2020.       Recommendations/plan at the last visit included:  1. Physical Therapy:  Not at this time, but he should continue his exercises  2. Clinical Health Psychologist:  NO  3. Diagnostic Studies: none  4. Medication Management:     1.   Continue Gabapentin 1200 TID   2.   Continue nortriptyline to 50 mg at bedtime.  We can reduce to 25mg if  continued sedation in morning   3.   Continue Tizanidine 4mg at bedtime   4.   Continue Oxycodone 3-4/day, continue 105 tablets for 30 days  5. Further procedures recommended: Plan for SCS trial once approved  6. Recommendations to PCP. See above     Follow up with this provider:  4 Weeks     Since his last visit, Indra Mehta reports:    He states that overall things " "are the same. He states that his feet are \"bad\" most of the time. He states that his lower back is about the same. It is putting pain into both hips and right leg. He states that for about the last 5 days, he notices that if turns his head to the left, he is unable to go very far before he has severe pain. He has been using ice and heat and rubbing the muscles. He denies any pain going down his arms.     He does plan to proceed with a SCS trial once he can get it approved with his insurance. He would like to try another injection in his low back.       Pain Information:                   Pain today 6/10       UDS: 07/17/2020-reviewed and appropriate  Controlled Substance Agreement signed: 4/15/2019     CURRENT RELEVANT PAIN MEDICATIONS:  Gabapentin 400mg-Taking 3 capsules 3 times a day  Tizanidine 4mg-taking 1 at HS   Tylenol 500mg-1000mg 4x/day  Oxycodone-taking 3-4/day  Xanax-does not use daily, last use was about 3 weeks ago  Nortriptyline 50mg at bedtime     Patient is using the medication as prescribed:  YES  Is your medication helpful?     YES   Medication side effects? no side effect     Previous Medications: (H--helped; HI--Helped initially; SWH-- somewhat helpful, NH--No help; W--worse; SE--side effects)   Norco/vicodin H  Oxycodone H  Flexeril - was helpful at night  Robaxin - not helpful  Tizanidine H  Gabapentin ?  Lyrica SE sedation  Cymbalta ?     Past Pain Treatments:  Injections:    - 11/8/18 bilateral L3-4 TFESI - (Dr Mcdaniel)  - 8/23/18 bilateral L3-4 TFESI - seems to have worsened pain (Dr Mcdaniel)  - 6/11/18 TFESI right C5-6 - helped with arm pain and numbness  - 12/28/17 bilateral L3-4 TFESI   - 10/23/17 Bilateral L4-5 TFESI   - 11/08/2018 Bilateral L3-4 TFESI   -6/14/2019 Bilateral L3-4 TFESI   -8/22/2019 C6-7 SRIDEVI   -5/22/2020 L3-4 bilateral TLESI  Surgery:  ACDF C6-7 on 7/5/17 with improvement; ACDF C5-6 12/19/2018, posterior C5-7 fusion. Lateral mass screws, right C5-6 medial facetectomy, right " C6 foraminotomy on 10/29/2019  TENS unit: helpful     [place] Board of Pharmacy Data Base Reviewed:    YES; As expected, no concern for misuse/abuse of controlled medications based on this report.     THE 4 As OF OPIOID MAINTENANCE ANALGESIA    Analgesia: Is pain relief clinically significant? YES   Activity: Is patient functional and able to perform Activities of Daily Living? YES   Adverse effects: Is patient free from adverse side effects from opiates? YES   Adherence to Rx protocol: Is patient adhering to Controlled Substance Agreement and taking medications ONLY as ordered? YES         Is Narcan prescribed for opiate use >50 MME daily? N/A        Daily MME: 30     Medications:  Current Outpatient Prescriptions          Current Outpatient Medications   Medication Sig Dispense Refill     Acetaminophen (TYLENOL PO) Take 500 mg by mouth every 4 hours as needed for mild pain or fever         ALPRAZolam (XANAX XR) 0.5 MG 24 hr tablet Take 1 tablet (0.5 mg) by mouth At Bedtime 30 tablet 3     gabapentin (NEURONTIN) 400 MG capsule Take 3 capsules (1,200 mg) by mouth 3 times daily 270 capsule 3     losartan (COZAAR) 50 MG tablet Take 1 tablet (50 mg) by mouth daily 90 tablet 0     nortriptyline (PAMELOR) 25 MG capsule Take 1 capsule (25 mg) by mouth At Bedtime 30 capsule 0     order for DME Equipment being ordered: TENS Pads as directed 1 Device 0     oxyCODONE (ROXICODONE) 5 MG tablet Take 1 tab every 4-6 hours as needed for severe pain. MAX 4 TABS PER DAY. Okay to fill 3/12/20 start 3/18/20. 100 tablet 0     tiZANidine (ZANAFLEX) 4 MG tablet Take 1 tablet (4 mg) by mouth At Bedtime 90 tablet 0               Assessment:  Indra Mehta is a 55 year old male who presents today for follow up regarding his:      1.  Cervicalgia   2.  S/p cervical spine fusion   3.  Myofascial pain   4. Chronic low back pain   5. Neuropathy    6. Chronic pain syndrome     Worsening pain in low back. Would like to try another  injection. Will hopefully be able to proceed with SCS trial. If not, may need to consider evaluation for pain medication pump. Okay to increase Oxycodone to 4/day every day.     Plan:     Diagnosis reviewed, treatment option addressed, and risk/benifits discussed.  Self-care instructions given.  I am recommending a multidisciplinary treatment plan to help this patient better manage pain.       1. Physical Therapy:  Not at this time, but he should continue his exercises  2. Clinical Health Psychologist:  NO  3. Diagnostic Studies: none  4. Medication Management:     1.   Continue Gabapentin 1200 TID   2.   Continue nortriptyline to 50 mg at bedtime.     3.   Continue Tizanidine 4mg at bedtime   4.   Okay to increase Oxycodone 4/day, 120 tablets for 30 days  5. Further procedures recommended: Plan for SCS trial once approved. Referred for L3-4 bilateral transforaminal epidural steroid injection.  6. Recommendations to PCP. See above    Follow up with this provider: 4 Weeks     Phone call duration: 12 minutes    Chiara Garcia PA-C   [facility]

## 2020-09-15 ENCOUNTER — HOSPITAL ENCOUNTER (OUTPATIENT)
Dept: PHYSICAL THERAPY | Facility: CLINIC | Age: 55
Setting detail: THERAPIES SERIES
End: 2020-09-15
Attending: PHYSICAL MEDICINE & REHABILITATION
Payer: COMMERCIAL

## 2020-09-15 ENCOUNTER — TELEPHONE (OUTPATIENT)
Facility: CLINIC | Age: 55
End: 2020-09-15

## 2020-09-15 DIAGNOSIS — Z11.59 ENCOUNTER FOR SCREENING FOR OTHER VIRAL DISEASES: Primary | ICD-10-CM

## 2020-09-15 DIAGNOSIS — M25.561 CHRONIC PAIN OF BOTH KNEES: ICD-10-CM

## 2020-09-15 DIAGNOSIS — G89.29 CHRONIC PAIN OF BOTH KNEES: ICD-10-CM

## 2020-09-15 DIAGNOSIS — M25.562 CHRONIC PAIN OF BOTH KNEES: ICD-10-CM

## 2020-09-15 PROCEDURE — 97161 PT EVAL LOW COMPLEX 20 MIN: CPT | Mod: GP | Performed by: PHYSICAL THERAPIST

## 2020-09-15 PROCEDURE — 97110 THERAPEUTIC EXERCISES: CPT | Mod: GP | Performed by: PHYSICAL THERAPIST

## 2020-09-15 NOTE — TELEPHONE ENCOUNTER
Pt scheduled for TESI  Date: 9/24/20  Time: 1045  Dr. Mcdaniel    Instructed pt to have H&P and  for procedure.  Patient informed of COVID testing process.

## 2020-09-15 NOTE — PROGRESS NOTES
09/15/20 1424   General Information   Type of Visit Initial OP Ortho PT Evaluation   Start of Care Date 09/15/20   Referring Physician rodrigo aguero MD    Patient/Family Goals Statement knee pain    Orders Evaluate and Treat   Date of Order 09/08/20   Certification Required? No   Medical Diagnosis chronic pain of B knee M25.561   Surgical/Medical history reviewed Yes   Precautions/Limitations no known precautions/limitations   Body Part(s)   Body Part(s) Knee   Presentation and Etiology   Pertinent history of current problem (include personal factors and/or comorbidities that impact the POC) patient seen due to chronic B knee pain . has the most pain with stairs .PMH cervical fusion and low back pain . HTN   Impairments A. Pain;H. Impaired gait   Functional Limitations perform activities of daily living;perform required work activities;perform desired leisure / sports activities   Onset date of current episode/exacerbation 09/15/19   Chronicity Chronic   Pain rating (0-10 point scale) Best (/10);Worst (/10)   Best (/10) 2/10   Worst (/10) 8/10   Pain quality A. Sharp;B. Dull;C. Aching   Frequency of pain/symptoms A. Constant   Pain/symptoms exacerbated by G. Certain positions  (stairs )   Pain/symptoms eased by C. Rest;E. Changing positions;H. Cold   Progression of symptoms since onset: Worsened  (increase pain and less tolerate of activity )   Current / Previous Interventions   Diagnostic Tests: X-ray   Prior Level of Function   Functional Level Prior Comment snap 3x week    Current Level of Function   Current Community Support Family/friend caregiver   Patient role/employment history A. Employed   Fall Risk Screen   Fall screen completed by PT   Have you fallen 2 or more times in the past year? No   Have you fallen and had an injury in the past year? No   Is patient a fall risk? No   Knee Objective Findings   Side (if bilateral, select both right and left) Right;Left   Observation     Knee ROM Comment full  AROM    Anterior Drawer Test neg   Posterior Drawer Test neg   Varus Stress Test neg   Valgus Stress Test neg   Jasmin's Test neg   Palpation pain is anterior superior patella    Right Hamstring Flexibility B 30    Right Quadricep Flexibility B 7 inch    Left Knee Flexion Strength sit stand 5x 19 seconds and increase knee pain    Planned Therapy Interventions   Planned Therapy Interventions ADL retraining;ROM;strengthening;stretching   Planned Modality Interventions   Planned Modality Interventions Comments modalities as needed    Clinical Impression   Criteria for Skilled Therapeutic Interventions Met yes, treatment indicated   PT Diagnosis B chronic knee pain    Functional limitations due to impairments LEFS 28/80   Clinical Presentation Stable/Uncomplicated   Clinical Presentation Rationale patient seen due to chronic B anterior knee pain , Rx is instruction in HEP for gentle strengthening and stretching    Clinical Decision Making (Complexity) Low complexity   Predicted Duration of Therapy Intervention (days/wks) recheck in 3 weeks , 4 Rx over 2 months   Risk & Benefits of therapy have been explained Yes   Patient, Family & other staff in agreement with plan of care Yes   Education Assessment   Preferred Learning Style Listening;Reading;Demonstration;Pictures/video   Barriers to Learning No barriers   ORTHO GOALS   PT Ortho Eval Goals 1;2   Ortho Goal 1   Goal Identifier 1   Goal Description instruction in HEP and compliant with it 5 of 7 days to improve quality of life    Target Date 11/15/20   Ortho Goal 2   Goal Identifier 2   Goal Description patient to report overall decrease in pain , decrease 50% of 8/10 pain , currently pain 2-8/10   Target Date 11/15/20   Total Evaluation Time   PT Brionna Low Complexity Minutes (10492) 15

## 2020-09-17 DIAGNOSIS — H93.A9 PULSATILE TINNITUS: ICD-10-CM

## 2020-09-17 RX ORDER — ALPRAZOLAM 0.5 MG/1
0.5 TABLET, EXTENDED RELEASE ORAL AT BEDTIME
Qty: 30 TABLET | Refills: 3 | Status: SHIPPED | OUTPATIENT
Start: 2020-09-17 | End: 2021-12-14

## 2020-09-17 NOTE — TELEPHONE ENCOUNTER
alprazolam  Last Written Prescription Date:  12/24/19  Last Fill Quantity: 30,  # refills: 3   Last office visit: 1/30/2020 with prescribing provider:  1/30/2020   Future Office Visit:   Next 5 appointments (look out 90 days)    Sep 21, 2020 11:20 AM CDT  Pre-[facility] Jg [facility]-C  [facility] [address] [place] [id number] [phone]           Requested Prescriptions   Pending Prescriptions Disp Refills     ALPRAZolam (XANAX XR) 0.5 MG 24 hr tablet 30 tablet 3     Sig: Take 1 tablet (0.5 mg) by mouth At Bedtime       There is no refill protocol information for this order          Unable to fill, not on protocol, routing to provider.    Danica KIMBLE, Lead RN, BSN. . .  9/17/2020, 10:27 AM

## 2020-09-21 ENCOUNTER — OFFICE VISIT (OUTPATIENT)
Dept: FAMILY MEDICINE | Facility: CLINIC | Age: 55
End: 2020-09-21
Payer: COMMERCIAL

## 2020-09-21 VITALS
RESPIRATION RATE: 18 BRPM | DIASTOLIC BLOOD PRESSURE: 78 MMHG | OXYGEN SATURATION: 96 % | BODY MASS INDEX: 29.33 KG/M2 | SYSTOLIC BLOOD PRESSURE: 136 MMHG | HEART RATE: 80 BPM | TEMPERATURE: 95.7 F | HEIGHT: 73 IN | WEIGHT: 221.3 LBS

## 2020-09-21 DIAGNOSIS — M54.50 LUMBAR BACK PAIN: ICD-10-CM

## 2020-09-21 DIAGNOSIS — Z01.818 PREOP GENERAL PHYSICAL EXAM: Primary | ICD-10-CM

## 2020-09-21 DIAGNOSIS — Z11.59 ENCOUNTER FOR SCREENING FOR OTHER VIRAL DISEASES: ICD-10-CM

## 2020-09-21 LAB
ANION GAP SERPL CALCULATED.3IONS-SCNC: 4 MMOL/L (ref 3–14)
BASOPHILS # BLD AUTO: 0 10E9/L (ref 0–0.2)
BASOPHILS NFR BLD AUTO: 0.7 %
BUN SERPL-MCNC: 8 MG/DL (ref 7–30)
CALCIUM SERPL-MCNC: 9.4 MG/DL (ref 8.5–10.1)
CHLORIDE SERPL-SCNC: 104 MMOL/L (ref 94–109)
CO2 SERPL-SCNC: 31 MMOL/L (ref 20–32)
CREAT SERPL-MCNC: 1.09 MG/DL (ref 0.66–1.25)
DIFFERENTIAL METHOD BLD: NORMAL
EOSINOPHIL NFR BLD AUTO: 1.9 %
ERYTHROCYTE [DISTWIDTH] IN BLOOD BY AUTOMATED COUNT: 12.2 % (ref 10–15)
GFR SERPL CREATININE-BSD FRML MDRD: 76 ML/MIN/{1.73_M2}
GLUCOSE SERPL-MCNC: 87 MG/DL (ref 70–99)
HCT VFR BLD AUTO: 46.3 % (ref 40–53)
HGB BLD-MCNC: 15.6 G/DL (ref 13.3–17.7)
IMM GRANULOCYTES # BLD: 0 10E9/L (ref 0–0.4)
IMM GRANULOCYTES NFR BLD: 0.7 %
LYMPHOCYTES # BLD AUTO: 1.5 10E9/L (ref 0.8–5.3)
LYMPHOCYTES NFR BLD AUTO: 25.8 %
MCH RBC QN AUTO: 30.8 PG (ref 26.5–33)
MCHC RBC AUTO-ENTMCNC: 33.7 G/DL (ref 31.5–36.5)
MCV RBC AUTO: 92 FL (ref 78–100)
MONOCYTES # BLD AUTO: 0.6 10E9/L (ref 0–1.3)
MONOCYTES NFR BLD AUTO: 10.6 %
NEUTROPHILS # BLD AUTO: 3.5 10E9/L (ref 1.6–8.3)
NEUTROPHILS NFR BLD AUTO: 60.3 %
NRBC # BLD AUTO: 0 10*3/UL
NRBC BLD AUTO-RTO: 0 /100
PLATELET # BLD AUTO: 238 10E9/L (ref 150–450)
POTASSIUM SERPL-SCNC: 4.3 MMOL/L (ref 3.4–5.3)
RBC # BLD AUTO: 5.06 10E12/L (ref 4.4–5.9)
SODIUM SERPL-SCNC: 139 MMOL/L (ref 133–144)
WBC # BLD AUTO: 5.7 10E9/L (ref 4–11)

## 2020-09-21 PROCEDURE — 80048 BASIC METABOLIC PNL TOTAL CA: CPT | Performed by: PHYSICIAN ASSISTANT

## 2020-09-21 PROCEDURE — U0003 INFECTIOUS AGENT DETECTION BY NUCLEIC ACID (DNA OR RNA); SEVERE ACUTE RESPIRATORY SYNDROME CORONAVIRUS 2 (SARS-COV-2) (CORONAVIRUS DISEASE [COVID-19]), AMPLIFIED PROBE TECHNIQUE, MAKING USE OF HIGH THROUGHPUT TECHNOLOGIES AS DESCRIBED BY CMS-2020-01-R: HCPCS | Performed by: ANESTHESIOLOGY

## 2020-09-21 PROCEDURE — 85025 COMPLETE CBC W/AUTO DIFF WBC: CPT | Performed by: PHYSICIAN ASSISTANT

## 2020-09-21 PROCEDURE — 93000 ELECTROCARDIOGRAM COMPLETE: CPT | Performed by: PHYSICIAN ASSISTANT

## 2020-09-21 PROCEDURE — 99214 OFFICE O/P EST MOD 30 MIN: CPT | Performed by: PHYSICIAN ASSISTANT

## 2020-09-21 PROCEDURE — 36415 COLL VENOUS BLD VENIPUNCTURE: CPT | Performed by: PHYSICIAN ASSISTANT

## 2020-09-21 ASSESSMENT — PAIN SCALES - GENERAL: PAINLEVEL: MODERATE PAIN (5)

## 2020-09-21 ASSESSMENT — MIFFLIN-ST. JEOR: SCORE: 1897.69

## 2020-09-21 NOTE — NURSING NOTE
Health Maintenance Due   Topic Date Due     HIV SCREENING  07[birth year]01[birth year]1981     PREVENTIVE CARE VISIT  11/29/2006     ZOSTER IMMUNIZATION (1 of 2) 07/01/2016     LIPID  03/29/2018     INFLUENZA VACCINE (1) 09/01/2020     CREATININE  10/14/2020     Deysi BARDALES LPN

## 2020-09-21 NOTE — PROGRESS NOTES
[address] [address]  Phone: [phone]  Fax: [phone]  Primary Provider: Tha Grullon  Pre-op Performing Provider: DOLORES CHRISTOPHER    PREOPERATIVE EVALUATION:  Today's date: 9/21/2020    Indra Mehta is a 55 year old male who presents for a preoperative evaluation.    Surgical Information:  Surgery Details 9/21/2020   Surgery/Procedure: INJECTION, EPIDURAL, TRANSFORAMINAL  LUMBAR 3-4 APPROACH    Surgery Location: [facility]   Surgeon: violeta   Surgery Date: 9/24/2020   Time of Surgery: 10:45   Where patient plans to recover: At home with family     Fax number for surgical facility: Note does not need to be faxed, will be available electronically in Epic.  Type of Anesthesia Anticipated: Monitor Anesthesia Care  Subjective     HPI related to upcoming procedure:   Patient is a 55 year old male who presents today for pre-operative evaluation. He is scheduled for a transforaminal L3-4 epidural injection on 09/24 with Violeta.     Preop Questions 9/21/2020   1. Have you ever had a heart attack or stroke? No   2. Have you ever had surgery on your heart or blood vessels, such as a stent placement, a coronary artery bypass, or surgery on an artery in your head, neck, heart, or legs? No   3. Do you have chest pain with activity? No   4. Do you have a history of  heart failure? No   5. Do you currently have a cold, bronchitis or symptoms of other infection? No   6. Do you have a cough, shortness of breath, or wheezing? No   7. Do you or anyone in your family have previous history of blood clots? No   8. Do you or does anyone in your family have a serious bleeding problem such as prolonged bleeding following surgeries or cuts? No   9. Have you ever had problems with anemia or been told to take iron pills? No   10. Have you had any abnormal blood loss such as black, tarry or bloody stools? No   11. Have you ever had a blood transfusion? No   Are you willing to  have a blood transfusion if it is medically needed before, during, or after your surgery? Yes   13. Have you or any of your relatives ever had problems with anesthesia? No   14. Do you have sleep apnea, excessive snoring or daytime drowsiness? YES - sleep apnea   14a. Do you have a CPAP machine? Yes   15. Do you have any artifical heart valves or other implanted medical devices like a pacemaker, defibrillator, or continuous glucose monitor? No   16. Do you have artificial joints? No   17. Are you allergic to latex? No         Review of Systems  Constitutional, neuro, ENT, endocrine, pulmonary, cardiac, gastrointestinal, genitourinary, musculoskeletal, integument and psychiatric systems are negative, except as otherwise noted.    Patient Active Problem List    Diagnosis Date Noted     Benign essential hypertension 06/30/2017     Priority: Medium     Myalgia 10/28/2016     Priority: Medium     Bilateral occipital neuralgia 10/28/2016     Priority: Medium     CARDIOVASCULAR SCREENING; LDL GOAL LESS THAN 160 10/31/2010     Priority: Medium     Major depression in complete remission (H) 08/20/2010     Priority: Medium     Chronic pain syndrome 08/20/2010     Priority: Medium     Patient is followed by Tha Grullon MD for ongoing prescription of pain medication.  All refills should be approved by this provider only at face-to-face appointments - not by phone request.    Medication(s): Tramadol.   Maximum quantity per month: 60  Clinic visit frequency required: Q 1 months     Controlled substance agreement:  Encounter-Level CSA - 10/13/16:               Controlled Substance Agreement - Scan on 10/23/2016  5:07 PM : CONTROLLED SUBSTANCE AGREEMENT 10/13/16 (below)            Pain Clinic evaluation in the past: Yes       Date/Location:   [facility]    DIRE Total Score(s):  No flowsheet data found.    Last [facility] website verification:  none   [email or web address]               Pain medication agreement  signed 08/20/2010     Priority: Medium     Meniere's disease 03/22/2007     Priority: Medium     Problem list name updated by automated process. Provider to review        Past Medical History:   Diagnosis Date     Back pain      Meniere's disease, unspecified      Pain medication agreement signed 8/20/2010     Past Surgical History:   Procedure Laterality Date     BUNIONECTOMY RT/LT  09/05/08    Both feet     COLONOSCOPY N/A 12/28/2016    Procedure: COMBINED COLONOSCOPY, SINGLE OR MULTIPLE BIOPSY/POLYPECTOMY BY BIOPSY;  Surgeon: Indra Jernigan MD;  [facility]     DISCECTOMY, FUSION CERVICAL ANTERIOR ONE LEVEL, COMBINED N/A 7/5/2017    Procedure: COMBINED DISCECTOMY, FUSION CERVICAL ANTERIOR ONE LEVEL;  Cervical 6-7, Anterior cervical discectomy fusion;  Surgeon: Mike Mckenna MD;  [facility]     DISCECTOMY, FUSION CERVICAL ANTERIOR ONE LEVEL, COMBINED N/A 12/19/2018    Procedure: cervical 5-6 anterior cervical discectomy fusion;  Surgeon: Mike Mckenna MD;  [facility]     HC COLONOSCOPY W/WO BRUSH/WASH  12/27/2005     HC CREATE EARDRUM OPENING,GEN ANESTH  09/27/2007    Pe tube, Left endolymphatic sac enhancement.     HC MASTOIDECTOMY,COMPLETE  09/27/2007     HERNIORRHAPHY UMBILICAL N/A 8/11/2017    Procedure: HERNIORRHAPHY UMBILICAL;  open umbilical hernia repair;  Surgeon: Boni Story MD;  [facility]     INJECT EPIDURAL CERVICAL N/A 8/22/2019    Procedure: INJECTION, SPINE, CERVICAL 6-7 EPIDURAL;  Surgeon: Darryn Mcdaniel MD;  [facility]     INJECT EPIDURAL LUMBAR N/A 11/9/2017    Procedure: INJECT EPIDURAL LUMBAR;  lumbar 4-5 epidural steroid injection ;  Surgeon: Darryn Mcdaniel MD;  [facility]     INJECT EPIDURAL LUMBAR N/A 12/28/2017    Procedure: INJECT EPIDURAL LUMBAR;  lumbar epidural injection;  Surgeon: Darryn Mcdaniel MD;  [facility]     INJECT EPIDURAL TRANSFORAMINAL Bilateral 8/23/2018    Procedure: INJECT EPIDURAL TRANSFORAMINAL;   transforaminal bilateral lumbar 3-4 steroid injection;  Surgeon: Darryn Mcdaniel MD;  [facility]     INJECT EPIDURAL TRANSFORAMINAL Bilateral 11/8/2018    Procedure: INJECT EPIDURAL TRANSFORAMINAL lumbar 3-4;  Surgeon: Darryn Mcdaniel MD;  [facility]     INJECT EPIDURAL TRANSFORAMINAL Bilateral 6/14/2019    Procedure: INJECTION, EPIDURAL, TRANSFORAMINAL LUMBAR 3-4 BILATERAL;  Surgeon: Darryn Mcdaniel MD;  [facility]     INJECT EPIDURAL TRANSFORAMINAL Bilateral 5/22/2020    Procedure: lumbar 3-4 bilateral transforaminal epidural steroid injection;  Surgeon: Darryn Mcdaniel MD;  [facility]     PE TUBES  2006    left ear     Current Outpatient Medications   Medication Sig Dispense Refill     Acetaminophen (TYLENOL PO) Take 1,000 mg by mouth 3 times daily        gabapentin (NEURONTIN) 400 MG capsule Take 3 capsules (1,200 mg) by mouth 3 times daily 270 capsule 3     hydrochlorothiazide (HYDRODIURIL) 25 MG tablet Take 1 tablet (25 mg) by mouth daily 30 tablet 1     ibuprofen (ADVIL/MOTRIN) 800 MG tablet Take 800 mg by mouth 3 times daily       losartan (COZAAR) 50 MG tablet Take 2 tablets (100 mg) by mouth daily 180 tablet 0     nortriptyline (PAMELOR) 50 MG capsule Take 1 capsule (50 mg) by mouth At Bedtime 30 capsule 2     order for DME Equipment being ordered: TENS Pads as directed 1 Device 0     oxyCODONE (ROXICODONE) 5 MG tablet Take 1 tablet every 4-6 hours as needed for severe pain. Max 4/day. Fill 09/11/2020, start 09/14/2020 120 tablet 0     tiZANidine (ZANAFLEX) 4 MG tablet Take 1 tablet (4 mg) by mouth At Bedtime 90 tablet 2     ALPRAZolam (XANAX XR) 0.5 MG 24 hr tablet Take 1 tablet (0.5 mg) by mouth At Bedtime (Patient not taking: Reported on 9/21/2020) 30 tablet 3       Allergies   Allergen Reactions     No Known Drug Allergies         Social History     Tobacco Use     Smoking status: Never Smoker     Smokeless tobacco: Never Used   Substance Use Topics     Alcohol use: No      "Alcohol/week: 0.0 standard drinks       History   Drug Use No            Objective   /78 (BP Location: Left arm, Patient Position: Chair, Cuff Size: Adult Large)   Pulse 80   Temp 95.7  F (35.4  C) (Temporal)   Resp 18   Ht 1.854 m (6' 1\")   Wt 100.4 kg (221 lb 4.8 oz)   SpO2 96%   BMI 29.20 kg/m    Physical Exam    GENERAL APPEARANCE: healthy, alert and no distress     EYES: EOMI,  PERRL     HENT: ear canals and TM's normal and nose and mouth without ulcers or lesions     NECK: no adenopathy, no asymmetry, masses, or scars and thyroid normal to palpation     RESP: lungs clear to auscultation - no rales, rhonchi or wheezes     CV: regular rates and rhythm, normal S1 S2, no S3 or S4 and no murmur, click or rub     ABDOMEN:  soft, nontender, no HSM or masses and bowel sounds normal     MS: extremities normal- no gross deformities noted, no evidence of inflammation in joints, FROM in all extremities.     SKIN: no suspicious lesions or rashes     NEURO: Normal strength and tone, sensory exam grossly normal, mentation intact and speech normal     PSYCH: mentation appears normal. and affect normal/bright    Recent Labs   Lab Test 10/14/19  1602 06/11/19  1113   HGB 15.4 14.5    248    141   POTASSIUM 3.9 3.9   CR 0.94 0.95        PRE-OP Diagnostics:  Labs pending at this time.  Results will be reviewed when available.  EKG: appears normal, NSR, normal axis, normal intervals, no acute ST/T changes c/w ischemia, no LVH by voltage criteria, unchanged from previous tracings         Assessment & Plan   The proposed surgical procedure is considered INTERMEDIATE risk.    REVISED CARDIAC RISK INDEX  The patient has the following serious cardiovascular risks for perioperative complications:  No serious cardiac risks = 0 points    INTERPRETATION: 0 points: Class I (very low risk - 0.4% complication rate)         ICD-10-CM    1. Preop general physical exam  Z01.818    2. Lumbar back pain  M54.5 EKG 12-lead " complete w/read - Clinics     CBC with platelets and differential     Basic metabolic panel  (Ca, Cl, CO2, Creat, Gluc, K, Na, BUN)       The patient has the following additional risks and recommendations for perioperative complications:     - No identified additional risk factors other than previously addressed      MEDICATION INSTRUCTIONS:  Patient is to HOLD all scheduled medications on the day of surgery    RECOMMENDATION:  APPROVAL GIVEN to proceed with proposed procedure, without further diagnostic evaluation.    No follow-ups on file.    Signed Electronically by: Jg Mckay PA-C    Copy of this evaluation report is provided to requesting physician.    [facility] Guidelines    Revised Cardiac Risk Index

## 2020-09-21 NOTE — PATIENT INSTRUCTIONS
Before Your Surgery      Call your surgeon if there is any change in your health. This includes signs of a cold or flu (such as a sore throat, runny nose, cough, rash or fever).    A surgery nurse will call you to review your health history and instructions. They will give you an arrival time based on your scheduled surgery time.    Please be ready to share the following:    Your doctor's clinic name and phone number    Your medical, surgical and anesthesia history    A list of allergies and sensitivities    A list of medicines, including herbal treatments and over-the-counter drugs    Whether the patient has a legal guardian (ask how to send us the papers in advance)    Do not smoke, drink alcohol or take over the counter medicine (unless your surgeon or primary care doctor tells you to) for the 24 hours before and after surgery.  o No smoking 2 weeks prior and 2 weeks after surgery to improve healing  o No alcohol 24 hours prior to surgery to reduce bleed risk  o No NSAIDs (ibuprofen, advil, motrin, aleve, naproxen, aspirin) 7 days prior to surgery to reduce bleed risk  - Tylenol or acetaminophen is A-OK    If you take prescribed drugs: Follow your doctor s orders about which medicines to take and which to stop until after surgery.  o HOLD all medications day of procedure    Eating and drinking prior to surgery: follow the instructions from your surgeon    Eat and drink as usual until 8 hours before surgery. After that, no food or milk.    Drink clear liquids until 2 hours before surgery. These are liquids you can see through, like water, Gatorade and Propel Water. You may also have black coffee and tea (no cream or milk).    Nothing by mouth within 2 hours of surgery. This includes gum, candy and breath mints.  Preventing infection    Shower or bathe the night before and morning of your surgery. Follow the instructions your clinic gave you. (If no instructions, use regular soap.)    Don't shave or clip hair near  your surgery site. This can lead to skin infection.    Your care team will make every effort to keep you safe from infection. We will:  ? Clean our hands often with soap and water (or an alcohol-based hand rub).  ? Clean the skin at your surgery site with a special soap that kills germs. We'll also remove hair from the site as needed.  ? Wear special hair covers, masks, gowns and gloves during surgery.  ? Give antibiotic medicine, if prescribed. Not all surgeries need antibiotics.  What to bring on the day of surgery    Photo ID and insurance card    Copy of your health care directive, if you have one    Glasses and hearing aides (bring cases)  ? You can't wear contacts during surgery    Inhaler and eye drops, if you use them (tell us about these when you arrive)    CPAP machine or breathing device, if you use them    A few personal items, if spending the night    If you have . . .  ? A pacemaker or ICD (cardiac defibrillator): Bring the ID card.  ? An implanted stimulator: Bring the remote control.  ? A legal guardian: Bring a copy of the certified (court-stamped) guardianship papers.  Please remove any jewelry, including body piercings. Leave jewelry and other valuables at home.  If you're going home the day of surgery  Important: If you don't follow the rules below, we must cancel your surgery.     Arrange for someone to drive you home after surgery. You may not drive, take a taxi or take public transportation by yourself (unless you'll have local anesthesia only).    Arrange for a responsible adult to stay with you overnight. If you don't, we may keep you in the hospital overnight, and you may need to pay the costs yourself.

## 2020-09-22 LAB
SARS-COV-2 RNA SPEC QL NAA+PROBE: NOT DETECTED
SPECIMEN SOURCE: NORMAL

## 2020-09-23 ENCOUNTER — ANESTHESIA EVENT (OUTPATIENT)
Dept: SURGERY | Facility: CLINIC | Age: 55
End: 2020-09-23
Payer: COMMERCIAL

## 2020-09-24 ENCOUNTER — HOSPITAL ENCOUNTER (OUTPATIENT)
Dept: GENERAL RADIOLOGY | Facility: CLINIC | Age: 55
End: 2020-09-24
Attending: ANESTHESIOLOGY | Admitting: ANESTHESIOLOGY
Payer: COMMERCIAL

## 2020-09-24 ENCOUNTER — ANESTHESIA (OUTPATIENT)
Dept: SURGERY | Facility: CLINIC | Age: 55
End: 2020-09-24
Payer: COMMERCIAL

## 2020-09-24 ENCOUNTER — HOSPITAL ENCOUNTER (OUTPATIENT)
Facility: CLINIC | Age: 55
Discharge: HOME | End: 2020-09-24
Attending: ANESTHESIOLOGY | Admitting: ANESTHESIOLOGY
Payer: COMMERCIAL

## 2020-09-24 VITALS
TEMPERATURE: 97.8 F | RESPIRATION RATE: 18 BRPM | SYSTOLIC BLOOD PRESSURE: 136 MMHG | OXYGEN SATURATION: 98 % | HEART RATE: 86 BPM | DIASTOLIC BLOOD PRESSURE: 94 MMHG

## 2020-09-24 DIAGNOSIS — M54.16 LUMBAR RADICULOPATHY: ICD-10-CM

## 2020-09-24 PROCEDURE — 999N000179 XR SURGERY CARM FLUORO LESS THAN 5 MIN W STILLS: Mod: TC

## 2020-09-24 PROCEDURE — 25000128 H RX IP 250 OP 636: Performed by: NURSE ANESTHETIST, CERTIFIED REGISTERED

## 2020-09-24 PROCEDURE — 25000125 ZZHC RX 250: Performed by: NURSE ANESTHETIST, CERTIFIED REGISTERED

## 2020-09-24 PROCEDURE — 64483 NJX AA&/STRD TFRM EPI L/S 1: CPT | Mod: 50 | Performed by: ANESTHESIOLOGY

## 2020-09-24 PROCEDURE — 25000128 H RX IP 250 OP 636: Performed by: ANESTHESIOLOGY

## 2020-09-24 PROCEDURE — 37000008 ZZH ANESTHESIA TECHNICAL FEE, 1ST 30 MIN: Performed by: ANESTHESIOLOGY

## 2020-09-24 PROCEDURE — 64483 NJX AA&/STRD TFRM EPI L/S 1: CPT | Performed by: ANESTHESIOLOGY

## 2020-09-24 RX ORDER — IOPAMIDOL 612 MG/ML
INJECTION, SOLUTION INTRATHECAL PRN
Status: DISCONTINUED | OUTPATIENT
Start: 2020-09-24 | End: 2020-09-24 | Stop reason: HOSPADM

## 2020-09-24 RX ORDER — TRIAMCINOLONE ACETONIDE 40 MG/ML
INJECTION, SUSPENSION INTRA-ARTICULAR; INTRAMUSCULAR PRN
Status: DISCONTINUED | OUTPATIENT
Start: 2020-09-24 | End: 2020-09-24 | Stop reason: HOSPADM

## 2020-09-24 RX ORDER — PROPOFOL 10 MG/ML
INJECTION, EMULSION INTRAVENOUS PRN
Status: DISCONTINUED | OUTPATIENT
Start: 2020-09-24 | End: 2020-09-24

## 2020-09-24 RX ORDER — LIDOCAINE HYDROCHLORIDE 20 MG/ML
INJECTION, SOLUTION INFILTRATION; PERINEURAL PRN
Status: DISCONTINUED | OUTPATIENT
Start: 2020-09-24 | End: 2020-09-24

## 2020-09-24 RX ORDER — LIDOCAINE 40 MG/G
CREAM TOPICAL
Status: DISCONTINUED | OUTPATIENT
Start: 2020-09-24 | End: 2020-09-24 | Stop reason: HOSPADM

## 2020-09-24 RX ADMIN — LIDOCAINE HYDROCHLORIDE 60 MG: 20 INJECTION, SOLUTION INFILTRATION; PERINEURAL at 11:25

## 2020-09-24 RX ADMIN — LIDOCAINE HYDROCHLORIDE 0.1 ML: 10 INJECTION, SOLUTION EPIDURAL; INFILTRATION; INTRACAUDAL; PERINEURAL at 11:20

## 2020-09-24 RX ADMIN — PROPOFOL 100 MG: 10 INJECTION, EMULSION INTRAVENOUS at 11:27

## 2020-09-24 RX ADMIN — PROPOFOL 50 MG: 10 INJECTION, EMULSION INTRAVENOUS at 11:29

## 2020-09-24 NOTE — ANESTHESIA PREPROCEDURE EVALUATION
Anesthesia Pre-Procedure Evaluation    Patient: Indra Mehta   MRN: [id number] : 1965          Preoperative Diagnosis: Lumbar radiculopathy [M54.16]    Procedure(s):  INJECTION, EPIDURAL, TRANSFORAMINAL  LUMBAR 3-4 APPROACH    Past Medical History:   Diagnosis Date     Back pain      Meniere's disease, unspecified      Pain medication agreement signed 2010     Past Surgical History:   Procedure Laterality Date     BUNIONECTOMY RT/LT  08    Both feet     COLONOSCOPY N/A 2016    Procedure: COMBINED COLONOSCOPY, SINGLE OR MULTIPLE BIOPSY/POLYPECTOMY BY BIOPSY;  Surgeon: Indra Jernigan MD;  [facility]     DISCECTOMY, FUSION CERVICAL ANTERIOR ONE LEVEL, COMBINED N/A 2017    Procedure: COMBINED DISCECTOMY, FUSION CERVICAL ANTERIOR ONE LEVEL;  Cervical 6-7, Anterior cervical discectomy fusion;  Surgeon: Mike Mckenna MD;  [facility]     DISCECTOMY, FUSION CERVICAL ANTERIOR ONE LEVEL, COMBINED N/A 2018    Procedure: cervical 5-6 anterior cervical discectomy fusion;  Surgeon: Mike Mckenna MD;  [facility]     HC COLONOSCOPY W/WO BRUSH/WASH  2005     HC CREATE EARDRUM OPENING,GEN ANESTH  2007    Pe tube, Left endolymphatic sac enhancement.     HC MASTOIDECTOMY,COMPLETE  2007     HERNIORRHAPHY UMBILICAL N/A 2017    Procedure: HERNIORRHAPHY UMBILICAL;  open umbilical hernia repair;  Surgeon: Boni Story MD;  [facility]     INJECT EPIDURAL CERVICAL N/A 2019    Procedure: INJECTION, SPINE, CERVICAL 6-7 EPIDURAL;  Surgeon: Darryn Mcdaniel MD;  [facility]     INJECT EPIDURAL LUMBAR N/A 2017    Procedure: INJECT EPIDURAL LUMBAR;  lumbar 4-5 epidural steroid injection ;  Surgeon: Darryn Mcdaniel MD;  [facility]     INJECT EPIDURAL LUMBAR N/A 2017    Procedure: INJECT EPIDURAL LUMBAR;  lumbar epidural injection;  Surgeon: Darryn Mcdaniel MD;  [facility]     INJECT EPIDURAL TRANSFORAMINAL Bilateral  8/23/2018    Procedure: INJECT EPIDURAL TRANSFORAMINAL;  transforaminal bilateral lumbar 3-4 steroid injection;  Surgeon: Darryn Mcdaniel MD;  [facility]     INJECT EPIDURAL TRANSFORAMINAL Bilateral 11/8/2018    Procedure: INJECT EPIDURAL TRANSFORAMINAL lumbar 3-4;  Surgeon: Darryn Mcdaniel MD;  [facility]     INJECT EPIDURAL TRANSFORAMINAL Bilateral 6/14/2019    Procedure: INJECTION, EPIDURAL, TRANSFORAMINAL LUMBAR 3-4 BILATERAL;  Surgeon: Darryn Mcdaniel MD;  [facility]     INJECT EPIDURAL TRANSFORAMINAL Bilateral 5/22/2020    Procedure: lumbar 3-4 bilateral transforaminal epidural steroid injection;  Surgeon: Darryn Mcdaniel MD;  [facility]     PE TUBES  2006    left ear       Anesthesia Evaluation     . Pt has had prior anesthetic. Type: General and MAC    No history of anesthetic complications          ROS/MED HX    ENT/Pulmonary:  - neg pulmonary ROS     Neurologic:     (+)neuropathy (bilateral occipital neuralgia) - Bilateral occipital neuralgia, other neuro Menieres disease    Cardiovascular:     (+) Dyslipidemia, hypertension----. : . . . :. . Previous cardiac testing date:results:date: results:ECG reviewed date:6-11-19 results:NSR date: results:          METS/Exercise Tolerance:  >4 METS   Hematologic:  - neg hematologic  ROS       Musculoskeletal:   (+)  other musculoskeletal- myalgia/back pain      GI/Hepatic:  - neg GI/hepatic ROS      (-) GERD   Renal/Genitourinary:  - ROS Renal section negative   (+) Pt has no history of transplant,       Endo:  - neg endo ROS       Psychiatric:     (+) psychiatric history depression      Infectious Disease:  - neg infectious disease ROS       Malignancy:      - no malignancy   Other:    (+) No chance of pregnancy C-spine cleared: Yes, H/O Chronic Pain,H/O chronic opiod use , no other significant disability                         Physical Exam  Normal systems: cardiovascular, pulmonary and dental    Airway   Mallampati: II  TM distance: >3  "FB  Neck ROM: full    Dental     Cardiovascular   Rhythm and rate: regular and normal      Pulmonary    breath sounds clear to auscultation    Other findings: Took Losartan and hydrochlorothiazide 9/23        Lab Results   Component Value Date    WBC 5.7 09/21/2020    HGB 15.6 09/21/2020    HCT 46.3 09/21/2020     09/21/2020    CRP <2.9 01/26/2018     09/21/2020    POTASSIUM 4.3 09/21/2020    CHLORIDE 104 09/21/2020    CO2 31 09/21/2020    BUN 8 09/21/2020    CR 1.09 09/21/2020    GLC 87 09/21/2020    KRISHAN 9.4 09/21/2020    MAG 2.2 03/19/2007    TSH 2.00 03/24/2016       Preop Vitals  BP Readings from Last 3 Encounters:   09/21/20 136/78   09/08/20 131/88   09/02/20 (!) 160/88    Pulse Readings from Last 3 Encounters:   09/21/20 80   09/02/20 92   08/14/20 101      Resp Readings from Last 3 Encounters:   09/21/20 18   09/02/20 12   08/14/20 20    SpO2 Readings from Last 3 Encounters:   09/21/20 96%   09/02/20 96%   08/14/20 100%      Temp Readings from Last 1 Encounters:   09/21/20 95.7  F (35.4  C) (Temporal)    Ht Readings from Last 1 Encounters:   09/21/20 1.854 m (6' 1\")      Wt Readings from Last 1 Encounters:   09/21/20 100.4 kg (221 lb 4.8 oz)    Estimated body mass index is 29.2 kg/m  as calculated from the following:    Height as of 9/21/20: 1.854 m (6' 1\").    Weight as of 9/21/20: 100.4 kg (221 lb 4.8 oz).       Anesthesia Plan      History & Physical Review  History and physical reviewed and following examination; no interval change.    ASA Status:  2 .    NPO Status:  > 8 hours    Plan for MAC with Propofol and Intravenous induction. Maintenance will be TIVA.  Reason for MAC:  Deep or markedly invasive procedure (G8)    Pt has had this type of procedure multiple times before and has done well with the anesthesia.        Postoperative Care  Postoperative pain management:  IV analgesics.      Consents  Anesthetic plan, risks, benefits and alternatives discussed with:  Patient.  Use of blood " products discussed: No .   .                 YUMIKO Babb CRNA

## 2020-09-24 NOTE — DISCHARGE INSTRUCTIONS
Home Care Instructions                Procedure: Epidural injection or joint injection    Activity:    Rest today    Do not work today    Resume normal activity tomorrow    Pain:    You may experience soreness at the injection site for 1 to 3 days.    You may use an ice pack for 20 minutes every 2 hours for the first 24 hours    You may use a heating pad after the first 24 hours    You may use Tylenol  (acetaminophen) every 4 hours or other pain medicines as directed by your physician    Safety  Sedation medicine, if given may remain active for many hours.    It is important for the next 24 hours that you do not:    Drive a car    Operate machines or power tools    Consume alcohol, including beer    Sign any important papers or legal documents    You may experience numbness radiating into your legs or arms, (depending on the procedure location)  This numbness may last several hours.  Until the numb sensation returns to normal please use caution in walking, climbing stairs, stepping out of your vehicle, etc.    Common side effects of steroids:  Not everyone will experience corticosteroid side effects. If side effects are experienced they will gradually subside in the 7-10 day period following an injection.    Most common side effects include:    Flushed face and/or chest    Feeling of warmth, particularly in face but could be overall feeling of warmth    Increased blood sugar in diabetic patients    Menstrual irregularities may occur.  If taking hormone based birth control an alternate method of birth control is recommended    Sleep disturbances and/or mood swings are possible    Leg cramps    Please contact us if you have:  Severe pain   Fever more than 101.5 degrees Fahrenheit  Signs of infection (redness, swelling or drainage)      If you have questions during normal business hours (8am-5pm Monday-Friday) contact the [facility] at [phone]. If you need help after hours, we recommend that you go to a  hospital emergency room or dial 911.

## 2020-09-24 NOTE — ANESTHESIA CARE TRANSFER NOTE
Patient: Indra Mehta    Procedure(s):  INJECTION, EPIDURAL, TRANSFORAMINAL  LUMBAR 3-4 APPROACH    Diagnosis: Lumbar radiculopathy [M54.16]  Diagnosis Additional Information: No value filed.    Anesthesia Type:   MAC     Note:  Airway :Room Air  Patient transferred to:Phase II  Handoff Report: Identifed the Patient, Identified the Reponsible Provider, Reviewed the pertinent medical history, Discussed the surgical course, Reviewed Intra-OP anesthesia mangement and issues during anesthesia, Set expectations for post-procedure period and Allowed opportunity for questions and acknowledgement of understanding      Vitals: (Last set prior to Anesthesia Care Transfer)    CRNA VITALS  9/24/2020 1104 - 9/24/2020 1142      9/24/2020             Pulse:  84    SpO2:  98 %    Resp Rate (observed):  17                Electronically Signed By: YUMIKO Babb CRNA  September 24, 2020  11:42 AM

## 2020-09-24 NOTE — ANESTHESIA POSTPROCEDURE EVALUATION
Patient: Indra Mehta    Procedure(s):  INJECTION, EPIDURAL, TRANSFORAMINAL  LUMBAR 3-4 APPROACH    Diagnosis:Lumbar radiculopathy [M54.16]  Diagnosis Additional Information: No value filed.    Anesthesia Type:  MAC    Note:  Anesthesia Post Evaluation    Patient location during evaluation: Phase 2  Patient participation: Able to fully participate in evaluation  Level of consciousness: awake  Pain management: satisfactory to patient  Cardiovascular status: stable and blood pressure returned to baseline  Respiratory status: room air and spontaneous ventilation  Hydration status: acceptable  PONV: none     Anesthetic complications: None    Comments: Appear to tolerate MAC with IV sedation well without anesthesia related problems / complications noted.  Pain level satisfactory per patient. No N  /  V.  No complaints per patient.  Will follow as needed.        Last vitals:  Vitals:    09/24/20 1100   BP: (!) 140/97   Pulse: 84   Resp: 16   Temp: 97.8  F (36.6  C)   SpO2: 97%         Electronically Signed By: YUMIKO Babb CRNA  September 24, 2020  11:45 AM

## 2020-09-25 DIAGNOSIS — G89.4 CHRONIC PAIN SYNDROME: ICD-10-CM

## 2020-09-25 DIAGNOSIS — M54.50 CHRONIC BILATERAL LOW BACK PAIN WITHOUT SCIATICA: ICD-10-CM

## 2020-09-25 DIAGNOSIS — G62.9 NEUROPATHY: ICD-10-CM

## 2020-09-25 DIAGNOSIS — G89.29 CHRONIC BILATERAL LOW BACK PAIN WITHOUT SCIATICA: ICD-10-CM

## 2020-09-25 DIAGNOSIS — Z98.1 S/P CERVICAL SPINAL FUSION: ICD-10-CM

## 2020-09-25 DIAGNOSIS — M79.18 MYOFASCIAL PAIN: ICD-10-CM

## 2020-09-25 DIAGNOSIS — M54.2 CERVICALGIA: ICD-10-CM

## 2020-09-25 NOTE — TELEPHONE ENCOUNTER
nortriptyline (PAMELOR) 50 MG capsule  Last Written Prescription Date:  06/24/2020  Last Fill Quantity: 30 cap,  # refills: 2   Last office visit: 9/11/2020 with prescribing provider:     Future Office Visit:      There are no medications in this encounter.           Gianfranco Church MA on 9/25/2020 at 1:30 PM

## 2020-09-29 RX ORDER — NORTRIPTYLINE HYDROCHLORIDE 50 MG/1
50 CAPSULE ORAL AT BEDTIME
Qty: 30 CAPSULE | Refills: 2 | Status: SHIPPED | OUTPATIENT
Start: 2020-09-29 | End: 2021-01-06

## 2020-10-06 ENCOUNTER — HOSPITAL ENCOUNTER (OUTPATIENT)
Dept: PHYSICAL THERAPY | Facility: CLINIC | Age: 55
Setting detail: THERAPIES SERIES
End: 2020-10-06
Attending: PHYSICAL MEDICINE & REHABILITATION
Payer: COMMERCIAL

## 2020-10-06 PROCEDURE — 97110 THERAPEUTIC EXERCISES: CPT | Mod: GP | Performed by: PHYSICAL THERAPIST

## 2020-10-09 ENCOUNTER — MYC MEDICAL ADVICE (OUTPATIENT)
Dept: PALLIATIVE MEDICINE | Facility: CLINIC | Age: 55
End: 2020-10-09

## 2020-10-09 ENCOUNTER — VIRTUAL VISIT (OUTPATIENT)
Dept: PALLIATIVE MEDICINE | Facility: CLINIC | Age: 55
End: 2020-10-09
Payer: COMMERCIAL

## 2020-10-09 ENCOUNTER — TELEPHONE (OUTPATIENT)
Dept: PALLIATIVE MEDICINE | Facility: CLINIC | Age: 55
End: 2020-10-09

## 2020-10-09 DIAGNOSIS — Z98.1 S/P CERVICAL SPINAL FUSION: ICD-10-CM

## 2020-10-09 DIAGNOSIS — M54.2 CERVICALGIA: ICD-10-CM

## 2020-10-09 DIAGNOSIS — M54.50 CHRONIC BILATERAL LOW BACK PAIN WITHOUT SCIATICA: ICD-10-CM

## 2020-10-09 DIAGNOSIS — G62.9 NEUROPATHY: ICD-10-CM

## 2020-10-09 DIAGNOSIS — G89.4 CHRONIC PAIN SYNDROME: ICD-10-CM

## 2020-10-09 DIAGNOSIS — M79.18 MYOFASCIAL PAIN: ICD-10-CM

## 2020-10-09 DIAGNOSIS — G89.29 CHRONIC BILATERAL LOW BACK PAIN WITHOUT SCIATICA: ICD-10-CM

## 2020-10-09 DIAGNOSIS — G89.4 CHRONIC PAIN SYNDROME: Primary | ICD-10-CM

## 2020-10-09 PROCEDURE — 99214 OFFICE O/P EST MOD 30 MIN: CPT | Mod: TEL | Performed by: STUDENT IN AN ORGANIZED HEALTH CARE EDUCATION/TRAINING PROGRAM

## 2020-10-09 RX ORDER — LIDOCAINE HYDROCHLORIDE 40 MG/ML
SOLUTION TOPICAL PRN
Qty: 50 ML | Refills: 1 | Status: SHIPPED | OUTPATIENT
Start: 2020-10-09 | End: 2021-04-06

## 2020-10-09 RX ORDER — OXYCODONE HYDROCHLORIDE 5 MG/1
TABLET ORAL
Qty: 8 TABLET | Refills: 0 | Status: SHIPPED | OUTPATIENT
Start: 2020-10-14 | End: 2020-10-14

## 2020-10-09 ASSESSMENT — PAIN SCALES - GENERAL: PAINLEVEL: SEVERE PAIN (6)

## 2020-10-09 NOTE — TELEPHONE ENCOUNTER
I would like to submit for authorization for Nucynta (tapentadol) for Ketan.   He is currently on oxycodone and has both MSK and neuropathic components of his pain, and the Nucynta is likely to be more effective for the neuropathic components of his pain. What other steps do I need to take for authorization?     Prior Authorization Retail Medication Request    Medication/Dose: Tapentadol HCl 50 MG TB12  ICD code (if different than what is on RX):     Previously Tried and Failed:     Rationale:       Insurance Name:   [employer]  Insurance ID:         Pharmacy Information (if different than what is on RX)  Name:     Phone:

## 2020-10-09 NOTE — TELEPHONE ENCOUNTER
Jessee message from patient on 10/9 at 9314:      was the bridge sent to [facility] in [place]?  ----------------    Yes, Ketan, the oxycodone Rx was [facility] [place].     Erica Bennett BSN, RN-BC  Patient Care Supervisor  [facility]

## 2020-10-09 NOTE — TELEPHONE ENCOUNTER
Message sent to pt on 10/9 at 1221:    Noble Aceves,      I have sent the request for authorization of the Nucynta medication. It may take a day or two to get back, so I am sending a 3 day bridge Rx of oxycodone to the pharmacy so that you do not run out over the weekend.   Feel free to message me with questions.     Also, my MA has sent a new controlled substance agreement to you. Please complete it and return it to the clinic.   Thank you     Liz Mallory MD  [facility]

## 2020-10-09 NOTE — PROGRESS NOTES
"Indra Mehta is a 55 year old male who is being evaluated via a billable telephone visit.      The patient has been notified of following:     \"This telephone visit will be conducted via a call between you and your physician/provider. We have found that certain health care needs can be provided without the need for a physical exam.  This service lets us provide the care you need with a short phone conversation.  If a prescription is necessary we can send it directly to your pharmacy.  If lab work is needed we can place an order for that and you can then stop by our lab to have the test done at a later time.    Telephone visits are billed at different rates depending on your insurance coverage. During this emergency period, for some insurers they may be billed the same as an in-person visit.  Please reach out to your insurance provider with any questions.    If during the course of the call the physician/provider feels a telephone visit is not appropriate, you will not be charged for this service.\"    Patient has given verbal consent for Telephone visit?  Yes    What phone number would you like to be contacted at? [phone]    How would you like to obtain your AVS? Sulema De La Cruz MA      Phone call duration: 30 minutes    Liz Mallory MD      "

## 2020-10-09 NOTE — PATIENT INSTRUCTIONS
----------------------------------------------------------------  [facility] Number:  [phone]     Call with any questions about your care and for scheduling assistance.     Calls are returned Monday through Friday between 8 AM and 4:30 PM. We usually get back to you within 2 business days depending on the issue/request.    If we are prescribing your medications:    For opioid medication refills, call the clinic or send a FindThatCourse message 7 days in advance.  Please include:    Name of requested medication    Name of the pharmacy.    For non-opioid medications, call your pharmacy directly to request a refill. Please allow 3-4 days to be processed.     Per [place] State Law:    All controlled substance prescriptions must be filled within 30 days of being written.      For those controlled substances allowing refills, pickup must occur within 30 days of last fill.      We believe regular attendance is key to your success in our program!      Any time you are unable to keep your appointment we ask that you call us at least 24 hours in advance to cancel.This will allow us to offer the appointment time to another patient.     Multiple missed appointments may lead to dismissal from the clinic.

## 2020-10-09 NOTE — PROGRESS NOTES
[facility]  Follow up clinic visit    Date of visit: 10/9/2020    Chief complaint:   Chief Complaint   Patient presents with     Pain     Telephone visit due to Covid-19        Interval history:  Indra Mehta is a 55 year old male with a history of depression who follows with Chiara Garcia PA-C for chronic low back pain, neck pain, and headaches. Last seen by Chiara on 9/11/2020.  he is seeing me while Chiara is on leave.     Recommendations/plan at the last visit included:  1. Physical Therapy:  Not at this time, but he should continue his exercises  2. Clinical Health Psychologist:  NO  3. Diagnostic Studies: none  4. Medication Management:                1.   Continue Gabapentin 1200 TID              2.   Continue nortriptyline to 50 mg at bedtime.                3.   Continue Tizanidine 4mg at bedtime              4.   Okay to increase Oxycodone 4/day, 120 tablets for 30 days  5. Further procedures recommended: Plan for SCS trial once approved. Referred for L3-4 bilateral transforaminal epidural steroid injection.  6. Recommendations to PCP. See above    Since his last visit, Indra Mehta reports:  He is now s/p bilateral L3/4 TFESI injections on 9/24/2020 with Dr. Mcdaniel. This doesn't seem to be as effective as prior injections    He has pain radiating down R leg to knee  Also pain that radiates to buttocks    He also has pain in the plantar aspect of bilateral feet (like he is stepping on nails or like he is stepping on rocks). Also has tingling in whole feet intermittently. On a good day the pain is isolated to the toes. This can cause him to not want to walk.     Pain scores:  Pain intensity on average is 6 on a scale of 0-10.     Current pain treatments:   Gabapentin 400 mg-Taking 3 capsules 3 times a day  Tizanidine 4 mg-taking 1 at HS   Tylenol 1000 mg 3x/day  Ibuprofen 800 mg TID  Oxycodone- 5 mg taking 4/day, about q5 hrs  Xanax-does not  use daily, last use was quite a while ago (was taking for insomnia related to) L ear tinnitis  Nortriptyline 50mg at bedtime    Past pain treatments:  - 11/8/18 bilateral L3-4 TFESI - (Dr Mcdaniel)  - 8/23/18 bilateral L3-4 TFESI - seems to have worsened pain (Dr Mcdaniel)  - 6/11/18 TFESI right C5-6 - helped with arm pain and numbness  - 12/28/17 bilateral L3-4 TFESI   - 10/23/17 Bilateral L4-5 TFESI   - 11/08/2018 Bilateral L3-4 TFESI   -6/14/2019 Bilateral L3-4 TFESI   -8/22/2019 C6-7 SRIDEVI   -5/22/2020 L3-4 bilateral TLESI  Surgery:  ACDF C6-7 on 7/5/17 with improvement; ACDF C5-6 12/19/2018, posterior C5-7 fusion. Lateral mass screws, right C5-6 medial facetectomy, right C6 foraminotomy on 10/29/2019  TENS unit: helpful    Side Effects: no side effect    Medications:  Current Outpatient Medications   Medication Sig Dispense Refill     Acetaminophen (TYLENOL PO) Take 1,000 mg by mouth 3 times daily        gabapentin (NEURONTIN) 400 MG capsule Take 3 capsules (1,200 mg) by mouth 3 times daily 270 capsule 3     hydrochlorothiazide (HYDRODIURIL) 25 MG tablet Take 1 tablet (25 mg) by mouth daily 30 tablet 1     ibuprofen (ADVIL/MOTRIN) 800 MG tablet Take 800 mg by mouth 3 times daily       losartan (COZAAR) 50 MG tablet Take 2 tablets (100 mg) by mouth daily 180 tablet 0     nortriptyline (PAMELOR) 50 MG capsule Take 1 capsule (50 mg) by mouth At Bedtime 30 capsule 2     order for DME Equipment being ordered: TENS Pads as directed 1 Device 0     oxyCODONE (ROXICODONE) 5 MG tablet Take 1 tablet every 4-6 hours as needed for severe pain. Max 4/day. Fill 09/11/2020, start 09/14/2020 120 tablet 0     tiZANidine (ZANAFLEX) 4 MG tablet Take 1 tablet (4 mg) by mouth At Bedtime 90 tablet 2     ALPRAZolam (XANAX XR) 0.5 MG 24 hr tablet Take 1 tablet (0.5 mg) by mouth At Bedtime (Patient not taking: Reported on 9/21/2020) 30 tablet 3       Medical History: any changes in medical history since they were last seen? No    Review of  Systems:  The 14 system ROS was reviewed from the intake questionnaire, and is positive for: LBP, neck, feet pain as above  Any bowel or bladder problems: no  Mood: good      Controlled Substance Agreement:   Encounter-Level CSA - 06/15/2017:    Controlled Substance Agreement - Scan on 6/22/2017 11:53 AM: CONTROLLED SUBSTANCE AGREEMENT     Encounter-Level CSA - 10/13/2016:    Controlled Substance Agreement - Scan on 10/23/2016  5:07 PM: CONTROLLED SUBSTANCE AGREEMENT 10/13/16     Patient-Level CSA:    Controlled Substance Agreement - Opioid - Scan on 4/15/2019  3:04 PM          UDS:   Pain Drug SCR UR W RPTD Meds   Date Value Ref Range Status   07/17/2020 FINAL  Final     Comment:     (Note)  ====================================================================  TOXAS[facility]RE COMP DRUG ANALYSIS,UR  ====================================================================  Test                             Result       Flag       Units        Drug Present   Oxycodone                      3628                    ng/mg creat   Oxymorphone                    1618                    ng/mg creat   Noroxycodone                   2804                    ng/mg creat   Noroxymorphone                 508                     ng/mg creat    Sources of oxycodone are scheduled prescription medications.    Oxymorphone, noroxycodone, and noroxymorphone are expected    metabolites of oxycodone. Oxymorphone is also available as a    scheduled prescription medication.   Gabapentin                     PRESENT                               Tizanidine                     PRESENT                               Nortriptyline                  PRESENT                                Nortriptyline may be administered as a prescription drug; it is    also an expected metabolite of amitriptyline.   Acetaminophen                  PRESENT                               Ibuprofen                      PRESENT                               Diphenhydramine                 PRESENT                              ====================================================================  Test                      Result    Flag   Units      Ref Range        Creatinine              212              mg/dL      >=20            ====================================================================  Declared Medications:  Medication list was not provided.  ====================================================================  For clinical consultation, please call [phone].  ====================================================================  Analysis performed by [employer] [place] [address]          THE 4 As OF OPIOID MAINTENANCE ANALGESIA    Analgesia: Is pain relief clinically significant? YES   Activity: Is patient functional and able to perform Activities of Daily Living? YES   Adverse effects: Is patient free from adverse side effects from opiates? YES   Adherence to Rx protocol: Is patient adhering to Controlled Substance Agreement and taking medications ONLY as ordered? YES    MME: 30    Is Narcan prescribed for opiate use >50 MME daily? NO     :  [place] Prescription Drug Monitoring Program (PDMP) Link  Checked and as expected  Summary  Total Prescriptions:    52   Total Prescribers:    7   Total Pharmacies:    5   Narcotics* (excluding Buprenorphine)  Current Qty:    4   Current MME/day:   30.00  30 Day Avg MME/day:    28.00   Sedatives*  Current Qty:    0   Current LME/day:  0.00  30 Day Avg LME/day:  0.00  Buprenorphine*  Current Qty:    0   Current mg/day:  0.00  30 Day Avg mg/day:  0.00  Rx Data    PRESCRIPTIONS  Total Prescriptions: 52   Total Private Pay: 6   Fill Date ID Written Sold Drug Qty Days Prescriber Rx # Pharmacy Refill Daily Dose * Pymt Type    09/26/2020  2   07/31/2020   Gabapentin 400 Mg Capsule  270.00 30 [facility]  [id number]  Thr (9082)  2/3  [place]   09/11/2020  2   09/11/2020   Oxycodone Hcl 5 Mg Tablet  120.00 30 [facility]  [id number]   Thr (9082)  0/0 30.[place]   09/01/2020  4   08/31/2020 09/01/2020  Testosteron Cyp 2,000 Mg/10 Ml  10.00 90 [facility]  [id number]  Mix (7959)  0/[place]   08/28/2020  2   07/31/2020   Gabapentin 400 Mg Capsule  270.00 30 [facility]  [id number]  Thr (9082)  1/3  [place]   08/13/2020  2   08/10/2020   Oxycodone Hcl 5 Mg Tablet  105.00 27 [facility]  [id number]  Thr (9082)  0/0 29.17 [place]   07/31/2020  2   07/31/2020   Gabapentin 400 Mg Capsule  270.00 30 [facility]  [id number]  Thr (9082)  0/3  [place]   07/14/2020  2   07/14/2020   Oxycodone Hcl 5 Mg Tablet  105.00 27 [facility]  [id number]  Thr (9082)  0/0 29.17 [place]   07/02/2020  2   03/12/2020   Gabapentin 400 Mg Capsule  270.00 30 [facility]  [id number]  Thr (9082)  3/3  [place]   06/13/2020  2   06/10/2020   Oxycodone Hcl 5 Mg Tablet  105.00 27 [facility]  [id number]  Thr (9082)  0/0 29.17 MME Comm Ins         Assessment:   Indra Mehta is a 55 year old male who presents today for follow up regarding his:                 1.  Cervicalgia              2.  S/p cervical spine fusion              3.  Myofascial pain              4. Chronic low back pain              5. Neuropathy               6. Chronic pain syndrome     Worsening pain in low back. Would like to try another injection. Will hopefully be able to proceed with SCS trial. If not, may need to consider evaluation for pain medication pump. Okay to increase Oxycodone to 4/day every day.    Indra Mehta is a 55 year old male who is seen at the pain clinic for chronic low back pain, painful peripheral neuropathy, and chronic neck pain s/p fusion.    Plan:  Additional Workup:   1. - Diagnostic Studies:  Not at this time  2. - Laboratory studies:  Not at this time  3. - Urine toxicology screen today: no     Therapies:   4. - Physical Therapy: continue current exercises  5. - Clinical Health Psychologist to address issues of relaxation, behavioral change, coping style, and other factors  important to improvement: not at this time    Medication Management:   6. - Transition from oxycodone to Nucynta for dual management of both his MSK low back and neck pain as well as his peripheral neuropathy. The norepinephrine effect of Nucynta will likely be more efficacious for his painful peripheral neuropathy, which is the most functionally limiting of his pain complaints.   7. - Start topical lidocaine for painful peripheral neuropathy    Further procedures recommended:   8. - Consider procedures to target facet joint in future for LBP  9. Agree with SCS trial - Dr. Pulido is in the process of appealing this with Pt's insurance.     Follow up: 1 month telephone    Liz Mallory MD  [facility]

## 2020-10-12 ENCOUNTER — MYC MEDICAL ADVICE (OUTPATIENT)
Dept: PALLIATIVE MEDICINE | Facility: CLINIC | Age: 55
End: 2020-10-12

## 2020-10-12 NOTE — TELEPHONE ENCOUNTER
PA Initiation    Medication: Tapentadol HCl 50 MG TB12  Insurance Company: [employer] - Phone [phone] Fax [phone]  [id number] [place] [facility] [address] [place] [address]  Filling Pharmacy Phone: [phone]  Filling Pharmacy Fax: [phone]  Start Date: 10/12/2020

## 2020-10-13 NOTE — PROGRESS NOTES
Outpatient Physical Therapy Discharge Note     Patient: Indra Mehta  : 1965    Beginning/End Dates of Reporting Period:  9/15/20 to 10/13/2020    Referring Provider: rodrigo aguero MD     Therapy Diagnosis: chronic B knee pain      Client Self Report: HEP is going no questions and overall left is feeling really good and right knee is alittle better     Objective Measurements:  Objective Measure: pain 2-8/10 LEFS 23/80 at al   Details: currently pain 0-2-3/10  left knee , right knee 2-3 to 7/10 , LEFS 41/80      patient seen for 2 Rx sessions for exercises in clinic and instruction in HEP at last Rx he was completing the following quad stretch ,90/90, standing heelcord stretch hold 10 x 5 , standing TKE with blue theraband , standing single leg mini squatt , single leg balance 1 minute  add arm movement , close eyes , cushion , walking laterial and backward with green band , walking forward , backward and laterial gray around waist         Goals:  Goal Identifier 1   Goal Description instruction in HEP and compliant with it 5 of 7 days to improve quality of life    Target Date 11/15/20   Date Met      Progress:     Goal Identifier 2   Goal Description patient to report overall decrease in pain , decrease 50% of 8/10 pain , currently pain 2-8/10   Target Date 11/15/20   Date Met      Progress:       Progress Toward Goals:   Progress this reporting period: patient is meeting his therapy goals and ready for discharge to HEP           Plan:  Discharge from therapy.    Discharge:    Reason for Discharge: Patient has met all goals.    Equipment Issued: none    Discharge Plan: Patient to continue home program.

## 2020-10-14 ENCOUNTER — MYC MEDICAL ADVICE (OUTPATIENT)
Dept: PALLIATIVE MEDICINE | Facility: CLINIC | Age: 55
End: 2020-10-14

## 2020-10-14 RX ORDER — OXYCODONE HYDROCHLORIDE 5 MG/1
TABLET ORAL
Qty: 28 TABLET | Refills: 0 | Status: SHIPPED | OUTPATIENT
Start: 2020-10-16 | End: 2020-10-19

## 2020-10-14 NOTE — TELEPHONE ENCOUNTER
Is there any way this can be expedited?    The plan is to discontinue his oxycodone when he starts the nucynta.  He will be out of oxycodone on Friday.    SAMY Moody-BSN  [facility]

## 2020-10-14 NOTE — TELEPHONE ENCOUNTER
See the 10/13/20 mychart encounter for more information.    Heidy Gipson RN-BSN  [facility]

## 2020-10-14 NOTE — TELEPHONE ENCOUNTER
Per patient myChart message:  From: Ketan Mehta      Created: 10/12/2020 8:38 PM      The Tapentadol has not been authorized by my insurance yet.  I picked up the 8 oxy today so I will be out at the end of day Thursday if I continue with the 4 per day I have been taking.     I also picked up the Lidocaine cream and applied it to my feet several times today.  I did not notice any relief from the cream.  I will continue to apply it to see if that changes.      and  From: Ketan Mehta      Created: 10/14/2020 12:06 PM      Has anything come back from my insurance company regarding the prior auth for the tapentadol?      ________________    The prior authorization for nucynta is in process as of 10/12/20.    See the 10/9/20 telephone encounter for prior authorization of the nucynta.  It was routed back to team asking for it to be expedited.    6. Per Dr. Mallory's 10/9/20 AVS: - Transition from oxycodone to Nucynta for dual management of both his MSK low back and neck pain as well as his peripheral neuropathy. The norepinephrine effect of Nucynta will likely be more efficacious for his painful peripheral neuropathy, which is the most functionally limiting of his pain complaints.   - Start topical lidocaine for painful peripheral neuropathy     Mychart sent to pt:  From: Heidy Gipson RN      Created: 10/14/2020 12:51 PM      Noble Aceves.  The prior authorization for the nucynta is still pending.    Check back w/ us tomorrow and we will go from there.       I sent a message to our prior authorization team asking to expedite.        SAMY Moody-BSN  [facility]

## 2020-10-14 NOTE — TELEPHONE ENCOUNTER
Dr. Mallory do you want to appeal?  FYI: Pt will run out of oxycodone on Friday, 10/16/20.    See the active mychart encounter on 10/12/20 for more information.    SAMY Moody-BSN  [facility]

## 2020-10-14 NOTE — TELEPHONE ENCOUNTER
PRIOR AUTHORIZATION DENIED    Medication: Tapentadol HCl 50 MG TB12    Denial Date: 10/14/2020    Denial Rational: Patient has to first try/fail 2 preferred medications: morphine ER tablets, oxycodone ER tablets, fentanyl patch (12mcg, 25mcg, 50mcg, 75mcg, 100mcg). He has already failed one (oxycodone ER)- he will have to try/fail one more medication.         Appeal Information: If provider would like to appeal this decision we will need a detailed letter of medical necessity to start the process. Then re-route this request back to the PA pool.

## 2020-10-14 NOTE — TELEPHONE ENCOUNTER
The prior authorization for  nucynta was denied.    See the 10/9/20 telephone encounter for that information.    Routed to provider to review and determine plan.    Heidy RN-BSN  [facility]

## 2020-10-15 ENCOUNTER — MYC MEDICAL ADVICE (OUTPATIENT)
Dept: PALLIATIVE MEDICINE | Facility: CLINIC | Age: 55
End: 2020-10-15

## 2020-10-15 NOTE — TELEPHONE ENCOUNTER
Dr. Mallory appealed the nucynta and prescribed a week of oxycodone.  Pt was informed of this via Treaterhart.    SAMY Moody-BSN  [facility]

## 2020-10-15 NOTE — TELEPHONE ENCOUNTER
Appeal made for Nucynta denial, routed to PA group.    One week Rx for oxycodone written to fill 10/15/2020 to start 10/16/2020 to await results of appeal.     Script Eprescribed to pharmacy    Will send this to MA team to notify patient.    Signed Prescriptions:                        Disp   Refills    oxyCODONE (ROXICODONE) 5 MG tablet         28 tab*0        Sig: Take 1 tablet every 4-6 hours as needed for severe           pain. Max 4/day. Fill 10/15/2020, start           10/16/2020. 1 week Rx pending appeal for Nucynta.  Authorizing Provider: DONALD MACIAS MD  [facility]

## 2020-10-15 NOTE — TELEPHONE ENCOUNTER
Jessee message from patient on 10/15 at 1231:    Thank you.  I am hopeful that the insurance company will allow the Nucynta because as we discussed my feet are constantly causing me major problems.  From what I have researched it does sound like the Nucynta could be a better drug for calming the pain in my feet.  ----------  EARLINE Stanton, RN-BC  Patient Care Supervisor  [facility] [place]

## 2020-10-15 NOTE — TELEPHONE ENCOUNTER
Message to pt at 1225:        Noble Aceves,      I am sorry the Nucynta was denied by your insurance. I have sent an appeal letter. I spoke with Leslie Munson and it sounds like you have collected the 1 week bridge prescription I sent.   If my appeal is denied I will send the remaining 23 days of your oxycodone prescription to [facility] next week.   Please feel free to contact me with questions.      Liz Mallory MD  [facility]

## 2020-10-15 NOTE — TELEPHONE ENCOUNTER
Medication Appeal Initiation    We have initiated an appeal for the requested medication:  Medication: Tapentadol HCl 50 MG TB12-APPEAL INITIATED  Appeal Start Date:  10/15/2020  Insurance Company: [employer] - Phone [phone] Fax [phone]  Comments:       Manually faxed letter of medical necessity to [phone]

## 2020-10-15 NOTE — TELEPHONE ENCOUNTER
See letters for appeal letter.    Routed to the prior authorization team.    SAMY Moody-BSN  [facility]

## 2020-10-15 NOTE — TELEPHONE ENCOUNTER
Noble Aceves,     I am sorry the Nucynta was denied by your insurance. I have sent an appeal letter. I spoke with Leslie Munson and it sounds like you have collected the 1 week bridge prescription I sent.   If my appeal is denied I will send the remaining 23 days of your oxycodone prescription to [facility] next week.   Please feel free to contact me with questions.     Liz Mallory MD  [facility]

## 2020-10-16 ENCOUNTER — TELEPHONE (OUTPATIENT)
Dept: PALLIATIVE MEDICINE | Facility: CLINIC | Age: 55
End: 2020-10-16

## 2020-10-16 DIAGNOSIS — G62.9 NEUROPATHY: ICD-10-CM

## 2020-10-16 DIAGNOSIS — M54.50 CHRONIC BILATERAL LOW BACK PAIN WITHOUT SCIATICA: ICD-10-CM

## 2020-10-16 DIAGNOSIS — G89.4 CHRONIC PAIN SYNDROME: ICD-10-CM

## 2020-10-16 DIAGNOSIS — G89.29 CHRONIC BILATERAL LOW BACK PAIN WITHOUT SCIATICA: ICD-10-CM

## 2020-10-16 DIAGNOSIS — M54.2 CERVICALGIA: ICD-10-CM

## 2020-10-16 NOTE — TELEPHONE ENCOUNTER
MEDICATION APPEAL APPROVED    Medication: Tapentadol HCl 50 MG TB12-APPEAL APPROVED  Authorization Effective Date: 9/15/2020  Authorization Expiration Date: 10/15/2021  Approved Dose/Quantity:   Reference #: [id number]   Insurance Company: [employer] - Phone [phone] Fax [phone]  Expected CoPay:       CoPay Card Available:      Foundation Assistance Needed:    Which Pharmacy is filling the prescription (Not needed for infusion/clinic administered): [id number] - [place] - [address] [place] [address]    Pharmacy will notify patient when medication is ready.

## 2020-10-16 NOTE — TELEPHONE ENCOUNTER
Script Eprescribed to pharmacy    Will send this to nursing team to notify patient.    Signed Prescriptions:                        Disp   Refills    Tapentadol HCl 50 MG TB12                  60 tab*0        Sig: Take 50 mg by mouth 2 times daily To start           10/23/2020, first fill 10/21/2020.  Authorizing Provider: DONALD MACIAS MD  [facility]

## 2020-10-19 ENCOUNTER — MYC MEDICAL ADVICE (OUTPATIENT)
Dept: PALLIATIVE MEDICINE | Facility: CLINIC | Age: 55
End: 2020-10-19

## 2020-10-19 DIAGNOSIS — Z98.1 S/P CERVICAL SPINAL FUSION: ICD-10-CM

## 2020-10-19 DIAGNOSIS — G89.4 CHRONIC PAIN SYNDROME: ICD-10-CM

## 2020-10-19 DIAGNOSIS — M54.2 CERVICALGIA: ICD-10-CM

## 2020-10-19 DIAGNOSIS — M54.50 CHRONIC BILATERAL LOW BACK PAIN WITHOUT SCIATICA: ICD-10-CM

## 2020-10-19 DIAGNOSIS — M79.18 MYOFASCIAL PAIN: ICD-10-CM

## 2020-10-19 DIAGNOSIS — G62.9 NEUROPATHY: ICD-10-CM

## 2020-10-19 DIAGNOSIS — G89.29 CHRONIC BILATERAL LOW BACK PAIN WITHOUT SCIATICA: ICD-10-CM

## 2020-10-19 RX ORDER — OXYCODONE HYDROCHLORIDE 5 MG/1
TABLET ORAL
Qty: 120 TABLET | Refills: 0 | Status: SHIPPED | OUTPATIENT
Start: 2020-10-24 | End: 2020-10-19 | Stop reason: ALTCHOICE

## 2020-10-19 NOTE — TELEPHONE ENCOUNTER
Noble Aceves,     I am sorry the Nucynta is not helpful.   You can stop the Nucynta and transition back to oxycodone tomorrow.   You will need to take the Nucynta to the [place] pharmacy to destroy the medication.   I will write you a 30 day oxycodone prescription to fill as early as 10/24 to start on 10/26.     Liz Mallory MD  [facility]    Script Eprescribed to pharmacy    Will send this to MA team to notify patient.    Pending Prescriptions:                       Disp   Refills    oxyCODONE (ROXICODONE) 5 MG tablet        120 ta*0            Sig: Take 1 tablet every 4-6 hours as needed for           severe pain. Max 4/day. Fill 10/24/2020, start           10/26/2020.      Liz Mallory MD  [facility]

## 2020-10-19 NOTE — TELEPHONE ENCOUNTER
Per patient myChart message:  From: Ketan Mehta      Created: 10/19/2020 1:18 PM      I wouldn't say that the Nucynta isn't working for me.  It is making my feet feel a little better.  Would it make sense to try it a little longer?  I was able to take a walk with my dogs and my lower back forced me to stop before my feet did.

## 2020-10-19 NOTE — TELEPHONE ENCOUNTER
See the other 10/19/20 mychart encounter for more information.    Heidy Gipson RN-BSN  [facility]

## 2020-10-19 NOTE — TELEPHONE ENCOUNTER
Hi Ketan,     I misunderstood your first message to mean that the Nucynta didn't help at all and that your pain was worse. The goal in starting it was to better address your neuropathic foot pain, which was preventing you from walking. From your most recent MyChart note, it sounds like the Nucynta is indeed helping your foot pain and helping you walk more.     I think in this case it is worth continuing on the Nucynta for a longer period of time. Continue the Nucynta until our next visit, which should be in about 2 weeks. Do not re-start the oxycodone.   We can reassess your medications at that time. I will cancel the additional oxycodone prescription and plan to discuss the Nucynta at our next visit.     Liz Mallory MD  [facility]

## 2020-10-19 NOTE — TELEPHONE ENCOUNTER
Dr. Pulido what is the status on the appeal letter?    Heidy RN-BSN  [facility]

## 2020-10-19 NOTE — TELEPHONE ENCOUNTER
See the 10/19/20 mychart encounter for more information on nucynta effectvieness    Heidy Gipson RN-BSN  [facility]

## 2020-10-19 NOTE — TELEPHONE ENCOUNTER
Per patient myChart message:  From: Ketan Mehta      Created: 10/19/2020 6:20 AM      I stopped taking the Oxy and started the Nucynta on Saturday.  The Nucynta does seem to help the neuropathy in my feet a little bit.  However, it isn't nearly as effective as the Oxy reducing the other pain I have.        Routed to provider.    Heidy RN-BSN  [facility]

## 2020-10-21 DIAGNOSIS — I10 BENIGN ESSENTIAL HYPERTENSION: ICD-10-CM

## 2020-10-21 RX ORDER — HYDROCHLOROTHIAZIDE 25 MG/1
TABLET ORAL
Qty: 90 TABLET | Refills: 1 | Status: SHIPPED | OUTPATIENT
Start: 2020-10-21 | End: 2021-04-06

## 2020-10-21 NOTE — TELEPHONE ENCOUNTER
Routing refill request to provider for review/approval because:  Labs out of range:  BP    ANGELINA ThomasN, RN  [facility]

## 2020-10-22 ENCOUNTER — MYC MEDICAL ADVICE (OUTPATIENT)
Dept: FAMILY MEDICINE | Facility: CLINIC | Age: 55
End: 2020-10-22

## 2020-10-28 ENCOUNTER — OFFICE VISIT (OUTPATIENT)
Dept: FAMILY MEDICINE | Facility: CLINIC | Age: 55
End: 2020-10-28
Payer: COMMERCIAL

## 2020-10-28 VITALS
RESPIRATION RATE: 16 BRPM | TEMPERATURE: 98.3 F | SYSTOLIC BLOOD PRESSURE: 128 MMHG | BODY MASS INDEX: 29.34 KG/M2 | HEART RATE: 82 BPM | WEIGHT: 222.38 LBS | DIASTOLIC BLOOD PRESSURE: 82 MMHG | OXYGEN SATURATION: 96 %

## 2020-10-28 DIAGNOSIS — M21.612 BILATERAL BUNIONS: ICD-10-CM

## 2020-10-28 DIAGNOSIS — M21.611 BILATERAL BUNIONS: ICD-10-CM

## 2020-10-28 DIAGNOSIS — B07.8 COMMON WART: ICD-10-CM

## 2020-10-28 DIAGNOSIS — I73.01 RAYNAUD'S DISEASE WITH GANGRENE (H): ICD-10-CM

## 2020-10-28 DIAGNOSIS — F32.5 MAJOR DEPRESSION IN COMPLETE REMISSION (H): ICD-10-CM

## 2020-10-28 DIAGNOSIS — I10 BENIGN ESSENTIAL HYPERTENSION: Primary | ICD-10-CM

## 2020-10-28 PROCEDURE — 99214 OFFICE O/P EST MOD 30 MIN: CPT | Performed by: FAMILY MEDICINE

## 2020-10-28 RX ORDER — AMLODIPINE BESYLATE 5 MG/1
2.5 TABLET ORAL DAILY
Qty: 30 TABLET | Refills: 0 | Status: SHIPPED | OUTPATIENT
Start: 2020-10-28 | End: 2020-12-23

## 2020-10-28 RX ORDER — LOSARTAN POTASSIUM AND HYDROCHLOROTHIAZIDE 25; 100 MG/1; MG/1
1 TABLET ORAL DAILY
Qty: 90 TABLET | Refills: 3 | Status: SHIPPED | OUTPATIENT
Start: 2020-10-28 | End: 2021-12-28

## 2020-10-28 ASSESSMENT — PAIN SCALES - GENERAL: PAINLEVEL: MODERATE PAIN (5)

## 2020-10-28 NOTE — PROGRESS NOTES
Subjective     Indra Mehta is a 55 year old male who presents to clinic today for the following health issues:    HPI         Hypertension Follow-up      Do you check your blood pressure regularly outside of the clinic? No     Are you following a low salt diet? Yes    Are your blood pressures ever more than 140 on the top number (systolic) OR more   than 90 on the bottom number (diastolic), for example 140/90? Yes      How many servings of fruits and vegetables do you eat daily?  2-3    On average, how many sweetened beverages do you drink each day (Examples: soda, juice, sweet tea, etc.  Do NOT count diet or artificially sweetened beverages)?   1    How many days per week do you exercise enough to make your heart beat faster? 4    How many minutes a day do you exercise enough to make your heart beat faster? 30 - 60    How many days per week do you miss taking your medication? 0    Depression Followup    How are you doing with your depression since your last visit? No change    Are you having other symptoms that might be associated with depression? No    Have you had a significant life event?  OTHER: Chronic pain syndrome     Are you feeling anxious or having panic attacks?   No    Do you have any concerns with your use of alcohol or other drugs? No    Social History     Tobacco Use     Smoking status: Never Smoker     Smokeless tobacco: Never Used   Substance Use Topics     Alcohol use: No     Alcohol/week: 0.0 standard drinks     Drug use: No     PHQ 7/23/2018 8/16/2019 8/14/2020   PHQ-9 Total Score 2 7 3   Q9: Thoughts of better off dead/self-harm past 2 weeks Not at all Not at all Not at all     ALICE-7 SCORE 11/6/2017 7/23/2018 8/16/2019   Total Score - - -   Total Score 3 1 5     Last PHQ-9 8/14/2020   1.  Little interest or pleasure in doing things 0   2.  Feeling down, depressed, or hopeless 1   3.  Trouble falling or staying asleep, or sleeping too much 1   4.  Feeling tired or having little energy 0    5.  Poor appetite or overeating 0   6.  Feeling bad about yourself 0   7.  Trouble concentrating 1   8.  Moving slowly or restless 0   Q9: Thoughts of better off dead/self-harm past 2 weeks 0   PHQ-9 Total Score 3   Difficulty at work, home, or with people Very difficult     ALICE-7  8/16/2019   1. Feeling nervous, anxious, or on edge 1   2. Not being able to stop or control worrying 0   3. Worrying too much about different things 1   4. Trouble relaxing 1   5. Being so restless that it is hard to sit still 0   6. Becoming easily annoyed or irritable 1   7. Feeling afraid, as if something awful might happen 1   ALICE-7 Total Score 5   If you checked any problems, how difficult have they made it for you to do your work, take care of things at home, or get along with other people? Not difficult at all           Patient Active Problem List   Diagnosis     Meniere's disease     Major depression in complete remission (H)     Chronic pain syndrome     CARDIOVASCULAR SCREENING; LDL GOAL LESS THAN 160     Pain medication agreement signed     Myalgia     Bilateral occipital neuralgia     Benign essential hypertension     Current Outpatient Medications   Medication Sig Dispense Refill     Acetaminophen (TYLENOL PO) Take 1,000 mg by mouth 3 times daily        ALPRAZolam (XANAX XR) 0.5 MG 24 hr tablet Take 1 tablet (0.5 mg) by mouth At Bedtime 30 tablet 3     gabapentin (NEURONTIN) 400 MG capsule Take 3 capsules (1,200 mg) by mouth 3 times daily 270 capsule 3     hydrochlorothiazide (HYDRODIURIL) 25 MG tablet TAKE 1 TABLET BY MOUTH EVERY DAY 90 tablet 1     ibuprofen (ADVIL/MOTRIN) 800 MG tablet Take 800 mg by mouth 3 times daily       losartan (COZAAR) 50 MG tablet Take 2 tablets (100 mg) by mouth daily 180 tablet 0     nortriptyline (PAMELOR) 50 MG capsule Take 1 capsule (50 mg) by mouth At Bedtime 30 capsule 2     order for DME Equipment being ordered: TENS Pads as directed 1 Device 0     Tapentadol HCl 50 MG TB12 Take 50 mg  by mouth 2 times daily To start 10/23/2020, first fill 10/21/2020. 60 tablet 0     tiZANidine (ZANAFLEX) 4 MG tablet Take 1 tablet (4 mg) by mouth At Bedtime 90 tablet 2     lidocaine (XYLOCAINE) 4 % external solution Apply topically as needed for moderate pain (Patient not taking: Reported on 10/28/2020) 50 mL 1         Review of Systems   CONSTITUTIONAL: NEGATIVE for fever, chills, change in weight  INTEGUMENTARY/SKIN: NEGATIVE for worrisome rashes, moles or lesions and POSITIVE for recalcitrant wart palmar aspect of right hand. Prior freezing and shaving. He would like referral to dermatology for immune therapy  ENT/MOUTH: NEGATIVE for ear, mouth and throat problems  RESP: NEGATIVE for significant cough or SOB  CV: POSITIVE for HX HTN and vasospasm and cyanosis of feet and NEGATIVE for claudication  MUSCULOSKELETAL: POSITIVE  for back pain chronic with neuropathy, radicular pain followed by pain and palliative care and bilateral prior bunion surgery with chronic painful 1st MTP.  ROS otherwise negative      Objective    /82 (BP Location: Right arm, Patient Position: Chair, Cuff Size: Adult Large)   Pulse 82   Temp 98.3  F (36.8  C) (Temporal)   Resp 16   Wt 100.9 kg (222 lb 6 oz)   SpO2 96%   BMI 29.34 kg/m    Body mass index is 29.34 kg/m .  Physical Exam   GENERAL: healthy, alert and no distress  NECK: no adenopathy, no asymmetry, masses, or scars and thyroid normal to palpation  RESP: lungs clear to auscultation - no rales, rhonchi or wheezes  CV: regular rate and rhythm, normal S1 S2, no S3 or S4, no murmur, click or rub, no peripheral edema and peripheral pulses strong  ABDOMEN: soft, nontender, no hepatosplenomegaly, no masses and bowel sounds normal  MS: no gross musculoskeletal defects noted, no edema  SKIN: no suspicious lesions or rashes and large wart right palm  Diabetic foot exam: normal DP and PT pulses, no trophic changes or ulcerative lesions, reduced sensation at distal calf  bilaterally and cool extremity without palor.            Assessment & Plan     Benign essential hypertension  Chronic, improved with the addition of the hydrochlorothiazide.  We will consolidate his losartan with his hydrochlorothiazide to Hyzaar.  He will take 1 tablet once daily and continue to monitor his blood pressures.  - losartan-hydrochlorothiazide (HYZAAR) 100-25 MG tablet; Take 1 tablet by mouth daily    Common wart  Has recurrent Calcitrene to wart on his right palm which he has frozen cauterized, curetted on multiple occasions.  He continues to have symptoms of pain with this.  He would like to see dermatology for immunotherapy with Candida antigen injection.  - DERMATOLOGY ADULT REFERRAL; Future    Raynaud's disease with gangrene (H)  Episodic bilateral lower extremity coolness and pain associated with temperature change.  He has underlying peripheral neuropathy due to his chronic lumbar radiculitis.  He is currently on maximum dose of gabapentin.  He does have palpable dorsalis pedis and posterior tibial pulses.  He has no hand symptoms.  I suspect he has a component of Raynaud's syndrome drawn.  We will start low-dose calcium channel blocker and monitor his symptoms over the next month.  - amLODIPine (NORVASC) 5 MG tablet; Take 0.5 tablets (2.5 mg) by mouth daily    Bilateral bunions  Chronic bilateral bunions for which she has had surgery on both feet many years ago.  Continues to have pain and deformity, lack of mobility.  This associated with his peripheral neuropathy and recently identified vasospastic phenomenon.  He would like further podiatry evaluation.  He has seen Dr. Masters in the past for torn plantar fascia.  Will refer him back to see Dr. Masters.  - Orthopedic & Spine  Referral; Future        SELF MONITORING:       - Please check blood pressure readings daily  Work on weight loss  Regular exercise    Return in about 1 month (around 11/28/2020) for If symptoms persist or do not  improve.    Tha Grullon MD  [facility]

## 2020-11-02 ENCOUNTER — OFFICE VISIT (OUTPATIENT)
Dept: PODIATRY | Facility: CLINIC | Age: 55
End: 2020-11-02
Attending: FAMILY MEDICINE
Payer: COMMERCIAL

## 2020-11-02 ENCOUNTER — ANCILLARY PROCEDURE (OUTPATIENT)
Dept: GENERAL RADIOLOGY | Facility: CLINIC | Age: 55
End: 2020-11-02
Attending: PODIATRIST
Payer: COMMERCIAL

## 2020-11-02 VITALS
BODY MASS INDEX: 29.47 KG/M2 | SYSTOLIC BLOOD PRESSURE: 136 MMHG | WEIGHT: 222.38 LBS | HEIGHT: 73 IN | DIASTOLIC BLOOD PRESSURE: 86 MMHG

## 2020-11-02 DIAGNOSIS — M20.12 VALGUS DEFORMITY OF BOTH GREAT TOES: Primary | ICD-10-CM

## 2020-11-02 DIAGNOSIS — M20.11 VALGUS DEFORMITY OF BOTH GREAT TOES: ICD-10-CM

## 2020-11-02 DIAGNOSIS — S93.692A RUPTURE OF PLANTAR FASCIA OF LEFT FOOT, INITIAL ENCOUNTER: ICD-10-CM

## 2020-11-02 DIAGNOSIS — M21.611 BILATERAL BUNIONS: ICD-10-CM

## 2020-11-02 DIAGNOSIS — M21.612 BILATERAL BUNIONS: ICD-10-CM

## 2020-11-02 DIAGNOSIS — M20.12 VALGUS DEFORMITY OF BOTH GREAT TOES: ICD-10-CM

## 2020-11-02 DIAGNOSIS — M20.11 VALGUS DEFORMITY OF BOTH GREAT TOES: Primary | ICD-10-CM

## 2020-11-02 PROCEDURE — 73630 X-RAY EXAM OF FOOT: CPT | Mod: LT | Performed by: RADIOLOGY

## 2020-11-02 PROCEDURE — 99213 OFFICE O/P EST LOW 20 MIN: CPT | Performed by: PODIATRIST

## 2020-11-02 ASSESSMENT — MIFFLIN-ST. JEOR: SCORE: 1902.57

## 2020-11-02 ASSESSMENT — PAIN SCALES - GENERAL: PAINLEVEL: MODERATE PAIN (5)

## 2020-11-02 NOTE — LETTER
11/2/2020         RE: Indra Mehta  [id number] [place] [id number]        Dear Colleague,    Thank you for referring your patient, Indra Mehta, to the [facility]. Please see a copy of my visit note below.    HPI:  Indra Mehta is a 55 year old male who is seen in consultation at the request of Tha Grullon MD    Pt presents for eval of:   (Onset, Location, L/R, Character, Treatments, Injury if yes)    XR Left and Right foot 11/2/2020 painful hallux limitus bilateral first MPJs with a history of bunionectomies 20 years prior.    Right achilles surgery approx 2010, partial rupture and repair     Partial rupture left planar fascia 8/13/2020    Bunionectomy approx 20 years ago.     Onset March 2020, medial left and right bunion pain. No injury noted. Presents today with athletic shoes.  After daily activity, constant dull ache with intermittent sharp pain 5.  Also has history of surgery in his cervical spine and chronic back pain off and on extending into his hips.    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    Weight management plan: Patient was referred to their PCP to discuss a diet and exercise plan.       ROS:  10 point ROS neg other than the symptoms noted above in the HPI.    Patient Active Problem List   Diagnosis     Meniere's disease     Major depression in complete remission (H)     Chronic pain syndrome     CARDIOVASCULAR SCREENING; LDL GOAL LESS THAN 160     Pain medication agreement signed     Myalgia     Bilateral occipital neuralgia     Benign essential hypertension       PAST MEDICAL HISTORY:   Past Medical History:   Diagnosis Date     Back pain      Meniere's disease, unspecified      Pain medication agreement signed 8/20/2010        PAST SURGICAL HISTORY:   Past Surgical History:   Procedure Laterality Date     BUNIONECTOMY RT/LT  09/05/08    Both feet     COLONOSCOPY N/A 12/28/2016    Procedure: COMBINED COLONOSCOPY, SINGLE  OR MULTIPLE BIOPSY/POLYPECTOMY BY BIOPSY;  Surgeon: Indra Jernigan MD;  Location: [facility]     DISCECTOMY, FUSION CERVICAL ANTERIOR ONE LEVEL, COMBINED N/A 7/5/2017    Procedure: COMBINED DISCECTOMY, FUSION CERVICAL ANTERIOR ONE LEVEL;  Cervical 6-7, Anterior cervical discectomy fusion;  Surgeon: Mike Mckenna MD;  Location: [facility]     DISCECTOMY, FUSION CERVICAL ANTERIOR ONE LEVEL, COMBINED N/A 12/19/2018    Procedure: cervical 5-6 anterior cervical discectomy fusion;  Surgeon: Mike Mckenna MD;  Location: [facility]     HC COLONOSCOPY W/WO BRUSH/WASH  12/27/2005     HC CREATE EARDRUM OPENING,GEN ANESTH  09/27/2007    Pe tube, Left endolymphatic sac enhancement.     HC MASTOIDECTOMY,COMPLETE  09/27/2007     HERNIORRHAPHY UMBILICAL N/A 8/11/2017    Procedure: HERNIORRHAPHY UMBILICAL;  open umbilical hernia repair;  Surgeon: Boni Story MD;  [facility]: [facility]     INJECT EPIDURAL CERVICAL N/A 8/22/2019    Procedure: INJECTION, SPINE, CERVICAL 6-7 EPIDURAL;  Surgeon: Darryn Mcdaniel MD;  Location: [facility]     INJECT EPIDURAL LUMBAR N/A 11/9/2017    Procedure: INJECT EPIDURAL LUMBAR;  lumbar 4-5 epidural steroid injection ;  Surgeon: Darryn Mcdaniel MD;  [facility]: [facility]     INJECT EPIDURAL LUMBAR N/A 12/28/2017    Procedure: INJECT EPIDURAL LUMBAR;  lumbar epidural injection;  Surgeon: Darryn Mcdaniel MD;  [facility]     INJECT EPIDURAL TRANSFORAMINAL Bilateral 8/23/2018    Procedure: INJECT EPIDURAL TRANSFORAMINAL;  transforaminal bilateral lumbar 3-4 steroid injection;  Surgeon: Darryn Mcdaniel MD;  [facility]     INJECT EPIDURAL TRANSFORAMINAL Bilateral 11/8/2018    Procedure: INJECT EPIDURAL TRANSFORAMINAL lumbar 3-4;  Surgeon: Darryn Mcdaniel MD;  [facility]     INJECT EPIDURAL TRANSFORAMINAL Bilateral 6/14/2019    Procedure: INJECTION, EPIDURAL, TRANSFORAMINAL LUMBAR 3-4 BILATERAL;  Surgeon: Darryn Mcdaniel MD;  [facility]     INJECT EPIDURAL TRANSFORAMINAL Bilateral 5/22/2020     Procedure: lumbar 3-4 bilateral transforaminal epidural steroid injection;  Surgeon: Darryn Mcdaniel MD;  [facility]     INJECT EPIDURAL TRANSFORAMINAL Bilateral 9/24/2020    Procedure: INJECTION, EPIDURAL, TRANSFORAMINAL  LUMBAR 3-4 APPROACH;  Surgeon: Darryn Mcdaniel MD;  [facility]: [facility]     PE TUBES  2006    left ear        MEDICATIONS:   Current Outpatient Medications:      Acetaminophen (TYLENOL PO), Take 1,000 mg by mouth 3 times daily , Disp: , Rfl:      ALPRAZolam (XANAX XR) 0.5 MG 24 hr tablet, Take 1 tablet (0.5 mg) by mouth At Bedtime, Disp: 30 tablet, Rfl: 3     amLODIPine (NORVASC) 5 MG tablet, Take 0.5 tablets (2.5 mg) by mouth daily, Disp: 30 tablet, Rfl: 0     gabapentin (NEURONTIN) 400 MG capsule, Take 3 capsules (1,200 mg) by mouth 3 times daily, Disp: 270 capsule, Rfl: 3     hydrochlorothiazide (HYDRODIURIL) 25 MG tablet, TAKE 1 TABLET BY MOUTH EVERY DAY, Disp: 90 tablet, Rfl: 1     ibuprofen (ADVIL/MOTRIN) 800 MG tablet, Take 800 mg by mouth 3 times daily, Disp: , Rfl:      lidocaine (XYLOCAINE) 4 % external solution, Apply topically as needed for moderate pain, Disp: 50 mL, Rfl: 1     losartan (COZAAR) 50 MG tablet, Take 2 tablets (100 mg) by mouth daily, Disp: 180 tablet, Rfl: 0     losartan-hydrochlorothiazide (HYZAAR) 100-25 MG tablet, Take 1 tablet by mouth daily, Disp: 90 tablet, Rfl: 3     nortriptyline (PAMELOR) 50 MG capsule, Take 1 capsule (50 mg) by mouth At Bedtime, Disp: 30 capsule, Rfl: 2     order for DME, Equipment being ordered: TENS Pads as directed, Disp: 1 Device, Rfl: 0     Tapentadol HCl 50 MG TB12, Take 50 mg by mouth 2 times daily To start 10/23/2020, first fill 10/21/2020., Disp: 60 tablet, Rfl: 0     tiZANidine (ZANAFLEX) 4 MG tablet, Take 1 tablet (4 mg) by mouth At Bedtime, Disp: 90 tablet, Rfl: 2     ALLERGIES:    Allergies   Allergen Reactions     No Known Drug Allergies         SOCIAL HISTORY:   Social History     Socioeconomic History     Marital status:  "Single     Spouse name: Not on file     Number of children: 2     Years of education: Not on file     Highest education level: Not on file   Occupational History     Employer: [employer]   Social Needs     Financial resource strain: Not on file     Food insecurity     Worry: Not on file     Inability: Not on file     Transportation needs     Medical: Not on file     Non-medical: Not on file   Tobacco Use     Smoking status: Never Smoker     Smokeless tobacco: Never Used   Substance and Sexual Activity     Alcohol use: No     Alcohol/week: 0.0 standard drinks     Drug use: No     Sexual activity: Yes     Partners: Female   Lifestyle     Physical activity     Days per week: Not on file     Minutes per session: Not on file     Stress: Not on file   Relationships     Social connections     Talks on phone: Not on file     Gets together: Not on file     Attends Religion service: Not on file     Active member of club or organization: Not on file     Attends meetings of clubs or organizations: Not on file     Relationship status: Not on file     Intimate partner violence     Fear of current or ex partner: Not on file     Emotionally abused: Not on file     Physically abused: Not on file     Forced sexual activity: Not on file   Other Topics Concern     Parent/sibling w/ CABG, MI or angioplasty before 65F 55M? No   Social History Narrative     Not on file        FAMILY HISTORY:   Family History   Problem Relation Age of Onset     Cancer - colorectal Mother      Diabetes Mother      Cardiovascular Father      Hypertension Father      Mental Illness Sister      Suicide Other         EXAM:Vitals: /86 (BP Location: Left arm, Patient Position: Sitting, Cuff Size: Adult Regular)   Ht 1.854 m (6' 1\")   Wt 100.9 kg (222 lb 6 oz)   BMI 29.34 kg/m    BMI= Body mass index is 29.34 kg/m .    General appearance: Patient is alert and fully cooperative with history & exam.  No sign of distress is noted during the visit.   "   Psychiatric: Affect is pleasant & appropriate.  Patient appears motivated to improve health.     Respiratory: Breathing is regular & unlabored while sitting.     HEENT: Hearing is intact to spoken word.  Speech is clear.  No gross evidence of visual impairment that would impact ambulation.     Vascular: DP & PT pulses are intact & regular bilaterally.  No significant edema or varicosities noted.  CFT and skin temperature is normal to both lower extremities.     Neurologic: Lower extremity sensation is intact to light touch.  No evidence of weakness or contracture in the lower extremities.  No evidence of neuropathy.    Dermatologic: Skin is intact to both lower extremities with adequate texture, turgor and tone about the integument.  No paronychia or evidence of soft tissue infection is noted.     Musculoskeletal: Patient is ambulatory without assistive device or brace.  There is gross prominence bilateral first MPJs.  Most consistent symptoms of pain and limited motion about the right first MPJ.  There is crepitus and tracking bilateral first MPJ with most symptoms to the right.  Approximately 10 degrees total range of motion with crepitus throughout on the right.  No erythema or heat.  Manual muscle strength +5/5 to all 4 quadrants.  Appear to be reasonable intact gliding without pain or limitation.  No weakness is noted.    Radiographs: 3 views bilateral 11/2/2020 demonstrate loss of joint space bilateral first metatarsal phalangeal joints with flattening of the first metatarsal head and sclerotic changes.  Not much joint space is noted on the first MPJ on the right and it is clearly reduced on the left.  Multiple loose bodies noted throughout both joints bilaterally.  Very long first metatarsal in comparison to the lesser metatarsals.     ASSESSMENT:       ICD-10-CM    1. Valgus deformity of both great toes  M20.11 XR Foot Bilateral G/E 3 Views    M20.12    2. Bilateral bunions  M21.611 Orthopedic & Spine  [facility] Referral    M21.612    3. Rupture of plantar fascia of left foot, initial encounter  S93.473T         PLAN:  Review[facility] in [facility].      11/2/2020   Obtained and interpreted radiographs  Discussed etiology of hallux limitus and treatment options.  Discussed stiff sturdy shoes oral anti-inflammatories and injections and what to expect from these.  We discussed his back pain and symptoms about his back and how his foot may be contributing or relating to his back symptoms.  We reviewed surgical arthrodesis as well as expected outcome and postoperative care.     Today he states he would like to give that some consideration before committing to any specific treatment.  He understands that injections would likely be helpful but probably not definitive or final care for him.  All questions were answered.  He can follow-up as needed.      Mariano Masters DPM        Again, thank you for allowing me to participate in the care of your patient.        Sincerely,        Mariano Masters DPM

## 2020-11-02 NOTE — PATIENT INSTRUCTIONS
"DEGENERATIVE ARTHRITIS OF THE BIG TOE JOINT (hallux limitus/hallux rigidus)   Arthritis of the joint at the base of the big toe (metatarsophalangeal joint) has several causes. Usually it results from repetitive trauma to the joint, secondary to abnormal foot mechanics. Often it is hereditary. However, a one-time traumatic event can lead to arthritis. The condition vargas,sn't improve with time, and even with treatment, can worsen. The cartilage wears out, joint surfaces are no longer smooth, bone rubs on bone, inflammation occurs with pain, and eventually bone spurs and loose fragments might develop.   The joint is often painful with activity, worse with flimsy shoes or walking barefoot, and it slowly progresses over time. A person might notice the toe \"locking up\" with walking. There often is an obvious, and irritating, bony bump on top of the foot. Shoes might be uncomfortable. In some people the pain is so bothersome that recreational activities sometimes even normal daily activities are difficult to perform.   The pain from this arthritis is likely a combination of joint jamming, cartilage loss and inflammation, and irritation from shoes rubbing on the bump. Sometimes other parts of the foot, leg, or back hurt from altering one's walk to compensate for the painful jOint.     Ways to help a person live with the discomfort include wearing a good, supportive shoe with a rigid, rocker-type bottom. An example is a hiking boot. A rigid sole minimizes bending of the joint, and therefore, joint motion and pain. Shoes with a high toe box allow for less rubbing on the bump. Avoiding barefoot walking, sandals, flip-flops and slippers usually helps.   Sometimes an insert or orthotic provides symptom relief. This might make shoe fit more difficult. Pads over the bump and occasionally injections into the joint provide relief.   Surgery for this condition is aimed towards alleviating pain. It does not cure the arthritis nor does " it guarantee better joint motion. Depending on the condition of the metatarsophalangeal joint, there are several surgiqal options:    1.  Cutting off the bony bump(s) and cleaning the joint    2.  Loosening the joint up by making cuts in the first metatarsal bone or the big toe bone and removing a small section of bone.    3.  Repositioning bone to minimize jamming of the joint.    4.  In severe cases, the joint is fused. By fusing the joint, it will never bend again. This resolves the pain, because it's the movement of a worn out jOint that causes pain. Oftentimes the operation involves a combination of these procedures and. requires the use of screws, pins, and/or a small surgical plate.     Healing after surgery requires about six weeks of protection. This allows the bone to heal. Maximum recovery takes about one year. The scar tissue and joint structures require this amount of time to finish the healing process. Expect stiffness, swelling and numbness during that time frame.   Surgery for arthritis of the metatarsophalangeal joint does involve side effects. Some side effects are predictable and others are less common but do occur. A scar will be visible and could be irritated by shoes. The shoe may rub on the screw or internal pin requiring surgical removal of these fixation devices. The screw and pin would likely be left in place for a full year. The first toe may remain stiff after surgery. The amount of stiffness is variable. Most people never regain normal motion of the first toe. This is due to scar tissue inherent to any surgery, in addition to the cumUlative effects of arthritis. Sometimes the big toe drifts to one side or the other. Joint fusion is one option to correct an unstable, drifting toe. This procedure straightens the toe, however, no motion remains.   All surgical procedures involve risk of infection, numbness, pain, delayed bone healing, osteotomy (bone cut) dislocation, blood clots, continued  foot pain, etc. Arthritic joint surgery is quite complex and should not be taken lightly.    Any skin incision can lead to infection. Deep infection might involve the bone and thus repeat surgery and six weeks of IV antibiotics. Scar tissue can cause nerve pain or numbness. his is generally temporary but can be permanent. We do not have treatments that cure nerve problems. Second toe pain could be related to altered mechanics and pressure transferred to the second toe. Delayed bone healing would lengthen the healing time. Some bones simply do not heal. This requires repeat surgery, electronic bone stimulation and/or extended protection. Smokers have an approximate 20% chance of poor bone healing. This is double that of a non-smoker. The bone cut may displace. This may need to be repaired with a second operation. Displacement can cause joint malalignment. Immobility after surgery can cause a blood clot in the legs and lungs. This could result in death.   Foot pain is complex. Most feet hurt for more than one reason. Operating on the arthritic   big toe joint will not necessarily create a pain free foot. Appropriate shoes, healthy body weight, avoidance of bare foot walking and moderation of activity will always be   necessary to enjoy foot comfort. Arthritis is incurable even with surgery.     Surgery for this type of arthritis is nevertheless quite successful. Most surgical patients are pleased with their foot following surgery. Many of the issues described above can be controlled by taking proper care of your foot during the healing process.   Cosmetic bump surgery is discouraged for the reasons listed above. A bump and joint that is comfortable when wearing appropriate shoes should simply be treated with appropriate shoes.   Your surgeon would be happy to fully describe any of the above issues. You should pursue a full understanding of the operation, recovery process and any potential problems that could develop.

## 2020-11-02 NOTE — PROGRESS NOTES
HPI:  Indra Mehta is a 55 year old male who is seen in consultation at the request of Tha Grullon MD    Pt presents for eval of:   (Onset, Location, L/R, Character, Treatments, Injury if yes)    XR Left and Right foot 11/2/2020 painful hallux limitus bilateral first MPJs with a history of bunionectomies 20 years prior.    Right achilles surgery approx 2010, partial rupture and repair     Partial rupture left planar fascia 8/13/2020    Bunionectomy approx 20 years ago.     Onset March 2020, medial left and right bunion pain. No injury noted. Presents today with athletic shoes.  After daily activity, constant dull ache with intermittent sharp pain 5.  Also has history of surgery in his cervical spine and chronic back pain off and on extending into his hips.    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    Weight management plan: Patient was referred to their PCP to discuss a diet and exercise plan.       ROS:  10 point ROS neg other than the symptoms noted above in the HPI.    Patient Active Problem List   Diagnosis     Meniere's disease     Major depression in complete remission (H)     Chronic pain syndrome     CARDIOVASCULAR SCREENING; LDL GOAL LESS THAN 160     Pain medication agreement signed     Myalgia     Bilateral occipital neuralgia     Benign essential hypertension       PAST MEDICAL HISTORY:   Past Medical History:   Diagnosis Date     Back pain      Meniere's disease, unspecified      Pain medication agreement signed 8/20/2010        PAST SURGICAL HISTORY:   Past Surgical History:   Procedure Laterality Date     BUNIONECTOMY RT/LT  09/05/08    Both feet     COLONOSCOPY N/A 12/28/2016    Procedure: COMBINED COLONOSCOPY, SINGLE OR MULTIPLE BIOPSY/POLYPECTOMY BY BIOPSY;  Surgeon: Indra Jernigan MD;  Location: [facility]     DISCECTOMY, FUSION CERVICAL ANTERIOR ONE LEVEL, COMBINED N/A 7/5/2017    Procedure: COMBINED DISCECTOMY, FUSION CERVICAL ANTERIOR ONE LEVEL;  Cervical  6-7, Anterior cervical discectomy fusion;  Surgeon: Mike Mckenna MD;  Location: [facility]     DISCECTOMY, FUSION CERVICAL ANTERIOR ONE LEVEL, COMBINED N/A 12/19/2018    Procedure: cervical 5-6 anterior cervical discectomy fusion;  Surgeon: Mike Mckenna MD;  Location: [facility]     HC COLONOSCOPY W/WO BRUSH/WASH  12/27/2005     HC CREATE EARDRUM OPENING,GEN ANESTH  09/27/2007    Pe tube, Left endolymphatic sac enhancement.     HC MASTOIDECTOMY,COMPLETE  09/27/2007     HERNIORRHAPHY UMBILICAL N/A 8/11/2017    Procedure: HERNIORRHAPHY UMBILICAL;  open umbilical hernia repair;  Surgeon: Boni Story MD;  Location: [facility]     INJECT EPIDURAL CERVICAL N/A 8/22/2019    Procedure: INJECTION, SPINE, CERVICAL 6-7 EPIDURAL;  Surgeon: Darryn Mcdaniel MD;  Location: [facility]     INJECT EPIDURAL LUMBAR N/A 11/9/2017    Procedure: INJECT EPIDURAL LUMBAR;  lumbar 4-5 epidural steroid injection ;  Surgeon: Darryn Mcdaniel MD;  [facility]: [facility]     INJECT EPIDURAL LUMBAR N/A 12/28/2017    Procedure: INJECT EPIDURAL LUMBAR;  lumbar epidural injection;  Surgeon: Darryn Mcdaniel MD;  [facility]     INJECT EPIDURAL TRANSFORAMINAL Bilateral 8/23/2018    Procedure: INJECT EPIDURAL TRANSFORAMINAL;  transforaminal bilateral lumbar 3-4 steroid injection;  Surgeon: Darryn Mcdaniel MD;  [facility]     INJECT EPIDURAL TRANSFORAMINAL Bilateral 11/8/2018    Procedure: INJECT EPIDURAL TRANSFORAMINAL lumbar 3-4;  Surgeon: Darryn Mcdaniel MD;  [facility]     INJECT EPIDURAL TRANSFORAMINAL Bilateral 6/14/2019    Procedure: INJECTION, EPIDURAL, TRANSFORAMINAL LUMBAR 3-4 BILATERAL;  Surgeon: Darryn Mcdaniel MD;  [facility]     INJECT EPIDURAL TRANSFORAMINAL Bilateral 5/22/2020    Procedure: lumbar 3-4 bilateral transforaminal epidural steroid injection;  Surgeon: Darryn Mcdaniel MD;  [facility]     INJECT EPIDURAL TRANSFORAMINAL Bilateral 9/24/2020    Procedure: INJECTION, EPIDURAL, TRANSFORAMINAL  LUMBAR 3-4 APPROACH;   Surgeon: Darryn Mcdaniel MD;  Location: [facility]     PE TUBES  2006    left ear        MEDICATIONS:   Current Outpatient Medications:      Acetaminophen (TYLENOL PO), Take 1,000 mg by mouth 3 times daily , Disp: , Rfl:      ALPRAZolam (XANAX XR) 0.5 MG 24 hr tablet, Take 1 tablet (0.5 mg) by mouth At Bedtime, Disp: 30 tablet, Rfl: 3     amLODIPine (NORVASC) 5 MG tablet, Take 0.5 tablets (2.5 mg) by mouth daily, Disp: 30 tablet, Rfl: 0     gabapentin (NEURONTIN) 400 MG capsule, Take 3 capsules (1,200 mg) by mouth 3 times daily, Disp: 270 capsule, Rfl: 3     hydrochlorothiazide (HYDRODIURIL) 25 MG tablet, TAKE 1 TABLET BY MOUTH EVERY DAY, Disp: 90 tablet, Rfl: 1     ibuprofen (ADVIL/MOTRIN) 800 MG tablet, Take 800 mg by mouth 3 times daily, Disp: , Rfl:      lidocaine (XYLOCAINE) 4 % external solution, Apply topically as needed for moderate pain, Disp: 50 mL, Rfl: 1     losartan (COZAAR) 50 MG tablet, Take 2 tablets (100 mg) by mouth daily, Disp: 180 tablet, Rfl: 0     losartan-hydrochlorothiazide (HYZAAR) 100-25 MG tablet, Take 1 tablet by mouth daily, Disp: 90 tablet, Rfl: 3     nortriptyline (PAMELOR) 50 MG capsule, Take 1 capsule (50 mg) by mouth At Bedtime, Disp: 30 capsule, Rfl: 2     order for DME, Equipment being ordered: TENS Pads as directed, Disp: 1 Device, Rfl: 0     Tapentadol HCl 50 MG TB12, Take 50 mg by mouth 2 times daily To start 10/23/2020, first fill 10/21/2020., Disp: 60 tablet, Rfl: 0     tiZANidine (ZANAFLEX) 4 MG tablet, Take 1 tablet (4 mg) by mouth At Bedtime, Disp: 90 tablet, Rfl: 2     ALLERGIES:    Allergies   Allergen Reactions     No Known Drug Allergies         SOCIAL HISTORY:   Social History     Socioeconomic History     Marital status: Single     Spouse name: Not on file     Number of children: 2     Years of education: Not on file     Highest education level: Not on file   Occupational History     Employer: [employer]   Social Needs     Financial resource strain: Not on file      "Food insecurity     Worry: Not on file     Inability: Not on file     Transportation needs     Medical: Not on file     Non-medical: Not on file   Tobacco Use     Smoking status: Never Smoker     Smokeless tobacco: Never Used   Substance and Sexual Activity     Alcohol use: No     Alcohol/week: 0.0 standard drinks     Drug use: No     Sexual activity: Yes     Partners: Female   Lifestyle     Physical activity     Days per week: Not on file     Minutes per session: Not on file     Stress: Not on file   Relationships     Social connections     Talks on phone: Not on file     Gets together: Not on file     Attends Zoroastrianism service: Not on file     Active member of club or organization: Not on file     Attends meetings of clubs or organizations: Not on file     Relationship status: Not on file     Intimate partner violence     Fear of current or ex partner: Not on file     Emotionally abused: Not on file     Physically abused: Not on file     Forced sexual activity: Not on file   Other Topics Concern     Parent/sibling w/ CABG, MI or angioplasty before 65F 55M? No   Social History Narrative     Not on file        FAMILY HISTORY:   Family History   Problem Relation Age of Onset     Cancer - colorectal Mother      Diabetes Mother      Cardiovascular Father      Hypertension Father      Mental Illness Sister      Suicide Other         EXAM:Vitals: /86 (BP Location: Left arm, Patient Position: Sitting, Cuff Size: Adult Regular)   Ht 1.854 m (6' 1\")   Wt 100.9 kg (222 lb 6 oz)   BMI 29.34 kg/m    BMI= Body mass index is 29.34 kg/m .    General appearance: Patient is alert and fully cooperative with history & exam.  No sign of distress is noted during the visit.     Psychiatric: Affect is pleasant & appropriate.  Patient appears motivated to improve health.     Respiratory: Breathing is regular & unlabored while sitting.     HEENT: Hearing is intact to spoken word.  Speech is clear.  No gross evidence of visual " impairment that would impact ambulation.     Vascular: DP & PT pulses are intact & regular bilaterally.  No significant edema or varicosities noted.  CFT and skin temperature is normal to both lower extremities.     Neurologic: Lower extremity sensation is intact to light touch.  No evidence of weakness or contracture in the lower extremities.  No evidence of neuropathy.    Dermatologic: Skin is intact to both lower extremities with adequate texture, turgor and tone about the integument.  No paronychia or evidence of soft tissue infection is noted.     Musculoskeletal: Patient is ambulatory without assistive device or brace.  There is gross prominence bilateral first MPJs.  Most consistent symptoms of pain and limited motion about the right first MPJ.  There is crepitus and tracking bilateral first MPJ with most symptoms to the right.  Approximately 10 degrees total range of motion with crepitus throughout on the right.  No erythema or heat.  Manual muscle strength +5/5 to all 4 quadrants.  Appear to be reasonable intact gliding without pain or limitation.  No weakness is noted.    Radiographs: 3 views bilateral 11/2/2020 demonstrate loss of joint space bilateral first metatarsal phalangeal joints with flattening of the first metatarsal head and sclerotic changes.  Not much joint space is noted on the first MPJ on the right and it is clearly reduced on the left.  Multiple loose bodies noted throughout both joints bilaterally.  Very long first metatarsal in comparison to the lesser metatarsals.     ASSESSMENT:       ICD-10-CM    1. Valgus deformity of both great toes  M20.11 XR Foot Bilateral G/E 3 Views    M20.12    2. Bilateral bunions  M21.611 Orthopedic [facility] Sp[facility]  Referral    M21.612    3. Rupture of plantar fascia of left foot, initial encounter  S93.082T         PLAN:  Reviewed patient's chart in [facility].      11/2/2020   Obtained and interpreted radiographs  Discussed etiology of hallux limitus and  treatment options.  Discussed stiff sturdy shoes oral anti-inflammatories and injections and what to expect from these.  We discussed his back pain and symptoms about his back and how his foot may be contributing or relating to his back symptoms.  We reviewed surgical arthrodesis as well as expected outcome and postoperative care.     Today he states he would like to give that some consideration before committing to any specific treatment.  He understands that injections would likely be helpful but probably not definitive or final care for him.  All questions were answered.  He can follow-up as needed.      Mariano Masters DPM

## 2020-11-10 ENCOUNTER — MYC MEDICAL ADVICE (OUTPATIENT)
Dept: ORTHOPEDICS | Facility: CLINIC | Age: 55
End: 2020-11-10

## 2020-11-12 NOTE — PROGRESS NOTES
"Indra Mehta is a 55 year old male who is being evaluated via a billable video visit.      The patient has been notified of following:     \"This video visit will be conducted via a call between you and your physician/provider. We have found that certain health care needs can be provided without the need for an in-person physical exam.  This service lets us provide the care you need with a video conversation.  If a prescription is necessary we can send it directly to your pharmacy.  If lab work is needed we can place an order for that and you can then stop by our lab to have the test done at a later time.    Video visits are billed at different rates depending on your insurance coverage.  Please reach out to your insurance provider with any questions.    If during the course of the call the physician/provider feels a video visit is not appropriate, you will not be charged for this service.\"    Patient has given verbal consent for Video visit? Yes  How would you like to obtain your AVS? MyChart  If you are dropped from the video visit, the video invite should be resent to: Text to cell phone: .[phone]    Will anyone else be joining your video visit? No      Rhonda Sandoval CMA   [facility] [place]    Video-Visit Details    Type of service:  Video Visit    Video Start Time: 8:01 AM  Video End Time: 8:33    Originating Location (pt. Location): Home    Distant Location (provider location):  [facility]NAG[facility]     Platform used for Video Visit: Jen Mallory MD                                [facility]  Follow up clinic visit    Date of visit: 11/12/2020    Chief complaint:   Chief Complaint   Patient presents with     Pain       Interval history:  Indra Mehta is a 55 year old male with a history of depression and HTN who follows with Chiara Garcia PA-C for:  - cervicalgia s/p cervical fusion C4-5 and C5-6. " "  - myofascial pain  - chronic low back pain  - painful peripheral neuropathy    Last seen by me on 10/09/2020. he is seeing me while Chiara is on leave.     Interval pain history 10/9/2020:  \"He is now s/p bilateral L3/4 TFESI injections on 9/24/2020 with Dr. Mcdaniel. This doesn't seem to be as effective as prior injections     He has pain radiating down R leg to knee  Also pain that radiates to buttocks     He also has pain in the plantar aspect of bilateral feet (like he is stepping on nails or like he is stepping on rocks). Also has tingling in whole feet intermittently. On a good day the pain is isolated to the toes. This can cause him to not want to walk. \"    Recommendations/plan at the last visit included:  Additional Workup:   1. - Diagnostic Studies:  Not at this time  2. - Laboratory studies:  Not at this time  3. - Urine toxicology screen today: no      Therapies:   4. - Physical Therapy: continue current exercises  5. - Clinical Health Psychologist to address issues of relaxation, behavioral change, coping style, and other factors important to improvement: not at this time     Medication Management:   6. - Transition from oxycodone to Nucynta for dual management of both his MSK low back and neck pain as well as his peripheral neuropathy. The norepinephrine effect of Nucynta will likely be more efficacious for his painful peripheral neuropathy, which is the most functionally limiting of his pain complaints.   7. - Start topical lidocaine for painful peripheral neuropathy     Further procedures recommended:   8. - Consider procedures to target facet joint in future for LBP  9. Agree with SCS trial - Dr. Pulido is in the process of appealing this with Pt's insurance.     Since his last visit, Indra Mehta reports:  - started Nucynta for neuropathic foot pain   - doesn't think it is helping his feet enough to offset diminished pain relief in other areas  - noticing now numbness/tingling extending " superiorly almost jail up to knee  - initially PE was attributed to low back  - low back and neck are also bothersome. Neck is constantly painful axially. Rotation is painful bilaterally. By the end of the day his neck hurts. Pressing on his cervical paraspinal mm does not feel like it affects the pain. It feels different from mm pain - inside  - MM in lower back also get sore, but pain is not mm pain. Still goes down the R leg to knee, but not past. On L side only radiates to buttock.  - Has pain with heavy activity immediately. Prolonged sitting and standing are problematic.      Pain scores:  Pain intensity on average is 5 on a scale of 0-10.     Current pain treatments:   Gabapentin 400 mg-Taking 3 capsules 3 times a day  Tizanidine 4 mg-taking 1 at HS   Tylenol 1000 mg 3x/day  Ibuprofen 800 mg TID  Nucynta - 50mg BID  Xanax-does not use daily, last use was quite a while ago (was taking for insomnia related to) L ear tinnitis  Nortriptyline 50mg at bedtime     Patient is using the medication as prescribed:  YES  Is your medication helpful? YES   Side Effects: no side effect    Past pain treatments:  Injections:   - 11/8/18 bilateral L3-4 TFESI - (Dr Mcdaniel)  - 8/23/18 bilateral L3-4 TFESI - seems to have worsened pain (Dr Mcdaniel)  - 6/11/18 TFESI right C5-6 - helped with arm pain and numbness  - 12/28/17 bilateral L3-4 TFESI   - 10/23/17 Bilateral L4-5 TFESI   - 11/08/2018 Bilateral L3-4 TFESI   -6/14/2019 Bilateral L3-4 TFESI   -8/22/2019 C6-7 SRIDEVI   -5/22/2020 L3-4 bilateral TLESI  Surgery:    - ACDF C6-7 on 7/5/17 with improvement;   - ACDF C5-6 12/19/2018, posterior C5-7 fusion.   - Lateral mass screws, right C5-6 medial facetectomy, right C6 foraminotomy on 10/29/2019  TENS unit: helpful    Medications:  Current Outpatient Medications   Medication Sig Dispense Refill     Acetaminophen (TYLENOL PO) Take 1,000 mg by mouth 3 times daily        ALPRAZolam (XANAX XR) 0.5 MG 24 hr tablet Take 1 tablet (0.5 mg)  by mouth At Bedtime 30 tablet 3     amLODIPine (NORVASC) 5 MG tablet Take 0.5 tablets (2.5 mg) by mouth daily 30 tablet 0     gabapentin (NEURONTIN) 400 MG capsule Take 3 capsules (1,200 mg) by mouth 3 times daily 270 capsule 3     hydrochlorothiazide (HYDRODIURIL) 25 MG tablet TAKE 1 TABLET BY MOUTH EVERY DAY 90 tablet 1     ibuprofen (ADVIL/MOTRIN) 800 MG tablet Take 800 mg by mouth 3 times daily       lidocaine (XYLOCAINE) 4 % external solution Apply topically as needed for moderate pain 50 mL 1     losartan (COZAAR) 50 MG tablet Take 2 tablets (100 mg) by mouth daily 180 tablet 0     losartan-hydrochlorothiazide (HYZAAR) 100-25 MG tablet Take 1 tablet by mouth daily 90 tablet 3     nortriptyline (PAMELOR) 50 MG capsule Take 1 capsule (50 mg) by mouth At Bedtime 30 capsule 2     order for DME Equipment being ordered: TENS Pads as directed 1 Device 0     Tapentadol HCl 50 MG TB12 Take 50 mg by mouth 2 times daily To start 10/23/2020, first fill 10/21/2020. 60 tablet 0     tiZANidine (ZANAFLEX) 4 MG tablet Take 1 tablet (4 mg) by mouth At Bedtime 90 tablet 2       Medical History: any changes in medical history since they were last seen? No    Review of Systems:  The 14 system ROS was reviewed from the intake questionnaire, and is positive for: as above  Any bowel or bladder problems: no  Mood: ok    Physical Exam:  There were no vitals taken for this visit.  General: NAD, appears stated age  Psych: Mood is ok. Affect is congruent. Thought process is logical. Thought content normal.  Neuro: speech fluent, CN II - XII wnl    Controlled Substance Agreement:   Encounter-Level CSA - 06/15/2017:    Controlled Substance Agreement - Scan on 6/22/2017 11:53 AM: CONTROLLED SUBSTANCE AGREEMENT     Encounter-Level CSA - 10/13/2016:    Controlled Substance Agreement - Scan on 10/23/2016  5:07 PM: CONTROLLED SUBSTANCE AGREEMENT 10/13/16     Patient-Level CSA:    Controlled Substance Agreement - Opioid - Scan on 10/29/2020  2:12  PM  Controlled Substance Agreement - Opioid - Scan on 4/15/2019  3:04 PM          UDS:   Pain Drug SCR UR W RPTD Meds   Date Value Ref Range Status   07/17/2020 FINAL  Final     Comment:     (Note)  ====================================================================  [facility] COMP DRUG ANALYSIS,UR  ====================================================================  Test                             Result       Flag       Units        Drug Present   Oxycodone                      3628                    ng/mg creat   Oxymorphone                    1618                    ng/mg creat   Noroxycodone                   2804                    ng/mg creat   Noroxymorphone                 508                     ng/mg creat    Sources of oxycodone are scheduled prescription medications.    Oxymorphone, noroxycodone, and noroxymorphone are expected    metabolites of oxycodone. Oxymorphone is also available as a    scheduled prescription medication.   Gabapentin                     PRESENT                               Tizanidine                     PRESENT                               Nortriptyline                  PRESENT                                Nortriptyline may be administered as a prescription drug; it is    also an expected metabolite of amitriptyline.   Acetaminophen                  PRESENT                               Ibuprofen                      PRESENT                               Diphenhydramine                PRESENT                              ====================================================================  Test                      Result    Flag   Units      Ref Range        Creatinine              212              mg/dL      >=20            ====================================================================  Declared Medications:  Medication list was not provided.  ====================================================================  For clinical consultation, please call [phone]  [phone].  ====================================================================  Analysis performed by [facility], [address] [place] [address]          THE 4 As OF OPIOID MAINTENANCE ANALGESIA    Analgesia: Is pain relief clinically significant? YES   Activity: Is patient functional and able to perform Activities of Daily Living? YES   Adverse effects: Is patient free from adverse side effects from opiates? YES   Adherence to Rx protocol: Is patient adhering to Controlled Substance Agreement and taking medications ONLY as ordered? YES    MME: 30    Is Narcan prescribed for opiate use >50 MME daily? N/A     :  [place] Prescription Drug Monitoring Program (PDMP) Link  Checked and as expected      Assessment:   Indra Mehta is a 55 year old male who is seen at the pain clinic for:       Chronic pain syndrome  Cervicalgia  Chronic bilateral low back pain without sciatica  Neuropathy    Dan's painful peripheral neuropathy is still the primary limiting factor to his pain. It can prevent him from walking. It is improved with the Nucynta, but the nucynta is less helpful for his low back pain and neck pain.   Given his functional limitation of his painful peripheral neuropathy, I think it is reasonable to increase his dose of nucynta, since he is on max dose of gabapentin and nortriptyline as well.   I would also like to work up his peripheral neuropathy, as it is worsening and there is no clear etiology. We will do a metabolic workup for peripheral neuropathy and consider neurology referral. I do not think an EMG would change our management plan, since he has already tried epidural steroid injections without benefit.   For his low back, his pain sounds facet mediated. He has never had procedures targeted at his facet joints. I would like to have him start PT for his low back. We may consider lumbar MBBs in the future if needed. He is planning to have IA CSIs for his knees soon, so I would like to avoid facet  joint corticosteroid injections.      Plan:  Additional Workup:   1. - Diagnostic Studies:  no  2. - Laboratory studies:  Metabolic work up for peripheral neuropathy: Hgb A1c, Vit B12, Vit E, folate, hepatic panel, JAMAAL, Cr, serum electrophoresis  3. - Urine toxicology screen today: no     Therapies:   4. - Physical Therapy: for axial low back pain  5. - Clinical Health Psychologist to address issues of relaxation, behavioral change, coping style, and other factors important to improvement: not at this time    Medication Management:   6. - Increase Nucynta to 75 mg BID - refilled today  7. - Continue topical lidocaine  8. - Continue gabapentin 1200 mg TID  9. - Continue nortriptyline 50 mg at bedtime  10. - Continue tizanidine 4mg qhs    Further procedures recommended:   11. - Agree with SCS trial. Dr. Pulido is in process of appealing with Pt's insurance.     Follow up: 1 mo video     Liz Mallory MD  [facility]

## 2020-11-13 ENCOUNTER — VIRTUAL VISIT (OUTPATIENT)
Dept: PALLIATIVE MEDICINE | Facility: CLINIC | Age: 55
End: 2020-11-13
Payer: COMMERCIAL

## 2020-11-13 DIAGNOSIS — M54.50 CHRONIC BILATERAL LOW BACK PAIN WITHOUT SCIATICA: ICD-10-CM

## 2020-11-13 DIAGNOSIS — M54.2 CERVICALGIA: ICD-10-CM

## 2020-11-13 DIAGNOSIS — G89.4 CHRONIC PAIN SYNDROME: ICD-10-CM

## 2020-11-13 DIAGNOSIS — G62.9 NEUROPATHY: ICD-10-CM

## 2020-11-13 DIAGNOSIS — G89.29 CHRONIC BILATERAL LOW BACK PAIN WITHOUT SCIATICA: ICD-10-CM

## 2020-11-13 PROCEDURE — 99214 OFFICE O/P EST MOD 30 MIN: CPT | Mod: GT | Performed by: STUDENT IN AN ORGANIZED HEALTH CARE EDUCATION/TRAINING PROGRAM

## 2020-11-13 ASSESSMENT — PAIN SCALES - GENERAL: PAINLEVEL: SEVERE PAIN (6)

## 2020-11-13 NOTE — PATIENT INSTRUCTIONS
1. Increase Nucynta to 75 mg twice daily. I have refilled this today.   2. Continue your other medications as previously prescribed.  3. Start physical therapy for your low back pain.   4. Make an appointment for labs to look into possible causes of your peripheral neuroapthy.     Follow up with me in 1 month via telehealth visit    Liz Mallory MD  [facility]    ----------------------------------------------------------------  Clinic Number:  [phone]     Call with any questions about your care and for scheduling assistance.     Calls are returned Monday through Friday between 8 AM and 4:30 PM. We usually get back to you within 2 business days depending on the issue/request.    If we are prescribing your medications:    For opioid medication refills, call the clinic or send a [employer] message 7 days in advance.  Please include:    Name of requested medication    Name of the pharmacy.    For non-opioid medications, call your pharmacy directly to request a refill. Please allow 3-4 days to be processed.     Per [place] State Law:    All controlled substance prescriptions must be filled within 30 days of being written.      For those controlled substances allowing refills, pickup must occur within 30 days of last fill.      We believe regular attendance is key to your success in our program!      Any time you are unable to keep your appointment we ask that you call us at least 24 hours in advance to cancel.This will allow us to offer the appointment time to another patient.     Multiple missed appointments may lead to dismissal from the clinic.

## 2020-11-16 ENCOUNTER — OFFICE VISIT (OUTPATIENT)
Dept: ORTHOPEDICS | Facility: CLINIC | Age: 55
End: 2020-11-16
Payer: COMMERCIAL

## 2020-11-16 ENCOUNTER — MYC MEDICAL ADVICE (OUTPATIENT)
Dept: PALLIATIVE MEDICINE | Facility: CLINIC | Age: 55
End: 2020-11-16

## 2020-11-16 VITALS
BODY MASS INDEX: 29.49 KG/M2 | HEART RATE: 121 BPM | WEIGHT: 222.5 LBS | DIASTOLIC BLOOD PRESSURE: 98 MMHG | HEIGHT: 73 IN | SYSTOLIC BLOOD PRESSURE: 174 MMHG

## 2020-11-16 DIAGNOSIS — G89.29 CHRONIC PAIN OF BOTH KNEES: Primary | ICD-10-CM

## 2020-11-16 DIAGNOSIS — G62.9 NEUROPATHY: Primary | ICD-10-CM

## 2020-11-16 DIAGNOSIS — Z79.891 ENCOUNTER FOR LONG-TERM OPIATE ANALGESIC USE: ICD-10-CM

## 2020-11-16 DIAGNOSIS — M25.562 CHRONIC PAIN OF BOTH KNEES: Primary | ICD-10-CM

## 2020-11-16 DIAGNOSIS — M25.561 CHRONIC PAIN OF BOTH KNEES: Primary | ICD-10-CM

## 2020-11-16 PROCEDURE — 20610 DRAIN/INJ JOINT/BURSA W/O US: CPT | Mod: 50 | Performed by: PHYSICAL MEDICINE & REHABILITATION

## 2020-11-16 PROCEDURE — 99213 OFFICE O/P EST LOW 20 MIN: CPT | Mod: 25 | Performed by: PHYSICAL MEDICINE & REHABILITATION

## 2020-11-16 RX ORDER — TRIAMCINOLONE ACETONIDE 40 MG/ML
40 INJECTION, SUSPENSION INTRA-ARTICULAR; INTRAMUSCULAR
Status: SHIPPED | OUTPATIENT
Start: 2020-11-16

## 2020-11-16 RX ADMIN — TRIAMCINOLONE ACETONIDE 40 MG: 40 INJECTION, SUSPENSION INTRA-ARTICULAR; INTRAMUSCULAR at 08:48

## 2020-11-16 ASSESSMENT — MIFFLIN-ST. JEOR: SCORE: 1903.13

## 2020-11-16 NOTE — LETTER
11/16/2020         RE: Indra Mehta  [id number] [address] [place] [id number]        Dear Colleague,    Thank you for referring your patient, Indra Mehta, to the [facility] [place]. Please see a copy of my visit note below.    Sports Medicine Clinic Visit       PCP: Tha Grullon    Indra Mehta is a 55 year old male who is seen in follow up for Chronic pain of both knees. Since last visit on 9/8/2020, Ketan has done 3-4 physical therapy sessions.  He notes it helped a little bit however he is still having trouble with the knees.  He rates the pain at a 4/10 (right) and 2/10 (left) currently.   Symptoms are relieved with ice.  Symptoms are worsened by stairs, sit to stand transition. He has difficulty with climbing and putting weight through the right knee.  He has noticed this with deer hunting. He would like to do bilateral knee injections today.      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       Review of Systems  Musculoskeletal: as above  Remainder of review of systems is negative including constitutional, eyes, ENT, CV, pulmonary, GI, , endocrine, skin, hematologic, and neurologic except as noted in HPI or medical history.    History reviewed. No pertinent past surgical/medical/family/social history other than as mentioned in HPI.    Patient Active Problem List   Diagnosis     Meniere's disease     Major depression in complete remission (H)     Chronic pain syndrome     CARDIOVASCULAR SCREENING; LDL GOAL LESS THAN 160     Pain medication agreement signed     Myalgia     Bilateral occipital neuralgia     Benign essential hypertension     Past Medical History:   Diagnosis Date     Back pain      Meniere's disease, unspecified      Pain medication agreement signed 8/20/2010     Past Surgical History:   Procedure Laterality Date     BUNIONECTOMY RT/LT  09/05/08    Both feet     COLONOSCOPY N/A 12/28/2016    Procedure: COMBINED COLONOSCOPY, SINGLE OR MULTIPLE BIOPSY/POLYPECTOMY BY  BIOPSY;  Surgeon: Indra Jernigan MD;  Location: [facility]     DISCECTOMY, FUSION CERVICAL ANTERIOR ONE LEVEL, COMBINED N/A 7/5/2017    Procedure: COMBINED DISCECTOMY, FUSION CERVICAL ANTERIOR ONE LEVEL;  Cervical 6-7, Anterior cervical discectomy fusion;  Surgeon: Mike Mckenna MD;  Location: [facility]     DISCECTOMY, FUSION CERVICAL ANTERIOR ONE LEVEL, COMBINED N/A 12/19/2018    Procedure: cervical 5-6 anterior cervical discectomy fusion;  Surgeon: Mike Mckenna MD;  Location: [facility]     HC COLONOSCOPY W/WO BRUSH/WASH  12/27/2005     HC CREATE EARDRUM OPENING,GEN ANESTH  09/27/2007    Pe tube, Left endolymphatic sac enhancement.     HC MASTOIDECTOMY,COMPLETE  09/27/2007     HERNIORRHAPHY UMBILICAL N/A 8/11/2017    Procedure: HERNIORRHAPHY UMBILICAL;  open umbilical hernia repair;  Surgeon: Boni Story MD;  Location: [facility]     INJECT EPIDURAL CERVICAL N/A 8/22/2019    Procedure: INJECTION, SPINE, CERVICAL 6-7 EPIDURAL;  Surgeon: Darryn Mcdaniel MD;  Location: [facility]     INJECT EPIDURAL LUMBAR N/A 11/9/2017    Procedure: INJECT EPIDURAL LUMBAR;  lumbar 4-5 epidural steroid injection ;  Surgeon: Darryn Mcdaniel MD;  [facility]     INJECT EPIDURAL LUMBAR N/A 12/28/2017    Procedure: INJECT EPIDURAL LUMBAR;  lumbar epidural injection;  Surgeon: Darryn Mcdaniel MD;  [facility]     INJECT EPIDURAL TRANSFORAMINAL Bilateral 8/23/2018    Procedure: INJECT EPIDURAL TRANSFORAMINAL;  transforaminal bilateral lumbar 3-4 steroid injection;  Surgeon: Darryn Mcdaniel MD;  [facility]     INJECT EPIDURAL TRANSFORAMINAL Bilateral 11/8/2018    Procedure: INJECT EPIDURAL TRANSFORAMINAL lumbar 3-4;  Surgeon: Darryn Mcdaniel MD;  [facility]     INJECT EPIDURAL TRANSFORAMINAL Bilateral 6/14/2019    Procedure: INJECTION, EPIDURAL, TRANSFORAMINAL LUMBAR 3-4 BILATERAL;  Surgeon: Darryn Mcdaniel MD;  [facility]     INJECT EPIDURAL TRANSFORAMINAL Bilateral 5/22/2020    Procedure: lumbar 3-4 bilateral  transforaminal epidural steroid injection;  Surgeon: Darryn Mcdaniel MD;  [facility]     INJECT EPIDURAL TRANSFORAMINAL Bilateral 9/24/2020    Procedure: INJECTION, EPIDURAL, TRANSFORAMINAL  LUMBAR 3-4 APPROACH;  Surgeon: Darryn Mcdaniel MD;  [facility]     PE TUBES  2006    left ear     Family History   Problem Relation Age of Onset     Cancer - colorectal Mother      Diabetes Mother      Cardiovascular Father      Hypertension Father      Mental Illness Sister      Suicide Other      Social History     Socioeconomic History     Marital status: Single     Spouse name: Not on file     Number of children: 2     Years of education: Not on file     Highest education level: Not on file   Occupational History     Employer: [employer]   Social Needs     Financial resource strain: Not on file     Food insecurity     Worry: Not on file     Inability: Not on file     Transportation needs     Medical: Not on file     Non-medical: Not on file   Tobacco Use     Smoking status: Never Smoker     Smokeless tobacco: Never Used   Substance and Sexual Activity     Alcohol use: No     Alcohol/week: 0.0 standard drinks     Drug use: No     Sexual activity: Yes     Partners: Female   Lifestyle     Physical activity     Days per week: Not on file     Minutes per session: Not on file     Stress: Not on file   Relationships     Social connections     Talks on phone: Not on file     Gets together: Not on file     Attends Gnosticism service: Not on file     Active member of club or organization: Not on file     Attends meetings of clubs or organizations: Not on file     Relationship status: Not on file     Intimate partner violence     Fear of current or ex partner: Not on file     Emotionally abused: Not on file     Physically abused: Not on file     Forced sexual activity: Not on file   Other Topics Concern     Parent/sibling w/ CABG, MI or angioplasty before 65F 55M? No   Social History Narrative     Not on file  "        Current Outpatient Medications   Medication     Acetaminophen (TYLENOL PO)     ALPRAZolam (XANAX XR) 0.5 MG 24 hr tablet     amLODIPine (NORVASC) 5 MG tablet     gabapentin (NEURONTIN) 400 MG capsule     hydrochlorothiazide (HYDRODIURIL) 25 MG tablet     ibuprofen (ADVIL/MOTRIN) 800 MG tablet     lidocaine (XYLOCAINE) 4 % external solution     losartan (COZAAR) 50 MG tablet     losartan-hydrochlorothiazide (HYZAAR) 100-25 MG tablet     nortriptyline (PAMELOR) 50 MG capsule     order for DME     tapentadol (NUCYNTA) 75 MG TABS tablet     Tapentadol HCl 50 MG TB12     tiZANidine (ZANAFLEX) 4 MG tablet     No current facility-administered medications for this visit.      Allergies   Allergen Reactions     No Known Drug Allergies          Objective:  BP (!) 174/98   Pulse 121   Ht 1.854 m (6' 1\")   Wt 100.9 kg (222 lb 8 oz)   BMI 29.36 kg/m      General: Alert and in no distress    Head: Normocephalic, atraumatic  Eyes: no scleral icterus or conjunctival erythema   Skin: no erythema, petechiae, or jaundice  Resp: normal respiratory effort without conversational dyspnea   Psych: normal mood and affect    Gait: Non-antalgic, appropriate coordination and balance   Musculoskeletal:  -No tenderness to palpation over the bilateral knees    Radiology:  Independent visualization of images performed  KNEE BILATERAL ONE TO TWO VIEWS September 8, 2020 10:23 AM      HISTORY: Chronic pain of both knees.     COMPARISON: [facility] bilateral knee x-rays dated 9/2/2020.     FINDINGS: Moderate enthesopathic spurring of the bilateral quadriceps  tendons into the bilateral patellae are noted. No fracture,  malalignment, joint space loss, or effusion is otherwise seen.                                                                      IMPRESSION:  1. Moderate enthesopathic spurring of the bilateral quadriceps tendon  insertions into the bilateral patellae.  2. Otherwise negative bilateral knee x-rays.      HILDA DOUGLAS MD    Large " Joint Injection/Arthocentesis: bilateral knee    Date/Time: 11/16/2020 8:48 AM  Performed by: Melissa Xie MD  Authorized by: Melissa Xie MD     Indications:  Pain  Needle Size:  25 G  Guidance: landmark guided    Approach:  Anterolateral  Location:  Knee  Laterality:  Bilateral      Medications (Right):  40 mg triamcinolone 40 MG/ML   Medications (Right) comment:  4ml 0.5% bupivicaine  NDC:82111-998-93  Lot: [id number]  8/31/21        Medications (Left):  40 mg triamcinolone 40 MG/ML   Medications (Left) comment:  4ml 0.5% bupivicaine  NDC:92770-073-04  Lot: [id number]  8/31/21        Outcome:  Tolerated well, no immediate complications  Procedure discussed: discussed risks, benefits, and alternatives    Consent Given by:  Patient          Assessment:  1. Chronic pain of both knees        Plan:  Discussed the assessment with the patient and developed a plan together:  -Steroid injections performed today.  Take it easy over the next few days. Keep in mind that the steroid may take up to 3 days to start working and up to 2 weeks to reach maximal effect.    -Ice for 15-20 minutes as needed for soreness and swelling.  -Patient's preferred over the counter medications as needed for soreness.    -Ketan to follow up with primary care provider regarding elevated blood pressure.      Yoanna Xie MD, [facility]  [facility]        Again, thank you for allowing me to participate in the care of your patient.        Sincerely,        Melissa Xie MD

## 2020-11-16 NOTE — PROGRESS NOTES
Sports Medicine Clinic Visit       PCP: Tha Grullon Janine is a 55 year old male who is seen in follow up for Chronic pain of both knees. Since last visit on 9/8/2020, Ketan has done 3-4 physical therapy sessions.  He notes it helped a little bit however he is still having trouble with the knees.  He rates the pain at a 4/10 (right) and 2/10 (left) currently.   Symptoms are relieved with ice.  Symptoms are worsened by stairs, sit to stand transition. He has difficulty with climbing and putting weight through the right knee.  He has noticed this with deer hunting. He would like to do bilateral knee injections today.      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       Review of Systems  Musculoskeletal: as above  Remainder of review of systems is negative including constitutional, eyes, ENT, CV, pulmonary, GI, , endocrine, skin, hematologic, and neurologic except as noted in HPI or medical history.    History reviewed. No pertinent past surgical/medical/family/social history other than as mentioned in HPI.    Patient Active Problem List   Diagnosis     Meniere's disease     Major depression in complete remission (H)     Chronic pain syndrome     CARDIOVASCULAR SCREENING; LDL GOAL LESS THAN 160     Pain medication agreement signed     Myalgia     Bilateral occipital neuralgia     Benign essential hypertension     Past Medical History:   Diagnosis Date     Back pain      Meniere's disease, unspecified      Pain medication agreement signed 8/20/2010     Past Surgical History:   Procedure Laterality Date     BUNIONECTOMY RT/LT  09/05/08    Both feet     COLONOSCOPY N/A 12/28/2016    Procedure: COMBINED COLONOSCOPY, SINGLE OR MULTIPLE BIOPSY/POLYPECTOMY BY BIOPSY;  Surgeon: Indra Jernigan MD;  [facility]     DISCECTOMY, FUSION CERVICAL ANTERIOR ONE LEVEL, COMBINED N/A 7/5/2017    Procedure: COMBINED DISCECTOMY, FUSION CERVICAL ANTERIOR ONE LEVEL;  Cervical 6-7, Anterior cervical discectomy fusion;  Surgeon:  Mike Mckenna MD;  [facility]     DISCECTOMY, FUSION CERVICAL ANTERIOR ONE LEVEL, COMBINED N/A 12/19/2018    Procedure: cervical 5-6 anterior cervical discectomy fusion;  Surgeon: Mike Mckenna MD;  [facility]     HC COLONOSCOPY W/WO BRUSH/WASH  12/27/2005     HC CREATE EARDRUM OPENING,GEN ANESTH  09/27/2007    Pe tube, Left endolymphatic sac enhancement.     HC MASTOIDECTOMY,COMPLETE  09/27/2007     HERNIORRHAPHY UMBILICAL N/A 8/11/2017    Procedure: HERNIORRHAPHY UMBILICAL;  open umbilical hernia repair;  Surgeon: Boni Story MD;  [facility]     INJECT EPIDURAL CERVICAL N/A 8/22/2019    Procedure: INJECTION, SPINE, CERVICAL 6-7 EPIDURAL;  Surgeon: Darryn Mcdaniel MD;  [facility]     INJECT EPIDURAL LUMBAR N/A 11/9/2017    Procedure: INJECT EPIDURAL LUMBAR;  lumbar 4-5 epidural steroid injection ;  Surgeon: Darryn Mcdaniel MD;  [facility]     INJECT EPIDURAL LUMBAR N/A 12/28/2017    Procedure: INJECT EPIDURAL LUMBAR;  lumbar epidural injection;  Surgeon: Darryn Mcdaniel MD;  [facility]     INJECT EPIDURAL TRANSFORAMINAL Bilateral 8/23/2018    Procedure: INJECT EPIDURAL TRANSFORAMINAL;  transforaminal bilateral lumbar 3-4 steroid injection;  Surgeon: Darryn Mcdaniel MD;  [facility]     INJECT EPIDURAL TRANSFORAMINAL Bilateral 11/8/2018    Procedure: INJECT EPIDURAL TRANSFORAMINAL lumbar 3-4;  Surgeon: Darryn Mcdaniel MD;  [facility]     INJECT EPIDURAL TRANSFORAMINAL Bilateral 6/14/2019    Procedure: INJECTION, EPIDURAL, TRANSFORAMINAL LUMBAR 3-4 BILATERAL;  Surgeon: Darryn Mcdaniel MD;  [facility]     INJECT EPIDURAL TRANSFORAMINAL Bilateral 5/22/2020    Procedure: lumbar 3-4 bilateral transforaminal epidural steroid injection;  Surgeon: Darryn Mcdaniel MD;  [facility]     INJECT EPIDURAL TRANSFORAMINAL Bilateral 9/24/2020    Procedure: INJECTION, EPIDURAL, TRANSFORAMINAL  LUMBAR 3-4 APPROACH;  Surgeon: Darryn Mcdaniel MD;  [facility]     PE  TUBES  2006    left ear     Family History   Problem Relation Age of Onset     Cancer - colorectal Mother      Diabetes Mother      Cardiovascular Father      Hypertension Father      Mental Illness Sister      Suicide Other      Social History     Socioeconomic History     Marital status: Single     Spouse name: Not on file     Number of children: 2     Years of education: Not on file     Highest education level: Not on file   Occupational History     Employer: [employer]   Social Needs     Financial resource strain: Not on file     Food insecurity     Worry: Not on file     Inability: Not on file     Transportation needs     Medical: Not on file     Non-medical: Not on file   Tobacco Use     Smoking status: Never Smoker     Smokeless tobacco: Never Used   Substance and Sexual Activity     Alcohol use: No     Alcohol/week: 0.0 standard drinks     Drug use: No     Sexual activity: Yes     Partners: Female   Lifestyle     Physical activity     Days per week: Not on file     Minutes per session: Not on file     Stress: Not on file   Relationships     Social connections     Talks on phone: Not on file     Gets together: Not on file     Attends Sabianist service: Not on file     Active member of club or organization: Not on file     Attends meetings of clubs or organizations: Not on file     Relationship status: Not on file     Intimate partner violence     Fear of current or ex partner: Not on file     Emotionally abused: Not on file     Physically abused: Not on file     Forced sexual activity: Not on file   Other Topics Concern     Parent/sibling w/ CABG, MI or angioplasty before 65F 55M? No   Social History Narrative     Not on file         Current Outpatient Medications   Medication     Acetaminophen (TYLENOL PO)     ALPRAZolam (XANAX XR) 0.5 MG 24 hr tablet     amLODIPine (NORVASC) 5 MG tablet     gabapentin (NEURONTIN) 400 MG capsule     hydrochlorothiazide (HYDRODIURIL) 25 MG tablet     ibuprofen  "(ADVIL/MOTRIN) 800 MG tablet     lidocaine (XYLOCAINE) 4 % external solution     losartan (COZAAR) 50 MG tablet     losartan-hydrochlorothiazide (HYZAAR) 100-25 MG tablet     nortriptyline (PAMELOR) 50 MG capsule     order for DME     tapentadol (NUCYNTA) 75 MG TABS tablet     Tapentadol HCl 50 MG TB12     tiZANidine (ZANAFLEX) 4 MG tablet     No current facility-administered medications for this visit.      Allergies   Allergen Reactions     No Known Drug Allergies          Objective:  BP (!) 174/98   Pulse 121   Ht 1.854 m (6' 1\")   Wt 100.9 kg (222 lb 8 oz)   BMI 29.36 kg/m      General: Alert and in no distress    Head: Normocephalic, atraumatic  Eyes: no scleral icterus or conjunctival erythema   Skin: no erythema, petechiae, or jaundice  Resp: normal respiratory effort without conversational dyspnea   Psych: normal mood and affect    Gait: Non-antalgic, appropriate coordination and balance   Musculoskeletal:  -No tenderness to palpation over the bilateral knees    Radiology:  Independent visualization of images performed  KNEE BILATERAL ONE TO TWO VIEWS September 8, 2020 10:23 AM      HISTORY: Chronic pain of both knees.     COMPARISON: [facility] bilateral knee x-rays dated 9/2/2020.     FINDINGS: Moderate enthesopathic spurring of the bilateral quadriceps  tendons into the bilateral patellae are noted. No fracture,  malalignment, joint space loss, or effusion is otherwise seen.                                                                      IMPRESSION:  1. Moderate enthesopathic spurring of the bilateral quadriceps tendon  insertions into the bilateral patellae.  2. Otherwise negative bilateral knee x-rays.      HILDA DOUGLAS MD    Large Joint Injection/Arthocentesis: bilateral knee    Date/Time: 11/16/2020 8:48 AM  Performed by: Melissa Xie MD  Authorized by: Melissa Xie MD     Indications:  Pain  Needle Size:  25 G  Guidance: landmark guided    Approach:  " Anterolateral  Location:  Knee  Laterality:  Bilateral      Medications (Right):  40 mg triamcinolone 40 MG/ML   Medications (Right) comment:  4ml 0.5% bupivicaine  NDC:28670-818-45  Lot: [id number]  8/31/21        Medications (Left):  40 mg triamcinolone 40 MG/ML   Medications (Left) comment:  4ml 0.5% bupivicaine  NDC:03500-171-44  Lot: [id number]  8/31/21        Outcome:  Tolerated well, no immediate complications  Procedure discussed: discussed risks, benefits, and alternatives    Consent Given by:  Patient          Assessment:  1. Chronic pain of both knees        Plan:  Discussed the assessment with the patient and developed a plan together:  -Steroid injections performed today.  Take it easy over the next few days. Keep in mind that the steroid may take up to 3 days to start working and up to 2 weeks to reach maximal effect.    -Ice for 15-20 minutes as needed for soreness and swelling.  -Patient's preferred over the counter medications as needed for soreness.    -Ketan to follow up with primary care provider regarding elevated blood pressure.      Yoanna Xie MD, CAQ Sports Medicine  [facility]

## 2020-11-17 ENCOUNTER — OFFICE VISIT (OUTPATIENT)
Dept: DERMATOLOGY | Facility: CLINIC | Age: 55
End: 2020-11-17
Attending: FAMILY MEDICINE
Payer: COMMERCIAL

## 2020-11-17 ENCOUNTER — MYC MEDICAL ADVICE (OUTPATIENT)
Dept: PALLIATIVE MEDICINE | Facility: CLINIC | Age: 55
End: 2020-11-17

## 2020-11-17 VITALS
SYSTOLIC BLOOD PRESSURE: 168 MMHG | DIASTOLIC BLOOD PRESSURE: 84 MMHG | BODY MASS INDEX: 29.36 KG/M2 | HEIGHT: 73 IN | HEART RATE: 118 BPM

## 2020-11-17 DIAGNOSIS — B07.8 COMMON WART: ICD-10-CM

## 2020-11-17 DIAGNOSIS — L81.4 LENTIGO: Primary | ICD-10-CM

## 2020-11-17 PROCEDURE — 11900 INJECT SKIN LESIONS </W 7: CPT | Performed by: DERMATOLOGY

## 2020-11-17 PROCEDURE — 99203 OFFICE O/P NEW LOW 30 MIN: CPT | Mod: 25 | Performed by: DERMATOLOGY

## 2020-11-17 RX ORDER — TAPENTADOL HYDROCHLORIDE 100 MG/1
100 TABLET, FILM COATED, EXTENDED RELEASE ORAL 2 TIMES DAILY
Qty: 60 TABLET | Refills: 0 | Status: SHIPPED | OUTPATIENT
Start: 2020-11-17 | End: 2020-12-18

## 2020-11-17 NOTE — PROGRESS NOTES
Indra Mehta is a 55 year old year old male patient here today for lesion on right hand.   .  Patient states this has been present for a while.  Patient reports the following symptoms:  tender.  Patient reports the following previous treatments cryo.  These treatments did not work.  Patient reports the following modifying factors none.  Associated symptoms: none.  Patient has no other skin complaints today.  Remainder of the HPI, Meds, PMH, Allergies, FH, and SH was reviewed in chart.      Past Medical History:   Diagnosis Date     Back pain      Meniere's disease, unspecified      Pain medication agreement signed 8/20/2010       Past Surgical History:   Procedure Laterality Date     BUNIONECTOMY RT/LT  09/05/08    Both feet     COLONOSCOPY N/A 12/28/2016    Procedure: COMBINED COLONOSCOPY, SINGLE OR MULTIPLE BIOPSY/POLYPECTOMY BY BIOPSY;  Surgeon: Indra Jernigan MD;  [facility]     DISCECTOMY, FUSION CERVICAL ANTERIOR ONE LEVEL, COMBINED N/A 7/5/2017    Procedure: COMBINED DISCECTOMY, FUSION CERVICAL ANTERIOR ONE LEVEL;  Cervical 6-7, Anterior cervical discectomy fusion;  Surgeon: Mike Mckenna MD;  [facility]     DISCECTOMY, FUSION CERVICAL ANTERIOR ONE LEVEL, COMBINED N/A 12/19/2018    Procedure: cervical 5-6 anterior cervical discectomy fusion;  Surgeon: Mike Mckenna MD;  [facility]     HC COLONOSCOPY W/WO BRUSH/WASH  12/27/2005     HC CREATE EARDRUM OPENING,GEN ANESTH  09/27/2007    Pe tube, Left endolymphatic sac enhancement.     HC MASTOIDECTOMY,COMPLETE  09/27/2007     HERNIORRHAPHY UMBILICAL N/A 8/11/2017    Procedure: HERNIORRHAPHY UMBILICAL;  open umbilical hernia repair;  Surgeon: Boni Story MD;  [facility]     INJECT EPIDURAL CERVICAL N/A 8/22/2019    Procedure: INJECTION, SPINE, CERVICAL 6-7 EPIDURAL;  Surgeon: Darryn Mcdaniel MD;  [facility]     INJECT EPIDURAL LUMBAR N/A 11/9/2017    Procedure: INJECT EPIDURAL LUMBAR;  lumbar 4-5 epidural  steroid injection ;  Surgeon: Darryn Mcdaniel MD;  [facility]     INJECT EPIDURAL LUMBAR N/A 12/28/2017    Procedure: INJECT EPIDURAL LUMBAR;  lumbar epidural injection;  Surgeon: Darryn Mcdaniel MD;  [facility]     INJECT EPIDURAL TRANSFORAMINAL Bilateral 8/23/2018    Procedure: INJECT EPIDURAL TRANSFORAMINAL;  transforaminal bilateral lumbar 3-4 steroid injection;  Surgeon: Darryn Mcdaniel MD;  [facility]     INJECT EPIDURAL TRANSFORAMINAL Bilateral 11/8/2018    Procedure: INJECT EPIDURAL TRANSFORAMINAL lumbar 3-4;  Surgeon: Darryn Mcdaniel MD;  [facility]     INJECT EPIDURAL TRANSFORAMINAL Bilateral 6/14/2019    Procedure: INJECTION, EPIDURAL, TRANSFORAMINAL LUMBAR 3-4 BILATERAL;  Surgeon: Darryn Mcdaniel MD;  [facility]     INJECT EPIDURAL TRANSFORAMINAL Bilateral 5/22/2020    Procedure: lumbar 3-4 bilateral transforaminal epidural steroid injection;  Surgeon: Darryn Mcdaniel MD;  [facility]     INJECT EPIDURAL TRANSFORAMINAL Bilateral 9/24/2020    Procedure: INJECTION, EPIDURAL, TRANSFORAMINAL  LUMBAR 3-4 APPROACH;  Surgeon: Darryn Mcdaniel MD;  [facility]     PE TUBES  2006    left ear        Family History   Problem Relation Age of Onset     Cancer - colorectal Mother      Diabetes Mother      Cardiovascular Father      Hypertension Father      Mental Illness Sister      Suicide Other        Social History     Socioeconomic History     Marital status: Single     Spouse name: Not on file     Number of children: 2     Years of education: Not on file     Highest education level: Not on file   Occupational History     Employer: [employer]   Social Needs     Financial resource strain: Not on file     Food insecurity     Worry: Not on file     Inability: Not on file     Transportation needs     Medical: Not on file     Non-medical: Not on file   Tobacco Use     Smoking status: Never Smoker     Smokeless tobacco: Never Used   Substance and Sexual Activity     Alcohol use: No      Alcohol/week: 0.0 standard drinks     Drug use: No     Sexual activity: Yes     Partners: Female   Lifestyle     Physical activity     Days per week: Not on file     Minutes per session: Not on file     Stress: Not on file   Relationships     Social connections     Talks on phone: Not on file     Gets together: Not on file     Attends Congregation service: Not on file     Active member of club or organization: Not on file     Attends meetings of clubs or organizations: Not on file     Relationship status: Not on file     Intimate partner violence     Fear of current or ex partner: Not on file     Emotionally abused: Not on file     Physically abused: Not on file     Forced sexual activity: Not on file   Other Topics Concern     Parent/sibling w/ CABG, MI or angioplasty before 65F 55M? No   Social History Narrative     Not on file       Outpatient Encounter Medications as of 11/17/2020   Medication Sig Dispense Refill     Acetaminophen (TYLENOL PO) Take 1,000 mg by mouth 3 times daily        ALPRAZolam (XANAX XR) 0.5 MG 24 hr tablet Take 1 tablet (0.5 mg) by mouth At Bedtime 30 tablet 3     amLODIPine (NORVASC) 5 MG tablet Take 0.5 tablets (2.5 mg) by mouth daily 30 tablet 0     gabapentin (NEURONTIN) 400 MG capsule Take 3 capsules (1,200 mg) by mouth 3 times daily 270 capsule 3     hydrochlorothiazide (HYDRODIURIL) 25 MG tablet TAKE 1 TABLET BY MOUTH EVERY DAY 90 tablet 1     ibuprofen (ADVIL/MOTRIN) 800 MG tablet Take 800 mg by mouth 3 times daily       lidocaine (XYLOCAINE) 4 % external solution Apply topically as needed for moderate pain 50 mL 1     losartan (COZAAR) 50 MG tablet Take 2 tablets (100 mg) by mouth daily 180 tablet 0     losartan-hydrochlorothiazide (HYZAAR) 100-25 MG tablet Take 1 tablet by mouth daily 90 tablet 3     nortriptyline (PAMELOR) 50 MG capsule Take 1 capsule (50 mg) by mouth At Bedtime 30 capsule 2     order for DME Equipment being ordered: TENS Pads as directed 1 Device 0      "tapentadol (NUCYNTA) 75 MG TABS tablet Take 1 tablet (75 mg) by mouth 2 times daily as needed for severe pain Fill 11/14 to start 11/16. #60 for 30 days. 60 tablet 0     Tapentadol HCl 50 MG TB12 Take 50 mg by mouth 2 times daily To start 10/23/2020, first fill 10/21/2020. 60 tablet 0     tiZANidine (ZANAFLEX) 4 MG tablet Take 1 tablet (4 mg) by mouth At Bedtime 90 tablet 2     Facility-Administered Encounter Medications as of 11/17/2020   Medication Dose Route Frequency Provider Last Rate Last Dose     triamcinolone (KENALOG-40) injection 40 mg  40 mg   Melissa Xie MD   40 mg at 11/16/20 0848     triamcinolone (KENALOG-40) injection 40 mg  40 mg   Melissa Xie MD   40 mg at 11/16/20 0848             Review Of Systems  Skin: As above  Eyes: negative  Ears/Nose/Throat: negative  Respiratory: No shortness of breath, dyspnea on exertion, cough, or hemoptysis  Cardiovascular: negative  Gastrointestinal: negative  Genitourinary: negative  Musculoskeletal: negative  Neurologic: negative  Psychiatric: negative  Hematologic/Lymphatic/Immunologic: negative  Endocrine: negative      O:   NAD, WDWN, Alert & Oriented, Mood & Affect wnl, Vitals stable   Here today alone   BP (!) 168/84   Pulse 118   Ht 1.854 m (6' 1\")   BMI 29.36 kg/m     General appearance normal   Vitals stable   Alert, oriented and in no acute distress     R hand verrucous plaque  Brown macule son face    Eyes: Conjunctivae/lids:Normal     ENT: Lips, buccal mucosa, tongue: normal    MSK:Normal    Cardiovascular: peripheral edema none    Pulm: Breathing Normal    Neuro/Psych: Orientation:Alert and Orientedx3 ; Mood/Affect:normal       A/P:  1. lentigo  2. Wart  Pathophysiology discussed with pateint   desturction candin discussed with patient   mediplast at home  IL Candin: PGACAC discussed.  Risks including but not limited to injection site reaction, bruising, no resolution.  All questions answered and entertained to patient s " satisfaction.  Informed consent obtained.  IL Candin in concentration of 1 unit/ 0.1 ml was injected ID to r  hand.  Total injected was  2 units.  Patient tolerated without complications and given wound care instructions, including not to move product around.  Return in 4 weeks for follow-up and possible additional IL Candin.      [id number]  3/5/2022

## 2020-11-17 NOTE — TELEPHONE ENCOUNTER
Script Eprescribed to pharmacy    Will send this to MA team to notify patient.    Signed Prescriptions:                        Disp   Refills    Tapentadol HCl (NUCYNTA) 100 MG TB12       60 tab*0        Sig: Take 100 mg by mouth 2 times daily  Authorizing Provider: LIZ MACIAS    naloxone (NARCAN) 4 MG/0.1ML nasal spray   0.2 mL 1        Sig: Spray 1 spray (4 mg) into one nostril alternating           nostrils once as needed for opioid reversal every           2-3 minutes until assistance arrives  Authorizing Provider: LIZ MACIAS MD  [facility]     MyChart message sent to patient.     Noble Aceves,   You are correct. I will send a prescription for extended release to the [facility] pharmacy. You should dispose of the short acting nucynta when you  the new prescription. Unfortunately the extended release does not come in 75mg dose, so I want you to carefully monitor for any sedation you may experience. I have also prescribed you narcan, which we sent to any patients on this dose of opioids. I will have one of our MAs call you to discuss this with you, but please message me or call the clinic if you have questions.     Liz Macias MD  [facility]

## 2020-11-17 NOTE — TELEPHONE ENCOUNTER
Forwarding this to Dr. Fiorella Mallory who has been managing him in Chiara's absence.     Helena CALDERON, RN CNP, FNP  [facility]  [facility]

## 2020-11-17 NOTE — TELEPHONE ENCOUNTER
From: Ketan Mehta      Created: 11/17/2020 12:49 PM        *-*-*This message has not been handled.*-*-*    I talked to a pharmacist at [facility] and she did confirm that the Nucynta 75mg is not extended release.  She looked and she is not able to get the 75mg dose in extended release.  Bothe the 50mg and 100mg come in extended release.     The 75 mg does relieve my pain as effectively as the Roxicodone but it has a much shorter length of time where it does provide relief.  After 2 -3 hours my pain starts to return and by 4 hours I can't tell that I took any pain medicine.     If we stay with the 75mg I would probably need to take four a day like the oxy.        Will forward MyChart message to Helena Muse.    Mariano Mcbride, RN  Care Coordinator   [facility]

## 2020-11-17 NOTE — LETTER
11/17/2020         RE: Indra Mehta  [address] [address] [facility] [address]        Dear Colleague,    Thank you for referring your patient, Indra Mehta, to the [facility]. Please see a copy of my visit note below.    Indra Mehta is a 55 year old year old male patient here today for lesion on right hand.   .  Patient states this has been present for a while.  Patient reports the following symptoms:  tender.  Patient reports the following previous treatments cryo.  These treatments did not work.  Patient reports the following modifying factors none.  Associated symptoms: none.  Patient has no other skin complaints today.  Remainder of the HPI, Meds, PMH, Allergies, FH, and SH was reviewed in chart.      Past Medical History:   Diagnosis Date     Back pain      Meniere's disease, unspecified      Pain medication agreement signed 8/20/2010       Past Surgical History:   Procedure Laterality Date     BUNIONECTOMY RT/LT  09/05/08    Both feet     COLONOSCOPY N/A 12/28/2016    Procedure: COMBINED COLONOSCOPY, SINGLE OR MULTIPLE BIOPSY/POLYPECTOMY BY BIOPSY;  Surgeon: Indra Jernigan MD;  Location:  GI     DISCECTOMY, FUSION CERVICAL ANTERIOR ONE LEVEL, COMBINED N/A 7/5/2017    Procedure: COMBINED DISCECTOMY, FUSION CERVICAL ANTERIOR ONE LEVEL;  Cervical 6-7, Anterior cervical discectomy fusion;  Surgeon: Mike Mckenna MD;  Location: [facility]     DISCECTOMY, FUSION CERVICAL ANTERIOR ONE LEVEL, COMBINED N/A 12/19/2018    Procedure: cervical 5-6 anterior cervical discectomy fusion;  Surgeon: Mike Mckenna MD;  Location:  OR     HC COLONOSCOPY W/WO BRUSH/WASH  12/27/2005     HC CREATE EARDRUM OPENING,GEN ANESTH  09/27/2007    Pe tube, Left endolymphatic sac enhancement.     HC MASTOIDECTOMY,COMPLETE  09/27/2007     HERNIORRHAPHY UMBILICAL N/A 8/11/2017    Procedure: HERNIORRHAPHY UMBILICAL;  open umbilical hernia repair;  Surgeon: Boni Story MD;  Location:  OR      INJECT EPIDURAL CERVICAL N/A 8/22/2019    Procedure: INJECTION, SPINE, CERVICAL 6-7 EPIDURAL;  Surgeon: Darryn Mcdaniel MD;  Location: [facility]     INJECT EPIDURAL LUMBAR N/A 11/9/2017    Procedure: INJECT EPIDURAL LUMBAR;  lumbar 4-5 epidural steroid injection ;  Surgeon: Darryn Mcdaniel MD;  Location: [facility]     INJECT EPIDURAL LUMBAR N/A 12/28/2017    Procedure: INJECT EPIDURAL LUMBAR;  lumbar epidural injection;  Surgeon: Darryn Mcdaniel MD;  Location: [facility]     INJECT EPIDURAL TRANSFORAMINAL Bilateral 8/23/2018    Procedure: INJECT EPIDURAL TRANSFORAMINAL;  transforaminal bilateral lumbar 3-4 steroid injection;  Surgeon: Darryn Mcdaniel MD;  Location: [facility]     INJECT EPIDURAL TRANSFORAMINAL Bilateral 11/8/2018    Procedure: INJECT EPIDURAL TRANSFORAMINAL lumbar 3-4;  Surgeon: aDrryn Mcdaniel MD;  Location: [facility]     INJECT EPIDURAL TRANSFORAMINAL Bilateral 6/14/2019    Procedure: INJECTION, EPIDURAL, TRANSFORAMINAL LUMBAR 3-4 BILATERAL;  Surgeon: Darryn Mcdaniel MD;  Location: [facility]     INJECT EPIDURAL TRANSFORAMINAL Bilateral 5/22/2020    Procedure: lumbar 3-4 bilateral transforaminal epidural steroid injection;  Surgeon: Darryn Mcdaniel MD;  Location: [facility]     INJECT EPIDURAL TRANSFORAMINAL Bilateral 9/24/2020    Procedure: INJECTION, EPIDURAL, TRANSFORAMINAL  LUMBAR 3-4 APPROACH;  Surgeon: Darryn Mcdaniel MD;  Location: [facility]     PE TUBES  2006    left ear        Family History   Problem Relation Age of Onset     Cancer - colorectal Mother      Diabetes Mother      Cardiovascular Father      Hypertension Father      Mental Illness Sister      Suicide Other        Social History     Socioeconomic History     Marital status: Single     Spouse name: Not on file     Number of children: 2     Years of education: Not on file     Highest education level: Not on file   Occupational History     Employer: [employer]   Social Needs     Financial resource strain: Not on file     Food insecurity     Worry: Not on  file     Inability: Not on file     Transportation needs     Medical: Not on file     Non-medical: Not on file   Tobacco Use     Smoking status: Never Smoker     Smokeless tobacco: Never Used   Substance and Sexual Activity     Alcohol use: No     Alcohol/week: 0.0 standard drinks     Drug use: No     Sexual activity: Yes     Partners: Female   Lifestyle     Physical activity     Days per week: Not on file     Minutes per session: Not on file     Stress: Not on file   Relationships     Social connections     Talks on phone: Not on file     Gets together: Not on file     Attends Church service: Not on file     Active member of club or organization: Not on file     Attends meetings of clubs or organizations: Not on file     Relationship status: Not on file     Intimate partner violence     Fear of current or ex partner: Not on file     Emotionally abused: Not on file     Physically abused: Not on file     Forced sexual activity: Not on file   Other Topics Concern     Parent/sibling w/ CABG, MI or angioplasty before 65F 55M? No   Social History Narrative     Not on file       Outpatient Encounter Medications as of 11/17/2020   Medication Sig Dispense Refill     Acetaminophen (TYLENOL PO) Take 1,000 mg by mouth 3 times daily        ALPRAZolam (XANAX XR) 0.5 MG 24 hr tablet Take 1 tablet (0.5 mg) by mouth At Bedtime 30 tablet 3     amLODIPine (NORVASC) 5 MG tablet Take 0.5 tablets (2.5 mg) by mouth daily 30 tablet 0     gabapentin (NEURONTIN) 400 MG capsule Take 3 capsules (1,200 mg) by mouth 3 times daily 270 capsule 3     hydrochlorothiazide (HYDRODIURIL) 25 MG tablet TAKE 1 TABLET BY MOUTH EVERY DAY 90 tablet 1     ibuprofen (ADVIL/MOTRIN) 800 MG tablet Take 800 mg by mouth 3 times daily       lidocaine (XYLOCAINE) 4 % external solution Apply topically as needed for moderate pain 50 mL 1     losartan (COZAAR) 50 MG tablet Take 2 tablets (100 mg) by mouth daily 180 tablet 0     losartan-hydrochlorothiazide (HYZAAR)  "100-25 MG tablet Take 1 tablet by mouth daily 90 tablet 3     nortriptyline (PAMELOR) 50 MG capsule Take 1 capsule (50 mg) by mouth At Bedtime 30 capsule 2     order for DME Equipment being ordered: TENS Pads as directed 1 Device 0     tapentadol (NUCYNTA) 75 MG TABS tablet Take 1 tablet (75 mg) by mouth 2 times daily as needed for severe pain Fill 11/14 to start 11/16. #60 for 30 days. 60 tablet 0     Tapentadol HCl 50 MG TB12 Take 50 mg by mouth 2 times daily To start 10/23/2020, first fill 10/21/2020. 60 tablet 0     tiZANidine (ZANAFLEX) 4 MG tablet Take 1 tablet (4 mg) by mouth At Bedtime 90 tablet 2     Facility-Administered Encounter Medications as of 11/17/2020   Medication Dose Route Frequency Provider Last Rate Last Dose     triamcinolone (KENALOG-40) injection 40 mg  40 mg   Melissa Xie MD   40 mg at 11/16/20 0848     triamcinolone (KENALOG-40) injection 40 mg  40 mg   Melissa Xie MD   40 mg at 11/16/20 0848             Review Of Systems  Skin: As above  Eyes: negative  Ears/Nose/Throat: negative  Respiratory: No shortness of breath, dyspnea on exertion, cough, or hemoptysis  Cardiovascular: negative  Gastrointestinal: negative  Genitourinary: negative  Musculoskeletal: negative  Neurologic: negative  Psychiatric: negative  Hematologic/Lymphatic/Immunologic: negative  Endocrine: negative      O:   NAD, WDWN, Alert & Oriented, Mood & Affect wnl, Vitals stable   Here today alone   BP (!) 168/84   Pulse 118   Ht 1.854 m (6' 1\")   BMI 29.36 kg/m     General appearance normal   Vitals stable   Alert, oriented and in no acute distress     R hand verrucous plaque  Brown macule son face    Eyes: Conjunctivae/lids:Normal     ENT: Lips, buccal mucosa, tongue: normal    MSK:Normal    Cardiovascular: peripheral edema none    Pulm: Breathing Normal    Neuro/Psych: Orientation:Alert and Orientedx3 ; Mood/Affect:normal       A/P:  1. lentigo  2. Wart  Pathophysiology discussed with " pateint   desturction candin discussed with patient   mediplast at home  IL Candin: PGACAC discussed.  Risks including but not limited to injection site reaction, bruising, no resolution.  All questions answered and entertained to patient s satisfaction.  Informed consent obtained.  IL Candin in concentration of 1 unit/ 0.1 ml was injected ID to r  hand.  Total injected was  2 units.  Patient tolerated without complications and given wound care instructions, including not to move product around.  Return in 4 weeks for follow-up and possible additional IL Candin.      ca065  3/5/2022        Again, thank you for allowing me to participate in the care of your patient.        Sincerely,        Pablo Matthews MD

## 2020-11-17 NOTE — PATIENT INSTRUCTIONS
Warts are caused by the Human Papilloma Virus.They are commonly spread by direct contact or autoinoculation. That mean if the wart is picked at it could spread to another area of skin.   In children without treatment 50% of warts will disappear spontaneous in 6 months, 90% are gone in 2 years. In adults they can last for a longer period of time, but they can resolve without treatment.   No method of treatment is better than the other. You just have to be consistent with your treatments and return every 4-6 weeks.   In between treatments you should use either salicylic acid or mediplast tape:  Mediplast tape: Cut to size of wart, leave tape on for 1 week, (Put duct tape over it to secure it). In 1 week, pare skin down with a pumice stone and repeat. You want to pare down until you see some pinpoint bleeding.

## 2020-11-17 NOTE — TELEPHONE ENCOUNTER
From: Ketan Mehta      Created: 11/16/2020 5:12 PM        *-*-*This message has not been handled.*-*-*    I picked up the Nucynta today and I don't think it is the extended release.  It seems to behave similar to the Roxycodone.        Will forward Pt's MyChart message to Dr. Mallory to review and advise on Pt's medication.     Mariano Mcbride, RN  Care Coordinator   [facility] [place]

## 2020-11-17 NOTE — NURSING NOTE
"Initial BP (!) 168/84   Pulse 118   Ht 1.854 m (6' 1\")   BMI 29.36 kg/m   Estimated body mass index is 29.36 kg/m  as calculated from the following:    Height as of this encounter: 1.854 m (6' 1\").    Weight as of 11/16/20: 100.9 kg (222 lb 8 oz). .      "

## 2020-11-18 NOTE — TELEPHONE ENCOUNTER
Trov message sent to patient:     Noble Aceves,      Just checking to make sure you got my message regarding the new prescription for 100 mg extended release Nucynta.   I sent a prescription for Nucynta 100 mg twice daily to the pharmacy. Please feel free to message me if you have any more questions or concerns.       Liz Mallory MD  [facility]

## 2020-11-18 NOTE — TELEPHONE ENCOUNTER
Writer attempted to call pt, No answer.  LVM for Pt to call writer back at [phone].    Writer LVM with information    Mariano Mcbride, RN  Care Coordinator   [facility] [place]

## 2020-11-19 NOTE — TELEPHONE ENCOUNTER
See the 11/17/20 mychart encounter for more information.    Heidy RN-BSN  [facility]

## 2020-11-19 NOTE — TELEPHONE ENCOUNTER
Per Dr. Mallory on 11/17 from a different S[facility] encounter:  From: Liz Mallory MD      Created: 11/17/2020 4:48 PM      Hi Ketan,   You are correct. I will send a prescription for extended release Nucynta to the [facility] pharmacy. You should dispose of the short acting nucynta when you  the new prescription.   Unfortunately the extended release does not come in 75mg dose, so I have prescribed you 100 mg twice daily. I want you to carefully monitor for any sedation you may experience. I have also prescribed you narcan, which we sent to any patients on this dose of opioids.   I will have one of our MAs call you to discuss this with you, but please message me or call the clinic if you have questions.      Liz Mallory MD  [facility]      __________________    Per patient myChart message:  From: Ketan Mehta      Created: 11/18/2020 5:21 PM      I did get your message. I will pick it up and dispose of the current nucynta as soon as possible.  My schedule is very full this week so it will probably be friday evening.        Routed to provider.    Heidy RN-BSN  [facility]

## 2020-11-19 NOTE — TELEPHONE ENCOUNTER
Pt reviewed this message    See the other 11/17/20 mychart encounter for more information.    SAMY Moody-BSN  [facility]

## 2020-11-23 ENCOUNTER — VIRTUAL VISIT (OUTPATIENT)
Dept: FAMILY MEDICINE | Facility: OTHER | Age: 55
End: 2020-11-23

## 2020-11-23 ENCOUNTER — MYC MEDICAL ADVICE (OUTPATIENT)
Dept: FAMILY MEDICINE | Facility: CLINIC | Age: 55
End: 2020-11-23

## 2020-11-23 DIAGNOSIS — Z20.822 SUSPECTED COVID-19 VIRUS INFECTION: Primary | ICD-10-CM

## 2020-11-23 NOTE — PROGRESS NOTES
"Date: 2020 08:56:54  Clinician: Justin Pichardo  Clinician NPI: [id number]  Patient: Indra Mehta  Patient : 1965  Patient Address: [address] [place] [address]  Patient Phone: [phone]  Visit Protocol: URI  Patient Summary:  Indra is a 55 year old ( : 1965 ) male who initiated a [facility] Visit for COVID-19 (Coronavirus) evaluation and screening. When asked the question \"Please sign me up to receive news, health information and promotions from [facility].\", Indra responded \"No\".    Indra states his symptoms started 1-2 days ago.   His symptoms consist of rhinitis, myalgia, chills, malaise, a sore throat, ageusia, a headache, enlarged lymph nodes, a cough, nasal congestion, and anosmia. He is experiencing mild difficulty breathing with activities but can speak normally in full sentences. Indra also feels feverish.   Symptom details     Nasal secretions: The color of his mucus is blood-tinged.    Cough: Indra coughs every 5-10 minutes and his cough is not more bothersome at night. Phlegm comes into his throat when he coughs. He does not believe his cough is caused by post-nasal drip. The color of the phlegm is yellow.     Sore throat: Indra reports having moderate throat pain (4-6 on a 10 point pain scale), does not have exudate on his tonsils, and can swallow liquids. The lymph nodes in his neck are enlarged. A rash has not appeared on the skin since the sore throat started.     Temperature: His current temperature is 98.2 degrees Fahrenheit.     Headache: He states the headache is moderate (4-6 on a 10 point pain scale).      Indra denies having vomiting, facial pain or pressure, teeth pain, diarrhea, ear pain, wheezing, and nausea. He also denies taking antibiotic medication in the past month, having recent facial or sinus surgery in the past 60 days, and having a sinus infection within the past year.   Precipitating events  Within the past week, Indra has not been exposed to " someone with strep throat. He has not recently been exposed to someone with influenza. Indra has not been in close contact with any high risk individuals.   Pertinent COVID-19 (Coronavirus) information  Indra does not work or volunteer as healthcare worker or a . In the past 14 days, Indra has not worked or volunteered at a healthcare facility or group living setting.   In the past 14 days, he also has not lived in a congregate living setting.   Indra has had a close contact with a laboratory-confirmed COVID-19 patient within 14 days of symptom onset. He was not exposed at his work. Date Indra was exposed to the laboratory-confirmed COVID-19 patient: 11/13/2020   Additional information about contact with COVID-19 (Coronavirus) patient as reported by the patient (free text): my daughter Siri was home from [facility] Nov 6th - Nov 13th.  She tested positive on Nov 16th.    Since December 2019, Indra has not been tested for COVID-19 and has not had upper respiratory infection or influenza-like illness.   Pertinent medical history  Indra does not need a return to work/school note.   Weight: 220 lbs   Indra does not smoke or use smokeless tobacco.   Additional information as reported by the patient (free text): My chest hurts quite a bit.  It gets much worse when I cough or breathe deeply.   Weight: 220 lbs    MEDICATIONS: acetaminophen oral, ibuprofen oral, gabapentin oral, tizanidine oral, nortriptyline oral, amlodipine oral, losartan-hydrochlorothiazide oral, Nucynta ER oral, ALLERGIES: NKDA  Clinician Response:  Dear Indra,   Your symptoms show that you may have coronavirus (COVID-19). This illness can cause fever, cough and trouble breathing. Many people get a mild case and get better on their own. Some people can get very sick.  What should I do?  We would like to test you for this virus.   1. Please call [phone] to schedule your visit. Explain that you were referred by [facility] to have a  "COVID-19 test. Be ready to share your [facility] visit ID number.  * If you need to schedule in [facility] please call [phone] or for [place] employees please call [phone].  * If you need to schedule in the [place] area please call [phone]. [place] employees call [phone].  The following will serve as your written order for this COVID Test, ordered by me, for the indication of suspected COVID [Z20.828]: The test will be ordered in [employer], our electronic health record, after you are scheduled. It will show as ordered and authorized by Kenn Salas MD.  Order: COVID-19 (Coronavirus) PCR for SYMPTOMATIC testing from [facility].   2. When it's time for your COVID test:  Stay at least 6 feet away from others. (If someone will drive you to your test, stay in the backseat, as far away from the  as you can.)   Cover your mouth and nose with a mask, tissue or washcloth.  Go straight to the testing site. Don't make any stops on the way there or back.      3.Starting now: Stay home and away from others (self-isolate) until:   You've had no fever---and no medicine that reduces fever---for one full day (24 hours). And...   Your other symptoms have gotten better. For example, your cough or breathing has improved. And...   At least 10 days have passed since your symptoms started.       During this time, don't leave the house except for testing or medical care.   Stay in your own room, even for meals. Use your own bathroom if you can.   Stay away from others in your home. No hugging, kissing or shaking hands. No visitors.  Don't go to work, school or anywhere else.    Clean \"high touch\" surfaces often (doorknobs, counters, handles, etc.). Use a household cleaning spray or wipes. You'll find a full list of  on the EPA website: www.epa.gov/pesticide-registration/list-n-disinfectants-use-against-sars-cov-2.   Cover your mouth and nose with a mask, tissue or washcloth to avoid spreading germs.  Wash your hands " and face often. Use soap and water.  Caregivers in these groups are at risk for severe illness due to COVID-19:  o People 65 years and older  o People who live in a nursing home or long-term care facility  o People with chronic disease (lung, heart, cancer, diabetes, kidney, liver, immunologic)  o People who have a weakened immune system, including those who:   Are in cancer treatment  Take medicine that weakens the immune system, such as corticosteroids  Had a bone marrow or organ transplant  Have an immune deficiency  Have poorly controlled HIV or AIDS  Are obese (body mass index of 40 or higher)  Smoke regularly   o Caregivers should wear gloves while washing dishes, handling laundry and cleaning bedrooms and bathrooms.  o Use caution when washing and drying laundry: Don't shake dirty laundry, and use the warmest water setting that you can.  o For more tips, go to www.cdc.gov/coronavirus/2019-ncov/downloads/10Things.pdf.    4.Sign up for [employer]. We know it's scary to hear that you might have COVID-19. We want to track your symptoms to make sure you're okay over the next 2 weeks. Please look for an email from [employer]---this is a free, online program that we'll use to keep in touch. To sign up, follow the link in the email. Learn more at [email or web address]  How can I take care of myself?   Get lots of rest. Drink extra fluids (unless a doctor has told you not to).   Take Tylenol (acetaminophen) for fever or pain. If you have liver or kidney problems, ask your family doctor if it's okay to take Tylenol.   Adults can take either:    650 mg (two 325 mg pills) every 4 to 6 hours, or...   1,000 mg (two 500 mg pills) every 8 hours as needed.    Note: Don't take more than 3,000 mg in one day. Acetaminophen is found in many medicines (both prescribed and over-the-counter medicines). Read all labels to be sure you don't take too much.   For children, check the Tylenol bottle for the right dose. The  dose is based on the child's age or weight.    If you have other health problems (like cancer, heart failure, an organ transplant or severe kidney disease): Call your specialty clinic if you don't feel better in the next 2 days.       Know when to call 911. Emergency warning signs include:    Trouble breathing or shortness of breath Pain or pressure in the chest that doesn't go away Feeling confused like you haven't felt before, or not being able to wake up Bluish-colored lips or face.  Where can I get more information?   [facility] -- About COVID-19: [email or web address]."[email or web address]/[email or web address]/   CDC -- What to Do If You're Sick: www.cdc.gov/coronavirus/2019-ncov/about/steps-when-sick.html   [facility] -- Ending Home Isolation: www.cdc.gov/coronavirus/2019-ncov/hcp/disposition-in-home-patients.html   [facility] -- Caring for Someone: www.cdc.gov/coronavirus/2019-ncov/if-you-are-sick/care-for-someone.html   [facility] -- Interim Guidance for Hospital Discharge to Home: www.[facility].mn./diseases/coronavirus/hcp/hospdischarge.pdf   [facility] clinical trials (COVID-19 research studies): clinicalaffairs.[facility]-clinical-trials    Below are the COVID-19 hotlines at the [place] Department of Health ([facility]). Interpreters are available.    For health questions: Call 160-793-7083 or 1-791.800.4011 (7 a.m. to 7 p.m.) For questions about schools and childcare: Call 748-419-5453 or 1-606.265.9767 (7 a.m. to 7 p.m.)    Diagnosis: Contact with and (suspected) exposure to other viral communicable diseases  Diagnosis ICD: Z20.828

## 2020-11-24 DIAGNOSIS — Z20.822 SUSPECTED COVID-19 VIRUS INFECTION: ICD-10-CM

## 2020-11-24 DIAGNOSIS — M54.2 CERVICALGIA: ICD-10-CM

## 2020-11-24 PROCEDURE — U0003 INFECTIOUS AGENT DETECTION BY NUCLEIC ACID (DNA OR RNA); SEVERE ACUTE RESPIRATORY SYNDROME CORONAVIRUS 2 (SARS-COV-2) (CORONAVIRUS DISEASE [COVID-19]), AMPLIFIED PROBE TECHNIQUE, MAKING USE OF HIGH THROUGHPUT TECHNOLOGIES AS DESCRIBED BY CMS-2020-01-R: HCPCS | Performed by: FAMILY MEDICINE

## 2020-11-24 RX ORDER — GABAPENTIN 400 MG/1
1200 CAPSULE ORAL 3 TIMES DAILY
Qty: 270 CAPSULE | Refills: 3 | Status: SHIPPED | OUTPATIENT
Start: 2020-11-24 | End: 2021-02-15

## 2020-11-24 NOTE — TELEPHONE ENCOUNTER
Received call from patient requesting refill(s) of    gabapentin (NEURONTIN) 400 MG capsule          Last dispensed from pharmacy on 10/26/20    Pt last seen by prescribing provider on 9/11/20  Next appt scheduled for NA     checked in the past 6 months? Yes If no, print current report and give to RN    Last urine drug screen date 07/17/20  Current opioid agreement on file (completed within the last year) Yes Date of opioid agreement: 10/29/20    E[facility] [place] [facility]    Will route to nursing [place] for review and preparation of prescription(s).

## 2020-11-24 NOTE — TELEPHONE ENCOUNTER
Script Eprescribed to pharmacy    Signed Prescriptions:                        Disp   Refills    gabapentin (NEURONTIN) 400 MG capsule      270 ca*3        Sig: Take 3 capsules (1,200 mg) by mouth 3 times daily  Authorizing Provider: DONALD MACIAS MD  [facility]

## 2020-11-25 LAB
SARS-COV-2 RNA SPEC QL NAA+PROBE: NOT DETECTED
SPECIMEN SOURCE: NORMAL

## 2020-12-01 DIAGNOSIS — T88.7XXA MEDICATION SIDE EFFECTS: Primary | ICD-10-CM

## 2020-12-01 DIAGNOSIS — G62.9 NEUROPATHY: ICD-10-CM

## 2020-12-01 LAB
ALBUMIN SERPL-MCNC: 4.4 G/DL (ref 3.4–5)
ALP SERPL-CCNC: 66 U/L (ref 40–150)
ALT SERPL W P-5'-P-CCNC: 89 U/L (ref 0–70)
AST SERPL W P-5'-P-CCNC: 25 U/L (ref 0–45)
BILIRUB DIRECT SERPL-MCNC: 0.2 MG/DL (ref 0–0.2)
BILIRUB SERPL-MCNC: 0.7 MG/DL (ref 0.2–1.3)
CREAT SERPL-MCNC: 1.12 MG/DL (ref 0.66–1.25)
FOLATE SERPL-MCNC: 24.9 NG/ML
GFR SERPL CREATININE-BSD FRML MDRD: 74 ML/MIN/{1.73_M2}
HBA1C MFR BLD: 5.2 % (ref 0–5.6)
PROT SERPL-MCNC: 8.1 G/DL (ref 6.8–8.8)

## 2020-12-01 PROCEDURE — 82746 ASSAY OF FOLIC ACID SERUM: CPT | Performed by: STUDENT IN AN ORGANIZED HEALTH CARE EDUCATION/TRAINING PROGRAM

## 2020-12-01 PROCEDURE — 86039 ANTINUCLEAR ANTIBODIES (ANA): CPT | Performed by: STUDENT IN AN ORGANIZED HEALTH CARE EDUCATION/TRAINING PROGRAM

## 2020-12-01 PROCEDURE — 82565 ASSAY OF CREATININE: CPT | Performed by: STUDENT IN AN ORGANIZED HEALTH CARE EDUCATION/TRAINING PROGRAM

## 2020-12-01 PROCEDURE — 83036 HEMOGLOBIN GLYCOSYLATED A1C: CPT | Performed by: STUDENT IN AN ORGANIZED HEALTH CARE EDUCATION/TRAINING PROGRAM

## 2020-12-01 PROCEDURE — 84165 PROTEIN E-PHORESIS SERUM: CPT | Performed by: PATHOLOGY

## 2020-12-01 PROCEDURE — 86038 ANTINUCLEAR ANTIBODIES: CPT | Performed by: STUDENT IN AN ORGANIZED HEALTH CARE EDUCATION/TRAINING PROGRAM

## 2020-12-01 PROCEDURE — 36415 COLL VENOUS BLD VENIPUNCTURE: CPT | Performed by: STUDENT IN AN ORGANIZED HEALTH CARE EDUCATION/TRAINING PROGRAM

## 2020-12-01 PROCEDURE — 82607 VITAMIN B-12: CPT | Performed by: STUDENT IN AN ORGANIZED HEALTH CARE EDUCATION/TRAINING PROGRAM

## 2020-12-01 PROCEDURE — 80076 HEPATIC FUNCTION PANEL: CPT | Performed by: STUDENT IN AN ORGANIZED HEALTH CARE EDUCATION/TRAINING PROGRAM

## 2020-12-01 PROCEDURE — 99N1036 PR STATISTIC TOTAL PROTEIN: Performed by: STUDENT IN AN ORGANIZED HEALTH CARE EDUCATION/TRAINING PROGRAM

## 2020-12-01 PROCEDURE — 99000 SPECIMEN HANDLING OFFICE-LAB: CPT | Performed by: STUDENT IN AN ORGANIZED HEALTH CARE EDUCATION/TRAINING PROGRAM

## 2020-12-01 PROCEDURE — 84446 ASSAY OF VITAMIN E: CPT | Mod: 90 | Performed by: STUDENT IN AN ORGANIZED HEALTH CARE EDUCATION/TRAINING PROGRAM

## 2020-12-01 NOTE — PROGRESS NOTES
Hepatic panel ordered for 3 weeks from today given elevated ALT seen on most recent panel.     Patient will hold his tizanidine for the 3 weeks prior to the next lab draw.     Liz Mallory MD  [facility]

## 2020-12-01 NOTE — RESULT ENCOUNTER NOTE
Noble Aceves,   The results of some of your lab work are back. It looks like your hemoglobin A1c is normal, which is good news and suggests no concern for diabetes contributing to your neuropathy. Your creatinine is also normal, which suggests normal kidney function. Your liver tests were normal with the exception of a slight increase in one of your liver enzymes. Let's hold your tizanidine (stop taking it for 3 weeks) and re-check the lab to determine if the tizanidine is affecting that lab. I will place an order for the lab test in 3 weeks.   Pending the results of the remaining labs I may also want you to see a neurologist to make sure we aren't missing anything that may be causing your neuropathy.     Please let me know if you have questions.    Liz Mallory MD  [facility]

## 2020-12-02 LAB
ALBUMIN SERPL ELPH-MCNC: 5 G/DL (ref 3.7–5.1)
ALPHA1 GLOB SERPL ELPH-MCNC: 0.3 G/DL (ref 0.2–0.4)
ALPHA2 GLOB SERPL ELPH-MCNC: 0.7 G/DL (ref 0.5–0.9)
ANA PAT SER IF-IMP: ABNORMAL
ANA SER QL IF: POSITIVE
ANA TITR SER IF: ABNORMAL {TITER}
B-GLOBULIN SERPL ELPH-MCNC: 0.9 G/DL (ref 0.6–1)
GAMMA GLOB SERPL ELPH-MCNC: 1.1 G/DL (ref 0.7–1.6)
M PROTEIN SERPL ELPH-MCNC: 0 G/DL
PROT PATTERN SERPL ELPH-IMP: NORMAL
VIT B12 SERPL-MCNC: 615 PG/ML (ref 193–986)

## 2020-12-02 NOTE — RESULT ENCOUNTER NOTE
Noble Aceves,     More of your lab results are back. Your folate and vitamin B12 are normal, but your JAMAAL test is positive. This doesn't necessarily indicate your have any particular disease, as it can be positive in healthy people. However, given this result I would like you to see a neurologist to discuss your peripheral neuropathy. I have made a referral.     Please feel free to call or message me with any questions.     Liz Mallory MD  [facility]

## 2020-12-03 LAB
A-TOCOPHEROL VIT E SERPL-MCNC: 20.1 MG/L (ref 5.5–18)
BETA+GAMMA TOCOPHEROL SERPL-MCNC: 1.6 MG/L (ref 0–6)

## 2020-12-07 ENCOUNTER — MYC MEDICAL ADVICE (OUTPATIENT)
Dept: PALLIATIVE MEDICINE | Facility: CLINIC | Age: 55
End: 2020-12-07

## 2020-12-07 NOTE — TELEPHONE ENCOUNTER
[email or web address] message from patient on 11/52/2020 at 1152:  I scheduled a visit with a neurologist but it is on January 13th.  A couple months ago I contacted the doctor that did my neck surgery at the [facility] and asked about seeing someone there for my neuropathy.  That appointment, along with a check of my neck, is next week.  I will go to the [facility] and meet with them about the neuropathy and I will mention the lab work.  I'll keep the Jan 13th appointment in case we think we need it.  ________________________________________    Mr [employer] message sent to patient:  Good Afternoon Ketan,    I will forward your message to Dr Mallory so she will be updated on your appointments.  Hope you have a good day. Stay safe and healthy.    Take Care,    Alysia Martínez RN  Care Coordinator  [facility]     __________________________________________________    Routing to provider to review as an FYI

## 2020-12-07 NOTE — TELEPHONE ENCOUNTER
My Chart message sent to patient:    Noble Aceves,    Thanks for updating me on your plans. I will be interested to hear what they have to say at [facility]. They should be able to access your records to see the lab results.     Thanks again for the update,     Liz Mallory MD  [facility]

## 2020-12-17 NOTE — PROGRESS NOTES
"Indra Mehta is a 55 year old male who is being evaluated via a billable video visit.      The patient has been notified of following:     \"This video visit will be conducted via a call between you and your physician/provider. We have found that certain health care needs can be provided without the need for an in-person physical exam.  This service lets us provide the care you need with a video conversation.  If a prescription is necessary we can send it directly to your pharmacy.  If lab work is needed we can place an order for that and you can then stop by our lab to have the test done at a later time.    Video visits are billed at different rates depending on your insurance coverage.  Please reach out to your insurance provider with any questions.    If during the course of the call the physician/provider feels a video visit is not appropriate, you will not be charged for this service.\"    Patient has given verbal consent for Video visit? Yes  How would you like to obtain your AVS? MyChart  If you are dropped from the video visit, the video invite should be resent to: Text to cell phone: [phone]  Will anyone else be joining your video visit? No        Anastasia Skinner MA  [facility] [place]       Video-Visit Details    Type of service:  Video Visit    Video Start Time: 8:00 AM  Video End Time: 8:36 AM    Originating Location (pt. Location): Home    Distant Location (provider location):  [facility] [place]     Platform used for Video Visit: Merle Mallory MD                                [facility]  Follow up clinic visit    Date of visit: 12/18/20    Chief complaint:   Chief Complaint   Patient presents with     Pain     Video visit due to covid-19       Interval history:  Indra Mehta is a 55 year old male with a history of depression and HTN who follows with Chiara Garcia PA-C for:  - cervicalgia s/p " "cervical fusion C4-5 and C5-6 .   - myofascial pain  - chronic low back pain  - painful peripheral neuropathy  Last seen by me on 11/13/2020. he is seeing me while Chiara is on leave.     Interval pain history 10/9/2020:  \"He is now s/p bilateral L3/4 TFESI injections on 9/24/2020 with Dr. Mcdaniel. This doesn't seem to be as effective as prior injections     He has pain radiating down R leg to knee  Also pain that radiates to buttocks     He also has pain in the plantar aspect of bilateral feet (like he is stepping on nails or like he is stepping on rocks). Also has tingling in whole feet intermittently. On a good day the pain is isolated to the toes. This can cause him to not want to walk. \"    Interval pain history 11/13/2020:  \"- started Nucynta for neuropathic foot pain   - doesn't think it is helping his feet enough to offset diminished pain relief in other areas  - noticing now numbness/tingling extending superiorly almost FPC up to knee  - initially PE was attributed to low back  - low back and neck are also bothersome. Neck is constantly painful axially. Rotation is painful bilaterally. By the end of the day his neck hurts. Pressing on his cervical paraspinal mm does not feel like it affects the pain. It feels different from mm pain - inside  - MM in lower back also get sore, but pain is not mm pain. Still goes down the R leg to knee, but not past. On L side only radiates to buttock.  - Has pain with heavy activity immediately. Prolonged sitting and standing are problematic.\"    Recommendations/plan at the last visit included:  Additional Workup:   1. - Diagnostic Studies:  no  2. - Laboratory studies:  Metabolic work up for peripheral neuropathy: Hgb A1c, Vit B12, Vit E, folate, hepatic panel, JAMAAL, Cr, serum electrophoresis  3. - Urine toxicology screen today: no      Therapies:   4. - Physical Therapy: for axial low back pain  5. - Clinical Health Psychologist to address issues of relaxation, behavioral " change, coping style, and other factors important to improvement: not at this time     Medication Management:   6. - Increase Nucynta to 75 mg BID - refilled today  7. - Continue topical lidocaine  8. - Continue gabapentin 1200 mg TID  9. - Continue nortriptyline 50 mg at bedtime  10. - Continue tizanidine 4mg qhs     Further procedures recommended:   11. - Agree with SCS trial. Dr. Pulido is in process of appealing with Pt's insurance.     Since his last visit, Indra Mehta reports:  - Seen by neurology at [facility]. 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 Spoke with Dr. Wesley about intermittent cervical radicular pain and intermittent axial pain.   - nucynta extended release isn't providing as good of relief during the day as immediate release. However having extended pain release is nice at night. ER release provides ~5-6/10 relief, vs 7-8/10 for short acting.   - Both low back and peripheral neuropathy pain is helped by the nucynta. When the neuropthy is bad it really limits what he does because he can't walk. The low back and neck pain is with him all the time but are not as functionally limiting. If his feet aren't   - He also did an autonomic reflex test (There is evidence of length-dependent postganglionic   sympathetic sudomotor and mild cardiovagal impairment while cardiovascular   adrenergic function is normal. These findings can be seen in autonomic   involvement in the patient's known peripheral neuropathy. ) and fat aspirate test  - Tuesday he will return to do a full body sweat test.   - Labs at [facility] as well. They were WNL except slightly elevated ALT. He had stopped his tizanidine 1-2 weeks prior to the follow up labs.   - This has  been a bad month for pain - ER Nucynta not as helpful for daytime pain. He was on his feet all day with work. Had to drive down to [place] [address] [place]  - Continues to have some intermittent cervical radicular symptoms. Sometimes to bilateral upper traps, sometimes to hands       Current pain treatments:   Gabapentin 400 mg-Taking 3 capsules 3 times a day  Tizanidine 4 mg-taking 1 at HS   Tylenol 1000 mg 3x/day  Ibuprofen 800 mg TID  Nucynta - 100mg BID  Xanax-does not use daily, last use was quite a while ago (was taking for insomnia related to) L ear tinnitis  Nortriptyline 50mg at bedtime    Patient is using the medication as prescribed:  YES  Is your medication helpful? YES   Side Effects: no side effect    Past pain treatments:  Injections:   - 11/8/18 bilateral L3-4 TFESI - (Dr Mcdaniel)  - 8/23/18 bilateral L3-4 TFESI - seems to have worsened pain (Dr Mcdaniel)  - 6/11/18 TFESI right C5-6 - helped with arm pain and numbness  - 12/28/17 bilateral L3-4 TFESI   - 10/23/17 Bilateral L4-5 TFESI   - 11/08/2018 Bilateral L3-4 TFESI   -6/14/2019 Bilateral L3-4 TFESI   -8/22/2019 C6-7 SRIDEVI -   -5/22/2020 L3-4 bilateral TLESI  - 11/16/2020 bilateral IA knee corticosteroid injections  Surgery:    - ACDF C6-7 on 7/5/17 with improvement;   - ACDF C5-6 12/19/2018, posterior C5-7 fusion.   - Lateral mass screws, right C5-6 medial facetectomy, right C6 foraminotomy on 10/29/2019  TENS unit: helpful    Medications:  Current Outpatient Medications   Medication Sig Dispense Refill     Acetaminophen (TYLENOL PO) Take 1,000 mg by mouth 3 times daily        ALPRAZolam (XANAX XR) 0.5 MG 24 hr tablet Take 1 tablet (0.5 mg) by mouth At Bedtime 30 tablet 3     amLODIPine (NORVASC) 5 MG tablet Take 0.5 tablets (2.5 mg) by mouth daily 30 tablet 0     gabapentin (NEURONTIN) 400 MG capsule Take 3 capsules (1,200 mg) by mouth 3 times daily 270 capsule 3     hydrochlorothiazide (HYDRODIURIL) 25 MG tablet TAKE 1 TABLET BY MOUTH EVERY DAY 90 tablet 1      ibuprofen (ADVIL/MOTRIN) 800 MG tablet Take 800 mg by mouth 3 times daily       lidocaine (XYLOCAINE) 4 % external solution Apply topically as needed for moderate pain 50 mL 1     losartan (COZAAR) 50 MG tablet Take 2 tablets (100 mg) by mouth daily 180 tablet 0     losartan-hydrochlorothiazide (HYZAAR) 100-25 MG tablet Take 1 tablet by mouth daily 90 tablet 3     naloxone (NARCAN) 4 MG/0.1ML nasal spray Spray 1 spray (4 mg) into one nostril alternating nostrils once as needed for opioid reversal every 2-3 minutes until assistance arrives 0.2 mL 1     nortriptyline (PAMELOR) 50 MG capsule Take 1 capsule (50 mg) by mouth At Bedtime 30 capsule 2     order for DME Equipment being ordered: TENS Pads as directed 1 Device 0     Tapentadol HCl (NUCYNTA) 100 MG TB12 Take 100 mg by mouth 2 times daily 60 tablet 0     tiZANidine (ZANAFLEX) 4 MG tablet Take 1 tablet (4 mg) by mouth At Bedtime 90 tablet 2       Medical History: any changes in medical history since they were last seen? As above, [place] workup for peripheral neuropathy    Review of Systems:  The 14 system ROS was reviewed from the intake questionnaire, and is positive for: pain as above  Any bowel or bladder problems: no  Mood: ok    Physical Exam:  VIDEO VISIT  There were no vitals taken for this visit.  General: NAD  Psych: Mood is ok. Affect is congruent. Thought process is logical. Thought content normal.  Neuro: CN II - XII grossly intact. Speech is fluent    Controlled Substance Agreement:   Encounter-Level CSA - 06/15/2017:    Controlled Substance Agreement - Scan on 6/22/2017 11:53 AM: CONTROLLED SUBSTANCE AGREEMENT     Encounter-Level CSA - 10/13/2016:    Controlled Substance Agreement - Scan on 10/23/2016  5:07 PM: CONTROLLED SUBSTANCE AGREEMENT 10/13/16     Patient-Level CSA:    Controlled Substance Agreement - Opioid - Scan on 10/29/2020  2:12 PM  Controlled Substance Agreement - Opioid - Scan on 4/15/2019  3:04 PM          UDS:   Pain Drug SCR UR W  RPTD Meds   Date Value Ref Range Status   07/17/2020 FINAL  Final     Comment:     (Note)  ====================================================================  [facility] COMP DRUG ANALYSIS,UR  ====================================================================  Test                             Result       Flag       Units        Drug Present   Oxycodone                      3628                    ng/mg creat   Oxymorphone                    1618                    ng/mg creat   Noroxycodone                   2804                    ng/mg creat   Noroxymorphone                 508                     ng/mg creat    Sources of oxycodone are scheduled prescription medications.    Oxymorphone, noroxycodone, and noroxymorphone are expected    metabolites of oxycodone. Oxymorphone is also available as a    scheduled prescription medication.   Gabapentin                     PRESENT                               Tizanidine                     PRESENT                               Nortriptyline                  PRESENT                                Nortriptyline may be administered as a prescription drug; it is    also an expected metabolite of amitriptyline.   Acetaminophen                  PRESENT                               Ibuprofen                      PRESENT                               Diphenhydramine                PRESENT                              ====================================================================  Test                      Result    Flag   Units      Ref Range        Creatinine              212              mg/dL      >=20            ====================================================================  Declared Medications:  Medication list was not provided.  ====================================================================  For clinical consultation, please call [phone].  ====================================================================  Analysis performed by [employer]  [employer] [address] [place] [address]          THE 4 As OF OPIOID MAINTENANCE ANALGESIA    Analgesia: Is pain relief clinically significant? YES   Activity: Is patient functional and able to perform Activities of Daily Living? YES   Adverse effects: Is patient free from adverse side effects from opiates? YES   Adherence to Rx protocol: Is patient adhering to Controlled Substance Agreement and taking medications ONLY as ordered? YES    MME: 60    Is Narcan prescribed for opiate use >50 MME daily? YES     :  [place] Prescription Drug Monitoring Program (PDMP) Link  Checked and as expected - Nucynta and gabapentin prescribed by me, testosterone from PCP      [place] lab results:   TSH, CRP, HIV,  CBC, Vit B12, RF, HCV, HBc, HBs Ag, Lyme serology, syphilis, BMP (except Cl, bicarb), LFTs (excpet ALT), IgA for celiac, ANCA vasculitis panel, tTG, JAMAAL, CCP WNL      Assessment:   Indra Mehta is a 55 year old male who is seen at the pain clinic for:       Neuropathy  Chronic peripheral neuropathic pain  Chronic bilateral low back pain without sciatica  Cervical radiculopathy  S/P cervical spinal fusion    Ketan is getting improved peripheral neuropathy pain management with Nucynta over oxycodone. Unfortunately we are increasing his total opioid dose substantially. He is not having adverse effects, but this is not the direction I would like to be moving in. His function is so compromised by his peripheral neuropathy though, that I would like to keep him on the nucynta for now. We are doing LA for overnight and SA for daytime.   If he develops tolerance to the Nucynta will consider rotating back to oxycodone or buprenorphine trial.   Lab workup for etiology of peripheral neuropathy thus far unrevealing.   He does have evidence of autonomic involvement of his peripheral neuropathy per [facility] testing. Further testing planned.   Holding tizanidine for elevated ALT. Will re-check in 1 mo.      Plan:  Additional Workup:   1. -  Diagnostic Studies:  No, has repeat lumbar MRI planned at [facility]  2. - Laboratory studies:  Repeat LFTs in 4 weeks at next visit while he holds his tizanidine  3. - Urine toxicology screen today: no     Therapies:   4. - Physical Therapy: continue for axial LBP  5. - Clinical Health Psychologist to address issues of relaxation, behavioral change, coping style, and other factors important to improvement: not at this time    Medication Management:   6. - Change Nucynta to 100 mg long-acting at bedtime, 75 mg short acting, #2-3 tabs per day for daytime.   7. - Consider rotation back to oxycodone if he develops tolerance  8. - Consider transition to [place] patch in the future.   9. - Continue gabapentin 1200 mg TID  10. - Continue nortriptyline 50 mg at bedtime  11. - Continue topical lidocaine  12. - Stop tizanidine    Further procedures recommended:   13. - Agree strongly with SCS trial. Contact Dr. Pulido re insurance coverage  14. - Consider repeat cervical SRIDEVI for remaining radicular symptoms.     Follow up: 1 mo    Liz Mallory MD  [facility]

## 2020-12-18 ENCOUNTER — TELEPHONE (OUTPATIENT)
Dept: PALLIATIVE MEDICINE | Facility: CLINIC | Age: 55
End: 2020-12-18

## 2020-12-18 ENCOUNTER — VIRTUAL VISIT (OUTPATIENT)
Dept: PALLIATIVE MEDICINE | Facility: CLINIC | Age: 55
End: 2020-12-18
Payer: COMMERCIAL

## 2020-12-18 DIAGNOSIS — M54.12 CERVICAL RADICULOPATHY: ICD-10-CM

## 2020-12-18 DIAGNOSIS — G89.29 CHRONIC BILATERAL LOW BACK PAIN WITHOUT SCIATICA: ICD-10-CM

## 2020-12-18 DIAGNOSIS — M79.2 CHRONIC PERIPHERAL NEUROPATHIC PAIN: Primary | ICD-10-CM

## 2020-12-18 DIAGNOSIS — G89.29 CHRONIC PERIPHERAL NEUROPATHIC PAIN: Primary | ICD-10-CM

## 2020-12-18 DIAGNOSIS — Z98.1 S/P CERVICAL SPINAL FUSION: ICD-10-CM

## 2020-12-18 DIAGNOSIS — G62.9 NEUROPATHY: ICD-10-CM

## 2020-12-18 DIAGNOSIS — M54.50 CHRONIC BILATERAL LOW BACK PAIN WITHOUT SCIATICA: ICD-10-CM

## 2020-12-18 PROCEDURE — 99214 OFFICE O/P EST MOD 30 MIN: CPT | Mod: GT | Performed by: STUDENT IN AN ORGANIZED HEALTH CARE EDUCATION/TRAINING PROGRAM

## 2020-12-18 RX ORDER — TAPENTADOL HYDROCHLORIDE 100 MG/1
100 TABLET, FILM COATED, EXTENDED RELEASE ORAL AT BEDTIME
Qty: 60 TABLET | Refills: 0 | Status: SHIPPED | OUTPATIENT
Start: 2020-12-18 | End: 2021-03-22

## 2020-12-18 ASSESSMENT — PAIN SCALES - GENERAL: PAINLEVEL: SEVERE PAIN (6)

## 2020-12-18 NOTE — TELEPHONE ENCOUNTER
MARTIN to schedule FOllow up 1 mo telehealth.     Shanda DE LA CRUZ    [facility]

## 2020-12-18 NOTE — PATIENT INSTRUCTIONS
1. Continue to take Nucynta 100mg extended release at night    2. Start short acting Nucynta 75mg during the day, #2-3 tabs per day.    3. Stop tizanidine - we will re-check your liver labs in 1 mo    4. Continue your other medications as previously prescribed.     5. I have contacted Dr. Pulido regarding spinal cord stimulation insurance denial.     6. We will consider cervical epidural steroid injections in the future.     Follow up in 1 month    Liz Mallory MD  [facility]    ----------------------------------------------------------------  Clinic Number:  [phone]     Call with any questions about your care and for scheduling assistance.     Calls are returned Monday through Friday between 8 AM and 4:30 PM. We usually get back to you within 2 business days depending on the issue/request.    If we are prescribing your medications:    For opioid medication refills, call the clinic or send a [employer] message 7 days in advance.  Please include:    Name of requested medication    Name of the pharmacy.    For non-opioid medications, call your pharmacy directly to request a refill. Please allow 3-4 days to be processed.     Per [place] State Law:    All controlled substance prescriptions must be filled within 30 days of being written.      For those controlled substances allowing refills, pickup must occur within 30 days of last fill.      We believe regular attendance is key to your success in our program!      Any time you are unable to keep your appointment we ask that you call us at least 24 hours in advance to cancel.This will allow us to offer the appointment time to another patient.     Multiple missed appointments may lead to dismissal from the clinic.

## 2020-12-21 ENCOUNTER — OFFICE VISIT (OUTPATIENT)
Dept: DERMATOLOGY | Facility: CLINIC | Age: 55
End: 2020-12-21
Payer: COMMERCIAL

## 2020-12-21 VITALS — SYSTOLIC BLOOD PRESSURE: 157 MMHG | DIASTOLIC BLOOD PRESSURE: 88 MMHG | OXYGEN SATURATION: 96 %

## 2020-12-21 DIAGNOSIS — L72.0 RUPTURED EPITHELIAL CYST: ICD-10-CM

## 2020-12-21 DIAGNOSIS — B07.8 COMMON WART: Primary | ICD-10-CM

## 2020-12-21 PROCEDURE — 11900 INJECT SKIN LESIONS </W 7: CPT | Performed by: DERMATOLOGY

## 2020-12-21 PROCEDURE — 99213 OFFICE O/P EST LOW 20 MIN: CPT | Mod: 25 | Performed by: DERMATOLOGY

## 2020-12-21 RX ORDER — DOXYCYCLINE 100 MG/1
100 CAPSULE ORAL 2 TIMES DAILY
Qty: 14 CAPSULE | Refills: 0 | Status: SHIPPED | OUTPATIENT
Start: 2020-12-21 | End: 2021-04-06

## 2020-12-21 NOTE — PROGRESS NOTES
Indra Mehta is an extremely pleasant 55 year old year old male patient here today for f/u wart getting smaller. He notes tender spot on right thigh.   .  Patient states this has been present for a while.  Patient reports the following symptoms:   Ruptured and tender.  Patient reports the following previous treatments none.  These treatments did not work.  Patient reports the following modifying factors none.  Associated symptoms: none.  Patient has no other skin complaints today.  Remainder of the HPI, Meds, PMH, Allergies, FH, and SH was reviewed in chart.      Past Medical History:   Diagnosis Date     Back pain      Meniere's disease, unspecified      Pain medication agreement signed 8/20/2010       Past Surgical History:   Procedure Laterality Date     BUNIONECTOMY RT/LT  09/05/08    Both feet     COLONOSCOPY N/A 12/28/2016    Procedure: COMBINED COLONOSCOPY, SINGLE OR MULTIPLE BIOPSY/POLYPECTOMY BY BIOPSY;  Surgeon: Indra Jernigan MD;  [facility]     DISCECTOMY, FUSION CERVICAL ANTERIOR ONE LEVEL, COMBINED N/A 7/5/2017    Procedure: COMBINED DISCECTOMY, FUSION CERVICAL ANTERIOR ONE LEVEL;  Cervical 6-7, Anterior cervical discectomy fusion;  Surgeon: Mike Mckenna MD;  [facility]     DISCECTOMY, FUSION CERVICAL ANTERIOR ONE LEVEL, COMBINED N/A 12/19/2018    Procedure: cervical 5-6 anterior cervical discectomy fusion;  Surgeon: Mike Mckenna MD;  [facility]     HC COLONOSCOPY W/WO BRUSH/WASH  12/27/2005     HC CREATE EARDRUM OPENING,GEN ANESTH  09/27/2007    Pe tube, Left endolymphatic sac enhancement.     HC MASTOIDECTOMY,COMPLETE  09/27/2007     HERNIORRHAPHY UMBILICAL N/A 8/11/2017    Procedure: HERNIORRHAPHY UMBILICAL;  open umbilical hernia repair;  Surgeon: Boni Story MD;  [facility]     INJECT EPIDURAL CERVICAL N/A 8/22/2019    Procedure: INJECTION, SPINE, CERVICAL 6-7 EPIDURAL;  Surgeon: Darryn Mcdaniel MD;  [facility]     INJECT EPIDURAL LUMBAR N/A  11/9/2017    Procedure: INJECT EPIDURAL LUMBAR;  lumbar 4-5 epidural steroid injection ;  Surgeon: Darryn Mcdaniel MD;  [facility]     INJECT EPIDURAL LUMBAR N/A 12/28/2017    Procedure: INJECT EPIDURAL LUMBAR;  lumbar epidural injection;  Surgeon: Darryn Mcdaniel MD;  [facility]     INJECT EPIDURAL TRANSFORAMINAL Bilateral 8/23/2018    Procedure: INJECT EPIDURAL TRANSFORAMINAL;  transforaminal bilateral lumbar 3-4 steroid injection;  Surgeon: Darryn Mcdaniel MD;  [facility]     INJECT EPIDURAL TRANSFORAMINAL Bilateral 11/8/2018    Procedure: INJECT EPIDURAL TRANSFORAMINAL lumbar 3-4;  Surgeon: Darryn Mcdaniel MD;  [facility]     INJECT EPIDURAL TRANSFORAMINAL Bilateral 6/14/2019    Procedure: INJECTION, EPIDURAL, TRANSFORAMINAL LUMBAR 3-4 BILATERAL;  Surgeon: Darryn Mcdaniel MD;  [facility]     INJECT EPIDURAL TRANSFORAMINAL Bilateral 5/22/2020    Procedure: lumbar 3-4 bilateral transforaminal epidural steroid injection;  Surgeon: Darryn Mcdaniel MD;  [facility]     INJECT EPIDURAL TRANSFORAMINAL Bilateral 9/24/2020    Procedure: INJECTION, EPIDURAL, TRANSFORAMINAL  LUMBAR 3-4 APPROACH;  Surgeon: Darryn Mcdaniel MD;  [facility]     PE TUBES  2006    left ear        Family History   Problem Relation Age of Onset     Cancer - colorectal Mother      Diabetes Mother      Cardiovascular Father      Hypertension Father      Mental Illness Sister      Suicide Other        Social History     Socioeconomic History     Marital status: Single     Spouse name: Not on file     Number of children: 2     Years of education: Not on file     Highest education level: Not on file   Occupational History     Employer: [employer]   Social Needs     Financial resource strain: Not on file     Food insecurity     Worry: Not on file     Inability: Not on file     Transportation needs     Medical: Not on file     Non-medical: Not on file   Tobacco Use     Smoking status: Never Smoker     Smokeless  tobacco: Never Used   Substance and Sexual Activity     Alcohol use: No     Alcohol/week: 0.0 standard drinks     Drug use: No     Sexual activity: Yes     Partners: Female   Lifestyle     Physical activity     Days per week: Not on file     Minutes per session: Not on file     Stress: Not on file   Relationships     Social connections     Talks on phone: Not on file     Gets together: Not on file     Attends Mandaen service: Not on file     Active member of club or organization: Not on file     Attends meetings of clubs or organizations: Not on file     Relationship status: Not on file     Intimate partner violence     Fear of current or ex partner: Not on file     Emotionally abused: Not on file     Physically abused: Not on file     Forced sexual activity: Not on file   Other Topics Concern     Parent/sibling w/ CABG, MI or angioplasty before 65F 55M? No   Social History Narrative     Not on file       Outpatient Encounter Medications as of 12/21/2020   Medication Sig Dispense Refill     Acetaminophen (TYLENOL PO) Take 1,000 mg by mouth 3 times daily        ALPRAZolam (XANAX XR) 0.5 MG 24 hr tablet Take 1 tablet (0.5 mg) by mouth At Bedtime 30 tablet 3     amLODIPine (NORVASC) 5 MG tablet Take 0.5 tablets (2.5 mg) by mouth daily 30 tablet 0     gabapentin (NEURONTIN) 400 MG capsule Take 3 capsules (1,200 mg) by mouth 3 times daily 270 capsule 3     hydrochlorothiazide (HYDRODIURIL) 25 MG tablet TAKE 1 TABLET BY MOUTH EVERY DAY 90 tablet 1     ibuprofen (ADVIL/MOTRIN) 800 MG tablet Take 800 mg by mouth 3 times daily       lidocaine (XYLOCAINE) 4 % external solution Apply topically as needed for moderate pain 50 mL 1     losartan (COZAAR) 50 MG tablet Take 2 tablets (100 mg) by mouth daily 180 tablet 0     losartan-hydrochlorothiazide (HYZAAR) 100-25 MG tablet Take 1 tablet by mouth daily 90 tablet 3     naloxone (NARCAN) 4 MG/0.1ML nasal spray Spray 1 spray (4 mg) into one nostril alternating nostrils once as  needed for opioid reversal every 2-3 minutes until assistance arrives 0.2 mL 1     nortriptyline (PAMELOR) 50 MG capsule Take 1 capsule (50 mg) by mouth At Bedtime 30 capsule 2     order for DME Equipment being ordered: TENS Pads as directed 1 Device 0     tapentadol (NUCYNTA) 75 MG TABS tablet Take 1 tablet (75 mg) by mouth every 6 hours as needed for severe pain Fill/start 12/18/2020. #75 tabs for 30 days 75 tablet 0     Tapentadol HCl (NUCYNTA) 100 MG TB12 Take 100 mg by mouth At Bedtime Fill/start 12/18/2020. 60 tablet 0     tiZANidine (ZANAFLEX) 4 MG tablet Take 1 tablet (4 mg) by mouth At Bedtime 90 tablet 2     Facility-Administered Encounter Medications as of 12/21/2020   Medication Dose Route Frequency Provider Last Rate Last Admin     triamcinolone (KENALOG-40) injection 40 mg  40 mg   Melissa Xie MD   40 mg at 11/16/20 0848     triamcinolone (KENALOG-40) injection 40 mg  40 mg   Melissa Xie MD   40 mg at 11/16/20 0848             Review Of Systems  Skin: As above  Eyes: negative  Ears/Nose/Throat: negative  Respiratory: No shortness of breath, dyspnea on exertion, cough, or hemoptysis  Cardiovascular: negative  Gastrointestinal: negative  Genitourinary: negative  Musculoskeletal: negative  Neurologic: negative  Psychiatric: negative  Hematologic/Lymphatic/Immunologic: negative  Endocrine: negative      O:   NAD, WDWN, Alert & Oriented, Mood & Affect wnl, Vitals stable   Here today alone   BP (!) 157/88 (BP Location: Left arm, Patient Position: Sitting, Cuff Size: Adult Large)   SpO2 96%    General appearance normal   Vitals stable   Alert, oriented and in no acute distress     R palm smaller verrucous papule   R thigh ruptured cyst with comedone      Eyes: Conjunctivae/lids:Normal     ENT: Lips, buccal mucosa, tongue: normal    MSK:Normal    Cardiovascular: peripheral edema none    Pulm: Breathing Normal    Neuro/Psych: Orientation:Alert and Orientedx3 ; Mood/Affect:normal        A/P:  1. Wart  Cont mediplast  IL Candin: PGACAC discussed.  Risks including but not limited to injection site reaction, bruising, no resolution.  All questions answered and entertained to patient s satisfaction.  Informed consent obtained.  IL Candin in concentration of 1 unit/ 0.1 ml was injected ID to R hand.  .  Total injected was  2 units.  Patient tolerated without complications and given wound care instructions, including not to move product around.  Return in 4 weeks for follow-up and possible additional IL Candin.      Lot ca065  3/5/22  2. Ruptured cyst  Doxy twice daily  Wound care discussed with patient

## 2020-12-21 NOTE — NURSING NOTE
"Initial BP (!) 157/88 (BP Location: Left arm, Patient Position: Sitting, Cuff Size: Adult Large)   SpO2 96%  Estimated body mass index is 29.36 kg/m  as calculated from the following:    Height as of 11/17/20: 1.854 m (6' 1\").    Weight as of 11/16/20: 100.9 kg (222 lb 8 oz). .      "

## 2020-12-21 NOTE — LETTER
12/21/2020         RE: Indra Mehta  [id number] [facility] [address]        Dear Colleague,    Thank you for referring your patient, Indra Mehta, to the [facility]. Please see a copy of my visit note below.    Indra Mehta is an extremely pleasant 55 year old year old male patient here today for f/u wart getting smaller. He notes tender spot on right thigh.   .  Patient states this has been present for a while.  Patient reports the following symptoms:   Ruptured and tender.  Patient reports the following previous treatments none.  These treatments did not work.  Patient reports the following modifying factors none.  Associated symptoms: none.  Patient has no other skin complaints today.  Remainder of the HPI, Meds, PMH, Allergies, FH, and SH was reviewed in chart.      Past Medical History:   Diagnosis Date     Back pain      Meniere's disease, unspecified      Pain medication agreement signed 8/20/2010       Past Surgical History:   Procedure Laterality Date     BUNIONECTOMY RT/LT  09/05/08    Both feet     COLONOSCOPY N/A 12/28/2016    Procedure: COMBINED COLONOSCOPY, SINGLE OR MULTIPLE BIOPSY/POLYPECTOMY BY BIOPSY;  Surgeon: Indra Jernigan MD;  [facility]     DISCECTOMY, FUSION CERVICAL ANTERIOR ONE LEVEL, COMBINED N/A 7/5/2017    Procedure: COMBINED DISCECTOMY, FUSION CERVICAL ANTERIOR ONE LEVEL;  Cervical 6-7, Anterior cervical discectomy fusion;  Surgeon: Mike Mckenna MD;  [facility]     DISCECTOMY, FUSION CERVICAL ANTERIOR ONE LEVEL, COMBINED N/A 12/19/2018    Procedure: cervical 5-6 anterior cervical discectomy fusion;  Surgeon: Mike Mckenna MD;  [facility]     HC COLONOSCOPY W/WO BRUSH/WASH  12/27/2005     HC CREATE EARDRUM OPENING,GEN ANESTH  09/27/2007    Pe tube, Left endolymphatic sac enhancement.     HC MASTOIDECTOMY,COMPLETE  09/27/2007     HERNIORRHAPHY UMBILICAL N/A 8/11/2017    Procedure: HERNIORRHAPHY UMBILICAL;   open umbilical hernia repair;  Surgeon: Boni Story MD;  [facility]     INJECT EPIDURAL CERVICAL N/A 8/22/2019    Procedure: INJECTION, SPINE, CERVICAL 6-7 EPIDURAL;  Surgeon: Darryn Mcdaniel MD;  [facility]     INJECT EPIDURAL LUMBAR N/A 11/9/2017    Procedure: INJECT EPIDURAL LUMBAR;  lumbar 4-5 epidural steroid injection ;  Surgeon: Darryn Mcdaniel MD;  [facility]     INJECT EPIDURAL LUMBAR N/A 12/28/2017    Procedure: INJECT EPIDURAL LUMBAR;  lumbar epidural injection;  Surgeon: Darryn Mcdaniel MD;  [facility]     INJECT EPIDURAL TRANSFORAMINAL Bilateral 8/23/2018    Procedure: INJECT EPIDURAL TRANSFORAMINAL;  transforaminal bilateral lumbar 3-4 steroid injection;  Surgeon: Darryn Mcdaniel MD;  [facility]     INJECT EPIDURAL TRANSFORAMINAL Bilateral 11/8/2018    Procedure: INJECT EPIDURAL TRANSFORAMINAL lumbar 3-4;  Surgeon: Darryn Mcdaniel MD;  [facility]     INJECT EPIDURAL TRANSFORAMINAL Bilateral 6/14/2019    Procedure: INJECTION, EPIDURAL, TRANSFORAMINAL LUMBAR 3-4 BILATERAL;  Surgeon: Darryn Mcdaniel MD;  [facility]     INJECT EPIDURAL TRANSFORAMINAL Bilateral 5/22/2020    Procedure: lumbar 3-4 bilateral transforaminal epidural steroid injection;  Surgeon: Darryn Mcdaniel MD;  [facility]     INJECT EPIDURAL TRANSFORAMINAL Bilateral 9/24/2020    Procedure: INJECTION, EPIDURAL, TRANSFORAMINAL  LUMBAR 3-4 APPROACH;  Surgeon: Darryn Mcdaniel MD;  [facility]     PE TUBES  2006    left ear        Family History   Problem Relation Age of Onset     Cancer - colorectal Mother      Diabetes Mother      Cardiovascular Father      Hypertension Father      Mental Illness Sister      Suicide Other        Social History     Socioeconomic History     Marital status: Single     Spouse name: Not on file     Number of children: 2     Years of education: Not on file     Highest education level: Not on file   Occupational History     Employer: [employer]   Social Needs      Financial resource strain: Not on file     Food insecurity     Worry: Not on file     Inability: Not on file     Transportation needs     Medical: Not on file     Non-medical: Not on file   Tobacco Use     Smoking status: Never Smoker     Smokeless tobacco: Never Used   Substance and Sexual Activity     Alcohol use: No     Alcohol/week: 0.0 standard drinks     Drug use: No     Sexual activity: Yes     Partners: Female   Lifestyle     Physical activity     Days per week: Not on file     Minutes per session: Not on file     Stress: Not on file   Relationships     Social connections     Talks on phone: Not on file     Gets together: Not on file     Attends Mu-ism service: Not on file     Active member of club or organization: Not on file     Attends meetings of clubs or organizations: Not on file     Relationship status: Not on file     Intimate partner violence     Fear of current or ex partner: Not on file     Emotionally abused: Not on file     Physically abused: Not on file     Forced sexual activity: Not on file   Other Topics Concern     Parent/sibling w/ CABG, MI or angioplasty before 65F 55M? No   Social History Narrative     Not on file       Outpatient Encounter Medications as of 12/21/2020   Medication Sig Dispense Refill     Acetaminophen (TYLENOL PO) Take 1,000 mg by mouth 3 times daily        ALPRAZolam (XANAX XR) 0.5 MG 24 hr tablet Take 1 tablet (0.5 mg) by mouth At Bedtime 30 tablet 3     amLODIPine (NORVASC) 5 MG tablet Take 0.5 tablets (2.5 mg) by mouth daily 30 tablet 0     gabapentin (NEURONTIN) 400 MG capsule Take 3 capsules (1,200 mg) by mouth 3 times daily 270 capsule 3     hydrochlorothiazide (HYDRODIURIL) 25 MG tablet TAKE 1 TABLET BY MOUTH EVERY DAY 90 tablet 1     ibuprofen (ADVIL/MOTRIN) 800 MG tablet Take 800 mg by mouth 3 times daily       lidocaine (XYLOCAINE) 4 % external solution Apply topically as needed for moderate pain 50 mL 1     losartan (COZAAR) 50 MG tablet Take 2  tablets (100 mg) by mouth daily 180 tablet 0     losartan-hydrochlorothiazide (HYZAAR) 100-25 MG tablet Take 1 tablet by mouth daily 90 tablet 3     naloxone (NARCAN) 4 MG/0.1ML nasal spray Spray 1 spray (4 mg) into one nostril alternating nostrils once as needed for opioid reversal every 2-3 minutes until assistance arrives 0.2 mL 1     nortriptyline (PAMELOR) 50 MG capsule Take 1 capsule (50 mg) by mouth At Bedtime 30 capsule 2     order for DME Equipment being ordered: TENS Pads as directed 1 Device 0     tapentadol (NUCYNTA) 75 MG TABS tablet Take 1 tablet (75 mg) by mouth every 6 hours as needed for severe pain Fill/start 12/18/2020. #75 tabs for 30 days 75 tablet 0     Tapentadol HCl (NUCYNTA) 100 MG TB12 Take 100 mg by mouth At Bedtime Fill/start 12/18/2020. 60 tablet 0     tiZANidine (ZANAFLEX) 4 MG tablet Take 1 tablet (4 mg) by mouth At Bedtime 90 tablet 2     Facility-Administered Encounter Medications as of 12/21/2020   Medication Dose Route Frequency Provider Last Rate Last Admin     triamcinolone (KENALOG-40) injection 40 mg  40 mg   Melissa Xie MD   40 mg at 11/16/20 0848     triamcinolone (KENALOG-40) injection 40 mg  40 mg   Melissa Xie MD   40 mg at 11/16/20 0848             Review Of Systems  Skin: As above  Eyes: negative  Ears/Nose/Throat: negative  Respiratory: No shortness of breath, dyspnea on exertion, cough, or hemoptysis  Cardiovascular: negative  Gastrointestinal: negative  Genitourinary: negative  Musculoskeletal: negative  Neurologic: negative  Psychiatric: negative  Hematologic/Lymphatic/Immunologic: negative  Endocrine: negative      O:   NAD, WDWN, Alert & Oriented, Mood & Affect wnl, Vitals stable   Here today alone   BP (!) 157/88 (BP Location: Left arm, Patient Position: Sitting, Cuff Size: Adult Large)   SpO2 96%    General appearance normal   Vitals stable   Alert, oriented and in no acute distress     R palm smaller verrucous papule   R thigh  ruptured cyst with comedone      Eyes: Conjunctivae/lids:Normal     ENT: Lips, buccal mucosa, tongue: normal    MSK:Normal    Cardiovascular: peripheral edema none    Pulm: Breathing Normal    Neuro/Psych: Orientation:Alert and Orientedx3 ; Mood/Affect:normal       A/P:  1. Wart  Cont mediplast  IL Candin: PGACAC discussed.  Risks including but not limited to injection site reaction, bruising, no resolution.  All questions answered and entertained to patient s satisfaction.  Informed consent obtained.  IL Candin in concentration of 1 unit/ 0.1 ml was injected ID to R hand.  .  Total injected was  2 units.  Patient tolerated without complications and given wound care instructions, including not to move product around.  Return in 4 weeks for follow-up and possible additional IL Candin.      Lot ca065  3/5/22  2. Ruptured cyst  Doxy twice daily  Wound care discussed with patient         Again, thank you for allowing me to participate in the care of your patient.        Sincerely,        Pablo Matthews MD

## 2020-12-22 DIAGNOSIS — I73.01 RAYNAUD'S DISEASE WITH GANGRENE (H): ICD-10-CM

## 2020-12-23 ENCOUNTER — TRANSFERRED RECORDS (OUTPATIENT)
Dept: HEALTH INFORMATION MANAGEMENT | Facility: CLINIC | Age: 55
End: 2020-12-23

## 2020-12-23 RX ORDER — AMLODIPINE BESYLATE 5 MG/1
TABLET ORAL
Qty: 30 TABLET | Refills: 0 | Status: SHIPPED | OUTPATIENT
Start: 2020-12-23 | End: 2021-02-16

## 2020-12-23 NOTE — TELEPHONE ENCOUNTER
"Requested Prescriptions   Pending Prescriptions Disp Refills     amLODIPine (NORVASC) 5 MG tablet [Pharmacy Med Name: AMLODIPINE 5MG TABLET] 30 tablet 0     Sig: TAKE 1/2 TABLET (2.5MG) BY MOUTH EVERY DAY       Calcium Channel Blockers Protocol  Failed - 12/22/2020  7:02 AM        Failed - Blood pressure under 140/90 in past 12 months     BP Readings from Last 3 Encounters:   12/21/20 (!) 157/88   11/17/20 (!) 168/84   11/16/20 (!) 174/98                 Passed - Recent (12 mo) or future (30 days) visit within the authorizing provider's specialty     Patient has had an office visit with the authorizing provider or a provider within the authorizing providers department within the previous 12 mos or has a future within next 30 days. See \"Patient Info\" tab in inbasket, or \"Choose Columns\" in Meds & Orders section of the refill encounter.              Passed - Medication is active on med list        Passed - Patient is age 18 or older        Passed - Normal serum creatinine on file in past 12 months     Recent Labs   Lab Test 12/01/20  0824   CR 1.12       Ok to refill medication if creatinine is low           Last Written Prescription Date:  10/28/20  Last Fill Quantity: 30,  # refills: 0   Last office visit: 10/28/2020 with prescribing provider:  Mitchel   Future Office Visit:   Next 5 appointments (look out 90 days)    Jan 19, 2021  9:00 AM  Return Visit with Pablo Matthews MD  [facility] ([facility]) [address] [facility] [id number] [phone]         Routing refill request to provider for review/approval because:  Failed protocols  BP > 140/90                  Joy Ivory RN  Triage Nurse  [facility], 24 hour nurse line, available by calling clinic at [phone] and following prompts.                    "

## 2021-01-06 DIAGNOSIS — Z98.1 S/P CERVICAL SPINAL FUSION: ICD-10-CM

## 2021-01-06 DIAGNOSIS — M79.18 MYOFASCIAL PAIN: ICD-10-CM

## 2021-01-06 DIAGNOSIS — G89.4 CHRONIC PAIN SYNDROME: ICD-10-CM

## 2021-01-06 DIAGNOSIS — M54.50 CHRONIC BILATERAL LOW BACK PAIN WITHOUT SCIATICA: ICD-10-CM

## 2021-01-06 DIAGNOSIS — M54.2 CERVICALGIA: ICD-10-CM

## 2021-01-06 DIAGNOSIS — G62.9 NEUROPATHY: ICD-10-CM

## 2021-01-06 DIAGNOSIS — G89.29 CHRONIC BILATERAL LOW BACK PAIN WITHOUT SCIATICA: ICD-10-CM

## 2021-01-06 RX ORDER — NORTRIPTYLINE HYDROCHLORIDE 50 MG/1
50 CAPSULE ORAL AT BEDTIME
Qty: 30 CAPSULE | Refills: 2 | Status: SHIPPED | OUTPATIENT
Start: 2021-01-06 | End: 2021-03-30

## 2021-01-06 RX ORDER — NORTRIPTYLINE HYDROCHLORIDE 50 MG/1
50 CAPSULE ORAL AT BEDTIME
Qty: 30 CAPSULE | Refills: 2 | Status: CANCELLED | OUTPATIENT
Start: 2021-01-06

## 2021-01-06 NOTE — TELEPHONE ENCOUNTER
Nortriptyline  Last Written Prescription Date:  9/29/20  Last Fill Quantity: 30,  # refills: 2   Last office visit: 12/18/2020 with prescribing provider:  12/18/2020   Future Office Visit:   Next 5 appointments (look out 90 days)    Jan 19, 2021  9:00 AM  Return Visit with Pablo Matthews MD  [facility] ([facility]) [address] [facility] [address]  [phone]           Requested Prescriptions   Pending Prescriptions Disp Refills     nortriptyline (PAMELOR) 50 MG capsule 30 capsule 2     Sig: Take 1 capsule (50 mg) by mouth At Bedtime       There is no refill protocol information for this order          Pain refills addressed by pain team, routing to them to address.       Danica KIMBLE, Lead RN, BSN. . .  1/6/2021, 10:14 AM

## 2021-01-06 NOTE — TELEPHONE ENCOUNTER
This is a duplicate encounter. Pain clinic also received a fax from the pharmacy and is processing refill in a separate encounter    EARLINE Stanton, RN-BC  Patient Care Supervisor  [facility] [place]

## 2021-01-06 NOTE — TELEPHONE ENCOUNTER
Script Eprescribed to pharmacy    Will send this to MA team to notify patient.    Signed Prescriptions:                        Disp   Refills    nortriptyline (PAMELOR) 50 MG capsule      30 cap*2        Sig: Take 1 capsule (50 mg) by mouth At Bedtime  Authorizing Provider: DONALD MACIAS MD  [facility]

## 2021-01-06 NOTE — TELEPHONE ENCOUNTER
Received fax request from   [id number] - [place], [place] - [address] [place] [address] [address] [facility] [address]  Phone: [phone] Fax: [phone]     pharmacy requesting refill(s) for nortriptyline (PAMELOR) 50 MG capsule    Last refilled on 11/22/20    Pt last seen on 12/18/20  Next appt scheduled for 01/15/21      Will facilitate refill.    Sofiya Roa CMA  [facility]  [place]

## 2021-01-10 ENCOUNTER — HEALTH MAINTENANCE LETTER (OUTPATIENT)
Age: 56
End: 2021-01-10

## 2021-01-14 NOTE — PROGRESS NOTES
"Ketan is a 55 year old who is being evaluated via a billable video visit.      How would you like to obtain your AVS? MyChart  If the video visit is dropped, the invitation should be resent by: Text to cell phone: [phone]  Will anyone else be joining your video visit? Steffanie Skinner MA  [facility]        Video Start Time: 8:03 AM  Video-Visit Details    Type of service:  Video Visit    Video End Time:8:30    Originating Location (pt. Location): Other car    Distant Location (provider location):  [facility] [place]     Platform used for Video Visit: [employer] [facility]  Follow up clinic visit    Date of visit: 1/15/21    Chief complaint:   Chief Complaint   Patient presents with     Pain     Video visit due to covid-19       Interval history:  Indra Mehta is a 55 year old male with a history of depression and HTN who follows with Chiara Garcia PA-C for:  - cervicalgia s/p cervical fusion C4-5 and C5-6 .   - myofascial pain  - chronic low back pain  - painful peripheral neuropathy  Last seen by me on 12/18/2020.      Interval pain history 10/9/2020:  \"He is now s/p bilateral L3/4 TFESI injections on 9/24/2020 with Dr. Mcdaniel. This doesn't seem to be as effective as prior injections     He has pain radiating down R leg to knee  Also pain that radiates to buttocks     He also has pain in the plantar aspect of bilateral feet (like he is stepping on nails or like he is stepping on rocks). Also has tingling in whole feet intermittently. On a good day the pain is isolated to the toes. This can cause him to not want to walk. \"    Interval pain history 11/13/2020:  \"- started Nucynta for neuropathic foot pain   - doesn't think it is helping his feet enough to offset diminished pain relief in other areas  - noticing now numbness/tingling extending superiorly almost snf up to knee  - " "initially PE was attributed to low back  - low back and neck are also bothersome. Neck is constantly painful axially. Rotation is painful bilaterally. By the end of the day his neck hurts. Pressing on his cervical paraspinal mm does not feel like it affects the pain. It feels different from mm pain - inside  - MM in lower back also get sore, but pain is not mm pain. Still goes down the R leg to knee, but not past. On L side only radiates to buttock.  - Has pain with heavy activity immediately. Prolonged sitting and standing are problematic.\"    Interval pain history 12/28/2020:  - Seen by neurology at [place]. 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 Spoke with Dr. Wesley about intermittent cervical radicular pain and intermittent axial pain.   - nucynta extended release isn't providing as good of relief during the day as immediate release. However having extended pain release is nice at night. ER release provides ~5-6/10 relief, vs 7-8/10 for short acting.   - Both low back and peripheral neuropathy pain is helped by the nucynta. When the neuropthy is bad it really limits what he does because he can't walk. The low back and neck pain is with him all the time but are not as functionally limiting. If his feet aren't   - He also did an autonomic reflex test (There is evidence of length-dependent postganglionic   sympathetic sudomotor and mild cardiovagal impairment while cardiovascular   adrenergic function is normal. These findings can be seen in autonomic   involvement in the patient's known peripheral neuropathy. ) and fat aspirate test  - Tuesday he will return to do a full body sweat test.   - Labs at [place] as well. They were WNL except slightly " elevated ALT. He had stopped his tizanidine 1-2 weeks prior to the follow up labs.   - This has been a bad month for pain - ER Nucynta not as helpful for daytime pain. He was on his feet all day with work. Had to drive down to [place] x3  - Continues to have some intermittent cervical radicular symptoms. Sometimes to bilateral upper traps, sometimes to hands        Recommendations/plan at the last visit included:  Additional Workup:   1. - Diagnostic Studies:  No, has repeat lumbar MRI planned at [place]  2. - Laboratory studies:  Repeat LFTs in 4 weeks at next visit while he holds his tizanidine  3. - Urine toxicology screen today: no   Therapies:   4. - Physical Therapy: continue for axial LBP  5. - Clinical Health Psychologist to address issues of relaxation, behavioral change, coping style, and other factors important to improvement: not at this time  Medication Management:   6. - Change Nucynta to 100 mg long-acting at bedtime, 75 mg short acting, #2-3 tabs per day for daytime.   7. - Consider rotation back to oxycodone if he develops tolerance  8. - Consider transition to Butrans patch in the future.   9. - Continue gabapentin 1200 mg TID  10. - Continue nortriptyline 50 mg at bedtime  11. - Continue topical lidocaine  12. - Stop tizanidine  Further procedures recommended:   13. - Agree strongly with SCS trial. Contact Dr. Pulido re insurance coverage  14. - Consider repeat cervical SRIDEVI for remaining radicular symptoms.     Since his last visit, Indra Mehta reports:  - MRI from [place] demonstrated no significant interval change from prior. He was found to have mild kerrie-facet edema at L4/5 (presumably bilaterally, although this was not specified int he report) as well as a minimal disc bulge in the L4/5 lateral recess adjacent to the left L5 traversing nerve root.   - per [facility] chart review, his autonomic reflex screen demonstrated posst-ganglionic length-dependent sympathetic sudomotor impairment  -  sweat test was consistent with a small fiber peripheral neuropathy  - ultimate diagnosis was length dependent axonal sensorimotor peripheral neuropathy with mild autonomic neuropathy  - Dr. Wesley with neurosurgery did not see any role for surgery at this time.   - EMG demonstrated chronic right L5 and left L4 radiculopathies.   - He has a plan for genetic testing with [facility] for TTR  - recommendation was made for transition to Lyrica 150 mg TID. Specific recommended transition plan was:   Week 1: 1200 mg gabapentin am, 1200 mg gabapentin pm and 150 mg pregabalin at bedtime  Week 2: 150 mg pregabalin am, 1200 mg gabapentin pm and 150 mg pregabalin at bedtime  Week 3: 150 mg pregabalin 3 times per day  - has been better. Afternoon of 1/1/2021 he fell backwards and landed on R hip. Now has substantial pain radiating down left leg. For 1st few days it was almost constant. R hip was sore Also pain in neck at midpoint and occiput. The pain is down posterior thigh to knee. It is an electric shock like pain - like a zap  - started volleyball practice Wednesday afternoon, was sore afterwards. Thinks he needs to workout more.     Current pain treatments:   Gabapentin 400 mg-Taking 3 capsules 3 times a day  Tylenol 1000 mg 3x/day  Ibuprofen 800 mg TID  Nucynta - 100 mg at bedtime and 75 mg #2-3 per day. Tries to only take 2   Xanax-does not use daily, last use was quite a while ago (was taking for insomnia related to) L ear tinnitis  Nortriptyline 50mg at bedtime - knows this is helpful. (ran out between Xmas and new years)    Patient is using the medication as prescribed:  YES  Is your medication helpful? YES   Side Effects: no side effect    Past pain treatments:  Tizanidine 4 mg-taking 1 at HS   Injections:   - 11/8/18 bilateral L3-4 TFESI - (Dr Mcdaniel)  - 8/23/18 bilateral L3-4 TFESI - seems to have worsened pain (Dr Mcdaniel)  - 6/11/18 TFESI right C5-6 - helped with arm pain and numbness  - 12/28/17 bilateral L3-4 TFESI    - 10/23/17 Bilateral L4-5 TFESI   - 11/08/2018 Bilateral L3-4 TFESI   -6/14/2019 Bilateral L3-4 TFESI   -8/22/2019 C6-7 SRIDEVI -   -5/22/2020 L3-4 bilateral TLESI - helpful   - 11/16/2020 bilateral IA knee corticosteroid injections  Surgery:    - ACDF C6-7 on 7/5/17 with improvement;   - ACDF C5-6 12/19/2018, posterior C5-7 fusion.   - Lateral mass screws, right C5-6 medial facetectomy, right C6 foraminotomy on 10/29/2019  TENS unit: helpful    Medications:  Current Outpatient Medications   Medication Sig Dispense Refill     Acetaminophen (TYLENOL PO) Take 1,000 mg by mouth 3 times daily        ALPRAZolam (XANAX XR) 0.5 MG 24 hr tablet Take 1 tablet (0.5 mg) by mouth At Bedtime 30 tablet 3     amLODIPine (NORVASC) 5 MG tablet TAKE 1/2 TABLET (2.5MG) BY MOUTH EVERY DAY 30 tablet 0     doxycycline monohydrate (MONODOX) 100 MG capsule Take 1 capsule (100 mg) by mouth 2 times daily 14 capsule 0     gabapentin (NEURONTIN) 400 MG capsule Take 3 capsules (1,200 mg) by mouth 3 times daily 270 capsule 3     hydrochlorothiazide (HYDRODIURIL) 25 MG tablet TAKE 1 TABLET BY MOUTH EVERY DAY 90 tablet 1     ibuprofen (ADVIL/MOTRIN) 800 MG tablet Take 800 mg by mouth 3 times daily       lidocaine (XYLOCAINE) 4 % external solution Apply topically as needed for moderate pain 50 mL 1     losartan (COZAAR) 50 MG tablet Take 2 tablets (100 mg) by mouth daily 180 tablet 0     losartan-hydrochlorothiazide (HYZAAR) 100-25 MG tablet Take 1 tablet by mouth daily 90 tablet 3     naloxone (NARCAN) 4 MG/0.1ML nasal spray Spray 1 spray (4 mg) into one nostril alternating nostrils once as needed for opioid reversal every 2-3 minutes until assistance arrives 0.2 mL 1     nortriptyline (PAMELOR) 50 MG capsule Take 1 capsule (50 mg) by mouth At Bedtime 30 capsule 2     order for DME Equipment being ordered: TENS Pads as directed 1 Device 0     tapentadol (NUCYNTA) 75 MG TABS tablet Take 1 tablet (75 mg) by mouth every 6 hours as needed for severe  pain Fill/start 12/18/2020. #75 tabs for 30 days 75 tablet 0     Tapentadol HCl (NUCYNTA) 100 MG TB12 Take 100 mg by mouth At Bedtime Fill/start 12/18/2020. 60 tablet 0     tiZANidine (ZANAFLEX) 4 MG tablet Take 1 tablet (4 mg) by mouth At Bedtime 90 tablet 2       Medical History: any changes in medical history since they were last seen? As above, [place] workup for peripheral neuropathy    Review of Systems:  The 14 system ROS was reviewed from the intake questionnaire, and is positive for: pain as above, fall as above  Any bowel or bladder problems: no  Mood: ok    Physical Exam:  VIDEO VISIT  There were no vitals taken for this visit.  General: NAD  Psych: Mood is ok. Affect is congruent. Thought process is logical. Thought content normal.  Neuro: CN II - XII grossly intact. Speech is fluent     Controlled Substance Agreement:   Encounter-Level CSA - 06/15/2017:    Controlled Substance Agreement - Scan on 6/22/2017 11:53 AM: CONTROLLED SUBSTANCE AGREEMENT     Encounter-Level CSA - 10/13/2016:    Controlled Substance Agreement - Scan on 10/23/2016  5:07 PM: CONTROLLED SUBSTANCE AGREEMENT 10/13/16     Patient-Level CSA:    Controlled Substance Agreement - Opioid - Scan on 10/29/2020  2:12 PM  Controlled Substance Agreement - Opioid - Scan on 4/15/2019  3:04 PM          UDS:   Pain Drug SCR UR W RPTD Meds   Date Value Ref Range Status   07/17/2020 FINAL  Final     Comment:     (Note)  ====================================================================  [facility] COMP DRUG ANALYSIS,UR  ====================================================================  Test                             Result       Flag       Units        Drug Present   Oxycodone                      3628                    ng/mg creat   Oxymorphone                    1618                    ng/mg creat   Noroxycodone                   2804                    ng/mg creat   Noroxymorphone                 508                     ng/mg creat    Sources  of oxycodone are scheduled prescription medications.    Oxymorphone, noroxycodone, and noroxymorphone are expected    metabolites of oxycodone. Oxymorphone is also available as a    scheduled prescription medication.   Gabapentin                     PRESENT                               Tizanidine                     PRESENT                               Nortriptyline                  PRESENT                                Nortriptyline may be administered as a prescription drug; it is    also an expected metabolite of amitriptyline.   Acetaminophen                  PRESENT                               Ibuprofen                      PRESENT                               Diphenhydramine                PRESENT                              ====================================================================  Test                      Result    Flag   Units      Ref Range        Creatinine              212              mg/dL      >=20            ====================================================================  Declared Medications:  Medication list was not provided.  ====================================================================  For clinical consultation, please call [phone].  ====================================================================  Analysis performed by [employer] [place] [address]          THE 4 As OF OPIOID MAINTENANCE ANALGESIA    Analgesia: Is pain relief clinically significant? YES   Activity: Is patient functional and able to perform Activities of Daily Living? YES   Adverse effects: Is patient free from adverse side effects from opiates? YES   Adherence to Rx protocol: Is patient adhering to Controlled Substance Agreement and taking medications ONLY as ordered? YES    MME: 75    Is Narcan prescribed for opiate use >50 MME daily? YES     :  [place] Prescription Drug Monitoring Program (PDMP) Link  Checked and as expected - Nucynta and gabapentin prescribed by  me, testosterone from PCP      Assessment:   Indra Mehta is a 55 year old male who is seen at the pain clinic for:       Lumbar radiculopathy  Chronic peripheral neuropathic pain  Neuropathy    Worsening lumbar radicular pain after a fall.  This resulted in a return of his posterior thigh radicular symptoms.  The same symptoms had been previously substantially improved after his most recent lumbar SRIDEVI (bilateral L3 transforaminal epidural steroid injections) in May 2020.  We will therefore repeat the most recent lumbar SRIDEVI.  Seen by neurology at [facility] for work-up of his small fiber peripheral neuropathy.  Recommendation was made at the time to try transitioning to Lyrica.  This is reasonable and we will therefore do a trial of the transition.  His pain is stable on his current dose of Nucynta, although this is higher than I would ideally like.  After transitioning to Lyrica I would consider adding Topamax to his medication regimen.    Plan:  Additional Workup:   1. - Diagnostic Studies:    2. - Laboratory studies:   3. - Urine toxicology screen today: no   Therapies:   4. - Physical Therapy: continue for axial LBP  5. - Clinical Health Psychologist to address issues of relaxation, behavioral change, coping style, and other factors important to improvement: not at this time  Medication Management:   6. -  Nucynta continue 100 mg long-acting at night and 75 mg short acting twice daily.  Short acting prescription refilled today, #60 tabs for 30 days.   7. - Stop gabapentin 1200 mg TID  8. - Start Lyrica 150 mg TID. Transition schedule provided in AVS.   9. - Continue nortriptyline 50 mg at bedtime  10. - Continue topical lidocaine  11. - Consider adding topamax in the future  12. - If Topamax and Lyrica are sufficiently beneficial, would consider transitioning from Nucynta to Butrans patch  Further procedures recommended:   13. -Repeat bilateral L3 transforaminal epidural steroid injections  ordered  14. -Dr. Pulido will investigate approval for SCS    Follow up: 1 mo    Liz Mallory MD  [facility] [place]

## 2021-01-15 ENCOUNTER — MYC MEDICAL ADVICE (OUTPATIENT)
Dept: PALLIATIVE MEDICINE | Facility: CLINIC | Age: 56
End: 2021-01-15

## 2021-01-15 ENCOUNTER — VIRTUAL VISIT (OUTPATIENT)
Dept: PALLIATIVE MEDICINE | Facility: CLINIC | Age: 56
End: 2021-01-15
Payer: COMMERCIAL

## 2021-01-15 ENCOUNTER — TELEPHONE (OUTPATIENT)
Dept: PALLIATIVE MEDICINE | Facility: CLINIC | Age: 56
End: 2021-01-15

## 2021-01-15 DIAGNOSIS — M79.2 CHRONIC PERIPHERAL NEUROPATHIC PAIN: ICD-10-CM

## 2021-01-15 DIAGNOSIS — G89.29 CHRONIC PERIPHERAL NEUROPATHIC PAIN: ICD-10-CM

## 2021-01-15 DIAGNOSIS — G62.9 NEUROPATHY: ICD-10-CM

## 2021-01-15 DIAGNOSIS — M54.16 LUMBAR RADICULOPATHY: Primary | ICD-10-CM

## 2021-01-15 PROCEDURE — 99214 OFFICE O/P EST MOD 30 MIN: CPT | Mod: GT | Performed by: STUDENT IN AN ORGANIZED HEALTH CARE EDUCATION/TRAINING PROGRAM

## 2021-01-15 RX ORDER — PREGABALIN 150 MG/1
150 CAPSULE ORAL 3 TIMES DAILY
Qty: 90 CAPSULE | Refills: 0 | Status: SHIPPED | OUTPATIENT
Start: 2021-01-15 | End: 2021-02-22

## 2021-01-15 RX ORDER — PREGABALIN 150 MG/1
150 CAPSULE ORAL 3 TIMES DAILY
Qty: 90 CAPSULE | Refills: 1 | Status: CANCELLED | OUTPATIENT
Start: 2021-01-15

## 2021-01-15 ASSESSMENT — PAIN SCALES - GENERAL: PAINLEVEL: SEVERE PAIN (6)

## 2021-01-15 NOTE — TELEPHONE ENCOUNTER
Patients appointment was today with Dr Mallory. The patient was not switched back to [facility] schedule in error.  Due to a technical problems he was unable to get prescriptions signed at todays appointment.    Routing to [facility] to review and sign for patient if appropriate.     ANGELINA PerryN, RN  Care Coordinator  [facility] [place]

## 2021-01-15 NOTE — PATIENT INSTRUCTIONS
1. Continue Nucynta at current doses. I have refilled a short acting medication.  The start date for this is 1/17/2021    2.  Transition from gabapentin to Lyrica as follows:  Week 1: 1200 mg gabapentin am, 1200 mg gabapentin pm and 150 mg pregabalin at bedtime  Week 2: 150 mg pregabalin am, 1200 mg gabapentin pm and 150 mg pregabalin at bedtime  Week 3: 150 mg pregabalin 3 times per day    3.  Schedule repeat epidural steroid injection for your low back and leg pain    4.  Continue increasing your activity levels    Follow-up with Chiara Garcia in 1 month  Liz Mallory MD  [facility]    ----------------------------------------------------------------  Clinic Number:  [phone]     Call with any questions about your care and for scheduling assistance.     Calls are returned Monday through Friday between 8 AM and 4:30 PM. We usually get back to you within 2 business days depending on the issue/request.    If we are prescribing your medications:    For opioid medication refills, call the clinic or send a [employer] message 7 days in advance.  Please include:    Name of requested medication    Name of the pharmacy.    For non-opioid medications, call your pharmacy directly to request a refill. Please allow 3-4 days to be processed.     Per [place] State Law:    All controlled substance prescriptions must be filled within 30 days of being written.      For those controlled substances allowing refills, pickup must occur within 30 days of last fill.      We believe regular attendance is key to your success in our program!      Any time you are unable to keep your appointment we ask that you call us at least 24 hours in advance to cancel.This will allow us to offer the appointment time to another patient.     Multiple missed appointments may lead to dismissal from the clinic.

## 2021-01-18 NOTE — TELEPHONE ENCOUNTER
Spoke with pharmacy and the medication is covered but Ketan has not met his deductible.     Spoke with Ketan and he currently uses Nucynta IR during the day and ER at night. He has 30 days of both medications. He has found a copay card and will check on the cost of Nucynta using that for his next fill. If it still remains unaffordable he will need to switch his regimen next month. I recommended a slightly early follow up with Chiara so it can be managed.     He is transitioning from Gabapenetin to Lyrica at this time. Routing to Chiara Garcia as an FYI.    Instructions from 1/15/21    1. Continue Nucynta at current doses. I have refilled a short acting medication.  The start date for this is 1/17/2021     2.  Transition from gabapentin to Lyrica as follows:  Week 1: 1200 mg gabapentin am, 1200 mg gabapentin pm and 150 mg pregabalin at bedtime  Week 2: 150 mg pregabalin am, 1200 mg gabapentin pm and 150 mg pregabalin at bedtime  Week 3: 150 mg pregabalin 3 times per day     3.  Schedule repeat epidural steroid injection for your low back and leg pain     4.  Continue increasing your activity levels     Follow-up with hCiara Garcia in 1 month  Liz Mallory MD  [facility]          EARLINE Perry, RN  Care Coordinator  [facility]

## 2021-01-19 ENCOUNTER — OFFICE VISIT (OUTPATIENT)
Dept: DERMATOLOGY | Facility: CLINIC | Age: 56
End: 2021-01-19
Payer: COMMERCIAL

## 2021-01-19 VITALS — OXYGEN SATURATION: 98 % | SYSTOLIC BLOOD PRESSURE: 154 MMHG | HEART RATE: 89 BPM | DIASTOLIC BLOOD PRESSURE: 99 MMHG

## 2021-01-19 DIAGNOSIS — B07.8 COMMON WART: Primary | ICD-10-CM

## 2021-01-19 PROCEDURE — 17110 DESTRUCTION B9 LES UP TO 14: CPT | Performed by: DERMATOLOGY

## 2021-01-19 PROCEDURE — 99207 PR NO CHARGE LOS: CPT | Performed by: DERMATOLOGY

## 2021-01-19 NOTE — PROGRESS NOTES
Indra Mehta is an extremely pleasant 55 year old year old male patient here today for f/u wart on left hand.  Still present,  Patient has no other skin complaints today.  Remainder of the HPI, Meds, PMH, Allergies, FH, and SH was reviewed in chart.      Past Medical History:   Diagnosis Date     Back pain      Meniere's disease, unspecified      Pain medication agreement signed 8/20/2010       Past Surgical History:   Procedure Laterality Date     BUNIONECTOMY RT/LT  09/05/08    Both feet     COLONOSCOPY N/A 12/28/2016    Procedure: COMBINED COLONOSCOPY, SINGLE OR MULTIPLE BIOPSY/POLYPECTOMY BY BIOPSY;  Surgeon: Indra Jernigan MD;  [facility]     DISCECTOMY, FUSION CERVICAL ANTERIOR ONE LEVEL, COMBINED N/A 7/5/2017    Procedure: COMBINED DISCECTOMY, FUSION CERVICAL ANTERIOR ONE LEVEL;  Cervical 6-7, Anterior cervical discectomy fusion;  Surgeon: Mike Mckenna MD;  [facility]     DISCECTOMY, FUSION CERVICAL ANTERIOR ONE LEVEL, COMBINED N/A 12/19/2018    Procedure: cervical 5-6 anterior cervical discectomy fusion;  Surgeon: Mike Mckenna MD;  [facility]     HC COLONOSCOPY W/WO BRUSH/WASH  12/27/2005     HC CREATE EARDRUM OPENING,GEN ANESTH  09/27/2007    Pe tube, Left endolymphatic sac enhancement.     HC MASTOIDECTOMY,COMPLETE  09/27/2007     HERNIORRHAPHY UMBILICAL N/A 8/11/2017    Procedure: HERNIORRHAPHY UMBILICAL;  open umbilical hernia repair;  Surgeon: Boni Story MD;  [facility]     INJECT EPIDURAL CERVICAL N/A 8/22/2019    Procedure: INJECTION, SPINE, CERVICAL 6-7 EPIDURAL;  Surgeon: Darryn Mcdaniel MD;  [facility]     INJECT EPIDURAL LUMBAR N/A 11/9/2017    Procedure: INJECT EPIDURAL LUMBAR;  lumbar 4-5 epidural steroid injection ;  Surgeon: Darryn Mcdaniel MD;  [facility]     INJECT EPIDURAL LUMBAR N/A 12/28/2017    Procedure: INJECT EPIDURAL LUMBAR;  lumbar epidural injection;  Surgeon: Darryn Mcdaniel MD;  [facility]     INJECT EPIDURAL  TRANSFORAMINAL Bilateral 8/23/2018    Procedure: INJECT EPIDURAL TRANSFORAMINAL;  transforaminal bilateral lumbar 3-4 steroid injection;  Surgeon: Darryn Mcdaniel MD;  [facility]     INJECT EPIDURAL TRANSFORAMINAL Bilateral 11/8/2018    Procedure: INJECT EPIDURAL TRANSFORAMINAL lumbar 3-4;  Surgeon: Darryn Mcdaniel MD;  [facility]     INJECT EPIDURAL TRANSFORAMINAL Bilateral 6/14/2019    Procedure: INJECTION, EPIDURAL, TRANSFORAMINAL LUMBAR 3-4 BILATERAL;  Surgeon: Darryn Mcdaniel MD;  [facility]     INJECT EPIDURAL TRANSFORAMINAL Bilateral 5/22/2020    Procedure: lumbar 3-4 bilateral transforaminal epidural steroid injection;  Surgeon: Darryn Mcdaniel MD;  [facility]     INJECT EPIDURAL TRANSFORAMINAL Bilateral 9/24/2020    Procedure: INJECTION, EPIDURAL, TRANSFORAMINAL  LUMBAR 3-4 APPROACH;  Surgeon: Darryn Mcdaniel MD;  [facility]     PE TUBES  2006    left ear        Family History   Problem Relation Age of Onset     Cancer - colorectal Mother      Diabetes Mother      Cardiovascular Father      Hypertension Father      Mental Illness Sister      Suicide Other        Social History     Socioeconomic History     Marital status: Single     Spouse name: Not on file     Number of children: 2     Years of education: Not on file     Highest education level: Not on file   Occupational History     Employer: [employer]   Social Needs     Financial resource strain: Not on file     Food insecurity     Worry: Not on file     Inability: Not on file     Transportation needs     Medical: Not on file     Non-medical: Not on file   Tobacco Use     Smoking status: Never Smoker     Smokeless tobacco: Never Used   Substance and Sexual Activity     Alcohol use: No     Alcohol/week: 0.0 standard drinks     Drug use: No     Sexual activity: Yes     Partners: Female   Lifestyle     Physical activity     Days per week: Not on file     Minutes per session: Not on file     Stress: Not on file   Relationships      Social connections     Talks on phone: Not on file     Gets together: Not on file     Attends Taoism service: Not on file     Active member of club or organization: Not on file     Attends meetings of clubs or organizations: Not on file     Relationship status: Not on file     Intimate partner violence     Fear of current or ex partner: Not on file     Emotionally abused: Not on file     Physically abused: Not on file     Forced sexual activity: Not on file   Other Topics Concern     Parent/sibling w/ CABG, MI or angioplasty before 65F 55M? No   Social History Narrative     Not on file       Outpatient Encounter Medications as of 1/19/2021   Medication Sig Dispense Refill     Acetaminophen (TYLENOL PO) Take 1,000 mg by mouth 3 times daily        ALPRAZolam (XANAX XR) 0.5 MG 24 hr tablet Take 1 tablet (0.5 mg) by mouth At Bedtime 30 tablet 3     amLODIPine (NORVASC) 5 MG tablet TAKE 1/2 TABLET (2.5MG) BY MOUTH EVERY DAY 30 tablet 0     gabapentin (NEURONTIN) 400 MG capsule Take 3 capsules (1,200 mg) by mouth 3 times daily 270 capsule 3     ibuprofen (ADVIL/MOTRIN) 800 MG tablet Take 800 mg by mouth 3 times daily       losartan-hydrochlorothiazide (HYZAAR) 100-25 MG tablet Take 1 tablet by mouth daily 90 tablet 3     naloxone (NARCAN) 4 MG/0.1ML nasal spray Spray 1 spray (4 mg) into one nostril alternating nostrils once as needed for opioid reversal every 2-3 minutes until assistance arrives 0.2 mL 1     nortriptyline (PAMELOR) 50 MG capsule Take 1 capsule (50 mg) by mouth At Bedtime 30 capsule 2     order for DME Equipment being ordered: TENS Pads as directed 1 Device 0     pregabalin (LYRICA) 150 MG capsule Take 1 capsule (150 mg) by mouth 3 times daily 90 capsule 0     tapentadol (NUCYNTA) 75 MG TABS tablet Take 1 tablet (75 mg) by mouth every 6 hours as needed for severe pain Fill 1/15/2021, start 1/17/2021. #60 tabs for 30 days 60 tablet 0     Tapentadol HCl (NUCYNTA) 100 MG TB12 Take 100 mg by mouth At  Bedtime Fill/start 12/18/2020. 60 tablet 0     tiZANidine (ZANAFLEX) 4 MG tablet Take 1 tablet (4 mg) by mouth At Bedtime 90 tablet 2     doxycycline monohydrate (MONODOX) 100 MG capsule Take 1 capsule (100 mg) by mouth 2 times daily 14 capsule 0     hydrochlorothiazide (HYDRODIURIL) 25 MG tablet TAKE 1 TABLET BY MOUTH EVERY DAY 90 tablet 1     lidocaine (XYLOCAINE) 4 % external solution Apply topically as needed for moderate pain 50 mL 1     losartan (COZAAR) 50 MG tablet Take 2 tablets (100 mg) by mouth daily 180 tablet 0     Facility-Administered Encounter Medications as of 1/19/2021   Medication Dose Route Frequency Provider Last Rate Last Admin     triamcinolone (KENALOG-40) injection 40 mg  40 mg   Melissa Xie MD   40 mg at 11/16/20 0848     triamcinolone (KENALOG-40) injection 40 mg  40 mg   Melissa Xie MD   40 mg at 11/16/20 0848             Review Of Systems  Skin: As above  Eyes: negative  Ears/Nose/Throat: negative  Respiratory: No shortness of breath, dyspnea on exertion, cough, or hemoptysis  Cardiovascular: negative  Gastrointestinal: negative  Genitourinary: negative  Musculoskeletal: negative  Neurologic: negative  Psychiatric: negative  Hematologic/Lymphatic/Immunologic: negative  Endocrine: negative      O:   NAD, WDWN, Alert & Oriented, Mood & Affect wnl, Vitals stable   Here today alone   BP (!) 154/99   Pulse 89   SpO2 98%    General appearance normal   Vitals stable   Alert, oriented and in no acute distress     L hand small verrucous papule      Eyes: Conjunctivae/lids:Normal     ENT: Lips, buccal mucosa, tongue: normal    MSK:Normal    Cardiovascular: peripheral edema none    Pulm: Breathing Normal    Neuro/Psych: Orientation:Alert and Orientedx3 ; Mood/Affect:normal       A/P:  1. Wart improving  LN2:  Treated with LN2 for 5s for 1-2 cycles. Warned risks of blistering, pain, pigment change, scarring, and incomplete resolution.  Advised patient to return if  lesions do not completely resolve.  Wound care sheet given.  Return to clinic 6 weeks

## 2021-01-19 NOTE — TELEPHONE ENCOUNTER
Pt calling to state tapentadol (NUCYNTA) 75 MG TABS tablet will cost him $532 and is wondering if there is another option.    Shanda DE LA CRUZ    [facility]    
Spoke to patient. Patient is going to try a savings card for next month. He will schedule a video visit with Chiara prior to refills being needed to discuss if a regimen change is needed.     ANGELINA PerryN, RN  Care Coordinator  [facility] [place]    
no concerns

## 2021-01-19 NOTE — LETTER
1/19/2021         RE: Indra Mehta  [id number] [facility] [address]        Dear Colleague,    Thank you for referring your patient, Indra Mehta, to the [facility]. Please see a copy of my visit note below.    Indra Mehta is an extremely pleasant 55 year old year old male patient here today for f/u wart on left hand.  Still present,  Patient has no other skin complaints today.  Remainder of the HPI, Meds, PMH, Allergies, FH, and SH was reviewed in chart.      Past Medical History:   Diagnosis Date     Back pain      Meniere's disease, unspecified      Pain medication agreement signed 8/20/2010       Past Surgical History:   Procedure Laterality Date     BUNIONECTOMY RT/LT  09/05/08    Both feet     COLONOSCOPY N/A 12/28/2016    Procedure: COMBINED COLONOSCOPY, SINGLE OR MULTIPLE BIOPSY/POLYPECTOMY BY BIOPSY;  Surgeon: Indra Jernigan MD;  [facility]     DISCECTOMY, FUSION CERVICAL ANTERIOR ONE LEVEL, COMBINED N/A 7/5/2017    Procedure: COMBINED DISCECTOMY, FUSION CERVICAL ANTERIOR ONE LEVEL;  Cervical 6-7, Anterior cervical discectomy fusion;  Surgeon: Mike Mckenna MD;  [facility]     DISCECTOMY, FUSION CERVICAL ANTERIOR ONE LEVEL, COMBINED N/A 12/19/2018    Procedure: cervical 5-6 anterior cervical discectomy fusion;  Surgeon: Mike Mckenna MD;  [facility]     HC COLONOSCOPY W/WO BRUSH/WASH  12/27/2005     HC CREATE EARDRUM OPENING,GEN ANESTH  09/27/2007    Pe tube, Left endolymphatic sac enhancement.     HC MASTOIDECTOMY,COMPLETE  09/27/2007     HERNIORRHAPHY UMBILICAL N/A 8/11/2017    Procedure: HERNIORRHAPHY UMBILICAL;  open umbilical hernia repair;  Surgeon: Boni Story MD;  [facility]     INJECT EPIDURAL CERVICAL N/A 8/22/2019    Procedure: INJECTION, SPINE, CERVICAL 6-7 EPIDURAL;  Surgeon: Darryn Mcdaniel MD;  [facility]     INJECT EPIDURAL LUMBAR N/A 11/9/2017    Procedure: INJECT EPIDURAL LUMBAR;  lumbar 4-5  epidural steroid injection ;  Surgeon: Darryn Mcdaniel MD;  [facility]     INJECT EPIDURAL LUMBAR N/A 12/28/2017    Procedure: INJECT EPIDURAL LUMBAR;  lumbar epidural injection;  Surgeon: Darryn Mcdaniel MD;  [facility]     INJECT EPIDURAL TRANSFORAMINAL Bilateral 8/23/2018    Procedure: INJECT EPIDURAL TRANSFORAMINAL;  transforaminal bilateral lumbar 3-4 steroid injection;  Surgeon: Darryn Mcdaniel MD;  [facility]     INJECT EPIDURAL TRANSFORAMINAL Bilateral 11/8/2018    Procedure: INJECT EPIDURAL TRANSFORAMINAL lumbar 3-4;  Surgeon: Darryn Mcdaniel MD;  [facility]     INJECT EPIDURAL TRANSFORAMINAL Bilateral 6/14/2019    Procedure: INJECTION, EPIDURAL, TRANSFORAMINAL LUMBAR 3-4 BILATERAL;  Surgeon: Darryn Mcdaniel MD;  [facility]     INJECT EPIDURAL TRANSFORAMINAL Bilateral 5/22/2020    Procedure: lumbar 3-4 bilateral transforaminal epidural steroid injection;  Surgeon: Darryn Mcdaniel MD;  [facility]     INJECT EPIDURAL TRANSFORAMINAL Bilateral 9/24/2020    Procedure: INJECTION, EPIDURAL, TRANSFORAMINAL  LUMBAR 3-4 APPROACH;  Surgeon: Darryn Mcdaniel MD;  [facility]     PE TUBES  2006    left ear        Family History   Problem Relation Age of Onset     Cancer - colorectal Mother      Diabetes Mother      Cardiovascular Father      Hypertension Father      Mental Illness Sister      Suicide Other        Social History     Socioeconomic History     Marital status: Single     Spouse name: Not on file     Number of children: 2     Years of education: Not on file     Highest education level: Not on file   Occupational History     Employer: [employer]   Social Needs     Financial resource strain: Not on file     Food insecurity     Worry: Not on file     Inability: Not on file     Transportation needs     Medical: Not on file     Non-medical: Not on file   Tobacco Use     Smoking status: Never Smoker     Smokeless tobacco: Never Used   Substance and Sexual Activity     Alcohol  use: No     Alcohol/week: 0.0 standard drinks     Drug use: No     Sexual activity: Yes     Partners: Female   Lifestyle     Physical activity     Days per week: Not on file     Minutes per session: Not on file     Stress: Not on file   Relationships     Social connections     Talks on phone: Not on file     Gets together: Not on file     Attends Caodaism service: Not on file     Active member of club or organization: Not on file     Attends meetings of clubs or organizations: Not on file     Relationship status: Not on file     Intimate partner violence     Fear of current or ex partner: Not on file     Emotionally abused: Not on file     Physically abused: Not on file     Forced sexual activity: Not on file   Other Topics Concern     Parent/sibling w/ CABG, MI or angioplasty before 65F 55M? No   Social History Narrative     Not on file       Outpatient Encounter Medications as of 1/19/2021   Medication Sig Dispense Refill     Acetaminophen (TYLENOL PO) Take 1,000 mg by mouth 3 times daily        ALPRAZolam (XANAX XR) 0.5 MG 24 hr tablet Take 1 tablet (0.5 mg) by mouth At Bedtime 30 tablet 3     amLODIPine (NORVASC) 5 MG tablet TAKE 1/2 TABLET (2.5MG) BY MOUTH EVERY DAY 30 tablet 0     gabapentin (NEURONTIN) 400 MG capsule Take 3 capsules (1,200 mg) by mouth 3 times daily 270 capsule 3     ibuprofen (ADVIL/MOTRIN) 800 MG tablet Take 800 mg by mouth 3 times daily       losartan-hydrochlorothiazide (HYZAAR) 100-25 MG tablet Take 1 tablet by mouth daily 90 tablet 3     naloxone (NARCAN) 4 MG/0.1ML nasal spray Spray 1 spray (4 mg) into one nostril alternating nostrils once as needed for opioid reversal every 2-3 minutes until assistance arrives 0.2 mL 1     nortriptyline (PAMELOR) 50 MG capsule Take 1 capsule (50 mg) by mouth At Bedtime 30 capsule 2     order for DME Equipment being ordered: TENS Pads as directed 1 Device 0     pregabalin (LYRICA) 150 MG capsule Take 1 capsule (150 mg) by mouth 3 times daily 90  capsule 0     tapentadol (NUCYNTA) 75 MG TABS tablet Take 1 tablet (75 mg) by mouth every 6 hours as needed for severe pain Fill 1/15/2021, start 1/17/2021. #60 tabs for 30 days 60 tablet 0     Tapentadol HCl (NUCYNTA) 100 MG TB12 Take 100 mg by mouth At Bedtime Fill/start 12/18/2020. 60 tablet 0     tiZANidine (ZANAFLEX) 4 MG tablet Take 1 tablet (4 mg) by mouth At Bedtime 90 tablet 2     doxycycline monohydrate (MONODOX) 100 MG capsule Take 1 capsule (100 mg) by mouth 2 times daily 14 capsule 0     hydrochlorothiazide (HYDRODIURIL) 25 MG tablet TAKE 1 TABLET BY MOUTH EVERY DAY 90 tablet 1     lidocaine (XYLOCAINE) 4 % external solution Apply topically as needed for moderate pain 50 mL 1     losartan (COZAAR) 50 MG tablet Take 2 tablets (100 mg) by mouth daily 180 tablet 0     Facility-Administered Encounter Medications as of 1/19/2021   Medication Dose Route Frequency Provider Last Rate Last Admin     triamcinolone (KENALOG-40) injection 40 mg  40 mg   Melissa Xie MD   40 mg at 11/16/20 0848     triamcinolone (KENALOG-40) injection 40 mg  40 mg   Melissa Xie MD   40 mg at 11/16/20 0848             Review Of Systems  Skin: As above  Eyes: negative  Ears/Nose/Throat: negative  Respiratory: No shortness of breath, dyspnea on exertion, cough, or hemoptysis  Cardiovascular: negative  Gastrointestinal: negative  Genitourinary: negative  Musculoskeletal: negative  Neurologic: negative  Psychiatric: negative  Hematologic/Lymphatic/Immunologic: negative  Endocrine: negative      O:   NAD, WDWN, Alert & Oriented, Mood & Affect wnl, Vitals stable   Here today alone   BP (!) 154/99   Pulse 89   SpO2 98%    General appearance normal   Vitals stable   Alert, oriented and in no acute distress     L hand small verrucous papule      Eyes: Conjunctivae/lids:Normal     ENT: Lips, buccal mucosa, tongue: normal    MSK:Normal    Cardiovascular: peripheral edema none    Pulm: Breathing  Normal    Neuro/Psych: Orientation:Alert and Orientedx3 ; Mood/Affect:normal       A/P:  1. Wart improving  LN2:  Treated with LN2 for 5s for 1-2 cycles. Warned risks of blistering, pain, pigment change, scarring, and incomplete resolution.  Advised patient to return if lesions do not completely resolve.  Wound care sheet given.  Return to clinic 6 weeks        Again, thank you for allowing me to participate in the care of your patient.        Sincerely,        Pablo Matthews MD

## 2021-02-10 NOTE — PROGRESS NOTES
Ketan is a 55 year old who is being evaluated via a billable video visit.      How would you like to obtain your AVS? MyChart  If the video visit is dropped, the invitation should be resent by: Text to cell phone: [phone]  Will anyone else be joining your video visit? No      Video Start Time: 9:31am  Video-Visit Details    Type of service:  Video Visit    Video End Time:9:51am    Originating Location (pt. Location): Home    Distant Location (provider location):  [facility]     Platform used for Video Visit: [facility]      CHIEF COMPLAINT:  Pain  -neck and low back pain     INTERVAL HISTORY:  Last seen on 1/15//2021       Recommendations/plan at the last visit included:  Additional Workup:   1. - Diagnostic Studies:    2. - Laboratory studies:   3. - Urine toxicology screen today: no   Therapies:   4. - Physical Therapy: continue for axial LBP  5. - Clinical Health Psychologist to address issues of relaxation, behavioral change, coping style, and other factors important to improvement: not at this time  Medication Management:   6. -  Nucynta continue 100 mg long-acting at night and 75 mg short acting twice daily.  Short acting prescription refilled today, #60 tabs for 30 days.   7. - Stop gabapentin 1200 mg TID  8. - Start Lyrica 150 mg TID. Transition schedule provided in AVS.   9. - Continue nortriptyline 50 mg at bedtime  10. - Continue topical lidocaine  11. - Consider adding topamax in the future  12. - If Topamax and Lyrica are sufficiently beneficial, would consider transitioning from Nucynta to Butrans patch  Further procedures recommended:   13. -Repeat bilateral L3 transforaminal epidural steroid injections ordered  14. -Dr. Pulido will investigate approval for SCS     Follow up: 1 mo    Since his last visit, Indra Mehta reports:    He states that the afternoon of 1/1/21 he fell and landed on his hip. He had shooting pain in his left leg. His states that his neck hurt as  "well. He states that it is taking \"a long time\" for those things to get better. He states that the was switched to Nucynta, but he is unable to afford it. He states that it was nice to have the extended release at night and that helped him sleep. He states that the IR does not last through the night.     He has enough Nucynta through today.    He is still hoping to do the spinal cord stimulator trial. He has started working out and states that it does increase his pain, but he knows it short term. Lyrica is helping.        Pain Information:                   Pain today 6/10       UDS: 07/17/2020-reviewed and appropriate  Controlled Substance Agreement signed: 7/17/2020     CURRENT RELEVANT PAIN MEDICATIONS:  Tizanidine 4mg-taking 1 at HS   Tylenol 500mg-1000mg 4x/day  Xanax-does not use daily, last use was about 3 weeks ago  Nortriptyline 50mg at bedtime  Nucynta ER 100mg at bedtime  Nucynta IR 75mg BID  Lyrica 150 mg TID     Patient is using the medication as prescribed:  YES  Is your medication helpful?     YES   Medication side effects? no side effect     Previous Medications: (H--helped; HI--Helped initially; SWH-- somewhat helpful, NH--No help; W--worse; SE--side effects)   Norco/vicodin H  Oxycodone H  Flexeril - was helpful at night  Robaxin - not helpful  Tizanidine H  Gabapentin ?  Lyrica SE sedation  Cymbalta ?     Past Pain Treatments:  Injections:    - 11/8/18 bilateral L3-4 TFESI - (Dr Mcdaniel)  - 8/23/18 bilateral L3-4 TFESI - seems to have worsened pain (Dr Mcdaniel)  - 6/11/18 TFESI right C5-6 - helped with arm pain and numbness  - 12/28/17 bilateral L3-4 TFESI   - 10/23/17 Bilateral L4-5 TFESI   - 11/08/2018 Bilateral L3-4 TFESI   -6/14/2019 Bilateral L3-4 TFESI   -8/22/2019 C6-7 SRIDEVI   -5/22/2020 L3-4 bilateral TLESI  Surgery:  ACDF C6-7 on 7/5/17 with improvement; ACDF C5-6 12/19/2018, posterior C5-7 fusion. Lateral mass screws, right C5-6 medial facetectomy, right C6 foraminotomy on 10/29/2019  TENS " unit: helpful     [place] Board of Pharmacy Data Base Reviewed:    YES; As expected, no concern for misuse/abuse of controlled medications based on this report.     THE 4 As OF OPIOID MAINTENANCE ANALGESIA    Analgesia: Is pain relief clinically significant? YES   Activity: Is patient functional and able to perform Activities of Daily Living? YES   Adverse effects: Is patient free from adverse side effects from opiates? YES   Adherence to Rx protocol: Is patient adhering to Controlled Substance Agreement and taking medications ONLY as ordered? YES         Is Narcan prescribed for opiate use >50 MME daily? N/A        Daily MME: 30     Medications:  Current Outpatient Prescriptions          Current Outpatient Medications   Medication Sig Dispense Refill     Acetaminophen (TYLENOL PO) Take 500 mg by mouth every 4 hours as needed for mild pain or fever         ALPRAZolam (XANAX XR) 0.5 MG 24 hr tablet Take 1 tablet (0.5 mg) by mouth At Bedtime 30 tablet 3     gabapentin (NEURONTIN) 400 MG capsule Take 3 capsules (1,200 mg) by mouth 3 times daily 270 capsule 3     losartan (COZAAR) 50 MG tablet Take 1 tablet (50 mg) by mouth daily 90 tablet 0     nortriptyline (PAMELOR) 25 MG capsule Take 1 capsule (25 mg) by mouth At Bedtime 30 capsule 0     order for DME Equipment being ordered: TENS Pads as directed 1 Device 0     oxyCODONE (ROXICODONE) 5 MG tablet Take 1 tab every 4-6 hours as needed for severe pain. MAX 4 TABS PER DAY. Okay to fill 3/12/20 start 3/18/20. 100 tablet 0     tiZANidine (ZANAFLEX) 4 MG tablet Take 1 tablet (4 mg) by mouth At Bedtime 90 tablet 0               Assessment:  Indra Mehta is a 55 year old male who presents today for follow up regarding his:     1.  Cervicalgia  2.  S/p cervical spine fusion  3.  Myofascial pain  4. Lumbar radiculopathy  5. Neuropathy   6. Chronic pain syndrome  7. Chronic use of opioids    Lyrica has been helpful. He is unable to afford Nucynta.       Plan:     Diagnosis reviewed, treatment option addressed, and risk/benifits discussed.  Self-care instructions given.  I am recommending a multidisciplinary treatment plan to help this patient better manage pain.       1. Physical Therapy:  Not at this time, but he should continue his exercises  2. Clinical Health Psychologist:  NO  3. Diagnostic Studies: none  4. Medication Management:     1.   Continue Lyrica 150 mg TID   2.   Continue nortriptyline to 50 mg at bedtime.     3.   Continue Tizanidine 4mg at bedtime   4.   Will stop Nucynta and have him restart Oxycodone 5m-2 tablets every 5  hours as needed. Max of 8 tabs in a day. Will then taper weekly.  5. Further procedures recommended: Plan for SCS trial once approved. Previously Referred for low back injection, okay to schedule.   6. Recommendations to PCP. See above    Follow up with this provider: 4 Weeks for a virtual visit    The total TIME spent on this patient on the day of the appointment was 42 minutes.    Time spent preparing to see the patient (reviewing records and tests) 1 minutes  Time spend face to face with the patient 20 minutes  Time spent ordering tests, medications, procedures and referrals 7 minutes  Time spent Referring and communicating with other healthcare professionals 0 minutes  Time spent documenting clinical information in Epic 15 minutes        Chiara Garcia PA-C   [facility]    Spoke with Keatn regarding his Oxycodone prescription and concerns regarding his left arm. Recommended that he speak with his surgeon regarding his arm symptoms.     Will switch him over to Oxycodone. Will have him start at 1-2 tablets every 5 hours as needed, max of 8/day and he will taper by 1 tab per week until at 3-4 tabs/day. Will then plan to start Butrans.     Chiara Garcia PA-C on 2/15/2021 at 3:57 PM

## 2021-02-15 ENCOUNTER — VIRTUAL VISIT (OUTPATIENT)
Dept: PALLIATIVE MEDICINE | Facility: CLINIC | Age: 56
End: 2021-02-15
Payer: COMMERCIAL

## 2021-02-15 DIAGNOSIS — Z98.1 S/P CERVICAL SPINAL FUSION: ICD-10-CM

## 2021-02-15 DIAGNOSIS — G62.9 NEUROPATHY: ICD-10-CM

## 2021-02-15 DIAGNOSIS — F11.90 CHRONIC, CONTINUOUS USE OF OPIOIDS: ICD-10-CM

## 2021-02-15 DIAGNOSIS — G89.4 CHRONIC PAIN SYNDROME: ICD-10-CM

## 2021-02-15 DIAGNOSIS — M54.2 CERVICALGIA: Primary | ICD-10-CM

## 2021-02-15 DIAGNOSIS — M54.16 LUMBAR RADICULOPATHY: ICD-10-CM

## 2021-02-15 DIAGNOSIS — M79.18 MYOFASCIAL PAIN: ICD-10-CM

## 2021-02-15 PROCEDURE — 99215 OFFICE O/P EST HI 40 MIN: CPT | Mod: GT | Performed by: PHYSICIAN ASSISTANT

## 2021-02-15 RX ORDER — OXYCODONE HYDROCHLORIDE 5 MG/1
TABLET ORAL
Qty: 182 TABLET | Refills: 0 | Status: SHIPPED | OUTPATIENT
Start: 2021-02-15 | End: 2021-03-22

## 2021-02-15 NOTE — PATIENT INSTRUCTIONS
After Visit Instructions:     Thank you for coming to [facility] for your care. It is my goal to partner with you to help you reach your optimal state of health.     I am recommending multidisciplinary care at this time.  The focus of care will be to continue gradual rehabilitation and pain management with medication adjustments as needed.    Continue daily self-care, identifying contributing factors, and monitoring variations in pain level. Continue to integrate self-care into your life.        Schedule follow-up with Chiara Garcia PA-C in 4 weeks for a virtual visit. You will need to make this appointment.     Procedures recommended: previously referred, you can call and schedule     Medication recommendations:     Continue Lyrica 150 mg TID    Continue nortriptyline to 50 mg at bedtime.      Continue Tizanidine 4mg at bedtime      Chiara Garcia PA-C  [facility]  [place]    Contact information: [facility]  Clinic Number:  [phone]     Call with any questions about your care and for scheduling assistance.     Calls are returned Monday through Friday between 8 AM and 4:30 PM. We usually get back to you within 2 business days depending on the issue/request.    If we are prescribing your medications:    For opioid medication refills, call the clinic or send a [employer] message 7 days in advance.  Please include:    Name of requested medication    Name of the pharmacy.    For non-opioid medications, call your pharmacy directly to request a refill. Please allow 3-4 days to be processed.     Per [place] State Law:    All controlled substance prescriptions must be filled within 30 days of being written.      For those controlled substances allowing refills, pickup must occur within 30 days of last fill.      We believe regular attendance is key to your success in our program!      Any time you are unable to keep your appointment we  ask that you call us at least 24 hours in advance to cancel.This will allow us to offer the appointment time to another patient.   Multiple missed appointments may lead to dismissal from the clinic.

## 2021-02-16 ENCOUNTER — MYC MEDICAL ADVICE (OUTPATIENT)
Dept: FAMILY MEDICINE | Facility: CLINIC | Age: 56
End: 2021-02-16

## 2021-02-16 DIAGNOSIS — I73.01 RAYNAUD'S DISEASE WITH GANGRENE (H): ICD-10-CM

## 2021-02-16 RX ORDER — AMLODIPINE BESYLATE 5 MG/1
5 TABLET ORAL DAILY
Qty: 30 TABLET | Refills: 1 | Status: SHIPPED | OUTPATIENT
Start: 2021-02-16 | End: 2021-04-20

## 2021-02-16 NOTE — TELEPHONE ENCOUNTER
See [employer] message from patient. Patient is aware PCP is out of the office. He would like to wait and have PCP address message.     EARLINE Thomas, RN  [facility]      My Amlodipine is in need of a refill but I wanted to ask you something before that.     I think it has helped with my feet being cold but it has helped just slightly.  My feet still get ice cold pretty much everyday at least once during the day and night.     Should I continue taking it or not?

## 2021-02-16 NOTE — TELEPHONE ENCOUNTER
[employer] message sent to patient.     Brandy Cooper, ANGELINAN, RN  [facility]

## 2021-02-19 DIAGNOSIS — M79.2 CHRONIC PERIPHERAL NEUROPATHIC PAIN: ICD-10-CM

## 2021-02-19 DIAGNOSIS — G62.9 NEUROPATHY: ICD-10-CM

## 2021-02-19 DIAGNOSIS — G89.29 CHRONIC PERIPHERAL NEUROPATHIC PAIN: ICD-10-CM

## 2021-02-22 ENCOUNTER — MYC MEDICAL ADVICE (OUTPATIENT)
Dept: PALLIATIVE MEDICINE | Facility: CLINIC | Age: 56
End: 2021-02-22

## 2021-02-22 DIAGNOSIS — M79.2 CHRONIC PERIPHERAL NEUROPATHIC PAIN: ICD-10-CM

## 2021-02-22 DIAGNOSIS — G89.29 CHRONIC PERIPHERAL NEUROPATHIC PAIN: ICD-10-CM

## 2021-02-22 DIAGNOSIS — G62.9 NEUROPATHY: ICD-10-CM

## 2021-02-22 RX ORDER — PREGABALIN 150 MG/1
150 CAPSULE ORAL 3 TIMES DAILY
Qty: 90 CAPSULE | Refills: 0 | Status: SHIPPED | OUTPATIENT
Start: 2021-02-22 | End: 2021-03-22

## 2021-02-22 NOTE — TELEPHONE ENCOUNTER
Pending Prescriptions:                       Disp   Refills    pregabalin (LYRICA) 150 MG capsule        90 cap*3            Sig: Take 1 capsule (150 mg) by mouth 3 times daily    Last refilled on 01/15/21    Pt last seen on 02/15/21  Next appt scheduled for none    Will facilitate refill.

## 2021-02-26 ENCOUNTER — TELEPHONE (OUTPATIENT)
Dept: PALLIATIVE MEDICINE | Facility: CLINIC | Age: 56
End: 2021-02-26

## 2021-02-26 NOTE — TELEPHONE ENCOUNTER
Received fax from [facility] requesting a PA for this patients Oxycodone.   Forwarding to PA team.     Danica KIMBLE, Lead RN, BSN. . .  2/26/2021, 3:41 PM

## 2021-03-01 NOTE — TELEPHONE ENCOUNTER
Central Prior Authorization Team   Phone: [phone]      Prior Authorization Approval    Authorization Effective Date: 3/1/2021  Authorization Expiration Date: 3/1/2022  Medication: Oxycodone - APPROVED  Approved Dose/Quantity: 240 FOR 30  Reference #:     Insurance Company: [employer] - Phone [phone] Fax [phone]  Expected CoPay:       CoPay Card Available:      Foundation Assistance Needed:    Which Pharmacy is filling the prescription (Not needed for infusion/clinic administered): [id number] - [place], [place] - [address] [place] [address]  Pharmacy Notified: Yes  Patient Notified: Yes (**Instructed pharmacy to notify patient when script is ready to /ship.**)

## 2021-03-19 ENCOUNTER — MYC MEDICAL ADVICE (OUTPATIENT)
Dept: FAMILY MEDICINE | Facility: CLINIC | Age: 56
End: 2021-03-19

## 2021-03-19 NOTE — PROGRESS NOTES
"Ketan is a 55 year old who is being evaluated via a billable video visit.      How would you like to obtain your AVS? MyChart  If the video visit is dropped, the invitation should be resent by: Text to cell phone: [phone]  Will anyone else be joining your video visit? No      Video Start Time: 9:31am  Video-Visit Details    Type of service:  Video Visit    Video End Time:9:51am    Originating Location (pt. Location): Home    Distant Location (provider location):  [facility]     Platform used for Video Visit: [facility]      CHIEF COMPLAINT:  Pain  -neck and low back pain     INTERVAL HISTORY:  Last seen on 2/15/2021       Recommendations/plan at the last visit included:  1. Physical Therapy:  Not at this time, but he should continue his exercises  2. Clinical Health Psychologist:  NO  3. Diagnostic Studies: none  4. Medication Management:                1.   Continue Lyrica 150 mg TID              2.   Continue nortriptyline to 50 mg at bedtime.                3.   Continue Tizanidine 4mg at bedtime              4.   Will stop Nucynta and have him restart Oxycodone 5m-2 tablets every 5       hours as needed. Max of 8 tabs in a day. Will then taper weekly.  5. Further procedures recommended: Plan for SCS trial once approved. Previously Referred for low back injection, okay to schedule.   6. Recommendations to PCP. See above     Follow up with this provider: 4 Weeks for a virtual visit    Since his last visit, Indra Mehta reports:    He states that he was not taking the Oxycodone as much as it was prescribed. He states that he ran out yesterday. He states that that being able to take a couple more was very helpful. He states that he took 8 a day a couple of times and was too much. He states that 6 tablets was \"perfect\". He states that he would take 4 doses during the day and would take the other one at night. He would occasionally take the 6th tablet during the day. He did then drop " down to 5/day as well. He did not have any withdrawals with switching back to Oxycodone Nucynta.     He states that he is trying to be more consistent with working out and feels that overall he is feeling better.      Pain Information:                   Pain today 6/10       UDS: 2020-reviewed and appropriate  Controlled Substance Agreement signed: 2020     CURRENT RELEVANT PAIN MEDICATIONS:  Tizanidine 4mg-taking 1 at HS   Tylenol 500mg-1000mg 4x/day  Xanax-does not use daily, last use was about 3 weeks ago  Nortriptyline 50mg at bedtime  Oxycodone 5m-2 tablets every 4-6 hours, max of 6/day  Lyrica 150 mg TID     Patient is using the medication as prescribed:  YES  Is your medication helpful?     YES   Medication side effects? no side effect     Previous Medications: (H--helped; HI--Helped initially; SWH-- somewhat helpful, NH--No help; W--worse; SE--side effects)   Norco/vicodin H  Oxycodone H  Flexeril - was helpful at night  Robaxin - not helpful  Tizanidine H  Gabapentin ?  Lyrica SE sedation  Cymbalta ?     Past Pain Treatments:  Injections:    - 18 bilateral L3-4 TFESI - (Dr Mcdaniel)  - 18 bilateral L3-4 TFESI - seems to have worsened pain (Dr Mcdaniel)  - 18 TFESI right C5-6 - helped with arm pain and numbness  - 17 bilateral L3-4 TFESI   - 10/23/17 Bilateral L4-5 TFESI   - 2018 Bilateral L3-4 TFESI   -2019 Bilateral L3-4 TFESI   -2019 C6-7 SRIDEVI   -2020 L3-4 bilateral TLESI  Surgery:  ACDF C6-7 on 17 with improvement; ACDF C5-6 2018, posterior C5-7 fusion. Lateral mass screws, right C5-6 medial facetectomy, right C6 foraminotomy on 10/29/2019  TENS unit: helpful     [place] Board of Pharmacy Data Base Reviewed:    YES; As expected, no concern for misuse/abuse of controlled medications based on this report.     THE 4 As OF OPIOID MAINTENANCE ANALGESIA    Analgesia: Is pain relief clinically significant? YES   Activity: Is patient functional and  able to perform Activities of Daily Living? YES   Adverse effects: Is patient free from adverse side effects from opiates? YES   Adherence to Rx protocol: Is patient adhering to Controlled Substance Agreement and taking medications ONLY as ordered? YES         Is Narcan prescribed for opiate use >50 MME daily? N/A        Daily MME: 37.5-45     Medications:  Current Outpatient Prescriptions          Current Outpatient Medications   Medication Sig Dispense Refill     Acetaminophen (TYLENOL PO) Take 500 mg by mouth every 4 hours as needed for mild pain or fever         ALPRAZolam (XANAX XR) 0.5 MG 24 hr tablet Take 1 tablet (0.5 mg) by mouth At Bedtime 30 tablet 3     gabapentin (NEURONTIN) 400 MG capsule Take 3 capsules (1,200 mg) by mouth 3 times daily 270 capsule 3     losartan (COZAAR) 50 MG tablet Take 1 tablet (50 mg) by mouth daily 90 tablet 0     nortriptyline (PAMELOR) 25 MG capsule Take 1 capsule (25 mg) by mouth At Bedtime 30 capsule 0     order for DME Equipment being ordered: TENS Pads as directed 1 Device 0     oxyCODONE (ROXICODONE) 5 MG tablet Take 1 tab every 4-6 hours as needed for severe pain. MAX 4 TABS PER DAY. Okay to fill 3/12/20 start 3/18/20. 100 tablet 0     tiZANidine (ZANAFLEX) 4 MG tablet Take 1 tablet (4 mg) by mouth At Bedtime 90 tablet 0               Assessment:  Indra Mehta is a 55 year old male who presents today for follow up regarding his:     1.  Cervicalgia  2.  S/p cervical spine fusion  3.  Myofascial pain  4. Lumbar radiculopathy  5. Neuropathy   6. Chronic pain syndrome  7. Chronic use of opioids    Pain was better controlled with Oxycodone 5mg, 5-6/day. He would like an injection in [place]. Order placed. No other changes made today.     Plan:     Diagnosis reviewed, treatment option addressed, and risk/benifits discussed.  Self-care instructions given.  I am recommending a multidisciplinary treatment plan to help this patient better manage pain.       1. Physical  Therapy:  Not at this time, but he should continue his exercises  2. Clinical Health Psychologist:  NO  3. Diagnostic Studies: none  4. Medication Management:     1.   Continue Lyrica 150 mg TID   2.   Continue nortriptyline to 50 mg at bedtime.     3.   Continue Tizanidine 4mg at bedtime   4.   Continue Oxycodone 5m-2 tablets every 4-6 hours as needed. Max of 6 tabs in a day.   5. Further procedures recommended: Plan for SCS trial once approved. Referred for low back injection  6. Recommendations to PCP. See above    Follow up with this provider: 4  Weeks for a virtual visit    The total TIME spent on this patient on the day of the appointment was 17  minutes.    Time spent preparing to see the patient (reviewing records and tests) 1  minutes  Time spend face to face with the patient 11  minutes  Time spent ordering tests, medications, procedures and referrals 2  minutes  Time spent Referring and communicating with other healthcare professionals 0 minutes  Time spent documenting clinical information in Epic 3  minutes        Chiara Garcia PA-C   [facility]    Spoke with Ketan regarding his Oxycodone prescription and concerns regarding his left arm. Recommended that he speak with his surgeon regarding his arm symptoms.     Will switch him over to Oxycodone. Will have him start at 1-2 tablets every 5 hours as needed, max of 8/day and he will taper by 1 tab per week until at 3-4 tabs/day. Will then plan to start Butrans.     Irish Clement MA on 2/15/2021 at 3:57 PM

## 2021-03-22 ENCOUNTER — VIRTUAL VISIT (OUTPATIENT)
Dept: PALLIATIVE MEDICINE | Facility: CLINIC | Age: 56
End: 2021-03-22
Payer: COMMERCIAL

## 2021-03-22 ENCOUNTER — TELEPHONE (OUTPATIENT)
Dept: SURGERY | Facility: CLINIC | Age: 56
End: 2021-03-22

## 2021-03-22 DIAGNOSIS — G89.4 CHRONIC PAIN SYNDROME: ICD-10-CM

## 2021-03-22 DIAGNOSIS — M79.18 MYOFASCIAL PAIN: ICD-10-CM

## 2021-03-22 DIAGNOSIS — F11.90 CHRONIC, CONTINUOUS USE OF OPIOIDS: ICD-10-CM

## 2021-03-22 DIAGNOSIS — M54.2 CERVICALGIA: Primary | ICD-10-CM

## 2021-03-22 DIAGNOSIS — M54.16 LUMBAR RADICULOPATHY: ICD-10-CM

## 2021-03-22 DIAGNOSIS — Z98.1 S/P CERVICAL SPINAL FUSION: ICD-10-CM

## 2021-03-22 DIAGNOSIS — G62.9 NEUROPATHY: ICD-10-CM

## 2021-03-22 PROCEDURE — 99213 OFFICE O/P EST LOW 20 MIN: CPT | Mod: GT | Performed by: PHYSICIAN ASSISTANT

## 2021-03-22 RX ORDER — OXYCODONE HYDROCHLORIDE 5 MG/1
TABLET ORAL
Qty: 165 TABLET | Refills: 0 | Status: SHIPPED | OUTPATIENT
Start: 2021-03-22 | End: 2021-04-19

## 2021-03-22 RX ORDER — PREGABALIN 150 MG/1
150 CAPSULE ORAL 3 TIMES DAILY
Qty: 90 CAPSULE | Refills: 0 | Status: SHIPPED | OUTPATIENT
Start: 2021-03-22 | End: 2021-04-19

## 2021-03-22 NOTE — PATIENT INSTRUCTIONS
After Visit Instructions:     Thank you for coming to [facility] for your care. It is my goal to partner with you to help you reach your optimal state of health.     I am recommending multidisciplinary care at this time.  The focus of care will be to continue gradual rehabilitation and pain management with medication adjustments as needed.    Continue daily self-care, identifying contributing factors, and monitoring variations in pain level. Continue to integrate self-care into your life.          Schedule follow-up with Chiara Garcia PA-C in 4 weeks for a virtual visit. You will need to make this appointment.     Procedures recommended: bilateral L3-4 transforaminal epidural steroid injection     Medication recommendations:    1.   Continue Lyrica 150 mg three times daily   2.   Continue nortriptyline to 50 mg at bedtime.     3.   Continue Tizanidine 4mg at bedtime   4.   Continue Oxycodone 5m-2 tablets every 4-6 hours as needed. Max of 6 tabs in a day.       Chiara Garcia PA-C  [facility]    Contact information: [facility]  Clinic Number:  [phone]     Call with any questions about your care and for scheduling assistance.     Calls are returned Monday through Friday between 8 AM and 4:30 PM. We usually get back to you within 2 business days depending on the issue/request.    If we are prescribing your medications:    For opioid medication refills, call the clinic or send a [employer] message 7 days in advance.  Please include:    Name of requested medication    Name of the pharmacy.    For non-opioid medications, call your pharmacy directly to request a refill. Please allow 3-4 days to be processed.     Per [place] State Law:    All controlled substance prescriptions must be filled within 30 days of being written.      For those controlled substances allowing refills, pickup must occur within 30 days of last  fill.      We believe regular attendance is key to your success in our program!      Any time you are unable to keep your appointment we ask that you call us at least 24 hours in advance to cancel.This will allow us to offer the appointment time to another patient.   Multiple missed appointments may lead to dismissal from the clinic.

## 2021-03-26 NOTE — TELEPHONE ENCOUNTER
Pt scheduled for LESI  Date:4/22/21  Time:0800am  Dr. Mcdaniel    Instructed pt to have H&P and  for procedure.  Patient informed of COVID testing process.

## 2021-03-30 ENCOUNTER — MYC MEDICAL ADVICE (OUTPATIENT)
Dept: PALLIATIVE MEDICINE | Facility: CLINIC | Age: 56
End: 2021-03-30

## 2021-03-30 DIAGNOSIS — M54.2 CERVICALGIA: ICD-10-CM

## 2021-03-30 DIAGNOSIS — Z98.1 S/P CERVICAL SPINAL FUSION: ICD-10-CM

## 2021-03-30 DIAGNOSIS — M54.50 CHRONIC BILATERAL LOW BACK PAIN WITHOUT SCIATICA: ICD-10-CM

## 2021-03-30 DIAGNOSIS — M79.18 MYOFASCIAL PAIN: ICD-10-CM

## 2021-03-30 DIAGNOSIS — G89.29 CHRONIC BILATERAL LOW BACK PAIN WITHOUT SCIATICA: ICD-10-CM

## 2021-03-30 DIAGNOSIS — G62.9 NEUROPATHY: ICD-10-CM

## 2021-03-30 DIAGNOSIS — G89.4 CHRONIC PAIN SYNDROME: ICD-10-CM

## 2021-03-30 RX ORDER — NORTRIPTYLINE HYDROCHLORIDE 50 MG/1
50 CAPSULE ORAL AT BEDTIME
Qty: 30 CAPSULE | Refills: 2 | Status: SHIPPED | OUTPATIENT
Start: 2021-03-30 | End: 2021-06-18

## 2021-03-30 NOTE — TELEPHONE ENCOUNTER
Refill appropriate. Sent to requested pharmacy.    Order for TENS unit sent to [facility].    Chiara Garcia, PAC

## 2021-03-30 NOTE — TELEPHONE ENCOUNTER
[employer] message sent with Rx approval from provider.  Dilip  Pain Clinic Management Team

## 2021-03-30 NOTE — TELEPHONE ENCOUNTER
nortriptyline (PAMELOR) 50 MG capsule last filled 03/01/21    [id number] - [place], [place] - [address]   [address] [facility] [address]  Phone: [phone] Fax: [phone]    Please place order for TENS Unit Pads.    From: Ketan Mehta      Created: 3/30/2021 8:40 AM        I need a refill on my Nortriptyline.  It has Dr. Mallory's name on it currently.     I also need more pads for my tens unit.  I need a prescription for this.

## 2021-04-06 ENCOUNTER — OFFICE VISIT (OUTPATIENT)
Dept: FAMILY MEDICINE | Facility: CLINIC | Age: 56
End: 2021-04-06
Payer: COMMERCIAL

## 2021-04-06 VITALS
HEIGHT: 73 IN | DIASTOLIC BLOOD PRESSURE: 84 MMHG | WEIGHT: 220.4 LBS | OXYGEN SATURATION: 97 % | SYSTOLIC BLOOD PRESSURE: 120 MMHG | RESPIRATION RATE: 16 BRPM | TEMPERATURE: 97.2 F | BODY MASS INDEX: 29.21 KG/M2 | HEART RATE: 84 BPM

## 2021-04-06 DIAGNOSIS — G47.30 SLEEP APNEA, UNSPECIFIED TYPE: ICD-10-CM

## 2021-04-06 DIAGNOSIS — G89.4 CHRONIC PAIN SYNDROME: ICD-10-CM

## 2021-04-06 DIAGNOSIS — Z01.818 PREOP GENERAL PHYSICAL EXAM: Primary | ICD-10-CM

## 2021-04-06 DIAGNOSIS — F32.5 MAJOR DEPRESSION IN COMPLETE REMISSION (H): ICD-10-CM

## 2021-04-06 DIAGNOSIS — M47.26 OSTEOARTHRITIS OF SPINE WITH RADICULOPATHY, LUMBAR REGION: ICD-10-CM

## 2021-04-06 DIAGNOSIS — I10 BENIGN ESSENTIAL HYPERTENSION: ICD-10-CM

## 2021-04-06 PROBLEM — M54.12 CERVICAL RADICULOPATHY: Status: ACTIVE | Noted: 2019-10-28

## 2021-04-06 PROBLEM — Z98.1 STATUS POST LUMBAR SPINAL FUSION: Status: ACTIVE | Noted: 2020-01-14

## 2021-04-06 PROCEDURE — 99214 OFFICE O/P EST MOD 30 MIN: CPT | Performed by: FAMILY MEDICINE

## 2021-04-06 RX ORDER — ATORVASTATIN CALCIUM 10 MG/1
10 TABLET, FILM COATED ORAL DAILY
COMMUNITY
Start: 2021-03-11

## 2021-04-06 ASSESSMENT — MIFFLIN-ST. JEOR: SCORE: 1893.61

## 2021-04-06 ASSESSMENT — PAIN SCALES - GENERAL: PAINLEVEL: MODERATE PAIN (5)

## 2021-04-06 NOTE — PROGRESS NOTES
[address] [address]  Phone: [phone]  Fax: [phone]  Primary Provider: Tha Grullon  Pre-op Performing Provider: DEJA WHITTEN      PREOPERATIVE EVALUATION:  Today's date: 4/6/2021    Indra Mehta is a 55 year old male who presents for a preoperative evaluation.    Surgical Information:  Surgery/Procedure: Bilateral transforaminal Lumbar 3-4 Epidural Steroid Injection  Surgery Location: [facility]  Surgeon: Violeta  Surgery Date: 04/22/21  Time of Surgery: 0800  Where patient plans to recover: At home with family  Fax number for surgical facility: Note does not need to be faxed, will be available electronically in Epic.    Type of Anesthesia Anticipated: Local with MAC    Assessment & Plan     The proposed surgical procedure is considered LOW risk.      ICD-10-CM    1. Preop general physical exam  Z01.818 Asymptomatic COVID-19 Virus (Coronavirus) by PCR   2. Osteoarthritis of spine with radiculopathy, lumbar region  M47.26    3. Chronic pain syndrome  G89.4    4. Benign essential hypertension  I10    5. Major depression in complete remission (H)  F32.5      Chronic pain is stable and controlled with oxycodone, Lyrica, anxiety and and nortriptyline which is managed by the pain specialist.    Blood pressure is well controlled with a low to pain and Hyzaar.    His depression is also controlled with no medication.  He takes Xanax rarely as needed.  He has no concern about his depression or anxiety today.      Risks and Recommendations:  The patient has the following additional risks and recommendations for perioperative complications:    Cardiovascular:  No cardiovascular risk identified.  His blood pressure has been well controlled with the medications.  No further cardiovascular evaluation is needed for the procedure.    Diabetes:  He does not have diabetes.    Pulmonary:   No pulmonary risk identified.  He did not smoke and has no history of  asthma or COPD.    Obstructive Sleep Apnea:   Has sleep apnea - controlled with CPAP.  Encouraged to use CPAP as prescribed.    Anemia/Bleeding/Clotting:   No history of bleeding disorder.  No history of PE/DVT.  PE/DVT prophylaxis per surgeon's recommendation/desire.  No history of anemia or blood transfusion.  He is okay to be transfused if necessary.    Social and Substance:    - High tolerance to opioid analgesics due to taking opioid chronically   - Patient is taking medications for chronic pain    Infection:   No history of MRSA  Prophylactic antibiotics per surgeon's recommendation/desire.  Covid screening as scheduled  Shower with provided antimicrobial shampoo the day before and a.m. the procedure      Medication Instructions:  Patient is to take all scheduled medications on the day of surgery EXCEPT for modifications listed below:   Please take your prescribed medications as prescribed except.  No aspirin for 7 days before the procedure.    No NSAID (Ibuprofen, Aleve, Motrin, Naproxen, ect.) for 5 days before the procedure  Hold the Hyzaar on am of the procedure; resume it after the procedure   Take shower with provided shampoo the day before and am of procedure  COVID screening - someone will call you to schedule for it. Let me know if not hearing from them 3-4 days before the procedure.  No alcohol or smoking for 24 before the procedure  Nothing to eat for 8 hrs, but may drink clear fluid up to 2 hrs before the procedure.    RECOMMENDATION:  APPROVAL GIVEN to proceed with proposed procedure, without further diagnostic evaluation.        Subjective     HPI related to upcoming procedure:     Preop Questions 4/6/2021   1. Have you ever had a heart attack or stroke? No   2. Have you ever had surgery on your heart or blood vessels, such as a stent placement, a coronary artery bypass, or surgery on an artery in your head, neck, heart, or legs? No   3. Do you have chest pain with activity? No   4. Do you have a  history of  heart failure? No   5. Do you currently have a cold, bronchitis or symptoms of other infection? No   6. Do you have a cough, shortness of breath, or wheezing? No   7. Do you or anyone in your family have previous history of blood clots? No   8. Do you or does anyone in your family have a serious bleeding problem such as prolonged bleeding following surgeries or cuts? No   9. Have you ever had problems with anemia or been told to take iron pills? No   10. Have you had any abnormal blood loss such as black, tarry or bloody stools? No   11. Have you ever had a blood transfusion? No   12. Are you willing to have a blood transfusion if it is medically needed before, during, or after your surgery? Yes   13. Have you or any of your relatives ever had problems with anesthesia? No   14. Do you have sleep apnea, excessive snoring or daytime drowsiness? YES - doing well with the CPAP   14a. Do you have a CPAP machine? Yes   15. Do you have any artifical heart valves or other implanted medical devices like a pacemaker, defibrillator, or continuous glucose monitor? No   16. Do you have artificial joints? No   17. Are you allergic to latex? No       Health Care Directive:  Patient does not have a Health Care Directive or Living Will: Discussed advance care planning with patient; however, patient declined at this time.    Preoperative Review of :   reviewed - controlled substances reflected in medication list.    Indra is here today for pre-op physical. He is scheduled for lumbar epidural injection on 4/22/21 with Dr. Mcdaniel at [facility] for chronic lower back pain bilateral radiculopathy.  Has had multiple same injections in the past with no complication and they were working well.  It is expected to be the same day procedure with MAC and local anesthesia. No personal or family history of anesthesia complication or pre-matured CAD or MI.  Has not been on steroid orally in the last 6 months.  Not taking  Aspirin or other form of blood thinner.  Last dose of NSAID was this morning.       Active medical problem and medication reviewed and updated.  He takes Norvasc and Hyzaar for high blood pressure.  He also takes oxycodone, Lyrica, Zanaflex, and nortriptyline for chronic pain that were prescribed by the pain specialist.  He takes Xanax rarely at bedtime.  He has Narcan at home to be used as needed.    He generally is doing well and has no concern today. No headache or dizziness. No runny nose, nasal congestion, ST, coughing, fever or chill.  No chest pain or SOB.  No N/V/D/C and denied of having problem with urination.  Does not smoke and denied of having breathing problem.  No history of asthma or COPD.      Review of Systems  Constitutional, neuro, ENT, endocrine, pulmonary, cardiac, gastrointestinal, genitourinary, musculoskeletal, integument and psychiatric systems are negative, except as otherwise noted.    Patient Active Problem List    Diagnosis Date Noted     Benign essential hypertension 06/30/2017     Priority: Medium     Myalgia 10/28/2016     Priority: Medium     Bilateral occipital neuralgia 10/28/2016     Priority: Medium     CARDIOVASCULAR SCREENING; LDL GOAL LESS THAN 160 10/31/2010     Priority: Medium     Major depression in complete remission (H) 08/20/2010     Priority: Medium     Chronic pain syndrome 08/20/2010     Priority: Medium     Patient is followed by Tha Grullon MD for ongoing prescription of pain medication.  All refills should be approved by this provider only at face-to-face appointments - not by phone request.    Medication(s): Tramadol.   Maximum quantity per month: 60  Clinic visit frequency required: Q 1 months     Controlled substance agreement:  Encounter-Level CSA - 10/13/16:               Controlled Substance Agreement - Scan on 10/23/2016  5:07 PM : CONTROLLED SUBSTANCE AGREEMENT 10/13/16 (below)            Pain Clinic evaluation in the past: Yes       Date/Location:    [facility]    DIRE Total Score(s):  No flowsheet data found.    Last [facility] website verification:  none   [email or web address]               Pain medication agreement signed 08/20/2010     Priority: Medium     Meniere's disease 03/22/2007     Priority: Medium     Problem list name updated by automated process. Provider to review        Past Medical History:   Diagnosis Date     Back pain      Meniere's disease, unspecified      Pain medication agreement signed 8/20/2010     Past Surgical History:   Procedure Laterality Date     BUNIONECTOMY RT/LT  09/05/08    Both feet     COLONOSCOPY N/A 12/28/2016    Procedure: COMBINED COLONOSCOPY, SINGLE OR MULTIPLE BIOPSY/POLYPECTOMY BY BIOPSY;  Surgeon: Indra Jernigan MD;  Location: [facility]     DISCECTOMY, FUSION CERVICAL ANTERIOR ONE LEVEL, COMBINED N/A 7/5/2017    Procedure: COMBINED DISCECTOMY, FUSION CERVICAL ANTERIOR ONE LEVEL;  Cervical 6-7, Anterior cervical discectomy fusion;  Surgeon: Mike Mckenna MD;  Location: [facility]     DISCECTOMY, FUSION CERVICAL ANTERIOR ONE LEVEL, COMBINED N/A 12/19/2018    Procedure: cervical 5-6 anterior cervical discectomy fusion;  Surgeon: Mike Mckenna MD;  Location: [facility]     HC COLONOSCOPY W/WO BRUSH/WASH  12/27/2005     HC CREATE EARDRUM OPENING,GEN ANESTH  09/27/2007    Pe tube, Left endolymphatic sac enhancement.     HC MASTOIDECTOMY,COMPLETE  09/27/2007     HERNIORRHAPHY UMBILICAL N/A 8/11/2017    Procedure: HERNIORRHAPHY UMBILICAL;  open umbilical hernia repair;  Surgeon: Boni Story MD;  Location[facility]     INJECT EPIDURAL CERVICAL N/A 8/22/2019    Procedure: INJECTION, SPINE, CERVICAL 6-7 EPIDURAL;  Surgeon: Darryn Mcdaniel MD;  [facility]     INJECT EPIDURAL LUMBAR N/A 11/9/2017    Procedure: INJECT EPIDURAL LUMBAR;  lumbar 4-5 epidural steroid injection ;  Surgeon: Darryn Mcdaniel MD;  [facility]     INJECT EPIDURAL LUMBAR N/A 12/28/2017    Procedure: INJECT EPIDURAL LUMBAR;   lumbar epidural injection;  Surgeon: Darryn Mcdaniel MD;  [facility]     INJECT EPIDURAL TRANSFORAMINAL Bilateral 8/23/2018    Procedure: INJECT EPIDURAL TRANSFORAMINAL;  transforaminal bilateral lumbar 3-4 steroid injection;  Surgeon: Darryn Mcdaniel MD;  [facility]     INJECT EPIDURAL TRANSFORAMINAL Bilateral 11/8/2018    Procedure: INJECT EPIDURAL TRANSFORAMINAL lumbar 3-4;  Surgeon: Darryn Mcdaniel MD;  [facility]     INJECT EPIDURAL TRANSFORAMINAL Bilateral 6/14/2019    Procedure: INJECTION, EPIDURAL, TRANSFORAMINAL LUMBAR 3-4 BILATERAL;  Surgeon: Darryn Mcdaniel MD;  [facility]     INJECT EPIDURAL TRANSFORAMINAL Bilateral 5/22/2020    Procedure: lumbar 3-4 bilateral transforaminal epidural steroid injection;  Surgeon: Darryn Mcdaniel MD;  [facility]     INJECT EPIDURAL TRANSFORAMINAL Bilateral 9/24/2020    Procedure: INJECTION, EPIDURAL, TRANSFORAMINAL  LUMBAR 3-4 APPROACH;  Surgeon: Darryn Mcdaniel MD;  Location: [facility]     PE TUBES  2006    left ear     Current Outpatient Medications   Medication Sig Dispense Refill     Acetaminophen (TYLENOL PO) Take 1,000 mg by mouth 3 times daily        ALPRAZolam (XANAX XR) 0.5 MG 24 hr tablet Take 1 tablet (0.5 mg) by mouth At Bedtime 30 tablet 3     amLODIPine (NORVASC) 5 MG tablet Take 1 tablet (5 mg) by mouth daily 30 tablet 1     ibuprofen (ADVIL/MOTRIN) 800 MG tablet Take 800 mg by mouth 3 times daily       losartan-hydrochlorothiazide (HYZAAR) 100-25 MG tablet Take 1 tablet by mouth daily 90 tablet 3     naloxone (NARCAN) 4 MG/0.1ML nasal spray Spray 1 spray (4 mg) into one nostril alternating nostrils once as needed for opioid reversal every 2-3 minutes until assistance arrives 0.2 mL 1     nortriptyline (PAMELOR) 50 MG capsule Take 1 capsule (50 mg) by mouth At Bedtime 30 capsule 2     order for DME Equipment being ordered: TENS Pads as directed 1 Device 0     oxyCODONE (ROXICODONE) 5 MG tablet 1-2 tablets every 4-6 hours as needed for  "severe pain. Max of 6 tablets/day. Fill/Start 3/22/21 165 tablet 0     pregabalin (LYRICA) 150 MG capsule Take 1 capsule (150 mg) by mouth 3 times daily 90 capsule 0     tiZANidine (ZANAFLEX) 4 MG tablet Take 1 tablet (4 mg) by mouth At Bedtime 90 tablet 2     doxycycline monohydrate (MONODOX) 100 MG capsule Take 1 capsule (100 mg) by mouth 2 times daily 14 capsule 0     hydrochlorothiazide (HYDRODIURIL) 25 MG tablet TAKE 1 TABLET BY MOUTH EVERY DAY 90 tablet 1     lidocaine (XYLOCAINE) 4 % external solution Apply topically as needed for moderate pain 50 mL 1     losartan (COZAAR) 50 MG tablet Take 2 tablets (100 mg) by mouth daily 180 tablet 0       Allergies   Allergen Reactions     No Known Drug Allergies         Social History     Tobacco Use     Smoking status: Never Smoker     Smokeless tobacco: Never Used   Substance Use Topics     Alcohol use: No     Alcohol/week: 0.0 standard drinks     Family History   Problem Relation Age of Onset     Diabetes Mother      Cancer Mother         colon cancer -  at 52, diag at 42     Hypertension Father      Heart Disease Father          of AAA     No Known Problems Brother      No Known Problems Brother      Depression Sister      Suicide Other      Unknown/Adopted Maternal Grandmother      Unknown/Adopted Maternal Grandfather      Unknown/Adopted Paternal Grandmother      Unknown/Adopted Paternal Grandfather      No Known Problems Daughter      No Known Problems Daughter      History   Drug Use No         Objective     /84   Pulse 84   Temp 97.2  F (36.2  C) (Temporal)   Resp 16   Ht 1.854 m (6' 1\")   Wt 100 kg (220 lb 6.4 oz)   SpO2 97%   BMI 29.08 kg/m      Physical Exam       GENERAL APPEARANCE: healthy, alert and no distress     EYES: EOMI,  PERRL     HENT: ear canals and TM's normal and nose and mouth without ulcers or lesions     NECK: no adenopathy, no asymmetry, masses, or scars and thyroid normal to palpation     RESP: lungs clear to " auscultation - no rales, rhonchi or wheezes     CV: regular rates and rhythm, normal S1 S2, no S3 or S4 and no murmur, click or rub     ABDOMEN:  soft, nontender, no HSM or masses and bowel sounds normal     MS: extremities normal- no gross deformities noted, no evidence of inflammation in joints, FROM in all extremities.     SKIN: no suspicious lesions or rashes     NEURO: Normal strength and tone, sensory exam grossly normal, mentation intact and speech normal     PSYCH: mentation appears normal. and affect normal/bright    Recent Labs   Lab Test 12/01/20  0824 09/21/20  1138 10/14/19  1602   HGB  --  15.6 15.4   PLT  --  238 304   NA  --  139 141   POTASSIUM  --  4.3 3.9   CR 1.12 1.09 0.94   A1C 5.2  --   --         Diagnostics:  No labs were ordered during this visit.   No EKG required for low risk surgery (cataract, skin procedure, breast biopsy, etc).  No EKG required, no history of coronary heart disease, significant arrhythmia, peripheral arterial disease or other structural heart disease.    Revised Cardiac Risk Index (RCRI):  The patient has the following serious cardiovascular risks for perioperative complications:   - No serious cardiac risks = 0 points     RCRI Interpretation: 0 points: Class I (very low risk - 0.4% complication rate)           Signed Electronically by: Orquidea Martínez Mai, MD  Copy of this evaluation report is provided to requesting physician.        Addendum  Reviewed medical record.  He is clear for the injection procedure that is scheduled for 5/13/21 if no significant medical change since his pre-op visit on 4/6/21.  No change in instruction.      Oruqidea Lebron MD.

## 2021-04-06 NOTE — PATIENT INSTRUCTIONS
Preparing for Your Surgery  Getting started  A nurse will call you to review your health history and instructions. They will give you an arrival time based on your scheduled surgery time.  Please be ready to share the following:    Your doctor's clinic name and phone number    Your medical, surgical and anesthesia history    A list of allergies and sensitivities    A list of medicines, including herbal treatments and over-the-counter drugs    Whether the patient has a legal guardian (ask how to send us the papers in advance)  If you have a child who's having surgery, please ask for a copy of Preparing for Your Child's Surgery.    Preparing for surgery    Within 30 days of surgery: Have a pre-op exam (sometimes called an H&P, or History and Physical). This can be done at a clinic or pre-operative center.  ? If you're having a , you may not need this exam. Talk to your care team    At your pre-op exam, talk to your care team about all medicines you take. If you need to stop any medicines before surgery, ask when to start taking them again.  ? We do this for your safety. Many medicines can make you bleed too much during surgery. Some change how well surgery (anesthesia) drugs work.    Call your insurance company to let them know you're having surgery. (If you don't have insurance, call [phone].)    Call your clinic if there's any change in your health. This includes signs of a cold or flu (sore throat, runny nose, cough, rash, fever). It also includes a scrape or scratch near the surgery site.    If you have questions on the day of surgery, call your hospital or surgery center.  Eating and drinking guidelines  For your safety: Unless your surgeon tells you otherwise, follow the guidelines below.    Eat and drink as usual until 8 hours before surgery. After that, no food or milk.    Drink clear liquids until 2 hours before surgery. These are liquids you can see through, like water, Gatorade and Propel  Water. You may also have black coffee and tea (no cream or milk).    Nothing by mouth within 2 hours of surgery. This includes gum, candy and breath mints.    If you drink, stop drinking alcohol the night before surgery.    If your care team tells you to take medicine on the morning of surgery, it's okay to take it with a sip of water.  Preventing infection    Shower or bathe the night before and morning of your surgery. Follow the instructions your clinic gave you. (If no instructions, use regular soap.)    Don't shave or clip hair near your surgery site. We'll remove the hair if needed.    Don't smoke or vape the morning of surgery. You may chew nicotine gum up to 2 hours before surgery. A nicotine patch is okay.  ? Note: Some surgeries require you to completely quit smoking and nicotine. Check with your surgeon.    Your care team will make every effort to keep you safe from infection. We will:  ? Clean our hands often with soap and water (or an alcohol-based hand rub).  ? Clean the skin at your surgery site with a special soap that kills germs.  ? Give you a special gown to keep you warm. (Cold raises the risk of infection.)  ? Wear special hair covers, masks, gowns and gloves during surgery.  ? Give antibiotic medicine, if prescribed. Not all surgeries need antibiotics.  What to bring on the day of surgery    Photo ID and insurance card    Copy of your health care directive, if you have one    Glasses and hearing aides (bring cases)  ? You can't wear contacts during surgery    Inhaler and eye drops, if you use them (tell us about these when you arrive)    CPAP machine or breathing device, if you use them    A few personal items, if spending the night    If you have . . .  ? A pacemaker or ICD (cardiac defibrillator): Bring the ID card.  ? An implanted stimulator: Bring the remote control.  ? A legal guardian: Bring a copy of the certified (court-stamped) guardianship papers.  Please remove any jewelry, including  body piercings. Leave jewelry and other valuables at home.  If you're going home the day of surgery  Important: If you don't follow the rules below, we must cancel your surgery.     Arrange for someone to drive you home after surgery. You may not drive, take a taxi or take public transportation by yourself (unless you'll have local anesthesia only).    Arrange for a responsible adult to stay with you overnight. If you don't, we may keep you in the hospital overnight, and you may need to pay the costs yourself.  Questions?   If you have any questions for your care team, list them here: _________________________________________________________________________________________________________________________________________________________________________________________________________________________________________________________________________________________________________________________  For informational purposes only. Not to replace the advice of your health care provider. Copyright   2003, 2019 [facility]. All rights reserved. Clinically reviewed by Karina Haq MD. [employer] 512451 - REV 4/20.      Please take your prescribed medications as prescribed except.  No aspirin for 7 days before the procedure.    No NSAID (Ibuprofen, Aleve, Motrin, Naproxen, ect.) for 5 days before the procedure  Hold the Hyzaar on am of the procedure; resume it after the procedure   Take shower with provided shampoo the day before and am of procedure  COVID screening - someone will call you to schedule for it. Let me know if not hearing from them 3-4 days before the procedure.  No alcohol or smoking for 24 before the procedure  Nothing to eat for 8 hrs, but may drink clear fluid up to 2 hrs before the procedure.

## 2021-04-13 ENCOUNTER — OFFICE VISIT (OUTPATIENT)
Dept: DERMATOLOGY | Facility: CLINIC | Age: 56
End: 2021-04-13
Payer: COMMERCIAL

## 2021-04-13 VITALS — SYSTOLIC BLOOD PRESSURE: 122 MMHG | HEART RATE: 92 BPM | OXYGEN SATURATION: 96 % | DIASTOLIC BLOOD PRESSURE: 79 MMHG

## 2021-04-13 DIAGNOSIS — B07.8 COMMON WART: ICD-10-CM

## 2021-04-13 DIAGNOSIS — L81.4 LENTIGO: Primary | ICD-10-CM

## 2021-04-13 PROCEDURE — 17110 DESTRUCTION B9 LES UP TO 14: CPT | Performed by: DERMATOLOGY

## 2021-04-13 PROCEDURE — 99212 OFFICE O/P EST SF 10 MIN: CPT | Mod: 25 | Performed by: DERMATOLOGY

## 2021-04-13 NOTE — PROGRESS NOTES
Indra Mehta is an extremely pleasant 55 year old year old male patient here today for recurent warton right hand.  We had tried candin and cryo which cleared it.  It is now back.  Using mediplast now.  Patient has no other skin complaints today.  Remainder of the HPI, Meds, PMH, Allergies, FH, and SH was reviewed in chart.      Past Medical History:   Diagnosis Date     Anxiety      Back pain      Depressive disorder      Hyperlipidemia      Hypertension      Meniere's disease, unspecified      Pain medication agreement signed 8/20/2010       Past Surgical History:   Procedure Laterality Date     BUNIONECTOMY RT/LT  09/05/08    Both feet     COLONOSCOPY N/A 12/28/2016    Procedure: COMBINED COLONOSCOPY, SINGLE OR MULTIPLE BIOPSY/POLYPECTOMY BY BIOPSY;  Surgeon: Indra Jernigan MD;  [facility]     DISCECTOMY, FUSION CERVICAL ANTERIOR ONE LEVEL, COMBINED N/A 7/5/2017    Procedure: COMBINED DISCECTOMY, FUSION CERVICAL ANTERIOR ONE LEVEL;  Cervical 6-7, Anterior cervical discectomy fusion;  Surgeon: Mike Mckenna MD;  [facility]     DISCECTOMY, FUSION CERVICAL ANTERIOR ONE LEVEL, COMBINED N/A 12/19/2018    Procedure: cervical 5-6 anterior cervical discectomy fusion;  Surgeon: Mike Mckenna MD;  [facility]     HC COLONOSCOPY W/WO BRUSH/WASH  12/27/2005     HC CREATE EARDRUM OPENING,GEN ANESTH  09/27/2007    Pe tube, Left endolymphatic sac enhancement.     HC MASTOIDECTOMY,COMPLETE  09/27/2007     HERNIORRHAPHY UMBILICAL N/A 8/11/2017    Procedure: HERNIORRHAPHY UMBILICAL;  open umbilical hernia repair;  Surgeon: Boni Story MD;  [facility]     INJECT EPIDURAL CERVICAL N/A 8/22/2019    Procedure: INJECTION, SPINE, CERVICAL 6-7 EPIDURAL;  Surgeon: Darryn Mcdaniel MD;  [facility]     INJECT EPIDURAL LUMBAR N/A 11/9/2017    Procedure: INJECT EPIDURAL LUMBAR;  lumbar 4-5 epidural steroid injection ;  Surgeon: Darryn Mcdaniel MD;  [facility]     INJECT EPIDURAL LUMBAR  N/A 2017    Procedure: INJECT EPIDURAL LUMBAR;  lumbar epidural injection;  Surgeon: Darryn Mcdaniel MD;  [facility]     INJECT EPIDURAL TRANSFORAMINAL Bilateral 2018    Procedure: INJECT EPIDURAL TRANSFORAMINAL;  transforaminal bilateral lumbar 3-4 steroid injection;  Surgeon: Darryn Mcdaniel MD;  [facility]     INJECT EPIDURAL TRANSFORAMINAL Bilateral 2018    Procedure: INJECT EPIDURAL TRANSFORAMINAL lumbar 3-4;  Surgeon: Darryn Mcdaniel MD;  [facility]     INJECT EPIDURAL TRANSFORAMINAL Bilateral 2019    Procedure: INJECTION, EPIDURAL, TRANSFORAMINAL LUMBAR 3-4 BILATERAL;  Surgeon: Darryn Mcdaniel MD;  [facility]     INJECT EPIDURAL TRANSFORAMINAL Bilateral 2020    Procedure: lumbar 3-4 bilateral transforaminal epidural steroid injection;  Surgeon: Darryn Mcdaniel MD;  [facility]     INJECT EPIDURAL TRANSFORAMINAL Bilateral 2020    Procedure: INJECTION, EPIDURAL, TRANSFORAMINAL  LUMBAR 3-4 APPROACH;  Surgeon: Darryn Mcdaniel MD;  [facility]     PE TUBES  2006    left ear        Family History   Problem Relation Age of Onset     Diabetes Mother      Cancer Mother         colon cancer -  at 52, diag at 42     Hypertension Father      Heart Disease Father          of AAA     No Known Problems Brother      No Known Problems Brother      Depression Sister      Suicide Other      Unknown/Adopted Maternal Grandmother      Unknown/Adopted Maternal Grandfather      Unknown/Adopted Paternal Grandmother      Unknown/Adopted Paternal Grandfather      No Known Problems Daughter      No Known Problems Daughter        Social History     Socioeconomic History     Marital status: Single     Spouse name: Not on file     Number of children: 2     Years of education: Not on file     Highest education level: Not on file   Occupational History     Employer: [employer]   Social Needs     Financial resource strain: Not on file     Food insecurity     Worry: Not on  file     Inability: Not on file     Transportation needs     Medical: Not on file     Non-medical: Not on file   Tobacco Use     Smoking status: Never Smoker     Smokeless tobacco: Never Used   Substance and Sexual Activity     Alcohol use: Yes     Alcohol/week: 0.0 standard drinks     Comment: rarely     Drug use: No     Sexual activity: Not Currently     Partners: Female   Lifestyle     Physical activity     Days per week: Not on file     Minutes per session: Not on file     Stress: Not on file   Relationships     Social connections     Talks on phone: Not on file     Gets together: Not on file     Attends Sikh service: Not on file     Active member of club or organization: Not on file     Attends meetings of clubs or organizations: Not on file     Relationship status: Not on file     Intimate partner violence     Fear of current or ex partner: Not on file     Emotionally abused: Not on file     Physically abused: Not on file     Forced sexual activity: Not on file   Other Topics Concern     Parent/sibling w/ CABG, MI or angioplasty before 65F 55M? No   Social History Narrative     Not on file       Outpatient Encounter Medications as of 4/13/2021   Medication Sig Dispense Refill     Acetaminophen (TYLENOL PO) Take 1,000 mg by mouth 3 times daily        ALPRAZolam (XANAX XR) 0.5 MG 24 hr tablet Take 1 tablet (0.5 mg) by mouth At Bedtime 30 tablet 3     amLODIPine (NORVASC) 5 MG tablet Take 1 tablet (5 mg) by mouth daily 30 tablet 1     atorvastatin (LIPITOR) 10 MG tablet Take 10 mg by mouth daily       ibuprofen (ADVIL/MOTRIN) 800 MG tablet Take 800 mg by mouth 3 times daily       losartan-hydrochlorothiazide (HYZAAR) 100-25 MG tablet Take 1 tablet by mouth daily 90 tablet 3     naloxone (NARCAN) 4 MG/0.1ML nasal spray Spray 1 spray (4 mg) into one nostril alternating nostrils once as needed for opioid reversal every 2-3 minutes until assistance arrives 0.2 mL 1     nortriptyline (PAMELOR) 50 MG capsule Take  1 capsule (50 mg) by mouth At Bedtime 30 capsule 2     order for DME Equipment being ordered: TENS Pads as directed 1 Device 0     oxyCODONE (ROXICODONE) 5 MG tablet 1-2 tablets every 4-6 hours as needed for severe pain. Max of 6 tablets/day. Fill/Start 3/22/21 165 tablet 0     pregabalin (LYRICA) 150 MG capsule Take 1 capsule (150 mg) by mouth 3 times daily 90 capsule 0     tiZANidine (ZANAFLEX) 4 MG tablet Take 1 tablet (4 mg) by mouth At Bedtime 90 tablet 2     Facility-Administered Encounter Medications as of 4/13/2021   Medication Dose Route Frequency Provider Last Rate Last Admin     triamcinolone (KENALOG-40) injection 40 mg  40 mg   Melissa Xie MD   40 mg at 11/16/20 0848     triamcinolone (KENALOG-40) injection 40 mg  40 mg   Melissa Xie MD   40 mg at 11/16/20 0848             Review Of Systems  Skin: As above  Eyes: negative  Ears/Nose/Throat: negative  Respiratory: No shortness of breath, dyspnea on exertion, cough, or hemoptysis  Cardiovascular: negative  Gastrointestinal: negative  Genitourinary: negative  Musculoskeletal: negative  Neurologic: negative  Psychiatric: negative  Hematologic/Lymphatic/Immunologic: negative  Endocrine: negative      O:   NAD, WDWN, Alert & Oriented, Mood & Affect wnl, Vitals stable   Here today alone   /79   Pulse 92   SpO2 96%    General appearance normal   Vitals stable   Alert, oriented and in no acute distress     Brown macules on face  R hand verrucous papule     The remainder of expanded problem focused exam was normal; the following areas were examined:  face, neck, , chest, digits/nails, RUE, LUE.      Eyes: Conjunctivae/lids:Normal     ENT: Lips, buccal mucosa, tongue: normal    MSK:Normal    Cardiovascular: peripheral edema none    Pulm: Breathing Normal    Neuro/Psych: Orientation:Alert and Orientedx3 ; Mood/Affect:normal       A/P:  1. Lentigo  2. Wart  Laser, excision, mediplast tape, candin, cryo discussed with patient    LN2:  Treated with LN2 for 5s for 1-2 cycles. Warned risks of blistering, pain, pigment change, scarring, and incomplete resolution.  Advised patient to return if lesions do not completely resolve.  Wound care sheet given.  It was a pleasure speaking to Indra Mehta today.  Return to clinic 6 weeks

## 2021-04-13 NOTE — LETTER
4/13/2021         RE: Indra Mehta  [id number] [facility] [id number]        Dear Colleague,    Thank you for referring your patient, Indra Mehta, to the [facility]. Please see a copy of my visit note below.    Indra Mehta is an extremely pleasant 55 year old year old male patient here today for recurent warton right hand.  We had tried candin and cryo which cleared it.  It is now back.  Using mediplast now.  Patient has no other skin complaints today.  Remainder of the HPI, Meds, PMH, Allergies, FH, and SH was reviewed in chart.      Past Medical History:   Diagnosis Date     Anxiety      Back pain      Depressive disorder      Hyperlipidemia      Hypertension      Meniere's disease, unspecified      Pain medication agreement signed 8/20/2010       Past Surgical History:   Procedure Laterality Date     BUNIONECTOMY RT/LT  09/05/08    Both feet     COLONOSCOPY N/A 12/28/2016    Procedure: COMBINED COLONOSCOPY, SINGLE OR MULTIPLE BIOPSY/POLYPECTOMY BY BIOPSY;  Surgeon: Indra Jernigan MD;  [facility]     DISCECTOMY, FUSION CERVICAL ANTERIOR ONE LEVEL, COMBINED N/A 7/5/2017    Procedure: COMBINED DISCECTOMY, FUSION CERVICAL ANTERIOR ONE LEVEL;  Cervical 6-7, Anterior cervical discectomy fusion;  Surgeon: Mike Mckenna MD;  [facility]     DISCECTOMY, FUSION CERVICAL ANTERIOR ONE LEVEL, COMBINED N/A 12/19/2018    Procedure: cervical 5-6 anterior cervical discectomy fusion;  Surgeon: Mike Mckenna MD;  [facility]     HC COLONOSCOPY W/WO BRUSH/WASH  12/27/2005     HC CREATE EARDRUM OPENING,GEN ANESTH  09/27/2007    Pe tube, Left endolymphatic sac enhancement.     HC MASTOIDECTOMY,COMPLETE  09/27/2007     HERNIORRHAPHY UMBILICAL N/A 8/11/2017    Procedure: HERNIORRHAPHY UMBILICAL;  open umbilical hernia repair;  Surgeon: Boni Story MD;  [facility]     INJECT EPIDURAL CERVICAL N/A 8/22/2019    Procedure: INJECTION, SPINE, CERVICAL 6-7  EPIDURAL;  Surgeon: Darryn Mcdaniel MD;  [facility]     INJECT EPIDURAL LUMBAR N/A 2017    Procedure: INJECT EPIDURAL LUMBAR;  lumbar 4-5 epidural steroid injection ;  Surgeon: Darryn Mcdaniel MD;  [facility]     INJECT EPIDURAL LUMBAR N/A 2017    Procedure: INJECT EPIDURAL LUMBAR;  lumbar epidural injection;  Surgeon: Darryn Mcdaniel MD;  [facility]     INJECT EPIDURAL TRANSFORAMINAL Bilateral 2018    Procedure: INJECT EPIDURAL TRANSFORAMINAL;  transforaminal bilateral lumbar 3-4 steroid injection;  Surgeon: Darryn Mcdaniel MD;  [facility]     INJECT EPIDURAL TRANSFORAMINAL Bilateral 2018    Procedure: INJECT EPIDURAL TRANSFORAMINAL lumbar 3-4;  Surgeon: Darryn Mcdaniel MD;  [facility]     INJECT EPIDURAL TRANSFORAMINAL Bilateral 2019    Procedure: INJECTION, EPIDURAL, TRANSFORAMINAL LUMBAR 3-4 BILATERAL;  Surgeon: Darryn Mcdaniel MD;  [facility]     INJECT EPIDURAL TRANSFORAMINAL Bilateral 2020    Procedure: lumbar 3-4 bilateral transforaminal epidural steroid injection;  Surgeon: Darryn Mcdaniel MD;  [facility]     INJECT EPIDURAL TRANSFORAMINAL Bilateral 2020    Procedure: INJECTION, EPIDURAL, TRANSFORAMINAL  LUMBAR 3-4 APPROACH;  Surgeon: Darryn Mcdaniel MD;  [facility]     PE TUBES  2006    left ear        Family History   Problem Relation Age of Onset     Diabetes Mother      Cancer Mother         colon cancer -  at 52, diag at 42     Hypertension Father      Heart Disease Father          of AAA     No Known Problems Brother      No Known Problems Brother      Depression Sister      Suicide Other      Unknown/Adopted Maternal Grandmother      Unknown/Adopted Maternal Grandfather      Unknown/Adopted Paternal Grandmother      Unknown/Adopted Paternal Grandfather      No Known Problems Daughter      No Known Problems Daughter        Social History     Socioeconomic History     Marital status: Single     Spouse name: Not on file      Number of children: 2     Years of education: Not on file     Highest education level: Not on file   Occupational History     Employer: [employer]   Social Needs     Financial resource strain: Not on file     Food insecurity     Worry: Not on file     Inability: Not on file     Transportation needs     Medical: Not on file     Non-medical: Not on file   Tobacco Use     Smoking status: Never Smoker     Smokeless tobacco: Never Used   Substance and Sexual Activity     Alcohol use: Yes     Alcohol/week: 0.0 standard drinks     Comment: rarely     Drug use: No     Sexual activity: Not Currently     Partners: Female   Lifestyle     Physical activity     Days per week: Not on file     Minutes per session: Not on file     Stress: Not on file   Relationships     Social connections     Talks on phone: Not on file     Gets together: Not on file     Attends Taoist service: Not on file     Active member of club or organization: Not on file     Attends meetings of clubs or organizations: Not on file     Relationship status: Not on file     Intimate partner violence     Fear of current or ex partner: Not on file     Emotionally abused: Not on file     Physically abused: Not on file     Forced sexual activity: Not on file   Other Topics Concern     Parent/sibling w/ CABG, MI or angioplasty before 65F 55M? No   Social History Narrative     Not on file       Outpatient Encounter Medications as of 4/13/2021   Medication Sig Dispense Refill     Acetaminophen (TYLENOL PO) Take 1,000 mg by mouth 3 times daily        ALPRAZolam (XANAX XR) 0.5 MG 24 hr tablet Take 1 tablet (0.5 mg) by mouth At Bedtime 30 tablet 3     amLODIPine (NORVASC) 5 MG tablet Take 1 tablet (5 mg) by mouth daily 30 tablet 1     atorvastatin (LIPITOR) 10 MG tablet Take 10 mg by mouth daily       ibuprofen (ADVIL/MOTRIN) 800 MG tablet Take 800 mg by mouth 3 times daily       losartan-hydrochlorothiazide (HYZAAR) 100-25 MG tablet Take 1 tablet by mouth  daily 90 tablet 3     naloxone (NARCAN) 4 MG/0.1ML nasal spray Spray 1 spray (4 mg) into one nostril alternating nostrils once as needed for opioid reversal every 2-3 minutes until assistance arrives 0.2 mL 1     nortriptyline (PAMELOR) 50 MG capsule Take 1 capsule (50 mg) by mouth At Bedtime 30 capsule 2     order for DME Equipment being ordered: TENS Pads as directed 1 Device 0     oxyCODONE (ROXICODONE) 5 MG tablet 1-2 tablets every 4-6 hours as needed for severe pain. Max of 6 tablets/day. Fill/Start 3/22/21 165 tablet 0     pregabalin (LYRICA) 150 MG capsule Take 1 capsule (150 mg) by mouth 3 times daily 90 capsule 0     tiZANidine (ZANAFLEX) 4 MG tablet Take 1 tablet (4 mg) by mouth At Bedtime 90 tablet 2     Facility-Administered Encounter Medications as of 4/13/2021   Medication Dose Route Frequency Provider Last Rate Last Admin     triamcinolone (KENALOG-40) injection 40 mg  40 mg   Melissa Xie MD   40 mg at 11/16/20 0848     triamcinolone (KENALOG-40) injection 40 mg  40 mg   Melissa Xie MD   40 mg at 11/16/20 0848             Review Of Systems  Skin: As above  Eyes: negative  Ears/Nose/Throat: negative  Respiratory: No shortness of breath, dyspnea on exertion, cough, or hemoptysis  Cardiovascular: negative  Gastrointestinal: negative  Genitourinary: negative  Musculoskeletal: negative  Neurologic: negative  Psychiatric: negative  Hematologic/Lymphatic/Immunologic: negative  Endocrine: negative      O:   NAD, WDWN, Alert & Oriented, Mood & Affect wnl, Vitals stable   Here today alone   /79   Pulse 92   SpO2 96%    General appearance normal   Vitals stable   Alert, oriented and in no acute distress     Brown macules on face  R hand verrucous papule     The remainder of expanded problem focused exam was normal; the following areas were examined:  face, neck, , chest, digits/nails, RUE, LUE.      Eyes: Conjunctivae/lids:Normal     ENT: Lips, buccal mucosa, tongue:  normal    MSK:Normal    Cardiovascular: peripheral edema none    Pulm: Breathing Normal    Neuro/Psych: Orientation:Alert and Orientedx3 ; Mood/Affect:normal       A/P:  1. Lentigo  2. Wart  Laser, excision, mediplast tape, candin, cryo discussed with patient   LN2:  Treated with LN2 for 5s for 1-2 cycles. Warned risks of blistering, pain, pigment change, scarring, and incomplete resolution.  Advised patient to return if lesions do not completely resolve.  Wound care sheet given.  It was a pleasure speaking to Indra Mehta today.  Return to clinic 6 weeks        Again, thank you for allowing me to participate in the care of your patient.        Sincerely,        Pablo Matthews MD

## 2021-04-13 NOTE — NURSING NOTE
Chief Complaint   Patient presents with     Derm Problem     wart       Vitals:    04/13/21 1416   BP: 122/79   Pulse: 92   SpO2: 96%     Wt Readings from Last 1 Encounters:   04/06/21 100 kg (220 lb 6.4 oz)       Ghada Churchill LPN.................4/13/2021

## 2021-04-14 NOTE — PROGRESS NOTES
Ketan is a 55 year old who is being evaluated via a billable video visit.      How would you like to obtain your AVS? MyChart  If the video visit is dropped, the invitation should be resent by: Text to cell phone: [phone]  Will anyone else be joining your video visit? No      Video Start Time: 10:53am  Video-Visit Details    Type of service:  Video Visit    Video End Time: 11:12am    Originating Location (pt. Location): Home    Distant Location (provider location):  [facility]     Platform used for Video Visit: [facility]      CHIEF COMPLAINT:  Pain  -neck and low back pain     INTERVAL HISTORY:  Last seen on 3/22/21       Recommendations/plan at the last visit included:  1. Physical Therapy:  Not at this time, but he should continue his exercises  2. Clinical Health Psychologist:  NO  3. Diagnostic Studies: none  4. Medication Management:                1.   Continue Lyrica 150 mg TID              2.   Continue nortriptyline to 50 mg at bedtime.                3.   Continue Tizanidine 4mg at bedtime              4.   Continue Oxycodone 5m-2 tablets every 4-6 hours as needed. Max of 6 tabs in a day.   5. Further procedures recommended: Plan for SCS trial once approved. Referred for low back injection  6. Recommendations to PCP. See above     Follow up with this provider: 4  Weeks for a virtual visit    Since his last visit, Indra Mehta reports:    That he is looking forward to his injection this week. He states that his lower back has been quite a bit worse. He states that at the end of March, one morning off to the right side, he is having a constant stabbing pain. He states that if he bends over or lifts his leg the pain intensifies and goes down into his hips and has once gone down to his knee. He states that this is different as he usually has the shooting pain from the center out. He states that this isn't going away where as before if he would have episodes it would improve.  He states that this pain is also making it hard to sleep. He states that he has to be careful with how his legs are positioned as if his knees come up he has increased pain. He states that the stretches that he does, it increases his pain at that spot.     His neck is stable. His neuropathy is the same. He states that in the last week, he hasn't been having shooting pain into his lower leg, but he has had spots in the calf and ankle he gets a point of pain and it hits, linger for 5-10 seconds then go away. He states that it feels like a muscle cramp but the muscle isn't knotting up. He has only noticed this on the right side.       Pain Information:                   Pain today 6/10       UDS: 2020-reviewed and appropriate  Controlled Substance Agreement signed: 2020     CURRENT RELEVANT PAIN MEDICATIONS:  Tizanidine 4mg-taking 1 at HS-hasn't been using recently   Tylenol 500mg-1000mg 4x/day  Xanax-does not use daily  Nortriptyline 50mg at bedtime  Oxycodone 5m-2 tablets every 4-6 hours, max of 6/day  Lyrica 150 mg TID     Patient is using the medication as prescribed:  YES  Is your medication helpful?     YES   Medication side effects? no side effect     Previous Medications: (H--helped; HI--Helped initially; SWH-- somewhat helpful, NH--No help; W--worse; SE--side effects)   Norco/vicodin H  Oxycodone H  Flexeril - was helpful at night  Robaxin - not helpful  Tizanidine H  Gabapentin ?  Lyrica SE sedation  Cymbalta ?     Past Pain Treatments:  Injections:    - 18 bilateral L3-4 TFESI - (Dr Mcdaniel)  - 18 bilateral L3-4 TFESI - seems to have worsened pain (Dr Mcdaniel)  - 18 TFESI right C5-6 - helped with arm pain and numbness  - 17 bilateral L3-4 TFESI   - 10/23/17 Bilateral L4-5 TFESI   - 2018 Bilateral L3-4 TFESI   -2019 Bilateral L3-4 TFESI   -2019 C6-7 SRIDEVI   -2020 L3-4 bilateral TLESI  Surgery:  ACDF C6-7 on 17 with improvement; ACDF C5-6 2018,  posterior C5-7 fusion. Lateral mass screws, right C5-6 medial facetectomy, right C6 foraminotomy on 10/29/2019  TENS unit: helpful     [place] Board of Pharmacy Data Base Reviewed:    YES; As expected, no concern for misuse/abuse of controlled medications based on this report.     THE 4 As OF OPIOID MAINTENANCE ANALGESIA    Analgesia: Is pain relief clinically significant? YES   Activity: Is patient functional and able to perform Activities of Daily Living? YES   Adverse effects: Is patient free from adverse side effects from opiates? YES   Adherence to Rx protocol: Is patient adhering to Controlled Substance Agreement and taking medications ONLY as ordered? YES         Is Narcan prescribed for opiate use >50 MME daily? N/A        Daily MME: 37.5-45     Medications:  Current Outpatient Prescriptions          Current Outpatient Medications   Medication Sig Dispense Refill     Acetaminophen (TYLENOL PO) Take 500 mg by mouth every 4 hours as needed for mild pain or fever         ALPRAZolam (XANAX XR) 0.5 MG 24 hr tablet Take 1 tablet (0.5 mg) by mouth At Bedtime 30 tablet 3     gabapentin (NEURONTIN) 400 MG capsule Take 3 capsules (1,200 mg) by mouth 3 times daily 270 capsule 3     losartan (COZAAR) 50 MG tablet Take 1 tablet (50 mg) by mouth daily 90 tablet 0     nortriptyline (PAMELOR) 25 MG capsule Take 1 capsule (25 mg) by mouth At Bedtime 30 capsule 0     order for DME Equipment being ordered: TENS Pads as directed 1 Device 0     oxyCODONE (ROXICODONE) 5 MG tablet Take 1 tab every 4-6 hours as needed for severe pain. MAX 4 TABS PER DAY. Okay to fill 3/12/20 start 3/18/20. 100 tablet 0     tiZANidine (ZANAFLEX) 4 MG tablet Take 1 tablet (4 mg) by mouth At Bedtime 90 tablet 0               Assessment:  Indra Mehta is a 55 year old male who presents today for follow up regarding his:     1.  Cervicalgia  2.  S/p cervical spine fusion  3.  Myofascial pain  4. Lumbar radiculopathy  5. Neuropathy   6. Chronic  "pain syndrome  7. Chronic use of opioids    Patient reports new pain in the right low back radiating down his right leg to the hip and occasionally the knee. He is also reporting a \"knotting\" pain in his right calf. He is scheduled for an injection this week. Will keep that as scheduled.If injection is not helpful and right sided pain persists, we may need to get a MRI to further evaluate.    Will continue current medications. No side effects and they have been helping.        Plan:     Diagnosis reviewed, treatment option addressed, and risk/benifits discussed.  Self-care instructions given.  I am recommending a multidisciplinary treatment plan to help this patient better manage pain.       1. Physical Therapy:  Not at this time, but he should continue his exercises  2. Clinical Health Psychologist:  NO  3. Diagnostic Studies: none  4. Medication Management:    1. Continue Lyrica 150 mg TID  2. Continue nortriptyline to 50 mg at bedtime.  3. Continue Oxycodone 5m-2 tablets every 4-6 hours as needed. Max of 6 tabs in a day.   4. Consider Butrans. Will need to taper Oxycodone to a max of 4 tabs/day for 1 week before starting.  5. Further procedures recommended: Plan for SCS trial once approved. Keep transforaminal SRIDEVI as scheduled   6. Recommendations to PCP. See above    Follow up with this provider: 4  Weeks for a virtual visit    The total TIME spent on this patient on the day of the appointment was 26 minutes.    Time spent preparing to see the patient (reviewing records and tests) 1  minutes  Time spend face to face with the patient 19  minutes  Time spent ordering tests, medications, procedures and referrals 0  minutes  Time spent Referring and communicating with other healthcare professionals 0 minutes  Time spent documenting clinical information in Epic 6  minutes        Chiara Garcia PA-C   [facility]      "

## 2021-04-14 NOTE — H&P (VIEW-ONLY)
Ketan is a 55 year old who is being evaluated via a billable video visit.      How would you like to obtain your AVS? MyChart  If the video visit is dropped, the invitation should be resent by: Text to cell phone: [phone]  Will anyone else be joining your video visit? No      Video Start Time: 10:53am  Video-Visit Details    Type of service:  Video Visit    Video End Time: 11:12am    Originating Location (pt. Location): Home    Distant Location (provider location):  [facility] [place]     Platform used for Video Visit: [facility]      CHIEF COMPLAINT:  Pain  -neck and low back pain     INTERVAL HISTORY:  Last seen on 3/22/21       Recommendations/plan at the last visit included:  1. Physical Therapy:  Not at this time, but he should continue his exercises  2. Clinical Health Psychologist:  NO  3. Diagnostic Studies: none  4. Medication Management:                1.   Continue Lyrica 150 mg TID              2.   Continue nortriptyline to 50 mg at bedtime.                3.   Continue Tizanidine 4mg at bedtime              4.   Continue Oxycodone 5m-2 tablets every 4-6 hours as needed. Max of 6 tabs in a day.   5. Further procedures recommended: Plan for SCS trial once approved. Referred for low back injection  6. Recommendations to PCP. See above     Follow up with this provider: 4  Weeks for a virtual visit    Since his last visit, Indra Mehta reports:    That he is looking forward to his injection this week. He states that his lower back has been quite a bit worse. He states that at the end of March, one morning off to the right side, he is having a constant stabbing pain. He states that if he bends over or lifts his leg the pain intensifies and goes down into his hips and has once gone down to his knee. He states that this is different as he usually has the shooting pain from the center out. He states that this isn't going away where as before if he would have episodes it would improve.  He states that this pain is also making it hard to sleep. He states that he has to be careful with how his legs are positioned as if his knees come up he has increased pain. He states that the stretches that he does, it increases his pain at that spot.     His neck is stable. His neuropathy is the same. He states that in the last week, he hasn't been having shooting pain into his lower leg, but he has had spots in the calf and ankle he gets a point of pain and it hits, linger for 5-10 seconds then go away. He states that it feels like a muscle cramp but the muscle isn't knotting up. He has only noticed this on the right side.       Pain Information:                   Pain today 6/10       UDS: 2020-reviewed and appropriate  Controlled Substance Agreement signed: 2020     CURRENT RELEVANT PAIN MEDICATIONS:  Tizanidine 4mg-taking 1 at HS-hasn't been using recently   Tylenol 500mg-1000mg 4x/day  Xanax-does not use daily  Nortriptyline 50mg at bedtime  Oxycodone 5m-2 tablets every 4-6 hours, max of 6/day  Lyrica 150 mg TID     Patient is using the medication as prescribed:  YES  Is your medication helpful?     YES   Medication side effects? no side effect     Previous Medications: (H--helped; HI--Helped initially; SWH-- somewhat helpful, NH--No help; W--worse; SE--side effects)   Norco/vicodin H  Oxycodone H  Flexeril - was helpful at night  Robaxin - not helpful  Tizanidine H  Gabapentin ?  Lyrica SE sedation  Cymbalta ?     Past Pain Treatments:  Injections:    - 18 bilateral L3-4 TFESI - (Dr Mcdaniel)  - 18 bilateral L3-4 TFESI - seems to have worsened pain (Dr Mcdaniel)  - 18 TFESI right C5-6 - helped with arm pain and numbness  - 17 bilateral L3-4 TFESI   - 10/23/17 Bilateral L4-5 TFESI   - 2018 Bilateral L3-4 TFESI   -2019 Bilateral L3-4 TFESI   -2019 C6-7 SRIDEVI   -2020 L3-4 bilateral TLESI  Surgery:  ACDF C6-7 on 17 with improvement; ACDF C5-6 2018,  posterior C5-7 fusion. Lateral mass screws, right C5-6 medial facetectomy, right C6 foraminotomy on 10/29/2019  TENS unit: helpful     [place] Board of Pharmacy Data Base Reviewed:    YES; As expected, no concern for misuse/abuse of controlled medications based on this report.     THE 4 As OF OPIOID MAINTENANCE ANALGESIA    Analgesia: Is pain relief clinically significant? YES   Activity: Is patient functional and able to perform Activities of Daily Living? YES   Adverse effects: Is patient free from adverse side effects from opiates? YES   Adherence to Rx protocol: Is patient adhering to Controlled Substance Agreement and taking medications ONLY as ordered? YES         Is Narcan prescribed for opiate use >50 MME daily? N/A        Daily MME: 37.5-45     Medications:  Current Outpatient Prescriptions          Current Outpatient Medications   Medication Sig Dispense Refill     Acetaminophen (TYLENOL PO) Take 500 mg by mouth every 4 hours as needed for mild pain or fever         ALPRAZolam (XANAX XR) 0.5 MG 24 hr tablet Take 1 tablet (0.5 mg) by mouth At Bedtime 30 tablet 3     gabapentin (NEURONTIN) 400 MG capsule Take 3 capsules (1,200 mg) by mouth 3 times daily 270 capsule 3     losartan (COZAAR) 50 MG tablet Take 1 tablet (50 mg) by mouth daily 90 tablet 0     nortriptyline (PAMELOR) 25 MG capsule Take 1 capsule (25 mg) by mouth At Bedtime 30 capsule 0     order for DME Equipment being ordered: TENS Pads as directed 1 Device 0     oxyCODONE (ROXICODONE) 5 MG tablet Take 1 tab every 4-6 hours as needed for severe pain. MAX 4 TABS PER DAY. Okay to fill 3/12/20 start 3/18/20. 100 tablet 0     tiZANidine (ZANAFLEX) 4 MG tablet Take 1 tablet (4 mg) by mouth At Bedtime 90 tablet 0               Assessment:  Indra Mehta is a 55 year old male who presents today for follow up regarding his:     1.  Cervicalgia  2.  S/p cervical spine fusion  3.  Myofascial pain  4. Lumbar radiculopathy  5. Neuropathy   6. Chronic  "pain syndrome  7. Chronic use of opioids    Patient reports new pain in the right low back radiating down his right leg to the hip and occasionally the knee. He is also reporting a \"knotting\" pain in his right calf. He is scheduled for an injection this week. Will keep that as scheduled.If injection is not helpful and right sided pain persists, we may need to get a MRI to further evaluate.    Will continue current medications. No side effects and they have been helping.        Plan:     Diagnosis reviewed, treatment option addressed, and risk/benifits discussed.  Self-care instructions given.  I am recommending a multidisciplinary treatment plan to help this patient better manage pain.       1. Physical Therapy:  Not at this time, but he should continue his exercises  2. Clinical Health Psychologist:  NO  3. Diagnostic Studies: none  4. Medication Management:    1. Continue Lyrica 150 mg TID  2. Continue nortriptyline to 50 mg at bedtime.  3. Continue Oxycodone 5m-2 tablets every 4-6 hours as needed. Max of 6 tabs in a day.   4. Consider Butrans. Will need to taper Oxycodone to a max of 4 tabs/day for 1 week before starting.  5. Further procedures recommended: Plan for SCS trial once approved. Keep transforaminal SRIDEVI as scheduled   6. Recommendations to PCP. See above    Follow up with this provider: 4  Weeks for a virtual visit    The total TIME spent on this patient on the day of the appointment was 26 minutes.    Time spent preparing to see the patient (reviewing records and tests) 1  minutes  Time spend face to face with the patient 19  minutes  Time spent ordering tests, medications, procedures and referrals 0  minutes  Time spent Referring and communicating with other healthcare professionals 0 minutes  Time spent documenting clinical information in Epic 6  minutes        Chiara Garcia PA-C   [facility]      "

## 2021-04-19 ENCOUNTER — VIRTUAL VISIT (OUTPATIENT)
Dept: PALLIATIVE MEDICINE | Facility: CLINIC | Age: 56
End: 2021-04-19
Payer: COMMERCIAL

## 2021-04-19 DIAGNOSIS — G62.9 NEUROPATHY: ICD-10-CM

## 2021-04-19 DIAGNOSIS — M54.16 LUMBAR RADICULOPATHY: ICD-10-CM

## 2021-04-19 DIAGNOSIS — F11.90 CHRONIC, CONTINUOUS USE OF OPIOIDS: ICD-10-CM

## 2021-04-19 DIAGNOSIS — Z98.1 S/P CERVICAL SPINAL FUSION: ICD-10-CM

## 2021-04-19 DIAGNOSIS — I73.01 RAYNAUD'S DISEASE WITH GANGRENE (H): ICD-10-CM

## 2021-04-19 DIAGNOSIS — M79.18 MYOFASCIAL PAIN: ICD-10-CM

## 2021-04-19 DIAGNOSIS — M54.2 CERVICALGIA: ICD-10-CM

## 2021-04-19 DIAGNOSIS — G89.4 CHRONIC PAIN SYNDROME: ICD-10-CM

## 2021-04-19 PROCEDURE — 99214 OFFICE O/P EST MOD 30 MIN: CPT | Mod: GT | Performed by: PHYSICIAN ASSISTANT

## 2021-04-19 RX ORDER — OXYCODONE HYDROCHLORIDE 5 MG/1
TABLET ORAL
Qty: 165 TABLET | Refills: 0 | Status: SHIPPED | OUTPATIENT
Start: 2021-04-19 | End: 2021-05-20

## 2021-04-19 RX ORDER — PREGABALIN 150 MG/1
150 CAPSULE ORAL 3 TIMES DAILY
Qty: 90 CAPSULE | Refills: 1 | Status: SHIPPED | OUTPATIENT
Start: 2021-04-19 | End: 2021-06-14

## 2021-04-19 NOTE — PATIENT INSTRUCTIONS
After Visit Instructions:     Thank you for coming to [facility] for your care. It is my goal to partner with you to help you reach your optimal state of health.     I am recommending multidisciplinary care at this time.  The focus of care will be to continue gradual rehabilitation and pain management with medication adjustments as needed.    Continue daily self-care, identifying contributing factors, and monitoring variations in pain level. Continue to integrate self-care into your life.          Schedule follow-up with Chiara Garcia PA-C in 4 weeks. You will need to make this appointment.     Imaging: may need to consider getting MRI of lumbar spine for that right sided low back pain    Procedures recommended: keep epidural as scheduled     Medication recommendations:   1. Continue Lyrica 150 mg TID  2. Continue nortriptyline to 50 mg at bedtime.    3. Continue Oxycodone 5m-2 tablets every 4-6 hours as needed. Max of 6 tabs in a day.   4. Consider Butrans. Will need to taper Oxycodone to a max of 4 tabs/day for 1 week before starting.      Chiara Garcia PA-C  [facility]  [facility]    Contact information: [facility]  Clinic Number:  [phone]     Call with any questions about your care and for scheduling assistance.     Calls are returned Monday through Friday between 8 AM and 4:30 PM. We usually get back to you within 2 business days depending on the issue/request.    If we are prescribing your medications:    For opioid medication refills, call the clinic or send a Skipjump message 7 days in advance.  Please include:    Name of requested medication    Name of the pharmacy.    For non-opioid medications, call your pharmacy directly to request a refill. Please allow 3-4 days to be processed.     Per [place] State Law:    All controlled substance prescriptions must be filled within 30 days of being written.      For those  controlled substances allowing refills, pickup must occur within 30 days of last fill.      We believe regular attendance is key to your success in our program!      Any time you are unable to keep your appointment we ask that you call us at least 24 hours in advance to cancel.This will allow us to offer the appointment time to another patient.   Multiple missed appointments may lead to dismissal from the clinic.

## 2021-04-20 ENCOUNTER — MYC MEDICAL ADVICE (OUTPATIENT)
Dept: PALLIATIVE MEDICINE | Facility: CLINIC | Age: 56
End: 2021-04-20

## 2021-04-20 DIAGNOSIS — U07.1 INFECTION DUE TO 2019 NOVEL CORONAVIRUS: Primary | ICD-10-CM

## 2021-04-20 DIAGNOSIS — Z01.818 PREOP GENERAL PHYSICAL EXAM: ICD-10-CM

## 2021-04-20 LAB
LABORATORY COMMENT REPORT: ABNORMAL
SARS-COV-2 RNA RESP QL NAA+PROBE: NORMAL
SARS-COV-2 RNA RESP QL NAA+PROBE: POSITIVE
SPECIMEN SOURCE: ABNORMAL
SPECIMEN SOURCE: NORMAL

## 2021-04-20 PROCEDURE — U0005 INFEC AGEN DETEC AMPLI PROBE: HCPCS | Performed by: FAMILY MEDICINE

## 2021-04-20 PROCEDURE — U0003 INFECTIOUS AGENT DETECTION BY NUCLEIC ACID (DNA OR RNA); SEVERE ACUTE RESPIRATORY SYNDROME CORONAVIRUS 2 (SARS-COV-2) (CORONAVIRUS DISEASE [COVID-19]), AMPLIFIED PROBE TECHNIQUE, MAKING USE OF HIGH THROUGHPUT TECHNOLOGIES AS DESCRIBED BY CMS-2020-01-R: HCPCS | Performed by: FAMILY MEDICINE

## 2021-04-20 RX ORDER — AMLODIPINE BESYLATE 5 MG/1
TABLET ORAL
Qty: 90 TABLET | Refills: 0 | Status: SHIPPED | OUTPATIENT
Start: 2021-04-20 | End: 2021-07-20

## 2021-04-20 NOTE — TELEPHONE ENCOUNTER
The appeal letter notification just alerted me today (4/20/2021) I was not aware that there was an appeal letter written (2/25/21). The appeal had to be submitted within 180 days of the denial.     Routing to advise, we can try to send if a new MAYDA CHURCHILL    [facility]

## 2021-04-20 NOTE — TELEPHONE ENCOUNTER
Prescription approved per [facility] Refill Protocol.    ANGELINA ThomasN, RN  [facility]

## 2021-04-21 ENCOUNTER — MYC MEDICAL ADVICE (OUTPATIENT)
Dept: PALLIATIVE MEDICINE | Facility: CLINIC | Age: 56
End: 2021-04-21

## 2021-04-21 ENCOUNTER — TELEPHONE (OUTPATIENT)
Dept: LAB | Facility: CLINIC | Age: 56
End: 2021-04-21

## 2021-04-21 NOTE — TELEPHONE ENCOUNTER
Patient tested positive for COVID procedure was rescheduled for     5/13/21  8am    No need for a reCOVID test

## 2021-04-21 NOTE — TELEPHONE ENCOUNTER
Coronavirus (COVID-19) Notification    Reason for call  Notify of POSITIVE  COVID-19 lab result, assess symptoms,  review [facility] recommendations    Lab Result   Lab test for 2019-nCoV rRt-PCR or SARS-COV-2 PCR  Oropharyngeal AND/OR nasopharyngeal swabs were POSITIVE for 2019-nCoV RNA [OR] SARS-COV-2 RNA (COVID-19) RNA     We have been unable to reach Patient by phone at this time to notify of their Positive COVID-19 result.  Left voicemail message requesting a call back to [phone] [facility] for results.        POSITIVE COVID-19 Letter sent.    Mitzi Lofton LPN

## 2021-04-21 NOTE — TELEPHONE ENCOUNTER
Please submit appeal letter.      Per Chiara Garcia PA-C's 4/19/21 plan:  5. Further procedures recommended: Plan for SCS trial once approved    SAMY Moody-BSN  [facility]

## 2021-04-22 ENCOUNTER — TELEPHONE (OUTPATIENT)
Dept: PALLIATIVE MEDICINE | Facility: CLINIC | Age: 56
End: 2021-04-22

## 2021-04-22 NOTE — TELEPHONE ENCOUNTER
Appeal has been faxed, right fax confirmed          Peyton CHURCHILL    [facility]

## 2021-04-22 NOTE — TELEPHONE ENCOUNTER
Will attempt to submit appeal , they may deny that due to it being past the 180 days.    Peyton CHURCHILL    [facility]

## 2021-04-22 NOTE — TELEPHONE ENCOUNTER
Vicky from [facility] Appeals calling in regards to pts appeal for a SCS. She states Lumbar Radiculopathy was given as a diagnosis, but that is not covered. Vicky is wondering if the patient has any of the follow diagnosis/    Is the pain a result of failed back surgery, diabetic polyneuropathy or chronic pain syndrome?    She can be reached at [phone].    Shanda DE LA CRUZ    [facility]

## 2021-04-23 NOTE — TELEPHONE ENCOUNTER
Writer called Vicky back and informed that writer was wrong, misread about spinal fusion in Pt's chart.      Dx should be lumbar Radiculopathy in the setting of multiple other therapies tried.    Insurance will proceed with the appeal as is.    Mariano Mcbride, RN  Care Coordinator   [facility] [place]

## 2021-04-23 NOTE — TELEPHONE ENCOUNTER
Vicky calling to speak with Snitzer or nurse to confirm a diagnosis for pt.       Sterling SANDRA    [facility] [place]

## 2021-04-23 NOTE — TELEPHONE ENCOUNTER
Patient has not had lumbar surgery  Patient has tried a plethora of other therapies as result I think it is reasonable to pursue a spinal cord stimulator

## 2021-04-23 NOTE — TELEPHONE ENCOUNTER
[employer] message from patient on 4/21 at 1051:    My injection has been moved to May 13th.  I guess I live with this a while longer.  -------------  Message sent to pt:    Noble Aceves,     Definitely not the scenario you were hoping for in regard to the timing of your injection. I hope you remain symptom free as you continue the quarantine due to the positive COVID test result.     Take care,     EARLINE Stanton, RN-BC  Patient Care Supervisor  [facility] [place]

## 2021-04-23 NOTE — TELEPHONE ENCOUNTER
Writer called Zene with Pt's insurance and informed that Dx should be failed back surgery.      Insurance needs statement that states that Pt has failed back surgery in a letter.      This needs to be done today for the appeal.    Please right that and fax.      Fax number [phone]     Mariano Mcbride RN  Care Coordinator   [facility] [place]

## 2021-04-23 NOTE — TELEPHONE ENCOUNTER
Reviewed chart. Patient last saw Dr. Pulido on 8/6/20 and placed order for SPINAL CORD STIMULATOR trial. PhD visit was completed on 8/21/20.     In the 4/19 virtual visit with Chiara Garcia, notes state:    5. Further procedures recommended: Plan for SCS trial once approved. Keep transforaminal SRIDEVI as scheduled    ------------------------------  ESIs being performed by Dr. Mcdaniel.     Will route to Dr. Pulido for review of diagnosis criteria for SPINAL CORD STIMULATOR trial.     Erica Navarro, ANGELINAN, RN-BC  Patient Care Supervisor  [facility] [place]

## 2021-04-23 NOTE — TELEPHONE ENCOUNTER
[employer] is calling back   They want the information to be faxed directly to appeals at [phone] and not the previously given fax number      Peyton CHURCHILL    [facility]

## 2021-04-23 NOTE — TELEPHONE ENCOUNTER
Vicky calling back to state they did receive the appeal letter. She is calling in regards to the the questions in the first note about diagnosis. Vicky states they need a response by today at 3:00 PM or they will move forward with the diagnosis code of Lumbar Radiculopathy.    Shanda DE LA CRUZ    [facility]

## 2021-04-23 NOTE — TELEPHONE ENCOUNTER
APpeal was sent a long time ago I'm not heard anything. We'll have to figure out where we are in the process.

## 2021-04-29 DIAGNOSIS — Z11.59 ENCOUNTER FOR SCREENING FOR OTHER VIRAL DISEASES: ICD-10-CM

## 2021-05-05 NOTE — TELEPHONE ENCOUNTER
Incoming fax from , the appeal has been denied.    Reason:    The patient does not have failed back surgery syndrome, complex regional pain syndrome, or diabetic peripheral neuropathy. The reviewer confirmed that the patient has pain secondary to lumbar radiculopathy. The reviewer also noted that the SCS trials are seen as a therapy for patients suffering primarily from neuropathic pain for which there is no alternative therapy and confirmed SCS is only supported for  Select conditions (stated above).       Routing to review, letter is scanned into media. An external appeal can be done as next step    Peyton CHURCHILL    [facility]

## 2021-05-05 NOTE — TELEPHONE ENCOUNTER
Routed to Dr. Pulido and Chiara Garcia PA-C.    Pt has NOT been notified yet.    SAMY Moody-BSN  [facility]

## 2021-05-05 NOTE — TELEPHONE ENCOUNTER
See the 8/7/21 mychart encounter for more information.    Heidy Gipson RN-BSN  [facility]

## 2021-05-11 NOTE — TELEPHONE ENCOUNTER
From: Mariano Mcbride RN      Created: 5/11/2021 11:18 AM        Finn Aceves,      You may or may not have already gotten a letter from your insurance company, we were notified that they denied the appeal for a Spinal Cord Stimulator trial.  Please follow up with Chiara Garcia for follow up.     Mariano Mcbride RN  Care Coordinator   [facility]         Will keep open for correspondence     Mariano Mcbride RN  Care Coordinator   [facility]

## 2021-05-13 ENCOUNTER — ANESTHESIA EVENT (OUTPATIENT)
Dept: SURGERY | Facility: CLINIC | Age: 56
End: 2021-05-13
Payer: COMMERCIAL

## 2021-05-13 ENCOUNTER — HOSPITAL ENCOUNTER (OUTPATIENT)
Facility: CLINIC | Age: 56
Discharge: HOME | End: 2021-05-13
Attending: ANESTHESIOLOGY | Admitting: ANESTHESIOLOGY
Payer: COMMERCIAL

## 2021-05-13 ENCOUNTER — ANESTHESIA (OUTPATIENT)
Dept: SURGERY | Facility: CLINIC | Age: 56
End: 2021-05-13
Payer: COMMERCIAL

## 2021-05-13 ENCOUNTER — HOSPITAL ENCOUNTER (OUTPATIENT)
Dept: GENERAL RADIOLOGY | Facility: CLINIC | Age: 56
End: 2021-05-13
Attending: ANESTHESIOLOGY | Admitting: ANESTHESIOLOGY
Payer: COMMERCIAL

## 2021-05-13 VITALS
RESPIRATION RATE: 18 BRPM | SYSTOLIC BLOOD PRESSURE: 136 MMHG | DIASTOLIC BLOOD PRESSURE: 99 MMHG | TEMPERATURE: 98.9 F | OXYGEN SATURATION: 97 % | HEART RATE: 80 BPM

## 2021-05-13 DIAGNOSIS — M54.16 LUMBAR RADICULOPATHY: ICD-10-CM

## 2021-05-13 PROCEDURE — 62323 NJX INTERLAMINAR LMBR/SAC: CPT | Performed by: ANESTHESIOLOGY

## 2021-05-13 PROCEDURE — 250N000009 HC RX 250: Performed by: NURSE ANESTHETIST, CERTIFIED REGISTERED

## 2021-05-13 PROCEDURE — 370N000017 HC ANESTHESIA TECHNICAL FEE, PER MIN: Performed by: ANESTHESIOLOGY

## 2021-05-13 PROCEDURE — 999N000179 XR SURGERY CARM FLUORO LESS THAN 5 MIN W STILLS: Mod: TC

## 2021-05-13 PROCEDURE — 64483 NJX AA&/STRD TFRM EPI L/S 1: CPT | Mod: 50 | Performed by: ANESTHESIOLOGY

## 2021-05-13 PROCEDURE — 250N000011 HC RX IP 250 OP 636: Performed by: ANESTHESIOLOGY

## 2021-05-13 PROCEDURE — 250N000011 HC RX IP 250 OP 636: Performed by: NURSE ANESTHETIST, CERTIFIED REGISTERED

## 2021-05-13 RX ORDER — PROPOFOL 10 MG/ML
INJECTION, EMULSION INTRAVENOUS PRN
Status: DISCONTINUED | OUTPATIENT
Start: 2021-05-13 | End: 2021-05-13

## 2021-05-13 RX ORDER — METHYLPREDNISOLONE ACETATE 40 MG/ML
INJECTION, SUSPENSION INTRA-ARTICULAR; INTRALESIONAL; INTRAMUSCULAR; SOFT TISSUE PRN
Status: DISCONTINUED | OUTPATIENT
Start: 2021-05-13 | End: 2021-05-13 | Stop reason: HOSPADM

## 2021-05-13 RX ORDER — NALOXONE HYDROCHLORIDE 0.4 MG/ML
0.4 INJECTION, SOLUTION INTRAMUSCULAR; INTRAVENOUS; SUBCUTANEOUS
Status: DISCONTINUED | OUTPATIENT
Start: 2021-05-13 | End: 2021-05-13 | Stop reason: HOSPADM

## 2021-05-13 RX ORDER — IOPAMIDOL 612 MG/ML
INJECTION, SOLUTION INTRATHECAL PRN
Status: DISCONTINUED | OUTPATIENT
Start: 2021-05-13 | End: 2021-05-13 | Stop reason: HOSPADM

## 2021-05-13 RX ORDER — LIDOCAINE HYDROCHLORIDE 20 MG/ML
INJECTION, SOLUTION INFILTRATION; PERINEURAL PRN
Status: DISCONTINUED | OUTPATIENT
Start: 2021-05-13 | End: 2021-05-13

## 2021-05-13 RX ORDER — MEPERIDINE HYDROCHLORIDE 25 MG/ML
12.5 INJECTION INTRAMUSCULAR; INTRAVENOUS; SUBCUTANEOUS
Status: DISCONTINUED | OUTPATIENT
Start: 2021-05-13 | End: 2021-05-13 | Stop reason: HOSPADM

## 2021-05-13 RX ORDER — NALOXONE HYDROCHLORIDE 0.4 MG/ML
0.2 INJECTION, SOLUTION INTRAMUSCULAR; INTRAVENOUS; SUBCUTANEOUS
Status: DISCONTINUED | OUTPATIENT
Start: 2021-05-13 | End: 2021-05-13 | Stop reason: HOSPADM

## 2021-05-13 RX ORDER — SODIUM CHLORIDE, SODIUM LACTATE, POTASSIUM CHLORIDE, CALCIUM CHLORIDE 600; 310; 30; 20 MG/100ML; MG/100ML; MG/100ML; MG/100ML
INJECTION, SOLUTION INTRAVENOUS CONTINUOUS
Status: DISCONTINUED | OUTPATIENT
Start: 2021-05-13 | End: 2021-05-13 | Stop reason: HOSPADM

## 2021-05-13 RX ORDER — ONDANSETRON 2 MG/ML
4 INJECTION INTRAMUSCULAR; INTRAVENOUS EVERY 30 MIN PRN
Status: DISCONTINUED | OUTPATIENT
Start: 2021-05-13 | End: 2021-05-13 | Stop reason: HOSPADM

## 2021-05-13 RX ORDER — ONDANSETRON 4 MG/1
4 TABLET, ORALLY DISINTEGRATING ORAL EVERY 30 MIN PRN
Status: DISCONTINUED | OUTPATIENT
Start: 2021-05-13 | End: 2021-05-13 | Stop reason: HOSPADM

## 2021-05-13 RX ADMIN — LIDOCAINE HYDROCHLORIDE 1 ML: 10 INJECTION, SOLUTION EPIDURAL; INFILTRATION; INTRACAUDAL; PERINEURAL at 07:25

## 2021-05-13 RX ADMIN — LIDOCAINE HYDROCHLORIDE 60 MG: 20 INJECTION, SOLUTION INFILTRATION; PERINEURAL at 08:00

## 2021-05-13 RX ADMIN — PROPOFOL 30 MG: 10 INJECTION, EMULSION INTRAVENOUS at 08:03

## 2021-05-13 RX ADMIN — PROPOFOL 30 MG: 10 INJECTION, EMULSION INTRAVENOUS at 08:05

## 2021-05-13 RX ADMIN — PROPOFOL 30 MG: 10 INJECTION, EMULSION INTRAVENOUS at 08:06

## 2021-05-13 RX ADMIN — PROPOFOL 70 MG: 10 INJECTION, EMULSION INTRAVENOUS at 08:01

## 2021-05-13 ASSESSMENT — LIFESTYLE VARIABLES: TOBACCO_USE: 0

## 2021-05-13 NOTE — OP NOTE
PRIMARY PROBLEM: Low back pain and bilateral leg pains     PROCEDURE: Repeat Bilateral L3-4  Transforaminal Epidural Steroid Injections with fluoroscopic guidance and contrast.      PROCEDURE DETAILS: After written informed consent was obtained from the patient, the patient was escorted to the procedure room.  The patient was placed in the prone position.  A  time out  was conducted to verify patient identity, procedure to be performed, side, site, allergies and any special requirements.  The skin over the thoracolumbar region was prepped and draped in normal sterile fashion. Fluoroscopy was used to identify the neural foramen in AP view and the skin was anesthetized with 2 mL of 1% lidocaine with bicarbonate buffer. A 22-gauge Quincke spinal needle was advanced through this location and advanced under fluoroscopic guidance towards the neural foramen.  The target zone was the 6 o clock position of the pedicle.   Prior to entering the foramen, the depth of the needle was gauged with a lateral view on fluoroscopy. While still in a lateral view, the needle was slowly advanced to avoid injury to the spinal nerve.  Then, in the oblique view (approximately 28 degrees), after negative aspiration, 1.5 mL of Omnipaque contrast dye was injected revealing epidural spread without evidence of intravascular or intrathecal spread.  Then a 2.5cc solution of 20 mg of Depo-Medrol in 2 mL of  Preservative-Free saline was slowly injected into the epidural space at each segment.  This was done bilaterally at the L3-4 segments.  After injection of the medication, as the needle tip was withdrawn, it was flushed with local anesthetic.   The patient was monitored with blood pressure and pulse oximetry machines with the assistance of an RN throughout the procedure.  The patient was alert and responsive to questions throughout the procedure.   The patient tolerated the procedure well and was observed in the post-procedural area.  The patient  was dismissed without apparent complications.      DIAGNOSIS:  1.  Lateral recess stenosis causing back pain and leg pains at the L3-4 segment with a history of long-term pain relief from the previous injection     PLAN:  1. Performed repeat bilateralL3-4  transforaminal epidural steroid injections.  2. The patient was instructed to follow-up per Dr. Mcdaniel's instructions.  Preoperatively, he stated that he had fell several months ago and since that time some of the pain is changed.  He still has the usual pain that he was getting the epidural injections for but there is a new pain going into his leg and posterior thigh region.  If today's injection is not helpful would recommend an updated MRI to look for new disc pathology.     Darryn Mcdaniel MD  Diplomate of the American Board of Anesthesiology, Pain Medicine

## 2021-05-13 NOTE — DISCHARGE INSTRUCTIONS
Home Care Instructions                Procedure: Epidural injection or joint injection    Activity:    Rest today    Do not work today    Resume normal activity tomorrow    Pain:    You may experience soreness at the injection site for 1 to 3 days.    You may use an ice pack for 20 minutes every 2 hours for the first 24 hours    You may use a heating pad after the first 24 hours    You may use Tylenol  (acetaminophen) every 4 hours or other pain medicines as directed by your physician    Safety  Sedation medicine, if given may remain active for many hours.    It is important for the next 24 hours that you do not:    Drive a car    Operate machines or power tools    Consume alcohol, including beer    Sign any important papers or legal documents    You may experience numbness radiating into your legs or arms, (depending on the procedure location)  This numbness may last several hours.  Until the numb sensation returns to normal please use caution in walking, climbing stairs, stepping out of your vehicle, etc.    Common side effects of steroids:  Not everyone will experience corticosteroid side effects. If side effects are experienced they will gradually subside in the 7-10 day period following an injection.    Most common side effects include:    Flushed face and/or chest    Feeling of warmth, particularly in face but could be overall feeling of warmth    Increased blood sugar in diabetic patients    Menstrual irregularities may occur.  If taking hormone based birth control an alternate method of birth control is recommended    Sleep disturbances and/or mood swings are possible    Leg cramps    Please contact us if you have:  Severe pain   Fever more than 101.5 degrees Fahrenheit  Signs of infection (redness, swelling or drainage)      If you have questions during normal business hours (8am-5pm Monday-Friday) contact the [facility] at [phone]. If you need help after hours, we recommend that you go to a  hospital emergency room or dial 911.

## 2021-05-13 NOTE — ANESTHESIA CARE TRANSFER NOTE
Patient: Indra Mehta    Procedure(s):  Bilateral Lumbar 3-4 Epidural Steroid Injection    Diagnosis: Lumbar radiculopathy [M54.16]  Diagnosis Additional Information: No value filed.    Anesthesia Type:   MAC     Note:    Oropharynx: oropharynx clear of all foreign objects and spontaneously breathing  Level of Consciousness: drowsy  Oxygen Supplementation: nasal cannula    Independent Airway: airway patency satisfactory and stable  Dentition: dentition unchanged  Vital Signs Stable: post-procedure vital signs reviewed and stable  Report to RN Given: handoff report given  Patient transferred to: Phase II    Handoff Report: Identifed the Patient, Identified the Reponsible Provider, Reviewed the pertinent medical history, Discussed the surgical course, Reviewed Intra-OP anesthesia mangement and issues during anesthesia, Set expectations for post-procedure period and Allowed opportunity for questions and acknowledgement of understanding      Vitals: (Last set prior to Anesthesia Care Transfer)  CRNA VITALS  5/13/2021 0738 - 5/13/2021 0838      5/13/2021             Pulse:  86    SpO2:  90 %    Resp Rate (observed):  16        Electronically Signed By: YUMIKO Allison CRNA  May 13, 2021  8:42 AM

## 2021-05-13 NOTE — ANESTHESIA PREPROCEDURE EVALUATION
Anesthesia Pre-Procedure Evaluation    Patient: Indra Mehta   MRN: [id number] : 1965        Preoperative Diagnosis: Lumbar radiculopathy [M54.16]   Procedure : Procedure(s):  Bilateral transforaminal Lumbar 3-4 Epidural Steroid Injection     Past Medical History:   Diagnosis Date     Anxiety      Back pain      Depressive disorder      Hyperlipidemia      Hypertension      Meniere's disease, unspecified      Pain medication agreement signed 2010      Past Surgical History:   Procedure Laterality Date     BUNIONECTOMY RT/LT  08    Both feet     COLONOSCOPY N/A 2016    Procedure: COMBINED COLONOSCOPY, SINGLE OR MULTIPLE BIOPSY/POLYPECTOMY BY BIOPSY;  Surgeon: Indra Jernigan MD;  Location: [facility]     DISCECTOMY, FUSION CERVICAL ANTERIOR ONE LEVEL, COMBINED N/A 2017    Procedure: COMBINED DISCECTOMY, FUSION CERVICAL ANTERIOR ONE LEVEL;  Cervical 6-7, Anterior cervical discectomy fusion;  Surgeon: Mike Mckenna MD;  Location: [facility]     DISCECTOMY, FUSION CERVICAL ANTERIOR ONE LEVEL, COMBINED N/A 2018    Procedure: cervical 5-6 anterior cervical discectomy fusion;  Surgeon: Mike Mckenna MD;  Location: [facility]     HC COLONOSCOPY W/WO BRUSH/WASH  2005     HC CREATE EARDRUM OPENING,GEN ANESTH  2007    Pe tube, Left endolymphatic sac enhancement.     HC MASTOIDECTOMY,COMPLETE  2007     HERNIORRHAPHY UMBILICAL N/A 2017    Procedure: HERNIORRHAPHY UMBILICAL;  open umbilical hernia repair;  Surgeon: Boni Story MD;  Location: [facility]     INJECT EPIDURAL CERVICAL N/A 2019    Procedure: INJECTION, SPINE, CERVICAL 6-7 EPIDURAL;  Surgeon: Darryn Mcdaniel MD;  Location: [facility]     INJECT EPIDURAL LUMBAR N/A 2017    Procedure: INJECT EPIDURAL LUMBAR;  lumbar 4-5 epidural steroid injection ;  Surgeon: Darryn Mcdaniel MD;  Location: [facility]     INJECT EPIDURAL LUMBAR N/A 2017    Procedure: INJECT EPIDURAL LUMBAR;  lumbar epidural injection;   Surgeon: Darryn Mcdaniel MD;  [facility]     INJECT EPIDURAL TRANSFORAMINAL Bilateral 8/23/2018    Procedure: INJECT EPIDURAL TRANSFORAMINAL;  transforaminal bilateral lumbar 3-4 steroid injection;  Surgeon: Darryn Mcdaniel MD;  [facility]     INJECT EPIDURAL TRANSFORAMINAL Bilateral 11/8/2018    Procedure: INJECT EPIDURAL TRANSFORAMINAL lumbar 3-4;  Surgeon: Darryn Mcdaniel MD;  [facility]     INJECT EPIDURAL TRANSFORAMINAL Bilateral 6/14/2019    Procedure: INJECTION, EPIDURAL, TRANSFORAMINAL LUMBAR 3-4 BILATERAL;  Surgeon: Darryn Mcdaniel MD;  [facility]     INJECT EPIDURAL TRANSFORAMINAL Bilateral 5/22/2020    Procedure: lumbar 3-4 bilateral transforaminal epidural steroid injection;  Surgeon: Darryn Mcdaniel MD;  [facility]     INJECT EPIDURAL TRANSFORAMINAL Bilateral 9/24/2020    Procedure: INJECTION, EPIDURAL, TRANSFORAMINAL  LUMBAR 3-4 APPROACH;  Surgeon: Darryn Mcdaniel MD;  Location: [facility]     PE TUBES  2006    left ear      Allergies   Allergen Reactions     No Known Drug Allergies       Social History     Tobacco Use     Smoking status: Never Smoker     Smokeless tobacco: Never Used   Substance Use Topics     Alcohol use: Yes     Alcohol/week: 0.0 standard drinks     Comment: rarely      Wt Readings from Last 1 Encounters:   04/06/21 100 kg (220 lb 6.4 oz)        Anesthesia Evaluation   Pt has had prior anesthetic. Type: General and MAC.    No history of anesthetic complications       ROS/MED HX  ENT/Pulmonary:     (+) sleep apnea, uses CPAP,  (-) tobacco use   Neurologic: Comment: Occipital neuralgia    Meniere's disease      Cardiovascular:     (+) Dyslipidemia hypertension-----    METS/Exercise Tolerance:     Hematologic:  - neg hematologic  ROS     Musculoskeletal: Comment: CLBP      GI/Hepatic:    (-) GERD   Renal/Genitourinary:  - neg Renal ROS     Endo:  - neg endo ROS     Psychiatric/Substance Use:     (+) psychiatric history anxiety and depression     Infectious Disease:   - neg infectious disease ROS     Malignancy:  - neg malignancy ROS     Other:  - neg other ROS          Physical Exam    Airway        Mallampati: II   TM distance: > 3 FB   Neck ROM: full   Mouth opening: > 3 cm    Respiratory Devices and Support         Dental  no notable dental history         Cardiovascular   cardiovascular exam normal       Rhythm and rate: regular and normal     Pulmonary   pulmonary exam normal        breath sounds clear to auscultation           OUTSIDE LABS:  CBC:   Lab Results   Component Value Date    WBC 5.7 09/21/2020    WBC 7.5 10/14/2019    HGB 15.6 09/21/2020    HGB 15.4 10/14/2019    HCT 46.3 09/21/2020    HCT 44.9 10/14/2019     09/21/2020     10/14/2019     BMP:   Lab Results   Component Value Date     09/21/2020     10/14/2019    POTASSIUM 4.3 09/21/2020    POTASSIUM 3.9 10/14/2019    CHLORIDE 104 09/21/2020    CHLORIDE 103 10/14/2019    CO2 31 09/21/2020    CO2 32 10/14/2019    BUN 8 09/21/2020    BUN 12 10/14/2019    CR 1.12 12/01/2020    CR 1.09 09/21/2020    GLC 87 09/21/2020     (H) 10/14/2019     COAGS: No results found for: PTT, INR, FIBR  POC: No results found for: BGM, HCG, HCGS  HEPATIC:   Lab Results   Component Value Date    ALBUMIN 4.4 12/01/2020    PROTTOTAL 8.1 12/01/2020    ALT 89 (H) 12/01/2020    AST 25 12/01/2020    ALKPHOS 66 12/01/2020    BILITOTAL 0.7 12/01/2020     OTHER:   Lab Results   Component Value Date    A1C 5.2 12/01/2020    KRISHAN 9.4 09/21/2020    MAG 2.2 03/19/2007    TSH 2.00 03/24/2016    CRP <2.9 01/26/2018       Anesthesia Plan    ASA Status:  2   NPO Status:  NPO Appropriate    Anesthesia Type: MAC.     - Reason for MAC: straight local not clinically adequate   Induction: Propofol, Intravenous.   Maintenance: TIVA.        Consents    Anesthesia Plan(s) and associated risks, benefits, and realistic alternatives discussed. Questions answered and patient/representative(s) expressed understanding.     - Discussed  with:  Patient      - Extended Intubation/Ventilatory Support Discussed: No.      - Patient is DNR/DNI Status: No    Use of blood products discussed: No .     Postoperative Care            Comments:    The risks and benefits of anesthesia, and the alternatives where applicable, have been discussed with the patient, and they wish to proceed.            YUMIKO Allison CRNA

## 2021-05-13 NOTE — ANESTHESIA POSTPROCEDURE EVALUATION
Patient: Indra Mehta    Procedure(s):  Bilateral Lumbar 3-4 Epidural Steroid Injection    Diagnosis:Lumbar radiculopathy [M54.16]  Diagnosis Additional Information: No value filed.    Anesthesia Type:  MAC    Note:  Disposition: Outpatient   Postop Pain Control: Uneventful            Sign Out: Well controlled pain   PONV: No   Neuro/Psych: Uneventful            Sign Out: Acceptable/Baseline neuro status   Airway/Respiratory: Uneventful            Sign Out: Acceptable/Baseline resp. status   CV/Hemodynamics: Uneventful            Sign Out: Acceptable CV status   Other NRE: NONE   DID A NON-ROUTINE EVENT OCCUR? No    Event details/Postop Comments:  Pt was happy with anesthesia care.  No complications.  I will follow up with the pt if needed.           Last vitals:  Vitals:    05/13/21 0715 05/13/21 0810 05/13/21 0820   BP: (!) 132/97 118/84 126/82   Pulse:  81 77   Resp: 18     Temp: 98.9  F (37.2  C)     SpO2:  97% 99%       Last vitals prior to Anesthesia Care Transfer:  CRNA VITALS  5/13/2021 0738 - 5/13/2021 0838      5/13/2021             Pulse:  86    SpO2:  90 %    Resp Rate (observed):  16          Electronically Signed By: YUMIKO Allison CRNA  May 13, 2021  8:43 AM

## 2021-05-20 ENCOUNTER — VIRTUAL VISIT (OUTPATIENT)
Dept: PALLIATIVE MEDICINE | Facility: CLINIC | Age: 56
End: 2021-05-20
Payer: COMMERCIAL

## 2021-05-20 DIAGNOSIS — Z98.1 S/P CERVICAL SPINAL FUSION: ICD-10-CM

## 2021-05-20 DIAGNOSIS — G62.9 NEUROPATHY: ICD-10-CM

## 2021-05-20 DIAGNOSIS — M54.2 CERVICALGIA: ICD-10-CM

## 2021-05-20 DIAGNOSIS — G89.4 CHRONIC PAIN SYNDROME: ICD-10-CM

## 2021-05-20 DIAGNOSIS — M54.16 LUMBAR RADICULOPATHY: ICD-10-CM

## 2021-05-20 DIAGNOSIS — F11.90 CHRONIC, CONTINUOUS USE OF OPIOIDS: ICD-10-CM

## 2021-05-20 DIAGNOSIS — M79.18 MYOFASCIAL PAIN: ICD-10-CM

## 2021-05-20 PROCEDURE — 99213 OFFICE O/P EST LOW 20 MIN: CPT | Mod: GT | Performed by: PHYSICIAN ASSISTANT

## 2021-05-20 RX ORDER — OXYCODONE HYDROCHLORIDE 5 MG/1
TABLET ORAL
Qty: 165 TABLET | Refills: 0 | Status: SHIPPED | OUTPATIENT
Start: 2021-05-20 | End: 2021-06-18

## 2021-05-20 NOTE — PATIENT INSTRUCTIONS
After Visit Instructions:     Thank you for coming to [facility] for your care. It is my goal to partner with you to help you reach your optimal state of health.     I am recommending multidisciplinary care at this time.  The focus of care will be to continue gradual rehabilitation and pain management with medication adjustments as needed.    Continue daily self-care, identifying contributing factors, and monitoring variations in pain level. Continue to integrate self-care into your life.            Schedule follow-up with Chiara Garcia PA-C in 4 weeks for a virtual visit. You will need to make this appointment.     Imaging: Consider repeat lumbar MRI    Medication recommendations:     Lyrica 150mg three times daily-we can increase this to 200mg three times daily    Continue nortriptyline to 50 mg at bedtime.    Continue Oxycodone 5m-2 tablets every 4-6 hours as needed. Max of 6 tabs in a day.       Chiara Garcia PA-C  [facility]    Contact information: [facility]  Clinic Number:  [phone]     Call with any questions about your care and for scheduling assistance.     Calls are returned Monday through Friday between 8 AM and 4:30 PM. We usually get back to you within 2 business days depending on the issue/request.    If we are prescribing your medications:    For opioid medication refills, call the clinic or send a [employer] message 7 days in advance.  Please include:    Name of requested medication    Name of the pharmacy.    For non-opioid medications, call your pharmacy directly to request a refill. Please allow 3-4 days to be processed.     Per [place] State Law:    All controlled substance prescriptions must be filled within 30 days of being written.      For those controlled substances allowing refills, pickup must occur within 30 days of last fill.      We believe regular attendance is ashford to your success  in our program!      Any time you are unable to keep your appointment we ask that you call us at least 24 hours in advance to cancel.This will allow us to offer the appointment time to another patient.   Multiple missed appointments may lead to dismissal from the clinic.

## 2021-06-11 NOTE — PROGRESS NOTES
CHIEF COMPLAINT:  Pain  -neck and low back pain     INTERVAL HISTORY:  Last seen on 21 virtually       Recommendations/plan at the last visit included:  1. Physical Therapy:  Not at this time, but he should continue his exercises  2. Clinical Health Psychologist:  NO  3. Diagnostic Studies: none  4. Medication Management:    1. Continue Lyrica 150 mg TID  2. Continue nortriptyline to 50 mg at bedtime.  3. Continue Oxycodone 5m-2 tablets every 4-6 hours as needed. Max of 6 tabs in a day.   4. Consider Butrans. Will need to taper Oxycodone to a max of 4 tabs/day for 1 week before starting. Could also consider OxyContin  5. Further procedures recommended: none at this time. Insurance denied appeal for SCS.  6. Recommendations to PCP. See above     Follow up with this provider: 4 Weeks for a virtual visit    Since his last visit, Indra Mehta reports:    He states that the injection has helped the pain going down into his legs. He states that it has not helped the pain in the low back. He states that he gets twitches of pain going into hip and the top of the buttocks at night. He states that he continues to feel very tired due to waking up so much at night. He states that he has tried taking 2 Oxycodone at once at night, but states that this makes him feel jittery.     He states that he did something to his left shoulder. He states that he is unsure what he did. He states that this has been going on for about 2 months. Reaching over across his chest hurts and reaching above his head hurts. He hasn't been going to the gym due to this. He knows that this doesn't help his overall pain.    He states that the spot between his shoulder blade in the middle of his back has been bothering him as well. His neck causes problems underneath the shoulder blades, but about 1 inch below the shoulder blade radiates out and then gets an ache at the bottom of his rib cage. He states that it builds and then lasts a day or  so at different degrees of discomfort and then resolves. Occasionally using a roller will help.     Pain Information:                   Pain today 5/10       UDS: 2020-reviewed and appropriate  Controlled Substance Agreement signed: 2020     CURRENT RELEVANT PAIN MEDICATIONS:  Tylenol 500mg-1000mg 4x/day  Xanax-does not use daily  Nortriptyline 50mg at bedtime  Oxycodone 5m-2 tablets every 4-6 hours, max of 6/day  Lyrica 150 mg TID     Patient is using the medication as prescribed:  YES  Is your medication helpful?     YES   Medication side effects? no side effect     Previous Medications: (H--helped; HI--Helped initially; SWH-- somewhat helpful, NH--No help; W--worse; SE--side effects)   Norco/vicodin H  Oxycodone H  Flexeril - was helpful at night but caused him to be groggy in the morning  Robaxin - not helpful  Tizanidine H  Gabapentin ?  Lyrica SE sedation  Cymbalta NH  Nortriptyline H     Past Pain Treatments:  Injections:    - 18 bilateral L3-4 TFESI - (Dr Mcdaniel)  - 18 bilateral L3-4 TFESI - seems to have worsened pain (Dr Mcdaniel)  - 18 TFESI right C5-6 - helped with arm pain and numbness  - 17 bilateral L3-4 TFESI   - 10/23/17 Bilateral L4-5 TFESI   - 2018 Bilateral L3-4 TFESI   -2019 Bilateral L3-4 TFESI   -2019 C6-7 SRIDVEI   -2020 L3-4 bilateral TLESI  -21 L3-4 bilateral TLESI  Surgery:  ACDF C6-7 on 17 with improvement; ACDF C5-6 2018, posterior C5-7 fusion. Lateral mass screws, right C5-6 medial facetectomy, right C6 foraminotomy on 10/29/2019  TENS unit: helpful     [place] Board of Pharmacy Data Base Reviewed:    YES; As expected, no concern for misuse/abuse of controlled medications based on this report. Checked 21     THE 4 As OF OPIOID MAINTENANCE ANALGESIA    Analgesia: Is pain relief clinically significant? YES   Activity: Is patient functional and able to perform Activities of Daily Living? YES   Adverse effects: Is  patient free from adverse side effects from opiates? YES   Adherence to Rx protocol: Is patient adhering to Controlled Substance Agreement and taking medications ONLY as ordered? YES         Is Narcan prescribed for opiate use >50 MME daily? N/A        Daily MME: 37.5-45     Medications:  Current Outpatient Prescriptions          Current Outpatient Medications   Medication Sig Dispense Refill     Acetaminophen (TYLENOL PO) Take 500 mg by mouth every 4 hours as needed for mild pain or fever         ALPRAZolam (XANAX XR) 0.5 MG 24 hr tablet Take 1 tablet (0.5 mg) by mouth At Bedtime 30 tablet 3     gabapentin (NEURONTIN) 400 MG capsule Take 3 capsules (1,200 mg) by mouth 3 times daily 270 capsule 3     losartan (COZAAR) 50 MG tablet Take 1 tablet (50 mg) by mouth daily 90 tablet 0     nortriptyline (PAMELOR) 25 MG capsule Take 1 capsule (25 mg) by mouth At Bedtime 30 capsule 0     order for DME Equipment being ordered: TENS Pads as directed 1 Device 0     oxyCODONE (ROXICODONE) 5 MG tablet Take 1 tab every 4-6 hours as needed for severe pain. MAX 4 TABS PER DAY. Okay to fill 3/12/20 start 3/18/20. 100 tablet 0     tiZANidine (ZANAFLEX) 4 MG tablet Take 1 tablet (4 mg) by mouth At Bedtime 90 tablet 0               Assessment:  Indra Mehta is a 55 year old male who presents today for follow up regarding his:     1.  Cervicalgia  2.  S/p cervical spine fusion  3.  Myofascial pain  4. Lumbar radiculopathy  5. Neuropathy   6. Chronic pain syndrome  7. Chronic use of opioids    Overall pain is stable. He has had worsening pain in his left shoulder. He will schedule with orthopedics to review. His sleep has been worse lately which has caused some increased muscle pain. He has tried Flexeril 10mg in the past which helped his sleep but he felt groggy the next day. Will try the 5mg dose. We did discuss the Nortriptyline and potentially increasing this dose, but he does not feel that it makes him tired.         Plan:     Diagnosis reviewed, treatment option addressed, and risk/benifits discussed.  Self-care instructions given.  I am recommending a multidisciplinary treatment plan to help this patient better manage pain.       1. Physical Therapy:  Not at this time, but he should continue his exercises  2. Clinical Health Psychologist:  NO  3. Diagnostic Studies: none  4. Medication Management:    1. Continue Lyrica 150 mg TID  2. Continue nortriptyline to 50 mg at bedtime.  3. Continue Oxycodone 5m-2 tablets every 4-6 hours as needed. Max of 6 tabs in a day.   4. Consider Butrans. Will need to taper Oxycodone to a max of 4 tabs/day for 1 week before starting. Could also consider OxyContin  5. Flexeril 5mg: take 1 tab TID PRN  5. Further procedures recommended: none at this time. Insurance denied appeal for SCS.  6. UDS and CSA updated today  7. Follow up with orthopedics regarding left shoulder  8. Recommendations to PCP. See above    Follow up with this provider: 4-6 weeks for a virtual visit    The total TIME spent on this patient on the day of the appointment was 22 minutes.    Time spent preparing to see the patient (reviewing records and tests) 1 minutes  Time spend face to face with the patient 16 minutes  Time spent ordering tests, medications, procedures and referrals 0  minutes  Time spent Referring and communicating with other healthcare professionals 0 minutes  Time spent documenting clinical information in Epic 5 minutes        Chiara Garcia PA-C   [facility] [place]

## 2021-06-14 DIAGNOSIS — G62.9 NEUROPATHY: ICD-10-CM

## 2021-06-14 RX ORDER — PREGABALIN 150 MG/1
150 CAPSULE ORAL 3 TIMES DAILY
Qty: 90 CAPSULE | Refills: 1 | Status: SHIPPED | OUTPATIENT
Start: 2021-06-14 | End: 2021-08-18

## 2021-06-14 NOTE — TELEPHONE ENCOUNTER
Chief Complaint   Patient presents with     Refill Request     pregabalin      Refill request received via fax by pharmacy   [id number] [address] [place] [address]       Pending Prescriptions:                       Disp   Refills    pregabalin (LYRICA) 150 MG capsule        90 cap*1            Sig: Take 1 capsule (150 mg) by mouth 3 times daily         Last Written Prescription Date:  4/19/21  Last Fill Quantity: 90,   # refills: 1  Last Office Visit with prescribing provider: 5/20/21  Future Office visit:    Next 5 appointments (look out 90 days)    Jun 18, 2021  8:00 AM  Return Visit with IGOR Hammonds [facility] ([facility] ) [address] [place] [id number] [phone]

## 2021-06-18 ENCOUNTER — OFFICE VISIT (OUTPATIENT)
Dept: PALLIATIVE MEDICINE | Facility: CLINIC | Age: 56
End: 2021-06-18
Payer: COMMERCIAL

## 2021-06-18 VITALS
HEIGHT: 72 IN | BODY MASS INDEX: 27.5 KG/M2 | DIASTOLIC BLOOD PRESSURE: 86 MMHG | TEMPERATURE: 98.5 F | SYSTOLIC BLOOD PRESSURE: 136 MMHG | WEIGHT: 203 LBS

## 2021-06-18 DIAGNOSIS — M54.50 CHRONIC BILATERAL LOW BACK PAIN WITHOUT SCIATICA: ICD-10-CM

## 2021-06-18 DIAGNOSIS — M79.18 MYOFASCIAL PAIN: ICD-10-CM

## 2021-06-18 DIAGNOSIS — M54.16 LUMBAR RADICULOPATHY: ICD-10-CM

## 2021-06-18 DIAGNOSIS — G89.29 CHRONIC BILATERAL LOW BACK PAIN WITHOUT SCIATICA: ICD-10-CM

## 2021-06-18 DIAGNOSIS — Z98.1 S/P CERVICAL SPINAL FUSION: ICD-10-CM

## 2021-06-18 DIAGNOSIS — G62.9 NEUROPATHY: ICD-10-CM

## 2021-06-18 DIAGNOSIS — G89.4 CHRONIC PAIN SYNDROME: ICD-10-CM

## 2021-06-18 DIAGNOSIS — M54.2 CERVICALGIA: ICD-10-CM

## 2021-06-18 DIAGNOSIS — F11.90 CHRONIC, CONTINUOUS USE OF OPIOIDS: ICD-10-CM

## 2021-06-18 PROCEDURE — 80307 DRUG TEST PRSMV CHEM ANLYZR: CPT | Performed by: PHYSICIAN ASSISTANT

## 2021-06-18 PROCEDURE — 99214 OFFICE O/P EST MOD 30 MIN: CPT | Performed by: PHYSICIAN ASSISTANT

## 2021-06-18 RX ORDER — OXYCODONE HYDROCHLORIDE 5 MG/1
TABLET ORAL
Qty: 165 TABLET | Refills: 0 | Status: SHIPPED | OUTPATIENT
Start: 2021-06-18 | End: 2021-07-19

## 2021-06-18 RX ORDER — CYCLOBENZAPRINE HCL 5 MG
5 TABLET ORAL 3 TIMES DAILY PRN
Qty: 30 TABLET | Refills: 0 | Status: SHIPPED | OUTPATIENT
Start: 2021-06-18 | End: 2021-07-19

## 2021-06-18 RX ORDER — NORTRIPTYLINE HYDROCHLORIDE 50 MG/1
50 CAPSULE ORAL AT BEDTIME
Qty: 30 CAPSULE | Refills: 2 | Status: SHIPPED | OUTPATIENT
Start: 2021-06-18 | End: 2021-09-13

## 2021-06-18 ASSESSMENT — PAIN SCALES - GENERAL: PAINLEVEL: MODERATE PAIN (5)

## 2021-06-18 ASSESSMENT — MIFFLIN-ST. JEOR: SCORE: 1798.8

## 2021-06-18 NOTE — LETTER
Opioid / Opioid Plus Controlled Substance Agreement    This is an agreement between you and your provider about the safe and appropriate use of controlled substance/opioids prescribed by your care team. Controlled substances are medicines that can cause physical and mental dependence (abuse).    There are strict laws about having and using these medicines. We here at [facility] are committing to working with you in your efforts to get better. To support you in this work, we ll help you schedule regular office appointments for medicine refills. If we must cancel or change your appointment for any reason, we ll make sure you have enough medicine to last until your next appointment.     As a Provider, I will:    Listen carefully to your concerns and treat you with respect.     Recommend a treatment plan that I believe is in your best interest. This plan may involve therapies other than opioid pain medication.     Talk with you often about the possible benefits, and the risk of harm of any medicine that we prescribe for you.     Provide a plan on how to taper (discontinue or go off) using this medicine if the decision is made to stop its use.    As a Patient, I understand that opioid(s):     Are a controlled substance prescribed by my care team to help me function or work and manage my condition(s).     Are strong medicines and can cause serious side effects such as:    Drowsiness, which can seriously affect my driving ability    A lower breathing rate, enough to cause death    Harm to my thinking ability     Depression     Abuse of and addiction to this medicine    Need to be taken exactly as prescribed. Combining opioids with certain medicines or chemicals (such as illegal drugs, sedatives, sleeping pills, and benzodiazepines) can be dangerous or even fatal. If I stop opioids suddenly, I may have severe withdrawal symptoms.    Do not work for all types of pain nor for all patients. If they re not helpful, I may  be asked to stop them.        The risks, benefits and side effects of these medicine(s) were explained to me. I agree that:  1. I will take part in other treatments as advised by my care team. This may be psychiatry or counseling, physical therapy, behavioral therapy, group treatment or a referral to a specialist.     2. I will keep all my appointments. I understand that this is part of the monitoring of opioids. My care team may require an office visit for EVERY opioid/controlled substance refill. If I miss appointments or don t follow instructions, my care team may stop my medicine.    3. I will take my medicines as prescribed. I will not change the dose or schedule unless my care team tells me to. There will be no refills if I run out early.     4. I may be asked to come to the clinic and complete a urine drug test or complete a pill count at any time. If I don t give a urine sample or participate in a pill count, the care team may stop my medicine.    5. I will only receive prescriptions from this clinic for chronic pain. If I am treated by another provider for acute pain issues, I will tell them that I am taking opioid pain medication for chronic pain and that I have a treatment agreement with this provider. I will inform my [facility] care team within one business day if I am given a prescription for any pain medication by another healthcare provider. My [facility] care team can contact other providers and pharmacists about my use of any medicines.    6. It is up to me to make sure that I don t run out of my medicines on weekends or holidays. If my care team is willing to refill my opioid prescription without a visit, I must request refills only during office hours. Refills may take up to 3 business days to process. I will use one pharmacy to fill all my opioid and other controlled substance prescriptions. I will notify the clinic about any changes to my insurance or medication  availability.    7. I am responsible for my prescriptions. If the medicine/prescription is lost, stolen or destroyed, it will not be replaced. I also agree not to share controlled substance medicines with anyone.    8. I am aware I should not use any illegal or recreational drugs. I agree not to drink alcohol unless my care team says I can.       9. If I enroll in the [place] Medical Cannabis program, I will tell my care team prior to my next refill.     10. I will tell my care team right away if I become pregnant, have a new medical problem treated outside of my regular clinic, or have a change in my medications.    11. I understand that this medicine can affect my thinking, judgment and reaction time. Alcohol and drugs affect the brain and body, which can affect the safety of my driving. Being under the influence of alcohol or drugs can affect my decision-making, behaviors, personal safety, and the safety of others. Driving while impaired (DWI) can occur if a person is driving, operating, or in physical control of a car, motorcycle, boat, snowmobile, ATV, motorbike, off-road vehicle, or any other motor vehicle ([place] Statute 169A.20). I understand the risk if I choose to drive or operate any vehicle or machinery.    I understand that if I do not follow any of the conditions above, my prescriptions or treatment may be stopped or changed.          Opioids  What You Need to Know    What are opioids?   Opioids are pain medicines that must be prescribed by a doctor. They are also known as narcotics.     Examples are:   1. morphine (MS Contin, Lucia)  2. oxycodone (Oxycontin)  3. oxycodone and acetaminophen (Percocet)  4. hydrocodone and acetaminophen (Vicodin, Norco)   5. fentanyl patch (Duragesic)   6. hydromorphone (Dilaudid)   7. methadone  8. codeine (Tylenol #3)     What do opioids do well?   Opioids are best for severe short-term pain such as after a surgery or injury. They may work well for cancer pain. They may  help some people with long-lasting (chronic) pain.     What do opioids NOT do well?   Opioids never get rid of pain entirely, and they don t work well for most patients with chronic pain. Opioids don t reduce swelling, one of the causes of pain.                                    Other ways to manage chronic pain and improve function include:       Treat the health problem that may be causing pain    Anti-inflammation medicines, which reduce swelling and tenderness, such as ibuprofen (Advil, Motrin) or naproxen (Aleve)    Acetaminophen (Tylenol)    Antidepressants and anti-seizure medicines, especially for nerve pain    Topical treatments such as patches or creams    Injections or nerve blocks    Chiropractic or osteopathic treatment    Acupuncture, massage, deep breathing, meditation, visual imagery, aromatherapy    Use heat or ice at the pain site    Physical therapy     Exercise    Stop smoking    Take part in therapy       Risks and side effects     Talk to your doctor before you start or decide to keep taking opioids. Possible side effects include:      Lowering your breathing rate enough to cause death    Overdose, including death, especially if taking higher than prescribed doses    Worse depression symptoms; less pleasure in things you usually enjoy    Feeling tired or sluggish    Slower thoughts or cloudy thinking    Being more sensitive to pain over time; pain is harder to control    Trouble sleeping or restless sleep    Changes in hormone levels (for example, less testosterone)    Changes in sex drive or ability to have sex    Constipation    Unsafe driving    Itching and sweating    Dizziness    Nausea, throwing up and dry mouth    What else should I know about opioids?    Opioids may lead to dependence, tolerance, or addiction.      Dependence means that if you stop or reduce the medicine too quickly, you will have withdrawal symptoms. These include loose poop (diarrhea), jitters, flu-like symptoms,  nervousness and tremors. Dependence is not the same as addiction.                       Tolerance means needing higher doses over time to get the same effect. This may increase the chance of serious side effects.      Addiction is when people improperly use a substance that harms their body, their mind or their relations with others. Use of opiates can cause a relapse of addiction if you have a history of drug or alcohol abuse.      People who have used opioids for a long time may have a lower quality of life, worse depression, higher levels of pain and more visits to doctors.    You can overdose on opioids. Take these steps to lower your risk of overdose:    1. Recognize the signs:  Signs of overdose include decrease or loss of consciousness (blackout), slowed breathing, trouble waking up and blue lips. If someone is worried about overdose, they should call 911.    2. Talk to your doctor about Narcan (naloxone).   If you are at risk for overdose, you may be given a prescription for Narcan. This medicine very quickly reverses the effects of opioids.   If you overdose, a friend or family member can give you Narcan while waiting for the ambulance. They need to know the signs of overdose and how to give Narcan.     3. Don't use alcohol or street drugs.   Taking them with opioids can cause death.    4. Do not take any of these medicines unless your doctor says it s OK. Taking these with opioids can cause death:    Benzodiazepines, such as lorazepam (Ativan), alprazolam (Xanax) or diazepam (Valium)    Muscle relaxers, such as cyclobenzaprine (Flexeril)    Sleeping pills like zolpidem (Ambien)     Other opioids      How to keep you and other people safe while taking opioids:    1. Never share your opioids with others.  Opioid medicines are regulated by the Drug Enforcement Agency (ALCIDES). Selling or sharing medications is a criminal act.    2. Be sure to store opioids in a secure place, locked up if possible. Young children  can easily swallow them and overdose.    3. When you are traveling with your medicines, keep them in the original bottles. If you use a pill box, be sure you also carry a copy of your medicine list from your clinic or pharmacy.    4. Safe disposal of opioids    Most pharmacies have places to get rid of medicine, called disposal kiosks. Medicine disposal options are also available in every [facility]. Search your county and  medication disposal  to find more options. You can find more details at:  [email or web address]     I agree that my provider, clinic care team, and pharmacy may work with any city, state or federal law enforcement agency that investigates the misuse, sale, or other diversion of my controlled medicine. I will allow my provider to discuss my care with, or share a copy of, this agreement with any other treating provider, pharmacy or emergency room where I receive care.    I have read this agreement and have asked questions about anything I did not understand.    _______________________________________________________  Patient Signature - Indra Mehta _____________________                   Date     _______________________________________________________  Provider Signature - Chiara Garcia PA-C   _____________________                   Date     _______________________________________________________  Witness Signature (required if provider not present while patient signing)   _____________________                   Date

## 2021-06-18 NOTE — PATIENT INSTRUCTIONS
After Visit Instructions:     Thank you for coming to [facility] for your care. It is my goal to partner with you to help you reach your optimal state of health.     I am recommending multidisciplinary care at this time.  The focus of care will be to continue gradual rehabilitation and pain management with medication adjustments as needed.    Continue daily self-care, identifying contributing factors, and monitoring variations in pain level. Continue to integrate self-care into your life.          Physical therapy assessment/visit: Continue home exercise program    Schedule follow-up with Chiara Garcia PA-C in 4-6 weeks for virtual visit. You will need to make this appointment.     Imaging: None at this time    Labs: Urine drug screen today    Schedule substance agreement reviewed and signed today    Procedures recommended: None at this time    Schedule appointment with orthopedics to discuss left shoulder pain    Medication recommendations:     Continue nortriptyline 50 mg at bedtime    Continue Lyrica 150 mg 3 times a day    Continue oxycodone 5 mg 1 to 2 tablets every 4-6 hours as needed max of 6 tabs in a day    We could consider Butrans in the future however you need to taper her oxycodone to a max of 4 tabs a day for a week before starting.  We also could consider OxyContin.  Belbuca may be an option as well    Start Flexeril 5 mg with instructions to take 1 tab 3 times daily as needed.  This medication may make you tired.  You can also cut this tablet in half and try a half a tab or 1-1/2 tabs at bedtime.      Chiara Garcia PA-C  [facility]  [place]    Contact information: [facility] [place]  Clinic Number:  [phone]     Call with any questions about your care and for scheduling assistance.     Calls are returned Monday through Friday between 8 AM and 4:30 PM. We usually get back to you within 2  business days depending on the issue/request.    If we are prescribing your medications:    For opioid medication refills, call the clinic or send a Knowtat message 7 days in advance.  Please include:    Name of requested medication    Name of the pharmacy.    For non-opioid medications, call your pharmacy directly to request a refill. Please allow 3-4 days to be processed.     Per [place] State Law:    All controlled substance prescriptions must be filled within 30 days of being written.      For those controlled substances allowing refills, pickup must occur within 30 days of last fill.      We believe regular attendance is key to your success in our program!      Any time you are unable to keep your appointment we ask that you call us at least 24 hours in advance to cancel.This will allow us to offer the appointment time to another patient.   Multiple missed appointments may lead to dismissal from the clinic.

## 2021-06-22 ENCOUNTER — OFFICE VISIT (OUTPATIENT)
Dept: ORTHOPEDICS | Facility: CLINIC | Age: 56
End: 2021-06-22
Payer: COMMERCIAL

## 2021-06-22 ENCOUNTER — ANCILLARY PROCEDURE (OUTPATIENT)
Dept: GENERAL RADIOLOGY | Facility: CLINIC | Age: 56
End: 2021-06-22
Attending: ORTHOPAEDIC SURGERY
Payer: COMMERCIAL

## 2021-06-22 VITALS
BODY MASS INDEX: 27.77 KG/M2 | HEIGHT: 72 IN | RESPIRATION RATE: 18 BRPM | SYSTOLIC BLOOD PRESSURE: 150 MMHG | WEIGHT: 205 LBS | DIASTOLIC BLOOD PRESSURE: 82 MMHG

## 2021-06-22 DIAGNOSIS — M25.512 LEFT SHOULDER PAIN, UNSPECIFIED CHRONICITY: Primary | ICD-10-CM

## 2021-06-22 DIAGNOSIS — M25.512 LEFT SHOULDER PAIN, UNSPECIFIED CHRONICITY: ICD-10-CM

## 2021-06-22 DIAGNOSIS — M75.42 IMPINGEMENT SYNDROME OF LEFT SHOULDER: ICD-10-CM

## 2021-06-22 LAB
CREAT UR-MCNC: 43 MG/DL
OXYCODONE UR CFM-MCNC: 197 NG/ML
OXYCODONE/CREAT UR: 458 NG/MG{CREAT}
OXYMORPHONE UR CFM-MCNC: 75 NG/ML
OXYMORPHONE/CREAT UR: 174 NG/MG{CREAT}
PREGABALIN UR QL CFM: PRESENT
RPT COMMENT: ABNORMAL

## 2021-06-22 PROCEDURE — 20610 DRAIN/INJ JOINT/BURSA W/O US: CPT | Mod: LT | Performed by: ORTHOPAEDIC SURGERY

## 2021-06-22 PROCEDURE — 99203 OFFICE O/P NEW LOW 30 MIN: CPT | Mod: 25 | Performed by: ORTHOPAEDIC SURGERY

## 2021-06-22 PROCEDURE — 73030 X-RAY EXAM OF SHOULDER: CPT | Mod: TC | Performed by: RADIOLOGY

## 2021-06-22 RX ADMIN — TRIAMCINOLONE ACETONIDE 40 MG: 40 INJECTION, SUSPENSION INTRA-ARTICULAR; INTRAMUSCULAR at 09:02

## 2021-06-22 RX ADMIN — LIDOCAINE HYDROCHLORIDE 5 ML: 10 INJECTION, SOLUTION INFILTRATION; PERINEURAL at 09:02

## 2021-06-22 ASSESSMENT — MIFFLIN-ST. JEOR: SCORE: 1807.87

## 2021-06-22 NOTE — PROGRESS NOTES
Large Joint Injection/Arthocentesis: L subacromial bursa    Date/Time: 6/22/2021 9:02 AM  Performed by: Alvaro Mariee MD  Authorized by: Alvaro Mariee MD     Indications:  Pain  Needle Size:  22 G  Guidance: landmark guided    Approach:  Posterolateral  Location:  Shoulder      Site:  L subacromial bursa  Medications:  40 mg triamcinolone 40 MG/ML; 5 mL lidocaine 1 %  Outcome:  Tolerated well, no immediate complications  Procedure discussed: discussed risks, benefits, and alternatives    Consent Given by:  Patient  Timeout: timeout called immediately prior to procedure    Prep: patient was prepped and draped in usual sterile fashion

## 2021-06-22 NOTE — LETTER
6/22/2021         RE: Indra Mehta  [address] [facility] [id number]        Dear Colleague,    Thank you for referring your patient, Indra Mehta, to the [facility]. Please see a copy of my visit note below.    Large Joint Injection/Arthocentesis: L subacromial bursa    Date/Time: 6/22/2021 9:02 AM  Performed by: Alvaro Mariee MD  Authorized by: Alvaro Mariee MD     Indications:  Pain  Needle Size:  22 G  Guidance: landmark guided    Approach:  Posterolateral  Location:  Shoulder      Site:  L subacromial bursa  Medications:  40 mg triamcinolone 40 MG/ML; 5 mL lidocaine 1 %  Outcome:  Tolerated well, no immediate complications  Procedure discussed: discussed risks, benefits, and alternatives    Consent Given by:  Patient  Timeout: timeout called immediately prior to procedure    Prep: patient was prepped and draped in usual sterile fashion            Indra Mehta is a 55 year old male who is seen as self referral for left shoulder pain.  He has had pain since the end of March with no known history of injury.  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  He describes aching, shooting, constant pain rated 5 out of 10.  Pain is worse with movement.  He has been in a sling to help.  He is used Motrin 800 3 times daily in the past.  Not recently.    X-ray of the left shoulder obtained today shows mild acromioclavicular arthrosis with spurring.  No glenohumeral arthritis.    Past Medical History:   Diagnosis Date     Anxiety      Back pain      Depressive disorder      Hyperlipidemia      Hypertension      Meniere's disease, unspecified      Pain medication agreement signed 8/20/2010       Past Surgical History:   Procedure Laterality Date     BUNIONECTOMY RT/LT  09/05/08    Both feet     COLONOSCOPY N/A 12/28/2016    Procedure: COMBINED COLONOSCOPY, SINGLE OR MULTIPLE BIOPSY/POLYPECTOMY BY BIOPSY;   Surgeon: Indra Jernigan MD;  [facility]     DISCECTOMY, FUSION CERVICAL ANTERIOR ONE LEVEL, COMBINED N/A 7/5/2017    Procedure: COMBINED DISCECTOMY, FUSION CERVICAL ANTERIOR ONE LEVEL;  Cervical 6-7, Anterior cervical discectomy fusion;  Surgeon: Mike Mckenna MD;  [facility]     DISCECTOMY, FUSION CERVICAL ANTERIOR ONE LEVEL, COMBINED N/A 12/19/2018    Procedure: cervical 5-6 anterior cervical discectomy fusion;  Surgeon: Mike Mckenna MD;  [facility]     HC COLONOSCOPY W/WO BRUSH/WASH  12/27/2005     HC CREATE EARDRUM OPENING,GEN ANESTH  09/27/2007    Pe tube, Left endolymphatic sac enhancement.     HC MASTOIDECTOMY,COMPLETE  09/27/2007     HERNIORRHAPHY UMBILICAL N/A 8/11/2017    Procedure: HERNIORRHAPHY UMBILICAL;  open umbilical hernia repair;  Surgeon: Boni Story MD;  [facility]     INJECT EPIDURAL CERVICAL N/A 8/22/2019    Procedure: INJECTION, SPINE, CERVICAL 6-7 EPIDURAL;  Surgeon: Darryn Mcdaniel MD;  [facility]     INJECT EPIDURAL LUMBAR N/A 11/9/2017    Procedure: INJECT EPIDURAL LUMBAR;  lumbar 4-5 epidural steroid injection ;  Surgeon: Darryn Mcdaniel MD;  [facility]     INJECT EPIDURAL LUMBAR N/A 12/28/2017    Procedure: INJECT EPIDURAL LUMBAR;  lumbar epidural injection;  Surgeon: Darryn Mcdaniel MD;  [facility]     INJECT EPIDURAL LUMBAR Bilateral 5/13/2021    Procedure: Bilateral Lumbar 3-4 Epidural Steroid Injection;  Surgeon: Darryn Mcdaniel MD;  [facility]     INJECT EPIDURAL TRANSFORAMINAL Bilateral 8/23/2018    Procedure: INJECT EPIDURAL TRANSFORAMINAL;  transforaminal bilateral lumbar 3-4 steroid injection;  Surgeon: Darryn Mcdaniel MD;  [facility]     INJECT EPIDURAL TRANSFORAMINAL Bilateral 11/8/2018    Procedure: INJECT EPIDURAL TRANSFORAMINAL lumbar 3-4;  Surgeon: Darryn Mcdaniel MD;  [facility]     INJECT EPIDURAL TRANSFORAMINAL Bilateral 6/14/2019    Procedure: INJECTION, EPIDURAL, TRANSFORAMINAL LUMBAR 3-4  BILATERAL;  Surgeon: Darryn Mcdaniel MD;  [facility]     INJECT EPIDURAL TRANSFORAMINAL Bilateral 2020    Procedure: lumbar 3-4 bilateral transforaminal epidural steroid injection;  Surgeon: Darryn Mcdaniel MD;  [facility]     INJECT EPIDURAL TRANSFORAMINAL Bilateral 2020    Procedure: INJECTION, EPIDURAL, TRANSFORAMINAL  LUMBAR 3-4 APPROACH;  Surgeon: Darryn Mcdaniel MD;  [facility]     PE TUBES  2006    left ear       Family History   Problem Relation Age of Onset     Diabetes Mother      Cancer Mother         colon cancer -  at 52, diag at 42     Hypertension Father      Heart Disease Father          of AAA     No Known Problems Brother      No Known Problems Brother      Depression Sister      Suicide Other      Unknown/Adopted Maternal Grandmother      Unknown/Adopted Maternal Grandfather      Unknown/Adopted Paternal Grandmother      Unknown/Adopted Paternal Grandfather      No Known Problems Daughter      No Known Problems Daughter        Social History     Socioeconomic History     Marital status: Single     Spouse name: Not on file     Number of children: 2     Years of education: Not on file     Highest education level: Not on file   Occupational History     Employer: [employer]   Social Needs     Financial resource strain: Not on file     Food insecurity     Worry: Not on file     Inability: Not on file     Transportation needs     Medical: Not on file     Non-medical: Not on file   Tobacco Use     Smoking status: Never Smoker     Smokeless tobacco: Never Used   Substance and Sexual Activity     Alcohol use: Yes     Alcohol/week: 0.0 standard drinks     Comment: rarely     Drug use: No     Sexual activity: Not Currently     Partners: Female   Lifestyle     Physical activity     Days per week: Not on file     Minutes per session: Not on file     Stress: Not on file   Relationships     Social connections     Talks on phone: Not on file     Gets together: Not on file      Attends Mandaen service: Not on file     Active member of club or organization: Not on file     Attends meetings of clubs or organizations: Not on file     Relationship status: Not on file     Intimate partner violence     Fear of current or ex partner: Not on file     Emotionally abused: Not on file     Physically abused: Not on file     Forced sexual activity: Not on file   Other Topics Concern     Parent/sibling w/ CABG, MI or angioplasty before 65F 55M? No   Social History Narrative     Not on file       Current Outpatient Medications   Medication Sig Dispense Refill     Acetaminophen (TYLENOL PO) Take 1,000 mg by mouth 3 times daily        ALPRAZolam (XANAX XR) 0.5 MG 24 hr tablet Take 1 tablet (0.5 mg) by mouth At Bedtime 30 tablet 3     amLODIPine (NORVASC) 5 MG tablet TAKE 1 TABLET BY MOUTH EVERY DAY 90 tablet 0     atorvastatin (LIPITOR) 10 MG tablet Take 10 mg by mouth daily       cyclobenzaprine (FLEXERIL) 5 MG tablet Take 1 tablet (5 mg) by mouth 3 times daily as needed for muscle spasms 30 tablet 0     ibuprofen (ADVIL/MOTRIN) 800 MG tablet Take 800 mg by mouth 3 times daily       losartan-hydrochlorothiazide (HYZAAR) 100-25 MG tablet Take 1 tablet by mouth daily 90 tablet 3     naloxone (NARCAN) 4 MG/0.1ML nasal spray Spray 1 spray (4 mg) into one nostril alternating nostrils once as needed for opioid reversal every 2-3 minutes until assistance arrives 0.2 mL 1     nortriptyline (PAMELOR) 50 MG capsule Take 1 capsule (50 mg) by mouth At Bedtime 30 capsule 2     order for DME Equipment being ordered: TENS Pads as directed 1 Device 0     oxyCODONE (ROXICODONE) 5 MG tablet 1-2 tablets every 4-6 hours as needed for severe pain. Max of 6 tablets/day. Fill 6/18/21 and Start 6/20/21 165 tablet 0     pregabalin (LYRICA) 150 MG capsule Take 1 capsule (150 mg) by mouth 3 times daily 90 capsule 1       Allergies   Allergen Reactions     No Known Drug Allergies        REVIEW OF SYSTEMS:  CONSTITUTIONAL:   NEGATIVE for fever, chills, change in weight, not feeling tired  SKIN:  NEGATIVE for worrisome rashes, no skin lumps, no skin ulcers and no non-healing wounds  EYES:  NEGATIVE for vision changes or irritation.  ENT/MOUTH:  NEGATIVE.  No hearing loss, no hoarseness, no difficulty swallowing.  RESP:  NEGATIVE. No cough or shortness of breath.  CV:  NEGATIVE for chest pain, palpitations or peripheral edema  GI:  NEGATIVE for nausea, abdominal pain, heartburn, or change in bowel habits  :  Negative. No dysuria, no hematuria  MUSCULOSKELETAL:  See HPI above  NEURO:  NEGATIVE . No headaches, no dizziness,  no numbness  ENDOCRINE:  NEGATIVE for temperature intolerance, skin/hair changes  HEME/ALLERGY/IMMUNE:  NEGATIVE for bleeding problems  PSYCHIATRIC:  NEGATIVE. no anxiety, no depression.     Exam:  Vitals: BP (!) 150/82   Resp 18   Ht 1.829 m (6')   Wt 93 kg (205 lb)   BMI 27.80 kg/m    BMI= Body mass index is 27.8 kg/m .  Constitutional:  healthy, alert and no distress  Neuro: Alert and Oriented x 3, Sensation grossly WNL.  Psych: Affect normal   Respiratory: Breathing not labored.  Cardiovascular: normal peripheral pulses  Lymph: no adenopathy  Skin: No rashes,worrisome lesions or skin problems  He has prominence of the AC joint on both shoulders.  No tenderness of the AC joint.  He has no tenderness in the subacromial space.  He does have mild pain with resisted internal and external rotation on the left shoulder.  No pain with resisted abduction.  He had no weakness with strength testing.  He did have pain with empty can test on the left shoulder.  No pain with blocking test.  He had negative anterior and posterior apprehension testing.  Sensation, motor and circulation are intact.    Assessment: Likely stage II impingement left shoulder with rotator cuff tendinosis.  Plan: We discussed options of strengthening and steroid injection versus MRI.  I feel MRI would be appropriate if strengthening and injection  would not work.  He agrees with this approach.  He understands strengthening already.  Patient desires injection today of left shoulder(s).  Risks, benefits, potential complications and alternatives were discussed.  With the patient's consent, sterile prep was performed of left shoulder(s).  Left shoulder was injected with Kenalog 40 mg and lidocaine at shoulder subacromial space from the posterolateral approach .  Return to clinic as needed.             Again, thank you for allowing me to participate in the care of your patient.        Sincerely,        Alvaro Mariee MD

## 2021-06-22 NOTE — PATIENT INSTRUCTIONS
Ketan to follow up with Primary Care provider regarding elevated blood pressure.    You have had a steroid injection today.  For the first 2 hours there will likely be some numbing in the joint from the lidocaine.  This is a good sign, indicating that the injection is in the right place.  In 2 hours the lidocaine will wear off, and the joint will hurt like you had a shot.  Each day the cortisone makes it feel better.  It reaches peak effect in 2 weeks.  We expect it to last for 3 months.  You may resume regular activity when you feel ready.  If you are diabetic, your glucoses will be quite high for several days.    Work on rotator cuff strengthening.

## 2021-06-23 PROBLEM — M75.42 IMPINGEMENT SYNDROME OF LEFT SHOULDER: Status: ACTIVE | Noted: 2021-06-23

## 2021-06-23 RX ORDER — LIDOCAINE HYDROCHLORIDE 10 MG/ML
5 INJECTION, SOLUTION INFILTRATION; PERINEURAL
Status: SHIPPED | OUTPATIENT
Start: 2021-06-22

## 2021-06-23 RX ORDER — TRIAMCINOLONE ACETONIDE 40 MG/ML
40 INJECTION, SUSPENSION INTRA-ARTICULAR; INTRAMUSCULAR
Status: SHIPPED | OUTPATIENT
Start: 2021-06-22

## 2021-06-24 NOTE — PROGRESS NOTES
Indra Mehta is a 55 year old male who is seen as self referral for left shoulder pain.  He has had pain since the end of March with no known history of injury.  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  He describes aching, shooting, constant pain rated 5 out of 10.  Pain is worse with movement.  He has been in a sling to help.  He is used Motrin 800 3 times daily in the past.  Not recently.    X-ray of the left shoulder obtained today shows mild acromioclavicular arthrosis with spurring.  No glenohumeral arthritis.    Past Medical History:   Diagnosis Date     Anxiety      Back pain      Depressive disorder      Hyperlipidemia      Hypertension      Meniere's disease, unspecified      Pain medication agreement signed 8/20/2010       Past Surgical History:   Procedure Laterality Date     BUNIONECTOMY RT/LT  09/05/08    Both feet     COLONOSCOPY N/A 12/28/2016    Procedure: COMBINED COLONOSCOPY, SINGLE OR MULTIPLE BIOPSY/POLYPECTOMY BY BIOPSY;  Surgeon: Indra Jernigan MD;  [facility]     DISCECTOMY, FUSION CERVICAL ANTERIOR ONE LEVEL, COMBINED N/A 7/5/2017    Procedure: COMBINED DISCECTOMY, FUSION CERVICAL ANTERIOR ONE LEVEL;  Cervical 6-7, Anterior cervical discectomy fusion;  Surgeon: Mike Mckenna MD;  [facility]     DISCECTOMY, FUSION CERVICAL ANTERIOR ONE LEVEL, COMBINED N/A 12/19/2018    Procedure: cervical 5-6 anterior cervical discectomy fusion;  Surgeon: Mike Mckenna MD;  [facility]     HC COLONOSCOPY W/WO BRUSH/WASH  12/27/2005     HC CREATE EARDRUM OPENING,GEN ANESTH  09/27/2007    Pe tube, Left endolymphatic sac enhancement.     HC MASTOIDECTOMY,COMPLETE  09/27/2007     HERNIORRHAPHY UMBILICAL N/A 8/11/2017    Procedure: HERNIORRHAPHY UMBILICAL;  open umbilical hernia repair;  Surgeon: Boni Story MD;  [facility]     INJECT EPIDURAL CERVICAL N/A 8/22/2019    Procedure: INJECTION, SPINE,  CERVICAL 6-7 EPIDURAL;  Surgeon: Darryn Mcdaniel MD;  [facility]     INJECT EPIDURAL LUMBAR N/A 2017    Procedure: INJECT EPIDURAL LUMBAR;  lumbar 4-5 epidural steroid injection ;  Surgeon: Darryn Mcdaniel MD;  [facility]     INJECT EPIDURAL LUMBAR N/A 2017    Procedure: INJECT EPIDURAL LUMBAR;  lumbar epidural injection;  Surgeon: Darryn Mcdaniel MD;  [facility]     INJECT EPIDURAL LUMBAR Bilateral 2021    Procedure: Bilateral Lumbar 3-4 Epidural Steroid Injection;  Surgeon: Darryn Mcdaniel MD;  [facility]     INJECT EPIDURAL TRANSFORAMINAL Bilateral 2018    Procedure: INJECT EPIDURAL TRANSFORAMINAL;  transforaminal bilateral lumbar 3-4 steroid injection;  Surgeon: Darryn Mcdaniel MD;  [facility]     INJECT EPIDURAL TRANSFORAMINAL Bilateral 2018    Procedure: INJECT EPIDURAL TRANSFORAMINAL lumbar 3-4;  Surgeon: Darryn Mcdaniel MD;  [facility]     INJECT EPIDURAL TRANSFORAMINAL Bilateral 2019    Procedure: INJECTION, EPIDURAL, TRANSFORAMINAL LUMBAR 3-4 BILATERAL;  Surgeon: Darryn Mcdaniel MD;  [facility]     INJECT EPIDURAL TRANSFORAMINAL Bilateral 2020    Procedure: lumbar 3-4 bilateral transforaminal epidural steroid injection;  Surgeon: Darryn Mcdaniel MD;  [facility]     INJECT EPIDURAL TRANSFORAMINAL Bilateral 2020    Procedure: INJECTION, EPIDURAL, TRANSFORAMINAL  LUMBAR 3-4 APPROACH;  Surgeon: Darryn Mcdaniel MD;  [facility]     PE TUBES      left ear       Family History   Problem Relation Age of Onset     Diabetes Mother      Cancer Mother         colon cancer -  at 52, diag at 42     Hypertension Father      Heart Disease Father          of AAA     No Known Problems Brother      No Known Problems Brother      Depression Sister      Suicide Other      Unknown/Adopted Maternal Grandmother      Unknown/Adopted Maternal Grandfather      Unknown/Adopted Paternal Grandmother      Unknown/Adopted Paternal Grandfather       No Known Problems Daughter      No Known Problems Daughter        Social History     Socioeconomic History     Marital status: Single     Spouse name: Not on file     Number of children: 2     Years of education: Not on file     Highest education level: Not on file   Occupational History     Employer: [employer]   Social Needs     Financial resource strain: Not on file     Food insecurity     Worry: Not on file     Inability: Not on file     Transportation needs     Medical: Not on file     Non-medical: Not on file   Tobacco Use     Smoking status: Never Smoker     Smokeless tobacco: Never Used   Substance and Sexual Activity     Alcohol use: Yes     Alcohol/week: 0.0 standard drinks     Comment: rarely     Drug use: No     Sexual activity: Not Currently     Partners: Female   Lifestyle     Physical activity     Days per week: Not on file     Minutes per session: Not on file     Stress: Not on file   Relationships     Social connections     Talks on phone: Not on file     Gets together: Not on file     Attends Scientologist service: Not on file     Active member of club or organization: Not on file     Attends meetings of clubs or organizations: Not on file     Relationship status: Not on file     Intimate partner violence     Fear of current or ex partner: Not on file     Emotionally abused: Not on file     Physically abused: Not on file     Forced sexual activity: Not on file   Other Topics Concern     Parent/sibling w/ CABG, MI or angioplasty before 65F 55M? No   Social History Narrative     Not on file       Current Outpatient Medications   Medication Sig Dispense Refill     Acetaminophen (TYLENOL PO) Take 1,000 mg by mouth 3 times daily        ALPRAZolam (XANAX XR) 0.5 MG 24 hr tablet Take 1 tablet (0.5 mg) by mouth At Bedtime 30 tablet 3     amLODIPine (NORVASC) 5 MG tablet TAKE 1 TABLET BY MOUTH EVERY DAY 90 tablet 0     atorvastatin (LIPITOR) 10 MG tablet Take 10 mg by mouth daily       cyclobenzaprine  (FLEXERIL) 5 MG tablet Take 1 tablet (5 mg) by mouth 3 times daily as needed for muscle spasms 30 tablet 0     ibuprofen (ADVIL/MOTRIN) 800 MG tablet Take 800 mg by mouth 3 times daily       losartan-hydrochlorothiazide (HYZAAR) 100-25 MG tablet Take 1 tablet by mouth daily 90 tablet 3     naloxone (NARCAN) 4 MG/0.1ML nasal spray Spray 1 spray (4 mg) into one nostril alternating nostrils once as needed for opioid reversal every 2-3 minutes until assistance arrives 0.2 mL 1     nortriptyline (PAMELOR) 50 MG capsule Take 1 capsule (50 mg) by mouth At Bedtime 30 capsule 2     order for DME Equipment being ordered: TENS Pads as directed 1 Device 0     oxyCODONE (ROXICODONE) 5 MG tablet 1-2 tablets every 4-6 hours as needed for severe pain. Max of 6 tablets/day. Fill 6/18/21 and Start 6/20/21 165 tablet 0     pregabalin (LYRICA) 150 MG capsule Take 1 capsule (150 mg) by mouth 3 times daily 90 capsule 1       Allergies   Allergen Reactions     No Known Drug Allergies        REVIEW OF SYSTEMS:  CONSTITUTIONAL:  NEGATIVE for fever, chills, change in weight, not feeling tired  SKIN:  NEGATIVE for worrisome rashes, no skin lumps, no skin ulcers and no non-healing wounds  EYES:  NEGATIVE for vision changes or irritation.  ENT/MOUTH:  NEGATIVE.  No hearing loss, no hoarseness, no difficulty swallowing.  RESP:  NEGATIVE. No cough or shortness of breath.  CV:  NEGATIVE for chest pain, palpitations or peripheral edema  GI:  NEGATIVE for nausea, abdominal pain, heartburn, or change in bowel habits  :  Negative. No dysuria, no hematuria  MUSCULOSKELETAL:  See HPI above  NEURO:  NEGATIVE . No headaches, no dizziness,  no numbness  ENDOCRINE:  NEGATIVE for temperature intolerance, skin/hair changes  HEME/ALLERGY/IMMUNE:  NEGATIVE for bleeding problems  PSYCHIATRIC:  NEGATIVE. no anxiety, no depression.     Exam:  Vitals: BP (!) 150/82   Resp 18   Ht 1.829 m (6')   Wt 93 kg (205 lb)   BMI 27.80 kg/m    BMI= Body mass index is  27.8 kg/m .  Constitutional:  healthy, alert and no distress  Neuro: Alert and Oriented x 3, Sensation grossly WNL.  Psych: Affect normal   Respiratory: Breathing not labored.  Cardiovascular: normal peripheral pulses  Lymph: no adenopathy  Skin: No rashes,worrisome lesions or skin problems  He has prominence of the AC joint on both shoulders.  No tenderness of the AC joint.  He has no tenderness in the subacromial space.  He does have mild pain with resisted internal and external rotation on the left shoulder.  No pain with resisted abduction.  He had no weakness with strength testing.  He did have pain with empty can test on the left shoulder.  No pain with blocking test.  He had negative anterior and posterior apprehension testing.  Sensation, motor and circulation are intact.    Assessment: Likely stage II impingement left shoulder with rotator cuff tendinosis.  Plan: We discussed options of strengthening and steroid injection versus MRI.  I feel MRI would be appropriate if strengthening and injection would not work.  He agrees with this approach.  He understands strengthening already.  Patient desires injection today of left shoulder(s).  Risks, benefits, potential complications and alternatives were discussed.  With the patient's consent, sterile prep was performed of left shoulder(s).  Left shoulder was injected with Kenalog 40 mg and lidocaine at shoulder subacromial space from the posterolateral approach .  Return to clinic as needed.

## 2021-07-02 NOTE — TELEPHONE ENCOUNTER
Medication reordered on 6/18/21 with 2 refills should be good until 8/18/21.    Closing encounter as it has recently been reordered.    Anai Lugo on 7/2/2021 at 9:42 AM

## 2021-07-05 NOTE — PROGRESS NOTES
CHIEF COMPLAINT:  Pain  -neck and low back pain     INTERVAL HISTORY:  Last seen on 21       Recommendations/plan at the last visit included:  1. Physical Therapy:  Not at this time, but he should continue his exercises  2. Clinical Health Psychologist:  NO  3. Diagnostic Studies: none  4. Medication Management:    1. Continue Lyrica 150 mg TID  2. Continue nortriptyline to 50 mg at bedtime.  3. Continue Oxycodone 5m-2 tablets every 4-6 hours as needed. Max of 6 tabs in a day.   4. Consider Butrans. Will need to taper Oxycodone to a max of 4 tabs/day for 1 week before starting. Could also consider OxyContin  5. Flexeril 5mg: take 1 tab TID PRN  5. Further procedures recommended: none at this time. Insurance denied appeal for SCS.  6. UDS and CSA updated today  7. Follow up with orthopedics regarding left shoulder  8. Recommendations to PCP. See above     Follow up with this provider: 4-6 weeks for a virtual visit    Since his last visit, Indra Mehta reports:    He states that he injection in his back has really helped the pain that was going down his leg. He states that the pain along the low back and shooting into his hips has been more bothersome. He states that with twisting he gets shots of pain he states that he pain in the midline low back is almost constant.  He states that he has had some tremors in his hands. He states that he notices it if he is holding something that he doesn't have to grasp hard. He states that his neck hurts all the time as well. He states that every movement causes pain. If he extends his ROM too much he gets increased pain.     He states that his left shoulder is doing better. He did get an injection. He states that however he still gets pain when bringing his arm across his body. He will meet with orthopedics again.     He states that work is really busy and is sitting at a computer constantly. This increases his pain as well. He states that his pain is always  there.       Pain Information:                   Pain today 6/10       UDS: 06/15/2021  Controlled Substance Agreement signed: 2020     CURRENT RELEVANT PAIN MEDICATIONS:  Tylenol 500mg-1000mg 4x/day  Xanax-does not use daily  Nortriptyline 50mg at bedtime  Oxycodone 5m-2 tablets every 4-6 hours, max of 6/day  Lyrica 150 mg TID     Patient is using the medication as prescribed:  YES  Is your medication helpful?     YES   Medication side effects? no side effect     Previous Medications: (H--helped; HI--Helped initially; SWH-- somewhat helpful, NH--No help; W--worse; SE--side effects)   Norco/vicodin H  Oxycodone H  Flexeril - was helpful at night but caused him to be groggy in the morning  Robaxin - not helpful  Tizanidine H  Gabapentin ?  Lyrica SE sedation  Cymbalta NH  Nortriptyline H     Past Pain Treatments:  Injections:    - 18 bilateral L3-4 TFESI - (Dr Mcdaniel)  - 18 bilateral L3-4 TFESI - seems to have worsened pain (Dr Mcdaniel)  - 18 TFESI right C5-6 - helped with arm pain and numbness  - 17 bilateral L3-4 TFESI   - 10/23/17 Bilateral L4-5 TFESI   - 2018 Bilateral L3-4 TFESI   -2019 Bilateral L3-4 TFESI   -2019 C6-7 SRIDEVI   -2020 L3-4 bilateral TLESI  -21 L3-4 bilateral TLESI  Surgery:  ACDF C6-7 on 17 with improvement; ACDF C5-6 2018, posterior C5-7 fusion. Lateral mass screws, right C5-6 medial facetectomy, right C6 foraminotomy on 10/29/2019  TENS unit: helpful     [place] Board of Pharmacy Data Base Reviewed:    YES; As expected, no concern for misuse/abuse of controlled medications based on this report. Checked 21     THE 4 As OF OPIOID MAINTENANCE ANALGESIA    Analgesia: Is pain relief clinically significant? YES   Activity: Is patient functional and able to perform Activities of Daily Living? YES   Adverse effects: Is patient free from adverse side effects from opiates? YES   Adherence to Rx protocol: Is patient adhering to  Controlled Substance Agreement and taking medications ONLY as ordered? YES         Is Narcan prescribed for opiate use >50 MME daily? N/A        Daily MME: 37.5-45     Medications:  Current Outpatient Prescriptions          Current Outpatient Medications   Medication Sig Dispense Refill     Acetaminophen (TYLENOL PO) Take 500 mg by mouth every 4 hours as needed for mild pain or fever         ALPRAZolam (XANAX XR) 0.5 MG 24 hr tablet Take 1 tablet (0.5 mg) by mouth At Bedtime 30 tablet 3     gabapentin (NEURONTIN) 400 MG capsule Take 3 capsules (1,200 mg) by mouth 3 times daily 270 capsule 3     losartan (COZAAR) 50 MG tablet Take 1 tablet (50 mg) by mouth daily 90 tablet 0     nortriptyline (PAMELOR) 25 MG capsule Take 1 capsule (25 mg) by mouth At Bedtime 30 capsule 0     order for DME Equipment being ordered: TENS Pads as directed 1 Device 0     oxyCODONE (ROXICODONE) 5 MG tablet Take 1 tab every 4-6 hours as needed for severe pain. MAX 4 TABS PER DAY. Okay to fill 3/12/20 start 3/18/20. 100 tablet 0     tiZANidine (ZANAFLEX) 4 MG tablet Take 1 tablet (4 mg) by mouth At Bedtime 90 tablet 0          Physical Exam:  BP (!) 144/80   Temp 98  F (36.7  C) (Temporal)   Ht 1.829 m (6')   Wt 93 kg (205 lb)   BMI 27.80 kg/m      Constitutional: alert and oriented. In no acute distress.   Psych: mood and affect appropriate.    Imaging:  MRI Lumbar Spine 12/22/2020  FINDINGS: No significant interval change. Mild left convex thoracolumbar curve.   Stable minimally bulging disc at L1-2 without spinal canal stenosis. Mild right   L1-2 neural foraminal narrowing.     Moderate spinal canal stenosis at L2-3 associated primarily with facet and   ligamentum flavum hypertrophy and minimally bulging disc, likely with an element   of congenitally short pedicles.     Minimal spinal canal narrowing at L3-4 associated with facet and ligamentum   flavum hypertrophy. No disc bulge or neural foraminal stenosis.     At L4-5 the spinal  canal is widely patent. Minimal disc bulge contributes to   mild narrowing of the left lateral recess adjacent to the traversing left L5   nerve root. Mild kerrie-facet edema.     Spinal canal and neural foramina are widely patent at L5-S1.     Conus terminates at L1 and appears normal.     IMPRESSION:   No significant interval change. Facet degenerative changes at L4-5   with mild kerrie-facet edema. Moderate spinal canal stenosis at L2-3. No focal   disc protrusions or extrusions. Normal conus.         Assessment:  Indra Mehta is a 56 year old male who presents today for follow up regarding his:     1. Lumbar Facet arthropathy  2. Cervicalgia  3. S/p cervical spine fusion  4. Myofascial pain  5. Lumbar radiculopathy  6. Neuropathy   7. Chronic pain syndrome  8. Chronic use of opioids    No significant change to the patient's pain however he does continue to have a significant amount of axial low back pain.  He does occasionally radiate into his hips.  We did review the MRI that was performed by the [facility] in December.  It does show multilevel facet degenerative changes.  Discussed a medial branch block and potentially proceeding to a radiofrequency ablation.  Patient would like to proceed with this.    We will continue his current medication dosing.  He does occasionally notice a tremor in his hands.  He does feel that this is progressing.  Strongly encouraged him to follow-up with his primary care provider to further evaluate this tremor.         Plan:     Diagnosis reviewed, treatment option addressed, and risk/benifits discussed.  Self-care instructions given.  I am recommending a multidisciplinary treatment plan to help this patient better manage pain.       1. Physical Therapy:   Not at this time, he will need 4 sessions prior to a radiofrequency ablation.  This was reviewed with the patient today  2. Clinical Health Psychologist:  NO  3. Diagnostic Studies: none  4. Medication Management:     1. Continue Lyrica 150 mg TID  2. Continue nortriptyline to 50 mg at bedtime.  3. Continue Oxycodone 5m-2 tablets every 4-6 hours as needed. Max of 6 tabs in a day.   4. Consider Butrans. Will need to taper Oxycodone to a max of 4 tabs/day for 1 week before starting. Could also consider OxyContin  5. Flexeril 5mg: take 1 tab TID PRN, refill today  5. Further procedures recommended: Lumbar medial branch blocks proceeding to a radiofrequency ablation. Insurance denied appeal for SCS.  6. Recommendations to PCP. See above, patient to see primary care regarding the tremor in his hands    Follow up with this provider: 4 weeks     The total TIME spent on this patient on the day of the appointment was 21 minutes.    Time spent preparing to see the patient (reviewing records and tests) 1 minutes  Time spend face to face with the patient 18 minutes  Time spent ordering tests, medications, procedures and referrals 0 minutes  Time spent Referring and communicating with other healthcare professionals 0 minutes  Time spent documenting clinical information in Epic 4 minutes        Chiara Garcia PA-C   [facility]

## 2021-07-19 ENCOUNTER — OFFICE VISIT (OUTPATIENT)
Dept: FAMILY MEDICINE | Facility: CLINIC | Age: 56
End: 2021-07-19
Payer: COMMERCIAL

## 2021-07-19 ENCOUNTER — OFFICE VISIT (OUTPATIENT)
Dept: PALLIATIVE MEDICINE | Facility: CLINIC | Age: 56
End: 2021-07-19
Payer: COMMERCIAL

## 2021-07-19 VITALS
BODY MASS INDEX: 27.77 KG/M2 | TEMPERATURE: 98 F | WEIGHT: 205 LBS | DIASTOLIC BLOOD PRESSURE: 80 MMHG | SYSTOLIC BLOOD PRESSURE: 138 MMHG | HEIGHT: 72 IN

## 2021-07-19 VITALS
BODY MASS INDEX: 29.29 KG/M2 | DIASTOLIC BLOOD PRESSURE: 72 MMHG | HEART RATE: 87 BPM | TEMPERATURE: 98 F | WEIGHT: 216 LBS | SYSTOLIC BLOOD PRESSURE: 124 MMHG | OXYGEN SATURATION: 96 % | RESPIRATION RATE: 10 BRPM

## 2021-07-19 DIAGNOSIS — M54.2 CERVICALGIA: ICD-10-CM

## 2021-07-19 DIAGNOSIS — R25.1 TREMOR: Primary | ICD-10-CM

## 2021-07-19 DIAGNOSIS — Z98.1 S/P CERVICAL SPINAL FUSION: ICD-10-CM

## 2021-07-19 DIAGNOSIS — G89.4 CHRONIC PAIN SYNDROME: ICD-10-CM

## 2021-07-19 DIAGNOSIS — G62.9 NEUROPATHY: ICD-10-CM

## 2021-07-19 DIAGNOSIS — I73.01 RAYNAUD'S DISEASE WITH GANGRENE (H): ICD-10-CM

## 2021-07-19 DIAGNOSIS — M54.16 LUMBAR RADICULOPATHY: ICD-10-CM

## 2021-07-19 DIAGNOSIS — F11.90 CHRONIC, CONTINUOUS USE OF OPIOIDS: ICD-10-CM

## 2021-07-19 DIAGNOSIS — M79.18 MYOFASCIAL PAIN: ICD-10-CM

## 2021-07-19 DIAGNOSIS — M47.816 LUMBAR FACET ARTHROPATHY: Primary | ICD-10-CM

## 2021-07-19 PROCEDURE — 99214 OFFICE O/P EST MOD 30 MIN: CPT | Performed by: NURSE PRACTITIONER

## 2021-07-19 PROCEDURE — 99214 OFFICE O/P EST MOD 30 MIN: CPT | Performed by: PHYSICIAN ASSISTANT

## 2021-07-19 RX ORDER — OXYCODONE HYDROCHLORIDE 5 MG/1
TABLET ORAL
Qty: 165 TABLET | Refills: 0 | Status: SHIPPED | OUTPATIENT
Start: 2021-07-19 | End: 2021-08-18

## 2021-07-19 RX ORDER — CYCLOBENZAPRINE HCL 5 MG
5 TABLET ORAL 3 TIMES DAILY PRN
Qty: 30 TABLET | Refills: 0 | Status: SHIPPED | OUTPATIENT
Start: 2021-07-19 | End: 2021-08-18

## 2021-07-19 ASSESSMENT — ENCOUNTER SYMPTOMS
HEMATOLOGIC/LYMPHATIC NEGATIVE: 1
GASTROINTESTINAL NEGATIVE: 1
PSYCHIATRIC NEGATIVE: 1
ALLERGIC/IMMUNOLOGIC NEGATIVE: 1
CONSTITUTIONAL NEGATIVE: 1
RESPIRATORY NEGATIVE: 1
CARDIOVASCULAR NEGATIVE: 1
MUSCULOSKELETAL NEGATIVE: 1
ENDOCRINE NEGATIVE: 1
TREMORS: 1
EYES NEGATIVE: 1

## 2021-07-19 ASSESSMENT — MIFFLIN-ST. JEOR: SCORE: 1802.87

## 2021-07-19 ASSESSMENT — PAIN SCALES - GENERAL
PAINLEVEL: SEVERE PAIN (6)
PAINLEVEL: NO PAIN (0)

## 2021-07-19 NOTE — PROGRESS NOTES
Assessment & Plan     Tremor  History of multiple surgeries to cervical spine. Last surgery was done at [facility] 2 years ago (2019). Has said that since then he has been having involuntary movements of his fingers. Affects bilateral hands with the right had being affected more often than the left hand. No numbness or tingling in his fingers. Observed visible fine motor movements to thumb and first fingers with hands at rest.  The tremor disappears with action movements.  When observed at certain points of rest it is not present.   Has caused difficulty with work, typing on a key board and picking up small objects. Strong shoulder, bicep/tricep, wrist and finger strength. Difficulty with thumb to 4th and 5th finger strength bilaterally. Some troubles with dysmetry when asked to perform finger to nose test. Has endorsed difficulty picking up small objects.  Blood work was not drawn at this time due to recent blood work being collected 2020 with normal TSH.  Does not work around heavy metals. Will send to Neurology for review.  - Adult Neurology Referral      Review of prior external note(s) from - CareEverywhere information from [facility] reviewed  Review of the result(s) of each unique test - multiple tests completed at [facility]  35 minutes spent on the date of the encounter doing chart review, history and exam, documentation and further activities per the note       BMI:   Estimated body mass index is 29.29 kg/m  as calculated from the following:    Height as of an earlier encounter on 7/19/21: 1.829 m (6').    Weight as of this encounter: 98 kg (216 lb).   Weight management plan: Patient was referred to their PCP to discuss a diet and exercise plan.      Return in about 4 weeks (around 8/16/2021) for Physical Exam.    Mitzi Gorman, Student Nurse Practitioner    Roselia Munson NP  [facility] Aceves is a 56 year old who presents for the following health issues      States that he's been having tremors in his bilateral hands. Has had multiple fusions of neck and had a revision done 2-3 years done at [facility] - see care everywhere, Has started having finger movement since his surgery. Bilateral hand tremors started one month ago with the right hand being worse than the left. His original neuropathy improved after that surgery. Denies numbness or tingling of fingers. Denies any involuntary movements of any other area of his body. Has dropped objects or unable to  the object because of this. States that it's better if he has a hard  of something.   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 States that occasionally he has difficulty hitting the keys hard enough to get the letter to register on the computer. Denies any new medications, supplements, diet changes. Has stopped tizanidine and added flexeril which can cause twitching. Has taken this in the past.     He has had extensive work up for peripheral neuropathy with with [facility]- has tingling/ buzzing- walking on sharp rocks.  Shooting pain in feet.    HPI       Review of Systems   Constitutional: Negative.    HENT: Negative.    Eyes: Negative.    Respiratory: Negative.    Cardiovascular: Negative.    Gastrointestinal: Negative.    Endocrine: Negative.    Genitourinary: Negative.    Musculoskeletal: Negative.    Skin: Negative.    Allergic/Immunologic: Negative.    Neurological: Positive for tremors.   Hematological: Negative.    Psychiatric/Behavioral: Negative.          Objective    /72   Pulse 87   Temp 98  F (36.7  C)   Resp 10   Wt 98 kg (216 lb)   SpO2 96%   BMI 29.29 kg/m    Body mass index is 29.29 kg/m .    GENERAL: healthy, alert and no distress  EYES: Eyes grossly normal to inspection, PERRL and conjunctivae and sclerae normal  HENT: ear canals and TM's normal, nose and mouth without ulcers or lesions  NECK: no adenopathy, no asymmetry, masses, or scars and  thyroid normal to palpation  RESP: lungs clear to auscultation - no rales, rhonchi or wheezes  CV: regular rate and rhythm, normal S1 S2, no S3 or S4, no murmur, click or rub, no peripheral edema and peripheral pulses strong  ABDOMEN: soft, nontender, no hepatosplenomegaly, no masses and bowel sounds normal  MS: normal muscle tone, normal range of motion and bilateral weakness with thumb to 4th and 5th fingers, trap 5/5, delt 5/5, bicep 5/5, tri 5/5, wrist flex/ext 5/5.  Negative spurlings. Sensation intact.    SKIN: no suspicious lesions or rashes  NEURO: Normal strength and tone, sensory exam grossly normal, mentation intact and some difficulties with spatial awareness when asked to perform nose to finger test. Resting tremor.   PSYCH: mentation appears normal, affect normal/bright

## 2021-07-19 NOTE — PATIENT INSTRUCTIONS
After Visit Instructions:     Thank you for coming to [facility] [place] for your care. It is my goal to partner with you to help you reach your optimal state of health.     I am recommending multidisciplinary care at this time.  The focus of care will be to continue gradual rehabilitation and pain management with medication adjustments as needed.    Continue daily self-care, identifying contributing factors, and monitoring variations in pain level. Continue to integrate self-care into your life.        Physical therapy assessment/visit: you will need 4 session prior to the radiofrequency ablation    Schedule follow-up with Chiara Garcia PA-C in 4 weeks. You will need to make this appointment.     Procedures recommended: lumbar medial branch blocks.  preceding to radiofrequency ablation To call and schedule your procedure, you can call: [phone], then hit option 2    Medication recommendations:     No changes      Chiara Garcia PA-C  [facility]    Contact information: [facility]  Clinic Number:  [phone]     Call with any questions about your care and for scheduling assistance.     Calls are returned Monday through Friday between 8 AM and 4:30 PM. We usually get back to you within 2 business days depending on the issue/request.    If we are prescribing your medications:    For opioid medication refills, call the clinic or send a [employer] message 7 days in advance.  Please include:    Name of requested medication    Name of the pharmacy.    For non-opioid medications, call your pharmacy directly to request a refill. Please allow 3-4 days to be processed.     Per [place] State Law:    All controlled substance prescriptions must be filled within 30 days of being written.      For those controlled substances allowing refills, pickup must occur within 30 days of last fill.      We believe regular attendance  is key to your success in our program!      Any time you are unable to keep your appointment we ask that you call us at least 24 hours in advance to cancel.This will allow us to offer the appointment time to another patient.   Multiple missed appointments may lead to dismissal from the clinic.

## 2021-07-20 ENCOUNTER — TELEPHONE (OUTPATIENT)
Dept: PALLIATIVE MEDICINE | Facility: CLINIC | Age: 56
End: 2021-07-20

## 2021-07-20 RX ORDER — AMLODIPINE BESYLATE 5 MG/1
TABLET ORAL
Qty: 90 TABLET | Refills: 1 | Status: SHIPPED | OUTPATIENT
Start: 2021-07-20 | End: 2021-10-20

## 2021-07-21 DIAGNOSIS — Z11.59 ENCOUNTER FOR SCREENING FOR OTHER VIRAL DISEASES: ICD-10-CM

## 2021-07-21 NOTE — TELEPHONE ENCOUNTER
Pt scheduled for MBB  Date:8/12/21  Time:1130am  Dr. Mcdaniel    Instructed pt to have a  for procedure.  Patient informed of COVID testing process.

## 2021-08-05 ENCOUNTER — TELEPHONE (OUTPATIENT)
Dept: ORTHOPEDICS | Facility: CLINIC | Age: 56
End: 2021-08-05

## 2021-08-05 ENCOUNTER — MYC MEDICAL ADVICE (OUTPATIENT)
Dept: ORTHOPEDICS | Facility: CLINIC | Age: 56
End: 2021-08-05

## 2021-08-05 DIAGNOSIS — M25.512 LEFT SHOULDER PAIN, UNSPECIFIED CHRONICITY: ICD-10-CM

## 2021-08-05 DIAGNOSIS — M75.42 IMPINGEMENT SYNDROME OF LEFT SHOULDER: ICD-10-CM

## 2021-08-05 DIAGNOSIS — M75.102 ROTATOR CUFF SYNDROME OF LEFT SHOULDER: Primary | ICD-10-CM

## 2021-08-05 NOTE — TELEPHONE ENCOUNTER
Ivelisse Dia MA talked with patient regarding his continued shoulder pain. An MRI order has been placed. Patient has been notified and provided with the number to schedule his MRI at [place]. He is to follow up in clinic with Dr. Mariee 2-3 days following his MRI to go over his MRI results.    Ainsley Ling, ATC  Certified Athletic Trainer

## 2021-08-08 NOTE — TELEPHONE ENCOUNTER
Informed patient of xray results relaying providers comments.    They were very appreciative.  Sarahy Strauss, TATIANA    
Please call patient and let him know that I reviewed his Xray. The post operative changes appear stable on Xray. If he continues to have tingling in his arms he should contact Dr. Mckenna's group to see if they have any further recommendations.     Chiara Garcia PA-C on 6/14/2019 at 2:38 PM    
No

## 2021-08-10 ENCOUNTER — LAB (OUTPATIENT)
Dept: LAB | Facility: CLINIC | Age: 56
End: 2021-08-10
Attending: ANESTHESIOLOGY
Payer: COMMERCIAL

## 2021-08-10 DIAGNOSIS — Z11.59 ENCOUNTER FOR SCREENING FOR OTHER VIRAL DISEASES: ICD-10-CM

## 2021-08-10 LAB — SARS-COV-2 RNA RESP QL NAA+PROBE: NEGATIVE

## 2021-08-10 PROCEDURE — U0003 INFECTIOUS AGENT DETECTION BY NUCLEIC ACID (DNA OR RNA); SEVERE ACUTE RESPIRATORY SYNDROME CORONAVIRUS 2 (SARS-COV-2) (CORONAVIRUS DISEASE [COVID-19]), AMPLIFIED PROBE TECHNIQUE, MAKING USE OF HIGH THROUGHPUT TECHNOLOGIES AS DESCRIBED BY CMS-2020-01-R: HCPCS

## 2021-08-10 PROCEDURE — U0005 INFEC AGEN DETEC AMPLI PROBE: HCPCS

## 2021-08-11 NOTE — PROGRESS NOTES
CHIEF COMPLAINT:  Pain  -neck and low back pain    DATE OF VISIT: 21    Assessment:  Indra Mehta is a 56 year old male who presents today for follow up regarding his:     1. Lumbar Facet arthropathy  2. Cervicalgia  3. S/p cervical spine fusion  4. Myofascial pain  5. Lumbar radiculopathy  6. Neuropathy   7. Chronic pain syndrome  8. Chronic use of opioids    Recently started to have new pain in his right hip and front of his leg.  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  Recommend that he try increasing his Flexeril for the next couple of days to see if I it well help calm it.    He reports significant benefit from the first medial branch blocks.  He reports 80 to 100% pain relief from 1215 until 4:00.  This is a positive result therefore we will proceed with the second medial branch.    We will continue current medications.         Plan:     Diagnosis reviewed, treatment option addressed, and risk/benifits discussed.  Self-care instructions given.  I am recommending a multidisciplinary treatment plan to help this patient better manage pain.       1. Physical Therapy:   Not at this time  2. Clinical Health Psychologist:  NO  3. Diagnostic Studies: none  4. Medication Management:    1. Continue Lyrica 150 mg TID  2. Continue nortriptyline to 50 mg at bedtime.  3. Continue Oxycodone 5m-2 tablets every 4-6 hours as needed. Max of 6 tabs in a day.   4. Consider Butrans.  Would need to taper Oxycodone to a max of 4 tabs/day for 1 week before starting. Could also consider OxyContin  5. Flexeril 5mg: Take 1-2 tabs 3 times daily as needed  5. Further procedures recommended: Lumbar medial branch blocks #2 Insurance denied appeal for SCS.  6. Recommendations to PCP.  See above    Follow up with this provider: 8 weeks      INTERVAL HISTORY:  Last seen on 21       Recommendations/plan at the last visit included:    1. ablation.  This was reviewed  "with the patient today  2. Clinical Health Psychologist:  NO  3. Diagnostic Studies: none  4. Medication Management:    1. Continue Lyrica 150 mg TID  2. Continue nortriptyline to 50 mg at bedtime.  3. Continue Oxycodone 5m-2 tablets every 4-6 hours as needed. Max of 6 tabs in a day.   4. Consider Butrans. Will need to taper Oxycodone to a max of 4 tabs/day for 1 week before starting. Could also consider OxyContin  5. Flexeril 5mg: take 1 tab TID PRN, refill today  5. Further procedures recommended: Lumbar medial branch blocks proceeding to a radiofrequency ablation. Insurance denied appeal for SCS.  6. Recommendations to PCP. See above, patient to see primary care regarding the tremor in his hands     Follow up with this provider: 4 weeks    Since his last visit, Indra Mehta reports:    He has been \"okay\". He states that he did something to his lower back on Friday. He has had constant pain in his hip and his upper leg aches constantly. This is different as he feels more pain in the front and usually it is in the back. He does not know of a specific injury but has been doing a lot of bending with his puppies. He has been waking up 5-6 times a night to rearrange.     His neuropathy is constant. He states that he doesn't ever get a complete relief from it during the day.       Pain Information:                   Pain today 6/10       UDS: 2021  Controlled Substance Agreement signed: 21     CURRENT RELEVANT PAIN MEDICATIONS:  Tylenol 500mg-1000mg 4x/day  Xanax-does not use daily  Nortriptyline 50mg at bedtime  Oxycodone 5m-2 tablets every 4-6 hours, max of 6/day  Lyrica 150 mg TID     Patient is using the medication as prescribed:  YES  Is your medication helpful?     YES   Medication side effects? no side effect     Previous Medications: (H--helped; HI--Helped initially; SWH-- somewhat helpful, NH--No help; W--worse; SE--side effects)   Norco/vicodin H  Oxycodone H  Flexeril - was helpful at " night but caused him to be groggy in the morning  Robaxin - not helpful  Tizanidine H  Gabapentin ?  Lyrica SE sedation  Cymbalta NH  Nortriptyline H     Past Pain Treatments:  Injections:    - 11/8/18 bilateral L3-4 TFESI - (Dr Mcdaniel)  - 8/23/18 bilateral L3-4 TFESI - seems to have worsened pain (Dr Mcdaniel)  - 6/11/18 TFESI right C5-6 - helped with arm pain and numbness  - 12/28/17 bilateral L3-4 TFESI   - 10/23/17 Bilateral L4-5 TFESI   - 11/08/2018 Bilateral L3-4 TFESI   -6/14/2019 Bilateral L3-4 TFESI   -8/22/2019 C6-7 SRIDEVI   -5/22/2020 L3-4 bilateral TLESI  -5/13/21 L3-4 bilateral TLESI  -8/12/21 bilateral L3-5 LMBB  Surgery:  ACDF C6-7 on 7/5/17 with improvement; ACDF C5-6 12/19/2018, posterior C5-7 fusion. Lateral mass screws, right C5-6 medial facetectomy, right C6 foraminotomy on 10/29/2019  TENS unit: helpful     [place] Board of Pharmacy Data Base Reviewed:    YES; As expected, no concern for misuse/abuse of controlled medications based on this report. Checked 6/18/21     THE 4 As OF OPIOID MAINTENANCE ANALGESIA    Analgesia: Is pain relief clinically significant? YES   Activity: Is patient functional and able to perform Activities of Daily Living? YES   Adverse effects: Is patient free from adverse side effects from opiates? YES   Adherence to Rx protocol: Is patient adhering to Controlled Substance Agreement and taking medications ONLY as ordered? YES         Is Narcan prescribed for opiate use >50 MME daily? N/A        Daily MME: 37.5-45     Medications:  Current Outpatient Prescriptions          Current Outpatient Medications   Medication Sig Dispense Refill     Acetaminophen (TYLENOL PO) Take 500 mg by mouth every 4 hours as needed for mild pain or fever         ALPRAZolam (XANAX XR) 0.5 MG 24 hr tablet Take 1 tablet (0.5 mg) by mouth At Bedtime 30 tablet 3     gabapentin (NEURONTIN) 400 MG capsule Take 3 capsules (1,200 mg) by mouth 3 times daily 270 capsule 3     losartan (COZAAR) 50 MG tablet  Take 1 tablet (50 mg) by mouth daily 90 tablet 0     nortriptyline (PAMELOR) 25 MG capsule Take 1 capsule (25 mg) by mouth At Bedtime 30 capsule 0     order for DME Equipment being ordered: TENS Pads as directed 1 Device 0     oxyCODONE (ROXICODONE) 5 MG tablet Take 1 tab every 4-6 hours as needed for severe pain. MAX 4 TABS PER DAY. Okay to fill 3/12/20 start 3/18/20. 100 tablet 0     tiZANidine (ZANAFLEX) 4 MG tablet Take 1 tablet (4 mg) by mouth At Bedtime 90 tablet 0          Physical Exam:  /86   Temp 98.3  F (36.8  C) (Temporal)   Ht 1.829 m (6')   Wt 98.7 kg (217 lb 8 oz)   BMI 29.50 kg/m      Constitutional: alert and oriented. In no acute distress.   Psych: mood and affect appropriate.    Imaging:  No new imaging to review          The total TIME spent on this patient on the day of the appointment was 19 minutes.    Time spent preparing to see the patient (reviewing records and tests) 1 minutes  Time spend face to face with the patient 15 minutes  Time spent ordering tests, medications, procedures and referrals 0 minutes  Time spent Referring and communicating with other healthcare professionals 0 minutes  Time spent documenting clinical information in Epic 3 minutes        Chiara Garcia PA-C   [facility] [place]

## 2021-08-12 ENCOUNTER — HOSPITAL ENCOUNTER (OUTPATIENT)
Dept: GENERAL RADIOLOGY | Facility: CLINIC | Age: 56
End: 2021-08-12
Attending: ANESTHESIOLOGY
Payer: COMMERCIAL

## 2021-08-12 ENCOUNTER — HOSPITAL ENCOUNTER (OUTPATIENT)
Facility: CLINIC | Age: 56
Discharge: HOME | End: 2021-08-12
Attending: ANESTHESIOLOGY | Admitting: ANESTHESIOLOGY
Payer: COMMERCIAL

## 2021-08-12 VITALS
SYSTOLIC BLOOD PRESSURE: 124 MMHG | RESPIRATION RATE: 16 BRPM | OXYGEN SATURATION: 97 % | HEART RATE: 85 BPM | DIASTOLIC BLOOD PRESSURE: 77 MMHG

## 2021-08-12 DIAGNOSIS — M54.50 LUMBAR PAIN: ICD-10-CM

## 2021-08-12 PROCEDURE — 999N000179 XR FLUORO NEEDLE PLACEMENT SPINE (WITH PROCEDURE)

## 2021-08-12 PROCEDURE — 250N000011 HC RX IP 250 OP 636: Performed by: ANESTHESIOLOGY

## 2021-08-12 PROCEDURE — 64495 INJ PARAVERT F JNT L/S 3 LEV: CPT | Performed by: ANESTHESIOLOGY

## 2021-08-12 PROCEDURE — 64494 INJ PARAVERT F JNT L/S 2 LEV: CPT | Mod: 50 | Performed by: ANESTHESIOLOGY

## 2021-08-12 PROCEDURE — 64493 INJ PARAVERT F JNT L/S 1 LEV: CPT | Mod: 50 | Performed by: ANESTHESIOLOGY

## 2021-08-12 PROCEDURE — 64495 INJ PARAVERT F JNT L/S 3 LEV: CPT | Mod: 50 | Performed by: ANESTHESIOLOGY

## 2021-08-12 PROCEDURE — 64493 INJ PARAVERT F JNT L/S 1 LEV: CPT | Performed by: ANESTHESIOLOGY

## 2021-08-12 PROCEDURE — 64494 INJ PARAVERT F JNT L/S 2 LEV: CPT | Performed by: ANESTHESIOLOGY

## 2021-08-12 PROCEDURE — 250N000009 HC RX 250: Performed by: ANESTHESIOLOGY

## 2021-08-12 PROCEDURE — 77003 FLUOROGUIDE FOR SPINE INJECT: CPT | Mod: TC

## 2021-08-12 RX ORDER — EPHEDRINE SULFATE 50 MG/ML
INJECTION, SOLUTION INTRAVENOUS PRN
Status: DISCONTINUED | OUTPATIENT
Start: 2021-08-12 | End: 2021-08-12 | Stop reason: HOSPADM

## 2021-08-12 RX ORDER — BUPIVACAINE HYDROCHLORIDE 7.5 MG/ML
INJECTION, SOLUTION EPIDURAL; RETROBULBAR PRN
Status: DISCONTINUED | OUTPATIENT
Start: 2021-08-12 | End: 2021-08-12 | Stop reason: HOSPADM

## 2021-08-12 RX ORDER — IOPAMIDOL 612 MG/ML
INJECTION, SOLUTION INTRATHECAL PRN
Status: DISCONTINUED | OUTPATIENT
Start: 2021-08-12 | End: 2021-08-12 | Stop reason: HOSPADM

## 2021-08-12 NOTE — PROGRESS NOTES
Patient returned from procedure with IV fluids running-- put on pump to administer in one hour.  Patient having no symptoms; /74 upon arrival to phase 2.  Plan to administer 500 mL fluids and prepare for discharge.

## 2021-08-12 NOTE — DISCHARGE INSTRUCTIONS
Home Care Instructions     Please fax or mail this form to the location that is selected at the bottom of your pain relief form.  Do not try to send the form back to Dr. Mcdaniel's clinic in [place].  If you are unsure of which location to send this form back to, please send it back to the [facility] [place] same-day surgery department.  Dr. Mcdaniel will then evaluate your form and determine the next step of your care.            Procedure:  Diagnostic Block (which includes medial branch blocks, SI joint injection blocks, and nerve root injections)    Activity:  The injection was used to identify the cause of your pain:    Resume normal activity  You are to engage in activity that would have normally caused you discomfort to determine the effectiveness of the block/injection.  It is imperative to evaluate and rate your pain the first 2 hours after the injection.     Pain:    You may experience soreness at the injection site for one or two days    You may use an ice pack for 20 minutes every 2 hours for the first 24 hours    You may use a heating pad after the first 24 hours    You may use Tylenol  (acetaminophen) every 4 hours or other pain medicines as directed by your physician after your block wears off.    Safety  You may experience numbness radiating into your legs or arms, (depending on the procedure location)  This numbness may last several hours.  Until the numb sensation returns to normal please use caution in walking, climbing stairs, stepping out of your vehicle, etc.    Please contact us if you have:  Severe pain   Fever more than 101.5 degrees Fahrenheit  Signs of infection (redness, swelling or drainage)       If you need immediate attention we recommend that you go to a hospital emergency room or dial 911.    _____ Please contact our office one week after your injection to report your percent of pain relief.   See attached One Week Procedure Injection Report  __X__ Please return your Nerve Block Pain  Relief Form the day after your injection.     See attached Nerve Block Pain Relief Form             ## PLEASE SEND NERVE BLOCK PAIN RELIEF REPORT WITH PATIENT ##

## 2021-08-12 NOTE — OP NOTE
PRE-OP DIAGNOSIS: Low back pain secondary to lumbar spondylosis    POST-OP DIAGNOSIS:  same    PROCEDURE: Left L3, Left L4, Left L5, Right L3, Right L4 and Right L5 medial branch nerve blocks using fluoroscopic guidance and contrast control.     ANESTHESIA:  Local    PROCEDURE DETAILS: After discussing the risks, benefits, and alternatives to the procedure, the patient expressed understanding and wished to proceed. Written consent was obtained.  The patient was escorted to the fluoroscopy suite and placed in prone position on the procedure table.  A procedural pause was conducted to verify the correct: patient identity, procedure to be performed, side, site, patient position, and any special requirements. The skin was aseptically prepped and draped in the usual fashion. The skin and subcutaneous tissue were anesthetized with 1% lidocaine.   Using intermittent fluoroscopic guidance, a 25 gauge 3    spinal needle was advanced to the junction of the pedicle, superior articular process and the transverse process in oblique view.  After negative aspiration 0.25cc of Omnipaque contrast was injected, showing perifacet spread of contrast without any evidence of intravascular or intrathecal spread. Then a 0.5 ml solution 0.75 % bupivacaine was then injected slowly at each segment.  After I completed the right side he noted that he was starting to feel nauseated and his blood pressure dropped into the 80s systolic with a heart rate into the 50s.  This was more than 20% drop in blood pressure and heart rate therefore an IV was started and he was given 0.4 mg of IV atropine.  His heart rate continued to be in the 60s with a systolic blood pressure in the 80s therefore 500 cc of lactated Ringer's were also administered along with 5 mg of ephedrine which restored his vital signs to within 20% of his preoperative vital signs.  His nausea also subsided and therefore we then went onto his left side and completed the left side at the  L3, L4, L5 medial branches.    The patient did not receive sedation during the procedure. The patient was monitored with blood pressure and pulse oximetry machines with the assistance of an RN throughout the procedure.  The patient was alert and responsive to questions throughout the procedure.   The patient tolerated the procedure well and was observed in the post-procedural area.  The patient was dismissed without apparent complications.     Blood loss: < 5 cc    PLAN:  1. Performed a Left L3, Left L4, Left L5, Right L3, Right L4 and Right L5    medial branch nerve blocks using bupivacaine 0.75% for treatment of the   bilateral L4-L5 and L5-S1 facet joints.  If there is relief from the medial branch blocks, will repeat with Lidocaine 4% for two comparative diagnostic blocks. If benefit with appropriate time duration of relief for both blocks, will recommend proceeding with longer term relief utilizing radiofrequency neurolysis.  Her today's injection to be diagnostically positive the patient will need to receive at least 2 hours of pain relief in the % range.  If they receive a smaller percentage of pain relief from the injection or the pain relief does not last for at least 2 hours this will be considered a negative diagnostic block and other pain generators should be considered.    2. The patient was instructed to fax a pain relief sheet back to the [place] pain clinic via fax or mail.  3.  In the future, I would recommend that he has an IV in for any seizures done under local anesthesia.  He is prone to vasovagal reactions and a pretreatment with glycopyrrolate is the best prophylaxis against a vagal mediated bradycardias.  He also would not have to feel that nausea if we could prevent to the drop in blood pressure and heart rate prior to doing the procedure.    Darryn Mcdaniel MD  Diplomate of the American Board of Anesthesiology, Pain Medicine

## 2021-08-18 ENCOUNTER — TELEPHONE (OUTPATIENT)
Dept: PALLIATIVE MEDICINE | Facility: CLINIC | Age: 56
End: 2021-08-18

## 2021-08-18 ENCOUNTER — HOSPITAL ENCOUNTER (OUTPATIENT)
Dept: MRI IMAGING | Facility: CLINIC | Age: 56
Discharge: HOME | End: 2021-08-18
Attending: ORTHOPAEDIC SURGERY | Admitting: ORTHOPAEDIC SURGERY
Payer: COMMERCIAL

## 2021-08-18 ENCOUNTER — OFFICE VISIT (OUTPATIENT)
Dept: PALLIATIVE MEDICINE | Facility: CLINIC | Age: 56
End: 2021-08-18
Payer: COMMERCIAL

## 2021-08-18 VITALS
DIASTOLIC BLOOD PRESSURE: 86 MMHG | HEIGHT: 72 IN | TEMPERATURE: 98.3 F | WEIGHT: 217.5 LBS | SYSTOLIC BLOOD PRESSURE: 128 MMHG | BODY MASS INDEX: 29.46 KG/M2

## 2021-08-18 DIAGNOSIS — F11.90 CHRONIC, CONTINUOUS USE OF OPIOIDS: ICD-10-CM

## 2021-08-18 DIAGNOSIS — M47.816 LUMBAR FACET ARTHROPATHY: Primary | ICD-10-CM

## 2021-08-18 DIAGNOSIS — Z98.1 S/P CERVICAL SPINAL FUSION: ICD-10-CM

## 2021-08-18 DIAGNOSIS — M79.18 MYOFASCIAL PAIN: ICD-10-CM

## 2021-08-18 DIAGNOSIS — M75.102 ROTATOR CUFF SYNDROME OF LEFT SHOULDER: ICD-10-CM

## 2021-08-18 DIAGNOSIS — G89.4 CHRONIC PAIN SYNDROME: ICD-10-CM

## 2021-08-18 DIAGNOSIS — M25.512 LEFT SHOULDER PAIN, UNSPECIFIED CHRONICITY: ICD-10-CM

## 2021-08-18 DIAGNOSIS — G62.9 NEUROPATHY: ICD-10-CM

## 2021-08-18 DIAGNOSIS — M54.2 CERVICALGIA: ICD-10-CM

## 2021-08-18 DIAGNOSIS — M54.16 LUMBAR RADICULOPATHY: ICD-10-CM

## 2021-08-18 DIAGNOSIS — M75.42 IMPINGEMENT SYNDROME OF LEFT SHOULDER: ICD-10-CM

## 2021-08-18 PROCEDURE — 73221 MRI JOINT UPR EXTREM W/O DYE: CPT | Mod: LT

## 2021-08-18 PROCEDURE — 99213 OFFICE O/P EST LOW 20 MIN: CPT | Performed by: PHYSICIAN ASSISTANT

## 2021-08-18 PROCEDURE — 73221 MRI JOINT UPR EXTREM W/O DYE: CPT | Mod: 26 | Performed by: RADIOLOGY

## 2021-08-18 RX ORDER — OXYCODONE HYDROCHLORIDE 5 MG/1
TABLET ORAL
Qty: 165 TABLET | Refills: 0 | Status: SHIPPED | OUTPATIENT
Start: 2021-08-18 | End: 2021-09-14

## 2021-08-18 RX ORDER — PREGABALIN 150 MG/1
150 CAPSULE ORAL 3 TIMES DAILY
Qty: 90 CAPSULE | Refills: 1 | Status: SHIPPED | OUTPATIENT
Start: 2021-08-18 | End: 2021-10-18

## 2021-08-18 RX ORDER — CYCLOBENZAPRINE HCL 5 MG
TABLET ORAL
Qty: 45 TABLET | Refills: 0 | Status: SHIPPED | OUTPATIENT
Start: 2021-08-18 | End: 2021-09-08

## 2021-08-18 ASSESSMENT — MIFFLIN-ST. JEOR: SCORE: 1859.57

## 2021-08-18 ASSESSMENT — PAIN SCALES - GENERAL: PAINLEVEL: SEVERE PAIN (6)

## 2021-08-18 NOTE — PATIENT INSTRUCTIONS
After Visit Instructions:     Thank you for coming to [facility] for your care. It is my goal to partner with you to help you reach your optimal state of health.     I am recommending multidisciplinary care at this time.  The focus of care will be to continue gradual rehabilitation and pain management with medication adjustments as needed.    Continue daily self-care, identifying contributing factors, and monitoring variations in pain level. Continue to integrate self-care into your life.        Schedule follow-up with Chiara Garcia PA-C in 8 weeks. You will need to make this appointment.     Procedures recommended: Lumbar medial branch blocks #2. To call and schedule your procedure, you can call: [phone], then hit option 2    Medication recommendations:   1. Continue Lyrica 150 mg three times a day  2. Continue nortriptyline to 50 mg at bedtime.  3. Continue Oxycodone 5m-2 tablets every 4-6 hours as needed. Max of 6 tabs in a day.   4. Consider Butrans. Will need to taper Oxycodone to a max of 4 tabs/day for 1 week before starting. Could also consider OxyContin  5. Flexeril 5mg: take 1-2 tabs three times daily as needed.       Chiara Garcia PA-C  [facility]  [facility]    Contact information: [facility]  Clinic Number:  [phone]     Call with any questions about your care and for scheduling assistance.     Calls are returned Monday through Friday between 8 AM and 4:30 PM. We usually get back to you within 2 business days depending on the issue/request.    If we are prescribing your medications:    For opioid medication refills, call the clinic or send a [employer] message 7 days in advance.  Please include:    Name of requested medication    Name of the pharmacy.    For non-opioid medications, call your pharmacy directly to request a refill. Please allow 3-4 days to be processed.     Per [place] State  Law:    All controlled substance prescriptions must be filled within 30 days of being written.      For those controlled substances allowing refills, pickup must occur within 30 days of last fill.      We believe regular attendance is key to your success in our program!      Any time you are unable to keep your appointment we ask that you call us at least 24 hours in advance to cancel.This will allow us to offer the appointment time to another patient.   Multiple missed appointments may lead to dismissal from the clinic.

## 2021-08-19 ENCOUNTER — HOSPITAL ENCOUNTER (OUTPATIENT)
Dept: PHYSICAL THERAPY | Facility: CLINIC | Age: 56
Setting detail: THERAPIES SERIES
End: 2021-08-19
Attending: PHYSICIAN ASSISTANT
Payer: COMMERCIAL

## 2021-08-19 ENCOUNTER — TELEPHONE (OUTPATIENT)
Dept: ORTHOPEDICS | Facility: CLINIC | Age: 56
End: 2021-08-19

## 2021-08-19 DIAGNOSIS — M75.42 IMPINGEMENT SYNDROME OF LEFT SHOULDER: ICD-10-CM

## 2021-08-19 DIAGNOSIS — M25.512 LEFT SHOULDER PAIN, UNSPECIFIED CHRONICITY: ICD-10-CM

## 2021-08-19 DIAGNOSIS — M75.102 ROTATOR CUFF SYNDROME OF LEFT SHOULDER: Primary | ICD-10-CM

## 2021-08-19 DIAGNOSIS — Z11.59 ENCOUNTER FOR SCREENING FOR OTHER VIRAL DISEASES: ICD-10-CM

## 2021-08-19 PROCEDURE — 97162 PT EVAL MOD COMPLEX 30 MIN: CPT | Mod: GP | Performed by: PHYSICAL THERAPIST

## 2021-08-19 PROCEDURE — 97110 THERAPEUTIC EXERCISES: CPT | Mod: GP | Performed by: PHYSICAL THERAPIST

## 2021-08-19 PROCEDURE — 97530 THERAPEUTIC ACTIVITIES: CPT | Mod: GP | Performed by: PHYSICAL THERAPIST

## 2021-08-19 NOTE — TELEPHONE ENCOUNTER
I called Indra PEREZ Janine on 8/19/2021 at 10:03 AM.  MRI shows rotator cuff partial tear with tendinosis and also probable inferior labrum tear.  These findings were present in 2017 also.  He has not had surgery on shoulder.  He does have neck issues also and had cervical fusion.  He thinks shoulder feels unstable to reaching and lifting.  He has done strengthening at home.  My partners have referred inferior labrum tears.   I offered referral to Dr. Ghada De Jesus to see if this may require labrum repair.  We will place order.

## 2021-08-19 NOTE — TELEPHONE ENCOUNTER
Pt scheduled for MMB  Date: 9/9/21  Time: 1100  Dr. Mcdaniel    Instructed pt to have H&P and  for procedure.  Patient informed of COVID testing process.

## 2021-08-20 DIAGNOSIS — I10 BENIGN ESSENTIAL HYPERTENSION: ICD-10-CM

## 2021-08-20 RX ORDER — LOSARTAN POTASSIUM AND HYDROCHLOROTHIAZIDE 25; 100 MG/1; MG/1
1 TABLET ORAL DAILY
Qty: 90 TABLET | Refills: 2 | OUTPATIENT
Start: 2021-08-20

## 2021-08-20 NOTE — TELEPHONE ENCOUNTER
Prescription was sent 10/27/20 for #90 with 3 refills.  Pharmacy notified via E-Prescribe refusal.     Jackie Moncada RN on 8/20/2021 at 4:46 PM

## 2021-08-23 NOTE — PROGRESS NOTES
08/19/21 1530   General Information   Type of Visit Initial OP Ortho PT Evaluation   Start of Care Date 08/19/21   Referring Physician Chiara Garcia PA-C   Patient/Family Goals Statement Pain relief   Orders Evaluate and Treat   Orders Comment None   Date of Order 08/18/21   Certification Required? No   Medical Diagnosis Lumbar facet arthropathy   Surgical/Medical history reviewed No   Precautions/Limitations no known precautions/limitations   Weight-Bearing Status - LUE full weight-bearing   Weight-Bearing Status - RUE full weight-bearing   Weight-Bearing Status - LLE full weight-bearing   Weight-Bearing Status - RLE full weight-bearing       Present No   Body Part(s)   Body Part(s) Lumbar Spine/SI   Presentation and Etiology   Pertinent history of current problem (include personal factors and/or comorbidities that impact the POC) Thinks symptoms started back in 7th grade was diagnosised with a leg length discrepancy so had a shoe lift. The legs eventually corrected. Also did a lot of jumping and thinks that is what has caused his symptoms. He has been suffering with back and lower extremity pain for most of his life.    Impairments A. Pain;E. Decreased flexibility   Functional Limitations perform activities of daily living;perform desired leisure / sports activities;perform required work activities   Symptom Location Central low back, shoots to bilateral hips and posterior thighs to the knees and can travel down to the feet.    How/Where did it occur From insidious onset   Onset date of current episode/exacerbation 08/19/19   Chronicity Chronic   Pain rating (0-10 point scale) Other   Pain rating comment Low back 4/10 typically and 9.5/10 at worst, hips 4/10 typically and 8.5/10 at worst and , lower extremities to the knee typically is a 0 when present it is a 7/10.    Pain quality A. Sharp;B. Dull;F. Stabbing;E. Shooting   Frequency of pain/symptoms   (Low back constant, hips and thighs  are intermittent. )   Pain/symptoms are: The same all the time   Pain/symptoms exacerbated by A. Sitting;B. Walking;G. Certain positions;I. Bending;J. ADL;K. Home tasks;L. Work tasks   Pain/symptoms eased by E. Changing positions;C. Rest   Progression of symptoms since onset: Worsened   Prior Level of Function   Prior Level of Function-Mobility Ambulates without an AD   Prior Level of Function-ADLs Independent   Current Level of Function   Current Community Support Family/friend caregiver   Patient role/employment history A. 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   Living environment House/townhome   Home/community accessibility Stairs can be difficult due to bilateral knee pain.   Current equipment-Gait/Locomotion None   Current equipment-ADL None   Fall Risk Screen   Fall screen completed by PT   Have you fallen 2 or more times in the past year? No   Have you fallen and had an injury in the past year? No   Is patient a fall risk? No   Abuse Screen (yes response referral indicated)   Feels Unsafe at Home or Work/School no   Feels Threatened by Someone no   Physical Signs of Abuse Present no   System Outcome Measures   Outcome Measures Low Back Pain (see Oswestry and Mary)   Lumbar Spine/SI Objective Findings   Observation Ketan is a healthy appearing 56 year old male who is referred to P.T. with a diagnosis of lumabr facet arthropathy. He has had a long history of spine pain including neck and low back. He has had some procedures done and last week had a medial branch block done. He has not had recent P.T. for his low back. He demonstrates minimal low back pain symptoms.    Integumentary No deficits identified.    Posture Sits and stands with an increased lumbar lordosis.    Gait/Locomotion Non-antalgic gait pattern   Balance/Proprioception (Single Leg Stance) Not tested   Flexion ROM WNL   Extension ROM Limited by 75%   Lumbar/SI Special Tests Comments Directional preference using Static/Dynamic Force  Analysis. Starting symptoms in standing: Low back pain that is central 2/10. Flexion in standing produced right posterior hip pain, repeated flexion in standing x 4 reps. increased pain to a 4/10 while patient is returning to standing from a bent position. Standing extension produces small central low back pain 4/10 the size of a marble, Repeated extension increases pain to a 5.5/10 that shoots to the rectum. Hooklying centralizes the pain to the low back, the size of a marble 4/10, knees to chest x 30 decreases the pain to a 1.5/10.    Planned Therapy Interventions   Planned Therapy Interventions manual therapy;neuromuscular re-education;ROM;strengthening;stretching   Planned Therapy Interventions Comment ther. ex., ther. activity, directional preference.    Planned Modality Interventions   Planned Modality Interventions Cryotherapy;Electrical stimulation;Hot packs;Ultrasound;Traction   Planned Modality Interventions Comments Will use as needed for symptom control.    Clinical Impression   Criteria for Skilled Therapeutic Interventions Met yes, treatment indicated   PT Diagnosis Mechanical low back pain, with radiating lower extremity symptoms, decreased ROM and function   Influenced by the following impairments Pain, decreased ROM.   Functional limitations due to impairments Bending, rising from sitting, prolonged standing and walking.    Clinical Presentation Evolving/Changing   Clinical Presentation Rationale Clinical judgement, assessment, evaluation   Clinical Decision Making (Complexity) Moderate complexity   Therapy Frequency 1 time/week   Predicted Duration of Therapy Intervention (days/wks) 8 visits   Risk & Benefits of therapy have been explained Yes   Patient, Family & other staff in agreement with plan of care Yes   Clinical Impression Comments Ketan is referred to P.T. with a diagnosis of lumbar facet arthropathy. He is demonstrating that he is a flexion responder.    Education Assessment   Preferred  Learning Style Listening;Reading;Demonstration   Barriers to Learning No barriers   ORTHO GOALS   PT Ortho Eval Goals 1;2;3   Ortho Goal 1   Goal Identifier Symptoms   Goal Description Ketan will use strategies learned in P.T. to decrease symptoms by 50% to help reach his personal goal of symptom relief.   Target Date 09/18/21   Ortho Goal 2   Goal Identifier Sleeping   Goal Description Ketan will consistently get 7 hours of sleep a night without waking due to pain, 5 out of 7 nights a week.   Target Date 10/18/21   Ortho Goal 3   Goal Identifier Function   Goal Description Ketan will improve his SADA by 30% indicating statistically significant improvement in his function.    Target Date 11/17/21   Total Evaluation Time   PT Brionna, Moderate Complexity Minutes (59327) 30

## 2021-08-28 NOTE — PROGRESS NOTES
Sports Medicine Clinic Visit       PCP: Tha Grullon    Indra Mehta is a 56 year old male who is seen in follow up for bilateral knee pain. Since last visit on 11/16/2020, Indra Mehta has noticed an increased level of bilateral knee pain. Pain is deep within the knee joint. He rates the pain at a 2/10 currently but a 8/10 with steps and stairs.  Symptoms are relieved with past corticosteroid injections in 10/2020 and ice/heat relieve pain minimally.  Symptoms are worsened by stairs and sit to stand.     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      History reviewed. No pertinent past surgical/medical/family/social history other than as mentioned in HPI.    Patient Active Problem List   Diagnosis     Meniere's disease     Major depression in complete remission (H)     Chronic pain syndrome     Pain medication agreement signed     Bilateral occipital neuralgia     Benign essential hypertension     Cervical radiculopathy     Status post lumbar spinal fusion     Sleep apnea, unspecified type     Osteoarthritis of spine with radiculopathy, lumbar region     Impingement syndrome of left shoulder     Past Medical History:   Diagnosis Date     Anxiety      Back pain      Depressive disorder      Hyperlipidemia      Hypertension      Meniere's disease, unspecified      Pain medication agreement signed 8/20/2010     Past Surgical History:   Procedure Laterality Date     BUNIONECTOMY RT/LT  09/05/08    Both feet     COLONOSCOPY N/A 12/28/2016    Procedure: COMBINED COLONOSCOPY, SINGLE OR MULTIPLE BIOPSY/POLYPECTOMY BY BIOPSY;  Surgeon: Indra Jernigan MD;  [facility]     DISCECTOMY, FUSION CERVICAL ANTERIOR ONE LEVEL, COMBINED N/A 7/5/2017    Procedure: COMBINED DISCECTOMY, FUSION CERVICAL ANTERIOR ONE LEVEL;  Cervical 6-7, Anterior cervical discectomy fusion;  Surgeon: Mike Mckenna MD;  [facility]     DISCECTOMY, FUSION CERVICAL ANTERIOR ONE LEVEL, COMBINED N/A 12/19/2018     Procedure: cervical 5-6 anterior cervical discectomy fusion;  Surgeon: Mike Mckenna MD;  [facility]     HERNIORRHAPHY UMBILICAL N/A 8/11/2017    Procedure: HERNIORRHAPHY UMBILICAL;  open umbilical hernia repair;  Surgeon: Boni Story MD;  [facility]     INJECT BLOCK MEDIAL BRANCH CERVICAL/THORACIC/LUMBAR Bilateral 8/12/2021    Procedure: Left L3, Left L4, Left L5, Right L3, Right L4 and Right L5 medial branch nerve blocks using fluoroscopic guidance and contrast control;  Surgeon: Darryn Mcdaniel MD;  [facility]     INJECT EPIDURAL CERVICAL N/A 8/22/2019    Procedure: INJECTION, SPINE, CERVICAL 6-7 EPIDURAL;  Surgeon: Darryn Mcdaniel MD;  [facility]     INJECT EPIDURAL LUMBAR N/A 11/9/2017    Procedure: INJECT EPIDURAL LUMBAR;  lumbar 4-5 epidural steroid injection ;  Surgeon: Darryn Mcdaniel MD;  [facility]     INJECT EPIDURAL LUMBAR N/A 12/28/2017    Procedure: INJECT EPIDURAL LUMBAR;  lumbar epidural injection;  Surgeon: Darryn Mcdaniel MD;  [facility]     INJECT EPIDURAL LUMBAR Bilateral 5/13/2021    Procedure: Bilateral Lumbar 3-4 Epidural Steroid Injection;  Surgeon: Darryn Mcdaniel MD;  [facility]     INJECT EPIDURAL TRANSFORAMINAL Bilateral 8/23/2018    Procedure: INJECT EPIDURAL TRANSFORAMINAL;  transforaminal bilateral lumbar 3-4 steroid injection;  Surgeon: Darryn Mcdaniel MD;  [facility]     INJECT EPIDURAL TRANSFORAMINAL Bilateral 11/8/2018    Procedure: INJECT EPIDURAL TRANSFORAMINAL lumbar 3-4;  Surgeon: Darryn Mcdaniel MD;  [facility]     INJECT EPIDURAL TRANSFORAMINAL Bilateral 6/14/2019    Procedure: INJECTION, EPIDURAL, TRANSFORAMINAL LUMBAR 3-4 BILATERAL;  Surgeon: Darryn Mcdaniel MD;  [facility]     INJECT EPIDURAL TRANSFORAMINAL Bilateral 5/22/2020    Procedure: lumbar 3-4 bilateral transforaminal epidural steroid injection;  Surgeon: Darryn Mcdaniel MD;  [facility]     INJECT EPIDURAL TRANSFORAMINAL Bilateral 9/24/2020     Procedure: INJECTION, EPIDURAL, TRANSFORAMINAL  LUMBAR 3-4 APPROACH;  Surgeon: Darryn Mcdaniel MD;  [facility]     PE TUBES      left ear     ZZHC COLONOSCOPY W/WO BRUSH/WASH  2005     ZZHC CREATE EARDRUM OPENING,GEN ANESTH  2007    Pe tube, Left endolymphatic sac enhancement.     ZZHC MASTOIDECTOMY,COMPLETE  2007     Family History   Problem Relation Age of Onset     Diabetes Mother      Cancer Mother         colon cancer -  at 52, diag at 42     Hypertension Father      Heart Disease Father          of AAA     No Known Problems Brother      No Known Problems Brother      Depression Sister      Suicide Other      Unknown/Adopted Maternal Grandmother      Unknown/Adopted Maternal Grandfather      Unknown/Adopted Paternal Grandmother      Unknown/Adopted Paternal Grandfather      No Known Problems Daughter      No Known Problems Daughter      Social History     Socioeconomic History     Marital status: Single     Spouse name: Not on file     Number of children: 2     Years of education: Not on file     Highest education level: Not on file   Occupational History     Employer: [employer]   Tobacco Use     Smoking status: Never Smoker     Smokeless tobacco: Never Used   Substance and Sexual Activity     Alcohol use: Yes     Alcohol/week: 0.0 standard drinks     Comment: rarely     Drug use: No     Sexual activity: Not Currently     Partners: Female   Other Topics Concern     Parent/sibling w/ CABG, MI or angioplasty before 65F 55M? No   Social History Narrative     Not on file     Social Determinants of Health     Financial Resource Strain:      Difficulty of Paying Living Expenses:    Food Insecurity:      Worried About Running Out of Food in the Last Year:      Ran Out of Food in the Last Year:    Transportation Needs:      Lack of Transportation (Medical):      Lack of Transportation (Non-Medical):    Physical Activity:      Days of Exercise per Week:      Minutes of Exercise  "per Session:    Stress:      Feeling of Stress :    Social Connections:      Frequency of Communication with Friends and Family:      Frequency of Social Gatherings with Friends and Family:      Attends [employer] Services:      Active Member of Clubs or Organizations:      Attends Club or Organization Meetings:      Marital Status:    Intimate Partner Violence:      Fear of Current or Ex-Partner:      Emotionally Abused:      Physically Abused:      Sexually Abused:          Current Outpatient Medications   Medication     Acetaminophen (TYLENOL PO)     ALPRAZolam (XANAX XR) 0.5 MG 24 hr tablet     amLODIPine (NORVASC) 5 MG tablet     atorvastatin (LIPITOR) 10 MG tablet     cyclobenzaprine (FLEXERIL) 5 MG tablet     ibuprofen (ADVIL/MOTRIN) 800 MG tablet     losartan-hydrochlorothiazide (HYZAAR) 100-25 MG tablet     naloxone (NARCAN) 4 MG/0.1ML nasal spray     nortriptyline (PAMELOR) 50 MG capsule     order for DME     oxyCODONE (ROXICODONE) 5 MG tablet     pregabalin (LYRICA) 150 MG capsule     Current Facility-Administered Medications   Medication     lidocaine 1 % injection 5 mL     triamcinolone (KENALOG-40) injection 40 mg     triamcinolone (KENALOG-40) injection 40 mg     triamcinolone (KENALOG-40) injection 40 mg     Allergies   Allergen Reactions     No Known Drug Allergies          Objective:  /72 (Cuff Size: Adult Large)   Ht 1.854 m (6' 1\")   Wt 98 kg (216 lb)   BMI 28.50 kg/m      General: Alert and in no distress    Head: Normocephalic, atraumatic  Eyes: no scleral icterus or conjunctival erythema   Skin: no erythema, petechiae, or jaundice  Resp: normal respiratory effort without conversational dyspnea   Psych: normal mood and affect    Gait: Non-antalgic, appropriate coordination and balance     Musculoskeletal:  -No tenderness over the bilateral knees  -Full seated knee extension and flexion bilaterally without pain    Radiology:  Independent visualization of images performed    KNEE AP " STANDING BILATERAL   9/2/2020 2:34 PM      HISTORY: Chronic pain of both knees.      COMPARISON: None.     FINDINGS: There is no fracture or significant joint effusion. Joint  spaces are well maintained.                                                                      IMPRESSION:  Negative single view of the bilateral knees.     HILDA DOUGLAS MD      KNEE BILATERAL ONE TO TWO VIEWS September 8, 2020 10:23 AM      HISTORY: Chronic pain of both knees.     COMPARISON: AP bilateral knee x-rays dated 9/2/2020.     FINDINGS: Moderate enthesopathic spurring of the bilateral quadriceps  tendons into the bilateral patellae are noted. No fracture,  malalignment, joint space loss, or effusion is otherwise seen.                                                                      IMPRESSION:  1. Moderate enthesopathic spurring of the bilateral quadriceps tendon  insertions into the bilateral patellae.  2. Otherwise negative bilateral knee x-rays.      HILDA DOUGLAS MD    Large Joint Injection/Arthocentesis: bilateral knee    Date/Time: 9/7/2021 8:55 AM  Performed by: Melissa Xie MD  Authorized by: Melissa Xie MD     Indications:  Pain  Needle Size:  25 G  Guidance: landmark guided    Approach:  Anterolateral  Location:  Knee  Laterality:  Bilateral      Medications (Right):  40 mg triamcinolone 40 MG/ML   Medications (Right) comment:  4 mL 0.5% Bupivacaine  NDC: 1285-2398-96  CBU: [id number]  Exp: 03/01/2023    Medications (Left):  40 mg triamcinolone 40 MG/ML   Medications (Left) comment:  4 mL 0.5% Bupivacaine  NDC: 7679-5650-02  CBU: [id number]  Exp: 03/01/2023  Outcome:  Tolerated well, no immediate complications  Procedure discussed: discussed risks, benefits, and alternatives    Consent Given by:  Patient        Assessment:  1. Chronic pain of both knees        Plan:  Discussed the assessment with the patient and developed a plan together:  -Steroid injections performed today.  Take it easy over  the next few days. Keep in mind that the steroid may take up to 3 days to start working and up to 2 weeks to reach maximal effect.    -Ice for 15-20 minutes as needed for soreness and swelling.  -Patient's preferred over the counter medications as needed for soreness.  -Would recommend repeat x-rays at next visit.      -Follow up as needed if symptoms fail to improve or worsen.  Please call with questions or concerns.        Yoanna Xie MD, CAQ Sports Medicine  [facility]

## 2021-09-05 ENCOUNTER — LAB (OUTPATIENT)
Dept: LAB | Facility: CLINIC | Age: 56
End: 2021-09-05
Attending: ANESTHESIOLOGY
Payer: COMMERCIAL

## 2021-09-05 DIAGNOSIS — Z11.59 ENCOUNTER FOR SCREENING FOR OTHER VIRAL DISEASES: ICD-10-CM

## 2021-09-05 PROCEDURE — U0003 INFECTIOUS AGENT DETECTION BY NUCLEIC ACID (DNA OR RNA); SEVERE ACUTE RESPIRATORY SYNDROME CORONAVIRUS 2 (SARS-COV-2) (CORONAVIRUS DISEASE [COVID-19]), AMPLIFIED PROBE TECHNIQUE, MAKING USE OF HIGH THROUGHPUT TECHNOLOGIES AS DESCRIBED BY CMS-2020-01-R: HCPCS

## 2021-09-05 PROCEDURE — U0005 INFEC AGEN DETEC AMPLI PROBE: HCPCS

## 2021-09-06 LAB — SARS-COV-2 RNA RESP QL NAA+PROBE: NEGATIVE

## 2021-09-07 ENCOUNTER — OFFICE VISIT (OUTPATIENT)
Dept: ORTHOPEDICS | Facility: CLINIC | Age: 56
End: 2021-09-07
Payer: COMMERCIAL

## 2021-09-07 ENCOUNTER — HOSPITAL ENCOUNTER (OUTPATIENT)
Dept: PHYSICAL THERAPY | Facility: CLINIC | Age: 56
Setting detail: THERAPIES SERIES
End: 2021-09-07
Attending: PHYSICIAN ASSISTANT
Payer: COMMERCIAL

## 2021-09-07 VITALS
DIASTOLIC BLOOD PRESSURE: 72 MMHG | WEIGHT: 216 LBS | SYSTOLIC BLOOD PRESSURE: 122 MMHG | BODY MASS INDEX: 28.63 KG/M2 | HEIGHT: 73 IN

## 2021-09-07 DIAGNOSIS — M25.562 CHRONIC PAIN OF BOTH KNEES: Primary | ICD-10-CM

## 2021-09-07 DIAGNOSIS — G62.9 NEUROPATHY: ICD-10-CM

## 2021-09-07 DIAGNOSIS — M54.16 LUMBAR RADICULOPATHY: ICD-10-CM

## 2021-09-07 DIAGNOSIS — M25.561 CHRONIC PAIN OF BOTH KNEES: Primary | ICD-10-CM

## 2021-09-07 DIAGNOSIS — G89.29 CHRONIC PAIN OF BOTH KNEES: Primary | ICD-10-CM

## 2021-09-07 DIAGNOSIS — Z98.1 S/P CERVICAL SPINAL FUSION: ICD-10-CM

## 2021-09-07 DIAGNOSIS — M79.18 MYOFASCIAL PAIN: ICD-10-CM

## 2021-09-07 DIAGNOSIS — G89.4 CHRONIC PAIN SYNDROME: ICD-10-CM

## 2021-09-07 DIAGNOSIS — M54.2 CERVICALGIA: ICD-10-CM

## 2021-09-07 PROCEDURE — 97110 THERAPEUTIC EXERCISES: CPT | Mod: GP | Performed by: PHYSICAL THERAPIST

## 2021-09-07 PROCEDURE — 99213 OFFICE O/P EST LOW 20 MIN: CPT | Mod: 25 | Performed by: PHYSICAL MEDICINE & REHABILITATION

## 2021-09-07 PROCEDURE — 20610 DRAIN/INJ JOINT/BURSA W/O US: CPT | Mod: 50 | Performed by: PHYSICAL MEDICINE & REHABILITATION

## 2021-09-07 PROCEDURE — 97530 THERAPEUTIC ACTIVITIES: CPT | Mod: GP | Performed by: PHYSICAL THERAPIST

## 2021-09-07 RX ORDER — TRIAMCINOLONE ACETONIDE 40 MG/ML
40 INJECTION, SUSPENSION INTRA-ARTICULAR; INTRAMUSCULAR
Status: SHIPPED | OUTPATIENT
Start: 2021-09-07

## 2021-09-07 RX ADMIN — TRIAMCINOLONE ACETONIDE 40 MG: 40 INJECTION, SUSPENSION INTRA-ARTICULAR; INTRAMUSCULAR at 08:55

## 2021-09-07 ASSESSMENT — MIFFLIN-ST. JEOR: SCORE: 1868.65

## 2021-09-07 ASSESSMENT — PAIN SCALES - GENERAL: PAINLEVEL: MILD PAIN (2)

## 2021-09-07 NOTE — LETTER
9/7/2021         RE: Indra Mehta  [id number] [facility] [id number]        Dear Colleague,    Thank you for referring your patient, Indra Mehta, to the [facility] [place]. Please see a copy of my visit note below.    Sports Medicine Clinic Visit       PCP: Tha Grullon    Indra Mehta is a 56 year old male who is seen in follow up for bilateral knee pain. Since last visit on 11/16/2020, Indra Mehta has noticed an increased level of bilateral knee pain. Pain is deep within the knee joint. He rates the pain at a 2/10 currently but a 8/10 with steps and stairs.  Symptoms are relieved with past corticosteroid injections in 10/2020 and ice/heat relieve pain minimally.  Symptoms are worsened by stairs and sit to stand.     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      History reviewed. No pertinent past surgical/medical/family/social history other than as mentioned in HPI.    Patient Active Problem List   Diagnosis     Meniere's disease     Major depression in complete remission (H)     Chronic pain syndrome     Pain medication agreement signed     Bilateral occipital neuralgia     Benign essential hypertension     Cervical radiculopathy     Status post lumbar spinal fusion     Sleep apnea, unspecified type     Osteoarthritis of spine with radiculopathy, lumbar region     Impingement syndrome of left shoulder     Past Medical History:   Diagnosis Date     Anxiety      Back pain      Depressive disorder      Hyperlipidemia      Hypertension      Meniere's disease, unspecified      Pain medication agreement signed 8/20/2010     Past Surgical History:   Procedure Laterality Date     BUNIONECTOMY RT/LT  09/05/08    Both feet     COLONOSCOPY N/A 12/28/2016    Procedure: COMBINED COLONOSCOPY, SINGLE OR MULTIPLE BIOPSY/POLYPECTOMY BY BIOPSY;  Surgeon: Indra Jernigan MD;  [facility]     DISCECTOMY, FUSION CERVICAL ANTERIOR ONE LEVEL, COMBINED N/A  7/5/2017    Procedure: COMBINED DISCECTOMY, FUSION CERVICAL ANTERIOR ONE LEVEL;  Cervical 6-7, Anterior cervical discectomy fusion;  Surgeon: Mike Mckenna MD;  [facility]     DISCECTOMY, FUSION CERVICAL ANTERIOR ONE LEVEL, COMBINED N/A 12/19/2018    Procedure: cervical 5-6 anterior cervical discectomy fusion;  Surgeon: Mike Mckenna MD;  [facility]     HERNIORRHAPHY UMBILICAL N/A 8/11/2017    Procedure: HERNIORRHAPHY UMBILICAL;  open umbilical hernia repair;  Surgeon: Boni Story MD;  [facility]     INJECT BLOCK MEDIAL BRANCH CERVICAL/THORACIC/LUMBAR Bilateral 8/12/2021    Procedure: Left L3, Left L4, Left L5, Right L3, Right L4 and Right L5 medial branch nerve blocks using fluoroscopic guidance and contrast control;  Surgeon: Darryn Mcdaniel MD;  [facility]     INJECT EPIDURAL CERVICAL N/A 8/22/2019    Procedure: INJECTION, SPINE, CERVICAL 6-7 EPIDURAL;  Surgeon: Darryn Mcdaniel MD;  [facility]     INJECT EPIDURAL LUMBAR N/A 11/9/2017    Procedure: INJECT EPIDURAL LUMBAR;  lumbar 4-5 epidural steroid injection ;  Surgeon: Darryn Mcdaniel MD;  [facility]     INJECT EPIDURAL LUMBAR N/A 12/28/2017    Procedure: INJECT EPIDURAL LUMBAR;  lumbar epidural injection;  Surgeon: Darryn Mcdaniel MD;  [facility]     INJECT EPIDURAL LUMBAR Bilateral 5/13/2021    Procedure: Bilateral Lumbar 3-4 Epidural Steroid Injection;  Surgeon: Darryn Mcdaniel MD;  [facility]     INJECT EPIDURAL TRANSFORAMINAL Bilateral 8/23/2018    Procedure: INJECT EPIDURAL TRANSFORAMINAL;  transforaminal bilateral lumbar 3-4 steroid injection;  Surgeon: Darryn Mcdaniel MD;  [facility]     INJECT EPIDURAL TRANSFORAMINAL Bilateral 11/8/2018    Procedure: INJECT EPIDURAL TRANSFORAMINAL lumbar 3-4;  Surgeon: Darryn Mcdaniel MD;  [facility]     INJECT EPIDURAL TRANSFORAMINAL Bilateral 6/14/2019    Procedure: INJECTION, EPIDURAL, TRANSFORAMINAL LUMBAR 3-4 BILATERAL;  Surgeon: Darryn Mcdaniel MD;   [facility]     INJECT EPIDURAL TRANSFORAMINAL Bilateral 2020    Procedure: lumbar 3-4 bilateral transforaminal epidural steroid injection;  Surgeon: Darryn Mcdaniel MD;  [facility]     INJECT EPIDURAL TRANSFORAMINAL Bilateral 2020    Procedure: INJECTION, EPIDURAL, TRANSFORAMINAL  LUMBAR 3-4 APPROACH;  Surgeon: Darryn Mcdaniel MD;  [facility]     PE TUBES      left ear     ZZHC COLONOSCOPY W/WO BRUSH/WASH  2005     ZZHC CREATE EARDRUM OPENING,GEN ANESTH  2007    Pe tube, Left endolymphatic sac enhancement.     ZZHC MASTOIDECTOMY,COMPLETE  2007     Family History   Problem Relation Age of Onset     Diabetes Mother      Cancer Mother         colon cancer -  at 52, diag at 42     Hypertension Father      Heart Disease Father          of AAA     No Known Problems Brother      No Known Problems Brother      Depression Sister      Suicide Other      Unknown/Adopted Maternal Grandmother      Unknown/Adopted Maternal Grandfather      Unknown/Adopted Paternal Grandmother      Unknown/Adopted Paternal Grandfather      No Known Problems Daughter      No Known Problems Daughter      Social History     Socioeconomic History     Marital status: Single     Spouse name: Not on file     Number of children: 2     Years of education: Not on file     Highest education level: Not on file   Occupational History     Employer: [employer]   Tobacco Use     Smoking status: Never Smoker     Smokeless tobacco: Never Used   Substance and Sexual Activity     Alcohol use: Yes     Alcohol/week: 0.0 standard drinks     Comment: rarely     Drug use: No     Sexual activity: Not Currently     Partners: Female   Other Topics Concern     Parent/sibling w/ CABG, MI or angioplasty before 65F 55M? No   Social History Narrative     Not on file     Social Determinants of Health     Financial Resource Strain:      Difficulty of Paying Living Expenses:    Food Insecurity:      Worried About Running  "Out of Food in the Last Year:      Ran Out of Food in the Last Year:    Transportation Needs:      Lack of Transportation (Medical):      Lack of Transportation (Non-Medical):    Physical Activity:      Days of Exercise per Week:      Minutes of Exercise per Session:    Stress:      Feeling of Stress :    Social Connections:      Frequency of Communication with Friends and Family:      Frequency of Social Gatherings with Friends and Family:      Attends Synagogue Services:      Active Member of Clubs or Organizations:      Attends Club or Organization Meetings:      Marital Status:    Intimate Partner Violence:      Fear of Current or Ex-Partner:      Emotionally Abused:      Physically Abused:      Sexually Abused:          Current Outpatient Medications   Medication     Acetaminophen (TYLENOL PO)     ALPRAZolam (XANAX XR) 0.5 MG 24 hr tablet     amLODIPine (NORVASC) 5 MG tablet     atorvastatin (LIPITOR) 10 MG tablet     cyclobenzaprine (FLEXERIL) 5 MG tablet     ibuprofen (ADVIL/MOTRIN) 800 MG tablet     losartan-hydrochlorothiazide (HYZAAR) 100-25 MG tablet     naloxone (NARCAN) 4 MG/0.1ML nasal spray     nortriptyline (PAMELOR) 50 MG capsule     order for DME     oxyCODONE (ROXICODONE) 5 MG tablet     pregabalin (LYRICA) 150 MG capsule     Current Facility-Administered Medications   Medication     lidocaine 1 % injection 5 mL     triamcinolone (KENALOG-40) injection 40 mg     triamcinolone (KENALOG-40) injection 40 mg     triamcinolone (KENALOG-40) injection 40 mg     Allergies   Allergen Reactions     No Known Drug Allergies          Objective:  /72 (Cuff Size: Adult Large)   Ht 1.854 m (6' 1\")   Wt 98 kg (216 lb)   BMI 28.50 kg/m      General: Alert and in no distress    Head: Normocephalic, atraumatic  Eyes: no scleral icterus or conjunctival erythema   Skin: no erythema, petechiae, or jaundice  Resp: normal respiratory effort without conversational dyspnea   Psych: normal mood and affect    Gait: " Non-antalgic, appropriate coordination and balance     Musculoskeletal:  -No tenderness over the bilateral knees  -Full seated knee extension and flexion bilaterally without pain    Radiology:  Independent visualization of images performed    KNEE AP STANDING BILATERAL   9/2/2020 2:34 PM      HISTORY: Chronic pain of both knees.      COMPARISON: None.     FINDINGS: There is no fracture or significant joint effusion. Joint  spaces are well maintained.                                                                      IMPRESSION:  Negative single view of the bilateral knees.     HILDA DOUGLAS MD      KNEE BILATERAL ONE TO TWO VIEWS September 8, 2020 10:23 AM      HISTORY: Chronic pain of both knees.     COMPARISON: AP bilateral knee x-rays dated 9/2/2020.     FINDINGS: Moderate enthesopathic spurring of the bilateral quadriceps  tendons into the bilateral patellae are noted. No fracture,  malalignment, joint space loss, or effusion is otherwise seen.                                                                      IMPRESSION:  1. Moderate enthesopathic spurring of the bilateral quadriceps tendon  insertions into the bilateral patellae.  2. Otherwise negative bilateral knee x-rays.      HILDA DOUGLAS MD    Large Joint Injection/Arthocentesis: bilateral knee    Date/Time: 9/7/2021 8:55 AM  Performed by: Melissa Xie MD  Authorized by: Melissa Xie MD     Indications:  Pain  Needle Size:  25 G  Guidance: landmark guided    Approach:  Anterolateral  Location:  Knee  Laterality:  Bilateral      Medications (Right):  40 mg triamcinolone 40 MG/ML   Medications (Right) comment:  4 mL 0.5% Bupivacaine  ND: 6353-7568-45  CBU: [id number]  Exp: 03/01/2023    Medications (Left):  40 mg triamcinolone 40 MG/ML   Medications (Left) comment:  4 mL 0.5% Bupivacaine  NDC: 4073-7876-66  CBU: [id number]  Exp: 03/01/2023  Outcome:  Tolerated well, no immediate complications  Procedure discussed: discussed risks,  benefits, and alternatives    Consent Given by:  Patient        Assessment:  1. Chronic pain of both knees        Plan:  Discussed the assessment with the patient and developed a plan together:  -Steroid injections performed today.  Take it easy over the next few days. Keep in mind that the steroid may take up to 3 days to start working and up to 2 weeks to reach maximal effect.    -Ice for 15-20 minutes as needed for soreness and swelling.  -Patient's preferred over the counter medications as needed for soreness.  -Would recommend repeat x-rays at next visit.      -Follow up as needed if symptoms fail to improve or worsen.  Please call with questions or concerns.        Yoanna Xie MD, [facility] Sports Medicine  [facility]          Again, thank you for allowing me to participate in the care of your patient.        Sincerely,        Melissa Xie MD

## 2021-09-07 NOTE — TELEPHONE ENCOUNTER
cyclobenzaprine      Last Written Prescription Date:  08/18/21  Last Fill Quantity: 45,   # refills: 0  Last Office Visit: 09/07/21 with Rojas, 08/18/21 with Chiara Garcia  Future Office visit:    Next 5 appointments (look out 90 days)    Oct 13, 2021  8:00 AM  Return Visit with Chiara Garcia PA-C  [facility] ([facility] ) [address] [place] [id number] [phone]           Routing refill request to provider for review/approval because:  Drug not on the [facility], [facility] or [facility] refill protocol or controlled substance

## 2021-09-08 RX ORDER — CYCLOBENZAPRINE HCL 5 MG
TABLET ORAL
Qty: 45 TABLET | Refills: 0 | Status: SHIPPED | OUTPATIENT
Start: 2021-09-08 | End: 2021-10-05

## 2021-09-08 NOTE — TELEPHONE ENCOUNTER
Signed Prescriptions:                        Disp   Refills    cyclobenzaprine (FLEXERIL) 5 MG tablet     45 tab*0        Si-2 tablets three times daily as needed  Authorizing Provider: HAROON WESTON RN CNP, FNP  [facility]  [facility]

## 2021-09-09 ENCOUNTER — MYC MEDICAL ADVICE (OUTPATIENT)
Dept: PALLIATIVE MEDICINE | Facility: CLINIC | Age: 56
End: 2021-09-09

## 2021-09-09 ENCOUNTER — HOSPITAL ENCOUNTER (OUTPATIENT)
Dept: GENERAL RADIOLOGY | Facility: CLINIC | Age: 56
End: 2021-09-09
Attending: ANESTHESIOLOGY | Admitting: ANESTHESIOLOGY
Payer: COMMERCIAL

## 2021-09-09 ENCOUNTER — HOSPITAL ENCOUNTER (OUTPATIENT)
Facility: CLINIC | Age: 56
Discharge: HOME | End: 2021-09-09
Attending: ANESTHESIOLOGY | Admitting: ANESTHESIOLOGY
Payer: COMMERCIAL

## 2021-09-09 VITALS
SYSTOLIC BLOOD PRESSURE: 134 MMHG | TEMPERATURE: 99 F | OXYGEN SATURATION: 97 % | HEART RATE: 89 BPM | DIASTOLIC BLOOD PRESSURE: 63 MMHG | RESPIRATION RATE: 15 BRPM

## 2021-09-09 DIAGNOSIS — M47.816 LUMBAR FACET ARTHROPATHY: ICD-10-CM

## 2021-09-09 DIAGNOSIS — M47.816 LUMBAR FACET ARTHROPATHY: Primary | ICD-10-CM

## 2021-09-09 PROCEDURE — 64493 INJ PARAVERT F JNT L/S 1 LEV: CPT | Mod: 50 | Performed by: ANESTHESIOLOGY

## 2021-09-09 PROCEDURE — 250N000011 HC RX IP 250 OP 636: Performed by: ANESTHESIOLOGY

## 2021-09-09 PROCEDURE — 64494 INJ PARAVERT F JNT L/S 2 LEV: CPT | Mod: 50 | Performed by: ANESTHESIOLOGY

## 2021-09-09 PROCEDURE — 64495 INJ PARAVERT F JNT L/S 3 LEV: CPT | Performed by: ANESTHESIOLOGY

## 2021-09-09 PROCEDURE — 250N000009 HC RX 250: Performed by: ANESTHESIOLOGY

## 2021-09-09 PROCEDURE — 77003 FLUOROGUIDE FOR SPINE INJECT: CPT | Mod: TC

## 2021-09-09 PROCEDURE — 64494 INJ PARAVERT F JNT L/S 2 LEV: CPT | Performed by: ANESTHESIOLOGY

## 2021-09-09 RX ORDER — IOPAMIDOL 612 MG/ML
INJECTION, SOLUTION INTRATHECAL PRN
Status: DISCONTINUED | OUTPATIENT
Start: 2021-09-09 | End: 2021-09-09 | Stop reason: HOSPADM

## 2021-09-09 RX ORDER — LIDOCAINE HYDROCHLORIDE 40 MG/ML
INJECTION, SOLUTION RETROBULBAR PRN
Status: DISCONTINUED | OUTPATIENT
Start: 2021-09-09 | End: 2021-09-09 | Stop reason: HOSPADM

## 2021-09-09 RX ORDER — LIDOCAINE 40 MG/G
CREAM TOPICAL
Status: DISCONTINUED | OUTPATIENT
Start: 2021-09-09 | End: 2021-09-09 | Stop reason: HOSPADM

## 2021-09-09 NOTE — OP NOTE
PRE-OP DIAGNOSIS: Low back pain secondary to lumbar spondylosis    POST-OP DIAGNOSIS:  same    PROCEDURE: Left L3, Left L4, Left L5, Right L3, Right L4 and Right L5 medial branch nerve blocks using fluoroscopic guidance and contrast control.     ANESTHESIA:  Local    PROCEDURE DETAILS: After discussing the risks, benefits, and alternatives to the procedure, the patient expressed understanding and wished to proceed. Written consent was obtained.  The patient was escorted to the fluoroscopy suite and placed in prone position on the procedure table.  A procedural pause was conducted to verify the correct: patient identity, procedure to be performed, side, site, patient position, and any special requirements. The skin was aseptically prepped and draped in the usual fashion. The skin and subcutaneous tissue were anesthetized with 1% lidocaine.   Using intermittent fluoroscopic guidance, a 25 gauge 3    spinal needle was advanced to the junction of the pedicle, superior articular process and the transverse process in oblique view.  After negative aspiration 0.25cc of Omnipaque contrast was injected, showing perifacet spread of contrast without any evidence of intravascular or intrathecal spread. Then a 0.5 ml solution of 4% lidocaine was then injected slowly at each segment.     The patient did not receive sedation during the procedure. The patient was monitored with blood pressure and pulse oximetry machines with the assistance of an RN throughout the procedure.  The patient was alert and responsive to questions throughout the procedure.   The patient tolerated the procedure well and was observed in the post-procedural area.  The patient was dismissed without apparent complications.     BLOOD LOSS: < 2 cc    PLAN:  1. Performed confirmatory Left L3, Left L4, Left L5, Right L3, Right L4 and Right L5    medial branch nerve blocks using Lidocaine 4%. If there is benefit with appropriate time duration (at least 1 hour in the  80 to 100% pain relief range) of relief for both blocks, will recommend proceeding with longer term relief utilizing radiofrequency neurolysis.  2. The patient was instructed to fax a pain relief sheet back to us for our diagnostic impression.  If there is any doubt which provider to send the pain relief form back to I would recommend sending the pain form to the [facility] for Chiara OHARA to diagnostically interpret the pain form.    Darryn Mcdaniel MD  Diplomate of the American Board of Anesthesiology, Pain Medicine

## 2021-09-09 NOTE — DISCHARGE INSTRUCTIONS
Home Care Instructions     Please fax or mail this form to the location that is selected at the bottom of your pain relief form.  Do not try to send the form back to Dr. Mcdaniel's clinic in [place].  If you are unsure of which location to send this form back to, please send it back to the [facility] same-day surgery department.  Dr. Mcdaniel will then evaluate your form and determine the next step of your care.            Procedure:  Diagnostic Block (which includes medial branch blocks, SI joint injection blocks, and nerve root injections)    Activity:  The injection was used to identify the cause of your pain:    Resume normal activity  You are to engage in activity that would have normally caused you discomfort to determine the effectiveness of the block/injection.  It is imperative to evaluate and rate your pain the first 2 hours after the injection.     Pain:    You may experience soreness at the injection site for one or two days    You may use an ice pack for 20 minutes every 2 hours for the first 24 hours    You may use a heating pad after the first 24 hours    You may use Tylenol  (acetaminophen) every 4 hours or other pain medicines as directed by your physician after your block wears off.    Safety  You may experience numbness radiating into your legs or arms, (depending on the procedure location)  This numbness may last several hours.  Until the numb sensation returns to normal please use caution in walking, climbing stairs, stepping out of your vehicle, etc.    Please contact us if you have:  Severe pain   Fever more than 101.5 degrees Fahrenheit  Signs of infection (redness, swelling or drainage)       If you need immediate attention we recommend that you go to a hospital emergency room or dial 911.    _____ Please contact our office one week after your injection to report your percent of pain relief.   See attached One Week Procedure Injection Report  __X__ Please return your Nerve Block Pain  Relief Form the day after your injection to the [facility]    See attached Nerve Block Pain Relief Form             ## PLEASE SEND NERVE BLOCK PAIN RELIEF REPORT WITH PATIENT ##

## 2021-09-10 NOTE — TELEPHONE ENCOUNTER
Nursing, please print the attached MMB sheet and track as appropriate.    Helena CALDERON, RN CNP, FNP  [facility]

## 2021-09-13 ENCOUNTER — TELEPHONE (OUTPATIENT)
Dept: PALLIATIVE MEDICINE | Facility: CLINIC | Age: 56
End: 2021-09-13

## 2021-09-13 ENCOUNTER — HOSPITAL ENCOUNTER (OUTPATIENT)
Dept: PHYSICAL THERAPY | Facility: CLINIC | Age: 56
Setting detail: THERAPIES SERIES
End: 2021-09-13
Attending: PHYSICIAN ASSISTANT
Payer: COMMERCIAL

## 2021-09-13 DIAGNOSIS — G89.29 CHRONIC BILATERAL LOW BACK PAIN WITHOUT SCIATICA: ICD-10-CM

## 2021-09-13 DIAGNOSIS — M54.2 CERVICALGIA: ICD-10-CM

## 2021-09-13 DIAGNOSIS — G62.9 NEUROPATHY: ICD-10-CM

## 2021-09-13 DIAGNOSIS — M54.50 CHRONIC BILATERAL LOW BACK PAIN WITHOUT SCIATICA: ICD-10-CM

## 2021-09-13 DIAGNOSIS — M54.16 LUMBAR RADICULOPATHY: ICD-10-CM

## 2021-09-13 DIAGNOSIS — M79.18 MYOFASCIAL PAIN: ICD-10-CM

## 2021-09-13 DIAGNOSIS — G89.4 CHRONIC PAIN SYNDROME: ICD-10-CM

## 2021-09-13 DIAGNOSIS — Z11.59 ENCOUNTER FOR SCREENING FOR OTHER VIRAL DISEASES: ICD-10-CM

## 2021-09-13 DIAGNOSIS — Z98.1 S/P CERVICAL SPINAL FUSION: ICD-10-CM

## 2021-09-13 PROCEDURE — 97530 THERAPEUTIC ACTIVITIES: CPT | Mod: GP | Performed by: PHYSICAL THERAPIST

## 2021-09-13 PROCEDURE — 97110 THERAPEUTIC EXERCISES: CPT | Mod: GP | Performed by: PHYSICAL THERAPIST

## 2021-09-13 RX ORDER — NORTRIPTYLINE HYDROCHLORIDE 50 MG/1
50 CAPSULE ORAL AT BEDTIME
Qty: 30 CAPSULE | Refills: 2 | Status: SHIPPED | OUTPATIENT
Start: 2021-09-13 | End: 2022-01-06

## 2021-09-13 NOTE — TELEPHONE ENCOUNTER
Patient reports % pain relief from 1pm-2:30pm. This is a positive results. Will place referral for ablation. Patient can call to schedule his procedure, the number: [phone], then hit option 2. He can also wait for them to call him back.      Chiara Garcia PA-C on 9/13/2021 at 12:22 PM

## 2021-09-13 NOTE — TELEPHONE ENCOUNTER
[employer] message sent with message from provider- patient to call if any questions.  [facility]  [facility] Management Team

## 2021-09-13 NOTE — TELEPHONE ENCOUNTER
Pt scheduled for RFA  Date: 10/01/21  Time: 730 am  Dr. Mcdaniel    Instructed pt to have H&P and  for procedure.  Patient informed of COVID testing process.

## 2021-09-13 NOTE — TELEPHONE ENCOUNTER
Chief Complaint   Patient presents with     Refill Request     nortriptyline      Refill request received via fax by pharmacy   [id number] - [place], [place] - [address] [place] [address]       No prescriptions requested or ordered in this encounter         Last Written Prescription Date:  6/18/21  Last Fill Quantity: 30,   # refills: 2  Last Office Visit with prescribing provider: 8/18/21  Future Office visit:    Next 5 appointments (look out 90 days)    Oct 13, 2021  8:00 AM  Return Visit with Chiara Garcia PA-C  [facility] ([facility] ) [address] [place] [id number] [phone]

## 2021-09-14 ENCOUNTER — MYC MEDICAL ADVICE (OUTPATIENT)
Dept: PALLIATIVE MEDICINE | Facility: CLINIC | Age: 56
End: 2021-09-14

## 2021-09-14 DIAGNOSIS — Z98.1 S/P CERVICAL SPINAL FUSION: ICD-10-CM

## 2021-09-14 DIAGNOSIS — M79.18 MYOFASCIAL PAIN: ICD-10-CM

## 2021-09-14 DIAGNOSIS — M54.2 CERVICALGIA: ICD-10-CM

## 2021-09-14 DIAGNOSIS — G89.4 CHRONIC PAIN SYNDROME: ICD-10-CM

## 2021-09-14 DIAGNOSIS — G62.9 NEUROPATHY: ICD-10-CM

## 2021-09-14 DIAGNOSIS — F11.90 CHRONIC, CONTINUOUS USE OF OPIOIDS: ICD-10-CM

## 2021-09-14 DIAGNOSIS — M54.16 LUMBAR RADICULOPATHY: ICD-10-CM

## 2021-09-14 RX ORDER — OXYCODONE HYDROCHLORIDE 5 MG/1
TABLET ORAL
Qty: 165 TABLET | Refills: 0 | Status: SHIPPED | OUTPATIENT
Start: 2021-09-14 | End: 2021-10-18

## 2021-09-14 NOTE — TELEPHONE ENCOUNTER
Received call from patient requesting refill(s) of oxyCODONE (ROXICODONE) 5 MG tablet     I have scheduled the ablation for the morning of October 1st.  Fingers crossed!    Last dispensed from pharmacy on 8/18/21    Patient's last office/virtual visit by prescribing provider on 8/1821  Next office/virtual appointment scheduled for 10/13/21    Last urine drug screen date 6/18/21  Current opioid agreement on file (completed within the last year) Yes Date of opioid agreement: 6/18/21    E-prescribe to [id number] - [place] - pharmacy    Will route to nursing [place] for review and preparation of prescription(s).

## 2021-09-14 NOTE — TELEPHONE ENCOUNTER
[employer] message sent with Rx approval from provider.  Dilip  Pain Clinic Management Team

## 2021-09-14 NOTE — TELEPHONE ENCOUNTER
Refill appropriate. Sent to requested pharmacy.    [place] Prescription Monitoring Program checked on 6/18/21.    Chiara Garcia, PAC

## 2021-09-14 NOTE — TELEPHONE ENCOUNTER
Medication refill information reviewed.     Due date for oxyCODONE (ROXICODONE) 5 MG tablet  is 09/18/21     Prescriptions prepped for review.     Will route to provider.

## 2021-09-20 ENCOUNTER — HOSPITAL ENCOUNTER (OUTPATIENT)
Dept: PHYSICAL THERAPY | Facility: CLINIC | Age: 56
Setting detail: THERAPIES SERIES
End: 2021-09-20
Attending: PHYSICIAN ASSISTANT
Payer: COMMERCIAL

## 2021-09-20 PROCEDURE — 97110 THERAPEUTIC EXERCISES: CPT | Mod: GP | Performed by: PHYSICAL THERAPIST

## 2021-09-20 NOTE — PROGRESS NOTES
Outpatient Physical Therapy Discharge Note     Patient: Indra Mehta  : 1965    Beginning/End Dates of Reporting Period:  2021 to 2021  Ketan has been seen for a total of 4 visits  overall.    Referring Provider: Chiara Garcia PA-C    Therapy Diagnosis: Mechanical low back pain.      Client Self Report: Overall better, no longer getting symptoms to the toes, can travel to the mid thigh/knee but mostly travels to the hips. Today he reports a 4/10 in the right hip. Reports that he reffed another volleyball tournament over the weekend. He states that he added in some stretching, krissy pose, and cat/cow. He reports no symptoms down his legs but has a 4/10 into the right hip.     Objective Measurements:  Objective Measure: SADA/KsT  Details: 52% initial score, today's score is 44%, KsT continues to be 4/8 high    Objective Measure: Frequency of PREP  Details: Every 2 hours    Objective Measure: Effect of PREP  Details: Can take the distal pain away, hurts to initially raise legs. Added a stool under the feet and able to abolish that pain.     Objective Measure: Symptoms upon awakening  Details: Low back soreness, symptoms into the hips.     Objective Measure: Frequency of distal symptoms  Details: No more toe symptoms, now getti g symptoms to the hips.      Goals:  Goal Identifier Symptoms   Goal Description Ketan will use strategies learned in P.T. to decrease symptoms by 50% to help reach his personal goal of symptom relief.   Target Date 21   Date Met      Progress (detail required for progress note):  Working toward     Goal Identifier Sleeping   Goal Description Ketan will consistently get 7 hours of sleep a night without waking due to pain, 5 out of 7 nights a week.   Target Date 10/18/21   Date Met      Progress (detail required for progress note):       Goal Identifier Function   Goal Description Ketan will improve his SADA by 30% indicating statistically significant improvement in his  function.    Target Date 11/17/21   Date Met      Progress (detail required for progress note):         Plan:  Discharge from therapy.    Discharge: Yes  Reason for Discharge: Patient would like to progress with the spinal injections    Equipment Issued: None    Discharge Plan: Patient to continue home program.    Thank you for this referral.    Janay Fu P.T.

## 2021-09-23 ENCOUNTER — OFFICE VISIT (OUTPATIENT)
Dept: ORTHOPEDICS | Facility: CLINIC | Age: 56
End: 2021-09-23
Payer: COMMERCIAL

## 2021-09-23 VITALS — BODY MASS INDEX: 28.83 KG/M2 | WEIGHT: 217.5 LBS | HEIGHT: 73 IN

## 2021-09-23 DIAGNOSIS — M71.9 DISORDER OF BURSAE AND TENDONS IN SHOULDER REGION: Primary | ICD-10-CM

## 2021-09-23 DIAGNOSIS — M67.919 DISORDER OF BURSAE AND TENDONS IN SHOULDER REGION: Primary | ICD-10-CM

## 2021-09-23 PROCEDURE — 99214 OFFICE O/P EST MOD 30 MIN: CPT | Performed by: ORTHOPAEDIC SURGERY

## 2021-09-23 ASSESSMENT — MIFFLIN-ST. JEOR: SCORE: 1879.06

## 2021-09-23 NOTE — LETTER
9/23/2021         RE: Indra Mehta  [address] [facility] [address]        Dear Colleague,    Thank you for referring your patient, Indra Mehta, to the [facility]. Please see a copy of my visit note below.    Chief Concern: Consult - left shoulder pain    History of Present Illness:   Idnra Mehta is a right hand dominant 56 year old male who presents today for evaluation of left shoulder pain. The patient states playing volleyball for many years with frequent subluxation events of his left shoulder. He reports less frequent volleyball playing as of lately, but notes a shoulder separation event while doing an overhead press, which inhibited him from exercising for months after. He recalls a small event in January that caused sudden onset of sharp pain with cross body adduction and forward elevation. He notes relief of his general shoulder pain from a subacromial cortisone injection on 6/22, but no relief of his sharp pain. He states attending physical therapy to refresh him on strengthening exercises with little relief. The patient was last seen via tele-health visit by Dr. Mariee on 8/19/21 with referral to me for further evaluation. The patient localizes his left shoulder pain as posterolateral and he presents for further evaluation.    Review of Systems:   A 10-point review of systems was obtained and is negative except for as noted in the HPI.     Medications:     Current Outpatient Medications:      Acetaminophen (TYLENOL PO), Take 1,000 mg by mouth 3 times daily , Disp: , Rfl:      ALPRAZolam (XANAX XR) 0.5 MG 24 hr tablet, Take 1 tablet (0.5 mg) by mouth At Bedtime, Disp: 30 tablet, Rfl: 3     amLODIPine (NORVASC) 5 MG tablet, TAKE 1 TABLET BY MOUTH EVERY DAY, Disp: 90 tablet, Rfl: 1     atorvastatin (LIPITOR) 10 MG tablet, Take 10 mg by mouth daily, Disp: , Rfl:      cyclobenzaprine (FLEXERIL) 5 MG tablet, 1-2 tablets three times daily as needed, Disp: 45  tablet, Rfl: 0     ibuprofen (ADVIL/MOTRIN) 800 MG tablet, Take 800 mg by mouth 3 times daily, Disp: , Rfl:      losartan-hydrochlorothiazide (HYZAAR) 100-25 MG tablet, Take 1 tablet by mouth daily, Disp: 90 tablet, Rfl: 3     naloxone (NARCAN) 4 MG/0.1ML nasal spray, Spray 1 spray (4 mg) into one nostril alternating nostrils once as needed for opioid reversal every 2-3 minutes until assistance arrives, Disp: 0.2 mL, Rfl: 1     nortriptyline (PAMELOR) 50 MG capsule, Take 1 capsule (50 mg) by mouth At Bedtime, Disp: 30 capsule, Rfl: 2     order for DME, Equipment being ordered: TENS Pads as directed, Disp: 1 Device, Rfl: 0     oxyCODONE (ROXICODONE) 5 MG tablet, 1-2 tablets every 4-6 hours as needed for severe pain. Max of 6 tablets/day. Fill 09/16/21 and Start 09/18/21, Disp: 165 tablet, Rfl: 0     pregabalin (LYRICA) 150 MG capsule, Take 1 capsule (150 mg) by mouth 3 times daily, Disp: 90 capsule, Rfl: 1    Current Facility-Administered Medications:      lidocaine 1 % injection 5 mL, 5 mL, , , Alvaro Mariee MD, 5 mL at 06/22/21 0902     triamcinolone (KENALOG-40) injection 40 mg, 40 mg, , , Melissa Xie MD, 40 mg at 09/07/21 0855     triamcinolone (KENALOG-40) injection 40 mg, 40 mg, , , Melissa Xie MD, 40 mg at 09/07/21 0855     triamcinolone (KENALOG-40) injection 40 mg, 40 mg, , , Alvaro Mariee MD, 40 mg at 06/22/21 0902     triamcinolone (KENALOG-40) injection 40 mg, 40 mg, , , Melissa Xie MD, 40 mg at 11/16/20 0848     triamcinolone (KENALOG-40) injection 40 mg, 40 mg, , , Melissa Xie MD, 40 mg at 11/16/20 0848    Allergies:  No known drug allergies     Past Medical History:  Myelopathy cervical Radiculopathy cervical  Hypertension  Neuralgia Occipital  Meniere's disease  Anxiety   Depressive disorder  Hyperlipidemia  Arthritis    Past Surgical History:  Bunionectomy  Inner ear surgery  Cervical fusion  Umbilical hernia repair  Fusion  posterior cervical  Laminectomy posterior cervical     Social History:  Works in information technology. He denies tobacco use and admits to occasional alcohol use.     Family History:  Mother - diabetes, colon cancer  Father - hypertension, heart disease  Sister - depression    Physical Examination:  Adult male in no acute distress. Articulates and communicates with normal affect.  Respirations even and unlabored  Focused upper extremity exam: Forward elevation to 175 on left versus 180 on right. External rotation to 70 on left versus 75 on right. Internal rotation to L 5 on left versus T 8 on right. No tenderness over the AC joint. No tenderness over the biceps groove. External rotation 5/5. Forward elevation 4/5 limited by pain. Positive Neosho's. Positive Speed's.     Imaging:   MR of the left shoulder (08/18//2021)  1. On a background of tendinosis, low to moderate grade intrasubstance  tear of the supraspinatus/infraspinatus junction fibers involving  approximately 8 mm AP dimension at the footprint, improved since 2017.  2. Inferior glenoid labral tear, and regional chondral fissure.  Decreased associated paralabral cysts since 2017.  Report per radiology.    I have independently reviewed the above imaging studies; the results were discussed with the patient.     Assessment:   1. Mild glenohumeral chondral fissuring and early arthrosis  2. Long head biceps disease    Plan:   -No surgical implications at this time  -Discussed risks and benefits of ultrasound guided biceps tendon cortisone injection  -Continue stretching and physical therapy exercises  - Follow up with me PRN    Scribe Disclosure:  So GARRIDO, am serving as a scribe to document services personally performed by Ghada De Jesus MD at this visit, based upon the provider's statements to me. All documentation has been reviewed by the aforementioned provider prior to being entered into the official medical record.     So GARRIDO a  kevan, prepared the chart for today's encounter.     Provider Disclosure:  I agree with above History, Physical exam and Plan.  I have reviewed the content of the documentation and have edited it as needed. I have personally performed the services documented here and the documentation accurately represents those services and the decisions I have made.      Ghada De Jesus MD        Again, thank you for allowing me to participate in the care of your patient.        Sincerely,        Ghada De Jesus MD

## 2021-09-23 NOTE — PROGRESS NOTES
Chief Concern: Consult - left shoulder pain    History of Present Illness:   Indra Mehta is a right hand dominant 56 year old male who presents today for evaluation of left shoulder pain. The patient states playing volleyball for many years with frequent subluxation events of his left shoulder. He reports less frequent volleyball playing as of lately, but notes a shoulder separation event while doing an overhead press, which inhibited him from exercising for months after. He recalls a small event in January that caused sudden onset of sharp pain with cross body adduction and forward elevation. He notes relief of his general shoulder pain from a subacromial cortisone injection on 6/22, but no relief of his sharp pain. He states attending physical therapy to refresh him on strengthening exercises with little relief. The patient was last seen via tele-health visit by Dr. Mariee on 8/19/21 with referral to me for further evaluation. The patient localizes his left shoulder pain as posterolateral and he presents for further evaluation.    Review of Systems:   A 10-point review of systems was obtained and is negative except for as noted in the HPI.     Medications:     Current Outpatient Medications:      Acetaminophen (TYLENOL PO), Take 1,000 mg by mouth 3 times daily , Disp: , Rfl:      ALPRAZolam (XANAX XR) 0.5 MG 24 hr tablet, Take 1 tablet (0.5 mg) by mouth At Bedtime, Disp: 30 tablet, Rfl: 3     amLODIPine (NORVASC) 5 MG tablet, TAKE 1 TABLET BY MOUTH EVERY DAY, Disp: 90 tablet, Rfl: 1     atorvastatin (LIPITOR) 10 MG tablet, Take 10 mg by mouth daily, Disp: , Rfl:      cyclobenzaprine (FLEXERIL) 5 MG tablet, 1-2 tablets three times daily as needed, Disp: 45 tablet, Rfl: 0     ibuprofen (ADVIL/MOTRIN) 800 MG tablet, Take 800 mg by mouth 3 times daily, Disp: , Rfl:      losartan-hydrochlorothiazide (HYZAAR) 100-25 MG tablet, Take 1 tablet by mouth daily, Disp: 90 tablet, Rfl: 3     naloxone (NARCAN) 4 MG/0.1ML  nasal spray, Spray 1 spray (4 mg) into one nostril alternating nostrils once as needed for opioid reversal every 2-3 minutes until assistance arrives, Disp: 0.2 mL, Rfl: 1     nortriptyline (PAMELOR) 50 MG capsule, Take 1 capsule (50 mg) by mouth At Bedtime, Disp: 30 capsule, Rfl: 2     order for DME, Equipment being ordered: TENS Pads as directed, Disp: 1 Device, Rfl: 0     oxyCODONE (ROXICODONE) 5 MG tablet, 1-2 tablets every 4-6 hours as needed for severe pain. Max of 6 tablets/day. Fill 09/16/21 and Start 09/18/21, Disp: 165 tablet, Rfl: 0     pregabalin (LYRICA) 150 MG capsule, Take 1 capsule (150 mg) by mouth 3 times daily, Disp: 90 capsule, Rfl: 1    Current Facility-Administered Medications:      lidocaine 1 % injection 5 mL, 5 mL, , , Alvaro Mariee MD, 5 mL at 06/22/21 0902     triamcinolone (KENALOG-40) injection 40 mg, 40 mg, , , Melissa Xie MD, 40 mg at 09/07/21 0855     triamcinolone (KENALOG-40) injection 40 mg, 40 mg, , , Melissa Xie MD, 40 mg at 09/07/21 0855     triamcinolone (KENALOG-40) injection 40 mg, 40 mg, , , Alvaro Mariee MD, 40 mg at 06/22/21 0902     triamcinolone (KENALOG-40) injection 40 mg, 40 mg, , , Melissa Xie MD, 40 mg at 11/16/20 0848     triamcinolone (KENALOG-40) injection 40 mg, 40 mg, , , Melissa Xie MD, 40 mg at 11/16/20 0848    Allergies:  No known drug allergies     Past Medical History:  Myelopathy cervical Radiculopathy cervical  Hypertension  Neuralgia Occipital  Meniere's disease  Anxiety   Depressive disorder  Hyperlipidemia  Arthritis    Past Surgical History:  Bunionectomy  Inner ear surgery  Cervical fusion  Umbilical hernia repair  Fusion posterior cervical  Laminectomy posterior cervical     Social History:  Works in information technology. He denies tobacco use and admits to occasional alcohol use.     Family History:  Mother - diabetes, colon cancer  Father - hypertension, heart  disease  Sister - depression    Physical Examination:  Adult male in no acute distress. Articulates and communicates with normal affect.  Respirations even and unlabored  Focused upper extremity exam: Forward elevation to 175 on left versus 180 on right. External rotation to 70 on left versus 75 on right. Internal rotation to L 5 on left versus T 8 on right. No tenderness over the AC joint. No tenderness over the biceps groove. External rotation 5/5. Forward elevation 4/5 limited by pain. Positive [place]'s. Positive Speed's.     Imaging:   MR of the left shoulder (08/18//2021)  1. On a background of tendinosis, low to moderate grade intrasubstance  tear of the supraspinatus/infraspinatus junction fibers involving  approximately 8 mm AP dimension at the footprint, improved since 2017.  2. Inferior glenoid labral tear, and regional chondral fissure.  Decreased associated paralabral cysts since 2017.  Report per radiology.    I have independently reviewed the above imaging studies; the results were discussed with the patient.     Assessment:   1. Mild glenohumeral chondral fissuring and early arthrosis  2. Long head biceps disease    Plan:   -No surgical implications at this time  -Discussed risks and benefits of ultrasound guided biceps tendon cortisone injection  -Continue stretching and physical therapy exercises  - Follow up with me PRN    Scribe Disclosure:  I, So Livingston, am serving as a scribe to document services personally performed by Ghada De Jesus MD at this visit, based upon the provider's statements to me. All documentation has been reviewed by the aforementioned provider prior to being entered into the official medical record.     ISo, a scribe, prepared the chart for today's encounter.     Provider Disclosure:  I agree with above History, Physical exam and Plan.  I have reviewed the content of the documentation and have edited it as needed. I have personally performed the services  documented here and the documentation accurately represents those services and the decisions I have made.      Ghada De Jesus MD

## 2021-09-23 NOTE — NURSING NOTE
"Reason For Visit:   Chief Complaint   Patient presents with     Consult     Left shoulder pain; played volleyball many years back and would have translation of the shoulder.        PCP: Tha Grullon  Ref: Dr. Mariee    ?  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  Work status?  Full time.  Date of injury: Played volleyball - Went for a block in [id number]  Date of surgery: No shoulder surgeries. Injections with Dr. Mariee   Smoker: No  Request smoking cessation information: No    Right hand dominant    SANE score  Affected shoulder: Left  Right shoulder SANE: 100  Left shoulder SANE: 60    Ht 1.86 m (6' 1.23\")   Wt 98.7 kg (217 lb 8 oz)   BMI 28.52 kg/m      Laury Mann ATC  "

## 2021-09-23 NOTE — PATIENT INSTRUCTIONS
Thanks for coming today.  Or[facility] Medicine Clinic  [address] [place] [address]    To schedule future appointments in Ortho Clinic, you may call [phone].    To schedule ordered imaging by your provider:   Call Central Imaging [phone]    To schedule an injection ordered by your provider:  Call [facility] Injection scheduling line: [phone]  [facility] available online at:  [email or web address]    Please call if any further questions or concerns ([phone]).  Clinic hours 8 am to 5 pm.    Return to clinic (call) if symptoms worsen or fail to improve.

## 2021-09-28 ENCOUNTER — LAB (OUTPATIENT)
Dept: LAB | Facility: CLINIC | Age: 56
End: 2021-09-28
Payer: COMMERCIAL

## 2021-09-28 DIAGNOSIS — Z11.59 ENCOUNTER FOR SCREENING FOR OTHER VIRAL DISEASES: ICD-10-CM

## 2021-09-28 PROCEDURE — U0003 INFECTIOUS AGENT DETECTION BY NUCLEIC ACID (DNA OR RNA); SEVERE ACUTE RESPIRATORY SYNDROME CORONAVIRUS 2 (SARS-COV-2) (CORONAVIRUS DISEASE [COVID-19]), AMPLIFIED PROBE TECHNIQUE, MAKING USE OF HIGH THROUGHPUT TECHNOLOGIES AS DESCRIBED BY CMS-2020-01-R: HCPCS

## 2021-09-28 PROCEDURE — U0005 INFEC AGEN DETEC AMPLI PROBE: HCPCS

## 2021-09-29 ENCOUNTER — OFFICE VISIT (OUTPATIENT)
Dept: FAMILY MEDICINE | Facility: CLINIC | Age: 56
End: 2021-09-29
Payer: COMMERCIAL

## 2021-09-29 VITALS
RESPIRATION RATE: 20 BRPM | DIASTOLIC BLOOD PRESSURE: 82 MMHG | OXYGEN SATURATION: 97 % | BODY MASS INDEX: 28.32 KG/M2 | SYSTOLIC BLOOD PRESSURE: 138 MMHG | WEIGHT: 216 LBS | HEART RATE: 99 BPM | TEMPERATURE: 97.9 F

## 2021-09-29 DIAGNOSIS — M47.26 OSTEOARTHRITIS OF SPINE WITH RADICULOPATHY, LUMBAR REGION: ICD-10-CM

## 2021-09-29 DIAGNOSIS — Z01.818 PREOP GENERAL PHYSICAL EXAM: Primary | ICD-10-CM

## 2021-09-29 DIAGNOSIS — I10 BENIGN ESSENTIAL HYPERTENSION: ICD-10-CM

## 2021-09-29 LAB
ANION GAP SERPL CALCULATED.3IONS-SCNC: 5 MMOL/L (ref 3–14)
BUN SERPL-MCNC: 9 MG/DL (ref 7–30)
CALCIUM SERPL-MCNC: 8.5 MG/DL (ref 8.5–10.1)
CHLORIDE BLD-SCNC: 106 MMOL/L (ref 94–109)
CO2 SERPL-SCNC: 30 MMOL/L (ref 20–32)
CREAT SERPL-MCNC: 1.18 MG/DL (ref 0.66–1.25)
GFR SERPL CREATININE-BSD FRML MDRD: 69 ML/MIN/1.73M2
GLUCOSE BLD-MCNC: 95 MG/DL (ref 70–99)
HGB BLD-MCNC: 14.9 G/DL (ref 13.3–17.7)
POTASSIUM BLD-SCNC: 3.9 MMOL/L (ref 3.4–5.3)
SARS-COV-2 RNA RESP QL NAA+PROBE: NEGATIVE
SODIUM SERPL-SCNC: 141 MMOL/L (ref 133–144)

## 2021-09-29 PROCEDURE — 85018 HEMOGLOBIN: CPT | Performed by: STUDENT IN AN ORGANIZED HEALTH CARE EDUCATION/TRAINING PROGRAM

## 2021-09-29 PROCEDURE — 80048 BASIC METABOLIC PNL TOTAL CA: CPT | Performed by: STUDENT IN AN ORGANIZED HEALTH CARE EDUCATION/TRAINING PROGRAM

## 2021-09-29 PROCEDURE — 99213 OFFICE O/P EST LOW 20 MIN: CPT | Performed by: STUDENT IN AN ORGANIZED HEALTH CARE EDUCATION/TRAINING PROGRAM

## 2021-09-29 PROCEDURE — 36415 COLL VENOUS BLD VENIPUNCTURE: CPT | Performed by: STUDENT IN AN ORGANIZED HEALTH CARE EDUCATION/TRAINING PROGRAM

## 2021-09-29 ASSESSMENT — PAIN SCALES - GENERAL: PAINLEVEL: MODERATE PAIN (5)

## 2021-09-29 ASSESSMENT — PATIENT HEALTH QUESTIONNAIRE - PHQ9: SUM OF ALL RESPONSES TO PHQ QUESTIONS 1-9: 0

## 2021-09-29 NOTE — PROGRESS NOTES
[address] [address]  Phone: [phone]  Fax: [phone]  Primary Provider: Tha Grullon      PREOPERATIVE EVALUATION:  Today's date: 9/29/2021    Indra Mehta is a 56 year old male who presents for a preoperative evaluation.    Surgical Information:  Surgery/Procedure: Ablation lumbar 3-4-5  Surgery Location: [facility]  Surgeon: Dr. Mcdaniel  Surgery Date: 10/01/2021  Time of Surgery: 7:30am  Where patient plans to recover: At home with family  Fax number for surgical facility: Note does not need to be faxed, will be available electronically in Epic.    Type of Anesthesia Anticipated: General    Assessment & Plan     The proposed surgical procedure is considered INTERMEDIATE risk.    Preop general physical exam  Ok to proceed with surgery     Osteoarthritis of spine with radiculopathy, lumbar region      Medication Instructions:  Patient is to take all scheduled medications on the day of surgery EXCEPT for modifications listed below:    RECOMMENDATION:  APPROVAL GIVEN to proceed with proposed procedure, without further diagnostic evaluation.    Subjective     HPI related to upcoming procedure: multiple injections in the past, previous cervical fusion. Long standing issue for him.     Preop Questions 9/29/2021   1. Have you ever had a heart attack or stroke? No   2. Have you ever had surgery on your heart or blood vessels, such as a stent placement, a coronary artery bypass, or surgery on an artery in your head, neck, heart, or legs? No   3. Do you have chest pain with activity? No   4. Do you have a history of  heart failure? No   5. Do you currently have a cold, bronchitis or symptoms of other infection? No   6. Do you have a cough, shortness of breath, or wheezing? No   7. Do you or anyone in your family have previous history of blood clots? No   8. Do you or does anyone in your family have a serious bleeding problem such as prolonged  bleeding following surgeries or cuts? No   9. Have you ever had problems with anemia or been told to take iron pills? No   10. Have you had any abnormal blood loss such as black, tarry or bloody stools? No   11. Have you ever had a blood transfusion? No   12. Are you willing to have a blood transfusion if it is medically needed before, during, or after your surgery? Yes   13. Have you or any of your relatives ever had problems with anesthesia? No   14. Do you have sleep apnea, excessive snoring or daytime drowsiness? No   14a. Do you have a CPAP machine? -   15. Do you have any artifical heart valves or other implanted medical devices like a pacemaker, defibrillator, or continuous glucose monitor? No   16. Do you have artificial joints? No   17. Are you allergic to latex? No       Health Care Directive:  Patient does not have a Health Care Directive or Living Will: Discussed advance care planning with patient; however, patient declined at this time.    Preoperative Review of :   reviewed - controlled substances reflected in medication list.        Review of Systems  Tingling in feet and numbness but Constitutional, neuro, ENT, endocrine, pulmonary, cardiac, gastrointestinal, genitourinary, musculoskeletal, integument and psychiatric systems are negative, except as otherwise noted.    Patient Active Problem List    Diagnosis Date Noted     Impingement syndrome of left shoulder 06/23/2021     Priority: Medium     Sleep apnea, unspecified type 04/06/2021     Priority: Medium     Osteoarthritis of spine with radiculopathy, lumbar region 04/06/2021     Priority: Medium     Status post lumbar spinal fusion 01/14/2020     Priority: Medium     Cervical radiculopathy 10/28/2019     Priority: Medium     Formatting of this note might be different from the original.  Added automatically from request for surgery [id number]       Benign essential hypertension 06/30/2017     Priority: Medium     Bilateral occipital neuralgia  10/28/2016     Priority: Medium     Major depression in complete remission (H) 08/20/2010     Priority: Medium     Chronic pain syndrome 08/20/2010     Priority: Medium     Patient is followed by Tha Grullon MD for ongoing prescription of pain medication.  All refills should be approved by this provider only at face-to-face appointments - not by phone request.    Medication(s): Tramadol.   Maximum quantity per month: 60  Clinic visit frequency required: Q 1 months     Controlled substance agreement:  Encounter-Level CSA - 10/13/16:               Controlled Substance Agreement - Scan on 10/23/2016  5:07 PM : CONTROLLED SUBSTANCE AGREEMENT 10/13/16 (below)            Pain Clinic evaluation in the past: Yes       Date/Location:   [facility]    DIRE Total Score(s):  No flowsheet data found.    Last [facility] website verification:  none   [email or web address]               Pain medication agreement signed 08/20/2010     Priority: Medium     Meniere's disease 03/22/2007     Priority: Medium     Problem list name updated by automated process. Provider to review        Past Medical History:   Diagnosis Date     Anxiety      Back pain      Depressive disorder      Hyperlipidemia      Hypertension      Meniere's disease, unspecified      Pain medication agreement signed 8/20/2010     Past Surgical History:   Procedure Laterality Date     BUNIONECTOMY RT/LT  09/05/08    Both feet     COLONOSCOPY N/A 12/28/2016    Procedure: COMBINED COLONOSCOPY, SINGLE OR MULTIPLE BIOPSY/POLYPECTOMY BY BIOPSY;  Surgeon: Indra Jernigan MD;  Location: [facility]     DISCECTOMY, FUSION CERVICAL ANTERIOR ONE LEVEL, COMBINED N/A 7/5/2017    Procedure: COMBINED DISCECTOMY, FUSION CERVICAL ANTERIOR ONE LEVEL;  Cervical 6-7, Anterior cervical discectomy fusion;  Surgeon: Mike Mckenna MD;  [facility]     DISCECTOMY, FUSION CERVICAL ANTERIOR ONE LEVEL, COMBINED N/A 12/19/2018    Procedure: cervical 5-6 anterior cervical  discectomy fusion;  Surgeon: Mike Mckenna MD;  [facility]     HERNIORRHAPHY UMBILICAL N/A 8/11/2017    Procedure: HERNIORRHAPHY UMBILICAL;  open umbilical hernia repair;  Surgeon: Boni Story MD;  [facility]     INJECT BLOCK MEDIAL BRANCH CERVICAL/THORACIC/LUMBAR Bilateral 8/12/2021    Procedure: Left L3, Left L4, Left L5, Right L3, Right L4 and Right L5 medial branch nerve blocks using fluoroscopic guidance and contrast control;  Surgeon: Darryn Mcdaniel MD;  [facility]     INJECT BLOCK MEDIAL BRANCH CERVICAL/THORACIC/LUMBAR N/A 9/9/2021    Procedure: Left L3, Left L4, Left L5, Right L3, Right L4 and Right L5 medial branch nerve blocks using fluoroscopic guidance and contrast control;  Surgeon: Darryn Mcdaniel MD;  [facility]     INJECT EPIDURAL CERVICAL N/A 8/22/2019    Procedure: INJECTION, SPINE, CERVICAL 6-7 EPIDURAL;  Surgeon: Darryn Mcdaniel MD;  [facility]     INJECT EPIDURAL LUMBAR N/A 11/9/2017    Procedure: INJECT EPIDURAL LUMBAR;  lumbar 4-5 epidural steroid injection ;  Surgeon: Darryn Mcdaniel MD;  [facility]     INJECT EPIDURAL LUMBAR N/A 12/28/2017    Procedure: INJECT EPIDURAL LUMBAR;  lumbar epidural injection;  Surgeon: Darryn Mcdaniel MD;  [facility]     INJECT EPIDURAL LUMBAR Bilateral 5/13/2021    Procedure: Bilateral Lumbar 3-4 Epidural Steroid Injection;  Surgeon: Darryn Mcdaniel MD;  [facility]     INJECT EPIDURAL TRANSFORAMINAL Bilateral 8/23/2018    Procedure: INJECT EPIDURAL TRANSFORAMINAL;  transforaminal bilateral lumbar 3-4 steroid injection;  Surgeon: Darryn Mcdaniel MD;  [facility]     INJECT EPIDURAL TRANSFORAMINAL Bilateral 11/8/2018    Procedure: INJECT EPIDURAL TRANSFORAMINAL lumbar 3-4;  Surgeon: Darryn Mcdaniel MD;  [facility]     INJECT EPIDURAL TRANSFORAMINAL Bilateral 6/14/2019    Procedure: INJECTION, EPIDURAL, TRANSFORAMINAL LUMBAR 3-4 BILATERAL;  Surgeon: Darryn Mcdanile MD;  [facility]     INJECT EPIDURAL  TRANSFORAMINAL Bilateral 5/22/2020    Procedure: lumbar 3-4 bilateral transforaminal epidural steroid injection;  Surgeon: Darryn Mcdaniel MD;  [facility]     INJECT EPIDURAL TRANSFORAMINAL Bilateral 9/24/2020    Procedure: INJECTION, EPIDURAL, TRANSFORAMINAL  LUMBAR 3-4 APPROACH;  Surgeon: Darryn Mcdaniel MD;  [facility]     PE TUBES  2006    left ear     ZZ COLONOSCOPY W/WO BRUSH/WASH  12/27/2005     ZZ CREATE EARDRUM OPENING,GEN ANESTH  09/27/2007    Pe tube, Left endolymphatic sac enhancement.     [facility] MASTOIDECTOMY,COMPLETE  09/27/2007     Current Outpatient Medications   Medication Sig Dispense Refill     Acetaminophen (TYLENOL PO) Take 1,000 mg by mouth 3 times daily        ALPRAZolam (XANAX XR) 0.5 MG 24 hr tablet Take 1 tablet (0.5 mg) by mouth At Bedtime 30 tablet 3     amLODIPine (NORVASC) 5 MG tablet TAKE 1 TABLET BY MOUTH EVERY DAY 90 tablet 1     atorvastatin (LIPITOR) 10 MG tablet Take 10 mg by mouth daily       cyclobenzaprine (FLEXERIL) 5 MG tablet 1-2 tablets three times daily as needed 45 tablet 0     ibuprofen (ADVIL/MOTRIN) 800 MG tablet Take 800 mg by mouth 3 times daily       losartan-hydrochlorothiazide (HYZAAR) 100-25 MG tablet Take 1 tablet by mouth daily 90 tablet 3     naloxone (NARCAN) 4 MG/0.1ML nasal spray Spray 1 spray (4 mg) into one nostril alternating nostrils once as needed for opioid reversal every 2-3 minutes until assistance arrives 0.2 mL 1     nortriptyline (PAMELOR) 50 MG capsule Take 1 capsule (50 mg) by mouth At Bedtime 30 capsule 2     order for DME Equipment being ordered: TENS Pads as directed 1 Device 0     oxyCODONE (ROXICODONE) 5 MG tablet 1-2 tablets every 4-6 hours as needed for severe pain. Max of 6 tablets/day. Fill 09/16/21 and Start 09/18/21 165 tablet 0     pregabalin (LYRICA) 150 MG capsule Take 1 capsule (150 mg) by mouth 3 times daily 90 capsule 1       Allergies   Allergen Reactions     No Known Drug Allergies         Social History      Tobacco Use     Smoking status: Never Smoker     Smokeless tobacco: Never Used   Substance Use Topics     Alcohol use: Yes     Alcohol/week: 0.0 standard drinks     Comment: rarely     Family History   Problem Relation Age of Onset     Diabetes Mother      Cancer Mother         colon cancer -  at 52, diag at 42     Hypertension Father      Heart Disease Father          of AAA     No Known Problems Brother      No Known Problems Brother      Depression Sister      Suicide Other      Unknown/Adopted Maternal Grandmother      Unknown/Adopted Maternal Grandfather      Unknown/Adopted Paternal Grandmother      Unknown/Adopted Paternal Grandfather      No Known Problems Daughter      No Known Problems Daughter      History   Drug Use No         Objective     /82   Pulse 99   Temp 97.9  F (36.6  C) (Temporal)   Resp 20   Wt 98 kg (216 lb)   SpO2 97%   BMI 28.32 kg/m      Physical Exam    GENERAL APPEARANCE: healthy, alert and no distress     EYES: EOMI,  PERRL     HENT: ear canals and TM's normal and nose and mouth without ulcers or lesions     NECK: no adenopathy, no asymmetry, masses, or scars and thyroid normal to palpation     RESP: lungs clear to auscultation - no rales, rhonchi or wheezes     CV: regular rates and rhythm, normal S1 S2, no S3 or S4 and no murmur, click or rub     ABDOMEN:  soft, nontender, no HSM or masses and bowel sounds normal     MS: extremities normal- no gross deformities noted, no evidence of inflammation in joints, FROM in all extremities.     SKIN: no suspicious lesions or rashes     NEURO: Normal strength and tone, sensory exam grossly normal, mentation intact and speech normal     PSYCH: mentation appears normal. and affect normal/bright     LYMPHATICS: No cervical adenopathy    Recent Labs   Lab Test 20  0824 20  1138 10/14/19  1602 10/14/19  1602   HGB  --  15.6  --  15.4   PLT  --  238  --  304   NA  --  139  --  141   POTASSIUM  --  4.3  --  3.9   CR  1.12 1.09   < > 0.94   A1C 5.2  --   --   --     < > = values in this interval not displayed.        Diagnostics:  Bmp, hgb    Revised Cardiac Risk Index (RCRI):  The patient has the following serious cardiovascular risks for perioperative complications:   - No serious cardiac risks = 0 points     RCRI Interpretation: 0 points: Class I (very low risk - 0.4% complication rate)           Signed Electronically by: Michael Monroy MD  Copy of this evaluation report is provided to requesting physician.

## 2021-09-30 ENCOUNTER — OFFICE VISIT (OUTPATIENT)
Dept: ORTHOPEDICS | Facility: CLINIC | Age: 56
End: 2021-09-30
Payer: COMMERCIAL

## 2021-09-30 ENCOUNTER — ANESTHESIA EVENT (OUTPATIENT)
Dept: SURGERY | Facility: CLINIC | Age: 56
End: 2021-09-30
Payer: COMMERCIAL

## 2021-09-30 DIAGNOSIS — M19.012 GLENOHUMERAL ARTHRITIS, LEFT: ICD-10-CM

## 2021-09-30 DIAGNOSIS — M24.112 LABRAL TEAR OF SHOULDER, DEGENERATIVE, LEFT: Primary | ICD-10-CM

## 2021-09-30 DIAGNOSIS — L02.419 CELLULITIS AND ABSCESS OF LEG, EXCEPT FOOT: ICD-10-CM

## 2021-09-30 DIAGNOSIS — L03.119 CELLULITIS AND ABSCESS OF LEG, EXCEPT FOOT: ICD-10-CM

## 2021-09-30 PROCEDURE — 99214 OFFICE O/P EST MOD 30 MIN: CPT | Mod: 25 | Performed by: PREVENTIVE MEDICINE

## 2021-09-30 PROCEDURE — 20611 DRAIN/INJ JOINT/BURSA W/US: CPT | Mod: LT | Performed by: PREVENTIVE MEDICINE

## 2021-09-30 RX ORDER — SULFAMETHOXAZOLE/TRIMETHOPRIM 800-160 MG
1 TABLET ORAL 2 TIMES DAILY
Qty: 14 TABLET | Refills: 0 | Status: SHIPPED | OUTPATIENT
Start: 2021-09-30 | End: 2021-10-18

## 2021-09-30 RX ADMIN — METHYLPREDNISOLONE ACETATE 40 MG: 40 INJECTION, SUSPENSION INTRA-ARTICULAR; INTRALESIONAL; INTRAMUSCULAR; SOFT TISSUE at 08:16

## 2021-09-30 ASSESSMENT — PAIN SCALES - GENERAL: PAINLEVEL: NO PAIN (0)

## 2021-09-30 ASSESSMENT — LIFESTYLE VARIABLES: TOBACCO_USE: 0

## 2021-09-30 NOTE — PROGRESS NOTES
Left Glenohumeral Injection - Ultrasound Guided  The patient was informed of the risks and the benefits of the procedure and a written consent was signed.  The patient s left shoulder was prepped with chlorhexidine in sterile fashion.   40 mg of methylprednisolone suspension was drawn up into a 5 mL syringe with 3 mL of 1% lidocaine w/o Epi.  Injection was performed using sterile technique.  Under ultrasound guidance a 3.5-inch 22-gauge needle was used to enter the glenohumeral joint.  Posterior approach was used with the patient in lateral recumbent position, arm in neutral position at the side.  Needle placement was visualized and documented with ultrasound.  Ultrasound visualization was necessary due to the small joint space entered.  Injection performed long axis to the probe.  Injection solution visualized within the joint space.  Images were permanently stored for the patient's record.  There were no complications. The patient tolerated the procedure well. There was negligible bleeding.   Therapy scheduled to follow for mobilization.  The patient was instructed to call or go to the emergency room with any unusual pain, swelling, redness, or if otherwise concerned.        Large Joint Injection/Arthocentesis: L glenohumeral joint    Date/Time: 9/30/2021 8:16 AM  Performed by: lAvaro Moore MD  Authorized by: Alvaro Moore MD     Indications:  Pain and osteoarthritis  Needle Size:  22 G  Guidance: ultrasound    Approach:  Posterolateral  Location:  Shoulder      Site:  L glenohumeral joint  Medications:  40 mg methylPREDNISolone 40 MG/ML  Procedure discussed: discussed risks, benefits, and alternatives    Consent Given by:  Patient  Timeout: timeout called immediately prior to procedure    Prep: patient was prepped and draped in usual sterile fashion     NDC: 28549-276-54

## 2021-09-30 NOTE — NURSING NOTE
[facility]   ORTHOPEDICS & SPORTS MEDICINE  [address] [place] [address]  Dept: [phone]  ______________________________________________________________________________    Patient: Indra Mehta   : 1965   MRN: [id number]   2021    INVASIVE PROCEDURE SAFETY CHECKLIST    Date: 21  Procedure: Left shoulder GH joint injection  Patient Name: Indra Mehta  MRN: [id number]  YOB: 1965    Action: Complete sections as appropriate. Any discrepancy results in a HARD COPY until resolved.     PRE PROCEDURE:  Patient ID verified with 2 identifiers (name and  or MRN): Yes  Procedure and site verified with patient/designee (when able): Yes  Accurate consent documentation in medical record: Yes  H&P (or appropriate assessment) documented in medical record: NA  H&P must be up to 20 days prior to procedure and updates within 24 hours of procedure as applicable: NA  Relevant diagnostic and radiology test results appropriately labeled and displayed as applicable: NA  Procedure site(s) marked with provider initials: NA    TIMEOUT:  Time-Out performed immediately prior to starting procedure, including verbal and active participation of all team members addressing the following:Yes  * Correct patient identify  * Confirmed that the correct side and site are marked  * An accurate procedure consent form  * Agreement on the procedure to be done  * Correct patient position  * Relevant images and results are properly labeled and appropriately displayed  * The need to administer antibiotics or fluids for irrigation purposes during the procedure as applicable   * Safety precautions based on patient history or medication use    DURING PROCEDURE: Verification of correct person, site, and procedures any time the responsibility for care of the patient is transferred to another member of the care team.       Prior to injection, verified patient identity using patient's name and  date of birth.  Due to injection administration, patient instructed to remain in clinic for 15 minutes  afterwards, and to report any adverse reaction to me immediately.    Joint injection was performed.      Drug Amount Wasted:  None.  Vial/Syringe: Single dose vial  Expiration Date:  10/2022      Patrica Bell, ATC  September 30, 2021

## 2021-09-30 NOTE — PROGRESS NOTES
HISTORY OF PRESENT ILLNESS  Mr. Mehta is a pleasant 56 year old year old male who presents to clinic today after being referred for a left shoulder injection per Dr De Jesus  We will perform this, and then he wants to discuss his wound on his right leg which he explains occurred a few days ago when he had injured it while doing work around his house  Has had redness and discharge in the area for a few days  Location: right lower leg below knee    Timing: occurs intermittently    Modifying factors:  resting and non-use makes it better, movement and use makes it worse  Associated signs & symptoms: redness, pus    MEDICAL HISTORY  Patient Active Problem List   Diagnosis     Meniere's disease     Major depression in complete remission (H)     Chronic pain syndrome     Pain medication agreement signed     Bilateral occipital neuralgia     Benign essential hypertension     Cervical radiculopathy     Status post lumbar spinal fusion     Sleep apnea, unspecified type     Osteoarthritis of spine with radiculopathy, lumbar region     Impingement syndrome of left shoulder       Current Outpatient Medications   Medication Sig Dispense Refill     Acetaminophen (TYLENOL PO) Take 1,000 mg by mouth 3 times daily        ALPRAZolam (XANAX XR) 0.5 MG 24 hr tablet Take 1 tablet (0.5 mg) by mouth At Bedtime 30 tablet 3     amLODIPine (NORVASC) 5 MG tablet TAKE 1 TABLET BY MOUTH EVERY DAY 90 tablet 1     atorvastatin (LIPITOR) 10 MG tablet Take 10 mg by mouth daily       cyclobenzaprine (FLEXERIL) 5 MG tablet 1-2 tablets three times daily as needed 45 tablet 0     ibuprofen (ADVIL/MOTRIN) 800 MG tablet Take 800 mg by mouth 3 times daily       losartan-hydrochlorothiazide (HYZAAR) 100-25 MG tablet Take 1 tablet by mouth daily 90 tablet 3     naloxone (NARCAN) 4 MG/0.1ML nasal spray Spray 1 spray (4 mg) into one nostril alternating nostrils once as needed for opioid reversal every 2-3 minutes until assistance arrives 0.2 mL 1      nortriptyline (PAMELOR) 50 MG capsule Take 1 capsule (50 mg) by mouth At Bedtime 30 capsule 2     order for DME Equipment being ordered: TENS Pads as directed 1 Device 0     oxyCODONE (ROXICODONE) 5 MG tablet 1-2 tablets every 4-6 hours as needed for severe pain. Max of 6 tablets/day. Fill 21 and Start 21 165 tablet 0     pregabalin (LYRICA) 150 MG capsule Take 1 capsule (150 mg) by mouth 3 times daily 90 capsule 1       Allergies   Allergen Reactions     No Known Drug Allergies        Family History   Problem Relation Age of Onset     Diabetes Mother      Cancer Mother         colon cancer -  at 52, diag at 42     Hypertension Father      Heart Disease Father          of AAA     No Known Problems Brother      No Known Problems Brother      Depression Sister      Suicide Other      Unknown/Adopted Maternal Grandmother      Unknown/Adopted Maternal Grandfather      Unknown/Adopted Paternal Grandmother      Unknown/Adopted Paternal Grandfather      No Known Problems Daughter      No Known Problems Daughter      Social History     Socioeconomic History     Marital status: Single     Spouse name: Not on file     Number of children: 2     Years of education: Not on file     Highest education level: Not on file   Occupational History     Employer: [employer]   Tobacco Use     Smoking status: Never Smoker     Smokeless tobacco: Never Used   Vaping Use     Vaping Use: Never used   Substance and Sexual Activity     Alcohol use: Yes     Alcohol/week: 0.0 standard drinks     Comment: rarely     Drug use: No     Sexual activity: Not Currently     Partners: Female   Other Topics Concern     Parent/sibling w/ CABG, MI or angioplasty before 65F 55M? No   Social History Narrative     Not on file     Social Determinants of Health     Financial Resource Strain:      Difficulty of Paying Living Expenses:    Food Insecurity:      Worried About Running Out of Food in the Last Year:      Ran Out of Food in the  Last Year:    Transportation Needs:      Lack of Transportation (Medical):      Lack of Transportation (Non-Medical):    Physical Activity:      Days of Exercise per Week:      Minutes of Exercise per Session:    Stress:      Feeling of Stress :    Social Connections:      Frequency of Communication with Friends and Family:      Frequency of Social Gatherings with Friends and Family:      Attends Sikh Services:      Active Member of Clubs or Organizations:      Attends Club or Organization Meetings:      Marital Status:    Intimate Partner Violence:      Fear of Current or Ex-Partner:      Emotionally Abused:      Physically Abused:      Sexually Abused:        Additional medical/Social/Surgical histories reviewed in [facility] and updated as appropriate.     REVIEW OF SYSTEMS (9/30/2021)  10 point ROS of systems including Constitutional, Eyes, Respiratory, Cardiovascular, Gastroenterology, Genitourinary, Integumentary, Musculoskeletal, Psychiatric, Allergic/Immunologic were all negative except for pertinent positives noted in my HPI.     PHYSICAL EXAM  Exam:  Constitutional: healthy, alert and no distress  Head: Normocephalic. No masses, lesions, tenderness or abnormalities  Neck: Neck supple. No adenopathy. Thyroid symmetric, normal size,, Carotids without bruits.  ENT: ENT exam normal, no neck nodes or sinus tenderness  Cardiovascular: negative, PMI normal. No lifts, heaves, or thrills. RRR. No murmurs, clicks gallops or rub  Respiratory: negative, Percussion normal. Good diaphragmatic excursion. Lungs clear    Skin: no suspicious lesions or rashes, except 2x2cm wound with erythma surrounding with 1cm diameter with ttp and has pus/discharge  Neurologic: Gait normal. Reflexes normal and symmetric. Sensation grossly WNL.  Psychiatric: mentation appears normal and affect normal/bright  Hematologic/Lymphatic/Immunologic: Normal cervical lymph nodes    ASSESSMENT & PLAN  55 yo male with right leg cellulitis after  wound    Discussed and ordered bactrim ds  Given daily wound cares  Monitor and followup if worsens    We also performed his shoulder injection as requested and planned.    Appropriate PPE was utilized for prevention of spread of Covid-19.  Avlaro Moore MD, CAQSM    Left Glenohumeral Injection - Ultrasound Guided  The patient was informed of the risks and the benefits of the procedure and a written consent was signed.  The patient s left shoulder was prepped with chlorhexidine in sterile fashion.   40 mg of methylprednisolone suspension was drawn up into a 5 mL syringe with 3 mL of 1% lidocaine w/o Epi.  Injection was performed using sterile technique.  Under ultrasound guidance a 3.5-inch 22-gauge needle was used to enter the glenohumeral joint.  Posterior approach was used with the patient in lateral recumbent position, arm in neutral position at the side.  Needle placement was visualized and documented with ultrasound.  Ultrasound visualization was necessary due to the small joint space entered.  Injection performed long axis to the probe.  Injection solution visualized within the joint space.  Images were permanently stored for the patient's record.  There were no complications. The patient tolerated the procedure well. There was negligible bleeding.   Therapy scheduled to follow for mobilization.  The patient was instructed to call or go to the emergency room with any unusual pain, swelling, redness, or if otherwise concerned.

## 2021-09-30 NOTE — LETTER
9/30/2021         RE: Indra Mehta  [address] [address] [place] [id number]        Dear Colleague,    Thank you for referring your patient, Indra Mehta, to the [facility] [place]. Please see a copy of my visit note below.    Left Glenohumeral Injection - Ultrasound Guided  The patient was informed of the risks and the benefits of the procedure and a written consent was signed.  The patient s left shoulder was prepped with chlorhexidine in sterile fashion.   40 mg of methylprednisolone suspension was drawn up into a 5 mL syringe with 3 mL of 1% lidocaine w/o Epi.  Injection was performed using sterile technique.  Under ultrasound guidance a 3.5-inch 22-gauge needle was used to enter the glenohumeral joint.  Posterior approach was used with the patient in lateral recumbent position, arm in neutral position at the side.  Needle placement was visualized and documented with ultrasound.  Ultrasound visualization was necessary due to the small joint space entered.  Injection performed long axis to the probe.  Injection solution visualized within the joint space.  Images were permanently stored for the patient's record.  There were no complications. The patient tolerated the procedure well. There was negligible bleeding.   Therapy scheduled to follow for mobilization.  The patient was instructed to call or go to the emergency room with any unusual pain, swelling, redness, or if otherwise concerned.        Large Joint Injection/Arthocentesis: L glenohumeral joint    Date/Time: 9/30/2021 8:16 AM  Performed by: Alvaro Moore MD  Authorized by: Alvaro Moore MD     Indications:  Pain and osteoarthritis  Needle Size:  22 G  Guidance: ultrasound    Approach:  Posterolateral  Location:  Shoulder      Site:  L glenohumeral joint  Medications:  40 mg methylPREDNISolone 40 MG/ML  Procedure discussed: discussed risks, benefits, and alternatives    Consent Given by:   Patient  Timeout: timeout called immediately prior to procedure    Prep: patient was prepped and draped in usual sterile fashion     NDC: 12741-990-37        HISTORY OF PRESENT ILLNESS  Mr. Mehta is a pleasant 56 year old year old male who presents to clinic today after being referred for a left shoulder injection per Dr De Jesus  We will perform this, and then he wants to discuss his wound on his right leg which he explains occurred a few days ago when he had injured it while doing work around his house  Has had redness and discharge in the area for a few days  Location: right lower leg below knee    Timing: occurs intermittently    Modifying factors:  resting and non-use makes it better, movement and use makes it worse  Associated signs & symptoms: redness, pus    MEDICAL HISTORY  Patient Active Problem List   Diagnosis     Meniere's disease     Major depression in complete remission (H)     Chronic pain syndrome     Pain medication agreement signed     Bilateral occipital neuralgia     Benign essential hypertension     Cervical radiculopathy     Status post lumbar spinal fusion     Sleep apnea, unspecified type     Osteoarthritis of spine with radiculopathy, lumbar region     Impingement syndrome of left shoulder       Current Outpatient Medications   Medication Sig Dispense Refill     Acetaminophen (TYLENOL PO) Take 1,000 mg by mouth 3 times daily        ALPRAZolam (XANAX XR) 0.5 MG 24 hr tablet Take 1 tablet (0.5 mg) by mouth At Bedtime 30 tablet 3     amLODIPine (NORVASC) 5 MG tablet TAKE 1 TABLET BY MOUTH EVERY DAY 90 tablet 1     atorvastatin (LIPITOR) 10 MG tablet Take 10 mg by mouth daily       cyclobenzaprine (FLEXERIL) 5 MG tablet 1-2 tablets three times daily as needed 45 tablet 0     ibuprofen (ADVIL/MOTRIN) 800 MG tablet Take 800 mg by mouth 3 times daily       losartan-hydrochlorothiazide (HYZAAR) 100-25 MG tablet Take 1 tablet by mouth daily 90 tablet 3     naloxone (NARCAN) 4 MG/0.1ML nasal  spray Spray 1 spray (4 mg) into one nostril alternating nostrils once as needed for opioid reversal every 2-3 minutes until assistance arrives 0.2 mL 1     nortriptyline (PAMELOR) 50 MG capsule Take 1 capsule (50 mg) by mouth At Bedtime 30 capsule 2     order for DME Equipment being ordered: TENS Pads as directed 1 Device 0     oxyCODONE (ROXICODONE) 5 MG tablet 1-2 tablets every 4-6 hours as needed for severe pain. Max of 6 tablets/day. Fill 21 and Start 21 165 tablet 0     pregabalin (LYRICA) 150 MG capsule Take 1 capsule (150 mg) by mouth 3 times daily 90 capsule 1       Allergies   Allergen Reactions     No Known Drug Allergies        Family History   Problem Relation Age of Onset     Diabetes Mother      Cancer Mother         colon cancer -  at 52, diag at 42     Hypertension Father      Heart Disease Father          of AAA     No Known Problems Brother      No Known Problems Brother      Depression Sister      Suicide Other      Unknown/Adopted Maternal Grandmother      Unknown/Adopted Maternal Grandfather      Unknown/Adopted Paternal Grandmother      Unknown/Adopted Paternal Grandfather      No Known Problems Daughter      No Known Problems Daughter      Social History     Socioeconomic History     Marital status: Single     Spouse name: Not on file     Number of children: 2     Years of education: Not on file     Highest education level: Not on file   Occupational History     Employer: [employer]   Tobacco Use     Smoking status: Never Smoker     Smokeless tobacco: Never Used   Vaping Use     Vaping Use: Never used   Substance and Sexual Activity     Alcohol use: Yes     Alcohol/week: 0.0 standard drinks     Comment: rarely     Drug use: No     Sexual activity: Not Currently     Partners: Female   Other Topics Concern     Parent/sibling w/ CABG, MI or angioplasty before 65F 55M? No   Social History Narrative     Not on file     Social Determinants of Health     Financial Resource  Strain:      Difficulty of Paying Living Expenses:    Food Insecurity:      Worried About Running Out of Food in the Last Year:      Ran Out of Food in the Last Year:    Transportation Needs:      Lack of Transportation (Medical):      Lack of Transportation (Non-Medical):    Physical Activity:      Days of Exercise per Week:      Minutes of Exercise per Session:    Stress:      Feeling of Stress :    Social Connections:      Frequency of Communication with Friends and Family:      Frequency of Social Gatherings with Friends and Family:      Attends Anabaptist Services:      Active Member of Clubs or Organizations:      Attends Club or Organization Meetings:      Marital Status:    Intimate Partner Violence:      Fear of Current or Ex-Partner:      Emotionally Abused:      Physically Abused:      Sexually Abused:        Additional medical/Social/Surgical histories reviewed in [facility] and updated as appropriate.     REVIEW OF SYSTEMS (9/30/2021)  10 point ROS of systems including Constitutional, Eyes, Respiratory, Cardiovascular, Gastroenterology, Genitourinary, Integumentary, Musculoskeletal, Psychiatric, Allergic/Immunologic were all negative except for pertinent positives noted in my HPI.     PHYSICAL EXAM  Exam:  Constitutional: healthy, alert and no distress  Head: Normocephalic. No masses, lesions, tenderness or abnormalities  Neck: Neck supple. No adenopathy. Thyroid symmetric, normal size,, Carotids without bruits.  ENT: ENT exam normal, no neck nodes or sinus tenderness  Cardiovascular: negative, PMI normal. No lifts, heaves, or thrills. RRR. No murmurs, clicks gallops or rub  Respiratory: negative, Percussion normal. Good diaphragmatic excursion. Lungs clear    Skin: no suspicious lesions or rashes, except 2x2cm wound with erythma surrounding with 1cm diameter with ttp and has pus/discharge  Neurologic: Gait normal. Reflexes normal and symmetric. Sensation grossly WNL.  Psychiatric: mentation appears normal and  affect normal/bright  Hematologic/Lymphatic/Immunologic: Normal cervical lymph nodes    ASSESSMENT & PLAN  57 yo male with right leg cellulitis after wound    Discussed and ordered bactrim ds  Given daily wound cares  Monitor and followup if worsens    We also performed his shoulder injection as requested and planned.    Appropriate PPE was utilized for prevention of spread of Covid-19.  Alvaro Moore MD, CAQSM    Left Glenohumeral Injection - Ultrasound Guided  The patient was informed of the risks and the benefits of the procedure and a written consent was signed.  The patient s left shoulder was prepped with chlorhexidine in sterile fashion.   40 mg of methylprednisolone suspension was drawn up into a 5 mL syringe with 3 mL of 1% lidocaine w/o Epi.  Injection was performed using sterile technique.  Under ultrasound guidance a 3.5-inch 22-gauge needle was used to enter the glenohumeral joint.  Posterior approach was used with the patient in lateral recumbent position, arm in neutral position at the side.  Needle placement was visualized and documented with ultrasound.  Ultrasound visualization was necessary due to the small joint space entered.  Injection performed long axis to the probe.  Injection solution visualized within the joint space.  Images were permanently stored for the patient's record.  There were no complications. The patient tolerated the procedure well. There was negligible bleeding.   Therapy scheduled to follow for mobilization.  The patient was instructed to call or go to the emergency room with any unusual pain, swelling, redness, or if otherwise concerned.          Again, thank you for allowing me to participate in the care of your patient.        Sincerely,        Alvaro Moore MD

## 2021-10-01 ENCOUNTER — HOSPITAL ENCOUNTER (OUTPATIENT)
Facility: CLINIC | Age: 56
Discharge: HOME | End: 2021-10-01
Attending: ANESTHESIOLOGY | Admitting: ANESTHESIOLOGY
Payer: COMMERCIAL

## 2021-10-01 ENCOUNTER — ANESTHESIA (OUTPATIENT)
Dept: SURGERY | Facility: CLINIC | Age: 56
End: 2021-10-01
Payer: COMMERCIAL

## 2021-10-01 ENCOUNTER — HOSPITAL ENCOUNTER (OUTPATIENT)
Dept: GENERAL RADIOLOGY | Facility: CLINIC | Age: 56
End: 2021-10-01
Attending: ANESTHESIOLOGY | Admitting: ANESTHESIOLOGY
Payer: COMMERCIAL

## 2021-10-01 VITALS
BODY MASS INDEX: 28.63 KG/M2 | DIASTOLIC BLOOD PRESSURE: 87 MMHG | SYSTOLIC BLOOD PRESSURE: 129 MMHG | OXYGEN SATURATION: 98 % | HEART RATE: 73 BPM | WEIGHT: 216 LBS | HEIGHT: 73 IN | TEMPERATURE: 97.9 F | RESPIRATION RATE: 16 BRPM

## 2021-10-01 DIAGNOSIS — M47.816 SPONDYLOSIS OF LUMBAR REGION WITHOUT MYELOPATHY OR RADICULOPATHY: Primary | ICD-10-CM

## 2021-10-01 DIAGNOSIS — M47.816 LUMBAR FACET ARTHROPATHY: ICD-10-CM

## 2021-10-01 PROCEDURE — 710N000012 HC RECOVERY PHASE 2, PER MINUTE: Performed by: ANESTHESIOLOGY

## 2021-10-01 PROCEDURE — 64636 DESTROY L/S FACET JNT ADDL: CPT | Mod: 50 | Performed by: ANESTHESIOLOGY

## 2021-10-01 PROCEDURE — 258N000003 HC RX IP 258 OP 636: Performed by: NURSE ANESTHETIST, CERTIFIED REGISTERED

## 2021-10-01 PROCEDURE — 250N000011 HC RX IP 250 OP 636: Performed by: NURSE ANESTHETIST, CERTIFIED REGISTERED

## 2021-10-01 PROCEDURE — 272N000001 HC OR GENERAL SUPPLY STERILE: Performed by: ANESTHESIOLOGY

## 2021-10-01 PROCEDURE — 999N000141 HC STATISTIC PRE-PROCEDURE NURSING ASSESSMENT: Performed by: ANESTHESIOLOGY

## 2021-10-01 PROCEDURE — 999N000179 XR SURGERY CARM FLUORO LESS THAN 5 MIN W STILLS: Mod: TC

## 2021-10-01 PROCEDURE — 360N000081 HC SURGERY LEVEL 1 W/ FLUORO, PER MIN: Performed by: ANESTHESIOLOGY

## 2021-10-01 PROCEDURE — 250N000009 HC RX 250: Performed by: NURSE ANESTHETIST, CERTIFIED REGISTERED

## 2021-10-01 PROCEDURE — 64635 DESTROY LUMB/SAC FACET JNT: CPT | Mod: 50 | Performed by: ANESTHESIOLOGY

## 2021-10-01 PROCEDURE — 370N000017 HC ANESTHESIA TECHNICAL FEE, PER MIN: Performed by: ANESTHESIOLOGY

## 2021-10-01 PROCEDURE — 250N000009 HC RX 250: Performed by: ANESTHESIOLOGY

## 2021-10-01 RX ORDER — LIDOCAINE HYDROCHLORIDE 20 MG/ML
INJECTION, SOLUTION INFILTRATION; PERINEURAL PRN
Status: DISCONTINUED | OUTPATIENT
Start: 2021-10-01 | End: 2021-10-01 | Stop reason: HOSPADM

## 2021-10-01 RX ORDER — BUPIVACAINE HYDROCHLORIDE 5 MG/ML
INJECTION, SOLUTION PERINEURAL PRN
Status: DISCONTINUED | OUTPATIENT
Start: 2021-10-01 | End: 2021-10-01 | Stop reason: HOSPADM

## 2021-10-01 RX ORDER — OXYCODONE HYDROCHLORIDE 5 MG/1
10 TABLET ORAL EVERY 6 HOURS PRN
Qty: 18 TABLET | Refills: 0 | Status: SHIPPED | OUTPATIENT
Start: 2021-10-01 | End: 2021-10-04

## 2021-10-01 RX ORDER — LIDOCAINE 40 MG/G
CREAM TOPICAL
Status: DISCONTINUED | OUTPATIENT
Start: 2021-10-01 | End: 2021-10-01 | Stop reason: HOSPADM

## 2021-10-01 RX ORDER — LIDOCAINE HYDROCHLORIDE 20 MG/ML
INJECTION, SOLUTION INFILTRATION; PERINEURAL PRN
Status: DISCONTINUED | OUTPATIENT
Start: 2021-10-01 | End: 2021-10-01

## 2021-10-01 RX ORDER — PROPOFOL 10 MG/ML
INJECTION, EMULSION INTRAVENOUS PRN
Status: DISCONTINUED | OUTPATIENT
Start: 2021-10-01 | End: 2021-10-01

## 2021-10-01 RX ORDER — SODIUM CHLORIDE, SODIUM LACTATE, POTASSIUM CHLORIDE, CALCIUM CHLORIDE 600; 310; 30; 20 MG/100ML; MG/100ML; MG/100ML; MG/100ML
INJECTION, SOLUTION INTRAVENOUS CONTINUOUS PRN
Status: DISCONTINUED | OUTPATIENT
Start: 2021-10-01 | End: 2021-10-01

## 2021-10-01 RX ORDER — SODIUM CHLORIDE, SODIUM LACTATE, POTASSIUM CHLORIDE, CALCIUM CHLORIDE 600; 310; 30; 20 MG/100ML; MG/100ML; MG/100ML; MG/100ML
INJECTION, SOLUTION INTRAVENOUS CONTINUOUS
Status: DISCONTINUED | OUTPATIENT
Start: 2021-10-01 | End: 2021-10-01 | Stop reason: HOSPADM

## 2021-10-01 RX ADMIN — PROPOFOL 50 MG: 10 INJECTION, EMULSION INTRAVENOUS at 07:48

## 2021-10-01 RX ADMIN — SODIUM CHLORIDE, POTASSIUM CHLORIDE, SODIUM LACTATE AND CALCIUM CHLORIDE: 600; 310; 30; 20 INJECTION, SOLUTION INTRAVENOUS at 07:15

## 2021-10-01 RX ADMIN — PROPOFOL 50 MG: 10 INJECTION, EMULSION INTRAVENOUS at 07:38

## 2021-10-01 RX ADMIN — PROPOFOL 50 MG: 10 INJECTION, EMULSION INTRAVENOUS at 07:41

## 2021-10-01 RX ADMIN — LIDOCAINE HYDROCHLORIDE 40 MG: 20 INJECTION, SOLUTION INFILTRATION; PERINEURAL at 07:31

## 2021-10-01 RX ADMIN — PROPOFOL 50 MG: 10 INJECTION, EMULSION INTRAVENOUS at 07:33

## 2021-10-01 RX ADMIN — PROPOFOL 50 MG: 10 INJECTION, EMULSION INTRAVENOUS at 07:34

## 2021-10-01 RX ADMIN — PROPOFOL 50 MG: 10 INJECTION, EMULSION INTRAVENOUS at 07:32

## 2021-10-01 ASSESSMENT — MIFFLIN-ST. JEOR: SCORE: 1868.65

## 2021-10-01 NOTE — OP NOTE
PRE-OP DIAGNOSIS: Low back pain secondary to lumbar spondylosis    POST-OP DIAGNOSIS:  same    PROCEDURE: Bilateral radiofrequency ablation at L3, L4, L5 all under fluoroscopic guidance to fully denervated the bilateral L4-5 and L5-S1 facet joints    ANESTHESIA: Monitored anesthetic care    PROCEDURE DETAILS: After discussing the risks, benefits, and alternatives to the procedure, the patient expressed understanding and wished to proceed. Written consent was obtained.  The patient was escorted to the fluoroscopy suite and placed in prone position on the procedure table.  A procedural pause was conducted to verify the correct: patient identity, procedure to be performed, side, site, patient position, and any special requirements. The skin was aseptically prepped and draped in the usual fashion utilizing ChloraPrep. The skin and subcutaneous tissue were anesthetized with 1% lidocaine.  I then advanced Jimmy venom 150 mm RFA needles with 10 mm active tips into each target location which was the bilateral L4 and L5 Burtons points as well as their bilateral sacral phillips corresponding to the bilateral L3 and L4 medial branches, and L5 dorsal rami nerves respectively.  AP, lateral, and oblique films showed proper needle placement away from the nerve roots and spinal cord.  Motor stimulation to 3 V at 2 Hz ruled out nerve root involvement and showed only multifidus muscle contraction.  I then anesthetized each nerve with 1 cc of 2% lidocaine and did 2 slightly overlapping Hopewell venom lesions at each nerve location at 80  C for 60 seconds.  This was done bilaterally in order to fully denervate the L4-5 and L5-S1 facet joints.    Blood loss: Less than 5 cc      PLAN:  1.  I completed bilateral radiofrequency ablations at L3, L4, L5  to fully denervated the L4-5 and L5-S1 facet joints.  They should expect to have postoperative pain for 1 to 2 weeks.  These dysesthesias are usually described as a sunburn type of sensation.   This will jay over 1 to 2 weeks.  Occasionally this can last up to 4 weeks. They will realize the full benefit of the procedure at approximately 4 weeks postoperatively.They were discharged home with precautions for infection and bleeding and told to seek my medical attention should any of these symptoms develop.    Complications: None

## 2021-10-01 NOTE — ANESTHESIA POSTPROCEDURE EVALUATION
Patient: Indra Mehta    Procedure(s):  RADIOFREQUENCY ABLATION lumbar 3, 4, 5 bilateral    Diagnosis:Lumbar facet arthropathy [M47.816]  Diagnosis Additional Information: No value filed.    Anesthesia Type:  MAC    Note:  Disposition: Outpatient   Postop Pain Control: Uneventful            Sign Out: Well controlled pain   PONV: No   Neuro/Psych: Uneventful            Sign Out: Acceptable/Baseline neuro status   Airway/Respiratory: Uneventful            Sign Out: Acceptable/Baseline resp. status   CV/Hemodynamics: Uneventful            Sign Out: Acceptable CV status   Other NRE: NONE   DID A NON-ROUTINE EVENT OCCUR? No    Event details/Postop Comments:  Pt was happy with anesthesia care.  No complications.  I will follow up with the pt if needed.           Last vitals:  Vitals Value Taken Time   /91 10/01/21 0830   Temp     Pulse 73 10/01/21 0830   Resp 16 10/01/21 0807   SpO2 99 % 10/01/21 0833   Vitals shown include unvalidated device data.    Electronically Signed By: YUMIKO Scanlon CRNA  October 1, 2021  8:34 AM

## 2021-10-01 NOTE — ANESTHESIA CARE TRANSFER NOTE
Patient: Indra Mehta    Procedure(s):  RADIOFREQUENCY ABLATION lumbar 3, 4, 5 bilateral    Diagnosis: Lumbar facet arthropathy [M47.816]  Diagnosis Additional Information: No value filed.    Anesthesia Type:   MAC     Note:    Oropharynx: oropharynx clear of all foreign objects and spontaneously breathing  Level of Consciousness: drowsy  Oxygen Supplementation: face mask    Independent Airway: airway patency satisfactory and stable  Dentition: dentition unchanged  Vital Signs Stable: post-procedure vital signs reviewed and stable  Report to RN Given: handoff report given  Patient transferred to: Phase II    Handoff Report: Identifed the Patient, Identified the Reponsible Provider, Reviewed the pertinent medical history, Discussed the surgical course, Reviewed Intra-OP anesthesia mangement and issues during anesthesia, Set expectations for post-procedure period and Allowed opportunity for questions and acknowledgement of understanding      Vitals:  Vitals Value Taken Time   BP     Temp     Pulse     Resp     SpO2 95 % 10/01/21 0808   Vitals shown include unvalidated device data.    Electronically Signed By: YUMIKO Scanlon CRNA  October 1, 2021  8:09 AM

## 2021-10-01 NOTE — ANESTHESIA PREPROCEDURE EVALUATION
Anesthesia Pre-Procedure Evaluation    Patient: Indra Mehta   MRN: [id number] : 1965        Preoperative Diagnosis: Lumbar facet arthropathy [M47.816]   Procedure : Procedure(s):  RADIOFREQUENCY ABLATION lumbar 3, 4, 5 bilateral     Past Medical History:   Diagnosis Date     Anxiety      Back pain      Depressive disorder      Hyperlipidemia      Hypertension      Meniere's disease, unspecified      Pain medication agreement signed 2010      Past Surgical History:   Procedure Laterality Date     BUNIONECTOMY RT/LT  08    Both feet     COLONOSCOPY N/A 2016    Procedure: COMBINED COLONOSCOPY, SINGLE OR MULTIPLE BIOPSY/POLYPECTOMY BY BIOPSY;  Surgeon: Indra Jernigan MD;  Location: [facility]     DISCECTOMY, FUSION CERVICAL ANTERIOR ONE LEVEL, COMBINED N/A 2017    Procedure: COMBINED DISCECTOMY, FUSION CERVICAL ANTERIOR ONE LEVEL;  Cervical 6-7, Anterior cervical discectomy fusion;  Surgeon: Mike Mckenna MD;  Location: [facility]     DISCECTOMY, FUSION CERVICAL ANTERIOR ONE LEVEL, COMBINED N/A 2018    Procedure: cervical 5-6 anterior cervical discectomy fusion;  Surgeon: Mike Mckenna MD;  Location: [facility]     HERNIORRHAPHY UMBILICAL N/A 2017    Procedure: HERNIORRHAPHY UMBILICAL;  open umbilical hernia repair;  Surgeon: Boni Story MD;  Location: [facility]     INJECT BLOCK MEDIAL BRANCH CERVICAL/THORACIC/LUMBAR Bilateral 2021    Procedure: Left L3, Left L4, Left L5, Right L3, Right L4 and Right L5 medial branch nerve blocks using fluoroscopic guidance and contrast control;  Surgeon: Darryn Mcdaniel MD;  [facility]     INJECT BLOCK MEDIAL BRANCH CERVICAL/THORACIC/LUMBAR N/A 2021    Procedure: Left L3, Left L4, Left L5, Right L3, Right L4 and Right L5 medial branch nerve blocks using fluoroscopic guidance and contrast control;  Surgeon: Darryn Mcdaniel MD;  [facility]     INJECT EPIDURAL CERVICAL N/A 2019    Procedure: INJECTION, SPINE,  CERVICAL 6-7 EPIDURAL;  Surgeon: Darryn Mcdaniel MD;  [facility]     INJECT EPIDURAL LUMBAR N/A 11/9/2017    Procedure: INJECT EPIDURAL LUMBAR;  lumbar 4-5 epidural steroid injection ;  Surgeon: Darryn Mcdaniel MD;  [facility]     INJECT EPIDURAL LUMBAR N/A 12/28/2017    Procedure: INJECT EPIDURAL LUMBAR;  lumbar epidural injection;  Surgeon: Darryn Mcdaniel MD;  [facility]     INJECT EPIDURAL LUMBAR Bilateral 5/13/2021    Procedure: Bilateral Lumbar 3-4 Epidural Steroid Injection;  Surgeon: Darryn Mcdaniel MD;  [facility]     INJECT EPIDURAL TRANSFORAMINAL Bilateral 8/23/2018    Procedure: INJECT EPIDURAL TRANSFORAMINAL;  transforaminal bilateral lumbar 3-4 steroid injection;  Surgeon: Darryn Mcdaniel MD;  [facility]     INJECT EPIDURAL TRANSFORAMINAL Bilateral 11/8/2018    Procedure: INJECT EPIDURAL TRANSFORAMINAL lumbar 3-4;  Surgeon: Darryn Mcdaniel MD;  [facility]     INJECT EPIDURAL TRANSFORAMINAL Bilateral 6/14/2019    Procedure: INJECTION, EPIDURAL, TRANSFORAMINAL LUMBAR 3-4 BILATERAL;  Surgeon: Darryn Mcdaniel MD;  [facility]     INJECT EPIDURAL TRANSFORAMINAL Bilateral 5/22/2020    Procedure: lumbar 3-4 bilateral transforaminal epidural steroid injection;  Surgeon: Darryn Mcdaniel MD;  [facility]     INJECT EPIDURAL TRANSFORAMINAL Bilateral 9/24/2020    Procedure: INJECTION, EPIDURAL, TRANSFORAMINAL  LUMBAR 3-4 APPROACH;  Surgeon: Darryn Mcdaniel MD;  [facility]     PE TUBES  2006    left ear     ZZHC COLONOSCOPY W/WO BRUSH/WASH  12/27/2005     ZZHC CREATE EARDRUM OPENING,GEN ANESTH  09/27/2007    Pe tube, Left endolymphatic sac enhancement.     ZZHC MASTOIDECTOMY,COMPLETE  09/27/2007      Allergies   Allergen Reactions     No Known Drug Allergies       Social History     Tobacco Use     Smoking status: Never Smoker     Smokeless tobacco: Never Used   Substance Use Topics     Alcohol use: Yes     Alcohol/week: 0.0 standard drinks     Comment: rarely      Wt  Readings from Last 1 Encounters:   09/29/21 98 kg (216 lb)        Anesthesia Evaluation   Pt has had prior anesthetic. Type: General and MAC.    No history of anesthetic complications       ROS/MED HX  ENT/Pulmonary:     (+) sleep apnea, moderate, uses CPAP,  (-) tobacco use   Neurologic: Comment: Occipital neuralgia    Meniere's disease      Cardiovascular:     (+) Dyslipidemia hypertension-----Previous cardiac testing   Echo: Date: Results:    Stress Test: Date: Results:    ECG Reviewed: Date: 9/21/20 Results:  SR  Cath: Date: Results:      METS/Exercise Tolerance:     Hematologic:  - neg hematologic  ROS     Musculoskeletal: Comment: CLBP  (+) arthritis,     GI/Hepatic:    (-) GERD   Renal/Genitourinary:  - neg Renal ROS     Endo:  - neg endo ROS     Psychiatric/Substance Use:     (+) psychiatric history anxiety and depression H/O chronic opiod use .     Infectious Disease:  - neg infectious disease ROS     Malignancy:  - neg malignancy ROS     Other:  - neg other ROS    (+) , H/O Chronic Pain,        Physical Exam    Airway  airway exam normal      Mallampati: II   TM distance: > 3 FB   Neck ROM: full   Mouth opening: > 3 cm    Respiratory Devices and Support         Dental  no notable dental history         Cardiovascular   cardiovascular exam normal       Rhythm and rate: regular and normal     Pulmonary   pulmonary exam normal        breath sounds clear to auscultation           OUTSIDE LABS:  CBC:   Lab Results   Component Value Date    WBC 5.7 09/21/2020    WBC 7.5 10/14/2019    HGB 14.9 09/29/2021    HGB 15.6 09/21/2020    HCT 46.3 09/21/2020    HCT 44.9 10/14/2019     09/21/2020     10/14/2019     BMP:   Lab Results   Component Value Date     09/29/2021     09/21/2020    POTASSIUM 3.9 09/29/2021    POTASSIUM 4.3 09/21/2020    CHLORIDE 106 09/29/2021    CHLORIDE 104 09/21/2020    CO2 30 09/29/2021    CO2 31 09/21/2020    BUN 9 09/29/2021    BUN 8 09/21/2020    CR 1.18 09/29/2021     CR 1.12 12/01/2020    GLC 95 09/29/2021    GLC 87 09/21/2020     COAGS: No results found for: PTT, INR, FIBR  POC: No results found for: BGM, HCG, HCGS  HEPATIC:   Lab Results   Component Value Date    ALBUMIN 4.4 12/01/2020    PROTTOTAL 8.1 12/01/2020    ALT 89 (H) 12/01/2020    AST 25 12/01/2020    ALKPHOS 66 12/01/2020    BILITOTAL 0.7 12/01/2020     OTHER:   Lab Results   Component Value Date    A1C 5.2 12/01/2020    KIRSHAN 8.5 09/29/2021    MAG 2.2 03/19/2007    TSH 2.00 03/24/2016    CRP <2.9 01/26/2018       Anesthesia Plan    ASA Status:  2   NPO Status:  NPO Appropriate    Anesthesia Type: MAC.      Maintenance: TIVA.        Consents    Anesthesia Plan(s) and associated risks, benefits, and realistic alternatives discussed. Questions answered and patient/representative(s) expressed understanding.     - Discussed with:  Patient    Use of blood products discussed: No .     Postoperative Care    Pain management: IV analgesics.        Comments:    The risks and benefits of anesthesia, and the alternatives where applicable, have been discussed with the patient, and they wish to proceed.     H&P reviewed: Unable to attach H&P to encounter due to EHR limitations. H&P Update: appropriate H&P reviewed, patient examined. No interval changes since H&P (within 30 days).         YUMIKO Scanlon CRNA

## 2021-10-01 NOTE — DISCHARGE INSTRUCTIONS
"Home Care Instructions                Procedure: Radiofrequency ablation    Activity:    Rest today    Do not work today    Resume normal activity tomorrow    Pain:    You may experience soreness at the injection site for 1 to 4 weeks.  Most patients described the postoperative pain as a \"sunburn feeling\".  This will go away over 1 to 4 weeks.    You may use an ice pack for 20 minutes every 2 hours for the first 24 hours    You may use a heating pad after the first 24 hours    You may use Tylenol  (acetaminophen) every 4 hours or other pain medicines as directed by your physician    Safety  Sedation medicine, if given may remain active for many hours.    It is important for the next 24 hours that you do not:    Drive a car    Operate machines or power tools    Consume alcohol, including beer    Sign any important papers or legal documents    You may experience numbness radiating into your legs or arms, (depending on the procedure location)  This numbness may last several hours.  Until the numb sensation returns to normal please use caution in walking, climbing stairs, stepping out of your vehicle, etc.    Common side effects of steroids:  Not everyone will experience corticosteroid side effects. If side effects are experienced they will gradually subside in the 7-10 day period following an injection.    Most common side effects include:    Flushed face and/or chest    Feeling of warmth, particularly in face but could be overall feeling of warmth    Increased blood sugar in diabetic patients    Menstrual irregularities may occur.  If taking hormone based birth control an alternate method of birth control is recommended    Sleep disturbances and/or mood swings are possible    Leg cramps    Please contact us if you have:  Severe pain   Fever more than 101.5 degrees Fahrenheit  Signs of infection (redness, swelling or drainage)      If you have questions during normal business hours (8am-5pm Monday-Friday) contact the " [facility] at [phone]. If you need help after hours, we recommend that you go to a hospital emergency room or dial 911.

## 2021-10-02 ENCOUNTER — HOSPITAL ENCOUNTER (EMERGENCY)
Facility: CLINIC | Age: 56
Discharge: HOME | End: 2021-10-02
Attending: FAMILY MEDICINE | Admitting: FAMILY MEDICINE
Payer: COMMERCIAL

## 2021-10-02 ENCOUNTER — NURSE TRIAGE (OUTPATIENT)
Dept: NURSING | Facility: CLINIC | Age: 56
End: 2021-10-02

## 2021-10-02 VITALS
RESPIRATION RATE: 14 BRPM | OXYGEN SATURATION: 98 % | HEART RATE: 89 BPM | DIASTOLIC BLOOD PRESSURE: 99 MMHG | TEMPERATURE: 99.4 F | WEIGHT: 217 LBS | SYSTOLIC BLOOD PRESSURE: 142 MMHG | BODY MASS INDEX: 28.63 KG/M2

## 2021-10-02 DIAGNOSIS — L08.9 SKIN INFECTION OF RIGHT KNEE: ICD-10-CM

## 2021-10-02 PROCEDURE — 87077 CULTURE AEROBIC IDENTIFY: CPT | Performed by: FAMILY MEDICINE

## 2021-10-02 PROCEDURE — 99284 EMERGENCY DEPT VISIT MOD MDM: CPT | Performed by: FAMILY MEDICINE

## 2021-10-02 RX ORDER — CEPHALEXIN 500 MG/1
500 CAPSULE ORAL 4 TIMES DAILY
Qty: 40 CAPSULE | Refills: 0 | Status: SHIPPED | OUTPATIENT
Start: 2021-10-02 | End: 2021-10-18

## 2021-10-02 RX ORDER — SULFAMETHOXAZOLE/TRIMETHOPRIM 800-160 MG
1 TABLET ORAL 2 TIMES DAILY
Qty: 14 TABLET | Refills: 0 | Status: SHIPPED | OUTPATIENT
Start: 2021-10-02 | End: 2021-10-18

## 2021-10-02 NOTE — ED TRIAGE NOTES
Presents with redness and swelling to right knee. States he knelt on something in his shed last week. He is currently on bactrim but the infection appears to be getting worse

## 2021-10-02 NOTE — TELEPHONE ENCOUNTER
"Seen on  for infected knee wound. Started Bactrim on  morning so on abx a little over 48 hrs. Pt expresses much concern today about this appearance of the wound \"it is getting worse\". Says wound more tender, more red and looks to have little pockets of pus on it. Denies fever. Advised see provider w/i 24 hrs.       It is Sat so will need to utilize UC/ED. In his case no UC in [facility] so he will use ED. Pt wants wound looked at today.     Reason for Disposition    [1] Wound infection AND [2] taking an antibiotic    [1] Red area or streak AND [2] no fever    Additional Information    Negative: [1] Widespread rash AND [2] bright red, sunburn-like AND [3] too weak to stand    Negative: Sounds like a life-threatening emergency to the triager    Negative: Stitches (or staples) and not infected    Negative: Skin glue used to close wound and not infected    Negative:  surgical wound infection suspected    Negative: Surgical wound infection suspected (post-op)    Negative: Animal bite wound infection suspected    Negative: Chronic wound care and Negative Pressure Wound Therapy (NPWT) symptoms and questions    Negative: [1] Widespread rash AND [2] bright red, sunburn-like    Negative: Severe difficulty breathing (e.g., struggling for each breath, speaks in single words)    Negative: Sounds like a life-threatening emergency to the triager    Negative: Severe pain in the wound    Negative: Black (necrotic) or blisters develop in wound    Negative: Patient sounds very sick or weak to the triager    Negative: [1] Looks infected (e.g., spreading redness, red streak, pus) AND [2] fever    Negative: [1] Red streak runs from the wound AND [2] longer than 1 inch (2.5 cm)    Negative: [1] Skin around the wound has become red AND [2] larger than 2 inches (5 cm)    Negative: [1] Face wound AND [2] looks infected (e.g., spreading redness)    Negative: [1] Finger wound AND [2] entire finger swollen    Protocols " used: INFECTION ON ANTIBIOTIC FOLLOW-UP CALL-A-AH, WOUND INFECTION-A-AH

## 2021-10-02 NOTE — ED PROVIDER NOTES
History     Chief Complaint   Patient presents with     Wound Infection     HPI  Indra Mehta is a 56 year old male who presents with concerns of worsening infection of his knee.  Patient states that he was seen by a sports medicine doctor about 4 days ago at the start of this and was prescribed Bactrim.  Patient states since then the wound has gotten bigger, it is opened up and draining now.  Denies any fevers or chills.  It hurts for him to bend his knee or even touch the area.  Patient denies any previous surgeries to the knee.  Denies any new nausea or vomiting.  Denies any rashes spreading anywhere.    Allergies:  Allergies   Allergen Reactions     No Known Drug Allergies        Problem List:    Patient Active Problem List    Diagnosis Date Noted     Impingement syndrome of left shoulder 06/23/2021     Priority: Medium     Sleep apnea, unspecified type 04/06/2021     Priority: Medium     Osteoarthritis of spine with radiculopathy, lumbar region 04/06/2021     Priority: Medium     Status post lumbar spinal fusion 01/14/2020     Priority: Medium     Cervical radiculopathy 10/28/2019     Priority: Medium     Formatting of this note might be different from the original.  Added automatically from request for surgery [id number]       Benign essential hypertension 06/30/2017     Priority: Medium     Bilateral occipital neuralgia 10/28/2016     Priority: Medium     Major depression in complete remission (H) 08/20/2010     Priority: Medium     Chronic pain syndrome 08/20/2010     Priority: Medium     Patient is followed by Tha Grullon MD for ongoing prescription of pain medication.  All refills should be approved by this provider only at face-to-face appointments - not by phone request.    Medication(s): Tramadol.   Maximum quantity per month: 60  Clinic visit frequency required: Q 1 months     Controlled substance agreement:  Encounter-Level CSA - 10/13/16:               Controlled Substance Agreement - Scan  on 10/23/2016  5:07 PM : CONTROLLED SUBSTANCE AGREEMENT 10/13/16 (below)            Pain Clinic evaluation in the past: Yes       Date/Location:   [facility]    DIRE Total Score(s):  No flowsheet data found.    Last [facility] website verification:  none   [email or web address]               Pain medication agreement signed 08/20/2010     Priority: Medium     Meniere's disease 03/22/2007     Priority: Medium     Problem list name updated by automated process. Provider to review          Past Medical History:    Past Medical History:   Diagnosis Date     Anxiety      Back pain      Depressive disorder      Hyperlipidemia      Hypertension      Meniere's disease, unspecified      Pain medication agreement signed 8/20/2010       Past Surgical History:    Past Surgical History:   Procedure Laterality Date     BUNIONECTOMY RT/LT  09/05/08    Both feet     COLONOSCOPY N/A 12/28/2016    Procedure: COMBINED COLONOSCOPY, SINGLE OR MULTIPLE BIOPSY/POLYPECTOMY BY BIOPSY;  Surgeon: Indra Jernigan MD;  Location: [facility]     DISCECTOMY, FUSION CERVICAL ANTERIOR ONE LEVEL, COMBINED N/A 7/5/2017    Procedure: COMBINED DISCECTOMY, FUSION CERVICAL ANTERIOR ONE LEVEL;  Cervical 6-7, Anterior cervical discectomy fusion;  Surgeon: Mike Mckenna MD;  [facility]     DISCECTOMY, FUSION CERVICAL ANTERIOR ONE LEVEL, COMBINED N/A 12/19/2018    Procedure: cervical 5-6 anterior cervical discectomy fusion;  Surgeon: Mike Mckenna MD;  [facility]     HERNIORRHAPHY UMBILICAL N/A 8/11/2017    Procedure: HERNIORRHAPHY UMBILICAL;  open umbilical hernia repair;  Surgeon: Boni Story MD;  [facility]     INJECT BLOCK MEDIAL BRANCH CERVICAL/THORACIC/LUMBAR Bilateral 8/12/2021    Procedure: Left L3, Left L4, Left L5, Right L3, Right L4 and Right L5 medial branch nerve blocks using fluoroscopic guidance and contrast control;  Surgeon: Darryn Mcdaniel MD;  [facility]     INJECT BLOCK MEDIAL BRANCH  CERVICAL/THORACIC/LUMBAR N/A 9/9/2021    Procedure: Left L3, Left L4, Left L5, Right L3, Right L4 and Right L5 medial branch nerve blocks using fluoroscopic guidance and contrast control;  Surgeon: Darryn Mcdaniel MD;  [facility]     INJECT EPIDURAL CERVICAL N/A 8/22/2019    Procedure: INJECTION, SPINE, CERVICAL 6-7 EPIDURAL;  Surgeon: Darryn Mcdaniel MD;  [facility]     INJECT EPIDURAL LUMBAR N/A 11/9/2017    Procedure: INJECT EPIDURAL LUMBAR;  lumbar 4-5 epidural steroid injection ;  Surgeon: Darryn Mcdaniel MD;  [facility]     INJECT EPIDURAL LUMBAR N/A 12/28/2017    Procedure: INJECT EPIDURAL LUMBAR;  lumbar epidural injection;  Surgeon: Darryn Mcdaniel MD;  [facility]     INJECT EPIDURAL LUMBAR Bilateral 5/13/2021    Procedure: Bilateral Lumbar 3-4 Epidural Steroid Injection;  Surgeon: Darryn Mcdaniel MD;  [facility]     INJECT EPIDURAL TRANSFORAMINAL Bilateral 8/23/2018    Procedure: INJECT EPIDURAL TRANSFORAMINAL;  transforaminal bilateral lumbar 3-4 steroid injection;  Surgeon: Darryn Mcdaniel MD;  [facility]     INJECT EPIDURAL TRANSFORAMINAL Bilateral 11/8/2018    Procedure: INJECT EPIDURAL TRANSFORAMINAL lumbar 3-4;  Surgeon: Darryn Mcdaniel MD;  [facility]     INJECT EPIDURAL TRANSFORAMINAL Bilateral 6/14/2019    Procedure: INJECTION, EPIDURAL, TRANSFORAMINAL LUMBAR 3-4 BILATERAL;  Surgeon: Darryn Mdcaniel MD;  [facility]     INJECT EPIDURAL TRANSFORAMINAL Bilateral 5/22/2020    Procedure: lumbar 3-4 bilateral transforaminal epidural steroid injection;  Surgeon: Darryn Mcdaniel MD;  [facility]     INJECT EPIDURAL TRANSFORAMINAL Bilateral 9/24/2020    Procedure: INJECTION, EPIDURAL, TRANSFORAMINAL  LUMBAR 3-4 APPROACH;  Surgeon: Darryn Mcdaniel MD;  [facility]     PE TUBES  2006    left ear     ZZHC COLONOSCOPY W/WO BRUSH/WASH  12/27/2005     ZZHC CREATE EARDRUM OPENING,GEN ANESTH  09/27/2007    Pe tube, Left endolymphatic sac enhancement.     ZZHC  MASTOIDECTOMY,COMPLETE  2007       Family History:    Family History   Problem Relation Age of Onset     Diabetes Mother      Cancer Mother         colon cancer -  at 52, diag at 42     Hypertension Father      Heart Disease Father          of AAA     No Known Problems Brother      No Known Problems Brother      Depression Sister      Suicide Other      Unknown/Adopted Maternal Grandmother      Unknown/Adopted Maternal Grandfather      Unknown/Adopted Paternal Grandmother      Unknown/Adopted Paternal Grandfather      No Known Problems Daughter      No Known Problems Daughter        Social History:  Marital Status:  Single [1]  Social History     Tobacco Use     Smoking status: Never Smoker     Smokeless tobacco: Never Used   Vaping Use     Vaping Use: Never used   Substance Use Topics     Alcohol use: Yes     Alcohol/week: 0.0 standard drinks     Comment: rarely     Drug use: No        Medications:    cephALEXin (KEFLEX) 500 MG capsule  sulfamethoxazole-trimethoprim (BACTRIM DS) 800-160 MG tablet  Acetaminophen (TYLENOL PO)  ALPRAZolam (XANAX XR) 0.5 MG 24 hr tablet  amLODIPine (NORVASC) 5 MG tablet  atorvastatin (LIPITOR) 10 MG tablet  cyclobenzaprine (FLEXERIL) 5 MG tablet  ibuprofen (ADVIL/MOTRIN) 800 MG tablet  losartan-hydrochlorothiazide (HYZAAR) 100-25 MG tablet  naloxone (NARCAN) 4 MG/0.1ML nasal spray  nortriptyline (PAMELOR) 50 MG capsule  order for DME  oxyCODONE (ROXICODONE) 5 MG tablet  oxyCODONE (ROXICODONE) 5 MG tablet  pregabalin (LYRICA) 150 MG capsule  sulfamethoxazole-trimethoprim (BACTRIM DS) 800-160 MG tablet          Review of Systems   All other systems reviewed and are negative.      Physical Exam   BP: (!) 142/99  Pulse: 89  Temp: 99.4  F (37.4  C)  Resp: 14  Weight: 98.4 kg (217 lb)  SpO2: 98 %      Physical Exam  Vitals and nursing note reviewed.   Constitutional:       General: He is not in acute distress.     Appearance: Normal appearance. He is not ill-appearing.    Musculoskeletal:      Right knee: Swelling and erythema present. Tenderness present.      Comments: There appears to be an open wound on the lower aspect of the right knee with some purulent drainage noted.  There is some surrounding erythema noted.  It is warm to touch and tender to palpation.   Neurological:      Mental Status: He is alert.         ED Course        Procedures      No results found. However, due to the size of the patient record, not all encounters were searched. Please check Results Review for a complete set of results.    Medications - No data to display     Patient presents with what looks like is a worsening infection of the right knee.  I think it is most likely an infected bursa, he states that it started after he was kneeling on the floor shed and did notice a pimple there initially before the spread.  He is currently on Bactrim but I think were missing a lot of different coverage so Jessica add Keflex and have him continue on his Bactrim for now.  I did obtain a wound culture and will have patient follow-up with orthopedics if things or not improving in the next 3 to 4 days.    Assessments & Plan (with Medical Decision Making)  Skin infection of the right knee     I have reviewed the nursing notes.    I have reviewed the findings, diagnosis, plan and need for follow up with the patient.      New Prescriptions    CEPHALEXIN (KEFLEX) 500 MG CAPSULE    Take 1 capsule (500 mg) by mouth 4 times daily    SULFAMETHOXAZOLE-TRIMETHOPRIM (BACTRIM DS) 800-160 MG TABLET    Take 1 tablet by mouth 2 times daily Take for 10 days only       Final diagnoses:   Skin infection of right knee       10/2/2021   [facility] EMERGENCY DEPT     Tod Tapia MD  10/02/21 [id number]

## 2021-10-03 NOTE — RESULT ENCOUNTER NOTE
[facility] Emergency Dept discharge antibiotic prescribed: [1] Cephalexin (Keflex) 500 mg capsule, 1 capsule (500 mg) by mouth 4 times daily for 10 days AND [2] Sulfamethoxazole-Trimethoprim (Bactrim DS, Septra DS) 800-160 mg PO tablet,  1 tablet by mouth 2 times daily for 10 days.  Incision and Drainage performed in [facility] Emergency Dept [Yes or No]: No  Recommendations in treatment per [facility] ED Lab Result culture protocol

## 2021-10-04 DIAGNOSIS — Z71.89 OTHER SPECIFIED COUNSELING: ICD-10-CM

## 2021-10-05 ENCOUNTER — PATIENT OUTREACH (OUTPATIENT)
Dept: CARE COORDINATION | Facility: CLINIC | Age: 56
End: 2021-10-05

## 2021-10-05 ENCOUNTER — OFFICE VISIT (OUTPATIENT)
Dept: ORTHOPEDICS | Facility: CLINIC | Age: 56
End: 2021-10-05
Payer: COMMERCIAL

## 2021-10-05 VITALS
DIASTOLIC BLOOD PRESSURE: 88 MMHG | WEIGHT: 217 LBS | SYSTOLIC BLOOD PRESSURE: 138 MMHG | BODY MASS INDEX: 28.76 KG/M2 | HEIGHT: 73 IN

## 2021-10-05 DIAGNOSIS — M54.2 CERVICALGIA: ICD-10-CM

## 2021-10-05 DIAGNOSIS — M54.16 LUMBAR RADICULOPATHY: ICD-10-CM

## 2021-10-05 DIAGNOSIS — G62.9 NEUROPATHY: ICD-10-CM

## 2021-10-05 DIAGNOSIS — M70.41 PREPATELLAR BURSITIS OF RIGHT KNEE: Primary | ICD-10-CM

## 2021-10-05 DIAGNOSIS — M79.18 MYOFASCIAL PAIN: ICD-10-CM

## 2021-10-05 DIAGNOSIS — Z98.1 S/P CERVICAL SPINAL FUSION: ICD-10-CM

## 2021-10-05 DIAGNOSIS — G89.4 CHRONIC PAIN SYNDROME: ICD-10-CM

## 2021-10-05 LAB
BACTERIA ABSC ANAEROBE+AEROBE CULT: ABNORMAL
GRAM STAIN RESULT: ABNORMAL
GRAM STAIN RESULT: ABNORMAL

## 2021-10-05 PROCEDURE — 10140 I&D HMTMA SEROMA/FLUID COLLJ: CPT | Mod: RT | Performed by: ORTHOPAEDIC SURGERY

## 2021-10-05 PROCEDURE — 99213 OFFICE O/P EST LOW 20 MIN: CPT | Mod: 25 | Performed by: ORTHOPAEDIC SURGERY

## 2021-10-05 RX ORDER — CYCLOBENZAPRINE HCL 5 MG
TABLET ORAL
Qty: 45 TABLET | Refills: 2 | Status: SHIPPED | OUTPATIENT
Start: 2021-10-05 | End: 2021-11-29

## 2021-10-05 ASSESSMENT — MIFFLIN-ST. JEOR: SCORE: 1873.19

## 2021-10-05 NOTE — TELEPHONE ENCOUNTER
Received fax from pharmacy requesting refill(s) for cyclobenzaprine (FLEXERIL) 5 MG tablet      Last refilled on 9/8/21    Pt last seen on 8/18/21  Next appt scheduled for 10/18/21    E-prescribe to:    [id number] - [place], [place] - [address] [place] [address]     Will facilitate refill.

## 2021-10-05 NOTE — PROGRESS NOTES
Clinic Care Coordination Contact    Background: Care Coordination referral placed from [facility] discharge report for reason of patient meeting criteria for a TCM outreach call by [facility] [place] team.    Assessment: Upon chart review, [facility] Team member will cancel/close the referral for TCM outreach due to reason below:     Patient has a follow up appointment with an appropriate provider today for hospital discharge.       Plan: Care Coordination referral for TCM outreach canceled.    ARISTIDES Hoover  [phone]  [facility]

## 2021-10-05 NOTE — PROGRESS NOTES
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        Infected wound on the right knee      History of Present Illness:     Patient reports a wound on the anterior right knee after kneeling on the right knee 2 weeks ago. The would has increased in size over the last 2 weeks. The wound is unhealed, red, swollen, and draining puss. He has been taking 2 different antibiotic for 5 days. He was seen in the ED in 10/2/21 and had cultures taken which was positive for MRSA. Patient has mild pain with knee flexion. Patient had bilateral knee steroid injections with Dr Xie on 9/7/21 in relation to chronic knee pain.  Location of Pain: right anterior knee  Worsened by: pressure to the knee  Better with: rest   Treatments tried: rest/activity avoidance and antibiotics  Associated symptoms: swelling, warmth, redness and Drainage.    Physical Exam:  Physical examination of the right knee shows minimal erythema around the area that is purulent.  There is an area of purulence which is approximately 3 cm in diameter.  It is raised.  That area is erythematous.  It is tender to palpation.  This is just superior and adjacent to the patella.  Distal motor and sensory examinations grossly intact.  Impression: Right knee prepatellar bursitis    Plan:  All of the above pertinent physical exam and imaging modalities findings was reviewed.  We performed an irrigation debridement today.  This was packed with half-inch iodoform gauze.  I have instructed them to replace this daily.  He has a sterile dressing on it.  I have advised him to continue with his antibiotics.  We will see him in the clinic on Friday.      [unfilled]    Incision and drainage    Date/Time: 10/5/2021 8:55 AM  Performed by: Truong Calero DO  Authorized by: Truong Calero DO     UNIVERSAL PROTOCOL   Site Marked: Yes  Prior Images Obtained and Reviewed:  No  Required items: Required blood products, implants, devices  and special equipment available    Patient identity confirmed:  Verbally with patient  Patient was reevaluated immediately before administering moderate or deep sedation or anesthesia  Confirmation Checklist:  Patient's identity using two indicators, correct equipment/implants were available, relevant allergies and procedure was appropriate and matched the consent or emergent situation  Time out: Immediately prior to the procedure a time out was called    Universal Protocol: the Joint Commission Universal Protocol was followed    Preparation: Patient was prepped and draped in usual sterile fashion    ESBL (mL):  0        ANESTHESIA  Local infiltration  Local anesthesia used: yes  Local Anesthetic: lidocaine 1% with epinephrine  Anesthetic total: 5 mL    SEDATION    Patient Sedated: No      Type: abscess  Body area: lower extremity  Location details: right leg  Scalpel size: 11  Needle gauge: 22  Incision type: single straight  Incision depth: subcutaneous  Complexity: simple  Drainage: purulent  Drainage amount: moderate  Wound treatment: wound left open  Packing material: 1/2 in iodoform gauze    PROCEDURE   Patient Tolerance:  Patient tolerated the procedure well with no immediate complications    Length of time physician/provider present for 1:1 monitoring during sedation: 0            BP Readings from Last 1 Encounters:   10/02/21 (!) 142/99

## 2021-10-05 NOTE — LETTER
10/5/2021         RE: Indra Mehta  [id number] [facility] [address]        Dear Colleague,    Thank you for referring your patient, Indra Mehta, to the [facility]. Please see a copy of my visit note below.        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        Infected wound on the right knee      History of Present Illness:     Patient reports a wound on the anterior right knee after kneeling on the right knee 2 weeks ago. The would has increased in size over the last 2 weeks. The wound is unhealed, red, swollen, and draining puss. He has been taking 2 different antibiotic for 5 days. He was seen in the ED in 10/2/21 and had cultures taken which was positive for MRSA. Patient has mild pain with knee flexion. Patient had bilateral knee steroid injections with Dr Xie on 9/7/21 in relation to chronic knee pain.  Location of Pain: right anterior knee  Worsened by: pressure to the knee  Better with: rest   Treatments tried: rest/activity avoidance and antibiotics  Associated symptoms: swelling, warmth, redness and Drainage.    Physical Exam:  Physical examination of the right knee shows minimal erythema around the area that is purulent.  There is an area of purulence which is approximately 3 cm in diameter.  It is raised.  That area is erythematous.  It is tender to palpation.  This is just superior and adjacent to the patella.  Distal motor and sensory examinations grossly intact.  Impression: Right knee prepatellar bursitis    Plan:  All of the above pertinent physical exam and imaging modalities findings was reviewed.  We performed an irrigation debridement today.  This was packed with half-inch iodoform gauze.  I have instructed them to replace this daily.  He has a sterile dressing on it.  I have advised him to continue with his antibiotics.  We will see him in the clinic on Friday.      [unfilled]    Incision and  drainage    Date/Time: 10/5/2021 8:55 AM  Performed by: Truong Calero DO  Authorized by: Truong Calero DO     UNIVERSAL PROTOCOL   Site Marked: Yes  Prior Images Obtained and Reviewed:  No  Required items: Required blood products, implants, devices and special equipment available    Patient identity confirmed:  Verbally with patient  Patient was reevaluated immediately before administering moderate or deep sedation or anesthesia  Confirmation Checklist:  Patient's identity using two indicators, correct equipment/implants were available, relevant allergies and procedure was appropriate and matched the consent or emergent situation  Time out: Immediately prior to the procedure a time out was called    Universal Protocol: the Joint Commission Universal Protocol was followed    Preparation: Patient was prepped and draped in usual sterile fashion    ESBL (mL):  0        ANESTHESIA  Local infiltration  Local anesthesia used: yes  Local Anesthetic: lidocaine 1% with epinephrine  Anesthetic total: 5 mL    SEDATION    Patient Sedated: No      Type: abscess  Body area: lower extremity  Location details: right leg  Scalpel size: 11  Needle gauge: 22  Incision type: single straight  Incision depth: subcutaneous  Complexity: simple  Drainage: purulent  Drainage amount: moderate  Wound treatment: wound left open  Packing material: 1/2 in iodoform gauze    PROCEDURE   Patient Tolerance:  Patient tolerated the procedure well with no immediate complications    Length of time physician/provider present for 1:1 monitoring during sedation: 0            BP Readings from Last 1 Encounters:   10/02/21 (!) 142/99                 Again, thank you for allowing me to participate in the care of your patient.        Sincerely,        Truong Calero DO

## 2021-10-08 ENCOUNTER — OFFICE VISIT (OUTPATIENT)
Dept: ORTHOPEDICS | Facility: CLINIC | Age: 56
End: 2021-10-08
Payer: COMMERCIAL

## 2021-10-08 VITALS
BODY MASS INDEX: 28.76 KG/M2 | WEIGHT: 217 LBS | DIASTOLIC BLOOD PRESSURE: 88 MMHG | SYSTOLIC BLOOD PRESSURE: 132 MMHG | HEIGHT: 73 IN

## 2021-10-08 DIAGNOSIS — M70.41 PREPATELLAR BURSITIS OF RIGHT KNEE: Primary | ICD-10-CM

## 2021-10-08 PROCEDURE — 99213 OFFICE O/P EST LOW 20 MIN: CPT | Performed by: ORTHOPAEDIC SURGERY

## 2021-10-08 ASSESSMENT — MIFFLIN-ST. JEOR: SCORE: 1873.19

## 2021-10-08 NOTE — LETTER
10/8/2021         RE: Indra Mehta  [address] [facility] [address]        Dear Colleague,    Thank you for referring your patient, Indra Mehta, to the [facility]. Please see a copy of my visit note below.    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      Chief Complaints and History of Present Illnesses   Patient presents with     Right Knee - Follow Up       History of Present Illness:     Patient is seen in follow up for a wound infection on the anterior right knee that was incised, drained and packed in clinic on 10/5/21. Patient has been replacing packing material everyday with Iodoform Gauze. He reports minimal pain at rest and no increased signs of infection. He continues to take antibiotics daily Keflex and Bactrim.  Location of Pain: right anterior knee  Worsened by: flexion  Better with: rest, wound care.  Treatments tried: rest/activity avoidance and wound packing  Associated symptoms: swelling and redness    Physical Exam:  Physical examination of the right knee after removing the packing shows a nice clean wound.  There is minimal purulence noted.  It looks like a very good bed for granulation tissue.  There is no erythema surrounding the wound.  He has good range of motion.        Impression: Right prepatellar bursitis    Plan:  All of the above pertinent physical exam and imaging modalities findings was reviewed.  We will continue with the packing daily.  I gave him some more sponges and some tape.  We will see him back in 2 weeks.            BP Readings from Last 1 Encounters:   10/05/21 138/88                 Again, thank you for allowing me to participate in the care of your patient.        Sincerely,        Truong Calero DO

## 2021-10-08 NOTE — PROGRESS NOTES
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      Chief Complaints and History of Present Illnesses   Patient presents with     Right Knee - Follow Up       History of Present Illness:     Patient is seen in follow up for a wound infection on the anterior right knee that was incised, drained and packed in clinic on 10/5/21. Patient has been replacing packing material everyday with Iodoform Gauze. He reports minimal pain at rest and no increased signs of infection. He continues to take antibiotics daily Keflex and Bactrim.  Location of Pain: right anterior knee  Worsened by: flexion  Better with: rest, wound care.  Treatments tried: rest/activity avoidance and wound packing  Associated symptoms: swelling and redness    Physical Exam:  Physical examination of the right knee after removing the packing shows a nice clean wound.  There is minimal purulence noted.  It looks like a very good bed for granulation tissue.  There is no erythema surrounding the wound.  He has good range of motion.        Impression: Right prepatellar bursitis    Plan:  All of the above pertinent physical exam and imaging modalities findings was reviewed.  We will continue with the packing daily.  I gave him some more sponges and some tape.  We will see him back in 2 weeks.            BP Readings from Last 1 Encounters:   10/05/21 138/88

## 2021-10-10 RX ORDER — METHYLPREDNISOLONE ACETATE 40 MG/ML
40 INJECTION, SUSPENSION INTRA-ARTICULAR; INTRALESIONAL; INTRAMUSCULAR; SOFT TISSUE
Status: SHIPPED | OUTPATIENT
Start: 2021-09-30

## 2021-10-13 DIAGNOSIS — M70.41 PREPATELLAR BURSITIS OF RIGHT KNEE: Primary | ICD-10-CM

## 2021-10-13 RX ORDER — SULFAMETHOXAZOLE/TRIMETHOPRIM 800-160 MG
1 TABLET ORAL 2 TIMES DAILY
Qty: 14 TABLET | Refills: 1 | Status: SHIPPED | OUTPATIENT
Start: 2021-10-13 | End: 2021-11-18

## 2021-10-14 NOTE — PROGRESS NOTES
CHIEF COMPLAINT:  Pain  -neck and low back pain    DATE OF VISIT: 10/18/21    Assessment:  Indra Mehta is a 56 year old male who presents today for follow up regarding his:       1. Cervicalgia  2. S/p cervical spine fusion  3. Myofascial pain  4. Lumbar radiculopathy  5. Neuropathy   6. Chronic pain syndrome  7. Chronic use of opioids    Pain is overall stable. He states that he got MRSA in his knee. It is improving. Discussed giving the ablation 6-8 weeks to reach it's full benefit.     We will continue current medications.         Plan:     Diagnosis reviewed, treatment option addressed, and risk/benifits discussed.  Self-care instructions given.  I am recommending a multidisciplinary treatment plan to help this patient better manage pain.       1. Physical Therapy:   Not at this time  2. Clinical Health Psychologist:  NO  3. Diagnostic Studies: none  4. Medication Management:    1. Continue Lyrica 150 mg TID  2. Continue nortriptyline to 50 mg at bedtime.  3. Continue Oxycodone 5m-2 tablets every 4-6 hours as needed. Max of 6 tabs in a day.   4. Flexeril 5mg: Take 1-2 tabs 3 times daily as needed  5. Further procedures recommended: none at this time, Insurance denied appeal for SCS.  6. Recommendations to PCP.  See above    Follow up with this provider: 8 weeks-I did discuss with the patient that I will be leaving the pain clinic at the end of December. Will plan to have him follow up with one of my colleagues after I leave.       INTERVAL HISTORY:  Last seen on 21       Recommendations/plan at the last visit included:    1. Physical Therapy:   Not at this time  2. Clinical Health Psychologist:  NO  3. Diagnostic Studies: none  4. Medication Management:    1. Continue Lyrica 150 mg TID  2. Continue nortriptyline to 50 mg at bedtime.  3. Continue Oxycodone 5m-2 tablets every 4-6 hours as needed. Max of 6 tabs in a day.   4. Consider Butrans.  Would need to taper Oxycodone to a max of 4  tabs/day for 1 week before starting. Could also consider OxyContin  5. Flexeril 5mg: Take 1-2 tabs 3 times daily as needed  5. Further procedures recommended: Lumbar medial branch blocks #2 Insurance denied appeal for SCS.  6. Recommendations to PCP.  See above     Follow up with this provider: 8 weeks     Since his last visit, Indra PEREZ Janine reports:    He states that he had the ablation on 10/1/21. He states that he can sit longer since the ablation but states that standing is worse for him. 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 He was out of his Lyrica from this weekend as well. He states that this has upset his neuropathy.    3 weeks ago, he was changing the oil in his mower. He knelt on the floo. By that evening he had a 'pimple'. He scraped and cleaned it. The next day he had a large pustule. He kept cleaning it. 3 days later he saw ortho for an injection for his shoulder. He was put on antibiotics. By the weekend his knee was very painful and looked bad. He called triage. He went to ER. Was put on another antibiotic. He saw Dr. Calero early next week and it still wasn't looking better. He then had an incision and had it flushed. He is now stuffing the incision.    Pain Information:                   Pain today 6/10       UDS: 2021  Controlled Substance Agreement signed: 21     CURRENT RELEVANT PAIN MEDICATIONS:  Tylenol 500mg-1000mg 4x/day  Xanax-does not use daily  Nortriptyline 50mg at bedtime  Oxycodone 5m-2 tablets every 4-6 hours, max of 6/day  Lyrica 150 mg TID     Patient is using the medication as prescribed:  YES  Is your medication helpful?     YES   Medication side effects? no side effect     Previous Medications: (H--helped; HI--Helped initially; SWH-- somewhat helpful, NH--No help; W--worse; SE--side effects)   Norco/vicodin H  Oxycodone H  Flexeril - was helpful at night but caused him to be groggy in the morning  Robaxin - not helpful  Tizanidine  H  Gabapentin ?  Lyrica SE sedation  Cymbalta NH  Nortriptyline H     Past Pain Treatments:  Injections:    - 11/8/18 bilateral L3-4 TFESI - (Dr Mcdaniel)  - 8/23/18 bilateral L3-4 TFESI - seems to have worsened pain (Dr Mcdaniel)  - 6/11/18 TFESI right C5-6 - helped with arm pain and numbness  - 12/28/17 bilateral L3-4 TFESI   - 10/23/17 Bilateral L4-5 TFESI   - 11/08/2018 Bilateral L3-4 TFESI   -6/14/2019 Bilateral L3-4 TFESI   -8/22/2019 C6-7 SRIDEVI   -5/22/2020 L3-4 bilateral TLESI  -5/13/21 L3-4 bilateral TLESI  -8/12/21 bilateral L3-5 LMBB  Surgery:  ACDF C6-7 on 7/5/17 with improvement; ACDF C5-6 12/19/2018, posterior C5-7 fusion. Lateral mass screws, right C5-6 medial facetectomy, right C6 foraminotomy on 10/29/2019  TENS unit: [facility] Board of Pharmacy Data Base Reviewed:    YES; As expected, no concern for misuse/abuse of controlled medications based on this report. Checked 10/18/21     THE 4 As OF OPIOID MAINTENANCE ANALGESIA    Analgesia: Is pain relief clinically significant? YES   Activity: Is patient functional and able to perform Activities of Daily Living? YES   Adverse effects: Is patient free from adverse side effects from opiates? YES   Adherence to Rx protocol: Is patient adhering to Controlled Substance Agreement and taking medications ONLY as ordered? YES         Is Narcan prescribed for opiate use >50 MME daily? N/A        Daily MME: 37.5-45     Medications:  Current Outpatient Prescriptions          Current Outpatient Medications   Medication Sig Dispense Refill     Acetaminophen (TYLENOL PO) Take 500 mg by mouth every 4 hours as needed for mild pain or fever         ALPRAZolam (XANAX XR) 0.5 MG 24 hr tablet Take 1 tablet (0.5 mg) by mouth At Bedtime 30 tablet 3     gabapentin (NEURONTIN) 400 MG capsule Take 3 capsules (1,200 mg) by mouth 3 times daily 270 capsule 3     losartan (COZAAR) 50 MG tablet Take 1 tablet (50 mg) by mouth daily 90 tablet 0     nortriptyline (PAMELOR) 25 MG  "capsule Take 1 capsule (25 mg) by mouth At Bedtime 30 capsule 0     order for DME Equipment being ordered: TENS Pads as directed 1 Device 0     oxyCODONE (ROXICODONE) 5 MG tablet Take 1 tab every 4-6 hours as needed for severe pain. MAX 4 TABS PER DAY. Okay to fill 3/12/20 start 3/18/20. 100 tablet 0     tiZANidine (ZANAFLEX) 4 MG tablet Take 1 tablet (4 mg) by mouth At Bedtime 90 tablet 0          Physical Exam:  /80   Temp 98.1  F (36.7  C) (Temporal)   Ht 1.854 m (6' 1\")   Wt 98.4 kg (217 lb)   BMI 28.63 kg/m      Constitutional: alert and oriented. In no acute distress.   Psych: mood and affect appropriate.    Imaging:  No new imaging to review        The total TIME spent on this patient on the day of the appointment was 18 minutes.    Time spent preparing to see the patient (reviewing records and tests) 1 minutes  Time spend face to face with the patient 15 minutes  Time spent ordering tests, medications, procedures and referrals 0 minutes  Time spent Referring and communicating with other healthcare professionals 0 minutes  Time spent documenting clinical information in Epic 2 minutes        Chiara Garcia PA-C   [facility]  "

## 2021-10-18 ENCOUNTER — OFFICE VISIT (OUTPATIENT)
Dept: PALLIATIVE MEDICINE | Facility: CLINIC | Age: 56
End: 2021-10-18
Payer: COMMERCIAL

## 2021-10-18 VITALS
DIASTOLIC BLOOD PRESSURE: 80 MMHG | BODY MASS INDEX: 28.76 KG/M2 | SYSTOLIC BLOOD PRESSURE: 128 MMHG | HEIGHT: 73 IN | TEMPERATURE: 98.1 F | WEIGHT: 217 LBS

## 2021-10-18 DIAGNOSIS — M79.18 MYOFASCIAL PAIN: ICD-10-CM

## 2021-10-18 DIAGNOSIS — Z98.1 S/P CERVICAL SPINAL FUSION: ICD-10-CM

## 2021-10-18 DIAGNOSIS — M54.16 LUMBAR RADICULOPATHY: ICD-10-CM

## 2021-10-18 DIAGNOSIS — M54.2 CERVICALGIA: ICD-10-CM

## 2021-10-18 DIAGNOSIS — G62.9 NEUROPATHY: ICD-10-CM

## 2021-10-18 DIAGNOSIS — F11.90 CHRONIC, CONTINUOUS USE OF OPIOIDS: ICD-10-CM

## 2021-10-18 DIAGNOSIS — G89.4 CHRONIC PAIN SYNDROME: ICD-10-CM

## 2021-10-18 DIAGNOSIS — I73.01 RAYNAUD'S DISEASE WITH GANGRENE (H): ICD-10-CM

## 2021-10-18 PROCEDURE — 99213 OFFICE O/P EST LOW 20 MIN: CPT | Performed by: PHYSICIAN ASSISTANT

## 2021-10-18 RX ORDER — PREGABALIN 150 MG/1
150 CAPSULE ORAL 3 TIMES DAILY
Qty: 90 CAPSULE | Refills: 3 | Status: SHIPPED | OUTPATIENT
Start: 2021-10-18 | End: 2022-03-21

## 2021-10-18 RX ORDER — OXYCODONE HYDROCHLORIDE 5 MG/1
TABLET ORAL
Qty: 165 TABLET | Refills: 0 | Status: SHIPPED | OUTPATIENT
Start: 2021-10-18 | End: 2021-11-17

## 2021-10-18 ASSESSMENT — PAIN SCALES - GENERAL: PAINLEVEL: SEVERE PAIN (6)

## 2021-10-18 ASSESSMENT — MIFFLIN-ST. JEOR: SCORE: 1873.19

## 2021-10-18 NOTE — PATIENT INSTRUCTIONS
After Visit Instructions:     Thank you for coming to [facility] for your care. It is my goal to partner with you to help you reach your optimal state of health.     I am recommending multidisciplinary care at this time.  The focus of care will be to continue gradual rehabilitation and pain management with medication adjustments as needed.    Continue daily self-care, identifying contributing factors, and monitoring variations in pain level. Continue to integrate self-care into your life.          Schedule follow-up with Chiara Garcia PA-C in 8 weeks. You will need to make this appointment.     Medication recommendations:   1. Continue Lyrica 150 mg three times daily  2. Continue nortriptyline to 50 mg at bedtime.  3. Continue Oxycodone 5m-2 tablets every 4-6 hours as needed. Max of 6 tabs in a day.   4. Flexeril 5mg: Take 1-2 tabs 3 times daily as needed      Chiara Garcia PA-C  [facility]    Contact information: [facility]  Clinic Number:  [phone]     Call with any questions about your care and for scheduling assistance.     Calls are returned Monday through Friday between 8 AM and 4:30 PM. We usually get back to you within 2 business days depending on the issue/request.    If we are prescribing your medications:    For opioid medication refills, call the clinic or send a [employer] message 7 days in advance.  Please include:    Name of requested medication    Name of the pharmacy.    For non-opioid medications, call your pharmacy directly to request a refill. Please allow 3-4 days to be processed.     Per [place] State Law:    All controlled substance prescriptions must be filled within 30 days of being written.      For those controlled substances allowing refills, pickup must occur within 30 days of last fill.      We believe regular attendance is key to your success in our program!       Any time you are unable to keep your appointment we ask that you call us at least 24 hours in advance to cancel.This will allow us to offer the appointment time to another patient.   Multiple missed appointments may lead to dismissal from the clinic.

## 2021-10-20 RX ORDER — AMLODIPINE BESYLATE 5 MG/1
TABLET ORAL
Qty: 90 TABLET | Refills: 1 | Status: SHIPPED | OUTPATIENT
Start: 2021-10-20 | End: 2022-01-19

## 2021-10-22 ENCOUNTER — OFFICE VISIT (OUTPATIENT)
Dept: ORTHOPEDICS | Facility: CLINIC | Age: 56
End: 2021-10-22
Payer: COMMERCIAL

## 2021-10-22 VITALS
BODY MASS INDEX: 28.76 KG/M2 | WEIGHT: 217 LBS | DIASTOLIC BLOOD PRESSURE: 88 MMHG | HEIGHT: 73 IN | SYSTOLIC BLOOD PRESSURE: 134 MMHG

## 2021-10-22 DIAGNOSIS — M70.41 PREPATELLAR BURSITIS OF RIGHT KNEE: Primary | ICD-10-CM

## 2021-10-22 PROCEDURE — 99213 OFFICE O/P EST LOW 20 MIN: CPT | Performed by: ORTHOPAEDIC SURGERY

## 2021-10-22 ASSESSMENT — PAIN SCALES - GENERAL: PAINLEVEL: NO PAIN (0)

## 2021-10-22 ASSESSMENT — MIFFLIN-ST. JEOR: SCORE: 1873.19

## 2021-10-22 NOTE — PROGRESS NOTES
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        Right knee wound follow-up            History of Present Illness:     Onset: 5 week(s) ago. Anterior right knee after kneeling. He had the wound packed and drained on 10/5/2021. Denies pain at this time at rest.  Location of Pain: anterior right knee  Worsened by: bumping his knee or kneeling on his knee.  Better with: bandage changes. Takes ibuprofen for body aches regularly, rest  Treatments tried: ibuprofen  Associated symptoms: some clear blood-tinged drainage. Denies swelling and drainage.    Physical Exam:  Physical examination of the right knee shows that the area is about half of what it was.  No erythema or purulence is noted.  Distal motor and sensory examinations grossly intact.          Impression: Right knee prepatellar bursitis    Plan:  All of the above pertinent physical exam and imaging modalities findings was reviewed.  I have advised him to keep packing in until it closes then.  He just finished his antibiotics yesterday.  We will keep him off of antibiotics unless there is a reason to administer some.  He will give me a call if he has any constitutional symptoms or increased erythema, pain, or swelling.            BP Readings from Last 1 Encounters:   10/18/21 128/80

## 2021-10-22 NOTE — LETTER
10/22/2021         RE: Indra Mehta  [id number] [place] [facility] [id number]        Dear Colleague,    Thank you for referring your patient, nIdra Mehta, to the [facility]. Please see a copy of my visit note below.    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        Right knee wound follow-up            History of Present Illness:     Onset: 5 week(s) ago. Anterior right knee after kneeling. He had the wound packed and drained on 10/5/2021. Denies pain at this time at rest.  Location of Pain: anterior right knee  Worsened by: bumping his knee or kneeling on his knee.  Better with: bandage changes. Takes ibuprofen for body aches regularly, rest  Treatments tried: ibuprofen  Associated symptoms: some clear blood-tinged drainage. Denies swelling and drainage.    Physical Exam:  Physical examination of the right knee shows that the area is about half of what it was.  No erythema or purulence is noted.  Distal motor and sensory examinations grossly intact.          Impression: Right knee prepatellar bursitis    Plan:  All of the above pertinent physical exam and imaging modalities findings was reviewed.  I have advised him to keep packing in until it closes then.  He just finished his antibiotics yesterday.  We will keep him off of antibiotics unless there is a reason to administer some.  He will give me a call if he has any constitutional symptoms or increased erythema, pain, or swelling.            BP Readings from Last 1 Encounters:   10/18/21 128/80               Again, thank you for allowing me to participate in the care of your patient.        Sincerely,        Truong Calero DO

## 2021-10-23 ENCOUNTER — HEALTH MAINTENANCE LETTER (OUTPATIENT)
Age: 56
End: 2021-10-23

## 2021-11-11 NOTE — PROGRESS NOTES
CHIEF COMPLAINT:  Pain  -neck and low back pain    DATE OF VISIT: 21    Assessment:  Indra Mehta is a 56 year old male who presents today for follow up regarding his:       1. Cervicalgia  2. S/p cervical spine fusion  3. Myofascial pain  4. Lumbar radiculopathy  5. Neuropathy   6. Chronic pain syndrome  7. Chronic use of opioids    Continues to have multiple areas of pain. He is working with orthopedics on his joint issues. Recommend that he try some stretching for his lower back pain. It appears to be SI joint. He has had mutliple steroid injections this year for his joint pain and one low back injection. He is hoping for more joint injections for , therefore I would be hesitant to do further low back steroid injections.     His neuropathy is worsening and insurance has denied a SCS. Will increase his bedtime lyrica dose.         Plan:     Diagnosis reviewed, treatment option addressed, and risk/benifits discussed.  Self-care instructions given.  I am recommending a multidisciplinary treatment plan to help this patient better manage pain.       1. Physical Therapy:   Not at this time  2. Clinical Health Psychologist:  NO  3. Diagnostic Studies: none  4. Medication Management:    1. Increase Lyrica, 150mg in AM and afternoon, 200mg at bedtime  2. Continue nortriptyline to 50 mg at bedtime.  3. Continue Oxycodone 5m-2 tablets every 4-6 hours as needed. Max of 6 tabs in a day.   4. Flexeril 5mg: Take 1-2 tabs 3 times daily as needed  5. Further procedures recommended: none at this time, Insurance denied appeal for SCS.  6. Recommendations to PCP.  See above    Follow up with this provider: 4 weeks-He was recommended to schedule a follow up with Dr. Mallory in 8-12 weeks.      INTERVAL HISTORY:  Last seen on 10/18/21       Recommendations/plan at the last visit included:    1. Physical Therapy:   Not at this time  2. Clinical Health Psychologist:  NO  3. Diagnostic Studies:  none  4. Medication Management:    1. Continue Lyrica 150 mg TID  2. Continue nortriptyline to 50 mg at bedtime.  3. Continue Oxycodone 5m-2 tablets every 4-6 hours as needed. Max of 6 tabs in a day.   4. Flexeril 5mg: Take 1-2 tabs 3 times daily as needed  5. Further procedures recommended: none at this time, Insurance denied appeal for SCS.  6. Recommendations to PCP.  See above     Follow up with this provider: 8 weeks-I did discuss with the patient that I will be leaving the pain clinic at the end of December. Will plan to have him follow up with one of my colleagues after I leave.         Since his last visit, Indra CHRIS Mehta reports:    His knee is doing well with regards to the infection. He states that he is seeing orthopedics tomorrow for his knee pain.     He states that he thinks his back is better. He states that about 2 weeks ago, his daughter's dog rolled on something dead. He cleaned the dog and then walked the woods to try to find what he rolled on. He states that he was walking around the woods. He states that he stepped on a log and his right leg slipped. He came down on the right leg with it straight. He states that he just stopped. He states that he now has pain that is really low and is going down both legs. It is going across both sides. He has had some pain in his neck as well. The pain is going down his shoulder blade and going down his right arm. He states that he has also had some new pain in the front. He states that doing some things with leaning still hurts, but sitting for a longer time is better.     He continues to have issues with his feet and the neuropathy. He states that he called his health insurance again about the SCS. They are still denying. The Lyrica does help as if he misses a dose he has severe pain.     Pain Information:                   Pain today 7/10       UDS: 2021  Controlled Substance Agreement signed: 21     CURRENT RELEVANT PAIN  MEDICATIONS:  Tylenol 500mg-1000mg 4x/day  Xanax-does not use daily  Nortriptyline 50mg at bedtime  Oxycodone 5m-2 tablets every 4-6 hours, max of 6/day  Lyrica 150 mg TID     Patient is using the medication as prescribed:  YES  Is your medication helpful?     YES   Medication side effects? no side effect     Previous Medications: (H--helped; HI--Helped initially; SWH-- somewhat helpful, NH--No help; W--worse; SE--side effects)   Norco/vicodin H  Oxycodone H  Flexeril - was helpful at night but caused him to be groggy in the morning  Robaxin - not helpful  Tizanidine H  Gabapentin ?  Lyrica SE sedation  Cymbalta NH  Nortriptyline H     Past Pain Treatments:  Injections:    - 18 bilateral L3-4 TFESI - (Dr Mcdaniel)  - 18 bilateral L3-4 TFESI - seems to have worsened pain (Dr Mcdaniel)  - 18 TFESI right C5-6 - helped with arm pain and numbness  - 17 bilateral L3-4 TFESI   - 10/23/17 Bilateral L4-5 TFESI   - 2018 Bilateral L3-4 TFESI   -2019 Bilateral L3-4 TFESI   -2019 C6-7 SRIDEVI   -2020 L3-4 bilateral TLESI  -21 L3-4 bilateral TLESI  -21 bilateral L3-5 LMBB  Surgery:  ACDF C6-7 on 17 with improvement; ACDF C5-6 2018, posterior C5-7 fusion. Lateral mass screws, right C5-6 medial facetectomy, right C6 foraminotomy on 10/29/2019  TENS unit: helpful     [place] Board of Pharmacy Data Base Reviewed:    YES; As expected, no concern for misuse/abuse of controlled medications based on this report. Checked 10/18/21     THE 4 As OF OPIOID MAINTENANCE ANALGESIA    Analgesia: Is pain relief clinically significant? YES   Activity: Is patient functional and able to perform Activities of Daily Living? YES   Adverse effects: Is patient free from adverse side effects from opiates? YES   Adherence to Rx protocol: Is patient adhering to Controlled Substance Agreement and taking medications ONLY as ordered? YES         Is Narcan prescribed for opiate use >50 MME daily?  "N/A        Daily MME: 37.5-45     Medications:  Current Outpatient Prescriptions          Current Outpatient Medications   Medication Sig Dispense Refill     Acetaminophen (TYLENOL PO) Take 500 mg by mouth every 4 hours as needed for mild pain or fever         ALPRAZolam (XANAX XR) 0.5 MG 24 hr tablet Take 1 tablet (0.5 mg) by mouth At Bedtime 30 tablet 3     gabapentin (NEURONTIN) 400 MG capsule Take 3 capsules (1,200 mg) by mouth 3 times daily 270 capsule 3     losartan (COZAAR) 50 MG tablet Take 1 tablet (50 mg) by mouth daily 90 tablet 0     nortriptyline (PAMELOR) 25 MG capsule Take 1 capsule (25 mg) by mouth At Bedtime 30 capsule 0     order for DME Equipment being ordered: TENS Pads as directed 1 Device 0     oxyCODONE (ROXICODONE) 5 MG tablet Take 1 tab every 4-6 hours as needed for severe pain. MAX 4 TABS PER DAY. Okay to fill 3/12/20 start 3/18/20. 100 tablet 0     tiZANidine (ZANAFLEX) 4 MG tablet Take 1 tablet (4 mg) by mouth At Bedtime 90 tablet 0          Physical Exam:  /78   Temp 98.4  F (36.9  C) (Temporal)   Ht 1.854 m (6' 1\")   Wt 98.4 kg (217 lb)   BMI 28.63 kg/m      Constitutional: alert and oriented. In no acute distress.   Psych: mood and affect appropriate.    Imaging:  No new imaging to review        The total TIME spent on this patient on the day of the appointment was 20 minutes.    Time spent preparing to see the patient (reviewing records and tests) 2 minutes  Time spend face to face with the patient 14 minutes  Time spent ordering tests, medications, procedures and referrals 0 minutes  Time spent Referring and communicating with other healthcare professionals 0 minutes  Time spent documenting clinical information in Epic 4 minutes        Chiara Garcia PA-C   [facility]  "

## 2021-11-17 ENCOUNTER — OFFICE VISIT (OUTPATIENT)
Dept: PALLIATIVE MEDICINE | Facility: CLINIC | Age: 56
End: 2021-11-17
Payer: COMMERCIAL

## 2021-11-17 VITALS
SYSTOLIC BLOOD PRESSURE: 120 MMHG | TEMPERATURE: 98.4 F | DIASTOLIC BLOOD PRESSURE: 78 MMHG | WEIGHT: 217 LBS | HEIGHT: 73 IN | BODY MASS INDEX: 28.76 KG/M2

## 2021-11-17 DIAGNOSIS — M54.2 CERVICALGIA: ICD-10-CM

## 2021-11-17 DIAGNOSIS — F11.90 CHRONIC, CONTINUOUS USE OF OPIOIDS: ICD-10-CM

## 2021-11-17 DIAGNOSIS — M54.16 LUMBAR RADICULOPATHY: ICD-10-CM

## 2021-11-17 DIAGNOSIS — Z98.1 S/P CERVICAL SPINAL FUSION: ICD-10-CM

## 2021-11-17 DIAGNOSIS — G62.9 NEUROPATHY: ICD-10-CM

## 2021-11-17 DIAGNOSIS — G89.4 CHRONIC PAIN SYNDROME: ICD-10-CM

## 2021-11-17 DIAGNOSIS — M79.18 MYOFASCIAL PAIN: ICD-10-CM

## 2021-11-17 PROCEDURE — 99214 OFFICE O/P EST MOD 30 MIN: CPT | Performed by: PHYSICIAN ASSISTANT

## 2021-11-17 RX ORDER — PREGABALIN 200 MG/1
CAPSULE ORAL
Qty: 30 CAPSULE | Refills: 2 | Status: SHIPPED | OUTPATIENT
Start: 2021-11-17 | End: 2022-01-11

## 2021-11-17 RX ORDER — OXYCODONE HYDROCHLORIDE 5 MG/1
TABLET ORAL
Qty: 165 TABLET | Refills: 0 | Status: SHIPPED | OUTPATIENT
Start: 2021-11-17 | End: 2021-12-17

## 2021-11-17 ASSESSMENT — MIFFLIN-ST. JEOR: SCORE: 1873.19

## 2021-11-17 ASSESSMENT — PAIN SCALES - GENERAL: PAINLEVEL: SEVERE PAIN (7)

## 2021-11-17 NOTE — PATIENT INSTRUCTIONS
After Visit Instructions:     Thank you for coming to [facility] [place] for your care. It is my goal to partner with you to help you reach your optimal state of health.     I am recommending multidisciplinary care at this time.  The focus of care will be to continue gradual rehabilitation and pain management with medication adjustments as needed.    Continue daily self-care, identifying contributing factors, and monitoring variations in pain level. Continue to integrate self-care into your life.          Schedule follow-up with Chiara Garcia PA-C in 4 weeks. You will need to make this appointment. Schedule follow up with Dr. Mallory in 8-12 weeks.     Medication recommendations:     Increase Lyrica to 150mg in AM and afternoon, 200mg at bedtime    No other changes      Chiara Garcia PA-C  [facility]    Contact information: [facility] [place]  Clinic Number:  [phone]     Call with any questions about your care and for scheduling assistance.     Calls are returned Monday through Friday between 8 AM and 4:30 PM. We usually get back to you within 2 business days depending on the issue/request.    If we are prescribing your medications:    For opioid medication refills, call the clinic or send a [employer] message 7 days in advance.  Please include:    Name of requested medication    Name of the pharmacy.    For non-opioid medications, call your pharmacy directly to request a refill. Please allow 3-4 days to be processed.     Per [place] State Law:    All controlled substance prescriptions must be filled within 30 days of being written.      For those controlled substances allowing refills, pickup must occur within 30 days of last fill.      We believe regular attendance is key to your success in our program!      Any time you are unable to keep your appointment we ask that you call us at least 24 hours in advance to  cancel.This will allow us to offer the appointment time to another patient.   Multiple missed appointments may lead to dismissal from the clinic.

## 2021-11-18 ENCOUNTER — OFFICE VISIT (OUTPATIENT)
Dept: ORTHOPEDICS | Facility: CLINIC | Age: 56
End: 2021-11-18
Payer: COMMERCIAL

## 2021-11-18 ENCOUNTER — ANCILLARY PROCEDURE (OUTPATIENT)
Dept: GENERAL RADIOLOGY | Facility: CLINIC | Age: 56
End: 2021-11-18
Attending: PHYSICAL MEDICINE & REHABILITATION
Payer: COMMERCIAL

## 2021-11-18 VITALS — DIASTOLIC BLOOD PRESSURE: 78 MMHG | BODY MASS INDEX: 28.63 KG/M2 | WEIGHT: 217 LBS | SYSTOLIC BLOOD PRESSURE: 120 MMHG

## 2021-11-18 DIAGNOSIS — M25.561 CHRONIC PAIN OF BOTH KNEES: Primary | ICD-10-CM

## 2021-11-18 DIAGNOSIS — G89.29 CHRONIC PAIN OF BOTH KNEES: Primary | ICD-10-CM

## 2021-11-18 DIAGNOSIS — G89.29 CHRONIC PAIN OF BOTH KNEES: ICD-10-CM

## 2021-11-18 DIAGNOSIS — M25.562 CHRONIC PAIN OF BOTH KNEES: ICD-10-CM

## 2021-11-18 DIAGNOSIS — M25.561 CHRONIC PAIN OF BOTH KNEES: ICD-10-CM

## 2021-11-18 DIAGNOSIS — M25.562 CHRONIC PAIN OF BOTH KNEES: Primary | ICD-10-CM

## 2021-11-18 PROCEDURE — 73562 X-RAY EXAM OF KNEE 3: CPT | Mod: TC | Performed by: RADIOLOGY

## 2021-11-18 PROCEDURE — 99214 OFFICE O/P EST MOD 30 MIN: CPT | Performed by: PHYSICAL MEDICINE & REHABILITATION

## 2021-11-18 ASSESSMENT — PAIN SCALES - GENERAL: PAINLEVEL: SEVERE PAIN (6)

## 2021-11-18 NOTE — LETTER
11/18/2021         RE: Indra Mehta  [address] [address] [facility] [id number]        Dear Colleague,    Thank you for referring your patient, Indra Mehta, to the [facility] [place]. Please see a copy of my visit note below.    Sports Medicine Clinic Visit       PCP: Tha Grullon    Indra Mehta is a 56 year old male who is seen in follow up for bilateral knee pain. Since last visit on 9/7/2021, Ketan has noted increased knee bilateral pain.  He notes that his lateral left knee pain is new since last being seen. Pain is posterior and inferior to the patella in his bilateral knees and over the lateral edge of his left patella. He rates the pain at a  2/10 currently and a 6/10 when going up the stairs and getting out of his vehicle/chair. Pain increases when he tries to move after prolonged sitting. Symptoms of a stabbing pain in his left knee are worsened by a quick stop.  Has had past corticosteroid injection, HEP from physical therapy, ice and stretching with minimal relief.  Last steroid injections lasted about 6 weeks.    He works on a computer for a living.      History reviewed. No pertinent past surgical/medical/family/social history other than as mentioned in HPI.    Patient Active Problem List   Diagnosis     Meniere's disease     Major depression in complete remission (H)     Chronic pain syndrome     Pain medication agreement signed     Bilateral occipital neuralgia     Benign essential hypertension     Cervical radiculopathy     Status post lumbar spinal fusion     Sleep apnea, unspecified type     Osteoarthritis of spine with radiculopathy, lumbar region     Impingement syndrome of left shoulder     Past Medical History:   Diagnosis Date     Anxiety      Back pain      Depressive disorder      Hyperlipidemia      Hypertension      Meniere's disease, unspecified      Pain medication agreement signed 8/20/2010     Sleep apnea, unspecified type      Past  Surgical History:   Procedure Laterality Date     BUNIONECTOMY RT/LT  09/05/08    Both feet     COLONOSCOPY N/A 12/28/2016    Procedure: COMBINED COLONOSCOPY, SINGLE OR MULTIPLE BIOPSY/POLYPECTOMY BY BIOPSY;  Surgeon: Indra Jernigan MD;  [facility]     DISCECTOMY, FUSION CERVICAL ANTERIOR ONE LEVEL, COMBINED N/A 7/5/2017    Procedure: COMBINED DISCECTOMY, FUSION CERVICAL ANTERIOR ONE LEVEL;  Cervical 6-7, Anterior cervical discectomy fusion;  Surgeon: Mike Mckenna MD;  [facility]     DISCECTOMY, FUSION CERVICAL ANTERIOR ONE LEVEL, COMBINED N/A 12/19/2018    Procedure: cervical 5-6 anterior cervical discectomy fusion;  Surgeon: Mike Mckenna MD;  [facility]     HERNIORRHAPHY UMBILICAL N/A 8/11/2017    Procedure: HERNIORRHAPHY UMBILICAL;  open umbilical hernia repair;  Surgeon: Boni Story MD;  [facility]     INJECT BLOCK MEDIAL BRANCH CERVICAL/THORACIC/LUMBAR Bilateral 8/12/2021    Procedure: Left L3, Left L4, Left L5, Right L3, Right L4 and Right L5 medial branch nerve blocks using fluoroscopic guidance and contrast control;  Surgeon: Darryn Mcdaniel MD;  [facility]     INJECT BLOCK MEDIAL BRANCH CERVICAL/THORACIC/LUMBAR N/A 9/9/2021    Procedure: Left L3, Left L4, Left L5, Right L3, Right L4 and Right L5 medial branch nerve blocks using fluoroscopic guidance and contrast control;  Surgeon: Darryn Mcdaniel MD;  [facility]     INJECT EPIDURAL CERVICAL N/A 8/22/2019    Procedure: INJECTION, SPINE, CERVICAL 6-7 EPIDURAL;  Surgeon: Darryn Mcdaniel MD;  [facility]     INJECT EPIDURAL LUMBAR N/A 11/9/2017    Procedure: INJECT EPIDURAL LUMBAR;  lumbar 4-5 epidural steroid injection ;  Surgeon: Darryn Mcdaniel MD;  [facility]     INJECT EPIDURAL LUMBAR N/A 12/28/2017    Procedure: INJECT EPIDURAL LUMBAR;  lumbar epidural injection;  Surgeon: Darryn Mcdaniel MD;  [facility]     INJECT EPIDURAL LUMBAR Bilateral 5/13/2021    Procedure: Bilateral Lumbar 3-4  Epidural Steroid Injection;  Surgeon: Darryn Mcdaniel MD;  [facility]     INJECT EPIDURAL TRANSFORAMINAL Bilateral 2018    Procedure: INJECT EPIDURAL TRANSFORAMINAL;  transforaminal bilateral lumbar 3-4 steroid injection;  Surgeon: Darryn Mcdaniel MD;  [facility]     INJECT EPIDURAL TRANSFORAMINAL Bilateral 2018    Procedure: INJECT EPIDURAL TRANSFORAMINAL lumbar 3-4;  Surgeon: Darryn Mcdaniel MD;  [facility]     INJECT EPIDURAL TRANSFORAMINAL Bilateral 2019    Procedure: INJECTION, EPIDURAL, TRANSFORAMINAL LUMBAR 3-4 BILATERAL;  Surgeon: Darryn Mcdaniel MD;  [facility]     INJECT EPIDURAL TRANSFORAMINAL Bilateral 2020    Procedure: lumbar 3-4 bilateral transforaminal epidural steroid injection;  Surgeon: Darryn Mcdaniel MD;  [facility]     INJECT EPIDURAL TRANSFORAMINAL Bilateral 2020    Procedure: INJECTION, EPIDURAL, TRANSFORAMINAL  LUMBAR 3-4 APPROACH;  Surgeon: Darryn Mcdaniel MD;  [facility]     PE TUBES      left ear     RADIO FREQUENCY ABLATION PULSED CERVICAL Bilateral 10/1/2021    Procedure: RADIOFREQUENCY ABLATION lumbar 3, 4, 5 bilateral;  Surgeon: Darryn Mcdaniel MD;  [facility]     ZZHC COLONOSCOPY W/WO BRUSH/WASH  2005     ZZHC CREATE EARDRUM OPENING,GEN ANESTH  2007    Pe tube, Left endolymphatic sac enhancement.     ZZHC MASTOIDECTOMY,COMPLETE  2007     Family History   Problem Relation Age of Onset     Diabetes Mother      Cancer Mother         colon cancer -  at 52, diag at 42     Hypertension Father      Heart Disease Father          of AAA     No Known Problems Brother      No Known Problems Brother      Depression Sister      Suicide Other      Unknown/Adopted Maternal Grandmother      Unknown/Adopted Maternal Grandfather      Unknown/Adopted Paternal Grandmother      Unknown/Adopted Paternal Grandfather      No Known Problems Daughter      No Known Problems Daughter      Social History     Socioeconomic  History     Marital status: Single     Spouse name: Not on file     Number of children: 2     Years of education: Not on file     Highest education level: Not on file   Occupational History     Employer: [employer]   Tobacco Use     Smoking status: Never Smoker     Smokeless tobacco: Never Used   Vaping Use     Vaping Use: Never used   Substance and Sexual Activity     Alcohol use: Yes     Alcohol/week: 0.0 standard drinks     Comment: rarely     Drug use: No     Sexual activity: Not Currently     Partners: Female   Other Topics Concern     Parent/sibling w/ CABG, MI or angioplasty before 65F 55M? No   Social History Narrative     Not on file     Social Determinants of Health     Financial Resource Strain: Not on file   Food Insecurity: Not on file   Transportation Needs: Not on file   Physical Activity: Not on file   Stress: Not on file   Social Connections: Not on file   Intimate Partner Violence: Not on file   Housing Stability: Not on file         Current Outpatient Medications   Medication     Acetaminophen (TYLENOL PO)     ALPRAZolam (XANAX XR) 0.5 MG 24 hr tablet     amLODIPine (NORVASC) 5 MG tablet     atorvastatin (LIPITOR) 10 MG tablet     cyclobenzaprine (FLEXERIL) 5 MG tablet     ibuprofen (ADVIL/MOTRIN) 800 MG tablet     losartan-hydrochlorothiazide (HYZAAR) 100-25 MG tablet     naloxone (NARCAN) 4 MG/0.1ML nasal spray     nortriptyline (PAMELOR) 50 MG capsule     order for DME     oxyCODONE (ROXICODONE) 5 MG tablet     pregabalin (LYRICA) 150 MG capsule     Pregabalin (LYRICA) 200 MG capsule     Current Facility-Administered Medications   Medication     lidocaine 1 % injection 5 mL     methylPREDNISolone (DEPO-MEDROL) injection 40 mg     triamcinolone (KENALOG-40) injection 40 mg     triamcinolone (KENALOG-40) injection 40 mg     triamcinolone (KENALOG-40) injection 40 mg     triamcinolone (KENALOG-40) injection 40 mg     triamcinolone (KENALOG-40) injection 40 mg     Allergies   Allergen  Reactions     No Known Drug Allergies          Objective:  /78   Wt 98.4 kg (217 lb)   BMI 28.63 kg/m      General: Alert and in no distress    Head: Normocephalic, atraumatic  Eyes: no scleral icterus or conjunctival erythema   Skin: no erythema, petechiae, or jaundice  Resp: normal respiratory effort without conversational dyspnea   Psych: normal mood and affect    Gait: Non-antalgic, appropriate coordination and balance   Musculoskeletal:  -No tenderness to palpation over the bilateral knees  -Full seated knee extension and flexion without pain    Radiology:  X-rays ordered and independent visualization of images performed and reviewed with Ketan.  Recent Results (from the past 744 hour(s))   XR Knee Bilateral 3 Views    Narrative    KNEE BILATERAL THREE VIEW   11/18/2021 10:56 AM     HISTORY: Chronic pain of both knees.    COMPARISON: None.      Impression    IMPRESSION: Mild medial compartment joint space narrowing bilaterally.  Mild spurring at the quadriceps insertion site on superior patella and  right greater than left. No fracture or joint effusion.    ALTA RIVAS MD         SYSTEM ID:  [id number]         Assessment:  1. Chronic pain of both knees        Plan:  Discussed the assessment with the patient and developed a plan together:  -MRI bilateral knees ordered.  Please call [phone] to schedule.   -Patient's preferred over the counter medication as directed on packaging as needed for pain or soreness.  -Ice 15-20 minutes for pain relief or swelling as needed.  -Continue home exercises.    -Could also receive steroid injections after 12/7/2021    -Follow up after completion of MRI.  Please schedule at least 1-2 business days after MRI is completed to ensure we have the results of the MRI.      Yoanna Xie MD, [facility]  [facility]          Again, thank you for allowing me to participate in the care of your patient.        Sincerely,        Melissa KOHLER  MD Rojas

## 2021-11-18 NOTE — PATIENT INSTRUCTIONS
-MRI bilateral knees ordered.  Please call [phone] to schedule.   -Patient's preferred over the counter medication as directed on packaging as needed for pain or soreness.  -Ice 15-20 minutes for pain relief or swelling as needed.  -Continue home exercises.    -Also discussed steroid injection after 12/7/2021    -Follow up after completion of MRI.  Please schedule at least 1-2 business days after MRI is completed to ensure we have the results of the MRI.

## 2021-11-18 NOTE — PROGRESS NOTES
Sports Medicine Clinic Visit       PCP: Tha Grullon Janine is a 56 year old male who is seen in follow up for bilateral knee pain. Since last visit on 9/7/2021, Ketan has noted increased knee bilateral pain.  He notes that his lateral left knee pain is new since last being seen. Pain is posterior and inferior to the patella in his bilateral knees and over the lateral edge of his left patella. He rates the pain at a  2/10 currently and a 6/10 when going up the stairs and getting out of his vehicle/chair. Pain increases when he tries to move after prolonged sitting. Symptoms of a stabbing pain in his left knee are worsened by a quick stop.  Has had past corticosteroid injection, HEP from physical therapy, ice and stretching with minimal relief.  Last steroid injections lasted about 6 weeks.    He works on a computer for a living.      History reviewed. No pertinent past surgical/medical/family/social history other than as mentioned in HPI.    Patient Active Problem List   Diagnosis     Meniere's disease     Major depression in complete remission (H)     Chronic pain syndrome     Pain medication agreement signed     Bilateral occipital neuralgia     Benign essential hypertension     Cervical radiculopathy     Status post lumbar spinal fusion     Sleep apnea, unspecified type     Osteoarthritis of spine with radiculopathy, lumbar region     Impingement syndrome of left shoulder     Past Medical History:   Diagnosis Date     Anxiety      Back pain      Depressive disorder      Hyperlipidemia      Hypertension      Meniere's disease, unspecified      Pain medication agreement signed 8/20/2010     Sleep apnea, unspecified type      Past Surgical History:   Procedure Laterality Date     BUNIONECTOMY RT/LT  09/05/08    Both feet     COLONOSCOPY N/A 12/28/2016    Procedure: COMBINED COLONOSCOPY, SINGLE OR MULTIPLE BIOPSY/POLYPECTOMY BY BIOPSY;  Surgeon: Indra Jernigan MD;  Location: [facility]      DISCECTOMY, FUSION CERVICAL ANTERIOR ONE LEVEL, COMBINED N/A 7/5/2017    Procedure: COMBINED DISCECTOMY, FUSION CERVICAL ANTERIOR ONE LEVEL;  Cervical 6-7, Anterior cervical discectomy fusion;  Surgeon: Mike Mckenna MD;  [facility]     DISCECTOMY, FUSION CERVICAL ANTERIOR ONE LEVEL, COMBINED N/A 12/19/2018    Procedure: cervical 5-6 anterior cervical discectomy fusion;  Surgeon: Mike Mckenna MD;  [facility]     HERNIORRHAPHY UMBILICAL N/A 8/11/2017    Procedure: HERNIORRHAPHY UMBILICAL;  open umbilical hernia repair;  Surgeon: Boni Story MD;  [facility]     INJECT BLOCK MEDIAL BRANCH CERVICAL/THORACIC/LUMBAR Bilateral 8/12/2021    Procedure: Left L3, Left L4, Left L5, Right L3, Right L4 and Right L5 medial branch nerve blocks using fluoroscopic guidance and contrast control;  Surgeon: Darryn Mcdaniel MD;  [facility]     INJECT BLOCK MEDIAL BRANCH CERVICAL/THORACIC/LUMBAR N/A 9/9/2021    Procedure: Left L3, Left L4, Left L5, Right L3, Right L4 and Right L5 medial branch nerve blocks using fluoroscopic guidance and contrast control;  Surgeon: Darryn Mcdaniel MD;  [facility]     INJECT EPIDURAL CERVICAL N/A 8/22/2019    Procedure: INJECTION, SPINE, CERVICAL 6-7 EPIDURAL;  Surgeon: Darryn Mcdaniel MD;  [facility]     INJECT EPIDURAL LUMBAR N/A 11/9/2017    Procedure: INJECT EPIDURAL LUMBAR;  lumbar 4-5 epidural steroid injection ;  Surgeon: Darryn Mcdaniel MD;  [facility]     INJECT EPIDURAL LUMBAR N/A 12/28/2017    Procedure: INJECT EPIDURAL LUMBAR;  lumbar epidural injection;  Surgeon: Darryn Mcdaniel MD;  [facility]     INJECT EPIDURAL LUMBAR Bilateral 5/13/2021    Procedure: Bilateral Lumbar 3-4 Epidural Steroid Injection;  Surgeon: Darryn Mcdaniel MD;  [facility]     INJECT EPIDURAL TRANSFORAMINAL Bilateral 8/23/2018    Procedure: INJECT EPIDURAL TRANSFORAMINAL;  transforaminal bilateral lumbar 3-4 steroid injection;  Surgeon: Darryn Mcdaniel MD;   [facility]     INJECT EPIDURAL TRANSFORAMINAL Bilateral 2018    Procedure: INJECT EPIDURAL TRANSFORAMINAL lumbar 3-4;  Surgeon: Darryn Mcdaniel MD;  [facility]     INJECT EPIDURAL TRANSFORAMINAL Bilateral 2019    Procedure: INJECTION, EPIDURAL, TRANSFORAMINAL LUMBAR 3-4 BILATERAL;  Surgeon: Darryn Mcdaniel MD;  [facility]     INJECT EPIDURAL TRANSFORAMINAL Bilateral 2020    Procedure: lumbar 3-4 bilateral transforaminal epidural steroid injection;  Surgeon: Darryn Mcdaniel MD;  [facility]     INJECT EPIDURAL TRANSFORAMINAL Bilateral 2020    Procedure: INJECTION, EPIDURAL, TRANSFORAMINAL  LUMBAR 3-4 APPROACH;  Surgeon: Darryn Mcdaniel MD;  [facility]     PE TUBES      left ear     RADIO FREQUENCY ABLATION PULSED CERVICAL Bilateral 10/1/2021    Procedure: RADIOFREQUENCY ABLATION lumbar 3, 4, 5 bilateral;  Surgeon: Darryn Mcdaniel MD;  [facility]     ZZHC COLONOSCOPY W/WO BRUSH/WASH  2005     ZZHC CREATE EARDRUM OPENING,GEN ANESTH  2007    Pe tube, Left endolymphatic sac enhancement.     ZZHC MASTOIDECTOMY,COMPLETE  2007     Family History   Problem Relation Age of Onset     Diabetes Mother      Cancer Mother         colon cancer -  at 52, diag at 42     Hypertension Father      Heart Disease Father          of AAA     No Known Problems Brother      No Known Problems Brother      Depression Sister      Suicide Other      Unknown/Adopted Maternal Grandmother      Unknown/Adopted Maternal Grandfather      Unknown/Adopted Paternal Grandmother      Unknown/Adopted Paternal Grandfather      No Known Problems Daughter      No Known Problems Daughter      Social History     Socioeconomic History     Marital status: Single     Spouse name: Not on file     Number of children: 2     Years of education: Not on file     Highest education level: Not on file   Occupational History     Employer: [employer]   Tobacco Use     Smoking status: Never  Smoker     Smokeless tobacco: Never Used   Vaping Use     Vaping Use: Never used   Substance and Sexual Activity     Alcohol use: Yes     Alcohol/week: 0.0 standard drinks     Comment: rarely     Drug use: No     Sexual activity: Not Currently     Partners: Female   Other Topics Concern     Parent/sibling w/ CABG, MI or angioplasty before 65F 55M? No   Social History Narrative     Not on file     Social Determinants of Health     Financial Resource Strain: Not on file   Food Insecurity: Not on file   Transportation Needs: Not on file   Physical Activity: Not on file   Stress: Not on file   Social Connections: Not on file   Intimate Partner Violence: Not on file   Housing Stability: Not on file         Current Outpatient Medications   Medication     Acetaminophen (TYLENOL PO)     ALPRAZolam (XANAX XR) 0.5 MG 24 hr tablet     amLODIPine (NORVASC) 5 MG tablet     atorvastatin (LIPITOR) 10 MG tablet     cyclobenzaprine (FLEXERIL) 5 MG tablet     ibuprofen (ADVIL/MOTRIN) 800 MG tablet     losartan-hydrochlorothiazide (HYZAAR) 100-25 MG tablet     naloxone (NARCAN) 4 MG/0.1ML nasal spray     nortriptyline (PAMELOR) 50 MG capsule     order for DME     oxyCODONE (ROXICODONE) 5 MG tablet     pregabalin (LYRICA) 150 MG capsule     Pregabalin (LYRICA) 200 MG capsule     Current Facility-Administered Medications   Medication     lidocaine 1 % injection 5 mL     methylPREDNISolone (DEPO-MEDROL) injection 40 mg     triamcinolone (KENALOG-40) injection 40 mg     triamcinolone (KENALOG-40) injection 40 mg     triamcinolone (KENALOG-40) injection 40 mg     triamcinolone (KENALOG-40) injection 40 mg     triamcinolone (KENALOG-40) injection 40 mg     Allergies   Allergen Reactions     No Known Drug Allergies          Objective:  /78   Wt 98.4 kg (217 lb)   BMI 28.63 kg/m      General: Alert and in no distress    Head: Normocephalic, atraumatic  Eyes: no scleral icterus or conjunctival erythema   Skin: no erythema, petechiae,  or jaundice  Resp: normal respiratory effort without conversational dyspnea   Psych: normal mood and affect    Gait: Non-antalgic, appropriate coordination and balance   Musculoskeletal:  -No tenderness to palpation over the bilateral knees  -Full seated knee extension and flexion without pain    Radiology:  X-rays ordered and independent visualization of images performed and reviewed with Ketan.  Recent Results (from the past 744 hour(s))   XR Knee Bilateral 3 Views    Narrative    KNEE BILATERAL THREE VIEW   11/18/2021 10:56 AM     HISTORY: Chronic pain of both knees.    COMPARISON: None.      Impression    IMPRESSION: Mild medial compartment joint space narrowing bilaterally.  Mild spurring at the quadriceps insertion site on superior patella and  right greater than left. No fracture or joint effusion.    ALTA RIVAS MD         SYSTEM ID:  [id number]         Assessment:  1. Chronic pain of both knees        Plan:  Discussed the assessment with the patient and developed a plan together:  -MRI bilateral knees ordered.  Please call [phone] to schedule.   -Patient's preferred over the counter medication as directed on packaging as needed for pain or soreness.  -Ice 15-20 minutes for pain relief or swelling as needed.  -Continue home exercises.    -Could also receive steroid injections after 12/7/2021    -Follow up after completion of MRI.  Please schedule at least 1-2 business days after MRI is completed to ensure we have the results of the MRI.      Yoanna Xie MD, [facility]  [facility]

## 2021-11-29 DIAGNOSIS — M54.16 LUMBAR RADICULOPATHY: ICD-10-CM

## 2021-11-29 DIAGNOSIS — M79.18 MYOFASCIAL PAIN: ICD-10-CM

## 2021-11-29 DIAGNOSIS — M54.2 CERVICALGIA: ICD-10-CM

## 2021-11-29 DIAGNOSIS — Z98.1 S/P CERVICAL SPINAL FUSION: ICD-10-CM

## 2021-11-29 DIAGNOSIS — G89.4 CHRONIC PAIN SYNDROME: ICD-10-CM

## 2021-11-29 DIAGNOSIS — G62.9 NEUROPATHY: ICD-10-CM

## 2021-11-29 RX ORDER — CYCLOBENZAPRINE HCL 5 MG
TABLET ORAL
Qty: 45 TABLET | Refills: 2 | Status: SHIPPED | OUTPATIENT
Start: 2021-11-29 | End: 2022-03-07

## 2021-11-29 NOTE — TELEPHONE ENCOUNTER
Chief Complaint   Patient presents with     Refill Request     cyclobenzaprine      Refill request received via fax by pharmacy      [id number] - [facility] [address] [place] [address]      Pending Prescriptions:                       Disp   Refills    cyclobenzaprine (FLEXERIL) 5 MG tablet    45 tab*2            Si-2 tablets three times daily as needed         Last Written Prescription Date:  10/5/21  Last Fill Quantity: 45,   # refills: 2  Last Office Visit with prescribing provider: 21  Future Office visit:    Next 5 appointments (look out 90 days)    Dec 09, 2021  8:00 AM  Return Visit with Melissa Xie MD  [facility] ([facility] ) [address] [place] [id number] [phone]   Dec 17, 2021  8:00 AM  Return Visit with Chiara Garcia PA-C  [facility] ([facility] ) [address] [place] [id number] [phone]

## 2021-12-01 ENCOUNTER — HOSPITAL ENCOUNTER (OUTPATIENT)
Dept: MRI IMAGING | Facility: CLINIC | Age: 56
End: 2021-12-01
Attending: PHYSICAL MEDICINE & REHABILITATION
Payer: COMMERCIAL

## 2021-12-01 DIAGNOSIS — G89.29 CHRONIC PAIN OF BOTH KNEES: ICD-10-CM

## 2021-12-01 DIAGNOSIS — M25.561 CHRONIC PAIN OF BOTH KNEES: ICD-10-CM

## 2021-12-01 DIAGNOSIS — M25.562 CHRONIC PAIN OF BOTH KNEES: ICD-10-CM

## 2021-12-01 PROCEDURE — 73721 MRI JNT OF LWR EXTRE W/O DYE: CPT | Mod: 26 | Performed by: RADIOLOGY

## 2021-12-01 PROCEDURE — 73721 MRI JNT OF LWR EXTRE W/O DYE: CPT | Mod: 50

## 2021-12-02 NOTE — PROGRESS NOTES
Sports Medicine Clinic Visit       PCP: Tha Grullon Janine is a 56 year old male who is seen in follow up for bilateral knee pain and MRI results. Since last visit on 11/18/2021, Ketan has noticed no change in pain levels or symptoms. Pain is over the lateral patellar border of his left knee and behind his patella in the bilateral knees. He rates the pain at a   5/10 currently.  Symptoms are relieved with corticosteroid injections.  Symptoms are worsened by getting out of his vehicle, going up the stairs and trying to move after prolonged sitting. 2 weeks ago, his left knee gave out when he was ascending the stairs and caught himself with the hand rail. He is here today to discuss his MRI results and corticosteroid injections.    He works on a computer for a living      History reviewed. No pertinent past surgical/medical/family/social history other than as mentioned in HPI.    Patient Active Problem List   Diagnosis     Meniere's disease     Major depression in complete remission (H)     Chronic pain syndrome     Pain medication agreement signed     Bilateral occipital neuralgia     Benign essential hypertension     Cervical radiculopathy     Status post lumbar spinal fusion     Sleep apnea, unspecified type     Osteoarthritis of spine with radiculopathy, lumbar region     Impingement syndrome of left shoulder     Past Medical History:   Diagnosis Date     Anxiety      Back pain      Depressive disorder      Hyperlipidemia      Hypertension      Meniere's disease, unspecified      Pain medication agreement signed 8/20/2010     Sleep apnea, unspecified type      Past Surgical History:   Procedure Laterality Date     BUNIONECTOMY RT/LT  09/05/08    Both feet     COLONOSCOPY N/A 12/28/2016    Procedure: COMBINED COLONOSCOPY, SINGLE OR MULTIPLE BIOPSY/POLYPECTOMY BY BIOPSY;  Surgeon: Indra Jernigan MD;  Location: [facility]     DISCECTOMY, FUSION CERVICAL ANTERIOR ONE LEVEL, COMBINED N/A 7/5/2017     Procedure: COMBINED DISCECTOMY, FUSION CERVICAL ANTERIOR ONE LEVEL;  Cervical 6-7, Anterior cervical discectomy fusion;  Surgeon: Mike Mckenna MD;  Location[facility]     DISCECTOMY, FUSION CERVICAL ANTERIOR ONE LEVEL, COMBINED N/A 12/19/2018    Procedure: cervical 5-6 anterior cervical discectomy fusion;  Surgeon: Mike Mckenna MD;  [facility]     HERNIORRHAPHY UMBILICAL N/A 8/11/2017    Procedure: HERNIORRHAPHY UMBILICAL;  open umbilical hernia repair;  Surgeon: Boni Story MD;  [facility]     INJECT BLOCK MEDIAL BRANCH CERVICAL/THORACIC/LUMBAR Bilateral 8/12/2021    Procedure: Left L3, Left L4, Left L5, Right L3, Right L4 and Right L5 medial branch nerve blocks using fluoroscopic guidance and contrast control;  Surgeon: Darryn Mcdaniel MD;  [facility]     INJECT BLOCK MEDIAL BRANCH CERVICAL/THORACIC/LUMBAR N/A 9/9/2021    Procedure: Left L3, Left L4, Left L5, Right L3, Right L4 and Right L5 medial branch nerve blocks using fluoroscopic guidance and contrast control;  Surgeon: Darryn Mcdaniel MD;  [facility]     INJECT EPIDURAL CERVICAL N/A 8/22/2019    Procedure: INJECTION, SPINE, CERVICAL 6-7 EPIDURAL;  Surgeon: Darryn Mcdaniel MD;  [facility]     INJECT EPIDURAL LUMBAR N/A 11/9/2017    Procedure: INJECT EPIDURAL LUMBAR;  lumbar 4-5 epidural steroid injection ;  Surgeon: Darryn Mcdaniel MD;  [facility]     INJECT EPIDURAL LUMBAR N/A 12/28/2017    Procedure: INJECT EPIDURAL LUMBAR;  lumbar epidural injection;  Surgeon: Darryn Mcdaniel MD;  [facility]     INJECT EPIDURAL LUMBAR Bilateral 5/13/2021    Procedure: Bilateral Lumbar 3-4 Epidural Steroid Injection;  Surgeon: Darryn Mcdaniel MD;  [facility]     INJECT EPIDURAL TRANSFORAMINAL Bilateral 8/23/2018    Procedure: INJECT EPIDURAL TRANSFORAMINAL;  transforaminal bilateral lumbar 3-4 steroid injection;  Surgeon: Darryn Mcdaniel MD;  [facility]     INJECT EPIDURAL TRANSFORAMINAL Bilateral 11/8/2018     Procedure: INJECT EPIDURAL TRANSFORAMINAL lumbar 3-4;  Surgeon: Darryn Mcdaniel MD;  [facility]     INJECT EPIDURAL TRANSFORAMINAL Bilateral 2019    Procedure: INJECTION, EPIDURAL, TRANSFORAMINAL LUMBAR 3-4 BILATERAL;  Surgeon: Darryn Mcdaniel MD;  [facility]     INJECT EPIDURAL TRANSFORAMINAL Bilateral 2020    Procedure: lumbar 3-4 bilateral transforaminal epidural steroid injection;  Surgeon: Darryn Mcdaniel MD;  [facility]     INJECT EPIDURAL TRANSFORAMINAL Bilateral 2020    Procedure: INJECTION, EPIDURAL, TRANSFORAMINAL  LUMBAR 3-4 APPROACH;  Surgeon: Darryn Mcdaniel MD;  [facility]     PE TUBES      left ear     RADIO FREQUENCY ABLATION PULSED CERVICAL Bilateral 10/1/2021    Procedure: RADIOFREQUENCY ABLATION lumbar 3, 4, 5 bilateral;  Surgeon: Darryn Mcdaniel MD;  [facility]     ZZHC COLONOSCOPY W/WO BRUSH/WASH  2005     ZZHC CREATE EARDRUM OPENING,GEN ANESTH  2007    Pe tube, Left endolymphatic sac enhancement.     ZZHC MASTOIDECTOMY,COMPLETE  2007     Family History   Problem Relation Age of Onset     Diabetes Mother      Cancer Mother         colon cancer -  at 52, diag at 42     Hypertension Father      Heart Disease Father          of AAA     No Known Problems Brother      No Known Problems Brother      Depression Sister      Suicide Other      Unknown/Adopted Maternal Grandmother      Unknown/Adopted Maternal Grandfather      Unknown/Adopted Paternal Grandmother      Unknown/Adopted Paternal Grandfather      No Known Problems Daughter      No Known Problems Daughter      Social History     Socioeconomic History     Marital status: Single     Spouse name: Not on file     Number of children: 2     Years of education: Not on file     Highest education level: Not on file   Occupational History     Employer: [employer]   Tobacco Use     Smoking status: Never Smoker     Smokeless tobacco: Never Used   Vaping Use     Vaping Use: Never  used   Substance and Sexual Activity     Alcohol use: Yes     Alcohol/week: 0.0 standard drinks     Comment: rarely     Drug use: No     Sexual activity: Not Currently     Partners: Female   Other Topics Concern     Parent/sibling w/ CABG, MI or angioplasty before 65F 55M? No   Social History Narrative     Not on file     Social Determinants of Health     Financial Resource Strain: Not on file   Food Insecurity: Not on file   Transportation Needs: Not on file   Physical Activity: Not on file   Stress: Not on file   Social Connections: Not on file   Intimate Partner Violence: Not on file   Housing Stability: Not on file         Current Outpatient Medications   Medication     Acetaminophen (TYLENOL PO)     ALPRAZolam (XANAX XR) 0.5 MG 24 hr tablet     amLODIPine (NORVASC) 5 MG tablet     atorvastatin (LIPITOR) 10 MG tablet     ibuprofen (ADVIL/MOTRIN) 800 MG tablet     losartan-hydrochlorothiazide (HYZAAR) 100-25 MG tablet     naloxone (NARCAN) 4 MG/0.1ML nasal spray     nortriptyline (PAMELOR) 50 MG capsule     oxyCODONE (ROXICODONE) 5 MG tablet     pregabalin (LYRICA) 150 MG capsule     cyclobenzaprine (FLEXERIL) 5 MG tablet     order for DME     Pregabalin (LYRICA) 200 MG capsule     Current Facility-Administered Medications   Medication     lidocaine 1 % injection 5 mL     methylPREDNISolone (DEPO-MEDROL) injection 40 mg     triamcinolone (KENALOG-40) injection 40 mg     triamcinolone (KENALOG-40) injection 40 mg     triamcinolone (KENALOG-40) injection 40 mg     triamcinolone (KENALOG-40) injection 40 mg     triamcinolone (KENALOG-40) injection 40 mg     Allergies   Allergen Reactions     No Known Drug Allergies          Objective:  /80   Wt 98.4 kg (217 lb)   BMI 28.63 kg/m      General: Alert and in no distress    Head: Normocephalic, atraumatic  Eyes: no scleral icterus or conjunctival erythema   Skin: no erythema, petechiae, or jaundice  Resp: normal respiratory effort without conversational dyspnea    Psych: normal mood and affect        Radiology:  Independent visualization of images performed and reviewed with Ketan  EXAMINATION: MRI of the left knee without contrast     DATE:  12/1/2021.     HISTORY: Chronic pain     TECHNIQUE: Multiplanar, multisequence MR imaging of the left knee was  obtained using standard sequences in 3 orthogonal planes without the  use of intravenous or intra-articular gadolinium contrast.     Comparison:  Comparison radiographs dated 11/18/2021 were reviewed,  which demonstrated no acute bone abnormality.     FINDINGS:     In the medial compartment, the medial meniscus is intact. There is  diffuse thinning of the articular cartilage in the medial femorotibial  joint compartment without subchondral edema. Focal areas of high-grade  cartilage loss along the medial femoral condyle.     In the lateral compartment, the lateral meniscus is intact. There is  no high-grade or full-thickness cartilage loss or subchondral edema.     In the patellofemoral compartment, there are focal areas of high-grade  to full-thickness cartilage loss along the medial patellar facet with  subchondral edema. Additionally there is an area of high-grade  cartilage loss with subchondral edema along the central to lateral  trochlear surface.     The anterior and posterior cruciate ligaments are intact.     The tibial collateral ligament is intact. The anterior iliotibial  band, fibular collateral ligament, biceps femoris tendon, and  popliteus tendons are intact.     There is a small joint effusion. No popliteal cyst. Low signal nodular  focus adjacent to the distal portion of the anterior cruciate  ligament, and anterior horn of the lateral meniscus, in the region of  the anterior interval, sagittal series 5 image 20, and coronal series  9 image 18, probable joint body.     The extensor mechanism is intact. There is mild tendinosis of the  distal quadriceps tendon and the proximal patellar tendon.                                                                       IMPRESSION:  1. Small left knee joint effusion.  2. Mild tendinosis of the distal quadriceps tendon and proximal  patellar tendon.  3. Probable joint body in the region of the anterior interval,  intimate with the distal ACL fibers and anterior horn of the lateral  meniscus.  4. The anterior and posterior cruciate ligaments, medial and lateral  supporting structures, and bilateral menisci are intact.  5. Osteoarthrosis of left knee with areas of cartilage loss,  high-grade to full-thickness along the medial patellar facet and  lateral trochlear surface as well as along the medial femoral condyle.     DMITRY SALTER MD       MR right knee without contrast 12/1/2021 5:45 PM     Techniques: Multiplanar multisequence imaging of the right knee was  obtained without administration of intra-articular or intravenous  contrast using routing protocol.     History: Chronic pain of both knees; Chronic pain of both knees;  Chronic pain of both knees     Comparison: 11/18/2021     Findings:     MENISCI:  Medial meniscus: Intact.  Lateral meniscus: Intermediate signal intensity linear signal along  the posterior horn of lateral meniscus oriented relatively vertically  along the Wrisberg ligament attachment, presumably pseudolesion.  Otherwise, intact.     LIGAMENTS  Cruciate ligaments: Intact.  Medial supporting structures: Thickening of the proximal tibial  collateral ligament, likely from remote trauma. Otherwise intact.  Lateral supporting structures: Intact. Proximal popliteus tendinosis.     EXTENSOR MECHANISM  Intact. Proximal patellar tendinosis. Nonspecific soft tissue edema at  the prepatellar and anterior to the patellar tendon. Suprapatellar  enthesophyte.     FLUID  Small joint effusion. Possible partial cruciate ganglion cysts  intimate with horizontal component of the posterior cruciate ligament.  No substantial Baker's cyst.      Small to moderate fluid in the pes  anserine/semimembranosus bursae,  query bursitis. Small amount of fluid in the deep infrapatellar bursa.     OSSEOUS and ARTICULAR STRUCTURES  Bones: No fracture, contusion, or osseous lesion is seen. Hypointense  signal of the medial trochlea, likely bone island. Mild osteophytosis.     Subchondral edema like marrow signal intensity at the tibia involving  proximal tibiofibular joint is consistent with degenerative change.     Patellofemoral compartment: Focal low-grade chondral fissure at the  lateral patellar facet and moderate grade chondral loss/fissure at the  central trochlea.     Medial compartment: Moderate chondral loss central weight bearing  surface of the medial femoral condyle without subchondral osseous  abnormality.      Lateral compartment: No high-grade hyaline cartilage disease.     ANCILLARY FINDINGS  None.                                                                      Impression:  1. Proximal patellar tendinosis.   2. Small to moderate fluid in the pes anserine/semimembranosus bursae,  query bursitis.   3. Proximal tibiofibular degenerative change.  4. Grade II/III areas of chondromalacia in the medial and  patellofemoral compartments.     LUTHER THOMPSON       Large Joint Injection/Arthocentesis: bilateral knee    Date/Time: 12/9/2021 8:27 AM  Performed by: Melissa Xie MD  Authorized by: Melissa Xie MD     Indications:  Pain  Needle Size:  25 G  Guidance: landmark guided    Approach:  Anterolateral  Location:  Knee  Laterality:  Bilateral      Medications (Right):  40 mg triamcinolone 40 MG/ML   Medications (Right) comment:  4 mL 0.5% Bupivacaine  ND: 5709-0814-50  CBU: [id number]  Exp: 03/01/2023  Medications (Left):  40 mg triamcinolone 40 MG/ML   Medications (Left) comment:  4 mL 0.5% Bupivacaine  NDC: 2672-3858-16  CBU: [id number]  Exp: 03/01/2023    Outcome:  Tolerated well, no immediate complications  Procedure discussed: discussed risks, benefits, and  alternatives    Consent Given by:  Patient        Assessment:  1. Chronic pain of both knees    2. Bilateral primary osteoarthritis of knee        Plan:  Discussed the assessment with the patient and developed a plan together:  -Reviewed MRIs with Ketan.  He would like to proceed with steroid injections today.    -Steroid injections performed today.  Take it easy over the next few days. Keep in mind that the steroid may take up to 3 days to start working and up to 2 weeks to reach maximal effect.    -Ice for 15-20 minutes as needed for soreness and swelling.  -Patient's preferred over the counter medications as needed for soreness.    -Also discussed gel injections, further physical therapy, and referral to orthopedic surgery    -Follow up as needed if symptoms fail to improve or worsen.  Please call with questions or concerns.      Yoanna Xie MD, CAQ Sports Medicine  [facility]

## 2021-12-09 ENCOUNTER — OFFICE VISIT (OUTPATIENT)
Dept: ORTHOPEDICS | Facility: CLINIC | Age: 56
End: 2021-12-09
Payer: COMMERCIAL

## 2021-12-09 VITALS — WEIGHT: 217 LBS | SYSTOLIC BLOOD PRESSURE: 118 MMHG | BODY MASS INDEX: 28.63 KG/M2 | DIASTOLIC BLOOD PRESSURE: 80 MMHG

## 2021-12-09 DIAGNOSIS — M25.561 CHRONIC PAIN OF BOTH KNEES: Primary | ICD-10-CM

## 2021-12-09 DIAGNOSIS — G89.29 CHRONIC PAIN OF BOTH KNEES: Primary | ICD-10-CM

## 2021-12-09 DIAGNOSIS — M17.0 BILATERAL PRIMARY OSTEOARTHRITIS OF KNEE: ICD-10-CM

## 2021-12-09 DIAGNOSIS — M25.562 CHRONIC PAIN OF BOTH KNEES: Primary | ICD-10-CM

## 2021-12-09 PROCEDURE — 20610 DRAIN/INJ JOINT/BURSA W/O US: CPT | Mod: 50 | Performed by: PHYSICAL MEDICINE & REHABILITATION

## 2021-12-09 PROCEDURE — 99213 OFFICE O/P EST LOW 20 MIN: CPT | Mod: 25 | Performed by: PHYSICAL MEDICINE & REHABILITATION

## 2021-12-09 RX ORDER — TRIAMCINOLONE ACETONIDE 40 MG/ML
40 INJECTION, SUSPENSION INTRA-ARTICULAR; INTRAMUSCULAR
Status: SHIPPED | OUTPATIENT
Start: 2021-12-09

## 2021-12-09 RX ADMIN — TRIAMCINOLONE ACETONIDE 40 MG: 40 INJECTION, SUSPENSION INTRA-ARTICULAR; INTRAMUSCULAR at 08:27

## 2021-12-09 ASSESSMENT — PAIN SCALES - GENERAL: PAINLEVEL: MODERATE PAIN (5)

## 2021-12-09 NOTE — LETTER
12/9/2021         RE: Indra Mehta  [address] [facility] [address]        Dear Colleague,    Thank you for referring your patient, Indra Mehta, to the [facility] [place]. Please see a copy of my visit note below.    Sports Medicine Clinic Visit       PCP: Tha Grullon    Indra Mehta is a 56 year old male who is seen in follow up for bilateral knee pain and MRI results. Since last visit on 11/18/2021, Ketan has noticed no change in pain levels or symptoms. Pain is over the lateral patellar border of his left knee and behind his patella in the bilateral knees. He rates the pain at a   5/10 currently.  Symptoms are relieved with corticosteroid injections.  Symptoms are worsened by getting out of his vehicle, going up the stairs and trying to move after prolonged sitting. 2 weeks ago, his left knee gave out when he was ascending the stairs and caught himself with the hand rail. He is here today to discuss his MRI results and corticosteroid injections.    He works on a computer for a living      History reviewed. No pertinent past surgical/medical/family/social history other than as mentioned in HPI.    Patient Active Problem List   Diagnosis     Meniere's disease     Major depression in complete remission (H)     Chronic pain syndrome     Pain medication agreement signed     Bilateral occipital neuralgia     Benign essential hypertension     Cervical radiculopathy     Status post lumbar spinal fusion     Sleep apnea, unspecified type     Osteoarthritis of spine with radiculopathy, lumbar region     Impingement syndrome of left shoulder     Past Medical History:   Diagnosis Date     Anxiety      Back pain      Depressive disorder      Hyperlipidemia      Hypertension      Meniere's disease, unspecified      Pain medication agreement signed 8/20/2010     Sleep apnea, unspecified type      Past Surgical History:   Procedure Laterality Date     BUNIONECTOMY RT/LT   09/05/08    Both feet     COLONOSCOPY N/A 12/28/2016    Procedure: COMBINED COLONOSCOPY, SINGLE OR MULTIPLE BIOPSY/POLYPECTOMY BY BIOPSY;  Surgeon: Indra Jernigan MD;  [facility]     DISCECTOMY, FUSION CERVICAL ANTERIOR ONE LEVEL, COMBINED N/A 7/5/2017    Procedure: COMBINED DISCECTOMY, FUSION CERVICAL ANTERIOR ONE LEVEL;  Cervical 6-7, Anterior cervical discectomy fusion;  Surgeon: Mike Mckenna MD;  [facility]     DISCECTOMY, FUSION CERVICAL ANTERIOR ONE LEVEL, COMBINED N/A 12/19/2018    Procedure: cervical 5-6 anterior cervical discectomy fusion;  Surgeon: Mike Mckenna MD;  [facility]     HERNIORRHAPHY UMBILICAL N/A 8/11/2017    Procedure: HERNIORRHAPHY UMBILICAL;  open umbilical hernia repair;  Surgeon: Boni Story MD;  [facility]     INJECT BLOCK MEDIAL BRANCH CERVICAL/THORACIC/LUMBAR Bilateral 8/12/2021    Procedure: Left L3, Left L4, Left L5, Right L3, Right L4 and Right L5 medial branch nerve blocks using fluoroscopic guidance and contrast control;  Surgeon: Darryn Mcdaniel MD;  [facility]     INJECT BLOCK MEDIAL BRANCH CERVICAL/THORACIC/LUMBAR N/A 9/9/2021    Procedure: Left L3, Left L4, Left L5, Right L3, Right L4 and Right L5 medial branch nerve blocks using fluoroscopic guidance and contrast control;  Surgeon: Darryn Mcdaniel MD;  [facility]     INJECT EPIDURAL CERVICAL N/A 8/22/2019    Procedure: INJECTION, SPINE, CERVICAL 6-7 EPIDURAL;  Surgeon: Darryn Mcdaniel MD;  [facility]     INJECT EPIDURAL LUMBAR N/A 11/9/2017    Procedure: INJECT EPIDURAL LUMBAR;  lumbar 4-5 epidural steroid injection ;  Surgeon: Darryn Mcdaniel MD;  [facility]     INJECT EPIDURAL LUMBAR N/A 12/28/2017    Procedure: INJECT EPIDURAL LUMBAR;  lumbar epidural injection;  Surgeon: Darryn Mcdaniel MD;  [facility]     INJECT EPIDURAL LUMBAR Bilateral 5/13/2021    Procedure: Bilateral Lumbar 3-4 Epidural Steroid Injection;  Surgeon: Darryn Mcdaniel MD;  [facility]     INJECT EPIDURAL TRANSFORAMINAL Bilateral 2018    Procedure: INJECT EPIDURAL TRANSFORAMINAL;  transforaminal bilateral lumbar 3-4 steroid injection;  Surgeon: Darryn Mcdaniel MD;  [facility]     INJECT EPIDURAL TRANSFORAMINAL Bilateral 2018    Procedure: INJECT EPIDURAL TRANSFORAMINAL lumbar 3-4;  Surgeon: Darryn Mcdaniel MD;  [facility]     INJECT EPIDURAL TRANSFORAMINAL Bilateral 2019    Procedure: INJECTION, EPIDURAL, TRANSFORAMINAL LUMBAR 3-4 BILATERAL;  Surgeon: Darryn Mcdaniel MD;  [facility]     INJECT EPIDURAL TRANSFORAMINAL Bilateral 2020    Procedure: lumbar 3-4 bilateral transforaminal epidural steroid injection;  Surgeon: Darryn Mcdaniel MD;  [facility]     INJECT EPIDURAL TRANSFORAMINAL Bilateral 2020    Procedure: INJECTION, EPIDURAL, TRANSFORAMINAL  LUMBAR 3-4 APPROACH;  Surgeon: Darryn Mcdaniel MD;  [facility]     PE TUBES      left ear     RADIO FREQUENCY ABLATION PULSED CERVICAL Bilateral 10/1/2021    Procedure: RADIOFREQUENCY ABLATION lumbar 3, 4, 5 bilateral;  Surgeon: Darryn Mcdaniel MD;  [facility]     ZZHC COLONOSCOPY W/WO BRUSH/WASH  2005     ZZHC CREATE EARDRUM OPENING,GEN ANESTH  2007    Pe tube, Left endolymphatic sac enhancement.     ZZHC MASTOIDECTOMY,COMPLETE  2007     Family History   Problem Relation Age of Onset     Diabetes Mother      Cancer Mother         colon cancer -  at 52, diag at 42     Hypertension Father      Heart Disease Father          of AAA     No Known Problems Brother      No Known Problems Brother      Depression Sister      Suicide Other      Unknown/Adopted Maternal Grandmother      Unknown/Adopted Maternal Grandfather      Unknown/Adopted Paternal Grandmother      Unknown/Adopted Paternal Grandfather      No Known Problems Daughter      No Known Problems Daughter      Social History     Socioeconomic History     Marital status: Single     Spouse name: Not on file      Number of children: 2     Years of education: Not on file     Highest education level: Not on file   Occupational History     Employer: [employer]   Tobacco Use     Smoking status: Never Smoker     Smokeless tobacco: Never Used   Vaping Use     Vaping Use: Never used   Substance and Sexual Activity     Alcohol use: Yes     Alcohol/week: 0.0 standard drinks     Comment: rarely     Drug use: No     Sexual activity: Not Currently     Partners: Female   Other Topics Concern     Parent/sibling w/ CABG, MI or angioplasty before 65F 55M? No   Social History Narrative     Not on file     Social Determinants of Health     Financial Resource Strain: Not on file   Food Insecurity: Not on file   Transportation Needs: Not on file   Physical Activity: Not on file   Stress: Not on file   Social Connections: Not on file   Intimate Partner Violence: Not on file   Housing Stability: Not on file         Current Outpatient Medications   Medication     Acetaminophen (TYLENOL PO)     ALPRAZolam (XANAX XR) 0.5 MG 24 hr tablet     amLODIPine (NORVASC) 5 MG tablet     atorvastatin (LIPITOR) 10 MG tablet     ibuprofen (ADVIL/MOTRIN) 800 MG tablet     losartan-hydrochlorothiazide (HYZAAR) 100-25 MG tablet     naloxone (NARCAN) 4 MG/0.1ML nasal spray     nortriptyline (PAMELOR) 50 MG capsule     oxyCODONE (ROXICODONE) 5 MG tablet     pregabalin (LYRICA) 150 MG capsule     cyclobenzaprine (FLEXERIL) 5 MG tablet     order for DME     Pregabalin (LYRICA) 200 MG capsule     Current Facility-Administered Medications   Medication     lidocaine 1 % injection 5 mL     methylPREDNISolone (DEPO-MEDROL) injection 40 mg     triamcinolone (KENALOG-40) injection 40 mg     triamcinolone (KENALOG-40) injection 40 mg     triamcinolone (KENALOG-40) injection 40 mg     triamcinolone (KENALOG-40) injection 40 mg     triamcinolone (KENALOG-40) injection 40 mg     Allergies   Allergen Reactions     No Known Drug Allergies          Objective:  /80   Wt  98.4 kg (217 lb)   BMI 28.63 kg/m      General: Alert and in no distress    Head: Normocephalic, atraumatic  Eyes: no scleral icterus or conjunctival erythema   Skin: no erythema, petechiae, or jaundice  Resp: normal respiratory effort without conversational dyspnea   Psych: normal mood and affect        Radiology:  Independent visualization of images performed and reviewed with Ketan  EXAMINATION: MRI of the left knee without contrast     DATE:  12/1/2021.     HISTORY: Chronic pain     TECHNIQUE: Multiplanar, multisequence MR imaging of the left knee was  obtained using standard sequences in 3 orthogonal planes without the  use of intravenous or intra-articular gadolinium contrast.     Comparison:  Comparison radiographs dated 11/18/2021 were reviewed,  which demonstrated no acute bone abnormality.     FINDINGS:     In the medial compartment, the medial meniscus is intact. There is  diffuse thinning of the articular cartilage in the medial femorotibial  joint compartment without subchondral edema. Focal areas of high-grade  cartilage loss along the medial femoral condyle.     In the lateral compartment, the lateral meniscus is intact. There is  no high-grade or full-thickness cartilage loss or subchondral edema.     In the patellofemoral compartment, there are focal areas of high-grade  to full-thickness cartilage loss along the medial patellar facet with  subchondral edema. Additionally there is an area of high-grade  cartilage loss with subchondral edema along the central to lateral  trochlear surface.     The anterior and posterior cruciate ligaments are intact.     The tibial collateral ligament is intact. The anterior iliotibial  band, fibular collateral ligament, biceps femoris tendon, and  popliteus tendons are intact.     There is a small joint effusion. No popliteal cyst. Low signal nodular  focus adjacent to the distal portion of the anterior cruciate  ligament, and anterior horn of the lateral meniscus,  in the region of  the anterior interval, sagittal series 5 image 20, and coronal series  9 image 18, probable joint body.     The extensor mechanism is intact. There is mild tendinosis of the  distal quadriceps tendon and the proximal patellar tendon.                                                                      IMPRESSION:  1. Small left knee joint effusion.  2. Mild tendinosis of the distal quadriceps tendon and proximal  patellar tendon.  3. Probable joint body in the region of the anterior interval,  intimate with the distal ACL fibers and anterior horn of the lateral  meniscus.  4. The anterior and posterior cruciate ligaments, medial and lateral  supporting structures, and bilateral menisci are intact.  5. Osteoarthrosis of left knee with areas of cartilage loss,  high-grade to full-thickness along the medial patellar facet and  lateral trochlear surface as well as along the medial femoral condyle.     DMITRY SALTER MD       MR right knee without contrast 12/1/2021 5:45 PM     Techniques: Multiplanar multisequence imaging of the right knee was  obtained without administration of intra-articular or intravenous  contrast using routing protocol.     History: Chronic pain of both knees; Chronic pain of both knees;  Chronic pain of both knees     Comparison: 11/18/2021     Findings:     MENISCI:  Medial meniscus: Intact.  Lateral meniscus: Intermediate signal intensity linear signal along  the posterior horn of lateral meniscus oriented relatively vertically  along the Wrisberg ligament attachment, presumably pseudolesion.  Otherwise, intact.     LIGAMENTS  Cruciate ligaments: Intact.  Medial supporting structures: Thickening of the proximal tibial  collateral ligament, likely from remote trauma. Otherwise intact.  Lateral supporting structures: Intact. Proximal popliteus tendinosis.     EXTENSOR MECHANISM  Intact. Proximal patellar tendinosis. Nonspecific soft tissue edema at  the prepatellar and anterior  to the patellar tendon. Suprapatellar  enthesophyte.     FLUID  Small joint effusion. Possible partial cruciate ganglion cysts  intimate with horizontal component of the posterior cruciate ligament.  No substantial Baker's cyst.      Small to moderate fluid in the pes anserine/semimembranosus bursae,  query bursitis. Small amount of fluid in the deep infrapatellar bursa.     OSSEOUS and ARTICULAR STRUCTURES  Bones: No fracture, contusion, or osseous lesion is seen. Hypointense  signal of the medial trochlea, likely bone island. Mild osteophytosis.     Subchondral edema like marrow signal intensity at the tibia involving  proximal tibiofibular joint is consistent with degenerative change.     Patellofemoral compartment: Focal low-grade chondral fissure at the  lateral patellar facet and moderate grade chondral loss/fissure at the  central trochlea.     Medial compartment: Moderate chondral loss central weight bearing  surface of the medial femoral condyle without subchondral osseous  abnormality.      Lateral compartment: No high-grade hyaline cartilage disease.     ANCILLARY FINDINGS  None.                                                                      Impression:  1. Proximal patellar tendinosis.   2. Small to moderate fluid in the pes anserine/semimembranosus bursae,  query bursitis.   3. Proximal tibiofibular degenerative change.  4. Grade II/III areas of chondromalacia in the medial and  patellofemoral compartments.     LUTHER THOMPSON       Large Joint Injection/Arthocentesis: bilateral knee    Date/Time: 12/9/2021 8:27 AM  Performed by: Melissa Xie MD  Authorized by: Melissa Xie MD     Indications:  Pain  Needle Size:  25 G  Guidance: landmark guided    Approach:  Anterolateral  Location:  Knee  Laterality:  Bilateral      Medications (Right):  40 mg triamcinolone 40 MG/ML   Medications (Right) comment:  4 mL 0.5% Bupivacaine  NDC: 7751-1256-73  CBU: [id number]  Exp:  03/01/2023  Medications (Left):  40 mg triamcinolone 40 MG/ML   Medications (Left) comment:  4 mL 0.5% Bupivacaine  NDC: 1089-4631-13  CBU: [id number]  Exp: 03/01/2023    Outcome:  Tolerated well, no immediate complications  Procedure discussed: discussed risks, benefits, and alternatives    Consent Given by:  Patient        Assessment:  1. Chronic pain of both knees    2. Bilateral primary osteoarthritis of knee        Plan:  Discussed the assessment with the patient and developed a plan together:  -Reviewed MRIs with Ketan.  He would like to proceed with steroid injections today.    -Steroid injections performed today.  Take it easy over the next few days. Keep in mind that the steroid may take up to 3 days to start working and up to 2 weeks to reach maximal effect.    -Ice for 15-20 minutes as needed for soreness and swelling.  -Patient's preferred over the counter medications as needed for soreness.    -Also discussed gel injections, further physical therapy, and referral to orthopedic surgery    -Follow up as needed if symptoms fail to improve or worsen.  Please call with questions or concerns.      Yoanna Xie MD, [facility]  [facility]          Again, thank you for allowing me to participate in the care of your patient.        Sincerely,        Melissa Xie MD

## 2021-12-14 DIAGNOSIS — H93.A9 PULSATILE TINNITUS: ICD-10-CM

## 2021-12-14 RX ORDER — ALPRAZOLAM 0.5 MG/1
0.5 TABLET, EXTENDED RELEASE ORAL AT BEDTIME
Qty: 30 TABLET | Refills: 3 | Status: SHIPPED | OUTPATIENT
Start: 2021-12-14 | End: 2022-06-01

## 2021-12-14 NOTE — TELEPHONE ENCOUNTER
Alprazolam 0.5mg      Last Written Prescription Date:  9/17/20  Last Fill Quantity: 30,   # refills: 3  Last Office Visit: 1/30/20  Future Office visit:    Next 5 appointments (look out 90 days)    Dec 17, 2021  8:00 AM  Return Visit with Chiara Garcia PA-C  [facility] ([facility] ) [address] [facility] [id number] [phone]   Jan 12, 2022  9:00 AM  Return Visit with Liz Mallory MD  [facility] [place] ([facility] [place] ) [address] [facility] [address] [facility]  [address]  [facility] [id number] [phone]           Routing refill request to provider for review/approval because:  Drug not on the [facility], [facility] or [facility] refill protocol or controlled substance

## 2021-12-17 ENCOUNTER — OFFICE VISIT (OUTPATIENT)
Dept: PALLIATIVE MEDICINE | Facility: CLINIC | Age: 56
End: 2021-12-17
Payer: COMMERCIAL

## 2021-12-17 VITALS
BODY MASS INDEX: 28.63 KG/M2 | SYSTOLIC BLOOD PRESSURE: 144 MMHG | HEIGHT: 73 IN | TEMPERATURE: 97.8 F | DIASTOLIC BLOOD PRESSURE: 80 MMHG | WEIGHT: 216 LBS

## 2021-12-17 DIAGNOSIS — G62.9 NEUROPATHY: ICD-10-CM

## 2021-12-17 DIAGNOSIS — Z98.1 S/P CERVICAL SPINAL FUSION: ICD-10-CM

## 2021-12-17 DIAGNOSIS — M79.18 MYOFASCIAL PAIN: ICD-10-CM

## 2021-12-17 DIAGNOSIS — M54.16 LUMBAR RADICULOPATHY: ICD-10-CM

## 2021-12-17 DIAGNOSIS — F11.90 CHRONIC, CONTINUOUS USE OF OPIOIDS: ICD-10-CM

## 2021-12-17 DIAGNOSIS — M54.2 CERVICALGIA: ICD-10-CM

## 2021-12-17 DIAGNOSIS — G89.4 CHRONIC PAIN SYNDROME: ICD-10-CM

## 2021-12-17 PROCEDURE — 99214 OFFICE O/P EST MOD 30 MIN: CPT | Performed by: PHYSICIAN ASSISTANT

## 2021-12-17 RX ORDER — OXYCODONE HYDROCHLORIDE 5 MG/1
5-10 TABLET ORAL EVERY 6 HOURS PRN
Qty: 165 TABLET | Refills: 0 | Status: SHIPPED | OUTPATIENT
Start: 2021-12-17 | End: 2022-01-12

## 2021-12-17 ASSESSMENT — PAIN SCALES - GENERAL: PAINLEVEL: SEVERE PAIN (6)

## 2021-12-17 ASSESSMENT — MIFFLIN-ST. JEOR: SCORE: 1868.65

## 2021-12-17 NOTE — PATIENT INSTRUCTIONS
After Visit Instructions:     Thank you for coming to [facility] [place] for your care. It is my goal to partner with you to help you reach your optimal state of health.     I am recommending multidisciplinary care at this time.  The focus of care will be to continue gradual rehabilitation and pain management with medication adjustments as needed.    Continue daily self-care, identifying contributing factors, and monitoring variations in pain level. Continue to integrate self-care into your life.          Keep follow-up with Dr. Mallory as scheduled.      Imaging: MRI lumbar spine-I will call you with the results    Procedures recommended: will review after MRI     Medication recommendations:     No changes      Chiara Garcia PA-C  [facility]  [facility]    Contact information: [facility] [place]  Clinic Number:  [phone]     Call with any questions about your care and for scheduling assistance.     Calls are returned Monday through Friday between 8 AM and 4:30 PM. We usually get back to you within 2 business days depending on the issue/request.    If we are prescribing your medications:    For opioid medication refills, call the clinic or send a Resultly message 7 days in advance.  Please include:    Name of requested medication    Name of the pharmacy.    For non-opioid medications, call your pharmacy directly to request a refill. Please allow 3-4 days to be processed.     Per [place] State Law:    All controlled substance prescriptions must be filled within 30 days of being written.      For those controlled substances allowing refills, pickup must occur within 30 days of last fill.      We believe regular attendance is key to your success in our program!      Any time you are unable to keep your appointment we ask that you call us at least 24 hours in advance to cancel.This will allow us to offer the appointment time to  another patient.   Multiple missed appointments may lead to dismissal from the clinic.

## 2021-12-23 ENCOUNTER — HOSPITAL ENCOUNTER (OUTPATIENT)
Dept: MRI IMAGING | Facility: CLINIC | Age: 56
Discharge: HOME | End: 2021-12-23
Attending: PHYSICIAN ASSISTANT | Admitting: PHYSICIAN ASSISTANT
Payer: COMMERCIAL

## 2021-12-23 DIAGNOSIS — M54.16 LUMBAR RADICULOPATHY: ICD-10-CM

## 2021-12-23 PROCEDURE — 72148 MRI LUMBAR SPINE W/O DYE: CPT

## 2021-12-24 ENCOUNTER — MYC MEDICAL ADVICE (OUTPATIENT)
Dept: PALLIATIVE MEDICINE | Facility: CLINIC | Age: 56
End: 2021-12-24
Payer: COMMERCIAL

## 2021-12-27 DIAGNOSIS — I10 BENIGN ESSENTIAL HYPERTENSION: ICD-10-CM

## 2021-12-28 RX ORDER — LOSARTAN POTASSIUM AND HYDROCHLOROTHIAZIDE 25; 100 MG/1; MG/1
TABLET ORAL
Qty: 30 TABLET | Refills: 0 | Status: SHIPPED | OUTPATIENT
Start: 2021-12-28 | End: 2022-01-26

## 2022-01-05 DIAGNOSIS — M54.50 CHRONIC BILATERAL LOW BACK PAIN WITHOUT SCIATICA: ICD-10-CM

## 2022-01-05 DIAGNOSIS — M54.2 CERVICALGIA: ICD-10-CM

## 2022-01-05 DIAGNOSIS — G89.29 CHRONIC BILATERAL LOW BACK PAIN WITHOUT SCIATICA: ICD-10-CM

## 2022-01-05 DIAGNOSIS — M54.16 LUMBAR RADICULOPATHY: ICD-10-CM

## 2022-01-05 DIAGNOSIS — G62.9 NEUROPATHY: ICD-10-CM

## 2022-01-05 DIAGNOSIS — M79.18 MYOFASCIAL PAIN: ICD-10-CM

## 2022-01-05 DIAGNOSIS — G89.4 CHRONIC PAIN SYNDROME: ICD-10-CM

## 2022-01-05 DIAGNOSIS — Z98.1 S/P CERVICAL SPINAL FUSION: ICD-10-CM

## 2022-01-05 NOTE — TELEPHONE ENCOUNTER
Chief Complaint   Patient presents with     Refill Request     nortriptyline      Refill request received via fax by pharmacy      [id number] - [place], [place] - [address] [place] [address]      Pending Prescriptions:                       Disp   Refills    nortriptyline (PAMELOR) 50 MG capsule     30 cap*2            Sig: Take 1 capsule (50 mg) by mouth At Bedtime         Last Written Prescription Date:  9/13/21  Last Fill Quantity: 30,   # refills: 2  Last Office Visit with prescribing provider: 12/17/21  Future Office visit:      Next 5 appointments (look out 90 days)    Jan 12, 2022  9:00 AM  Return Visit with Liz Mallory MD  [facility] [place] ([facility] [place] ) [address] [address] [phone]

## 2022-01-06 RX ORDER — NORTRIPTYLINE HYDROCHLORIDE 50 MG/1
50 CAPSULE ORAL AT BEDTIME
Qty: 30 CAPSULE | Refills: 2 | Status: SHIPPED | OUTPATIENT
Start: 2022-01-06 | End: 2022-03-14

## 2022-01-06 NOTE — TELEPHONE ENCOUNTER
Script Eprescribed to pharmacy        Signed Prescriptions:                        Disp   Refills    nortriptyline (PAMELOR) 50 MG capsule      30 cap*2        Sig: Take 1 capsule (50 mg) by mouth At Bedtime  Authorizing Provider: DONALD MACIAS MD  [facility]

## 2022-01-10 DIAGNOSIS — M54.2 CERVICALGIA: ICD-10-CM

## 2022-01-10 DIAGNOSIS — G89.4 CHRONIC PAIN SYNDROME: ICD-10-CM

## 2022-01-10 DIAGNOSIS — M54.16 LUMBAR RADICULOPATHY: ICD-10-CM

## 2022-01-10 DIAGNOSIS — F11.90 CHRONIC, CONTINUOUS USE OF OPIOIDS: ICD-10-CM

## 2022-01-10 DIAGNOSIS — M79.18 MYOFASCIAL PAIN: ICD-10-CM

## 2022-01-10 DIAGNOSIS — Z98.1 S/P CERVICAL SPINAL FUSION: ICD-10-CM

## 2022-01-10 DIAGNOSIS — G62.9 NEUROPATHY: ICD-10-CM

## 2022-01-10 NOTE — TELEPHONE ENCOUNTER
Chief Complaint   Patient presents with     Refill Request     pregabalin      Refill request received via fax by pharmacy   [id number] - [facility] [address] [place] [address]       Pending Prescriptions:                       Disp   Refills    Pregabalin (LYRICA) 200 MG capsule        30 cap*2            Sig: Take 1 capsule at bedtime. This is in addition to           the 150mg in morning and afternoon.         Last Written Prescription Date:  11/17/21  Last Fill Quantity: 30,   # refills: 2  Last Office Visit with prescribing provider: 12/17/21  Future Office visit:    Next 5 appointments (look out 90 days)    Jan 12, 2022  9:00 AM  Return Visit with Liz Mallory MD  [facility] [place] ([facility] [place] ) [address] [address] [phone]

## 2022-01-11 RX ORDER — PREGABALIN 200 MG/1
CAPSULE ORAL
Qty: 30 CAPSULE | Refills: 2 | Status: SHIPPED | OUTPATIENT
Start: 2022-01-11 | End: 2022-05-18

## 2022-01-11 NOTE — TELEPHONE ENCOUNTER
checked and appropriate     Script Eprescribed to pharmacy    Will send this to MA team to notify patient.    Signed Prescriptions:                        Disp   Refills    Pregabalin (LYRICA) 200 MG capsule         30 cap*2        Sig: Take 1 capsule at bedtime. This is in addition to the           150mg in morning and afternoon.  Authorizing Provider: DONALD MACIAS MD  [facility]

## 2022-01-12 ENCOUNTER — OFFICE VISIT (OUTPATIENT)
Dept: PALLIATIVE MEDICINE | Facility: CLINIC | Age: 57
End: 2022-01-12
Payer: COMMERCIAL

## 2022-01-12 VITALS — DIASTOLIC BLOOD PRESSURE: 89 MMHG | HEART RATE: 96 BPM | SYSTOLIC BLOOD PRESSURE: 151 MMHG

## 2022-01-12 DIAGNOSIS — Z98.1 S/P CERVICAL SPINAL FUSION: ICD-10-CM

## 2022-01-12 DIAGNOSIS — M25.551 PAIN IN JOINT INVOLVING PELVIC REGION AND THIGH, RIGHT: Primary | ICD-10-CM

## 2022-01-12 DIAGNOSIS — M54.2 CERVICALGIA: ICD-10-CM

## 2022-01-12 DIAGNOSIS — G62.89 OTHER POLYNEUROPATHY: ICD-10-CM

## 2022-01-12 DIAGNOSIS — G62.9 NEUROPATHY: ICD-10-CM

## 2022-01-12 DIAGNOSIS — M79.18 MYOFASCIAL PAIN: ICD-10-CM

## 2022-01-12 DIAGNOSIS — M54.16 LUMBAR RADICULOPATHY: ICD-10-CM

## 2022-01-12 DIAGNOSIS — G89.4 CHRONIC PAIN SYNDROME: ICD-10-CM

## 2022-01-12 DIAGNOSIS — F11.90 CHRONIC, CONTINUOUS USE OF OPIOIDS: ICD-10-CM

## 2022-01-12 PROCEDURE — 99215 OFFICE O/P EST HI 40 MIN: CPT | Performed by: STUDENT IN AN ORGANIZED HEALTH CARE EDUCATION/TRAINING PROGRAM

## 2022-01-12 RX ORDER — OXYCODONE HYDROCHLORIDE 5 MG/1
5-10 TABLET ORAL EVERY 6 HOURS PRN
Qty: 165 TABLET | Refills: 0 | Status: SHIPPED | OUTPATIENT
Start: 2022-01-12 | End: 2022-02-10

## 2022-01-12 RX ORDER — DULOXETIN HYDROCHLORIDE 30 MG/1
CAPSULE, DELAYED RELEASE ORAL
Qty: 60 CAPSULE | Refills: 1 | Status: SHIPPED | OUTPATIENT
Start: 2022-01-12 | End: 2022-02-14

## 2022-01-12 ASSESSMENT — PAIN SCALES - GENERAL: PAINLEVEL: MODERATE PAIN (4)

## 2022-01-12 NOTE — PROGRESS NOTES
01/12/22 0902   PEG: A Thee-Item Scale Assessing Pain Intensity and Interference        0 = No pain / No interference    10 = Pain as bad as you can imagine / Completely interferes   What number best describes your pain on average in the past week? 6   What number best describes how, during the past week, pain has interfered with your enjoyment of life? 7   What number best describes how, during the past week, pain has interfered with your general activity? 7   PEG Total Score 6.67

## 2022-01-12 NOTE — PATIENT INSTRUCTIONS
For your peripheral neuropathy:   1.We will be starting duloxetine also known as cymbalta.    We discussed that you would start taking 30mg daily. We will consider increasing to 60 mg daily in the future.     Common side effects include: Nausea, drowsiness, dry mouth, dizziness, constipation, decreased appetite and decreased libido.    Avoid activities requiring mental alertness or coordination until you know the drugs effects on you as there is a risk for dizziness and sleepiness.    If you are experiencing drowsiness, consider taking the medication at night instead.    There is an risk for worsening depression in teenagers and young adults with this medication. Immediately report worsening mood symptoms, suicidal ideation or changes in your behavior.    Immediately report symptoms of bleeding or bruising and try to avoid NSAIDs, aspiring initially.    Report any new rash that develops as there has been cases of severe and life threatening rashes reported.     Do not suddenly discontinue this medication as you may have withdrawal symptoms, speak with your primary physician or pain provider prior to discontinuing.    Avoid heavy alcohol intake with this medication as it may lead to liver injury.    If you have symptoms of an allergic reaction (swelling, difficulty breathing, hives, itching, rash) immediately stop the medication and contact a health care provider.    This medication in conjunction with your nortriptyline has a small risk of causing too much serotonin in your body, which can cause the following symptoms: - agitation and restlessness, confusion, rapid heart rate, muscle twitching, heavy sweating, shivering, and diarrhea, goose bumps. Please call the clinic if you start to notice any of these new symptoms. After hours, go to urgent care    2. For your right hip/buttock pain  - We will get an XRay of your pelvis. [facility] [phone] - please call to schedule appointment. Or  call [employer].     3. For your neck. In the future we could consider radiofrequency ablation of the neck joints, we will hold on that for now.     4. For your low back we could consider repeating your radiofrequency ablation over the next year.     Follow up: I will call you with the results of your XRay  If we don't see anything I will likely order an MRI.     Liz Mallory MD  [facility]    ----------------------------------------------------------------  Clinic Number:  [phone]     Call with any questions about your care and for scheduling assistance.     Calls are returned Monday through Friday between 8 AM and 4:30 PM. We usually get back to you within 2 business days depending on the issue/request.    If we are prescribing your medications:    For opioid medication refills, call the clinic or send a [employer] message 7 days in advance.  Please include:    Name of requested medication    Name of the pharmacy.    For non-opioid medications, call your pharmacy directly to request a refill. Please allow 3-4 days to be processed.     Per [place] State Law:    All controlled substance prescriptions must be filled within 30 days of being written.      For those controlled substances allowing refills, pickup must occur within 30 days of last fill.      We believe regular attendance is key to your success in our program!      Any time you are unable to keep your appointment we ask that you call us at least 24 hours in advance to cancel.This will allow us to offer the appointment time to another patient.     Multiple missed appointments may lead to dismissal from the clinic.

## 2022-01-12 NOTE — PROGRESS NOTES
"                          [facility]  Follow up clinic visit    Date of visit: 1/12/2022    Chief complaint:   Chief Complaint   Patient presents with     Pain       History:  Indra Mehta is a 56 year old male who follows with the pain clinic for:   Idiopathic peripheral neuropathy  Cervicalgia s/p cervical fusion  Lumbar radiculopathy  Chronic opioid use  Last seen by Chiara Garcia PA-C on 12/17/2021.        Interval history 12/17/2021 (date)  \"The injection has helped his knees, but he reports continuing sharp pain in left knee on the outside. He was recommended to see Dr. Calero if it persists. He states that he feels the left knee cap is different than the right and is wondering if this is contributing to the pain.      He has his follow up with Dr. Mallory on 1/12/22.      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 This is frustrating as he does not feel he is doing anything strenuous. He states that the pain is his joints and low back. He feels that the spot in his low back is getting worse. He states that he gets a shot of pain with every step he takes. He states that he tends to lean forward when walking as that seems too help the pain. He states that he has had pain going below the knee on the right side for the last week. He states that he gets sharp pain then it aches. He states that this pain feels different than the pain he has had before. The pain is located on the left side.     Recommendations/plan at the last visit included:  New and worsening left sided low back pain radiating down his left leg. Given the worsening radiculopathy, will get an updated Mri and review potential injection therapy options or referral to neurosurgery depending on the results. His neck pain is stable. His neuropathy is " "overall stable. He would likely benefit from a spinal cord stimulator, unfortunately his insurance has denied the trial. \"    1. \"Physical Therapy:   Not at this time  2. Clinical Health Psychologist:  NO  3. Diagnostic Studies: MRI lumbar spine  4. Medication Management:    1. IContinue Lyrica, 150mg in AM and afternoon, 200mg at bedtime  2. Continue nortriptyline to 50 mg at bedtime.  3. Continue Oxycodone 5m-2 tablets every 4-6 hours as needed. Max of 6 tabs in a day. We did discuss today that long term opiate use can increase pain by decreasing pain tolerance and increase pain sensitivity. We do know that he has a tolerance to the oxycodone. Will continue to monitor, but this may need to be changed in the future.   4. Flexeril 5mg: Take 1-2 tabs 3 times daily as needed  5. Further procedures recommended: none at this time, Insurance denied appeal for SCS.  6. Recommendations to PCP.  See above  \"    Since his last visit, Indra Mehta reports:  - Has a spot on the right iliac crest that is painful. Not tender to palpation. Radiates into R lateral buttock/hip, back of thigh with active hip flexion. Most commonly radiates into buttock/hip. 20% of he time radiates down the posterior thigh. Every step with R leg, 1/10 steps on the left. Not painful with weight bearing. Never radiates past the knee. IN the iliac crest it is a constant sharp pain. The radiating pain feels like the typical nerve pain. Worse after being inactive for a period of time. Pain then gradually improves with being active, then when active for prolonged periods, pain increases again. Started 2 mo ago without inciting event. Gradually worsened.    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 By the end of practice the leg will be burning.   - Feet still suck. Not getting better, getting worse. 1st steps in the morning feels like his ball of his " foot is a big clump/very thick. Takes By 8-9 am feet are tingling pins and needles all the time, worsened with every step, but not nearly as bad as when he fist gets up.   - The neck hurts with movement, (outler limit of movement R and L rotation. L rotation is more limited). Occasionally pain in the R under shoulder blade, down into upper arm, lasts 5-6 days. Extension is also limited with pain. Flexion is the best. Thinks the neck is where it is gooing to be in regards to pain - not acute pain all the time unless he is really moving his head around a lot. 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 Things that bother this are movement or sitting in one spot. Now improved sitting/stiffness s/p radiofrequency ablation. Movement pain remains.     Pain scores:  Pain intensity on average is 4 on a scale of 0-10.     Current pain treatments:   Tylenol 500mg-1000mg 4x/day  Xanax-Takes a night for tinnitis. increased tinitis and fullness and dizziness over the past month. Using ~6x/mo. Will make ENT appointment  Nortriptyline 50mg at bedtime  Oxycodone 5m-2 tablets every 4-6 hours, max of 6/day  Lyrica 150/200/150 mg  Flexeril 5-10 mg bedtime PRN - 5 mg does not make him sleepy, takes 5 mg occasionally during the day    Patient is using the medication as prescribed:  YES  Is your medication helpful? YES   Side Effects: no side effect    Past pain treatments:  Norco/vicodin H  Oxycodone H  Flexeril - was helpful at night but caused him to be groggy in the morning  Robaxin - not helpful  Tizanidine H  Gabapentin ?  Lyrica SE sedation  Cymbalta NH  Nortriptyline H  Duloxetine  - unclear why stopped     Past Pain Treatments:  Injections:    - 18 bilateral L3-4 TFESI - (Dr Mcdaniel)  - 18 bilateral L3-4 TFESI - seems to have worsened pain (Dr Mcdaniel)  - 18 TFESI  right C5-6 - helped with arm pain and numbness  - 12/28/17 bilateral L3-4 TFESI   - 10/23/17 Bilateral L4-5 TFESI   - 11/08/2018 Bilateral L3-4 TFESI   -6/14/2019 Bilateral L3-4 TFESI   -8/22/2019 C6-7 SRIDEVI   -5/22/2020 L3-4 bilateral TLESI  -5/13/21 L3-4 bilateral TLESI  -8/12/21 bilateral L3-5 LMBB  - bilateral L3,4,5 radiofrequency ablation - disappointed with results. Helped with sitting.  Surgery:    - ACDF C6-7 on 7/5/17 with improvement;   - ACDF C5-6 12/19/2018, posterior C5-7 fusion. Lateral mass screws, right C5-6 medial facetectomy,   - right C6 foraminotomy on 10/29/2019  TENS unit: helpful    Medications:  Current Outpatient Medications   Medication Sig Dispense Refill     Acetaminophen (TYLENOL PO) Take 1,000 mg by mouth 3 times daily        ALPRAZolam (XANAX XR) 0.5 MG 24 hr tablet Take 1 tablet (0.5 mg) by mouth At Bedtime 30 tablet 3     amLODIPine (NORVASC) 5 MG tablet TAKE 1 TABLET BY MOUTH EVERY DAY 90 tablet 1     atorvastatin (LIPITOR) 10 MG tablet Take 10 mg by mouth daily       cyclobenzaprine (FLEXERIL) 5 MG tablet 1-2 tablets three times daily as needed 45 tablet 2     ibuprofen (ADVIL/MOTRIN) 800 MG tablet Take 800 mg by mouth 3 times daily       losartan-hydrochlorothiazide (HYZAAR) 100-25 MG tablet TAKE 1 TABLET BY MOUTH EVERY DAY 30 tablet 0     naloxone (NARCAN) 4 MG/0.1ML nasal spray Spray 1 spray (4 mg) into one nostril alternating nostrils once as needed for opioid reversal every 2-3 minutes until assistance arrives 0.2 mL 1     nortriptyline (PAMELOR) 50 MG capsule Take 1 capsule (50 mg) by mouth At Bedtime 30 capsule 2     order for DME Equipment being ordered: TENS Pads as directed 1 Device 0     oxyCODONE (ROXICODONE) 5 MG tablet Take 1-2 tablets (5-10 mg) by mouth every 6 hours as needed for severe pain Max of 6 tablets/day. Fill 12/17/21 and Start 12/19/21 165 tablet 0     pregabalin (LYRICA) 150 MG capsule Take 1 capsule (150 mg) by mouth 3 times daily 90 capsule 3      Pregabalin (LYRICA) 200 MG capsule Take 1 capsule at bedtime. This is in addition to the 150mg in morning and afternoon. 30 capsule 2       Medical History: any changes in medical history since they were last seen? No    Physical Exam:  Blood pressure (!) 151/89, pulse 96.  General: NAD  Gait: Slow, antalgic favoring R   Psych: Mood is OK. Affect is congruent. Thought content is logical.   Neuro: CNs II - XII grossly intact    MSK exam:    Low back exam:    THORACIC/LUMBAR SPINE  Inspection:    No gross deformity/asymmetry, scapular winging  Palpation:    NO tenderness to palpation about the lumbar facet joints, left para lumbar muscles, right para lumbar muscles, left SI joint, right SI joint, left sciatic notch and right sciatic notch.   Strength:    5/5 - quadriceps, hamstrings, tibialis anterior, gastrocsoleus, and extensor hallicus longus L hip flexion limited by pain provoked in R hip/PSIS area. R hip flexion without pain, 5/5  Knee extension, ankle dorsi- and plantar flexion 5/5, EHL 5/5 bilaterally  Special Tests:    Positive: none   Negative: RIGHT straight leg raise (right), slump test (right), femoral stretch test (right), SI joint compression (right), RADHA (right), FADIR (right), Gaenslen's test, R hip scour, hopping on R leg    Reflexes:       patellar (L3, L4) symmetric normal       achilles tendons (S1) symmetric absent  no ankle clonus bilaterally    Sensation:      grossly intact throughout lower extremities except distal to mid calf bilaterally in stocking distribution with paresthesias to LT    Skin:       well perfused       capillary refill brisk    Controlled Substance Agreement:   CSA -- Encounter Level on 06/15/2017:    Controlled Substance Agreement - Scan on 6/22/2017 11:53 AM: CONTROLLED SUBSTANCE AGREEMENT     CSA -- Encounter Level on 10/13/2016:    Controlled Substance Agreement - Scan on 10/23/2016  5:07 PM: CONTROLLED SUBSTANCE AGREEMENT 10/13/16     CSA -- Patient Level:     Controlled Substance Agreement - Opioid - Scan on 6/18/2021  9:09 AM  Controlled Substance Agreement - Opioid - Scan on 10/29/2020  2:12 PM  Controlled Substance Agreement - Opioid - Scan on 4/15/2019  3:04 PM          UDS: 6/18/2021    THE 4 As OF OPIOID MAINTENANCE ANALGESIA    Analgesia: Is pain relief clinically significant? YES   Activity: Is patient functional and able to perform Activities of Daily Living? YES   Adverse effects: Is patient free from adverse side effects from opiates? YES   Adherence to Rx protocol: Is patient adhering to Controlled Substance Agreement and taking medications ONLY as ordered? YES        Assessment:   Indra Mehta is a 56 year old male who is seen at the pain clinic for:    1. Pain in joint involving pelvic region and thigh, right    2. Other polyneuropathy    3. Neuropathy    4. Cervicalgia    5. S/P cervical spinal fusion    6. Myofascial pain    7. Lumbar radiculopathy    8. Chronic pain syndrome    9. Chronic, continuous use of opioids         # Chronic axial low back pain: bilateral facet arthropathy on MRI at L4/5, L5/S1. sitting improved after bilateral L3,4,5 medial branch radiofrequency ablation. Consider repeat in the future    #Chronic axial neck pain: s/p C5/6, C6/7 ACDF, C5-7 posterior fusion. Pain with range of motion likely cervical spondylosis. Consider radiofrequency ablation in the future.     #Idiopathic peripheral neuropathy: on Lyrica 150/200/150, nortriptyline 50 mg bedtime with continued symptoms. Will try combination therapy with duloxetine while monitoring for serotonin syndrome given use of nortriptyline.     #Right hip/pelvic pain: Over PSIS. Unclear etiology. Has numbness and tingling radiating down posterior thigh, no pathology on lumbar MRI to suggest radicular cause. No exacerbation with FADIR or palpation of piriformis to suggest piriformis syndrome. Unlikely intra-articular hip athology given lack of exacerbation with weight bearing, hip  flexion, hip IR, hip scour. Likely mechanical given pain with R hip flexion while walking and pain provoked by L resisted hip flexion on MRI. Will get XRay of pelvis    Plan:  Additional Workup:     - Diagnostic Studies:  XRay pelvis    - Laboratory studies:  no    - Urine toxicology screen today: no   Therapies:     - Physical Therapy: not at this time    - Clinical Health Psychologist to address issues of relaxation, behavioral change, coping style, and other factors important to improvement: not at this time  Medication Management:     - Continue oxycodone 5 mg. Max #6 tabs per day. Refilled today  1. - UDS 6/18/2021  2. - CSA 6/18/2021  3. Start Cymbalta 30 gm per day for 7 days, then 60 mg per day. Consider trial of high dose (120 mg per day) if not sufficiently helpful. Would likely need to taper off nortriptyline.   Procedures recommended:     - Consider repeat L3,4,5 bilateral radiofrequency ablation in the future    - Consider bilateral TON, C3, C4 MBBs/RFA in the future    - Consider trial of Botox for PN in the future if able to obtain approval    Follow up: I will call with results of XRay, then in 1 mo    BILLING TIME DOCUMENTATION:   The total TIME spent on this patient on the date of the encounter/appointment was 40 minutes.      TOTAL TIME includes:   Time spent preparing to see the patient (reviewing records and tests) - 10 min  Time spent face to face (or over the phone) with the patient - 20 min  Time spent ordering tests, medications, procedures and referrals - 0 min  Time spent Referring and communicating with other healthcare professionals - 0 min  Time spent documenting clinical information in Epic - 10 min    Liz Mallory MD  [facility] [place]

## 2022-01-14 ENCOUNTER — ANCILLARY PROCEDURE (OUTPATIENT)
Dept: GENERAL RADIOLOGY | Facility: CLINIC | Age: 57
End: 2022-01-14
Attending: STUDENT IN AN ORGANIZED HEALTH CARE EDUCATION/TRAINING PROGRAM
Payer: COMMERCIAL

## 2022-01-14 DIAGNOSIS — M25.551 PAIN IN JOINT INVOLVING PELVIC REGION AND THIGH, RIGHT: ICD-10-CM

## 2022-01-14 PROCEDURE — 73521 X-RAY EXAM HIPS BI 2 VIEWS: CPT

## 2022-01-17 DIAGNOSIS — I73.01 RAYNAUD'S DISEASE WITH GANGRENE (H): ICD-10-CM

## 2022-01-19 RX ORDER — AMLODIPINE BESYLATE 5 MG/1
TABLET ORAL
Qty: 90 TABLET | Refills: 0 | Status: SHIPPED | OUTPATIENT
Start: 2022-01-19 | End: 2022-07-13

## 2022-01-19 NOTE — TELEPHONE ENCOUNTER
PCP currently out of office,    Routing refill request to provider for review/approval because:  Elevated BP on file.     Altagracia Villasenor RN

## 2022-01-26 DIAGNOSIS — I10 BENIGN ESSENTIAL HYPERTENSION: ICD-10-CM

## 2022-01-26 RX ORDER — LOSARTAN POTASSIUM AND HYDROCHLOROTHIAZIDE 25; 100 MG/1; MG/1
TABLET ORAL
Qty: 90 TABLET | Refills: 0 | Status: SHIPPED | OUTPATIENT
Start: 2022-01-26 | End: 2022-04-28

## 2022-01-26 NOTE — TELEPHONE ENCOUNTER
Pending Prescriptions:                       Disp   Refills    losartan-hydrochlorothiazide (HYZAAR) 100-*30 tab*0        Sig: TAKE 1 TABLET BY MOUTH EVERY DAY    Routing refill request to provider for review/approval because:  Labs out of range:    BP Readings from Last 3 Encounters:   01/12/22 (!) 151/89   12/17/21 (!) 144/80   12/09/21 118/80

## 2022-02-01 ENCOUNTER — MYC MEDICAL ADVICE (OUTPATIENT)
Dept: PALLIATIVE MEDICINE | Facility: CLINIC | Age: 57
End: 2022-02-01
Payer: COMMERCIAL

## 2022-02-01 DIAGNOSIS — M25.551 PAIN IN JOINT INVOLVING PELVIC REGION AND THIGH, RIGHT: Primary | ICD-10-CM

## 2022-02-01 NOTE — TELEPHONE ENCOUNTER
Ruth Kunstadter â€“ [employer] message sent to Pt    Noble Aceves,     I should see you at least every 3 months.   OK to do some of the visits via the computer to avoid the drive. Probably every other visit would be best, so I would see you in person 2 times per year.     I have ordered the MRI.     Liz Mallory MD  [facility]

## 2022-02-01 NOTE — TELEPHONE ENCOUNTER
Per patient myChart message:  From: Ketan Mehta      Created: 2/1/2022 2:30 PM      I saw your note on the x-ray.  I was hoping to avoid meeting my deductible again this year, but I am still in pain.  Go ahead and order the MRI.     Also, how often do you want to see me?  I haven't made an appointment for a return visit yet.  Also, to avoid driving over an hour one way can we do some of the visit on the computer?      __________________________________________    1/14/22 xray result note:  Hi Ketan,      Your pelvic XRay was normal. I suggest MRI to further evaluate. Would you like me to order it?     Liz Mallory MD  [facility]   Written by Liz Mallory MD on 1/14/2022 10:40 AM CST  Seen by patient Ketan Mehta on 2/1/2022  2:26 PM      Routed to provider.     Heidy RN-BSN  [facility]

## 2022-02-09 ENCOUNTER — HOSPITAL ENCOUNTER (OUTPATIENT)
Dept: MRI IMAGING | Facility: CLINIC | Age: 57
Discharge: HOME | End: 2022-02-09
Attending: STUDENT IN AN ORGANIZED HEALTH CARE EDUCATION/TRAINING PROGRAM | Admitting: STUDENT IN AN ORGANIZED HEALTH CARE EDUCATION/TRAINING PROGRAM
Payer: COMMERCIAL

## 2022-02-09 DIAGNOSIS — M25.551 PAIN IN JOINT INVOLVING PELVIC REGION AND THIGH, RIGHT: ICD-10-CM

## 2022-02-09 PROCEDURE — 72195 MRI PELVIS W/O DYE: CPT

## 2022-02-09 PROCEDURE — 72195 MRI PELVIS W/O DYE: CPT | Mod: 26 | Performed by: RADIOLOGY

## 2022-02-10 DIAGNOSIS — G62.9 NEUROPATHY: ICD-10-CM

## 2022-02-10 DIAGNOSIS — F11.90 CHRONIC, CONTINUOUS USE OF OPIOIDS: ICD-10-CM

## 2022-02-10 DIAGNOSIS — M54.16 LUMBAR RADICULOPATHY: ICD-10-CM

## 2022-02-10 DIAGNOSIS — G89.4 CHRONIC PAIN SYNDROME: ICD-10-CM

## 2022-02-10 DIAGNOSIS — M84.30XA STRESS REACTION OF BONE: Primary | ICD-10-CM

## 2022-02-10 DIAGNOSIS — Z98.1 S/P CERVICAL SPINAL FUSION: ICD-10-CM

## 2022-02-10 DIAGNOSIS — M79.18 MYOFASCIAL PAIN: ICD-10-CM

## 2022-02-10 DIAGNOSIS — M54.2 CERVICALGIA: ICD-10-CM

## 2022-02-10 RX ORDER — OXYCODONE HYDROCHLORIDE 5 MG/1
5-10 TABLET ORAL EVERY 6 HOURS PRN
Qty: 165 TABLET | Refills: 0 | Status: SHIPPED | OUTPATIENT
Start: 2022-02-10 | End: 2022-03-18

## 2022-02-10 NOTE — TELEPHONE ENCOUNTER
Medication refill information reviewed.     Due date for oxyCODONE (ROXICODONE) 5 MG tablet      is 2/17/22     Prescriptions prepped for review.     Will route to provider.     Nyasia Aguilera RN, BSN, CMSRN  RN Care Coordinator  [facility]

## 2022-02-10 NOTE — TELEPHONE ENCOUNTER
Received call from patient requesting refill(s) of oxyCODONE (ROXICODONE) 5 MG tablet     Last dispensed from pharmacy on 01/16/22    Patient's last office/virtual visit by prescribing provider on 01/12/22  Next office/virtual appointment scheduled for None    Last urine drug screen date 06/18/21  Current opioid agreement on file (completed within the last year) Yes Date of opioid agreement: 06/18/21    E-prescribe to   [id number] - [place], [place] - [address] [place] [id number]  Phone: [phone] Fax: [phone]    Will route to nursing [place] for review and preparation of prescription(s).     Anastasia Skinner MA  [facility]

## 2022-02-11 ENCOUNTER — MYC MEDICAL ADVICE (OUTPATIENT)
Dept: GASTROENTEROLOGY | Facility: CLINIC | Age: 57
End: 2022-02-11
Payer: COMMERCIAL

## 2022-02-11 NOTE — TELEPHONE ENCOUNTER
checked and appropriate     Script Eprescribed to pharmacy    Will send this to MA team to notify patient.    Signed Prescriptions:                        Disp   Refills    oxyCODONE (ROXICODONE) 5 MG tablet         165 ta*0        Sig: Take 1-2 tablets (5-10 mg) by mouth every 6 hours as           needed for severe pain Max of 6 tablets/day. Fill           2/15/22 and Start 2/17/22  Authorizing Provider: DONALD MACIAS MD  [facility]

## 2022-02-11 NOTE — TELEPHONE ENCOUNTER
[employer] message sent with Rx approval from provider.  Dilip  Pain Clinic Management Team

## 2022-02-12 ENCOUNTER — HEALTH MAINTENANCE LETTER (OUTPATIENT)
Age: 57
End: 2022-02-12

## 2022-02-14 DIAGNOSIS — G62.89 OTHER POLYNEUROPATHY: ICD-10-CM

## 2022-02-14 RX ORDER — DULOXETIN HYDROCHLORIDE 60 MG/1
60 CAPSULE, DELAYED RELEASE ORAL DAILY
Qty: 60 CAPSULE | Refills: 1 | Status: SHIPPED | OUTPATIENT
Start: 2022-02-14 | End: 2022-05-31

## 2022-02-14 NOTE — TELEPHONE ENCOUNTER
Received fax request from [facility] pharmacy requesting refill(s) for DULoxetine (CYMBALTA) 30 MG capsule     Last refilled on 01/12/22    Pt last seen on 01/12/22  Next appt scheduled for : none    Will facilitate refill.

## 2022-02-16 ENCOUNTER — OFFICE VISIT (OUTPATIENT)
Dept: ORTHOPEDICS | Facility: CLINIC | Age: 57
End: 2022-02-16
Payer: COMMERCIAL

## 2022-02-16 VITALS
TEMPERATURE: 98 F | SYSTOLIC BLOOD PRESSURE: 132 MMHG | DIASTOLIC BLOOD PRESSURE: 66 MMHG | HEIGHT: 74 IN | BODY MASS INDEX: 28.49 KG/M2 | WEIGHT: 222 LBS

## 2022-02-16 DIAGNOSIS — L03.90 CELLULITIS, UNSPECIFIED CELLULITIS SITE: Primary | ICD-10-CM

## 2022-02-16 PROCEDURE — 99213 OFFICE O/P EST LOW 20 MIN: CPT | Performed by: ORTHOPAEDIC SURGERY

## 2022-02-16 RX ORDER — SULFAMETHOXAZOLE/TRIMETHOPRIM 800-160 MG
1 TABLET ORAL 2 TIMES DAILY
Qty: 28 TABLET | Refills: 0 | Status: SHIPPED | OUTPATIENT
Start: 2022-02-16 | End: 2022-02-18

## 2022-02-16 ASSESSMENT — PAIN SCALES - GENERAL: PAINLEVEL: MODERATE PAIN (5)

## 2022-02-16 NOTE — PROGRESS NOTES
ORTHOPEDIC CONSULT      Chief Complaint: Indra Mehta is a 56 year old right hand dominant male who is having left knee pain.        Chief Complaints and History of Present Illnesses   Patient presents with     Knee Pain     left knee pain             History of Present Illness:     We have previously treated the patient before for a prepatellar bursitis.  He noticed that on the inside of his left distal thigh around the knee that he was having an area with increased redness and swelling which is slightly fluctuant.      Physical Exam:    There is about a 3 cm raised erythematous area with a small area of what appears to be purulence underneath of it that is slightly fluctuant.  It is tender to palpation.      Impression: Left knee cellulitis    Plan:  All of the above pertinent physical exam and imaging modalities findings was reviewed.  I sterilely prepped his area of the knee with Betadine swabs and then inserted an 18-gauge 3 times.  I then try to express any fluid which I could not.  I then covered this with a 4 x 4 and foam tape.  I did give him some more bandages and tape to go home with.    We will start him on Bactrim twice a day for 2 weeks.  I do want him to take yogurt or probiotic with this.  If he does not resolve he needs to come back and visit with us.            BP Readings from Last 1 Encounters:   02/16/22 132/66

## 2022-02-16 NOTE — LETTER
2/16/2022         RE: Indra Mehta  [address] [facility] [address]        Dear Colleague,    Thank you for referring your patient, Indra Mehta, to the [facility]. Please see a copy of my visit note below.    ORTHOPEDIC CONSULT      Chief Complaint: Indra Mehta is a 56 year old right hand dominant male who is having left knee pain.        Chief Complaints and History of Present Illnesses   Patient presents with     Knee Pain     left knee pain             History of Present Illness:     We have previously treated the patient before for a prepatellar bursitis.  He noticed that on the inside of his left distal thigh around the knee that he was having an area with increased redness and swelling which is slightly fluctuant.      Physical Exam:    There is about a 3 cm raised erythematous area with a small area of what appears to be purulence underneath of it that is slightly fluctuant.  It is tender to palpation.      Impression: Left knee cellulitis    Plan:  All of the above pertinent physical exam and imaging modalities findings was reviewed.  I sterilely prepped his area of the knee with Betadine swabs and then inserted an 18-gauge 3 times.  I then try to express any fluid which I could not.  I then covered this with a 4 x 4 and foam tape.  I did give him some more bandages and tape to go home with.    We will start him on Bactrim twice a day for 2 weeks.  I do want him to take yogurt or probiotic with this.  If he does not resolve he needs to come back and visit with us.            BP Readings from Last 1 Encounters:   02/16/22 132/66                 Again, thank you for allowing me to participate in the care of your patient.        Sincerely,        Truong Calero, DO

## 2022-02-17 ENCOUNTER — MYC MEDICAL ADVICE (OUTPATIENT)
Dept: ORTHOPEDICS | Facility: CLINIC | Age: 57
End: 2022-02-17
Payer: COMMERCIAL

## 2022-02-18 ENCOUNTER — HOSPITAL ENCOUNTER (EMERGENCY)
Facility: CLINIC | Age: 57
End: 2022-02-18
Payer: COMMERCIAL

## 2022-02-18 ENCOUNTER — HOSPITAL ENCOUNTER (EMERGENCY)
Facility: CLINIC | Age: 57
Discharge: HOME | End: 2022-02-18
Attending: EMERGENCY MEDICINE | Admitting: EMERGENCY MEDICINE
Payer: COMMERCIAL

## 2022-02-18 VITALS
HEIGHT: 74 IN | SYSTOLIC BLOOD PRESSURE: 138 MMHG | HEART RATE: 94 BPM | RESPIRATION RATE: 18 BRPM | WEIGHT: 229.6 LBS | DIASTOLIC BLOOD PRESSURE: 86 MMHG | TEMPERATURE: 98.4 F | OXYGEN SATURATION: 94 % | BODY MASS INDEX: 29.46 KG/M2

## 2022-02-18 DIAGNOSIS — L02.416 ABSCESS OF LEFT LEG: ICD-10-CM

## 2022-02-18 DIAGNOSIS — L03.90 CELLULITIS, UNSPECIFIED CELLULITIS SITE: ICD-10-CM

## 2022-02-18 PROCEDURE — 96365 THER/PROPH/DIAG IV INF INIT: CPT | Mod: 59 | Performed by: EMERGENCY MEDICINE

## 2022-02-18 PROCEDURE — 250N000009 HC RX 250: Performed by: EMERGENCY MEDICINE

## 2022-02-18 PROCEDURE — 96367 TX/PROPH/DG ADDL SEQ IV INF: CPT | Mod: 59 | Performed by: EMERGENCY MEDICINE

## 2022-02-18 PROCEDURE — 10060 I&D ABSCESS SIMPLE/SINGLE: CPT | Performed by: EMERGENCY MEDICINE

## 2022-02-18 PROCEDURE — 99284 EMERGENCY DEPT VISIT MOD MDM: CPT | Mod: 25 | Performed by: EMERGENCY MEDICINE

## 2022-02-18 PROCEDURE — 250N000011 HC RX IP 250 OP 636: Performed by: EMERGENCY MEDICINE

## 2022-02-18 PROCEDURE — 87070 CULTURE OTHR SPECIMN AEROBIC: CPT | Performed by: EMERGENCY MEDICINE

## 2022-02-18 RX ORDER — BUPIVACAINE HYDROCHLORIDE 5 MG/ML
10 INJECTION, SOLUTION PERINEURAL ONCE
Status: COMPLETED | OUTPATIENT
Start: 2022-02-18 | End: 2022-02-18

## 2022-02-18 RX ORDER — VANCOMYCIN HYDROCHLORIDE 1 G/200ML
1000 INJECTION, SOLUTION INTRAVENOUS EVERY 12 HOURS
Status: DISCONTINUED | OUTPATIENT
Start: 2022-02-18 | End: 2022-02-18 | Stop reason: HOSPADM

## 2022-02-18 RX ORDER — SULFAMETHOXAZOLE/TRIMETHOPRIM 800-160 MG
2 TABLET ORAL 2 TIMES DAILY
Qty: 28 TABLET | Refills: 0 | COMMUNITY
Start: 2022-02-18 | End: 2022-06-01

## 2022-02-18 RX ORDER — SULFAMETHOXAZOLE/TRIMETHOPRIM 800-160 MG
2 TABLET ORAL 2 TIMES DAILY
Qty: 40 TABLET | Refills: 0 | Status: SHIPPED | OUTPATIENT
Start: 2022-02-18 | End: 2022-02-28

## 2022-02-18 RX ADMIN — BUPIVACAINE HYDROCHLORIDE 50 MG: 5 INJECTION, SOLUTION EPIDURAL; INTRACAUDAL at 16:34

## 2022-02-18 RX ADMIN — VANCOMYCIN HYDROCHLORIDE 1000 MG: 1 INJECTION, SOLUTION INTRAVENOUS at 17:44

## 2022-02-18 RX ADMIN — TAZOBACTAM SODIUM AND PIPERACILLIN SODIUM 3.38 G: 375; 3 INJECTION, SOLUTION INTRAVENOUS at 16:52

## 2022-02-18 NOTE — ED TRIAGE NOTES
Has a small wound to his left leg just above the knee. Was seen in clinic this week, put on antibiotics, comes in today with worsening swelling and pain to leg, states wound looks about the same.

## 2022-02-18 NOTE — ED PROVIDER NOTES
History     Chief Complaint   Patient presents with     Wound Check     HPI reported by Patient      Indra Mehta is a 56 year old male who presents with a wound on the inner aspect of his left knee. Patient reports 7 days ago before bed he scratched his knee and then 6 days ago he woke up to his knee being red. 3 days ago the wound became inflamed and pus filled. Patient had an appointment with Dr. Calero to drain the the pus but was unsuccessful. These last 2 days the wound has gotten worse. Patient's left leg has started to swell, become red and painful. Reports leg is sensitive to touch. Pain radiates from mid thigh to ankle. There has been some drainage. Denies fever or chills, has had a mild sore throat from sinus issues. Patient had a similar wound on his right knee this past summer and it had to be cut open and drained. No other symptoms reported.     Allergies:  Allergies   Allergen Reactions     No Known Drug Allergies        Problem List:    Patient Active Problem List    Diagnosis Date Noted     Impingement syndrome of left shoulder 06/23/2021     Priority: Medium     Sleep apnea, unspecified type 04/06/2021     Priority: Medium     Osteoarthritis of spine with radiculopathy, lumbar region 04/06/2021     Priority: Medium     Status post lumbar spinal fusion 01/14/2020     Priority: Medium     Cervical radiculopathy 10/28/2019     Priority: Medium     Formatting of this note might be different from the original.  Added automatically from request for surgery [id number]       Benign essential hypertension 06/30/2017     Priority: Medium     Bilateral occipital neuralgia 10/28/2016     Priority: Medium     Major depression in complete remission (H) 08/20/2010     Priority: Medium     Chronic pain syndrome 08/20/2010     Priority: Medium     Patient is followed by Tha Grullon MD for ongoing prescription of pain medication.  All refills should be approved by this provider only at face-to-face  appointments - not by phone request.    Medication(s): Tramadol.   Maximum quantity per month: 60  Clinic visit frequency required: Q 1 months     Controlled substance agreement:  Encounter-Level CSA - 10/13/16:               Controlled Substance Agreement - Scan on 10/23/2016  5:07 PM : CONTROLLED SUBSTANCE AGREEMENT 10/13/16 (below)            Pain Clinic evaluation in the past: Yes       Date/Location:   [facility]    DIRE Total Score(s):  No flowsheet data found.    [facility] website verification:  none   [email or web address]               Pain medication agreement signed 08/20/2010     Priority: Medium     Meniere's disease 03/22/2007     Priority: Medium     Problem list name updated by automated process. Provider to review          Past Medical History:    Past Medical History:   Diagnosis Date     Anxiety      Back pain      Depressive disorder      Hyperlipidemia      Hypertension      Meniere's disease, unspecified      Pain medication agreement signed 8/20/2010     Sleep apnea, unspecified type        Past Surgical History:    Past Surgical History:   Procedure Laterality Date     BUNIONECTOMY RT/LT  09/05/08    Both feet     COLONOSCOPY N/A 12/28/2016    Procedure: COMBINED COLONOSCOPY, SINGLE OR MULTIPLE BIOPSY/POLYPECTOMY BY BIOPSY;  Surgeon: Indra Jernigan MD;  [facility]     DISCECTOMY, FUSION CERVICAL ANTERIOR ONE LEVEL, COMBINED N/A 7/5/2017    Procedure: COMBINED DISCECTOMY, FUSION CERVICAL ANTERIOR ONE LEVEL;  Cervical 6-7, Anterior cervical discectomy fusion;  Surgeon: Mike Mckenna MD;  [facility]     DISCECTOMY, FUSION CERVICAL ANTERIOR ONE LEVEL, COMBINED N/A 12/19/2018    Procedure: cervical 5-6 anterior cervical discectomy fusion;  Surgeon: Mike Mckenna MD;  [facility]     HERNIORRHAPHY UMBILICAL N/A 8/11/2017    Procedure: HERNIORRHAPHY UMBILICAL;  open umbilical hernia repair;  Surgeon: Boni Story MD;  [facility]     INJECT  BLOCK MEDIAL BRANCH CERVICAL/THORACIC/LUMBAR Bilateral 8/12/2021    Procedure: Left L3, Left L4, Left L5, Right L3, Right L4 and Right L5 medial branch nerve blocks using fluoroscopic guidance and contrast control;  Surgeon: Darryn Mcdaniel MD;  [facility]     INJECT BLOCK MEDIAL BRANCH CERVICAL/THORACIC/LUMBAR N/A 9/9/2021    Procedure: Left L3, Left L4, Left L5, Right L3, Right L4 and Right L5 medial branch nerve blocks using fluoroscopic guidance and contrast control;  Surgeon: Darryn Mcdaniel MD;  [facility]     INJECT EPIDURAL CERVICAL N/A 8/22/2019    Procedure: INJECTION, SPINE, CERVICAL 6-7 EPIDURAL;  Surgeon: Darryn Mcdaniel MD;  [facility]     INJECT EPIDURAL LUMBAR N/A 11/9/2017    Procedure: INJECT EPIDURAL LUMBAR;  lumbar 4-5 epidural steroid injection ;  Surgeon: Darryn Mcdaniel MD;  [facility]     INJECT EPIDURAL LUMBAR N/A 12/28/2017    Procedure: INJECT EPIDURAL LUMBAR;  lumbar epidural injection;  Surgeon: Darryn Mcdaniel MD;  [facility]     INJECT EPIDURAL LUMBAR Bilateral 5/13/2021    Procedure: Bilateral Lumbar 3-4 Epidural Steroid Injection;  Surgeon: Darryn Mcdaniel MD;  [facility]     INJECT EPIDURAL TRANSFORAMINAL Bilateral 8/23/2018    Procedure: INJECT EPIDURAL TRANSFORAMINAL;  transforaminal bilateral lumbar 3-4 steroid injection;  Surgeon: Darryn Mcdaniel MD;  [facility]     INJECT EPIDURAL TRANSFORAMINAL Bilateral 11/8/2018    Procedure: INJECT EPIDURAL TRANSFORAMINAL lumbar 3-4;  Surgeon: Darryn Mcdaniel MD;  [facility]     INJECT EPIDURAL TRANSFORAMINAL Bilateral 6/14/2019    Procedure: INJECTION, EPIDURAL, TRANSFORAMINAL LUMBAR 3-4 BILATERAL;  Surgeon: Darryn Mcdaniel MD;  [facility]     INJECT EPIDURAL TRANSFORAMINAL Bilateral 5/22/2020    Procedure: lumbar 3-4 bilateral transforaminal epidural steroid injection;  Surgeon: Darryn Mcdaniel MD;  [facility]     INJECT EPIDURAL TRANSFORAMINAL Bilateral 9/24/2020    Procedure: INJECTION,  EPIDURAL, TRANSFORAMINAL  LUMBAR 3-4 APPROACH;  Surgeon: Darryn Mcdaniel MD;  [facility]     PE TUBES      left ear     RADIO FREQUENCY ABLATION PULSED CERVICAL Bilateral 10/1/2021    Procedure: RADIOFREQUENCY ABLATION lumbar 3, 4, 5 bilateral;  Surgeon: Darryn Mcdaniel MD;  [facility]     ZZHC COLONOSCOPY W/WO BRUSH/WASH  2005     ZZHC CREATE EARDRUM OPENING,GEN ANESTH  2007    Pe tube, Left endolymphatic sac enhancement.     ZZHC MASTOIDECTOMY,COMPLETE  2007       Family History:    Family History   Problem Relation Age of Onset     Diabetes Mother      Cancer Mother         colon cancer -  at 52, diag at 42     Hypertension Father      Heart Disease Father          of AAA     No Known Problems Brother      No Known Problems Brother      Depression Sister      Suicide Other      Unknown/Adopted Maternal Grandmother      Unknown/Adopted Maternal Grandfather      Unknown/Adopted Paternal Grandmother      Unknown/Adopted Paternal Grandfather      No Known Problems Daughter      No Known Problems Daughter        Social History:  Marital Status:  Single [1]  Social History     Tobacco Use     Smoking status: Never Smoker     Smokeless tobacco: Never Used   Vaping Use     Vaping Use: Never used   Substance Use Topics     Alcohol use: Yes     Alcohol/week: 0.0 standard drinks     Comment: rarely     Drug use: No        Medications:    amoxicillin-clavulanate (AUGMENTIN) 875-125 MG tablet  sulfamethoxazole-trimethoprim (BACTRIM DS) 800-160 MG tablet  sulfamethoxazole-trimethoprim (BACTRIM DS) 800-160 MG tablet  Acetaminophen (TYLENOL PO)  ALPRAZolam (XANAX XR) 0.5 MG 24 hr tablet  amLODIPine (NORVASC) 5 MG tablet  atorvastatin (LIPITOR) 10 MG tablet  cyclobenzaprine (FLEXERIL) 5 MG tablet  DULoxetine (CYMBALTA) 60 MG capsule  ibuprofen (ADVIL/MOTRIN) 800 MG tablet  losartan-hydrochlorothiazide (HYZAAR) 100-25 MG tablet  naloxone (NARCAN) 4 MG/0.1ML nasal spray  nortriptyline  "(PAMELOR) 50 MG capsule  order for DME  oxyCODONE (ROXICODONE) 5 MG tablet  pregabalin (LYRICA) 150 MG capsule  Pregabalin (LYRICA) 200 MG capsule          Review of Systems   All other systems reviewed and are negative.      Physical Exam   BP: 138/86  Pulse: 94  Temp: 98.4  F (36.9  C)  Resp: 18  Height: 188 cm (6' 2\")  Weight: 104.1 kg (229 lb 9.6 oz)  SpO2: 94 %      Physical Exam  Vitals and nursing note reviewed.   Constitutional:       General: He is not in acute distress.     Appearance: He is not diaphoretic.   HENT:      Head: Atraumatic.   Eyes:      General: No scleral icterus.     Pupils: Pupils are equal, round, and reactive to light.   Cardiovascular:      Heart sounds: Normal heart sounds.   Pulmonary:      Effort: No respiratory distress.      Breath sounds: Normal breath sounds.   Abdominal:      General: Bowel sounds are normal.      Palpations: Abdomen is soft.      Tenderness: There is no abdominal tenderness.   Musculoskeletal:         General: No tenderness.      Left upper leg: Swelling present.      Left knee: Swelling and erythema present.      Left ankle: Swelling present.        Legs:       Comments: Erythema and warm from mid-thigh to ankle     Skin:     General: Skin is warm.      Findings: No rash.         ED [facility]    PROCEDURE: -Incision/Drainage    Date/Time: 2/18/2022 10:20 PM  Performed by: Dax Mcneal MD  Authorized by: Dax Mcneal MD     Risks, benefits and alternatives discussed.      LOCATION:      Type:  Abscess    Location:  Lower extremity    Lower extremity location:  Leg    Leg location:  L upper leg    PRE-PROCEDURE DETAILS:     Skin preparation:  Betadine    PROCEDURE TYPE:     Complexity:  Simple    ANESTHESIA (see MAR for exact dosages):     Anesthesia method:  Local infiltration    Local anesthetic:  Bupivacaine 0.5% w/o epi    PROCEDURE DETAILS:     Incision types:  Single straight    Incision " depth:  Dermal    Scalpel blade:  11    Wound management:  Probed and deloculated    Drainage:  Purulent    Drainage amount:  Moderate    Wound treatment:  Wound left open    PROCEDURE    Patient Tolerance:  Patient tolerated the procedure well with no immediate complications                Critical Care time:  none               No results found. However, due to the size of the patient record, not all encounters were searched. Please check Results Review for a complete set of results.    Medications   piperacillin-tazobactam (ZOSYN) infusion 3.375 g (0 g Intravenous Stopped 2/18/22 1744)   BUPivacaine (MARCAINE) 0.5% injection (50 mg Intradermal Given 2/18/22 1634)       Assessments & Plan (with Medical Decision Making)  56-year-old male with abscess and cellulitis of the left lower extremity.  History of MRSA in October.  Given dose of Zosyn and vancomycin here.  I recommended recheck here tomorrow.  Have increased his Bactrim to 2 pills twice a day and placed on  Augmentin.     I have reviewed the nursing notes.    I have reviewed the findings, diagnosis, plan and need for follow up with the patient.       Discharge Medication List as of 2/18/2022  6:58 PM      START taking these medications    Details   amoxicillin-clavulanate (AUGMENTIN) 875-125 MG tablet Take 1 tablet by mouth 2 times daily for 10 days, Disp-20 tablet, R-0, E-Prescribe             Final diagnoses:   Abscess of left leg   This document serves as a record of services personally performed by Dax Mcneal MD. It was created on their behalf by Ariel Marquez, a trained medical scribe. The creation of this record is based on the provider's personal observations and the statements of the patient. This document has been checked and approved by the attending provider.  Note: Chart documentation done in part with Dragon Voice Recognition software. Although reviewed after completion, some word and grammatical errors may remain.  2/18/2022   [facility]  [facility] EMERGENCY DEPT     Dax Mcneal MD  02/18/22 8629

## 2022-02-18 NOTE — PHARMACY-VANCOMYCIN DOSING SERVICE
"Pharmacy Vancomycin Initial Note  Date of Service 2022  Patient's  1965  56 year old, male    Indication: Skin and Soft Tissue Infection    Current estimated CrCl = CrCl cannot be calculated (Patient's most recent lab result is older than the maximum 30 days allowed.).    Creatinine for last 3 days  No results found for requested labs within last 72 hours.    Recent Vancomycin Level(s) for last 3 days  No results found for requested labs within last 72 hours.      Vancomycin IV Administrations (past 72 hours)      No vancomycin orders with administrations in past 72 hours.                Nephrotoxins and other renal medications (From now, onward)    Start     Dose/Rate Route Frequency Ordered Stop    22 1700  vancomycin (VANCOCIN) 1000 mg in dextrose 5% 200 mL PREMIX        Note to Pharmacy: DO NOT USE THIS FIELD FOR ADMIN INSTRUCTIONS; INFORMATION DOES NOT SHOW ON MAR. USE THE FIELD ABOVE MARKED ADMIN INSTRUCTIONS    1,000 mg  200 mL/hr over 1 Hours Intravenous EVERY 12 HOURS 22 1630      22 1635  piperacillin-tazobactam (ZOSYN) infusion 3.375 g        Note to Pharmacy: For [facility], [facility] and [facility]: For Zosyn-naive patients, use the \"Zosyn initial dose + extended infusion\" order panel.    3.375 g  100 mL/hr over 30 Minutes Intravenous ONCE 22 1630            Contrast Orders - past 72 hours (72h ago, onward)            None          [employer] Prediction of Planned Initial Vancomycin Regimen  Loading dose: N/A  Regimen: 1000 mg IV every 12 hours.  Start time: 16:40 on 2022  Exposure target: AUC24 (range)400-600 mg/L.hr   AUC24,ss: 474 mg/L.hr  Probability of AUC24 > 400: 69 %  Ctrough,ss: 15.7 mg/L  Probability of Ctrough,ss > 20: 26 %  Probability of nephrotoxicity (Lodise QUENTIN ): 11 %          Plan:  1. Start vancomycin  1000 mg IV q12h.   2. Vancomycin monitoring method: AUC  3. Vancomycin therapeutic monitoring goal: 400-600 mg*h/L  4. Pharmacy will check vancomycin " levels as appropriate in 1-3 Days.    5. Serum creatinine levels will be ordered a minimum of twice weekly.      Darryn Mg, STEPHANIE

## 2022-02-21 ENCOUNTER — TELEPHONE (OUTPATIENT)
Dept: EMERGENCY MEDICINE | Facility: CLINIC | Age: 57
End: 2022-02-21
Payer: COMMERCIAL

## 2022-02-21 LAB — BACTERIA ABSC ANAEROBE+AEROBE CULT: ABNORMAL

## 2022-02-21 NOTE — TELEPHONE ENCOUNTER
[facility] Emergency Department/Urgent Care Lab result notification:    [place] ED lab result protocol used  Culture     Reason for call  Notify of lab results, assess symptoms,  review ED providers recommendations/discharge instructions (if necessary) and advise per ED lab result f/u protocol    Lab Result   Final Abscess Aerobic Bacterial Culture (specimen - Leg) report on 2/21/22  [facility] Emergency Dept discharge antibiotic prescribed: Sulfamethoxazole-Trimethoprim (Bactrim DS, Septra DS) 800-160 mg PO tablet,  1 tablet by mouth 2 times daily for 14 days. & Amoxicillin-Clavulanate (Augmentin) 875-125 mg PO tablet, 1 tablet by mouth 2 times daily for 10 days  #1. Bacteria, 2+ Staphylococcus aureus MRSA,  is [SUSCEPTIBLE] to Bactrim DS.   Incision and Drainage performed in the [facility] ED: Yes  Recommendations in treatment per [facility] ED lab result culture protocol  Information table from Emergency Dept Provider visit on 2/18/22  Symptoms reported at ED visit (Chief complaint, HPI) Wound Check      HPI reported by Patient        Indra Mehta is a 56 year old male who presents with a wound on the inner aspect of his left knee. Patient reports 7 days ago before bed he scratched his knee and then 6 days ago he woke up to his knee being red. 3 days ago the wound became inflamed and pus filled. Patient had an appointment with Dr. Calero to drain the the pus but was unsuccessful. These last 2 days the wound has gotten worse. Patient's left leg has started to swell, become red and painful. Reports leg is sensitive to touch. Pain radiates from mid thigh to ankle. There has been some drainage. Denies fever or chills, has had a mild sore throat from sinus issues. Patient had a similar wound on his right knee this past summer and it had to be cut open and drained. No other symptoms reported.      ED providers Impression and Plan (applicable information) 56-year-old male with abscess  and cellulitis of the left lower extremity.  History of MRSA in October.  Given dose of Zosyn and vancomycin here.  I recommended recheck here tomorrow.  Have increased his Bactrim to 2 pills twice a day and placed on  Augmentin.   Miscellaneous information na     RN Assessment (Patient s current Symptoms), include time called.  [Insert Left message here if message left]  3:35PM: Left voicemail message requesting a call back to [facility] ED Lab Result RN at [phone].  RN is available every day between 9 a.m. and 5:30 p.m.    Zhanna Hyde RN  [facility] [employer] [place]  Emergency Dept Lab Result RN  Ph# [phone]     Copy of Lab result   Abscess Aerobic Bacterial Culture Routine  Order: [id number]   Collected 2/18/2022  4:34 PM     Status: Final result     Visible to patient: Yes (not seen)    Specimen Information: Leg, left; Abscess         3 Result Notes    Culture 2+ Staphylococcus aureus MRSA Abnormal             Resulting Agency: [facility]       Susceptibility     Staphylococcus aureus MRSA     COLTON     Clindamycin 2.0 ug/mL Intermediate     Erythromycin >=8.0 ug/mL Resistant     Gentamicin <=0.5 ug/mL Susceptible     Linezolid 2.0 ug/mL Susceptible     Oxacillin >=4.0 ug/mL Resistant 1     Tetracycline <=1.0 ug/mL Susceptible     Trimethoprim/Sulfamethoxazole <=0.5/9.5 u... Susceptible     Vancomycin <=0.5 ug/mL Susceptible              1 Oxacillin susceptible isolates are susceptible to cephalosporins (ex. cefazolin and cephalexin) and beta lactam combination agents. Oxacillin resistant isolates are resistant to these agents.        Susceptibility Comments    Staphylococcus aureus MRSA   MRSA requires contact precautions.         Specimen Collected: 02/18/22  4:34 PM Last Resulted: 02/21/22  7:49 AM

## 2022-02-23 NOTE — TELEPHONE ENCOUNTER
"[facility] Emergency Department/Urgent Care Lab result notification:     [place] ED lab result protocol used  Culture      Reason for call  Notify of lab results, assess symptoms,  review ED providers recommendations/discharge instructions (if necessary) and advise per ED lab result f/u protocol     Lab Result   Final Abscess Aerobic Bacterial Culture (specimen - Leg) report on 2/21/22  [facility] Emergency Dept discharge antibiotic prescribed: Sulfamethoxazole-Trimethoprim (Bactrim DS, Septra DS) 800-160 mg PO tablet,  1 tablet by mouth 2 times daily for 14 days. & Amoxicillin-Clavulanate (Augmentin) 875-125 mg PO tablet, 1 tablet by mouth 2 times daily for 10 days  #1. Bacteria, 2+ Staphylococcus aureus MRSA,  is [SUSCEPTIBLE] to Bactrim DS.   Incision and Drainage performed in the [place] ED: Yes  Recommendations in treatment per [facility] ED lab result culture protocol    RN Assessment (Patient s current Symptoms), include time called.  [Insert Left message here if message left]  9:52AM: Spoke with patient. He states that the symptoms are improving.     RN Recommendations/Instructions per [place] ED lab result protocol  Patient notified of lab result and treatment recommendations.   RN reviewed information about MRSA from protocol patient education and Epic reference.   Advised to take the antibiotics as prescribed. Advised that he was supposed to follow up in the ED the day after his ED visit. The patient states that he will be following up with his primary provider \"she's an orthopedic doctor so she can look at it.\" Offered to warm transfer the patient to scheduling and he declines.   Advised to not share any personal items such as towels, washcloths, razors or clothing.  Wash sheets, bedding, towels, etc in hot water with laundry detergent with liquid bleach and use a hot dryer.   Wipe down commonly touched surfaces with a bleach solution.  The patient is comfortable with the " information given and has no further questions.     Please Contact your PCP clinic or return to the Emergency department if your:    Symptoms worsen or other concerning symptom's.    PCP follow-up Questions asked: YES       Zhanna Hyde RN  [facility]  Emergency Dept Lab Result RN  Ph# [phone]

## 2022-03-07 ENCOUNTER — MYC MEDICAL ADVICE (OUTPATIENT)
Dept: GASTROENTEROLOGY | Facility: CLINIC | Age: 57
End: 2022-03-07
Payer: COMMERCIAL

## 2022-03-07 DIAGNOSIS — M54.2 CERVICALGIA: ICD-10-CM

## 2022-03-07 DIAGNOSIS — Z98.1 S/P CERVICAL SPINAL FUSION: ICD-10-CM

## 2022-03-07 DIAGNOSIS — M54.16 LUMBAR RADICULOPATHY: ICD-10-CM

## 2022-03-07 DIAGNOSIS — M79.18 MYOFASCIAL PAIN: ICD-10-CM

## 2022-03-07 DIAGNOSIS — G89.4 CHRONIC PAIN SYNDROME: ICD-10-CM

## 2022-03-07 DIAGNOSIS — G62.9 NEUROPATHY: ICD-10-CM

## 2022-03-07 RX ORDER — CYCLOBENZAPRINE HCL 5 MG
TABLET ORAL
Qty: 90 TABLET | Refills: 2 | Status: SHIPPED | OUTPATIENT
Start: 2022-03-07 | End: 2022-09-21

## 2022-03-07 NOTE — TELEPHONE ENCOUNTER
Chief Complaint   Patient presents with     Refill Request     cyclobenzaprine      Refill request received via fax by pharmacy   [id number] - [place], [place] - [address] [place] [address]       Pending Prescriptions:                       Disp   Refills    cyclobenzaprine (FLEXERIL) 5 MG tablet    45 tab*2            Si-2 tablets three times daily as needed         Last Written Prescription Date:  21  Last Fill Quantity: 45,   # refills: 2  Last Office Visit with prescribing provider: 22  Future Office visit: none

## 2022-03-07 NOTE — TELEPHONE ENCOUNTER
[employer] message sent with Rx approval from provider.  Dilip  Pain Clinic Management Team

## 2022-03-07 NOTE — TELEPHONE ENCOUNTER
Flexeril refill approved on behalf of Dr. Mallory who is on leave.    Dev Almonte DO  [facility] Pain Management

## 2022-03-10 DIAGNOSIS — G62.9 NEUROPATHY: ICD-10-CM

## 2022-03-10 DIAGNOSIS — M54.50 CHRONIC BILATERAL LOW BACK PAIN WITHOUT SCIATICA: ICD-10-CM

## 2022-03-10 DIAGNOSIS — Z98.1 S/P CERVICAL SPINAL FUSION: ICD-10-CM

## 2022-03-10 DIAGNOSIS — M54.16 LUMBAR RADICULOPATHY: ICD-10-CM

## 2022-03-10 DIAGNOSIS — M79.18 MYOFASCIAL PAIN: ICD-10-CM

## 2022-03-10 DIAGNOSIS — G89.29 CHRONIC BILATERAL LOW BACK PAIN WITHOUT SCIATICA: ICD-10-CM

## 2022-03-10 DIAGNOSIS — G89.4 CHRONIC PAIN SYNDROME: ICD-10-CM

## 2022-03-10 DIAGNOSIS — M54.2 CERVICALGIA: ICD-10-CM

## 2022-03-10 NOTE — TELEPHONE ENCOUNTER
Received fax request from [facility] pharmacy requesting refill(s) for nortriptyline (PAMELOR) 50 MG capsule    Last refilled on 03/06/22    Pt last seen on 01/12/22  Next appt scheduled for : none    Will facilitate refill.

## 2022-03-14 RX ORDER — NORTRIPTYLINE HYDROCHLORIDE 50 MG/1
50 CAPSULE ORAL AT BEDTIME
Qty: 90 CAPSULE | Refills: 1 | Status: SHIPPED | OUTPATIENT
Start: 2022-03-14 | End: 2022-09-21

## 2022-03-15 NOTE — TELEPHONE ENCOUNTER
Script Eprescribed to pharmacy        Signed Prescriptions:                        Disp   Refills    nortriptyline (PAMELOR) 50 MG capsule      90 cap*1        Sig: Take 1 capsule (50 mg) by mouth At Bedtime  Authorizing Provider: DONALD MACIAS MD  [facility] Pain Management

## 2022-03-16 ENCOUNTER — MYC MEDICAL ADVICE (OUTPATIENT)
Dept: PALLIATIVE MEDICINE | Facility: CLINIC | Age: 57
End: 2022-03-16
Payer: COMMERCIAL

## 2022-03-16 NOTE — TELEPHONE ENCOUNTER
Per patient Matthieuhart message:  From: Ketan CHRIS Hollowayclovis      Created: 3/16/2022 9:22 AM      After my last appointment I had some relief from the pain in my pelvic area.  I waited to make the appointments for the MRI and Orthopedic doctor.  About a week and a half ago the pain returned with a vengeance and I even had pain on the left side.  I'm not sure if the pain on the left side is the same exact thing as on the right because it seems to be centered just a bit up from where the right side is.  Something new that has been happening is pain into my thighs.  This pain isn't like I've had before as it is a deep aching pain where in the past it has always been sharp, travelling pain on the outside and rear of the leg.  I have scheduled the two appointment with the MRI happening tomorrow morning and my visit with Dr. Nayak in early April.          3/17/22: lumbar MRI      Routed to provider.     SAMY Moody-BSN  [facility]

## 2022-03-17 ENCOUNTER — HOSPITAL ENCOUNTER (OUTPATIENT)
Dept: MRI IMAGING | Facility: CLINIC | Age: 57
Discharge: HOME | End: 2022-03-17
Attending: STUDENT IN AN ORGANIZED HEALTH CARE EDUCATION/TRAINING PROGRAM | Admitting: STUDENT IN AN ORGANIZED HEALTH CARE EDUCATION/TRAINING PROGRAM
Payer: COMMERCIAL

## 2022-03-17 ENCOUNTER — MYC MEDICAL ADVICE (OUTPATIENT)
Dept: PALLIATIVE MEDICINE | Facility: CLINIC | Age: 57
End: 2022-03-17
Payer: COMMERCIAL

## 2022-03-17 DIAGNOSIS — M54.2 CERVICALGIA: ICD-10-CM

## 2022-03-17 DIAGNOSIS — M79.18 MYOFASCIAL PAIN: ICD-10-CM

## 2022-03-17 DIAGNOSIS — M84.30XA STRESS REACTION OF BONE: ICD-10-CM

## 2022-03-17 DIAGNOSIS — M54.16 LUMBAR RADICULOPATHY: ICD-10-CM

## 2022-03-17 DIAGNOSIS — Z98.1 S/P CERVICAL SPINAL FUSION: ICD-10-CM

## 2022-03-17 DIAGNOSIS — G62.9 NEUROPATHY: ICD-10-CM

## 2022-03-17 DIAGNOSIS — F11.90 CHRONIC, CONTINUOUS USE OF OPIOIDS: ICD-10-CM

## 2022-03-17 DIAGNOSIS — G89.4 CHRONIC PAIN SYNDROME: ICD-10-CM

## 2022-03-17 PROCEDURE — 72148 MRI LUMBAR SPINE W/O DYE: CPT

## 2022-03-17 NOTE — TELEPHONE ENCOUNTER
[employer] message sent to Pt    Thanks for the update Ketan.     I will look for the results of the lumbar MRI.     Liz Mallory MD  [facility]

## 2022-03-17 NOTE — TELEPHONE ENCOUNTER
Per patient myChart message:  To: PAIN NURSE      From: Ketan Mehta      Created: 3/17/2022 10:24 AM      After my MRI this morning I stopped at the pharmacy to  a couple refills and they said they haven't received the refill authorization for the oxycodone.  I was wondering if Dr. Mallory is in clinic and did receive the refill request.  I have enough for today and tomorrow.      _________________________________    Mychart sent to pt:  From: Heidy Gipson RN      Created: 3/17/2022 2:23 PM      Hi Ketan,  We have not received a request for an oxycodone.  Did you contact us or the pharmacy? For opioid request be sure to contact us.  I will process refill now for you.    I assume you want [facility] pharmacy in [place].          Heidy RN-BSN  [facility]

## 2022-03-17 NOTE — TELEPHONE ENCOUNTER
Routed to the nursing pool and to the MA pool to process refill(s).    Heidy Gipson, RN-BSN  [facility]

## 2022-03-18 RX ORDER — OXYCODONE HYDROCHLORIDE 5 MG/1
5-10 TABLET ORAL EVERY 6 HOURS PRN
Qty: 165 TABLET | Refills: 0 | Status: SHIPPED | OUTPATIENT
Start: 2022-03-18 | End: 2022-04-13

## 2022-03-18 NOTE — TELEPHONE ENCOUNTER
Medication refill information reviewed.     Last due:  Fill 2/15/22 and Start 2/17/22   Due date:  anytime      Prescriptions prepped for review.     Heidy RN-BSN  [facility]

## 2022-03-18 NOTE — TELEPHONE ENCOUNTER
Received call from patient requesting refill(s) of oxyCODONE (ROXICODONE) 5 MG tablet    Last dispensed from pharmacy on 02/14/22    Patient's last office/virtual visit by prescribing provider on 01/12/22  Next office/virtual appointment scheduled for 04/20/22    Last urine drug screen date 06/18/21  Current opioid agreement on file (completed within the last year) Yes Date of opioid agreement: 06/18/21    E-prescribe to pharmacy-[id number] - [place], [place] - [address] [place] [address]     Will route to nursing [place] for review and preparation of prescription(s).

## 2022-03-18 NOTE — TELEPHONE ENCOUNTER
checked and appropriate     Script Eprescribed to pharmacy    Will send this to MA team to notify patient.    Signed Prescriptions:                        Disp   Refills    oxyCODONE (ROXICODONE) 5 MG tablet         165 ta*0        Sig: Take 1-2 tablets (5-10 mg) by mouth every 6 hours as           needed for severe pain Max of 6 tablets/day. Fill           now. Start 3/19.  Authorizing Provider: DONALD MACIAS MD  [facility]

## 2022-03-21 DIAGNOSIS — G62.9 NEUROPATHY: ICD-10-CM

## 2022-03-21 RX ORDER — PREGABALIN 150 MG/1
150 CAPSULE ORAL 3 TIMES DAILY
Qty: 90 CAPSULE | Refills: 3 | Status: SHIPPED | OUTPATIENT
Start: 2022-03-21 | End: 2022-09-08

## 2022-03-21 NOTE — TELEPHONE ENCOUNTER
checked and appropriate     Script Eprescribed to pharmacy        Signed Prescriptions:                        Disp   Refills    pregabalin (LYRICA) 150 MG capsule         90 cap*3        Sig: Take 1 capsule (150 mg) by mouth 3 times daily  Authorizing Provider: DONALD MACIAS MD  [facility]

## 2022-03-21 NOTE — TELEPHONE ENCOUNTER
Chief Complaint   Patient presents with     Refill Request     pregabalin      Refill request received via fax by pharmacy   [id number] - [address]       Pending Prescriptions:                       Disp   Refills    pregabalin (LYRICA) 150 MG capsule        90 cap*3            Sig: Take 1 capsule (150 mg) by mouth 3 times daily         Last Written Prescription Date:  10/18/21  Last Fill Quantity: 90,   # refills: 3  Last Office Visit with prescribing provider: 1/12/22  Future Office visit: 4/20/22

## 2022-04-06 ENCOUNTER — OFFICE VISIT (OUTPATIENT)
Dept: ORTHOPEDICS | Facility: CLINIC | Age: 57
End: 2022-04-06
Payer: COMMERCIAL

## 2022-04-06 ENCOUNTER — MYC MEDICAL ADVICE (OUTPATIENT)
Dept: ORTHOPEDICS | Facility: CLINIC | Age: 57
End: 2022-04-06

## 2022-04-06 VITALS
DIASTOLIC BLOOD PRESSURE: 85 MMHG | SYSTOLIC BLOOD PRESSURE: 138 MMHG | HEIGHT: 74 IN | BODY MASS INDEX: 28.23 KG/M2 | WEIGHT: 220 LBS | RESPIRATION RATE: 20 BRPM | HEART RATE: 108 BPM

## 2022-04-06 DIAGNOSIS — S76.819A STRAIN OF ILIOPSOAS MUSCLE, UNSPECIFIED LATERALITY, INITIAL ENCOUNTER: Primary | ICD-10-CM

## 2022-04-06 PROCEDURE — 99213 OFFICE O/P EST LOW 20 MIN: CPT | Performed by: ORTHOPAEDIC SURGERY

## 2022-04-06 NOTE — TELEPHONE ENCOUNTER
[employer] message sent to Pt    CANDELARIA Aceves,      I am glad you got in to see Dr. Mariee. I do think it will be important to meet (video OK, face to face probably better) to talk about possible next steps.      Liz Mallory MD  [facility]

## 2022-04-06 NOTE — LETTER
4/6/2022         RE: Indra Mehta  [id number] [facility] [id number]        Dear Colleague,    Thank you for referring your patient, Indra Mehta, to the [facility]. Please see a copy of my visit note below.    Indra Mehta is a 56 year old male who is seen in consultation at the request of Dr. Liz Mallory for low back pain into hips.  He has had chronic low back problems for years.  Now however for the last 4 to 5 months he has had pain primarily deep in the pelvis especially when he raises his legs.  It hurts when he uses his hip flexors.  It had been more on the right, but now is worse on the left.  He has constant sharp aching pain rated 4 out of 10.  He is on ibuprofen long-term.    He has had pelvis x-ray showing no osteoarthritis of the hips.  He has had pelvis MRI scan showing no hip pathology but question of L5 pedicle stress changes and para spinal muscle strain.  He had lumbar MRI scan which showed multilevel moderate facet arthropathy at L2-3 down to L5-S1.  There was no significant stenosis with that however.    Past Medical History:   Diagnosis Date     Anxiety      Back pain      Depressive disorder      Hyperlipidemia      Hypertension      Meniere's disease, unspecified      Pain medication agreement signed 8/20/2010     Sleep apnea, unspecified type        Past Surgical History:   Procedure Laterality Date     BUNIONECTOMY RT/LT  09/05/08    Both feet     COLONOSCOPY N/A 12/28/2016    Procedure: COMBINED COLONOSCOPY, SINGLE OR MULTIPLE BIOPSY/POLYPECTOMY BY BIOPSY;  Surgeon: Indra Jernigan MD;  [facility]     DISCECTOMY, FUSION CERVICAL ANTERIOR ONE LEVEL, COMBINED N/A 7/5/2017    Procedure: COMBINED DISCECTOMY, FUSION CERVICAL ANTERIOR ONE LEVEL;  Cervical 6-7, Anterior cervical discectomy fusion;  Surgeon: Mike Mckenna MD;  Location: [facility]     DISCECTOMY, FUSION CERVICAL ANTERIOR ONE LEVEL, COMBINED N/A 12/19/2018     Procedure: cervical 5-6 anterior cervical discectomy fusion;  Surgeon: Mike Mckenna MD;  [facility]     HERNIORRHAPHY UMBILICAL N/A 8/11/2017    Procedure: HERNIORRHAPHY UMBILICAL;  open umbilical hernia repair;  Surgeon: Boni Story MD;  [facility]     INJECT BLOCK MEDIAL BRANCH CERVICAL/THORACIC/LUMBAR Bilateral 8/12/2021    Procedure: Left L3, Left L4, Left L5, Right L3, Right L4 and Right L5 medial branch nerve blocks using fluoroscopic guidance and contrast control;  Surgeon: Darryn Mcdaniel MD;  [facility]     INJECT BLOCK MEDIAL BRANCH CERVICAL/THORACIC/LUMBAR N/A 9/9/2021    Procedure: Left L3, Left L4, Left L5, Right L3, Right L4 and Right L5 medial branch nerve blocks using fluoroscopic guidance and contrast control;  Surgeon: Darryn Mcdaniel MD;  [facility]     INJECT EPIDURAL CERVICAL N/A 8/22/2019    Procedure: INJECTION, SPINE, CERVICAL 6-7 EPIDURAL;  Surgeon: Darryn Mcdaniel MD;  [facility]     INJECT EPIDURAL LUMBAR N/A 11/9/2017    Procedure: INJECT EPIDURAL LUMBAR;  lumbar 4-5 epidural steroid injection ;  Surgeon: Darryn Mcdaniel MD;  [facility]     INJECT EPIDURAL LUMBAR N/A 12/28/2017    Procedure: INJECT EPIDURAL LUMBAR;  lumbar epidural injection;  Surgeon: Darryn Mcdaniel MD;  [facility]     INJECT EPIDURAL LUMBAR Bilateral 5/13/2021    Procedure: Bilateral Lumbar 3-4 Epidural Steroid Injection;  Surgeon: Darryn Mcdaniel MD;  [facility]     INJECT EPIDURAL TRANSFORAMINAL Bilateral 8/23/2018    Procedure: INJECT EPIDURAL TRANSFORAMINAL;  transforaminal bilateral lumbar 3-4 steroid injection;  Surgeon: Darryn Mcdaniel MD;  [facility]     INJECT EPIDURAL TRANSFORAMINAL Bilateral 11/8/2018    Procedure: INJECT EPIDURAL TRANSFORAMINAL lumbar 3-4;  Surgeon: Darryn Mcdaniel MD;  [facility]     INJECT EPIDURAL TRANSFORAMINAL Bilateral 6/14/2019    Procedure: INJECTION, EPIDURAL, TRANSFORAMINAL LUMBAR 3-4 BILATERAL;  Surgeon: Darryn Mcdaniel  MD ELIA;  [facility]     INJECT EPIDURAL TRANSFORAMINAL Bilateral 2020    Procedure: lumbar 3-4 bilateral transforaminal epidural steroid injection;  Surgeon: Darryn Mcdaniel MD;  [facility]     INJECT EPIDURAL TRANSFORAMINAL Bilateral 2020    Procedure: INJECTION, EPIDURAL, TRANSFORAMINAL  LUMBAR 3-4 APPROACH;  Surgeon: Darryn Mcdaniel MD;  [facility]     PE TUBES      left ear     RADIO FREQUENCY ABLATION PULSED CERVICAL Bilateral 10/1/2021    Procedure: RADIOFREQUENCY ABLATION lumbar 3, 4, 5 bilateral;  Surgeon: Darryn Mcdaniel MD;  [facility]     ZZHC COLONOSCOPY W/WO BRUSH/WASH  2005     ZZHC CREATE EARDRUM OPENING,GEN ANESTH  2007    Pe tube, Left endolymphatic sac enhancement.     ZZHC MASTOIDECTOMY,COMPLETE  2007       Family History   Problem Relation Age of Onset     Diabetes Mother      Cancer Mother         colon cancer -  at 52, diag at 42     Hypertension Father      Heart Disease Father          of AAA     No Known Problems Brother      No Known Problems Brother      Depression Sister      Suicide Other      Unknown/Adopted Maternal Grandmother      Unknown/Adopted Maternal Grandfather      Unknown/Adopted Paternal Grandmother      Unknown/Adopted Paternal Grandfather      No Known Problems Daughter      No Known Problems Daughter        Social History     Socioeconomic History     Marital status: Single     Spouse name: Not on file     Number of children: 2     Years of education: Not on file     Highest education level: Not on file   Occupational History     Employer: [employer]   Tobacco Use     Smoking status: Never Smoker     Smokeless tobacco: Never Used   Vaping Use     Vaping Use: Never used   Substance and Sexual Activity     Alcohol use: Yes     Alcohol/week: 0.0 standard drinks     Comment: rarely     Drug use: No     Sexual activity: Not Currently     Partners: Female   Other Topics Concern     Parent/sibling w/ CABG, MI or  angioplasty before 65F 55M? No   Social History Narrative     Not on file     Social Determinants of Health     Financial Resource Strain: Not on file   Food Insecurity: Not on file   Transportation Needs: Not on file   Physical Activity: Not on file   Stress: Not on file   Social Connections: Not on file   Intimate Partner Violence: Not on file   Housing Stability: Not on file       Current Outpatient Medications   Medication Sig Dispense Refill     Acetaminophen (TYLENOL PO) Take 1,000 mg by mouth 3 times daily        ALPRAZolam (XANAX XR) 0.5 MG 24 hr tablet Take 1 tablet (0.5 mg) by mouth At Bedtime 30 tablet 3     amLODIPine (NORVASC) 5 MG tablet TAKE 1 TABLET BY MOUTH EVERY DAY 90 tablet 0     atorvastatin (LIPITOR) 10 MG tablet Take 10 mg by mouth daily       cyclobenzaprine (FLEXERIL) 5 MG tablet 1-2 tablets three times daily as needed 90 tablet 2     DULoxetine (CYMBALTA) 60 MG capsule Take 1 capsule (60 mg) by mouth daily 60 capsule 1     ibuprofen (ADVIL/MOTRIN) 800 MG tablet Take 800 mg by mouth 3 times daily       losartan-hydrochlorothiazide (HYZAAR) 100-25 MG tablet TAKE 1 TABLET BY MOUTH EVERY DAY 90 tablet 0     naloxone (NARCAN) 4 MG/0.1ML nasal spray Spray 1 spray (4 mg) into one nostril alternating nostrils once as needed for opioid reversal every 2-3 minutes until assistance arrives 0.2 mL 1     nortriptyline (PAMELOR) 50 MG capsule Take 1 capsule (50 mg) by mouth At Bedtime 90 capsule 1     order for DME Equipment being ordered: TENS Pads as directed 1 Device 0     oxyCODONE (ROXICODONE) 5 MG tablet Take 1-2 tablets (5-10 mg) by mouth every 6 hours as needed for severe pain Max of 6 tablets/day. Fill now. Start 3/19. 165 tablet 0     pregabalin (LYRICA) 150 MG capsule Take 1 capsule (150 mg) by mouth 3 times daily 90 capsule 3     Pregabalin (LYRICA) 200 MG capsule Take 1 capsule at bedtime. This is in addition to the 150mg in morning and afternoon. 30 capsule 2      "sulfamethoxazole-trimethoprim (BACTRIM DS) 800-160 MG tablet Take 2 tablets by mouth 2 times daily 28 tablet 0       Allergies   Allergen Reactions     No Known Drug Allergies        REVIEW OF SYSTEMS:  CONSTITUTIONAL:  NEGATIVE for fever, chills, change in weight, not feeling tired  SKIN:  NEGATIVE for worrisome rashes, no skin lumps, no skin ulcers and no non-healing wounds  EYES:  NEGATIVE for vision changes or irritation.  ENT/MOUTH:  NEGATIVE.  No hearing loss, no hoarseness, no difficulty swallowing.  RESP:  NEGATIVE. No cough or shortness of breath.  CV:  NEGATIVE for chest pain, palpitations or peripheral edema  GI:  NEGATIVE for nausea, abdominal pain, heartburn, or change in bowel habits  :  Negative. No dysuria, no hematuria  MUSCULOSKELETAL:  See HPI above  NEURO:  NEGATIVE . No headaches, no dizziness,  no numbness  ENDOCRINE:  NEGATIVE for temperature intolerance, skin/hair changes  HEME/ALLERGY/IMMUNE:  NEGATIVE for bleeding problems  PSYCHIATRIC:  NEGATIVE. no anxiety, no depression.     Exam:  Vitals: /85   Pulse 108   Resp 20   Ht 1.873 m (6' 1.75\")   Wt 99.8 kg (220 lb)   BMI 28.44 kg/m    BMI= Body mass index is 28.44 kg/m .  Constitutional:  healthy, alert and no distress  Neuro: Alert and Oriented x 3, no focal defects.  Psych: Affect normal   Respiratory: Breathing not labored.  Cardiovascular: normal peripheral pulses  Lymph: no adenopathy  Skin: No rashes,worrisome lesions or skin problems  He indicates pain near both SI joints.  He feels the pain here but does not have tenderness here.  He says it is deeper but in this area.  He has no tenderness in the sciatic notch, or greater trochanters bilaterally.  There is no pain with rotation of the hips.  He has full rotation of the hips.  He has no pain with resisted abduction or adduction of the legs.  He does have pain bilaterally with resisted hip flexion.cmsi.    Assessment:  Bilateral hip flexor pain.  MRI scan shows no abscess " or other abnormalities of the iliopsoas.  Thus I do not have an explanation for his pain.  Plan: He can work on gentle stretching of the hip flexor.  May be beneficial to see a pain clinic.  He has seen them before and had facet injections, so already has contact with them.      Again, thank you for allowing me to participate in the care of your patient.        Sincerely,        Alvaro Mariee MD

## 2022-04-11 ENCOUNTER — MYC MEDICAL ADVICE (OUTPATIENT)
Dept: PALLIATIVE MEDICINE | Facility: CLINIC | Age: 57
End: 2022-04-11
Payer: COMMERCIAL

## 2022-04-11 DIAGNOSIS — G89.4 CHRONIC PAIN SYNDROME: ICD-10-CM

## 2022-04-11 DIAGNOSIS — M54.16 LUMBAR RADICULOPATHY: ICD-10-CM

## 2022-04-11 DIAGNOSIS — M54.2 CERVICALGIA: ICD-10-CM

## 2022-04-11 DIAGNOSIS — Z98.1 S/P CERVICAL SPINAL FUSION: ICD-10-CM

## 2022-04-11 DIAGNOSIS — G62.9 NEUROPATHY: ICD-10-CM

## 2022-04-11 DIAGNOSIS — M79.18 MYOFASCIAL PAIN: ICD-10-CM

## 2022-04-11 DIAGNOSIS — F11.90 CHRONIC, CONTINUOUS USE OF OPIOIDS: ICD-10-CM

## 2022-04-11 NOTE — TELEPHONE ENCOUNTER
Routed to the nursing pool and to the MA pool to process refill(s).    Heidy Gipson, RN-BSN  [facility]

## 2022-04-12 NOTE — TELEPHONE ENCOUNTER
Received call from patient requesting refill(s) of oxyCODONE (ROXICODONE) 5 MG tablet    Last dispensed from pharmacy on 3/18/22    Patient's last office/virtual visit by prescribing provider on 1/12/22  Next office/virtual appointment scheduled for 4/20/22    Last urine drug screen date 6/18/21  Current opioid agreement on file (completed within the last year) Yes Date of opioid agreement: 6/18/21    E-prescribe to [id number] - [place], [place] pharmacy    Will route to [facility] for review and preparation of prescription(s).

## 2022-04-13 RX ORDER — OXYCODONE HYDROCHLORIDE 5 MG/1
5-10 TABLET ORAL EVERY 6 HOURS PRN
Qty: 165 TABLET | Refills: 0 | Status: SHIPPED | OUTPATIENT
Start: 2022-04-13 | End: 2022-05-12

## 2022-04-13 NOTE — TELEPHONE ENCOUNTER
Medication refill information reviewed.     Last due:  . Fill now. Start 3/19  Due date:  4/18/22      Prescriptions prepped for review.     SAMY Moody-BSN  [facility]

## 2022-04-14 NOTE — TELEPHONE ENCOUNTER
checked and appropriate     Script Eprescribed to pharmacy    Will send this to MA team to notify patient.    Signed Prescriptions:                        Disp   Refills    oxyCODONE (ROXICODONE) 5 MG tablet         165 ta*0        Sig: Take 1-2 tablets (5-10 mg) by mouth every 6 hours as           needed for severe pain Max of 6 tablets/day. Fill           4/16/22. Start 4/18/22. 30 day Rx  Authorizing Provider: DONALD MACIAS MD  [facility]

## 2022-04-20 ENCOUNTER — OFFICE VISIT (OUTPATIENT)
Dept: PALLIATIVE MEDICINE | Facility: CLINIC | Age: 57
End: 2022-04-20
Payer: COMMERCIAL

## 2022-04-20 VITALS — SYSTOLIC BLOOD PRESSURE: 148 MMHG | DIASTOLIC BLOOD PRESSURE: 96 MMHG | HEART RATE: 92 BPM

## 2022-04-20 DIAGNOSIS — G89.29 CHRONIC NECK PAIN: ICD-10-CM

## 2022-04-20 DIAGNOSIS — M54.50 CHRONIC BILATERAL LOW BACK PAIN WITHOUT SCIATICA: ICD-10-CM

## 2022-04-20 DIAGNOSIS — G89.29 CHRONIC BILATERAL LOW BACK PAIN WITHOUT SCIATICA: ICD-10-CM

## 2022-04-20 DIAGNOSIS — M54.2 CHRONIC NECK PAIN: ICD-10-CM

## 2022-04-20 DIAGNOSIS — M84.30XA STRESS REACTION OF BONE: Primary | ICD-10-CM

## 2022-04-20 DIAGNOSIS — G60.9 HEREDITARY AND IDIOPATHIC PERIPHERAL NEUROPATHY: ICD-10-CM

## 2022-04-20 PROCEDURE — 99214 OFFICE O/P EST MOD 30 MIN: CPT | Performed by: STUDENT IN AN ORGANIZED HEALTH CARE EDUCATION/TRAINING PROGRAM

## 2022-04-20 RX ORDER — ALENDRONATE SODIUM 70 MG/1
70 TABLET ORAL
Qty: 4 TABLET | Refills: 1 | Status: SHIPPED | OUTPATIENT
Start: 2022-04-20 | End: 2022-05-20

## 2022-04-20 RX ORDER — CALCITONIN SALMON 200 [IU]/.09ML
1 SPRAY, METERED NASAL DAILY
Qty: 3.7 ML | Refills: 0 | Status: SHIPPED | OUTPATIENT
Start: 2022-04-20 | End: 2022-05-20

## 2022-04-20 ASSESSMENT — PAIN SCALES - GENERAL: PAINLEVEL: SEVERE PAIN (6)

## 2022-04-20 NOTE — PATIENT INSTRUCTIONS
I think your low back/hip pain is most likely due to a stress reaction in the lumbar spine.     For this we will:     - Start calcitonin 1 spray in a nostril daily. Alternate nostrils  - Start Fosamax 70 mg every 7 days    Follow up:   - 1 mo    Liz Mallory MD  [facility]      ----------------------------------------------------------------  Clinic Number:  [phone]   Call with any questions about your care and for scheduling assistance.   Calls are returned Monday through Friday between 8 AM and 4:30 PM. We usually get back to you within 2 business days depending on the issue/request.    If we are prescribing your medications:  For opioid medication refills, call the clinic or send a [employer] message 7 days in advance.  Please include:  Name of requested medication  Name of the pharmacy.  For non-opioid medications, call your pharmacy directly to request a refill. Please allow 3-4 days to be processed.   Per [place] State Law:  All controlled substance prescriptions must be filled within 30 days of being written.    For those controlled substances allowing refills, pickup must occur within 30 days of last fill.      We believe regular attendance is key to your success in our program!    Any time you are unable to keep your appointment we ask that you call us at least 24 hours in advance to cancel.This will allow us to offer the appointment time to another patient.   Multiple missed appointments may lead to dismissal from the clinic.

## 2022-04-20 NOTE — PROGRESS NOTES
"                          [facility]  Follow up clinic visit    Date of visit: 4/20/2022     Chief complaint:   Chief Complaint   Patient presents with     Pain       History:  Indra Mehta is a 56 year old male who follows with the pain clinic for:   Idiopathic peripheral neuropathy  Cervicalgia s/p cervical fusion  Lumbar radiculopathy  Chronic opioid use  Last seen by me on 1/12/2022        Interval history 12/17/2021 (date)  \"The injection has helped his knees, but he reports continuing sharp pain in left knee on the outside. He was recommended to see Dr. Calero if it persists. He states that he feels the left knee cap is different than the right and is wondering if this is contributing to the pain.      He has his follow up with Dr. Mallory on 1/12/22.      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 This is frustrating as he does not feel he is doing anything strenuous. He states that the pain is his joints and low back. He feels that the spot in his low back is getting worse. He states that he gets a shot of pain with every step he takes. He states that he tends to lean forward when walking as that seems too help the pain. He states that he has had pain going below the knee on the right side for the last week. He states that he gets sharp pain then it aches. He states that this pain feels different than the pain he has had before. The pain is located on the left side.     Interval history 1/12/2022  - Has a spot on the right iliac crest that is painful. Not tender to palpation. Radiates into R lateral buttock/hip, back of thigh with active hip flexion. Most commonly radiates into buttock/hip. 20% of he time radiates down the posterior thigh. Every step with R leg, 1/10 steps on the left. Not painful with weight " bearing. Never radiates past the knee. IN the iliac crest it is a constant sharp pain. The radiating pain feels like the typical nerve pain. Worse after being inactive for a period of time. Pain then gradually improves with being active, then when active for prolonged periods, pain increases again. Started 2 mo ago without inciting event. Gradually worsened.    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 By the end of practice the leg will be burning.   - Feet still suck. Not getting better, getting worse. 1st steps in the morning feels like his ball of his foot is a big clump/very thick. Takes By 8-9 am feet are tingling pins and needles all the time, worsened with every step, but not nearly as bad as when he fist gets up.   - The neck hurts with movement, (outler limit of movement R and L rotation. L rotation is more limited). Occasionally pain in the R under shoulder blade, down into upper arm, lasts 5-6 days. Extension is also limited with pain. Flexion is the best. Thinks the neck is where it is gooing to be in regards to pain - not acute pain all the time unless he is really moving his head around a lot. 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 Things that bother this are movement or sitting in one spot. Now improved sitting/stiffness s/p radiofrequency ablation. Movement pain remains.       Recommendations/plan at the last visit included:  Plan:  Additional Workup:     - Diagnostic Studies:  XRay pelvis    - Laboratory studies:  no    - Urine toxicology screen today: no   Therapies:     - Physical Therapy: not at this time    - Clinical Health Psychologist to address issues of relaxation, behavioral change, coping style, and other factors important to improvement:  "not at this time  Medication Management:     - Continue oxycodone 5 mg. Max #6 tabs per day. Refilled today  1. - UDS 2021  2. - CSA 2021  3. Start Cymbalta 30 gm per day for 7 days, then 60 mg per day. Consider trial of high dose (120 mg per day) if not sufficiently helpful. Would likely need to taper off nortriptyline.   Procedures recommended:     - Consider repeat L3,4,5 bilateral radiofrequency ablation in the future    - Consider bilateral TON, C3, C4 MBBs/RFA in the future    - Consider trial of Botox for PN in the future if able to obtain approval    Follow up: I will call with results of XRay, then in 1 mo  \"    Since his last visit, Indra Mehta reports:  - Pulled a muscle in the L thoracic spine a few weeks ago. Resolving spontaneously  - Previously had R sided pain over PSIS. 2 weeks ago developed pain around the L PSIS. Forward flexion of the waist causes pain on R or L. Originates on PSIS and radiates over buttocks. Can also radiate over lateral hip. Aching pain. No numbness or tingling  - No improvement with time.   - Nearly 1 week of pain uner R shoulder blad into occiput, then spontaneously resolved    Pain scores:  Pain intensity on average is 4 on a scale of 0-10.     Current pain treatments:   Tylenol 1000mg three times a day - 4x/day  Ibuprofen 800 mg three times a day   Xanax-Takes a night for tinnitis. increased tinitis and fullness and dizziness over the past month. Rare. Using ~6x/mo. Will make ENT appointment.   Nortriptyline 50mg at bedtime  Oxycodone 5m-2 tablets every 4-6 hours, max of 6/day  Lyrica 150/200/150 mg  Flexeril 5-10 mg bedtime PRN - 5 mg does not make him sleepy, takes 5 mg occasionally during the day    Patient is using the medication as prescribed:  YES  Is your medication helpful? YES   Side Effects: no side effect    Past pain treatments:  Norco/vicodin H  Oxycodone H  Flexeril - was helpful at night but caused him to be groggy in the morning  Robaxin " - not helpful  Tizanidine H  Gabapentin ?  Lyrica SE sedation  Cymbalta NH  Nortriptyline H  Duloxetine  - unclear why stopped     Past Pain Treatments:  Injections:    - 11/8/18 bilateral L3-4 TFESI - (Dr Mcdaniel)  - 8/23/18 bilateral L3-4 TFESI - seems to have worsened pain (Dr Mcdaniel)  - 6/11/18 TFESI right C5-6 - helped with arm pain and numbness  - 12/28/17 bilateral L3-4 TFESI   - 10/23/17 Bilateral L4-5 TFESI   - 11/08/2018 Bilateral L3-4 TFESI   -6/14/2019 Bilateral L3-4 TFESI   -8/22/2019 C6-7 SRIDEVI   -5/22/2020 L3-4 bilateral TLESI  -5/13/21 L3-4 bilateral TLESI  -8/12/21 bilateral L3-5 LMBB  - bilateral L3,4,5 radiofrequency ablation - disappointed with results. Helped with sitting.  Surgery:    - ACDF C6-7 on 7/5/17 with improvement;   - ACDF C5-6 12/19/2018, posterior C5-7 fusion. Lateral mass screws, right C5-6 medial facetectomy,   - right C6 foraminotomy on 10/29/2019  TENS unit: helpful    Medications:  Current Outpatient Medications   Medication Sig Dispense Refill     Acetaminophen (TYLENOL PO) Take 1,000 mg by mouth 3 times daily        ALPRAZolam (XANAX XR) 0.5 MG 24 hr tablet Take 1 tablet (0.5 mg) by mouth At Bedtime 30 tablet 3     amLODIPine (NORVASC) 5 MG tablet TAKE 1 TABLET BY MOUTH EVERY DAY 90 tablet 0     atorvastatin (LIPITOR) 10 MG tablet Take 10 mg by mouth daily       cyclobenzaprine (FLEXERIL) 5 MG tablet 1-2 tablets three times daily as needed 90 tablet 2     DULoxetine (CYMBALTA) 60 MG capsule Take 1 capsule (60 mg) by mouth daily 60 capsule 1     ibuprofen (ADVIL/MOTRIN) 800 MG tablet Take 800 mg by mouth 3 times daily       losartan-hydrochlorothiazide (HYZAAR) 100-25 MG tablet TAKE 1 TABLET BY MOUTH EVERY DAY 90 tablet 0     naloxone (NARCAN) 4 MG/0.1ML nasal spray Spray 1 spray (4 mg) into one nostril alternating nostrils once as needed for opioid reversal every 2-3 minutes until assistance arrives 0.2 mL 1     nortriptyline (PAMELOR) 50 MG capsule Take 1 capsule (50 mg) by  mouth At Bedtime 90 capsule 1     order for DME Equipment being ordered: TENS Pads as directed 1 Device 0     oxyCODONE (ROXICODONE) 5 MG tablet Take 1-2 tablets (5-10 mg) by mouth every 6 hours as needed for severe pain Max of 6 tablets/day. Fill 4/16/22. Start 4/18/22. 30 day Rx 165 tablet 0     pregabalin (LYRICA) 150 MG capsule Take 1 capsule (150 mg) by mouth 3 times daily 90 capsule 3     Pregabalin (LYRICA) 200 MG capsule Take 1 capsule at bedtime. This is in addition to the 150mg in morning and afternoon. 30 capsule 2     sulfamethoxazole-trimethoprim (BACTRIM DS) 800-160 MG tablet Take 2 tablets by mouth 2 times daily 28 tablet 0       Medical History: any changes in medical history since they were last seen? No    Physical Exam:  Blood pressure (!) 148/96, pulse 92.  General: NAD  Gait: Slow, antalgic favoring R   Psych: Mood is OK. Affect is congruent. Thought content is logical.   Neuro: CNs II - XII grossly intact    MSK exam:  tender to palpation over bilateral PSIS  Increased pain with forward flexion at the waist  Strength 5/5 in bilateral lower extremities       Reflexes:       patellar (L3, L4) symmetric normal       achilles tendons (S1) symmetric absent  no ankle clonus bilaterally    Sensation:      grossly intact throughout lower extremities except distal to mid calf bilaterally in stocking distribution with paresthesias to LT    Skin:       well perfused       capillary refill brisk    Controlled Substance Agreement:   CSA -- Encounter Level on 06/15/2017:    Controlled Substance Agreement - Scan on 6/22/2017 11:53 AM: CONTROLLED SUBSTANCE AGREEMENT     CSA -- Encounter Level on 10/13/2016:    Controlled Substance Agreement - Scan on 10/23/2016  5:07 PM: CONTROLLED SUBSTANCE AGREEMENT 10/13/16     CSA -- Patient Level:    Controlled Substance Agreement - Opioid - Scan on 6/18/2021  9:09 AM  Controlled Substance Agreement - Opioid - Scan on 10/29/2020  2:12 PM  Controlled Substance Agreement -  Opioid - Scan on 4/15/2019  3:04 PM          UDS: 6/18/2021    THE 4 As OF OPIOID MAINTENANCE ANALGESIA    Analgesia: Is pain relief clinically significant? YES   Activity: Is patient functional and able to perform Activities of Daily Living? YES   Adverse effects: Is patient free from adverse side effects from opiates? YES   Adherence to Rx protocol: Is patient adhering to Controlled Substance Agreement and taking medications ONLY as ordered? YES      Assessment:   Indra Mehta is a 56 year old male who is seen at the pain clinic for:    1. Stress reaction of bone    2. Chronic bilateral low back pain without sciatica    3. Chronic neck pain    4. Hereditary and idiopathic peripheral neuropathy         # Chronic axial low back pain: bilateral facet arthropathy on MRI at L4/5, L5/S1. sitting improved after bilateral L3,4,5 medial branch radiofrequency ablation. Consider repeat in the future    #Chronic axial neck pain: s/p C5/6, C6/7 ACDF, C5-7 posterior fusion. Pain with range of motion likely cervical spondylosis. Consider radiofrequency ablation in the future.     #Idiopathic peripheral neuropathy: on Lyrica 150/200/150, nortriptyline 50 mg bedtime with continued symptoms. Will try combination therapy with duloxetine while monitoring for serotonin syndrome given use of nortriptyline.     #Bilateral hip/pelvic pain/Stress reaction: MRI of pelvis demonstrated R L5 pedicle, bilateral L4 lamina stress change. MRI lumbar spine without clear read of stress change, although edema seems to be present. Neurologically intact. Start calcitonin and fosamax    Plan:  Therapies:     - Physical Therapy: not at this time    - Clinical Health Psychologist to address issues of relaxation, behavioral change, coping style, and other factors important to improvement: not at this time  Medication Management:     - Continue oxycodone 5 mg. Max #6 tabs per day. Refilled today  1. - UDS 6/18/2021  2. - CSA 6/18/2021  3. Continue  Cymbalta 60 mg per day. Consider trial of high dose (120 mg per day) if not sufficiently helpful. Would likely need to taper off nortriptyline with higher dose  4. - start alendronate 70 mg q7 days  5. - start calcitonin 1 spray daily  Procedures recommended:     - Consider repeat L3,4,5 bilateral radiofrequency ablation in the future    - Consider bilateral TON, C3, C4 MBBs/RFA in the future    - Consider trial of Botox for PN in the future if able to obtain approval    Follow up: 1 mo    BILLING TIME DOCUMENTATION:   The total TIME spent on this patient on the date of the encounter/appointment was 36 minutes.      TOTAL TIME includes:   Time spent preparing to see the patient (reviewing records and tests) - 2 min  Time spent face to face (or over the phone) with the patient - 32 min  Time spent ordering tests, medications, procedures and referrals - 0 min  Time spent Referring and communicating with other healthcare professionals - 0 min  Time spent documenting clinical information in Epic - 2 min    Liz Mallory MD  [facility]

## 2022-04-26 DIAGNOSIS — I10 BENIGN ESSENTIAL HYPERTENSION: ICD-10-CM

## 2022-04-27 NOTE — TELEPHONE ENCOUNTER
Pending Prescriptions:                       Disp   Refills    losartan-hydrochlorothiazide (HYZAAR) 50-1*180 ta*0        Sig: TAKE 2 TABLETS BY MOUTH EVERY DAY    Routing refill request to provider for review/approval because:  BP failed RN protocol    Amy Orourke RN

## 2022-04-28 RX ORDER — LOSARTAN POTASSIUM AND HYDROCHLOROTHIAZIDE 12.5; 5 MG/1; MG/1
TABLET ORAL
Qty: 180 TABLET | Refills: 0 | Status: SHIPPED | OUTPATIENT
Start: 2022-04-28 | End: 2022-07-29

## 2022-05-04 ENCOUNTER — OFFICE VISIT (OUTPATIENT)
Dept: ORTHOPEDICS | Facility: CLINIC | Age: 57
End: 2022-05-04
Payer: COMMERCIAL

## 2022-05-04 VITALS — HEIGHT: 74 IN | RESPIRATION RATE: 20 BRPM | WEIGHT: 220 LBS | BODY MASS INDEX: 28.23 KG/M2

## 2022-05-04 DIAGNOSIS — M17.0 PRIMARY OSTEOARTHRITIS OF BOTH KNEES: ICD-10-CM

## 2022-05-04 PROCEDURE — 20610 DRAIN/INJ JOINT/BURSA W/O US: CPT | Mod: 50 | Performed by: ORTHOPAEDIC SURGERY

## 2022-05-04 RX ORDER — LIDOCAINE HYDROCHLORIDE 10 MG/ML
5 INJECTION, SOLUTION INFILTRATION; PERINEURAL
Status: SHIPPED | OUTPATIENT
Start: 2022-05-04

## 2022-05-04 RX ORDER — METHYLPREDNISOLONE ACETATE 80 MG/ML
80 INJECTION, SUSPENSION INTRA-ARTICULAR; INTRALESIONAL; INTRAMUSCULAR; SOFT TISSUE
Status: SHIPPED | OUTPATIENT
Start: 2022-05-04

## 2022-05-04 RX ADMIN — METHYLPREDNISOLONE ACETATE 80 MG: 80 INJECTION, SUSPENSION INTRA-ARTICULAR; INTRALESIONAL; INTRAMUSCULAR; SOFT TISSUE at 15:11

## 2022-05-04 RX ADMIN — LIDOCAINE HYDROCHLORIDE 5 ML: 10 INJECTION, SOLUTION INFILTRATION; PERINEURAL at 15:11

## 2022-05-04 NOTE — PROGRESS NOTES
Large Joint Injection/Arthocentesis: bilateral knee    Date/Time: 5/4/2022 3:11 PM  Performed by: Alvaro Mariee MD  Authorized by: Alvaro Mariee MD     Indications:  Pain  Needle Size:  22 G  Guidance: landmark guided    Location:  Knee  Laterality:  Bilateral      Medications (Right):  80 mg methylPREDNISolone 80 MG/ML; 5 mL lidocaine 1 %  Medications (Left):  80 mg methylPREDNISolone 80 MG/ML; 5 mL lidocaine 1 %  Outcome:  Tolerated well, no immediate complications  Procedure discussed: discussed risks, benefits, and alternatives    Consent Given by:  Patient  Timeout: timeout called immediately prior to procedure    Prep: patient was prepped and draped in usual sterile fashion

## 2022-05-04 NOTE — LETTER
5/4/2022         RE: Indra Mehta  [id number] [facility] [id number]        Dear Colleague,    Thank you for referring your patient, Indra Mehta, to the [facility]. Please see a copy of my visit note below.    Large Joint Injection/Arthocentesis: bilateral knee    Date/Time: 5/4/2022 3:11 PM  Performed by: Alvaro Mariee MD  Authorized by: Alvaro Mariee MD     Indications:  Pain  Needle Size:  22 G  Guidance: landmark guided    Location:  Knee  Laterality:  Bilateral      Medications (Right):  80 mg methylPREDNISolone 80 MG/ML; 5 mL lidocaine 1 %  Medications (Left):  80 mg methylPREDNISolone 80 MG/ML; 5 mL lidocaine 1 %  Outcome:  Tolerated well, no immediate complications  Procedure discussed: discussed risks, benefits, and alternatives    Consent Given by:  Patient  Timeout: timeout called immediately prior to procedure    Prep: patient was prepped and draped in usual sterile fashion            Follow up bilateral knee primary osteoarthritis.  Bilateral MRIs showed extensive chondromalacia, but no medial meniscus tear.  Last injection 12/9/21.  Range of motion 0-130.    He  desires injection today of bilateral knee(s).  Risks, benefits, potential complications and alternatives were discussed.   With the patient's consent, sterile prep was performed of bilateral knee(s).  Each knee was injected with Depo Medrol 80 mg and lidocaine at anterolateral site.  Return to clinic as needed.         Again, thank you for allowing me to participate in the care of your patient.        Sincerely,        Alvaro Mariee MD

## 2022-05-04 NOTE — PROGRESS NOTES
Follow up bilateral knee primary osteoarthritis.  Bilateral MRIs showed extensive chondromalacia, but no medial meniscus tear.  Last injection 12/9/21.  Range of motion 0-130.    He  desires injection today of bilateral knee(s).  Risks, benefits, potential complications and alternatives were discussed.   With the patient's consent, sterile prep was performed of bilateral knee(s).  Each knee was injected with Depo Medrol 80 mg and lidocaine at anterolateral site.  Return to clinic as needed.

## 2022-05-08 PROBLEM — M54.50 CHRONIC BILATERAL LOW BACK PAIN WITHOUT SCIATICA: Status: ACTIVE | Noted: 2022-05-08

## 2022-05-08 PROBLEM — G89.29 CHRONIC BILATERAL LOW BACK PAIN WITHOUT SCIATICA: Status: ACTIVE | Noted: 2022-05-08

## 2022-05-10 ENCOUNTER — MYC MEDICAL ADVICE (OUTPATIENT)
Dept: PALLIATIVE MEDICINE | Facility: CLINIC | Age: 57
End: 2022-05-10
Payer: COMMERCIAL

## 2022-05-10 DIAGNOSIS — G89.4 CHRONIC PAIN SYNDROME: ICD-10-CM

## 2022-05-10 DIAGNOSIS — M54.2 CERVICALGIA: ICD-10-CM

## 2022-05-10 DIAGNOSIS — M79.18 MYOFASCIAL PAIN: ICD-10-CM

## 2022-05-10 DIAGNOSIS — F11.90 CHRONIC, CONTINUOUS USE OF OPIOIDS: ICD-10-CM

## 2022-05-10 DIAGNOSIS — M54.16 LUMBAR RADICULOPATHY: ICD-10-CM

## 2022-05-10 DIAGNOSIS — G62.9 NEUROPATHY: ICD-10-CM

## 2022-05-10 DIAGNOSIS — Z98.1 S/P CERVICAL SPINAL FUSION: ICD-10-CM

## 2022-05-12 RX ORDER — OXYCODONE HYDROCHLORIDE 5 MG/1
5-10 TABLET ORAL EVERY 6 HOURS PRN
Qty: 165 TABLET | Refills: 0 | Status: SHIPPED | OUTPATIENT
Start: 2022-05-12 | End: 2022-06-01

## 2022-05-12 NOTE — TELEPHONE ENCOUNTER
To: PAIN NURSE      From: Ketan Mehta      Created: 5/10/2022 7:53 AM        *-*-*This message has not been handled.*-*-*    I am requesting a refill of my oxycodone prescription for next week.  Please send to [facility] in [place].     I was a little late calling for my next 30 day appointment so my next appointment isn't until June 1st.        Routing refill request for oxyCODONE (ROXICODONE) 5 MG tablet to team for processing    Nyasia Aguilera RN, BSN, CMSRN  RN Care Coordinator  [facility]

## 2022-05-12 NOTE — TELEPHONE ENCOUNTER
Received call from patient requesting refill(s) of oxyCODONE (ROXICODONE) 5 MG tablet    Last dispensed from pharmacy on 04/16/22    Patient's last office/virtual visit by prescribing provider on 04/20/22  Next office/virtual appointment scheduled for 06/01/22    Last urine drug screen date 06/18/21  Current opioid agreement on file (completed within the last year) Yes Date of opioid agreement: 06/18/21    E-prescribe to pharmacy-[id number] - [place], [place] - [address] [place] [address]     Will route to nursing [place] for review and preparation of prescription(s).

## 2022-05-12 NOTE — TELEPHONE ENCOUNTER
Medication refill information reviewed.     Due date for oxyCODONE (ROXICODONE) 5 MG tablet      is 5/18/22     Prescriptions prepped for review.     Will route to provider.       Nyasia Aguilera RN, BSN, CMSRN  RN Care Coordinator  [facility]

## 2022-05-12 NOTE — TELEPHONE ENCOUNTER
checked and appropriate     Script Eprescribed to pharmacy    Will send this to MA team to notify patient.    Signed Prescriptions:                        Disp   Refills    oxyCODONE (ROXICODONE) 5 MG tablet         165 ta*0        Sig: Take 1-2 tablets (5-10 mg) by mouth every 6 hours as           needed for severe pain Max of 6 tablets/day. Fill           5/16/22. Start 5/18/22. 30 day Rx  Authorizing Provider: DONALD MACIAS MD  [facility]

## 2022-05-18 ENCOUNTER — MYC MEDICAL ADVICE (OUTPATIENT)
Dept: PALLIATIVE MEDICINE | Facility: CLINIC | Age: 57
End: 2022-05-18
Payer: COMMERCIAL

## 2022-05-18 DIAGNOSIS — G89.4 CHRONIC PAIN SYNDROME: ICD-10-CM

## 2022-05-18 DIAGNOSIS — F11.90 CHRONIC, CONTINUOUS USE OF OPIOIDS: ICD-10-CM

## 2022-05-18 DIAGNOSIS — M79.18 MYOFASCIAL PAIN: ICD-10-CM

## 2022-05-18 DIAGNOSIS — M84.30XA STRESS REACTION OF BONE: ICD-10-CM

## 2022-05-18 DIAGNOSIS — M54.2 CERVICALGIA: ICD-10-CM

## 2022-05-18 DIAGNOSIS — G62.9 NEUROPATHY: ICD-10-CM

## 2022-05-18 DIAGNOSIS — M54.16 LUMBAR RADICULOPATHY: ICD-10-CM

## 2022-05-18 DIAGNOSIS — Z98.1 S/P CERVICAL SPINAL FUSION: ICD-10-CM

## 2022-05-18 RX ORDER — PREGABALIN 200 MG/1
CAPSULE ORAL
Qty: 30 CAPSULE | Refills: 2 | Status: SHIPPED | OUTPATIENT
Start: 2022-05-18 | End: 2022-08-18

## 2022-05-18 NOTE — TELEPHONE ENCOUNTER
Fax received from: [id number] - [place] Refill authorization requested    Drug: Pregabaline 200 mg capsule   Qty: 30.0  Last filled 04/15/22  Last seen: 4/20/22  Next appointment 6/1/22    Lillian De La Cruz MA

## 2022-05-19 NOTE — TELEPHONE ENCOUNTER
checked and appropriate     Script Eprescribed to pharmacy        Pending Prescriptions:                       Disp   Refills    Pregabalin (LYRICA) 200 MG capsule        30 cap*2            Sig: Take 1 capsule at bedtime. This is in addition to           the 150mg in morning and afternoon.      Liz Mallory MD  [facility]

## 2022-05-20 RX ORDER — CALCITONIN SALMON 200 [IU]/.09ML
1 SPRAY, METERED NASAL DAILY
Qty: 3.7 ML | Refills: 0 | Status: SHIPPED | OUTPATIENT
Start: 2022-05-20 | End: 2022-08-11

## 2022-05-20 RX ORDER — ALENDRONATE SODIUM 70 MG/1
70 TABLET ORAL
Qty: 4 TABLET | Refills: 0 | Status: SHIPPED | OUTPATIENT
Start: 2022-05-20 | End: 2022-08-11

## 2022-05-20 NOTE — TELEPHONE ENCOUNTER
[employer] message sent to Pt    Noble Aceves,      I think one additional course of the nasal spray and Fosamax would be OK, but I don't think we should continue it beyond that. I have placed orders for both. Unfortunately I think the mainstay of treatment will be activity modification and time.      let me know if you have any questions      Liz Mallory MD  [facility]

## 2022-05-31 DIAGNOSIS — G62.89 OTHER POLYNEUROPATHY: ICD-10-CM

## 2022-05-31 RX ORDER — DULOXETIN HYDROCHLORIDE 60 MG/1
60 CAPSULE, DELAYED RELEASE ORAL DAILY
Qty: 60 CAPSULE | Refills: 1 | Status: SHIPPED | OUTPATIENT
Start: 2022-05-31 | End: 2022-09-21

## 2022-05-31 NOTE — TELEPHONE ENCOUNTER
Script Eprescribed to pharmacy        Signed Prescriptions:                        Disp   Refills    DULoxetine (CYMBALTA) 60 MG capsule        60 cap*1        Sig: Take 1 capsule (60 mg) by mouth daily  Authorizing Provider: DONALD MACIAS MD  [facility]

## 2022-05-31 NOTE — TELEPHONE ENCOUNTER
Fax received from: [id number] - [place], [place]  Refill authorization requested    Drug: DULoxetine (CYMBALTA) 60 MG capsule  Qty: 60.0  Last filled 3/31/22  Last seen: 4/20/22  Next appointment 6/1/22    Lillian De La Cruz MA

## 2022-06-01 ENCOUNTER — OFFICE VISIT (OUTPATIENT)
Dept: PALLIATIVE MEDICINE | Facility: CLINIC | Age: 57
End: 2022-06-01

## 2022-06-01 ENCOUNTER — LAB (OUTPATIENT)
Dept: LAB | Facility: CLINIC | Age: 57
End: 2022-06-01
Payer: COMMERCIAL

## 2022-06-01 VITALS — HEART RATE: 78 BPM | OXYGEN SATURATION: 98 % | DIASTOLIC BLOOD PRESSURE: 89 MMHG | SYSTOLIC BLOOD PRESSURE: 141 MMHG

## 2022-06-01 DIAGNOSIS — A49.01 METHICILLIN SUSCEPTIBLE STAPHYLOCOCCUS AUREUS INFECTION: Primary | ICD-10-CM

## 2022-06-01 DIAGNOSIS — F11.90 CHRONIC, CONTINUOUS USE OF OPIOIDS: ICD-10-CM

## 2022-06-01 DIAGNOSIS — Z51.81 ENCOUNTER FOR THERAPEUTIC DRUG MONITORING: ICD-10-CM

## 2022-06-01 DIAGNOSIS — M47.816 SPONDYLOSIS OF LUMBAR REGION WITHOUT MYELOPATHY OR RADICULOPATHY: ICD-10-CM

## 2022-06-01 DIAGNOSIS — Z98.1 S/P CERVICAL SPINAL FUSION: ICD-10-CM

## 2022-06-01 DIAGNOSIS — M54.16 LUMBAR RADICULOPATHY: ICD-10-CM

## 2022-06-01 DIAGNOSIS — G89.4 CHRONIC PAIN SYNDROME: ICD-10-CM

## 2022-06-01 DIAGNOSIS — A49.01 METHICILLIN SUSCEPTIBLE STAPHYLOCOCCUS AUREUS INFECTION: ICD-10-CM

## 2022-06-01 DIAGNOSIS — M54.2 CERVICALGIA: ICD-10-CM

## 2022-06-01 DIAGNOSIS — G62.89 OTHER POLYNEUROPATHY: ICD-10-CM

## 2022-06-01 DIAGNOSIS — M79.18 MYOFASCIAL PAIN: ICD-10-CM

## 2022-06-01 LAB — CREAT UR-MCNC: 31 MG/DL

## 2022-06-01 PROCEDURE — 99214 OFFICE O/P EST MOD 30 MIN: CPT | Performed by: STUDENT IN AN ORGANIZED HEALTH CARE EDUCATION/TRAINING PROGRAM

## 2022-06-01 PROCEDURE — 80307 DRUG TEST PRSMV CHEM ANLYZR: CPT

## 2022-06-01 PROCEDURE — 87040 BLOOD CULTURE FOR BACTERIA: CPT

## 2022-06-01 PROCEDURE — 36415 COLL VENOUS BLD VENIPUNCTURE: CPT

## 2022-06-01 RX ORDER — OXYCODONE HYDROCHLORIDE 5 MG/1
5-10 TABLET ORAL EVERY 6 HOURS PRN
Qty: 165 TABLET | Refills: 0 | Status: SHIPPED | OUTPATIENT
Start: 2022-06-01 | End: 2022-07-13

## 2022-06-01 ASSESSMENT — PAIN SCALES - GENERAL: PAINLEVEL: SEVERE PAIN (6)

## 2022-06-01 NOTE — PROGRESS NOTES
"  Patient presents to the clinic today for a follow up with Liz Mallory MD regarding Pain Management.      PEG Score 1/12/2022 6/1/2022   PEG Total Score 6.67 7       Helena Diazbitts  [facility] Patient Facilitator                              [facility]  Follow up clinic visit    Date of visit:  6/1/2022     Chief complaint:   Chief Complaint   Patient presents with     Pain       History:  Indra Mehta is a 56 year old male who follows with the pain clinic for:   Idiopathic peripheral neuropathy  Cervicalgia s/p cervical fusion  Lumbar radiculopathy  Chronic opioid use  Last seen by me on 4/20/2022        Interval history 12/17/2021 (date)  \"The injection has helped his knees, but he reports continuing sharp pain in left knee on the outside. He was recommended to see Dr. Calero if it persists. He states that he feels the left knee cap is different than the right and is wondering if this is contributing to the pain.      He has his follow up with Dr. Mallory on 1/12/22.      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 This is frustrating as he does not feel he is doing anything strenuous. He states that the pain is his joints and low back. He feels that the spot in his low back is getting worse. He states that he gets a shot of pain with every step he takes. He states that he tends to lean forward when walking as that seems too help the pain. He states that he has had pain going below the knee on the right side for the last week. He states that he gets sharp pain then it aches. He states that this pain feels different than the pain he has had before. The pain is located on the left side.     Interval history 1/12/2022  - Has a spot on the right iliac crest that is painful. Not tender to palpation. Radiates " into R lateral buttock/hip, back of thigh with active hip flexion. Most commonly radiates into buttock/hip. 20% of he time radiates down the posterior thigh. Every step with R leg, 1/10 steps on the left. Not painful with weight bearing. Never radiates past the knee. IN the iliac crest it is a constant sharp pain. The radiating pain feels like the typical nerve pain. Worse after being inactive for a period of time. Pain then gradually improves with being active, then when active for prolonged periods, pain increases again. Started 2 mo ago without inciting event. Gradually worsened.    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 By the end of practice the leg will be burning.   - Feet still suck. Not getting better, getting worse. 1st steps in the morning feels like his ball of his foot is a big clump/very thick. Takes By 8-9 am feet are tingling pins and needles all the time, worsened with every step, but not nearly as bad as when he fist gets up.   - The neck hurts with movement, (outler limit of movement R and L rotation. L rotation is more limited). Occasionally pain in the R under shoulder blade, down into upper arm, lasts 5-6 days. Extension is also limited with pain. Flexion is the best. Thinks the neck is where it is gooing to be in regards to pain - not acute pain all the time unless he is really moving his head around a lot. 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 Things that bother this are movement or sitting in one spot. Now improved sitting/stiffness s/p radiofrequency ablation. Movement pain remains.     Interval history 4/20/2022  - Pulled a muscle in the L thoracic spine a few weeks ago. Resolving spontaneously  - Previously had R sided pain over PSIS. 2  "weeks ago developed pain around the L PSIS. Forward flexion of the waist causes pain on R or L. Originates on PSIS and radiates over buttocks. Can also radiate over lateral hip. Aching pain. No numbness or tingling  - No improvement with time.   - Nearly 1 week of pain uner R shoulder blad into occiput, then spontaneously resolved    Recommendations/plan at the last visit included:  Plan:  Therapies:     - Physical Therapy: not at this time    - Clinical Health Psychologist to address issues of relaxation, behavioral change, coping style, and other factors important to improvement: not at this time  Medication Management:     - Continue oxycodone 5 mg. Max #6 tabs per day. Refilled today  1. - UDS 6/18/2021  2. - CSA 6/18/2021  3. Continue Cymbalta 60 mg per day. Consider trial of high dose (120 mg per day) if not sufficiently helpful. Would likely need to taper off nortriptyline with higher dose  4. - start alendronate 70 mg q7 days  5. - start calcitonin 1 spray daily  Procedures recommended:     - Consider repeat L3,4,5 bilateral radiofrequency ablation in the future    - Consider bilateral TON, C3, C4 MBBs/RFA in the future    - Consider trial of Botox for PN in the future if able to obtain approval  Follow up: 1 mo  \"    Since his last visit, Indra PEREZ Janine reports:  - the right sided low back pain is improving. Less sharp, now more dull ache.   - mid back muscle strain is improved  - daughters do not like how much medication he is on. Formerly every couple months, now somewhat more frequently his body gets wiped out for 2-3 days. Hardly concentrate, needs to lie in bed for 2-3 days due to fatigue. Formerly this was happening every few months, now more frequent, not quite   - had 2 staph infections - knee started to get red, hot swollen. Left knee. Talked to Dr. Nayak (ortho) via [employer]  - They were MRSA infections.     Pain scores:  Pain intensity on average is 4 on a scale of 0-10.     Current pain " treatments:   Tylenol 1000mg three times a day - 4x/day  Ibuprofen 800 mg three times a day   Xanax-Takes a night for tinnitis. increased tinitis and fullness and dizziness over the past month. Rare. Using ~6x/mo. Will make ENT appointment.   Nortriptyline 50mg at bedtime  Oxycodone 5m-2 tablets every 4-6 hours, max of 6/day  Lyrica 150/200/150 mg  Flexeril 5-10 mg bedtime PRN - 5 mg does not make him sleepy, takes 5 mg occasionally during the day  Alendronate, calcitonin for stress reaction of lumbar spine   Duloxetine 120 mg     Patient is using the medication as prescribed:  YES  Is your medication helpful? YES   Side Effects: no side effect    Past pain treatments:  Norco/vicodin H  Oxycodone H  Flexeril - was helpful at night but caused him to be groggy in the morning  Robaxin - not helpful  Tizanidine H  Gabapentin ?  Lyrica SE sedation  Cymbalta NH  Nortriptyline H  Duloxetine  - unclear why stopped     Past Pain Treatments:  Injections:    - 18 bilateral L3-4 TFESI - (Dr Mcdaniel)  - 18 bilateral L3-4 TFESI - seems to have worsened pain (Dr Mcdaniel)  - 18 TFESI right C5-6 - helped with arm pain and numbness  - 17 bilateral L3-4 TFESI   - 10/23/17 Bilateral L4-5 TFESI   - 2018 Bilateral L3-4 TFESI   -2019 Bilateral L3-4 TFESI   -2019 C6-7 SRIDEVI   -2020 L3-4 bilateral TLESI  -21 L3-4 bilateral TLESI  -21 bilateral L3-5 LMBB  - bilateral L3,4,5 radiofrequency ablation - disappointed with results. Helped with sitting.  Surgery:    - ACDF C6-7 on 17 with improvement;   - ACDF C5-6 2018, posterior C5-7 fusion. Lateral mass screws, right C5-6 medial facetectomy,   - right C6 foraminotomy on 10/29/2019  TENS unit: helpful    Medications:  Current Outpatient Medications   Medication Sig Dispense Refill     Acetaminophen (TYLENOL PO) Take 1,000 mg by mouth 3 times daily       alendronate (FOSAMAX) 70 MG tablet Take 1 tablet (70 mg) by mouth every 7 days 4  tablet 0     amLODIPine (NORVASC) 5 MG tablet TAKE 1 TABLET BY MOUTH EVERY DAY 90 tablet 0     atorvastatin (LIPITOR) 10 MG tablet Take 10 mg by mouth daily       calcitonin, salmon, (MIACALCIN) 200 UNIT/ACT nasal spray Spray 1 spray into one nostril alternating nostrils daily Alternate nostril each day. 3.7 mL 0     cyclobenzaprine (FLEXERIL) 5 MG tablet 1-2 tablets three times daily as needed 90 tablet 2     DULoxetine (CYMBALTA) 60 MG capsule Take 1 capsule (60 mg) by mouth daily 60 capsule 1     ibuprofen (ADVIL/MOTRIN) 800 MG tablet Take 800 mg by mouth 3 times daily       losartan-hydrochlorothiazide (HYZAAR) 50-12.5 MG tablet TAKE 2 TABLETS BY MOUTH EVERY  tablet 0     naloxone (NARCAN) 4 MG/0.1ML nasal spray Spray 1 spray (4 mg) into one nostril alternating nostrils once as needed for opioid reversal every 2-3 minutes until assistance arrives 0.2 mL 1     nortriptyline (PAMELOR) 50 MG capsule Take 1 capsule (50 mg) by mouth At Bedtime 90 capsule 1     order for DME Equipment being ordered: TENS Pads as directed 1 Device 0     oxyCODONE (ROXICODONE) 5 MG tablet Take 1-2 tablets (5-10 mg) by mouth every 6 hours as needed for severe pain Max of 6 tablets/day. Fill 5/16/22. Start 5/18/22. 30 day Rx 165 tablet 0     pregabalin (LYRICA) 150 MG capsule Take 1 capsule (150 mg) by mouth 3 times daily 90 capsule 3     Pregabalin (LYRICA) 200 MG capsule Take 1 capsule at bedtime. This is in addition to the 150mg in morning and afternoon. 30 capsule 2     ALPRAZolam (XANAX XR) 0.5 MG 24 hr tablet Take 1 tablet (0.5 mg) by mouth At Bedtime 30 tablet 3     sulfamethoxazole-trimethoprim (BACTRIM DS) 800-160 MG tablet Take 2 tablets by mouth 2 times daily (Patient not taking: No sig reported) 28 tablet 0       Medical History: any changes in medical history since they were last seen? No    Physical Exam:  Blood pressure (!) 141/89, pulse 78, SpO2 98 %.  General: NAD  Gait: Slow, antalgic favoring R   Psych: Mood is  OK. Affect is congruent. Thought content is logical.   Neuro: CNs II - XII grossly intact    MSK exam:  tender to palpation over bilateral PSIS  Increased pain with forward flexion at the waist  Strength 5/5 in bilateral lower extremities       Reflexes:       patellar (L3, L4) symmetric normal       achilles tendons (S1) symmetric absent  no ankle clonus bilaterally    Sensation:      grossly intact throughout lower extremities except distal to mid calf bilaterally in stocking distribution with paresthesias to LT    Skin:       well perfused       capillary refill brisk    Controlled Substance Agreement:   CSA -- Encounter Level on 06/15/2017:    Controlled Substance Agreement - Scan on 6/22/2017 11:53 AM: CONTROLLED SUBSTANCE AGREEMENT     CSA -- Encounter Level on 10/13/2016:    Controlled Substance Agreement - Scan on 10/23/2016  5:07 PM: CONTROLLED SUBSTANCE AGREEMENT 10/13/16     CSA -- Patient Level:    Controlled Substance Agreement - Opioid - Scan on 6/18/2021  9:09 AM  Controlled Substance Agreement - Opioid - Scan on 10/29/2020  2:12 PM  Controlled Substance Agreement - Opioid - Scan on 4/15/2019  3:04 PM          UDS: 6/18/2021    THE 4 As OF OPIOID MAINTENANCE ANALGESIA    Analgesia: Is pain relief clinically significant? YES   Activity: Is patient functional and able to perform Activities of Daily Living? YES   Adverse effects: Is patient free from adverse side effects from opiates? YES   Adherence to Rx protocol: Is patient adhering to Controlled Substance Agreement and taking medications ONLY as ordered? YES      Assessment:   Indra Mehta is a 56 year old male who is seen at the pain clinic for:    1. Methicillin susceptible Staphylococcus aureus infection    2. Neuropathy    3. Cervicalgia    4. S/P cervical spinal fusion    5. Myofascial pain    6. Lumbar radiculopathy    7. Chronic pain syndrome    8. Chronic, continuous use of opioids    9. Spondylosis of lumbar region without myelopathy or  radiculopathy    10. Encounter for therapeutic drug monitoring         # Chronic axial low back pain: bilateral facet arthropathy on MRI at L4/5, L5/S1. sitting improved after bilateral L3,4,5 medial branch radiofrequency ablation. Repeat    #Chronic axial neck pain: s/p C5/6, C6/7 ACDF, C5-7 posterior fusion. Pain with range of motion likely cervical spondylosis. Consider radiofrequency ablation in the future.     #Idiopathic peripheral neuropathy: on Lyrica 150/200/150, nortriptyline 50 mg bedtime, duloxetine with continued symptoms. Consider resubmitting for insurance consideration of SCS     #Bilateral hip/pelvic pain/Stress reaction: MRI of pelvis demonstrated R L5 pedicle, bilateral L4 lamina stress change. MRI lumbar spine without clear read of stress change, although edema seems to be present. Neurologically intact. Start calcitonin and fosamax    Plan:  Therapies:     - Physical Therapy: not at this time    - Clinical Health Psychologist to address issues of relaxation, behavioral change, coping style, and other factors important to improvement: not at this time  Medication Management:     - Continue oxycodone 5 mg. Max #6 tabs per day. Refilled today  1. - UDS today  2. - CSA today  3. Continue Cymbalta 60 mg per day. Consider trial of high dose (120 mg per day) if not sufficiently helpful. Would likely need to taper off nortriptyline with higher dose  4. Continue Lyrica 150/200/150  5. Consider adding topamax in the future  Procedures recommended:     - repeat L3,4,5 bilateral radiofrequency ablation    - Consider bilateral TON, C3, C4 MBBs/RFA in the future    - re-submit SCS trial for idiopathic peripheral neuropathy  Follow up: for lumbar radiofrequency ablation and in 2-3 mo    BILLING TIME DOCUMENTATION:   The total TIME spent on this patient on the date of the encounter/appointment was 39 minutes.      TOTAL TIME includes:   Time spent preparing to see the patient (reviewing records and tests) - 2  min  Time spent face to face (or over the phone) with the patient - 32 min  Time spent ordering tests, medications, procedures and referrals - 0 min  Time spent Referring and communicating with other healthcare professionals - 0 min  Time spent documenting clinical information in Epic - 5 min    Liz Mallory MD  [facility] [place]

## 2022-06-01 NOTE — PATIENT INSTRUCTIONS
I have messaged our  about spinal cord stimulator coverage for you. I will let you know what I hear.   I refilled your oxycodone today  We signed the annual controlled substance agreement today  Go to the lab for your urine drug screen as well as blood cultures today  We will repeat your lumbar radiofrequency ablation. You will be called to schedule this     Follow up:   For lumbar radiofrequency ablation  And in clinic in 2-3 mo    Liz Mallory MD  [facility]     Clinic Number:  [phone]   Call with any questions about your care and for scheduling assistance.   Calls are returned Monday through Friday between 8 AM and 4:30 PM. We usually get back to you within 2 business days depending on the issue/request.    If we are prescribing your medications:  For opioid medication refills, call the clinic or send a Exit41 message 7 days in advance.  Please include:  Name of requested medication  Name of the pharmacy.  For non-opioid medications, call your pharmacy directly to request a refill. Please allow 3-4 days to be processed.   Per [place] State Law:  All controlled substance prescriptions must be filled within 30 days of being written.    For those controlled substances allowing refills, pickup must occur within 30 days of last fill.      We believe regular attendance is key to your success in our program!    Any time you are unable to keep your appointment we ask that you call us at least 24 hours in advance to cancel.This will allow us to offer the appointment time to another patient.   Multiple missed appointments may lead to dismissal from the clinic.    [facility] Pt's are always seen in [facility], near orthopedics.    Radiology injection Pt's are always seen in [facility], Emergency/Same Day Surgery entrance. Go through double doors of Emergency entrance and the imaging suite is the very first door on the right. You will check in for  radiology injections here.

## 2022-06-03 ENCOUNTER — TELEPHONE (OUTPATIENT)
Dept: PALLIATIVE MEDICINE | Facility: CLINIC | Age: 57
End: 2022-06-03
Payer: COMMERCIAL

## 2022-06-03 DIAGNOSIS — M79.2 CHRONIC PERIPHERAL NEUROPATHIC PAIN: ICD-10-CM

## 2022-06-03 DIAGNOSIS — Z20.822 ENCOUNTER FOR LABORATORY TESTING FOR COVID-19 VIRUS: ICD-10-CM

## 2022-06-03 DIAGNOSIS — G62.89 OTHER POLYNEUROPATHY: ICD-10-CM

## 2022-06-03 DIAGNOSIS — M47.816 SPONDYLOSIS OF LUMBAR REGION WITHOUT MYELOPATHY OR RADICULOPATHY: Primary | ICD-10-CM

## 2022-06-03 DIAGNOSIS — G89.29 CHRONIC PERIPHERAL NEUROPATHIC PAIN: ICD-10-CM

## 2022-06-03 DIAGNOSIS — G60.9 HEREDITARY AND IDIOPATHIC PERIPHERAL NEUROPATHY: ICD-10-CM

## 2022-06-03 NOTE — TELEPHONE ENCOUNTER
[facility]- RFA REQUIREMENTS   3. Repeat Radiofrequency ablative denervation (RFA) at the same level is covered when the following criteria are met:  A. A minimum of six months has elapsed since the initial RFA;  B. The initial RFA relieved at least 50% of the pain within 3 months of the procedure date as reported by the patient;          C. Severe pain limiting activities of daily living for at least 3 months despite conservative treatments (such as exercise, activity modification or  chiropractic care). Documentation of conservative treatments must correspond to the current episode of pain (within 6 months).    Conservative treatments must include physical therapy (PT), at least 4 visits over a course of 6 weeks or less. Active muscle conditioning is required as part of physical therapy.    Physical therapist's notes must be submitted, or there must be a physician's statement in the clinical documents that explains why physical therapy is contraindicated.    If a member is unable to complete physical therapy due to progressively worsening pain and disability, documentation in the physical therapists notes demonstrating this is required.    The requirement for physical therapy will not be met if there is a failure to complete prescribed physical therapy for non-clinical reasons.    Routing to review and advise if patient meets criteria above          Peyton CHURCHILL    [facility]

## 2022-06-03 NOTE — TELEPHONE ENCOUNTER
Interventional Injection Only - Type of Injection: bilateral L3,4,5 radiofrequency ablation - repeat. Radiology? Yes  Please plan for 500 mL of IVF prior to procedure          Routing to review         Opal David    [facility]

## 2022-06-05 LAB
OXYCODONE UR CFM-MCNC: 318 NG/ML
OXYCODONE/CREAT UR: 1026 NG/MG {CREAT}
PREGABALIN UR QL CFM: PRESENT

## 2022-06-06 LAB — BACTERIA BLD CULT: NO GROWTH

## 2022-06-06 NOTE — TELEPHONE ENCOUNTER
Spoke to Ketan regarding below,      A.    A minimum of six months has elapsed since the initial RFA;  Yes, last 10/1/21   B.    The initial RFA relieved at least 50% of the pain within 3 months of the procedure date as reported by the patient;  Yes, at least 50%                         C.     Severe pain limiting activities of daily living for at least 3 months despite conservative treatments (such as exercise, activity modification or  chiropractic care). Documentation of conservative treatments must correspond to the current episode of pain (within 6 months).  No, Ketan has not done in last 6 months, Ketan is willing to do PT again to repeat RFA    PT orders pended for provider review  ? Conservative treatments must include physical therapy (PT), at least 4 visits over a course of 6 weeks or less. Active muscle conditioning is required as part of physical therapy.  ? Physical therapist's notes must be submitted, or there must be a physician's statement in the clinical documents that explains why physical therapy is contraindicated.  ? If a member is unable to complete physical therapy due to progressively worsening pain and disability, documentation in the physical therapists notes demonstrating this is required.  ? The requirement for physical therapy will not be met if there is a failure to complete prescribed physical therapy for non-clinical reasons.    Nyasia Aguilera RN, BSN, CMSRN  RN Care Coordinator  [facility]

## 2022-06-09 NOTE — TELEPHONE ENCOUNTER
Reason for call:  Other   Patient called regarding (reason for call): call back  Additional comments: Regarding MAYDA Blackwell from [employer] is requesting a call back from Peyton regarding the placement of the TRIAL. .     Phone number to reach patient:  Other phone number:  [phone]    Best Time:  Anytime     Can we leave a detailed message on this number?  YES    Travel screening: Not Applicable     Sarai Headley      [facility]  [facility]

## 2022-06-09 NOTE — TELEPHONE ENCOUNTER
Faxed completed PA form and supporting documentation for SCS TRIAL with Dr Mallory to .  Right fax confirmation          Peyton CHURCHILL    [facility]

## 2022-06-10 ENCOUNTER — MYC MEDICAL ADVICE (OUTPATIENT)
Dept: FAMILY MEDICINE | Facility: CLINIC | Age: 57
End: 2022-06-10
Payer: COMMERCIAL

## 2022-06-10 NOTE — TELEPHONE ENCOUNTER
Patient is called and scheduled next week with PCP.     Brandy Cooper, ANGELINAN, RN  [facility]

## 2022-06-10 NOTE — TELEPHONE ENCOUNTER
Tha Grullon MD  Dyad 1 Triage 33 minutes ago (3:05 PM)     RC    OK for workin next week on scheduled virtual time. Please call patient  to schedule time.   Electronically signed by Tha Grullon MD

## 2022-06-10 NOTE — TELEPHONE ENCOUNTER
Left VM consuelo Blackwell to return call      Peyton CHURCHILL    [facility]

## 2022-06-11 NOTE — TELEPHONE ENCOUNTER
Routing to nursing to schedule Interventional Injection Only - Type of Injection: SCS trial Radiology? Yes        Opal David    [facility]

## 2022-06-13 ENCOUNTER — OFFICE VISIT (OUTPATIENT)
Dept: FAMILY MEDICINE | Facility: CLINIC | Age: 57
End: 2022-06-13
Payer: COMMERCIAL

## 2022-06-13 ENCOUNTER — MYC MEDICAL ADVICE (OUTPATIENT)
Dept: FAMILY MEDICINE | Facility: CLINIC | Age: 57
End: 2022-06-13

## 2022-06-13 VITALS
OXYGEN SATURATION: 98 % | BODY MASS INDEX: 28.61 KG/M2 | RESPIRATION RATE: 14 BRPM | WEIGHT: 222.9 LBS | DIASTOLIC BLOOD PRESSURE: 82 MMHG | SYSTOLIC BLOOD PRESSURE: 118 MMHG | TEMPERATURE: 98.3 F | HEIGHT: 74 IN | HEART RATE: 104 BPM

## 2022-06-13 DIAGNOSIS — A49.02 MRSA INFECTION: Primary | ICD-10-CM

## 2022-06-13 PROBLEM — G95.9 CERVICAL MYELOPATHY (H): Status: ACTIVE | Noted: 2022-06-13

## 2022-06-13 PROBLEM — F32.5 MAJOR DEPRESSION IN COMPLETE REMISSION (H): Status: ACTIVE | Noted: 2022-06-13

## 2022-06-13 PROCEDURE — 99213 OFFICE O/P EST LOW 20 MIN: CPT | Performed by: FAMILY MEDICINE

## 2022-06-13 RX ORDER — SULFAMETHOXAZOLE/TRIMETHOPRIM 800-160 MG
1 TABLET ORAL 2 TIMES DAILY
Qty: 14 TABLET | Refills: 0 | Status: SHIPPED | OUTPATIENT
Start: 2022-06-13 | End: 2022-06-20

## 2022-06-13 ASSESSMENT — PATIENT HEALTH QUESTIONNAIRE - PHQ9
SUM OF ALL RESPONSES TO PHQ QUESTIONS 1-9: 9
10. IF YOU CHECKED OFF ANY PROBLEMS, HOW DIFFICULT HAVE THESE PROBLEMS MADE IT FOR YOU TO DO YOUR WORK, TAKE CARE OF THINGS AT HOME, OR GET ALONG WITH OTHER PEOPLE: NOT DIFFICULT AT ALL
SUM OF ALL RESPONSES TO PHQ QUESTIONS 1-9: 9

## 2022-06-13 ASSESSMENT — PAIN SCALES - GENERAL: PAINLEVEL: MODERATE PAIN (4)

## 2022-06-13 NOTE — PROGRESS NOTES
"  Assessment & Plan     MRSA infection  Prior knee infection 1 month ago. Recurrent swelling of left knee 2 weeks ago but mostly resolved at this time. Most likely due to patella chondromalacia and prepatellar bursitis. Minimal edema over patellar bursa, no erythema and no tenderness. Knee normal.   Know MRSA. Will treat with Bactrim DS twice daily for 7 days.  - sulfamethoxazole-trimethoprim (BACTRIM DS) 800-160 MG tablet; Take 1 tablet by mouth 2 times daily for 7 days    Prescription drug management         BMI:   Estimated body mass index is 29.01 kg/m  as calculated from the following:    Height as of this encounter: 1.867 m (6' 1.5\").    Weight as of this encounter: 101.1 kg (222 lb 14.4 oz).           Return in about 3 months (around 9/13/2022) for Follow up, Routine preventive, in person.    Tha Grullon MD  [facility] [place] Aceves is a 56 year old who presents for the following health issues     History of Present Illness       Reason for visit:  Infection in knee    He eats 2-3 servings of fruits and vegetables daily.He consumes 2 sweetened beverage(s) daily.He exercises with enough effort to increase his heart rate 10 to 19 minutes per day.  He exercises with enough effort to increase his heart rate 3 or less days per week.   He is taking medications regularly.    Today's PHQ-9         PHQ-9 Total Score: 9    PHQ-9 Q9 Thoughts of better off dead/self-harm past 2 weeks :   Not at all    How difficult have these problems made it for you to do your work, take care of things at home, or get along with other people: Not difficult at all     Left Knee  Prior knee infection 1 month ago. Recurrent swelling of left knee 2 weeks ago but mostly resolved at this time. Most likely due to patella chondromalacia and prepatellar bursitis. Minimal edema over patellar bursa, no erythema and no tenderness. Knee normal.     Patient Active Problem List   Diagnosis     Meniere's disease     " Major depression in complete remission (H)     Chronic pain syndrome     Pain medication agreement signed     Bilateral occipital neuralgia     Benign essential hypertension     Cervical radiculopathy     Status post lumbar spinal fusion     Sleep apnea, unspecified type     Osteoarthritis of spine with radiculopathy, lumbar region     Impingement syndrome of left shoulder     Primary osteoarthritis of both knees     Chronic bilateral low back pain without sciatica     Cervical myelopathy (H)     Major depression in complete remission (H)     Current Outpatient Medications   Medication Sig Dispense Refill     Acetaminophen (TYLENOL PO) Take 1,000 mg by mouth 3 times daily       alendronate (FOSAMAX) 70 MG tablet Take 1 tablet (70 mg) by mouth every 7 days 4 tablet 0     amLODIPine (NORVASC) 5 MG tablet TAKE 1 TABLET BY MOUTH EVERY DAY 90 tablet 0     atorvastatin (LIPITOR) 10 MG tablet Take 10 mg by mouth daily       calcitonin, salmon, (MIACALCIN) 200 UNIT/ACT nasal spray Spray 1 spray into one nostril alternating nostrils daily Alternate nostril each day. 3.7 mL 0     cyclobenzaprine (FLEXERIL) 5 MG tablet 1-2 tablets three times daily as needed 90 tablet 2     DULoxetine (CYMBALTA) 60 MG capsule Take 1 capsule (60 mg) by mouth daily 60 capsule 1     ibuprofen (ADVIL/MOTRIN) 800 MG tablet Take 800 mg by mouth 3 times daily       losartan-hydrochlorothiazide (HYZAAR) 50-12.5 MG tablet TAKE 2 TABLETS BY MOUTH EVERY  tablet 0     nortriptyline (PAMELOR) 50 MG capsule Take 1 capsule (50 mg) by mouth At Bedtime 90 capsule 1     order for DME Equipment being ordered: TENS Pads as directed 1 Device 0     oxyCODONE (ROXICODONE) 5 MG tablet Take 1-2 tablets (5-10 mg) by mouth every 6 hours as needed for severe pain Max of 6 tablets/day. Fill 6/15/22. Start 6/17/22. 30 day Rx 165 tablet 0     pregabalin (LYRICA) 150 MG capsule Take 1 capsule (150 mg) by mouth 3 times daily 90 capsule 3     Pregabalin (LYRICA) 200 MG  "capsule Take 1 capsule at bedtime. This is in addition to the 150mg in morning and afternoon. 30 capsule 2     naloxone (NARCAN) 4 MG/0.1ML nasal spray Spray 1 spray (4 mg) into one nostril alternating nostrils once as needed for opioid reversal every 2-3 minutes until assistance arrives (Patient not taking: Reported on 6/13/2022) 0.2 mL 1         Review of Systems   Constitutional, HEENT, cardiovascular, pulmonary, gi and gu systems are negative, except as otherwise noted.      Objective    /82 (BP Location: Left arm)   Pulse 104   Temp 98.3  F (36.8  C)   Resp 14   Ht 1.867 m (6' 1.5\")   Wt 101.1 kg (222 lb 14.4 oz)   SpO2 98%   BMI 29.01 kg/m    Body mass index is 29.01 kg/m .  Physical Exam   GENERAL: healthy, alert and no distress  MS: LLE exam shows Knees reveal full range of motion, no tenderness, masses, effusion or ligamentous instability.    SKIN: no suspicious lesions or rashes    Lab on 06/01/2022   Component Date Value Ref Range Status     Culture 06/01/2022 No Growth   Final     Oxycodone ng/mL 06/01/2022 318 (A) <50 ng/mL Final     Oxycodone 06/01/2022 1,026  Absent ng/mg [creat] Final    Sources of oxycodone are scheduled prescription medications.     Pregabalin 06/01/2022 Present (A) Absent Final    Sources of pregabalin are prescription medications.     Creatinine Urine for Drug Screen 06/01/2022 31  mg/dL Final    The reference range has not been established for creatinine in random urines. The results should be integrated into the clinical context for interpretation.               "

## 2022-06-13 NOTE — TELEPHONE ENCOUNTER
This encounter has 2 separate procedures being reviewed.    A new encounter was made for the spinal cord stimulator trial.   See the 6/13/22 encounter for that information.    Radiofrequency ablation plan: pt to do PT and then contact us to resubmit to insurance.      SAMY Moody-BSN  [facility]

## 2022-06-13 NOTE — TELEPHONE ENCOUNTER
Finn Burrell,  I was not able to move your appointment with Dr. Grullon forward so we will have to keep it scheduled for 8/11/2022.   If you need to reschedule or we can assist you otherwise please reach out via My Chart or call us at [phone].   Thank you,  [facility] Team

## 2022-06-16 ENCOUNTER — HOSPITAL ENCOUNTER (OUTPATIENT)
Dept: PHYSICAL THERAPY | Facility: CLINIC | Age: 57
Setting detail: THERAPIES SERIES
Discharge: HOME | End: 2022-06-16
Attending: STUDENT IN AN ORGANIZED HEALTH CARE EDUCATION/TRAINING PROGRAM
Payer: COMMERCIAL

## 2022-06-16 DIAGNOSIS — M47.816 SPONDYLOSIS OF LUMBAR REGION WITHOUT MYELOPATHY OR RADICULOPATHY: ICD-10-CM

## 2022-06-16 PROCEDURE — 97140 MANUAL THERAPY 1/> REGIONS: CPT | Mod: GP | Performed by: PHYSICAL THERAPIST

## 2022-06-16 PROCEDURE — 97161 PT EVAL LOW COMPLEX 20 MIN: CPT | Mod: GP | Performed by: PHYSICAL THERAPIST

## 2022-06-16 PROCEDURE — 97112 NEUROMUSCULAR REEDUCATION: CPT | Mod: GP | Performed by: PHYSICAL THERAPIST

## 2022-06-16 PROCEDURE — 97110 THERAPEUTIC EXERCISES: CPT | Mod: GP | Performed by: PHYSICAL THERAPIST

## 2022-06-23 ENCOUNTER — TELEPHONE (OUTPATIENT)
Dept: FAMILY MEDICINE | Facility: CLINIC | Age: 57
End: 2022-06-23

## 2022-06-23 ENCOUNTER — HOSPITAL ENCOUNTER (OUTPATIENT)
Dept: PHYSICAL THERAPY | Facility: CLINIC | Age: 57
Setting detail: THERAPIES SERIES
Discharge: HOME | End: 2022-06-23
Attending: STUDENT IN AN ORGANIZED HEALTH CARE EDUCATION/TRAINING PROGRAM
Payer: COMMERCIAL

## 2022-06-23 PROCEDURE — 97140 MANUAL THERAPY 1/> REGIONS: CPT | Mod: GP | Performed by: PHYSICAL THERAPIST

## 2022-06-23 PROCEDURE — 97035 APP MDLTY 1+ULTRASOUND EA 15: CPT | Mod: GP | Performed by: PHYSICAL THERAPIST

## 2022-06-23 NOTE — TELEPHONE ENCOUNTER
Patient states right leg/knee was swollen yesterday, but states today is better and just mildly swollen. Denies any redness or open wound. States knee is just sore today, but he is able to move it. Patient is wondering if he needs another antibiotic.    Can PCP recommend if he would like to see patient or if he should follow up with ortho for this?    Cinthia Kingston, ANGELINAN, RN

## 2022-06-23 NOTE — PROGRESS NOTES
"   06/16/22 1400   General Information   Type of Visit Initial OP Ortho PT Evaluation   Start of Care Date 06/16/22   Referring Physician Liz Mallory MD   Patient/Family Goals Statement control the pain and not be on meds   Orders Evaluate and Treat   Date of Order 06/06/22   Certification Required? No   Medical Diagnosis Spondylosis of lumbar region without myelopathy or radiculopathy   Surgical/Medical history reviewed Yes   Precautions/Limitations no known precautions/limitations   Special Instructions His current program is machines at the gym for legs, back, extension, LTR, stretches in morning.  He seems to remember bending over feels much better than extension--so flexion repsonder.   General Information Comments PMH; \"really bad neuropathy\" bilat feet buzzing, pins and needles 3/4 way of leg toward knee 6-12 mo of it going up legs, prior just the feet (for 4 yrs); sharp stabs at times and like walking on lots of sharp tiny rocks.  H/o 2 fusions in neck and one to stabilize the fusions (extra hardware.)   Body Part(s)   Body Part(s) Lumbar Spine/SI   Presentation and Etiology   Pertinent history of current problem (include personal factors and/or comorbidities that impact the POC) Planning to get another radio ablation in L2-4, not scheduled yet.  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  Always had a minor pain in back, he would worked through it.  He played basketball and volleyball and feels he paying for it.  He would jump 4 ft in the air.  He is on maximum doses of some meds and wants to be off of them.  He does also report a pain separate from this which is the sacral level with 2 spots about 3 inches from midline and noted a bone problem on MRI--was given meds for bone density. "  He also does report a fall from deer stand onto tailbone a few years ago.   Impairments A. Pain   Symptom Location around T12 - end of sacrum pain bilat --it can go out to kidney areas and down spine.   How/Where did it occur From insidious onset   Chronicity Chronic   Pain rating (0-10 point scale) Best (/10);Worst (/10);Other   Best (/10) 2.5   Worst (/10) 9   Pain rating comment current 5.5/10; band length of sacrum going across bilat out to both sides completely to lateral hips.   Pain/symptoms eased by E. Changing positions   Progression of symptoms since onset: Improved   Prior Level of Function   Prior Level of Function-Mobility active years ago   Prior Level of Function-ADLs active years ago   Functional Level Prior Comment active years ago   Current Level of Function   Patient role/employment history A. Employed   Employment Comments In IT now, was teaching and coaching some.   Fall Risk Screen   Fall screen completed by PT   Have you fallen 2 or more times in the past year? No   Have you fallen and had an injury in the past year? No   Is patient a fall risk? No   Abuse Screen (yes response referral indicated)   Feels Unsafe at Home or Work/School no   Feels Threatened by Someone no   Does Anyone Try to Keep You From Having Contact with Others or Doing Things Outside Your Home? no   Physical Signs of Abuse Present no   Patient needs abuse support services and resources No   Lumbar Spine/SI Objective Findings   Flexion ROM full reach to floor: really low sacral pain 3/10 but goes away immediately upon standing back up.  10 reps flexion: 8/10 pain--real low pain again in thumb width across low sacrum--going away in 30 seconds.  2nd set of 10 reps: only able to complete 7 reps and had to stop at 9/10 pain which went along R sacral edge and down at diagonal inward to coccyx/medial low buttock.   Extension ROM one rep full motion: gives a shooting pain thumb width low sacrum and out laterally to gluts.  He also  had a crunching sensation in same area.  He says that is worse than going forward as he does not get the crunching and shaprer pain.  Pain goes down in 1 minute to 5-5.5/10.   Hip Screen level in standing and prone but hip rotator ROM gives sharp left high lumbar pain with R LE ER to about 15 degrees.  R hip limited ER to 15 and L limited IR to 15 degrees, each other rot motion normal.   Lumbar/SI Special Tests Comments prone left SI moves first and most in both flex/ext.  Severely taut glut mm resolve in prone.   Observation very taut in mm of gluts with R side more prominent in bulk but both similar in tightness.   Posture sits mostly upright  with legs/knees out. Does lean to one elbow at times (either one)   Planned Therapy Interventions   Planned Therapy Interventions joint mobilization;manual therapy;neuromuscular re-education;strengthening;stretching   Planned Modality Interventions   Planned Modality Interventions Comments possible use of US to prep tissues for better mobility   Clinical Impression   Criteria for Skilled Therapeutic Interventions Met yes, treatment indicated   PT Diagnosis sacral imbalance, severe mm tension in gluts   Influenced by the following impairments sacral and pelvic rotation/immobility   Functional limitations due to impairments personal cares, walking, standing, sitting, lifting, social life   Clinical Presentation Stable/Uncomplicated   Clinical Decision Making (Complexity) Low complexity   Therapy Frequency 1 time/week   Predicted Duration of Therapy Intervention (days/wks) 4-6 weeks   Risk & Benefits of therapy have been explained Yes   Patient, Family & other staff in agreement with plan of care Yes   Clinical Impression Comments windswept type locking of sacral motion with level pelvis noted--lacks ER R and IR L with left SI hypermobile (more painful after testing) and R SI hypomobile.  Also more pain with sacral flexion (but not releived with sacral extension)  Tightness in  coccyx.   Education Assessment   Preferred Learning Style Listening   Barriers to Learning No barriers   ORTHO GOALS   PT Ortho Eval Goals 1;2   Ortho Goal 1   Goal Identifier participates in HEP   Goal Description Ketan will actively participate in HEP and home activity needed to help lessen his symptoms, as reported by pt that he is doing various homework assigned.   Target Date 07/29/22   Ortho Goal 2   Goal Identifier pain   Goal Description Ketan will be able to report 25% reduction in pain levels to better participate in daily functions.   Target Date 07/29/22   Total Evaluation Time   PT Brionna Low Complexity Minutes (51808) 20

## 2022-06-23 NOTE — TELEPHONE ENCOUNTER
Behavioral Health Note    This patient was referred to the [address] [place] [address] by Bushra Nelson NP for symptoms of anxiety. Met with pt. for initial session of 60 minutes to establish contact, to assess symptoms and mental status, identify health behavior goals, and develop plan of care based on readiness to change. Diagnosis: Generalized Anxiety Disorder    Symptoms and mental status: Pt appeared stated age, dressed appropriately, and was pleasant and cooperative throughout the interview. Pt made good eye contact; thoughts were clear and goal-oriented and there was no evidence of disturbances in thought process or content or perceptual disturbances. Mood and affect were mildly depressed/anxious. Insight and judgment were good. Phoebe Cueva reports excessive anxiety and worry most days even though she knows she has a good life and really nothing to worry about. She ruminates on various topics. She manages her anxiety by running for 1 hour/5 days a week (states she is a bit obsessed with it) and does yoga 3-4 times/week. She was first dx with anxiety in high school. During college she became obsessed with her food intake but stated that it was not related to her weight but rather to IBS sx (although she has not been dx with IBS). She has several family members who are treated for depression. She reports being very close to her mother and denies any significant trauma. She is tolerating Zoloft well. The scores on the Pt. Stress Questionnaire were: PHQ-9: 10 (moderate depressive sx); JOHN-7: 17 (significant anxiety); AUDIT: 3 (no abuse of alcohol); and 0/4 on the PTSD scale. She rated her current pain level 1/10 (slight headache since starting Zoloft). Health Behavior Goals: To feel lass anxious    Social Support: , mother, several friends    Intervention:   The following CBT interventions were reviewed: self-monitoring of thoughts that contribute to anxiety, relaxation training, and Left message for patient to call back triage nurse or if same issues as previous knee/leg pain/swelling to call specialty.   mindfulness/awareness skills. Discussions related to lifestyle modifications were also reviewed:  sleep hygiene, management of stressors, physical activity. Homework included: tracking when anxiety seems worse , relaxation practice, cognitive restructuring, reading assignment. Referrals made:      Plan: Pt. agreed to follow the treatment plan outlined above and will return in 4 weeks. [facility] will coordinate with PCP for plan of care.

## 2022-06-23 NOTE — TELEPHONE ENCOUNTER
Please call patient to triage symptoms.   Have patient follow up with orthopedics. This is a chronic problem that he has seen ortho for recently.   Tha Grullon MD    Message text           Swollen knee  6/18/2022 11:38 AM Reply    To: [employer] MESSAGES      From: Ketan Mehta      Created: 6/18/2022 11:38 AM        *-*-*This message was handled on 6/20/2022 7:37 AM by DAYAMI ANAYA*-*-*    The swelling and pain in my left knee/leg is getting worse again.  My knee is noticeably more swollen and the swelling is back in the lower leg and ankle.

## 2022-06-24 ENCOUNTER — HOSPITAL ENCOUNTER (EMERGENCY)
Facility: CLINIC | Age: 57
Discharge: HOME | End: 2022-06-24
Attending: FAMILY MEDICINE | Admitting: FAMILY MEDICINE
Payer: COMMERCIAL

## 2022-06-24 VITALS
HEIGHT: 73 IN | RESPIRATION RATE: 18 BRPM | TEMPERATURE: 97.8 F | WEIGHT: 230.1 LBS | HEART RATE: 84 BPM | BODY MASS INDEX: 30.5 KG/M2 | OXYGEN SATURATION: 98 % | DIASTOLIC BLOOD PRESSURE: 83 MMHG | SYSTOLIC BLOOD PRESSURE: 120 MMHG

## 2022-06-24 DIAGNOSIS — M25.561 CHRONIC PAIN OF BOTH KNEES: ICD-10-CM

## 2022-06-24 DIAGNOSIS — G89.29 CHRONIC PAIN OF BOTH KNEES: ICD-10-CM

## 2022-06-24 DIAGNOSIS — M25.562 CHRONIC PAIN OF BOTH KNEES: ICD-10-CM

## 2022-06-24 PROCEDURE — 99284 EMERGENCY DEPT VISIT MOD MDM: CPT | Performed by: FAMILY MEDICINE

## 2022-06-24 PROCEDURE — 99283 EMERGENCY DEPT VISIT LOW MDM: CPT | Performed by: FAMILY MEDICINE

## 2022-06-24 RX ORDER — METHYLPREDNISOLONE 4 MG
TABLET, DOSE PACK ORAL
Qty: 21 TABLET | Refills: 0 | Status: SHIPPED | OUTPATIENT
Start: 2022-06-24 | End: 2022-08-11

## 2022-06-24 NOTE — ED NOTES
Care assumed for discharge only. Discharge instructions reviewed and patient will f/u as recommended .

## 2022-06-24 NOTE — TELEPHONE ENCOUNTER
Tha Grullon MD  [facility] 9 hours ago (9:40 PM)     RC    Please notify patient additional antibiotics are not indicated.   Tha Grullon MD    Message text

## 2022-06-24 NOTE — ED PROVIDER NOTES
History     Chief Complaint   Patient presents with     Knee Pain     HPI  Indra Mehta is a 56 year old male who presents with bilateral knee pain this been going on for the last couple of months.  Patient was seen by his primary care doctor few weeks ago because they thought he might of had an infection in his left knee as there was some swelling there.  He was put on a course of antibiotics but then shortly after that started having pain in both knees.  He has not noticed any redness there has been no drainage and the swelling has gone down but he still has some pain when he goes to walk or stand.  He also has pain when he lays on his stomach in bed.  There has been no fevers or chills.  Denies any new trauma.  He has had injections in his knee before which has helped in the past.  Patient has had multiple imaging of the knee.    Allergies:  Allergies   Allergen Reactions     No Known Drug Allergies        Problem List:    Patient Active Problem List    Diagnosis Date Noted     Cervical myelopathy (H) 06/13/2022     Priority: Medium     Major depression in complete remission (H) 06/13/2022     Priority: Medium     Chronic bilateral low back pain without sciatica 05/08/2022     Priority: Medium     Primary osteoarthritis of both knees 05/04/2022     Priority: Medium     Impingement syndrome of left shoulder 06/23/2021     Priority: Medium     Sleep apnea, unspecified type 04/06/2021     Priority: Medium     Osteoarthritis of spine with radiculopathy, lumbar region 04/06/2021     Priority: Medium     Status post lumbar spinal fusion 01/14/2020     Priority: Medium     Cervical radiculopathy 10/28/2019     Priority: Medium     Formatting of this note might be different from the original.  Added automatically from request for surgery [id number]       Benign essential hypertension 06/30/2017     Priority: Medium     Bilateral occipital neuralgia 10/28/2016     Priority: Medium     Major depression in complete  remission (H) 08/20/2010     Priority: Medium     Chronic pain syndrome 08/20/2010     Priority: Medium     Patient is followed by Tha Grullon MD for ongoing prescription of pain medication.  All refills should be approved by this provider only at face-to-face appointments - not by phone request.    Medication(s): Tramadol.   Maximum quantity per month: 60  Clinic visit frequency required: Q 1 months     Controlled substance agreement:  Encounter-Level CSA - 10/13/16:               Controlled Substance Agreement - Scan on 10/23/2016  5:07 PM : CONTROLLED SUBSTANCE AGREEMENT 10/13/16 (below)            Pain Clinic evaluation in the past: Yes       Date/Location:   [facility]    DIRE Total Score(s):  No flowsheet data found.    Last [facility] website verification:  none   [email or web address]               Pain medication agreement signed 08/20/2010     Priority: Medium     Meniere's disease 03/22/2007     Priority: Medium     Problem list name updated by automated process. Provider to review          Past Medical History:    Past Medical History:   Diagnosis Date     Anxiety      Back pain      Depressive disorder      Hyperlipidemia      Hypertension      Meniere's disease, unspecified      Pain medication agreement signed 8/20/2010     Sleep apnea, unspecified type        Past Surgical History:    Past Surgical History:   Procedure Laterality Date     BUNIONECTOMY RT/LT  09/05/08    Both feet     COLONOSCOPY N/A 12/28/2016    Procedure: COMBINED COLONOSCOPY, SINGLE OR MULTIPLE BIOPSY/POLYPECTOMY BY BIOPSY;  Surgeon: Indra Jernigan MD;  [facility]     DISCECTOMY, FUSION CERVICAL ANTERIOR ONE LEVEL, COMBINED N/A 7/5/2017    Procedure: COMBINED DISCECTOMY, FUSION CERVICAL ANTERIOR ONE LEVEL;  Cervical 6-7, Anterior cervical discectomy fusion;  Surgeon: Mike Mckenna MD;  Location: [facility]     DISCECTOMY, FUSION CERVICAL ANTERIOR ONE LEVEL, COMBINED N/A 12/19/2018    Procedure: cervical  5-6 anterior cervical discectomy fusion;  Surgeon: Mike Mckenna MD;  [facility]     HERNIORRHAPHY UMBILICAL N/A 8/11/2017    Procedure: HERNIORRHAPHY UMBILICAL;  open umbilical hernia repair;  Surgeon: Boni Story MD;  [facility]     INJECT BLOCK MEDIAL BRANCH CERVICAL/THORACIC/LUMBAR Bilateral 8/12/2021    Procedure: Left L3, Left L4, Left L5, Right L3, Right L4 and Right L5 medial branch nerve blocks using fluoroscopic guidance and contrast control;  Surgeon: Darryn Mcdaniel MD;  Location: [facility]     INJECT BLOCK MEDIAL BRANCH CERVICAL/THORACIC/LUMBAR N/A 9/9/2021    Procedure: Left L3, Left L4, Left L5, Right L3, Right L4 and Right L5 medial branch nerve blocks using fluoroscopic guidance and contrast control;  Surgeon: Darryn Mcdaniel MD;  [facility]     INJECT EPIDURAL CERVICAL N/A 8/22/2019    Procedure: INJECTION, SPINE, CERVICAL 6-7 EPIDURAL;  Surgeon: Darryn Mcdaniel MD;  [facility]     INJECT EPIDURAL LUMBAR N/A 11/9/2017    Procedure: INJECT EPIDURAL LUMBAR;  lumbar 4-5 epidural steroid injection ;  Surgeon: Darryn Mcdaniel MD;  [facility]     INJECT EPIDURAL LUMBAR N/A 12/28/2017    Procedure: INJECT EPIDURAL LUMBAR;  lumbar epidural injection;  Surgeon: Darryn Mcdaniel MD;  [facility]     INJECT EPIDURAL LUMBAR Bilateral 5/13/2021    Procedure: Bilateral Lumbar 3-4 Epidural Steroid Injection;  Surgeon: Darryn Mcdaniel MD;  [facility]     INJECT EPIDURAL TRANSFORAMINAL Bilateral 8/23/2018    Procedure: INJECT EPIDURAL TRANSFORAMINAL;  transforaminal bilateral lumbar 3-4 steroid injection;  Surgeon: Darryn Mcdaniel MD;  [facility]     INJECT EPIDURAL TRANSFORAMINAL Bilateral 11/8/2018    Procedure: INJECT EPIDURAL TRANSFORAMINAL lumbar 3-4;  Surgeon: Darryn Mcdaniel MD;  [facility]     INJECT EPIDURAL TRANSFORAMINAL Bilateral 6/14/2019    Procedure: INJECTION, EPIDURAL, TRANSFORAMINAL LUMBAR 3-4 BILATERAL;  Surgeon: Darryn Mcdaniel MD;  [facility]     INJECT EPIDURAL TRANSFORAMINAL Bilateral 2020    Procedure: lumbar 3-4 bilateral transforaminal epidural steroid injection;  Surgeon: Darryn Mcdaniel MD;  [facility]     INJECT EPIDURAL TRANSFORAMINAL Bilateral 2020    Procedure: INJECTION, EPIDURAL, TRANSFORAMINAL  LUMBAR 3-4 APPROACH;  Surgeon: Darryn Mcdaniel MD;  [facility]     PE TUBES      left ear     RADIO FREQUENCY ABLATION PULSED CERVICAL Bilateral 10/1/2021    Procedure: RADIOFREQUENCY ABLATION lumbar 3, 4, 5 bilateral;  Surgeon: Darryn Mcdaniel MD;  [facility]     ZZHC COLONOSCOPY W/WO BRUSH/WASH  2005     ZZHC CREATE EARDRUM OPENING,GEN ANESTH  2007    Pe tube, Left endolymphatic sac enhancement.     ZZHC MASTOIDECTOMY,COMPLETE  2007       Family History:    Family History   Problem Relation Age of Onset     Diabetes Mother      Cancer Mother         colon cancer -  at 52, diag at 42     Hypertension Father      Heart Disease Father          of AAA     No Known Problems Brother      No Known Problems Brother      Depression Sister      Suicide Other      Unknown/Adopted Maternal Grandmother      Unknown/Adopted Maternal Grandfather      Unknown/Adopted Paternal Grandmother      Unknown/Adopted Paternal Grandfather      No Known Problems Daughter      No Known Problems Daughter        Social History:  Marital Status:  Single [1]  Social History     Tobacco Use     Smoking status: Never Smoker     Smokeless tobacco: Never Used   Vaping Use     Vaping Use: Never used   Substance Use Topics     Alcohol use: Yes     Alcohol/week: 0.0 standard drinks     Comment: rarely     Drug use: No        Medications:    methylPREDNISolone (MEDROL DOSEPAK) 4 MG tablet therapy pack  Acetaminophen (TYLENOL PO)  alendronate (FOSAMAX) 70 MG tablet  amLODIPine (NORVASC) 5 MG tablet  atorvastatin (LIPITOR) 10 MG tablet  calcitonin, salmon, (MIACALCIN) 200 UNIT/ACT nasal spray  cyclobenzaprine (FLEXERIL) 5 MG  "tablet  DULoxetine (CYMBALTA) 60 MG capsule  ibuprofen (ADVIL/MOTRIN) 800 MG tablet  losartan-hydrochlorothiazide (HYZAAR) 50-12.5 MG tablet  naloxone (NARCAN) 4 MG/0.1ML nasal spray  nortriptyline (PAMELOR) 50 MG capsule  order for DME  oxyCODONE (ROXICODONE) 5 MG tablet  pregabalin (LYRICA) 150 MG capsule  Pregabalin (LYRICA) 200 MG capsule          Review of Systems   All other systems reviewed and are negative.      Physical Exam   BP: (!) 139/96  Pulse: 101  Temp: 97.8  F (36.6  C)  Resp: 18  Height: 185.4 cm (6' 1\")  Weight: 104.4 kg (230 lb 1.6 oz)  SpO2: 98 %      Physical Exam  Vitals and nursing note reviewed.   Constitutional:       General: He is not in acute distress.     Appearance: Normal appearance. He is not ill-appearing.   Musculoskeletal:      Right knee: Normal. No swelling, erythema, ecchymosis, bony tenderness or crepitus. Normal range of motion. No tenderness.      Left knee: Normal. No swelling, erythema, ecchymosis, bony tenderness or crepitus. Normal range of motion. No tenderness.   Neurological:      Mental Status: He is alert.         ED Course                 Procedures      No results found. However, due to the size of the patient record, not all encounters were searched. Please check Results Review for a complete set of results.    Medications - No data to display     Both knee exam was normal, there is no signs of infection, swelling or effusion of the knee.  I do not think this is likely infection with the fact that is constant long and there is no systemic symptoms and no findings at all on exam.  He has gotten relief in the past from injections which makes me think there is some inflammatory component to this.  I Jessica try him on a course of steroids to see if this may be helps.  Recommend follow-up with Ortho to consider a joint injection if things or not helping in the future.    Assessments & Plan (with Medical Decision Making)  Bilateral knee pain     I have reviewed the " nursing notes.    I have reviewed the findings, diagnosis, plan and need for follow up with the patient.      New Prescriptions    METHYLPREDNISOLONE (MEDROL DOSEPAK) 4 MG TABLET THERAPY PACK    Follow Package Directions       Final diagnoses:   Chronic pain of both knees       6/24/2022   [facility] EMERGENCY DEPT     Tod Tapia MD  06/24/22 0704

## 2022-06-24 NOTE — ED TRIAGE NOTES
Started having pain in hs knees about 5 weeks ago, saw his primary doctor and was started on bactrum for possible infection with no relief, was unable to get and appointment with his primary and the pain is worsening.      Triage Assessment     Row Name 06/24/22 132       Triage Assessment (Adult)    Airway WDL WDL       Respiratory WDL    Respiratory WDL WDL       Skin Circulation/Temperature WDL    Skin Circulation/Temperature WDL WDL

## 2022-06-28 ENCOUNTER — HOSPITAL ENCOUNTER (OUTPATIENT)
Dept: PHYSICAL THERAPY | Facility: CLINIC | Age: 57
Setting detail: THERAPIES SERIES
Discharge: HOME | End: 2022-06-28
Attending: STUDENT IN AN ORGANIZED HEALTH CARE EDUCATION/TRAINING PROGRAM
Payer: COMMERCIAL

## 2022-06-28 PROCEDURE — 97110 THERAPEUTIC EXERCISES: CPT | Mod: GP | Performed by: PHYSICAL THERAPIST

## 2022-06-28 PROCEDURE — 97140 MANUAL THERAPY 1/> REGIONS: CPT | Mod: GP | Performed by: PHYSICAL THERAPIST

## 2022-07-04 ENCOUNTER — MYC MEDICAL ADVICE (OUTPATIENT)
Dept: PALLIATIVE MEDICINE | Facility: CLINIC | Age: 57
End: 2022-07-04

## 2022-07-05 ENCOUNTER — HOSPITAL ENCOUNTER (OUTPATIENT)
Dept: PHYSICAL THERAPY | Facility: CLINIC | Age: 57
Setting detail: THERAPIES SERIES
Discharge: HOME | End: 2022-07-05
Attending: STUDENT IN AN ORGANIZED HEALTH CARE EDUCATION/TRAINING PROGRAM
Payer: COMMERCIAL

## 2022-07-05 PROCEDURE — 97140 MANUAL THERAPY 1/> REGIONS: CPT | Mod: GP | Performed by: PHYSICAL THERAPIST

## 2022-07-05 PROCEDURE — 97110 THERAPEUTIC EXERCISES: CPT | Mod: GP | Performed by: PHYSICAL THERAPIST

## 2022-07-10 ENCOUNTER — MYC MEDICAL ADVICE (OUTPATIENT)
Dept: PALLIATIVE MEDICINE | Facility: CLINIC | Age: 57
End: 2022-07-10

## 2022-07-11 DIAGNOSIS — I73.01 RAYNAUD'S DISEASE WITH GANGRENE (H): ICD-10-CM

## 2022-07-11 NOTE — TELEPHONE ENCOUNTER
To: PAIN NURSE      From: Ketan Mehta      Created: 7/7/2022 5:49 PM        *-*-*This message has been handled.*-*-*    My 4th PT session is Tuesday morning.  I'd like to schedule as soon as we can after that.

## 2022-07-11 NOTE — TELEPHONE ENCOUNTER
Copied into related repeat RFA encounter 6/3/22    Nyasia Aguilera RN, BSN, CMSRN  RN Care Coordinator  [facility]

## 2022-07-12 ENCOUNTER — HOSPITAL ENCOUNTER (OUTPATIENT)
Dept: PHYSICAL THERAPY | Facility: CLINIC | Age: 57
Setting detail: THERAPIES SERIES
Discharge: HOME | End: 2022-07-12
Attending: STUDENT IN AN ORGANIZED HEALTH CARE EDUCATION/TRAINING PROGRAM
Payer: COMMERCIAL

## 2022-07-12 ENCOUNTER — MYC MEDICAL ADVICE (OUTPATIENT)
Dept: PALLIATIVE MEDICINE | Facility: CLINIC | Age: 57
End: 2022-07-12

## 2022-07-12 DIAGNOSIS — Z98.1 S/P CERVICAL SPINAL FUSION: ICD-10-CM

## 2022-07-12 DIAGNOSIS — M54.16 LUMBAR RADICULOPATHY: ICD-10-CM

## 2022-07-12 DIAGNOSIS — M79.18 MYOFASCIAL PAIN: ICD-10-CM

## 2022-07-12 DIAGNOSIS — M54.2 CERVICALGIA: ICD-10-CM

## 2022-07-12 DIAGNOSIS — G89.4 CHRONIC PAIN SYNDROME: ICD-10-CM

## 2022-07-12 DIAGNOSIS — F11.90 CHRONIC, CONTINUOUS USE OF OPIOIDS: ICD-10-CM

## 2022-07-12 PROCEDURE — 97530 THERAPEUTIC ACTIVITIES: CPT | Mod: GP | Performed by: PHYSICAL THERAPIST

## 2022-07-12 PROCEDURE — 97140 MANUAL THERAPY 1/> REGIONS: CPT | Mod: GP | Performed by: PHYSICAL THERAPIST

## 2022-07-12 NOTE — TELEPHONE ENCOUNTER
Per patient [employer] message:  From: Ketan Mehta      Created: 7/10/2022 7:01 PM      I can't remember if I talked to you about my recent swollen knees and ankles.  On June 18th, from a recommendation to see my primary for my swollen knees and ankles,  I messaged Dr. Grullon and saw him in clinic a few days later.  Both knees were red, warm, and quite swollen and the swelling went all the way into my foot.  This is exactly what happened when I had the two staph infections in each knee with an open and infected wound.  The swelling did not go down with antibiotics.  I messaged Dr. Mariee after the Bactrim did nothing and since it was a Friday they suggested I go to urgent care or the ER.  I went to the ER and the doctor was certain the swelling was not from infection.  He prescribed a steroid pack which I took over the next five days.  I currently have more pain in my knees than before this mystery swelling.  I have trouble standing from a sitting position because of this pain in my knees and some additional pain in my ankles/feet.  My knees are still slightly swollen and on some days I can see that there is swelling in my feet.  There is my update, wondering what you would suggest for next steps.     I See the 7/12/22 for refill information.  TASHIA            Routed to provider.     Heidy RN-BSN  [facility]

## 2022-07-12 NOTE — TELEPHONE ENCOUNTER
[employer]koto message sent to Pt      Noble Aceves,      I am so sorry to hear that this has been happening to you.      I think it would be best to see your primary care provider about this. I am very concerned about your symptoms and would like to have someone with a broad perspective take a look.   Otherwise I would be happy to draw fluid off your knees and send it to the lab for analysis. I would recommend making the appointment with a primary care provider first. If that doesn't get you anywhere please let me know and I will place an order for you to be seen for the knee fluid analysis as soon as possible.      Best,   Liz Mallory MD  [facility]

## 2022-07-12 NOTE — TELEPHONE ENCOUNTER
Routed to the nursing pool and to the MA pool to process refill(s).  _____________________________________      Per patient [employer] message (Brought over from the 7/10/22 encounter):    I am also requesting a refill of my oxycodone prescription for the end of the week.  I use [facility] in [place].

## 2022-07-12 NOTE — PROGRESS NOTES
[facility]    Outpatient Physical Therapy Discharge Note  Patient: Indra Mehta  : 1965    Beginning/End Dates of Reporting Period:  22 to 22    Referring Provider: Liz Mallory MD    Therapy Diagnosis: sacral imbalance, severe mm tension in gluts     Client Self Report: I am sore.  I went to [place] and did festivities all weekend.  All the weekend walking and standing go tto me.    Objective Measurements:        Objective Measure: current sx's  Details: bilat mm from bottom of ribs to lateral hips and left piriformis area to back of left upper leg     Goals:  Goal Identifier participates in HEP   Goal Description Ketan will actively participate in HEP and home activity needed to help lessen his symptoms, as reported by pt that he is doing various homework assigned.   Target Date 22   Date Met  22   Progress (detail required for progress note):       Goal Identifier pain   Goal Description Ketan will be able to report 25% reduction in pain levels to better partiicpate in daily functions.   Target Date 10/18/21   Date Met  22   Progress (detail required for progress note): 22: 50% reduction from beginning of today and 25% reduction since starting overall         Plan:  Discharge from therapy.    Discharge:    Reason for Discharge: Pt reports he came to satisfy insurance for progression to injections; we have completed 5 sessions.  He can have some relief during sessions, but no lasting relief reported.     Equipment Issued: .    Discharge Plan: Pt to follow with MD.

## 2022-07-13 DIAGNOSIS — M84.30XA STRESS REACTION OF BONE: ICD-10-CM

## 2022-07-13 RX ORDER — AMLODIPINE BESYLATE 5 MG/1
TABLET ORAL
Qty: 90 TABLET | Refills: 0 | Status: SHIPPED | OUTPATIENT
Start: 2022-07-13 | End: 2022-08-11

## 2022-07-13 RX ORDER — ALENDRONATE SODIUM 70 MG/1
70 TABLET ORAL
Qty: 4 TABLET | Refills: 0 | OUTPATIENT
Start: 2022-07-13

## 2022-07-13 NOTE — TELEPHONE ENCOUNTER
Received call from patient requesting refill(s) of oxyCODONE (ROXICODONE) 5 MG tablet     Last dispensed from pharmacy on 06/15/2022    Patient's last office/virtual visit by prescribing provider on 06/01/2022  Next office/virtual appointment scheduled for none    Last urine drug screen date 06/01/2022  Current opioid agreement on file (completed within the last year) Yes Date of opioid agreement: 06/01/2022    E-prescribe to [id number] - [place], [place] - [address] [place] [address] pharmacy    Will route to [facility] for review and preparation of prescription(s).     Per patient [employer] message (Brought over from the 7/10/22 encounter):     I am also requesting a refill of my oxycodone prescription for the end of the week.  I use [facility] in [place].    Tarah Butts MA  [facility] [place]

## 2022-07-13 NOTE — TELEPHONE ENCOUNTER
Faxed completed PA form and supporting documentation for REPEAT LRFA to .  Right fax confirmation          Peyton CHURCHILL    [facility]

## 2022-07-13 NOTE — TELEPHONE ENCOUNTER
Medication refill information reviewed.     Last due:  Fill 6/15/22. Start 6/17/22. 30 day Rx   Due date:  7/17/22      Prescriptions prepped for review.     Heidy RN-BSN  [facility]

## 2022-07-13 NOTE — TELEPHONE ENCOUNTER
Pending Prescriptions:                       Disp   Refills    amLODIPine (NORVASC) 5 MG tablet [Pharmacy*90 tab*0        Sig: TAKE 1 TABLET BY MOUTH ONCE DAILY      Routing refill request to provider for review/approval because:  A break in medication    Darvin Leo RN

## 2022-07-13 NOTE — TELEPHONE ENCOUNTER
Received fax request from [facility] pharmacy requesting refill(s) for alendronate (FOSAMAX) 70 MG tablet    Last refilled on 05/20/22    Pt last seen on 06/01/22  Next appt scheduled for : none    Will facilitate refill.

## 2022-07-14 RX ORDER — OXYCODONE HYDROCHLORIDE 5 MG/1
5-10 TABLET ORAL EVERY 6 HOURS PRN
Qty: 165 TABLET | Refills: 0 | Status: SHIPPED | OUTPATIENT
Start: 2022-07-14 | End: 2022-08-08

## 2022-07-14 NOTE — TELEPHONE ENCOUNTER
Screening Questions for RFA Procedure      Procedure ordered? LRFA WITH DR MACIAS       What insurance are we billing for this procedure?  HP   IF SCHEDULING IN [place], IT IS OKAY TO SCHEDULE. [place] HANDLES THEIR OWN PA'S AFTER THE PATIENT IS SCHEDULED. PLEASE SCHEDULE AT LEAST 1 WEEK OUT SO A PA CAN BE OBTAINED.    Has patient had this injection before? Yes:   This procedure requires that a COVID-19 lab test be done; Either a PCR test from [facility] or a Rapid Home Antigen test.  What test would Pt like to do?  PCR     If PCR test from [facility] - Pt must have within 4 days of procedure, let patient know that someone will call them to schedule the COVID-19 test and that they will only receive a call back if the result is positive. Route to nursing to enter order.     If Rapid Home Antigen Test - Pt must have within 2 days of procedure and bring a picture of the negative test with them to the appointment.  Any chance of pregnancy? Not Applicable   If YES, do NOT schedule and route to RN pool     Is  Needed?: No  Will patient have a ?  Yes   If pt is given sedation meds, no driving for 24 hours.  Is pt taking a cab or transportation service? NO        If so will need to be accompanied by an adult too (friend/family member) in order for IV sedation to be given.      Per [place] Policy:  Outpatients are to have responsible adult or family member to accompany them at discharge and drive them home. A service providing medically trained drivers or attendants would be acceptable. Public transportation would not be acceptable unless the patient is accompanied by a responsible adult or family member.  Is patient taking any blood thinners (i.e. plavix, coumadin, jantoven, warfarin, heparin, pradaxa or dabigatran, etc)? No   If YES, do NOT schedule, and route to RN pool    Is patient taking any aspirin products? No     If more than 325mg/day, OK to schedule; Instruct pt to decrease to  less than 325 mg for 7 days AND route to RN pool    For CERVICAL procedures, hold all aspirin products for 6 days.     Tell pt that if aspirin product is not held for 6 days, the procedure WILL BE cancelled.      Does the patient have a bleeding or clotting disorder? No     If YES, it it OKAY to schedule AND route to RN pool    **For any patients with platelet count <100, must be forwarded to provider**    Is patient diabetic? No If YES, have them bring their glucometer.    Does patient have an active infection or treated for one within the past week? No   If YES, do NOT schedule and route to RN nurse pool     Is patient currently taking any antibiotics?  No    For patients on chronic, preventative, or prophylactic antibiotics, procedures may be scheduled.     For patients on antibiotics for active or recent infection:antibiotic course must have been completed for 4 days    Is patient currently taking any steroid medications? (i.e. Prednisone, Medrol)  No     For patients on steroid medications, course must have been completed for 4 days    Is patient actively being treated for cancer or immunocompromised, including the spleen having been removed? No  If YES, do NOT schedule and route to RN pool     Any history of complications with sedation medications?  NO   If YES, OK to schedule AND route to RN pool     Any history of sleep apnea?  Yes    If YES, OK to schedule AND route to RN pool     Any cardiac history?  NO   If YES, OK to schedule AND route to RN pool     Do you have an implanted pacemaker, ICD (implanted cardiac device) or AICD (automatic implanted cardiac device)?  NO    If YES, do NOT schedule AND route to RN pool.     Obtain name of device :       Obtain name of cardiologist:       Do you have an implanted stimulator?  NO    If YES, OK to schedule AND route to nursing.     Instruct patient to bring in the remote to the appointment and it will need to be turned off.  reviewed and       Does  patient have an allergy to contrast dye, iodine or shellfish?  No   If YES, OK to schedule. Route to RN pool AND add allergy information to appointment notes    Are you able to get on and off an exam table with minimal or no assistance? Yes   If NO, do NOT schedule and route to RN pool    Are you able to roll over and lay on your stomach with minimal or no assistance? Yes   If NO, do NOT schedule and route to RN pool    Reminders:    If you are started on any steroids or antibiotics between now and your appointment, you must contact us because it may affect our ability to perform your procedure.  Yes    Informed patient that s/he needs to fast for 6 hours before procedure?  YES    Informed patient that it is OK to take normal medications with sips of water, especially blood pressure medications, before the procedure and must hold blood thinners as instructed.  Yes    Informed patient to arrive 30 minutes before procedure time to have an IV inserted.  reviewed   Do NOT schedule at 0745, 0815 or 1245.  reviewed     All radiofrequency ablations are in a 90 minute time slot.  reviewed

## 2022-07-14 NOTE — TELEPHONE ENCOUNTER
PA approved.  Effective date: 7/13/22-7/12/23  PA reference #: [id number]  Pt. notified:   OKAY TO SCHEDULE LRFA WITH DR COLLEEN CHURCHILL    [facility]

## 2022-07-14 NOTE — TELEPHONE ENCOUNTER
checked and appropriate     Script Eprescribed to pharmacy    Will send this to MA team to notify patient.    Signed Prescriptions:                        Disp   Refills    oxyCODONE (ROXICODONE) 5 MG tablet         165 ta*0        Sig: Take 1-2 tablets (5-10 mg) by mouth every 6 hours as           needed for severe pain Max of 6 tablets/day. Fill           7/15/22. Start 7/17/22. 30 day Rx  Authorizing Provider: DONALD MACIAS MD  [facility]

## 2022-07-14 NOTE — TELEPHONE ENCOUNTER
COVID test ordered.    Heidy Gipson, RN-BSN  [facility]

## 2022-07-26 DIAGNOSIS — M84.30XA STRESS REACTION OF BONE: ICD-10-CM

## 2022-07-26 RX ORDER — ALENDRONATE SODIUM 70 MG/1
70 TABLET ORAL
Qty: 4 TABLET | Refills: 0 | OUTPATIENT
Start: 2022-07-26

## 2022-07-26 NOTE — TELEPHONE ENCOUNTER
Received fax request from [id number] - [place], [place] - [address] [place] [address] pharmacy requesting refill(s) for alendronate (FOSAMAX) 70 MG tablet    Last refilled on 05/20/2022    Pt last seen on 06/01/2022  Next appt scheduled for 08/18/2022    Will facilitate refill.    Tarah Butts MA  [facility] [place]

## 2022-07-29 DIAGNOSIS — I10 BENIGN ESSENTIAL HYPERTENSION: ICD-10-CM

## 2022-07-29 RX ORDER — LOSARTAN POTASSIUM AND HYDROCHLOROTHIAZIDE 12.5; 5 MG/1; MG/1
TABLET ORAL
Qty: 180 TABLET | Refills: 1 | Status: SHIPPED | OUTPATIENT
Start: 2022-07-29 | End: 2023-01-31

## 2022-08-08 ENCOUNTER — MYC REFILL (OUTPATIENT)
Dept: PALLIATIVE MEDICINE | Facility: CLINIC | Age: 57
End: 2022-08-08

## 2022-08-08 DIAGNOSIS — G89.4 CHRONIC PAIN SYNDROME: ICD-10-CM

## 2022-08-08 DIAGNOSIS — Z98.1 S/P CERVICAL SPINAL FUSION: ICD-10-CM

## 2022-08-08 DIAGNOSIS — M79.18 MYOFASCIAL PAIN: ICD-10-CM

## 2022-08-08 DIAGNOSIS — M54.2 CERVICALGIA: ICD-10-CM

## 2022-08-08 DIAGNOSIS — F11.90 CHRONIC, CONTINUOUS USE OF OPIOIDS: ICD-10-CM

## 2022-08-08 DIAGNOSIS — M54.16 LUMBAR RADICULOPATHY: ICD-10-CM

## 2022-08-09 RX ORDER — OXYCODONE HYDROCHLORIDE 5 MG/1
5-10 TABLET ORAL EVERY 6 HOURS PRN
Qty: 165 TABLET | Refills: 0 | Status: SHIPPED | OUTPATIENT
Start: 2022-08-09 | End: 2022-09-07

## 2022-08-09 NOTE — TELEPHONE ENCOUNTER
Received [employer] message from patient requesting refill(s) for oxyCODONE (ROXICODONE) 5 MG tablet     Last dispensed from pharmacy on 7/15/22    Patient's last office/virtual visit by prescribing provider on 6/1/22  Next office/virtual appointment scheduled for none    Last urine drug screen date 6/1/22  Current opioid agreement on file? Yes Date of opioid agreement: 6/1/22    E-prescribe to:    [id number] - [place] - [address] [place] [address]    Will route to nursing [place] for review and preparation of prescription(s).

## 2022-08-09 NOTE — TELEPHONE ENCOUNTER
Medication refill information reviewed.     Last due:  Fill 7/15/22. Start 7/17/22. 30 day Rx   Due date:  8/16/22      Prescriptions prepped for review.     Heidy RN-BSN  [facility]

## 2022-08-09 NOTE — TELEPHONE ENCOUNTER
Refills have been requested for the following medications:         oxyCODONE (ROXICODONE) 5 MG tablet [Liz Mallory]     Preferred pharmacy: [id number] - [place] - [address] [place] [address]

## 2022-08-10 NOTE — TELEPHONE ENCOUNTER
checked and appropriate     Script Eprescribed to pharmacy    Will send this to MA team to notify patient.    Signed Prescriptions:                        Disp   Refills    oxyCODONE (ROXICODONE) 5 MG tablet         165 ta*0        Sig: Take 1-2 tablets (5-10 mg) by mouth every 6 hours as           needed for severe pain Max of 6 tablets/day. Fill           8/14/22. Start 8/16/22. 30 day Rx  Authorizing Provider: DONALD MACIAS MD  [facility]

## 2022-08-11 ENCOUNTER — TELEPHONE (OUTPATIENT)
Dept: GASTROENTEROLOGY | Facility: CLINIC | Age: 57
End: 2022-08-11

## 2022-08-11 ENCOUNTER — OFFICE VISIT (OUTPATIENT)
Dept: FAMILY MEDICINE | Facility: CLINIC | Age: 57
End: 2022-08-11
Payer: COMMERCIAL

## 2022-08-11 VITALS
SYSTOLIC BLOOD PRESSURE: 126 MMHG | WEIGHT: 219.56 LBS | DIASTOLIC BLOOD PRESSURE: 82 MMHG | HEART RATE: 88 BPM | TEMPERATURE: 98.3 F | BODY MASS INDEX: 29.1 KG/M2 | OXYGEN SATURATION: 100 % | HEIGHT: 73 IN | RESPIRATION RATE: 16 BRPM

## 2022-08-11 DIAGNOSIS — I10 BENIGN ESSENTIAL HYPERTENSION: ICD-10-CM

## 2022-08-11 DIAGNOSIS — G89.29 CHRONIC PAIN OF BOTH KNEES: ICD-10-CM

## 2022-08-11 DIAGNOSIS — I73.01 RAYNAUD'S DISEASE WITH GANGRENE (H): ICD-10-CM

## 2022-08-11 DIAGNOSIS — Z11.59 NEED FOR HEPATITIS C SCREENING TEST: ICD-10-CM

## 2022-08-11 DIAGNOSIS — Z12.11 SCREEN FOR COLON CANCER: ICD-10-CM

## 2022-08-11 DIAGNOSIS — Z00.00 ROUTINE GENERAL MEDICAL EXAMINATION AT A HEALTH CARE FACILITY: Primary | ICD-10-CM

## 2022-08-11 DIAGNOSIS — Z13.220 SCREENING FOR HYPERLIPIDEMIA: ICD-10-CM

## 2022-08-11 DIAGNOSIS — M25.561 CHRONIC PAIN OF BOTH KNEES: ICD-10-CM

## 2022-08-11 DIAGNOSIS — Z11.4 SCREENING FOR HIV (HUMAN IMMUNODEFICIENCY VIRUS): ICD-10-CM

## 2022-08-11 DIAGNOSIS — M25.562 CHRONIC PAIN OF BOTH KNEES: ICD-10-CM

## 2022-08-11 LAB
ANION GAP SERPL CALCULATED.3IONS-SCNC: 5 MMOL/L (ref 3–14)
BUN SERPL-MCNC: 14 MG/DL (ref 7–30)
CALCIUM SERPL-MCNC: 9.5 MG/DL (ref 8.5–10.1)
CHLORIDE BLD-SCNC: 104 MMOL/L (ref 94–109)
CHOLEST SERPL-MCNC: 214 MG/DL
CO2 SERPL-SCNC: 30 MMOL/L (ref 20–32)
CREAT SERPL-MCNC: 1.08 MG/DL (ref 0.66–1.25)
CRP SERPL-MCNC: <2.9 MG/L (ref 0–8)
ERYTHROCYTE [DISTWIDTH] IN BLOOD BY AUTOMATED COUNT: 13.2 % (ref 10–15)
ERYTHROCYTE [SEDIMENTATION RATE] IN BLOOD BY WESTERGREN METHOD: 4 MM/HR (ref 0–20)
FASTING STATUS PATIENT QL REPORTED: NO
GFR SERPL CREATININE-BSD FRML MDRD: 81 ML/MIN/1.73M2
GLUCOSE BLD-MCNC: 84 MG/DL (ref 70–99)
HCT VFR BLD AUTO: 43.6 % (ref 40–53)
HDLC SERPL-MCNC: 34 MG/DL
HGB BLD-MCNC: 15.5 G/DL (ref 13.3–17.7)
LDLC SERPL CALC-MCNC: 102 MG/DL
MCH RBC QN AUTO: 31 PG (ref 26.5–33)
MCHC RBC AUTO-ENTMCNC: 35.6 G/DL (ref 31.5–36.5)
MCV RBC AUTO: 87 FL (ref 78–100)
NONHDLC SERPL-MCNC: 180 MG/DL
PLATELET # BLD AUTO: 291 10E3/UL (ref 150–450)
POTASSIUM BLD-SCNC: 3.5 MMOL/L (ref 3.4–5.3)
RBC # BLD AUTO: 5 10E6/UL (ref 4.4–5.9)
SODIUM SERPL-SCNC: 139 MMOL/L (ref 133–144)
TRIGL SERPL-MCNC: 388 MG/DL
WBC # BLD AUTO: 5.7 10E3/UL (ref 4–11)

## 2022-08-11 PROCEDURE — 86039 ANTINUCLEAR ANTIBODIES (ANA): CPT | Performed by: FAMILY MEDICINE

## 2022-08-11 PROCEDURE — 86140 C-REACTIVE PROTEIN: CPT | Performed by: FAMILY MEDICINE

## 2022-08-11 PROCEDURE — 86431 RHEUMATOID FACTOR QUANT: CPT | Performed by: FAMILY MEDICINE

## 2022-08-11 PROCEDURE — 85027 COMPLETE CBC AUTOMATED: CPT | Performed by: FAMILY MEDICINE

## 2022-08-11 PROCEDURE — 86618 LYME DISEASE ANTIBODY: CPT | Performed by: FAMILY MEDICINE

## 2022-08-11 PROCEDURE — 86803 HEPATITIS C AB TEST: CPT | Performed by: FAMILY MEDICINE

## 2022-08-11 PROCEDURE — 80048 BASIC METABOLIC PNL TOTAL CA: CPT | Performed by: FAMILY MEDICINE

## 2022-08-11 PROCEDURE — 86038 ANTINUCLEAR ANTIBODIES: CPT | Performed by: FAMILY MEDICINE

## 2022-08-11 PROCEDURE — 85652 RBC SED RATE AUTOMATED: CPT | Performed by: FAMILY MEDICINE

## 2022-08-11 PROCEDURE — 99396 PREV VISIT EST AGE 40-64: CPT | Performed by: FAMILY MEDICINE

## 2022-08-11 PROCEDURE — 87389 HIV-1 AG W/HIV-1&-2 AB AG IA: CPT | Performed by: FAMILY MEDICINE

## 2022-08-11 PROCEDURE — 36415 COLL VENOUS BLD VENIPUNCTURE: CPT | Performed by: FAMILY MEDICINE

## 2022-08-11 PROCEDURE — 99214 OFFICE O/P EST MOD 30 MIN: CPT | Mod: 25 | Performed by: FAMILY MEDICINE

## 2022-08-11 PROCEDURE — 80061 LIPID PANEL: CPT | Performed by: FAMILY MEDICINE

## 2022-08-11 RX ORDER — AMLODIPINE BESYLATE 5 MG/1
5 TABLET ORAL DAILY
Qty: 90 TABLET | Refills: 3 | Status: SHIPPED | OUTPATIENT
Start: 2022-08-11 | End: 2023-07-06

## 2022-08-11 ASSESSMENT — PATIENT HEALTH QUESTIONNAIRE - PHQ9: SUM OF ALL RESPONSES TO PHQ QUESTIONS 1-9: 9

## 2022-08-11 ASSESSMENT — PAIN SCALES - GENERAL: PAINLEVEL: MODERATE PAIN (5)

## 2022-08-11 NOTE — TELEPHONE ENCOUNTER
Screening Questions  BlueKIND OF PREP RedLOCATION [review exclusion criteria] GreenSEDATION TYPE      1.  YEs Are you active on mychart?       2.  Tha Grullon MD Ordering/Referring Provider:      3. [facility]  What insurance is in the chart?    4.  y Do you have a legal guardian or medical Power of ?              Are you able to give consent for your medical care?                (Sedation review/consideration needed)      5.   28.97  BMI  [BMI OVER 40-EXTENDED PREP]  If greater than 40 review exclusion criteria [PAC APPT IF @ [facility]]       6.   n Have you had a positive covid test in the last 90 days?      7.   Respiratory Screening :  [If yes to any of the following HOSPITAL setting only]                     n Do you use daily home oxygen?   n Do you have mod to severe Obstructive Sleep Apnea?  [OKAY @ [facility] SH PH RI]                  n Do you have Pulmonary Hypertension?                   n Do you have UNCONTROLLED asthma?         8.   n Have you had a heart or lung transplant?       9.   n Are you currently on dialysis?   [ If yes, G-PREP & HOSPITAL setting only]      10.   n  Do you have chronic kidney disease?  [ If yes, G-PREP ]     11.  n Have you had a stroke or Transient ischemic attack (TIA - aka  mini stroke ) within 6 months?   (If yes, please review exclusion criteria)     12.   n In the past 6 months, have you had any heart related issues including cardiomyopathy or heart attack?           n If yes, did it require cardiac stenting or other implantable device?       13.   n Do you have any implantable devices in your body (pacemaker, defib, LVAD)?  (If yes, please review exclusion criteria)     14.   n Do you take nitroglycerin?            n If yes, how often? n  (if yes, HOSPITAL setting ONLY)     15.   n Are you currently taking any blood thinners?           [IF YES, INFORM PATIENT TO FOLLOW UP W/ ORDERING PROVIDER FOR BRIDGING INSTRUCTIONS]      16.    n  Do you have a  diagnosis of diabetes?  [ If yes, G-PREP ]     17.   [FEMALES] n Are you currently pregnant?    n If yes, how many weeks?      18.    y Are you taking any prescription pain medications on a routine schedule?    [ If yes, EXTENDED PREP.] [If yes, MAC]    22.  Do you take the medication Phentermine?     Yes-> Hold for 7 days before procedure.  Please consult your prescribing provider if you have questions about holding this medication.     No-> Continue to next question.     19.   n Do you have any chemical dependencies such as alcohol, street drugs, or methadone?   [If yes, MAC]     20.   n Do you have any history of post-traumatic stress syndrome, severe anxiety or history of psychosis?   [If yes, MAC]     21.   y Do you transfer independently?       22.   n  On a regular basis do you go 3-5 days between bowel movements?  [ If yes, EXTENDED PREP.]     23.    Preferred LOCAL Pharmacy for Pre Prescription       [id number] - [address]          Scheduling Details         Indra : Caller   (Please ask for phone number if not scheduled by patient)    Lower Endoscopy [Colonoscopy] : Type of Procedure Scheduled   extended Which Colonoscopy Prep was Sent?      long Surgeon    09/27 Date of Procedure   ph Location    mac Sedation Type     Conscious Sedation- Needs  for 6 hours after the procedure  MAC/General-Needs  for 24 hours after procedure     n Pre-op Required at [facility] [place], [place] and OR for MAC sedation   (advise patient they will need a pre-op prior to procedure -)       y Informed patient they will need an adult    Cannot take any type of public or medical transportation alone     home Pre-Procedure Covid test to be completed at [facility] or Externally  [[facility] PCR Testing Required]     y  Confirmed Nurse will call to complete assessment     Additional comments:

## 2022-08-11 NOTE — LETTER

## 2022-08-11 NOTE — PROGRESS NOTES
SUBJECTIVE:   CC: Indra Mehta is an 57 year old male who presents for preventative health visit.       Patient has been advised of split billing requirements and indicates understanding: Yes  Healthy Habits:     Getting at least 3 servings of Calcium per day:  Yes    Bi-annual eye exam:  NO    Dental care twice a year:  NO    Sleep apnea or symptoms of sleep apnea:  Sleep apnea, Excessive snoring and Daytime drowsiness    Diet:  Regular (no restrictions)    Frequency of exercise:  2-3 days/week    Duration of exercise:  Greater than 60 minutes    Taking medications regularly:  0    Barriers to taking medications:  None    Medication side effects:  None    PHQ-2 Total Score: 2    Additional concerns today:  No  History of Present Illness       Reason for visit:  Yearly exam    He eats 2-3 servings of fruits and vegetables daily.He consumes 3 sweetened beverage(s) daily.He exercises with enough effort to increase his heart rate 10 to 19 minutes per day.  He exercises with enough effort to increase his heart rate 3 or less days per week.   He is taking medications regularly.  He is not taking prescribed medications regularly due to None.        Today's PHQ-2 Score:   PHQ-2 ( 1999 Pfizer) 7/23/2018   Q1: Little interest or pleasure in doing things 0   Q2: Feeling down, depressed or hopeless 0   PHQ-2 Score 0   Q1: Little interest or pleasure in doing things -   Q2: Feeling down, depressed or hopeless -   PHQ-2 Score -       Abuse: Current or Past(Physical, Sexual or Emotional)- No  Do you feel safe in your environment? Yes    Have you ever done Advance Care Planning? (For example, a Health Directive, POLST, or a discussion with a medical provider or your loved ones about your wishes): No, advance care planning information given to patient to review.  Patient declined advance care planning discussion at this time.    Social History     Tobacco Use     Smoking status: Never Smoker     Smokeless tobacco: Never Used    Substance Use Topics     Alcohol use: Yes     Alcohol/week: 0.0 standard drinks     Comment: rarely         Last PSA: No results found for: PSA    Reviewed orders with patient. Reviewed health maintenance and updated orders accordingly - Yes  Labs reviewed in EPIC  BP Readings from Last 3 Encounters:   08/11/22 126/82   06/24/22 120/83   06/13/22 118/82    Wt Readings from Last 3 Encounters:   08/11/22 99.6 kg (219 lb 9 oz)   06/24/22 104.4 kg (230 lb 1.6 oz)   06/13/22 101.1 kg (222 lb 14.4 oz)                  Patient Active Problem List   Diagnosis     Meniere's disease     Major depression in complete remission (H)     Chronic pain syndrome     Pain medication agreement signed     Bilateral occipital neuralgia     Benign essential hypertension     Cervical radiculopathy     Status post lumbar spinal fusion     Sleep apnea, unspecified type     Osteoarthritis of spine with radiculopathy, lumbar region     Impingement syndrome of left shoulder     Primary osteoarthritis of both knees     Chronic bilateral low back pain without sciatica     Cervical myelopathy (H)     Major depression in complete remission (H)     Past Surgical History:   Procedure Laterality Date     BUNIONECTOMY RT/LT  09/05/08    Both feet     COLONOSCOPY N/A 12/28/2016    Procedure: COMBINED COLONOSCOPY, SINGLE OR MULTIPLE BIOPSY/POLYPECTOMY BY BIOPSY;  Surgeon: Indra Jernigan MD;  [facility]     DISCECTOMY, FUSION CERVICAL ANTERIOR ONE LEVEL, COMBINED N/A 7/5/2017    Procedure: COMBINED DISCECTOMY, FUSION CERVICAL ANTERIOR ONE LEVEL;  Cervical 6-7, Anterior cervical discectomy fusion;  Surgeon: Mike Mckenna MD;  Location[facility]     DISCECTOMY, FUSION CERVICAL ANTERIOR ONE LEVEL, COMBINED N/A 12/19/2018    Procedure: cervical 5-6 anterior cervical discectomy fusion;  Surgeon: Mike Mckenna MD;  Location: [facility]     HERNIORRHAPHY UMBILICAL N/A 8/11/2017    Procedure: HERNIORRHAPHY UMBILICAL;  open umbilical hernia  repair;  Surgeon: Boni Story MD;  [facility]     INJECT BLOCK MEDIAL BRANCH CERVICAL/THORACIC/LUMBAR Bilateral 8/12/2021    Procedure: Left L3, Left L4, Left L5, Right L3, Right L4 and Right L5 medial branch nerve blocks using fluoroscopic guidance and contrast control;  Surgeon: Darryn Mcdaniel MD;  Location[facility]     INJECT BLOCK MEDIAL BRANCH CERVICAL/THORACIC/LUMBAR N/A 9/9/2021    Procedure: Left L3, Left L4, Left L5, Right L3, Right L4 and Right L5 medial branch nerve blocks using fluoroscopic guidance and contrast control;  Surgeon: Darryn Mcdaniel MD;  [facility]     INJECT EPIDURAL CERVICAL N/A 8/22/2019    Procedure: INJECTION, SPINE, CERVICAL 6-7 EPIDURAL;  Surgeon: Darryn Mcdaniel MD;  [facility]     INJECT EPIDURAL LUMBAR N/A 11/9/2017    Procedure: INJECT EPIDURAL LUMBAR;  lumbar 4-5 epidural steroid injection ;  Surgeon: Darryn Mcdaniel MD;  [facility]     INJECT EPIDURAL LUMBAR N/A 12/28/2017    Procedure: INJECT EPIDURAL LUMBAR;  lumbar epidural injection;  Surgeon: Darryn Mcdaniel MD;  [facility]     INJECT EPIDURAL LUMBAR Bilateral 5/13/2021    Procedure: Bilateral Lumbar 3-4 Epidural Steroid Injection;  Surgeon: Darryn Mcdaniel MD;  [facility]     INJECT EPIDURAL TRANSFORAMINAL Bilateral 8/23/2018    Procedure: INJECT EPIDURAL TRANSFORAMINAL;  transforaminal bilateral lumbar 3-4 steroid injection;  Surgeon: Darryn Mcdaniel MD;  [facility]     INJECT EPIDURAL TRANSFORAMINAL Bilateral 11/8/2018    Procedure: INJECT EPIDURAL TRANSFORAMINAL lumbar 3-4;  Surgeon: Darryn Mcdaniel MD;  [facility]     INJECT EPIDURAL TRANSFORAMINAL Bilateral 6/14/2019    Procedure: INJECTION, EPIDURAL, TRANSFORAMINAL LUMBAR 3-4 BILATERAL;  Surgeon: Darryn Mcdaniel MD;  [facility]     INJECT EPIDURAL TRANSFORAMINAL Bilateral 5/22/2020    Procedure: lumbar 3-4 bilateral transforaminal epidural steroid injection;  Surgeon: Darryn Mcdaniel MD;  [facility]     INJECT  EPIDURAL TRANSFORAMINAL Bilateral 2020    Procedure: INJECTION, EPIDURAL, TRANSFORAMINAL  LUMBAR 3-4 APPROACH;  Surgeon: Darryn Mcdaniel MD;  [facility]     PE TUBES      left ear     RADIO FREQUENCY ABLATION PULSED CERVICAL Bilateral 10/1/2021    Procedure: RADIOFREQUENCY ABLATION lumbar 3, 4, 5 bilateral;  Surgeon: Darryn Mcdaniel MD;  [facility]: [facility]     ZZHC COLONOSCOPY W/WO BRUSH/WASH  2005     ZZHC CREATE EARDRUM OPENING,GEN ANESTH  2007    Pe tube, Left endolymphatic sac enhancement.     ZZHC MASTOIDECTOMY,COMPLETE  2007       Social History     Tobacco Use     Smoking status: Never Smoker     Smokeless tobacco: Never Used   Substance Use Topics     Alcohol use: Yes     Alcohol/week: 0.0 standard drinks     Comment: rarely     Family History   Problem Relation Age of Onset     Diabetes Mother      Cancer Mother         colon cancer -  at 52, diag at 42     Hypertension Father      Heart Disease Father          of AAA     No Known Problems Brother      No Known Problems Brother      Depression Sister      Suicide Other      Unknown/Adopted Maternal Grandmother      Unknown/Adopted Maternal Grandfather      Unknown/Adopted Paternal Grandmother      Unknown/Adopted Paternal Grandfather      No Known Problems Daughter      No Known Problems Daughter          Current Outpatient Medications   Medication Sig Dispense Refill     Acetaminophen (TYLENOL PO) Take 1,000 mg by mouth 3 times daily       alendronate (FOSAMAX) 70 MG tablet Take 1 tablet (70 mg) by mouth every 7 days 4 tablet 0     amLODIPine (NORVASC) 5 MG tablet TAKE 1 TABLET BY MOUTH ONCE DAILY 90 tablet 0     atorvastatin (LIPITOR) 10 MG tablet Take 10 mg by mouth daily       calcitonin, salmon, (MIACALCIN) 200 UNIT/ACT nasal spray Spray 1 spray into one nostril alternating nostrils daily Alternate nostril each day. 3.7 mL 0     cyclobenzaprine (FLEXERIL) 5 MG tablet 1-2 tablets three times daily as needed 90 tablet  2     DULoxetine (CYMBALTA) 60 MG capsule Take 1 capsule (60 mg) by mouth daily 60 capsule 1     ibuprofen (ADVIL/MOTRIN) 800 MG tablet Take 800 mg by mouth 3 times daily       losartan-hydrochlorothiazide (HYZAAR) 50-12.5 MG tablet TAKE 2 TABLETS BY MOUTH EVERY  tablet 1     methylPREDNISolone (MEDROL DOSEPAK) 4 MG tablet therapy pack Follow Package Directions 21 tablet 0     naloxone (NARCAN) 4 MG/0.1ML nasal spray Spray 1 spray (4 mg) into one nostril alternating nostrils once as needed for opioid reversal every 2-3 minutes until assistance arrives 0.2 mL 1     nortriptyline (PAMELOR) 50 MG capsule Take 1 capsule (50 mg) by mouth At Bedtime 90 capsule 1     order for DME Equipment being ordered: TENS Pads as directed 1 Device 0     oxyCODONE (ROXICODONE) 5 MG tablet Take 1-2 tablets (5-10 mg) by mouth every 6 hours as needed for severe pain Max of 6 tablets/day. Fill 8/14/22. Start 8/16/22. 30 day Rx 165 tablet 0     pregabalin (LYRICA) 150 MG capsule Take 1 capsule (150 mg) by mouth 3 times daily 90 capsule 3     Pregabalin (LYRICA) 200 MG capsule Take 1 capsule at bedtime. This is in addition to the 150mg in morning and afternoon. 30 capsule 2     Allergies   Allergen Reactions     No Known Drug Allergies      Recent Labs   Lab Test 09/29/21  1413 12/01/20  0824 09/21/20  1138 06/30/17  1613 03/24/16  1900   A1C  --  5.2  --   --   --    ALT  --  89*  --   --   --    CR 1.18 1.12 1.09   < > 1.06   GFRESTIMATED 69 74 76   < > 74   GFRESTBLACK  --  86 89   < > 90   POTASSIUM 3.9  --  4.3   < > 3.5   TSH  --   --   --   --  2.00    < > = values in this interval not displayed.        Reviewed and updated as needed this visit by clinical staff   Tobacco  Allergies  Meds  Problems  Med Hx  Surg Hx  Fam Hx            Reviewed and updated as needed this visit by Provider   Tobacco  Allergies  Meds  Problems  Med Hx  Surg Hx  Fam Hx               Review of Systems  CONSTITUTIONAL: NEGATIVE for  "fever, chills, change in weight  INTEGUMENTARY/SKIN: NEGATIVE for worrisome rashes, moles or lesions  EYES: NEGATIVE for vision changes or irritation  ENT: NEGATIVE for ear, mouth and throat problems  RESP: NEGATIVE for significant cough or SOB  CV: NEGATIVE for chest pain, palpitations or peripheral edema  GI: NEGATIVE for nausea, abdominal pain, heartburn, or change in bowel habits   male: negative for dysuria, hematuria, decreased urinary stream, erectile dysfunction, urethral discharge  MUSCULOSKELETAL:POSITIVE  for joint pain chronic bilateral knee with episodic swelling. Prior MRSA infections. No active swelling. Prior MRI bilateral knees.  NEURO: NEGATIVE for weakness, dizziness or paresthesias  PSYCHIATRIC: NEGATIVE for changes in mood or affect    OBJECTIVE:   /82   Pulse 88   Temp 98.3  F (36.8  C) (Temporal)   Resp 16   Ht 1.854 m (6' 1\")   Wt 99.6 kg (219 lb 9 oz)   SpO2 100%   BMI 28.97 kg/m      Physical Exam  GENERAL: healthy, alert and no distress  EYES: Eyes grossly normal to inspection, PERRL and conjunctivae and sclerae normal  HENT: ear canals and TM's normal, nose and mouth without ulcers or lesions  NECK: no adenopathy, no asymmetry, masses, or scars and thyroid normal to palpation  RESP: lungs clear to auscultation - no rales, rhonchi or wheezes  CV: regular rate and rhythm, normal S1 S2, no S3 or S4, no murmur, click or rub, no peripheral edema and peripheral pulses strong  ABDOMEN: soft, nontender, no hepatosplenomegaly, no masses and bowel sounds normal  MS: no gross musculoskeletal defects noted, no edema  NEURO: Normal strength and tone, mentation intact and speech normal  PSYCH: mentation appears normal, affect normal/bright  LYMPH: no cervical, supraclavicular, axillary, or inguinal adenopathy    Diagnostic Test Results:  Labs reviewed in Epic    ASSESSMENT/PLAN:       ICD-10-CM    1. Routine general medical examination at a health care facility  Z00.00    2. Screening " for HIV (human immunodeficiency virus)  Z11.4 HIV Antigen Antibody Combo   3. Need for hepatitis C screening test  Z11.59 Hepatitis C Screen Reflex to HCV RNA Quant and Genotype   4. Screening for hyperlipidemia  Z13.220 Lipid panel reflex to direct LDL Non-fasting   5. Screen for colon cancer  Z12.11 Colonscopy Screening  Referral   6. Raynaud's disease with gangrene (H)  I73.01 amLODIPine (NORVASC) 5 MG tablet   7. Benign essential hypertension  I10 Basic metabolic panel  (Ca, Cl, CO2, Creat, Gluc, K, Na, BUN)   8. Chronic pain of both knees  M25.561 CRP, inflammation    M25.562 ESR: Erythrocyte sedimentation rate    G89.29 Rheumatoid factor     Anti Nuclear Amaris IgG by IFA with Reflex     CBC with platelets     Lyme Disease Total Abs Bld with Reflex to Confirm CLIA       Patient has been advised of split billing requirements and indicates understanding: Yes     6. Raynaud's disease with gangrene (H)  Chronic, stable with addition of Norvasc. The current medical regimen is effective;  continue present plan and medications.   - amLODIPine (NORVASC) 5 MG tablet; Take 1 tablet (5 mg) by mouth daily  Dispense: 90 tablet; Refill: 3    7. Benign essential hypertension  Chronic controlled. The current medical regimen is effective;  continue present plan and medications.   - Basic metabolic panel  (Ca, Cl, CO2, Creat, Gluc, K, Na, BUN); Future    8. Chronic pain of both knees  Chronic, previously seen by [facility] and Ortho for injections. Exam unremarkable today. Will check auto immune, inflammatory and infectious markers. Consider Rheumatology referral if indicated.  - CRP, inflammation; Future  - ESR: Erythrocyte sedimentation rate; Future  - Rheumatoid factor; Future  - Anti Nuclear Amaris IgG by IFA with Reflex; Future  - CBC with platelets; Future  - Lyme Disease Total Abs Bld with Reflex to Confirm CLIA; Future      COUNSELING:   Reviewed preventive health counseling, as reflected in patient instructions        "Regular exercise       Healthy diet/nutrition       Vision screening       Immunizations    Declined: Zoster and COVID booster due to Other will await possible COVID booster upgrade               Consider Hep C screening for all patients one time for ages 18-79 years       HIV screeninx in teen years, 1x in adult years, and at intervals if high risk       Colorectal cancer screening    Estimated body mass index is 28.97 kg/m  as calculated from the following:    Height as of this encounter: 1.854 m (6' 1\").    Weight as of this encounter: 99.6 kg (219 lb 9 oz).     Weight management plan: Discussed healthy diet and exercise guidelines    He reports that he has never smoked. He has never used smokeless tobacco.      Counseling Resources:  ATP IV Guidelines  Pooled Cohorts Equation Calculator  FRAX Risk Assessment  ICSI Preventive Guidelines  Dietary Guidelines for Americans, 2010  USDA's MyPlate  ASA Prophylaxis  Lung CA Screening    Tha Grullon MD  [facility]  "

## 2022-08-12 LAB
ANA PAT SER IF-IMP: ABNORMAL
ANA SER QL IF: POSITIVE
ANA TITR SER IF: ABNORMAL {TITER}
B BURGDOR IGG+IGM SER QL: 0.11
HCV AB SERPL QL IA: NONREACTIVE
HIV 1+2 AB+HIV1 P24 AG SERPL QL IA: NONREACTIVE
RHEUMATOID FACT SER NEPH-ACNC: <6 IU/ML

## 2022-08-12 RX ORDER — BISACODYL 5 MG
1 TABLET, DELAYED RELEASE (ENTERIC COATED) ORAL SEE ADMIN INSTRUCTIONS
Qty: 4 TABLET | Refills: 0 | Status: SHIPPED | OUTPATIENT
Start: 2022-08-12 | End: 2022-10-12

## 2022-08-12 NOTE — PROGRESS NOTES
SLEEP MEDICINE CLINIC NOTE   [facility]  Indra Mehta 54 year old male  : 1965  MRN: [id number]      PRIMARY CARE PROVIDER: Tha Grullon    Visit Date:   2019      REASON FOR VISIT:  Review of recent home sleep apnea test.      HISTORY OF PRESENT ILLNESS:  The patient is a very pleasant 54-year-old gentleman with a history of major depression, Meniere's disease, hypertension, bilateral occipital neuralgia and chronic neck pain, currently on daily opiates and muscle relaxants, who was seen in our clinic approximately 1 month ago for evaluation of signs and symptoms suggestive of obstructive sleep apnea.  The patient was advised to undergo a home sleep apnea test.  He comes in today to review the results.      The patient denies any significant changes to his sleep or health since he was last seen in our clinic.      ASSESSMENT AND PLAN:  The patient's home sleep apnea test performed on 2019 was reviewed.  The total analysis time was 452 minutes.  His apnea-hypopnea index was 12.2 events per hour with supine AHI of 40.2 and lateral AHI of 3.3 events per hour.  The patient spent 25% of the night in supine position and 50% of the night and in the left lateral position.  His baseline oxygen saturation was 95%; lowest oxygen saturation was 78% with a total of 18 minutes spent below 88%.  Loud snoring was noted continuously through the night.  No cardiac abnormalities were noted.      Overall, the patient has at least mild obstructive sleep apnea, which is predominantly present in supine position.  He also has significant sleep-associated hypoxemia and loud snoring.  We did review in detail the implications of this diagnosis given his comorbidities of excessive daytime sleepiness and depression.  It is recommended that we consider treatment of obstructive sleep apnea.  The patient is interested in using a CPAP.  He will be set up with an auto CPAP with a range of 5-15  cm of water.  He will return to clinic after 1-2 months of CPAP use to review his CPAP compliance.  The patient was advised not to drive or operate heavy machinery if drowsy or sleepy.  He was counseled regarding the importance of weight loss.  He was suggested to use the CPAP every time he goes to sleep, including during naps if any.         I spent a total of 25 minutes face to face with Magy Esqueda during today's office visit. Over 50% of this time was spent counseling the patient and/or coordinating care regarding their sleep disorder.     Krishna Beltre MD   of Medicine  Pulmonary, Critical Care and Sleep Medicine  [facility]  Pager: [phone]                  D: 2019   T: 2019   MT:       Name:     MAGY ESQUEDA   MRN:      [id number]        Account:      [id number]   :      1965           Visit Date:   2019      Document: [id number]       05-Aug-2022 06:55

## 2022-08-16 ENCOUNTER — MYC MEDICAL ADVICE (OUTPATIENT)
Dept: PALLIATIVE MEDICINE | Facility: CLINIC | Age: 57
End: 2022-08-16

## 2022-08-18 ENCOUNTER — HOSPITAL ENCOUNTER (OUTPATIENT)
Dept: PALLIATIVE MEDICINE | Facility: CLINIC | Age: 57
Discharge: HOME | End: 2022-08-18
Attending: FAMILY MEDICINE | Admitting: STUDENT IN AN ORGANIZED HEALTH CARE EDUCATION/TRAINING PROGRAM
Payer: COMMERCIAL

## 2022-08-18 VITALS
DIASTOLIC BLOOD PRESSURE: 78 MMHG | SYSTOLIC BLOOD PRESSURE: 135 MMHG | OXYGEN SATURATION: 96 % | RESPIRATION RATE: 16 BRPM | TEMPERATURE: 97.8 F | HEART RATE: 77 BPM

## 2022-08-18 DIAGNOSIS — M54.16 LUMBAR RADICULOPATHY: ICD-10-CM

## 2022-08-18 DIAGNOSIS — G89.4 CHRONIC PAIN SYNDROME: ICD-10-CM

## 2022-08-18 DIAGNOSIS — G89.18 ACUTE POST-OPERATIVE PAIN: Primary | ICD-10-CM

## 2022-08-18 DIAGNOSIS — F11.90 CHRONIC, CONTINUOUS USE OF OPIOIDS: ICD-10-CM

## 2022-08-18 DIAGNOSIS — M54.2 CERVICALGIA: ICD-10-CM

## 2022-08-18 DIAGNOSIS — M79.18 MYOFASCIAL PAIN: ICD-10-CM

## 2022-08-18 DIAGNOSIS — Z98.1 S/P CERVICAL SPINAL FUSION: ICD-10-CM

## 2022-08-18 DIAGNOSIS — G62.9 NEUROPATHY: ICD-10-CM

## 2022-08-18 DIAGNOSIS — M47.816 SPONDYLOSIS OF LUMBAR REGION WITHOUT MYELOPATHY OR RADICULOPATHY: ICD-10-CM

## 2022-08-18 PROCEDURE — 99153 MOD SED SAME PHYS/QHP EA: CPT | Performed by: STUDENT IN AN ORGANIZED HEALTH CARE EDUCATION/TRAINING PROGRAM

## 2022-08-18 PROCEDURE — 250N000009 HC RX 250: Performed by: STUDENT IN AN ORGANIZED HEALTH CARE EDUCATION/TRAINING PROGRAM

## 2022-08-18 PROCEDURE — 250N000011 HC RX IP 250 OP 636: Performed by: STUDENT IN AN ORGANIZED HEALTH CARE EDUCATION/TRAINING PROGRAM

## 2022-08-18 PROCEDURE — 64636 DESTROY L/S FACET JNT ADDL: CPT | Mod: 50 | Performed by: STUDENT IN AN ORGANIZED HEALTH CARE EDUCATION/TRAINING PROGRAM

## 2022-08-18 PROCEDURE — 64635 DESTROY LUMB/SAC FACET JNT: CPT | Mod: 50 | Performed by: STUDENT IN AN ORGANIZED HEALTH CARE EDUCATION/TRAINING PROGRAM

## 2022-08-18 PROCEDURE — 258N000003 HC RX IP 258 OP 636: Performed by: STUDENT IN AN ORGANIZED HEALTH CARE EDUCATION/TRAINING PROGRAM

## 2022-08-18 PROCEDURE — 99152 MOD SED SAME PHYS/QHP 5/>YRS: CPT | Performed by: STUDENT IN AN ORGANIZED HEALTH CARE EDUCATION/TRAINING PROGRAM

## 2022-08-18 RX ORDER — LIDOCAINE HYDROCHLORIDE 20 MG/ML
3 INJECTION, SOLUTION EPIDURAL; INFILTRATION; INTRACAUDAL; PERINEURAL ONCE
Status: COMPLETED | OUTPATIENT
Start: 2022-08-18 | End: 2022-08-18

## 2022-08-18 RX ORDER — FENTANYL CITRATE 50 UG/ML
INJECTION, SOLUTION INTRAMUSCULAR; INTRAVENOUS PRN
Status: DISCONTINUED | OUTPATIENT
Start: 2022-08-18 | End: 2022-08-19 | Stop reason: HOSPADM

## 2022-08-18 RX ORDER — PREGABALIN 200 MG/1
CAPSULE ORAL
Qty: 30 CAPSULE | Refills: 2 | Status: SHIPPED | OUTPATIENT
Start: 2022-08-18 | End: 2022-11-23

## 2022-08-18 RX ORDER — OXYCODONE HYDROCHLORIDE 5 MG/1
5 TABLET ORAL EVERY 6 HOURS PRN
Qty: 12 TABLET | Refills: 0 | Status: SHIPPED | OUTPATIENT
Start: 2022-08-18 | End: 2022-08-21

## 2022-08-18 RX ORDER — SODIUM CHLORIDE 9 MG/ML
INJECTION, SOLUTION INTRAVENOUS CONTINUOUS PRN
Status: DISCONTINUED | OUTPATIENT
Start: 2022-08-18 | End: 2022-08-19 | Stop reason: HOSPADM

## 2022-08-18 RX ADMIN — FENTANYL CITRATE 25 MCG: 50 INJECTION, SOLUTION INTRAMUSCULAR; INTRAVENOUS at 10:45

## 2022-08-18 RX ADMIN — LIDOCAINE HYDROCHLORIDE 6 ML: 10 INJECTION, SOLUTION EPIDURAL; INFILTRATION; INTRACAUDAL; PERINEURAL at 10:22

## 2022-08-18 RX ADMIN — FENTANYL CITRATE 25 MCG: 50 INJECTION, SOLUTION INTRAMUSCULAR; INTRAVENOUS at 11:03

## 2022-08-18 RX ADMIN — FENTANYL CITRATE 25 MCG: 50 INJECTION, SOLUTION INTRAMUSCULAR; INTRAVENOUS at 10:53

## 2022-08-18 RX ADMIN — SODIUM BICARBONATE 0.5 MEQ: 84 INJECTION, SOLUTION INTRAVENOUS at 10:23

## 2022-08-18 RX ADMIN — MIDAZOLAM 1 MG: 1 INJECTION INTRAMUSCULAR; INTRAVENOUS at 10:44

## 2022-08-18 RX ADMIN — LIDOCAINE HYDROCHLORIDE 7.4 ML: 20 INJECTION, SOLUTION EPIDURAL; INFILTRATION; INTRACAUDAL; PERINEURAL at 10:22

## 2022-08-18 RX ADMIN — SODIUM CHLORIDE 125 ML/HR: 0.9 INJECTION, SOLUTION INTRAVENOUS at 10:46

## 2022-08-18 NOTE — PROGRESS NOTES
[facility] - Procedure Note    Date of Visit: 8/18/2022    Pre procedure Diagnosis: facet arthropathy   Post procedure Diagnosis: Same  Procedure performed: Bilateral L3,4,5 medial branch radiofrequency ablation   Anesthesia: moderate sedation with 1mg versed & 75mcg fentanyl  Complications: NONE  Operators: Liz Mallory MD    Indications:   Indra Mehta is a 57 year old male here for lumbar RFA.  he has a history of chronic axial low back pain.  Exam shows increased discomfort with extension and rotation and he has tried conservative treatment including medications, physical therapy, previous radiofrequency ablation.    This is a repeat procedure. Indra Mehta had bilateral L3,4,5 radiofrequency ablation > 1 yr ago with substantial improvement in his pain when sitting.     MRI of the LUMBAR SPINE was done on 3/17/2022 which showed   IMPRESSION:  1. Diffuse degenerative change of the lumbar spine as detailed above  without appreciable change from the recent comparison study.  2. No significant spinal canal or neural foraminal stenosis at any  level of the lumbar spine.    Allergies:      Allergies   Allergen Reactions     No Known Drug Allergies         Vitals:  /78 (BP Location: Left arm, Patient Position: Sitting)   Pulse 77   Temp 97.8  F (36.6  C) (Tympanic)   Resp 16   SpO2 96%     Review of Systems: The patient denies recent fever, chills, illness, use of antibiotics or anticoagulants. All other 10-point review of systems negative.     Physical Exam:   Resp: CTA bilaterally  CV: RRR no murmurs, rubs or gallops  Airway:  Mallampati Class 2    The patient does NOT have a history of obstructive sleep apnea.     Options/alternatives, benefits and risks were discussed with the patient including bleeding, infection, no pain relief, tissue trauma, exposure to radiation, reaction to medications including seizure, spinal cord injury,increased pain after the  procedure, weakness, numbness or sensory changes and headache.   We also discussed risks of sedation, including reaction to medications and cardiovascular collapse.    Questions were answered to his satisfaction and he agrees to proceed. Voluntary informed consent was obtained and signed.     Medications were reviewed:  Yes     Procedure:  After getting informed consent, patient was brought into the procedure suite and was placed in a prone position on the procedure table.   A Pause for the Cause was performed.  Patient was prepped and draped in sterile fashion.     Indra Mehta had an IV line placed prior the procedure.  The C-arm was positioned in the left oblique view to afford optimal view of the L4-L5 vertebral bodies. Lidocaine 1% was used to anesthetize the skin at each level.  At each level, a 100mm, 20G curved radiofrequency cannula with a 10mm active tip was positioned, overlying the intersection of the transverse process and pedicle at L4 & L5, and was advanced under intermittent fluoroscopy until it contacted the transverse process and notch, and the tip slightly overran that process, just lateral to the mamillary process.  The position of each cannula was verified and optimized in the oblique view and AP views.    In the AP view, another cannula was placed at the sacral alar notch.      Each position was tested for motor and sensory stimulation, and was positioned so that stimulation was negative for stimuli outside the immediate area of the desired lesion.  Sensory stimulation was completed at 50 Hz, with max stimulation up to 0.8V.  Motor stimulation was completed at 2Hz, up to 2V.  Lidocaine 2% 1.2 ml was injected at each level, and a 90 second, 80 degree Centigrade lesion was generated.    The needles were then rotated within the pathway of the medial branch, and locations were evaluated with repeat imaging. A second lesion was then generated at each location.    The procedure was then repeated  on the right side, using the same technique as described above.    The needles were withdrawn. Hemostasis was achieved, the area was cleaned, and bandaids were placed when appropriate.  The patient tolerated the procedure well, and was taken to the recovery room.    Images were saved to PACS.    Follow-up includes:   -post-procedure pain medications: Oxycodone 5mg q4hrs PRN pain - max 4/day #12 tablets no refills  -f/u with primary care provider     Liz Mallory MD  [facility]     [facility] Procedure Note        Sedation:      Performed by: Liz Mallory MD  Authorized by: Liz Mallory MD    Pre-Procedure Assessment done at 0930.    Expected Level:  Minimal Sedation    Indication:  Sedation is required to allow for RFA    Consent obtained from patient after discussing the risks, benefits and alternatives.    PO Intake:  Appropriately NPO for procedure    ASA Class:  Class 2 - MILD SYSTEMIC DISEASE, NO ACUTE PROBLEMS, NO FUNCTIONAL LIMITATIONS.    Mallampati:  Grade 2:  Soft palate, base of uvula, tonsillar pillars, and portion of posterior pharyngeal wall visible    Lungs: Lungs Clear with good breath sounds bilaterally.     Heart: Normal heart sounds and rate    History and physical reviewed and no updates needed. I have reviewed the lab findings, diagnostic data, medications, and the plan for sedation. I have determined this patient to be an appropriate candidate for the planned sedation and procedure and have reassessed the patient IMMEDIATELY PRIOR to sedation and procedure.      Sedation Post Procedure Summary:    Prior to the start of the procedure and with procedural staff participation, I verbally confirmed the patient s identity using two indicators, relevant allergies, that the procedure was appropriate and matched the consent or emergent situation, and that the correct equipment/implants were available. Immediately prior to starting the procedure I conducted  the Time Out with the procedural staff and re-confirmed the patient s name, procedure, and site/side. (The Joint Commission universal protocol was followed.)  Yes      Sedatives: Fentanyl and Midazolam (Versed)    Vital signs, airway, End Tidal CO2 and pulse oximetry were monitored and remained stable throughout the procedure and sedation was maintained until the procedure was complete.  The patient was monitored by staff until sedation discharge criteria were met.    Patient tolerance: Patient tolerated the procedure well with no immediate complications.    Time of sedation in minutes:  45 minutes from beginning to end of physician one to one monitoring.

## 2022-08-18 NOTE — DISCHARGE INSTRUCTIONS
[facility]   Radiofrequency Ablation (RFA) Discharge Instructions     Today you saw:   Dr. Mallory    You should anticipate procedural pain for up to 2 weeks.   You may receive a prescription for pain medication and you should take this as directed.   It may take up to 8 weeks to receive relief from the RFA   Follow up with your provider.  If you received sedation before, during or after your procedure, for the next 24 hours you shall NOT:    -Drive    -Operate machinery    -Drink alcohol    -Sign any legal documents   You may resume your normal diet   You may resume your regular medications after the procedure   Be cautious with walking. Numbness and/or weakness in the lower extremities may occur for up to 6-8 hours due to effect of local anesthetic  Avoid strenuous activity for the first 24 hours   You may resume your regular activities after 24 hours   You may shower, however no swimming, tub baths or hot tubs for 24 hours following your procedure   You may use ice packs 10-15 minutes three to four times a day at the injection site for comfort   Do not use heat to painful areas for 6 to 8 hours. This will give the local anesthetic time to wear off and prevent you from accidentally burning your skin.   Unless you have been directed to avoid the use of anti-inflammatory medications (NSAIDS), you may use medications such as ibuprofen, Aleve or Tylenol for pain control if needed.   If you experience any of the following, call the Pain Clinic during work hours (Monday through Friday 8 am-4:30 pm) at [phone] or the Provider Line after hours at [phone]:   -Fever over 100 degree F    -Swelling, bleeding, redness, drainage, warmth at the injection site    -Progressive weakness or numbness in your legs    -Loss of bowel or bladder function    -Unusual new onset of pain that is not improving

## 2022-08-18 NOTE — TELEPHONE ENCOUNTER
Received fax from pharmacy requesting refill(s) for Pregabalin (LYRICA) 200 MG capsule     Last refilled on 07/23/22    Patient last seen on 06/01/22  Next appt scheduled for None    E-prescribe to:  [id number] - [place], [place] - [address] [place] [address]     Will facilitate refill.      Anastasia Skinner MA  [facility] [place]

## 2022-08-18 NOTE — PROGRESS NOTES
Pre-procedure Intake  If YES to any questions or NO to having a   Please complete laminated checklist and leave on the computer keyboard for Provider, verbally inform provider if able.    For SCS Trial, RFA's or any sedation procedure:  Have you been fasting? Yes    If yes, for how long? 1800, 8/17/2022    Are you taking any any blood thinners such as Coumadin, Warfarin, Jantoven, Pradaxa Xarelto, Eliquis, Edoxaban, Enoxaparin, Lovenox, Heparin, Arixtra, Fondaparinux, or Fragmin? OR Antiplatelet medication such as Plavix, Brilinta, or Effient?   No     If yes, when did you take your last dose?     Do you take aspirin?  No    If cervical procedure, have you held aspirin for 6 days?   NA    Do you have any allergies to contrast dye, iodine, steroid and/or numbing medications?  NO    Are you currently taking antibiotics or have an active infection?  NO    Have you had a fever/elevated temperature within the past week? NO    Are you currently taking oral steroids? NO    Do you have a ? Yes    Are you pregnant or breastfeeding? NA    Have you received the COVID-19 vaccine? Yes    If yes, was it your 1st, 2nd or only dose needed? 2nd    Date of most recent vaccine: 6/14/2021    Notify provider and RNs if systolic BP >170, diastolic BP >100, P >100 or O2 sats < 90%

## 2022-08-18 NOTE — TELEPHONE ENCOUNTER
Pt's appt time has passed. Closing      Covid test  8/16/2022 2:27 PM Reply    To: PAIN NURSE      From: Ketan Mehta      Created: 8/16/2022 2:27 PM        *-*-*This message has not been handled.*-*-*    I haven't been contacted to schedule a covid test.  I thought I needed one for Thursday's procedure.        Honey GARRIDO RN Care Coordinator  [facility]

## 2022-08-19 ASSESSMENT — KOOS JR
GOING UP OR DOWN STAIRS: SEVERE
HOW SEVERE IS YOUR KNEE STIFFNESS AFTER FIRST WAKING IN MORNING: MODERATE
STANDING UPRIGHT: MILD
KOOS JR SCORING: 54.84
BENDING TO THE FLOOR TO PICK UP OBJECT: MODERATE
TWISING OR PIVOTING ON KNEE: MODERATE
STRAIGHTENING KNEE FULLY: MILD
RISING FROM SITTING: MODERATE

## 2022-08-19 NOTE — TELEPHONE ENCOUNTER
Script Eprescribed to pharmacy        Signed Prescriptions:                        Disp   Refills    Pregabalin (LYRICA) 200 MG capsule         30 cap*2        Sig: Take 1 capsule at bedtime. This is in addition to the           150mg in morning and afternoon.  Authorizing Provider: DONALD MACIAS MD  [facility]

## 2022-08-25 ENCOUNTER — OFFICE VISIT (OUTPATIENT)
Dept: ORTHOPEDICS | Facility: OTHER | Age: 57
End: 2022-08-25
Payer: COMMERCIAL

## 2022-08-25 VITALS — WEIGHT: 219 LBS | HEIGHT: 74 IN | BODY MASS INDEX: 28.11 KG/M2 | RESPIRATION RATE: 20 BRPM

## 2022-08-25 DIAGNOSIS — M17.0 PRIMARY OSTEOARTHRITIS OF BOTH KNEES: Primary | ICD-10-CM

## 2022-08-25 PROCEDURE — 20610 DRAIN/INJ JOINT/BURSA W/O US: CPT | Mod: 50 | Performed by: ORTHOPAEDIC SURGERY

## 2022-08-25 RX ORDER — METHYLPREDNISOLONE ACETATE 80 MG/ML
80 INJECTION, SUSPENSION INTRA-ARTICULAR; INTRALESIONAL; INTRAMUSCULAR; SOFT TISSUE
Status: SHIPPED | OUTPATIENT
Start: 2022-08-25

## 2022-08-25 RX ORDER — LIDOCAINE HYDROCHLORIDE 10 MG/ML
5 INJECTION, SOLUTION EPIDURAL; INFILTRATION; INTRACAUDAL; PERINEURAL
Status: SHIPPED | OUTPATIENT
Start: 2022-08-25

## 2022-08-25 RX ADMIN — LIDOCAINE HYDROCHLORIDE 5 ML: 10 INJECTION, SOLUTION EPIDURAL; INFILTRATION; INTRACAUDAL; PERINEURAL at 08:42

## 2022-08-25 RX ADMIN — METHYLPREDNISOLONE ACETATE 80 MG: 80 INJECTION, SUSPENSION INTRA-ARTICULAR; INTRALESIONAL; INTRAMUSCULAR; SOFT TISSUE at 08:42

## 2022-08-25 NOTE — PROGRESS NOTES
Large Joint Injection/Arthocentesis: bilateral knee    Date/Time: 8/25/2022 8:42 AM  Performed by: Alvaro Mariee MD  Authorized by: Alvaro Mariee MD     Indications:  Pain  Needle Size:  22 G  Guidance: landmark guided    Location:  Knee  Laterality:  Bilateral      Medications (Right):  80 mg methylPREDNISolone 80 MG/ML; 5 mL lidocaine (PF) 1 %  Medications (Left):  80 mg methylPREDNISolone 80 MG/ML; 5 mL lidocaine (PF) 1 %  Outcome:  Tolerated well, no immediate complications  Procedure discussed: discussed risks, benefits, and alternatives    Consent Given by:  Patient  Timeout: timeout called immediately prior to procedure    Prep: patient was prepped and draped in usual sterile fashion

## 2022-08-25 NOTE — PROGRESS NOTES
Follow up bilateral knee primary osteoarthritis.  Last injection 5/4/22.  Range of motion 0-130.    He  desires injection today of bilateral knee(s).  Risks, benefits, potential complications and alternatives were discussed.   With the patient's consent, sterile prep was performed of bilateral knee(s).  Each knee was injected with Depo Medrol 80 mg and lidocaine at anterolateral site.  Return to clinic as needed.

## 2022-08-25 NOTE — LETTER
8/25/2022         RE: Indra Mehta  [id number] [facility] [id number]        Dear Colleague,    Thank you for referring your patient, Indra Mehta, to the [facility] [place]. Please see a copy of my visit note below.    Large Joint Injection/Arthocentesis: bilateral knee    Date/Time: 8/25/2022 8:42 AM  Performed by: Alvaro Mariee MD  Authorized by: Alvaro Mariee MD     Indications:  Pain  Needle Size:  22 G  Guidance: landmark guided    Location:  Knee  Laterality:  Bilateral      Medications (Right):  80 mg methylPREDNISolone 80 MG/ML; 5 mL lidocaine (PF) 1 %  Medications (Left):  80 mg methylPREDNISolone 80 MG/ML; 5 mL lidocaine (PF) 1 %  Outcome:  Tolerated well, no immediate complications  Procedure discussed: discussed risks, benefits, and alternatives    Consent Given by:  Patient  Timeout: timeout called immediately prior to procedure    Prep: patient was prepped and draped in usual sterile fashion            Follow up bilateral knee primary osteoarthritis.  Last injection 5/4/22.  Range of motion 0-130.    He  desires injection today of bilateral knee(s).  Risks, benefits, potential complications and alternatives were discussed.   With the patient's consent, sterile prep was performed of bilateral knee(s).  Each knee was injected with Depo Medrol 80 mg and lidocaine at anterolateral site.  Return to clinic as needed.         Again, thank you for allowing me to participate in the care of your patient.        Sincerely,        Alvaro Mariee MD

## 2022-09-06 ENCOUNTER — MYC MEDICAL ADVICE (OUTPATIENT)
Dept: FAMILY MEDICINE | Facility: CLINIC | Age: 57
End: 2022-09-06

## 2022-09-06 DIAGNOSIS — A49.02 MRSA INFECTION: Primary | ICD-10-CM

## 2022-09-07 ENCOUNTER — MYC REFILL (OUTPATIENT)
Dept: PALLIATIVE MEDICINE | Facility: CLINIC | Age: 57
End: 2022-09-07

## 2022-09-07 ENCOUNTER — MYC MEDICAL ADVICE (OUTPATIENT)
Dept: PALLIATIVE MEDICINE | Facility: CLINIC | Age: 57
End: 2022-09-07

## 2022-09-07 DIAGNOSIS — G62.9 NEUROPATHY: ICD-10-CM

## 2022-09-07 DIAGNOSIS — Z98.1 S/P CERVICAL SPINAL FUSION: ICD-10-CM

## 2022-09-07 DIAGNOSIS — M54.2 CERVICALGIA: ICD-10-CM

## 2022-09-07 DIAGNOSIS — F11.90 CHRONIC, CONTINUOUS USE OF OPIOIDS: ICD-10-CM

## 2022-09-07 DIAGNOSIS — G89.4 CHRONIC PAIN SYNDROME: ICD-10-CM

## 2022-09-07 DIAGNOSIS — M79.18 MYOFASCIAL PAIN: ICD-10-CM

## 2022-09-07 DIAGNOSIS — M54.16 LUMBAR RADICULOPATHY: ICD-10-CM

## 2022-09-07 RX ORDER — SULFAMETHOXAZOLE/TRIMETHOPRIM 800-160 MG
1 TABLET ORAL 2 TIMES DAILY
Qty: 20 TABLET | Refills: 0 | Status: SHIPPED | OUTPATIENT
Start: 2022-09-07 | End: 2022-09-17

## 2022-09-07 NOTE — TELEPHONE ENCOUNTER
Received [employer] message from patient requesting refill(s) for oxyCODONE (ROXICODONE) 5 MG tablet     Last dispensed from pharmacy on   8/15/22 #165   8/18/22 #12    Patient's last office/virtual visit by prescribing provider on 6/1/22  Next office/virtual appointment scheduled for 9/21/22    Last urine drug screen date 6/21/22  Current opioid agreement on file? Yes Date of opioid agreement: 6/1/22    E-prescribe to:    [id number] - [place] - [facility] [address]    Will route to nursing [place] for review and preparation of prescription(s).

## 2022-09-07 NOTE — TELEPHONE ENCOUNTER
Received [employer] message from patient requesting refill(s) for pregabalin (LYRICA) 150 MG capsule     Last refilled on 7/23/22    Pt last seen on 6/1/22  Next appt scheduled for 9/21/22    E-prescribe to:    [id number] - [place], [place] - [address] [place] [address]     Will facilitate refill.

## 2022-09-07 NOTE — TELEPHONE ENCOUNTER
Appointment with Dr. Mallory on 9/21 to determine a plan.    Medication refill information reviewed.     Last due:  Fill 8/14/22. Start 8/16/22. 30 day   Due date:  9/15/22      Prescriptions prepped for review.     Heidy RN-BSN  [place] [facility]

## 2022-09-08 RX ORDER — OXYCODONE HYDROCHLORIDE 5 MG/1
5-10 TABLET ORAL EVERY 6 HOURS PRN
Qty: 165 TABLET | Refills: 0 | Status: SHIPPED | OUTPATIENT
Start: 2022-09-08 | End: 2022-10-13

## 2022-09-08 RX ORDER — PREGABALIN 150 MG/1
150 CAPSULE ORAL 3 TIMES DAILY
Qty: 90 CAPSULE | Refills: 3 | Status: SHIPPED | OUTPATIENT
Start: 2022-09-08 | End: 2023-03-09

## 2022-09-08 NOTE — TELEPHONE ENCOUNTER
Signed Prescriptions:                        Disp   Refills    pregabalin (LYRICA) 150 MG capsule         90 cap*3        Sig: Take 1 capsule (150 mg) by mouth 3 times daily  Authorizing Provider: TRISHA PAYTON reviewed  and last visit with provider. Refill looks consistent with plan.     [facility]UMI[facility]

## 2022-09-08 NOTE — TELEPHONE ENCOUNTER
checked and appropriate     Script Eprescribed to pharmacy    Will send this to MA team to notify patient.    Signed Prescriptions:                        Disp   Refills    oxyCODONE (ROXICODONE) 5 MG tablet         165 ta*0        Sig: Take 1-2 tablets (5-10 mg) by mouth every 6 hours as           needed for severe pain Max of 6 tablets/day. Fill           9/13/22. Start 9/15/22. 30 day Rx  Authorizing Provider: DONALD MACIAS MD  [facility]

## 2022-09-20 NOTE — PROGRESS NOTES
"Ketan is a 57 year old who is being evaluated via a billable video visit.      How would you like to obtain your AVS? MyChart  If the video visit is dropped, the invitation should be resent by: Text to cell phone: [phone]  Will anyone else be joining your video visit? No   Is Pt currently in [place]? Yes    Lillian De La Cruz MA      NOTE:  If Pt is not in [place], Appointment needs to be canceled and rescheduled.        Video-Visit Details    Video Start Time: 3:13 PM    Type of service:  Video Visit    Video End Time:3:50 PM    Originating Location (pt. Location): Home    Distant Location (provider location):  [facility]     Platform used for Video Visit: [employer] [facility]  Follow up clinic visit    Date of visit:  9/21/22     Chief complaint:   Chief Complaint   Patient presents with     Pain     Video visit due to COVID-19        History:  Indra Mehta is a 56 year old male who follows with the pain clinic for:   Idiopathic peripheral neuropathy  Cervicalgia s/p cervical fusion  Lumbar radiculopathy  Chronic opioid use  Last seen by me on 6/1/2022      Interval history 12/17/2021 (date)  \"The injection has helped his knees, but he reports continuing sharp pain in left knee on the outside. He was recommended to see Dr. Calero if it persists. He states that he feels the left knee cap is different than the right and is wondering if this is contributing to the pain.      He has his follow up with Dr. Mallory on 1/12/22.      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 This is frustrating as he does not feel he is doing anything strenuous. He states that the pain is his joints and low back. He feels that the spot in his low back is getting worse. He states " that he gets a shot of pain with every step he takes. He states that he tends to lean forward when walking as that seems too help the pain. He states that he has had pain going below the knee on the right side for the last week. He states that he gets sharp pain then it aches. He states that this pain feels different than the pain he has had before. The pain is located on the left side.     Interval history 1/12/2022  - Has a spot on the right iliac crest that is painful. Not tender to palpation. Radiates into R lateral buttock/hip, back of thigh with active hip flexion. Most commonly radiates into buttock/hip. 20% of he time radiates down the posterior thigh. Every step with R leg, 1/10 steps on the left. Not painful with weight bearing. Never radiates past the knee. IN the iliac crest it is a constant sharp pain. The radiating pain feels like the typical nerve pain. Worse after being inactive for a period of time. Pain then gradually improves with being active, then when active for prolonged periods, pain increases again. Started 2 mo ago without inciting event. Gradually worsened.    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 By the end of practice the leg will be burning.   - Feet still suck. Not getting better, getting worse. 1st steps in the morning feels like his ball of his foot is a big clump/very thick. Takes By 8-9 am feet are tingling pins and needles all the time, worsened with every step, but not nearly as bad as when he fist gets up.   - The neck hurts with movement, (outler limit of movement R and L rotation. L rotation is more limited). Occasionally pain in the R under shoulder blade, down into upper arm, lasts 5-6 days. Extension is also limited with pain. Flexion is the best. Thinks the neck is where it is gooing to be in regards to pain - not acute pain all the time unless he is really moving his head  around a lot. 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 Things that bother this are movement or sitting in one spot. Now improved sitting/stiffness s/p radiofrequency ablation. Movement pain remains.     Interval history 4/20/2022  - Pulled a muscle in the L thoracic spine a few weeks ago. Resolving spontaneously  - Previously had R sided pain over PSIS. 2 weeks ago developed pain around the L PSIS. Forward flexion of the waist causes pain on R or L. Originates on PSIS and radiates over buttocks. Can also radiate over lateral hip. Aching pain. No numbness or tingling  - No improvement with time.   - Nearly 1 week of pain uner R shoulder blad into occiput, then spontaneously resolved    Interval history 6/1/2022   - the right sided low back pain is improving. Less sharp, now more dull ache.   - mid back muscle strain is improved  - daughters do not like how much medication he is on. Formerly every couple months, now somewhat more frequently his body gets wiped out for 2-3 days. Hardly concentrate, needs to lie in bed for 2-3 days due to fatigue. Formerly this was happening every few months, now more frequent, not quite   - had 2 staph infections - knee started to get red, hot swollen. Left knee. Talked to Dr. Nayak (ortho) via MyChart  - They were MRSA infections.    Recommendations/plan at the last visit included:  Plan:  Therapies:     - Physical Therapy: not at this time    - Clinical Health Psychologist to address issues of relaxation, behavioral change, coping style, and other factors important to improvement: not at this time  Medication Management:     - Continue oxycodone 5 mg. Max #6 tabs per day. Refilled today  1. - UDS today  2. - CSA today  3. Continue Cymbalta 60 mg per day. Consider trial of high dose (120 mg per day) if not sufficiently helpful.  "Would likely need to taper off nortriptyline with higher dose  4. Continue Lyrica 150/200/150  5. Consider adding topamax in the future  Procedures recommended:     - repeat L3,4,5 bilateral radiofrequency ablation    - Consider bilateral TON, C3, C4 MBBs/RFA in the future    - re-submit SCS trial for idiopathic peripheral neuropathy  Follow up: for lumbar radiofrequency ablation and in 2-3 mo  \"    Since his last visit, Indra PEREZ Janine reports:  - Things are a little better. The lumbar radiofrequency ablation has helped in his back. Currently the only pain he has in his back is very low, almost in the hip  - has had some other trouble with his neck. Sent a doctor he has seen at [facility] about his neck. He has plans to get XRay. Has a catch periodically with range of motion. After happens several times his neck gets really sore. This just started in the last 3 weeks  - neuropathy is the same.   - he had given up on being with a woman because things never worked out but he is unable to get an erection with the current medications. But, if he misses a dose, he has terrible nerve pain.       Pain scores:  Pain intensity on average is 6 on a scale of 0-10.     Current pain treatments:   Tylenol 1000mg three times a day - 4x/day  Ibuprofen 800 mg three times a day   Xanax-Takes a night for tinnitis. increased tinitis and fullness and dizziness over the past month. Rare. Using ~6x/mo. Will make ENT appointment.   Nortriptyline 50mg at bedtime  Oxycodone 5m-2 tablets every 4-6 hours, max of 6/day  Lyrica 150/200/150 mg  Flexeril 5-10 mg bedtime PRN - 5 mg does not make him sleepy, takes 5 mg occasionally during the day  Alendronate, calcitonin for stress reaction of lumbar spine   Duloxetine 60 mg     Patient is using the medication as prescribed:  YES  Is your medication helpful? YES   Side Effects: no side effect    Past pain treatments:  Norco/vicodin H  Oxycodone H  Flexeril - was helpful at night but caused him to " be groggy in the morning  Robaxin - not helpful  Tizanidine H  Gabapentin ?  Lyrica SE sedation  Cymbalta NH  Nortriptyline H  Duloxetine  - unclear why stopped     Past Pain Treatments:  Injections:    - 11/8/18 bilateral L3-4 TFESI - (Dr Mcdaniel)  - 8/23/18 bilateral L3-4 TFESI - seems to have worsened pain (Dr Mcdaniel)  - 6/11/18 TFESI right C5-6 - helped with arm pain and numbness  - 12/28/17 bilateral L3-4 TFESI   - 10/23/17 Bilateral L4-5 TFESI   - 11/08/2018 Bilateral L3-4 TFESI   -6/14/2019 Bilateral L3-4 TFESI   -8/22/2019 C6-7 SRIDEVI   -5/22/2020 L3-4 bilateral TLESI  -5/13/21 L3-4 bilateral TLESI  -8/12/21 bilateral L3-5 LMBB  - bilateral L3,4,5 radiofrequency ablation - disappointed with results. Helped with sitting. Repeat 8/18/2022, helped   Surgery:    - ACDF C6-7 on 7/5/17 with improvement;   - ACDF C5-6 12/19/2018, posterior C5-7 fusion. Lateral mass screws, right C5-6 medial facetectomy,   - right C6 foraminotomy on 10/29/2019  TENS unit: helpful    Medications:  Current Outpatient Medications   Medication Sig Dispense Refill     Acetaminophen (TYLENOL PO) Take 1,000 mg by mouth 3 times daily       amLODIPine (NORVASC) 5 MG tablet Take 1 tablet (5 mg) by mouth daily 90 tablet 3     atorvastatin (LIPITOR) 10 MG tablet Take 10 mg by mouth daily       bisacodyl (DULCOLAX) 5 MG EC tablet Take 1 tablet (5 mg) by mouth See Admin Instructions --2 days prior to procedure, take two (2) tablets at 4 pm.  1 day prior to procedure, take two (2) tablets at 4 pm.  For additional instructions refer to you colonoscopy prep instructions. 4 tablet 0     cyclobenzaprine (FLEXERIL) 5 MG tablet 1-2 tablets three times daily as needed 90 tablet 2     DULoxetine (CYMBALTA) 60 MG capsule Take 1 capsule (60 mg) by mouth daily 60 capsule 1     ibuprofen (ADVIL/MOTRIN) 800 MG tablet Take 800 mg by mouth 3 times daily       losartan-hydrochlorothiazide (HYZAAR) 50-12.5 MG tablet TAKE 2 TABLETS BY MOUTH EVERY  tablet 1      naloxone (NARCAN) 4 MG/0.1ML nasal spray Spray 1 spray (4 mg) into one nostril alternating nostrils once as needed for opioid reversal every 2-3 minutes until assistance arrives 0.2 mL 1     nortriptyline (PAMELOR) 50 MG capsule Take 1 capsule (50 mg) by mouth At Bedtime 90 capsule 1     order for DME Equipment being ordered: TENS Pads as directed 1 Device 0     oxyCODONE (ROXICODONE) 5 MG tablet Take 1-2 tablets (5-10 mg) by mouth every 6 hours as needed for severe pain Max of 6 tablets/day. Fill 9/13/22. Start 9/15/22. 30 day Rx 165 tablet 0     polyethylene glycol (GOLYTELY) 236 g suspension Take 6,000 mLs by mouth See Admin Instructions --For instructions refer to you colonoscopy prep instructions. 8000 mL 0     pregabalin (LYRICA) 150 MG capsule Take 1 capsule (150 mg) by mouth 3 times daily 90 capsule 3     Pregabalin (LYRICA) 200 MG capsule Take 1 capsule at bedtime. This is in addition to the 150mg in morning and afternoon. 30 capsule 2       Medical History: any changes in medical history since they were last seen? No    Physical Exam:  There were no vitals taken for this visit.   VIDEO VISIT  General: NAD  Gait: Slow, antalgic favoring R   Psych: Mood is OK. Affect is congruent. Thought content is logical.   Neuro: CNs II - XII grossly intact    MSK exam:  tender to palpation over R sacroiliac joint   No pain with active hip flexion, active hip IR      Controlled Substance Agreement:   CSA -- Encounter Level on 06/15/2017:    Controlled Substance Agreement - Scan on 6/22/2017 11:53 AM: CONTROLLED SUBSTANCE AGREEMENT     CSA -- Encounter Level on 10/13/2016:    Controlled Substance Agreement - Scan on 10/23/2016  5:07 PM: CONTROLLED SUBSTANCE AGREEMENT 10/13/16     CSA -- Patient Level:    Controlled Substance Agreement - Opioid - Scan on 6/1/2022  4:25 PM  Controlled Substance Agreement - Opioid - Scan on 6/18/2021  9:09 AM  Controlled Substance Agreement - Opioid - Scan on 10/29/2020  2:12 PM  Controlled  Substance Agreement - Opioid - Scan on 4/15/2019  3:04 PM          UDS: 6/1/2022    THE 4 As OF OPIOID MAINTENANCE ANALGESIA    Analgesia: Is pain relief clinically significant? YES   Activity: Is patient functional and able to perform Activities of Daily Living? YES   Adverse effects: Is patient free from adverse side effects from opiates? YES   Adherence to Rx protocol: Is patient adhering to Controlled Substance Agreement and taking medications ONLY as ordered? YES    Assessment:   Indra Mehta is a 56 year old male who is seen at the pain clinic for:    1. Sacroiliac joint pain    2. Cervicalgia    3. S/P cervical spinal fusion    4. Myofascial pain    5. Lumbar radiculopathy    6. Neuropathy    7. Chronic pain syndrome    8. Other polyneuropathy    9. Chronic bilateral low back pain without sciatica         # Chronic axial low back pain: bilateral facet arthropathy on MRI at L4/5, L5/S1. sitting improved after bilateral L3,4,5 medial branch radiofrequency ablation. Repeat ablation improved pain    #Chronic axial neck pain: s/p C5/6, C6/7 ACDF, C5-7 posterior fusion. Pain with range of motion likely cervical spondylosis. Consider radiofrequency ablation in the future. Has plans to see his surgeon at [facility] about this.     #Idiopathic peripheral neuropathy: on Lyrica 150/200/150, nortriptyline 50 mg bedtime, duloxetine with continued symptoms. Could consider resubmitting for insurance consideration of SCS     #Bilateral hip/pelvic pain/Stress reaction: MRI of pelvis demonstrated R L5 pedicle, bilateral L4 lamina stress change. MRI lumbar spine without clear read of stress change, although edema seems to be present. Neurologically intact.     #R sacroiliac joint pain  tender to palpation today over R sacroiliac joint. Do R sacroiliac joint injection    Plan:  Therapies:     - Physical Therapy: not at this time    - Clinical Health Psychologist to address issues of relaxation, behavioral change, coping  style, and other factors important to improvement: not at this time  Medication Management:     - Continue oxycodone 5 mg. Max #6 tabs per day. Refilled today  1. - UDS 6/1/2022  2. - CSA 6/1/2022  3. Taper Cymbalta to off as tolerated due to sexual side effects   4. Continue Lyrica 150/200/150  5. Could consider adding topamax in the future  Procedures recommended:     - repeat L3,4,5 bilateral radiofrequency ablation    - Consider bilateral TON, C3, C4 MBBs/RFA in the future    - Consider SCS trial for idiopathic peripheral neuropathy in the future  Follow up:with primary care provider     BILLING TIME DOCUMENTATION:   The total TIME spent on this patient on the date of the encounter/appointment was 45 minutes.      TOTAL TIME includes:   Time spent preparing to see the patient (reviewing records and tests) - 2 min  Time spent face to face (or over the phone) with the patient - 38 min  Time spent ordering tests, medications, procedures and referrals - 0 min  Time spent Referring and communicating with other healthcare professionals - 0 min  Time spent documenting clinical information in Epic - 5 min    Liz Mallory MD  [facility]

## 2022-09-21 ENCOUNTER — VIRTUAL VISIT (OUTPATIENT)
Dept: PALLIATIVE MEDICINE | Facility: CLINIC | Age: 57
End: 2022-09-21
Payer: COMMERCIAL

## 2022-09-21 ENCOUNTER — TELEPHONE (OUTPATIENT)
Dept: FAMILY MEDICINE | Facility: CLINIC | Age: 57
End: 2022-09-21

## 2022-09-21 DIAGNOSIS — M54.16 LUMBAR RADICULOPATHY: ICD-10-CM

## 2022-09-21 DIAGNOSIS — G89.29 CHRONIC BILATERAL LOW BACK PAIN WITHOUT SCIATICA: ICD-10-CM

## 2022-09-21 DIAGNOSIS — M53.3 SACROILIAC JOINT PAIN: Primary | ICD-10-CM

## 2022-09-21 DIAGNOSIS — M54.2 CERVICALGIA: ICD-10-CM

## 2022-09-21 DIAGNOSIS — G89.4 CHRONIC PAIN SYNDROME: ICD-10-CM

## 2022-09-21 DIAGNOSIS — G62.9 NEUROPATHY: ICD-10-CM

## 2022-09-21 DIAGNOSIS — G62.89 OTHER POLYNEUROPATHY: ICD-10-CM

## 2022-09-21 DIAGNOSIS — Z98.1 S/P CERVICAL SPINAL FUSION: ICD-10-CM

## 2022-09-21 DIAGNOSIS — M79.18 MYOFASCIAL PAIN: ICD-10-CM

## 2022-09-21 DIAGNOSIS — M54.50 CHRONIC BILATERAL LOW BACK PAIN WITHOUT SCIATICA: ICD-10-CM

## 2022-09-21 PROCEDURE — 99215 OFFICE O/P EST HI 40 MIN: CPT | Mod: GT | Performed by: STUDENT IN AN ORGANIZED HEALTH CARE EDUCATION/TRAINING PROGRAM

## 2022-09-21 RX ORDER — DULOXETIN HYDROCHLORIDE 60 MG/1
60 CAPSULE, DELAYED RELEASE ORAL DAILY
Qty: 60 CAPSULE | Refills: 1 | Status: SHIPPED | OUTPATIENT
Start: 2022-09-21 | End: 2022-10-12

## 2022-09-21 RX ORDER — CYCLOBENZAPRINE HCL 5 MG
TABLET ORAL
Qty: 90 TABLET | Refills: 2 | Status: SHIPPED | OUTPATIENT
Start: 2022-09-21 | End: 2023-04-10

## 2022-09-21 RX ORDER — DULOXETIN HYDROCHLORIDE 20 MG/1
CAPSULE, DELAYED RELEASE ORAL
Qty: 21 CAPSULE | Refills: 0 | Status: SHIPPED | OUTPATIENT
Start: 2022-09-21 | End: 2022-09-21

## 2022-09-21 RX ORDER — NORTRIPTYLINE HYDROCHLORIDE 50 MG/1
50 CAPSULE ORAL AT BEDTIME
Qty: 90 CAPSULE | Refills: 1 | Status: SHIPPED | OUTPATIENT
Start: 2022-09-21 | End: 2023-04-10

## 2022-09-21 RX ORDER — DULOXETIN HYDROCHLORIDE 20 MG/1
CAPSULE, DELAYED RELEASE ORAL
Qty: 21 CAPSULE | Refills: 0 | Status: SHIPPED | OUTPATIENT
Start: 2022-09-21 | End: 2022-10-12

## 2022-09-21 ASSESSMENT — PAIN SCALES - PAIN ENJOYMENT GENERAL ACTIVITY SCALE (PEG)
INTERFERED_GENERAL_ACTIVITY: 6
AVG_PAIN_PASTWEEK: 5
INTERFERED_ENJOYMENT_LIFE: 6
PEG_TOTALSCORE: 5.67
PEG_TOTALSCORE: 5.67
INTERFERED_GENERAL_ACTIVITY: 6
AVG_PAIN_PASTWEEK: 5
INTERFERED_ENJOYMENT_LIFE: 6

## 2022-09-21 ASSESSMENT — ANXIETY QUESTIONNAIRES
2. NOT BEING ABLE TO STOP OR CONTROL WORRYING: SEVERAL DAYS
GAD7 TOTAL SCORE: 5
3. WORRYING TOO MUCH ABOUT DIFFERENT THINGS: SEVERAL DAYS
6. BECOMING EASILY ANNOYED OR IRRITABLE: NOT AT ALL
4. TROUBLE RELAXING: MORE THAN HALF THE DAYS
7. FEELING AFRAID AS IF SOMETHING AWFUL MIGHT HAPPEN: SEVERAL DAYS
GAD7 TOTAL SCORE: 5
8. IF YOU CHECKED OFF ANY PROBLEMS, HOW DIFFICULT HAVE THESE MADE IT FOR YOU TO DO YOUR WORK, TAKE CARE OF THINGS AT HOME, OR GET ALONG WITH OTHER PEOPLE?: NOT DIFFICULT AT ALL
IF YOU CHECKED OFF ANY PROBLEMS ON THIS QUESTIONNAIRE, HOW DIFFICULT HAVE THESE PROBLEMS MADE IT FOR YOU TO DO YOUR WORK, TAKE CARE OF THINGS AT HOME, OR GET ALONG WITH OTHER PEOPLE: NOT DIFFICULT AT ALL
1. FEELING NERVOUS, ANXIOUS, OR ON EDGE: NOT AT ALL
GAD7 TOTAL SCORE: 5
5. BEING SO RESTLESS THAT IT IS HARD TO SIT STILL: NOT AT ALL

## 2022-09-21 ASSESSMENT — PAIN SCALES - GENERAL: PAINLEVEL: SEVERE PAIN (6)

## 2022-09-21 NOTE — PATIENT INSTRUCTIONS
Continue your medications at your current doses. I have written to Dr. Grullon to request that he continue these for you.   We will try an sacroiliac joint injection for your right low back and hip pain  If you want to see Dr. Mcdaniel for the sacroiliac joint injection, ask Dr. Grullon for a referral.   I recommend a trial of tapering down, and possibly off, your duloxetine (Cymbalta), as that is the most likely medication to cause sexual side effects. I recommend you taper as follows:   - decrease to 40 mg duloxetine for 7 days. Then  - decrease to 20 mg duloxetine for 7 days. Then  - stop  I placed an order for 20 mg capsules for this taper. If you have a pain flare from the taper, contact Dr. Grullon for another prescription to help you go back on the duloxetine.   Follow up: with Dr. Grullon  A list of outside pain clinics you could consider includes:   [facility] - Various Locations  [facility] - Various Locations  [place] [facility] - Various Locations  [facility] - Various [facility] - Various Locations    If you are unable to be seen by our providers for repeat injections, you could contact the following clinics for procedures:    [facility] - Various Locations  [facility] - Various Locations     Liz Mallory MD  [facility]     ----------------------------------------------------------------  Clinic Number:  [phone]   Call with any questions about your care and for scheduling assistance.   Calls are returned Monday through Friday between 8 AM and 4:30 PM. We usually get back to you within 2 business days depending on the issue/request.    If we are prescribing your medications:  For opioid medication refills, call the clinic or send a [place] message 7 days in advance.  Please include:  Name of requested medication  Name of the pharmacy.  For non-opioid  medications, call your pharmacy directly to request a refill. Please allow 3-4 days to be processed.   Per [place] State Law:  All controlled substance prescriptions must be filled within 30 days of being written.    For those controlled substances allowing refills, pickup must occur within 30 days of last fill.      We believe regular attendance is key to your success in our program!    Any time you are unable to keep your appointment we ask that you call us at least 24 hours in advance to cancel.This will allow us to offer the appointment time to another patient.   Multiple missed appointments may lead to dismissal from the clinic.

## 2022-09-21 NOTE — TELEPHONE ENCOUNTER
----- Message from Liz Mallory MD sent at 9/21/2022  3:27 PM CDT -----  Tha Sosa ,     I have been seeing Indra Mehta for pain management. Unfortunately I am leaving the pain clinic at the end of September and will therefore need to transition him back to primary care. he will need someone to take over prescribing of his oxycodone, Lyrica, Cymbalta and nortriptyline. he is stable on these medications and we typically transition patients back to primary care at this point.   Indra Mehta's daily MME is 45, which is below the recommended 50MME/day.    This patient is also on medical cannabis. If you do not certify for medical cannabis, please work   The patient's current pain plan also includes a lumbar radiofrequency ablation. You can either directly order a repeat of the procedure or he can reach out to the pain clinic schedulers ([phone]) to request repeat procedures.     Please reach out to me if you have questions or concerns.     Please continue the following:   Continue urine drug screens if the patient is on controlled substances - at least 2 times per year.   Consider pill counts.   Assess routinely for IAN/OUD.   Continue new PPAs (Patient Provider Agreements- aka Pain Contracts)   And continue to offer to taper to a reduced dose or to discontinuation at every face to face visit. See Opioid taper or discontinuation.    Unfortunately we do not have another pain provider coming to [place] at this time. I therefore recommend Ketan seek an outside pain clinic. Options are as follows:   [facility] - Various Locations  [place] [facility] - Various Locations  [place] [facility] - Various Locations  [facility] - Various Locations  [facility] - Various Locations    If his is unable to be seen by our providers for repeat injections, he could contact the following clinics for procedures:    [facility] -  Various Locations  [facility] - Various Locations       Thank you,   Liz Mallory MD  [facility]

## 2022-09-22 ENCOUNTER — MYC MEDICAL ADVICE (OUTPATIENT)
Dept: FAMILY MEDICINE | Facility: CLINIC | Age: 57
End: 2022-09-22

## 2022-09-22 DIAGNOSIS — G89.29 CHRONIC BILATERAL LOW BACK PAIN WITHOUT SCIATICA: Primary | ICD-10-CM

## 2022-09-22 DIAGNOSIS — M54.50 CHRONIC BILATERAL LOW BACK PAIN WITHOUT SCIATICA: Primary | ICD-10-CM

## 2022-09-22 NOTE — TELEPHONE ENCOUNTER
Need order for pain injection for sacroiliac joint pain.  Originally ordered by Dr. Mallory, however with her ordering patient is unable to schedule this with Dr. Mcdaniel at [place].  The  said to have PCP reorder and then patient will be able to schedule with Dr. Mcdaniel.

## 2022-09-23 ENCOUNTER — HOSPITAL ENCOUNTER (OUTPATIENT)
Dept: GENERAL RADIOLOGY | Facility: CLINIC | Age: 57
Discharge: HOME | End: 2022-09-23
Attending: NEUROLOGICAL SURGERY | Admitting: NEUROLOGICAL SURGERY
Payer: COMMERCIAL

## 2022-09-23 DIAGNOSIS — M54.2 NECK PAIN: ICD-10-CM

## 2022-09-23 PROCEDURE — 72040 X-RAY EXAM NECK SPINE 2-3 VW: CPT

## 2022-09-26 NOTE — H&P
[facility] Anesthesia Pre-op History and Physical    Indra Mehta MRN# [id number]   Age: 57 year old YOB: 1965      Date of Surgery: 9/27/2022 Location [facility]      Date of Exam 9/27/2022 Facility (Same day)       Home clinic: [facility]  Primary care provider: Tha Grullon         Chief Complaint and/or Reason for Procedure:   No chief complaint on file.  Colonoscopy. Family hx CRC.  2016 hyperplastic polyp.       Active problem list:     Patient Active Problem List    Diagnosis Date Noted     Cervical myelopathy (H) 06/13/2022     Priority: Medium     Major depression in complete remission (H) 06/13/2022     Priority: Medium     Chronic bilateral low back pain without sciatica 05/08/2022     Priority: Medium     Primary osteoarthritis of both knees 05/04/2022     Priority: Medium     Impingement syndrome of left shoulder 06/23/2021     Priority: Medium     Sleep apnea, unspecified type 04/06/2021     Priority: Medium     Osteoarthritis of spine with radiculopathy, lumbar region 04/06/2021     Priority: Medium     Status post lumbar spinal fusion 01/14/2020     Priority: Medium     Cervical radiculopathy 10/28/2019     Priority: Medium     Formatting of this note might be different from the original.  Added automatically from request for surgery [id number]       Benign essential hypertension 06/30/2017     Priority: Medium     Bilateral occipital neuralgia 10/28/2016     Priority: Medium     Major depression in complete remission (H) 08/20/2010     Priority: Medium     Chronic pain syndrome 08/20/2010     Priority: Medium     Patient is followed by Tha Grullon MD for ongoing prescription of pain medication.  All refills should be approved by this provider only at face-to-face appointments - not by phone request.    Medication(s): Tramadol.   Maximum quantity per month: 60  Clinic visit frequency required: Q 1 months     Controlled  substance agreement:  Encounter-Level CSA - 10/13/16:               Controlled Substance Agreement - Scan on 10/23/2016  5:07 PM : CONTROLLED SUBSTANCE AGREEMENT 10/13/16 (below)            Pain Clinic evaluation in the past: Yes       Date/Location:   [facility]    DIRE Total Score(s):  No flowsheet data found.    Last [facility] website verification:  none   [email or web address]               Pain medication agreement signed 08/20/2010     Priority: Medium     Meniere's disease 03/22/2007     Priority: Medium     Problem list name updated by automated process. Provider to review              Medications (include herbals and vitamins):   Any Plavix use in the last 7 days? No     Current Facility-Administered Medications   Medication     lidocaine (PF) (XYLOCAINE) 1 % injection 5 mL     lidocaine (PF) (XYLOCAINE) 1 % injection 5 mL     lidocaine 1 % injection 5 mL     lidocaine 1 % injection 5 mL     lidocaine 1 % injection 5 mL     methylPREDNISolone (DEPO-MEDROL) injection 40 mg     methylPREDNISolone (DEPO-MEDROL) injection 80 mg     methylPREDNISolone (DEPO-MEDROL) injection 80 mg     methylPREDNISolone (DEPO-MEDROL) injection 80 mg     methylPREDNISolone (DEPO-MEDROL) injection 80 mg     triamcinolone (KENALOG-40) injection 40 mg     triamcinolone (KENALOG-40) injection 40 mg     triamcinolone (KENALOG-40) injection 40 mg     triamcinolone (KENALOG-40) injection 40 mg     triamcinolone (KENALOG-40) injection 40 mg     triamcinolone (KENALOG-40) injection 40 mg     triamcinolone (KENALOG-40) injection 40 mg     Current Outpatient Medications   Medication Sig     bisacodyl (DULCOLAX) 5 MG EC tablet Take 1 tablet (5 mg) by mouth See Admin Instructions --2 days prior to procedure, take two (2) tablets at 4 pm.  1 day prior to procedure, take two (2) tablets at 4 pm.  For additional instructions refer to you colonoscopy prep instructions.     polyethylene glycol (GOLYTELY) 236 g suspension Take 6,000  mLs by mouth See Admin Instructions --For instructions refer to you colonoscopy prep instructions.     Acetaminophen (TYLENOL PO) Take 1,000 mg by mouth 3 times daily     amLODIPine (NORVASC) 5 MG tablet Take 1 tablet (5 mg) by mouth daily     atorvastatin (LIPITOR) 10 MG tablet Take 10 mg by mouth daily     cyclobenzaprine (FLEXERIL) 5 MG tablet 1-2 tablets three times daily as needed     DULoxetine (CYMBALTA) 20 MG capsule Take 2 capsules (40 mg) by mouth daily for 7 days, THEN 1 capsule (20 mg) daily for 7 days.     DULoxetine (CYMBALTA) 60 MG capsule Take 1 capsule (60 mg) by mouth daily     ibuprofen (ADVIL/MOTRIN) 800 MG tablet Take 800 mg by mouth 3 times daily     losartan-hydrochlorothiazide (HYZAAR) 50-12.5 MG tablet TAKE 2 TABLETS BY MOUTH EVERY DAY     naloxone (NARCAN) 4 MG/0.1ML nasal spray Spray 1 spray (4 mg) into one nostril alternating nostrils once as needed for opioid reversal every 2-3 minutes until assistance arrives (Patient not taking: Reported on 9/21/2022)     nortriptyline (PAMELOR) 50 MG capsule Take 1 capsule (50 mg) by mouth At Bedtime     order for DME Equipment being ordered: TENS Pads as directed     oxyCODONE (ROXICODONE) 5 MG tablet Take 1-2 tablets (5-10 mg) by mouth every 6 hours as needed for severe pain Max of 6 tablets/day. Fill 9/13/22. Start 9/15/22. 30 day Rx     pregabalin (LYRICA) 150 MG capsule Take 1 capsule (150 mg) by mouth 3 times daily     Pregabalin (LYRICA) 200 MG capsule Take 1 capsule at bedtime. This is in addition to the 150mg in morning and afternoon.             Allergies:      Allergies   Allergen Reactions     No Known Drug Allergies      Allergy to Latex? No  Allergy to tape?   No  Intolerances:             Physical Exam:   All vitals have been reviewed  No data found.  No intake/output data recorded.  Lungs:   No increased work of breathing, good air exchange, clear to auscultation bilaterally, no crackles or wheezing     Cardiovascular:   Normal apical  impulse, regular rate and rhythm, normal S1 and S2, no S3 or S4, and no murmur noted             Lab / Radiology Results:            Anesthetic risk and/or ASA classification:       Pablo Ferris MD

## 2022-09-27 ENCOUNTER — HOSPITAL ENCOUNTER (OUTPATIENT)
Facility: CLINIC | Age: 57
Discharge: HOME | End: 2022-09-27
Attending: INTERNAL MEDICINE | Admitting: INTERNAL MEDICINE
Payer: COMMERCIAL

## 2022-09-27 ENCOUNTER — ANESTHESIA (OUTPATIENT)
Dept: GASTROENTEROLOGY | Facility: CLINIC | Age: 57
End: 2022-09-27
Payer: COMMERCIAL

## 2022-09-27 ENCOUNTER — ANESTHESIA EVENT (OUTPATIENT)
Dept: GASTROENTEROLOGY | Facility: CLINIC | Age: 57
End: 2022-09-27
Payer: COMMERCIAL

## 2022-09-27 VITALS
WEIGHT: 219 LBS | TEMPERATURE: 98.7 F | SYSTOLIC BLOOD PRESSURE: 127 MMHG | OXYGEN SATURATION: 99 % | RESPIRATION RATE: 16 BRPM | HEIGHT: 73 IN | HEART RATE: 67 BPM | BODY MASS INDEX: 29.03 KG/M2 | DIASTOLIC BLOOD PRESSURE: 77 MMHG

## 2022-09-27 DIAGNOSIS — Z12.11 SCREENING FOR COLON CANCER: Primary | ICD-10-CM

## 2022-09-27 LAB — COLONOSCOPY: NORMAL

## 2022-09-27 PROCEDURE — 250N000009 HC RX 250: Performed by: NURSE ANESTHETIST, CERTIFIED REGISTERED

## 2022-09-27 PROCEDURE — 88305 TISSUE EXAM BY PATHOLOGIST: CPT | Mod: TC | Performed by: INTERNAL MEDICINE

## 2022-09-27 PROCEDURE — 45380 COLONOSCOPY AND BIOPSY: CPT | Mod: PT | Performed by: INTERNAL MEDICINE

## 2022-09-27 PROCEDURE — 370N000017 HC ANESTHESIA TECHNICAL FEE, PER MIN: Performed by: INTERNAL MEDICINE

## 2022-09-27 PROCEDURE — 258N000003 HC RX IP 258 OP 636: Performed by: NURSE ANESTHETIST, CERTIFIED REGISTERED

## 2022-09-27 PROCEDURE — 45385 COLONOSCOPY W/LESION REMOVAL: CPT | Performed by: INTERNAL MEDICINE

## 2022-09-27 PROCEDURE — 88305 TISSUE EXAM BY PATHOLOGIST: CPT | Mod: 26

## 2022-09-27 PROCEDURE — 250N000011 HC RX IP 250 OP 636: Performed by: NURSE ANESTHETIST, CERTIFIED REGISTERED

## 2022-09-27 RX ORDER — LIDOCAINE HYDROCHLORIDE 20 MG/ML
INJECTION, SOLUTION INFILTRATION; PERINEURAL PRN
Status: DISCONTINUED | OUTPATIENT
Start: 2022-09-27 | End: 2022-09-27

## 2022-09-27 RX ORDER — LIDOCAINE 40 MG/G
CREAM TOPICAL
Status: DISCONTINUED | OUTPATIENT
Start: 2022-09-27 | End: 2022-09-27 | Stop reason: HOSPADM

## 2022-09-27 RX ORDER — SODIUM CHLORIDE, SODIUM LACTATE, POTASSIUM CHLORIDE, CALCIUM CHLORIDE 600; 310; 30; 20 MG/100ML; MG/100ML; MG/100ML; MG/100ML
INJECTION, SOLUTION INTRAVENOUS CONTINUOUS
Status: DISCONTINUED | OUTPATIENT
Start: 2022-09-27 | End: 2022-09-27 | Stop reason: HOSPADM

## 2022-09-27 RX ORDER — PROPOFOL 10 MG/ML
INJECTION, EMULSION INTRAVENOUS CONTINUOUS PRN
Status: DISCONTINUED | OUTPATIENT
Start: 2022-09-27 | End: 2022-09-27

## 2022-09-27 RX ORDER — PROPOFOL 10 MG/ML
INJECTION, EMULSION INTRAVENOUS PRN
Status: DISCONTINUED | OUTPATIENT
Start: 2022-09-27 | End: 2022-09-27

## 2022-09-27 RX ADMIN — PROPOFOL 150 MCG/KG/MIN: 10 INJECTION, EMULSION INTRAVENOUS at 10:37

## 2022-09-27 RX ADMIN — PROPOFOL 100 MG: 10 INJECTION, EMULSION INTRAVENOUS at 10:37

## 2022-09-27 RX ADMIN — LIDOCAINE HYDROCHLORIDE 45 MG: 20 INJECTION, SOLUTION INFILTRATION; PERINEURAL at 10:37

## 2022-09-27 RX ADMIN — SODIUM CHLORIDE, POTASSIUM CHLORIDE, SODIUM LACTATE AND CALCIUM CHLORIDE: 600; 310; 30; 20 INJECTION, SOLUTION INTRAVENOUS at 09:01

## 2022-09-27 ASSESSMENT — ACTIVITIES OF DAILY LIVING (ADL)
ADLS_ACUITY_SCORE: 35
ADLS_ACUITY_SCORE: 35

## 2022-09-27 NOTE — ANESTHESIA PREPROCEDURE EVALUATION
Anesthesia Pre-Procedure Evaluation    Patient: Indra Mehta   MRN: [id number] : 1965        Procedure : Procedure(s):  COLONOSCOPY          Past Medical History:   Diagnosis Date     Anxiety      Back pain      Depressive disorder      Hyperlipidemia      Hypertension      Meniere's disease, unspecified      Pain medication agreement signed 2010     Sleep apnea, unspecified type       Past Surgical History:   Procedure Laterality Date     BUNIONECTOMY RT/LT  08    Both feet     COLONOSCOPY N/A 2016    Procedure: COMBINED COLONOSCOPY, SINGLE OR MULTIPLE BIOPSY/POLYPECTOMY BY BIOPSY;  Surgeon: Indra Jernigan MD;  [facility]     DISCECTOMY, FUSION CERVICAL ANTERIOR ONE LEVEL, COMBINED N/A 2017    Procedure: COMBINED DISCECTOMY, FUSION CERVICAL ANTERIOR ONE LEVEL;  Cervical 6-7, Anterior cervical discectomy fusion;  Surgeon: Mike Mckenna MD;  [facility]     DISCECTOMY, FUSION CERVICAL ANTERIOR ONE LEVEL, COMBINED N/A 2018    Procedure: cervical 5-6 anterior cervical discectomy fusion;  Surgeon: Mike Mckenna MD;  [facility]     HERNIORRHAPHY UMBILICAL N/A 2017    Procedure: HERNIORRHAPHY UMBILICAL;  open umbilical hernia repair;  Surgeon: Boni Story MD;  [facility]     INJECT BLOCK MEDIAL BRANCH CERVICAL/THORACIC/LUMBAR Bilateral 2021    Procedure: Left L3, Left L4, Left L5, Right L3, Right L4 and Right L5 medial branch nerve blocks using fluoroscopic guidance and contrast control;  Surgeon: Darryn Mcdaniel MD;  [facility]     INJECT BLOCK MEDIAL BRANCH CERVICAL/THORACIC/LUMBAR N/A 2021    Procedure: Left L3, Left L4, Left L5, Right L3, Right L4 and Right L5 medial branch nerve blocks using fluoroscopic guidance and contrast control;  Surgeon: Darryn Mcdaniel MD;  [facility]     INJECT EPIDURAL CERVICAL N/A 2019    Procedure: INJECTION, SPINE, CERVICAL 6-7 EPIDURAL;  Surgeon: Darryn Mcdaniel MD;  [facility]     INJECT EPIDURAL LUMBAR N/A 11/9/2017    Procedure: INJECT EPIDURAL LUMBAR;  lumbar 4-5 epidural steroid injection ;  Surgeon: Darryn Mcdaniel MD;  [facility]     INJECT EPIDURAL LUMBAR N/A 12/28/2017    Procedure: INJECT EPIDURAL LUMBAR;  lumbar epidural injection;  Surgeon: Darryn Mcdaniel MD;  [facility]     INJECT EPIDURAL LUMBAR Bilateral 5/13/2021    Procedure: Bilateral Lumbar 3-4 Epidural Steroid Injection;  Surgeon: Darryn Mcdaniel MD;  [facility]     INJECT EPIDURAL TRANSFORAMINAL Bilateral 8/23/2018    Procedure: INJECT EPIDURAL TRANSFORAMINAL;  transforaminal bilateral lumbar 3-4 steroid injection;  Surgeon: Darryn Mcdaniel MD;  [facility]     INJECT EPIDURAL TRANSFORAMINAL Bilateral 11/8/2018    Procedure: INJECT EPIDURAL TRANSFORAMINAL lumbar 3-4;  Surgeon: Darryn Mcdaniel MD;  [facility]     INJECT EPIDURAL TRANSFORAMINAL Bilateral 6/14/2019    Procedure: INJECTION, EPIDURAL, TRANSFORAMINAL LUMBAR 3-4 BILATERAL;  Surgeon: Darryn Mcdaniel MD;  [facility]     INJECT EPIDURAL TRANSFORAMINAL Bilateral 5/22/2020    Procedure: lumbar 3-4 bilateral transforaminal epidural steroid injection;  Surgeon: Darryn Mcdaniel MD;  [facility]     INJECT EPIDURAL TRANSFORAMINAL Bilateral 9/24/2020    Procedure: INJECTION, EPIDURAL, TRANSFORAMINAL  LUMBAR 3-4 APPROACH;  Surgeon: Darryn Mcdaniel MD;  [facility]     PE TUBES  2006    left ear     RADIO FREQUENCY ABLATION PULSED CERVICAL Bilateral 10/1/2021    Procedure: RADIOFREQUENCY ABLATION lumbar 3, 4, 5 bilateral;  Surgeon: Darryn Mcdaniel MD;  [facility]     ZZHC COLONOSCOPY W/WO BRUSH/WASH  12/27/2005     ZZHC CREATE EARDRUM OPENING,GEN ANESTH  09/27/2007    Pe tube, Left endolymphatic sac enhancement.     ZZHC MASTOIDECTOMY,COMPLETE  09/27/2007      Allergies   Allergen Reactions     No Known Drug Allergies       Social History     Tobacco Use     Smoking status: Never Smoker     Smokeless tobacco: Never Used    Substance Use Topics     Alcohol use: Yes     Alcohol/week: 0.0 standard drinks     Comment: rarely      Wt Readings from Last 1 Encounters:   08/25/22 99.3 kg (219 lb)        Anesthesia Evaluation   Pt has had prior anesthetic. Type: General and MAC.    No history of anesthetic complications       ROS/MED HX  ENT/Pulmonary:     (+) sleep apnea, doesn't use CPAP,     Neurologic:  - neg neurologic ROS     Cardiovascular:     (+) hypertension-----Previous cardiac testing   Echo: Date: Results:    Stress Test: Date: Results:    ECG Reviewed: Date: 09/21/2020 Results:  NSR    Cath: Date: Results:      METS/Exercise Tolerance:     Hematologic:       Musculoskeletal: Comment: - Cervical radiculopathy       GI/Hepatic:     (+) GERD, Asymptomatic on medication, bowel prep,     Renal/Genitourinary:       Endo:       Psychiatric/Substance Use:  - neg psychiatric ROS     Infectious Disease:       Malignancy:       Other:      (+) , H/O Chronic Pain,        Physical Exam    Airway        Mallampati: II   TM distance: > 3 FB   Neck ROM: full   Mouth opening: > 3 cm    Respiratory Devices and Support         Dental  no notable dental history         Cardiovascular   cardiovascular exam normal          Pulmonary   pulmonary exam normal                OUTSIDE LABS:  CBC:   Lab Results   Component Value Date    WBC 5.7 08/11/2022    WBC 5.7 09/21/2020    HGB 15.5 08/11/2022    HGB 14.9 09/29/2021    HCT 43.6 08/11/2022    HCT 46.3 09/21/2020     08/11/2022     09/21/2020     BMP:   Lab Results   Component Value Date     08/11/2022     09/29/2021    POTASSIUM 3.5 08/11/2022    POTASSIUM 3.9 09/29/2021    CHLORIDE 104 08/11/2022    CHLORIDE 106 09/29/2021    CO2 30 08/11/2022    CO2 30 09/29/2021    BUN 14 08/11/2022    BUN 9 09/29/2021    CR 1.08 08/11/2022    CR 1.18 09/29/2021    GLC 84 08/11/2022    GLC 95 09/29/2021     COAGS: No results found for: PTT, INR, FIBR  POC: No results found for: BGM, HCG,  HCGS  HEPATIC:   Lab Results   Component Value Date    ALBUMIN 4.4 12/01/2020    PROTTOTAL 8.1 12/01/2020    ALT 89 (H) 12/01/2020    AST 25 12/01/2020    ALKPHOS 66 12/01/2020    BILITOTAL 0.7 12/01/2020     OTHER:   Lab Results   Component Value Date    A1C 5.2 12/01/2020    KRISHAN 9.5 08/11/2022    MAG 2.2 03/19/2007    TSH 2.00 03/24/2016    CRP <2.9 08/11/2022    SED 4 08/11/2022       Anesthesia Plan    ASA Status:  2   NPO Status:  NPO Appropriate    Anesthesia Type: MAC.     - Reason for MAC: immobility needed   Induction: Propofol, Intravenous.   Maintenance: TIVA.        Consents    Anesthesia Plan(s) and associated risks, benefits, and realistic alternatives discussed. Questions answered and patient/representative(s) expressed understanding.     - Discussed: Risks, Benefits and Alternatives for BOTH SEDATION and the PROCEDURE were discussed     - Discussed with:  Patient      - Extended Intubation/Ventilatory Support Discussed: No.      - Patient is DNR/DNI Status: No    Use of blood products discussed: No .     Postoperative Care            Comments:    Other Comments: The risks and benefits of anesthesia, and the alternatives where applicable, have been discussed with the patient, and they wish to proceed.               YUMIKO Jeter CRNA

## 2022-09-27 NOTE — DISCHARGE INSTRUCTIONS
[facility]    Home Care Following Endoscopy          Activity:  You have just undergone an endoscopic procedure usually performed with conscious sedation.  Do not work or operate machinery (including a car) for at least 12 hours.    I encourage you to walk and attempt to pass this air as soon as possible.    Diet:  Return to the diet you were on before your procedure but eat lightly for the first 12-24 hours.  Drink plenty of water.  Resume any regular medications unless otherwise advised by your physician.  Please begin any new medication prescribed as a result of your procedure as directed by your physician.   If you had any biopsy or polyp removed please refrain from aspirin or aspirin products for 2 days.  If on Coumadin please restart as instructed by your physician.   Pain:  You may take Tylenol as needed for pain.  Expected Recovery:  You can expect some mild abdominal fullness and/or discomfort due to the air used to inflate your intestinal tract.     Call Your Physician if You Have:    After Colonoscopy:  Worsening persisting abdominal pain which is worse with activity.  Fevers (>101 degrees F), chills or shakes.  Passage of continued blood with bowel movements.     Any questions or concerns about your recovery, please call [phone] or after hours [phone] Nurse Advice Line.    Follow-up Care:  You did have polyps/biopsy tissue sample(s) removed.  The polyps/biopsy tissue sample(s) will be sent to pathology.  You should receive letter in your My Chart from Dr Ferris with your results within 1-2 weeks. If you do not participate in My Chart a physical letter will come in the mail in 2-3 weeks.  Please call if you have not received a notification of your results.               [phone].

## 2022-09-27 NOTE — ANESTHESIA POSTPROCEDURE EVALUATION
Patient: Indra Mehta    Procedure: Procedure(s):  COLONOSCOPY, FLEXIBLE, WITH LESION REMOVAL USING SNARE       Anesthesia Type:  MAC    Note:  Disposition: Outpatient   Postop Pain Control: Uneventful            Sign Out: Well controlled pain   PONV: No   Neuro/Psych: Uneventful            Sign Out: Acceptable/Baseline neuro status   Airway/Respiratory: Uneventful            Sign Out: Acceptable/Baseline resp. status   CV/Hemodynamics: Uneventful            Sign Out: Acceptable CV status   Other NRE: NONE   DID A NON-ROUTINE EVENT OCCUR? No    Event details/Postop Comments:  Pt was happy with anesthesia care.  No complications.  I will follow up with the pt if needed.           Last vitals:  Vitals Value Taken Time   /82 09/27/22 1120   Temp     Pulse 66 09/27/22 1120   Resp     SpO2 100 % 09/27/22 1123   Vitals shown include unvalidated device data.    Electronically Signed By: YUMIKO Jeter CRNA  September 27, 2022  12:32 PM

## 2022-09-27 NOTE — LETTER
October 3, 2022      Ketan Mehta  [id number] [address] [place] [id number]        Dear ,    We are writing to inform you of your test results.    This type of polyp is benign.  A repeat exam in 5 yrs is suggested with the family history.    Resulted Orders   Surgical Pathology Exam   Result Value Ref Range    Case Report       Surgical Pathology Report                         Case: [id number]                                  Authorizing Provider:  Pablo Ferris MD        Collected:           09/27/2022 10:54 AM          Ordering Location:     [facility]          Received:            09/27/2022 11:51 AM                                 [facility]                                                          Pathologist:           Sonya Dhaliwal MD                                                           Specimen:    Rectum                                                                                     Final Diagnosis       Rectum, polyp, biopsy/polypectomy:  -Hyperplastic polyp.      Clinical Information       Screening colonoscopy.        Gross Description       A(1). Rectum, :  The specimen is received in formalin, labeled with the patient's name, medical record number and other identifying information and designated  rectum . It consists of 2 tan soft tissue fragments ranging from 0.1-0.2 cm in greatest dimension. Entirely submitted in one cassette.   ([facility])        Microscopic Description       Microscopic examination is performed.        Performing Labs       The technical component of this testing was completed at [facility]      Case Images         If you have any questions or concerns, please call the clinic at the number listed above.       Sincerely,      Pablo Ferris MD

## 2022-09-27 NOTE — ANESTHESIA CARE TRANSFER NOTE
Patient: Indra Mehta    Procedure: Procedure(s):  COLONOSCOPY, FLEXIBLE, WITH LESION REMOVAL USING SNARE       Diagnosis: Screen for colon cancer [Z12.11]  Diagnosis Additional Information: No value filed.    Anesthesia Type:   MAC     Note:    Oropharynx: oropharynx clear of all foreign objects and spontaneously breathing  Level of Consciousness: awake  Oxygen Supplementation: room air    Independent Airway: airway patency satisfactory and stable  Dentition: dentition unchanged  Vital Signs Stable: post-procedure vital signs reviewed and stable  Report to RN Given: handoff report given  Patient transferred to: Phase II    Handoff Report: Identifed the Patient, Identified the Reponsible Provider, Reviewed the pertinent medical history, Discussed the surgical course, Reviewed Intra-OP anesthesia mangement and issues during anesthesia, Set expectations for post-procedure period and Allowed opportunity for questions and acknowledgement of understanding      Vitals:  Vitals Value Taken Time   /82 09/27/22 1120   Temp     Pulse 66 09/27/22 1120   Resp     SpO2 100 % 09/27/22 1123   Vitals shown include unvalidated device data.    Electronically Signed By: YUMIKO Jeter CRNA  September 27, 2022  12:31 PM

## 2022-09-28 ENCOUNTER — TELEPHONE (OUTPATIENT)
Dept: PALLIATIVE MEDICINE | Facility: CLINIC | Age: 57
End: 2022-09-28

## 2022-09-28 NOTE — TELEPHONE ENCOUNTER
Pt scheduled for injection   Date:10/13/22  Time:8am  Dr. Mcdaniel    Instructed pt to have an H&P and  day of surgery    Patient informed of COVID testing process.

## 2022-09-30 LAB
PATH REPORT.COMMENTS IMP SPEC: NORMAL
PATH REPORT.COMMENTS IMP SPEC: NORMAL
PATH REPORT.FINAL DX SPEC: NORMAL
PATH REPORT.GROSS SPEC: NORMAL
PATH REPORT.MICROSCOPIC SPEC OTHER STN: NORMAL
PATH REPORT.RELEVANT HX SPEC: NORMAL
PHOTO IMAGE: NORMAL

## 2022-10-05 ENCOUNTER — MYC MEDICAL ADVICE (OUTPATIENT)
Dept: FAMILY MEDICINE | Facility: CLINIC | Age: 57
End: 2022-10-05

## 2022-10-05 DIAGNOSIS — M79.18 MYOFASCIAL PAIN: ICD-10-CM

## 2022-10-05 DIAGNOSIS — M54.2 CERVICALGIA: ICD-10-CM

## 2022-10-05 DIAGNOSIS — F11.90 CHRONIC, CONTINUOUS USE OF OPIOIDS: ICD-10-CM

## 2022-10-05 DIAGNOSIS — M54.16 LUMBAR RADICULOPATHY: ICD-10-CM

## 2022-10-05 DIAGNOSIS — G89.4 CHRONIC PAIN SYNDROME: ICD-10-CM

## 2022-10-05 DIAGNOSIS — Z98.1 S/P CERVICAL SPINAL FUSION: ICD-10-CM

## 2022-10-05 RX ORDER — OXYCODONE HYDROCHLORIDE 5 MG/1
5-10 TABLET ORAL EVERY 6 HOURS PRN
Qty: 165 TABLET | Refills: 0 | Status: CANCELLED | OUTPATIENT
Start: 2022-10-05

## 2022-10-06 NOTE — TELEPHONE ENCOUNTER
Requested Prescriptions   Pending Prescriptions Disp Refills     oxyCODONE (ROXICODONE) 5 MG tablet 165 tablet 0     Sig: Take 1-2 tablets (5-10 mg) by mouth every 6 hours as needed for severe pain Max of 6 tablets/day. Fill 9/13/22. Start 9/15/22. 30 day Rx      Next 5 appointments (look out 90 days)    Oct 12, 2022  2:20 PM  (Arrive by 2:00 PM)  Pre-Operative Physical with Michael Monroy MD  [facility] ([facility] ) [address] [place] [id number] [phone]           Routing refill request to provider for review/approval because:  Drug not on the G, P or [facility] refill protocol or controlled substance

## 2022-10-09 ENCOUNTER — HEALTH MAINTENANCE LETTER (OUTPATIENT)
Age: 57
End: 2022-10-09

## 2022-10-11 RX ORDER — OXYCODONE HYDROCHLORIDE 5 MG/1
5-10 TABLET ORAL EVERY 6 HOURS PRN
Qty: 165 TABLET | Refills: 0 | Status: CANCELLED | OUTPATIENT
Start: 2022-10-11

## 2022-10-11 NOTE — TELEPHONE ENCOUNTER
Unfortunately this was sent to covering pool, but it was not addressed.  It was to be filled last month on 9/13/22 to start taking on 9/15/22 to last 1 month.  He should be fine until PCP is back in clinic tomorrow, 10/12/22.

## 2022-10-12 ENCOUNTER — ANESTHESIA EVENT (OUTPATIENT)
Dept: SURGERY | Facility: CLINIC | Age: 57
End: 2022-10-12
Payer: COMMERCIAL

## 2022-10-12 ENCOUNTER — OFFICE VISIT (OUTPATIENT)
Dept: FAMILY MEDICINE | Facility: CLINIC | Age: 57
End: 2022-10-12
Payer: COMMERCIAL

## 2022-10-12 VITALS
WEIGHT: 218.6 LBS | HEIGHT: 73 IN | RESPIRATION RATE: 16 BRPM | OXYGEN SATURATION: 98 % | DIASTOLIC BLOOD PRESSURE: 70 MMHG | SYSTOLIC BLOOD PRESSURE: 128 MMHG | HEART RATE: 105 BPM | TEMPERATURE: 97.6 F | BODY MASS INDEX: 28.97 KG/M2

## 2022-10-12 DIAGNOSIS — Z98.1 S/P CERVICAL SPINAL FUSION: ICD-10-CM

## 2022-10-12 DIAGNOSIS — M53.3 SI (SACROILIAC) JOINT DYSFUNCTION: ICD-10-CM

## 2022-10-12 DIAGNOSIS — M54.50 CHRONIC BILATERAL LOW BACK PAIN WITHOUT SCIATICA: ICD-10-CM

## 2022-10-12 DIAGNOSIS — I10 BENIGN ESSENTIAL HYPERTENSION: ICD-10-CM

## 2022-10-12 DIAGNOSIS — G47.30 SLEEP APNEA, UNSPECIFIED TYPE: ICD-10-CM

## 2022-10-12 DIAGNOSIS — Z01.818 PREOP GENERAL PHYSICAL EXAM: Primary | ICD-10-CM

## 2022-10-12 DIAGNOSIS — G89.29 CHRONIC BILATERAL LOW BACK PAIN WITHOUT SCIATICA: ICD-10-CM

## 2022-10-12 PROCEDURE — 99213 OFFICE O/P EST LOW 20 MIN: CPT | Performed by: STUDENT IN AN ORGANIZED HEALTH CARE EDUCATION/TRAINING PROGRAM

## 2022-10-12 ASSESSMENT — LIFESTYLE VARIABLES: TOBACCO_USE: 0

## 2022-10-12 ASSESSMENT — PATIENT HEALTH QUESTIONNAIRE - PHQ9
SUM OF ALL RESPONSES TO PHQ QUESTIONS 1-9: 8
SUM OF ALL RESPONSES TO PHQ QUESTIONS 1-9: 8
10. IF YOU CHECKED OFF ANY PROBLEMS, HOW DIFFICULT HAVE THESE PROBLEMS MADE IT FOR YOU TO DO YOUR WORK, TAKE CARE OF THINGS AT HOME, OR GET ALONG WITH OTHER PEOPLE: NOT DIFFICULT AT ALL

## 2022-10-12 ASSESSMENT — PAIN SCALES - GENERAL: PAINLEVEL: EXTREME PAIN (8)

## 2022-10-12 NOTE — H&P (VIEW-ONLY)
[address] [address]  Phone: [phone]  Fax: [phone]  Primary Provider: Tha Grullon  Pre-op Performing Provider: DIEGO DANIELS      PREOPERATIVE EVALUATION:  Today's date: 10/12/2022    Indra Mehta is a 57 year old male who presents for a preoperative evaluation.    Surgical Information:  Surgery/Procedure:   INJECT JOINT SACROILIAC Bilateral     Surgery Location: [facility]  Surgeon: Darryn Mcdaniel MD  Surgery Date: 10/13/2022  Time of Surgery: 08:00 am  Where patient plans to recover: At home with family  Fax number for surgical facility: Note does not need to be faxed, will be available electronically in Epic.    Type of Anesthesia Anticipated: MAC    Assessment & Plan     The proposed surgical procedure is considered LOW risk.    Preop general physical exam  Cardiovascular is for procedure    Chronic bilateral low back pain without sciatica      SI (sacroiliac) joint dysfunction      S/P cervical spinal fusion      Sleep apnea, unspecified type  Encouraged continued CPAP use.    Benign essential hypertension  Stable with recently normal BMP.           Risks and Recommendations:  The patient has the following additional risks and recommendations for perioperative complications:  Obstructive Sleep Apnea:       Medication Instructions:  Patient is to take all scheduled medications on the day of surgery EXCEPT for modifications listed below:  lisinopril-hct day of procedure      Subjective     HPI related to upcoming procedure: Previous injections and ablations. Now pain in SI and trying lower injection.      Preop Questions 10/6/2022   1. Have you ever had a heart attack or stroke? No   2. Have you ever had surgery on your heart or blood vessels, such as a stent placement, a coronary artery bypass, or surgery on an artery in your head, neck, heart, or legs? No   3. Do you have chest pain with activity? No    4. Do you have a history of  heart failure? No   5. Do you currently have a cold, bronchitis or symptoms of other infection? No   6. Do you have a cough, shortness of breath, or wheezing? No   7. Do you or anyone in your family have previous history of blood clots? No   8. Do you or does anyone in your family have a serious bleeding problem such as prolonged bleeding following surgeries or cuts? No   9. Have you ever had problems with anemia or been told to take iron pills? No   10. Have you had any abnormal blood loss such as black, tarry or bloody stools? No   11. Have you ever had a blood transfusion? No   12. Are you willing to have a blood transfusion if it is medically needed before, during, or after your surgery? Yes   13. Have you or any of your relatives ever had problems with anesthesia? No   14. Do you have sleep apnea, excessive snoring or daytime drowsiness? No   14a. Do you have a CPAP machine? -   15. Do you have any artifical heart valves or other implanted medical devices like a pacemaker, defibrillator, or continuous glucose monitor? No   16. Do you have artificial joints? No   17. Are you allergic to latex? No       Health Care Directive:  Patient does not have a Health Care Directive or Living Will: Discussed advance care planning with patient; information given to patient to review.    Preoperative Review of :    reviewed - controlled substances reflected in medication list.      Patient does note some trouble with erectile dysfunction that has improved since discontinuing Cymbalta.  Long history of chronic back and neck pain with 3 cervical spine procedures in the past.  Struggles with neuropathy.    Review of Systems  Constitutional, neuro, ENT, endocrine, pulmonary, cardiac, gastrointestinal, genitourinary, musculoskeletal, integument and psychiatric systems are negative, except as otherwise noted.    Patient Active Problem List    Diagnosis Date Noted     Cervical myelopathy (H)  06/13/2022     Priority: Medium     Major depression in complete remission (H) 06/13/2022     Priority: Medium     Chronic bilateral low back pain without sciatica 05/08/2022     Priority: Medium     Primary osteoarthritis of both knees 05/04/2022     Priority: Medium     Impingement syndrome of left shoulder 06/23/2021     Priority: Medium     Sleep apnea, unspecified type 04/06/2021     Priority: Medium     Osteoarthritis of spine with radiculopathy, lumbar region 04/06/2021     Priority: Medium     Status post lumbar spinal fusion 01/14/2020     Priority: Medium     Cervical radiculopathy 10/28/2019     Priority: Medium     Formatting of this note might be different from the original.  Added automatically from request for surgery [id number]       Benign essential hypertension 06/30/2017     Priority: Medium     Bilateral occipital neuralgia 10/28/2016     Priority: Medium     Major depression in complete remission (H) 08/20/2010     Priority: Medium     Chronic pain syndrome 08/20/2010     Priority: Medium     Patient is followed by Tha Grullon MD for ongoing prescription of pain medication.  All refills should be approved by this provider only at face-to-face appointments - not by phone request.    Medication(s): Tramadol.   Maximum quantity per month: 60  Clinic visit frequency required: Q 1 months     Controlled substance agreement:  Encounter-Level CSA - 10/13/16:               Controlled Substance Agreement - Scan on 10/23/2016  5:07 PM : CONTROLLED SUBSTANCE AGREEMENT 10/13/16 (below)            Pain Clinic evaluation in the past: Yes       Date/Location:   [facility]    DIRE Total Score(s):  No flowsheet data found.    Last [facility] website verification:  none   [email or web address]               Pain medication agreement signed 08/20/2010     Priority: Medium     Meniere's disease 03/22/2007     Priority: Medium     Problem list name updated by automated process. Provider to review         Past Medical History:   Diagnosis Date     Anxiety      Back pain      Depressive disorder      Hyperlipidemia      Hypertension      Meniere's disease, unspecified      Pain medication agreement signed 8/20/2010     Sleep apnea, unspecified type      Past Surgical History:   Procedure Laterality Date     BUNIONECTOMY RT/LT  09/05/08    Both feet     COLONOSCOPY N/A 12/28/2016    Procedure: COMBINED COLONOSCOPY, SINGLE OR MULTIPLE BIOPSY/POLYPECTOMY BY BIOPSY;  Surgeon: Indra Jernigan MD;  [facility]     COLONOSCOPY N/A 9/27/2022    Procedure: COLONOSCOPY, FLEXIBLE, WITH LESION REMOVAL USING SNARE;  Surgeon: Pablo Ferris MD;  [facility]     DISCECTOMY, FUSION CERVICAL ANTERIOR ONE LEVEL, COMBINED N/A 7/5/2017    Procedure: COMBINED DISCECTOMY, FUSION CERVICAL ANTERIOR ONE LEVEL;  Cervical 6-7, Anterior cervical discectomy fusion;  Surgeon: Mike Mckenna MD;  [facility]     DISCECTOMY, FUSION CERVICAL ANTERIOR ONE LEVEL, COMBINED N/A 12/19/2018    Procedure: cervical 5-6 anterior cervical discectomy fusion;  Surgeon: Mike Mckenna MD;  [facility]     HERNIORRHAPHY UMBILICAL N/A 8/11/2017    Procedure: HERNIORRHAPHY UMBILICAL;  open umbilical hernia repair;  Surgeon: Boni Story MD;  [facility]     INJECT BLOCK MEDIAL BRANCH CERVICAL/THORACIC/LUMBAR Bilateral 8/12/2021    Procedure: Left L3, Left L4, Left L5, Right L3, Right L4 and Right L5 medial branch nerve blocks using fluoroscopic guidance and contrast control;  Surgeon: Darryn Mcdaniel MD;  [facility]     INJECT BLOCK MEDIAL BRANCH CERVICAL/THORACIC/LUMBAR N/A 9/9/2021    Procedure: Left L3, Left L4, Left L5, Right L3, Right L4 and Right L5 medial branch nerve blocks using fluoroscopic guidance and contrast control;  Surgeon: Darryn Mcdaniel MD;  [facility]     INJECT EPIDURAL CERVICAL N/A 8/22/2019    Procedure: INJECTION, SPINE, CERVICAL 6-7 EPIDURAL;  Surgeon: Darryn Mcdaniel MD;  [facility]     INJECT EPIDURAL LUMBAR N/A 11/9/2017    Procedure: INJECT EPIDURAL LUMBAR;  lumbar 4-5 epidural steroid injection ;  Surgeon: Darryn Mcdaniel MD;  [facility]     INJECT EPIDURAL LUMBAR N/A 12/28/2017    Procedure: INJECT EPIDURAL LUMBAR;  lumbar epidural injection;  Surgeon: Darryn Mcdaniel MD;  [facility]     INJECT EPIDURAL LUMBAR Bilateral 5/13/2021    Procedure: Bilateral Lumbar 3-4 Epidural Steroid Injection;  Surgeon: Darryn Mcdaniel MD;  [facility]     INJECT EPIDURAL TRANSFORAMINAL Bilateral 8/23/2018    Procedure: INJECT EPIDURAL TRANSFORAMINAL;  transforaminal bilateral lumbar 3-4 steroid injection;  Surgeon: Darryn Mcdaniel MD;  [facility]     INJECT EPIDURAL TRANSFORAMINAL Bilateral 11/8/2018    Procedure: INJECT EPIDURAL TRANSFORAMINAL lumbar 3-4;  Surgeon: Darryn Mcdaniel MD;  [facility]     INJECT EPIDURAL TRANSFORAMINAL Bilateral 6/14/2019    Procedure: INJECTION, EPIDURAL, TRANSFORAMINAL LUMBAR 3-4 BILATERAL;  Surgeon: Darryn Mcdaniel MD;  [facility]     INJECT EPIDURAL TRANSFORAMINAL Bilateral 5/22/2020    Procedure: lumbar 3-4 bilateral transforaminal epidural steroid injection;  Surgeon: Darryn Mcdaniel MD;  [facility]     INJECT EPIDURAL TRANSFORAMINAL Bilateral 9/24/2020    Procedure: INJECTION, EPIDURAL, TRANSFORAMINAL  LUMBAR 3-4 APPROACH;  Surgeon: Darryn Mcdaniel MD;  [facility]     PE TUBES  2006    left ear     RADIO FREQUENCY ABLATION PULSED CERVICAL Bilateral 10/1/2021    Procedure: RADIOFREQUENCY ABLATION lumbar 3, 4, 5 bilateral;  Surgeon: Darryn Mcdaniel MD;  [facility]     ZZHC COLONOSCOPY W/WO BRUSH/WASH  12/27/2005     ZZHC CREATE EARDRUM OPENING,GEN ANESTH  09/27/2007    Pe tube, Left endolymphatic sac enhancement.     ZZHC MASTOIDECTOMY,COMPLETE  09/27/2007     Current Outpatient Medications   Medication Sig Dispense Refill     Acetaminophen (TYLENOL PO) Take 1,000 mg by mouth 3 times daily       amLODIPine (NORVASC) 5 MG  tablet Take 1 tablet (5 mg) by mouth daily 90 tablet 3     atorvastatin (LIPITOR) 10 MG tablet Take 10 mg by mouth daily       cyclobenzaprine (FLEXERIL) 5 MG tablet 1-2 tablets three times daily as needed 90 tablet 2     ibuprofen (ADVIL/MOTRIN) 800 MG tablet Take 800 mg by mouth 3 times daily       losartan-hydrochlorothiazide (HYZAAR) 50-12.5 MG tablet TAKE 2 TABLETS BY MOUTH EVERY  tablet 1     nortriptyline (PAMELOR) 50 MG capsule Take 1 capsule (50 mg) by mouth At Bedtime 90 capsule 1     order for DME Equipment being ordered: TENS Pads as directed 1 Device 0     oxyCODONE (ROXICODONE) 5 MG tablet Take 1-2 tablets (5-10 mg) by mouth every 6 hours as needed for severe pain Max of 6 tablets/day. Fill 22. Start 9/15/22. 30 day Rx 165 tablet 0     pregabalin (LYRICA) 150 MG capsule Take 1 capsule (150 mg) by mouth 3 times daily 90 capsule 3     Pregabalin (LYRICA) 200 MG capsule Take 1 capsule at bedtime. This is in addition to the 150mg in morning and afternoon. 30 capsule 2     naloxone (NARCAN) 4 MG/0.1ML nasal spray Spray 1 spray (4 mg) into one nostril alternating nostrils once as needed for opioid reversal every 2-3 minutes until assistance arrives (Patient not taking: No sig reported) 0.2 mL 1       Allergies   Allergen Reactions     No Known Drug Allergies         Social History     Tobacco Use     Smoking status: Never     Smokeless tobacco: Never   Substance Use Topics     Alcohol use: Yes     Alcohol/week: 0.0 standard drinks     Comment: rarely     Family History   Problem Relation Age of Onset     Diabetes Mother      Cancer Mother         colon cancer -  at 52, diag at 42     Hypertension Father      Heart Disease Father          of AAA     No Known Problems Brother      No Known Problems Brother      Depression Sister      Suicide Other      Unknown/Adopted Maternal Grandmother      Unknown/Adopted Maternal Grandfather      Unknown/Adopted Paternal Grandmother       "Unknown/Adopted Paternal Grandfather      No Known Problems Daughter      No Known Problems Daughter      History   Drug Use No         Objective     /70 (BP Location: Right arm, Patient Position: Sitting, Cuff Size: Adult Large)   Pulse 105   Temp 97.6  F (36.4  C) (Temporal)   Resp 16   Ht 1.854 m (6' 1\")   Wt 99.2 kg (218 lb 9.6 oz)   SpO2 98%   BMI 28.84 kg/m      Physical Exam    GENERAL APPEARANCE: healthy, alert and no distress     EYES: EOMI,  PERRL     HENT: ear canals and TM's normal and nose and mouth without ulcers or lesions     NECK: no adenopathy, no asymmetry, masses, or scars and thyroid normal to palpation     RESP: lungs clear to auscultation - no rales, rhonchi or wheezes     CV: regular rates and rhythm, normal S1 S2, no S3 or S4 and no murmur, click or rub     ABDOMEN:  soft, nontender, no HSM or masses and bowel sounds normal     MS: extremities normal- no gross deformities noted, no evidence of inflammation in joints, FROM in all extremities.     SKIN: no suspicious lesions or rashes     NEURO: Normal strength and tone, sensory exam grossly normal, mentation intact and speech normal     PSYCH: mentation appears normal. and affect normal/bright     LYMPHATICS: No cervical adenopathy    Recent Labs   Lab Test 08/11/22  1426 09/29/21  1413 09/29/21  1338 12/01/20  0824   HGB 15.5  --  14.9  --      --   --   --     141  --   --    POTASSIUM 3.5 3.9  --   --    CR 1.08 1.18  --  1.12   A1C  --   --   --  5.2        Diagnostics:  No labs were ordered during this visit.   No EKG required, no history of coronary heart disease, significant arrhythmia, peripheral arterial disease or other structural heart disease.    Revised Cardiac Risk Index (RCRI):  The patient has the following serious cardiovascular risks for perioperative complications:   - No serious cardiac risks = 0 points     RCRI Interpretation: 0 points: Class I (very low risk - 0.4% complication rate)       "     Signed Electronically by: Michael Monroy MD  Copy of this evaluation report is provided to requesting physician.

## 2022-10-12 NOTE — ANESTHESIA PREPROCEDURE EVALUATION
Anesthesia Pre-Procedure Evaluation    Patient: Indra Mehta   MRN: [id number] : 1965        Procedure : Procedure(s):  INJECT JOINT SACROILIAC          Past Medical History:   Diagnosis Date     Anxiety      Back pain      Depressive disorder      Hyperlipidemia      Hypertension      Meniere's disease, unspecified      Pain medication agreement signed 2010     Sleep apnea, unspecified type       Past Surgical History:   Procedure Laterality Date     BUNIONECTOMY RT/LT  08    Both feet     COLONOSCOPY N/A 2016    Procedure: COMBINED COLONOSCOPY, SINGLE OR MULTIPLE BIOPSY/POLYPECTOMY BY BIOPSY;  Surgeon: Indra Jernigan MD;  [facility]     COLONOSCOPY N/A 2022    Procedure: COLONOSCOPY, FLEXIBLE, WITH LESION REMOVAL USING SNARE;  Surgeon: Pablo Ferris MD;  [facility]     DISCECTOMY, FUSION CERVICAL ANTERIOR ONE LEVEL, COMBINED N/A 2017    Procedure: COMBINED DISCECTOMY, FUSION CERVICAL ANTERIOR ONE LEVEL;  Cervical 6-7, Anterior cervical discectomy fusion;  Surgeon: Mike Mckenna MD;  [facility]     DISCECTOMY, FUSION CERVICAL ANTERIOR ONE LEVEL, COMBINED N/A 2018    Procedure: cervical 5-6 anterior cervical discectomy fusion;  Surgeon: Mike Mckenna MD;  [facility]     HERNIORRHAPHY UMBILICAL N/A 2017    Procedure: HERNIORRHAPHY UMBILICAL;  open umbilical hernia repair;  Surgeon: Boni Story MD;  [facility]     INJECT BLOCK MEDIAL BRANCH CERVICAL/THORACIC/LUMBAR Bilateral 2021    Procedure: Left L3, Left L4, Left L5, Right L3, Right L4 and Right L5 medial branch nerve blocks using fluoroscopic guidance and contrast control;  Surgeon: Darryn Mcdaniel MD;  [facility]     INJECT BLOCK MEDIAL BRANCH CERVICAL/THORACIC/LUMBAR N/A 2021    Procedure: Left L3, Left L4, Left L5, Right L3, Right L4 and Right L5 medial branch nerve blocks using fluoroscopic guidance and contrast control;  Surgeon: Darryn Mcdaniel,  MD;  [facility]     INJECT EPIDURAL CERVICAL N/A 8/22/2019    Procedure: INJECTION, SPINE, CERVICAL 6-7 EPIDURAL;  Surgeon: Darryn Mcdaniel MD;  [facility]     INJECT EPIDURAL LUMBAR N/A 11/9/2017    Procedure: INJECT EPIDURAL LUMBAR;  lumbar 4-5 epidural steroid injection ;  Surgeon: Darryn Mcdaniel MD;  [facility]     INJECT EPIDURAL LUMBAR N/A 12/28/2017    Procedure: INJECT EPIDURAL LUMBAR;  lumbar epidural injection;  Surgeon: Darryn Mcdaniel MD;  [facility]     INJECT EPIDURAL LUMBAR Bilateral 5/13/2021    Procedure: Bilateral Lumbar 3-4 Epidural Steroid Injection;  Surgeon: Darryn Mcdaniel MD;  [facility]     INJECT EPIDURAL TRANSFORAMINAL Bilateral 8/23/2018    Procedure: INJECT EPIDURAL TRANSFORAMINAL;  transforaminal bilateral lumbar 3-4 steroid injection;  Surgeon: Darryn Mcdaniel MD;  [facility]     INJECT EPIDURAL TRANSFORAMINAL Bilateral 11/8/2018    Procedure: INJECT EPIDURAL TRANSFORAMINAL lumbar 3-4;  Surgeon: Darryn Mcdaniel MD;  [facility]     INJECT EPIDURAL TRANSFORAMINAL Bilateral 6/14/2019    Procedure: INJECTION, EPIDURAL, TRANSFORAMINAL LUMBAR 3-4 BILATERAL;  Surgeon: Darryn Mcdaniel MD;  [facility]     INJECT EPIDURAL TRANSFORAMINAL Bilateral 5/22/2020    Procedure: lumbar 3-4 bilateral transforaminal epidural steroid injection;  Surgeon: Darryn Mcdaniel MD;  [facility]     INJECT EPIDURAL TRANSFORAMINAL Bilateral 9/24/2020    Procedure: INJECTION, EPIDURAL, TRANSFORAMINAL  LUMBAR 3-4 APPROACH;  Surgeon: Darryn Mcdaniel MD;  [facility]     PE TUBES  2006    left ear     RADIO FREQUENCY ABLATION PULSED CERVICAL Bilateral 10/1/2021    Procedure: RADIOFREQUENCY ABLATION lumbar 3, 4, 5 bilateral;  Surgeon: Darryn Mcdaniel MD;  [facility]     ZZHC COLONOSCOPY W/WO BRUSH/WASH  12/27/2005     ZZHC CREATE EARDRUM OPENING,GEN ANESTH  09/27/2007    Pe tube, Left endolymphatic sac enhancement.     ZZ MASTOIDECTOMY,COMPLETE  09/27/2007       Allergies   Allergen Reactions     No Known Drug Allergies       Social History     Tobacco Use     Smoking status: Never     Smokeless tobacco: Never   Substance Use Topics     Alcohol use: Yes     Alcohol/week: 0.0 standard drinks     Comment: rarely      Wt Readings from Last 1 Encounters:   10/12/22 99.2 kg (218 lb 9.6 oz)        Anesthesia Evaluation   Pt has had prior anesthetic. Type: General and MAC.    No history of anesthetic complications       ROS/MED HX  ENT/Pulmonary:     (+) sleep apnea, moderate, uses CPAP,  (-) tobacco use   Neurologic: Comment: Occipital neuralgia    Meniere's disease      Cardiovascular:     (+) Dyslipidemia hypertension-----Previous cardiac testing   Echo: Date: Results:    Stress Test: Date: Results:    ECG Reviewed: Date: 9/21/20 Results:  SR  Cath: Date: Results:      METS/Exercise Tolerance:     Hematologic:  - neg hematologic  ROS     Musculoskeletal: Comment: CLBP  (+) arthritis,     GI/Hepatic:    (-) GERD   Renal/Genitourinary:  - neg Renal ROS     Endo:  - neg endo ROS     Psychiatric/Substance Use:     (+) psychiatric history anxiety and depression H/O chronic opiod use .     Infectious Disease:  - neg infectious disease ROS     Malignancy:  - neg malignancy ROS     Other:  - neg other ROS    (+) , H/O Chronic Pain,        Physical Exam    Airway  airway exam normal      Mallampati: II   TM distance: > 3 FB   Neck ROM: full   Mouth opening: > 3 cm    Respiratory Devices and Support         Dental  no notable dental history         Cardiovascular   cardiovascular exam normal       Rhythm and rate: regular and normal     Pulmonary   pulmonary exam normal        breath sounds clear to auscultation           OUTSIDE LABS:  CBC:   Lab Results   Component Value Date    WBC 5.7 08/11/2022    WBC 5.7 09/21/2020    HGB 15.5 08/11/2022    HGB 14.9 09/29/2021    HCT 43.6 08/11/2022    HCT 46.3 09/21/2020     08/11/2022     09/21/2020     BMP:   Lab Results    Component Value Date     08/11/2022     09/29/2021    POTASSIUM 3.5 08/11/2022    POTASSIUM 3.9 09/29/2021    CHLORIDE 104 08/11/2022    CHLORIDE 106 09/29/2021    CO2 30 08/11/2022    CO2 30 09/29/2021    BUN 14 08/11/2022    BUN 9 09/29/2021    CR 1.08 08/11/2022    CR 1.18 09/29/2021    GLC 84 08/11/2022    GLC 95 09/29/2021     COAGS: No results found for: PTT, INR, FIBR  POC: No results found for: BGM, HCG, HCGS  HEPATIC:   Lab Results   Component Value Date    ALBUMIN 4.4 12/01/2020    PROTTOTAL 8.1 12/01/2020    ALT 89 (H) 12/01/2020    AST 25 12/01/2020    ALKPHOS 66 12/01/2020    BILITOTAL 0.7 12/01/2020     OTHER:   Lab Results   Component Value Date    A1C 5.2 12/01/2020    KRISHAN 9.5 08/11/2022    MAG 2.2 03/19/2007    TSH 2.00 03/24/2016    CRP <2.9 08/11/2022    SED 4 08/11/2022       Anesthesia Plan    ASA Status:  2   NPO Status:  NPO Appropriate    Anesthesia Type: MAC.      Maintenance: TIVA.        Consents    Anesthesia Plan(s) and associated risks, benefits, and realistic alternatives discussed. Questions answered and patient/representative(s) expressed understanding.    - Discussed:     - Discussed with:  Patient    Use of blood products discussed: No .     Postoperative Care    Pain management: IV analgesics.        Comments:    Other Comments: The risks and benefits of anesthesia, and the alternatives where applicable, have been discussed with the patient, and they wish to proceed.       H&P reviewed: Unable to attach H&P to encounter due to EHR limitations. H&P Update: appropriate H&P reviewed, patient examined. No interval changes since H&P (within 30 days).         YUMIKO Babb CRNA

## 2022-10-12 NOTE — PROGRESS NOTES
[address] [address]  Phone: [phone]  Fax: [phone]  Primary Provider: Tha Grullon  Pre-op Performing Provider: DIEGO DANIELS      PREOPERATIVE EVALUATION:  Today's date: 10/12/2022    Indra Mehta is a 57 year old male who presents for a preoperative evaluation.    Surgical Information:  Surgery/Procedure:   INJECT JOINT SACROILIAC Bilateral     Surgery Location: [facility]  Surgeon: Darryn Mcdaniel MD  Surgery Date: 10/13/2022  Time of Surgery: 08:00 am  Where patient plans to recover: At home with family  Fax number for surgical facility: Note does not need to be faxed, will be available electronically in Epic.    Type of Anesthesia Anticipated: MAC    Assessment & Plan     The proposed surgical procedure is considered LOW risk.    Preop general physical exam  Cardiovascular is for procedure    Chronic bilateral low back pain without sciatica      SI (sacroiliac) joint dysfunction      S/P cervical spinal fusion      Sleep apnea, unspecified type  Encouraged continued CPAP use.    Benign essential hypertension  Stable with recently normal BMP.           Risks and Recommendations:  The patient has the following additional risks and recommendations for perioperative complications:  Obstructive Sleep Apnea:       Medication Instructions:  Patient is to take all scheduled medications on the day of surgery EXCEPT for modifications listed below:  lisinopril-hct day of procedure      Subjective     HPI related to upcoming procedure: Previous injections and ablations. Now pain in SI and trying lower injection.      Preop Questions 10/6/2022   1. Have you ever had a heart attack or stroke? No   2. Have you ever had surgery on your heart or blood vessels, such as a stent placement, a coronary artery bypass, or surgery on an artery in your head, neck, heart, or legs? No   3. Do you have chest pain with activity? No    4. Do you have a history of  heart failure? No   5. Do you currently have a cold, bronchitis or symptoms of other infection? No   6. Do you have a cough, shortness of breath, or wheezing? No   7. Do you or anyone in your family have previous history of blood clots? No   8. Do you or does anyone in your family have a serious bleeding problem such as prolonged bleeding following surgeries or cuts? No   9. Have you ever had problems with anemia or been told to take iron pills? No   10. Have you had any abnormal blood loss such as black, tarry or bloody stools? No   11. Have you ever had a blood transfusion? No   12. Are you willing to have a blood transfusion if it is medically needed before, during, or after your surgery? Yes   13. Have you or any of your relatives ever had problems with anesthesia? No   14. Do you have sleep apnea, excessive snoring or daytime drowsiness? No   14a. Do you have a CPAP machine? -   15. Do you have any artifical heart valves or other implanted medical devices like a pacemaker, defibrillator, or continuous glucose monitor? No   16. Do you have artificial joints? No   17. Are you allergic to latex? No       Health Care Directive:  Patient does not have a Health Care Directive or Living Will: Discussed advance care planning with patient; information given to patient to review.    Preoperative Review of :    reviewed - controlled substances reflected in medication list.      Patient does note some trouble with erectile dysfunction that has improved since discontinuing Cymbalta.  Long history of chronic back and neck pain with 3 cervical spine procedures in the past.  Struggles with neuropathy.    Review of Systems  Constitutional, neuro, ENT, endocrine, pulmonary, cardiac, gastrointestinal, genitourinary, musculoskeletal, integument and psychiatric systems are negative, except as otherwise noted.    Patient Active Problem List    Diagnosis Date Noted     Cervical myelopathy (H)  06/13/2022     Priority: Medium     Major depression in complete remission (H) 06/13/2022     Priority: Medium     Chronic bilateral low back pain without sciatica 05/08/2022     Priority: Medium     Primary osteoarthritis of both knees 05/04/2022     Priority: Medium     Impingement syndrome of left shoulder 06/23/2021     Priority: Medium     Sleep apnea, unspecified type 04/06/2021     Priority: Medium     Osteoarthritis of spine with radiculopathy, lumbar region 04/06/2021     Priority: Medium     Status post lumbar spinal fusion 01/14/2020     Priority: Medium     Cervical radiculopathy 10/28/2019     Priority: Medium     Formatting of this note might be different from the original.  Added automatically from request for surgery [id number]       Benign essential hypertension 06/30/2017     Priority: Medium     Bilateral occipital neuralgia 10/28/2016     Priority: Medium     Major depression in complete remission (H) 08/20/2010     Priority: Medium     Chronic pain syndrome 08/20/2010     Priority: Medium     Patient is followed by Tha Grullon MD for ongoing prescription of pain medication.  All refills should be approved by this provider only at face-to-face appointments - not by phone request.    Medication(s): Tramadol.   Maximum quantity per month: 60  Clinic visit frequency required: Q 1 months     Controlled substance agreement:  Encounter-Level CSA - 10/13/16:               Controlled Substance Agreement - Scan on 10/23/2016  5:07 PM : CONTROLLED SUBSTANCE AGREEMENT 10/13/16 (below)            Pain Clinic evaluation in the past: Yes       Date/Location:   [facility]    DIRE Total Score(s):  No flowsheet data found.    Last [facility] website verification:  none   [email or web address]               Pain medication agreement signed 08/20/2010     Priority: Medium     Meniere's disease 03/22/2007     Priority: Medium     Problem list name updated by automated process. Provider to review         Past Medical History:   Diagnosis Date     Anxiety      Back pain      Depressive disorder      Hyperlipidemia      Hypertension      Meniere's disease, unspecified      Pain medication agreement signed 8/20/2010     Sleep apnea, unspecified type      Past Surgical History:   Procedure Laterality Date     BUNIONECTOMY RT/LT  09/05/08    Both feet     COLONOSCOPY N/A 12/28/2016    Procedure: COMBINED COLONOSCOPY, SINGLE OR MULTIPLE BIOPSY/POLYPECTOMY BY BIOPSY;  Surgeon: Indra Jernigan MD;  [facility]     COLONOSCOPY N/A 9/27/2022    Procedure: COLONOSCOPY, FLEXIBLE, WITH LESION REMOVAL USING SNARE;  Surgeon: Pablo Ferris MD;  [facility]     DISCECTOMY, FUSION CERVICAL ANTERIOR ONE LEVEL, COMBINED N/A 7/5/2017    Procedure: COMBINED DISCECTOMY, FUSION CERVICAL ANTERIOR ONE LEVEL;  Cervical 6-7, Anterior cervical discectomy fusion;  Surgeon: Mike Mckenna MD;  [facility]     DISCECTOMY, FUSION CERVICAL ANTERIOR ONE LEVEL, COMBINED N/A 12/19/2018    Procedure: cervical 5-6 anterior cervical discectomy fusion;  Surgeon: Mike Mckenna MD;  [facility]     HERNIORRHAPHY UMBILICAL N/A 8/11/2017    Procedure: HERNIORRHAPHY UMBILICAL;  open umbilical hernia repair;  Surgeon: Boni Story MD;  [facility]     INJECT BLOCK MEDIAL BRANCH CERVICAL/THORACIC/LUMBAR Bilateral 8/12/2021    Procedure: Left L3, Left L4, Left L5, Right L3, Right L4 and Right L5 medial branch nerve blocks using fluoroscopic guidance and contrast control;  Surgeon: Darryn Mcdaniel MD;  [facility]     INJECT BLOCK MEDIAL BRANCH CERVICAL/THORACIC/LUMBAR N/A 9/9/2021    Procedure: Left L3, Left L4, Left L5, Right L3, Right L4 and Right L5 medial branch nerve blocks using fluoroscopic guidance and contrast control;  Surgeon: Darryn Mcdaniel MD;  [facility]     INJECT EPIDURAL CERVICAL N/A 8/22/2019    Procedure: INJECTION, SPINE, CERVICAL 6-7 EPIDURAL;  Surgeon: Darryn Mcdaniel MD;  [facility]     INJECT EPIDURAL LUMBAR N/A 11/9/2017    Procedure: INJECT EPIDURAL LUMBAR;  lumbar 4-5 epidural steroid injection ;  Surgeon: Darryn Mcdaniel MD;  [facility]     INJECT EPIDURAL LUMBAR N/A 12/28/2017    Procedure: INJECT EPIDURAL LUMBAR;  lumbar epidural injection;  Surgeon: Darryn Mcdaniel MD;  [facility]     INJECT EPIDURAL LUMBAR Bilateral 5/13/2021    Procedure: Bilateral Lumbar 3-4 Epidural Steroid Injection;  Surgeon: Darryn Mcdaniel MD;  [facility]     INJECT EPIDURAL TRANSFORAMINAL Bilateral 8/23/2018    Procedure: INJECT EPIDURAL TRANSFORAMINAL;  transforaminal bilateral lumbar 3-4 steroid injection;  Surgeon: Darryn Mcdaniel MD;  [facility]     INJECT EPIDURAL TRANSFORAMINAL Bilateral 11/8/2018    Procedure: INJECT EPIDURAL TRANSFORAMINAL lumbar 3-4;  Surgeon: Darryn Mcdaniel MD;  [facility]     INJECT EPIDURAL TRANSFORAMINAL Bilateral 6/14/2019    Procedure: INJECTION, EPIDURAL, TRANSFORAMINAL LUMBAR 3-4 BILATERAL;  Surgeon: Darryn Mcdaniel MD;  [facility]     INJECT EPIDURAL TRANSFORAMINAL Bilateral 5/22/2020    Procedure: lumbar 3-4 bilateral transforaminal epidural steroid injection;  Surgeon: Darryn Mcdaniel MD;  [facility]     INJECT EPIDURAL TRANSFORAMINAL Bilateral 9/24/2020    Procedure: INJECTION, EPIDURAL, TRANSFORAMINAL  LUMBAR 3-4 APPROACH;  Surgeon: Darryn Mcdaniel MD;  [facility]     PE TUBES  2006    left ear     RADIO FREQUENCY ABLATION PULSED CERVICAL Bilateral 10/1/2021    Procedure: RADIOFREQUENCY ABLATION lumbar 3, 4, 5 bilateral;  Surgeon: Darryn Mcdaniel MD;  [facility]     ZZHC COLONOSCOPY W/WO BRUSH/WASH  12/27/2005     ZZHC CREATE EARDRUM OPENING,GEN ANESTH  09/27/2007    Pe tube, Left endolymphatic sac enhancement.     ZZHC MASTOIDECTOMY,COMPLETE  09/27/2007     Current Outpatient Medications   Medication Sig Dispense Refill     Acetaminophen (TYLENOL PO) Take 1,000 mg by mouth 3 times daily       amLODIPine (NORVASC) 5 MG  tablet Take 1 tablet (5 mg) by mouth daily 90 tablet 3     atorvastatin (LIPITOR) 10 MG tablet Take 10 mg by mouth daily       cyclobenzaprine (FLEXERIL) 5 MG tablet 1-2 tablets three times daily as needed 90 tablet 2     ibuprofen (ADVIL/MOTRIN) 800 MG tablet Take 800 mg by mouth 3 times daily       losartan-hydrochlorothiazide (HYZAAR) 50-12.5 MG tablet TAKE 2 TABLETS BY MOUTH EVERY  tablet 1     nortriptyline (PAMELOR) 50 MG capsule Take 1 capsule (50 mg) by mouth At Bedtime 90 capsule 1     order for DME Equipment being ordered: TENS Pads as directed 1 Device 0     oxyCODONE (ROXICODONE) 5 MG tablet Take 1-2 tablets (5-10 mg) by mouth every 6 hours as needed for severe pain Max of 6 tablets/day. Fill 22. Start 9/15/22. 30 day Rx 165 tablet 0     pregabalin (LYRICA) 150 MG capsule Take 1 capsule (150 mg) by mouth 3 times daily 90 capsule 3     Pregabalin (LYRICA) 200 MG capsule Take 1 capsule at bedtime. This is in addition to the 150mg in morning and afternoon. 30 capsule 2     naloxone (NARCAN) 4 MG/0.1ML nasal spray Spray 1 spray (4 mg) into one nostril alternating nostrils once as needed for opioid reversal every 2-3 minutes until assistance arrives (Patient not taking: No sig reported) 0.2 mL 1       Allergies   Allergen Reactions     No Known Drug Allergies         Social History     Tobacco Use     Smoking status: Never     Smokeless tobacco: Never   Substance Use Topics     Alcohol use: Yes     Alcohol/week: 0.0 standard drinks     Comment: rarely     Family History   Problem Relation Age of Onset     Diabetes Mother      Cancer Mother         colon cancer -  at 52, diag at 42     Hypertension Father      Heart Disease Father          of AAA     No Known Problems Brother      No Known Problems Brother      Depression Sister      Suicide Other      Unknown/Adopted Maternal Grandmother      Unknown/Adopted Maternal Grandfather      Unknown/Adopted Paternal Grandmother       "Unknown/Adopted Paternal Grandfather      No Known Problems Daughter      No Known Problems Daughter      History   Drug Use No         Objective     /70 (BP Location: Right arm, Patient Position: Sitting, Cuff Size: Adult Large)   Pulse 105   Temp 97.6  F (36.4  C) (Temporal)   Resp 16   Ht 1.854 m (6' 1\")   Wt 99.2 kg (218 lb 9.6 oz)   SpO2 98%   BMI 28.84 kg/m      Physical Exam    GENERAL APPEARANCE: healthy, alert and no distress     EYES: EOMI,  PERRL     HENT: ear canals and TM's normal and nose and mouth without ulcers or lesions     NECK: no adenopathy, no asymmetry, masses, or scars and thyroid normal to palpation     RESP: lungs clear to auscultation - no rales, rhonchi or wheezes     CV: regular rates and rhythm, normal S1 S2, no S3 or S4 and no murmur, click or rub     ABDOMEN:  soft, nontender, no HSM or masses and bowel sounds normal     MS: extremities normal- no gross deformities noted, no evidence of inflammation in joints, FROM in all extremities.     SKIN: no suspicious lesions or rashes     NEURO: Normal strength and tone, sensory exam grossly normal, mentation intact and speech normal     PSYCH: mentation appears normal. and affect normal/bright     LYMPHATICS: No cervical adenopathy    Recent Labs   Lab Test 08/11/22  1426 09/29/21  1413 09/29/21  1338 12/01/20  0824   HGB 15.5  --  14.9  --      --   --   --     141  --   --    POTASSIUM 3.5 3.9  --   --    CR 1.08 1.18  --  1.12   A1C  --   --   --  5.2        Diagnostics:  No labs were ordered during this visit.   No EKG required, no history of coronary heart disease, significant arrhythmia, peripheral arterial disease or other structural heart disease.    Revised Cardiac Risk Index (RCRI):  The patient has the following serious cardiovascular risks for perioperative complications:   - No serious cardiac risks = 0 points     RCRI Interpretation: 0 points: Class I (very low risk - 0.4% complication rate)       "     Signed Electronically by: Michael Monroy MD  Copy of this evaluation report is provided to requesting physician.

## 2022-10-12 NOTE — PATIENT INSTRUCTIONS
Preparing for Your Surgery  Getting started  A nurse will call you to review your health history and instructions. They will give you an arrival time based on your scheduled surgery time. Please be ready to share:    Your doctor's clinic name and phone number    Your medical, surgical and anesthesia history    A list of allergies and sensitivities    A list of medicines, including herbal treatments and over-the-counter drugs    Whether the patient has a legal guardian (ask how to send us the papers in advance)  Please tell us if you're pregnant--or if there's any chance you might be pregnant. Some surgeries may injure a fetus (unborn baby), so they require a pregnancy test. Surgeries that are safe for a fetus don't always need a test, and you can choose whether to have one.   If you have a child who's having surgery, please ask for a copy of Preparing for Your Child's Surgery.    Preparing for surgery    Within 10 to 30 days of surgery: Have a pre-op exam (sometimes called an H&P, or History and Physical). This can be done at a clinic or pre-operative center.  ? If you're having a , you may not need this exam. Talk to your care team.    At your pre-op exam, talk to your care team about all medicines you take. If you need to stop any medicines before surgery, ask when to start taking them again.  ? We do this for your safety. Many medicines can make you bleed too much during surgery. Some change how well surgery (anesthesia) drugs work.    Call your insurance company to let them know you're having surgery. (If you don't have insurance, call [phone].)    Call your clinic if there's any change in your health. This includes signs of a cold or flu (sore throat, runny nose, cough, rash, fever). It also includes a scrape or scratch near the surgery site.    If you have questions on the day of surgery, call your hospital or surgery center.  COVID testing  You may need to be tested for COVID-19 before having  surgery. If so, we will give you instructions (or click here).  Eating and drinking guidelines  For your safety: Unless your surgeon tells you otherwise, follow the guidelines below.    Eat and drink as usual until 8 hours before surgery. After that, no food or milk.    Drink clear liquids until 2 hours before surgery. These are liquids you can see through, like water, Gatorade and Propel Water. You may also have black coffee and tea (no cream or milk).    Nothing by mouth within 2 hours of surgery. This includes gum, candy and breath mints.    If you drink alcohol: Stop drinking it the night before surgery.    If your care team tells you to take medicine on the morning of surgery, it's okay to take it with a sip of water.  Preventing infection    Shower or bathe the night before and morning of your surgery. Follow the instructions your clinic gave you. (If no instructions, use regular soap.)    Don't shave or clip hair near your surgery site. We'll remove the hair if needed.    Don't smoke or vape the morning of surgery. You may chew nicotine gum up to 2 hours before surgery. A nicotine patch is okay.  ? Note: Some surgeries require you to completely quit smoking and nicotine. Check with your surgeon.    Your care team will make every effort to keep you safe from infection. We will:  ? Clean our hands often with soap and water (or an alcohol-based hand rub).  ? Clean the skin at your surgery site with a special soap that kills germs.  ? Give you a special gown to keep you warm. (Cold raises the risk of infection.)  ? Wear special hair covers, masks, gowns and gloves during surgery.  ? Give antibiotic medicine, if prescribed. Not all surgeries need antibiotics.  What to bring on the day of surgery    Photo ID and insurance card    Copy of your health care directive, if you have one    Glasses and hearing aides (bring cases)  ? You can't wear contacts during surgery    Inhaler and eye drops, if you use them (tell us  about these when you arrive)    CPAP machine or breathing device, if you use them    A few personal items, if spending the night    If you have . . .  ? A pacemaker, ICD (cardiac defibrillator) or other implant: Bring the ID card.  ? An implanted stimulator: Bring the remote control.  ? A legal guardian: Bring a copy of the certified (court-stamped) guardianship papers.  Please remove any jewelry, including body piercings. Leave jewelry and other valuables at home.  If you're going home the day of surgery    You must have a responsible adult drive you home. They should stay with you overnight as well.    If you don't have someone to stay with you, and you aren't safe to go home alone, we may keep you overnight. Insurance often won't pay for this.  After surgery  If it's hard to control your pain or you need more pain medicine, please call your surgeon's office.  Questions?   If you have any questions for your care team, list them here: _________________________________________________________________________________________________________________________________________________________________________ ____________________________________ ____________________________________ ____________________________________  For informational purposes only. Not to replace the advice of your health care provider. Copyright   2003, 2019 [facility]. All rights reserved. Clinically reviewed by Karina Haq MD. [email or web address] [id number] - REV 07/22.

## 2022-10-13 ENCOUNTER — ANESTHESIA (OUTPATIENT)
Dept: SURGERY | Facility: CLINIC | Age: 57
End: 2022-10-13
Payer: COMMERCIAL

## 2022-10-13 ENCOUNTER — HOSPITAL ENCOUNTER (OUTPATIENT)
Dept: GENERAL RADIOLOGY | Facility: CLINIC | Age: 57
Discharge: HOME | End: 2022-10-13
Attending: ANESTHESIOLOGY | Admitting: ANESTHESIOLOGY
Payer: COMMERCIAL

## 2022-10-13 ENCOUNTER — HOSPITAL ENCOUNTER (OUTPATIENT)
Facility: CLINIC | Age: 57
Discharge: HOME | End: 2022-10-13
Attending: ANESTHESIOLOGY | Admitting: ANESTHESIOLOGY
Payer: COMMERCIAL

## 2022-10-13 ENCOUNTER — HOSPITAL ENCOUNTER (OUTPATIENT)
Dept: MRI IMAGING | Facility: CLINIC | Age: 57
Discharge: HOME | End: 2022-10-13
Attending: NEUROLOGICAL SURGERY | Admitting: ANESTHESIOLOGY
Payer: COMMERCIAL

## 2022-10-13 ENCOUNTER — HOSPITAL ENCOUNTER (OUTPATIENT)
Dept: CT IMAGING | Facility: CLINIC | Age: 57
Discharge: HOME | End: 2022-10-13
Attending: NEUROLOGICAL SURGERY | Admitting: ANESTHESIOLOGY
Payer: COMMERCIAL

## 2022-10-13 VITALS
DIASTOLIC BLOOD PRESSURE: 93 MMHG | RESPIRATION RATE: 16 BRPM | HEART RATE: 73 BPM | SYSTOLIC BLOOD PRESSURE: 136 MMHG | TEMPERATURE: 98.5 F | OXYGEN SATURATION: 100 %

## 2022-10-13 DIAGNOSIS — M54.2 CERVICAL PAIN: ICD-10-CM

## 2022-10-13 DIAGNOSIS — G89.29 CHRONIC BILATERAL LOW BACK PAIN WITHOUT SCIATICA: ICD-10-CM

## 2022-10-13 DIAGNOSIS — Z98.1 STATUS POST CERVICAL SPINAL FUSION: ICD-10-CM

## 2022-10-13 DIAGNOSIS — M54.50 CHRONIC BILATERAL LOW BACK PAIN WITHOUT SCIATICA: ICD-10-CM

## 2022-10-13 PROCEDURE — 250N000011 HC RX IP 250 OP 636: Performed by: ANESTHESIOLOGY

## 2022-10-13 PROCEDURE — 20550 NJX 1 TENDON SHEATH/LIGAMENT: CPT | Mod: 50

## 2022-10-13 PROCEDURE — 27096 INJECT SACROILIAC JOINT: CPT | Mod: 50 | Performed by: ANESTHESIOLOGY

## 2022-10-13 PROCEDURE — 27096 INJECT SACROILIAC JOINT: CPT | Performed by: ANESTHESIOLOGY

## 2022-10-13 PROCEDURE — 72141 MRI NECK SPINE W/O DYE: CPT

## 2022-10-13 PROCEDURE — 370N000017 HC ANESTHESIA TECHNICAL FEE, PER MIN: Performed by: ANESTHESIOLOGY

## 2022-10-13 PROCEDURE — 250N000009 HC RX 250: Performed by: ANESTHESIOLOGY

## 2022-10-13 PROCEDURE — 250N000011 HC RX IP 250 OP 636: Performed by: NURSE ANESTHETIST, CERTIFIED REGISTERED

## 2022-10-13 PROCEDURE — 72125 CT NECK SPINE W/O DYE: CPT

## 2022-10-13 PROCEDURE — 250N000009 HC RX 250: Performed by: NURSE ANESTHETIST, CERTIFIED REGISTERED

## 2022-10-13 PROCEDURE — 999N000179 XR SURGERY CARM FLUORO LESS THAN 5 MIN W STILLS: Mod: TC

## 2022-10-13 RX ORDER — OXYCODONE HYDROCHLORIDE 5 MG/1
5-10 TABLET ORAL EVERY 6 HOURS PRN
Qty: 165 TABLET | Refills: 0 | Status: SHIPPED | OUTPATIENT
Start: 2022-10-13 | End: 2022-11-07

## 2022-10-13 RX ORDER — PROPOFOL 10 MG/ML
INJECTION, EMULSION INTRAVENOUS PRN
Status: DISCONTINUED | OUTPATIENT
Start: 2022-10-13 | End: 2022-10-13

## 2022-10-13 RX ORDER — METHYLPREDNISOLONE ACETATE 40 MG/ML
INJECTION, SUSPENSION INTRA-ARTICULAR; INTRALESIONAL; INTRAMUSCULAR; SOFT TISSUE PRN
Status: DISCONTINUED | OUTPATIENT
Start: 2022-10-13 | End: 2022-10-13 | Stop reason: HOSPADM

## 2022-10-13 RX ORDER — IOPAMIDOL 612 MG/ML
INJECTION, SOLUTION INTRATHECAL PRN
Status: DISCONTINUED | OUTPATIENT
Start: 2022-10-13 | End: 2022-10-13 | Stop reason: HOSPADM

## 2022-10-13 RX ORDER — BUPIVACAINE HYDROCHLORIDE 5 MG/ML
INJECTION, SOLUTION PERINEURAL PRN
Status: DISCONTINUED | OUTPATIENT
Start: 2022-10-13 | End: 2022-10-13 | Stop reason: HOSPADM

## 2022-10-13 RX ORDER — LIDOCAINE 40 MG/G
CREAM TOPICAL
Status: DISCONTINUED | OUTPATIENT
Start: 2022-10-13 | End: 2022-10-13 | Stop reason: HOSPADM

## 2022-10-13 RX ORDER — LIDOCAINE HYDROCHLORIDE 20 MG/ML
INJECTION, SOLUTION INFILTRATION; PERINEURAL PRN
Status: DISCONTINUED | OUTPATIENT
Start: 2022-10-13 | End: 2022-10-13

## 2022-10-13 RX ADMIN — PROPOFOL 50 MG: 10 INJECTION, EMULSION INTRAVENOUS at 07:56

## 2022-10-13 RX ADMIN — PROPOFOL 100 MG: 10 INJECTION, EMULSION INTRAVENOUS at 07:53

## 2022-10-13 RX ADMIN — LIDOCAINE HYDROCHLORIDE 60 MG: 20 INJECTION, SOLUTION INFILTRATION; PERINEURAL at 07:52

## 2022-10-13 ASSESSMENT — ACTIVITIES OF DAILY LIVING (ADL): ADLS_ACUITY_SCORE: 33

## 2022-10-13 NOTE — ANESTHESIA CARE TRANSFER NOTE
Patient: Indra Mehta    Procedure: Procedure(s):  INJECT JOINTS SACROILIAC Bilateral       Diagnosis: Chronic bilateral low back pain without sciatica [M54.50, G89.29]  Diagnosis Additional Information: No value filed.    Anesthesia Type:   MAC     Note:    Oropharynx: spontaneously breathing  Level of Consciousness: awake  Oxygen Supplementation: room air    Independent Airway: airway patency satisfactory and stable  Dentition: dentition unchanged  Vital Signs Stable: post-procedure vital signs reviewed and stable  Report to RN Given: handoff report given  Patient transferred to: Phase II    Handoff Report: Identifed the Patient, Identified the Reponsible Provider, Reviewed the pertinent medical history, Discussed the surgical course, Reviewed Intra-OP anesthesia mangement and issues during anesthesia, Set expectations for post-procedure period and Allowed opportunity for questions and acknowledgement of understanding      Vitals:  Vitals Value Taken Time   BP     Temp     Pulse     Resp     SpO2         Electronically Signed By: YUMIKO Babb CRNA  October 13, 2022  8:05 AM

## 2022-10-13 NOTE — INTERVAL H&P NOTE
"I have reviewed the surgical (or preoperative) H&P that is linked to this encounter, and examined the patient. There are no significant changes    Clinical Conditions Present on Arrival:  Clinically Significant Risk Factors Present on Admission                   # Overweight: Estimated body mass index is 28.84 kg/m  as calculated from the following:    Height as of 10/12/22: 1.854 m (6' 1\").    Weight as of 10/12/22: 99.2 kg (218 lb 9.6 oz).       "

## 2022-10-13 NOTE — OP NOTE
CHIEF COMPLAINT:    1.SI joint dysfunction (Sacroilitis) and pain (724.6)   2 Sacral enthesopathy (720.1)    PROCEDURE:   Fluoroscopically-guided injection of the Bilateral  sacroiliac joints with Bilateral kerrie Sacroiliac joint ligaments infiltration over the sacrum.    PROCEDURE DETAILS: After written informed consent was obtained from the patient, the patient was escorted to the procedure room.  The patient was placed in the prone position.   A time out was conducted to verify patient identity, procedure to be performed, side, site, allergies and any special requirements.  The skin over the lumbosacral region was prepped and draped in normal sterile fashion with ChloraPrep. Fluoroscopy was used to identify the posteroinferior region of the sacroiliac joint.  The skin was anesthetized with 2 mL of 1% lidocaine with bicarbonate buffer.  Using fluoroscopic guidance, a 22 gauge, 3.5  Quincke spinal needle was advanced into the joint from an inferior approach.  After negative aspiration, 0.5 ml of Omnipaque contrast was injected showing intra-articular spread of contrast without evidence of intravascular spread.  2.0 mL of solution consisting of 20 mg of triamcinolone and 1.5 cc of 0.5% marcaine was injected slowly into the joint.   A second injection at the sacroiliac joint ligaments was then performed.  The middle third of the SI Joint was identified with fluoroscopy. After advancing a 22 gauge, 3.5  Quincke spinal needle to these ligaments with intermittent fluoroscopic guidance,  3 mL of solution consisting of  10 mg of triamcinolone and 2.75 mL of 0.5 Marcaine was injected periligamentous and intramuscular over the sacroiliac joint ligaments.    This was performed bilaterally.  The patient was monitored with blood pressure and pulse oximetry machines with the assistance of an RN throughout the procedure.  The patient was alert and responsive to questions throughout the procedure.   The patient tolerated the  procedure well and was observed in the post-procedural area.  The patient was dismissed without apparent complications.   Blood loss: Less than 5 cc.    DIAGNOSIS:  1.  Bilateral sacroilitis  2.  Bilateral sacral enthesopathy  PLAN:  1. Performed Bilateral   Sacroiliac joint injections.  2. Bilateral SI ligament injections over the sacrum   3. The patient was instructed to fill out a pain form reflecting the local anesthetic phase of the injection and follow-up per Dr. Mcdaniel's instructions.  If the pain form looks diagnostic then obviously he most likely suffers from SI joint mediated back pain.  The treatment is primarily physical therapy focusing on his SI joints.  Occasionally patients need regenerative medicine injections for longer-term pain relief or they can repeat the corticosteroid injections periodically for pain management.  Rarely do most patients need SI joint fusions.    Darryn Mcdaniel MD  Diplomate of the American Board of Anesthesiology, Pain Medicine

## 2022-10-13 NOTE — DISCHARGE INSTRUCTIONS

## 2022-10-13 NOTE — TELEPHONE ENCOUNTER
See MyChart. Patient is needed oxycodone. Medication was pended prior but RN was unsure what happened to it.     ANGELINA EllingtonN, RN

## 2022-10-13 NOTE — ANESTHESIA POSTPROCEDURE EVALUATION
Patient: Indra Mehta    Procedure: Procedure(s):  INJECT JOINTS SACROILIAC Bilateral       Anesthesia Type:  MAC    Note:  Disposition: Outpatient   Postop Pain Control: Uneventful            Sign Out: Well controlled pain   PONV: No   Neuro/Psych: Uneventful            Sign Out: Acceptable/Baseline neuro status   Airway/Respiratory: Uneventful            Sign Out: Acceptable/Baseline resp. status   CV/Hemodynamics: Uneventful            Sign Out: Acceptable CV status   Other NRE: NONE   DID A NON-ROUTINE EVENT OCCUR? No    Event details/Postop Comments:  Pt was happy with anesthesia care.  No complications.  I will follow up with the pt if needed.           Last vitals:  Vitals Value Taken Time   BP     Temp     Pulse     Resp     SpO2         Electronically Signed By: YUMIKO Babb CRNA  October 13, 2022  8:05 AM

## 2022-10-18 ENCOUNTER — MYC MEDICAL ADVICE (OUTPATIENT)
Dept: FAMILY MEDICINE | Facility: CLINIC | Age: 57
End: 2022-10-18

## 2022-10-18 DIAGNOSIS — N52.9 ERECTILE DYSFUNCTION, UNSPECIFIED ERECTILE DYSFUNCTION TYPE: ICD-10-CM

## 2022-10-18 NOTE — TELEPHONE ENCOUNTER
Please see [employer] message.    Patient would like to restart/refill his sildenafil.    He had his last annual with you on 08/11/22 and prior to that had an office visit with you on 06/13/22 for MRSA infection.    Are you will to prescribe this medication or would you prefer he make an appointment?

## 2022-10-19 RX ORDER — SILDENAFIL 100 MG/1
50-100 TABLET, FILM COATED ORAL DAILY PRN
Qty: 6 TABLET | Refills: 7 | Status: SHIPPED | OUTPATIENT
Start: 2022-10-19

## 2022-10-25 ENCOUNTER — OFFICE VISIT (OUTPATIENT)
Dept: ORTHOPEDICS | Facility: CLINIC | Age: 57
End: 2022-10-25
Payer: COMMERCIAL

## 2022-10-25 VITALS
BODY MASS INDEX: 28.89 KG/M2 | RESPIRATION RATE: 20 BRPM | WEIGHT: 218 LBS | DIASTOLIC BLOOD PRESSURE: 87 MMHG | SYSTOLIC BLOOD PRESSURE: 121 MMHG | HEIGHT: 73 IN | HEART RATE: 83 BPM

## 2022-10-25 DIAGNOSIS — M17.0 PRIMARY OSTEOARTHRITIS OF BOTH KNEES: Primary | ICD-10-CM

## 2022-10-25 PROCEDURE — 20610 DRAIN/INJ JOINT/BURSA W/O US: CPT | Mod: 50 | Performed by: ORTHOPAEDIC SURGERY

## 2022-10-25 RX ORDER — LIDOCAINE HYDROCHLORIDE 10 MG/ML
5 INJECTION, SOLUTION INFILTRATION; PERINEURAL
Status: SHIPPED | OUTPATIENT
Start: 2022-10-25

## 2022-10-25 RX ORDER — METHYLPREDNISOLONE ACETATE 80 MG/ML
80 INJECTION, SUSPENSION INTRA-ARTICULAR; INTRALESIONAL; INTRAMUSCULAR; SOFT TISSUE
Status: SHIPPED | OUTPATIENT
Start: 2022-10-25

## 2022-10-25 RX ADMIN — LIDOCAINE HYDROCHLORIDE 5 ML: 10 INJECTION, SOLUTION INFILTRATION; PERINEURAL at 09:12

## 2022-10-25 RX ADMIN — METHYLPREDNISOLONE ACETATE 80 MG: 80 INJECTION, SUSPENSION INTRA-ARTICULAR; INTRALESIONAL; INTRAMUSCULAR; SOFT TISSUE at 09:12

## 2022-10-25 NOTE — PROGRESS NOTES
Large Joint Injection/Arthocentesis: bilateral knee    Date/Time: 10/25/2022 9:12 AM  Performed by: Alvaro Mariee MD  Authorized by: Alvaro Mariee MD     Indications:  Pain  Needle Size:  22 G  Guidance: landmark guided    Approach:  Anteromedial  Location:  Knee  Laterality:  Bilateral      Medications (Right):  80 mg methylPREDNISolone 80 MG/ML; 5 mL lidocaine 1 %  Medications (Left):  80 mg methylPREDNISolone 80 MG/ML; 5 mL lidocaine 1 %  Outcome:  Tolerated well, no immediate complications  Procedure discussed: discussed risks, benefits, and alternatives    Consent Given by:  Patient  Timeout: timeout called immediately prior to procedure    Prep: patient was prepped and draped in usual sterile fashion

## 2022-10-25 NOTE — LETTER
10/25/2022         RE: Indra Mehta  [id number] [facility] [id number]        Dear Colleague,    Thank you for referring your patient, Indra Mehta, to the [facility]. Please see a copy of my visit note below.    Large Joint Injection/Arthocentesis: bilateral knee    Date/Time: 10/25/2022 9:12 AM  Performed by: Alvaro Mariee MD  Authorized by: Alvaro Mariee MD     Indications:  Pain  Needle Size:  22 G  Guidance: landmark guided    Approach:  Anteromedial  Location:  Knee  Laterality:  Bilateral      Medications (Right):  80 mg methylPREDNISolone 80 MG/ML; 5 mL lidocaine 1 %  Medications (Left):  80 mg methylPREDNISolone 80 MG/ML; 5 mL lidocaine 1 %  Outcome:  Tolerated well, no immediate complications  Procedure discussed: discussed risks, benefits, and alternatives    Consent Given by:  Patient  Timeout: timeout called immediately prior to procedure    Prep: patient was prepped and draped in usual sterile fashion            Follow up bilateral knee primary osteoarthritis.  Last injection 5/4/22.  This worked for a short time.  Sometimes injections have worked longer.  He is wondering about his options.  He takes ibuprofen 80 mg three times daily, tylenol, oxycodone, flexeril.  He has had x-ray bilateral knees on 11/18/21.  These showed mild osteoarthritis.  He had MRI of each knee 12/1/21.  These showed mainly osteoarthritis and patellar tendinitis.  Range of motion 0-130.   Tenderness at medial joint line bilaterally.  Also tender about patella.  He feels there is swelling especially about left knee anterior.  No pain with resisted extension.    Assessment:  Bilateral knee osteoarthritis - mild.  We discussed injections, changing NSAID, repeat knee x-ray.  He  desires injection today of bilateral knee(s).  Risks, benefits, potential complications and alternatives were discussed.   With the patient's consent, sterile prep was performed of bilateral  knee(s).  Each knee was injected with Depo Medrol 80 mg and lidocaine at anterolateral site.  Return to clinic as needed.         Again, thank you for allowing me to participate in the care of your patient.        Sincerely,        Alvaro Mariee MD

## 2022-10-25 NOTE — PROGRESS NOTES
Follow up bilateral knee primary osteoarthritis.  Last injection 5/4/22.  This worked for a short time.  Sometimes injections have worked longer.  He is wondering about his options.  He takes ibuprofen 80 mg three times daily, tylenol, oxycodone, flexeril.  He has had x-ray bilateral knees on 11/18/21.  These showed mild osteoarthritis.  He had MRI of each knee 12/1/21.  These showed mainly osteoarthritis and patellar tendinitis.  Range of motion 0-130.   Tenderness at medial joint line bilaterally.  Also tender about patella.  He feels there is swelling especially about left knee anterior.  No pain with resisted extension.    Assessment:  Bilateral knee osteoarthritis - mild.  We discussed injections, changing NSAID, repeat knee x-ray.  He  desires injection today of bilateral knee(s).  Risks, benefits, potential complications and alternatives were discussed.   With the patient's consent, sterile prep was performed of bilateral knee(s).  Each knee was injected with Depo Medrol 80 mg and lidocaine at anterolateral site.  Return to clinic as needed.

## 2022-10-31 DIAGNOSIS — I10 BENIGN ESSENTIAL HYPERTENSION: ICD-10-CM

## 2022-11-02 NOTE — ANESTHESIA CARE TRANSFER NOTE
Patient: Indra Mehta    Procedure(s):  open umbilical hernia repair - Wound Class: I-Clean    Diagnosis: incarcerated umbilical hernia  Diagnosis Additional Information: No value filed.    Anesthesia Type:   General, LMA     Note:  Airway :Nasal Cannula  Patient transferred to:PACU  Comments: Patient resting without complaint. Report to pacu rn      Vitals: (Last set prior to Anesthesia Care Transfer)    CRNA VITALS  8/11/2017 1323 - 8/11/2017 1356      8/11/2017             Pulse: 83    SpO2: 100 %                Electronically Signed By: YUMIKO Melendez CRNA  August 11, 2017  1:56 PM   Alert-The patient is alert, awake and responds to voice. The patient is oriented to time, place, and person. The triage nurse is able to obtain subjective information.

## 2022-11-03 RX ORDER — LOSARTAN POTASSIUM AND HYDROCHLOROTHIAZIDE 12.5; 5 MG/1; MG/1
TABLET ORAL
Qty: 180 TABLET | Refills: 1 | OUTPATIENT
Start: 2022-11-03

## 2022-11-07 ENCOUNTER — MYC MEDICAL ADVICE (OUTPATIENT)
Dept: FAMILY MEDICINE | Facility: CLINIC | Age: 57
End: 2022-11-07

## 2022-11-07 ENCOUNTER — MYC REFILL (OUTPATIENT)
Dept: FAMILY MEDICINE | Facility: CLINIC | Age: 57
End: 2022-11-07

## 2022-11-07 DIAGNOSIS — G89.4 CHRONIC PAIN SYNDROME: ICD-10-CM

## 2022-11-07 DIAGNOSIS — M54.16 LUMBAR RADICULOPATHY: ICD-10-CM

## 2022-11-07 DIAGNOSIS — M54.2 CERVICALGIA: ICD-10-CM

## 2022-11-07 DIAGNOSIS — M79.18 MYOFASCIAL PAIN: ICD-10-CM

## 2022-11-07 DIAGNOSIS — Z98.1 S/P CERVICAL SPINAL FUSION: ICD-10-CM

## 2022-11-07 DIAGNOSIS — F11.90 CHRONIC, CONTINUOUS USE OF OPIOIDS: ICD-10-CM

## 2022-11-08 RX ORDER — OXYCODONE HYDROCHLORIDE 5 MG/1
5-10 TABLET ORAL EVERY 6 HOURS PRN
Qty: 165 TABLET | Refills: 0 | Status: SHIPPED | OUTPATIENT
Start: 2022-11-12 | End: 2022-12-07

## 2022-11-08 NOTE — TELEPHONE ENCOUNTER
Routing refill request to provider for review/approval because:    Requested Prescriptions   Pending Prescriptions Disp Refills    oxyCODONE (ROXICODONE) 5 MG tablet 165 tablet 0     Sig: Take 1-2 tablets (5-10 mg) by mouth every 6 hours as needed for severe pain Max of 6 tablets/day. Fill 9/13/22. Start 9/15/22. 30 day Rx       There is no refill protocol information for this order

## 2022-11-22 DIAGNOSIS — M79.18 MYOFASCIAL PAIN: ICD-10-CM

## 2022-11-22 DIAGNOSIS — G62.9 NEUROPATHY: ICD-10-CM

## 2022-11-22 DIAGNOSIS — M54.2 CERVICALGIA: ICD-10-CM

## 2022-11-22 DIAGNOSIS — G89.4 CHRONIC PAIN SYNDROME: ICD-10-CM

## 2022-11-22 DIAGNOSIS — M54.16 LUMBAR RADICULOPATHY: ICD-10-CM

## 2022-11-22 DIAGNOSIS — Z98.1 S/P CERVICAL SPINAL FUSION: ICD-10-CM

## 2022-11-22 DIAGNOSIS — F11.90 CHRONIC, CONTINUOUS USE OF OPIOIDS: ICD-10-CM

## 2022-11-23 RX ORDER — PREGABALIN 200 MG/1
CAPSULE ORAL
Qty: 30 CAPSULE | Refills: 0 | Status: SHIPPED | OUTPATIENT
Start: 2022-11-23 | End: 2022-12-19

## 2022-11-29 ENCOUNTER — OFFICE VISIT (OUTPATIENT)
Dept: RHEUMATOLOGY | Facility: CLINIC | Age: 57
End: 2022-11-29
Attending: FAMILY MEDICINE
Payer: COMMERCIAL

## 2022-11-29 VITALS
BODY MASS INDEX: 28.89 KG/M2 | HEIGHT: 73 IN | WEIGHT: 218 LBS | DIASTOLIC BLOOD PRESSURE: 84 MMHG | SYSTOLIC BLOOD PRESSURE: 136 MMHG

## 2022-11-29 DIAGNOSIS — I73.00 RAYNAUD'S DISEASE WITHOUT GANGRENE: ICD-10-CM

## 2022-11-29 DIAGNOSIS — R76.8 POSITIVE ANA (ANTINUCLEAR ANTIBODY): Primary | ICD-10-CM

## 2022-11-29 DIAGNOSIS — J34.89 SINUS DRAINAGE: ICD-10-CM

## 2022-11-29 DIAGNOSIS — M25.50 POLYARTHRALGIA: ICD-10-CM

## 2022-11-29 LAB
ALBUMIN UR-MCNC: NEGATIVE MG/DL
APPEARANCE UR: CLEAR
BILIRUB UR QL STRIP: NEGATIVE
COLOR UR AUTO: NORMAL
CRP SERPL-MCNC: <3 MG/L
ERYTHROCYTE [SEDIMENTATION RATE] IN BLOOD BY WESTERGREN METHOD: 5 MM/HR (ref 0–20)
GLUCOSE UR STRIP-MCNC: NEGATIVE MG/DL
HGB UR QL STRIP: NEGATIVE
KETONES UR STRIP-MCNC: NEGATIVE MG/DL
LEUKOCYTE ESTERASE UR QL STRIP: NEGATIVE
NITRATE UR QL: NEGATIVE
PH UR STRIP: 6 [PH] (ref 5–7)
RBC #/AREA URNS AUTO: NORMAL /HPF
SP GR UR STRIP: 1.01 (ref 1–1.03)
URATE SERPL-MCNC: 5.8 MG/DL (ref 3.4–7)
UROBILINOGEN UR STRIP-ACNC: 0.2 E.U./DL
WBC #/AREA URNS AUTO: NORMAL /HPF

## 2022-11-29 PROCEDURE — 86256 FLUORESCENT ANTIBODY TITER: CPT | Performed by: PHYSICIAN ASSISTANT

## 2022-11-29 PROCEDURE — 99204 OFFICE O/P NEW MOD 45 MIN: CPT | Performed by: PHYSICIAN ASSISTANT

## 2022-11-29 PROCEDURE — 86160 COMPLEMENT ANTIGEN: CPT | Performed by: PHYSICIAN ASSISTANT

## 2022-11-29 PROCEDURE — 86146 BETA-2 GLYCOPROTEIN ANTIBODY: CPT | Mod: 59 | Performed by: PHYSICIAN ASSISTANT

## 2022-11-29 PROCEDURE — 86036 ANCA SCREEN EACH ANTIBODY: CPT | Performed by: PHYSICIAN ASSISTANT

## 2022-11-29 PROCEDURE — 84550 ASSAY OF BLOOD/URIC ACID: CPT | Performed by: PHYSICIAN ASSISTANT

## 2022-11-29 PROCEDURE — 86146 BETA-2 GLYCOPROTEIN ANTIBODY: CPT | Performed by: PHYSICIAN ASSISTANT

## 2022-11-29 PROCEDURE — 86200 CCP ANTIBODY: CPT | Performed by: PHYSICIAN ASSISTANT

## 2022-11-29 PROCEDURE — 85390 FIBRINOLYSINS SCREEN I&R: CPT | Performed by: PATHOLOGY

## 2022-11-29 PROCEDURE — 85730 THROMBOPLASTIN TIME PARTIAL: CPT | Performed by: PHYSICIAN ASSISTANT

## 2022-11-29 PROCEDURE — 86225 DNA ANTIBODY NATIVE: CPT | Performed by: PHYSICIAN ASSISTANT

## 2022-11-29 PROCEDURE — 85652 RBC SED RATE AUTOMATED: CPT | Performed by: PHYSICIAN ASSISTANT

## 2022-11-29 PROCEDURE — 86147 CARDIOLIPIN ANTIBODY EA IG: CPT | Performed by: PHYSICIAN ASSISTANT

## 2022-11-29 PROCEDURE — 86160 COMPLEMENT ANTIGEN: CPT | Mod: 59 | Performed by: PHYSICIAN ASSISTANT

## 2022-11-29 PROCEDURE — 86147 CARDIOLIPIN ANTIBODY EA IG: CPT | Mod: 59 | Performed by: PHYSICIAN ASSISTANT

## 2022-11-29 PROCEDURE — 86235 NUCLEAR ANTIGEN ANTIBODY: CPT | Performed by: PHYSICIAN ASSISTANT

## 2022-11-29 PROCEDURE — 81001 URINALYSIS AUTO W/SCOPE: CPT | Performed by: PHYSICIAN ASSISTANT

## 2022-11-29 PROCEDURE — 85613 RUSSELL VIPER VENOM DILUTED: CPT | Performed by: PHYSICIAN ASSISTANT

## 2022-11-29 PROCEDURE — 86235 NUCLEAR ANTIGEN ANTIBODY: CPT | Mod: 59 | Performed by: PHYSICIAN ASSISTANT

## 2022-11-29 PROCEDURE — 86140 C-REACTIVE PROTEIN: CPT | Performed by: PHYSICIAN ASSISTANT

## 2022-11-29 PROCEDURE — 36415 COLL VENOUS BLD VENIPUNCTURE: CPT | Performed by: PHYSICIAN ASSISTANT

## 2022-11-29 NOTE — PROGRESS NOTES
Rheumatology Clinic Visit  [facility]  ARELIS Ny     Indra Mehta MRN# [id number]   YOB: 1965 Age: 57 year old   Date of Visit: 11/29/2022  Primary care provider: Tha Grullon          Assessment and Plan:     1.  Positive JAMAAL  2.  Polyarthralgia  3.  Sinus drainage  4.  Raynaud's phenomenon    Patient presents today for an initial evaluation.  He has polyarthralgia and significant back pain with multiple surgeries in the past.  Of recent he had an evaluation with his primary care provider.  He had a positive JAMAAL at that time.  Patient also reports having Raynaud's phenomenon which she states is managed well with conservative measures.  He also states that he has a significant amount of sinus drainage.  But denies any nosebleeds.  Physical examination did not show any synovitis, dactylitis, tenosynovitis or enthesopathy.  He does have enlarged MTP joints bilaterally.  No tenderness to MTP squeeze.  Previous laboratory evaluations and imaging studies were reviewed.  Results below.    Discussed with the patient that a positive JAMAAL is of unknown significance.  10% of the healthy population can have a positive JAMAAL.  We do get false positives with this test as well.  Discussed that a positive JAMAAL can be associated with nonrheumatological autoimmune disorder such as thyroiditis.  It can also be associated with rheumatological autoimmune disorder such as connective tissue disorders or scleroderma.  A positive JAMAAL has also been known to be associated with some viral and bacterial infections.  Upon further review of the patient's chart it does look like 2 years ago he had a positive RNP antibody with the [facility].  I do think he needs a further autoimmune work-up.  We will get these labs checked today.  No further imaging studies are needed.  I will contact the patient once the results are complete.      Plan:     1. Schedule follow-up with Chiara Garcia PA-C as needed.   2. Labs:  "ANCA, beta-2 glycoprotein antibodies, cardiolipin antibodies, centromere antibody, C3, C4, CRP, CCP antibody, double-stranded DNA, DONNA panel, sed rate, lupus anticoagulant panel, SCL 70, UA, uric acid    Chiara Garcia, ARELIS  Rheumatology         History of Present Illness:   Indra Mehta presents for evaluation of knee pain.      He reports that he has had multiple imaging studies and they all show arthritis. He has had blood tests that showed a positive JAMAAL.     He has pain in his ankles, knees, hips and shoulders. When he gets up in the morning he has more pain in every joint until he starts getting moving and the first dose of Oxycodone starts working. If he didn't take the Oxycodone, it would take about 1 hour for things to improve. If he sits for any period of time, he gets more stiff and sore and it takes a bit to get moving again. He has notices some swelling in his knees. It is usually in the pocket areas of his knees. No skin rashes or mouth sores. He has dry eyes and uses Systane eye drops. His mouth does seem to get a little dryer than he used to. He has Raynaud's, but reports that this is seldom a problem. He is on Norvasc, but this has more been to help with circulation. No excessive hair loss. He has had issues with heartburn. He has to be mindful of what he eats after 6pm. He does feel that he has episodes where food/medications get stuck. No sun sensitivity rashes. He reports tolerating the sun well. No significant muscle weakness. No history of blood clots or seizures. He reports always having had a \"bad gut\" and uses Imodium daily. He did see [facility] 1 year ago and multiple tests were done for his GI issues and nothing was found.     He has had multiple episodes of Staph infection. Initially it started after he had shorts on and knelt down in his shed and he kneeled on a sharp rock and a couple days later it became red and swollen. He did need to have it opened and drained.     Constitutional " "symptoms, such as fever, fatigue, lymphadenopathy, or weight loss: he does get some fatigue occasionally, otherwise negative  Photosensitive skin lesions, such as a malar rash: negative  Painless oral or nasal ulcers:negative  Hair loss that is patchy or frontal/peripheral: negative  Raynaud phenomenon: yes, well managed  Joint pain or swelling, which can be migratory or symmetrical: yes, ankles, knees hips and shoulders, bilateral  Dyspnea or pleuritic chest pain suggestive of serositis: negative  Chest pain suggestive of pericarditis: negative  Lower-extremity edema: negative  Neurologic symptoms, such as seizures or psychosis: negative  Recurrent miscarriages: n/A  Exposure to medications associated with drug-induced lupus (see \"Drug-induced lupus\")  Dry eyes and or dry mouth: yes  Heartburn: yes  Trouble with food/medications feeling like they get stuck when swallowing: yes    No known family history of RA, lupus or scleroderma. Brother with Crohn's. No personal history of psoriasis, ulcerative colitis or crohn's.              Review of Systems:     Constitutional: negative  Skin: negative  Eyes: negative  Ears/Nose/Throat: negative  Respiratory: No shortness of breath, dyspnea on exertion, cough, or hemoptysis  Cardiovascular: negative  Gastrointestinal: negative  Genitourinary: negative  Musculoskeletal: as above  Neurologic: negative  Psychiatric: negative  Hematologic/Lymphatic/Immunologic: negative  Endocrine: negative         Active Problem List:     Patient Active Problem List    Diagnosis Date Noted     Cervical myelopathy (H) 06/13/2022     Priority: Medium     Major depression in complete remission (H) 06/13/2022     Priority: Medium     Chronic bilateral low back pain without sciatica 05/08/2022     Priority: Medium     Primary osteoarthritis of both knees 05/04/2022     Priority: Medium     Impingement syndrome of left shoulder 06/23/2021     Priority: Medium     Sleep apnea, unspecified type " 04/06/2021     Priority: Medium     Osteoarthritis of spine with radiculopathy, lumbar region 04/06/2021     Priority: Medium     Status post lumbar spinal fusion 01/14/2020     Priority: Medium     Cervical radiculopathy 10/28/2019     Priority: Medium     Formatting of this note might be different from the original.  Added automatically from request for surgery [id number]       Benign essential hypertension 06/30/2017     Priority: Medium     Bilateral occipital neuralgia 10/28/2016     Priority: Medium     Major depression in complete remission (H) 08/20/2010     Priority: Medium     Chronic pain syndrome 08/20/2010     Priority: Medium     Patient is followed by Tha Grullon MD for ongoing prescription of pain medication.  All refills should be approved by this provider only at face-to-face appointments - not by phone request.    Medication(s): Tramadol.   Maximum quantity per month: 60  Clinic visit frequency required: Q 1 months     Controlled substance agreement:  Encounter-Level CSA - 10/13/16:               Controlled Substance Agreement - Scan on 10/23/2016  5:07 PM : CONTROLLED SUBSTANCE AGREEMENT 10/13/16 (below)            Pain Clinic evaluation in the past: Yes       Date/Location:   [facility]    DIRE Total Score(s):  No flowsheet data found.    Last [facility] website verification:  none   [email or web address]               Pain medication agreement signed 08/20/2010     Priority: Medium     Meniere's disease 03/22/2007     Priority: Medium     Problem list name updated by automated process. Provider to review              Past Medical History:     Past Medical History:   Diagnosis Date     Anxiety      Back pain      Depressive disorder      Hyperlipidemia      Hypertension      Meniere's disease, unspecified      Pain medication agreement signed 8/20/2010     Sleep apnea, unspecified type      Past Surgical History:   Procedure Laterality Date     BUNIONECTOMY RT/LT  09/05/08    Both  feet     COLONOSCOPY N/A 12/28/2016    Procedure: COMBINED COLONOSCOPY, SINGLE OR MULTIPLE BIOPSY/POLYPECTOMY BY BIOPSY;  Surgeon: Indra Jernigan MD;  [facility]     COLONOSCOPY N/A 9/27/2022    Procedure: COLONOSCOPY, FLEXIBLE, WITH LESION REMOVAL USING SNARE;  Surgeon: Pablo Ferris MD;  Location: [facility]     DISCECTOMY, FUSION CERVICAL ANTERIOR ONE LEVEL, COMBINED N/A 7/5/2017    Procedure: COMBINED DISCECTOMY, FUSION CERVICAL ANTERIOR ONE LEVEL;  Cervical 6-7, Anterior cervical discectomy fusion;  Surgeon: Mike Mckenna MD;  [facility]     DISCECTOMY, FUSION CERVICAL ANTERIOR ONE LEVEL, COMBINED N/A 12/19/2018    Procedure: cervical 5-6 anterior cervical discectomy fusion;  Surgeon: Mike Mckenna MD;  [facility]     HERNIORRHAPHY UMBILICAL N/A 8/11/2017    Procedure: HERNIORRHAPHY UMBILICAL;  open umbilical hernia repair;  Surgeon: Boni Story MD;  [facility]     INJECT BLOCK MEDIAL BRANCH CERVICAL/THORACIC/LUMBAR Bilateral 8/12/2021    Procedure: Left L3, Left L4, Left L5, Right L3, Right L4 and Right L5 medial branch nerve blocks using fluoroscopic guidance and contrast control;  Surgeon: Darryn Mcdaniel MD;  [facility]     INJECT BLOCK MEDIAL BRANCH CERVICAL/THORACIC/LUMBAR N/A 9/9/2021    Procedure: Left L3, Left L4, Left L5, Right L3, Right L4 and Right L5 medial branch nerve blocks using fluoroscopic guidance and contrast control;  Surgeon: Darryn Mcdaniel MD;  [facility]     INJECT EPIDURAL CERVICAL N/A 8/22/2019    Procedure: INJECTION, SPINE, CERVICAL 6-7 EPIDURAL;  Surgeon: Darryn Mcdaniel MD;  [facility]     INJECT EPIDURAL LUMBAR N/A 11/9/2017    Procedure: INJECT EPIDURAL LUMBAR;  lumbar 4-5 epidural steroid injection ;  Surgeon: Darryn Mcdaniel MD;  [facility]     INJECT EPIDURAL LUMBAR N/A 12/28/2017    Procedure: INJECT EPIDURAL LUMBAR;  lumbar epidural injection;  Surgeon: Darryn Mcdaniel MD;  [facility]     INJECT EPIDURAL  LUMBAR Bilateral 5/13/2021    Procedure: Bilateral Lumbar 3-4 Epidural Steroid Injection;  Surgeon: Darryn Mcdaniel MD;  [facility]     INJECT EPIDURAL TRANSFORAMINAL Bilateral 8/23/2018    Procedure: INJECT EPIDURAL TRANSFORAMINAL;  transforaminal bilateral lumbar 3-4 steroid injection;  Surgeon: Darryn Mcdaniel MD;  [facility]     INJECT EPIDURAL TRANSFORAMINAL Bilateral 11/8/2018    Procedure: INJECT EPIDURAL TRANSFORAMINAL lumbar 3-4;  Surgeon: Darryn Mcdaniel MD;  [facility]     INJECT EPIDURAL TRANSFORAMINAL Bilateral 6/14/2019    Procedure: INJECTION, EPIDURAL, TRANSFORAMINAL LUMBAR 3-4 BILATERAL;  Surgeon: Darryn Mcdaniel MD;  [facility]     INJECT EPIDURAL TRANSFORAMINAL Bilateral 5/22/2020    Procedure: lumbar 3-4 bilateral transforaminal epidural steroid injection;  Surgeon: Darryn Mcdaniel MD;  [facility]     INJECT EPIDURAL TRANSFORAMINAL Bilateral 9/24/2020    Procedure: INJECTION, EPIDURAL, TRANSFORAMINAL  LUMBAR 3-4 APPROACH;  Surgeon: Darryn Mcdaniel MD;  [facility]     INJECT JOINT SACROILIAC Bilateral 10/13/2022    Procedure: Fluoroscopically-guided injection of the Bilateral sacroiliac joints with Bilateral kerrie Sacroiliac joint ligaments infiltration over the sacrum;  Surgeon: Darryn Mcdaniel MD;  [facility]     PE TUBES  2006    left ear     RADIO FREQUENCY ABLATION PULSED CERVICAL Bilateral 10/1/2021    Procedure: RADIOFREQUENCY ABLATION lumbar 3, 4, 5 bilateral;  Surgeon: Darryn Mcdaniel MD;  [facility]     ZZHC COLONOSCOPY W/WO BRUSH/WASH  12/27/2005     ZZHC CREATE EARDRUM OPENING,GEN ANESTH  09/27/2007    Pe tube, Left endolymphatic sac enhancement.     ZZHC MASTOIDECTOMY,COMPLETE  09/27/2007            Social History:     Social History     Socioeconomic History     Marital status: Single     Spouse name: Not on file     Number of children: 2     Years of education: Not on file     Highest education level: Not on file   Occupational History      Employer: [employer]   Tobacco Use     Smoking status: Never     Smokeless tobacco: Never   Vaping Use     Vaping Use: Never used   Substance and Sexual Activity     Alcohol use: Yes     Alcohol/week: 0.0 standard drinks     Comment: rarely     Drug use: No     Sexual activity: Not Currently     Partners: Female   Other Topics Concern     Parent/sibling w/ CABG, MI or angioplasty before 65F 55M? No   Social History Narrative     Not on file     Social Determinants of Health     Financial Resource Strain: Not on file   Food Insecurity: Not on file   Transportation Needs: Not on file   Physical Activity: Not on file   Stress: Not on file   Social Connections: Not on file   Intimate Partner Violence: Not on file   Housing Stability: Not on file          Family History:     Family History   Problem Relation Age of Onset     Diabetes Mother      Cancer Mother         colon cancer -  at 52, diag at 42     Hypertension Father      Heart Disease Father          of AAA     No Known Problems Brother      No Known Problems Brother      Depression Sister      Suicide Other      Unknown/Adopted Maternal Grandmother      Unknown/Adopted Maternal Grandfather      Unknown/Adopted Paternal Grandmother      Unknown/Adopted Paternal Grandfather      No Known Problems Daughter      No Known Problems Daughter             Allergies:     Allergies   Allergen Reactions     No Known Drug Allergies             Medications:     Current Outpatient Medications   Medication Sig Dispense Refill     Acetaminophen (TYLENOL PO) Take 1,000 mg by mouth 3 times daily       amLODIPine (NORVASC) 5 MG tablet Take 1 tablet (5 mg) by mouth daily 90 tablet 3     atorvastatin (LIPITOR) 10 MG tablet Take 10 mg by mouth daily       cyclobenzaprine (FLEXERIL) 5 MG tablet 1-2 tablets three times daily as needed 90 tablet 2     ibuprofen (ADVIL/MOTRIN) 800 MG tablet Take 800 mg by mouth 3 times daily       losartan-hydrochlorothiazide (HYZAAR) 50-12.5  MG tablet TAKE 2 TABLETS BY MOUTH EVERY  tablet 1     naloxone (NARCAN) 4 MG/0.1ML nasal spray Spray 1 spray (4 mg) into one nostril alternating nostrils once as needed for opioid reversal every 2-3 minutes until assistance arrives (Patient not taking: No sig reported) 0.2 mL 1     nortriptyline (PAMELOR) 50 MG capsule Take 1 capsule (50 mg) by mouth At Bedtime 90 capsule 1     order for DME Equipment being ordered: TENS Pads as directed 1 Device 0     oxyCODONE (ROXICODONE) 5 MG tablet Take 1-2 tablets (5-10 mg) by mouth every 6 hours as needed for severe pain Max of 6 tablets/day. Fill 9/13/22. Start 9/15/22. 30 day Rx 165 tablet 0     pregabalin (LYRICA) 150 MG capsule Take 1 capsule (150 mg) by mouth 3 times daily 90 capsule 3     Pregabalin (LYRICA) 200 MG capsule TAKE 1 CAPSULE BY MOUTH EVERY DAY 30 capsule 0     sildenafil (VIAGRA) 100 MG tablet Take 0.5-1 tablets ( mg) by mouth daily as needed Take 30 min to 4 hours before intercourse.  Never use with nitroglycerin, terazosin or doxazosin. 6 tablet 7            Physical Exam:   There were no vitals taken for this visit.  Wt Readings from Last 6 Encounters:   10/25/22 98.9 kg (218 lb)   10/12/22 99.2 kg (218 lb 9.6 oz)   09/27/22 99.3 kg (219 lb)   08/25/22 99.3 kg (219 lb)   08/11/22 99.6 kg (219 lb 9 oz)   06/24/22 104.4 kg (230 lb 1.6 oz)     Constitutional: well-developed, appearing stated age; cooperative  Eyes: nl PERRLA, conjunctiva, sclera  ENT: nl external ears, nose, hearing, lips, teeth, gums, throat. No mucositis.   No mucous membrane lesions, normal saliva pool  Neck: no mass or thyroid enlargement  Resp: lungs clear to auscultation  CV: RRR, no murmurs, rubs or gallops, no edema  Lymph: no cervical, supraclavicular or epitrochlear nodes  MS: No tenderness to palpation of the spinal musculature.  No active synovitis or altered joint anatomy. Full joint ROM.  Full fist formation, normal  strength. No dactylitis,  tenosynovitis,  enthesopathy.  He does have enlargement of the MTPs bilaterally.  No MTP squeeze tenderness.  Skin: no nail pitting, alopecia, rash, nodules or lesions.   Neuro: nl cranial nerves.   Psych: nl judgement, orientation, memory, affect.           Data:   Imagin2020 x-ray bilateral foot  Impression:     Right:  1.  Severe arthrosis at the MTP joint of the great toe.   2.  Mild sagging of the medial longitudinal arch in the midfoot.  3.  Otherwise negative right foot.     Left:  1.  Moderate arthrosis of the MTP joint of the great toe.  2.  Mild sagging of the medial longitudinal arch in the midfoot.  3.  Mild arthrosis at the talonavicular joint.    2021 MRI left shoulder  Impression:  1. On a background of tendinosis, low to moderate grade intrasubstance  tear of the supraspinatus/infraspinatus junction fibers involving  approximately 8 mm AP dimension at the footprint, improved since 2017.  2. Inferior glenoid labral tear, and regional chondral fissure.  Decreased associated paralabral cysts since 2017.    2021 MRI right knee  Impression:  1. Proximal patellar tendinosis.   2. Small to moderate fluid in the pes anserine/semimembranosus bursae,  query bursitis.   3. Proximal tibiofibular degenerative change.  4. Grade II/III areas of chondromalacia in the medial and  patellofemoral compartments.    2021 MRI left knee  IMPRESSION:  1. Small left knee joint effusion.  2. Mild tendinosis of the distal quadriceps tendon and proximal  patellar tendon.  3. Probable joint body in the region of the anterior interval,  intimate with the distal ACL fibers and anterior horn of the lateral  meniscus.  4. The anterior and posterior cruciate ligaments, medial and lateral  supporting structures, and bilateral menisci are intact.  5. Osteoarthrosis of left knee with areas of cartilage loss,  high-grade to full-thickness along the medial patellar facet and  lateral trochlear surface as well as along the medial  femoral condyle.    2/9/2022 MRI pelvis muscular tissue  Impression:  1. Query right L5 pedicle/lamina stress change. Also edema like marrow  signal intensity of bilateral L4 lamina. Consider dedicates L spine  MR.  2. Query left greater than the right paraspinal muscle strain.  3. Query dorsal epidural lipomatosis at L3-L4.  4. Right distal abdominal wall muscle strain.    3/17/2022 MRI lumbar spine  IMPRESSION:  1. Diffuse degenerative change of the lumbar spine as detailed above  without appreciable change from the recent comparison study.  2. No significant spinal canal or neural foraminal stenosis at any  level of the lumbar spine.    10/13/2022 MRI cervical spine  IMPRESSION:  1. Postoperative and diffuse degenerative change of the cervical spine  as detailed above.  2. No spinal canal stenosis.  3. Moderate neural foraminal stenosis on the right at C3-C4 and on the  left at C7-T1.    Laboratory:  12/2020  Positive RNP antibody    8/11/2022  Creatinine 1.08, GFR 81  CRP less than 2.9  Rheumatoid factor less than 6  White blood cell count 5.7, hemoglobin 15.5, platelet count 291  Sed rate 4  Lyme disease 0.11  Hepatitis C antibody nonreactive  HIV antibody nonreactive  JAMAAL positive with a speckled pattern and a titer of 1: 160

## 2022-11-29 NOTE — PATIENT INSTRUCTIONS
After Visit Instructions:     Thank you for coming to [facility] for your care. It is my goal to partner with you to help you reach your optimal state of health.       Plan:     Schedule follow-up with Chiara Garcia PA-C as needed.   Labs: see below      Chiara Garcia PA-C  [facility]  [facility]    Contact information: [facility]  Clinic Number:  [phone]  Please call or send a [employer] message with any questions about your care

## 2022-11-30 LAB
ANCA AB PATTERN SER IF-IMP: NORMAL
B2 GLYCOPROT1 IGG SERPL IA-ACNC: 14 U/ML
B2 GLYCOPROT1 IGM SERPL IA-ACNC: <2.4 U/ML
C-ANCA TITR SER IF: NORMAL {TITER}
C3 SERPL-MCNC: 126 MG/DL (ref 81–157)
C4 SERPL-MCNC: 21 MG/DL (ref 13–39)
CARDIOLIPIN IGG SER IA-ACNC: 3.6 GPL-U/ML
CARDIOLIPIN IGG SER IA-ACNC: NEGATIVE
CARDIOLIPIN IGM SER IA-ACNC: <2 MPL-U/ML
CARDIOLIPIN IGM SER IA-ACNC: NEGATIVE
CCP AB SER IA-ACNC: 0.5 U/ML
DRVVT SCREEN RATIO: 0.99
DSDNA AB SER-ACNC: 0.6 IU/ML
ENA SM IGG SER IA-ACNC: 0.8 U/ML
ENA SM IGG SER IA-ACNC: NEGATIVE
ENA SS-A AB SER IA-ACNC: <0.5 U/ML
ENA SS-A AB SER IA-ACNC: NEGATIVE
ENA SS-B IGG SER IA-ACNC: <0.6 U/ML
ENA SS-B IGG SER IA-ACNC: NEGATIVE
INR PPP: 0.97 (ref 0.85–1.15)
LA PPP-IMP: NEGATIVE
LUPUS INTERPRETATION: NORMAL
PTT RATIO: 0.95
THROMBIN TIME: 18.3 SECONDS (ref 13–19)
U1 SNRNP IGG SER IA-ACNC: 3.2 U/ML
U1 SNRNP IGG SER IA-ACNC: NEGATIVE

## 2022-12-02 LAB
B2 GLYCOPROT1 IGA SER-ACNC: 1.4 U/ML
CARDIOLIPIN IGA SER IA-ACNC: 1.5 APL-U/ML
CARDIOLIPIN IGA SER IA-ACNC: NEGATIVE
CENTROMERE B AB SER-ACNC: <0.7 U/ML
CENTROMERE B AB SER-ACNC: NEGATIVE
ENA SCL70 IGG SER IA-ACNC: <0.6 U/ML
ENA SCL70 IGG SER IA-ACNC: NEGATIVE

## 2022-12-05 ENCOUNTER — TELEPHONE (OUTPATIENT)
Dept: RHEUMATOLOGY | Facility: CLINIC | Age: 57
End: 2022-12-05

## 2022-12-05 DIAGNOSIS — R76.8 POSITIVE ANA (ANTINUCLEAR ANTIBODY): Primary | ICD-10-CM

## 2022-12-07 ENCOUNTER — MYC REFILL (OUTPATIENT)
Dept: FAMILY MEDICINE | Facility: CLINIC | Age: 57
End: 2022-12-07

## 2022-12-07 DIAGNOSIS — G89.4 CHRONIC PAIN SYNDROME: ICD-10-CM

## 2022-12-07 DIAGNOSIS — Z98.1 S/P CERVICAL SPINAL FUSION: ICD-10-CM

## 2022-12-07 DIAGNOSIS — M79.18 MYOFASCIAL PAIN: ICD-10-CM

## 2022-12-07 DIAGNOSIS — M54.16 LUMBAR RADICULOPATHY: ICD-10-CM

## 2022-12-07 DIAGNOSIS — F11.90 CHRONIC, CONTINUOUS USE OF OPIOIDS: ICD-10-CM

## 2022-12-07 DIAGNOSIS — M54.2 CERVICALGIA: ICD-10-CM

## 2022-12-07 RX ORDER — OXYCODONE HYDROCHLORIDE 5 MG/1
5-10 TABLET ORAL EVERY 6 HOURS PRN
Qty: 165 TABLET | Refills: 0 | Status: SHIPPED | OUTPATIENT
Start: 2022-12-10 | End: 2023-01-03

## 2022-12-07 NOTE — TELEPHONE ENCOUNTER
Requested Prescriptions   Pending Prescriptions Disp Refills     oxyCODONE (ROXICODONE) 5 MG tablet 165 tablet 0     Sig: Take 1-2 tablets (5-10 mg) by mouth every 6 hours as needed for severe pain (7-10) Max of 6 tablets/day. Fill 9/13/22. Start 9/15/22. 30 day Rx       Next 5 appointments (look out 90 days)    Dec 14, 2022  9:00 AM  (Arrive by 8:45 AM)  Return Visit with Alvaro Moore MD  [facility] [place] ([facility]) [address] [address] [place] [id number] [phone]           Routing refill request to provider for review/approval because:  Drug not on the [facility], P or [facility] refill protocol or controlled substance

## 2022-12-14 ENCOUNTER — OFFICE VISIT (OUTPATIENT)
Dept: ORTHOPEDICS | Facility: CLINIC | Age: 57
End: 2022-12-14
Payer: COMMERCIAL

## 2022-12-14 DIAGNOSIS — M19.012 GLENOHUMERAL ARTHRITIS, LEFT: Primary | ICD-10-CM

## 2022-12-14 PROCEDURE — 20611 DRAIN/INJ JOINT/BURSA W/US: CPT | Mod: LT | Performed by: PREVENTIVE MEDICINE

## 2022-12-14 PROCEDURE — 99214 OFFICE O/P EST MOD 30 MIN: CPT | Mod: 25 | Performed by: PREVENTIVE MEDICINE

## 2022-12-14 RX ADMIN — METHYLPREDNISOLONE ACETATE 40 MG: 40 INJECTION, SUSPENSION INTRA-ARTICULAR; INTRALESIONAL; INTRAMUSCULAR; SOFT TISSUE at 09:07

## 2022-12-14 NOTE — PROGRESS NOTES
HISTORY OF PRESENT ILLNESS  Mr. Mehta is a pleasant 57 year old year old male who presents to clinic today with left shoulder pain.   Indra explains that his left shoulder pain was improved until a couple months ago and now it has been getting progressively worse over the last couple months. No new injury or trauma.     Patient needs refills on the following medications: None    Location: Left shoulder    Quality:  achy pain    Severity: 7/10 at worst    Duration: see above  Timing: occurs intermittently  Context: occurs while exercising and lifting and using the joint  Modifying factors:  resting and non-use makes it better, movement and use makes it worse  Associated signs & symptoms:  Some radiation into left arm    MEDICAL HISTORY  Patient Active Problem List   Diagnosis     Meniere's disease     Major depression in complete remission (H)     Chronic pain syndrome     Pain medication agreement signed     Bilateral occipital neuralgia     Benign essential hypertension     Cervical radiculopathy     Status post lumbar spinal fusion     Sleep apnea, unspecified type     Osteoarthritis of spine with radiculopathy, lumbar region     Impingement syndrome of left shoulder     Primary osteoarthritis of both knees     Chronic bilateral low back pain without sciatica     Cervical myelopathy (H)     Major depression in complete remission (H)       Current Outpatient Medications   Medication Sig Dispense Refill     Acetaminophen (TYLENOL PO) Take 1,000 mg by mouth 3 times daily       amLODIPine (NORVASC) 5 MG tablet Take 1 tablet (5 mg) by mouth daily 90 tablet 3     atorvastatin (LIPITOR) 10 MG tablet Take 10 mg by mouth daily       cyclobenzaprine (FLEXERIL) 5 MG tablet 1-2 tablets three times daily as needed 90 tablet 2     ibuprofen (ADVIL/MOTRIN) 800 MG tablet Take 800 mg by mouth 3 times daily       losartan-hydrochlorothiazide (HYZAAR) 50-12.5 MG tablet TAKE 2 TABLETS BY MOUTH EVERY  tablet 1     naloxone  (NARCAN) 4 MG/0.1ML nasal spray Spray 1 spray (4 mg) into one nostril alternating nostrils once as needed for opioid reversal every 2-3 minutes until assistance arrives (Patient not taking: Reported on 2022) 0.2 mL 1     nortriptyline (PAMELOR) 50 MG capsule Take 1 capsule (50 mg) by mouth At Bedtime 90 capsule 1     order for DME Equipment being ordered: TENS Pads as directed 1 Device 0     oxyCODONE (ROXICODONE) 5 MG tablet Take 1-2 tablets (5-10 mg) by mouth every 6 hours as needed for severe pain (7-10) Max of 6 tablets/day. Fill 22. Start 9/15/22. 30 day Rx 165 tablet 0     pregabalin (LYRICA) 150 MG capsule Take 1 capsule (150 mg) by mouth 3 times daily 90 capsule 3     Pregabalin (LYRICA) 200 MG capsule TAKE 1 CAPSULE BY MOUTH EVERY DAY 30 capsule 0     sildenafil (VIAGRA) 100 MG tablet Take 0.5-1 tablets ( mg) by mouth daily as needed Take 30 min to 4 hours before intercourse.  Never use with nitroglycerin, terazosin or doxazosin. 6 tablet 7       Allergies   Allergen Reactions     No Known Drug Allergies        Family History   Problem Relation Age of Onset     Diabetes Mother      Cancer Mother         colon cancer -  at 52, diag at 42     Hypertension Father      Heart Disease Father          of AAA     No Known Problems Brother      No Known Problems Brother      Depression Sister      Suicide Other      Unknown/Adopted Maternal Grandmother      Unknown/Adopted Maternal Grandfather      Unknown/Adopted Paternal Grandmother      Unknown/Adopted Paternal Grandfather      No Known Problems Daughter      No Known Problems Daughter      Social History     Socioeconomic History     Marital status: Single     Number of children: 2   Occupational History     Employer: [employer]   Tobacco Use     Smoking status: Never     Smokeless tobacco: Never   Vaping Use     Vaping Use: Never used   Substance and Sexual Activity     Alcohol use: Yes     Alcohol/week: 0.0 standard drinks      Comment: rarely     Drug use: No     Sexual activity: Not Currently     Partners: Female   Other Topics Concern     Parent/sibling w/ CABG, MI or angioplasty before 65F 55M? No       Additional medical/Social/Surgical histories reviewed in [facility] and updated as appropriate.     REVIEW OF SYSTEMS (12/14/2022)  10 point ROS of systems including Constitutional, Eyes, Respiratory, Cardiovascular, Gastroenterology, Genitourinary, Integumentary, Musculoskeletal, Psychiatric, Allergic/Immunologic were all negative except for pertinent positives noted in my HPI.     PHYSICAL EXAM  VSS    General  - normal appearance, in no obvious distress  HEENT  - conjunctivae not injected, moist mucous membranes, normocephalic/atraumatic head, ears normal appearance, no lesions, mouth normal appearance, no scars, normal dentition and teeth present  CV  - normal radial pulse  Pulm  - normal respiratory pattern, non-labored  Musculoskeletal - left shoulder  - inspection: normal bone and joint alignment, no obvious deformity, no scapular winging, no AC step-off  - palpation: no bony or soft tissue tenderness, normal clavicle, non-tender AC  - ROM:  limited flexion, IR, ER, abduction, painful at end range  - strength: 5/5  strength, 5/5 in all shoulder planes  - special tests:  (-) Speed's  (-) Neer  (-) Hawkin's  (-) Tran's  (+) Gage's  (+) load & shift, supine  (-) apprehension  (-) subscap lift-off  Neuro  - no sensory or motor deficit, grossly normal coordination, normal muscle tone  Skin  - no ecchymosis, erythema, warmth, or induration, no obvious rash  Psych  - interactive, appropriate, normal mood and affect        ASSESSMENT & PLAN  56 yo male with left shoulder glenohumeral arthritis    I independently reviewed the following imaging studies:  Left shoulder xray shows GH arthritis  Cervical MRI: shows ddd  Discussed and can consider injetion in C spine if needed  After a 20 minute discussion and examination, we decided to  perform a same day injection for diagnostic and therapeutic purposes for left shoulder  Patient has been doing home exercise physical therapy program for this problem      Appropriate PPE was utilized for prevention of spread of Covid-19.  Alvaro Moore MD, CAQSM  Glenohumeral Injection - Ultrasound Guided  The patient was informed of the risks and the benefits of the procedure and a written consent was signed.  The patient s left shoulder was prepped with chlorhexidine in sterile fashion.   40 mg of methylprednisolone suspension was drawn up into a 5 mL syringe with 3 mL of 1% lidocaine w/o Epi.  Injection was performed using sterile technique.  Under ultrasound guidance a 3.5-inch 22-gauge needle was used to enter the glenohumeral joint.  Posterior approach was used with the patient in lateral recumbent position, arm in neutral position at the side.  Needle placement was visualized and documented with ultrasound.  Ultrasound visualization was necessary due to the small joint space entered.  Injection performed long axis to the probe.  Injection solution visualized within the joint space.  Images were permanently stored for the patient's record.  There were no complications. The patient tolerated the procedure well. There was negligible bleeding.   Therapy scheduled to follow for mobilization.      Large Joint Injection/Arthocentesis: L glenohumeral joint    Date/Time: 12/14/2022 9:07 AM  Performed by: Alvaro Moore MD  Authorized by: Alvaro Moore MD     Indications:  Pain, osteoarthritis and diagnostic evaluation  Needle Size:  22 G  Guidance: ultrasound    Approach:  Posterolateral  Location:  Shoulder      Site:  L glenohumeral joint  Medications:  40 mg methylPREDNISolone 40 MG/ML  Outcome:  Tolerated well, no immediate complications  Procedure discussed: discussed risks, benefits, and alternatives    Consent Given by:  Patient  Timeout: timeout called immediately prior to procedure    Prep:  patient was prepped and draped in usual sterile fashion

## 2022-12-14 NOTE — LETTER
12/14/2022         RE: Indra Mehta  [address] [address] [place] [id number]        Dear Colleague,    Thank you for referring your patient, Indra Mehta, to the [facility] [place]. Please see a copy of my visit note below.    HISTORY OF PRESENT ILLNESS  Mr. Mehta is a pleasant 57 year old year old male who presents to clinic today with left shoulder pain.   Indra explains that his left shoulder pain was improved until a couple months ago and now it has been getting progressively worse over the last couple months. No new injury or trauma.     Patient needs refills on the following medications: None    Location: Left shoulder    Quality:  achy pain    Severity: 7/10 at worst    Duration: see above  Timing: occurs intermittently  Context: occurs while exercising and lifting and using the joint  Modifying factors:  resting and non-use makes it better, movement and use makes it worse  Associated signs & symptoms:  Some radiation into left arm    MEDICAL HISTORY  Patient Active Problem List   Diagnosis     Meniere's disease     Major depression in complete remission (H)     Chronic pain syndrome     Pain medication agreement signed     Bilateral occipital neuralgia     Benign essential hypertension     Cervical radiculopathy     Status post lumbar spinal fusion     Sleep apnea, unspecified type     Osteoarthritis of spine with radiculopathy, lumbar region     Impingement syndrome of left shoulder     Primary osteoarthritis of both knees     Chronic bilateral low back pain without sciatica     Cervical myelopathy (H)     Major depression in complete remission (H)       Current Outpatient Medications   Medication Sig Dispense Refill     Acetaminophen (TYLENOL PO) Take 1,000 mg by mouth 3 times daily       amLODIPine (NORVASC) 5 MG tablet Take 1 tablet (5 mg) by mouth daily 90 tablet 3     atorvastatin (LIPITOR) 10 MG tablet Take 10 mg by mouth daily       cyclobenzaprine  (FLEXERIL) 5 MG tablet 1-2 tablets three times daily as needed 90 tablet 2     ibuprofen (ADVIL/MOTRIN) 800 MG tablet Take 800 mg by mouth 3 times daily       losartan-hydrochlorothiazide (HYZAAR) 50-12.5 MG tablet TAKE 2 TABLETS BY MOUTH EVERY  tablet 1     naloxone (NARCAN) 4 MG/0.1ML nasal spray Spray 1 spray (4 mg) into one nostril alternating nostrils once as needed for opioid reversal every 2-3 minutes until assistance arrives (Patient not taking: Reported on 2022) 0.2 mL 1     nortriptyline (PAMELOR) 50 MG capsule Take 1 capsule (50 mg) by mouth At Bedtime 90 capsule 1     order for DME Equipment being ordered: TENS Pads as directed 1 Device 0     oxyCODONE (ROXICODONE) 5 MG tablet Take 1-2 tablets (5-10 mg) by mouth every 6 hours as needed for severe pain (7-10) Max of 6 tablets/day. Fill 22. Start 9/15/22. 30 day Rx 165 tablet 0     pregabalin (LYRICA) 150 MG capsule Take 1 capsule (150 mg) by mouth 3 times daily 90 capsule 3     Pregabalin (LYRICA) 200 MG capsule TAKE 1 CAPSULE BY MOUTH EVERY DAY 30 capsule 0     sildenafil (VIAGRA) 100 MG tablet Take 0.5-1 tablets ( mg) by mouth daily as needed Take 30 min to 4 hours before intercourse.  Never use with nitroglycerin, terazosin or doxazosin. 6 tablet 7       Allergies   Allergen Reactions     No Known Drug Allergies        Family History   Problem Relation Age of Onset     Diabetes Mother      Cancer Mother         colon cancer -  at 52, diag at 42     Hypertension Father      Heart Disease Father          of AAA     No Known Problems Brother      No Known Problems Brother      Depression Sister      Suicide Other      Unknown/Adopted Maternal Grandmother      Unknown/Adopted Maternal Grandfather      Unknown/Adopted Paternal Grandmother      Unknown/Adopted Paternal Grandfather      No Known Problems Daughter      No Known Problems Daughter      Social History     Socioeconomic History     Marital status: Single     Number  of children: 2   Occupational History     Employer: [employer]   Tobacco Use     Smoking status: Never     Smokeless tobacco: Never   Vaping Use     Vaping Use: Never used   Substance and Sexual Activity     Alcohol use: Yes     Alcohol/week: 0.0 standard drinks     Comment: rarely     Drug use: No     Sexual activity: Not Currently     Partners: Female   Other Topics Concern     Parent/sibling w/ CABG, MI or angioplasty before 65F 55M? No       Additional medical/Social/Surgical histories reviewed in [facility] and updated as appropriate.     REVIEW OF SYSTEMS (12/14/2022)  10 point ROS of systems including Constitutional, Eyes, Respiratory, Cardiovascular, Gastroenterology, Genitourinary, Integumentary, Musculoskeletal, Psychiatric, Allergic/Immunologic were all negative except for pertinent positives noted in my HPI.     PHYSICAL EXAM  VSS    General  - normal appearance, in no obvious distress  HEENT  - conjunctivae not injected, moist mucous membranes, normocephalic/atraumatic head, ears normal appearance, no lesions, mouth normal appearance, no scars, normal dentition and teeth present  CV  - normal radial pulse  Pulm  - normal respiratory pattern, non-labored  Musculoskeletal - left shoulder  - inspection: normal bone and joint alignment, no obvious deformity, no scapular winging, no AC step-off  - palpation: no bony or soft tissue tenderness, normal clavicle, non-tender AC  - ROM:  limited flexion, IR, ER, abduction, painful at end range  - strength: 5/5  strength, 5/5 in all shoulder planes  - special tests:  (-) Speed's  (-) Neer  (-) Hawkin's  (-) Tran's  (+) Cascade's  (+) load & shift, supine  (-) apprehension  (-) subscap lift-off  Neuro  - no sensory or motor deficit, grossly normal coordination, normal muscle tone  Skin  - no ecchymosis, erythema, warmth, or induration, no obvious rash  Psych  - interactive, appropriate, normal mood and affect        ASSESSMENT & PLAN  58 yo male with left  shoulder glenohumeral arthritis    I independently reviewed the following imaging studies:  Left shoulder xray shows GH arthritis  Cervical MRI: shows ddd  Discussed and can consider injetion in C spine if needed  After a 20 minute discussion and examination, we decided to perform a same day injection for diagnostic and therapeutic purposes for left shoulder  Patient has been doing home exercise physical therapy program for this problem      Appropriate PPE was utilized for prevention of spread of Covid-19.  Alvaro Moore MD, CAQSM  Glenohumeral Injection - Ultrasound Guided  The patient was informed of the risks and the benefits of the procedure and a written consent was signed.  The patient s left shoulder was prepped with chlorhexidine in sterile fashion.   40 mg of methylprednisolone suspension was drawn up into a 5 mL syringe with 3 mL of 1% lidocaine w/o Epi.  Injection was performed using sterile technique.  Under ultrasound guidance a 3.5-inch 22-gauge needle was used to enter the glenohumeral joint.  Posterior approach was used with the patient in lateral recumbent position, arm in neutral position at the side.  Needle placement was visualized and documented with ultrasound.  Ultrasound visualization was necessary due to the small joint space entered.  Injection performed long axis to the probe.  Injection solution visualized within the joint space.  Images were permanently stored for the patient's record.  There were no complications. The patient tolerated the procedure well. There was negligible bleeding.   Therapy scheduled to follow for mobilization.      Large Joint Injection/Arthocentesis: L glenohumeral joint    Date/Time: 12/14/2022 9:07 AM  Performed by: Alvaro Moore MD  Authorized by: Alvaro Moore MD     Indications:  Pain, osteoarthritis and diagnostic evaluation  Needle Size:  22 G  Guidance: ultrasound    Approach:  Posterolateral  Location:  Shoulder      Site:  L  glenohumeral joint  Medications:  40 mg methylPREDNISolone 40 MG/ML  Outcome:  Tolerated well, no immediate complications  Procedure discussed: discussed risks, benefits, and alternatives    Consent Given by:  Patient  Timeout: timeout called immediately prior to procedure    Prep: patient was prepped and draped in usual sterile fashion                Again, thank you for allowing me to participate in the care of your patient.        Sincerely,        Alvaro Moore MD

## 2022-12-19 DIAGNOSIS — M54.2 CERVICALGIA: ICD-10-CM

## 2022-12-19 DIAGNOSIS — M54.16 LUMBAR RADICULOPATHY: ICD-10-CM

## 2022-12-19 DIAGNOSIS — M79.18 MYOFASCIAL PAIN: ICD-10-CM

## 2022-12-19 DIAGNOSIS — G62.9 NEUROPATHY: ICD-10-CM

## 2022-12-19 DIAGNOSIS — G89.4 CHRONIC PAIN SYNDROME: ICD-10-CM

## 2022-12-19 DIAGNOSIS — Z98.1 S/P CERVICAL SPINAL FUSION: ICD-10-CM

## 2022-12-19 DIAGNOSIS — F11.90 CHRONIC, CONTINUOUS USE OF OPIOIDS: ICD-10-CM

## 2022-12-19 RX ORDER — PREGABALIN 200 MG/1
CAPSULE ORAL
Qty: 30 CAPSULE | Refills: 1 | Status: SHIPPED | OUTPATIENT
Start: 2022-12-19 | End: 2023-01-19

## 2022-12-25 RX ORDER — METHYLPREDNISOLONE ACETATE 40 MG/ML
40 INJECTION, SUSPENSION INTRA-ARTICULAR; INTRALESIONAL; INTRAMUSCULAR; SOFT TISSUE
Status: SHIPPED | OUTPATIENT
Start: 2022-12-14

## 2023-01-03 ENCOUNTER — MYC REFILL (OUTPATIENT)
Dept: FAMILY MEDICINE | Facility: CLINIC | Age: 58
End: 2023-01-03

## 2023-01-03 DIAGNOSIS — M54.16 LUMBAR RADICULOPATHY: ICD-10-CM

## 2023-01-03 DIAGNOSIS — M54.2 CERVICALGIA: ICD-10-CM

## 2023-01-03 DIAGNOSIS — F11.90 CHRONIC, CONTINUOUS USE OF OPIOIDS: ICD-10-CM

## 2023-01-03 DIAGNOSIS — M79.18 MYOFASCIAL PAIN: ICD-10-CM

## 2023-01-03 DIAGNOSIS — Z98.1 S/P CERVICAL SPINAL FUSION: ICD-10-CM

## 2023-01-03 DIAGNOSIS — G89.4 CHRONIC PAIN SYNDROME: ICD-10-CM

## 2023-01-03 NOTE — TELEPHONE ENCOUNTER
Requested Prescriptions   Pending Prescriptions Disp Refills     oxyCODONE (ROXICODONE) 5 MG tablet 165 tablet 0     Sig: Take 1-2 tablets (5-10 mg) by mouth every 6 hours as needed for severe pain (7-10) Max of 6 tablets/day. Fill 9/13/22. Start 9/15/22. 30 day Rx         Routing refill request to provider for review/approval because:  Drug not on the [facility], [facility] or [facility] refill protocol or controlled substance

## 2023-01-04 RX ORDER — OXYCODONE HYDROCHLORIDE 5 MG/1
5-10 TABLET ORAL EVERY 6 HOURS PRN
Qty: 60 TABLET | Refills: 0 | Status: SHIPPED | OUTPATIENT
Start: 2023-01-04 | End: 2023-01-12

## 2023-01-12 ENCOUNTER — MYC MEDICAL ADVICE (OUTPATIENT)
Dept: FAMILY MEDICINE | Facility: CLINIC | Age: 58
End: 2023-01-12

## 2023-01-12 DIAGNOSIS — G89.4 CHRONIC PAIN SYNDROME: ICD-10-CM

## 2023-01-12 DIAGNOSIS — F11.90 CHRONIC, CONTINUOUS USE OF OPIOIDS: ICD-10-CM

## 2023-01-12 DIAGNOSIS — M54.16 LUMBAR RADICULOPATHY: ICD-10-CM

## 2023-01-12 DIAGNOSIS — Z98.1 S/P CERVICAL SPINAL FUSION: ICD-10-CM

## 2023-01-12 DIAGNOSIS — M79.18 MYOFASCIAL PAIN: ICD-10-CM

## 2023-01-12 DIAGNOSIS — M54.2 CERVICALGIA: ICD-10-CM

## 2023-01-12 NOTE — TELEPHONE ENCOUNTER
Patient is requesting a refill of his Oxycodone with a quantity of 165 for a months supply.  Prescription pended if appropriate.

## 2023-01-16 RX ORDER — OXYCODONE HYDROCHLORIDE 5 MG/1
5-10 TABLET ORAL EVERY 6 HOURS PRN
Qty: 165 TABLET | Refills: 0 | Status: SHIPPED | OUTPATIENT
Start: 2023-01-16 | End: 2023-02-10

## 2023-01-19 DIAGNOSIS — M54.16 LUMBAR RADICULOPATHY: ICD-10-CM

## 2023-01-19 DIAGNOSIS — F11.90 CHRONIC, CONTINUOUS USE OF OPIOIDS: ICD-10-CM

## 2023-01-19 DIAGNOSIS — Z98.1 S/P CERVICAL SPINAL FUSION: ICD-10-CM

## 2023-01-19 DIAGNOSIS — M54.2 CERVICALGIA: ICD-10-CM

## 2023-01-19 DIAGNOSIS — G89.4 CHRONIC PAIN SYNDROME: ICD-10-CM

## 2023-01-19 DIAGNOSIS — M79.18 MYOFASCIAL PAIN: ICD-10-CM

## 2023-01-19 DIAGNOSIS — G62.9 NEUROPATHY: ICD-10-CM

## 2023-01-19 RX ORDER — PREGABALIN 200 MG/1
CAPSULE ORAL
Qty: 30 CAPSULE | Refills: 1 | Status: SHIPPED | OUTPATIENT
Start: 2023-01-19 | End: 2023-02-13

## 2023-01-24 ENCOUNTER — OFFICE VISIT (OUTPATIENT)
Dept: ORTHOPEDICS | Facility: CLINIC | Age: 58
End: 2023-01-24
Payer: COMMERCIAL

## 2023-01-24 VITALS
WEIGHT: 218 LBS | SYSTOLIC BLOOD PRESSURE: 131 MMHG | HEIGHT: 73 IN | DIASTOLIC BLOOD PRESSURE: 87 MMHG | RESPIRATION RATE: 18 BRPM | BODY MASS INDEX: 28.89 KG/M2

## 2023-01-24 DIAGNOSIS — M17.0 PRIMARY OSTEOARTHRITIS OF BOTH KNEES: Primary | ICD-10-CM

## 2023-01-24 PROCEDURE — 20610 DRAIN/INJ JOINT/BURSA W/O US: CPT | Mod: 50 | Performed by: ORTHOPAEDIC SURGERY

## 2023-01-24 RX ORDER — LIDOCAINE HYDROCHLORIDE 10 MG/ML
5 INJECTION, SOLUTION INFILTRATION; PERINEURAL
Status: SHIPPED | OUTPATIENT
Start: 2023-01-24

## 2023-01-24 RX ORDER — METHYLPREDNISOLONE ACETATE 80 MG/ML
80 INJECTION, SUSPENSION INTRA-ARTICULAR; INTRALESIONAL; INTRAMUSCULAR; SOFT TISSUE
Status: SHIPPED | OUTPATIENT
Start: 2023-01-24

## 2023-01-24 RX ADMIN — LIDOCAINE HYDROCHLORIDE 5 ML: 10 INJECTION, SOLUTION INFILTRATION; PERINEURAL at 13:29

## 2023-01-24 RX ADMIN — METHYLPREDNISOLONE ACETATE 80 MG: 80 INJECTION, SUSPENSION INTRA-ARTICULAR; INTRALESIONAL; INTRAMUSCULAR; SOFT TISSUE at 13:29

## 2023-01-24 ASSESSMENT — KOOS JR
GOING UP OR DOWN STAIRS: SEVERE
STRAIGHTENING KNEE FULLY: MODERATE
TWISING OR PIVOTING ON KNEE: SEVERE
RISING FROM SITTING: SEVERE
STANDING UPRIGHT: MODERATE
KOOS JR SCORING: 36.93
HOW SEVERE IS YOUR KNEE STIFFNESS AFTER FIRST WAKING IN MORNING: EXTREME
BENDING TO THE FLOOR TO PICK UP OBJECT: SEVERE

## 2023-01-24 NOTE — LETTER
1/24/2023         RE: Indra Mehta  [address] [facility] [id number]        Dear Colleague,    Thank you for referring your patient, Indra Mehta, to the [facility]. Please see a copy of my visit note below.    Large Joint Injection/Arthocentesis: bilateral knee    Date/Time: 1/24/2023 1:29 PM  Performed by: Alvaro Mariee MD  Authorized by: Alvaro Mariee MD     Indications:  Pain  Needle Size:  22 G  Guidance: landmark guided    Approach:  Anteromedial  Location:  Knee  Laterality:  Bilateral      Medications (Right):  80 mg methylPREDNISolone 80 MG/ML; 5 mL lidocaine 1 %  Medications (Left):  80 mg methylPREDNISolone 80 MG/ML; 5 mL lidocaine 1 %  Outcome:  Tolerated well, no immediate complications  Procedure discussed: discussed risks, benefits, and alternatives    Consent Given by:  Patient  Timeout: timeout called immediately prior to procedure    Prep: patient was prepped and draped in usual sterile fashion            Follow up bilateral knee primary osteoarthritis.  Last injection 10/25/22.  This worked for a short time.  Sometimes injections have worked longer.  He is wondering about his options.  He takes ibuprofen 80 mg three times daily, tylenol, oxycodone, flexeril.  He has had x-ray bilateral knees on 11/18/21.  These showed mild osteoarthritis.  He had MRI of each knee 12/1/21.  These showed mainly osteoarthritis and patellar tendinitis.  Range of motion 0-130.   Tenderness at medial joint line bilaterally.  Also tender about patella.  He feels there is swelling especially about left knee anterior.  No pain with resisted extension.    Assessment:  Bilateral knee osteoarthritis - mild.  We discussed injections, changing NSAID, repeat knee x-ray.  He  desires injection today of bilateral knee(s).  Risks, benefits, potential complications and alternatives were discussed.   With the patient's consent, sterile prep was performed of bilateral  knee(s).  Each knee was injected with Depo Medrol 80 mg and lidocaine at left anterolateral and right anteromedial site.  Return to clinic as needed.         Again, thank you for allowing me to participate in the care of your patient.        Sincerely,        Alvaro Mariee MD

## 2023-01-24 NOTE — PROGRESS NOTES
Follow up bilateral knee primary osteoarthritis.  Last injection 10/25/22.  This worked for a short time.  Sometimes injections have worked longer.  He is wondering about his options.  He takes ibuprofen 80 mg three times daily, tylenol, oxycodone, flexeril.  He has had x-ray bilateral knees on 11/18/21.  These showed mild osteoarthritis.  He had MRI of each knee 12/1/21.  These showed mainly osteoarthritis and patellar tendinitis.  Range of motion 0-130.   Tenderness at medial joint line bilaterally.  Also tender about patella.  He feels there is swelling especially about left knee anterior.  No pain with resisted extension.    Assessment:  Bilateral knee osteoarthritis - mild.  We discussed injections, changing NSAID, repeat knee x-ray.  He  desires injection today of bilateral knee(s).  Risks, benefits, potential complications and alternatives were discussed.   With the patient's consent, sterile prep was performed of bilateral knee(s).  Each knee was injected with Depo Medrol 80 mg and lidocaine at left anterolateral and right anteromedial site.  Return to clinic as needed.

## 2023-01-24 NOTE — PROGRESS NOTES
Large Joint Injection/Arthocentesis: bilateral knee    Date/Time: 1/24/2023 1:29 PM  Performed by: Alvaro Mariee MD  Authorized by: Alvaro Mariee MD     Indications:  Pain  Needle Size:  22 G  Guidance: landmark guided    Approach:  Anteromedial  Location:  Knee  Laterality:  Bilateral      Medications (Right):  80 mg methylPREDNISolone 80 MG/ML; 5 mL lidocaine 1 %  Medications (Left):  80 mg methylPREDNISolone 80 MG/ML; 5 mL lidocaine 1 %  Outcome:  Tolerated well, no immediate complications  Procedure discussed: discussed risks, benefits, and alternatives    Consent Given by:  Patient  Timeout: timeout called immediately prior to procedure    Prep: patient was prepped and draped in usual sterile fashion

## 2023-01-31 DIAGNOSIS — I10 BENIGN ESSENTIAL HYPERTENSION: ICD-10-CM

## 2023-02-01 RX ORDER — LOSARTAN POTASSIUM AND HYDROCHLOROTHIAZIDE 12.5; 5 MG/1; MG/1
2 TABLET ORAL DAILY
Qty: 180 TABLET | Refills: 1 | Status: SHIPPED | OUTPATIENT
Start: 2023-02-01 | End: 2023-04-27

## 2023-02-01 NOTE — TELEPHONE ENCOUNTER
Prescription approved per [facility] Refill Protocol.    Cinthia Kingston, BSN, RN

## 2023-02-10 ENCOUNTER — MYC REFILL (OUTPATIENT)
Dept: FAMILY MEDICINE | Facility: CLINIC | Age: 58
End: 2023-02-10
Payer: COMMERCIAL

## 2023-02-10 DIAGNOSIS — Z98.1 S/P CERVICAL SPINAL FUSION: ICD-10-CM

## 2023-02-10 DIAGNOSIS — M54.2 CERVICALGIA: ICD-10-CM

## 2023-02-10 DIAGNOSIS — G89.4 CHRONIC PAIN SYNDROME: ICD-10-CM

## 2023-02-10 DIAGNOSIS — M54.16 LUMBAR RADICULOPATHY: ICD-10-CM

## 2023-02-10 DIAGNOSIS — M79.18 MYOFASCIAL PAIN: ICD-10-CM

## 2023-02-10 DIAGNOSIS — F11.90 CHRONIC, CONTINUOUS USE OF OPIOIDS: ICD-10-CM

## 2023-02-10 RX ORDER — OXYCODONE HYDROCHLORIDE 5 MG/1
5-10 TABLET ORAL EVERY 6 HOURS PRN
Qty: 165 TABLET | Refills: 0 | Status: SHIPPED | OUTPATIENT
Start: 2023-02-15 | End: 2023-03-13

## 2023-02-10 NOTE — TELEPHONE ENCOUNTER
Requested Prescriptions   Pending Prescriptions Disp Refills     oxyCODONE (ROXICODONE) 5 MG tablet 165 tablet 0     Sig: Take 1-2 tablets (5-10 mg) by mouth every 6 hours as needed for severe pain (7-10) Max of 6 tablets/day. Fill 9/13/22. Start 9/15/22. 30 day Rx         Routing refill request to provider for review/approval because:  Drug not on the [facility], [facility] or [facility] refill protocol or controlled substance

## 2023-02-13 DIAGNOSIS — M54.16 LUMBAR RADICULOPATHY: ICD-10-CM

## 2023-02-13 DIAGNOSIS — G62.9 NEUROPATHY: ICD-10-CM

## 2023-02-13 DIAGNOSIS — M79.18 MYOFASCIAL PAIN: ICD-10-CM

## 2023-02-13 DIAGNOSIS — M54.2 CERVICALGIA: ICD-10-CM

## 2023-02-13 DIAGNOSIS — G89.4 CHRONIC PAIN SYNDROME: ICD-10-CM

## 2023-02-13 DIAGNOSIS — F11.90 CHRONIC, CONTINUOUS USE OF OPIOIDS: ICD-10-CM

## 2023-02-13 DIAGNOSIS — Z98.1 S/P CERVICAL SPINAL FUSION: ICD-10-CM

## 2023-02-13 RX ORDER — PREGABALIN 200 MG/1
CAPSULE ORAL
Qty: 30 CAPSULE | Refills: 1 | Status: SHIPPED | OUTPATIENT
Start: 2023-02-18 | End: 2023-03-20

## 2023-02-14 ENCOUNTER — VIRTUAL VISIT (OUTPATIENT)
Dept: ORTHOPEDICS | Facility: CLINIC | Age: 58
End: 2023-02-14
Payer: COMMERCIAL

## 2023-02-14 DIAGNOSIS — T14.8XXA SUBLUXATION: ICD-10-CM

## 2023-02-14 DIAGNOSIS — M75.112 INCOMPLETE TEAR OF LEFT ROTATOR CUFF, UNSPECIFIED WHETHER TRAUMATIC: Primary | ICD-10-CM

## 2023-02-14 PROCEDURE — 99214 OFFICE O/P EST MOD 30 MIN: CPT | Mod: TEL | Performed by: PREVENTIVE MEDICINE

## 2023-02-14 NOTE — LETTER
2/14/2023         RE: Indra Mehta  [address] [address] [place] [id number]        Dear Colleague,    Thank you for referring your patient, Indra Mehta, to the [facility] [place]. Please see a copy of my visit note below.    Patient is a  57  year old who is being evaluated via a billable telephone visit.      What phone number would you like to be contacted at? CELL  How would you like to obtain your AVS? MYCHART        Subjective   Patient is a   57  year old who presents by phone call visit for the following:   Ongoing chronic left shoulder pain  Has had instability events in the past and continues to feel it 'pop' and hurt with reaching and lifting things  Injection only improved his pain for a few weeks, and now continues to hurt with activities  Wants to get an MRI and willing to do more PT  HPI       Review of Systems   Constitutional, HEENT, cardiovascular, pulmonary, gi and gu systems are negative, except as otherwise noted.      Objective           Vitals:  No vitals were obtained today due to virtual visit.    Physical Exam   healthy, alert and no distress  PSYCH: Alert and oriented times 3; coherent speech, normal   rate and volume, able to articulate logical thoughts, able   to abstract reason, no tangential thoughts, no hallucinations   or delusions  His affect is normal  RESP: No cough, no audible wheezing, able to talk in full sentences  Remainder of exam unable to be completed due to telephone visits    Assessment/Plan  56 yo male with left shoulder chronic pain, instability due to rotator cuff tear/arthropathy, possible labral tear and arthritis, not improving      I independently reviewed the following imaging studies and discussed with patient:  xrays shoulder: shows arthritis  Previous shoulder MRI from 2021 shows rotator cuff tear and labral tear  Worsened symptoms the past 6 months  Ordered shoulder MRI  Ordered physical therapy  F/u with me  afterwards          Phone call duration: 20 minutes  Phone call start: 1130am  Phone call end: 1150am  Dr Moore      Again, thank you for allowing me to participate in the care of your patient.        Sincerely,        Alvaro Moore MD

## 2023-02-14 NOTE — LETTER
2/14/2023         RE: Indra Mehta  [id number] [address] [place] [id number]        Dear Colleague,    Thank you for referring your patient, Indra Mehta, to the [facility] [place]. Please see a copy of my visit note below.    Patient is a  57  year old who is being evaluated via a billable telephone visit.      What phone number would you like to be contacted at? CELL  How would you like to obtain your AVS? MYCHART        Subjective   Patient is a   57  year old who presents by phone call visit for the following:   Ongoing chronic left shoulder pain  Has had instability events in the past and continues to feel it 'pop' and hurt with reaching and lifting things  Injection only improved his pain for a few weeks, and now continues to hurt with activities  Wants to get an MRI and willing to do more PT  HPI       Review of Systems   Constitutional, HEENT, cardiovascular, pulmonary, gi and gu systems are negative, except as otherwise noted.      Objective           Vitals:  No vitals were obtained today due to virtual visit.    Physical Exam   healthy, alert and no distress  PSYCH: Alert and oriented times 3; coherent speech, normal   rate and volume, able to articulate logical thoughts, able   to abstract reason, no tangential thoughts, no hallucinations   or delusions  His affect is normal  RESP: No cough, no audible wheezing, able to talk in full sentences  Remainder of exam unable to be completed due to telephone visits    Assessment/Plan  58 yo male with left shoulder chronic pain, instability due to rotator cuff tear/arthropathy, possible labral tear and arthritis, not improving      I independently reviewed the following imaging studies and discussed with patient:  xrays shoulder: shows arthritis  Previous shoulder MRI from 2021 shows rotator cuff tear and labral tear  Worsened symptoms the past 6 months  Ordered shoulder MRI  Ordered physical therapy  F/u with me  afterwards          Phone call duration: 20 minutes  Phone call start: 1130am  Phone call end: 1150am  Dr Moore      Again, thank you for allowing me to participate in the care of your patient.        Sincerely,        Alvaro Moore MD

## 2023-02-14 NOTE — PROGRESS NOTES
Patient is a  57  year old who is being evaluated via a billable telephone visit.      What phone number would you like to be contacted at? CELL  How would you like to obtain your AVS? SHANA        Subjective   Patient is a   57  year old who presents by phone call visit for the following:   Ongoing chronic left shoulder pain  Has had instability events in the past and continues to feel it 'pop' and hurt with reaching and lifting things  Injection only improved his pain for a few weeks, and now continues to hurt with activities  Wants to get an MRI and willing to do more PT  HPI       Review of Systems   Constitutional, HEENT, cardiovascular, pulmonary, gi and gu systems are negative, except as otherwise noted.      Objective           Vitals:  No vitals were obtained today due to virtual visit.    Physical Exam   healthy, alert and no distress  PSYCH: Alert and oriented times 3; coherent speech, normal   rate and volume, able to articulate logical thoughts, able   to abstract reason, no tangential thoughts, no hallucinations   or delusions  His affect is normal  RESP: No cough, no audible wheezing, able to talk in full sentences  Remainder of exam unable to be completed due to telephone visits    Assessment/Plan  56 yo male with left shoulder chronic pain, instability due to rotator cuff tear/arthropathy, possible labral tear and arthritis, not improving      I independently reviewed the following imaging studies and discussed with patient:  xrays shoulder: shows arthritis  Previous shoulder MRI from 2021 shows rotator cuff tear and labral tear  Worsened symptoms the past 6 months  Ordered shoulder MRI  Ordered physical therapy  F/u with me afterwards          Phone call duration: 20 minutes  Phone call start: 1130am  Phone call end: 1150am  Dr Moore

## 2023-02-15 ENCOUNTER — TELEPHONE (OUTPATIENT)
Dept: ORTHOPEDICS | Facility: CLINIC | Age: 58
End: 2023-02-15
Payer: COMMERCIAL

## 2023-02-15 NOTE — TELEPHONE ENCOUNTER
Left Voicemail (1st Attempt) for the patient to call back and schedule the following:    Appointment type: return  Provider: dr. so   Return date: couple days after mri  Specialty phone number: [phone]   Additional appointment(s) needed: mri prior       Anny goodwin Procedure   Orthopedics, Podiatry, Sports Medicine, Ent ,Eye , Audiology, Adult Endocrine & Diabetes, Nutrition & Medication Therapy Management Specialties   [facility]

## 2023-02-16 ENCOUNTER — HOSPITAL ENCOUNTER (OUTPATIENT)
Dept: PHYSICAL THERAPY | Facility: CLINIC | Age: 58
Setting detail: THERAPIES SERIES
Discharge: HOME | End: 2023-02-16
Attending: PREVENTIVE MEDICINE
Payer: COMMERCIAL

## 2023-02-16 ENCOUNTER — HOSPITAL ENCOUNTER (OUTPATIENT)
Dept: MRI IMAGING | Facility: CLINIC | Age: 58
Discharge: HOME | End: 2023-02-16
Attending: PREVENTIVE MEDICINE | Admitting: PREVENTIVE MEDICINE
Payer: COMMERCIAL

## 2023-02-16 DIAGNOSIS — M75.112 INCOMPLETE TEAR OF LEFT ROTATOR CUFF, UNSPECIFIED WHETHER TRAUMATIC: ICD-10-CM

## 2023-02-16 DIAGNOSIS — T14.8XXA SUBLUXATION: ICD-10-CM

## 2023-02-16 PROCEDURE — 97110 THERAPEUTIC EXERCISES: CPT | Mod: GP | Performed by: PHYSICAL THERAPIST

## 2023-02-16 PROCEDURE — 73221 MRI JOINT UPR EXTREM W/O DYE: CPT | Mod: LT

## 2023-02-16 PROCEDURE — 73221 MRI JOINT UPR EXTREM W/O DYE: CPT | Mod: 26 | Performed by: RADIOLOGY

## 2023-02-16 PROCEDURE — 97161 PT EVAL LOW COMPLEX 20 MIN: CPT | Mod: GP | Performed by: PHYSICAL THERAPIST

## 2023-02-19 NOTE — PROGRESS NOTES
02/16/23 0700   General Information   Type of Visit Initial OP Ortho PT Evaluation   Start of Care Date 02/16/23   Referring Physician Alvaro Moore MD   Patient/Family Goals Statement decrease pain, be able to lift feed bags   Orders Evaluate and Treat   Date of Order 02/14/23   Certification Required? No   Medical Diagnosis Incomplete tear of left rotator cuff, unspecified whether traumatic, Subluxation   Surgical/Medical history reviewed Yes   Precautions/Limitations no known precautions/limitations   Weight-Bearing Status - LUE weight-bearing as tolerated   Weight-Bearing Status - RUE full weight-bearing   Weight-Bearing Status - LLE full weight-bearing   Weight-Bearing Status - RLE full weight-bearing       Present No   Body Part(s)   Body Part(s) Shoulder   Presentation and Etiology   Pertinent history of current problem (include personal factors and/or comorbidities that impact the POC) Patient is a 56 y/o male presenting with L shoulder pain and suspected rotator cuff tear. No known ABIEL to current exasturbation. Pain described as a constant ache in the shoulder and instances of sharp pain with reaching tasks: across body, above shoulder, to the side. Also instances of numbness and tingling into the L shoulder and fingers when turning his head to the left. In the past week his shoulder pain has been 8-9/10 at worst and 1/10 at best with medication. Currently taking prescribed pain medication for other conditions and tylenol and/or ibuprofen as needed.  Patient has a hx of shouder injuries and subluxation for 25-30 years. His last MRI in 2021 showed RC and labral tearing. He has another scheduled today. Patient reports that his s/s have prevented him from doing ADLs and IADLs that involve reaching, lifting, and carrying.   Impairments A. Pain;D. Decreased ROM;E. Decreased flexibility;F. Decreased strength and endurance;M. Locking or catching   Functional Limitations perform  activities of daily living;perform desired leisure / sports activities   Symptom Location L shoulder   How/Where did it occur Other   Onset date of current episode/exacerbation 08/01/22   Pain rating (0-10 point scale) Best (/10);Worst (/10)   Best (/10) 1/10 on medication   Worst (/10) 8-9/10 with reaching tasks   Progression of symptoms since onset: Worsened   Current Level of Function   Patient role/employment history A. 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 No   Have you fallen and had an injury in the past year? No   Is patient a fall risk? No   Abuse Screen (yes response referral indicated)   Feels Unsafe at Home or Work/School no   Feels Threatened by Someone no   Does Anyone Try to Keep You From Having Contact with Others or Doing Things Outside Your Home? no   Physical Signs of Abuse Present no   Patient needs abuse support services and resources No   Shoulder Objective Findings   Side (if bilateral, select both right and left) Right;Left   Shoulder ROM Comment Pt reported feeling unstable when lifting above shoulder; demonstrate PROM WFL with flexion and scaption   [place]'s Test positive   Relocation Test Negative, pain with both positions   Observation Patient looked comfortable seated in chair, no indication of discomfort when resting   Posture Upright in chair   Left Shoulder Flexion AROM 96   Left Shoulder Abduction AROM 99   Left Shoulder ER AROM 34   Left Shoulder IR AROM 35   Left Shoulder Flexion Strength 3+   Left Shoulder Abduction Strength 3-   Left Shoulder ER Strength 4   Left Shoulder IR Strength 4   Right Shoulder Flexion AROM WNLs   Right Shoulder Abduction AROM WNLs   Right Shoulder Flexion Strength 4+   Right Shoulder Abduction Strength 4+   Right Shoulder ER Strength 4+   Right Shoulder IR  Strength 4+   Planned Therapy Interventions   Planned Therapy Interventions ADL retraining;IADL retraining;joint mobilization;manual therapy;neuromuscular re-education;ROM;strengthening;stretching   Planned Modality Interventions   Planned Modality Interventions Comments Modalities as required   Clinical Impression   Criteria for Skilled Therapeutic Interventions Met yes, treatment indicated   PT Diagnosis Decreased shoulder ROM and shoulder strength   Influenced by the following impairments pain, decreased ROM, decreased strength   Functional limitations due to impairments ADLs, IADLs, lifting, carrying, reaching   Clinical Presentation Stable/Uncomplicated   Clinical Presentation Rationale clinical judgement   Clinical Decision Making (Complexity) Low complexity   Therapy Frequency 2 times/Week   Predicted Duration of Therapy Intervention (days/wks) 90 days   Risk & Benefits of therapy have been explained Yes   Patient, Family & other staff in agreement with plan of care Yes   Clinical Impression Comments The patient is a 58 yo male who was referred to outpatient physical therapy for evaluation and treatment of incomplete L RC tear and subluxation; subjective and objective data supports RC impingement and decreased shoulder stability. 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   Education Assessment   Preferred Learning Style Listening;Demonstration;Pictures/video   Barriers to Learning No barriers   ORTHO GOALS   PT Ortho Eval Goals 1;2;3;4   Ortho Goal 1   Goal Identifier 1   Goal Description Patient will decrease the percentage of his day he is in pain from 100% to 50% in order to increase patient comfort levels and participate in HEP   Target Date 05/17/23   Ortho Goal 2   Goal Identifier 2   Goal Description Patient will increase L shoulder ROM within 10% of contralateral limb in order to perform his ADLs and IADLs   Target Date 04/17/23   Ortho Goal 3    Goal Identifier 3   Goal Description Patient will be able to lift 50# with both arms at shoulder height 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   Target Date 03/17/23   Total Evaluation Time   PT Eval, Low Complexity Minutes (85707) 35           Sherrie Maza, PT, DPT, OCS, CSCS  [facility]  [phone]

## 2023-02-22 ENCOUNTER — TELEPHONE (OUTPATIENT)
Dept: ORTHOPEDICS | Facility: CLINIC | Age: 58
End: 2023-02-22

## 2023-02-22 ENCOUNTER — VIRTUAL VISIT (OUTPATIENT)
Dept: ORTHOPEDICS | Facility: CLINIC | Age: 58
End: 2023-02-22
Payer: COMMERCIAL

## 2023-02-22 DIAGNOSIS — M54.12 CERVICAL RADICULOPATHY: ICD-10-CM

## 2023-02-22 DIAGNOSIS — Z98.1 HISTORY OF FUSION OF CERVICAL SPINE: ICD-10-CM

## 2023-02-22 DIAGNOSIS — M50.20 CERVICAL DISC HERNIATION: ICD-10-CM

## 2023-02-22 DIAGNOSIS — M50.30 DDD (DEGENERATIVE DISC DISEASE), CERVICAL: Primary | ICD-10-CM

## 2023-02-22 PROCEDURE — 99214 OFFICE O/P EST MOD 30 MIN: CPT | Mod: TEL | Performed by: PREVENTIVE MEDICINE

## 2023-02-22 NOTE — PROGRESS NOTES
Patient is a  57   year old who is being evaluated via a billable telephone visit.      What phone number would you like to be contacted at? CELL  How would you like to obtain your AVS? SHANA        Subjective   Patient is a  57   year old who presents by phone call visit for the following:     HPI   Neck pain, ongoing  Wants to review cervical MRI: shows ddd, disc herniations  Wants to discuss and decide on treatments    Review of Systems   Constitutional, HEENT, cardiovascular, pulmonary, gi and gu systems are negative, except as otherwise noted.      Objective           Vitals:  No vitals were obtained today due to virtual visit.    Physical Exam   healthy, alert and no distress  PSYCH: Alert and oriented times 3; coherent speech, normal   rate and volume, able to articulate logical thoughts, able   to abstract reason, no tangential thoughts, no hallucinations   or delusions  His affect is normal  RESP: No cough, no audible wheezing, able to talk in full sentences  Remainder of exam unable to be completed due to telephone visits    Assessment/Plan  [age]yo male with neck pain and cervical ddd, disc herniations, radicular pain    I independently reviewed the following imaging studies and discussed with patient:  Cervical MRI: shows ddd, disc herniations  Discussed and ordered cervical SRIDEVI  Cont. Medications  And HEP  F/u after SRIEDVI          Phone call duration:  20 minutes  Phone call start: 200pm  Phone call end: 220pm  Dr Moore

## 2023-02-22 NOTE — LETTER
2/22/2023         RE: Indra Mehta  [address] [address] [place] [id number]        Dear Colleague,    Thank you for referring your patient, Indra Mehta, to the [facility] [place]. Please see a copy of my visit note below.    Patient is a  57   year old who is being evaluated via a billable telephone visit.      What phone number would you like to be contacted at? CELL  How would you like to obtain your AVS? MYCHART        Subjective   Patient is a  57   year old who presents by phone call visit for the following:     HPI   Neck pain, ongoing  Wants to review cervical MRI: shows ddd, disc herniations  Wants to discuss and decide on treatments    Review of Systems   Constitutional, HEENT, cardiovascular, pulmonary, gi and gu systems are negative, except as otherwise noted.      Objective           Vitals:  No vitals were obtained today due to virtual visit.    Physical Exam   healthy, alert and no distress  PSYCH: Alert and oriented times 3; coherent speech, normal   rate and volume, able to articulate logical thoughts, able   to abstract reason, no tangential thoughts, no hallucinations   or delusions  His affect is normal  RESP: No cough, no audible wheezing, able to talk in full sentences  Remainder of exam unable to be completed due to telephone visits    Assessment/Plan  [age]yo male with neck pain and cervical ddd, disc herniations, radicular pain    I independently reviewed the following imaging studies and discussed with patient:  Cervical MRI: shows ddd, disc herniations  Discussed and ordered cervical SRIDEVI  Cont. Medications  And HEP  F/u after SRIDEVI          Phone call duration:  20 minutes  Phone call start: 200pm  Phone call end: 220pm  Dr Moore      Again, thank you for allowing me to participate in the care of your patient.        Sincerely,        Alvaro Moore MD

## 2023-02-22 NOTE — LETTER
2/22/2023         RE: Indra Mehta  [address] [address] [place] [id number]        Dear Colleague,    Thank you for referring your patient, Indra Mehta, to the [facility] [place]. Please see a copy of my visit note below.    Patient is a  57   year old who is being evaluated via a billable telephone visit.      What phone number would you like to be contacted at? CELL  How would you like to obtain your AVS? MYCHART        Subjective   Patient is a  57   year old who presents by phone call visit for the following:     HPI   Neck pain, ongoing  Wants to review cervical MRI: shows ddd, disc herniations  Wants to discuss and decide on treatments    Review of Systems   Constitutional, HEENT, cardiovascular, pulmonary, gi and gu systems are negative, except as otherwise noted.      Objective           Vitals:  No vitals were obtained today due to virtual visit.    Physical Exam   healthy, alert and no distress  PSYCH: Alert and oriented times 3; coherent speech, normal   rate and volume, able to articulate logical thoughts, able   to abstract reason, no tangential thoughts, no hallucinations   or delusions  His affect is normal  RESP: No cough, no audible wheezing, able to talk in full sentences  Remainder of exam unable to be completed due to telephone visits    Assessment/Plan  [age]yo male with neck pain and cervical ddd, disc herniations, radicular pain    I independently reviewed the following imaging studies and discussed with patient:  Cervical MRI: shows ddd, disc herniations  Discussed and ordered cervical SRIDEVI  Cont. Medications  And HEP  F/u after SRIDEVI          Phone call duration:  20 minutes  Phone call start: 200pm  Phone call end: 220pm  Dr Moore      Again, thank you for allowing me to participate in the care of your patient.        Sincerely,        Alvaro Moore MD

## 2023-02-22 NOTE — TELEPHONE ENCOUNTER
Left Voicemail (1st Attempt) for the patient to call back and schedule the following:    Appointment type: return  Provider: dr. so   Return date: 2 weeks after epidural injection   Specialty phone number: [phone]   Additional appointment(s) needed: epidural injection prior     Anny goodwin Procedure   Orthopedics, Podiatry, Sports Medicine, Ent ,Eye , Audiology, Adult Endocrine & Diabetes, Nutrition & Medication Therapy Management Specialties   [facility]

## 2023-03-06 ENCOUNTER — OFFICE VISIT (OUTPATIENT)
Dept: ORTHOPEDICS | Facility: CLINIC | Age: 58
End: 2023-03-06
Payer: COMMERCIAL

## 2023-03-06 ENCOUNTER — OFFICE VISIT (OUTPATIENT)
Dept: FAMILY MEDICINE | Facility: CLINIC | Age: 58
End: 2023-03-06
Payer: COMMERCIAL

## 2023-03-06 VITALS
HEIGHT: 74 IN | DIASTOLIC BLOOD PRESSURE: 80 MMHG | WEIGHT: 220 LBS | SYSTOLIC BLOOD PRESSURE: 120 MMHG | RESPIRATION RATE: 10 BRPM | TEMPERATURE: 98.6 F | OXYGEN SATURATION: 98 % | HEART RATE: 95 BPM | BODY MASS INDEX: 28.23 KG/M2

## 2023-03-06 DIAGNOSIS — M54.12 CERVICAL RADICULOPATHY: ICD-10-CM

## 2023-03-06 DIAGNOSIS — M12.812 ROTATOR CUFF ARTHROPATHY OF LEFT SHOULDER: ICD-10-CM

## 2023-03-06 DIAGNOSIS — Z01.818 PREOP GENERAL PHYSICAL EXAM: Primary | ICD-10-CM

## 2023-03-06 DIAGNOSIS — M75.52 SUBACROMIAL BURSITIS OF LEFT SHOULDER JOINT: Primary | ICD-10-CM

## 2023-03-06 PROCEDURE — 20610 DRAIN/INJ JOINT/BURSA W/O US: CPT | Mod: LT | Performed by: PREVENTIVE MEDICINE

## 2023-03-06 PROCEDURE — 99214 OFFICE O/P EST MOD 30 MIN: CPT | Mod: 25 | Performed by: PREVENTIVE MEDICINE

## 2023-03-06 PROCEDURE — 99214 OFFICE O/P EST MOD 30 MIN: CPT | Performed by: FAMILY MEDICINE

## 2023-03-06 RX ORDER — METHYLPREDNISOLONE ACETATE 40 MG/ML
40 INJECTION, SUSPENSION INTRA-ARTICULAR; INTRALESIONAL; INTRAMUSCULAR; SOFT TISSUE
Status: SHIPPED | OUTPATIENT
Start: 2023-03-06

## 2023-03-06 RX ADMIN — METHYLPREDNISOLONE ACETATE 40 MG: 40 INJECTION, SUSPENSION INTRA-ARTICULAR; INTRALESIONAL; INTRAMUSCULAR; SOFT TISSUE at 09:17

## 2023-03-06 ASSESSMENT — PATIENT HEALTH QUESTIONNAIRE - PHQ9
SUM OF ALL RESPONSES TO PHQ QUESTIONS 1-9: 9
SUM OF ALL RESPONSES TO PHQ QUESTIONS 1-9: 9
10. IF YOU CHECKED OFF ANY PROBLEMS, HOW DIFFICULT HAVE THESE PROBLEMS MADE IT FOR YOU TO DO YOUR WORK, TAKE CARE OF THINGS AT HOME, OR GET ALONG WITH OTHER PEOPLE: SOMEWHAT DIFFICULT

## 2023-03-06 NOTE — LETTER
3/6/2023         RE: Indra Mehta  [address] [address] [place] [id number]        Dear Colleague,    Thank you for referring your patient, Indar Mehta, to the [facility] [place]. Please see a copy of my visit note below.    HISTORY OF PRESENT ILLNESS  Mr. Mehta is a pleasant 57 year old year old male who presents to clinic today with the following:  What problem are you here for? Left shoulder bursitis  Wants to review his MRI again and disucss injection potentially today    How long have you had this problem?  decades    Have you had any recent imaging of this problem? Xrays/MRI/CT scans? yes    Have you had treatments for this problem in the past?  -Medications? yes  -Physical therapy? yes  -Injections? yes  -Surgery? no    How severe is this problem today? 0-10 scale? 2-3    How severe has this problem been at WORST in the past? 0-10 scale? 6    What do you think caused this problem? Played volleyball for a long time     Does this problem or its symptoms cause difficulty for you falling asleep or staying asleep? yes    Anything else you want us to know about this problem? Surgery vs conservative treatment          MEDICAL HISTORY  Patient Active Problem List   Diagnosis     Meniere's disease     Major depression in complete remission (H)     Chronic pain syndrome     Pain medication agreement signed     Bilateral occipital neuralgia     Benign essential hypertension     Cervical radiculopathy     Status post lumbar spinal fusion     Sleep apnea, unspecified type     Osteoarthritis of spine with radiculopathy, lumbar region     Impingement syndrome of left shoulder     Primary osteoarthritis of both knees     Chronic bilateral low back pain without sciatica     Cervical myelopathy (H)     Major depression in complete remission (H)       Current Outpatient Medications   Medication Sig Dispense Refill     Acetaminophen (TYLENOL PO) Take 1,000 mg by mouth 3 times daily        amLODIPine (NORVASC) 5 MG tablet Take 1 tablet (5 mg) by mouth daily 90 tablet 3     atorvastatin (LIPITOR) 10 MG tablet Take 10 mg by mouth daily       cyclobenzaprine (FLEXERIL) 5 MG tablet 1-2 tablets three times daily as needed 90 tablet 2     ibuprofen (ADVIL/MOTRIN) 800 MG tablet Take 800 mg by mouth 3 times daily       losartan-hydrochlorothiazide (HYZAAR) 50-12.5 MG tablet Take 2 tablets by mouth daily 180 tablet 1     naloxone (NARCAN) 4 MG/0.1ML nasal spray Spray 1 spray (4 mg) into one nostril alternating nostrils once as needed for opioid reversal every 2-3 minutes until assistance arrives (Patient not taking: Reported on 2022) 0.2 mL 1     nortriptyline (PAMELOR) 50 MG capsule Take 1 capsule (50 mg) by mouth At Bedtime 90 capsule 1     order for DME Equipment being ordered: TENS Pads as directed 1 Device 0     oxyCODONE (ROXICODONE) 5 MG tablet Take 1-2 tablets (5-10 mg) by mouth every 6 hours as needed for severe pain (7-10) Must last 30 days 165 tablet 0     pregabalin (LYRICA) 150 MG capsule Take 1 capsule (150 mg) by mouth 3 times daily 90 capsule 3     Pregabalin (LYRICA) 200 MG capsule TAKE 1 CAPSULE BY MOUTH EVERY DAY 30 capsule 1     sildenafil (VIAGRA) 100 MG tablet Take 0.5-1 tablets ( mg) by mouth daily as needed Take 30 min to 4 hours before intercourse.  Never use with nitroglycerin, terazosin or doxazosin. 6 tablet 7       Allergies   Allergen Reactions     No Known Drug Allergies        Family History   Problem Relation Age of Onset     Diabetes Mother      Cancer Mother         colon cancer -  at 52, diag at 42     Hypertension Father      Heart Disease Father          of AAA     No Known Problems Brother      No Known Problems Brother      Depression Sister      Suicide Other      Unknown/Adopted Maternal Grandmother      Unknown/Adopted Maternal Grandfather      Unknown/Adopted Paternal Grandmother      Unknown/Adopted Paternal Grandfather      No Known  Problems Daughter      No Known Problems Daughter      Social History     Socioeconomic History     Marital status: Single     Number of children: 2   Occupational History     Employer: [employer]   Tobacco Use     Smoking status: Never     Smokeless tobacco: Never   Vaping Use     Vaping Use: Never used   Substance and Sexual Activity     Alcohol use: Yes     Alcohol/week: 0.0 standard drinks     Comment: rarely     Drug use: No     Sexual activity: Not Currently     Partners: Female   Other Topics Concern     Parent/sibling w/ CABG, MI or angioplasty before 65F 55M? No       Additional medical/Social/Surgical histories reviewed in [facility] and updated as appropriate.     REVIEW OF SYSTEMS (3/6/2023)  10 point ROS of systems including Constitutional, Eyes, Respiratory, Cardiovascular, Gastroenterology, Genitourinary, Integumentary, Musculoskeletal, Psychiatric, Allergic/Immunologic were all negative except for pertinent positives noted in my HPI.     PHYSICAL EXAM  VSS    General  - normal appearance, in no obvious distress  HEENT  - conjunctivae not injected, moist mucous membranes, normocephalic/atraumatic head, ears normal appearance, no lesions, mouth normal appearance, no scars, normal dentition and teeth present  CV  - normal radial pulse  Pulm  - normal respiratory pattern, non-labored  Musculoskeletal - left  shoulder  - inspection: normal bone and joint alignment, no obvious deformity, no scapular winging, no AC step-off  - palpation: mildly tender RC insertion, normal clavicle, non-tender AC  - ROM:  painful and limited flexion and ER at end range, limited IR  - strength: 5/5  strength, 5/5 in all shoulder planes  - special tests:  (-) Speed's  (+) Neer  (+) Hawkin's  (+) Tran's  (-) Hatillo's  (-) apprehension  (-) subscap lift-off  Neuro  - no sensory or motor deficit, grossly normal coordination, normal muscle tone  Skin  - no ecchymosis, erythema, warmth, or induration, no obvious rash  Psych  -  interactive, appropriate, normal mood and affect  ASSESSMENT & PLAN  56 yo male with left shoulder subacromial bursitis, rotator cuff arthropathy, , not resolved    I independently reviewed the following imaging studies:  Left shoulder MRI; shows some moderate GH arthritis, degenerative labral tearing, rotator cuff tendinitis, chronic arthropathy, bursitis  After a 20 minute discussion and examination, we decided to perform a same day injection for diagnostic and therapeutic purposes for left shoulder  Cont. PT  Cont. Medications as wrtiten  F/u 1 month  Patient HAS completed physical therapy for 4-6 weeks  Patient has been doing home exercise physical therapy program for this problem      Appropriate PPE was utilized for prevention of spread of Covid-19.  Alvaro Moore MD, CAQSM    PROCEDURE: left SHOULDER subacromial bursa injection     The patient was apprised of the risks and the benefits of the procedure and consented and a written consent was signed by the patient.   The patient s shoulder was sterilely prepped with chloraprep.   40 mg of depo suspension was drawn up into a 5 mL syringe with 4 mL of 1% lidocaine   The patient was injected with a 1.5-inch 22-gauge needle at lateral gleno-humeral joint in the subacromial space  There were no complications. The patient tolerated the procedure well. There was minimal bleeding.   The patient was instructed to ice the shoulder upon leaving clinic and refrain from overuse over the next 3 days.   The patient was instructed to go to the emergency room with any usual pain, swelling, or redness occurred in the injected area.   The patient was given a followup appointment to evaluate response to the injection to their increased range of motion and reduction of pain.    followup PRN  Dr Alvaro Moore    Large Joint Injection/Arthocentesis: L subacromial bursa    Date/Time: 3/6/2023 9:17 AM  Performed by: Alvaro Moore MD  Authorized by: Alvaro Moore MD      Indications:  Pain, osteoarthritis and diagnostic evaluation  Needle Size:  22 G  Guidance: landmark guided    Approach:  Anterolateral  Location:  Shoulder      Site:  L subacromial bursa  Medications:  40 mg methylPREDNISolone 40 MG/ML  Outcome:  Tolerated well, no immediate complications  Procedure discussed: discussed risks, benefits, and alternatives    Consent Given by:  Patient  Timeout: timeout called immediately prior to procedure    Prep: patient was prepped and draped in usual sterile fashion              Again, thank you for allowing me to participate in the care of your patient.        Sincerely,        Alvaro Moore MD

## 2023-03-06 NOTE — PROGRESS NOTES
[address] [address]  Phone: [phone]  Fax: [phone]  Primary Provider: Gary Bello  Pre-op Performing Provider: GARY BELLO      PREOPERATIVE EVALUATION:  Today's date: 3/6/2023    Indra Mehta is a 57 year old male who presents for a preoperative evaluation.    Surgical Information:  Surgery/Procedure: Inject epidural  left sided C7 translaminar  Surgery Location: [facility]   Surgeon: Violeta  Surgery Date: 3/9/2023  Time of Surgery: 8 am   Where patient plans to recover: At home with family  Fax number for surgical facility: Note does not need to be faxed, will be available electronically in Epic.    Type of Anesthesia Anticipated: Local with MAC    Assessment & Plan     The proposed surgical procedure is considered LOW risk.      ICD-10-CM    1. Preop general physical exam  Z01.818       2. Cervical radiculopathy  M54.12                    Risks and Recommendations:  The patient has the following additional risks and recommendations for perioperative complications:   - No identified additional risk factors other than previously addressed    Medication Instructions:   - aspirin: Discontinue aspirin 7-10 days prior to procedure to reduce bleeding risk. It should be resumed postoperatively.    - ACE/ARB: HOLD due to exceptional risk of hypotension during surgery.    - Calcium Channel Blockers: May be continued on the day of surgery.   - SSRIs, SNRIs, TCAs, Antipsychotics: Continue without modification.     RECOMMENDATION:  APPROVAL GIVEN to proceed with proposed procedure, without further diagnostic evaluation.            Subjective     HPI related to upcoming procedure: Indra Mehta is a 57 year old male who presents with chronic bilateral cervical radiculopathy with prior cervical fusion and prior SRIDEVI      Preop Questions 3/6/2023   1. Have you ever had a heart attack or stroke? No   2. Have you ever had surgery on your heart or  blood vessels, such as a stent placement, a coronary artery bypass, or surgery on an artery in your head, neck, heart, or legs? No   3. Do you have chest pain with activity? No   4. Do you have a history of  heart failure? No   5. Do you currently have a cold, bronchitis or symptoms of other infection? No   6. Do you have a cough, shortness of breath, or wheezing? No   7. Do you or anyone in your family have previous history of blood clots? No   8. Do you or does anyone in your family have a serious bleeding problem such as prolonged bleeding following surgeries or cuts? No   9. Have you ever had problems with anemia or been told to take iron pills? No   10. Have you had any abnormal blood loss such as black, tarry or bloody stools? No   11. Have you ever had a blood transfusion? No   12. Are you willing to have a blood transfusion if it is medically needed before, during, or after your surgery? Yes   13. Have you or any of your relatives ever had problems with anesthesia? No   14. Do you have sleep apnea, excessive snoring or daytime drowsiness? No   14a. Do you have a CPAP machine? -   15. Do you have any artifical heart valves or other implanted medical devices like a pacemaker, defibrillator, or continuous glucose monitor? No   16. Do you have artificial joints? No   17. Are you allergic to latex? No       Health Care Directive:  Patient does not have a Health Care Directive or Living Will: Discussed advance care planning with patient; however, patient declined at this time.    Preoperative Review of :   reviewed - controlled substances reflected in medication list.      Status of Chronic Conditions:  See problem list for active medical problems.  Problems all longstanding and stable, except as noted/documented.  See ROS for pertinent symptoms related to these conditions.      Review of Systems  CONSTITUTIONAL: NEGATIVE for fever, chills, change in weight  ENT/MOUTH: NEGATIVE for ear, mouth and throat  problems  RESP: NEGATIVE for significant cough or SOB  CV: NEGATIVE for chest pain, palpitations or peripheral edema  ROS otherwise negative    Patient Active Problem List    Diagnosis Date Noted     Cervical myelopathy (H) 06/13/2022     Priority: Medium     Major depression in complete remission (H) 06/13/2022     Priority: Medium     Chronic bilateral low back pain without sciatica 05/08/2022     Priority: Medium     Primary osteoarthritis of both knees 05/04/2022     Priority: Medium     Impingement syndrome of left shoulder 06/23/2021     Priority: Medium     Sleep apnea, unspecified type 04/06/2021     Priority: Medium     Osteoarthritis of spine with radiculopathy, lumbar region 04/06/2021     Priority: Medium     Status post lumbar spinal fusion 01/14/2020     Priority: Medium     Cervical radiculopathy 10/28/2019     Priority: Medium     Formatting of this note might be different from the original.  Added automatically from request for surgery [id number]       Benign essential hypertension 06/30/2017     Priority: Medium     Bilateral occipital neuralgia 10/28/2016     Priority: Medium     Major depression in complete remission (H) 08/20/2010     Priority: Medium     Chronic pain syndrome 08/20/2010     Priority: Medium     Patient is followed by Tha Grullon MD for ongoing prescription of pain medication.  All refills should be approved by this provider only at face-to-face appointments - not by phone request.    Medication(s): Tramadol.   Maximum quantity per month: 60  Clinic visit frequency required: Q 1 months     Controlled substance agreement:  Encounter-Level CSA - 10/13/16:               Controlled Substance Agreement - Scan on 10/23/2016  5:07 PM : CONTROLLED SUBSTANCE AGREEMENT 10/13/16 (below)            Pain Clinic evaluation in the past: Yes       Date/Location:   [facility]    DIRE Total Score(s):  No flowsheet data found.    Last [facility] website verification:  none    [email or web address]               Pain medication agreement signed 08/20/2010     Priority: Medium     Meniere's disease 03/22/2007     Priority: Medium     Problem list name updated by automated process. Provider to review        Past Medical History:   Diagnosis Date     Anxiety      Back pain      Depressive disorder      Hyperlipidemia      Hypertension      Meniere's disease, unspecified      Pain medication agreement signed 8/20/2010     Sleep apnea, unspecified type      Past Surgical History:   Procedure Laterality Date     BUNIONECTOMY RT/LT  09/05/08    Both feet     COLONOSCOPY N/A 12/28/2016    Procedure: COMBINED COLONOSCOPY, SINGLE OR MULTIPLE BIOPSY/POLYPECTOMY BY BIOPSY;  Surgeon: Indra Jernigan MD;  [facility]     COLONOSCOPY N/A 9/27/2022    Procedure: COLONOSCOPY, FLEXIBLE, WITH LESION REMOVAL USING SNARE;  Surgeon: Pablo Ferris MD;  [facility]     DISCECTOMY, FUSION CERVICAL ANTERIOR ONE LEVEL, COMBINED N/A 7/5/2017    Procedure: COMBINED DISCECTOMY, FUSION CERVICAL ANTERIOR ONE LEVEL;  Cervical 6-7, Anterior cervical discectomy fusion;  Surgeon: Mike Mckenna MD;  [facility]     DISCECTOMY, FUSION CERVICAL ANTERIOR ONE LEVEL, COMBINED N/A 12/19/2018    Procedure: cervical 5-6 anterior cervical discectomy fusion;  Surgeon: Mike Mckenna MD;  [facility]     HERNIORRHAPHY UMBILICAL N/A 8/11/2017    Procedure: HERNIORRHAPHY UMBILICAL;  open umbilical hernia repair;  Surgeon: Boni Story MD;  [facility]     INJECT BLOCK MEDIAL BRANCH CERVICAL/THORACIC/LUMBAR Bilateral 8/12/2021    Procedure: Left L3, Left L4, Left L5, Right L3, Right L4 and Right L5 medial branch nerve blocks using fluoroscopic guidance and contrast control;  Surgeon: Darryn Mcdaniel MD;  [facility]     INJECT BLOCK MEDIAL BRANCH CERVICAL/THORACIC/LUMBAR N/A 9/9/2021    Procedure: Left L3, Left L4, Left L5, Right L3, Right L4 and Right L5 medial branch nerve blocks  using fluoroscopic guidance and contrast control;  Surgeon: Darryn Mcdaniel MD;  [facility]     INJECT EPIDURAL CERVICAL N/A 8/22/2019    Procedure: INJECTION, SPINE, CERVICAL 6-7 EPIDURAL;  Surgeon: Darryn Mcdaniel MD;  [facility]     INJECT EPIDURAL LUMBAR N/A 11/9/2017    Procedure: INJECT EPIDURAL LUMBAR;  lumbar 4-5 epidural steroid injection ;  Surgeon: Darryn Mcdaniel MD;  [facility]     INJECT EPIDURAL LUMBAR N/A 12/28/2017    Procedure: INJECT EPIDURAL LUMBAR;  lumbar epidural injection;  Surgeon: Darryn Mcdaniel MD;  [facility]     INJECT EPIDURAL LUMBAR Bilateral 5/13/2021    Procedure: Bilateral Lumbar 3-4 Epidural Steroid Injection;  Surgeon: Darryn Mcdaniel MD;  [facility]     INJECT EPIDURAL TRANSFORAMINAL Bilateral 8/23/2018    Procedure: INJECT EPIDURAL TRANSFORAMINAL;  transforaminal bilateral lumbar 3-4 steroid injection;  Surgeon: Darryn Mcdaniel MD;  [facility]     INJECT EPIDURAL TRANSFORAMINAL Bilateral 11/8/2018    Procedure: INJECT EPIDURAL TRANSFORAMINAL lumbar 3-4;  Surgeon: Darryn Mcdaniel MD;  [facility]     INJECT EPIDURAL TRANSFORAMINAL Bilateral 6/14/2019    Procedure: INJECTION, EPIDURAL, TRANSFORAMINAL LUMBAR 3-4 BILATERAL;  Surgeon: Darryn Mcdaniel MD;  [facility]     INJECT EPIDURAL TRANSFORAMINAL Bilateral 5/22/2020    Procedure: lumbar 3-4 bilateral transforaminal epidural steroid injection;  Surgeon: Darryn Mcdaniel MD;  [facility]     INJECT EPIDURAL TRANSFORAMINAL Bilateral 9/24/2020    Procedure: INJECTION, EPIDURAL, TRANSFORAMINAL  LUMBAR 3-4 APPROACH;  Surgeon: Darryn Mcdaniel MD;  [facility]     INJECT JOINT SACROILIAC Bilateral 10/13/2022    Procedure: Fluoroscopically-guided injection of the Bilateral sacroiliac joints with Bilateral kerrie Sacroiliac joint ligaments infiltration over the sacrum;  Surgeon: Darryn Mcdaniel MD;  [facility]     PE TUBES  2006    left ear     RADIO FREQUENCY ABLATION PULSED CERVICAL  Bilateral 10/1/2021    Procedure: RADIOFREQUENCY ABLATION lumbar 3, 4, 5 bilateral;  Surgeon: Darryn Mcdaniel MD;  [facility]     ZZHC COLONOSCOPY W/WO BRUSH/WASH  12/27/2005     ZZHC CREATE EARDRUM OPENING,GEN ANESTH  09/27/2007    Pe tube, Left endolymphatic sac enhancement.     ZZHC MASTOIDECTOMY,COMPLETE  09/27/2007     Current Outpatient Medications   Medication Sig Dispense Refill     Acetaminophen (TYLENOL PO) Take 1,000 mg by mouth 3 times daily       amLODIPine (NORVASC) 5 MG tablet Take 1 tablet (5 mg) by mouth daily 90 tablet 3     atorvastatin (LIPITOR) 10 MG tablet Take 10 mg by mouth daily       cyclobenzaprine (FLEXERIL) 5 MG tablet 1-2 tablets three times daily as needed 90 tablet 2     ibuprofen (ADVIL/MOTRIN) 800 MG tablet Take 800 mg by mouth 3 times daily       losartan-hydrochlorothiazide (HYZAAR) 50-12.5 MG tablet Take 2 tablets by mouth daily 180 tablet 1     nortriptyline (PAMELOR) 50 MG capsule Take 1 capsule (50 mg) by mouth At Bedtime 90 capsule 1     order for DME Equipment being ordered: TENS Pads as directed 1 Device 0     oxyCODONE (ROXICODONE) 5 MG tablet Take 1-2 tablets (5-10 mg) by mouth every 6 hours as needed for severe pain (7-10) Must last 30 days 165 tablet 0     pregabalin (LYRICA) 150 MG capsule Take 1 capsule (150 mg) by mouth 3 times daily 90 capsule 3     Pregabalin (LYRICA) 200 MG capsule TAKE 1 CAPSULE BY MOUTH EVERY DAY 30 capsule 1     sildenafil (VIAGRA) 100 MG tablet Take 0.5-1 tablets ( mg) by mouth daily as needed Take 30 min to 4 hours before intercourse.  Never use with nitroglycerin, terazosin or doxazosin. 6 tablet 7     naloxone (NARCAN) 4 MG/0.1ML nasal spray Spray 1 spray (4 mg) into one nostril alternating nostrils once as needed for opioid reversal every 2-3 minutes until assistance arrives (Patient not taking: Reported on 9/21/2022) 0.2 mL 1       Allergies   Allergen Reactions     No Known Drug Allergies         Social History     Tobacco Use  "    Smoking status: Never     Smokeless tobacco: Never   Substance Use Topics     Alcohol use: Yes     Alcohol/week: 0.0 standard drinks     Comment: rarely     Family History   Problem Relation Age of Onset     Diabetes Mother      Cancer Mother         colon cancer -  at 52, diag at 42     Hypertension Father      Heart Disease Father          of AAA     No Known Problems Brother      No Known Problems Brother      Depression Sister      Suicide Other      Unknown/Adopted Maternal Grandmother      Unknown/Adopted Maternal Grandfather      Unknown/Adopted Paternal Grandmother      Unknown/Adopted Paternal Grandfather      No Known Problems Daughter      No Known Problems Daughter      History   Drug Use No         Objective     /80   Pulse 95   Temp 98.6  F (37  C)   Resp 10   Ht 1.88 m (6' 2.02\")   Wt 99.8 kg (220 lb)   SpO2 98%   BMI 28.23 kg/m      Physical Exam  GENERAL APPEARANCE: healthy, alert and no distress  HENT: ear canals and TM's normal and nose and mouth without ulcers or lesions  RESP: lungs clear to auscultation - no rales, rhonchi or wheezes  CV: regular rate and rhythm, normal S1 S2, no S3 or S4 and no murmur, click or rub   ABDOMEN: soft, nontender, no HSM or masses and bowel sounds normal  NEURO: Normal strength and tone, sensory exam grossly normal, mentation intact and speech normal    Recent Labs   Lab Test 22  1033 22  1426 21  1413 21  1338   HGB  --  15.5  --  14.9   PLT  --  291  --   --    INR 0.97  --   --   --    NA  --  139 141  --    POTASSIUM  --  3.5 3.9  --    CR  --  1.08 1.18  --         Diagnostics:  No labs were ordered during this visit.   No EKG required for low risk surgery (cataract, skin procedure, breast biopsy, etc).    Revised Cardiac Risk Index (RCRI):  The patient has the following serious cardiovascular risks for perioperative complications:   - No serious cardiac risks = 0 points     RCRI Interpretation: 0 points: Class " I (very low risk - 0.4% complication rate)           Signed Electronically by: Tha Grullon MD  Copy of this evaluation report is provided to requesting physician.      Answers for HPI/ROS submitted by the patient on 3/6/2023  If you checked off any problems, how difficult have these problems made it for you to do your work, take care of things at home, or get along with other people?: Somewhat difficult  PHQ9 TOTAL SCORE: 9

## 2023-03-06 NOTE — PATIENT INSTRUCTIONS
For informational purposes only. Not to replace the advice of your health care provider. Copyright   2003,  [employer] [facility]. All rights reserved. Clinically reviewed by Karina Haq MD. [employer] [id number] - REV .  Preparing for Your Surgery  Getting started  A nurse will call you to review your health history and instructions. They will give you an arrival time based on your scheduled surgery time. Please be ready to share:    Your doctor's clinic name and phone number    Your medical, surgical, and anesthesia history    A list of allergies and sensitivities    A list of medicines, including herbal treatments and over-the-counter drugs    Whether the patient has a legal guardian (ask how to send us the papers in advance)  Please tell us if you're pregnant--or if there's any chance you might be pregnant. Some surgeries may injure a fetus (unborn baby), so they require a pregnancy test. Surgeries that are safe for a fetus don't always need a test, and you can choose whether to have one.   If you have a child who's having surgery, please ask for a copy of Preparing for Your Child's Surgery.    Preparing for surgery    Within 10 to 30 days of surgery: Have a pre-op exam (sometimes called an H&P, or History and Physical). This can be done at a clinic or pre-operative center.  ? If you're having a , you may not need this exam. Talk to your care team.    At your pre-op exam, talk to your care team about all medicines you take. If you need to stop any medicines before surgery, ask when to start taking them again.  ? We do this for your safety. Many medicines can make you bleed too much during surgery. Some change how well surgery (anesthesia) drugs work.    Call your insurance company to let them know you're having surgery. (If you don't have insurance, call [phone].)    Call your clinic if there's any change in your health. This includes signs of a cold or flu (sore throat, runny nose,  cough, rash, fever). It also includes a scrape or scratch near the surgery site.    If you have questions on the day of surgery, call your hospital or surgery center.  Eating and drinking guidelines  For your safety: Unless your surgeon tells you otherwise, follow the guidelines below.    Eat and drink as usual until 8 hours before you arrive for surgery. After that, no food or milk.    Drink clear liquids until 2 hours before you arrive. These are liquids you can see through, like water, Gatorade, and Propel Water. They also include plain black coffee and tea (no cream or milk), candy, and breath mints. You can spit out gum when you arrive.    If you drink alcohol: Stop drinking it the night before surgery.    If your care team tells you to take medicine on the morning of surgery, it's okay to take it with a sip of water.  Preventing infection    Shower or bathe the night before and morning of your surgery. Follow the instructions your clinic gave you. (If no instructions, use regular soap.)    Don't shave or clip hair near your surgery site. We'll remove the hair if needed.    Don't smoke or vape the morning of surgery. You may chew nicotine gum up to 2 hours before surgery. A nicotine patch is okay.  ? Note: Some surgeries require you to completely quit smoking and nicotine. Check with your surgeon.    Your care team will make every effort to keep you safe from infection. We will:  ? Clean our hands often with soap and water (or an alcohol-based hand rub).  ? Clean the skin at your surgery site with a special soap that kills germs.  ? Give you a special gown to keep you warm. (Cold raises the risk of infection.)  ? Wear special hair covers, masks, gowns and gloves during surgery.  ? Give antibiotic medicine, if prescribed. Not all surgeries need antibiotics.  What to bring on the day of surgery    Photo ID and insurance card    Copy of your health care directive, if you have one    Glasses and hearing aids (bring  cases)  ? You can't wear contacts during surgery    Inhaler and eye drops, if you use them (tell us about these when you arrive)    CPAP machine or breathing device, if you use them    A few personal items, if spending the night    If you have . . .  ? A pacemaker, ICD (cardiac defibrillator) or other implant: Bring the ID card.  ? An implanted stimulator: Bring the remote control.  ? A legal guardian: Bring a copy of the certified (court-stamped) guardianship papers.  Please remove any jewelry, including body piercings. Leave jewelry and other valuables at home.  If you're going home the day of surgery    You must have a responsible adult drive you home. They should stay with you overnight as well.    If you don't have someone to stay with you, and you aren't safe to go home alone, we may keep you overnight. Insurance often won't pay for this.  After surgery  If it's hard to control your pain or you need more pain medicine, please call your surgeon's office.  Questions?   If you have any questions for your care team, list them here: _________________________________________________________________________________________________________________________________________________________________________ ____________________________________ ____________________________________ ____________________________________    How to Take Your Medication Before Surgery  - HOLD (do not take) Aspirin for 7 days  - HOLD (do not take) Losartan the morning of the procedure

## 2023-03-06 NOTE — PROGRESS NOTES
Large Joint Injection/Arthocentesis: L subacromial bursa    Date/Time: 3/6/2023 9:17 AM  Performed by: Alvaro Moore MD  Authorized by: Alvaro Moore MD     Indications:  Pain, osteoarthritis and diagnostic evaluation  Needle Size:  22 G  Guidance: landmark guided    Approach:  Anterolateral  Location:  Shoulder      Site:  L subacromial bursa  Medications:  40 mg methylPREDNISolone 40 MG/ML  Outcome:  Tolerated well, no immediate complications  Procedure discussed: discussed risks, benefits, and alternatives    Consent Given by:  Patient  Timeout: timeout called immediately prior to procedure    Prep: patient was prepped and draped in usual sterile fashion

## 2023-03-06 NOTE — H&P (VIEW-ONLY)
[address] [address]  Phone: [phone]  Fax: [phone]  Primary Provider: Gary Bello  Pre-op Performing Provider: GARY BELLO      PREOPERATIVE EVALUATION:  Today's date: 3/6/2023    Indra Mehta is a 57 year old male who presents for a preoperative evaluation.    Surgical Information:  Surgery/Procedure: Inject epidural  left sided C7 translaminar  Surgery Location: [facility]   Surgeon: Violeta  Surgery Date: 3/9/2023  Time of Surgery: 8 am   Where patient plans to recover: At home with family  Fax number for surgical facility: Note does not need to be faxed, will be available electronically in Epic.    Type of Anesthesia Anticipated: Local with MAC    Assessment & Plan     The proposed surgical procedure is considered LOW risk.      ICD-10-CM    1. Preop general physical exam  Z01.818       2. Cervical radiculopathy  M54.12                    Risks and Recommendations:  The patient has the following additional risks and recommendations for perioperative complications:   - No identified additional risk factors other than previously addressed    Medication Instructions:   - aspirin: Discontinue aspirin 7-10 days prior to procedure to reduce bleeding risk. It should be resumed postoperatively.    - ACE/ARB: HOLD due to exceptional risk of hypotension during surgery.    - Calcium Channel Blockers: May be continued on the day of surgery.   - SSRIs, SNRIs, TCAs, Antipsychotics: Continue without modification.     RECOMMENDATION:  APPROVAL GIVEN to proceed with proposed procedure, without further diagnostic evaluation.            Subjective     HPI related to upcoming procedure: Indra Mehta is a 57 year old male who presents with chronic bilateral cervical radiculopathy with prior cervical fusion and prior SRIDEVI      Preop Questions 3/6/2023   1. Have you ever had a heart attack or stroke? No   2. Have you ever had surgery on your heart or  blood vessels, such as a stent placement, a coronary artery bypass, or surgery on an artery in your head, neck, heart, or legs? No   3. Do you have chest pain with activity? No   4. Do you have a history of  heart failure? No   5. Do you currently have a cold, bronchitis or symptoms of other infection? No   6. Do you have a cough, shortness of breath, or wheezing? No   7. Do you or anyone in your family have previous history of blood clots? No   8. Do you or does anyone in your family have a serious bleeding problem such as prolonged bleeding following surgeries or cuts? No   9. Have you ever had problems with anemia or been told to take iron pills? No   10. Have you had any abnormal blood loss such as black, tarry or bloody stools? No   11. Have you ever had a blood transfusion? No   12. Are you willing to have a blood transfusion if it is medically needed before, during, or after your surgery? Yes   13. Have you or any of your relatives ever had problems with anesthesia? No   14. Do you have sleep apnea, excessive snoring or daytime drowsiness? No   14a. Do you have a CPAP machine? -   15. Do you have any artifical heart valves or other implanted medical devices like a pacemaker, defibrillator, or continuous glucose monitor? No   16. Do you have artificial joints? No   17. Are you allergic to latex? No       Health Care Directive:  Patient does not have a Health Care Directive or Living Will: Discussed advance care planning with patient; however, patient declined at this time.    Preoperative Review of :   reviewed - controlled substances reflected in medication list.      Status of Chronic Conditions:  See problem list for active medical problems.  Problems all longstanding and stable, except as noted/documented.  See ROS for pertinent symptoms related to these conditions.      Review of Systems  CONSTITUTIONAL: NEGATIVE for fever, chills, change in weight  ENT/MOUTH: NEGATIVE for ear, mouth and throat  problems  RESP: NEGATIVE for significant cough or SOB  CV: NEGATIVE for chest pain, palpitations or peripheral edema  ROS otherwise negative    Patient Active Problem List    Diagnosis Date Noted     Cervical myelopathy (H) 06/13/2022     Priority: Medium     Major depression in complete remission (H) 06/13/2022     Priority: Medium     Chronic bilateral low back pain without sciatica 05/08/2022     Priority: Medium     Primary osteoarthritis of both knees 05/04/2022     Priority: Medium     Impingement syndrome of left shoulder 06/23/2021     Priority: Medium     Sleep apnea, unspecified type 04/06/2021     Priority: Medium     Osteoarthritis of spine with radiculopathy, lumbar region 04/06/2021     Priority: Medium     Status post lumbar spinal fusion 01/14/2020     Priority: Medium     Cervical radiculopathy 10/28/2019     Priority: Medium     Formatting of this note might be different from the original.  Added automatically from request for surgery [id number]       Benign essential hypertension 06/30/2017     Priority: Medium     Bilateral occipital neuralgia 10/28/2016     Priority: Medium     Major depression in complete remission (H) 08/20/2010     Priority: Medium     Chronic pain syndrome 08/20/2010     Priority: Medium     Patient is followed by Tha Grullon MD for ongoing prescription of pain medication.  All refills should be approved by this provider only at face-to-face appointments - not by phone request.    Medication(s): Tramadol.   Maximum quantity per month: 60  Clinic visit frequency required: Q 1 months     Controlled substance agreement:  Encounter-Level CSA - 10/13/16:               Controlled Substance Agreement - Scan on 10/23/2016  5:07 PM : CONTROLLED SUBSTANCE AGREEMENT 10/13/16 (below)            Pain Clinic evaluation in the past: Yes       Date/Location:   [facility]    DIRE Total Score(s):  No flowsheet data found.    Last [facility] website verification:  none    [email or web address]               Pain medication agreement signed 08/20/2010     Priority: Medium     Meniere's disease 03/22/2007     Priority: Medium     Problem list name updated by automated process. Provider to review        Past Medical History:   Diagnosis Date     Anxiety      Back pain      Depressive disorder      Hyperlipidemia      Hypertension      Meniere's disease, unspecified      Pain medication agreement signed 8/20/2010     Sleep apnea, unspecified type      Past Surgical History:   Procedure Laterality Date     BUNIONECTOMY RT/LT  09/05/08    Both feet     COLONOSCOPY N/A 12/28/2016    Procedure: COMBINED COLONOSCOPY, SINGLE OR MULTIPLE BIOPSY/POLYPECTOMY BY BIOPSY;  Surgeon: Indra Jernigan MD;  Location: [facility]     COLONOSCOPY N/A 9/27/2022    Procedure: COLONOSCOPY, FLEXIBLE, WITH LESION REMOVAL USING SNARE;  Surgeon: Pablo Ferris MD;  Location: [facility]     DISCECTOMY, FUSION CERVICAL ANTERIOR ONE LEVEL, COMBINED N/A 7/5/2017    Procedure: COMBINED DISCECTOMY, FUSION CERVICAL ANTERIOR ONE LEVEL;  Cervical 6-7, Anterior cervical discectomy fusion;  Surgeon: Mike Mckenna MD;  [facility]     DISCECTOMY, FUSION CERVICAL ANTERIOR ONE LEVEL, COMBINED N/A 12/19/2018    Procedure: cervical 5-6 anterior cervical discectomy fusion;  Surgeon: Mike Mckenna MD;  [facility]     HERNIORRHAPHY UMBILICAL N/A 8/11/2017    Procedure: HERNIORRHAPHY UMBILICAL;  open umbilical hernia repair;  Surgeon: Boni Story MD;  [facility]     INJECT BLOCK MEDIAL BRANCH CERVICAL/THORACIC/LUMBAR Bilateral 8/12/2021    Procedure: Left L3, Left L4, Left L5, Right L3, Right L4 and Right L5 medial branch nerve blocks using fluoroscopic guidance and contrast control;  Surgeon: Darryn Mcdaniel MD;  [facility]     INJECT BLOCK MEDIAL BRANCH CERVICAL/THORACIC/LUMBAR N/A 9/9/2021    Procedure: Left L3, Left L4, Left L5, Right L3, Right L4 and Right L5 medial branch nerve blocks  using fluoroscopic guidance and contrast control;  Surgeon: Darryn Mcdaniel MD;  [facility]     INJECT EPIDURAL CERVICAL N/A 8/22/2019    Procedure: INJECTION, SPINE, CERVICAL 6-7 EPIDURAL;  Surgeon: Darryn Mcdaniel MD;  [facility]     INJECT EPIDURAL LUMBAR N/A 11/9/2017    Procedure: INJECT EPIDURAL LUMBAR;  lumbar 4-5 epidural steroid injection ;  Surgeon: Darryn Mcdaniel MD;  [facility]     INJECT EPIDURAL LUMBAR N/A 12/28/2017    Procedure: INJECT EPIDURAL LUMBAR;  lumbar epidural injection;  Surgeon: Darryn Mcdaniel MD;  [facility]     INJECT EPIDURAL LUMBAR Bilateral 5/13/2021    Procedure: Bilateral Lumbar 3-4 Epidural Steroid Injection;  Surgeon: Darryn Mcdaniel MD;  [facility]     INJECT EPIDURAL TRANSFORAMINAL Bilateral 8/23/2018    Procedure: INJECT EPIDURAL TRANSFORAMINAL;  transforaminal bilateral lumbar 3-4 steroid injection;  Surgeon: Darryn Mcdaniel MD;  [facility]     INJECT EPIDURAL TRANSFORAMINAL Bilateral 11/8/2018    Procedure: INJECT EPIDURAL TRANSFORAMINAL lumbar 3-4;  Surgeon: Darryn Mcdaniel MD;  [facility]     INJECT EPIDURAL TRANSFORAMINAL Bilateral 6/14/2019    Procedure: INJECTION, EPIDURAL, TRANSFORAMINAL LUMBAR 3-4 BILATERAL;  Surgeon: Darryn Mcdaniel MD;  [facility]     INJECT EPIDURAL TRANSFORAMINAL Bilateral 5/22/2020    Procedure: lumbar 3-4 bilateral transforaminal epidural steroid injection;  Surgeon: Darryn Mcdaniel MD;  [facility]     INJECT EPIDURAL TRANSFORAMINAL Bilateral 9/24/2020    Procedure: INJECTION, EPIDURAL, TRANSFORAMINAL  LUMBAR 3-4 APPROACH;  Surgeon: Darryn Mcdaniel MD;  [facility]     INJECT JOINT SACROILIAC Bilateral 10/13/2022    Procedure: Fluoroscopically-guided injection of the Bilateral sacroiliac joints with Bilateral kerrie Sacroiliac joint ligaments infiltration over the sacrum;  Surgeon: Darryn Mcdaniel MD;  [facility]     PE TUBES  2006    left ear     RADIO FREQUENCY ABLATION PULSED CERVICAL  Bilateral 10/1/2021    Procedure: RADIOFREQUENCY ABLATION lumbar 3, 4, 5 bilateral;  Surgeon: Darryn Mcdaniel MD;  [facility]     ZZHC COLONOSCOPY W/WO BRUSH/WASH  12/27/2005     ZZHC CREATE EARDRUM OPENING,GEN ANESTH  09/27/2007    Pe tube, Left endolymphatic sac enhancement.     ZZHC MASTOIDECTOMY,COMPLETE  09/27/2007     Current Outpatient Medications   Medication Sig Dispense Refill     Acetaminophen (TYLENOL PO) Take 1,000 mg by mouth 3 times daily       amLODIPine (NORVASC) 5 MG tablet Take 1 tablet (5 mg) by mouth daily 90 tablet 3     atorvastatin (LIPITOR) 10 MG tablet Take 10 mg by mouth daily       cyclobenzaprine (FLEXERIL) 5 MG tablet 1-2 tablets three times daily as needed 90 tablet 2     ibuprofen (ADVIL/MOTRIN) 800 MG tablet Take 800 mg by mouth 3 times daily       losartan-hydrochlorothiazide (HYZAAR) 50-12.5 MG tablet Take 2 tablets by mouth daily 180 tablet 1     nortriptyline (PAMELOR) 50 MG capsule Take 1 capsule (50 mg) by mouth At Bedtime 90 capsule 1     order for DME Equipment being ordered: TENS Pads as directed 1 Device 0     oxyCODONE (ROXICODONE) 5 MG tablet Take 1-2 tablets (5-10 mg) by mouth every 6 hours as needed for severe pain (7-10) Must last 30 days 165 tablet 0     pregabalin (LYRICA) 150 MG capsule Take 1 capsule (150 mg) by mouth 3 times daily 90 capsule 3     Pregabalin (LYRICA) 200 MG capsule TAKE 1 CAPSULE BY MOUTH EVERY DAY 30 capsule 1     sildenafil (VIAGRA) 100 MG tablet Take 0.5-1 tablets ( mg) by mouth daily as needed Take 30 min to 4 hours before intercourse.  Never use with nitroglycerin, terazosin or doxazosin. 6 tablet 7     naloxone (NARCAN) 4 MG/0.1ML nasal spray Spray 1 spray (4 mg) into one nostril alternating nostrils once as needed for opioid reversal every 2-3 minutes until assistance arrives (Patient not taking: Reported on 9/21/2022) 0.2 mL 1       Allergies   Allergen Reactions     No Known Drug Allergies         Social History     Tobacco Use  "    Smoking status: Never     Smokeless tobacco: Never   Substance Use Topics     Alcohol use: Yes     Alcohol/week: 0.0 standard drinks     Comment: rarely     Family History   Problem Relation Age of Onset     Diabetes Mother      Cancer Mother         colon cancer -  at 52, diag at 42     Hypertension Father      Heart Disease Father          of AAA     No Known Problems Brother      No Known Problems Brother      Depression Sister      Suicide Other      Unknown/Adopted Maternal Grandmother      Unknown/Adopted Maternal Grandfather      Unknown/Adopted Paternal Grandmother      Unknown/Adopted Paternal Grandfather      No Known Problems Daughter      No Known Problems Daughter      History   Drug Use No         Objective     /80   Pulse 95   Temp 98.6  F (37  C)   Resp 10   Ht 1.88 m (6' 2.02\")   Wt 99.8 kg (220 lb)   SpO2 98%   BMI 28.23 kg/m      Physical Exam  GENERAL APPEARANCE: healthy, alert and no distress  HENT: ear canals and TM's normal and nose and mouth without ulcers or lesions  RESP: lungs clear to auscultation - no rales, rhonchi or wheezes  CV: regular rate and rhythm, normal S1 S2, no S3 or S4 and no murmur, click or rub   ABDOMEN: soft, nontender, no HSM or masses and bowel sounds normal  NEURO: Normal strength and tone, sensory exam grossly normal, mentation intact and speech normal    Recent Labs   Lab Test 22  1033 22  1426 21  1413 21  1338   HGB  --  15.5  --  14.9   PLT  --  291  --   --    INR 0.97  --   --   --    NA  --  139 141  --    POTASSIUM  --  3.5 3.9  --    CR  --  1.08 1.18  --         Diagnostics:  No labs were ordered during this visit.   No EKG required for low risk surgery (cataract, skin procedure, breast biopsy, etc).    Revised Cardiac Risk Index (RCRI):  The patient has the following serious cardiovascular risks for perioperative complications:   - No serious cardiac risks = 0 points     RCRI Interpretation: 0 points: Class " I (very low risk - 0.4% complication rate)           Signed Electronically by: Tha Grullon MD  Copy of this evaluation report is provided to requesting physician.      Answers for HPI/ROS submitted by the patient on 3/6/2023  If you checked off any problems, how difficult have these problems made it for you to do your work, take care of things at home, or get along with other people?: Somewhat difficult  PHQ9 TOTAL SCORE: 9

## 2023-03-06 NOTE — PROGRESS NOTES
HISTORY OF PRESENT ILLNESS  Mr. Mehta is a pleasant 57 year old year old male who presents to clinic today with the following:  What problem are you here for? Left shoulder bursitis  Wants to review his MRI again and disucss injection potentially today    How long have you had this problem?  decades    Have you had any recent imaging of this problem? Xrays/MRI/CT scans? yes    Have you had treatments for this problem in the past?  -Medications? yes  -Physical therapy? yes  -Injections? yes  -Surgery? no    How severe is this problem today? 0-10 scale? 2-3    How severe has this problem been at WORST in the past? 0-10 scale? 6    What do you think caused this problem? Played volleyball for a long time     Does this problem or its symptoms cause difficulty for you falling asleep or staying asleep? yes    Anything else you want us to know about this problem? Surgery vs conservative treatment          MEDICAL HISTORY  Patient Active Problem List   Diagnosis     Meniere's disease     Major depression in complete remission (H)     Chronic pain syndrome     Pain medication agreement signed     Bilateral occipital neuralgia     Benign essential hypertension     Cervical radiculopathy     Status post lumbar spinal fusion     Sleep apnea, unspecified type     Osteoarthritis of spine with radiculopathy, lumbar region     Impingement syndrome of left shoulder     Primary osteoarthritis of both knees     Chronic bilateral low back pain without sciatica     Cervical myelopathy (H)     Major depression in complete remission (H)       Current Outpatient Medications   Medication Sig Dispense Refill     Acetaminophen (TYLENOL PO) Take 1,000 mg by mouth 3 times daily       amLODIPine (NORVASC) 5 MG tablet Take 1 tablet (5 mg) by mouth daily 90 tablet 3     atorvastatin (LIPITOR) 10 MG tablet Take 10 mg by mouth daily       cyclobenzaprine (FLEXERIL) 5 MG tablet 1-2 tablets three times daily as needed 90 tablet 2     ibuprofen  (ADVIL/MOTRIN) 800 MG tablet Take 800 mg by mouth 3 times daily       losartan-hydrochlorothiazide (HYZAAR) 50-12.5 MG tablet Take 2 tablets by mouth daily 180 tablet 1     naloxone (NARCAN) 4 MG/0.1ML nasal spray Spray 1 spray (4 mg) into one nostril alternating nostrils once as needed for opioid reversal every 2-3 minutes until assistance arrives (Patient not taking: Reported on 2022) 0.2 mL 1     nortriptyline (PAMELOR) 50 MG capsule Take 1 capsule (50 mg) by mouth At Bedtime 90 capsule 1     order for DME Equipment being ordered: TENS Pads as directed 1 Device 0     oxyCODONE (ROXICODONE) 5 MG tablet Take 1-2 tablets (5-10 mg) by mouth every 6 hours as needed for severe pain (7-10) Must last 30 days 165 tablet 0     pregabalin (LYRICA) 150 MG capsule Take 1 capsule (150 mg) by mouth 3 times daily 90 capsule 3     Pregabalin (LYRICA) 200 MG capsule TAKE 1 CAPSULE BY MOUTH EVERY DAY 30 capsule 1     sildenafil (VIAGRA) 100 MG tablet Take 0.5-1 tablets ( mg) by mouth daily as needed Take 30 min to 4 hours before intercourse.  Never use with nitroglycerin, terazosin or doxazosin. 6 tablet 7       Allergies   Allergen Reactions     No Known Drug Allergies        Family History   Problem Relation Age of Onset     Diabetes Mother      Cancer Mother         colon cancer -  at 52, diag at 42     Hypertension Father      Heart Disease Father          of AAA     No Known Problems Brother      No Known Problems Brother      Depression Sister      Suicide Other      Unknown/Adopted Maternal Grandmother      Unknown/Adopted Maternal Grandfather      Unknown/Adopted Paternal Grandmother      Unknown/Adopted Paternal Grandfather      No Known Problems Daughter      No Known Problems Daughter      Social History     Socioeconomic History     Marital status: Single     Number of children: 2   Occupational History     Employer: [employer]   Tobacco Use     Smoking status: Never     Smokeless tobacco: Never    Vaping Use     Vaping Use: Never used   Substance and Sexual Activity     Alcohol use: Yes     Alcohol/week: 0.0 standard drinks     Comment: rarely     Drug use: No     Sexual activity: Not Currently     Partners: Female   Other Topics Concern     Parent/sibling w/ CABG, MI or angioplasty before 65F 55M? No       Additional medical/Social/Surgical histories reviewed in [facility] and updated as appropriate.     REVIEW OF SYSTEMS (3/6/2023)  10 point ROS of systems including Constitutional, Eyes, Respiratory, Cardiovascular, Gastroenterology, Genitourinary, Integumentary, Musculoskeletal, Psychiatric, Allergic/Immunologic were all negative except for pertinent positives noted in my HPI.     PHYSICAL EXAM  VSS    General  - normal appearance, in no obvious distress  HEENT  - conjunctivae not injected, moist mucous membranes, normocephalic/atraumatic head, ears normal appearance, no lesions, mouth normal appearance, no scars, normal dentition and teeth present  CV  - normal radial pulse  Pulm  - normal respiratory pattern, non-labored  Musculoskeletal - left  shoulder  - inspection: normal bone and joint alignment, no obvious deformity, no scapular winging, no AC step-off  - palpation: mildly tender RC insertion, normal clavicle, non-tender AC  - ROM:  painful and limited flexion and ER at end range, limited IR  - strength: 5/5  strength, 5/5 in all shoulder planes  - special tests:  (-) Speed's  (+) Neer  (+) Hawkin's  (+) Tran's  (-) [place]'s  (-) apprehension  (-) subscap lift-off  Neuro  - no sensory or motor deficit, grossly normal coordination, normal muscle tone  Skin  - no ecchymosis, erythema, warmth, or induration, no obvious rash  Psych  - interactive, appropriate, normal mood and affect  ASSESSMENT & PLAN  56 yo male with left shoulder subacromial bursitis, rotator cuff arthropathy, , not resolved    I independently reviewed the following imaging studies:  Left shoulder MRI; shows some moderate GH  arthritis, degenerative labral tearing, rotator cuff tendinitis, chronic arthropathy, bursitis  After a 20 minute discussion and examination, we decided to perform a same day injection for diagnostic and therapeutic purposes for left shoulder  Cont. PT  Cont. Medications as wrtiten  F/u 1 month  Patient HAS completed physical therapy for 4-6 weeks  Patient has been doing home exercise physical therapy program for this problem      Appropriate PPE was utilized for prevention of spread of Covid-19.  Alvaro Moore MD, CAQSM    PROCEDURE: left SHOULDER subacromial bursa injection     The patient was apprised of the risks and the benefits of the procedure and consented and a written consent was signed by the patient.   The patient s shoulder was sterilely prepped with chloraprep.   40 mg of depo suspension was drawn up into a 5 mL syringe with 4 mL of 1% lidocaine   The patient was injected with a 1.5-inch 22-gauge needle at lateral gleno-humeral joint in the subacromial space  There were no complications. The patient tolerated the procedure well. There was minimal bleeding.   The patient was instructed to ice the shoulder upon leaving clinic and refrain from overuse over the next 3 days.   The patient was instructed to go to the emergency room with any usual pain, swelling, or redness occurred in the injected area.   The patient was given a followup appointment to evaluate response to the injection to their increased range of motion and reduction of pain.    followup PRN  Dr Alvaro Moore

## 2023-03-08 ENCOUNTER — ANESTHESIA EVENT (OUTPATIENT)
Dept: SURGERY | Facility: CLINIC | Age: 58
End: 2023-03-08
Payer: COMMERCIAL

## 2023-03-08 ENCOUNTER — HOSPITAL ENCOUNTER (OUTPATIENT)
Dept: PHYSICAL THERAPY | Facility: CLINIC | Age: 58
Setting detail: THERAPIES SERIES
Discharge: HOME | End: 2023-03-08
Attending: PREVENTIVE MEDICINE
Payer: COMMERCIAL

## 2023-03-08 PROCEDURE — 97140 MANUAL THERAPY 1/> REGIONS: CPT | Mod: GP | Performed by: PHYSICAL THERAPIST

## 2023-03-08 PROCEDURE — 97110 THERAPEUTIC EXERCISES: CPT | Mod: GP | Performed by: PHYSICAL THERAPIST

## 2023-03-08 PROCEDURE — 97112 NEUROMUSCULAR REEDUCATION: CPT | Mod: GP | Performed by: PHYSICAL THERAPIST

## 2023-03-08 ASSESSMENT — LIFESTYLE VARIABLES: TOBACCO_USE: 0

## 2023-03-08 NOTE — ANESTHESIA PREPROCEDURE EVALUATION
Anesthesia Pre-Procedure Evaluation    Patient: Indra Mehta   MRN: [id number] : 1965        Procedure : Procedure(s):  INJECT JOINT SACROILIAC          Past Medical History:   Diagnosis Date     Anxiety      Back pain      Depressive disorder      Hyperlipidemia      Hypertension      Meniere's disease, unspecified      Pain medication agreement signed 2010     Sleep apnea, unspecified type       Past Surgical History:   Procedure Laterality Date     BUNIONECTOMY RT/LT  08    Both feet     COLONOSCOPY N/A 2016    Procedure: COMBINED COLONOSCOPY, SINGLE OR MULTIPLE BIOPSY/POLYPECTOMY BY BIOPSY;  Surgeon: Indra Jernigan MD;  Location: [facility]     COLONOSCOPY N/A 2022    Procedure: COLONOSCOPY, FLEXIBLE, WITH LESION REMOVAL USING SNARE;  Surgeon: Pablo Ferris MD;  [facility]     DISCECTOMY, FUSION CERVICAL ANTERIOR ONE LEVEL, COMBINED N/A 2017    Procedure: COMBINED DISCECTOMY, FUSION CERVICAL ANTERIOR ONE LEVEL;  Cervical 6-7, Anterior cervical discectomy fusion;  Surgeon: Mike Mckenna MD;  [facility]     DISCECTOMY, FUSION CERVICAL ANTERIOR ONE LEVEL, COMBINED N/A 2018    Procedure: cervical 5-6 anterior cervical discectomy fusion;  Surgeon: Mike Mckenna MD;  [facility]     HERNIORRHAPHY UMBILICAL N/A 2017    Procedure: HERNIORRHAPHY UMBILICAL;  open umbilical hernia repair;  Surgeon: Boni Story MD;  [facility]     INJECT BLOCK MEDIAL BRANCH CERVICAL/THORACIC/LUMBAR Bilateral 2021    Procedure: Left L3, Left L4, Left L5, Right L3, Right L4 and Right L5 medial branch nerve blocks using fluoroscopic guidance and contrast control;  Surgeon: Darryn Mcdanile MD;  [facility]     INJECT BLOCK MEDIAL BRANCH CERVICAL/THORACIC/LUMBAR N/A 2021    Procedure: Left L3, Left L4, Left L5, Right L3, Right L4 and Right L5 medial branch nerve blocks using fluoroscopic guidance and contrast control;  Surgeon: Darryn Mcdaniel,  MD;  [facility]     INJECT EPIDURAL CERVICAL N/A 8/22/2019    Procedure: INJECTION, SPINE, CERVICAL 6-7 EPIDURAL;  Surgeon: Darryn Mcdaniel MD;  [facility]     INJECT EPIDURAL LUMBAR N/A 11/9/2017    Procedure: INJECT EPIDURAL LUMBAR;  lumbar 4-5 epidural steroid injection ;  Surgeon: Darryn Mcdaniel MD;  [facility]     INJECT EPIDURAL LUMBAR N/A 12/28/2017    Procedure: INJECT EPIDURAL LUMBAR;  lumbar epidural injection;  Surgeon: Darryn Mcdaniel MD;  [facility]     INJECT EPIDURAL LUMBAR Bilateral 5/13/2021    Procedure: Bilateral Lumbar 3-4 Epidural Steroid Injection;  Surgeon: Darryn Mcdaniel MD;  [facility]     INJECT EPIDURAL TRANSFORAMINAL Bilateral 8/23/2018    Procedure: INJECT EPIDURAL TRANSFORAMINAL;  transforaminal bilateral lumbar 3-4 steroid injection;  Surgeon: Darryn Mcdaniel MD;  [facility]     INJECT EPIDURAL TRANSFORAMINAL Bilateral 11/8/2018    Procedure: INJECT EPIDURAL TRANSFORAMINAL lumbar 3-4;  Surgeon: Darryn Mcdaniel MD;  [facility]     INJECT EPIDURAL TRANSFORAMINAL Bilateral 6/14/2019    Procedure: INJECTION, EPIDURAL, TRANSFORAMINAL LUMBAR 3-4 BILATERAL;  Surgeon: Darryn Mcdaniel MD;  [facility]     INJECT EPIDURAL TRANSFORAMINAL Bilateral 5/22/2020    Procedure: lumbar 3-4 bilateral transforaminal epidural steroid injection;  Surgeon: Darryn Mcdaniel MD;  [facility]     INJECT EPIDURAL TRANSFORAMINAL Bilateral 9/24/2020    Procedure: INJECTION, EPIDURAL, TRANSFORAMINAL  LUMBAR 3-4 APPROACH;  Surgeon: Darryn Mcdaniel MD;  [facility]     INJECT JOINT SACROILIAC Bilateral 10/13/2022    Procedure: Fluoroscopically-guided injection of the Bilateral sacroiliac joints with Bilateral kerrie Sacroiliac joint ligaments infiltration over the sacrum;  Surgeon: Darryn Mcdaniel MD;  [facility]     PE TUBES  2006    left ear     RADIO FREQUENCY ABLATION PULSED CERVICAL Bilateral 10/1/2021    Procedure: RADIOFREQUENCY ABLATION lumbar 3, 4, 5  bilateral;  Surgeon: Darryn Mcdaniel MD;  Location:  OR     ZZ COLONOSCOPY W/WO BRUSH/WASH  12/27/2005     ZZHC CREATE EARDRUM OPENING,GEN ANESTH  09/27/2007    Pe tube, Left endolymphatic sac enhancement.     ZZHC MASTOIDECTOMY,COMPLETE  09/27/2007      Allergies   Allergen Reactions     No Known Drug Allergies       Social History     Tobacco Use     Smoking status: Never     Smokeless tobacco: Never   Substance Use Topics     Alcohol use: Yes     Alcohol/week: 0.0 standard drinks     Comment: rarely      Wt Readings from Last 1 Encounters:   03/06/23 99.8 kg (220 lb)        Anesthesia Evaluation   Pt has had prior anesthetic. Type: General and MAC.    No history of anesthetic complications       ROS/MED HX  ENT/Pulmonary:     (+) sleep apnea, moderate, uses CPAP,  (-) tobacco use   Neurologic: Comment: Occipital neuralgia    Meniere's disease      Cardiovascular:     (+) Dyslipidemia hypertension-----Previous cardiac testing   Echo: Date: Results:    Stress Test: Date: Results:    ECG Reviewed: Date: 9/21/20 Results:  SR  Cath: Date: Results:      METS/Exercise Tolerance:     Hematologic:  - neg hematologic  ROS     Musculoskeletal: Comment: CLBP  (+) arthritis,     GI/Hepatic:    (-) GERD   Renal/Genitourinary:  - neg Renal ROS     Endo:  - neg endo ROS     Psychiatric/Substance Use:     (+) psychiatric history anxiety and depression H/O chronic opiod use .     Infectious Disease:  - neg infectious disease ROS     Malignancy:  - neg malignancy ROS     Other:  - neg other ROS    (+) , H/O Chronic Pain,        Physical Exam    Airway  airway exam normal      Mallampati: II   TM distance: > 3 FB   Neck ROM: full   Mouth opening: > 3 cm    Respiratory Devices and Support         Dental  no notable dental history         Cardiovascular   cardiovascular exam normal       Rhythm and rate: regular and normal     Pulmonary   pulmonary exam normal        breath sounds clear to auscultation           OUTSIDE  LABS:  CBC:   Lab Results   Component Value Date    WBC 5.7 08/11/2022    WBC 5.7 09/21/2020    HGB 15.5 08/11/2022    HGB 14.9 09/29/2021    HCT 43.6 08/11/2022    HCT 46.3 09/21/2020     08/11/2022     09/21/2020     BMP:   Lab Results   Component Value Date     08/11/2022     09/29/2021    POTASSIUM 3.5 08/11/2022    POTASSIUM 3.9 09/29/2021    CHLORIDE 104 08/11/2022    CHLORIDE 106 09/29/2021    CO2 30 08/11/2022    CO2 30 09/29/2021    BUN 14 08/11/2022    BUN 9 09/29/2021    CR 1.08 08/11/2022    CR 1.18 09/29/2021    GLC 84 08/11/2022    GLC 95 09/29/2021     COAGS:   Lab Results   Component Value Date    INR 0.97 11/29/2022     POC: No results found for: BGM, HCG, HCGS  HEPATIC:   Lab Results   Component Value Date    ALBUMIN 4.4 12/01/2020    PROTTOTAL 8.1 12/01/2020    ALT 89 (H) 12/01/2020    AST 25 12/01/2020    ALKPHOS 66 12/01/2020    BILITOTAL 0.7 12/01/2020     OTHER:   Lab Results   Component Value Date    A1C 5.2 12/01/2020    KRISHAN 9.5 08/11/2022    MAG 2.2 03/19/2007    TSH 2.00 03/24/2016    CRP <2.9 08/11/2022    SED 5 11/29/2022       Anesthesia Plan    ASA Status:  2   NPO Status:  NPO Appropriate    Anesthesia Type: MAC.      Maintenance: TIVA.        Consents    Anesthesia Plan(s) and associated risks, benefits, and realistic alternatives discussed. Questions answered and patient/representative(s) expressed understanding.    - Discussed:     - Discussed with:  Patient    Use of blood products discussed: No .     Postoperative Care    Pain management: IV analgesics.   PONV prophylaxis: Background Propofol Infusion     Comments:    Other Comments: The risks and benefits of anesthesia, and the alternatives where applicable, have been discussed with the patient, and they wish to proceed.       H&P reviewed: Unable to attach H&P to encounter due to EHR limitations. H&P Update: appropriate H&P reviewed, patient examined. No interval changes since H&P (within 30 days).              YUMIKO Melendez CRNA

## 2023-03-09 ENCOUNTER — ANESTHESIA (OUTPATIENT)
Dept: SURGERY | Facility: CLINIC | Age: 58
End: 2023-03-09
Payer: COMMERCIAL

## 2023-03-09 ENCOUNTER — HOSPITAL ENCOUNTER (OUTPATIENT)
Facility: CLINIC | Age: 58
Discharge: HOME | End: 2023-03-09
Attending: ANESTHESIOLOGY | Admitting: ANESTHESIOLOGY
Payer: COMMERCIAL

## 2023-03-09 ENCOUNTER — HOSPITAL ENCOUNTER (OUTPATIENT)
Dept: GENERAL RADIOLOGY | Facility: CLINIC | Age: 58
Discharge: HOME | End: 2023-03-09
Attending: ANESTHESIOLOGY | Admitting: ANESTHESIOLOGY
Payer: COMMERCIAL

## 2023-03-09 ENCOUNTER — MYC MEDICAL ADVICE (OUTPATIENT)
Dept: FAMILY MEDICINE | Facility: CLINIC | Age: 58
End: 2023-03-09

## 2023-03-09 VITALS
TEMPERATURE: 96.1 F | OXYGEN SATURATION: 99 % | DIASTOLIC BLOOD PRESSURE: 82 MMHG | HEART RATE: 80 BPM | SYSTOLIC BLOOD PRESSURE: 133 MMHG | RESPIRATION RATE: 16 BRPM

## 2023-03-09 DIAGNOSIS — G62.9 NEUROPATHY: ICD-10-CM

## 2023-03-09 DIAGNOSIS — M54.12 CERVICAL RADICULOPATHY: ICD-10-CM

## 2023-03-09 PROCEDURE — 250N000011 HC RX IP 250 OP 636: Performed by: ANESTHESIOLOGY

## 2023-03-09 PROCEDURE — 370N000017 HC ANESTHESIA TECHNICAL FEE, PER MIN: Performed by: ANESTHESIOLOGY

## 2023-03-09 PROCEDURE — 62321 NJX INTERLAMINAR CRV/THRC: CPT | Performed by: ANESTHESIOLOGY

## 2023-03-09 PROCEDURE — 250N000009 HC RX 250: Performed by: NURSE ANESTHETIST, CERTIFIED REGISTERED

## 2023-03-09 PROCEDURE — 250N000011 HC RX IP 250 OP 636: Performed by: NURSE ANESTHETIST, CERTIFIED REGISTERED

## 2023-03-09 PROCEDURE — 999N000179 XR SURGERY CARM FLUORO LESS THAN 5 MIN W STILLS: Mod: TC

## 2023-03-09 RX ORDER — LIDOCAINE HYDROCHLORIDE 20 MG/ML
INJECTION, SOLUTION INFILTRATION; PERINEURAL PRN
Status: DISCONTINUED | OUTPATIENT
Start: 2023-03-09 | End: 2023-03-09

## 2023-03-09 RX ORDER — TRIAMCINOLONE ACETONIDE 40 MG/ML
INJECTION, SUSPENSION INTRA-ARTICULAR; INTRAMUSCULAR PRN
Status: DISCONTINUED | OUTPATIENT
Start: 2023-03-09 | End: 2023-03-09 | Stop reason: HOSPADM

## 2023-03-09 RX ORDER — PROPOFOL 10 MG/ML
INJECTION, EMULSION INTRAVENOUS PRN
Status: DISCONTINUED | OUTPATIENT
Start: 2023-03-09 | End: 2023-03-09

## 2023-03-09 RX ORDER — PREGABALIN 150 MG/1
150 CAPSULE ORAL 3 TIMES DAILY
Qty: 90 CAPSULE | Refills: 1 | Status: SHIPPED | OUTPATIENT
Start: 2023-03-09 | End: 2023-04-27

## 2023-03-09 RX ORDER — IOPAMIDOL 612 MG/ML
INJECTION, SOLUTION INTRATHECAL PRN
Status: DISCONTINUED | OUTPATIENT
Start: 2023-03-09 | End: 2023-03-09 | Stop reason: HOSPADM

## 2023-03-09 RX ADMIN — PROPOFOL 50 MG: 10 INJECTION, EMULSION INTRAVENOUS at 07:45

## 2023-03-09 RX ADMIN — PROPOFOL 50 MG: 10 INJECTION, EMULSION INTRAVENOUS at 07:50

## 2023-03-09 RX ADMIN — LIDOCAINE HYDROCHLORIDE 50 MG: 20 INJECTION, SOLUTION INFILTRATION; PERINEURAL at 07:45

## 2023-03-09 ASSESSMENT — ACTIVITIES OF DAILY LIVING (ADL): ADLS_ACUITY_SCORE: 33

## 2023-03-09 NOTE — TELEPHONE ENCOUNTER
Requested Prescriptions   Pending Prescriptions Disp Refills    pregabalin (LYRICA) 150 MG capsule 90 capsule 1     Sig: Take 1 capsule (150 mg) by mouth 3 times daily       There is no refill protocol information for this order           Wendy Florian RN

## 2023-03-09 NOTE — DISCHARGE INSTRUCTIONS

## 2023-03-09 NOTE — OP NOTE
CHIEF COMPLAINT: Neck pain with left-sided radicular arm pains    INDICATIONS FOR PROCEDURE:   1.This patient suffers from moderate to severe neck pain and left upper extremity radicular pains.    2.This pain has persisted for more than 4 weeks and is causing significant functional disability when they are trying to perform ADL's.   3. They failed conservative care which consisted of giving this pain time to jay, surgery , and medications  4. Preoperative NRS pain score was verbally reported to me today as a  7/10.   5. The patients radicular pains correlates to their MRI which shows left C7-T1 foraminal stenosis as well as left-sided C4-C5 foraminal stenosis  6.  An order was sent to me to perform the technical component of a cervical epidural steroid injection.    PROCEDURE: C7-T1 Interlaminar epidural steroid injection using fluoroscopic guidance with contrast dye.     PROCEDURE DETAILS: After written informed consent was obtained from the patient, the patient was escorted to the procedure room.  The patient was placed in the prone position.  A  time out  was conducted to verify patient identity, procedure to be performed, side, site, allergies and any special requirements.  The skin over the neck and upper back region were prepped and draped in normal sterile fashion utilizing ChloraPrep.  Fluoroscopy was used to identify the C7-T1 interspace in an AP view and the skin was anesthetized with 2 mL of 1% lidocaine with bicarbonate buffer.  A 20-gauge 3-1/2 inch Tuohy needle was advanced using the loss of resistance technique with preservative free normal saline with fluoroscopic guidance. After negative aspiration for CSF and blood, 1.5 cc of Isovue contrast dye was injected revealing the appropriate cervical epidurogram without evidence of intrathecal or intravascular spread. Following this, a 3-mL solution of 40 mg of triamcinolone with 2 cc preservative-free normal saline was slowly injected.  There is good  spread of medication covering the left C7 and C8 nerve roots.  After injection of the medication, as the needle tip was withdrawn, it was flushed with local anesthetic.  The patient was monitored with blood pressure and pulse oximetry machines with the assistance of an RN throughout the procedure.  The patient was alert and responsive to questions throughout the procedure.   The patient tolerated the procedure well and was observed in the post-procedural area.  The patient was dismissed without apparent complications.     BLOOD LOSS: less than 5 cc    DIAGNOSIS:  1.  Left cervical radiculitis with associated neck pain    PLAN:    1. Performed a C7-T1 interlaminar epidural steroid injection without complications.   2. The patient was instructed follow-up with the referring provider and to call the [place] [facility] if today's procedure is not helpful.    Darryn Mcdaniel MD  Diplomate of the American Board of Anesthesiology, Pain Medicine

## 2023-03-09 NOTE — ANESTHESIA CARE TRANSFER NOTE
Patient: Indra Mehta    Procedure: Procedure(s):  Left cervical 7 thoracic 1 interlaminar epidural injection       Diagnosis: Cervical radiculopathy [M54.12]  Diagnosis Additional Information: No value filed.    Anesthesia Type:   MAC     Note:    Oropharynx: oropharynx clear of all foreign objects and spontaneously breathing  Level of Consciousness: drowsy  Oxygen Supplementation: room air    Independent Airway: airway patency satisfactory and stable  Dentition: dentition unchanged  Vital Signs Stable: post-procedure vital signs reviewed and stable  Report to RN Given: handoff report given  Patient transferred to: PACU    Handoff Report: Identifed the Patient, Identified the Reponsible Provider, Reviewed the pertinent medical history, Discussed the surgical course, Reviewed Intra-OP anesthesia mangement and issues during anesthesia, Set expectations for post-procedure period and Allowed opportunity for questions and acknowledgement of understanding      Vitals:  Vitals Value Taken Time   BP     Temp     Pulse     Resp     SpO2 98 % 03/09/23 0800   Vitals shown include unvalidated device data.    Electronically Signed By: YUMIKO Melendez CRNA  March 9, 2023  8:02 AM

## 2023-03-09 NOTE — ANESTHESIA CARE TRANSFER NOTE
Patient: Indra Mehta    Procedure: Procedure(s):  Left cervical 7 thoracic 1 interlaminar epidural injection       Diagnosis: Cervical radiculopathy [M54.12]  Diagnosis Additional Information: No value filed.    Anesthesia Type:   MAC     Note:    Oropharynx: oropharynx clear of all foreign objects and spontaneously breathing  Level of Consciousness: drowsy  Oxygen Supplementation: room air    Independent Airway: airway patency satisfactory and stable  Dentition: dentition unchanged  Vital Signs Stable: post-procedure vital signs reviewed and stable  Report to RN Given: handoff report given  Patient transferred to: Phase II    Handoff Report: Identifed the Patient, Identified the Reponsible Provider, Reviewed the pertinent medical history, Discussed the surgical course, Reviewed Intra-OP anesthesia mangement and issues during anesthesia, Set expectations for post-procedure period and Allowed opportunity for questions and acknowledgement of understanding      Vitals:  Vitals Value Taken Time   /82 03/09/23 0815   Temp     Pulse 77 03/09/23 0815   Resp     SpO2 97 % 03/09/23 0818   Vitals shown include unvalidated device data.    Electronically Signed By: YUMIKO Melendez CRNA  March 9, 2023  8:29 AM

## 2023-03-09 NOTE — INTERVAL H&P NOTE
"I have reviewed the surgical (or preoperative) H&P that is linked to this encounter, and examined the patient. There are no significant changes    Clinical Conditions Present on Arrival:  Clinically Significant Risk Factors Present on Admission                    # Overweight: Estimated body mass index is 28.23 kg/m  as calculated from the following:    Height as of 3/6/23: 1.88 m (6' 2.02\").    Weight as of 3/6/23: 99.8 kg (220 lb).       "

## 2023-03-09 NOTE — ANESTHESIA POSTPROCEDURE EVALUATION
Patient: Indra Mehta    Procedure: Procedure(s):  Left cervical 7 thoracic 1 interlaminar epidural injection       Anesthesia Type:  MAC    Note:  Disposition: Outpatient   Postop Pain Control: Uneventful            Sign Out: Well controlled pain   PONV: No   Neuro/Psych: Uneventful            Sign Out: Acceptable/Baseline neuro status   Airway/Respiratory: Uneventful            Sign Out: Acceptable/Baseline resp. status   CV/Hemodynamics: Uneventful            Sign Out: Acceptable CV status   Other NRE: NONE   DID A NON-ROUTINE EVENT OCCUR? No    Event details/Postop Comments:  Pt was happy with anesthesia care.  No complications.  I will follow up with the pt if needed.           Last vitals:  Vitals Value Taken Time   /82 03/09/23 0815   Temp     Pulse 77 03/09/23 0815   Resp     SpO2 97 % 03/09/23 0818   Vitals shown include unvalidated device data.    Electronically Signed By: YUMIKO Mleendez CRNA  March 9, 2023  8:29 AM

## 2023-03-13 ENCOUNTER — MYC REFILL (OUTPATIENT)
Dept: FAMILY MEDICINE | Facility: CLINIC | Age: 58
End: 2023-03-13
Payer: COMMERCIAL

## 2023-03-13 DIAGNOSIS — Z98.1 S/P CERVICAL SPINAL FUSION: ICD-10-CM

## 2023-03-13 DIAGNOSIS — G89.4 CHRONIC PAIN SYNDROME: ICD-10-CM

## 2023-03-13 DIAGNOSIS — M54.16 LUMBAR RADICULOPATHY: ICD-10-CM

## 2023-03-13 DIAGNOSIS — F11.90 CHRONIC, CONTINUOUS USE OF OPIOIDS: ICD-10-CM

## 2023-03-13 DIAGNOSIS — M79.18 MYOFASCIAL PAIN: ICD-10-CM

## 2023-03-13 DIAGNOSIS — M54.2 CERVICALGIA: ICD-10-CM

## 2023-03-14 RX ORDER — OXYCODONE HYDROCHLORIDE 5 MG/1
5-10 TABLET ORAL EVERY 6 HOURS PRN
Qty: 80 TABLET | Refills: 0 | Status: SHIPPED | OUTPATIENT
Start: 2023-03-14 | End: 2023-03-28

## 2023-03-14 NOTE — TELEPHONE ENCOUNTER
Routing refill request to provider for review/approval because:    Requested Prescriptions   Pending Prescriptions Disp Refills    oxyCODONE (ROXICODONE) 5 MG tablet 165 tablet 0     Sig: Take 1-2 tablets (5-10 mg) by mouth every 6 hours as needed for severe pain (7-10) Must last 30 days       There is no refill protocol information for this order

## 2023-03-15 ENCOUNTER — HOSPITAL ENCOUNTER (OUTPATIENT)
Dept: PHYSICAL THERAPY | Facility: CLINIC | Age: 58
Setting detail: THERAPIES SERIES
Discharge: HOME | End: 2023-03-15
Attending: PREVENTIVE MEDICINE
Payer: COMMERCIAL

## 2023-03-15 PROCEDURE — 97110 THERAPEUTIC EXERCISES: CPT | Mod: GP | Performed by: PHYSICAL THERAPIST

## 2023-03-15 PROCEDURE — 97140 MANUAL THERAPY 1/> REGIONS: CPT | Mod: GP | Performed by: PHYSICAL THERAPIST

## 2023-03-20 DIAGNOSIS — G89.4 CHRONIC PAIN SYNDROME: ICD-10-CM

## 2023-03-20 DIAGNOSIS — Z98.1 S/P CERVICAL SPINAL FUSION: ICD-10-CM

## 2023-03-20 DIAGNOSIS — M54.2 CERVICALGIA: ICD-10-CM

## 2023-03-20 DIAGNOSIS — M79.18 MYOFASCIAL PAIN: ICD-10-CM

## 2023-03-20 DIAGNOSIS — G62.9 NEUROPATHY: ICD-10-CM

## 2023-03-20 DIAGNOSIS — F11.90 CHRONIC, CONTINUOUS USE OF OPIOIDS: ICD-10-CM

## 2023-03-20 DIAGNOSIS — M54.16 LUMBAR RADICULOPATHY: ICD-10-CM

## 2023-03-20 RX ORDER — PREGABALIN 200 MG/1
CAPSULE ORAL
Qty: 30 CAPSULE | Refills: 1 | Status: SHIPPED | OUTPATIENT
Start: 2023-03-20 | End: 2023-04-27

## 2023-03-22 ENCOUNTER — HOSPITAL ENCOUNTER (OUTPATIENT)
Dept: PHYSICAL THERAPY | Facility: CLINIC | Age: 58
Setting detail: THERAPIES SERIES
Discharge: HOME | End: 2023-03-22
Attending: PREVENTIVE MEDICINE
Payer: COMMERCIAL

## 2023-03-22 PROCEDURE — 97140 MANUAL THERAPY 1/> REGIONS: CPT | Mod: GP | Performed by: PHYSICAL THERAPIST

## 2023-03-22 PROCEDURE — 97110 THERAPEUTIC EXERCISES: CPT | Mod: GP | Performed by: PHYSICAL THERAPIST

## 2023-03-27 ENCOUNTER — VIRTUAL VISIT (OUTPATIENT)
Dept: ORTHOPEDICS | Facility: CLINIC | Age: 58
End: 2023-03-27
Payer: COMMERCIAL

## 2023-03-27 DIAGNOSIS — M54.12 CERVICAL RADICULOPATHY: ICD-10-CM

## 2023-03-27 DIAGNOSIS — R20.0 NUMBNESS AND TINGLING IN LEFT HAND: Primary | ICD-10-CM

## 2023-03-27 DIAGNOSIS — R20.2 NUMBNESS AND TINGLING IN LEFT HAND: Primary | ICD-10-CM

## 2023-03-27 PROCEDURE — 99213 OFFICE O/P EST LOW 20 MIN: CPT | Mod: TEL | Performed by: PREVENTIVE MEDICINE

## 2023-03-27 NOTE — PROGRESS NOTES
Patient is a 57    year old who is being evaluated via a billable telephone visit.      What phone number would you like to be contacted at? CELL  How would you like to obtain your AVS? SHANA        Subjective   Patient is a   57  year old who presents by phone call visit for the following:     HPI   Ongoing chronic neck and left shoulder pain  He had recent cervical Barbara that has improved some of his pain and ROM of his neck, however his nubmbness in left hand and pain in shoulder has persisted, he still has difficulty lifting and moving his shoulder into certain positions      Review of Systems   Constitutional, HEENT, cardiovascular, pulmonary, gi and gu systems are negative, except as otherwise noted.      Objective           Vitals:  No vitals were obtained today due to virtual visit.    Physical Exam   healthy, alert and no distress  PSYCH: Alert and oriented times 3; coherent speech, normal   rate and volume, able to articulate logical thoughts, able   to abstract reason, no tangential thoughts, no hallucinations   or delusions  His affect is normal  RESP: No cough, no audible wheezing, able to talk in full sentences  Remainder of exam unable to be completed due to telephone visits    Assessment/Plan  58 yo male with chronic left shoulder pain due to rotator cuff tear arthropathy, biceps tendinopathy, GH arthritis, not resolved and cervical ddd, disc herniations, radicular pain, previous cervical fusion, not resolved    I independently reviewed the following imaging studies and discussed with patient:  Left shoulder MRI: shows 1. Moderate glenohumeral osteoarthrosis, mildly progressed compared to  8/18/2021. Multifocal labral tearing, similar to prior.     2. Similar focal low to moderate grade intrasubstance tear of the  junctional posterior supraspinatus/superior infraspinatus at the  footprint. Subscapularis tendinosis. No full-thickness rotator cuff  tear. Normal rotator cuff muscle bulk.  Cervical MRI:  shows 1. Postoperative and diffuse degenerative change of the cervical spine  as detailed above.  2. No spinal canal stenosis.  3. Moderate neural foraminal stenosis on the right at C3-C4 and on the  left at C7-T1.  Cont. PT, and medications  Will consider further treatments but patient would like to have consultations with Dr Vázquez for his neck, and Dr De Jesus for his shoulder            Phone call duration: 20 minutes  Phone call start: 900am  Phone call end: 920am  Dr Moore

## 2023-03-27 NOTE — LETTER
3/27/2023         RE: Indra Mehta  [address] [place] [facility] [address]        Dear Colleague,    Thank you for referring your patient, Indra Mehta, to the [facility] [place]. Please see a copy of my visit note below.    Patient is a 57    year old who is being evaluated via a billable telephone visit.      What phone number would you like to be contacted at? CELL  How would you like to obtain your AVS? MYCHART        Subjective   Patient is a   57  year old who presents by phone call visit for the following:     HPI   Ongoing chronic neck and left shoulder pain  He had recent cervical Barbara that has improved some of his pain and ROM of his neck, however his nubmbness in left hand and pain in shoulder has persisted, he still has difficulty lifting and moving his shoulder into certain positions      Review of Systems   Constitutional, HEENT, cardiovascular, pulmonary, gi and gu systems are negative, except as otherwise noted.      Objective           Vitals:  No vitals were obtained today due to virtual visit.    Physical Exam   healthy, alert and no distress  PSYCH: Alert and oriented times 3; coherent speech, normal   rate and volume, able to articulate logical thoughts, able   to abstract reason, no tangential thoughts, no hallucinations   or delusions  His affect is normal  RESP: No cough, no audible wheezing, able to talk in full sentences  Remainder of exam unable to be completed due to telephone visits    Assessment/Plan  58 yo male with chronic left shoulder pain due to rotator cuff tear arthropathy, biceps tendinopathy, GH arthritis, not resolved and cervical ddd, disc herniations, radicular pain, previous cervical fusion, not resolved    I independently reviewed the following imaging studies and discussed with patient:  Left shoulder MRI: shows 1. Moderate glenohumeral osteoarthrosis, mildly progressed compared to  8/18/2021. Multifocal labral tearing, similar  to prior.     2. Similar focal low to moderate grade intrasubstance tear of the  junctional posterior supraspinatus/superior infraspinatus at the  footprint. Subscapularis tendinosis. No full-thickness rotator cuff  tear. Normal rotator cuff muscle bulk.  Cervical MRI: shows 1. Postoperative and diffuse degenerative change of the cervical spine  as detailed above.  2. No spinal canal stenosis.  3. Moderate neural foraminal stenosis on the right at C3-C4 and on the  left at C7-T1.  Cont. PT, and medications  Will consider further treatments but patient would like to have consultations with Dr Vázquez for his neck, and Dr De Jesus for his shoulder            Phone call duration: 20 minutes  Phone call start: 900am  Phone call end: 920am  Dr Moore      Again, thank you for allowing me to participate in the care of your patient.        Sincerely,        Alvaro Moore MD

## 2023-03-27 NOTE — LETTER
3/27/2023         RE: Indra Mehta  [address] [place] [address]        Dear Colleague,    Thank you for referring your patient, Indra Mehta, to the [facility] [place]. Please see a copy of my visit note below.    Patient is a 57    year old who is being evaluated via a billable telephone visit.      What phone number would you like to be contacted at? CELL  How would you like to obtain your AVS? MYCHART        Subjective   Patient is a   57  year old who presents by phone call visit for the following:     HPI   Ongoing chronic neck and left shoulder pain  He had recent cervical Barbara that has improved some of his pain and ROM of his neck, however his nubmbness in left hand and pain in shoulder has persisted, he still has difficulty lifting and moving his shoulder into certain positions      Review of Systems   Constitutional, HEENT, cardiovascular, pulmonary, gi and gu systems are negative, except as otherwise noted.      Objective           Vitals:  No vitals were obtained today due to virtual visit.    Physical Exam   healthy, alert and no distress  PSYCH: Alert and oriented times 3; coherent speech, normal   rate and volume, able to articulate logical thoughts, able   to abstract reason, no tangential thoughts, no hallucinations   or delusions  His affect is normal  RESP: No cough, no audible wheezing, able to talk in full sentences  Remainder of exam unable to be completed due to telephone visits    Assessment/Plan  56 yo male with chronic left shoulder pain due to rotator cuff tear arthropathy, biceps tendinopathy, GH arthritis, not resolved and cervical ddd, disc herniations, radicular pain, previous cervical fusion, not resolved    I independently reviewed the following imaging studies and discussed with patient:  Left shoulder MRI: shows 1. Moderate glenohumeral osteoarthrosis, mildly progressed compared to  8/18/2021. Multifocal labral tearing, similar  to prior.     2. Similar focal low to moderate grade intrasubstance tear of the  junctional posterior supraspinatus/superior infraspinatus at the  footprint. Subscapularis tendinosis. No full-thickness rotator cuff  tear. Normal rotator cuff muscle bulk.  Cervical MRI: shows 1. Postoperative and diffuse degenerative change of the cervical spine  as detailed above.  2. No spinal canal stenosis.  3. Moderate neural foraminal stenosis on the right at C3-C4 and on the  left at C7-T1.  Cont. PT, and medications  Will consider further treatments but patient would like to have consultations with Dr Vázquez for his neck, and Dr De Jesus for his shoulder            Phone call duration: 20 minutes  Phone call start: 900am  Phone call end: 920am  Dr Moore      Again, thank you for allowing me to participate in the care of your patient.        Sincerely,        Alvaro Moore MD

## 2023-03-28 ENCOUNTER — HOSPITAL ENCOUNTER (OUTPATIENT)
Dept: PHYSICAL THERAPY | Facility: CLINIC | Age: 58
Setting detail: THERAPIES SERIES
Discharge: HOME | End: 2023-03-28
Attending: PREVENTIVE MEDICINE
Payer: COMMERCIAL

## 2023-03-28 ENCOUNTER — MYC REFILL (OUTPATIENT)
Dept: FAMILY MEDICINE | Facility: CLINIC | Age: 58
End: 2023-03-28
Payer: COMMERCIAL

## 2023-03-28 DIAGNOSIS — Z98.1 S/P CERVICAL SPINAL FUSION: ICD-10-CM

## 2023-03-28 DIAGNOSIS — F11.90 CHRONIC, CONTINUOUS USE OF OPIOIDS: ICD-10-CM

## 2023-03-28 DIAGNOSIS — M54.2 CERVICALGIA: ICD-10-CM

## 2023-03-28 DIAGNOSIS — M54.16 LUMBAR RADICULOPATHY: ICD-10-CM

## 2023-03-28 DIAGNOSIS — G89.4 CHRONIC PAIN SYNDROME: ICD-10-CM

## 2023-03-28 DIAGNOSIS — M79.18 MYOFASCIAL PAIN: ICD-10-CM

## 2023-03-28 PROCEDURE — 97140 MANUAL THERAPY 1/> REGIONS: CPT | Mod: GP | Performed by: PHYSICAL THERAPIST

## 2023-03-28 PROCEDURE — 97110 THERAPEUTIC EXERCISES: CPT | Mod: GP | Performed by: PHYSICAL THERAPIST

## 2023-03-28 PROCEDURE — 97112 NEUROMUSCULAR REEDUCATION: CPT | Mod: GP | Performed by: PHYSICAL THERAPIST

## 2023-03-29 RX ORDER — OXYCODONE HYDROCHLORIDE 5 MG/1
5-10 TABLET ORAL EVERY 6 HOURS PRN
Qty: 165 TABLET | Refills: 0 | Status: SHIPPED | OUTPATIENT
Start: 2023-03-29 | End: 2023-04-25

## 2023-03-29 NOTE — TELEPHONE ENCOUNTER
Requested Prescriptions   Pending Prescriptions Disp Refills     oxyCODONE (ROXICODONE) 5 MG tablet 80 tablet 0     Sig: Take 1-2 tablets (5-10 mg) by mouth every 6 hours as needed for severe pain (7-10) Must last 15 days       There is no refill protocol information for this order          Routing refill request to provider for review/approval because:  Drug not on the [facility] or [facility] refill protocol or controlled substance

## 2023-03-29 NOTE — TELEPHONE ENCOUNTER
DIAGNOSIS: left shoulder   APPOINTMENT DATE: 05/15/2023   NOTES STATUS DETAILS   OFFICE NOTE from referring provider Internal 03/27/2023 Dr Moore [facility]   OFFICE NOTE from other specialist Internal 03/28/2023 PT [facility]    DISCHARGE SUMMARY from hospital N/A    DISCHARGE REPORT from the ER N/A    OPERATIVE REPORT N/A    MEDICATION LIST N/A    EMG (for Spine) N/A    IMPLANT RECORD/STICKER N/A    LABS     CBC/DIFF N/A    CULTURES N/A    INJECTIONS DONE IN RADIOLOGY N/A    MRI Internal 02/16/2023 LFT shoulder   CT SCAN N/A    XRAYS (IMAGES & REPORTS) N/A    TUMOR     PATHOLOGY  Slides & report N/A

## 2023-04-10 DIAGNOSIS — M54.16 LUMBAR RADICULOPATHY: ICD-10-CM

## 2023-04-10 DIAGNOSIS — M79.18 MYOFASCIAL PAIN: ICD-10-CM

## 2023-04-10 DIAGNOSIS — M54.2 CERVICALGIA: ICD-10-CM

## 2023-04-10 DIAGNOSIS — G62.9 NEUROPATHY: ICD-10-CM

## 2023-04-10 DIAGNOSIS — G89.4 CHRONIC PAIN SYNDROME: ICD-10-CM

## 2023-04-10 DIAGNOSIS — Z98.1 S/P CERVICAL SPINAL FUSION: ICD-10-CM

## 2023-04-10 DIAGNOSIS — G89.29 CHRONIC BILATERAL LOW BACK PAIN WITHOUT SCIATICA: ICD-10-CM

## 2023-04-10 DIAGNOSIS — M54.50 CHRONIC BILATERAL LOW BACK PAIN WITHOUT SCIATICA: ICD-10-CM

## 2023-04-10 RX ORDER — CYCLOBENZAPRINE HCL 5 MG
TABLET ORAL
Qty: 90 TABLET | Refills: 2 | Status: SHIPPED | OUTPATIENT
Start: 2023-04-10 | End: 2023-11-20

## 2023-04-10 RX ORDER — NORTRIPTYLINE HYDROCHLORIDE 50 MG/1
50 CAPSULE ORAL AT BEDTIME
Qty: 90 CAPSULE | Refills: 1 | Status: SHIPPED | OUTPATIENT
Start: 2023-04-10 | End: 2023-10-17

## 2023-04-10 NOTE — TELEPHONE ENCOUNTER
Received fax request from [facility] pharmacy requesting refill(s) for nortriptyline (PAMELOR) 50 MG capsule    Last refilled on 01/08/23    Pt last seen on 09/21/22-[facility]  Next appt scheduled for None    Will facilitate refill.

## 2023-04-10 NOTE — TELEPHONE ENCOUNTER
Received fax request from [facility] pharmacy requesting refill(s) for cyclobenzaprine (FLEXERIL) 5 MG tablet    Last refilled on 01/01/23    Pt last seen on 09/21/22-[facility]  Next appt scheduled for None    Will facilitate refill.

## 2023-04-18 ENCOUNTER — MYC MEDICAL ADVICE (OUTPATIENT)
Dept: FAMILY MEDICINE | Facility: CLINIC | Age: 58
End: 2023-04-18
Payer: COMMERCIAL

## 2023-04-18 DIAGNOSIS — Z98.1 S/P CERVICAL SPINAL FUSION: ICD-10-CM

## 2023-04-18 DIAGNOSIS — M54.12 CERVICAL RADICULOPATHY: ICD-10-CM

## 2023-04-18 DIAGNOSIS — G62.9 NEUROPATHY: ICD-10-CM

## 2023-04-18 DIAGNOSIS — M54.2 CERVICALGIA: Primary | ICD-10-CM

## 2023-04-19 NOTE — TELEPHONE ENCOUNTER
Please print order for EMG at [place] for [facility] and fax.  Tha Grullon MD

## 2023-04-25 ENCOUNTER — MYC REFILL (OUTPATIENT)
Dept: FAMILY MEDICINE | Facility: CLINIC | Age: 58
End: 2023-04-25
Payer: COMMERCIAL

## 2023-04-25 DIAGNOSIS — Z98.1 S/P CERVICAL SPINAL FUSION: ICD-10-CM

## 2023-04-25 DIAGNOSIS — M54.2 CERVICALGIA: ICD-10-CM

## 2023-04-25 DIAGNOSIS — F11.90 CHRONIC, CONTINUOUS USE OF OPIOIDS: ICD-10-CM

## 2023-04-25 DIAGNOSIS — M79.18 MYOFASCIAL PAIN: ICD-10-CM

## 2023-04-25 DIAGNOSIS — G89.4 CHRONIC PAIN SYNDROME: ICD-10-CM

## 2023-04-25 DIAGNOSIS — M54.16 LUMBAR RADICULOPATHY: ICD-10-CM

## 2023-04-25 NOTE — TELEPHONE ENCOUNTER
oxyCODONE (ROXICODONE) 5 MG tablet         Last Written Prescription Date:  3/29/23  Last Fill Quantity: 165,   # refills: 0  Last Office Visit: 3/6/23  Future Office visit:    Next 5 appointments (look out 90 days)    Ovi 15, 2023  2:20 PM  (Arrive by 2:00 PM)  Provider Visit with Tha Grullon MD  [facility] ([facility] ) [address] [place] [id number] [phone]           Routing refill request to provider for review/approval because:  Drug not on the [facility], [facility] or [facility] refill protocol or controlled substance

## 2023-04-26 RX ORDER — OXYCODONE HYDROCHLORIDE 5 MG/1
5-10 TABLET ORAL EVERY 6 HOURS PRN
Qty: 165 TABLET | Refills: 0 | Status: SHIPPED | OUTPATIENT
Start: 2023-04-28 | End: 2023-05-23

## 2023-04-27 DIAGNOSIS — M79.18 MYOFASCIAL PAIN: ICD-10-CM

## 2023-04-27 DIAGNOSIS — G62.9 NEUROPATHY: ICD-10-CM

## 2023-04-27 DIAGNOSIS — Z98.1 S/P CERVICAL SPINAL FUSION: ICD-10-CM

## 2023-04-27 DIAGNOSIS — M54.2 CERVICALGIA: ICD-10-CM

## 2023-04-27 DIAGNOSIS — I10 BENIGN ESSENTIAL HYPERTENSION: ICD-10-CM

## 2023-04-27 DIAGNOSIS — F11.90 CHRONIC, CONTINUOUS USE OF OPIOIDS: ICD-10-CM

## 2023-04-27 DIAGNOSIS — M54.16 LUMBAR RADICULOPATHY: ICD-10-CM

## 2023-04-27 DIAGNOSIS — G89.4 CHRONIC PAIN SYNDROME: ICD-10-CM

## 2023-04-27 RX ORDER — LOSARTAN POTASSIUM AND HYDROCHLOROTHIAZIDE 12.5; 5 MG/1; MG/1
2 TABLET ORAL DAILY
Qty: 180 TABLET | Refills: 1 | Status: SHIPPED | OUTPATIENT
Start: 2023-04-27 | End: 2024-01-24

## 2023-04-27 RX ORDER — PREGABALIN 150 MG/1
CAPSULE ORAL
Qty: 90 CAPSULE | Refills: 1 | Status: SHIPPED | OUTPATIENT
Start: 2023-04-27 | End: 2023-06-15

## 2023-04-27 RX ORDER — PREGABALIN 200 MG/1
CAPSULE ORAL
Qty: 30 CAPSULE | Refills: 1 | Status: SHIPPED | OUTPATIENT
Start: 2023-04-27 | End: 2023-06-15

## 2023-05-09 DIAGNOSIS — M25.512 LEFT SHOULDER PAIN: Primary | ICD-10-CM

## 2023-05-10 NOTE — PROGRESS NOTES
[facility] Service    Outpatient Physical Therapy Discharge Note  Patient: Indra Mehta  : 1965    Beginning/End Dates of Reporting Period:  23  to 3/28/23     Referring Provider: Alvaro Moore MD    Therapy Diagnosis: Decreased shoulder ROM and shoulder strength     Client Self Report: Pt reports some increase to shoulder yesterday after setting up poles for VB nets.  Pt reports generally more soreness over the last week and feels he was may have been pushing too hard on HEP or is worried that injection is wearing off.    Objective Measurements:  Objective Measure: ROM  Details: () R shoulder: flex 96, abd 99, IR 35, ER 34  Objective Measure: Pain         Goals:  Goal Identifier 1   Goal Description Patient will decrease the percentage of his day he is in pain from 100% to 50% in order to increase patient comfort levels and participate in HEP   Target Date 23   Date Met  23   Progress (detail required for progress note):       Goal Identifier 2   Goal Description Patient will increase L shoulder ROM within 10% of contralateral limb in order to perform his ADLs and IADLs   Target Date 23   Date Met      Progress (detail required for progress note):       Goal Identifier 3   Goal Description Patient will be able to lift 50# with both arms at shoulder height in order to 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   Target Date 23   Date Met      Progress (detail required for progress note):         Plan:  Discharge from therapy.    Discharge:    Reason for Discharge: Patient did not return for future sessions    Discharge Plan:  Patient to continue home program.      Sherrie Maza, PT, DPT, OCS, CSCS  [facility]  [phone]

## 2023-05-15 ENCOUNTER — PRE VISIT (OUTPATIENT)
Dept: ORTHOPEDICS | Facility: CLINIC | Age: 58
End: 2023-05-15

## 2023-05-15 ENCOUNTER — ANCILLARY PROCEDURE (OUTPATIENT)
Dept: GENERAL RADIOLOGY | Facility: CLINIC | Age: 58
End: 2023-05-15
Attending: ORTHOPAEDIC SURGERY
Payer: COMMERCIAL

## 2023-05-15 ENCOUNTER — OFFICE VISIT (OUTPATIENT)
Dept: ORTHOPEDICS | Facility: CLINIC | Age: 58
End: 2023-05-15
Payer: COMMERCIAL

## 2023-05-15 VITALS — WEIGHT: 220 LBS | HEIGHT: 74 IN | BODY MASS INDEX: 28.23 KG/M2

## 2023-05-15 DIAGNOSIS — M25.512 LEFT SHOULDER PAIN: ICD-10-CM

## 2023-05-15 DIAGNOSIS — M19.012 ARTHROSIS OF LEFT ACROMIOCLAVICULAR JOINT: Primary | ICD-10-CM

## 2023-05-15 PROCEDURE — 99214 OFFICE O/P EST MOD 30 MIN: CPT | Performed by: ORTHOPAEDIC SURGERY

## 2023-05-15 PROCEDURE — 73030 X-RAY EXAM OF SHOULDER: CPT | Mod: LT | Performed by: RADIOLOGY

## 2023-05-15 ASSESSMENT — PAIN SCALES - GENERAL: PAINLEVEL: MILD PAIN (3)

## 2023-05-15 NOTE — NURSING NOTE
"Reason For Visit:   Chief Complaint   Patient presents with     Consult     L shoulder pain        PCP: Tha Grullon  Ref: Dr. Moore    ?  No  Occupation. IT work from [employer]  Currently working? Yes.  Work status?  Full time.  Date of injury: Old Volley Ball injury ~30yrs prior, Pain increased 1yr prior  Type of injury: Old injury from VB.  Date of surgery: No  Type of surgery: No.  Had repeated Injections from Dr. Moore,   Last done 3/6/23. Helped do PT during this time then pain returned worse after injection effects wore off  Smoker: No  Request smoking cessation information: No    Right hand dominant    SANE score  Affected shoulder: Left  Right shoulder SANE: 80  Left shoulder SANE: 5    Ht 1.886 m (6' 2.25\")   Wt 99.8 kg (220 lb)   BMI 28.06 kg/m      Javier Mann ATC    "

## 2023-05-15 NOTE — LETTER
5/15/2023         RE: Indra Mehta  [address] [address] [place] [id number]        Dear Colleague,    Thank you for referring your patient, Indra Mehta, to the [facility]. Please see a copy of my visit note below.    CHIEF CONCERN: Left shoulder pain    HISTORY OF PRESENT ILLNESS: Mr. Mehta is a pleasant 57-year-old man referred by Dr. Moore for left shoulder pain. Per chart review, patient received a subacromial left shoulder injection 3/6/23 and glenohumeral injection 12/14/22. He reports the most recent injection in March, followed by PT, was promising, but in the past two weeks symptoms recurred. Patient reports pain underneath the AC joint, which extends down the arm with cross body adduction. Patient had suffered dislocations and injuries to the left shoulder decades ago from playing volleyball.    Past Medical History:   Diagnosis Date     Anxiety      Back pain      Depressive disorder      Hyperlipidemia      Hypertension      Meniere's disease, unspecified      Pain medication agreement signed 8/20/2010     Sleep apnea, unspecified type        Past Surgical History:   Procedure Laterality Date     BUNIONECTOMY RT/LT  09/05/08    Both feet     COLONOSCOPY N/A 12/28/2016    Procedure: COMBINED COLONOSCOPY, SINGLE OR MULTIPLE BIOPSY/POLYPECTOMY BY BIOPSY;  Surgeon: Indra Jernigan MD;  Location:  GI     COLONOSCOPY N/A 9/27/2022    Procedure: COLONOSCOPY, FLEXIBLE, WITH LESION REMOVAL USING SNARE;  Surgeon: Pablo Ferris MD;  Location: [facility]     DISCECTOMY, FUSION CERVICAL ANTERIOR ONE LEVEL, COMBINED N/A 7/5/2017    Procedure: COMBINED DISCECTOMY, FUSION CERVICAL ANTERIOR ONE LEVEL;  Cervical 6-7, Anterior cervical discectomy fusion;  Surgeon: Mike Mckenna MD;  Location: [facility] OR     DISCECTOMY, FUSION CERVICAL ANTERIOR ONE LEVEL, COMBINED N/A 12/19/2018    Procedure: cervical 5-6 anterior cervical discectomy fusion;  Surgeon: Mike Mckenna MD;   Location: [facility]     HERNIORRHAPHY UMBILICAL N/A 8/11/2017    Procedure: HERNIORRHAPHY UMBILICAL;  open umbilical hernia repair;  Surgeon: Boni Story MD;  Location: [facility]     INJECT BLOCK MEDIAL BRANCH CERVICAL/THORACIC/LUMBAR Bilateral 8/12/2021    Procedure: Left L3, Left L4, Left L5, Right L3, Right L4 and Right L5 medial branch nerve blocks using fluoroscopic guidance and contrast control;  Surgeon: Darryn Mcdaniel MD;  Location: [facility]     INJECT BLOCK MEDIAL BRANCH CERVICAL/THORACIC/LUMBAR N/A 9/9/2021    Procedure: Left L3, Left L4, Left L5, Right L3, Right L4 and Right L5 medial branch nerve blocks using fluoroscopic guidance and contrast control;  Surgeon: Darryn Mcdaniel MD;  [facility]     INJECT EPIDURAL CERVICAL N/A 8/22/2019    Procedure: INJECTION, SPINE, CERVICAL 6-7 EPIDURAL;  Surgeon: Darryn Mcdaniel MD;  Location: [facility]     INJECT EPIDURAL CERVICAL Left 3/9/2023    Procedure: Cervical 7-Thoracic 1 Interlaminar epidural steroid injection using fluoroscopic guidance with contrast dye, Left;  Surgeon: Darryn Mcdaniel MD;  [facility]     INJECT EPIDURAL LUMBAR N/A 11/9/2017    Procedure: INJECT EPIDURAL LUMBAR;  lumbar 4-5 epidural steroid injection ;  Surgeon: Darryn Mcdaniel MD;  [facility]     INJECT EPIDURAL LUMBAR N/A 12/28/2017    Procedure: INJECT EPIDURAL LUMBAR;  lumbar epidural injection;  Surgeon: Darryn Mcdaniel MD;  [facility]     INJECT EPIDURAL LUMBAR Bilateral 5/13/2021    Procedure: Bilateral Lumbar 3-4 Epidural Steroid Injection;  Surgeon: Darryn Mcdaniel MD;  [facility]     INJECT EPIDURAL TRANSFORAMINAL Bilateral 8/23/2018    Procedure: INJECT EPIDURAL TRANSFORAMINAL;  transforaminal bilateral lumbar 3-4 steroid injection;  Surgeon: Darryn Mcdaniel MD;  [facility]     INJECT EPIDURAL TRANSFORAMINAL Bilateral 11/8/2018    Procedure: INJECT EPIDURAL TRANSFORAMINAL lumbar 3-4;  Surgeon: Darryn Mcdaniel MD;  [facility]     INJECT EPIDURAL  TRANSFORAMINAL Bilateral 6/14/2019    Procedure: INJECTION, EPIDURAL, TRANSFORAMINAL LUMBAR 3-4 BILATERAL;  Surgeon: Darryn Mcdaniel MD;  [facility]     INJECT EPIDURAL TRANSFORAMINAL Bilateral 5/22/2020    Procedure: lumbar 3-4 bilateral transforaminal epidural steroid injection;  Surgeon: Darryn Mcdaniel MD;  [facility]     INJECT EPIDURAL TRANSFORAMINAL Bilateral 9/24/2020    Procedure: INJECTION, EPIDURAL, TRANSFORAMINAL  LUMBAR 3-4 APPROACH;  Surgeon: Darryn Mcdaniel MD;  [facility]     INJECT JOINT SACROILIAC Bilateral 10/13/2022    Procedure: Fluoroscopically-guided injection of the Bilateral sacroiliac joints with Bilateral kerrie Sacroiliac joint ligaments infiltration over the sacrum;  Surgeon: Darryn Mcdaniel MD;  [facility]     PE TUBES  2006    left ear     RADIO FREQUENCY ABLATION PULSED CERVICAL Bilateral 10/1/2021    Procedure: RADIOFREQUENCY ABLATION lumbar 3, 4, 5 bilateral;  Surgeon: Darryn Mcdaniel MD;  [facility]     ZZHC COLONOSCOPY W/WO BRUSH/WASH  12/27/2005     ZZHC CREATE EARDRUM OPENING,GEN ANESTH  09/27/2007    Pe tube, Left endolymphatic sac enhancement.     ZZHC MASTOIDECTOMY,COMPLETE  09/27/2007       Current Outpatient Medications   Medication Sig Dispense Refill     Acetaminophen (TYLENOL PO) Take 1,000 mg by mouth 3 times daily       amLODIPine (NORVASC) 5 MG tablet Take 1 tablet (5 mg) by mouth daily 90 tablet 3     atorvastatin (LIPITOR) 10 MG tablet Take 10 mg by mouth daily       cyclobenzaprine (FLEXERIL) 5 MG tablet 1-2 tablets three times daily as needed 90 tablet 2     ibuprofen (ADVIL/MOTRIN) 800 MG tablet Take 800 mg by mouth 3 times daily       losartan-hydrochlorothiazide (HYZAAR) 50-12.5 MG tablet TAKE 2 TABLETS BY MOUTH DAILY 180 tablet 1     naloxone (NARCAN) 4 MG/0.1ML nasal spray Spray 1 spray (4 mg) into one nostril alternating nostrils once as needed for opioid reversal every 2-3 minutes until assistance arrives (Patient not taking: Reported  on 9/21/2022) 0.2 mL 1     nortriptyline (PAMELOR) 50 MG capsule Take 1 capsule (50 mg) by mouth At Bedtime 90 capsule 1     order for DME Equipment being ordered: TENS Pads as directed 1 Device 0     oxyCODONE (ROXICODONE) 5 MG tablet Take 1-2 tablets (5-10 mg) by mouth every 6 hours as needed for severe pain Must last 30 days 165 tablet 0     pregabalin (LYRICA) 150 MG capsule TAKE 1 CAPSULE BY MOUTH 3 TIMES A DAY 90 capsule 1     Pregabalin (LYRICA) 200 MG capsule TAKE 1 CAPSULE BY MOUTH EVERY DAY 30 capsule 1     sildenafil (VIAGRA) 100 MG tablet Take 0.5-1 tablets ( mg) by mouth daily as needed Take 30 min to 4 hours before intercourse.  Never use with nitroglycerin, terazosin or doxazosin. 6 tablet 7          Allergies   Allergen Reactions     No Known Drug Allergy        SOCIAL HISTORY:    Social History     Socioeconomic History     Marital status: Single     Spouse name: Not on file     Number of children: 2     Years of education: Not on file     Highest education level: Not on file   Occupational History     Employer: [employer]   Tobacco Use     Smoking status: Never     Smokeless tobacco: Never   Vaping Use     Vaping status: Never Used     Passive vaping exposure: Yes   Substance and Sexual Activity     Alcohol use: Yes     Alcohol/week: 0.0 standard drinks of alcohol     Comment: rarely     Drug use: No     Sexual activity: Not Currently     Partners: Female   Other Topics Concern     Parent/sibling w/ CABG, MI or angioplasty before 65F 55M? No   Social History Narrative     Not on file     Social Determinants of Health     Financial Resource Strain: Not on file   Food Insecurity: Not on file   Transportation Needs: Not on file   Physical Activity: Not on file   Stress: Not on file   Social Connections: Not on file   Intimate Partner Violence: Not on file   Housing Stability: Not on file       FAMILY HISTORY: Reviewed in EMR      REVIEW OF SYSTEMS: Positive for that noted in past medical  history and history of present illness and otherwise reviewed in EMR     PHYSICAL EXAM:    Adult male in no acute distress. Articulates and communicates with normal affect.  Respirations even and unlabored  Focused upper extremity exam: Skin intact. No erythema. Sensation intact all dermatomes into the hand to light touch. EPL, FPL, and Intrinsics intact. Right shoulder active motion is FE to 180, ER at side to 70, and IR to T10. Left shoulder active motion is FE to 165, ER to 30, and IR to back pocket.     IMAGING:  Left shoulder XRs were reviewed and I agree with the Impression below:  IMPRESSION: Hypertrophic change of the left AC joint with an old fracture fragment or accessory ossicle along the superior margin. This is corticated and not consistent with acute injury. Mild diastases of the AC joint interval. The left glenohumeral   joint is negative for fracture. There is degenerative change.    Left shoulder MRI dated 2/16/23 was reviewed and I agree with the Impression below:  Impression:  1. Moderate glenohumeral osteoarthrosis, mildly progressed compared to 8/18/2021. Multifocal labral tearing, similar to prior.  2. Similar focal low to moderate grade intrasubstance tear of the junctional posterior supraspinatus/superior infraspinatus at the footprint. Subscapularis tendinosis. No full-thickness rotator cuff  tear. Normal rotator cuff muscle bulk.  3. Tenosynovitis of the extra-articular long head of biceps tendon.    ASSESSMENT:    1. Left acromioclavicular arthrosis, symptomatic  2. Left moderate glenohumeral arthrosis    PLAN:  We discussed the imaging and exam diagnoses of AC and GH degenerative change. We discussed nonoperative and operative treatment options. Patient would like to proceed with an AC joint injection for the left shoulder with Dr. Moore. If the injection provides relief, he can repeat it every 6 months. We briefly discussed operative options if his GH arthrosis progresses.      By  signing my name below, I, Samra Perdomo, attest that this documentation has been prepared under the direction of Ghada De Jesus MD.  Electronically Signed: Luiz Klein.    Provider Disclosure:  I agree with above History, Physical exam and Plan.  I have reviewed the content of the documentation and have edited it as needed. I have personally performed the services documented here and the documentation accurately represents those services and the decisions I have made.      Ghada De Jesus MD        Again, thank you for allowing me to participate in the care of your patient.        Sincerely,        Ghada De Jesus MD

## 2023-05-23 ENCOUNTER — MYC REFILL (OUTPATIENT)
Dept: FAMILY MEDICINE | Facility: CLINIC | Age: 58
End: 2023-05-23
Payer: COMMERCIAL

## 2023-05-23 DIAGNOSIS — M54.2 CERVICALGIA: ICD-10-CM

## 2023-05-23 DIAGNOSIS — M79.18 MYOFASCIAL PAIN: ICD-10-CM

## 2023-05-23 DIAGNOSIS — F11.90 CHRONIC, CONTINUOUS USE OF OPIOIDS: ICD-10-CM

## 2023-05-23 DIAGNOSIS — M54.16 LUMBAR RADICULOPATHY: ICD-10-CM

## 2023-05-23 DIAGNOSIS — G89.4 CHRONIC PAIN SYNDROME: ICD-10-CM

## 2023-05-23 DIAGNOSIS — Z98.1 S/P CERVICAL SPINAL FUSION: ICD-10-CM

## 2023-05-23 RX ORDER — OXYCODONE HYDROCHLORIDE 5 MG/1
5-10 TABLET ORAL EVERY 6 HOURS PRN
Qty: 165 TABLET | Refills: 0 | Status: SHIPPED | OUTPATIENT
Start: 2023-05-23 | End: 2023-06-23

## 2023-05-23 NOTE — TELEPHONE ENCOUNTER
oxyCODONE (ROXICODONE) 5 MG tablet [Tha Grullon]       Patient Comment: Being that the 28th is Sunday I am asking that the refill can be done before the 28th because [facility]         Last Written Prescription Date:  4/28/23  Last Fill Quantity: 165,   # refills: 0  Last Office Visit: 3/6/23  Future Office visit:    Next 5 appointments (look out 90 days)    Ovi 15, 2023  2:20 PM  (Arrive by 2:00 PM)  Provider Visit with Tha Grullon MD  [facility] ) [address] [place] [id number] [phone]           Routing refill request to provider for review/approval because:  Drug not on the [facility], [facility] or [facility] refill protocol or controlled substance     regular diet

## 2023-05-24 ENCOUNTER — MYC MEDICAL ADVICE (OUTPATIENT)
Dept: FAMILY MEDICINE | Facility: CLINIC | Age: 58
End: 2023-05-24
Payer: COMMERCIAL

## 2023-05-31 ASSESSMENT — KOOS JR
HOW SEVERE IS YOUR KNEE STIFFNESS AFTER FIRST WAKING IN MORNING: SEVERE
BENDING TO THE FLOOR TO PICK UP OBJECT: SEVERE
RISING FROM SITTING: SEVERE
TWISING OR PIVOTING ON KNEE: EXTREME
STANDING UPRIGHT: MODERATE
STRAIGHTENING KNEE FULLY: SEVERE
GOING UP OR DOWN STAIRS: EXTREME
KOOS JR SCORING: 31.31

## 2023-06-01 ENCOUNTER — OFFICE VISIT (OUTPATIENT)
Dept: ORTHOPEDICS | Facility: CLINIC | Age: 58
End: 2023-06-01
Payer: COMMERCIAL

## 2023-06-01 VITALS
HEIGHT: 74 IN | SYSTOLIC BLOOD PRESSURE: 135 MMHG | BODY MASS INDEX: 28.23 KG/M2 | HEART RATE: 88 BPM | RESPIRATION RATE: 18 BRPM | DIASTOLIC BLOOD PRESSURE: 93 MMHG | WEIGHT: 220 LBS

## 2023-06-01 DIAGNOSIS — M17.0 PRIMARY OSTEOARTHRITIS OF BOTH KNEES: Primary | ICD-10-CM

## 2023-06-01 PROCEDURE — 20610 DRAIN/INJ JOINT/BURSA W/O US: CPT | Mod: 50 | Performed by: ORTHOPAEDIC SURGERY

## 2023-06-01 RX ORDER — METHYLPREDNISOLONE ACETATE 80 MG/ML
80 INJECTION, SUSPENSION INTRA-ARTICULAR; INTRALESIONAL; INTRAMUSCULAR; SOFT TISSUE
Status: SHIPPED | OUTPATIENT
Start: 2023-06-01

## 2023-06-01 RX ORDER — LIDOCAINE HYDROCHLORIDE 10 MG/ML
5 INJECTION, SOLUTION INFILTRATION; PERINEURAL
Status: SHIPPED | OUTPATIENT
Start: 2023-06-01

## 2023-06-01 RX ADMIN — METHYLPREDNISOLONE ACETATE 80 MG: 80 INJECTION, SUSPENSION INTRA-ARTICULAR; INTRALESIONAL; INTRAMUSCULAR; SOFT TISSUE at 13:53

## 2023-06-01 RX ADMIN — LIDOCAINE HYDROCHLORIDE 5 ML: 10 INJECTION, SOLUTION INFILTRATION; PERINEURAL at 13:53

## 2023-06-01 NOTE — LETTER
6/1/2023         RE: Indra Mehta  [address] [facility] [address]        Dear Colleague,    Thank you for referring your patient, Indra Mehta, to the [facility]. Please see a copy of my visit note below.    Large Joint Injection/Arthocentesis: bilateral knee    Date/Time: 6/1/2023 1:53 PM    Performed by: Alvaro Mariee MD  Authorized by: Alvaro Mariee MD    Indications:  Pain  Needle Size:  22 G  Guidance: landmark guided    Location:  Knee  Laterality:  Bilateral      Medications (Right):  80 mg methylPREDNISolone 80 MG/ML; 5 mL lidocaine 1 %  Medications (Left):  80 mg methylPREDNISolone 80 MG/ML; 5 mL lidocaine 1 %  Outcome:  Tolerated well, no immediate complications  Procedure discussed: discussed risks, benefits, and alternatives    Consent Given by:  Patient  Timeout: timeout called immediately prior to procedure    Prep: patient was prepped and draped in usual sterile fashion            Follow up bilateral knee primary osteoarthritis.  Last injection 1/24/23.  Range of motion 0-130.    He  desires injection today of bilateral knee(s).  Risks, benefits, potential complications and alternatives were discussed.   With the patient's consent, sterile prep was performed of bilateral knee(s).  Each knee was injected with Depo Medrol 80 mg and lidocaine at anteromedial site.  Return to clinic as needed.         Again, thank you for allowing me to participate in the care of your patient.        Sincerely,        Alvaro Mariee MD

## 2023-06-01 NOTE — PROGRESS NOTES
Follow up bilateral knee primary osteoarthritis.  Last injection 1/24/23.  Range of motion 0-130.    He  desires injection today of bilateral knee(s).  Risks, benefits, potential complications and alternatives were discussed.   With the patient's consent, sterile prep was performed of bilateral knee(s).  Each knee was injected with Depo Medrol 80 mg and lidocaine at anteromedial site.  Return to clinic as needed.

## 2023-06-01 NOTE — PROGRESS NOTES
Large Joint Injection/Arthocentesis: bilateral knee    Date/Time: 6/1/2023 1:53 PM    Performed by: Alvaro Mariee MD  Authorized by: Alvaro Mariee MD    Indications:  Pain  Needle Size:  22 G  Guidance: landmark guided    Location:  Knee  Laterality:  Bilateral      Medications (Right):  80 mg methylPREDNISolone 80 MG/ML; 5 mL lidocaine 1 %  Medications (Left):  80 mg methylPREDNISolone 80 MG/ML; 5 mL lidocaine 1 %  Outcome:  Tolerated well, no immediate complications  Procedure discussed: discussed risks, benefits, and alternatives    Consent Given by:  Patient  Timeout: timeout called immediately prior to procedure    Prep: patient was prepped and draped in usual sterile fashion

## 2023-06-06 ENCOUNTER — TRANSFERRED RECORDS (OUTPATIENT)
Dept: HEALTH INFORMATION MANAGEMENT | Facility: CLINIC | Age: 58
End: 2023-06-06
Payer: COMMERCIAL

## 2023-06-07 ENCOUNTER — OFFICE VISIT (OUTPATIENT)
Dept: ORTHOPEDICS | Facility: CLINIC | Age: 58
End: 2023-06-07
Payer: COMMERCIAL

## 2023-06-07 DIAGNOSIS — M17.0 ARTHRITIS OF BOTH KNEES: Primary | ICD-10-CM

## 2023-06-07 DIAGNOSIS — M19.012 ARTHRITIS OF LEFT ACROMIOCLAVICULAR JOINT: ICD-10-CM

## 2023-06-07 PROCEDURE — 99214 OFFICE O/P EST MOD 30 MIN: CPT | Mod: 25 | Performed by: PREVENTIVE MEDICINE

## 2023-06-07 PROCEDURE — 20605 DRAIN/INJ JOINT/BURSA W/O US: CPT | Mod: LT | Performed by: PREVENTIVE MEDICINE

## 2023-06-07 RX ORDER — METHYLPREDNISOLONE ACETATE 40 MG/ML
40 INJECTION, SUSPENSION INTRA-ARTICULAR; INTRALESIONAL; INTRAMUSCULAR; SOFT TISSUE
Status: SHIPPED | OUTPATIENT
Start: 2023-06-07

## 2023-06-07 RX ADMIN — METHYLPREDNISOLONE ACETATE 40 MG: 40 INJECTION, SUSPENSION INTRA-ARTICULAR; INTRALESIONAL; INTRAMUSCULAR; SOFT TISSUE at 08:53

## 2023-06-07 NOTE — PROGRESS NOTES
HISTORY OF PRESENT ILLNESS  Mr. Mehta is a pleasant 57 year old year old male who presents to clinic today with the following:  What problem are you here for? Left shoulder AC joint injection  And to discuss his bilateral knee arthritis and pain  Had some recent cortisone injections for both knees and these have been helpful for about 2 months and has had them several times over the past few years  Wants to know if there are other treatments for his knees  He is following medication regimen for knee pain and taking medications prescribed, as well as doing PT exercises for his knees   Without resolution  He saw Dr De Jesus regarding his shoulder pain and she suggested an AC joint injection for the left shoulder which we will do today  How long have you had this problem? chronic    Have you had any recent imaging of this problem? Xrays/MRI/CT scans? yes    Have you had treatments for this problem in the past?  -Medications? yes  -Physical therapy? yes  -Injections? no  -Surgery? no    How severe is this problem today? 0-10 scale? 5    How severe has this problem been at WORST in the past? 0-10 scale? 8    What do you think caused this problem? na    Does this problem or its symptoms cause difficulty for you falling asleep or staying asleep? yes    Anything else you want us to know about this problem? no          MEDICAL HISTORY  Patient Active Problem List   Diagnosis     Meniere's disease     Major depression in complete remission (H)     Chronic pain syndrome     Pain medication agreement signed     Bilateral occipital neuralgia     Benign essential hypertension     Cervical radiculopathy     Status post lumbar spinal fusion     Sleep apnea, unspecified type     Osteoarthritis of spine with radiculopathy, lumbar region     Impingement syndrome of left shoulder     Primary osteoarthritis of both knees     Chronic bilateral low back pain without sciatica     Cervical myelopathy (H)     Major depression in complete  remission (H)       Current Outpatient Medications   Medication Sig Dispense Refill     Acetaminophen (TYLENOL PO) Take 1,000 mg by mouth 3 times daily       amLODIPine (NORVASC) 5 MG tablet Take 1 tablet (5 mg) by mouth daily 90 tablet 3     atorvastatin (LIPITOR) 10 MG tablet Take 10 mg by mouth daily       cyclobenzaprine (FLEXERIL) 5 MG tablet 1-2 tablets three times daily as needed 90 tablet 2     ibuprofen (ADVIL/MOTRIN) 800 MG tablet Take 800 mg by mouth 3 times daily       losartan-hydrochlorothiazide (HYZAAR) 50-12.5 MG tablet TAKE 2 TABLETS BY MOUTH DAILY 180 tablet 1     naloxone (NARCAN) 4 MG/0.1ML nasal spray Spray 1 spray (4 mg) into one nostril alternating nostrils once as needed for opioid reversal every 2-3 minutes until assistance arrives (Patient not taking: Reported on 2022) 0.2 mL 1     nortriptyline (PAMELOR) 50 MG capsule Take 1 capsule (50 mg) by mouth At Bedtime 90 capsule 1     order for DME Equipment being ordered: TENS Pads as directed 1 Device 0     oxyCODONE (ROXICODONE) 5 MG tablet Take 1-2 tablets (5-10 mg) by mouth every 6 hours as needed for severe pain Must last 30 days 165 tablet 0     pregabalin (LYRICA) 150 MG capsule TAKE 1 CAPSULE BY MOUTH 3 TIMES A DAY 90 capsule 1     Pregabalin (LYRICA) 200 MG capsule TAKE 1 CAPSULE BY MOUTH EVERY DAY 30 capsule 1     sildenafil (VIAGRA) 100 MG tablet Take 0.5-1 tablets ( mg) by mouth daily as needed Take 30 min to 4 hours before intercourse.  Never use with nitroglycerin, terazosin or doxazosin. 6 tablet 7       Allergies   Allergen Reactions     No Known Drug Allergy        Family History   Problem Relation Age of Onset     Diabetes Mother      Cancer Mother         colon cancer -  at 52, diag at 42     Hypertension Father      Heart Disease Father          of AAA     No Known Problems Brother      No Known Problems Brother      Depression Sister      Suicide Other      Unknown/Adopted Maternal Grandmother       Unknown/Adopted Maternal Grandfather      Unknown/Adopted Paternal Grandmother      Unknown/Adopted Paternal Grandfather      No Known Problems Daughter      No Known Problems Daughter      Social History     Socioeconomic History     Marital status: Single     Number of children: 2   Occupational History     Employer: [employer]   Tobacco Use     Smoking status: Never     Smokeless tobacco: Never   Vaping Use     Vaping status: Never Used     Passive vaping exposure: Yes   Substance and Sexual Activity     Alcohol use: Yes     Alcohol/week: 0.0 standard drinks of alcohol     Comment: rarely     Drug use: No     Sexual activity: Not Currently     Partners: Female   Other Topics Concern     Parent/sibling w/ CABG, MI or angioplasty before 65F 55M? No       Additional medical/Social/Surgical histories reviewed in [facility] and updated as appropriate.     REVIEW OF SYSTEMS (6/7/2023)  10 point ROS of systems including Constitutional, Eyes, Respiratory, Cardiovascular, Gastroenterology, Genitourinary, Integumentary, Musculoskeletal, Psychiatric, Allergic/Immunologic were all negative except for pertinent positives noted in my HPI.     PHYSICAL EXAM  VSS    General  - normal appearance, in no obvious distress  HEENT  - conjunctivae not injected, moist mucous membranes, normocephalic/atraumatic head, ears normal appearance, no lesions, mouth normal appearance, no scars, normal dentition and teeth present  CV  - normal popliteal pulse  Pulm  - normal respiratory pattern, non-labored  Musculoskeletal - bilateral knee  - stance: mildly antalgic gait, genu varum  - inspection: no generalized swelling, trace effusion  - palpation: medial joint line tenderness, patella and patellar tendon non-tender, normal popliteal pulse  - ROM: 100 degrees flexion, 0 degrees extension, painful active ROM  - strength: 5/5 in flexion, 5/5 in extension  - neuro: no sensory or motor deficit  - special tests:  (-) Lachman  (-) anterior  drawer  (-) posterior drawer  (-) pivot shift  (-) Jasmin  (-) Thessaly  (-) varus at 0 and 30 degrees flexion  (-) valgus at 0 and 30 degrees flexion  (+) Jake s compression test  (-) patellar apprehension  Neuro  - no sensory or motor deficit, grossly normal coordination, normal muscle tone  Skin  - no ecchymosis, erythema, warmth, or induration, no obvious rash  Psych  - interactive, appropriate, normal mood and affect  Left shoulder; has pain over AC joint, and pain with reaching and crossover testing, mild positive mahajan    ASSESSMENT & PLAN  56 yo male with chronic left shoulder pain due to rotator cuff arthropathy and AC arthritis, not resolved, and bilateral chronic knee pain due to bilateral arthritis in knees    I independently reviewed the following imaging studies:  Bilateral knee xrays: shows arthritis, MRIs of knees shows arthritis  Left shoulder xray shows AC and GH arthritis  After a 20 minute discussion and examination, we decided to perform a same day injection for diagnostic and therapeutic purposes for  Left AC joint as planned  Discussed and ordered euflexxa for his knees  Will f/u in 1-2 months to start euflexxa  Cont. Medications as prescribed  Patient HAS completed physical therapy for 4-6 weeks  Patient has been doing home exercise physical therapy program for this problem      Appropriate PPE was utilized for prevention of spread of Covid-19.  Alvaro Moore MD, CAM  PROCEDURE: left SHOULDER AC joint injection     The patient was apprised of the risks and the benefits of the procedure and consented and a written consent was signed by the patient.   The patient s shoulder was sterilely prepped with chloraprep.   40 mg of depo suspension was drawn up into a 5 mL syringe with 2 mL of 1% lidocaine   The patient was injected with a 1.5-inch 22-gauge needle at the AC joint  There were no complications. The patient tolerated the procedure well. There was minimal bleeding.   The patient was  instructed to ice the shoulder upon leaving clinic and refrain from overuse over the next 3 days.   The patient was instructed to go to the emergency room with any usual pain, swelling, or redness occurred in the injected area.   The patient was given a followup appointment to evaluate response to the injection to their increased range of motion and reduction of pain.    followup PRN  Dr Alvaro Moore    Medium Joint Injection/Arthrocentesis: L acromioclavicular    Date/Time: 6/7/2023 8:53 AM    Performed by: Alvaro Moore MD  Authorized by: Alvaro Moore MD    Indications:  Pain, diagnostic evaluation and joint swelling  Needle Size:  22 G  Guidance: surface landmarks    Approach:  Superior  Location:  Shoulder  Site:  L acromioclavicular  Medications:  40 mg methylPREDNISolone 40 MG/ML  Outcome:  Tolerated well, no immediate complications  Procedure discussed: discussed risks, benefits, and alternatives    Consent Given by:  Patient  Timeout: timeout called immediately prior to procedure    Prep: patient was prepped and draped in usual sterile fashion

## 2023-06-07 NOTE — LETTER
6/7/2023         RE: Indra Mehta  [id number] [address] [place] [id number]        Dear Colleague,    Thank you for referring your patient, Indra Mehta, to the [facility] [place]. Please see a copy of my visit note below.    HISTORY OF PRESENT ILLNESS  Mr. Mehta is a pleasant 57 year old year old male who presents to clinic today with the following:  What problem are you here for? Left shoulder AC joint injection  And to discuss his bilateral knee arthritis and pain  Had some recent cortisone injections for both knees and these have been helpful for about 2 months and has had them several times over the past few years  Wants to know if there are other treatments for his knees  He is following medication regimen for knee pain and taking medications prescribed, as well as doing PT exercises for his knees   Without resolution  He saw Dr De Jesus regarding his shoulder pain and she suggested an AC joint injection for the left shoulder which we will do today  How long have you had this problem? chronic    Have you had any recent imaging of this problem? Xrays/MRI/CT scans? yes    Have you had treatments for this problem in the past?  -Medications? yes  -Physical therapy? yes  -Injections? no  -Surgery? no    How severe is this problem today? 0-10 scale? 5    How severe has this problem been at WORST in the past? 0-10 scale? 8    What do you think caused this problem? na    Does this problem or its symptoms cause difficulty for you falling asleep or staying asleep? yes    Anything else you want us to know about this problem? no          MEDICAL HISTORY  Patient Active Problem List   Diagnosis     Meniere's disease     Major depression in complete remission (H)     Chronic pain syndrome     Pain medication agreement signed     Bilateral occipital neuralgia     Benign essential hypertension     Cervical radiculopathy     Status post lumbar spinal fusion     Sleep apnea,  unspecified type     Osteoarthritis of spine with radiculopathy, lumbar region     Impingement syndrome of left shoulder     Primary osteoarthritis of both knees     Chronic bilateral low back pain without sciatica     Cervical myelopathy (H)     Major depression in complete remission (H)       Current Outpatient Medications   Medication Sig Dispense Refill     Acetaminophen (TYLENOL PO) Take 1,000 mg by mouth 3 times daily       amLODIPine (NORVASC) 5 MG tablet Take 1 tablet (5 mg) by mouth daily 90 tablet 3     atorvastatin (LIPITOR) 10 MG tablet Take 10 mg by mouth daily       cyclobenzaprine (FLEXERIL) 5 MG tablet 1-2 tablets three times daily as needed 90 tablet 2     ibuprofen (ADVIL/MOTRIN) 800 MG tablet Take 800 mg by mouth 3 times daily       losartan-hydrochlorothiazide (HYZAAR) 50-12.5 MG tablet TAKE 2 TABLETS BY MOUTH DAILY 180 tablet 1     naloxone (NARCAN) 4 MG/0.1ML nasal spray Spray 1 spray (4 mg) into one nostril alternating nostrils once as needed for opioid reversal every 2-3 minutes until assistance arrives (Patient not taking: Reported on 9/21/2022) 0.2 mL 1     nortriptyline (PAMELOR) 50 MG capsule Take 1 capsule (50 mg) by mouth At Bedtime 90 capsule 1     order for DME Equipment being ordered: TENS Pads as directed 1 Device 0     oxyCODONE (ROXICODONE) 5 MG tablet Take 1-2 tablets (5-10 mg) by mouth every 6 hours as needed for severe pain Must last 30 days 165 tablet 0     pregabalin (LYRICA) 150 MG capsule TAKE 1 CAPSULE BY MOUTH 3 TIMES A DAY 90 capsule 1     Pregabalin (LYRICA) 200 MG capsule TAKE 1 CAPSULE BY MOUTH EVERY DAY 30 capsule 1     sildenafil (VIAGRA) 100 MG tablet Take 0.5-1 tablets ( mg) by mouth daily as needed Take 30 min to 4 hours before intercourse.  Never use with nitroglycerin, terazosin or doxazosin. 6 tablet 7       Allergies   Allergen Reactions     No Known Drug Allergy        Family History   Problem Relation Age of Onset     Diabetes Mother      Cancer  Mother         colon cancer -  at 52, diag at 42     Hypertension Father      Heart Disease Father          of AAA     No Known Problems Brother      No Known Problems Brother      Depression Sister      Suicide Other      Unknown/Adopted Maternal Grandmother      Unknown/Adopted Maternal Grandfather      Unknown/Adopted Paternal Grandmother      Unknown/Adopted Paternal Grandfather      No Known Problems Daughter      No Known Problems Daughter      Social History     Socioeconomic History     Marital status: Single     Number of children: 2   Occupational History     Employer: [employer]   Tobacco Use     Smoking status: Never     Smokeless tobacco: Never   Vaping Use     Vaping status: Never Used     Passive vaping exposure: Yes   Substance and Sexual Activity     Alcohol use: Yes     Alcohol/week: 0.0 standard drinks of alcohol     Comment: rarely     Drug use: No     Sexual activity: Not Currently     Partners: Female   Other Topics Concern     Parent/sibling w/ CABG, MI or angioplasty before 65F 55M? No       Additional medical/Social/Surgical histories reviewed in [facility] and updated as appropriate.     REVIEW OF SYSTEMS (2023)  10 point ROS of systems including Constitutional, Eyes, Respiratory, Cardiovascular, Gastroenterology, Genitourinary, Integumentary, Musculoskeletal, Psychiatric, Allergic/Immunologic were all negative except for pertinent positives noted in my HPI.     PHYSICAL EXAM  VSS    General  - normal appearance, in no obvious distress  HEENT  - conjunctivae not injected, moist mucous membranes, normocephalic/atraumatic head, ears normal appearance, no lesions, mouth normal appearance, no scars, normal dentition and teeth present  CV  - normal popliteal pulse  Pulm  - normal respiratory pattern, non-labored  Musculoskeletal - bilateral knee  - stance: mildly antalgic gait, genu varum  - inspection: no generalized swelling, trace effusion  - palpation: medial joint line  tenderness, patella and patellar tendon non-tender, normal popliteal pulse  - ROM: 100 degrees flexion, 0 degrees extension, painful active ROM  - strength: 5/5 in flexion, 5/5 in extension  - neuro: no sensory or motor deficit  - special tests:  (-) Lachman  (-) anterior drawer  (-) posterior drawer  (-) pivot shift  (-) Jasmin  (-) Thessaly  (-) varus at 0 and 30 degrees flexion  (-) valgus at 0 and 30 degrees flexion  (+) Jake s compression test  (-) patellar apprehension  Neuro  - no sensory or motor deficit, grossly normal coordination, normal muscle tone  Skin  - no ecchymosis, erythema, warmth, or induration, no obvious rash  Psych  - interactive, appropriate, normal mood and affect  Left shoulder; has pain over AC joint, and pain with reaching and crossover testing, mild positive mahajan    ASSESSMENT & PLAN  58 yo male with chronic left shoulder pain due to rotator cuff arthropathy and AC arthritis, not resolved, and bilateral chronic knee pain due to bilateral arthritis in knees    I independently reviewed the following imaging studies:  Bilateral knee xrays: shows arthritis, MRIs of knees shows arthritis  Left shoulder xray shows AC and GH arthritis  After a 20 minute discussion and examination, we decided to perform a same day injection for diagnostic and therapeutic purposes for  Left AC joint as planned  Discussed and ordered euflexxa for his knees  Will f/u in 1-2 months to start euflexxa  Cont. Medications as prescribed  Patient HAS completed physical therapy for 4-6 weeks  Patient has been doing home exercise physical therapy program for this problem      Appropriate PPE was utilized for prevention of spread of Covid-19.  Alvaro Moore MD, CAQSM  PROCEDURE: left SHOULDER AC joint injection     The patient was apprised of the risks and the benefits of the procedure and consented and a written consent was signed by the patient.   The patient s shoulder was sterilely prepped with chloraprep.   40  mg of depo suspension was drawn up into a 5 mL syringe with 2 mL of 1% lidocaine   The patient was injected with a 1.5-inch 22-gauge needle at the AC joint  There were no complications. The patient tolerated the procedure well. There was minimal bleeding.   The patient was instructed to ice the shoulder upon leaving clinic and refrain from overuse over the next 3 days.   The patient was instructed to go to the emergency room with any usual pain, swelling, or redness occurred in the injected area.   The patient was given a followup appointment to evaluate response to the injection to their increased range of motion and reduction of pain.    followup PRN  Dr Alvaro Moore    Medium Joint Injection/Arthrocentesis: L acromioclavicular    Date/Time: 6/7/2023 8:53 AM    Performed by: Alvaro Moore MD  Authorized by: Alvaro Moore MD    Indications:  Pain, diagnostic evaluation and joint swelling  Needle Size:  22 G  Guidance: surface landmarks    Approach:  Superior  Location:  Shoulder  Site:  L acromioclavicular  Medications:  40 mg methylPREDNISolone 40 MG/ML  Outcome:  Tolerated well, no immediate complications  Procedure discussed: discussed risks, benefits, and alternatives    Consent Given by:  Patient  Timeout: timeout called immediately prior to procedure    Prep: patient was prepped and draped in usual sterile fashion            Again, thank you for allowing me to participate in the care of your patient.        Sincerely,        Alvaro Moore MD

## 2023-06-15 ENCOUNTER — OFFICE VISIT (OUTPATIENT)
Dept: FAMILY MEDICINE | Facility: CLINIC | Age: 58
End: 2023-06-15
Payer: COMMERCIAL

## 2023-06-15 VITALS
TEMPERATURE: 97.8 F | BODY MASS INDEX: 28.41 KG/M2 | DIASTOLIC BLOOD PRESSURE: 74 MMHG | HEART RATE: 88 BPM | WEIGHT: 214.4 LBS | OXYGEN SATURATION: 99 % | RESPIRATION RATE: 18 BRPM | HEIGHT: 73 IN | SYSTOLIC BLOOD PRESSURE: 138 MMHG

## 2023-06-15 DIAGNOSIS — M46.1 SACROILIITIS, NOT ELSEWHERE CLASSIFIED (H): Primary | ICD-10-CM

## 2023-06-15 DIAGNOSIS — Z98.1 S/P CERVICAL SPINAL FUSION: ICD-10-CM

## 2023-06-15 DIAGNOSIS — G62.9 NEUROPATHY: ICD-10-CM

## 2023-06-15 DIAGNOSIS — M79.18 MYOFASCIAL PAIN: ICD-10-CM

## 2023-06-15 DIAGNOSIS — M54.2 CERVICALGIA: ICD-10-CM

## 2023-06-15 DIAGNOSIS — M54.16 LUMBAR RADICULOPATHY: ICD-10-CM

## 2023-06-15 DIAGNOSIS — F11.90 CHRONIC, CONTINUOUS USE OF OPIOIDS: ICD-10-CM

## 2023-06-15 DIAGNOSIS — G95.9 CERVICAL MYELOPATHY (H): ICD-10-CM

## 2023-06-15 DIAGNOSIS — F33.42 RECURRENT MAJOR DEPRESSIVE DISORDER, IN FULL REMISSION (H): ICD-10-CM

## 2023-06-15 DIAGNOSIS — G89.4 CHRONIC PAIN SYNDROME: ICD-10-CM

## 2023-06-15 LAB — CREAT UR-MCNC: 138 MG/DL

## 2023-06-15 PROCEDURE — 99214 OFFICE O/P EST MOD 30 MIN: CPT | Performed by: FAMILY MEDICINE

## 2023-06-15 RX ORDER — PREGABALIN 150 MG/1
150 CAPSULE ORAL 2 TIMES DAILY
Qty: 90 CAPSULE | Refills: 1 | COMMUNITY
Start: 2023-06-15 | End: 2023-07-21

## 2023-06-15 RX ORDER — PREGABALIN 200 MG/1
200 CAPSULE ORAL DAILY
Qty: 30 CAPSULE | Refills: 1 | Status: SHIPPED | OUTPATIENT
Start: 2023-06-15 | End: 2023-08-14 | Stop reason: DRUGHIGH

## 2023-06-15 ASSESSMENT — PATIENT HEALTH QUESTIONNAIRE - PHQ9
10. IF YOU CHECKED OFF ANY PROBLEMS, HOW DIFFICULT HAVE THESE PROBLEMS MADE IT FOR YOU TO DO YOUR WORK, TAKE CARE OF THINGS AT HOME, OR GET ALONG WITH OTHER PEOPLE: EXTREMELY DIFFICULT
SUM OF ALL RESPONSES TO PHQ QUESTIONS 1-9: 12
SUM OF ALL RESPONSES TO PHQ QUESTIONS 1-9: 12

## 2023-06-15 ASSESSMENT — PAIN SCALES - GENERAL: PAINLEVEL: SEVERE PAIN (7)

## 2023-06-15 NOTE — PROGRESS NOTES
Assessment & Plan     Goyo Aceves is a 57-year-old male who presents today in follow-up of his chronic pain syndrome.  He has chronic continuous use of narcotics because of chronic pain due to cervical, lumbar back pain status post previous cervical spinal fusion.  He also has bilateral shoulder pain and knee pain for which she is seeing orthopedics.  He has been on previously followed by pain clinic but his pain provider left and he was reestablished with primary care to manage his pain medications.  He has been no changes in his pain medication but he continues to feel quite sedated in his day and difficulty concentrating which she attributes primarily to his use of his pain medications.  He has been evaluated for spinal cord stimulator in the past but was denied by insurance he is looking for referral back to pain management to look at possible spinal cord stimulator implantation for complex regional pain syndrome.    In review of his chronic pain medications [place] prescription monitoring program was reviewed.  His overdose risk score is 350 which gives him a 12 fold increased risk of unintentional overdose and death.  He is currently at 42 morphine milliequivalents of narcotics and 3 lorazepam milligram equivalents with his Lyrica.  He is due for a controlled substance agreement reviewed today as well as a urine drug screen.  We reviewed the risks of his ongoing narcotic use as well as the persistent nature of his pain in his cervical spine and lumbar spine as well as both knees and his shoulder.  Our goal would be to reduce his morphine milliequivalents to less than 30/day and if possible reduce his lorazepam milliequivalents to 2/day.  He feels this can only be accomplished if some other intervention such as a spinal cord stimulator was available.  - Pregabalin (LYRICA) 200 MG capsule; Take 1 capsule (200 mg) by mouth daily  - pregabalin (LYRICA) 150 MG capsule; Take 1 capsule (150 mg) by mouth 2  times daily    Cervicalgia  Ketan is a 57-year-old male who presents today in follow-up of his chronic pain syndrome.  He has chronic continuous use of narcotics because of chronic pain due to cervical, lumbar back pain status post previous cervical spinal fusion.  He also has bilateral shoulder pain and knee pain for which she is seeing orthopedics.  He has been on previously followed by pain clinic but his pain provider left and he was reestablished with primary care to manage his pain medications.  He has been no changes in his pain medication but he continues to feel quite sedated in his day and difficulty concentrating which she attributes primarily to his use of his pain medications.  He has been evaluated for spinal cord stimulator in the past but was denied by insurance he is looking for referral back to pain management to look at possible spinal cord stimulator implantation for complex regional pain syndrome.    In review of his chronic pain medications [place] prescription monitoring program was reviewed.  His overdose risk score is 350 which gives him a 12 fold increased risk of unintentional overdose and death.  He is currently at 42 morphine milliequivalents of narcotics and 3 lorazepam milligram equivalents with his Lyrica.  He is due for a controlled substance agreement reviewed today as well as a urine drug screen.  We reviewed the risks of his ongoing narcotic use as well as the persistent nature of his pain in his cervical spine and lumbar spine as well as both knees and his shoulder.  Our goal would be to reduce his morphine milliequivalents to less than 30/day and if possible reduce his lorazepam milliequivalents to 2/day.  He feels this can only be accomplished if some other intervention such as a spinal cord stimulator was available.    - Pregabalin (LYRICA) 200 MG capsule; Take 1 capsule (200 mg) by mouth daily  - Pain Management  Referral; Future    S/P cervical spinal fusion  Ketan is  a 57-year-old male who presents today in follow-up of his chronic pain syndrome.  He has chronic continuous use of narcotics because of chronic pain due to cervical, lumbar back pain status post previous cervical spinal fusion.  He also has bilateral shoulder pain and knee pain for which she is seeing orthopedics.  He has been on previously followed by pain clinic but his pain provider left and he was reestablished with primary care to manage his pain medications.  He has been no changes in his pain medication but he continues to feel quite sedated in his day and difficulty concentrating which she attributes primarily to his use of his pain medications.  He has been evaluated for spinal cord stimulator in the past but was denied by insurance he is looking for referral back to pain management to look at possible spinal cord stimulator implantation for complex regional pain syndrome.    In review of his chronic pain medications [place] prescription monitoring program was reviewed.  His overdose risk score is 350 which gives him a 12 fold increased risk of unintentional overdose and death.  He is currently at 42 morphine milliequivalents of narcotics and 3 lorazepam milligram equivalents with his Lyrica.  He is due for a controlled substance agreement reviewed today as well as a urine drug screen.  We reviewed the risks of his ongoing narcotic use as well as the persistent nature of his pain in his cervical spine and lumbar spine as well as both knees and his shoulder.  Our goal would be to reduce his morphine milliequivalents to less than 30/day and if possible reduce his lorazepam milliequivalents to 2/day.  He feels this can only be accomplished if some other intervention such as a spinal cord stimulator was available.    - [id number] - Urine Drug Confirmation Panel (Comprehensive); Future  - Pregabalin (LYRICA) 200 MG capsule; Take 1 capsule (200 mg) by mouth daily  - Pain Management  Referral; Future  -  [id number] - Urine Drug Confirmation Panel (Comprehensive)    Myofascial pain  Ketan is a 57-year-old male who presents today in follow-up of his chronic pain syndrome.  He has chronic continuous use of narcotics because of chronic pain due to cervical, lumbar back pain status post previous cervical spinal fusion.  He also has bilateral shoulder pain and knee pain for which she is seeing orthopedics.  He has been on previously followed by pain clinic but his pain provider left and he was reestablished with primary care to manage his pain medications.  He has been no changes in his pain medication but he continues to feel quite sedated in his day and difficulty concentrating which she attributes primarily to his use of his pain medications.  He has been evaluated for spinal cord stimulator in the past but was denied by insurance he is looking for referral back to pain management to look at possible spinal cord stimulator implantation for complex regional pain syndrome.    In review of his chronic pain medications [place] prescription monitoring program was reviewed.  His overdose risk score is 350 which gives him a 12 fold increased risk of unintentional overdose and death.  He is currently at 42 morphine milliequivalents of narcotics and 3 lorazepam milligram equivalents with his Lyrica.  He is due for a controlled substance agreement reviewed today as well as a urine drug screen.  We reviewed the risks of his ongoing narcotic use as well as the persistent nature of his pain in his cervical spine and lumbar spine as well as both knees and his shoulder.  Our goal would be to reduce his morphine milliequivalents to less than 30/day and if possible reduce his lorazepam milliequivalents to 2/day.  He feels this can only be accomplished if some other intervention such as a spinal cord stimulator was available.    - Pregabalin (LYRICA) 200 MG capsule; Take 1 capsule (200 mg) by mouth daily    Lumbar radiculopathy  Ketan is a  57-year-old male who presents today in follow-up of his chronic pain syndrome.  He has chronic continuous use of narcotics because of chronic pain due to cervical, lumbar back pain status post previous cervical spinal fusion.  He also has bilateral shoulder pain and knee pain for which she is seeing orthopedics.  He has been on previously followed by pain clinic but his pain provider left and he was reestablished with primary care to manage his pain medications.  He has been no changes in his pain medication but he continues to feel quite sedated in his day and difficulty concentrating which she attributes primarily to his use of his pain medications.  He has been evaluated for spinal cord stimulator in the past but was denied by insurance he is looking for referral back to pain management to look at possible spinal cord stimulator implantation for complex regional pain syndrome.    In review of his chronic pain medications [place] prescription monitoring program was [place].  His overdose risk score is 350 which gives him a 12 fold increased risk of unintentional overdose and death.  He is currently at 42 morphine milliequivalents of narcotics and 3 lorazepam milligram equivalents with his Lyrica.  He is due for a controlled substance agreement reviewed today as well as a urine drug screen.  We reviewed the risks of his ongoing narcotic use as well as the persistent nature of his pain in his cervical spine and lumbar spine as well as both knees and his shoulder.  Our goal would be to reduce his morphine milliequivalents to less than 30/day and if possible reduce his lorazepam milliequivalents to 2/day.  He feels this can only be accomplished if some other intervention such as a spinal cord stimulator was available.    - [id number] - Urine Drug Confirmation Panel (Comprehensive); Future  - Pregabalin (LYRICA) 200 MG capsule; Take 1 capsule (200 mg) by mouth daily  - Pain Management  Referral; Future  -  [id number] - Urine Drug Confirmation Panel (Comprehensive)    Chronic pain syndrome  Ketan is a 57-year-old male who presents today in follow-up of his chronic pain syndrome.  He has chronic continuous use of narcotics because of chronic pain due to cervical, lumbar back pain status post previous cervical spinal fusion.  He also has bilateral shoulder pain and knee pain for which she is seeing orthopedics.  He has been on previously followed by pain clinic but his pain provider left and he was reestablished with primary care to manage his pain medications.  He has been no changes in his pain medication but he continues to feel quite sedated in his day and difficulty concentrating which she attributes primarily to his use of his pain medications.  He has been evaluated for spinal cord stimulator in the past but was denied by insurance he is looking for referral back to pain management to look at possible spinal cord stimulator implantation for complex regional pain syndrome.    In review of his chronic pain medications [place] prescription monitoring program was reviewed.  His overdose risk score is 350 which gives him a 12 fold increased risk of unintentional overdose and death.  He is currently at 42 morphine milliequivalents of narcotics and 3 lorazepam milligram equivalents with his Lyrica.  He is due for a controlled substance agreement reviewed today as well as a urine drug screen.  We reviewed the risks of his ongoing narcotic use as well as the persistent nature of his pain in his cervical spine and lumbar spine as well as both knees and his shoulder.  Our goal would be to reduce his morphine milliequivalents to less than 30/day and if possible reduce his lorazepam milliequivalents to 2/day.  He feels this can only be accomplished if some other intervention such as a spinal cord stimulator was available.    - [id number] - Urine Drug Confirmation Panel (Comprehensive); Future  - Pregabalin (LYRICA) 200 MG capsule;  Take 1 capsule (200 mg) by mouth daily  - Pain Management  Referral; Future  - PRIMARY CARE FOLLOW-UP SCHEDULING; Future  - [id number] - Urine Drug Confirmation Panel (Comprehensive)    Chronic, continuous use of opioids  Ketan is a 57-year-old male who presents today in follow-up of his chronic pain syndrome.  He has chronic continuous use of narcotics because of chronic pain due to cervical, lumbar back pain status post previous cervical spinal fusion.  He also has bilateral shoulder pain and knee pain for which she is seeing orthopedics.  He has been on previously followed by pain clinic but his pain provider left and he was reestablished with primary care to manage his pain medications.  He has been no changes in his pain medication but he continues to feel quite sedated in his day and difficulty concentrating which she attributes primarily to his use of his pain medications.  He has been evaluated for spinal cord stimulator in the past but was denied by insurance he is looking for referral back to pain management to look at possible spinal cord stimulator implantation for complex regional pain syndrome.    In review of his chronic pain medications [place] prescription monitoring program was reviewed.  His overdose risk score is 350 which gives him a 12 fold increased risk of unintentional overdose and death.  He is currently at 42 morphine milliequivalents of narcotics and 3 lorazepam milligram equivalents with his Lyrica.  He is due for a controlled substance agreement reviewed today as well as a urine drug screen.  We reviewed the risks of his ongoing narcotic use as well as the persistent nature of his pain in his cervical spine and lumbar spine as well as both knees and his shoulder.  Our goal would be to reduce his morphine milliequivalents to less than 30/day and if possible reduce his lorazepam milliequivalents to 2/day.  He feels this can only be accomplished if some other intervention such as a  spinal cord stimulator was available.    - [id number] - Urine Drug Confirmation Panel (Comprehensive); Future  - Pregabalin (LYRICA) 200 MG capsule; Take 1 capsule (200 mg) by mouth daily  - PRIMARY CARE FOLLOW-UP SCHEDULING; Future  - [id number] - Urine Drug Confirmation Panel (Comprehensive)    Sacroiliitis, not elsewhere classified (H)  Ketan is a 57-year-old male who presents today in follow-up of his chronic pain syndrome.  He has chronic continuous use of narcotics because of chronic pain due to cervical, lumbar back pain status post previous cervical spinal fusion.  He also has bilateral shoulder pain and knee pain for which she is seeing orthopedics.  He has been on previously followed by pain clinic but his pain provider left and he was reestablished with primary care to manage his pain medications.  He has been no changes in his pain medication but he continues to feel quite sedated in his day and difficulty concentrating which she attributes primarily to his use of his pain medications.  He has been evaluated for spinal cord stimulator in the past but was denied by insurance he is looking for referral back to pain management to look at possible spinal cord stimulator implantation for complex regional pain syndrome.    In review of his chronic pain medications [place] prescription monitoring program was reviewed.  His overdose risk score is 350 which gives him a 12 fold increased risk of unintentional overdose and death.  He is currently at 42 morphine milliequivalents of narcotics and 3 lorazepam milligram equivalents with his Lyrica.  He is due for a controlled substance agreement reviewed today as well as a urine drug screen.  We reviewed the risks of his ongoing narcotic use as well as the persistent nature of his pain in his cervical spine and lumbar spine as well as both knees and his shoulder.  Our goal would be to reduce his morphine milliequivalents to less than 30/day and if possible reduce his  lorazepam milliequivalents to 2/day.  He feels this can only be accomplished if some other intervention such as a spinal cord stimulator was available.    - Pain Management  Referral; Future    Cervical myelopathy (CHRIS)  Ketan is a 57-year-old male who presents today in follow-up of his chronic pain syndrome.  He has chronic continuous use of narcotics because of chronic pain due to cervical, lumbar back pain status post previous cervical spinal fusion.  He also has bilateral shoulder pain and knee pain for which she is seeing orthopedics.  He has been on previously followed by pain clinic but his pain provider left and he was reestablished with primary care to manage his pain medications.  He has been no changes in his pain medication but he continues to feel quite sedated in his day and difficulty concentrating which she attributes primarily to his use of his pain medications.  He has been evaluated for spinal cord stimulator in the past but was denied by insurance he is looking for referral back to pain management to look at possible spinal cord stimulator implantation for complex regional pain syndrome.    In review of his chronic pain medications [place] prescription monitoring program was reviewed.  His overdose risk score is 350 which gives him a 12 fold increased risk of unintentional overdose and death.  He is currently at 42 morphine milliequivalents of narcotics and 3 lorazepam milligram equivalents with his Lyrica.  He is due for a controlled substance agreement reviewed today as well as a urine drug screen.  We reviewed the risks of his ongoing narcotic use as well as the persistent nature of his pain in his cervical spine and lumbar spine as well as both knees and his shoulder.  Our goal would be to reduce his morphine milliequivalents to less than 30/day and if possible reduce his lorazepam milliequivalents to 2/day.  He feels this can only be accomplished if some other intervention such as a  "spinal cord stimulator was available.    - Pain Management  Referral; Future    Recurrent major depressive disorder, in full remission (H)  Chronic, stable. The current medical regimen is effective;  continue present plan and medications.       Ordering of each unique test  Prescription drug management         BMI:   Estimated body mass index is 28.68 kg/m  as calculated from the following:    Height as of this encounter: 1.842 m (6' 0.5\").    Weight as of this encounter: 97.3 kg (214 lb 6.4 oz).       CONSULTATION/REFERRAL to Pain Clinic  Regular exercise   Follow-up Visit   Expected date:  Sep 15, 2023 (Approximate)      Follow Up Appointment Details:     Follow-up with whom?: Me    Follow-Up for what?: Chronic Disease f/u    Chronic Disease f/u: General (Other)    Additional Details: Chronic pain    How?: In Person or Virtual    Is this an as-needed follow-up?: No                      Tha Grullon MD  [facility] [place] Aceves is a 57 year old, presenting for the following health issues:  Recheck Medication (Pain medication) and Memory Loss        6/15/2023     2:09 PM   Additional Questions   Roomed by Deysi CASEY         6/15/2023     2:09 PM   Patient Reported Additional Medications   Patient reports taking the following new medications Lyrica 150 mg 1 tablet twice daily. testosterone     History of Present Illness       Back Pain:  He presents for follow up of back pain. Patient's back pain is a chronic problem.  Location of back pain:  Right lower back, left lower back, left side of neck, left shoulder, right buttock, left buttock, right hip, left hip, right side of waist and left side of waist  Description of back pain: burning, sharp and shooting  Back pain spreads: right buttocks, left buttocks, right thigh, right knee, left shoulder and left side of neck    Since patient first noticed back pain, pain is: gradually worsening  Does back pain interfere with his job:  " Yes      Reason for visit:  Medications and loss of brain function    He eats 2-3 servings of fruits and vegetables daily.He consumes 2 sweetened beverage(s) daily.He exercises with enough effort to increase his heart rate 9 or less minutes per day.  He exercises with enough effort to increase his heart rate 3 or less days per week.   He is taking medications regularly.    Today's PHQ-9         PHQ-9 Total Score: 12    PHQ-9 Q9 Thoughts of better off dead/self-harm past 2 weeks :   Not at all    How difficult have these problems made it for you to do your work, take care of things at home, or get along with other people: Extremely difficult       Pain History:  When did you first notice your pain? Many years   Have you seen this provider for your pain in the past?   Yes   Where in your body do you have pain? Neck, back, shoulders, knees  Are you seeing anyone else for your pain? Yes - Ortho        10/12/2022     2:02 PM 3/6/2023     3:20 PM 6/15/2023     2:15 PM   PHQ-9 SCORE   PHQ-9 Total Score MyChart 8 (Mild depression) 9 (Mild depression) 12 (Moderate depression)   PHQ-9 Total Score 8 9 12           7/23/2018     4:48 PM 8/16/2019     2:54 PM 9/21/2022     3:09 PM   ALICE-7 SCORE   Total Score   5 (mild anxiety)   Total Score 1 5 5               Chronic Pain Follow Up:    Location of pain: Neck, back, shoulders, knees  Analgesia/pain control:    - Recent changes:  worse    - Overall control: Inadequate pain control    - Current treatments: Lyrica, Oxycodone   Adherence:     - Do you ever take more pain medicine than prescribed? No    - When did you take your last dose of pain medicine?  today   Adverse effects: Yes: concentration and memory deficits   PDMP Review       Value Time User    State PDMP site checked  Yes 6/15/2023  2:47 PM Tha Grullon MD        Last CSA Agreement:   CSA -- Patient Level:     [Media Unavailable] Controlled Substance Agreement - Opioid - Scan on 6/1/2022  4:25 PM   [Media  "Unavailable] Controlled Substance Agreement - Opioid - Scan on 6/18/2021  9:09 AM   [Media Unavailable] Controlled Substance Agreement - Opioid - Scan on 10/29/2020  2:12 PM   [Media Unavailable] Controlled Substance Agreement - Opioid - Scan on 4/15/2019  3:04 PM       Last UDS: 6/5/2022 6/16/2023    11:59 AM   OPIOID RISK TOOL TOTAL SCORE   Total Score 1     Low Risk (0-3)  Moderate Risk (4-7)  High Risk (>8)            Review of Systems   CONSTITUTIONAL: NEGATIVE for fever, chills, change in weight  ENT/MOUTH: NEGATIVE for ear, mouth and throat problems  RESP: NEGATIVE for significant cough or SOB  CV: NEGATIVE for chest pain, palpitations or peripheral edema  MUSCULOSKELETAL: POSITIVE  for back pain lumbar chronic, joint pain bilateral knees and shouder, neck pain, paresthesias and radicular pain, prior EMG with mild CTS   PSYCHIATRIC: POSITIVE fordepressed mood, Hx anxiety, Hx depression, feelings of worthlessness/guilt, hopelessness, impaired memory and frustration with side effects of multiple chronic pain medications.  ROS otherwise negative      Objective    /74 (BP Location: Right arm, Patient Position: Chair)   Pulse 88   Temp 97.8  F (36.6  C) (Temporal)   Resp 18   Ht 1.842 m (6' 0.5\")   Wt 97.3 kg (214 lb 6.4 oz)   SpO2 99%   BMI 28.68 kg/m    Body mass index is 28.68 kg/m .  Physical Exam   GENERAL: healthy, alert and no distress  NECK: no adenopathy, no asymmetry, masses, or scars and thyroid normal to palpation  RESP: lungs clear to auscultation - no rales, rhonchi or wheezes  CV: regular rate and rhythm, normal S1 S2, no S3 or S4, no murmur, click or rub, no peripheral edema and peripheral pulses strong  ABDOMEN: soft, nontender, no hepatosplenomegaly, no masses and bowel sounds normal  MS: no gross musculoskeletal defects noted, no edema  PSYCH: concentration poor, affect flat, fatigued, judgement and insight intact and appearance well groomed    Office Visit on 11/29/2022 "   Component Date Value Ref Range Status     Neutrophil Cytoplasmic Antibody 11/29/2022 <1:10  <1:10 Final     Neutrophil Cytoplasmic Antibody Pa* 11/29/2022 The ANCA IFA is <1:10.  No further testing will be performed.   Final     Beta 2 Glycoprotein 1 Antibody IgM 11/29/2022 <2.4  <7.0 U/mL Final    Negative     Cardiolipin Amaris IgA Instrument Archana* 11/29/2022 1.5  <14.0 APL-U/mL Final     Cardiolipin Antibody IgA 11/29/2022 Negative  Negative Final     Beta 2 Glycoprotein 1 Antibody IgG 11/29/2022 14.0 (H)  <7.0 U/mL Final    Positive     Beta 2 Glycoprotein 1 Antibody IgA 11/29/2022 1.4  <7.0 U/mL Final    Negative     INR 11/29/2022 0.97  0.85 - 1.15 Final     Thrombin Time 11/29/2022 18.3  13.0 - 19.0 Seconds Final     PTT Ratio 11/29/2022 0.95  <1.21 Final     DRVVT Screen Ratio 11/29/2022 0.99  <1.08 Final     Lupus Result 11/29/2022 Negative  Negative Final     Lupus Interpretation 11/29/2022    Final                    Value:The INR is normal.  APTT ratio is normal.    DRVVT Screen ratio is normal.  Thrombin time is normal.    NEGATIVE TEST; A LUPUS ANTICOAGULANT WAS NOT DETECTED IN THIS SPECIMEN WITHIN THE LIMITS OF THE TESTING REPERTOIRE.    If the clinical picture is strongly suggestive of an antiphospholipid syndrome, recommend anticardiolipin and beta-2-glycoprotein (IgG and IgM) antibody tests.      Ruben Maza MD  [facility]           Centromere Amaris IgG Instrument Value 11/29/2022 <0.7  <7.0 U/mL Final     Centromere Antibody IgG 11/29/2022 Negative  Negative Final     Scl-70 Amaris IgG Instrument Value 11/29/2022 <0.6  <7.0 U/mL Final     Scl-70 Antibody IgG 11/29/2022 Negative  Negative Final     RNP Amaris IgG Instrument Value 11/29/2022 3.2  <5.0 U/mL Final     RNP Antibody IgG 11/29/2022 Negative  Negative Final     Calero DONNA Amaris IgG Instrument Value 11/29/2022 0.8  <7.0 U/mL Final     Smith DONNA Antibody IgG 11/29/2022 Negative  Negative Final     SSA Amaris IgG Instrument Value 11/29/2022 <0.5  <7.0  U/mL Final     SSA (Ro) Antibody IgG 11/29/2022 Negative  Negative Final     SSB Amaris IgG Instrument Value 11/29/2022 <0.6  <7.0 U/mL Final     SSB (La) Antibody IgG 11/29/2022 Negative  Negative Final     DNA (ds) Antibody 11/29/2022 0.6  <10.0 IU/mL Final    Negative     C4 Complement 11/29/2022 21  13 - 39 mg/dL Final     C3 Complement 11/29/2022 126  81 - 157 mg/dL Final     Cyclic Citrullinated Peptide Antib* 11/29/2022 0.5  <7.0 U/mL Final    Negative     Erythrocyte Sedimentation Rate 11/29/2022 5  0 - 20 mm/hr Final     CRP Inflammation 11/29/2022 <3.00  <5.00 mg/L Final     Uric Acid 11/29/2022 5.8  3.4 - 7.0 mg/dL Final     Cardiolipin Amaris IgG Instrument Archana* 11/29/2022 3.6  <10.0 GPL-U/mL Final     Cardiolipin Antibody IgG 11/29/2022 Negative  Negative Final     Cardiolipin Amaris IgM Instrument Archana* 11/29/2022 <2.0  <10.0 MPL-U/mL Final     Cardiolipin Antibody IgM 11/29/2022 Negative  Negative Final     Color Urine 11/29/2022 Light Yellow  Colorless, Straw, Light Yellow, Yellow Final     Appearance Urine 11/29/2022 Clear  Clear Final     Glucose Urine 11/29/2022 Negative  Negative mg/dL Final     Bilirubin Urine 11/29/2022 Negative  Negative Final     Ketones Urine 11/29/2022 Negative  Negative mg/dL Final     Specific Gravity Urine 11/29/2022 1.010  1.003 - 1.035 Final     Blood Urine 11/29/2022 Negative  Negative Final     pH Urine 11/29/2022 6.0  5.0 - 7.0 Final     Protein Albumin Urine 11/29/2022 Negative  Negative mg/dL Final     Urobilinogen Urine 11/29/2022 0.2  0.2, 1.0 E.U./dL Final     Nitrite Urine 11/29/2022 Negative  Negative Final     Leukocyte Esterase Urine 11/29/2022 Negative  Negative Final     RBC Urine 11/29/2022 None Seen  0-2 /HPF /HPF Final     WBC Urine 11/29/2022 None Seen  0-5 /HPF /HPF Final     Results for orders placed or performed in visit on 06/15/23   Urine Creatinine for Drug Screen Panel     Status: None   Result Value Ref Range    Creatinine Urine for Drug Screen 138  mg/dL   [id number] - Urine Drug Confirmation Panel (Comprehensive)     Status: None (In process)    Narrative    The following orders were created for panel order [id number] - Urine Drug Confirmation Panel (Comprehensive).  Procedure                               Abnormality         Status                     ---------                               -----------         ------                     Urine Drug Confirmation ...[[id number]]                      In process                 Urine Creatinine for Gm...[[id number]]                      Final result                 Please view results for these tests on the individual orders.

## 2023-06-15 NOTE — LETTER
Opioid / Opioid Plus Controlled Substance Agreement    This is an agreement between you and your provider about the safe and appropriate use of controlled substance/opioids prescribed by your care team. Controlled substances are medicines that can cause physical and mental dependence (abuse).    There are strict laws about having and using these medicines. We here at [facility] are committing to working with you in your efforts to get better. To support you in this work, we ll help you schedule regular office appointments for medicine refills. If we must cancel or change your appointment for any reason, we ll make sure you have enough medicine to last until your next appointment.     As a Provider, I will:  Listen carefully to your concerns and treat you with respect.   Recommend a treatment plan that I believe is in your best interest. This plan may involve therapies other than opioid pain medication.   Talk with you often about the possible benefits, and the risk of harm of any medicine that we prescribe for you.   Provide a plan on how to taper (discontinue or go off) using this medicine if the decision is made to stop its use.    As a Patient, I understand that opioid(s):   Are a controlled substance prescribed by my care team to help me function or work and manage my condition(s).   Are strong medicines and can cause serious side effects such as:  Drowsiness, which can seriously affect my driving ability  A lower breathing rate, enough to cause death  Harm to my thinking ability   Depression   Abuse of and addiction to this medicine  Need to be taken exactly as prescribed. Combining opioids with certain medicines or chemicals (such as illegal drugs, sedatives, sleeping pills, and benzodiazepines) can be dangerous or even fatal. If I stop opioids suddenly, I may have severe withdrawal symptoms.  Do not work for all types of pain nor for all patients. If they re not helpful, I may be asked to stop  them.        The risks, benefits and side effects of these medicine(s) were explained to me. I agree that:  I will take part in other treatments as advised by my care team. This may be psychiatry or counseling, physical therapy, behavioral therapy, group treatment or a referral to a specialist.     I will keep all my appointments. I understand that this is part of the monitoring of opioids. My care team may require an office visit for EVERY opioid/controlled substance refill. If I miss appointments or don t follow instructions, my care team may stop my medicine.    I will take my medicines as prescribed. I will not change the dose or schedule unless my care team tells me to. There will be no refills if I run out early.     I may be asked to come to the clinic and complete a urine drug test or complete a pill count at any time. If I don t give a urine sample or participate in a pill count, the care team may stop my medicine.    I will only receive prescriptions from this clinic for chronic pain. If I am treated by another provider for acute pain issues, I will tell them that I am taking opioid pain medication for chronic pain and that I have a treatment agreement with this provider. I will inform my [facility] care team within one business day if I am given a prescription for any pain medication by another healthcare provider. My [facility] care team can contact other providers and pharmacists about my use of any medicines.    It is up to me to make sure that I don t run out of my medicines on weekends or holidays. If my care team is willing to refill my opioid prescription without a visit, I must request refills only during office hours. Refills may take up to 3 business days to process. I will use one pharmacy to fill all my opioid and other controlled substance prescriptions. I will notify the clinic about any changes to my insurance or medication availability.    I am responsible for my  prescriptions. If the medicine/prescription is lost, stolen or destroyed, it will not be replaced. I also agree not to share controlled substance medicines with anyone.    I am aware I should not use any illegal or recreational drugs. I agree not to drink alcohol unless my care team says I can.       If I enroll in the [place] [facility] Cann[facility] program, I will tell my care team prior to my next refill.     I will tell my care team right away if I become pregnant, have a new medical problem treated outside of my regular clinic, or have a change in my medications.    I understand that this medicine can affect my thinking, judgment and reaction time. Alcohol and drugs affect the brain and body, which can affect the safety of my driving. Being under the influence of alcohol or drugs can affect my decision-making, behaviors, personal safety, and the safety of others. Driving while impaired (DWI) can occur if a person is driving, operating, or in physical control of a car, motorcycle, boat, snowmobile, ATV, motorbike, off-road vehicle, or any other motor vehicle ([place] Statute 169A.20). I understand the risk if I choose to drive or operate any vehicle or machinery.    I understand that if I do not follow any of the conditions above, my prescriptions or treatment may be stopped or changed.          Opioids  What You Need to Know    What are opioids?   Opioids are pain medicines that must be prescribed by a doctor. They are also known as narcotics.     Examples are:   morphine (MS Contin, Lucia)  oxycodone (Oxycontin)  oxycodone and acetaminophen (Percocet)  hydrocodone and acetaminophen (Vicodin, Norco)   fentanyl patch (Duragesic)   hydromorphone (Dilaudid)   methadone  codeine (Tylenol #3)     What do opioids do well?   Opioids are best for severe short-term pain such as after a surgery or injury. They may work well for cancer pain. They may help some people with long-lasting (chronic) pain.     What do opioids NOT do  well?   Opioids never get rid of pain entirely, and they don t work well for most patients with chronic pain. Opioids don t reduce swelling, one of the causes of pain.                                    Other ways to manage chronic pain and improve function include:     Treat the health problem that may be causing pain  Anti-inflammation medicines, which reduce swelling and tenderness, such as ibuprofen (Advil, Motrin) or naproxen (Aleve)  Acetaminophen (Tylenol)  Antidepressants and anti-seizure medicines, especially for nerve pain  Topical treatments such as patches or creams  Injections or nerve blocks  Chiropractic or osteopathic treatment  Acupuncture, massage, deep breathing, meditation, visual imagery, aromatherapy  Use heat or ice at the pain site  Physical therapy   Exercise  Stop smoking  Take part in therapy       Risks and side effects     Talk to your doctor before you start or decide to keep taking opioids. Possible side effects include:    Lowering your breathing rate enough to cause death  Overdose, including death, especially if taking higher than prescribed doses  Worse depression symptoms; less pleasure in things you usually enjoy  Feeling tired or sluggish  Slower thoughts or cloudy thinking  Being more sensitive to pain over time; pain is harder to control  Trouble sleeping or restless sleep  Changes in hormone levels (for example, less testosterone)  Changes in sex drive or ability to have sex  Constipation  Unsafe driving  Itching and sweating  Dizziness  Nausea, throwing up and dry mouth    What else should I know about opioids?    Opioids may lead to dependence, tolerance, or addiction.    Dependence means that if you stop or reduce the medicine too quickly, you will have withdrawal symptoms. These include loose poop (diarrhea), jitters, flu-like symptoms, nervousness and tremors. Dependence is not the same as addiction.                     Tolerance means needing higher doses over time to  get the same effect. This may increase the chance of serious side effects.    Addiction is when people improperly use a substance that harms their body, their mind or their relations with others. Use of opiates can cause a relapse of addiction if you have a history of drug or alcohol abuse.    People who have used opioids for a long time may have a lower quality of life, worse depression, higher levels of pain and more visits to doctors.    You can overdose on opioids. Take these steps to lower your risk of overdose:    Recognize the signs:  Signs of overdose include decrease or loss of consciousness (blackout), slowed breathing, trouble waking up and blue lips. If someone is worried about overdose, they should call 911.    Talk to your doctor about Narcan (naloxone).   If you are at risk for overdose, you may be given a prescription for Narcan. This medicine very quickly reverses the effects of opioids.   If you overdose, a friend or family member can give you Narcan while waiting for the ambulance. They need to know the signs of overdose and how to give Narcan.     Don't use alcohol or street drugs.   Taking them with opioids can cause death.    Do not take any of these medicines unless your doctor says it s OK. Taking these with opioids can cause death:  Benzodiazepines, such as lorazepam (Ativan), alprazolam (Xanax) or diazepam (Valium)  Muscle relaxers, such as cyclobenzaprine (Flexeril)  Sleeping pills like zolpidem (Ambien)   Other opioids      How to keep you and other people safe while taking opioids:    Never share your opioids with others.  Opioid medicines are regulated by the Drug Enforcement Agency (ALCIDES). Selling or sharing medications is a criminal act.    2. Be sure to store opioids in a secure place, locked up if possible. Young children can easily swallow them and overdose.    3. When you are traveling with your medicines, keep them in the original bottles. If you use a pill box, be sure you also  carry a copy of your medicine list from your clinic or pharmacy.    4. Safe disposal of opioids    Most pharmacies have places to get rid of medicine, called disposal kiosks. Medicine disposal options are also available in every [facility]. Search your county and  medication disposal  to find more options. You can find more details at:  https://www.pca.[facility].[place]./living-green/managing-unwanted-medications     I agree that my provider, clinic care team, and pharmacy may work with any city, state or federal law enforcement agency that investigates the misuse, sale, or other diversion of my controlled medicine. I will allow my provider to discuss my care with, or share a copy of, this agreement with any other treating provider, pharmacy or emergency room where I receive care.    I have read this agreement and have asked questions about anything I did not understand.    _______________________________________________________  Patient Signature - Indra Mehta _____________________                   Date     _______________________________________________________  Provider Signature - Tha Grullon MD   _____________________                   Date     _______________________________________________________  Witness Signature (required if provider not present while patient signing)   _____________________                   Date

## 2023-06-19 LAB
OXYCODONE UR CFM-MCNC: 720 NG/ML
OXYCODONE/CREAT UR: 522 NG/MG {CREAT}
OXYMORPHONE UR CFM-MCNC: 420 NG/ML
OXYMORPHONE/CREAT UR: 304 NG/MG {CREAT}
PREGABALIN UR QL CFM: PRESENT

## 2023-06-21 ENCOUNTER — TELEPHONE (OUTPATIENT)
Dept: PALLIATIVE MEDICINE | Facility: CLINIC | Age: 58
End: 2023-06-21
Payer: COMMERCIAL

## 2023-06-21 NOTE — TELEPHONE ENCOUNTER
RECORDS RECEIVED FROM: Internal   REASON FOR VISIT:   Cervicalgia   S/P cervical spinal fusion   Lumbar radiculopathy   Chronic pain syndrome   Sacroiliitis, not elsewhere classified (H)   Cervical myelopathy (H      Date of Appt: 10/05/2023   NOTES (FOR ALL VISITS) STATUS DETAILS   OFFICE NOTE from referring provider Internal 06/15/2023 Dr Grullon [facility]    OFFICE NOTE from other specialist Internal 06/01/2022 Dr Mallory [facility]    DISCHARGE SUMMARY from hospital N/A    DISCHARGE REPORT from the ER N/A    OPERATIVE REPORT N/A    [facility] (MS ONLY) N/A    EMG Received 06/06/2023 [facility]   EEG N/A    MEDICATION LIST N/A    IMAGING  (FOR ALL VISITS)     LUMBAR PUNCTURE N/A    ALFREDITO SCAN (MOVEMENT) N/A    ULTRASOUND (CAROTID BILAT) *VASCULAR* N/A    MRI (HEAD, NECK, SPINE) Internal    CT (HEAD, NECK, SPINE) Internal

## 2023-06-21 NOTE — TELEPHONE ENCOUNTER
[facility] Call Center    Phone Message    May a detailed message be left on voicemail: yes     Reason for Call: Other: Pt is calling and would like to make an appt to be seen in the pain clinic Pt did see  at the [facility] but she is no longer there. Pt was last seen on   9/21/2022. Pt does have a referral but it has not been a year so not sure if he needs that to be seen. Please call Pt back to schedule appt.   Pt would like to be seen at [facility]  Action Taken: Message routed to:  Other: [facility]    Travel Screening: Not Applicable

## 2023-06-23 ENCOUNTER — MYC REFILL (OUTPATIENT)
Dept: FAMILY MEDICINE | Facility: CLINIC | Age: 58
End: 2023-06-23
Payer: COMMERCIAL

## 2023-06-23 DIAGNOSIS — M54.2 CERVICALGIA: ICD-10-CM

## 2023-06-23 DIAGNOSIS — M79.18 MYOFASCIAL PAIN: ICD-10-CM

## 2023-06-23 DIAGNOSIS — G89.4 CHRONIC PAIN SYNDROME: ICD-10-CM

## 2023-06-23 DIAGNOSIS — Z98.1 S/P CERVICAL SPINAL FUSION: ICD-10-CM

## 2023-06-23 DIAGNOSIS — M54.16 LUMBAR RADICULOPATHY: ICD-10-CM

## 2023-06-23 DIAGNOSIS — F11.90 CHRONIC, CONTINUOUS USE OF OPIOIDS: ICD-10-CM

## 2023-06-23 RX ORDER — OXYCODONE HYDROCHLORIDE 5 MG/1
5-10 TABLET ORAL EVERY 6 HOURS PRN
Qty: 165 TABLET | Refills: 0 | Status: SHIPPED | OUTPATIENT
Start: 2023-06-23 | End: 2023-07-24

## 2023-06-23 NOTE — TELEPHONE ENCOUNTER
Requested Prescriptions   Pending Prescriptions Disp Refills     oxyCODONE (ROXICODONE) 5 MG tablet 165 tablet 0     Sig: Take 1-2 tablets (5-10 mg) by mouth every 6 hours as needed for severe pain Must last 30 days       There is no refill protocol information for this order          Routing refill request to provider for review/approval because:  [place] not on [place] [facility] [facility] or [facility] refill protocol or controlled substance

## 2023-07-06 DIAGNOSIS — I73.01 RAYNAUD'S DISEASE WITH GANGRENE (H): ICD-10-CM

## 2023-07-06 RX ORDER — AMLODIPINE BESYLATE 5 MG/1
TABLET ORAL
Qty: 90 TABLET | Refills: 3 | Status: SHIPPED | OUTPATIENT
Start: 2023-07-06 | End: 2024-07-05

## 2023-07-12 ENCOUNTER — PATIENT OUTREACH (OUTPATIENT)
Dept: CARE COORDINATION | Facility: CLINIC | Age: 58
End: 2023-07-12
Payer: COMMERCIAL

## 2023-07-17 ENCOUNTER — MYC MEDICAL ADVICE (OUTPATIENT)
Dept: FAMILY MEDICINE | Facility: CLINIC | Age: 58
End: 2023-07-17
Payer: COMMERCIAL

## 2023-07-19 ENCOUNTER — MYC MEDICAL ADVICE (OUTPATIENT)
Dept: FAMILY MEDICINE | Facility: CLINIC | Age: 58
End: 2023-07-19
Payer: COMMERCIAL

## 2023-07-19 ENCOUNTER — NURSE TRIAGE (OUTPATIENT)
Dept: FAMILY MEDICINE | Facility: CLINIC | Age: 58
End: 2023-07-19
Payer: COMMERCIAL

## 2023-07-19 NOTE — TELEPHONE ENCOUNTER
S-(situation): Patient sent My Chart message as noted below.  Phoned patient.  Patient stated the high dose of Lyrica he is on is causing him confusion, to loose focus, and he does not feel safe driving.  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  He stated he takes 500 mg a day of Lyrica and it is not helping with the pain anymore, but is increasing his mental ability to think clearly.    B-(background): Patient has scheduled visit for the first available visit per Dr. Mitchel MD referral with neurology/ pain to address pain relieving machine to go into his back.  This visit is not until October of 2023 and patient states he is not doing well enough to wait that long.    A-(assessment): Patient in pain and with higher doses of medication is losing his mental ability to think clearly, concentrate or remember things he has completed even within the last week or so.    R-(recommendations): Advised patient to seek emergency care for severe pain per protocol.  Advised patient to investigate other avenues to be seen by a provider that can assist him with the pain relieving machine he is looking for through neurology/ pain provider.  Informed patient this message would be sent to PCP (who will not be back into the office until 7/24/2023), patient stated understanding.  Informed patient this message would be sent to scheduled provider through [facility] as referred by PCP,  for possible early work into schedule.  Patient stated understanding.    Will forward to PCP for review.  Will forward to Dr. Peyton MD for review      [facility] Messages (supporting Tha Grullon MD)2 hours ago (1:31 PM)   I feel like my focus, concentration, and general mental ability is declining rapidly.  Things at work that should take me an hour take me a day or two.  I find myself staring at my computer screen not knowing what to do.  Even writing this message is taking a  "long time as I struggle for what to type next. This is scaring me.  Is there anything else we can do besides waiting until Oct 5th for my visit with pain management?  Additional Information   Negative: Difficult to awaken or acting confused (disoriented, slurred speech) and has diabetes mellitus   Negative: Difficult to awaken or acting confused (disoriented, slurred speech) and new-onset   Negative: Weakness of the face, arm, or leg on one side of the body and new-onset   Negative: Numbness of the face, arm, or leg on one side of the body and new-onset   Negative: Loss of speech or garbled speech and new-onset   Negative: Difficulty breathing and bluish (or gray) lips or face   Negative: Shock suspected (e.g., cold/pale/clammy skin, too weak to stand, low BP, rapid pulse)   Negative: Seeing or hearing or feeling things that are not there (i.e., auditory, visual, or tactile hallucinations)   Negative: Followed a head injury   Negative: Drug overdose suspected   Negative: Sounds like a life-threatening emergency to the triager   Negative: Questions or concerns about alcohol use, unhealthy alcohol use, binge drinking, intoxication, or withdrawal   Negative: Questions or concerns about substance use (drug use), unhealthy drug use, intoxication, or withdrawal   Negative: Diabetes mellitus and confusion from low blood sugar (i.e., < 60 mg/dl or 3.5 mmol/l)   Negative: Headache or vomiting   Negative: Stiff neck (can't touch chin to chest)   Negative: Very strange or paranoid behavior   Negative: Fever > 100.4 F (38.0 C)   Negative: Patient sounds very sick or weak to the triager   Negative: Brief confusion (now gone)   Negative: Patient wants to be seen (or caregiver requests)    Answer Assessment - Initial Assessment Questions  1. LEVEL OF CONSCIOUSNESS: \"How is he (she, the patient) acting right now?\" (e.g., alert-oriented, confused, lethargic, stuporous, comatose)      Please see My Chart message attached.  Feels like " "concentration is decreased and will get exhausted.    2. ONSET: \"When did the confusion start?\"  (minutes, hours, days)      Started shortly after full dose of Lyrica    3. PATTERN \"Does this come and go, or has it been constant since it started?\"  \"Is it present now?\"      Constant    4. ALCOHOL or DRUGS: \"Has he been drinking alcohol or taking any drugs?\"       NA    5. NARCOTIC MEDICATIONS: \"Has he been receiving any narcotic medications?\" (e.g., morphine, Vicodin)      NA    6. CAUSE: \"What do you think is causing the confusion?\"       Higher dose of Lyrica    7. OTHER SYMPTOMS: \"Are there any other symptoms?\" (e.g., difficulty breathing, headache, fever, weakness)      fatigued    Protocols used: Confusion - Delirium-A-OH  Amy Orourke RN    "

## 2023-07-21 DIAGNOSIS — G62.9 NEUROPATHY: ICD-10-CM

## 2023-07-21 RX ORDER — PREGABALIN 150 MG/1
150 CAPSULE ORAL 2 TIMES DAILY
Qty: 60 CAPSULE | Refills: 1 | Status: SHIPPED | OUTPATIENT
Start: 2023-07-21 | End: 2023-08-14

## 2023-07-21 NOTE — TELEPHONE ENCOUNTER
Patient calling to request refill on his lyrica 150 mg that he takes 1 twice daily. Noted dosing was placed HX by provider at that visit on 6/15/23. Miriam Sims LPN

## 2023-07-24 ENCOUNTER — MYC MEDICAL ADVICE (OUTPATIENT)
Dept: FAMILY MEDICINE | Facility: CLINIC | Age: 58
End: 2023-07-24
Payer: COMMERCIAL

## 2023-07-24 ENCOUNTER — MYC REFILL (OUTPATIENT)
Dept: FAMILY MEDICINE | Facility: CLINIC | Age: 58
End: 2023-07-24
Payer: COMMERCIAL

## 2023-07-24 DIAGNOSIS — Z98.1 S/P CERVICAL SPINAL FUSION: ICD-10-CM

## 2023-07-24 DIAGNOSIS — F11.90 CHRONIC, CONTINUOUS USE OF OPIOIDS: ICD-10-CM

## 2023-07-24 DIAGNOSIS — M54.16 LUMBAR RADICULOPATHY: ICD-10-CM

## 2023-07-24 DIAGNOSIS — M54.2 CERVICALGIA: ICD-10-CM

## 2023-07-24 DIAGNOSIS — M79.18 MYOFASCIAL PAIN: ICD-10-CM

## 2023-07-24 DIAGNOSIS — G89.4 CHRONIC PAIN SYNDROME: ICD-10-CM

## 2023-07-24 RX ORDER — OXYCODONE HYDROCHLORIDE 5 MG/1
5-10 TABLET ORAL EVERY 6 HOURS PRN
Qty: 165 TABLET | Refills: 0 | Status: SHIPPED | OUTPATIENT
Start: 2023-07-26 | End: 2023-08-21

## 2023-07-24 NOTE — TELEPHONE ENCOUNTER
Requested Prescriptions   Pending Prescriptions Disp Refills    oxyCODONE (ROXICODONE) 5 MG tablet 165 tablet 0     Sig: Take 1-2 tablets (5-10 mg) by mouth every 6 hours as needed for severe pain Must last 30 days       There is no refill protocol information for this order          Routing refill request to provider for review/approval because:  Drug not on the [facility] [facility] or [facility] refill protocol or controlled substance

## 2023-07-26 ENCOUNTER — PATIENT OUTREACH (OUTPATIENT)
Dept: CARE COORDINATION | Facility: CLINIC | Age: 58
End: 2023-07-26
Payer: COMMERCIAL

## 2023-07-27 ENCOUNTER — VIRTUAL VISIT (OUTPATIENT)
Dept: ORTHOPEDICS | Facility: CLINIC | Age: 58
End: 2023-07-27
Payer: COMMERCIAL

## 2023-07-27 DIAGNOSIS — M54.12 CERVICAL RADICULOPATHY: Primary | ICD-10-CM

## 2023-07-27 DIAGNOSIS — M50.20 CERVICAL DISC HERNIATION: ICD-10-CM

## 2023-07-27 DIAGNOSIS — M50.30 DDD (DEGENERATIVE DISC DISEASE), CERVICAL: ICD-10-CM

## 2023-07-27 PROCEDURE — 99214 OFFICE O/P EST MOD 30 MIN: CPT | Mod: 93 | Performed by: PREVENTIVE MEDICINE

## 2023-07-27 NOTE — LETTER
7/27/2023         RE: Indra Mehta  [id number] [address] [place] [phone]        Dear Colleague,    Thank you for referring your patient, Indra Mehta, to the [facility] [place]. Please see a copy of my visit note below.    Patient is a    58 year old who is being evaluated via a billable telephone visit.      What phone number would you like to be contacted at? CELL  How would you like to obtain your AVS? MYCHART        Subjective   Patient is a  58   year old who presents by phone call visit for the following:     HPI   Follow up for EMG and cervical radicular pain and cervical ddd. Still having pain in neck and radiating   Wants to discuss EMG and consider cervical SRIDEVI    Review of Systems   Constitutional, HEENT, cardiovascular, pulmonary, gi and gu systems are negative, except as otherwise noted.      Objective           Vitals:  No vitals were obtained today due to virtual visit.    Physical Exam   healthy, alert, and no distress  PSYCH: Alert and oriented times 3; coherent speech, normal   rate and volume, able to articulate logical thoughts, able   to abstract reason, no tangential thoughts, no hallucinations   or delusions  His affect is normal  RESP: No cough, no audible wheezing, able to talk in full sentences  Remainder of exam unable to be completed due to telephone visits    Assessment/Plan  59 yo male with cervical ddd, disc herniations, radicular pain    I independently reviewed the following imaging studies and discussed with patient:  Cervical MRI: shows ddd, disc herniations  Discussed and ordered cervical SRIDEVI  Cont. Medications as written  Follow up for possible emg        Phone call duration: 20 minutes  Phone call start: 1200pm  Phone call end: 1220pm  Dr Moore      Again, thank you for allowing me to participate in the care of your patient.        Sincerely,        Alvaro Moore MD

## 2023-07-27 NOTE — PROGRESS NOTES
Patient is a    58 year old who is being evaluated via a billable telephone visit.      What phone number would you like to be contacted at? CELL  How would you like to obtain your AVS? SHANA        Subjective   Patient is a  58   year old who presents by phone call visit for the following:     HPI   Follow up for EMG and cervical radicular pain and cervical ddd. Still having pain in neck and radiating   Wants to discuss EMG and consider cervical SRIDEVI    Review of Systems   Constitutional, HEENT, cardiovascular, pulmonary, gi and gu systems are negative, except as otherwise noted.      Objective           Vitals:  No vitals were obtained today due to virtual visit.    Physical Exam   healthy, alert, and no distress  PSYCH: Alert and oriented times 3; coherent speech, normal   rate and volume, able to articulate logical thoughts, able   to abstract reason, no tangential thoughts, no hallucinations   or delusions  His affect is normal  RESP: No cough, no audible wheezing, able to talk in full sentences  Remainder of exam unable to be completed due to telephone visits    Assessment/Plan  59 yo male with cervical ddd, disc herniations, radicular pain    I independently reviewed the following imaging studies and discussed with patient:  Cervical MRI: shows ddd, disc herniations  Discussed and ordered cervical SRIDEVI  Cont. Medications as written  Follow up for possible emg        Phone call duration: 20 minutes  Phone call start: 1200pm  Phone call end: 1220pm  Dr Moore

## 2023-07-27 NOTE — LETTER
7/27/2023         RE: Indra Mehta  [address] [address] [place] [phone]        Dear Colleague,    Thank you for referring your patient, Indra Mehta, to the [facility] [place]. Please see a copy of my visit note below.    Patient is a    58 year old who is being evaluated via a billable telephone visit.      What phone number would you like to be contacted at? CELL  How would you like to obtain your AVS? MYCHART        Subjective   Patient is a  58   year old who presents by phone call visit for the following:     HPI   Follow up for EMG and cervical radicular pain and cervical ddd. Still having pain in neck and radiating   Wants to discuss EMG and consider cervical SRIDEVI    Review of Systems   Constitutional, HEENT, cardiovascular, pulmonary, gi and gu systems are negative, except as otherwise noted.      Objective           Vitals:  No vitals were obtained today due to virtual visit.    Physical Exam   healthy, alert, and no distress  PSYCH: Alert and oriented times 3; coherent speech, normal   rate and volume, able to articulate logical thoughts, able   to abstract reason, no tangential thoughts, no hallucinations   or delusions  His affect is normal  RESP: No cough, no audible wheezing, able to talk in full sentences  Remainder of exam unable to be completed due to telephone visits    Assessment/Plan  59 yo male with cervical ddd, disc herniations, radicular pain    I independently reviewed the following imaging studies and discussed with patient:  Cervical MRI: shows ddd, disc herniations  Discussed and ordered cervical SRIDEVI  Cont. Medications as written  Follow up for possible emg        Phone call duration: 20 minutes  Phone call start: 1200pm  Phone call end: 1220pm  Dr Moore      Again, thank you for allowing me to participate in the care of your patient.        Sincerely,        Alvaro Moore MD

## 2023-07-28 ENCOUNTER — TELEPHONE (OUTPATIENT)
Dept: ORTHOPEDICS | Facility: CLINIC | Age: 58
End: 2023-07-28
Payer: COMMERCIAL

## 2023-07-28 DIAGNOSIS — I10 BENIGN ESSENTIAL HYPERTENSION: ICD-10-CM

## 2023-07-28 RX ORDER — LOSARTAN POTASSIUM AND HYDROCHLOROTHIAZIDE 12.5; 5 MG/1; MG/1
2 TABLET ORAL DAILY
Qty: 180 TABLET | Refills: 1 | OUTPATIENT
Start: 2023-07-28

## 2023-07-31 ENCOUNTER — TELEPHONE (OUTPATIENT)
Dept: PALLIATIVE MEDICINE | Facility: CLINIC | Age: 58
End: 2023-07-31
Payer: COMMERCIAL

## 2023-07-31 DIAGNOSIS — M54.12 CERVICAL RADICULOPATHY: Primary | ICD-10-CM

## 2023-07-31 DIAGNOSIS — M50.20 CERVICAL HERNIATED DISC: ICD-10-CM

## 2023-07-31 DIAGNOSIS — M50.30 DEGENERATIVE DISC DISEASE, CERVICAL: ICD-10-CM

## 2023-07-31 NOTE — TELEPHONE ENCOUNTER
7/31 Called and spoke to patient, patient was transferred to [facility] scheduling for this injection.     Anny goodwin Procedure   Orthopedics, Podiatry, Sports Medicine, Ent ,Eye , Audiology, Adult Endocrine & Diabetes, Nutrition & Medication Therapy Management Specialties   [facility]

## 2023-07-31 NOTE — TELEPHONE ENCOUNTER
Patient called to schedule SRIDEVI, incorrect orders have been placed, we are not pain management.  Need orders placed for XR epidural injection not PM referral.

## 2023-08-02 NOTE — TELEPHONE ENCOUNTER
8/2 Called and left voicemail. Provided phone number [phone] to schedule with pain clinic.     Anny goodwin Procedure   Orthopedics, Podiatry, Sports Medicine, Ent ,Eye , Audiology, Adult Endocrine & Diabetes, Nutrition & Medication Therapy Management Specialties   [facility]

## 2023-08-10 ENCOUNTER — TELEPHONE (OUTPATIENT)
Dept: FAMILY MEDICINE | Facility: CLINIC | Age: 58
End: 2023-08-10
Payer: COMMERCIAL

## 2023-08-10 ENCOUNTER — MYC MEDICAL ADVICE (OUTPATIENT)
Dept: ORTHOPEDICS | Facility: CLINIC | Age: 58
End: 2023-08-10
Payer: COMMERCIAL

## 2023-08-10 NOTE — TELEPHONE ENCOUNTER
Reason for Call:  Appointment Request    Patient requesting this type of appt: Pre-op    Requested provider: Tha Grullon or any available provider    Reason patient unable to be scheduled: Not within requested timeframe    When does patient want to be seen/preferred time:  before 8/18    Comments: Pre op Injection 8/18 Dr. Feldman    Could we send this information to you in [employer] or would you prefer to receive a phone call?:   Patient would prefer a phone call   Okay to leave a detailed message?: Yes at Home number on file [phone] (home)    Call taken on 8/10/2023 at 9:34 AM by Itzel Zhang

## 2023-08-14 ENCOUNTER — OFFICE VISIT (OUTPATIENT)
Dept: FAMILY MEDICINE | Facility: CLINIC | Age: 58
End: 2023-08-14
Payer: COMMERCIAL

## 2023-08-14 VITALS
DIASTOLIC BLOOD PRESSURE: 84 MMHG | TEMPERATURE: 98.9 F | WEIGHT: 217 LBS | SYSTOLIC BLOOD PRESSURE: 132 MMHG | RESPIRATION RATE: 16 BRPM | HEART RATE: 84 BPM | OXYGEN SATURATION: 97 % | BODY MASS INDEX: 28.76 KG/M2 | HEIGHT: 73 IN

## 2023-08-14 DIAGNOSIS — I10 BENIGN ESSENTIAL HYPERTENSION: Primary | ICD-10-CM

## 2023-08-14 DIAGNOSIS — R76.8 POSITIVE ANA (ANTINUCLEAR ANTIBODY): ICD-10-CM

## 2023-08-14 DIAGNOSIS — Z01.818 PREOP GENERAL PHYSICAL EXAM: ICD-10-CM

## 2023-08-14 DIAGNOSIS — M54.2 CERVICALGIA: ICD-10-CM

## 2023-08-14 DIAGNOSIS — G62.9 NEUROPATHY: ICD-10-CM

## 2023-08-14 LAB
ANION GAP SERPL CALCULATED.3IONS-SCNC: 14 MMOL/L (ref 7–15)
BUN SERPL-MCNC: 14 MG/DL (ref 6–20)
CALCIUM SERPL-MCNC: 9.5 MG/DL (ref 8.6–10)
CHLORIDE SERPL-SCNC: 100 MMOL/L (ref 98–107)
CREAT SERPL-MCNC: 1.01 MG/DL (ref 0.67–1.17)
DEPRECATED HCO3 PLAS-SCNC: 25 MMOL/L (ref 22–29)
ERYTHROCYTE [DISTWIDTH] IN BLOOD BY AUTOMATED COUNT: 13 % (ref 10–15)
GFR SERPL CREATININE-BSD FRML MDRD: 87 ML/MIN/1.73M2
GLUCOSE SERPL-MCNC: 140 MG/DL (ref 70–99)
HCT VFR BLD AUTO: 47.2 % (ref 40–53)
HGB BLD-MCNC: 16.5 G/DL (ref 13.3–17.7)
MCH RBC QN AUTO: 30.4 PG (ref 26.5–33)
MCHC RBC AUTO-ENTMCNC: 35 G/DL (ref 31.5–36.5)
MCV RBC AUTO: 87 FL (ref 78–100)
PLATELET # BLD AUTO: 297 10E3/UL (ref 150–450)
POTASSIUM SERPL-SCNC: 3.3 MMOL/L (ref 3.4–5.3)
RBC # BLD AUTO: 5.43 10E6/UL (ref 4.4–5.9)
SODIUM SERPL-SCNC: 139 MMOL/L (ref 136–145)
WBC # BLD AUTO: 6.1 10E3/UL (ref 4–11)

## 2023-08-14 PROCEDURE — 85027 COMPLETE CBC AUTOMATED: CPT | Performed by: FAMILY MEDICINE

## 2023-08-14 PROCEDURE — 99213 OFFICE O/P EST LOW 20 MIN: CPT | Performed by: FAMILY MEDICINE

## 2023-08-14 PROCEDURE — 80048 BASIC METABOLIC PNL TOTAL CA: CPT | Performed by: FAMILY MEDICINE

## 2023-08-14 PROCEDURE — 86146 BETA-2 GLYCOPROTEIN ANTIBODY: CPT | Mod: 59 | Performed by: FAMILY MEDICINE

## 2023-08-14 PROCEDURE — 36415 COLL VENOUS BLD VENIPUNCTURE: CPT | Performed by: FAMILY MEDICINE

## 2023-08-14 RX ORDER — PREGABALIN 150 MG/1
150 CAPSULE ORAL DAILY
Qty: 60 CAPSULE | Refills: 1 | COMMUNITY
Start: 2023-08-14 | End: 2023-09-26

## 2023-08-14 ASSESSMENT — ANXIETY QUESTIONNAIRES
4. TROUBLE RELAXING: MORE THAN HALF THE DAYS
7. FEELING AFRAID AS IF SOMETHING AWFUL MIGHT HAPPEN: MORE THAN HALF THE DAYS
GAD7 TOTAL SCORE: 10
GAD7 TOTAL SCORE: 10
3. WORRYING TOO MUCH ABOUT DIFFERENT THINGS: MORE THAN HALF THE DAYS
IF YOU CHECKED OFF ANY PROBLEMS ON THIS QUESTIONNAIRE, HOW DIFFICULT HAVE THESE PROBLEMS MADE IT FOR YOU TO DO YOUR WORK, TAKE CARE OF THINGS AT HOME, OR GET ALONG WITH OTHER PEOPLE: VERY DIFFICULT
1. FEELING NERVOUS, ANXIOUS, OR ON EDGE: MORE THAN HALF THE DAYS
5. BEING SO RESTLESS THAT IT IS HARD TO SIT STILL: NOT AT ALL
2. NOT BEING ABLE TO STOP OR CONTROL WORRYING: SEVERAL DAYS
6. BECOMING EASILY ANNOYED OR IRRITABLE: SEVERAL DAYS

## 2023-08-14 ASSESSMENT — PATIENT HEALTH QUESTIONNAIRE - PHQ9
SUM OF ALL RESPONSES TO PHQ QUESTIONS 1-9: 13
SUM OF ALL RESPONSES TO PHQ QUESTIONS 1-9: 13
10. IF YOU CHECKED OFF ANY PROBLEMS, HOW DIFFICULT HAVE THESE PROBLEMS MADE IT FOR YOU TO DO YOUR WORK, TAKE CARE OF THINGS AT HOME, OR GET ALONG WITH OTHER PEOPLE: EXTREMELY DIFFICULT

## 2023-08-14 ASSESSMENT — PAIN SCALES - GENERAL: PAINLEVEL: SEVERE PAIN (7)

## 2023-08-14 NOTE — PATIENT INSTRUCTIONS
For informational purposes only. Not to replace the advice of your health care provider. Copyright   2003,  [place] [employer] [facility]. All rights reserved. Clinically reviewed by Karina Haq MD. [facility] [id number] - REV .  Preparing for Your Surgery  Getting started  A nurse will call you to review your health history and instructions. They will give you an arrival time based on your scheduled surgery time. Please be ready to share:  Your doctor's clinic name and phone number  Your medical, surgical, and anesthesia history  A list of allergies and sensitivities  A list of medicines, including herbal treatments and over-the-counter drugs  Whether the patient has a legal guardian (ask how to send us the papers in advance)  Please tell us if you're pregnant--or if there's any chance you might be pregnant. Some surgeries may injure a fetus (unborn baby), so they require a pregnancy test. Surgeries that are safe for a fetus don't always need a test, and you can choose whether to have one.   If you have a child who's having surgery, please ask for a copy of Preparing for Your Child's Surgery.    Preparing for surgery  Within 10 to 30 days of surgery: Have a pre-op exam (sometimes called an H&P, or History and Physical). This can be done at a clinic or pre-operative center.  If you're having a , you may not need this exam. Talk to your care team.  At your pre-op exam, talk to your care team about all medicines you take. If you need to stop any medicines before surgery, ask when to start taking them again.  We do this for your safety. Many medicines can make you bleed too much during surgery. Some change how well surgery (anesthesia) drugs work.  Call your insurance company to let them know you're having surgery. (If you don't have insurance, call [phone].)  Call your clinic if there's any change in your health. This includes signs of a cold or flu (sore throat, runny nose, cough, rash, fever). It also  includes a scrape or scratch near the surgery site.  If you have questions on the day of surgery, call your hospital or surgery center.  Eating and drinking guidelines  For your safety: Unless your surgeon tells you otherwise, follow the guidelines below.  Eat and drink as usual until 8 hours before you arrive for surgery. After that, no food or milk.  Drink clear liquids until 2 hours before you arrive. These are liquids you can see through, like water, Gatorade, and Propel Water. They also include plain black coffee and tea (no cream or milk), candy, and breath mints. You can spit out gum when you arrive.  If you drink alcohol: Stop drinking it the night before surgery.  If your care team tells you to take medicine on the morning of surgery, it's okay to take it with a sip of water.  Preventing infection  Shower or bathe the night before and morning of your surgery. Follow the instructions your clinic gave you. (If no instructions, use regular soap.)  Don't shave or clip hair near your surgery site. We'll remove the hair if needed.  Don't smoke or vape the morning of surgery. You may chew nicotine gum up to 2 hours before surgery. A nicotine patch is okay.  Note: Some surgeries require you to completely quit smoking and nicotine. Check with your surgeon.  Your care team will make every effort to keep you safe from infection. We will:  Clean our hands often with soap and water (or an alcohol-based hand rub).  Clean the skin at your surgery site with a special soap that kills germs.  Give you a special gown to keep you warm. (Cold raises the risk of infection.)  Wear special hair covers, masks, gowns and gloves during surgery.  Give antibiotic medicine, if prescribed. Not all surgeries need antibiotics.  What to bring on the day of surgery  Photo ID and insurance card  Copy of your health care directive, if you have one  Glasses and hearing aids (bring cases)  You can't wear contacts during surgery  Inhaler and eye  drops, if you use them (tell us about these when you arrive)  CPAP machine or breathing device, if you use them  A few personal items, if spending the night  If you have . . .  A pacemaker, ICD (cardiac defibrillator) or other implant: Bring the ID card.  An implanted stimulator: Bring the remote control.  A legal guardian: Bring a copy of the certified (court-stamped) guardianship papers.  Please remove any jewelry, including body piercings. Leave jewelry and other valuables at home.  If you're going home the day of surgery  You must have a responsible adult drive you home. They should stay with you overnight as well.  If you don't have someone to stay with you, and you aren't safe to go home alone, we may keep you overnight. Insurance often won't pay for this.  After surgery  If it's hard to control your pain or you need more pain medicine, please call your surgeon's office.  Questions?   If you have any questions for your care team, list them here: _________________________________________________________________________________________________________________________________________________________________________ ____________________________________ ____________________________________ ____________________________________    How to Take Your Medication Before Surgery  - Take all of your medications before surgery except as noted below  - HOLD (do not take) Aspirin for 7 days

## 2023-08-14 NOTE — H&P (VIEW-ONLY)
[address] [address]  Phone: [phone]  Fax: [phone]  Primary Provider: Tha Bello  Pre-op Performing Provider: THA BELLO      PREOPERATIVE EVALUATION:  Today's date: 8/14/2023    Indra Mehta is a 58 year old male who presents for a preoperative evaluation.      8/14/2023     3:55 PM   Additional Questions   Roomed by Lizbeth Maza       Surgical Information:  Surgery/Procedure: Inject epidural cervical  Surgery Location:  OR  Surgeon: Dr. Mcdaniel  Surgery Date: 8/18/23  Time of Surgery: 3:00 pm  Where patient plans to recover: At home with family  Fax number for surgical facility: Note does not need to be faxed, will be available electronically in Epic.    Assessment & Plan     The proposed surgical procedure is considered LOW risk.      ICD-10-CM    1. Benign essential hypertension  I10       2. Preop general physical exam  Z01.818       3. Cervicalgia  M54.2                    Risks and Recommendations:  The patient has the following additional risks and recommendations for perioperative complications:   - No identified additional risk factors other than previously addressed   - Chronic narcotic use    Antiplatelet or Anticoagulation Medication Instructions:   - Patient is on no antiplatelet or anticoagulation medications.    Additional Medication Instructions:  Patient is to take all scheduled medications on the day of surgery EXCEPT for modifications listed below:   - ACE/ARB: HOLD on day of surgery (minimum 11 hours for general anesthesia).   - Opioids: Continue without modification.   - pregabalin, gabapentin: Continue without modification.   - SSRIs, SNRIs, TCAs, Antipsychotics: Continue without modification.     RECOMMENDATION:  APPROVAL GIVEN to proceed with proposed procedure, without further diagnostic evaluation.            Subjective       HPI related to upcoming procedure: Indra Mehta is a 58 year old male  who presents with chronic cervicalgia status post cervical fusion.         8/14/2023     3:37 PM   Preop Questions   1. Have you ever had a heart attack or stroke? No   2. Have you ever had surgery on your heart or blood vessels, such as a stent placement, a coronary artery bypass, or surgery on an artery in your head, neck, heart, or legs? No   3. Do you have chest pain with activity? No   4. Do you have a history of  heart failure? No   5. Do you currently have a cold, bronchitis or symptoms of other infection? No   6. Do you have a cough, shortness of breath, or wheezing? No   7. Do you or anyone in your family have previous history of blood clots? No   8. Do you or does anyone in your family have a serious bleeding problem such as prolonged bleeding following surgeries or cuts? No   9. Have you ever had problems with anemia or been told to take iron pills? No   10. Have you had any abnormal blood loss such as black, tarry or bloody stools? No   11. Have you ever had a blood transfusion? No   12. Are you willing to have a blood transfusion if it is medically needed before, during, or after your surgery? Yes   13. Have you or any of your relatives ever had problems with anesthesia? No   14. Do you have sleep apnea, excessive snoring or daytime drowsiness? No   15. Do you have any artifical heart valves or other implanted medical devices like a pacemaker, defibrillator, or continuous glucose monitor? No   16. Do you have artificial joints? No   17. Are you allergic to latex? No       Health Care Directive:  Patient does not have a Health Care Directive or Living Will: Discussed advance care planning with patient; however, patient declined at this time.    Preoperative Review of :   reviewed - controlled substances reflected in medication list.      Status of Chronic Conditions:  See problem list for active medical problems.  Problems all longstanding and stable, except as noted/documented.  See ROS for  pertinent symptoms related to these conditions.    Review of Systems  CONSTITUTIONAL: NEGATIVE for fever, chills, change in weight  INTEGUMENTARY/SKIN: NEGATIVE for worrisome rashes, moles or lesions  EYES: NEGATIVE for vision changes or irritation  ENT/MOUTH: NEGATIVE for ear, mouth and throat problems  RESP: NEGATIVE for significant cough or SOB  CV: NEGATIVE for chest pain, palpitations or peripheral edema  GI: NEGATIVE for nausea, abdominal pain, heartburn, or change in bowel habits  : NEGATIVE for frequency, dysuria, or hematuria  MUSCULOSKELETAL: NEGATIVE for significant arthralgias or myalgia  NEURO: NEGATIVE for weakness, dizziness or paresthesias  ENDOCRINE: NEGATIVE for temperature intolerance, skin/hair changes  HEME: NEGATIVE for bleeding problems  PSYCHIATRIC: NEGATIVE for changes in mood or affect    Patient Active Problem List    Diagnosis Date Noted    Chronic, continuous use of opioids 06/15/2023     Priority: Medium    Sacroiliitis, not elsewhere classified (H) 06/15/2023     Priority: Medium    Cervical myelopathy (H) 06/13/2022     Priority: Medium    Major depression in complete remission (H) 06/13/2022     Priority: Medium    Chronic bilateral low back pain without sciatica 05/08/2022     Priority: Medium    Primary osteoarthritis of both knees 05/04/2022     Priority: Medium    Impingement syndrome of left shoulder 06/23/2021     Priority: Medium    Sleep apnea, unspecified type 04/06/2021     Priority: Medium    Osteoarthritis of spine with radiculopathy, lumbar region 04/06/2021     Priority: Medium    Status post lumbar spinal fusion 01/14/2020     Priority: Medium    Cervical radiculopathy 10/28/2019     Priority: Medium     Formatting of this note might be different from the original.  Added automatically from request for surgery [id number]      Benign essential hypertension 06/30/2017     Priority: Medium    Bilateral occipital neuralgia 10/28/2016     Priority: Medium    Major  depression in complete remission (H) 08/20/2010     Priority: Medium    Chronic pain syndrome 08/20/2010     Priority: Medium     Patient is followed by Tha Grullon MD for ongoing prescription of pain medication.  All refills should be approved by this provider only at face-to-face appointments - not by phone request.    Medication(s): Tramadol.   Maximum quantity per month: 60  Clinic visit frequency required: Q 1 months     Controlled substance agreement:  Encounter-Level CSA - 10/13/16:               Controlled Substance Agreement - Scan on 10/23/2016  5:07 PM : CONTROLLED SUBSTANCE AGREEMENT 10/13/16 (below)            Pain Clinic evaluation in the past: Yes       Date/Location:   [facility]    DIRE Total Score(s):  No flowsheet data found.    Last [facility] website verification:  none   [email or web address]              Pain medication agreement signed 08/20/2010     Priority: Medium    Meniere's disease 03/22/2007     Priority: Medium     Problem list name updated by automated process. Provider to review        Past Medical History:   Diagnosis Date    Anxiety     Back pain     Depressive disorder     Hyperlipidemia     Hypertension     Meniere's disease, unspecified     Pain medication agreement signed 8/20/2010    Sleep apnea, unspecified type      Past Surgical History:   Procedure Laterality Date    BUNIONECTOMY RT/LT  09/05/08    Both feet    COLONOSCOPY N/A 12/28/2016    Procedure: COMBINED COLONOSCOPY, SINGLE OR MULTIPLE BIOPSY/POLYPECTOMY BY BIOPSY;  Surgeon: Indra Jernigan MD;  Location:  GI    COLONOSCOPY N/A 9/27/2022    Procedure: COLONOSCOPY, FLEXIBLE, WITH LESION REMOVAL USING SNARE;  Surgeon: Pablo Ferris MD;  Location:  GI    DISCECTOMY, FUSION CERVICAL ANTERIOR ONE LEVEL, COMBINED N/A 7/5/2017    Procedure: COMBINED DISCECTOMY, FUSION CERVICAL ANTERIOR ONE LEVEL;  Cervical 6-7, Anterior cervical discectomy fusion;  Surgeon: Mike Mckenna MD;  Location:   OR    DISCECTOMY, FUSION CERVICAL ANTERIOR ONE LEVEL, COMBINED N/A 12/19/2018    Procedure: cervical 5-6 anterior cervical discectomy fusion;  Surgeon: Mike Mckenna MD;  Location[facility]    HERNIORRHAPHY UMBILICAL N/A 8/11/2017    Procedure: HERNIORRHAPHY UMBILICAL;  open umbilical hernia repair;  Surgeon: Boni Story MD;  Location: [facility]    INJECT BLOCK MEDIAL BRANCH CERVICAL/THORACIC/LUMBAR Bilateral 8/12/2021    Procedure: Left L3, Left L4, Left L5, Right L3, Right L4 and Right L5 medial branch nerve blocks using fluoroscopic guidance and contrast control;  Surgeon: Darryn Mcdaniel MD;  Location: [facility]    INJECT BLOCK MEDIAL BRANCH CERVICAL/THORACIC/LUMBAR N/A 9/9/2021    Procedure: Left L3, Left L4, Left L5, Right L3, Right L4 and Right L5 medial branch nerve blocks using fluoroscopic guidance and contrast control;  Surgeon: Darryn Mcdaniel MD;  [facility]    INJECT EPIDURAL CERVICAL N/A 8/22/2019    Procedure: INJECTION, SPINE, CERVICAL 6-7 EPIDURAL;  Surgeon: Darryn Mcdaniel MD;  [facility]    INJECT EPIDURAL CERVICAL Left 3/9/2023    Procedure: Cervical 7-Thoracic 1 Interlaminar epidural steroid injection using fluoroscopic guidance with contrast dye, Left;  Surgeon: Darryn Mcdaniel MD;  [facility]    INJECT EPIDURAL LUMBAR N/A 11/9/2017    Procedure: INJECT EPIDURAL LUMBAR;  lumbar 4-5 epidural steroid injection ;  Surgeon: Darryn Mcdaniel MD;  [facility]    INJECT EPIDURAL LUMBAR N/A 12/28/2017    Procedure: INJECT EPIDURAL LUMBAR;  lumbar epidural injection;  Surgeon: Darryn Mcdaniel MD;  [facility]    INJECT EPIDURAL LUMBAR Bilateral 5/13/2021    Procedure: Bilateral Lumbar 3-4 Epidural Steroid Injection;  Surgeon: Darryn Mcdaniel MD;  [facility]    INJECT EPIDURAL TRANSFORAMINAL Bilateral 8/23/2018    Procedure: INJECT EPIDURAL TRANSFORAMINAL;  transforaminal bilateral lumbar 3-4 steroid injection;  Surgeon: Darryn Mcdaniel MD;  [facility]    INJECT EPIDURAL  TRANSFORAMINAL Bilateral 11/8/2018    Procedure: INJECT EPIDURAL TRANSFORAMINAL lumbar 3-4;  Surgeon: Darryn Mcdaniel MD;  [facility]    INJECT EPIDURAL TRANSFORAMINAL Bilateral 6/14/2019    Procedure: INJECTION, EPIDURAL, TRANSFORAMINAL LUMBAR 3-4 BILATERAL;  Surgeon: Darryn Mcdaniel MD;  [facility]    INJECT EPIDURAL TRANSFORAMINAL Bilateral 5/22/2020    Procedure: lumbar 3-4 bilateral transforaminal epidural steroid injection;  Surgeon: Darryn Mcdaniel MD;  [facility]    INJECT EPIDURAL TRANSFORAMINAL Bilateral 9/24/2020    Procedure: INJECTION, EPIDURAL, TRANSFORAMINAL  LUMBAR 3-4 APPROACH;  Surgeon: Darryn Mcdaniel MD;  [facility]    INJECT JOINT SACROILIAC Bilateral 10/13/2022    Procedure: Fluoroscopically-guided injection of the Bilateral sacroiliac joints with Bilateral kerrie Sacroiliac joint ligaments infiltration over the sacrum;  Surgeon: Darryn Mcdaniel MD;  [facility]    PE TUBES  2006    left ear    RADIO FREQUENCY ABLATION PULSED CERVICAL Bilateral 10/1/2021    Procedure: RADIOFREQUENCY ABLATION lumbar 3, 4, 5 bilateral;  Surgeon: Darryn Mcdaniel MD;  [facility]    ZZHC COLONOSCOPY W/WO BRUSH/WASH  12/27/2005    ZZHC CREATE EARDRUM OPENING,GEN ANESTH  09/27/2007    Pe tube, Left endolymphatic sac enhancement.    ZZHC MASTOIDECTOMY,COMPLETE  09/27/2007     Current Outpatient Medications   Medication Sig Dispense Refill    Acetaminophen (TYLENOL PO) Take 1,000 mg by mouth 3 times daily      amLODIPine (NORVASC) 5 MG tablet TAKE 1 TABLET BY MOUTH EVERY DAY 90 tablet 3    atorvastatin (LIPITOR) 10 MG tablet Take 10 mg by mouth daily      cyclobenzaprine (FLEXERIL) 5 MG tablet 1-2 tablets three times daily as needed 90 tablet 2    ibuprofen (ADVIL/MOTRIN) 800 MG tablet Take 800 mg by mouth 3 times daily      losartan-hydrochlorothiazide (HYZAAR) 50-12.5 MG tablet TAKE 2 TABLETS BY MOUTH DAILY 180 tablet 1    nortriptyline (PAMELOR) 50 MG capsule Take 1 capsule (50 mg) by mouth  "At Bedtime 90 capsule 1    order for DME Equipment being ordered: TENS Pads as directed 1 Device 0    oxyCODONE (ROXICODONE) 5 MG tablet Take 1-2 tablets (5-10 mg) by mouth every 6 hours as needed for severe pain Must last 30 days 165 tablet 0    pregabalin (LYRICA) 150 MG capsule Take 1 capsule (150 mg) by mouth 2 times daily 60 capsule 1    sildenafil (VIAGRA) 100 MG tablet Take 0.5-1 tablets ( mg) by mouth daily as needed Take 30 min to 4 hours before intercourse.  Never use with nitroglycerin, terazosin or doxazosin. 6 tablet 7    naloxone (NARCAN) 4 MG/0.1ML nasal spray Spray 1 spray (4 mg) into one nostril alternating nostrils once as needed for opioid reversal every 2-3 minutes until assistance arrives (Patient not taking: Reported on 2022) 0.2 mL 1    Pregabalin (LYRICA) 200 MG capsule Take 1 capsule (200 mg) by mouth daily (Patient not taking: Reported on 2023) 30 capsule 1       Allergies   Allergen Reactions    No Known Drug Allergy         Social History     Tobacco Use    Smoking status: Never    Smokeless tobacco: Never   Substance Use Topics    Alcohol use: Yes     Alcohol/week: 0.0 standard drinks of alcohol     Comment: rarely     Family History   Problem Relation Age of Onset    Diabetes Mother     Cancer Mother         colon cancer -  at 52, diag at 42    Hypertension Father     Heart Disease Father          of AAA    No Known Problems Brother     No Known Problems Brother     Depression Sister     Suicide Other     Unknown/Adopted Maternal Grandmother     Unknown/Adopted Maternal Grandfather     Unknown/Adopted Paternal Grandmother     Unknown/Adopted Paternal Grandfather     No Known Problems Daughter     No Known Problems Daughter      History   Drug Use No         Objective     /84   Pulse 84   Temp 98.9  F (37.2  C) (Temporal)   Resp 16   Ht 1.842 m (6' 0.5\")   Wt 98.4 kg (217 lb)   SpO2 97%   BMI 29.03 kg/m      Physical Exam    GENERAL APPEARANCE: " healthy, alert and no distress     EYES: EOMI,  PERRL     HENT: ear canals and TM's normal and nose and mouth without ulcers or lesions     NECK: no adenopathy, no asymmetry, masses, or scars and thyroid normal to palpation     RESP: lungs clear to auscultation - no rales, rhonchi or wheezes     CV: regular rates and rhythm, normal S1 S2, no S3 or S4 and no murmur, click or rub     ABDOMEN:  soft, nontender, no HSM or masses and bowel sounds normal     MS: extremities normal- no gross deformities noted, no evidence of inflammation in joints, FROM in all extremities.     SKIN: no suspicious lesions or rashes     NEURO: Normal strength and tone, sensory exam grossly normal, mentation intact and speech normal     PSYCH: mentation appears normal. and affect normal/bright     LYMPHATICS: No cervical adenopathy    Recent Labs   Lab Test 11/29/22  1033 08/11/22  1426 09/29/21  1413 09/29/21  1338   HGB  --  15.5  --  14.9   PLT  --  291  --   --    INR 0.97  --   --   --    NA  --  139 141  --    POTASSIUM  --  3.5 3.9  --    CR  --  1.08 1.18  --         Diagnostics:  Labs pending at this time.  Results will be reviewed when available.   No EKG required for low risk surgery (cataract, skin procedure, breast biopsy, etc).    Revised Cardiac Risk Index (RCRI):  The patient has the following serious cardiovascular risks for perioperative complications:   - No serious cardiac risks = 0 points     RCRI Interpretation: 0 points: Class I (very low risk - 0.4% complication rate)         Signed Electronically by: Tha Grullon MD  Copy of this evaluation report is provided to requesting physician.

## 2023-08-14 NOTE — PROGRESS NOTES
[address] [address]  Phone: [phone]  Fax: [phone]  Primary Provider: Tha Bello  Pre-op Performing Provider: THA BELLO      PREOPERATIVE EVALUATION:  Today's date: 8/14/2023    Indra Mehta is a 58 year old male who presents for a preoperative evaluation.      8/14/2023     3:55 PM   Additional Questions   Roomed by Lizbeth Maza       Surgical Information:  Surgery/Procedure: Inject epidural cervical  Surgery Location:  OR  Surgeon: Dr. Mcdaniel  Surgery Date: 8/18/23  Time of Surgery: 3:00 pm  Where patient plans to recover: At home with family  Fax number for surgical facility: Note does not need to be faxed, will be available electronically in Epic.    Assessment & Plan     The proposed surgical procedure is considered LOW risk.      ICD-10-CM    1. Benign essential hypertension  I10       2. Preop general physical exam  Z01.818       3. Cervicalgia  M54.2                    Risks and Recommendations:  The patient has the following additional risks and recommendations for perioperative complications:   - No identified additional risk factors other than previously addressed   - Chronic narcotic use    Antiplatelet or Anticoagulation Medication Instructions:   - Patient is on no antiplatelet or anticoagulation medications.    Additional Medication Instructions:  Patient is to take all scheduled medications on the day of surgery EXCEPT for modifications listed below:   - ACE/ARB: HOLD on day of surgery (minimum 11 hours for general anesthesia).   - Opioids: Continue without modification.   - pregabalin, gabapentin: Continue without modification.   - SSRIs, SNRIs, TCAs, Antipsychotics: Continue without modification.     RECOMMENDATION:  APPROVAL GIVEN to proceed with proposed procedure, without further diagnostic evaluation.            Subjective       HPI related to upcoming procedure: Indra Mehta is a 58 year old male  who presents with chronic cervicalgia status post cervical fusion.         8/14/2023     3:37 PM   Preop Questions   1. Have you ever had a heart attack or stroke? No   2. Have you ever had surgery on your heart or blood vessels, such as a stent placement, a coronary artery bypass, or surgery on an artery in your head, neck, heart, or legs? No   3. Do you have chest pain with activity? No   4. Do you have a history of  heart failure? No   5. Do you currently have a cold, bronchitis or symptoms of other infection? No   6. Do you have a cough, shortness of breath, or wheezing? No   7. Do you or anyone in your family have previous history of blood clots? No   8. Do you or does anyone in your family have a serious bleeding problem such as prolonged bleeding following surgeries or cuts? No   9. Have you ever had problems with anemia or been told to take iron pills? No   10. Have you had any abnormal blood loss such as black, tarry or bloody stools? No   11. Have you ever had a blood transfusion? No   12. Are you willing to have a blood transfusion if it is medically needed before, during, or after your surgery? Yes   13. Have you or any of your relatives ever had problems with anesthesia? No   14. Do you have sleep apnea, excessive snoring or daytime drowsiness? No   15. Do you have any artifical heart valves or other implanted medical devices like a pacemaker, defibrillator, or continuous glucose monitor? No   16. Do you have artificial joints? No   17. Are you allergic to latex? No       Health Care Directive:  Patient does not have a Health Care Directive or Living Will: Discussed advance care planning with patient; however, patient declined at this time.    Preoperative Review of :   reviewed - controlled substances reflected in medication list.      Status of Chronic Conditions:  See problem list for active medical problems.  Problems all longstanding and stable, except as noted/documented.  See ROS for  pertinent symptoms related to these conditions.    Review of Systems  CONSTITUTIONAL: NEGATIVE for fever, chills, change in weight  INTEGUMENTARY/SKIN: NEGATIVE for worrisome rashes, moles or lesions  EYES: NEGATIVE for vision changes or irritation  ENT/MOUTH: NEGATIVE for ear, mouth and throat problems  RESP: NEGATIVE for significant cough or SOB  CV: NEGATIVE for chest pain, palpitations or peripheral edema  GI: NEGATIVE for nausea, abdominal pain, heartburn, or change in bowel habits  : NEGATIVE for frequency, dysuria, or hematuria  MUSCULOSKELETAL: NEGATIVE for significant arthralgias or myalgia  NEURO: NEGATIVE for weakness, dizziness or paresthesias  ENDOCRINE: NEGATIVE for temperature intolerance, skin/hair changes  HEME: NEGATIVE for bleeding problems  PSYCHIATRIC: NEGATIVE for changes in mood or affect    Patient Active Problem List    Diagnosis Date Noted    Chronic, continuous use of opioids 06/15/2023     Priority: Medium    Sacroiliitis, not elsewhere classified (H) 06/15/2023     Priority: Medium    Cervical myelopathy (H) 06/13/2022     Priority: Medium    Major depression in complete remission (H) 06/13/2022     Priority: Medium    Chronic bilateral low back pain without sciatica 05/08/2022     Priority: Medium    Primary osteoarthritis of both knees 05/04/2022     Priority: Medium    Impingement syndrome of left shoulder 06/23/2021     Priority: Medium    Sleep apnea, unspecified type 04/06/2021     Priority: Medium    Osteoarthritis of spine with radiculopathy, lumbar region 04/06/2021     Priority: Medium    Status post lumbar spinal fusion 01/14/2020     Priority: Medium    Cervical radiculopathy 10/28/2019     Priority: Medium     Formatting of this note might be different from the original.  Added automatically from request for surgery [id number]      Benign essential hypertension 06/30/2017     Priority: Medium    Bilateral occipital neuralgia 10/28/2016     Priority: Medium    Major  depression in complete remission (H) 08/20/2010     Priority: Medium    Chronic pain syndrome 08/20/2010     Priority: Medium     Patient is followed by Tha Grullon MD for ongoing prescription of pain medication.  All refills should be approved by this provider only at face-to-face appointments - not by phone request.    Medication(s): Tramadol.   Maximum quantity per month: 60  Clinic visit frequency required: Q 1 months     Controlled substance agreement:  Encounter-Level CSA - 10/13/16:               Controlled Substance Agreement - Scan on 10/23/2016  5:07 PM : CONTROLLED SUBSTANCE AGREEMENT 10/13/16 (below)            Pain Clinic evaluation in the past: Yes       Date/Location:   [facility]    DIRE Total Score(s):  No flowsheet data found.    Last [facility] website verification:  none   [email or web address]              Pain medication agreement signed 08/20/2010     Priority: Medium    Meniere's disease 03/22/2007     Priority: Medium     Problem list name updated by automated process. Provider to review        Past Medical History:   Diagnosis Date    Anxiety     Back pain     Depressive disorder     Hyperlipidemia     Hypertension     Meniere's disease, unspecified     Pain medication agreement signed 8/20/2010    Sleep apnea, unspecified type      Past Surgical History:   Procedure Laterality Date    BUNIONECTOMY RT/LT  09/05/08    Both feet    COLONOSCOPY N/A 12/28/2016    Procedure: COMBINED COLONOSCOPY, SINGLE OR MULTIPLE BIOPSY/POLYPECTOMY BY BIOPSY;  Surgeon: Indra Jernigan MD;  Location:  GI    COLONOSCOPY N/A 9/27/2022    Procedure: COLONOSCOPY, FLEXIBLE, WITH LESION REMOVAL USING SNARE;  Surgeon: Pablo Ferris MD;  Location:  GI    DISCECTOMY, FUSION CERVICAL ANTERIOR ONE LEVEL, COMBINED N/A 7/5/2017    Procedure: COMBINED DISCECTOMY, FUSION CERVICAL ANTERIOR ONE LEVEL;  Cervical 6-7, Anterior cervical discectomy fusion;  Surgeon: Mike Mckenna MD;  Location:   OR    DISCECTOMY, FUSION CERVICAL ANTERIOR ONE LEVEL, COMBINED N/A 12/19/2018    Procedure: cervical 5-6 anterior cervical discectomy fusion;  Surgeon: Mike Mckenna MD;  [facility]    HERNIORRHAPHY UMBILICAL N/A 8/11/2017    Procedure: HERNIORRHAPHY UMBILICAL;  open umbilical hernia repair;  Surgeon: Boni Story MD;  [facility]: [facility]    INJECT BLOCK MEDIAL BRANCH CERVICAL/THORACIC/LUMBAR Bilateral 8/12/2021    Procedure: Left L3, Left L4, Left L5, Right L3, Right L4 and Right L5 medial branch nerve blocks using fluoroscopic guidance and contrast control;  Surgeon: Darryn Mcdaniel MD;  [facility]    INJECT BLOCK MEDIAL BRANCH CERVICAL/THORACIC/LUMBAR N/A 9/9/2021    Procedure: Left L3, Left L4, Left L5, Right L3, Right L4 and Right L5 medial branch nerve blocks using fluoroscopic guidance and contrast control;  Surgeon: Darryn Mcdaniel MD;  [facility]    INJECT EPIDURAL CERVICAL N/A 8/22/2019    Procedure: INJECTION, SPINE, CERVICAL 6-7 EPIDURAL;  Surgeon: Darryn Mcdaniel MD;  [facility]    INJECT EPIDURAL CERVICAL Left 3/9/2023    Procedure: Cervical 7-Thoracic 1 Interlaminar epidural steroid injection using fluoroscopic guidance with contrast dye, Left;  Surgeon: Darryn Mcdaniel MD;  [facility]    INJECT EPIDURAL LUMBAR N/A 11/9/2017    Procedure: INJECT EPIDURAL LUMBAR;  lumbar 4-5 epidural steroid injection ;  Surgeon: Darryn Mcdaniel MD;  [facility]    INJECT EPIDURAL LUMBAR N/A 12/28/2017    Procedure: INJECT EPIDURAL LUMBAR;  lumbar epidural injection;  Surgeon: Darryn Mcdaniel MD;  [facility]    INJECT EPIDURAL LUMBAR Bilateral 5/13/2021    Procedure: Bilateral Lumbar 3-4 Epidural Steroid Injection;  Surgeon: Darryn Mcdaniel MD;  [facility]    INJECT EPIDURAL TRANSFORAMINAL Bilateral 8/23/2018    Procedure: INJECT EPIDURAL TRANSFORAMINAL;  transforaminal bilateral lumbar 3-4 steroid injection;  Surgeon: Darryn Mcdaniel MD;  [facility]    INJECT EPIDURAL  TRANSFORAMINAL Bilateral 11/8/2018    Procedure: INJECT EPIDURAL TRANSFORAMINAL lumbar 3-4;  Surgeon: Darryn Mcdaniel MD;  [facility]    INJECT EPIDURAL TRANSFORAMINAL Bilateral 6/14/2019    Procedure: INJECTION, EPIDURAL, TRANSFORAMINAL LUMBAR 3-4 BILATERAL;  Surgeon: Darryn Mcdaniel MD;  [facility]    INJECT EPIDURAL TRANSFORAMINAL Bilateral 5/22/2020    Procedure: lumbar 3-4 bilateral transforaminal epidural steroid injection;  Surgeon: Darryn Mcdaniel MD;  [facility]    INJECT EPIDURAL TRANSFORAMINAL Bilateral 9/24/2020    Procedure: INJECTION, EPIDURAL, TRANSFORAMINAL  LUMBAR 3-4 APPROACH;  Surgeon: Darryn Mcdaniel MD;  [facility]    INJECT JOINT SACROILIAC Bilateral 10/13/2022    Procedure: Fluoroscopically-guided injection of the Bilateral sacroiliac joints with Bilateral kerrie Sacroiliac joint ligaments infiltration over the sacrum;  Surgeon: Darryn Mcdaniel MD;  [facility]    PE TUBES  2006    left ear    RADIO FREQUENCY ABLATION PULSED CERVICAL Bilateral 10/1/2021    Procedure: RADIOFREQUENCY ABLATION lumbar 3, 4, 5 bilateral;  Surgeon: Darryn Mcdaniel MD;  [facility]    ZZHC COLONOSCOPY W/WO BRUSH/WASH  12/27/2005    ZZHC CREATE EARDRUM OPENING,GEN ANESTH  09/27/2007    Pe tube, Left endolymphatic sac enhancement.    ZZHC MASTOIDECTOMY,COMPLETE  09/27/2007     Current Outpatient Medications   Medication Sig Dispense Refill    Acetaminophen (TYLENOL PO) Take 1,000 mg by mouth 3 times daily      amLODIPine (NORVASC) 5 MG tablet TAKE 1 TABLET BY MOUTH EVERY DAY 90 tablet 3    atorvastatin (LIPITOR) 10 MG tablet Take 10 mg by mouth daily      cyclobenzaprine (FLEXERIL) 5 MG tablet 1-2 tablets three times daily as needed 90 tablet 2    ibuprofen (ADVIL/MOTRIN) 800 MG tablet Take 800 mg by mouth 3 times daily      losartan-hydrochlorothiazide (HYZAAR) 50-12.5 MG tablet TAKE 2 TABLETS BY MOUTH DAILY 180 tablet 1    nortriptyline (PAMELOR) 50 MG capsule Take 1 capsule (50 mg) by mouth  "At Bedtime 90 capsule 1    order for DME Equipment being ordered: TENS Pads as directed 1 Device 0    oxyCODONE (ROXICODONE) 5 MG tablet Take 1-2 tablets (5-10 mg) by mouth every 6 hours as needed for severe pain Must last 30 days 165 tablet 0    pregabalin (LYRICA) 150 MG capsule Take 1 capsule (150 mg) by mouth 2 times daily 60 capsule 1    sildenafil (VIAGRA) 100 MG tablet Take 0.5-1 tablets ( mg) by mouth daily as needed Take 30 min to 4 hours before intercourse.  Never use with nitroglycerin, terazosin or doxazosin. 6 tablet 7    naloxone (NARCAN) 4 MG/0.1ML nasal spray Spray 1 spray (4 mg) into one nostril alternating nostrils once as needed for opioid reversal every 2-3 minutes until assistance arrives (Patient not taking: Reported on 2022) 0.2 mL 1    Pregabalin (LYRICA) 200 MG capsule Take 1 capsule (200 mg) by mouth daily (Patient not taking: Reported on 2023) 30 capsule 1       Allergies   Allergen Reactions    No Known Drug Allergy         Social History     Tobacco Use    Smoking status: Never    Smokeless tobacco: Never   Substance Use Topics    Alcohol use: Yes     Alcohol/week: 0.0 standard drinks of alcohol     Comment: rarely     Family History   Problem Relation Age of Onset    Diabetes Mother     Cancer Mother         colon cancer -  at 52, diag at 42    Hypertension Father     Heart Disease Father          of AAA    No Known Problems Brother     No Known Problems Brother     Depression Sister     Suicide Other     Unknown/Adopted Maternal Grandmother     Unknown/Adopted Maternal Grandfather     Unknown/Adopted Paternal Grandmother     Unknown/Adopted Paternal Grandfather     No Known Problems Daughter     No Known Problems Daughter      History   Drug Use No         Objective     /84   Pulse 84   Temp 98.9  F (37.2  C) (Temporal)   Resp 16   Ht 1.842 m (6' 0.5\")   Wt 98.4 kg (217 lb)   SpO2 97%   BMI 29.03 kg/m      Physical Exam    GENERAL APPEARANCE: " healthy, alert and no distress     EYES: EOMI,  PERRL     HENT: ear canals and TM's normal and nose and mouth without ulcers or lesions     NECK: no adenopathy, no asymmetry, masses, or scars and thyroid normal to palpation     RESP: lungs clear to auscultation - no rales, rhonchi or wheezes     CV: regular rates and rhythm, normal S1 S2, no S3 or S4 and no murmur, click or rub     ABDOMEN:  soft, nontender, no HSM or masses and bowel sounds normal     MS: extremities normal- no gross deformities noted, no evidence of inflammation in joints, FROM in all extremities.     SKIN: no suspicious lesions or rashes     NEURO: Normal strength and tone, sensory exam grossly normal, mentation intact and speech normal     PSYCH: mentation appears normal. and affect normal/bright     LYMPHATICS: No cervical adenopathy    Recent Labs   Lab Test 11/29/22  1033 08/11/22  1426 09/29/21  1413 09/29/21  1338   HGB  --  15.5  --  14.9   PLT  --  291  --   --    INR 0.97  --   --   --    NA  --  139 141  --    POTASSIUM  --  3.5 3.9  --    CR  --  1.08 1.18  --         Diagnostics:  Labs pending at this time.  Results will be reviewed when available.   No EKG required for low risk surgery (cataract, skin procedure, breast biopsy, etc).    Revised Cardiac Risk Index (RCRI):  The patient has the following serious cardiovascular risks for perioperative complications:   - No serious cardiac risks = 0 points     RCRI Interpretation: 0 points: Class I (very low risk - 0.4% complication rate)         Signed Electronically by: Tha Grullon MD  Copy of this evaluation report is provided to requesting physician.

## 2023-08-15 LAB
B2 GLYCOPROT1 IGG SERPL IA-ACNC: 5.4 U/ML
B2 GLYCOPROT1 IGM SERPL IA-ACNC: <2.4 U/ML

## 2023-08-17 ENCOUNTER — ANESTHESIA EVENT (OUTPATIENT)
Dept: SURGERY | Facility: CLINIC | Age: 58
End: 2023-08-17
Payer: COMMERCIAL

## 2023-08-17 NOTE — ANESTHESIA PREPROCEDURE EVALUATION
Anesthesia Pre-Procedure Evaluation    Patient: Indra Mehta   MRN: [id number] : 1965        Procedure : Procedure(s):  Inject epidural cervical          Past Medical History:   Diagnosis Date    Anxiety     Back pain     Depressive disorder     Hyperlipidemia     Hypertension     Meniere's disease, unspecified     Pain medication agreement signed 2010    Sleep apnea, unspecified type       Past Surgical History:   Procedure Laterality Date    BUNIONECTOMY RT/LT  08    Both feet    COLONOSCOPY N/A 2016    Procedure: COMBINED COLONOSCOPY, SINGLE OR MULTIPLE BIOPSY/POLYPECTOMY BY BIOPSY;  Surgeon: Indra Jernigan MD;  [facility]    COLONOSCOPY N/A 2022    Procedure: COLONOSCOPY, FLEXIBLE, WITH LESION REMOVAL USING SNARE;  Surgeon: Pablo Ferris MD;  [facility]    DISCECTOMY, FUSION CERVICAL ANTERIOR ONE LEVEL, COMBINED N/A 2017    Procedure: COMBINED DISCECTOMY, FUSION CERVICAL ANTERIOR ONE LEVEL;  Cervical 6-7, Anterior cervical discectomy fusion;  Surgeon: Mike Mckenna MD;  [facility]    DISCECTOMY, FUSION CERVICAL ANTERIOR ONE LEVEL, COMBINED N/A 2018    Procedure: cervical 5-6 anterior cervical discectomy fusion;  Surgeon: Mike Mckenna MD;  [facility]    HERNIORRHAPHY UMBILICAL N/A 2017    Procedure: HERNIORRHAPHY UMBILICAL;  open umbilical hernia repair;  Surgeon: Boni Story MD;  [facility]    INJECT BLOCK MEDIAL BRANCH CERVICAL/THORACIC/LUMBAR Bilateral 2021    Procedure: Left L3, Left L4, Left L5, Right L3, Right L4 and Right L5 medial branch nerve blocks using fluoroscopic guidance and contrast control;  Surgeon: Darryn Mcdaniel MD;  [facility]    INJECT BLOCK MEDIAL BRANCH CERVICAL/THORACIC/LUMBAR N/A 2021    Procedure: Left L3, Left L4, Left L5, Right L3, Right L4 and Right L5 medial branch nerve blocks using fluoroscopic guidance and contrast control;  Surgeon: Darryn Mcdaniel MD;  [facility]    INJECT EPIDURAL CERVICAL N/A 8/22/2019    Procedure: INJECTION, SPINE, CERVICAL 6-7 EPIDURAL;  Surgeon: Darryn Mcdaniel MD;  [facility]    INJECT EPIDURAL CERVICAL Left 3/9/2023    Procedure: Cervical 7-Thoracic 1 Interlaminar epidural steroid injection using fluoroscopic guidance with contrast dye, Left;  Surgeon: Darryn Mcdaniel MD;  [facility]    INJECT EPIDURAL LUMBAR N/A 11/9/2017    Procedure: INJECT EPIDURAL LUMBAR;  lumbar 4-5 epidural steroid injection ;  Surgeon: Darryn Mcdaniel MD;  [facility]    INJECT EPIDURAL LUMBAR N/A 12/28/2017    Procedure: INJECT EPIDURAL LUMBAR;  lumbar epidural injection;  Surgeon: Darryn Mcdaniel MD;  [facility]    INJECT EPIDURAL LUMBAR Bilateral 5/13/2021    Procedure: Bilateral Lumbar 3-4 Epidural Steroid Injection;  Surgeon: Darryn Mcdaniel MD;  [facility]    INJECT EPIDURAL TRANSFORAMINAL Bilateral 8/23/2018    Procedure: INJECT EPIDURAL TRANSFORAMINAL;  transforaminal bilateral lumbar 3-4 steroid injection;  Surgeon: Darryn Mcdaniel MD;  [facility]    INJECT EPIDURAL TRANSFORAMINAL Bilateral 11/8/2018    Procedure: INJECT EPIDURAL TRANSFORAMINAL lumbar 3-4;  Surgeon: Darryn Mcdaniel MD;  [facility]    INJECT EPIDURAL TRANSFORAMINAL Bilateral 6/14/2019    Procedure: INJECTION, EPIDURAL, TRANSFORAMINAL LUMBAR 3-4 BILATERAL;  Surgeon: Darryn Mcdaniel MD;  [facility]    INJECT EPIDURAL TRANSFORAMINAL Bilateral 5/22/2020    Procedure: lumbar 3-4 bilateral transforaminal epidural steroid injection;  Surgeon: Darryn Mcdaniel MD;  [facility]    INJECT EPIDURAL TRANSFORAMINAL Bilateral 9/24/2020    Procedure: INJECTION, EPIDURAL, TRANSFORAMINAL  LUMBAR 3-4 APPROACH;  Surgeon: Darryn Mcdaniel MD;  [facility]    INJECT JOINT SACROILIAC Bilateral 10/13/2022    Procedure: Fluoroscopically-guided injection of the Bilateral sacroiliac joints with Bilateral kerrie Sacroiliac joint ligaments infiltration over the sacrum;  Surgeon:  Darryn Mcdaniel MD;  [facility]    PE TUBES  2006    left ear    RADIO FREQUENCY ABLATION PULSED CERVICAL Bilateral 10/1/2021    Procedure: RADIOFREQUENCY ABLATION lumbar 3, 4, 5 bilateral;  Surgeon: Darryn Mcdaniel MD;  [facility]    ZZHC COLONOSCOPY W/WO BRUSH/WASH  12/27/2005    ZZHC CREATE EARDRUM OPENING,GEN ANESTH  09/27/2007    Pe tube, Left endolymphatic sac enhancement.    ZZHC MASTOIDECTOMY,COMPLETE  09/27/2007      Allergies   Allergen Reactions    No Known Drug Allergy       Social History     Tobacco Use    Smoking status: Never    Smokeless tobacco: Never   Substance Use Topics    Alcohol use: Yes     Alcohol/week: 0.0 standard drinks of alcohol     Comment: rarely      Wt Readings from Last 1 Encounters:   08/14/23 98.4 kg (217 lb)        Anesthesia Evaluation   Pt has had prior anesthetic. Type: General and MAC.        ROS/MED HX  ENT/Pulmonary: Comment: Bilateral occipital neuralgia    (+) sleep apnea,                                      Neurologic: Comment: Meniere's disease      Cardiovascular:     (+) Dyslipidemia hypertension- -   -  - -                                 Previous cardiac testing   Echo: Date: Results:    Stress Test:  Date: Results:    ECG Reviewed:  Date: 9/21/2020 Results:  SR  Cath:  Date: Results:      METS/Exercise Tolerance:     Hematologic:       Musculoskeletal: Comment: Cervical myelopathy  Chronic bilateral low back pain without sciatica  Chronic pain syndrome  (+)  arthritis,             GI/Hepatic:  - neg GI/hepatic ROS     Renal/Genitourinary:  - neg Renal ROS   (+)        BPH,      Endo:       Psychiatric/Substance Use:     (+) psychiatric history depression  H/O chronic opiod use .     Infectious Disease:  - neg infectious disease ROS     Malignancy:  - neg malignancy ROS     Other:            Physical Exam    Airway  airway exam normal      Mallampati: II   TM distance: > 3 FB   Neck ROM: full   Mouth opening: > 3 cm    Respiratory Devices and Support          Dental  no notable dental history         Cardiovascular   cardiovascular exam normal       Rhythm and rate: regular and normal     Pulmonary   pulmonary exam normal        breath sounds clear to auscultation           OUTSIDE LABS:  CBC:   Lab Results   Component Value Date    WBC 6.1 08/14/2023    WBC 5.7 08/11/2022    HGB 16.5 08/14/2023    HGB 15.5 08/11/2022    HCT 47.2 08/14/2023    HCT 43.6 08/11/2022     08/14/2023     08/11/2022     BMP:   Lab Results   Component Value Date     08/14/2023     08/11/2022    POTASSIUM 3.3 (L) 08/14/2023    POTASSIUM 3.5 08/11/2022    CHLORIDE 100 08/14/2023    CHLORIDE 104 08/11/2022    CO2 25 08/14/2023    CO2 30 08/11/2022    BUN 14.0 08/14/2023    BUN 14 08/11/2022    CR 1.01 08/14/2023    CR 1.08 08/11/2022     (H) 08/14/2023    GLC 84 08/11/2022     COAGS:   Lab Results   Component Value Date    INR 0.97 11/29/2022     POC: No results found for: BGM, HCG, HCGS  HEPATIC:   Lab Results   Component Value Date    ALBUMIN 4.4 12/01/2020    PROTTOTAL 8.1 12/01/2020    ALT 89 (H) 12/01/2020    AST 25 12/01/2020    ALKPHOS 66 12/01/2020    BILITOTAL 0.7 12/01/2020     OTHER:   Lab Results   Component Value Date    A1C 5.2 12/01/2020    KRISHAN 9.5 08/14/2023    MAG 2.2 03/19/2007    TSH 2.00 03/24/2016    CRP <2.9 08/11/2022    SED 5 11/29/2022       Anesthesia Plan    ASA Status:  3    NPO Status:  NPO Appropriate    Anesthesia Type: MAC.     - Reason for MAC: straight local not clinically adequate, immobility needed   Induction: Intravenous, Propofol.   Maintenance: TIVA.        Consents    Anesthesia Plan(s) and associated risks, benefits, and realistic alternatives discussed. Questions answered and patient/representative(s) expressed understanding.     - Discussed:     - Discussed with:  Patient      - Extended Intubation/Ventilatory Support Discussed: No.      - Patient is DNR/DNI Status: No     Use of blood products discussed: No .      Postoperative Care    Pain management: Oral pain medications.   PONV prophylaxis: Background Propofol Infusion     Comments:    Other Comments: The risks and benefits of anesthesia, and the alternatives where applicable, have been discussed with the patient, and they wish to proceed.               YUMIKO Melendez CRNA

## 2023-08-18 ENCOUNTER — ANESTHESIA (OUTPATIENT)
Dept: SURGERY | Facility: CLINIC | Age: 58
End: 2023-08-18
Payer: COMMERCIAL

## 2023-08-18 ENCOUNTER — HOSPITAL ENCOUNTER (OUTPATIENT)
Facility: CLINIC | Age: 58
Discharge: HOME | End: 2023-08-18
Attending: ANESTHESIOLOGY | Admitting: ANESTHESIOLOGY
Payer: COMMERCIAL

## 2023-08-18 ENCOUNTER — HOSPITAL ENCOUNTER (OUTPATIENT)
Dept: GENERAL RADIOLOGY | Facility: CLINIC | Age: 58
Discharge: HOME | End: 2023-08-18
Attending: ANESTHESIOLOGY | Admitting: ANESTHESIOLOGY
Payer: COMMERCIAL

## 2023-08-18 VITALS
TEMPERATURE: 98.1 F | RESPIRATION RATE: 16 BRPM | SYSTOLIC BLOOD PRESSURE: 151 MMHG | DIASTOLIC BLOOD PRESSURE: 100 MMHG | OXYGEN SATURATION: 99 % | HEART RATE: 78 BPM

## 2023-08-18 DIAGNOSIS — M54.12 CERVICAL RADICULOPATHY: ICD-10-CM

## 2023-08-18 PROCEDURE — 250N000011 HC RX IP 250 OP 636: Performed by: NURSE ANESTHETIST, CERTIFIED REGISTERED

## 2023-08-18 PROCEDURE — 370N000017 HC ANESTHESIA TECHNICAL FEE, PER MIN: Performed by: ANESTHESIOLOGY

## 2023-08-18 PROCEDURE — 999N000179 XR SURGERY CARM FLUORO LESS THAN 5 MIN W STILLS: Mod: TC

## 2023-08-18 PROCEDURE — 250N000009 HC RX 250: Performed by: NURSE ANESTHETIST, CERTIFIED REGISTERED

## 2023-08-18 PROCEDURE — 250N000011 HC RX IP 250 OP 636: Mod: JZ | Performed by: ANESTHESIOLOGY

## 2023-08-18 PROCEDURE — 250N000011 HC RX IP 250 OP 636: Performed by: ANESTHESIOLOGY

## 2023-08-18 PROCEDURE — 62321 NJX INTERLAMINAR CRV/THRC: CPT | Performed by: ANESTHESIOLOGY

## 2023-08-18 PROCEDURE — 258N000003 HC RX IP 258 OP 636: Performed by: NURSE ANESTHETIST, CERTIFIED REGISTERED

## 2023-08-18 RX ORDER — ONDANSETRON 4 MG/1
4 TABLET, ORALLY DISINTEGRATING ORAL EVERY 30 MIN PRN
Status: DISCONTINUED | OUTPATIENT
Start: 2023-08-18 | End: 2023-08-18 | Stop reason: HOSPADM

## 2023-08-18 RX ORDER — LIDOCAINE HYDROCHLORIDE 10 MG/ML
INJECTION, SOLUTION INFILTRATION; PERINEURAL PRN
Status: DISCONTINUED | OUTPATIENT
Start: 2023-08-18 | End: 2023-08-18

## 2023-08-18 RX ORDER — TRIAMCINOLONE ACETONIDE 40 MG/ML
INJECTION, SUSPENSION INTRA-ARTICULAR; INTRAMUSCULAR PRN
Status: DISCONTINUED | OUTPATIENT
Start: 2023-08-18 | End: 2023-08-18 | Stop reason: HOSPADM

## 2023-08-18 RX ORDER — ONDANSETRON 2 MG/ML
4 INJECTION INTRAMUSCULAR; INTRAVENOUS EVERY 30 MIN PRN
Status: DISCONTINUED | OUTPATIENT
Start: 2023-08-18 | End: 2023-08-18 | Stop reason: HOSPADM

## 2023-08-18 RX ORDER — PROPOFOL 10 MG/ML
INJECTION, EMULSION INTRAVENOUS PRN
Status: DISCONTINUED | OUTPATIENT
Start: 2023-08-18 | End: 2023-08-18

## 2023-08-18 RX ORDER — FENTANYL CITRATE 50 UG/ML
INJECTION, SOLUTION INTRAMUSCULAR; INTRAVENOUS PRN
Status: DISCONTINUED | OUTPATIENT
Start: 2023-08-18 | End: 2023-08-18

## 2023-08-18 RX ORDER — SODIUM CHLORIDE, SODIUM LACTATE, POTASSIUM CHLORIDE, CALCIUM CHLORIDE 600; 310; 30; 20 MG/100ML; MG/100ML; MG/100ML; MG/100ML
INJECTION, SOLUTION INTRAVENOUS CONTINUOUS PRN
Status: DISCONTINUED | OUTPATIENT
Start: 2023-08-18 | End: 2023-08-18

## 2023-08-18 RX ORDER — IOPAMIDOL 612 MG/ML
INJECTION, SOLUTION INTRATHECAL PRN
Status: DISCONTINUED | OUTPATIENT
Start: 2023-08-18 | End: 2023-08-18 | Stop reason: HOSPADM

## 2023-08-18 RX ADMIN — PROPOFOL 30 MG: 10 INJECTION, EMULSION INTRAVENOUS at 07:33

## 2023-08-18 RX ADMIN — PROPOFOL 50 MG: 10 INJECTION, EMULSION INTRAVENOUS at 07:31

## 2023-08-18 RX ADMIN — SODIUM CHLORIDE, POTASSIUM CHLORIDE, SODIUM LACTATE AND CALCIUM CHLORIDE: 600; 310; 30; 20 INJECTION, SOLUTION INTRAVENOUS at 07:29

## 2023-08-18 RX ADMIN — FENTANYL CITRATE 50 MCG: 50 INJECTION, SOLUTION INTRAMUSCULAR; INTRAVENOUS at 07:38

## 2023-08-18 RX ADMIN — LIDOCAINE HYDROCHLORIDE 1 ML: 10 INJECTION, SOLUTION EPIDURAL; INFILTRATION; INTRACAUDAL; PERINEURAL at 06:51

## 2023-08-18 RX ADMIN — FENTANYL CITRATE 50 MCG: 50 INJECTION, SOLUTION INTRAMUSCULAR; INTRAVENOUS at 07:35

## 2023-08-18 RX ADMIN — LIDOCAINE HYDROCHLORIDE 30 MG: 10 INJECTION, SOLUTION INFILTRATION; PERINEURAL at 07:30

## 2023-08-18 ASSESSMENT — ACTIVITIES OF DAILY LIVING (ADL): ADLS_ACUITY_SCORE: 35

## 2023-08-18 NOTE — ANESTHESIA CARE TRANSFER NOTE
Patient: Indra Mehta    Procedure: Procedure(s):  Cervical 7 thoracic 1 epidural injection off to the left       Diagnosis: Cervical radiculopathy [M54.12]  Degenerative disc disease, cervical [M50.30]  Cervical herniated disc [M50.20]  Diagnosis Additional Information: No value filed.    Anesthesia Type:   MAC     Note:    Oropharynx: oropharynx clear of all foreign objects and spontaneously breathing  Level of Consciousness: drowsy  Oxygen Supplementation: room air    Independent Airway: airway patency satisfactory and stable  Dentition: dentition unchanged  Vital Signs Stable: post-procedure vital signs reviewed and stable  Report to RN Given: handoff report given  Patient transferred to: Phase II    Handoff Report: Identifed the Patient, Identified the Reponsible Provider, Reviewed the pertinent medical history, Discussed the surgical course, Reviewed Intra-OP anesthesia mangement and issues during anesthesia, Set expectations for post-procedure period and Allowed opportunity for questions and acknowledgement of understanding  Vitals:  Vitals Value Taken Time   /94 08/18/23 0751   Temp     Pulse 77 08/18/23 0751   Resp     SpO2 96 % 08/18/23 0753   Vitals shown include unvalidated device data.    Electronically Signed By: YUMIKO Melendez CRNA  August 18, 2023  7:55 AM

## 2023-08-18 NOTE — ANESTHESIA POSTPROCEDURE EVALUATION
Patient: Indra Mehta    Procedure: Procedure(s):  Cervical 7 thoracic 1 epidural injection off to the left       Anesthesia Type:  MAC    Note:  Disposition: Outpatient   Postop Pain Control: Uneventful            Sign Out: Well controlled pain   PONV: No   Neuro/Psych: Uneventful            Sign Out: Acceptable/Baseline neuro status   Airway/Respiratory: Uneventful            Sign Out: Acceptable/Baseline resp. status   CV/Hemodynamics: Uneventful            Sign Out: Acceptable CV status   Other NRE: NONE   DID A NON-ROUTINE EVENT OCCUR? No    Event details/Postop Comments:  Pt was happy with anesthesia care.  No complications.  I will follow up with the pt if needed.       Last vitals:  Vitals Value Taken Time   /100 08/18/23 0800   Temp 98.1  F (36.7  C) 08/18/23 0750   Pulse 78 08/18/23 0800   Resp 16 08/18/23 0750   SpO2 98 % 08/18/23 0802   Vitals shown include unvalidated device data.    Electronically Signed By: YUMIKO Melendez CRNA  August 18, 2023  8:03 AM

## 2023-08-18 NOTE — INTERVAL H&P NOTE
"I have reviewed the surgical (or preoperative) H&P that is linked to this encounter, and examined the patient. There are no significant changes    Clinical Conditions Present on Arrival:  Clinically Significant Risk Factors Present on Admission        # Hypokalemia: Lowest K = 3.3 mmol/L in last 30 days, will replace as needed           # Overweight: Estimated body mass index is 29.03 kg/m  as calculated from the following:    Height as of 8/14/23: 1.842 m (6' 0.5\").    Weight as of 8/14/23: 98.4 kg (217 lb).       "

## 2023-08-18 NOTE — OP NOTE
CHIEF COMPLAINT: Neck pain with radicular arm pains    INDICATIONS FOR PROCEDURE:   1.This patient suffers from moderate to severe neck pain and upper extremity radicular pains.    2.This pain has persisted for more than 4 weeks and is causing significant functional disability when they are trying to perform ADL's.   3. They failed conservative care which consisted of giving this pain time to jay, PT, and previous surgeries  4. Preoperative NRS pain score was verbally reported to me today as a  6-7/10.   5. The patients radicular pains correlates to their MRI which shows moderate stenosis at the left C7 and T1 foramen.  He also has facet hypertrophy at the C7-T1 facet joints.  6.  An order was sent to me to perform the technical component of a cervical epidural steroid injection.    PROCEDURE: C7-T1 Interlaminar epidural steroid injection using fluoroscopic guidance with contrast dye.     PROCEDURE DETAILS: After written informed consent was obtained from the patient, the patient was escorted to the procedure room.  The patient was placed in the prone position.  A  time out  was conducted to verify patient identity, procedure to be performed, side, site, allergies and any special requirements.  The skin over the neck and upper back region were prepped and draped in normal sterile fashion utilizing ChloraPrep.  Fluoroscopy was used to identify the C7-T1 interspace in an AP view and the skin was anesthetized with 2 mL of 1% lidocaine with bicarbonate buffer.  A 20-gauge 3-1/2 inch Tuohy needle was advanced using the loss of resistance technique with preservative free normal saline with fluoroscopic guidance. After negative aspiration for CSF and blood, 1.5 cc of Isovue contrast dye was injected revealing the appropriate cervical epidurogram without evidence of intrathecal or intravascular spread. Following this, a 3-mL solution of 40 mg of triamcinolone with 2 cc preservative-free normal saline was slowly injected.   After injection of the medication, as the needle tip was withdrawn, it was flushed with local anesthetic.  The patient was monitored with blood pressure and pulse oximetry machines with the assistance of an RN throughout the procedure.  The patient was alert and responsive to questions throughout the procedure.   The patient tolerated the procedure well and was observed in the post-procedural area.  The patient was dismissed without apparent complications.     BLOOD LOSS: less than 5 cc    DIAGNOSIS:  1.  Sided neck pain with radiation to the left shoulder and shoulder blade region.    PLAN:    1. Performed a C7-T1 interlaminar epidural steroid injection without complications.   2. The patient was instructed follow-up with the referring provider and to call the [facility] if today's procedure is not helpful.  Think if today's injection is not helpful he should have medial branch blocks at C7 and C8 to anesthetize a C7-T1 facet joint and establish whether or not his facet joints are actually causing the pain.  If he has 2 positive medial branch blocks then he would be a candidate for a radiofrequency ablation of this facet joint.    Darryn Mcdaniel MD  Diplomate of the American Board of Anesthesiology, Pain Medicine

## 2023-08-18 NOTE — DISCHARGE INSTRUCTIONS

## 2023-08-21 ENCOUNTER — MYC REFILL (OUTPATIENT)
Dept: FAMILY MEDICINE | Facility: CLINIC | Age: 58
End: 2023-08-21
Payer: COMMERCIAL

## 2023-08-21 DIAGNOSIS — Z98.1 S/P CERVICAL SPINAL FUSION: ICD-10-CM

## 2023-08-21 DIAGNOSIS — M54.16 LUMBAR RADICULOPATHY: ICD-10-CM

## 2023-08-21 DIAGNOSIS — M79.18 MYOFASCIAL PAIN: ICD-10-CM

## 2023-08-21 DIAGNOSIS — F11.90 CHRONIC, CONTINUOUS USE OF OPIOIDS: ICD-10-CM

## 2023-08-21 DIAGNOSIS — G89.4 CHRONIC PAIN SYNDROME: ICD-10-CM

## 2023-08-21 DIAGNOSIS — M54.2 CERVICALGIA: ICD-10-CM

## 2023-08-21 RX ORDER — OXYCODONE HYDROCHLORIDE 5 MG/1
5-10 TABLET ORAL EVERY 6 HOURS PRN
Qty: 165 TABLET | Refills: 0 | Status: SHIPPED | OUTPATIENT
Start: 2023-08-25 | End: 2023-09-21

## 2023-08-21 NOTE — TELEPHONE ENCOUNTER
Requested Prescriptions   Pending Prescriptions Disp Refills    oxyCODONE (ROXICODONE) 5 MG tablet 165 tablet 0     Sig: Take 1-2 tablets (5-10 mg) by mouth every 6 hours as needed for severe pain Must last 30 days       There is no refill protocol information for this order

## 2023-09-07 ENCOUNTER — MYC MEDICAL ADVICE (OUTPATIENT)
Dept: ORTHOPEDICS | Facility: CLINIC | Age: 58
End: 2023-09-07
Payer: COMMERCIAL

## 2023-09-07 NOTE — PROGRESS NOTES
30 DAY [facility] VISIT    Diagnostic AHI:  12.2 HST    Message left for patient to return call.    Assessment: Pt not meeting objective benchmarks for compliance. Patient scheduled to remove his Adenoids.  Action plan: Waiting for patient to return call.   Patient has scheduled a follow up visit with Dr. Beltre on 11/5/2019.   Device type: Auto-CPAP  PAP settings: CPAP min 7.0 cm  H20     CPAP max 15.0 cm  H20    95th% pressure 12.6 cm  H20   Mask type:  Nasal Mask  Objective measures: 14 day rolling measures       Leak  26.4 lpm  last  upload      AHI 4.22   last  upload      Average number of minutes 6      Objective measure goal  Compliance   Goal >70%  Leak   Goal < 24 lpm  AHI  Goal < 5  Usage  Goal >240      Total time spent on remote patient monitoring data analysis and patient contact today:   10 minutes         
stretcher

## 2023-09-11 ENCOUNTER — MYC MEDICAL ADVICE (OUTPATIENT)
Dept: FAMILY MEDICINE | Facility: CLINIC | Age: 58
End: 2023-09-11
Payer: COMMERCIAL

## 2023-09-11 DIAGNOSIS — F33.1 MODERATE EPISODE OF RECURRENT MAJOR DEPRESSIVE DISORDER (H): ICD-10-CM

## 2023-09-11 DIAGNOSIS — R41.89 COGNITIVE IMPAIRMENT: Primary | ICD-10-CM

## 2023-09-12 ENCOUNTER — MYC MEDICAL ADVICE (OUTPATIENT)
Dept: FAMILY MEDICINE | Facility: CLINIC | Age: 58
End: 2023-09-12
Payer: COMMERCIAL

## 2023-09-12 NOTE — TELEPHONE ENCOUNTER
Please see previous mychart encounter from today 09/12/23.  This message has been added to that encounter.  Closing this encounter.

## 2023-09-12 NOTE — TELEPHONE ENCOUNTER
Attached message to other [facility] encounter.  Closing this encounter as it will be addressed there.

## 2023-09-12 NOTE — TELEPHONE ENCOUNTER
Part 2 of message  Received: Yesterday  Ketan Mehta [employer] Messages  Phone Number: [phone]     Because of the reduction in pregabalin my feet are constantly hurting me at a level that often makes it difficult to even walk down my driveway and back (about .4 miles)  The oxy helps for a short time, maybe an hour at best and I can't tell much difference after taking the 150mg pregabalin.    I'm sorry if this is rambling but that is the way my thoughts are.    Physically I have a lot of pain in my knees, lower back, neck and shoulders.  This makes taking a shower and getting dressed hard to do.    I have an appointment on the 21st so we can discuss this then.  I just want you to know how bad things are right now.

## 2023-09-12 NOTE — TELEPHONE ENCOUNTER
September 12, 2023  Ketan PEREZ Mettling   to [employer] Mess[employer] (supporting Tha Grullon MD)       9/12/23 12:06 PM  I can't get in until May and a partner needs to know what kind of consult, psyciatric or other. I can't do this calling back and forth right now I am too overwhelmed.

## 2023-09-13 NOTE — TELEPHONE ENCOUNTER
Patient will be adding the fax number to another [employer] message. He didn't have it when he got here.

## 2023-09-14 PROBLEM — G60.3 IDIOPATHIC PROGRESSIVE POLYNEUROPATHY: Status: ACTIVE | Noted: 2023-09-14

## 2023-09-14 PROBLEM — M46.1 SACROILIITIS, NOT ELSEWHERE CLASSIFIED (H): Status: RESOLVED | Noted: 2023-06-15 | Resolved: 2023-09-14

## 2023-09-14 PROBLEM — M54.16 LUMBAR RADICULOPATHY: Status: ACTIVE | Noted: 2023-09-14

## 2023-09-14 PROBLEM — Z98.1 STATUS POST LUMBAR SPINAL FUSION: Status: RESOLVED | Noted: 2020-01-14 | Resolved: 2023-09-14

## 2023-09-14 NOTE — PROGRESS NOTES
NEUROLOGY CONSULTATION NOTE       [facility] [place]  [address] [place] [address]  Tel: [phone]  Fax: [phone]  [email or web address]     Indra Mehta,  1965, MRN [id number]  PCP: Tha Grullon  Date: 09/15/2023     ASSESSMENT & PLAN     Visit Diagnosis  Neurocognitive disorder  Idiopathic progressive polyneuropathy     Seizure-like activity  58-year-old male with history of HTN, sleep apnea, major depression, occipital neuralgia, lumbar radiculopathy, idiopathic sensorimotor polyneuropathy who was referred for evaluation of cognitive decline and seizure-like activity.  He does have depression and anxiety and due to his inability to concentrate is worried he will lose his job.  He scored 28/30 on MoCA and I suspect his inattention is related to his anxiety but seizures is also a possibility.  I have recommended:    1.  MRI brain with and without contrast  2.  Sleep deprived EEG  3.  Lab work to include folate, MMA, RPR, TSH, B1 and B12    Idiopathic progressive polyneuropathy  Patient developed paresthesias years ago and was seen at [facility] and had EMG and autonomic testing that suggested sensorimotor axonal polyneuropathy with autonomic involvement.  No etiology was found.  And he was being treated with Lyrica and had gradually escalated the dose.  He attributes his cognitive issues after attempt was made to decrease the dose of Lyrica    Thank you again for this referral, please feel free to contact me if you have any questions.    Edu Griggs MD  [facility]  (Formerly, [facility]     REASON FOR CONSULTATION Cognitive decline        HISTORY OF PRESENT ILLNESS     We have been requested by Dr. Grullon to evaluate Indra Mehta who is a 58 year old  male for cognitive decline    Patient is 58-year-old male with history of HTN, sleep apnea, major depression, occipital neuralgia, lumbar  radiculopathy, sensorimotor polyneuropathy who was referred for evaluation of cognitive decline & seizure-like activity.  Patient does have significant discomfort due to neuropathy and takes Lyrica.  However he does not derive benefit and due to the discomfort gets anxious and falls behind on his tasks that causes him more anxiety.  According to patient he had episodes during which he loses touch with his surrounding.  Last winter they had a snow and he noticed that the driveway was plowed.  He looked at his ring camera and realized he was the one who did it and had no recollection of that episode.  He had multiple such episodes.  He does feel quite anxious as he is unable to complete his tasks.  He is afraid he cannot concentrate and will lose his job    He was not sure who did it and asked his daughter patient was evaluated at [facility] in 2020 for peripheral neuropathy when he reported he had cervical spine surgery in 2017, 2018 and 2019 between C5 and C7 with fusion.  Since 2012 he started having bilateral feet paresthesias that he described as pins and needle at times he would experience burning sharp shooting pain along with vasomotor changes who was initially tried on gabapentin and subsequently switched to Lyrica.  He had an EMG done that showed length-dependent sensorimotor axonal polyneuropathy with superimposed chronic right L5 and left L4 radiculopathy.  Subsequently autonomic testing was done that was abnormal suggesting autonomic involvement.     PROBLEM LIST   Patient Active Problem List   Diagnosis Code     Meniere's disease H81.09     Major depression in complete remission (H) F32.9     Chronic pain syndrome G89.4     Pain medication agreement signed Z02.89     Occipital neuralgia M54.81     Benign essential hypertension I10     Cervical radiculopathy M54.12     Sleep apnea, unspecified type G47.30     Osteoarthritis of spine with radiculopathy, lumbar region M47.26     Impingement syndrome of  left shoulder M75.42     Primary osteoarthritis of both knees M17.0     Chronic bilateral low back pain without sciatica M54.50, G89.29     Cervical myelopathy (H) G95.9     Chronic, continuous use of opioids F11.90     Idiopathic progressive polyneuropathy G60.3     Lumbar radiculopathy M54.16         PAST MEDICAL & SURGICAL HISTORY     Past Medical History:   Patient  has a past medical history of Anxiety, Back pain, Depressive disorder, Hyperlipidemia, Hypertension, Meniere's disease, unspecified, Pain medication agreement signed (08/20/2010), Sleep apnea, unspecified type, and Status post lumbar spinal fusion (01/14/2020).    Surgical History:  He  has a past surgical history that includes Colonoscopy w/wo Brush **Performed** (12/27/2005); pe tubes (2006); MASTOIDECTOMY,COMPLETE (09/27/2007); CREATE EARDRUM OPENING,GEN ANESTH (09/27/2007); Colonoscopy (N/A, 12/28/2016); bunionectomy rt/lt (09/05/08); Discectomy, fusion cervical anterior one level, combined (N/A, 7/5/2017); Herniorrhaphy umbilical (N/A, 8/11/2017); Inject epidural lumbar (N/A, 11/9/2017); Inject epidural lumbar (N/A, 12/28/2017); Inject epidural transforaminal (Bilateral, 8/23/2018); Inject epidural transforaminal (Bilateral, 11/8/2018); Discectomy, fusion cervical anterior one level, combined (N/A, 12/19/2018); Inject epidural transforaminal (Bilateral, 6/14/2019); Inject epidural cervical (N/A, 8/22/2019); Inject epidural transforaminal (Bilateral, 5/22/2020); Inject epidural transforaminal (Bilateral, 9/24/2020); Inject epidural lumbar (Bilateral, 5/13/2021); Inject block medial branch cervical/thoracic/lumbar (Bilateral, 8/12/2021); Inject block medial branch cervical/thoracic/lumbar (N/A, 9/9/2021); Radio Frequency Ablation Pulsed Cervical (Bilateral, 10/1/2021); Colonoscopy (N/A, 9/27/2022); Inject joint sacroiliac (Bilateral, 10/13/2022); Inject epidural cervical (Left, 3/9/2023); and Inject epidural cervical (N/A, 8/18/2023).     SOCIAL  HISTORY     Reviewed, and he  reports that he has never smoked. He has never used smokeless tobacco. He reports current alcohol use. He reports that he does not use drugs.     FAMILY HISTORY     Reviewed, and family history includes Cancer in his mother; Depression in his sister; Diabetes in his mother; Heart Disease in his father; Hypertension in his father; No Known Problems in his brother, brother, daughter, and daughter; Suicide in an other family member; Unknown/Adopted in his maternal grandfather, maternal grandmother, paternal grandfather, and paternal grandmother.     ALLERGIES     Allergies   Allergen Reactions     No Known Drug Allergy          REVIEW OF SYSTEMS     A 12 point review of system was performed and was negative except as outlined in the history of present illness.     HOME MEDICATIONS     Current Outpatient Rx   Medication Sig Dispense Refill     Acetaminophen (TYLENOL PO) Take 1,000 mg by mouth 3 times daily       amLODIPine (NORVASC) 5 MG tablet TAKE 1 TABLET BY MOUTH EVERY DAY 90 tablet 3     atorvastatin (LIPITOR) 10 MG tablet Take 10 mg by mouth daily       cyclobenzaprine (FLEXERIL) 5 MG tablet 1-2 tablets three times daily as needed 90 tablet 2     ibuprofen (ADVIL/MOTRIN) 800 MG tablet Take 800 mg by mouth 3 times daily       LORazepam (ATIVAN) 1 MG tablet Take 1 tablet 30 minutes before MRI scan 1 tablet 0     losartan-hydrochlorothiazide (HYZAAR) 50-12.5 MG tablet TAKE 2 TABLETS BY MOUTH DAILY 180 tablet 1     naloxone (NARCAN) 4 MG/0.1ML nasal spray Spray 1 spray (4 mg) into one nostril alternating nostrils once as needed for opioid reversal every 2-3 minutes until assistance arrives 0.2 mL 1     nortriptyline (PAMELOR) 50 MG capsule Take 1 capsule (50 mg) by mouth At Bedtime 90 capsule 1     oxyCODONE (ROXICODONE) 5 MG tablet Take 1-2 tablets (5-10 mg) by mouth every 6 hours as needed for severe pain Must last 30 days 165 tablet 0     pregabalin (LYRICA) 150 MG capsule Take 1  "capsule (150 mg) by mouth daily 60 capsule 1     sildenafil (VIAGRA) 100 MG tablet Take 0.5-1 tablets ( mg) by mouth daily as needed Take 30 min to 4 hours before intercourse.  Never use with nitroglycerin, terazosin or doxazosin. 6 tablet 7     order for DME Equipment being ordered: TENS Pads as directed 1 Device 0         PHYSICAL EXAM     Vital signs  /73 (BP Location: Right arm, Patient Position: Sitting)   Pulse 86   Ht 1.842 m (6' 0.5\")   Wt 96.2 kg (212 lb)   BMI 28.36 kg/m      Weight:   212 lbs 0 oz      9/15/2023    11:12 AM   MOCA   Visuospatial/Executive  5   Naming 3   Attention - Digits 1   Attention - Letters 1   Attention - Subtraction 3   Language - Repeat 2   Language - Fluency  1   Abstraction 2   Delayed Recall 4   Orientation 6   Education 0   MOCA Score 28   Administered by:  Markel Marcum LPN       Patient is alert and oriented x4 in no acute distress. Vital signs were reviewed and are documented in electronic medical record. Neck was supple, no carotid bruits, thyromegaly, JVD, or lymphadenopathy was noted.   NEUROLOGY EXAM:    Patient s speech was normal with no aphasia or dysarthria. Mentation, and affect were also normal.     Funduscopic exam was normal, with normal cup to disc ratio. Cranial nerves II -XII were intact.     Patient had normal mass, tone and motor strength was 5/5 in all extremities without pronator drift.     Sensation was intact to light touch, pinprick, and vibratory sensation.     Reflexes were 1+ symmetrical with downgoing toes.     No dysmetria noted on FNF or HKS. Romberg was negative.    Gait testing was normal. Able to walk on toes/heels. Tandem walk normal.     PERTINENT DIAGNOSTIC STUDIES     Following studies were reviewed:     MRI CERVICAL SPINE 10/13/2022  1. Postoperative and diffuse degenerative change of the cervical spine  as detailed above.  2. No spinal canal stenosis.  3. Moderate neural foraminal stenosis on the right at C3-C4 and on " the  left at C7-T1.    MRI LUMBAR SPINE 3/17/2022  1. Diffuse degenerative change of the lumbar spine as detailed above  without appreciable change from the recent comparison study.  2. No significant spinal canal or neural foraminal stenosis at any  level of the lumbar spine.    EMG 12/15/2020  This is an abnormal study.  There is electrodiagnostic evidence of a   length-dependent, sensorimotor axonal peripheral neuropathy and superimposed old or chronic right   L5 and left L4 radiculopathy without active denervation.    AUTONOMIC TESTING 12/17/2020  Abnormal study. There is evidence of length-dependent postganglionic   sympathetic sudomotor and mild cardiovagal impairment while cardiovascular   adrenergic function is normal. These findings can be seen in autonomic   involvement in the patient's known peripheral neuropathy. Residual   medication effects (nortriptyline) cannot be excluded.     PERTINENT LABS  Following labs were reviewed:  Office Visit on 08/14/2023   Component Date Value Ref Range Status     Beta 2 Glycoprotein 1 Antibody IgG 08/14/2023 5.4  <7.0 U/mL Final     Beta 2 Glycoprotein 1 Antibody IgM 08/14/2023 <2.4  <7.0 U/mL Final     Sodium 08/14/2023 139  136 - 145 mmol/L Final     Potassium 08/14/2023 3.3 (L)  3.4 - 5.3 mmol/L Final     Chloride 08/14/2023 100  98 - 107 mmol/L Final     Carbon Dioxide (CO2) 08/14/2023 25  22 - 29 mmol/L Final     Anion Gap 08/14/2023 14  7 - 15 mmol/L Final     Urea Nitrogen 08/14/2023 14.0  6.0 - 20.0 mg/dL Final     Creatinine 08/14/2023 1.01  0.67 - 1.17 mg/dL Final     Calcium 08/14/2023 9.5  8.6 - 10.0 mg/dL Final     Glucose 08/14/2023 140 (H)  70 - 99 mg/dL Final     GFR Estimate 08/14/2023 87  >60 mL/min/1.73m2 Final     WBC Count 08/14/2023 6.1  4.0 - 11.0 10e3/uL Final     RBC Count 08/14/2023 5.43  4.40 - 5.90 10e6/uL Final     Hemoglobin 08/14/2023 16.5  13.3 - 17.7 g/dL Final     Hematocrit 08/14/2023 47.2  40.0 - 53.0 % Final     MCV 08/14/2023 87  78  - 100 fL Final     MCH 08/14/2023 30.4  26.5 - 33.0 pg Final     MCHC 08/14/2023 35.0  31.5 - 36.5 g/dL Final     RDW 08/14/2023 13.0  10.0 - 15.0 % Final     Platelet Count 08/14/2023 297  150 - 450 10e3/uL Final        Total time spent for face to face visit, reviewing labs/imaging studies, counseling and coordination of care was: 1 Hour spent on the date of the encounter doing chart review, review of outside records, review of test results, interpretation of tests, patient visit, and documentation     This note was dictated using voice recognition software.  Any grammatical or context distortions are unintentional and inherent to the software.    Orders Placed This Encounter   Procedures     MR Brain w/o & w Contrast     Folate     Methylmalonic Acid     Treponema Abs w Reflex to RPR and Titer     TSH with free T4 reflex     Vitamin B1 whole blood     Vitamin B12     EEG Sleep Deprived      New Prescriptions    LORAZEPAM (ATIVAN) 1 MG TABLET    Take 1 tablet 30 minutes before MRI scan      Modified Medications    No medications on file

## 2023-09-14 NOTE — TELEPHONE ENCOUNTER
Forms faxed appropriately.  Copies sent to scanning, and original forms mailed back to patient.    Khushboo Sher XRO/

## 2023-09-15 ENCOUNTER — OFFICE VISIT (OUTPATIENT)
Dept: NEUROLOGY | Facility: CLINIC | Age: 58
End: 2023-09-15
Attending: FAMILY MEDICINE
Payer: COMMERCIAL

## 2023-09-15 ENCOUNTER — LAB (OUTPATIENT)
Dept: LAB | Facility: HOSPITAL | Age: 58
End: 2023-09-15
Payer: COMMERCIAL

## 2023-09-15 VITALS
BODY MASS INDEX: 28.1 KG/M2 | WEIGHT: 212 LBS | HEART RATE: 86 BPM | SYSTOLIC BLOOD PRESSURE: 126 MMHG | HEIGHT: 73 IN | DIASTOLIC BLOOD PRESSURE: 73 MMHG

## 2023-09-15 DIAGNOSIS — G60.3 IDIOPATHIC PROGRESSIVE POLYNEUROPATHY: Primary | ICD-10-CM

## 2023-09-15 DIAGNOSIS — R41.89 COGNITIVE IMPAIRMENT: ICD-10-CM

## 2023-09-15 DIAGNOSIS — R56.9 SEIZURE-LIKE ACTIVITY (H): ICD-10-CM

## 2023-09-15 DIAGNOSIS — M54.16 LUMBAR RADICULOPATHY: ICD-10-CM

## 2023-09-15 LAB
FOLATE SERPL-MCNC: 6 NG/ML (ref 4.6–34.8)
TSH SERPL DL<=0.005 MIU/L-ACNC: 0.42 UIU/ML (ref 0.3–4.2)
VIT B12 SERPL-MCNC: 465 PG/ML (ref 232–1245)

## 2023-09-15 PROCEDURE — 82746 ASSAY OF FOLIC ACID SERUM: CPT

## 2023-09-15 PROCEDURE — 83921 ORGANIC ACID SINGLE QUANT: CPT

## 2023-09-15 PROCEDURE — 99205 OFFICE O/P NEW HI 60 MIN: CPT | Performed by: PSYCHIATRY & NEUROLOGY

## 2023-09-15 PROCEDURE — 84425 ASSAY OF VITAMIN B-1: CPT

## 2023-09-15 PROCEDURE — 36415 COLL VENOUS BLD VENIPUNCTURE: CPT

## 2023-09-15 PROCEDURE — 82607 VITAMIN B-12: CPT

## 2023-09-15 PROCEDURE — 84443 ASSAY THYROID STIM HORMONE: CPT

## 2023-09-15 PROCEDURE — 86780 TREPONEMA PALLIDUM: CPT

## 2023-09-15 RX ORDER — LORAZEPAM 1 MG/1
TABLET ORAL
Qty: 1 TABLET | Refills: 0 | Status: SHIPPED | OUTPATIENT
Start: 2023-09-15 | End: 2023-10-10

## 2023-09-15 ASSESSMENT — MONTREAL COGNITIVE ASSESSMENT (MOCA)
8. SERIAL SUBTRACTION OF 7S: 3
WHAT IS THE TOTAL SCORE (OUT OF 30): 28
WHAT LEVEL OF EDUCATION WAS ATTAINED: 0
9. REPEAT EACH SENTENCE: 2
11. FOR EACH PAIR OF WORDS, WHAT CATEGORY DO THEY BELONG TO (OUT OF 2): 2
10. [FLUENCY] NAME WORDS STARTING WITH DESIGNATED LETTER: 1
4. NAME EACH OF THE THREE ANIMALS SHOWN: 3
7. [VIGILENCE] TAP WHEN HEARING DESIGNATED LETTER: 1
13. ORIENTATION SUBSCORE: 6
6. READ LIST OF DIGITS [FORWARD/BACKWARD]: 1
VISUOSPATIAL/EXECUTIVE SUBSCORE: 5
12. MEMORY INDEX SCORE: 4

## 2023-09-15 NOTE — NURSING NOTE
JESUS COGNITIVE ASSESSMENT (MOCA)  Version 7.1 Original Version  VISUOSPATIAL/EXECUTIVE               COPY CUBE      [  1  ]                                [1    ] DRAW CLOCK (Ten past eleven)  (3 points)    [  1  ]                    [ 1   ]               [  1  ]       Contour            Numbers     Hands POINTS                  5 / 5   NAMING    [  1 ]                                                                        [ 1   ]                                             [ 1   ]  Liiqra Rutherford                                Camel                     3/ 3   MEMORY Read list of words, subject must repeat them. Do 2 trials, even if 1st trial is successful. Do a recall after 5 minutes  FACE VELVET Muslim PEDRO RED No Points    1st          2nd         ATTENTION Read list of digits (1 digit/sec) Subject has to repeat in the forward order       [  0  ]   2  1  8  5  4                                [ 1   ] 7 4 2                          1/2   Read list of letters. The subject must tap with his hand at each letter A. No points if > 2 errors.  [  1  ] F B A C M N A A J K L B A F A K D E A A A J A M O F A A B              1/1   Serial 7 subtraction starting at 100          [  1  ] 93         [   1 ] 86          [   1 ] 79          [   1 ] 72         [ 1   ] 65   4 or 5 correct subtractions: 3 points,  2 or 3 correct: 2 points,  1correct: 1 point,   0 correct: 0 points           3 /3   LANGUAGE Repeat: I only know that Olayinka is the one to help today. [ 1    ]                                      The cat always hid under the couch when dogs were in the room. [  1 ]               2/2   Fluency: Name maximum number of words in one minute that begin with the letter F                                                                                                                    [  1  ] ___ (N > 11 words)              1 /1   ABSTRACTION Similarity  between e.g. banana-orange=fruit                                                                   [  1  ] train-bicycle                      [  1  ] watch-ruler             2 /2   DELAYED  RECALL Has to recall words  WITH NO CUE FACE  [  1  ] VELVET  [    1] Mandaeism  [  0  ]  PEDRO  [  1  ] RED  [  1  ] Points for UNCUED recall only           4 /5           OPTIONAL Category cue           Multiple choice cue          ORIENTATION  [   1 ] Date     [   1 ] Month       [   1 ] Year      [  1  ] Day      [   1 ] Place        [  1  ] City         5 /6   TOTAL  Normal > 26/30 Add 1 point if < 12 years education        28/30

## 2023-09-15 NOTE — TELEPHONE ENCOUNTER
See the other 10/11/17 mychart encounter for information on refill.    Heidy Gipson RN-BSN  [facility]     no

## 2023-09-15 NOTE — NURSING NOTE
Chief Complaint   Patient presents with    Cognitive decline     He started noticing a decline with memory 1.5-2 years ago.     Jody Marcum LPN on 9/15/2023 at 11:03 AM

## 2023-09-15 NOTE — LETTER
9/15/2023         RE: Indra Mehta  [address] [facility] [id number]        Dear Colleague,    Thank you for referring your patient, Indra Mehta, to the [facility] [place]. Please see a copy of my visit note below.    NEUROLOGY CONSULTATION NOTE       [facility] [place]  [address] [place] [address]  Tel: [phone]  Fax: [phone]  [email or web address]     Indra Mehta,  1965, MRN [id number]  PCP: Tha Grullon  Date: 09/15/2023     ASSESSMENT & PLAN     Visit Diagnosis  Neurocognitive disorder  Idiopathic progressive polyneuropathy     Seizure-like activity  58-year-old male with history of HTN, sleep apnea, major depression, occipital neuralgia, lumbar radiculopathy, idiopathic sensorimotor polyneuropathy who was referred for evaluation of cognitive decline and seizure-like activity.  He does have depression and anxiety and due to his inability to concentrate is worried he will lose his job.  He scored 28/30 on MoCA and I suspect his inattention is related to his anxiety but seizures is also a possibility.  I have recommended:    1.  MRI brain with and without contrast  2.  Sleep deprived EEG  3.  Lab work to include folate, MMA, RPR, TSH, B1 and B12    Idiopathic progressive polyneuropathy  Patient developed paresthesias years ago and was seen at [facility] and had EMG and autonomic testing that suggested sensorimotor axonal polyneuropathy with autonomic involvement.  No etiology was found.  And he was being treated with Lyrica and had gradually escalated the dose.  He attributes his cognitive issues after attempt was made to decrease the dose of Lyrica    Thank you again for this referral, please feel free to contact me if you have any questions.    Edu Griggs MD  [facility]  (Former[facility], [facility]     REASON FOR CONSULTATION Cognitive decline         HISTORY OF PRESENT ILLNESS     We have been requested by Dr. Grullon to evaluate Indra Mehta who is a 58 year old  male for cognitive decline    Patient is 58-year-old male with history of HTN, sleep apnea, major depression, occipital neuralgia, lumbar radiculopathy, sensorimotor polyneuropathy who was referred for evaluation of cognitive decline & seizure-like activity.  Patient does have significant discomfort due to neuropathy and takes Lyrica.  However he does not derive benefit and due to the discomfort gets anxious and falls behind on his tasks that causes him more anxiety.  According to patient he had episodes during which he loses touch with his surrounding.  Last winter they had a snow and he noticed that the driveway was plowed.  He looked at his ring camera and realized he was the one who did it and had no recollection of that episode.  He had multiple such episodes.  He does feel quite anxious as he is unable to complete his tasks.  He is afraid he cannot concentrate and will lose his job    He was not sure who did it and asked his daughter patient was evaluated at [facility] in 2020 for peripheral neuropathy when he reported he had cervical spine surgery in 2017, 2018 and 2019 between C5 and C7 with fusion.  Since 2012 he started having bilateral feet paresthesias that he described as pins and needle at times he would experience burning sharp shooting pain along with vasomotor changes who was initially tried on gabapentin and subsequently switched to Lyrica.  He had an EMG done that showed length-dependent sensorimotor axonal polyneuropathy with superimposed chronic right L5 and left L4 radiculopathy.  Subsequently autonomic testing was done that was abnormal suggesting autonomic involvement.     PROBLEM LIST   Patient Active Problem List   Diagnosis Code     Meniere's disease H81.09     Major depression in complete remission (H) F32.9     Chronic pain syndrome G89.4     Pain medication  agreement signed Z02.89     Occipital neuralgia M54.81     Benign essential hypertension I10     Cervical radiculopathy M54.12     Sleep apnea, unspecified type G47.30     Osteoarthritis of spine with radiculopathy, lumbar region M47.26     Impingement syndrome of left shoulder M75.42     Primary osteoarthritis of both knees M17.0     Chronic bilateral low back pain without sciatica M54.50, G89.29     Cervical myelopathy (H) G95.9     Chronic, continuous use of opioids F11.90     Idiopathic progressive polyneuropathy G60.3     Lumbar radiculopathy M54.16         PAST MEDICAL & SURGICAL HISTORY     Past Medical History:   Patient  has a past medical history of Anxiety, Back pain, Depressive disorder, Hyperlipidemia, Hypertension, Meniere's disease, unspecified, Pain medication agreement signed (08/20/2010), Sleep apnea, unspecified type, and Status post lumbar spinal fusion (01/14/2020).    Surgical History:  He  has a past surgical history that includes Colonoscopy w/wo Brush **Performed** (12/27/2005); pe tubes (2006); MASTOIDECTOMY,COMPLETE (09/27/2007); CREATE EARDRUM OPENING,GEN ANESTH (09/27/2007); Colonoscopy (N/A, 12/28/2016); bunionectomy rt/lt (09/05/08); Discectomy, fusion cervical anterior one level, combined (N/A, 7/5/2017); Herniorrhaphy umbilical (N/A, 8/11/2017); Inject epidural lumbar (N/A, 11/9/2017); Inject epidural lumbar (N/A, 12/28/2017); Inject epidural transforaminal (Bilateral, 8/23/2018); Inject epidural transforaminal (Bilateral, 11/8/2018); Discectomy, fusion cervical anterior one level, combined (N/A, 12/19/2018); Inject epidural transforaminal (Bilateral, 6/14/2019); Inject epidural cervical (N/A, 8/22/2019); Inject epidural transforaminal (Bilateral, 5/22/2020); Inject epidural transforaminal (Bilateral, 9/24/2020); Inject epidural lumbar (Bilateral, 5/13/2021); Inject block medial branch cervical/thoracic/lumbar (Bilateral, 8/12/2021); Inject block medial branch  cervical/thoracic/lumbar (N/A, 9/9/2021); Radio Frequency Ablation Pulsed Cervical (Bilateral, 10/1/2021); Colonoscopy (N/A, 9/27/2022); Inject joint sacroiliac (Bilateral, 10/13/2022); Inject epidural cervical (Left, 3/9/2023); and Inject epidural cervical (N/A, 8/18/2023).     SOCIAL HISTORY     Reviewed, and he  reports that he has never smoked. He has never used smokeless tobacco. He reports current alcohol use. He reports that he does not use drugs.     FAMILY HISTORY     Reviewed, and family history includes Cancer in his mother; Depression in his sister; Diabetes in his mother; Heart Disease in his father; Hypertension in his father; No Known Problems in his brother, brother, daughter, and daughter; Suicide in an other family member; Unknown/Adopted in his maternal grandfather, maternal grandmother, paternal grandfather, and paternal grandmother.     ALLERGIES     Allergies   Allergen Reactions     No Known Drug Allergy          REVIEW OF SYSTEMS     A 12 point review of system was performed and was negative except as outlined in the history of present illness.     HOME MEDICATIONS     Current Outpatient Rx   Medication Sig Dispense Refill     Acetaminophen (TYLENOL PO) Take 1,000 mg by mouth 3 times daily       amLODIPine (NORVASC) 5 MG tablet TAKE 1 TABLET BY MOUTH EVERY DAY 90 tablet 3     atorvastatin (LIPITOR) 10 MG tablet Take 10 mg by mouth daily       cyclobenzaprine (FLEXERIL) 5 MG tablet 1-2 tablets three times daily as needed 90 tablet 2     ibuprofen (ADVIL/MOTRIN) 800 MG tablet Take 800 mg by mouth 3 times daily       LORazepam (ATIVAN) 1 MG tablet Take 1 tablet 30 minutes before MRI scan 1 tablet 0     losartan-hydrochlorothiazide (HYZAAR) 50-12.5 MG tablet TAKE 2 TABLETS BY MOUTH DAILY 180 tablet 1     naloxone (NARCAN) 4 MG/0.1ML nasal spray Spray 1 spray (4 mg) into one nostril alternating nostrils once as needed for opioid reversal every 2-3 minutes until assistance arrives 0.2 mL 1      "nortriptyline (PAMELOR) 50 MG capsule Take 1 capsule (50 mg) by mouth At Bedtime 90 capsule 1     oxyCODONE (ROXICODONE) 5 MG tablet Take 1-2 tablets (5-10 mg) by mouth every 6 hours as needed for severe pain Must last 30 days 165 tablet 0     pregabalin (LYRICA) 150 MG capsule Take 1 capsule (150 mg) by mouth daily 60 capsule 1     sildenafil (VIAGRA) 100 MG tablet Take 0.5-1 tablets ( mg) by mouth daily as needed Take 30 min to 4 hours before intercourse.  Never use with nitroglycerin, terazosin or doxazosin. 6 tablet 7     order for DME Equipment being ordered: TENS Pads as directed 1 Device 0         PHYSICAL EXAM     Vital signs  /73 (BP Location: Right arm, Patient Position: Sitting)   Pulse 86   Ht 1.842 m (6' 0.5\")   Wt 96.2 kg (212 lb)   BMI 28.36 kg/m      Weight:   212 lbs 0 oz      9/15/2023    11:12 AM   MOCA   Visuospatial/Executive  5   Naming 3   Attention - Digits 1   Attention - Letters 1   Attention - Subtraction 3   Language - Repeat 2   Language - Fluency  1   Abstraction 2   Delayed Recall 4   Orientation 6   Education 0   MOCA Score 28   Administered by:  Markel Marcum LPN       Patient is alert and oriented x4 in no acute distress. Vital signs were reviewed and are documented in electronic medical record. Neck was supple, no carotid bruits, thyromegaly, JVD, or lymphadenopathy was noted.   NEUROLOGY EXAM:    Patient s speech was normal with no aphasia or dysarthria. Mentation, and affect were also normal.     Funduscopic exam was normal, with normal cup to disc ratio. Cranial nerves II -XII were intact.     Patient had normal mass, tone and motor strength was 5/5 in all extremities without pronator drift.     Sensation was intact to light touch, pinprick, and vibratory sensation.     Reflexes were 1+ symmetrical with downgoing toes.     No dysmetria noted on FNF or HKS. Romberg was negative.    Gait testing was normal. Able to walk on toes/heels. Tandem walk normal. "     PERTINENT DIAGNOSTIC STUDIES     Following studies were reviewed:     MRI CERVICAL SPINE 10/13/2022  1. Postoperative and diffuse degenerative change of the cervical spine  as detailed above.  2. No spinal canal stenosis.  3. Moderate neural foraminal stenosis on the right at C3-C4 and on the  left at C7-T1.    MRI LUMBAR SPINE 3/17/2022  1. Diffuse degenerative change of the lumbar spine as detailed above  without appreciable change from the recent comparison study.  2. No significant spinal canal or neural foraminal stenosis at any  level of the lumbar spine.    EMG 12/15/2020  This is an abnormal study.  There is electrodiagnostic evidence of a   length-dependent, sensorimotor axonal peripheral neuropathy and superimposed old or chronic right   L5 and left L4 radiculopathy without active denervation.    AUTONOMIC TESTING 12/17/2020  Abnormal study. There is evidence of length-dependent postganglionic   sympathetic sudomotor and mild cardiovagal impairment while cardiovascular   adrenergic function is normal. These findings can be seen in autonomic   involvement in the patient's known peripheral neuropathy. Residual   medication effects (nortriptyline) cannot be excluded.     PERTINENT LABS  Following labs were reviewed:  Office Visit on 08/14/2023   Component Date Value Ref Range Status     Beta 2 Glycoprotein 1 Antibody IgG 08/14/2023 5.4  <7.0 U/mL Final     Beta 2 Glycoprotein 1 Antibody IgM 08/14/2023 <2.4  <7.0 U/mL Final     Sodium 08/14/2023 139  136 - 145 mmol/L Final     Potassium 08/14/2023 3.3 (L)  3.4 - 5.3 mmol/L Final     Chloride 08/14/2023 100  98 - 107 mmol/L Final     Carbon Dioxide (CO2) 08/14/2023 25  22 - 29 mmol/L Final     Anion Gap 08/14/2023 14  7 - 15 mmol/L Final     Urea Nitrogen 08/14/2023 14.0  6.0 - 20.0 mg/dL Final     Creatinine 08/14/2023 1.01  0.67 - 1.17 mg/dL Final     Calcium 08/14/2023 9.5  8.6 - 10.0 mg/dL Final     Glucose 08/14/2023 140 (H)  70 - 99 mg/dL Final      GFR Estimate 08/14/2023 87  >60 mL/min/1.73m2 Final     WBC Count 08/14/2023 6.1  4.0 - 11.0 10e3/uL Final     RBC Count 08/14/2023 5.43  4.40 - 5.90 10e6/uL Final     Hemoglobin 08/14/2023 16.5  13.3 - 17.7 g/dL Final     Hematocrit 08/14/2023 47.2  40.0 - 53.0 % Final     MCV 08/14/2023 87  78 - 100 fL Final     MCH 08/14/2023 30.4  26.5 - 33.0 pg Final     MCHC 08/14/2023 35.0  31.5 - 36.5 g/dL Final     RDW 08/14/2023 13.0  10.0 - 15.0 % Final     Platelet Count 08/14/2023 297  150 - 450 10e3/uL Final        Total time spent for face to face visit, reviewing labs/imaging studies, counseling and coordination of care was: 1 Hour spent on the date of the encounter doing chart review, review of outside records, review of test results, interpretation of tests, patient visit, and documentation     This note was dictated using voice recognition software.  Any grammatical or context distortions are unintentional and inherent to the software.    Orders Placed This Encounter   Procedures     MR Brain w/o & w Contrast     Folate     Methylmalonic Acid     Treponema Abs w Reflex to RPR and Titer     TSH with free T4 reflex     Vitamin B1 whole blood     Vitamin B12     EEG Sleep Deprived      New Prescriptions    LORAZEPAM (ATIVAN) 1 MG TABLET    Take 1 tablet 30 minutes before MRI scan      Modified Medications    No medications on file                Again, thank you for allowing me to participate in the care of your patient.        Sincerely,        Edu Griggs MD

## 2023-09-16 LAB — T PALLIDUM AB SER QL: NONREACTIVE

## 2023-09-18 ENCOUNTER — OFFICE VISIT (OUTPATIENT)
Dept: ORTHOPEDICS | Facility: CLINIC | Age: 58
End: 2023-09-18
Payer: COMMERCIAL

## 2023-09-18 ENCOUNTER — ANCILLARY PROCEDURE (OUTPATIENT)
Dept: GENERAL RADIOLOGY | Facility: CLINIC | Age: 58
End: 2023-09-18
Attending: PREVENTIVE MEDICINE
Payer: COMMERCIAL

## 2023-09-18 DIAGNOSIS — M25.511 CHRONIC RIGHT SHOULDER PAIN: Primary | ICD-10-CM

## 2023-09-18 DIAGNOSIS — M12.811 ROTATOR CUFF ARTHROPATHY OF RIGHT SHOULDER: ICD-10-CM

## 2023-09-18 DIAGNOSIS — G89.29 CHRONIC RIGHT SHOULDER PAIN: Primary | ICD-10-CM

## 2023-09-18 DIAGNOSIS — M17.0 ARTHRITIS OF BOTH KNEES: ICD-10-CM

## 2023-09-18 DIAGNOSIS — M25.511 CHRONIC RIGHT SHOULDER PAIN: ICD-10-CM

## 2023-09-18 DIAGNOSIS — G89.29 CHRONIC RIGHT SHOULDER PAIN: ICD-10-CM

## 2023-09-18 PROCEDURE — 99214 OFFICE O/P EST MOD 30 MIN: CPT | Mod: 25 | Performed by: PREVENTIVE MEDICINE

## 2023-09-18 PROCEDURE — 20610 DRAIN/INJ JOINT/BURSA W/O US: CPT | Mod: 50 | Performed by: PREVENTIVE MEDICINE

## 2023-09-18 PROCEDURE — 73030 X-RAY EXAM OF SHOULDER: CPT | Mod: RT | Performed by: RADIOLOGY

## 2023-09-18 RX ORDER — CELECOXIB 100 MG/1
100 CAPSULE ORAL 2 TIMES DAILY
Qty: 60 CAPSULE | Refills: 0 | Status: SHIPPED | OUTPATIENT
Start: 2023-09-18 | End: 2023-10-19

## 2023-09-18 RX ADMIN — Medication 20 MG: at 10:08

## 2023-09-18 NOTE — PROGRESS NOTES
Ketan returns for bilateral knee injections with euflexxa  He also has developed more right shoulder pain  No injury  But having pain with reaching and lifting things  Wants to discuss what is wrong and make a plan      MEDICAL HISTORY  Patient Active Problem List   Diagnosis    Meniere's disease    Major depression in complete remission (H)    Chronic pain syndrome    Pain medication agreement signed    Occipital neuralgia    Benign essential hypertension    Cervical radiculopathy    Sleep apnea, unspecified type    Osteoarthritis of spine with radiculopathy, lumbar region    Impingement syndrome of left shoulder    Primary osteoarthritis of both knees    Chronic bilateral low back pain without sciatica    Cervical myelopathy (H)    Chronic, continuous use of opioids    Idiopathic progressive polyneuropathy    Lumbar radiculopathy       Current Outpatient Medications   Medication Sig Dispense Refill    Acetaminophen (TYLENOL PO) Take 1,000 mg by mouth 3 times daily      amLODIPine (NORVASC) 5 MG tablet TAKE 1 TABLET BY MOUTH EVERY DAY 90 tablet 3    atorvastatin (LIPITOR) 10 MG tablet Take 10 mg by mouth daily      cyclobenzaprine (FLEXERIL) 5 MG tablet 1-2 tablets three times daily as needed 90 tablet 2    ibuprofen (ADVIL/MOTRIN) 800 MG tablet Take 800 mg by mouth 3 times daily      LORazepam (ATIVAN) 1 MG tablet Take 1 tablet 30 minutes before MRI scan 1 tablet 0    losartan-hydrochlorothiazide (HYZAAR) 50-12.5 MG tablet TAKE 2 TABLETS BY MOUTH DAILY 180 tablet 1    naloxone (NARCAN) 4 MG/0.1ML nasal spray Spray 1 spray (4 mg) into one nostril alternating nostrils once as needed for opioid reversal every 2-3 minutes until assistance arrives 0.2 mL 1    nortriptyline (PAMELOR) 50 MG capsule Take 1 capsule (50 mg) by mouth At Bedtime 90 capsule 1    order for DME Equipment being ordered: TENS Pads as directed 1 Device 0    oxyCODONE (ROXICODONE) 5 MG tablet Take 1-2 tablets (5-10 mg) by mouth every 6 hours as  needed for severe pain Must last 30 days 165 tablet 0    pregabalin (LYRICA) 150 MG capsule Take 1 capsule (150 mg) by mouth daily 60 capsule 1    sildenafil (VIAGRA) 100 MG tablet Take 0.5-1 tablets ( mg) by mouth daily as needed Take 30 min to 4 hours before intercourse.  Never use with nitroglycerin, terazosin or doxazosin. 6 tablet 7       Allergies   Allergen Reactions    No Known Drug Allergy        Family History   Problem Relation Age of Onset    Diabetes Mother     Cancer Mother         colon cancer -  at 52, diag at 42    Hypertension Father     Heart Disease Father          of AAA    No Known Problems Brother     No Known Problems Brother     Depression Sister     Suicide Other     Unknown/Adopted Maternal Grandmother     Unknown/Adopted Maternal Grandfather     Unknown/Adopted Paternal Grandmother     Unknown/Adopted Paternal Grandfather     No Known Problems Daughter     No Known Problems Daughter      Social History     Socioeconomic History    Marital status: Single    Number of children: 2   Occupational History     Employer: [employer]   Tobacco Use    Smoking status: Never    Smokeless tobacco: Never   Vaping Use    Vaping Use: Never used   Substance and Sexual Activity    Alcohol use: Yes     Alcohol/week: 0.0 standard drinks of alcohol     Comment: rarely    Drug use: No    Sexual activity: Not Currently     Partners: Female   Other Topics Concern    Parent/sibling w/ CABG, MI or angioplasty before 65F 55M? No       Additional medical/Social/Surgical histories reviewed in [facility] and updated as appropriate.     REVIEW OF SYSTEMS (2023)  10 point ROS of systems including Constitutional, Eyes, Respiratory, Cardiovascular, Gastroenterology, Genitourinary, Integumentary, Musculoskeletal, Psychiatric, Allergic/Immunologic were all negative except for pertinent positives noted in my HPI.     PHYSICAL EXAM  VSS    General  - normal appearance, in no obvious distress  HEENT  -  conjunctivae not injected, moist mucous membranes, normocephalic/atraumatic head, ears normal appearance, no lesions, mouth normal appearance, no scars, normal dentition and teeth present  CV  - normal radial pulse  Pulm  - normal respiratory pattern, non-labored  Musculoskeletal - right shoulder  - inspection: normal bone and joint alignment, no obvious deformity, no scapular winging, no AC step-off  - palpation: mildly tender RC insertion, normal clavicle, non-tender AC  - ROM:  painful and limited flexion and ER at end range, limited IR  - strength: 5/5  strength, 5/5 in all shoulder planes  - special tests:  (-) Speed's  (+) Neer  (+) Hawkin's  (+) Tran's  (-) Chaffee's  (-) apprehension  (-) subscap lift-off  Neuro  - no sensory or motor deficit, grossly normal coordination, normal muscle tone  Skin  - no ecchymosis, erythema, warmth, or induration, no obvious rash  Psych  - interactive, appropriate, normal mood and affect      ASSESSMENT & PLAN  59 yo male here for bilateral knee arthritis here for euflexxa injections as planned, and right shoulder pain, due to rotator cuff arthropathy, subacromial bursitis    I independently reviewed the following imaging studies:  Right shoulder xray: shows some arthritis  After a 20 minute discussion and examination, we decided to perform a same day injection for  therapeutic purposes for  Bilateral knees with euflexxa  Rx given for celebrex  Patient has been doing home exercise physical therapy program for this problem      Appropriate PPE was utilized for prevention of spread of Covid-19.  Alvaro Moore MD, CAQSM    PROCEDURE:           bilateral  Knee joint injection with euflexxa    The patient was apprised of the risks and the benefits of the procedure and consented and a written consent was signed.   The patient s  KNEEs were sterilely prepped with chloraprep.     The patient was injected with euflexxa syringe into the right and left knee with a 1.5-inch 22-gauge  needle at the_superiorlateral aspect of their knee joint    There were no complications. The patient tolerated the procedure well. There was minimal bleeding.   The patient was instructed to ice the knees upon leaving clinic and refrain from overuse over the next 3 days.   The patient was instructed to go to the emergency room with any usual pain, swelling, or redness occurred in the injected area.   The patient was given a followup appointment to evaluate response to the injection to their increased range of motion and reduction of pain.  Follow up for euflexxa  Dr Alvaro Moore       Large Joint Injection/Arthocentesis: bilateral knee    Date/Time: 9/18/2023 10:08 AM    Performed by: Alvaro Moore MD  Authorized by: Alvaro Moore MD    Indications:  Osteoarthritis and pain  Needle Size:  22 G  Guidance: landmark guided    Approach:  Superolateral  Location:  Knee  Laterality:  Bilateral      Medications (Right):  20 mg sodium hyaluronate 20 MG/2ML  Medications (Left):  20 mg sodium hyaluronate 20 MG/2ML  Outcome:  Tolerated well, no immediate complications  Procedure discussed: discussed risks, benefits, and alternatives    Consent Given by:  Patient  Timeout: timeout called immediately prior to procedure    Prep: patient was prepped and draped in usual sterile fashion

## 2023-09-19 LAB — METHYLMALONATE SERPL-SCNC: 0.21 UMOL/L (ref 0–0.4)

## 2023-09-20 LAB — VIT B1 PYROPHOSHATE BLD-SCNC: 128 NMOL/L

## 2023-09-21 ENCOUNTER — HOSPITAL ENCOUNTER (OUTPATIENT)
Dept: MRI IMAGING | Facility: CLINIC | Age: 58
Discharge: HOME | End: 2023-09-21
Attending: PSYCHIATRY & NEUROLOGY | Admitting: PSYCHIATRY & NEUROLOGY
Payer: COMMERCIAL

## 2023-09-21 ENCOUNTER — OFFICE VISIT (OUTPATIENT)
Dept: FAMILY MEDICINE | Facility: CLINIC | Age: 58
End: 2023-09-21
Attending: FAMILY MEDICINE
Payer: COMMERCIAL

## 2023-09-21 VITALS
WEIGHT: 209.31 LBS | DIASTOLIC BLOOD PRESSURE: 78 MMHG | TEMPERATURE: 98.2 F | BODY MASS INDEX: 27.74 KG/M2 | SYSTOLIC BLOOD PRESSURE: 126 MMHG | RESPIRATION RATE: 18 BRPM | HEART RATE: 92 BPM | HEIGHT: 73 IN | OXYGEN SATURATION: 97 %

## 2023-09-21 DIAGNOSIS — F11.90 CHRONIC, CONTINUOUS USE OF OPIOIDS: ICD-10-CM

## 2023-09-21 DIAGNOSIS — R41.89 COGNITIVE IMPAIRMENT: ICD-10-CM

## 2023-09-21 DIAGNOSIS — M79.18 MYOFASCIAL PAIN: ICD-10-CM

## 2023-09-21 DIAGNOSIS — M54.2 CERVICALGIA: ICD-10-CM

## 2023-09-21 DIAGNOSIS — R56.9 SEIZURE-LIKE ACTIVITY (H): ICD-10-CM

## 2023-09-21 DIAGNOSIS — G89.4 CHRONIC PAIN SYNDROME: ICD-10-CM

## 2023-09-21 DIAGNOSIS — Z98.1 S/P CERVICAL SPINAL FUSION: ICD-10-CM

## 2023-09-21 DIAGNOSIS — M54.16 LUMBAR RADICULOPATHY: ICD-10-CM

## 2023-09-21 PROCEDURE — 99213 OFFICE O/P EST LOW 20 MIN: CPT | Performed by: FAMILY MEDICINE

## 2023-09-21 PROCEDURE — A9585 GADOBUTROL INJECTION: HCPCS | Mod: JZ | Performed by: PSYCHIATRY & NEUROLOGY

## 2023-09-21 PROCEDURE — 255N000002 HC RX 255 OP 636: Mod: JZ | Performed by: PSYCHIATRY & NEUROLOGY

## 2023-09-21 PROCEDURE — 70553 MRI BRAIN STEM W/O & W/DYE: CPT

## 2023-09-21 RX ORDER — OXYCODONE HYDROCHLORIDE 5 MG/1
5-10 TABLET ORAL EVERY 6 HOURS PRN
Qty: 165 TABLET | Refills: 0 | Status: SHIPPED | OUTPATIENT
Start: 2023-09-24 | End: 2023-10-20

## 2023-09-21 RX ORDER — GADOBUTROL 604.72 MG/ML
10 INJECTION INTRAVENOUS ONCE
Status: COMPLETED | OUTPATIENT
Start: 2023-09-21 | End: 2023-09-21

## 2023-09-21 RX ADMIN — GADOBUTROL 9.5 ML: 604.72 INJECTION INTRAVENOUS at 18:12

## 2023-09-21 ASSESSMENT — ANXIETY QUESTIONNAIRES
IF YOU CHECKED OFF ANY PROBLEMS ON THIS QUESTIONNAIRE, HOW DIFFICULT HAVE THESE PROBLEMS MADE IT FOR YOU TO DO YOUR WORK, TAKE CARE OF THINGS AT HOME, OR GET ALONG WITH OTHER PEOPLE: EXTREMELY DIFFICULT
GAD7 TOTAL SCORE: 16
3. WORRYING TOO MUCH ABOUT DIFFERENT THINGS: NEARLY EVERY DAY
1. FEELING NERVOUS, ANXIOUS, OR ON EDGE: NEARLY EVERY DAY
GAD7 TOTAL SCORE: 16
2. NOT BEING ABLE TO STOP OR CONTROL WORRYING: NEARLY EVERY DAY
4. TROUBLE RELAXING: NEARLY EVERY DAY
7. FEELING AFRAID AS IF SOMETHING AWFUL MIGHT HAPPEN: NEARLY EVERY DAY
5. BEING SO RESTLESS THAT IT IS HARD TO SIT STILL: NOT AT ALL
6. BECOMING EASILY ANNOYED OR IRRITABLE: SEVERAL DAYS

## 2023-09-21 ASSESSMENT — PATIENT HEALTH QUESTIONNAIRE - PHQ9
SUM OF ALL RESPONSES TO PHQ QUESTIONS 1-9: 11
10. IF YOU CHECKED OFF ANY PROBLEMS, HOW DIFFICULT HAVE THESE PROBLEMS MADE IT FOR YOU TO DO YOUR WORK, TAKE CARE OF THINGS AT HOME, OR GET ALONG WITH OTHER PEOPLE: EXTREMELY DIFFICULT
SUM OF ALL RESPONSES TO PHQ QUESTIONS 1-9: 11

## 2023-09-21 ASSESSMENT — PAIN SCALES - GENERAL: PAINLEVEL: MODERATE PAIN (5)

## 2023-09-21 NOTE — PROGRESS NOTES
Assessment & Plan     Chronic pain syndrome  Ketan is a 58-year-old male with history of chronic pain due to cervical radiculopathy, lumbar radiculopathy, bilateral knee pain and idiopathic neuropathy who presents for follow-up of chronic pain.  This is his 3-month follow-up.  Controlled substance agreement was signed at last visit.  Urine drug screen was obtained at that time as well and was unremarkable.  He is currently working with neurology for ongoing cognitive concerns.  He is tapered off of the Lyrica as we assume that that was can to causing some of his cognitive problems however his memory issues continue.  He is also working with spine care and has a follow-up appointment with pain and palliative care for review of his pain management.  We reviewed [place] prescription monitoring for prescriptions and are appropriate.  We will continue to use the oxycodone as previously prescribed.  He will follow-up in 3 months.  - PRIMARY CARE FOLLOW-UP SCHEDULING  - oxyCODONE (ROXICODONE) 5 MG tablet; Take 1-2 tablets (5-10 mg) by mouth every 6 hours as needed for severe pain Must last 30 days    Chronic, continuous use of opioids  Same as above  - PRIMARY CARE FOLLOW-UP SCHEDULING  - oxyCODONE (ROXICODONE) 5 MG tablet; Take 1-2 tablets (5-10 mg) by mouth every 6 hours as needed for severe pain Must last 30 days    Cervicalgia  Same as above  - oxyCODONE (ROXICODONE) 5 MG tablet; Take 1-2 tablets (5-10 mg) by mouth every 6 hours as needed for severe pain Must last 30 days    S/P cervical spinal fusion  Same as above  - oxyCODONE (ROXICODONE) 5 MG tablet; Take 1-2 tablets (5-10 mg) by mouth every 6 hours as needed for severe pain Must last 30 days    Myofascial pain  Same as above  - oxyCODONE (ROXICODONE) 5 MG tablet; Take 1-2 tablets (5-10 mg) by mouth every 6 hours as needed for severe pain Must last 30 days    Lumbar radiculopathy  Same as above  - oxyCODONE (ROXICODONE) 5 MG tablet; Take 1-2 tablets (5-10 mg) by  mouth every 6 hours as needed for severe pain Must last 30 days    Prescription drug management             Tha Grullon MD  [facility] [place] Aceves is a 58 year old, presenting for the following health issues:  Recheck Medication      9/21/2023     3:13 PM   Additional Questions   Roomed by Melyssa HUTCHISON       History of Present Illness       Back Pain:  He presents for follow up of back pain. Patient's back pain is a chronic problem.  Location of back pain:  Right lower back, left lower back, right side of neck, left side of neck, left shoulder, right hip and left hip  Description of back pain: burning, sharp, shooting and stabbing  Back pain spreads: right thigh, left thigh, right knee, left knee, right shoulder, left shoulder, right side of neck and left side of neck    Since patient first noticed back pain, pain is: unchanged  Does back pain interfere with his job:  Yes       He eats 0-1 servings of fruits and vegetables daily.He consumes 2 sweetened beverage(s) daily.He exercises with enough effort to increase his heart rate 9 or less minutes per day.  He exercises with enough effort to increase his heart rate 3 or less days per week.   He is taking medications regularly.         Patient Active Problem List   Diagnosis    Meniere's disease    Major depression in complete remission (H)    Chronic pain syndrome    Pain medication agreement signed    Occipital neuralgia    Benign essential hypertension    Cervical radiculopathy    Sleep apnea, unspecified type    Osteoarthritis of spine with radiculopathy, lumbar region    Impingement syndrome of left shoulder    Primary osteoarthritis of both knees    Chronic bilateral low back pain without sciatica    Cervical myelopathy (H)    Chronic, continuous use of opioids    Idiopathic progressive polyneuropathy    Lumbar radiculopathy       Current Outpatient Medications   Medication Sig Dispense Refill    Acetaminophen (TYLENOL PO) Take  1,000 mg by mouth 3 times daily      amLODIPine (NORVASC) 5 MG tablet TAKE 1 TABLET BY MOUTH EVERY DAY 90 tablet 3    atorvastatin (LIPITOR) 10 MG tablet Take 10 mg by mouth daily      celecoxib (CELEBREX) 100 MG capsule Take 1 capsule (100 mg) by mouth 2 times daily 60 capsule 0    cyclobenzaprine (FLEXERIL) 5 MG tablet 1-2 tablets three times daily as needed 90 tablet 2    LORazepam (ATIVAN) 1 MG tablet Take 1 tablet 30 minutes before MRI scan 1 tablet 0    losartan-hydrochlorothiazide (HYZAAR) 50-12.5 MG tablet TAKE 2 TABLETS BY MOUTH DAILY 180 tablet 1    naloxone (NARCAN) 4 MG/0.1ML nasal spray Spray 1 spray (4 mg) into one nostril alternating nostrils once as needed for opioid reversal every 2-3 minutes until assistance arrives 0.2 mL 1    nortriptyline (PAMELOR) 50 MG capsule Take 1 capsule (50 mg) by mouth At Bedtime 90 capsule 1    order for DME Equipment being ordered: TENS Pads as directed 1 Device 0    oxyCODONE (ROXICODONE) 5 MG tablet Take 1-2 tablets (5-10 mg) by mouth every 6 hours as needed for severe pain Must last 30 days 165 tablet 0    pregabalin (LYRICA) 150 MG capsule Take 1 capsule (150 mg) by mouth daily 60 capsule 1    sildenafil (VIAGRA) 100 MG tablet Take 0.5-1 tablets ( mg) by mouth daily as needed Take 30 min to 4 hours before intercourse.  Never use with nitroglycerin, terazosin or doxazosin. 6 tablet 7    ibuprofen (ADVIL/MOTRIN) 800 MG tablet Take 800 mg by mouth 3 times daily (Patient not taking: Reported on 9/21/2023)         Pain History:  When did you first notice your pain? Many years   Have you seen this provider for your pain in the past? Yes   Where in your body do you have pain? Neck, shoulders, low back, knees and neuropathy  Are you seeing anyone else for your pain? Yes - Pain, Neurology, Ortho, psychiatry        6/15/2023     2:15 PM 8/14/2023     3:35 PM 9/21/2023     3:04 PM   PHQ-9 SCORE   PHQ-9 Total Score MyChart 12 (Moderate depression) 13 (Moderate  depression) 11 (Moderate depression)   PHQ-9 Total Score 12 13 11           9/21/2022     3:09 PM 8/14/2023     3:38 PM 9/21/2023     3:05 PM   ALICE-7 SCORE   Total Score 5 (mild anxiety) 10 (moderate anxiety) 16 (severe anxiety)   Total Score 5 10 16           1/12/2022     9:02 AM 6/1/2022     3:40 PM 9/21/2023     3:15 PM   PEG Score   PEG Total Score 6.67 7 7.33           Chronic Pain Follow Up:    Location of pain: Neck, shoulders, low back, knees and neuropathy  Analgesia/pain control:    - Recent changes:  Worse with taper of Lyrica    - Overall control: Inadequate pain control    - Current treatments: Lyrica, celebrex, Oxycodone, Elavil   Adherence:     - Do you ever take more pain medicine than prescribed? No    - When did you take your last dose of pain medicine?  today   Adverse effects: No   PDMP Review         Value Time User    State PDMP site checked  Yes 9/21/2023  3:24 PM Tha Grullon MD          Last CSA Agreement:   CSA -- Patient Level:     [Media Unavailable] Controlled Substance Agreement - Opioid - Scan on 8/8/2023  4:09 PM: OPIOID AGREEMEMT   [Media Unavailable] Controlled Substance Agreement - Opioid - Scan on 6/15/2023  3:28 PM   [Media Unavailable] Controlled Substance Agreement - Opioid - Scan on 6/1/2022  4:25 PM   [Media Unavailable] Controlled Substance Agreement - Opioid - Scan on 6/18/2021  9:09 AM   [Media Unavailable] Controlled Substance Agreement - Opioid - Scan on 10/29/2020  2:12 PM   [Media Unavailable] Controlled Substance Agreement - Opioid - Scan on 4/15/2019  3:04 PM       Last UDS: 6/19/2023              Review of Systems   CONSTITUTIONAL: NEGATIVE for fever, chills, change in weight  ENT/MOUTH: NEGATIVE for ear, mouth and throat problems  RESP: NEGATIVE for significant cough or SOB  CV: NEGATIVE for chest pain, palpitations or peripheral edema  MUSCULOSKELETAL: POSITIVE  for back pain lumbar, joint pain bilateral knees, neck pain, and paresthesias  NEURO: POSITIVE for  "memory problems and paresthesias  ROS otherwise negative      Objective    /78   Pulse 92   Temp 98.2  F (36.8  C) (Temporal)   Resp 18   Ht 1.844 m (6' 0.6\")   Wt 94.9 kg (209 lb 5 oz)   SpO2 97%   BMI 27.92 kg/m    Body mass index is 27.92 kg/m .  Physical Exam   GENERAL: healthy, alert and no distress  NECK: no adenopathy, no asymmetry, masses, or scars and thyroid normal to palpation  RESP: lungs clear to auscultation - no rales, rhonchi or wheezes  CV: regular rate and rhythm, normal S1 S2, no S3 or S4, no murmur, click or rub, no peripheral edema and peripheral pulses strong  ABDOMEN: soft, nontender, no hepatosplenomegaly, no masses and bowel sounds normal  MS: no gross musculoskeletal defects noted, no edema    Lab on 09/15/2023   Component Date Value Ref Range Status    Folic Acid 09/15/2023 6.0  4.6 - 34.8 ng/mL Final    Methylmalonic Acid 09/15/2023 0.21  0.00 - 0.40 umol/L Final    INTERPRETIVE INFORMATION:    Slight elevation 0.41-0.99 umol/L is consistent with mild vitamin B12 deficiency, renal insufficiency, or intravascular volume contraction.    Moderate elevation 1.00-9.99 umol/L is consistent with mild vitamin B12 deficiency.    Massive elevation 10.00 umol/L or greater is consistent with significant vitamin B12 deficiency or with inborn errors of metabolism.    Treponema Antibody Total 09/15/2023 Nonreactive  Nonreactive Final    TSH 09/15/2023 0.42  0.30 - 4.20 uIU/mL Final    Vitamin B1 Whole Blood Level 09/15/2023 128  70 - 180 nmol/L Final    INTERPRETIVE INFORMATION: Vitamin B1, Whole Blood    This assay measures the concentration of thiamine   diphosphate (TDP), the primary active form of vitamin B1.   Approximately 90 percent of vitamin B1 present in whole   blood is TDP. Thiamine and thiamine monophosphate, which   comprise the remaining 10 percent, are not measured.    This test was developed and its performance characteristics   determined by [facility]. It has not " been cleared or   approved by the US Food and Drug Administration. This test   was performed in a CLIA certified laboratory and is   intended for clinical purposes.  Performed By: [facility]  [address] [place] [id number]  : Fabian Castillo MD, PhD  CLIA Number: [id number]    Vitamin B12 09/15/2023 465  232 - 1,245 pg/mL Final

## 2023-09-25 ENCOUNTER — MYC MEDICAL ADVICE (OUTPATIENT)
Dept: FAMILY MEDICINE | Facility: CLINIC | Age: 58
End: 2023-09-25
Payer: COMMERCIAL

## 2023-09-25 DIAGNOSIS — G62.9 NEUROPATHY: ICD-10-CM

## 2023-09-26 RX ORDER — PREGABALIN 150 MG/1
150 CAPSULE ORAL DAILY
Qty: 60 CAPSULE | Refills: 1 | Status: SHIPPED | OUTPATIENT
Start: 2023-09-26 | End: 2023-12-06

## 2023-09-28 ENCOUNTER — OFFICE VISIT (OUTPATIENT)
Dept: ORTHOPEDICS | Facility: CLINIC | Age: 58
End: 2023-09-28
Payer: COMMERCIAL

## 2023-09-28 DIAGNOSIS — M17.0 ARTHRITIS OF BOTH KNEES: Primary | ICD-10-CM

## 2023-09-28 PROCEDURE — 20610 DRAIN/INJ JOINT/BURSA W/O US: CPT | Mod: 50 | Performed by: PREVENTIVE MEDICINE

## 2023-09-28 PROCEDURE — 99207 PR DROP WITH A PROCEDURE: CPT | Performed by: PREVENTIVE MEDICINE

## 2023-09-28 RX ORDER — HYALURONATE SODIUM 10 MG/ML
20 SYRINGE (ML) INTRAARTICULAR
Status: SHIPPED | OUTPATIENT
Start: 2023-09-28

## 2023-09-28 RX ADMIN — Medication 20 MG: at 08:04

## 2023-09-28 NOTE — LETTER
9/28/2023         RE: Indra Mehta  [address] [address] [place] [id number]        Dear Colleague,    Thank you for referring your patient, Indra Mehta, to the [facility] [place]. Please see a copy of my visit note below.    Ketan returns for bilateral knee euflexxa injections #2  Diagnosis: bilateral knee arthritis  PROCEDURE:           bilateral  Knee joint injection with euflexxa  #2    The patient was apprised of the risks and the benefits of the procedure and consented and a written consent was signed.   The patient s  KNEEs were sterilely prepped with chloraprep.     The patient was injected with euflexxa syringe into the right and left knee with a 1.5-inch 22-gauge needle at the_superiorlateral aspect of their knee joint    There were no complications. The patient tolerated the procedure well. There was minimal bleeding.   The patient was instructed to ice the knees upon leaving clinic and refrain from overuse over the next 3 days.   The patient was instructed to go to the emergency room with any usual pain, swelling, or redness occurred in the injected area.   The patient was given a followup appointment to evaluate response to the injection to their increased range of motion and reduction of pain.  Follow up for euflexxa  Dr Alvaro Moore       Large Joint Injection/Arthocentesis: bilateral knee    Date/Time: 9/28/2023 8:04 AM    Performed by: Alvaro Moore MD  Authorized by: Alvaro Moore MD    Indications:  Osteoarthritis and pain  Needle Size:  22 G  Guidance: landmark guided    Approach:  Superolateral  Location:  Knee  Laterality:  Bilateral      Medications (Right):  20 mg sodium hyaluronate 20 MG/2ML  Medications (Left):  20 mg sodium hyaluronate 20 MG/2ML  Outcome:  Tolerated well, no immediate complications  Procedure discussed: discussed risks, benefits, and alternatives    Consent Given by:  Patient  Timeout: timeout called immediately  prior to procedure    Prep: patient was prepped and draped in usual sterile fashion          Again, thank you for allowing me to participate in the care of your patient.        Sincerely,        Alvaro Moore MD

## 2023-09-28 NOTE — PROGRESS NOTES
Ketan returns for bilateral knee euflexxa injections #2  Diagnosis: bilateral knee arthritis  PROCEDURE:           bilateral  Knee joint injection with euflexxa  #2    The patient was apprised of the risks and the benefits of the procedure and consented and a written consent was signed.   The patient s  KNEEs were sterilely prepped with chloraprep.     The patient was injected with euflexxa syringe into the right and left knee with a 1.5-inch 22-gauge needle at the_superiorlateral aspect of their knee joint    There were no complications. The patient tolerated the procedure well. There was minimal bleeding.   The patient was instructed to ice the knees upon leaving clinic and refrain from overuse over the next 3 days.   The patient was instructed to go to the emergency room with any usual pain, swelling, or redness occurred in the injected area.   The patient was given a followup appointment to evaluate response to the injection to their increased range of motion and reduction of pain.  Follow up for euflexxa  Dr Alvaro Moore       Large Joint Injection/Arthocentesis: bilateral knee    Date/Time: 9/28/2023 8:04 AM    Performed by: Alvaro Moore MD  Authorized by: Alvaro Moore MD    Indications:  Osteoarthritis and pain  Needle Size:  22 G  Guidance: landmark guided    Approach:  Superolateral  Location:  Knee  Laterality:  Bilateral      Medications (Right):  20 mg sodium hyaluronate 20 MG/2ML  Medications (Left):  20 mg sodium hyaluronate 20 MG/2ML  Outcome:  Tolerated well, no immediate complications  Procedure discussed: discussed risks, benefits, and alternatives    Consent Given by:  Patient  Timeout: timeout called immediately prior to procedure    Prep: patient was prepped and draped in usual sterile fashion

## 2023-10-02 RX ORDER — HYALURONATE SODIUM 10 MG/ML
20 SYRINGE (ML) INTRAARTICULAR
Status: SHIPPED | OUTPATIENT
Start: 2023-09-18

## 2023-10-04 ASSESSMENT — PAIN SCALES - PAIN ENJOYMENT GENERAL ACTIVITY SCALE (PEG)
INTERFERED_ENJOYMENT_LIFE: 8
PEG_TOTALSCORE: 7
INTERFERED_GENERAL_ACTIVITY: 8
AVG_PAIN_PASTWEEK: 5

## 2023-10-04 ASSESSMENT — ANXIETY QUESTIONNAIRES
GAD7 TOTAL SCORE: 16
IF YOU CHECKED OFF ANY PROBLEMS ON THIS QUESTIONNAIRE, HOW DIFFICULT HAVE THESE PROBLEMS MADE IT FOR YOU TO DO YOUR WORK, TAKE CARE OF THINGS AT HOME, OR GET ALONG WITH OTHER PEOPLE: EXTREMELY DIFFICULT
4. TROUBLE RELAXING: NEARLY EVERY DAY
3. WORRYING TOO MUCH ABOUT DIFFERENT THINGS: NEARLY EVERY DAY
7. FEELING AFRAID AS IF SOMETHING AWFUL MIGHT HAPPEN: NEARLY EVERY DAY
1. FEELING NERVOUS, ANXIOUS, OR ON EDGE: NEARLY EVERY DAY
GAD7 TOTAL SCORE: 16
5. BEING SO RESTLESS THAT IT IS HARD TO SIT STILL: NOT AT ALL
6. BECOMING EASILY ANNOYED OR IRRITABLE: SEVERAL DAYS
2. NOT BEING ABLE TO STOP OR CONTROL WORRYING: NEARLY EVERY DAY

## 2023-10-05 ENCOUNTER — PRE VISIT (OUTPATIENT)
Dept: PALLIATIVE MEDICINE | Facility: CLINIC | Age: 58
End: 2023-10-05

## 2023-10-05 ENCOUNTER — OFFICE VISIT (OUTPATIENT)
Dept: PALLIATIVE MEDICINE | Facility: CLINIC | Age: 58
End: 2023-10-05
Payer: COMMERCIAL

## 2023-10-05 VITALS
BODY MASS INDEX: 29.35 KG/M2 | SYSTOLIC BLOOD PRESSURE: 130 MMHG | WEIGHT: 220 LBS | HEART RATE: 85 BPM | DIASTOLIC BLOOD PRESSURE: 87 MMHG

## 2023-10-05 DIAGNOSIS — M54.16 LUMBAR RADICULOPATHY: ICD-10-CM

## 2023-10-05 DIAGNOSIS — G95.9 CERVICAL MYELOPATHY (H): ICD-10-CM

## 2023-10-05 DIAGNOSIS — M46.1 SACROILIITIS, NOT ELSEWHERE CLASSIFIED (H): ICD-10-CM

## 2023-10-05 DIAGNOSIS — M54.2 CERVICALGIA: ICD-10-CM

## 2023-10-05 DIAGNOSIS — M79.2 NEUROPATHIC PAIN: Primary | ICD-10-CM

## 2023-10-05 DIAGNOSIS — Z98.1 S/P CERVICAL SPINAL FUSION: ICD-10-CM

## 2023-10-05 DIAGNOSIS — G89.4 CHRONIC PAIN SYNDROME: ICD-10-CM

## 2023-10-05 PROCEDURE — 99203 OFFICE O/P NEW LOW 30 MIN: CPT

## 2023-10-05 ASSESSMENT — PAIN SCALES - GENERAL: PAINLEVEL: SEVERE PAIN (6)

## 2023-10-05 NOTE — PROGRESS NOTES
Indra Mehta is a 58 year old male with a past medical history significant for hypertension and hypercholesterolemia who presents with a chief complaint of neuropathy, neck pain, and back pain.  He receives injections from Dr Mcdaniel (in [place]) in his neck and back.  The etiology of the neuropathy is not clear.  He has had an EMG at an outside facility.  Results are documented below.  He takes pregabalin, nortriptyline, and oxycodone (5 tabs per day) .  The pain is likened to pins and needles, or the leg being asleep.  The patient states that the pain interferes with his daily activities.  The pain causes him to stumble.  The medications interfere with his concentration.    Past Pain Treatments:  Injections:    - 11/8/18 bilateral L3-4 TFESI - (Dr Mcdaniel)  - 8/23/18 bilateral L3-4 TFESI - seems to have worsened pain (Dr Mcdaniel)  - 6/11/18 TFESI right C5-6 - helped with arm pain and numbness  - 12/28/17 bilateral L3-4 TFESI   - 10/23/17 Bilateral L4-5 TFESI   - 11/08/2018 Bilateral L3-4 TFESI   -6/14/2019 Bilateral L3-4 TFESI   -8/22/2019 C6-7 SRIDEVI   -5/22/2020 L3-4 bilateral TLESI  -5/13/21 L3-4 bilateral TLESI  -8/12/21 bilateral L3-5 LMBB  - bilateral L3,4,5 radiofrequency ablation - disappointed with results. Helped with sitting.  Surgery:    - ACDF C6-7 on 7/5/17 with improvement;   - ACDF C5-6 12/19/2018, posterior C5-7 fusion. Lateral mass screws, right C5-6 medial facetectomy,   - right C6 foraminotomy on 10/29/2019    The pain has been present for 5-10 years .    The pain is Severe Pain (6) in severity.    The pain is described as pins and needles.   The pain is alleviated by rest.    It is exacerbated by being in the same position for a long time.    Modalities that have been utilized in the past which were helpful include pregabalin, nortriptyline, oxycodone.    Things that were not helpful, but tried ,include duloxetine.    The patient has never tried spinal cord stimulation.      Current  Outpatient Medications   Medication     Acetaminophen (TYLENOL PO)     amLODIPine (NORVASC) 5 MG tablet     atorvastatin (LIPITOR) 10 MG tablet     celecoxib (CELEBREX) 100 MG capsule     cyclobenzaprine (FLEXERIL) 5 MG tablet     losartan-hydrochlorothiazide (HYZAAR) 50-12.5 MG tablet     naloxone (NARCAN) 4 MG/0.1ML nasal spray     nortriptyline (PAMELOR) 50 MG capsule     oxyCODONE (ROXICODONE) 5 MG tablet     pregabalin (LYRICA) 150 MG capsule     sildenafil (VIAGRA) 100 MG tablet     ibuprofen (ADVIL/MOTRIN) 800 MG tablet     LORazepam (ATIVAN) 1 MG tablet     order for DME     Current Facility-Administered Medications   Medication     lidocaine (PF) (XYLOCAINE) 1 % injection 5 mL     lidocaine (PF) (XYLOCAINE) 1 % injection 5 mL     lidocaine 1 % injection 5 mL     lidocaine 1 % injection 5 mL     lidocaine 1 % injection 5 mL     lidocaine 1 % injection 5 mL     lidocaine 1 % injection 5 mL     lidocaine 1 % injection 5 mL     lidocaine 1 % injection 5 mL     lidocaine 1 % injection 5 mL     lidocaine 1 % injection 5 mL     methylPREDNISolone (DEPO-MEDROL) injection 40 mg     methylPREDNISolone (DEPO-MEDROL) injection 40 mg     methylPREDNISolone (DEPO-MEDROL) injection 40 mg     methylPREDNISolone (DEPO-MEDROL) injection 40 mg     methylPREDNISolone (DEPO-MEDROL) injection 80 mg     methylPREDNISolone (DEPO-MEDROL) injection 80 mg     methylPREDNISolone (DEPO-MEDROL) injection 80 mg     methylPREDNISolone (DEPO-MEDROL) injection 80 mg     methylPREDNISolone (DEPO-MEDROL) injection 80 mg     methylPREDNISolone (DEPO-MEDROL) injection 80 mg     methylPREDNISolone (DEPO-MEDROL) injection 80 mg     methylPREDNISolone (DEPO-MEDROL) injection 80 mg     methylPREDNISolone (DEPO-MEDROL) injection 80 mg     methylPREDNISolone (DEPO-MEDROL) injection 80 mg     sodium hyaluronate (EUFLEXXA) injection 20 mg     sodium hyaluronate (EUFLEXXA) injection 20 mg     sodium hyaluronate (EUFLEXXA) injection 20 mg     sodium  hyaluronate (EUFLEXXA) injection 20 mg     triamcinolone (KENALOG-40) injection 40 mg     triamcinolone (KENALOG-40) injection 40 mg     triamcinolone (KENALOG-40) injection 40 mg     triamcinolone (KENALOG-40) injection 40 mg     triamcinolone (KENALOG-40) injection 40 mg     triamcinolone (KENALOG-40) injection 40 mg     triamcinolone (KENALOG-40) injection 40 mg     Allergies   Allergen Reactions     No Known Drug Allergy       Past Medical History:   Diagnosis Date     Anxiety      Back pain      Depressive disorder      Hyperlipidemia      Hypertension      Meniere's disease, unspecified      Pain medication agreement signed 08/20/2010     Sleep apnea, unspecified type      Status post lumbar spinal fusion 01/14/2020     Past Surgical History:   Procedure Laterality Date     BUNIONECTOMY RT/LT  09/05/08    Both feet     COLONOSCOPY N/A 12/28/2016    Procedure: COMBINED COLONOSCOPY, SINGLE OR MULTIPLE BIOPSY/POLYPECTOMY BY BIOPSY;  Surgeon: Indra Jernigan MD;  [facility]     COLONOSCOPY N/A 9/27/2022    Procedure: COLONOSCOPY, FLEXIBLE, WITH LESION REMOVAL USING SNARE;  Surgeon: Pablo Ferris MD;  [facility]     DISCECTOMY, FUSION CERVICAL ANTERIOR ONE LEVEL, COMBINED N/A 7/5/2017    Procedure: COMBINED DISCECTOMY, FUSION CERVICAL ANTERIOR ONE LEVEL;  Cervical 6-7, Anterior cervical discectomy fusion;  Surgeon: Mike Mckenna MD;  [facility]     DISCECTOMY, FUSION CERVICAL ANTERIOR ONE LEVEL, COMBINED N/A 12/19/2018    Procedure: cervical 5-6 anterior cervical discectomy fusion;  Surgeon: Mike Mckenna MD;  [facility]     HERNIORRHAPHY UMBILICAL N/A 8/11/2017    Procedure: HERNIORRHAPHY UMBILICAL;  open umbilical hernia repair;  Surgeon: Boni Story MD;  [facility]     INJECT BLOCK MEDIAL BRANCH CERVICAL/THORACIC/LUMBAR Bilateral 8/12/2021    Procedure: Left L3, Left L4, Left L5, Right L3, Right L4 and Right L5 medial branch nerve blocks using fluoroscopic  guidance and contrast control;  Surgeon: Darryn Mcdaniel MD;  [facility]     INJECT BLOCK MEDIAL BRANCH CERVICAL/THORACIC/LUMBAR N/A 9/9/2021    Procedure: Left L3, Left L4, Left L5, Right L3, Right L4 and Right L5 medial branch nerve blocks using fluoroscopic guidance and contrast control;  Surgeon: Darryn Mcdaniel MD;  [facility]     INJECT EPIDURAL CERVICAL N/A 8/22/2019    Procedure: INJECTION, SPINE, CERVICAL 6-7 EPIDURAL;  Surgeon: Darryn Mcdaniel MD;  [facility]     INJECT EPIDURAL CERVICAL Left 3/9/2023    Procedure: Cervical 7-Thoracic 1 Interlaminar epidural steroid injection using fluoroscopic guidance with contrast dye, Left;  Surgeon: Darryn Mcdaniel MD;  [facility]     INJECT EPIDURAL CERVICAL N/A 8/18/2023    Procedure: Cervical 7-Thoracic 1 Interlaminar epidural steroid injection using fluoroscopic guidance with contrast dye.;  Surgeon: Darryn Mcdaniel MD;  [facility]     INJECT EPIDURAL LUMBAR N/A 11/9/2017    Procedure: INJECT EPIDURAL LUMBAR;  lumbar 4-5 epidural steroid injection ;  Surgeon: Darryn Mcdaniel MD;  [facility]     INJECT EPIDURAL LUMBAR N/A 12/28/2017    Procedure: INJECT EPIDURAL LUMBAR;  lumbar epidural injection;  Surgeon: Darryn Mcdaniel MD;  [facility]     INJECT EPIDURAL LUMBAR Bilateral 5/13/2021    Procedure: Bilateral Lumbar 3-4 Epidural Steroid Injection;  Surgeon: Darryn Mcdaniel MD;  [facility]     INJECT EPIDURAL TRANSFORAMINAL Bilateral 8/23/2018    Procedure: INJECT EPIDURAL TRANSFORAMINAL;  transforaminal bilateral lumbar 3-4 steroid injection;  Surgeon: Darryn Mcdaniel MD;  [facility]     INJECT EPIDURAL TRANSFORAMINAL Bilateral 11/8/2018    Procedure: INJECT EPIDURAL TRANSFORAMINAL lumbar 3-4;  Surgeon: Darryn Mcdaniel MD;  [facility]     INJECT EPIDURAL TRANSFORAMINAL Bilateral 6/14/2019    Procedure: INJECTION, EPIDURAL, TRANSFORAMINAL LUMBAR 3-4 BILATERAL;  Surgeon: Darryn Mcdaniel MD;  [facility]     INJECT  EPIDURAL TRANSFORAMINAL Bilateral 2020    Procedure: lumbar 3-4 bilateral transforaminal epidural steroid injection;  Surgeon: Darryn Mcdaniel MD;  [facility]     INJECT EPIDURAL TRANSFORAMINAL Bilateral 2020    Procedure: INJECTION, EPIDURAL, TRANSFORAMINAL  LUMBAR 3-4 APPROACH;  Surgeon: Darryn Mcdaniel MD;  [facility]     INJECT JOINT SACROILIAC Bilateral 10/13/2022    Procedure: Fluoroscopically-guided injection of the Bilateral sacroiliac joints with Bilateral kerrie Sacroiliac joint ligaments infiltration over the sacrum;  Surgeon: Darryn Mcdaniel MD;  [facility]     PE TUBES      left ear     RADIO FREQUENCY ABLATION PULSED CERVICAL Bilateral 10/1/2021    Procedure: RADIOFREQUENCY ABLATION lumbar 3, 4, 5 bilateral;  Surgeon: Darryn Mcdaniel MD;  [facility]     ZZHC COLONOSCOPY W/WO BRUSH/WASH  2005     ZZHC CREATE EARDRUM OPENING,GEN ANESTH  2007    Pe tube, Left endolymphatic sac enhancement.     ZZHC MASTOIDECTOMY,COMPLETE  2007     Family History   Problem Relation Age of Onset     Diabetes Mother      Cancer Mother         colon cancer -  at 52, diag at 42     Hypertension Father      Heart Disease Father          of AAA     No Known Problems Brother      No Known Problems Brother      Depression Sister      Suicide Other      Unknown/Adopted Maternal Grandmother      Unknown/Adopted Maternal Grandfather      Unknown/Adopted Paternal Grandmother      Unknown/Adopted Paternal Grandfather      No Known Problems Daughter      No Known Problems Daughter      Social History     Socioeconomic History     Marital status: Single     Number of children: 2   Occupational History     Employer: [employer]   Tobacco Use     Smoking status: Never     Smokeless tobacco: Never   Vaping Use     Vaping Use: Never used   Substance and Sexual Activity     Alcohol use: Yes     Alcohol/week: 0.0 standard drinks of alcohol     Comment: rarely     Drug use: No      Sexual activity: Not Currently     Partners: Female   Other Topics Concern     Parent/sibling w/ CABG, MI or angioplasty before 65F 55M? No     Social Determinants of Health     Financial Resource Strain: Low Risk  (9/21/2023)    Financial Resource Strain      Within the past 12 months, have you or your family members you live with been unable to get utilities (heat, electricity) when it was really needed?: No   Food Insecurity: Low Risk  (9/21/2023)    Food Insecurity      Within the past 12 months, did you worry that your food would run out before you got money to buy more?: No      Within the past 12 months, did the food you bought just not last and you didn t have money to get more?: No   Transportation Needs: Low Risk  (9/21/2023)    Transportation Needs      Within the past 12 months, has lack of transportation kept you from medical appointments, getting your medicines, non-medical meetings or appointments, work, or from getting things that you need?: No   Interpersonal Safety: Low Risk  (9/21/2023)    Interpersonal Safety      Do you feel physically and emotionally safe where you currently live?: Yes      Within the past 12 months, have you been hit, slapped, kicked or otherwise physically hurt by someone?: No      Within the past 12 months, have you been humiliated or emotionally abused in other ways by your partner or ex-partner?: No   Housing Stability: Low Risk  (9/21/2023)    Housing Stability      Do you have housing? : Yes      Are you worried about losing your housing?: No      ROS: 10 point ROS neg other than the symptoms noted above in the HPI.  Physical Exam  Vitals and nursing note reviewed. Exam conducted with a chaperone present.   Constitutional:       Appearance: Normal appearance.   Musculoskeletal:         General: No swelling, tenderness, deformity or signs of injury.      Right lower leg: No edema.      Left lower leg: No edema.   Neurological:      Mental Status: He is alert and oriented  to person, place, and time.      Cranial Nerves: No cranial nerve deficit.      Sensory: Sensory deficit present.      Motor: No weakness.      Coordination: Coordination normal.      Gait: Gait abnormal.      Deep Tendon Reflexes: Reflexes normal.         EMG Results:  15-Dec-2020             Electromyography             Final Report  Study Number: 1    EMG Consultant: Marta Espana . [phone] or [phone]  Referred by: CHINYERE OCHOA (127 or [phone])  Referred for: PN, pain in feet  Referral Code:      200  RX: [phone]  [phone]  [phone]    SUMMARY: Prior to starting the procedure, the patient's identity was verified, pertinent available  records were reviewed, the nature of the procedure was explained, the appropriate sites of the  exam were confirmed directly with the patient, and a pre-procedure pause was performed for final  verification of all of the above.  Nerve conduction studies showed unobtainable right medial  plantar responses, low-amplitude right sural and median sensory responses and reduced amplitude  right tibial motor responses.  Needle EMG showed long duration high amplitude motor unit  potentials in bilateral distal lower limb muscles and in right proximal L5 and left L4 innervated  muscles.  Fibrillation potentials were not observed.        CLINICAL INTERPRETATION: This is an abnormal study.  There is electrodiagnostic evidence of a  length-dependent, sensorimotor axonal peripheral neuropathy and superimposed old or chronic right  L5 and left L4 radiculopathy without active denervation.    DEBRA Espana (127 or [phone])/[facility]    NERVE CONDUCTIONS    Record Rep   Normal  Normal Distal Normal F-Wave F-Wave Temp  Nerve Type Site Stim Side Amp Amp CV CV Lat Lat Lat Est ( C)  Fibular Motor EDB  R 3.6 (> 2.0) 42 (> 41) 5.3 (< 6.6) 61.5 61.3 29.4  Tibial Motor AH  R 2.0 (> 4.0) 42 (> 40) 5.9 (< 6.1) 68.1 63.0 29.0  Medial Plantar Sensory Ankle  R NR (> 7.0)  (> 46) NR (< 4.0)   28.8  Sural Sensory Ankle  R 4  (A)    8.7 (A)    33.6  Ulnar Motor ADM  R 11.7 (> 6.0) 60 (> 51) 2.7 (< 3.6) 32.5 28.4 32.5  Median Sensory Dig II  R 11 (> 15.0) 57 (> 56) 3.5 (< 3.6)   32.9      NEEDLE EMG    Ins Spont  MUP  Recruitment  Duration  Amplitude  Phases    Muscle Side Act Fib Fasc Normal Activ Reduced Rapid Long Short High Low % Turns  Gluteus ajit R NL 0 0 NL            Gluteus ajit L NL 0 0 NL            Tensor fasciae latae L NL 0 0 NL            Tensor fasciae latae R NL 0 0 -----    +  +      Vastus medialis R NL 0 0 NL            Vastus medialis L NL 0 0 -----    +  ++      Gastrocnemius (medial head) R NL 0 0 -----    +/-  +      Tibialis anterior R NL 0 0 -----    +/-  +      Tibialis anterior L NL 0 0 -----    +  +              ** This interpretation has been electronically signed: Marta Espana MD at 12/15/2020 3:57:50 PM CST **  Procedure Note    Marta Espana M.D. - 12/15/2020  Formatting of this note is different from the original.  15-Dec-2020             Electromyography             Final Report  Study Number: 1    EMG Consultant: Marta Espana . 127 or [phone]  Referred by: CHINYERE OCHOA (127 or [phone])  Referred for: PN, pain in feet  Referral Code:      200  RX: 205  [phone]  [phone]    SUMMARY: Prior to starting the procedure, the patient's identity was verified, pertinent available  records were reviewed, the nature of the procedure was explained, the appropriate sites of the  exam were confirmed directly with the patient, and a pre-procedure pause was performed for final  verification of all of the above.  Nerve conduction studies showed unobtainable right medial  plantar responses, low-amplitude right sural and median sensory responses and reduced amplitude  right tibial motor responses.  Needle EMG showed long duration high amplitude motor unit  potentials in bilateral distal lower limb muscles and in right proximal L5 and left L4 innervated  muscles.  Fibrillation potentials were not  observed.        CLINICAL INTERPRETATION: This is an abnormal study.  There is electrodiagnostic evidence of a  length-dependent, sensorimotor axonal peripheral neuropathy and superimposed old or chronic right  L5 and left L4 radiculopathy without active denervation.    DEBRA Espana (127 or [phone])/[facility]    NERVE CONDUCTIONS    Record Rep   Normal  Normal Distal Normal F-Wave F-Wave Temp  Nerve Type Site Stim Side Amp Amp CV CV Lat Lat Lat Est ( C)  Fibular Motor EDB  R 3.6 (> 2.0) 42 (> 41) 5.3 (< 6.6) 61.5 61.3 29.4  Tibial Motor AH  R 2.0 (> 4.0) 42 (> 40) 5.9 (< 6.1) 68.1 63.0 29.0  Medial Plantar Sensory Ankle  R NR (> 7.0)  (> 46) NR (< 4.0)   28.8  Sural Sensory Ankle  R 4 (A)    8.7 (A)    33.6  Ulnar Motor ADM  R 11.7 (> 6.0) 60 (> 51) 2.7 (< 3.6) 32.5 28.4 32.5  Median Sensory Dig II  R 11 (> 15.0) 57 (> 56) 3.5 (< 3.6)   32.9      NEEDLE EMG    Ins Spont  MUP  Recruitment  Duration  Amplitude  Phases    Muscle Side Act Fib Fasc Normal Activ Reduced Rapid Long Short High Low % Turns  Gluteus ajit R NL 0 0 NL            Gluteus ajit L NL 0 0 NL            Tensor fasciae latae L NL 0 0 NL            Tensor fasciae latae R NL 0 0 -----    +  +      Vastus medialis R NL 0 0 NL            Vastus medialis L NL 0 0 -----    +  ++      Gastrocnemius (medial head) R NL 0 0 -----    +/-  +      Tibialis anterior R NL 0 0 -----    +/-  +      Tibialis anterior L NL 0 0 -----    +  +             A/P:Patient is a 59 y/o man with documented peripheral neuropathy and pain in the lower extremities.  The etiology of his pain has not been clearly delineated, and prior to proposing a therapeutic plan, I would like to obtain a lumbar MRI to compile more data to make a more accurate diagnosis.  He was referred for spinal cord stimulation evaluation, however prior to scheduling such an intervention, I would like to r/o other injections that may be helpful.  He has trued medication manaement, and thus far it has not been  particularly helpful.  He feels that pregabalin helps somewhat, but higher doses seem to cloud his thinking and he can not tolerate the higher doses.  Await MRI results prior to formulating a definitive plan.   Answers submitted by the patient for this visit:  ALICE-7 (Submitted on 10/4/2023)  ALICE 7 TOTAL SCORE: 16

## 2023-10-05 NOTE — PATIENT INSTRUCTIONS
Follow up:      Dr. Todd is going to try to work on getting the spinal cord stimulator trial approved. If it does, we will contact you with next steps.       To speak with a nurse, schedule/reschedule/cancel a clinic appointment, or request a medication refill call: [phone].    You can also reach us by [email or web address]: [email or web address]

## 2023-10-09 ENCOUNTER — OFFICE VISIT (OUTPATIENT)
Dept: ORTHOPEDICS | Facility: CLINIC | Age: 58
End: 2023-10-09
Payer: COMMERCIAL

## 2023-10-09 DIAGNOSIS — M51.16 LUMBAR DISC HERNIATION WITH RADICULOPATHY: ICD-10-CM

## 2023-10-09 DIAGNOSIS — M54.16 LUMBAR RADICULOPATHY: Primary | ICD-10-CM

## 2023-10-09 DIAGNOSIS — M51.369 DDD (DEGENERATIVE DISC DISEASE), LUMBAR: ICD-10-CM

## 2023-10-09 DIAGNOSIS — M17.0 ARTHRITIS OF BOTH KNEES: ICD-10-CM

## 2023-10-09 PROCEDURE — 20610 DRAIN/INJ JOINT/BURSA W/O US: CPT | Mod: 50 | Performed by: PREVENTIVE MEDICINE

## 2023-10-09 PROCEDURE — 99214 OFFICE O/P EST MOD 30 MIN: CPT | Mod: 25 | Performed by: PREVENTIVE MEDICINE

## 2023-10-09 RX ORDER — METHYLPREDNISOLONE 4 MG
TABLET, DOSE PACK ORAL
Qty: 21 TABLET | Refills: 0 | Status: SHIPPED | OUTPATIENT
Start: 2023-10-09 | End: 2024-07-22

## 2023-10-09 RX ORDER — HYALURONATE SODIUM 10 MG/ML
20 SYRINGE (ML) INTRAARTICULAR
Status: SHIPPED | OUTPATIENT
Start: 2023-10-09

## 2023-10-09 RX ADMIN — Medication 20 MG: at 08:08

## 2023-10-09 NOTE — LETTER
10/9/2023         RE: Indra Mehta  [address] [facility] [address]        Dear Colleague,    Thank you for referring your patient, Indra Mehta, to the [facility] [place]. Please see a copy of my visit note below.    Ketan returns for bilateral knee euflexxa injections #3  As planned  He reports that his low back continues to bother him, he has had more pain with standing and working and walking   Not improving and more symptoms radiating into legs  Has not had treatments on his low back in several months          MEDICAL HISTORY  Patient Active Problem List   Diagnosis     Meniere's disease     Major depression in complete remission (H)     Chronic pain syndrome     Pain medication agreement signed     Occipital neuralgia     Benign essential hypertension     Cervical radiculopathy     Sleep apnea, unspecified type     Osteoarthritis of spine with radiculopathy, lumbar region     Impingement syndrome of left shoulder     Primary osteoarthritis of both knees     Chronic bilateral low back pain without sciatica     Cervical myelopathy (H)     Chronic, continuous use of opioids     Idiopathic progressive polyneuropathy     Lumbar radiculopathy       Current Outpatient Medications   Medication Sig Dispense Refill     Acetaminophen (TYLENOL PO) Take 1,000 mg by mouth 3 times daily       amLODIPine (NORVASC) 5 MG tablet TAKE 1 TABLET BY MOUTH EVERY DAY 90 tablet 3     atorvastatin (LIPITOR) 10 MG tablet Take 10 mg by mouth daily       celecoxib (CELEBREX) 100 MG capsule Take 1 capsule (100 mg) by mouth 2 times daily 60 capsule 0     cyclobenzaprine (FLEXERIL) 5 MG tablet 1-2 tablets three times daily as needed 90 tablet 2     ibuprofen (ADVIL/MOTRIN) 800 MG tablet Take 800 mg by mouth 3 times daily (Patient not taking: Reported on 9/21/2023)       LORazepam (ATIVAN) 1 MG tablet Take 1 tablet 30 minutes before MRI scan 1 tablet 0     losartan-hydrochlorothiazide (HYZAAR)  50-12.5 MG tablet TAKE 2 TABLETS BY MOUTH DAILY 180 tablet 1     naloxone (NARCAN) 4 MG/0.1ML nasal spray Spray 1 spray (4 mg) into one nostril alternating nostrils once as needed for opioid reversal every 2-3 minutes until assistance arrives 0.2 mL 1     nortriptyline (PAMELOR) 50 MG capsule Take 1 capsule (50 mg) by mouth At Bedtime 90 capsule 1     order for DME Equipment being ordered: TENS Pads as directed 1 Device 0     oxyCODONE (ROXICODONE) 5 MG tablet Take 1-2 tablets (5-10 mg) by mouth every 6 hours as needed for severe pain Must last 30 days 165 tablet 0     pregabalin (LYRICA) 150 MG capsule Take 1 capsule (150 mg) by mouth daily 60 capsule 1     sildenafil (VIAGRA) 100 MG tablet Take 0.5-1 tablets ( mg) by mouth daily as needed Take 30 min to 4 hours before intercourse.  Never use with nitroglycerin, terazosin or doxazosin. 6 tablet 7       Allergies   Allergen Reactions     No Known Drug Allergy        Family History   Problem Relation Age of Onset     Diabetes Mother      Cancer Mother         colon cancer -  at 52, diag at 42     Hypertension Father      Heart Disease Father          of AAA     No Known Problems Brother      No Known Problems Brother      Depression Sister      Suicide Other      Unknown/Adopted Maternal Grandmother      Unknown/Adopted Maternal Grandfather      Unknown/Adopted Paternal Grandmother      Unknown/Adopted Paternal Grandfather      No Known Problems Daughter      No Known Problems Daughter      Social History     Socioeconomic History     Marital status: Single     Number of children: 2   Occupational History     Employer: [employer]   Tobacco Use     Smoking status: Never     Smokeless tobacco: Never   Vaping Use     Vaping Use: Never used   Substance and Sexual Activity     Alcohol use: Yes     Alcohol/week: 0.0 standard drinks of alcohol     Comment: rarely     Drug use: No     Sexual activity: Not Currently     Partners: Female   Other Topics  Concern     Parent/sibling w/ CABG, MI or angioplasty before 65F 55M? No     Social Determinants of Health     Financial Resource Strain: Low Risk  (9/21/2023)    Financial Resource Strain      Within the past 12 months, have you or your family members you live with been unable to get utilities (heat, electricity) when it was really needed?: No   Food Insecurity: Low Risk  (9/21/2023)    Food Insecurity      Within the past 12 months, did you worry that your food would run out before you got money to buy more?: No      Within the past 12 months, did the food you bought just not last and you didn t have money to get more?: No   Transportation Needs: Low Risk  (9/21/2023)    Transportation Needs      Within the past 12 months, has lack of transportation kept you from medical appointments, getting your medicines, non-medical meetings or appointments, work, or from getting things that you need?: No   Interpersonal Safety: Low Risk  (9/21/2023)    Interpersonal Safety      Do you feel physically and emotionally safe where you currently live?: Yes      Within the past 12 months, have you been hit, slapped, kicked or otherwise physically hurt by someone?: No      Within the past 12 months, have you been humiliated or emotionally abused in other ways by your partner or ex-partner?: No   Housing Stability: Low Risk  (9/21/2023)    Housing Stability      Do you have housing? : Yes      Are you worried about losing your housing?: No       Additional medical/Social/[facility] and updated as appropriate.     REVIEW OF SYSTEMS (10/9/2023)  10 point ROS of systems including Constitutional, Eyes, Respiratory, Cardiovascular, Gastroenterology, Genitourinary, Integumentary, Musculoskeletal, Psychiatric, Allergic/Immunologic were all negative except for pertinent positives noted in my HPI.     PHYSICAL EXAM  VSS  Vital Signs: There were no vitals taken for this visit. Patient declined being weighed. There is  no height or weight on file to calculate BMI.    General  - normal appearance, in no obvious distress  HEENT  - conjunctivae not injected, moist mucous membranes, normocephalic/atraumatic head, ears normal appearance, no lesions, mouth normal appearance, no scars, normal dentition and teeth present  CV  - normal peripheral perfusion  Pulm  - normal respiratory pattern, non-labored  Musculoskeletal - lumbar spine  - stance: normal gait without limp, no obvious leg length discrepancy, normal heel and toe walk  - inspection: normal bone and joint alignment, no obvious scoliosis  - palpation: no paravertebral or bony tenderness  - ROM: flexion exacerbates pain, normal extension, sidebending, rotation  - strength: lower extremities 5/5 in all planes  - special tests:  (+) straight leg raise  (+) slump test  Neuro  - patellar and Achilles DTRs 2+ bilaterally, lower extremity sensory deficit throughout L5 distribution, grossly normal coordination, normal muscle tone  Skin  - no ecchymosis, erythema, warmth, or induration, no obvious rash  Psych  - interactive, appropriate, normal mood and affect    ASSESSMENT & PLAN  59 yo male here for bilateral knee euflexxa injections for arthritis and lumbar ddd, disc herniations, radicular pain, worse    I independently reviewed the following imaging studies:  Lumbar MRI from 2022: shows ddd, disc herniations  Discussed and ordered new lumbar MRI due to ongoing and worse pain  Will review after MRI and discuss further options for treatments  After a 20 minute discussion and examination, we decided to perform a same day injection for therapeutic purposes for  Bialteral knees with euflexxa  Patient has been doing home exercise physical therapy program for this problem      Appropriate PPE was utilized for prevention of spread of Covid-19.  Alvaro Moore MD, CAQSM    PROCEDURE:           bilateral  Knee joint injection with euflexxa #3    The patient was apprised of the risks and the  benefits of the procedure and consented and a written consent was signed.   The patient s  KNEEs were sterilely prepped with chloraprep.     The patient was injected with euflexxa syringe into the right and left knee with a 1.5-inch 22-gauge needle at the_superiorlateral aspect of their knee joint    There were no complications. The patient tolerated the procedure well. There was minimal bleeding.   The patient was instructed to ice the knees upon leaving clinic and refrain from overuse over the next 3 days.   The patient was instructed to go to the emergency room with any usual pain, swelling, or redness occurred in the injected area.   The patient was given a followup appointment to evaluate response to the injection to their increased range of motion and reduction of pain.  Follow up for euflexxa  Dr Alvaro Moore       Large Joint Injection/Arthocentesis: bilateral knee    Date/Time: 10/9/2023 8:08 AM    Performed by: Alvaro Moore MD  Authorized by: Alvaro Moore MD    Indications:  Osteoarthritis and pain  Needle Size:  22 G  Guidance: landmark guided    Approach:  Superolateral  Location:  Knee  Laterality:  Bilateral      Medications (Right):  20 mg sodium hyaluronate 20 MG/2ML  Medications (Left):  20 mg sodium hyaluronate 20 MG/2ML  Outcome:  Tolerated well, no immediate complications  Procedure discussed: discussed risks, benefits, and alternatives    Consent Given by:  Patient  Timeout: timeout called immediately prior to procedure    Prep: patient was prepped and draped in usual sterile fashion          Again, thank you for allowing me to participate in the care of your patient.        Sincerely,        Alvaro Moore MD

## 2023-10-09 NOTE — PROGRESS NOTES
Ketan returns for bilateral knee euflexxa injections #3  As planned  He reports that his low back continues to bother him, he has had more pain with standing and working and walking   Not improving and more symptoms radiating into legs  Has not had treatments on his low back in several months          MEDICAL HISTORY  Patient Active Problem List   Diagnosis    Meniere's disease    Major depression in complete remission (H)    Chronic pain syndrome    Pain medication agreement signed    Occipital neuralgia    Benign essential hypertension    Cervical radiculopathy    Sleep apnea, unspecified type    Osteoarthritis of spine with radiculopathy, lumbar region    Impingement syndrome of left shoulder    Primary osteoarthritis of both knees    Chronic bilateral low back pain without sciatica    Cervical myelopathy (H)    Chronic, continuous use of opioids    Idiopathic progressive polyneuropathy    Lumbar radiculopathy       Current Outpatient Medications   Medication Sig Dispense Refill    Acetaminophen (TYLENOL PO) Take 1,000 mg by mouth 3 times daily      amLODIPine (NORVASC) 5 MG tablet TAKE 1 TABLET BY MOUTH EVERY DAY 90 tablet 3    atorvastatin (LIPITOR) 10 MG tablet Take 10 mg by mouth daily      celecoxib (CELEBREX) 100 MG capsule Take 1 capsule (100 mg) by mouth 2 times daily 60 capsule 0    cyclobenzaprine (FLEXERIL) 5 MG tablet 1-2 tablets three times daily as needed 90 tablet 2    ibuprofen (ADVIL/MOTRIN) 800 MG tablet Take 800 mg by mouth 3 times daily (Patient not taking: Reported on 9/21/2023)      LORazepam (ATIVAN) 1 MG tablet Take 1 tablet 30 minutes before MRI scan 1 tablet 0    losartan-hydrochlorothiazide (HYZAAR) 50-12.5 MG tablet TAKE 2 TABLETS BY MOUTH DAILY 180 tablet 1    naloxone (NARCAN) 4 MG/0.1ML nasal spray Spray 1 spray (4 mg) into one nostril alternating nostrils once as needed for opioid reversal every 2-3 minutes until assistance arrives 0.2 mL 1    nortriptyline (PAMELOR) 50 MG capsule  Take 1 capsule (50 mg) by mouth At Bedtime 90 capsule 1    order for DME Equipment being ordered: TENS Pads as directed 1 Device 0    oxyCODONE (ROXICODONE) 5 MG tablet Take 1-2 tablets (5-10 mg) by mouth every 6 hours as needed for severe pain Must last 30 days 165 tablet 0    pregabalin (LYRICA) 150 MG capsule Take 1 capsule (150 mg) by mouth daily 60 capsule 1    sildenafil (VIAGRA) 100 MG tablet Take 0.5-1 tablets ( mg) by mouth daily as needed Take 30 min to 4 hours before intercourse.  Never use with nitroglycerin, terazosin or doxazosin. 6 tablet 7       Allergies   Allergen Reactions    No Known Drug Allergy        Family History   Problem Relation Age of Onset    Diabetes Mother     Cancer Mother         colon cancer -  at 52, diag at 42    Hypertension Father     Heart Disease Father          of AAA    No Known Problems Brother     No Known Problems Brother     Depression Sister     Suicide Other     Unknown/Adopted Maternal Grandmother     Unknown/Adopted Maternal Grandfather     Unknown/Adopted Paternal Grandmother     Unknown/Adopted Paternal Grandfather     No Known Problems Daughter     No Known Problems Daughter      Social History     Socioeconomic History    Marital status: Single    Number of children: 2   Occupational History     Employer: [employer]   Tobacco Use    Smoking status: Never    Smokeless tobacco: Never   Vaping Use    Vaping Use: Never used   Substance and Sexual Activity    Alcohol use: Yes     Alcohol/week: 0.0 standard drinks of alcohol     Comment: rarely    Drug use: No    Sexual activity: Not Currently     Partners: Female   Other Topics Concern    Parent/sibling w/ CABG, MI or angioplasty before 65F 55M? No     Social Determinants of Health     Financial Resource Strain: Low Risk  (2023)    Financial Resource Strain     Within the past 12 months, have you or your family members you live with been unable to get utilities (heat, electricity) when it was  really needed?: No   Food Insecurity: Low Risk  (9/21/2023)    Food Insecurity     Within the past 12 months, did you worry that your food would run out before you got money to buy more?: No     Within the past 12 months, did the food you bought just not last and you didn t have money to get more?: No   Transportation Needs: Low Risk  (9/21/2023)    Transportation Needs     Within the past 12 months, has lack of transportation kept you from medical appointments, getting your medicines, non-medical meetings or appointments, work, or from getting things that you need?: No   Interpersonal Safety: Low Risk  (9/21/2023)    Interpersonal Safety     Do you feel physically and emotionally safe where you currently live?: Yes     Within the past 12 months, have you been hit, slapped, kicked or otherwise physically hurt by someone?: No     Within the past 12 months, have you been humiliated or emotionally abused in other ways by your partner or ex-partner?: No   Housing Stability: Low Risk  (9/21/2023)    Housing Stability     Do you have housing? : Yes     Are you worried about losing your housing?: No       Additional medical/Social/Surgical histories reviewed in [facility] and updated as appropriate.     REVIEW OF SYSTEMS (10/9/2023)  10 point ROS of systems including Constitutional, Eyes, Respiratory, Cardiovascular, Gastroenterology, Genitourinary, Integumentary, Musculoskeletal, Psychiatric, Allergic/Immunologic were all negative except for pertinent positives noted in my HPI.     PHYSICAL EXAM  VSS  Vital Signs: There were no vitals taken for this visit. Patient declined being weighed. There is no height or weight on file to calculate BMI.    General  - normal appearance, in no obvious distress  HEENT  - conjunctivae not injected, moist mucous membranes, normocephalic/atraumatic head, ears normal appearance, no lesions, mouth normal appearance, no scars, normal dentition and teeth present  CV  - normal peripheral  perfusion  Pulm  - normal respiratory pattern, non-labored  Musculoskeletal - lumbar spine  - stance: normal gait without limp, no obvious leg length discrepancy, normal heel and toe walk  - inspection: normal bone and joint alignment, no obvious scoliosis  - palpation: no paravertebral or bony tenderness  - ROM: flexion exacerbates pain, normal extension, sidebending, rotation  - strength: lower extremities 5/5 in all planes  - special tests:  (+) straight leg raise  (+) slump test  Neuro  - patellar and Achilles DTRs 2+ bilaterally, lower extremity sensory deficit throughout L5 distribution, grossly normal coordination, normal muscle tone  Skin  - no ecchymosis, erythema, warmth, or induration, no obvious rash  Psych  - interactive, appropriate, normal mood and affect    ASSESSMENT & PLAN  59 yo male here for bilateral knee euflexxa injections for arthritis and lumbar ddd, disc herniations, radicular pain, worse    I independently reviewed the following imaging studies:  Lumbar MRI from 2022: shows ddd, disc herniations  Discussed and ordered new lumbar MRI due to ongoing and worse pain  Will review after MRI and discuss further options for treatments  After a 20 minute discussion and examination, we decided to perform a same day injection for therapeutic purposes for  Bialteral knees with euflexxa  Patient has been doing home exercise physical therapy program for this problem      Appropriate PPE was utilized for prevention of spread of Covid-19.  Alvaro Moore MD, CAQSM    PROCEDURE:           bilateral  Knee joint injection with euflexxa #3    The patient was apprised of the risks and the benefits of the procedure and consented and a written consent was signed.   The patient s  KNEEs were sterilely prepped with chloraprep.     The patient was injected with euflexxa syringe into the right and left knee with a 1.5-inch 22-gauge needle at the_superiorlateral aspect of their knee joint    There were no  complications. The patient tolerated the procedure well. There was minimal bleeding.   The patient was instructed to ice the knees upon leaving clinic and refrain from overuse over the next 3 days.   The patient was instructed to go to the emergency room with any usual pain, swelling, or redness occurred in the injected area.   The patient was given a followup appointment to evaluate response to the injection to their increased range of motion and reduction of pain.  Follow up for euflexxa  Dr Alvaro Moore       Large Joint Injection/Arthocentesis: bilateral knee    Date/Time: 10/9/2023 8:08 AM    Performed by: Alvaro Moore MD  Authorized by: Alvaro Moore MD    Indications:  Osteoarthritis and pain  Needle Size:  22 G  Guidance: landmark guided    Approach:  Superolateral  Location:  Knee  Laterality:  Bilateral      Medications (Right):  20 mg sodium hyaluronate 20 MG/2ML  Medications (Left):  20 mg sodium hyaluronate 20 MG/2ML  Outcome:  Tolerated well, no immediate complications  Procedure discussed: discussed risks, benefits, and alternatives    Consent Given by:  Patient  Timeout: timeout called immediately prior to procedure    Prep: patient was prepped and draped in usual sterile fashion

## 2023-10-09 NOTE — PROGRESS NOTES
NEUROLOGY FOLLOW UP VISIT  NOTE       [facility] [place]  [address] [place] [address]  Tel: [phone]  Fax: [phone]  [email or web address]     Indra Mehta,  1965, MRN [id number]  PCP: Tha Grullon  Date: 10/10/2023      ASSESSMENT & PLAN     Visit Diagnosis  Idiopathic progressive polyneuropathy  Cognitive impairment     Pseudodementia  58-year-old male with history of HTN, sleep apnea, major depression, occipital neuralgia, idiopathic sensorimotor polyneuropathy who was initially evaluated on 9/15/2023 for cognitive decline and seizure-like activity.  He scored 28/30 on MoCA.  Subsequently MRI brain, EEG and lab work for common causes of cognitive decline was normal.  I have informed patient that work-up so far is negative and I suspect his subjective feeling of memory decline likely is due to underlying depression and anxiety.  He is on nortriptyline for his idiopathic progressive polyneuropathy but I have recommended starting Zoloft 50 mg daily.  Follow-up will be in 3 months    Thank you again for this referral, please feel free to contact me if you have any questions.    Edu Griggs MD  [facility]  (Former[facility], [facility]     HISTORY OF PRESENT ILLNESS     Patient is a 58-year-old male with history of HTN, sleep apnea, major depression, occipital neuralgia, lumbar radiculopathy, idiopathic sensorimotor polyneuropathy who was initially evaluated on 9/15/2023 for cognitive decline and seizure-like activity.  He scored 28/30 on MoCA and I suspected his cognitive issues were related to anxiety but an EEG was recommended to rule out seizure and was normal.  He also had MRI of the brain that showed mild age-related changes but no acute abnormality.  Lab work for common causes of cognitive decline included normal B12, B1, TSH, RPR, MMA and folate.    Patient also developed paresthesias years ago and  was seen at [facility] and had EMG and autonomic testing that suggested sensorimotor axonal polyneuropathy with autonomic involvement.  No etiology was found and he was treated symptomatically with Lyrica and dose was gradually escalated.  According to patient he had cervical spine surgery in 2017, 2018 and 2019 and had C5-C7 fusion.  Prior to that starting in 2012 he started having bilateral feet paresthesias that he described as pins and needle at times he would experience burning sharp shooting pain along with vasomotor changes who was initially tried on gabapentin and subsequently switched to Lyrica. He had an EMG done that showed length-dependent sensorimotor axonal polyneuropathy with superimposed chronic right L5 and left L4 radiculopathy. Subsequently autonomic testing was done that was abnormal suggesting autonomic involvement.     PROBLEM LIST   Patient Active Problem List   Diagnosis Code     Meniere's disease H81.09     Major depression in complete remission (H) F32.9     Chronic pain syndrome G89.4     Pain medication agreement signed Z02.89     Occipital neuralgia M54.81     Benign essential hypertension I10     Cervical radiculopathy M54.12     Sleep apnea, unspecified type G47.30     Osteoarthritis of spine with radiculopathy, lumbar region M47.26     Impingement syndrome of left shoulder M75.42     Primary osteoarthritis of both knees M17.0     Chronic bilateral low back pain without sciatica M54.50, G89.29     Cervical myelopathy (H) G95.9     Chronic, continuous use of opioids F11.90     Idiopathic progressive polyneuropathy G60.3     Lumbar radiculopathy M54.16         PAST MEDICAL & SURGICAL HISTORY     Past Medical History:   Patient  has a past medical history of Anxiety, Back pain, Depressive disorder, Hyperlipidemia, Hypertension, Meniere's disease, unspecified, Pain medication agreement signed (08/20/2010), Sleep apnea, unspecified type, and Status post lumbar spinal fusion  (01/14/2020).    Surgical History:  He  has a past surgical history that includes Colonoscopy w/wo Brush **Performed** (12/27/2005); pe tubes (2006); MASTOIDECTOMY,COMPLETE (09/27/2007); CREATE EARDRUM OPENING,GEN ANESTH (09/27/2007); Colonoscopy (N/A, 12/28/2016); bunionectomy rt/lt (09/05/08); Discectomy, fusion cervical anterior one level, combined (N/A, 7/5/2017); Herniorrhaphy umbilical (N/A, 8/11/2017); Inject epidural lumbar (N/A, 11/9/2017); Inject epidural lumbar (N/A, 12/28/2017); Inject epidural transforaminal (Bilateral, 8/23/2018); Inject epidural transforaminal (Bilateral, 11/8/2018); Discectomy, fusion cervical anterior one level, combined (N/A, 12/19/2018); Inject epidural transforaminal (Bilateral, 6/14/2019); Inject epidural cervical (N/A, 8/22/2019); Inject epidural transforaminal (Bilateral, 5/22/2020); Inject epidural transforaminal (Bilateral, 9/24/2020); Inject epidural lumbar (Bilateral, 5/13/2021); Inject block medial branch cervical/thoracic/lumbar (Bilateral, 8/12/2021); Inject block medial branch cervical/thoracic/lumbar (N/A, 9/9/2021); Radio Frequency Ablation Pulsed Cervical (Bilateral, 10/1/2021); Colonoscopy (N/A, 9/27/2022); Inject joint sacroiliac (Bilateral, 10/13/2022); Inject epidural cervical (Left, 3/9/2023); and Inject epidural cervical (N/A, 8/18/2023).     SOCIAL HISTORY     Reviewed, and he  reports that he has never smoked. He has never used smokeless tobacco. He reports current alcohol use. He reports that he does not use drugs.     FAMILY HISTORY     Reviewed, and family history includes Cancer in his mother; Depression in his sister; Diabetes in his mother; Heart Disease in his father; Hypertension in his father; No Known Problems in his brother, brother, daughter, and daughter; Suicide in an other family member; Unknown/Adopted in his maternal grandfather, maternal grandmother, paternal grandfather, and paternal grandmother.     ALLERGIES     Allergies   Allergen  Reactions     No Known Drug Allergy          REVIEW OF SYSTEMS     A 12 point review of system was performed and was negative except as outlined in the history of present illness.     HOME MEDICATIONS     Current Outpatient Rx   Medication Sig Dispense Refill     Acetaminophen (TYLENOL PO) Take 1,000 mg by mouth 3 times daily       amLODIPine (NORVASC) 5 MG tablet TAKE 1 TABLET BY MOUTH EVERY DAY 90 tablet 3     atorvastatin (LIPITOR) 10 MG tablet Take 10 mg by mouth daily       celecoxib (CELEBREX) 100 MG capsule Take 1 capsule (100 mg) by mouth 2 times daily 60 capsule 0     cyclobenzaprine (FLEXERIL) 5 MG tablet 1-2 tablets three times daily as needed 90 tablet 2     losartan-hydrochlorothiazide (HYZAAR) 50-12.5 MG tablet TAKE 2 TABLETS BY MOUTH DAILY 180 tablet 1     methylPREDNISolone (MEDROL) 4 MG tablet therapy pack Follow Package Directions 21 tablet 0     naloxone (NARCAN) 4 MG/0.1ML nasal spray Spray 1 spray (4 mg) into one nostril alternating nostrils once as needed for opioid reversal every 2-3 minutes until assistance arrives 0.2 mL 1     nortriptyline (PAMELOR) 50 MG capsule Take 1 capsule (50 mg) by mouth At Bedtime 90 capsule 1     order for DME Equipment being ordered: TENS Pads as directed 1 Device 0     oxyCODONE (ROXICODONE) 5 MG tablet Take 1-2 tablets (5-10 mg) by mouth every 6 hours as needed for severe pain Must last 30 days 165 tablet 0     pregabalin (LYRICA) 150 MG capsule Take 1 capsule (150 mg) by mouth daily 60 capsule 1     sertraline (ZOLOFT) 50 MG tablet Take 1 tablet (50 mg) by mouth daily 30 tablet 11     sildenafil (VIAGRA) 100 MG tablet Take 0.5-1 tablets ( mg) by mouth daily as needed Take 30 min to 4 hours before intercourse.  Never use with nitroglycerin, terazosin or doxazosin. 6 tablet 7         PHYSICAL EXAM     Vital signs  /84   Pulse 85   Ht 1.829 m (6')   Wt 99.8 kg (220 lb)   BMI 29.84 kg/m      Weight:   220 lbs 0 oz    Patient is alert and oriented x4  in no acute distress. Vital signs were reviewed and are documented in electronic medical record. Neck was supple, no carotid bruits, thyromegaly, JVD, or lymphadenopathy was noted.   NEUROLOGY EXAM:    Patient s speech was normal with no aphasia or dysarthria. Mentation, and affect were also normal.     Funduscopic exam was normal, with normal cup to disc ratio. Cranial nerves II -XII were intact.     Patient had normal mass, tone and motor strength was 5/5 in all extremities without pronator drift.     Sensation was intact to light touch, pinprick, and vibratory sensation.     Reflexes were 1+ symmetrical with downgoing toes.     No dysmetria noted on FNF or HKS. Romberg was negative.    Gait testing was normal. Able to walk on toes/heels. Tandem walk normal.      PERTINENT DIAGNOSTIC STUDIES     Following studies were reviewed:     MRI BRAIN 9/21/2023  1.  No acute/subacute infarcts, mass lesions, hydrocephalus or MRI evidence of intracranial hemorrhage. No abnormal enhancement.  2.  Mild age-related diffuse cerebral parenchymal volume loss. Presumed chronic hypertensive/microvascular ischemic white matter changes.    MRI CERVICAL SPINE 10/13/2022  1. Postoperative and diffuse degenerative change of the cervical spine  as detailed above.  2. No spinal canal stenosis.  3. Moderate neural foraminal stenosis on the right at C3-C4 and on the  left at C7-T1.     MRI LUMBAR SPINE 3/17/2022  1. Diffuse degenerative change of the lumbar spine as detailed above  without appreciable change from the recent comparison study.  2. No significant spinal canal or neural foraminal stenosis at any  level of the lumbar spine.     EMG 12/15/2020  This is an abnormal study.  There is electrodiagnostic evidence of a   length-dependent, sensorimotor axonal peripheral neuropathy and superimposed old or chronic right   L5 and left L4 radiculopathy without active denervation.     AUTONOMIC TESTING 12/17/2020  Abnormal study. There is  evidence of length-dependent postganglionic   sympathetic sudomotor and mild cardiovagal impairment while cardiovascular   adrenergic function is normal. These findings can be seen in autonomic   involvement in the patient's known peripheral neuropathy. Residual   medication effects (nortriptyline) cannot be excluded.    EEG 10/10/2023  Normal awake and drowsy     PERTINENT LABS  Following labs were reviewed:  Lab on 09/15/2023   Component Date Value Ref Range Status     Folic Acid 09/15/2023 6.0  4.6 - 34.8 ng/mL Final     Methylmalonic Acid 09/15/2023 0.21  0.00 - 0.40 umol/L Final     Treponema Antibody Total 09/15/2023 Nonreactive  Nonreactive Final     TSH 09/15/2023 0.42  0.30 - 4.20 uIU/mL Final     Vitamin B1 Whole Blood Level 09/15/2023 128  70 - 180 nmol/L Final     Vitamin B12 09/15/2023 465  232 - 1,245 pg/mL Final   Office Visit on 08/14/2023   Component Date Value Ref Range Status     Beta 2 Glycoprotein 1 Antibody IgG 08/14/2023 5.4  <7.0 U/mL Final     Beta 2 Glycoprotein 1 Antibody IgM 08/14/2023 <2.4  <7.0 U/mL Final     Sodium 08/14/2023 139  136 - 145 mmol/L Final     Potassium 08/14/2023 3.3 (L)  3.4 - 5.3 mmol/L Final     Chloride 08/14/2023 100  98 - 107 mmol/L Final     Carbon Dioxide (CO2) 08/14/2023 25  22 - 29 mmol/L Final     Anion Gap 08/14/2023 14  7 - 15 mmol/L Final     Urea Nitrogen 08/14/2023 14.0  6.0 - 20.0 mg/dL Final     Creatinine 08/14/2023 1.01  0.67 - 1.17 mg/dL Final     Calcium 08/14/2023 9.5  8.6 - 10.0 mg/dL Final     Glucose 08/14/2023 140 (H)  70 - 99 mg/dL Final     GFR Estimate 08/14/2023 87  >60 mL/min/1.73m2 Final     WBC Count 08/14/2023 6.1  4.0 - 11.0 10e3/uL Final     RBC Count 08/14/2023 5.43  4.40 - 5.90 10e6/uL Final     Hemoglobin 08/14/2023 16.5  13.3 - 17.7 g/dL Final     Hematocrit 08/14/2023 47.2  40.0 - 53.0 % Final     MCV 08/14/2023 87  78 - 100 fL Final     MCH 08/14/2023 30.4  26.5 - 33.0 pg Final     MCHC 08/14/2023 35.0  31.5 - 36.5 g/dL Final      RDW 08/14/2023 13.0  10.0 - 15.0 % Final     Platelet Count 08/14/2023 297  150 - 450 10e3/uL Final         Total time spent for face to face visit, reviewing labs/imaging studies, counseling and coordination of care was: 30 Minutes spent on the date of the encounter doing chart review, review of outside records, review of test results, interpretation of tests, patient visit, and documentation     This note was dictated using voice recognition software.  Any grammatical or context distortions are unintentional and inherent to the software.    No orders of the defined types were placed in this encounter.     New Prescriptions    SERTRALINE (ZOLOFT) 50 MG TABLET    Take 1 tablet (50 mg) by mouth daily     Modified Medications    No medications on file

## 2023-10-10 ENCOUNTER — ANCILLARY PROCEDURE (OUTPATIENT)
Dept: NEUROLOGY | Facility: CLINIC | Age: 58
End: 2023-10-10
Attending: PSYCHIATRY & NEUROLOGY
Payer: COMMERCIAL

## 2023-10-10 VITALS
WEIGHT: 220 LBS | HEART RATE: 85 BPM | HEIGHT: 72 IN | SYSTOLIC BLOOD PRESSURE: 121 MMHG | BODY MASS INDEX: 29.8 KG/M2 | DIASTOLIC BLOOD PRESSURE: 84 MMHG

## 2023-10-10 DIAGNOSIS — F41.8 DEPRESSION WITH ANXIETY: ICD-10-CM

## 2023-10-10 DIAGNOSIS — R41.89 PSEUDODEMENTIA: ICD-10-CM

## 2023-10-10 DIAGNOSIS — G60.3 IDIOPATHIC PROGRESSIVE POLYNEUROPATHY: Primary | ICD-10-CM

## 2023-10-10 DIAGNOSIS — R56.9 SEIZURE-LIKE ACTIVITY (H): ICD-10-CM

## 2023-10-10 DIAGNOSIS — R41.89 COGNITIVE IMPAIRMENT: ICD-10-CM

## 2023-10-10 PROCEDURE — 95816 EEG AWAKE AND DROWSY: CPT | Performed by: PSYCHIATRY & NEUROLOGY

## 2023-10-10 PROCEDURE — 99214 OFFICE O/P EST MOD 30 MIN: CPT | Performed by: PSYCHIATRY & NEUROLOGY

## 2023-10-10 NOTE — NURSING NOTE
Chief Complaint   Patient presents with    Seizures     EEG results     Helena Fuller on 10/10/2023 at 2:42 PM

## 2023-10-10 NOTE — LETTER
10/10/2023         RE: Indra Mehta  [id number] [facility] [address]        Dear Colleague,    Thank you for referring your patient, Indra Mehta, to the [facility] [place]. Please see a copy of my visit note below.    NEUROLOGY FOLLOW UP VISIT  NOTE       [facility] [place]  [address] [place] [address]  Tel: [phone]  Fax: [phone]  [email or web address]     Indra Mehta,  1965, MRN [id number]  PCP: Tha Grullon  Date: 10/10/2023      ASSESSMENT & PLAN     Visit Diagnosis  Idiopathic progressive polyneuropathy  Cognitive impairment     Pseudodementia  58-year-old male with history of HTN, sleep apnea, major depression, occipital neuralgia, idiopathic sensorimotor polyneuropathy who was initially evaluated on 9/15/2023 for cognitive decline and seizure-like activity.  He scored 28/30 on MoCA.  Subsequently MRI brain, EEG and lab work for common causes of cognitive decline was normal.  I have informed patient that work-up so far is negative and I suspect his subjective feeling of memory decline likely is due to underlying depression and anxiety.  He is on nortriptyline for his idiopathic progressive polyneuropathy but I have recommended starting Zoloft 50 mg daily.  Follow-up will be in 3 months    Thank you again for this referral, please feel free to contact me if you have any questions.    Edu Griggs MD  [facility]  (Formerly, [facility]     HISTORY OF PRESENT ILLNESS     Patient is a 58-year-old male with history of HTN, sleep apnea, major depression, occipital neuralgia, lumbar radiculopathy, idiopathic sensorimotor polyneuropathy who was initially evaluated on 9/15/2023 for cognitive decline and seizure-like activity.  He scored 28/30 on MoCA and I suspected his cognitive issues were related to anxiety but an EEG was recommended to rule out  seizure and was normal.  He also had MRI of the brain that showed mild age-related changes but no acute abnormality.  Lab work for common causes of cognitive decline included normal B12, B1, TSH, RPR, MMA and folate.    Patient also developed paresthesias years ago and was seen at [facility] and had EMG and autonomic testing that suggested sensorimotor axonal polyneuropathy with autonomic involvement.  No etiology was found and he was treated symptomatically with Lyrica and dose was gradually escalated.  According to patient he had cervical spine surgery in 2017, 2018 and 2019 and had C5-C7 fusion.  Prior to that starting in 2012 he started having bilateral feet paresthesias that he described as pins and needle at times he would experience burning sharp shooting pain along with vasomotor changes who was initially tried on gabapentin and subsequently switched to Lyrica. He had an EMG done that showed length-dependent sensorimotor axonal polyneuropathy with superimposed chronic right L5 and left L4 radiculopathy. Subsequently autonomic testing was done that was abnormal suggesting autonomic involvement.     PROBLEM LIST   Patient Active Problem List   Diagnosis Code     Meniere's disease H81.09     Major depression in complete remission (H) F32.9     Chronic pain syndrome G89.4     Pain medication agreement signed Z02.89     Occipital neuralgia M54.81     Benign essential hypertension I10     Cervical radiculopathy M54.12     Sleep apnea, unspecified type G47.30     Osteoarthritis of spine with radiculopathy, lumbar region M47.26     Impingement syndrome of left shoulder M75.42     Primary osteoarthritis of both knees M17.0     Chronic bilateral low back pain without sciatica M54.50, G89.29     Cervical myelopathy (H) G95.9     Chronic, continuous use of opioids F11.90     Idiopathic progressive polyneuropathy G60.3     Lumbar radiculopathy M54.16         PAST MEDICAL & SURGICAL HISTORY     Past Medical History:    Patient  has a past medical history of Anxiety, Back pain, Depressive disorder, Hyperlipidemia, Hypertension, Meniere's disease, unspecified, Pain medication agreement signed (08/20/2010), Sleep apnea, unspecified type, and Status post lumbar spinal fusion (01/14/2020).    Surgical History:  He  has a past surgical history that includes Colonoscopy w/wo Brush **Performed** (12/27/2005); pe tubes (2006); MASTOIDECTOMY,COMPLETE (09/27/2007); CREATE EARDRUM OPENING,GEN ANESTH (09/27/2007); Colonoscopy (N/A, 12/28/2016); bunionectomy rt/lt (09/05/08); Discectomy, fusion cervical anterior one level, combined (N/A, 7/5/2017); Herniorrhaphy umbilical (N/A, 8/11/2017); Inject epidural lumbar (N/A, 11/9/2017); Inject epidural lumbar (N/A, 12/28/2017); Inject epidural transforaminal (Bilateral, 8/23/2018); Inject epidural transforaminal (Bilateral, 11/8/2018); Discectomy, fusion cervical anterior one level, combined (N/A, 12/19/2018); Inject epidural transforaminal (Bilateral, 6/14/2019); Inject epidural cervical (N/A, 8/22/2019); Inject epidural transforaminal (Bilateral, 5/22/2020); Inject epidural transforaminal (Bilateral, 9/24/2020); Inject epidural lumbar (Bilateral, 5/13/2021); Inject block medial branch cervical/thoracic/lumbar (Bilateral, 8/12/2021); Inject block medial branch cervical/thoracic/lumbar (N/A, 9/9/2021); Radio Frequency Ablation Pulsed Cervical (Bilateral, 10/1/2021); Colonoscopy (N/A, 9/27/2022); Inject joint sacroiliac (Bilateral, 10/13/2022); Inject epidural cervical (Left, 3/9/2023); and Inject epidural cervical (N/A, 8/18/2023).     SOCIAL HISTORY     Reviewed, and he  reports that he has never smoked. He has never used smokeless tobacco. He reports current alcohol use. He reports that he does not use drugs.     FAMILY HISTORY     Reviewed, and family history includes Cancer in his mother; Depression in his sister; Diabetes in his mother; Heart Disease in his father; Hypertension in his father;  No Known Problems in his brother, brother, daughter, and daughter; Suicide in an other family member; Unknown/Adopted in his maternal grandfather, maternal grandmother, paternal grandfather, and paternal grandmother.     ALLERGIES     Allergies   Allergen Reactions     No Known Drug Allergy          REVIEW OF SYSTEMS     A 12 point review of system was performed and was negative except as outlined in the history of present illness.     HOME MEDICATIONS     Current Outpatient Rx   Medication Sig Dispense Refill     Acetaminophen (TYLENOL PO) Take 1,000 mg by mouth 3 times daily       amLODIPine (NORVASC) 5 MG tablet TAKE 1 TABLET BY MOUTH EVERY DAY 90 tablet 3     atorvastatin (LIPITOR) 10 MG tablet Take 10 mg by mouth daily       celecoxib (CELEBREX) 100 MG capsule Take 1 capsule (100 mg) by mouth 2 times daily 60 capsule 0     cyclobenzaprine (FLEXERIL) 5 MG tablet 1-2 tablets three times daily as needed 90 tablet 2     losartan-hydrochlorothiazide (HYZAAR) 50-12.5 MG tablet TAKE 2 TABLETS BY MOUTH DAILY 180 tablet 1     methylPREDNISolone (MEDROL) 4 MG tablet therapy pack Follow Package Directions 21 tablet 0     naloxone (NARCAN) 4 MG/0.1ML nasal spray Spray 1 spray (4 mg) into one nostril alternating nostrils once as needed for opioid reversal every 2-3 minutes until assistance arrives 0.2 mL 1     nortriptyline (PAMELOR) 50 MG capsule Take 1 capsule (50 mg) by mouth At Bedtime 90 capsule 1     order for DME Equipment being ordered: TENS Pads as directed 1 Device 0     oxyCODONE (ROXICODONE) 5 MG tablet Take 1-2 tablets (5-10 mg) by mouth every 6 hours as needed for severe pain Must last 30 days 165 tablet 0     pregabalin (LYRICA) 150 MG capsule Take 1 capsule (150 mg) by mouth daily 60 capsule 1     sertraline (ZOLOFT) 50 MG tablet Take 1 tablet (50 mg) by mouth daily 30 tablet 11     sildenafil (VIAGRA) 100 MG tablet Take 0.5-1 tablets ( mg) by mouth daily as needed Take 30 min to 4 hours before  intercourse.  Never use with nitroglycerin, terazosin or doxazosin. 6 tablet 7         PHYSICAL EXAM     Vital signs  /84   Pulse 85   Ht 1.829 m (6')   Wt 99.8 kg (220 lb)   BMI 29.84 kg/m      Weight:   220 lbs 0 oz    Patient is alert and oriented x4 in no acute distress. Vital signs were reviewed and are documented in electronic medical record. Neck was supple, no carotid bruits, thyromegaly, JVD, or lymphadenopathy was noted.   NEUROLOGY EXAM:    Patient s speech was normal with no aphasia or dysarthria. Mentation, and affect were also normal.     Funduscopic exam was normal, with normal cup to disc ratio. Cranial nerves II -XII were intact.     Patient had normal mass, tone and motor strength was 5/5 in all extremities without pronator drift.     Sensation was intact to light touch, pinprick, and vibratory sensation.     Reflexes were 1+ symmetrical with downgoing toes.     No dysmetria noted on FNF or HKS. Romberg was negative.    Gait testing was normal. Able to walk on toes/heels. Tandem walk normal.      PERTINENT DIAGNOSTIC STUDIES     Following studies were reviewed:     MRI BRAIN 9/21/2023  1.  No acute/subacute infarcts, mass lesions, hydrocephalus or MRI evidence of intracranial hemorrhage. No abnormal enhancement.  2.  Mild age-related diffuse cerebral parenchymal volume loss. Presumed chronic hypertensive/microvascular ischemic white matter changes.    MRI CERVICAL SPINE 10/13/2022  1. Postoperative and diffuse degenerative change of the cervical spine  as detailed above.  2. No spinal canal stenosis.  3. Moderate neural foraminal stenosis on the right at C3-C4 and on the  left at C7-T1.     MRI LUMBAR SPINE 3/17/2022  1. Diffuse degenerative change of the lumbar spine as detailed above  without appreciable change from the recent comparison study.  2. No significant spinal canal or neural foraminal stenosis at any  level of the lumbar spine.     EMG 12/15/2020  This is an abnormal study.   There is electrodiagnostic evidence of a   length-dependent, sensorimotor axonal peripheral neuropathy and superimposed old or chronic right   L5 and left L4 radiculopathy without active denervation.     AUTONOMIC TESTING 12/17/2020  Abnormal study. There is evidence of length-dependent postganglionic   sympathetic sudomotor and mild cardiovagal impairment while cardiovascular   adrenergic function is normal. These findings can be seen in autonomic   involvement in the patient's known peripheral neuropathy. Residual   medication effects (nortriptyline) cannot be excluded.    EEG 10/10/2023  Normal awake and drowsy     PERTINENT LABS  Following labs were reviewed:  Lab on 09/15/2023   Component Date Value Ref Range Status     Folic Acid 09/15/2023 6.0  4.6 - 34.8 ng/mL Final     Methylmalonic Acid 09/15/2023 0.21  0.00 - 0.40 umol/L Final     Treponema Antibody Total 09/15/2023 Nonreactive  Nonreactive Final     TSH 09/15/2023 0.42  0.30 - 4.20 uIU/mL Final     Vitamin B1 Whole Blood Level 09/15/2023 128  70 - 180 nmol/L Final     Vitamin B12 09/15/2023 465  232 - 1,245 pg/mL Final   Office Visit on 08/14/2023   Component Date Value Ref Range Status     Beta 2 Glycoprotein 1 Antibody IgG 08/14/2023 5.4  <7.0 U/mL Final     Beta 2 Glycoprotein 1 Antibody IgM 08/14/2023 <2.4  <7.0 U/mL Final     Sodium 08/14/2023 139  136 - 145 mmol/L Final     Potassium 08/14/2023 3.3 (L)  3.4 - 5.3 mmol/L Final     Chloride 08/14/2023 100  98 - 107 mmol/L Final     Carbon Dioxide (CO2) 08/14/2023 25  22 - 29 mmol/L Final     Anion Gap 08/14/2023 14  7 - 15 mmol/L Final     Urea Nitrogen 08/14/2023 14.0  6.0 - 20.0 mg/dL Final     Creatinine 08/14/2023 1.01  0.67 - 1.17 mg/dL Final     Calcium 08/14/2023 9.5  8.6 - 10.0 mg/dL Final     Glucose 08/14/2023 140 (H)  70 - 99 mg/dL Final     GFR Estimate 08/14/2023 87  >60 mL/min/1.73m2 Final     WBC Count 08/14/2023 6.1  4.0 - 11.0 10e3/uL Final     RBC Count 08/14/2023 5.43  4.40 - 5.90  10e6/uL Final     Hemoglobin 08/14/2023 16.5  13.3 - 17.7 g/dL Final     Hematocrit 08/14/2023 47.2  40.0 - 53.0 % Final     MCV 08/14/2023 87  78 - 100 fL Final     MCH 08/14/2023 30.4  26.5 - 33.0 pg Final     MCHC 08/14/2023 35.0  31.5 - 36.5 g/dL Final     RDW 08/14/2023 13.0  10.0 - 15.0 % Final     Platelet Count 08/14/2023 297  150 - 450 10e3/uL Final         Total time spent for face to face visit, reviewing labs/imaging studies, counseling and coordination of care was: 30 Minutes spent on the date of the encounter doing chart review, review of outside records, review of test results, interpretation of tests, patient visit, and documentation     This note was dictated using voice recognition software.  Any grammatical or context distortions are unintentional and inherent to the software.    No orders of the defined types were placed in this encounter.     New Prescriptions    SERTRALINE (ZOLOFT) 50 MG TABLET    Take 1 tablet (50 mg) by mouth daily     Modified Medications    No medications on file                   Again, thank you for allowing me to participate in the care of your patient.        Sincerely,        Edu Griggs MD

## 2023-10-17 ENCOUNTER — HOSPITAL ENCOUNTER (OUTPATIENT)
Dept: MRI IMAGING | Facility: CLINIC | Age: 58
Discharge: HOME | End: 2023-10-17
Attending: PREVENTIVE MEDICINE | Admitting: PREVENTIVE MEDICINE
Payer: COMMERCIAL

## 2023-10-17 DIAGNOSIS — M54.16 LUMBAR RADICULOPATHY: ICD-10-CM

## 2023-10-17 DIAGNOSIS — G89.29 CHRONIC BILATERAL LOW BACK PAIN WITHOUT SCIATICA: ICD-10-CM

## 2023-10-17 DIAGNOSIS — M54.50 CHRONIC BILATERAL LOW BACK PAIN WITHOUT SCIATICA: ICD-10-CM

## 2023-10-17 DIAGNOSIS — M51.16 LUMBAR DISC HERNIATION WITH RADICULOPATHY: ICD-10-CM

## 2023-10-17 DIAGNOSIS — M51.369 DDD (DEGENERATIVE DISC DISEASE), LUMBAR: ICD-10-CM

## 2023-10-17 DIAGNOSIS — M25.511 CHRONIC RIGHT SHOULDER PAIN: ICD-10-CM

## 2023-10-17 DIAGNOSIS — M54.2 CERVICALGIA: ICD-10-CM

## 2023-10-17 DIAGNOSIS — G62.9 NEUROPATHY: ICD-10-CM

## 2023-10-17 DIAGNOSIS — G89.4 CHRONIC PAIN SYNDROME: ICD-10-CM

## 2023-10-17 DIAGNOSIS — Z98.1 S/P CERVICAL SPINAL FUSION: ICD-10-CM

## 2023-10-17 DIAGNOSIS — G89.29 CHRONIC RIGHT SHOULDER PAIN: ICD-10-CM

## 2023-10-17 DIAGNOSIS — M79.18 MYOFASCIAL PAIN: ICD-10-CM

## 2023-10-17 PROCEDURE — 72148 MRI LUMBAR SPINE W/O DYE: CPT

## 2023-10-17 RX ORDER — NORTRIPTYLINE HYDROCHLORIDE 50 MG/1
50 CAPSULE ORAL AT BEDTIME
Qty: 90 CAPSULE | Refills: 1 | Status: SHIPPED | OUTPATIENT
Start: 2023-10-17 | End: 2024-01-18

## 2023-10-17 NOTE — TELEPHONE ENCOUNTER
Receive request requesting refill(s) for nortriptyline (PAMELOR) 50 MG capsule     Last refilled on 07/17/2023    [id number] [place], [place] - [address] [place] [address] [phone]     Pt last seen on 09/21/2022- DR. Huff  Next appt scheduled for None    Will facilitate refill.    Tarah Butts MA  [facility]

## 2023-10-17 NOTE — TELEPHONE ENCOUNTER
Prescription refill requested for:   Celecoxib (CELEBREX) 100 MG capsule    Last Written Prescription Date:  9/18/23  Last Fill Quantity: 60,   # refills: 0  Last Office Visit: 10/9/23  Future Office visit:    Next 5 appointments (look out 90 days)      Red 10, 2024  9:15 AM  (Arrive by 9:00 AM)  Return Visit with Edu Griggs MD  [facility] [place] ([facility] ) [address] [place] [id number] [phone]                 Clinton Sanches, EMT

## 2023-10-19 RX ORDER — CELECOXIB 100 MG/1
100 CAPSULE ORAL 2 TIMES DAILY
Qty: 60 CAPSULE | Refills: 0 | Status: SHIPPED | OUTPATIENT
Start: 2023-10-19 | End: 2023-11-28

## 2023-10-20 ENCOUNTER — MYC REFILL (OUTPATIENT)
Dept: FAMILY MEDICINE | Facility: CLINIC | Age: 58
End: 2023-10-20
Payer: COMMERCIAL

## 2023-10-20 DIAGNOSIS — M54.16 LUMBAR RADICULOPATHY: ICD-10-CM

## 2023-10-20 DIAGNOSIS — F11.90 CHRONIC, CONTINUOUS USE OF OPIOIDS: ICD-10-CM

## 2023-10-20 DIAGNOSIS — M54.2 CERVICALGIA: ICD-10-CM

## 2023-10-20 DIAGNOSIS — M79.18 MYOFASCIAL PAIN: ICD-10-CM

## 2023-10-20 DIAGNOSIS — G89.4 CHRONIC PAIN SYNDROME: ICD-10-CM

## 2023-10-20 DIAGNOSIS — Z98.1 S/P CERVICAL SPINAL FUSION: ICD-10-CM

## 2023-10-20 RX ORDER — OXYCODONE HYDROCHLORIDE 5 MG/1
5-10 TABLET ORAL EVERY 6 HOURS PRN
Qty: 165 TABLET | Refills: 0 | Status: SHIPPED | OUTPATIENT
Start: 2023-10-22 | End: 2023-11-15

## 2023-10-28 ENCOUNTER — HEALTH MAINTENANCE LETTER (OUTPATIENT)
Age: 58
End: 2023-10-28

## 2023-10-30 ENCOUNTER — TELEPHONE (OUTPATIENT)
Dept: FAMILY MEDICINE | Facility: CLINIC | Age: 58
End: 2023-10-30
Payer: COMMERCIAL

## 2023-10-30 ENCOUNTER — MYC MEDICAL ADVICE (OUTPATIENT)
Dept: FAMILY MEDICINE | Facility: CLINIC | Age: 58
End: 2023-10-30
Payer: COMMERCIAL

## 2023-10-30 NOTE — TELEPHONE ENCOUNTER
RN TRIAGE CALL:    Patient Contact    Attempt # 1    Was call answered?  No.  Left message on voicemail with information to call any one of the triage nurses back to get more information on his last My Chart message.    SAMY Hampton [employer] Messages (supporting Tha Grullon MD)26 minutes ago (12:08 PM)     The doctor I saw last week referred me to a Neuro Psych doctor.  That doctor is booking out into March.  I need those records for my disability claim.  I've been off work for weeks without any income.  I'm freaking out right now.  I don't know what to do.

## 2023-11-01 NOTE — TELEPHONE ENCOUNTER
RN Triage    Patient Contact    Attempt # 2    Was call answered?  No.  Left message on voicemail with information to call me back.    Jackie Moncada RN on 11/1/2023 at 9:04 AM

## 2023-11-02 NOTE — TELEPHONE ENCOUNTER
RN TRIAGE CALL:    Patient Contact    Attempt # 3    Was call answered?  No.  Left message on voicemail with information to call any one of the triage nurses at the clinic to discuss his recent My Chart message and to follow up on any concerns he may be having.    Amy Orourke, RN

## 2023-11-03 NOTE — TELEPHONE ENCOUNTER
RN TRIAGE CALL:    Patient Contact    Attempt # 4    Was call answered?  No.  Left message on voicemail with information that we were attempting to call one last time to follow up on previous message from this last week.  Left message for patient to call back and ask to speak with any of the triage nurses if he decided to follow up with us.    Closing this encounter at this time.    Amy Orourke, RN

## 2023-11-07 ENCOUNTER — TELEPHONE (OUTPATIENT)
Dept: FAMILY MEDICINE | Facility: CLINIC | Age: 58
End: 2023-11-07
Payer: COMMERCIAL

## 2023-11-07 NOTE — TELEPHONE ENCOUNTER
"S-(situation): Patient is calling back after multiple calls and messages left for him to call triage nurse back, from 10/30/2023.  Patient stated he is having a difficult time keeping track of all the paperwork and instruction to help with his disability approval, his medication changes and results.  He stated he believes that the last piece holding back his disability approval is the release of information to his insurance company, [employer] (f[employer]), that Dr. Grullon stated he needed to use our form for.  He stated he was unsure if he filled out the paperwork correctly. He stated he is having more pain in his feet since decreasing the amount of Lyrica and with the start of Zoloft he stated he is nauseated frequently.  He stated he has vomited more in the past month after starting Zoloft \"than he has his whole life\".  Patient stated his pain clinic provider left [place] so he has not been able to see anyone for pain management other than PCP at this time.  B-(background): Patient has been seen by pain management that is no longer available at this clinic.  Patient has been seen by Neurophysiology who stated to him that they did not see anything physical for his symptoms of lack of concentration and memory lapse.  He stated the Neuropsychologist verbalized they thought it might be depression that was creating his symptoms and prescribed patient Zoloft.    A-(assessment): Patient would benefit from a visit with PCP to discuss increasing his Lyrica to the previous dose that helps with his pain.  Patient would benefit discussing the Zoloft side effects with PCP and the results of neurophysiology thinking his symptoms are more depression.  PCP prescribed Wellbutrin  mg- taking 1 tab daily in 2010 when patient was experiencing life difficulties with a divorce.  Patient stated that he remembers that medication being helpful with no side effects that he remembers.     R-(recommendations): Patient would benefit from " scheduling an office visit with PCP to discuss concerns and symptoms.  Advised patient to bring release of information paperwork to the clinic having staff review and help him fill out the information correctly so the process of his disability monies can be finalized.  Advised patient to research other pain clinic options since his pain management provider left some time ago.  Patient stated understanding.  Patient stated he will come to the clinic to drop off paperwork for disability release of information.      Patient would like to schedule visit with provider to discuss the above listed concerns.    Will forward to PCP for review and scheduling request.      Amy Orourke RN

## 2023-11-15 ENCOUNTER — MYC REFILL (OUTPATIENT)
Dept: FAMILY MEDICINE | Facility: CLINIC | Age: 58
End: 2023-11-15
Payer: COMMERCIAL

## 2023-11-15 DIAGNOSIS — M54.2 CERVICALGIA: ICD-10-CM

## 2023-11-15 DIAGNOSIS — F11.90 CHRONIC, CONTINUOUS USE OF OPIOIDS: ICD-10-CM

## 2023-11-15 DIAGNOSIS — M54.16 LUMBAR RADICULOPATHY: ICD-10-CM

## 2023-11-15 DIAGNOSIS — Z98.1 S/P CERVICAL SPINAL FUSION: ICD-10-CM

## 2023-11-15 DIAGNOSIS — G89.4 CHRONIC PAIN SYNDROME: ICD-10-CM

## 2023-11-15 DIAGNOSIS — M79.18 MYOFASCIAL PAIN: ICD-10-CM

## 2023-11-16 ENCOUNTER — MYC MEDICAL ADVICE (OUTPATIENT)
Dept: FAMILY MEDICINE | Facility: CLINIC | Age: 58
End: 2023-11-16
Payer: COMMERCIAL

## 2023-11-16 RX ORDER — OXYCODONE HYDROCHLORIDE 5 MG/1
5-10 TABLET ORAL EVERY 6 HOURS PRN
Qty: 165 TABLET | Refills: 0 | Status: SHIPPED | OUTPATIENT
Start: 2023-11-22 | End: 2023-12-18

## 2023-11-17 DIAGNOSIS — G89.29 CHRONIC RIGHT SHOULDER PAIN: ICD-10-CM

## 2023-11-17 DIAGNOSIS — M25.511 CHRONIC RIGHT SHOULDER PAIN: ICD-10-CM

## 2023-11-17 NOTE — TELEPHONE ENCOUNTER
Prescription refill requested for:   Celecoxib (CELEBREX) 100 MG capsule     Last Written Prescription Date:  10/19/23  Last Fill Quantity: 60,   # refills: 0  Last Office Visit: 10/9/23  Future Office visit:    Next 5 appointments (look out 90 days)      Dec 04, 2023  2:20 PM  (Arrive by 2:15 PM)  Provider Visit with Tha Grullon MD  [facility] ([facility] ) [address] [facility] [address] [phone]     Red 10, 2024  9:15 AM  (Arrive by 9:00 AM)  Return Visit with Edu Griggs MD  [facility] ([facility] ) [address] [place] [id number] [phone]                 Clinton Sanches, EMT

## 2023-11-20 DIAGNOSIS — M54.16 LUMBAR RADICULOPATHY: ICD-10-CM

## 2023-11-20 DIAGNOSIS — G62.9 NEUROPATHY: ICD-10-CM

## 2023-11-20 DIAGNOSIS — M79.18 MYOFASCIAL PAIN: ICD-10-CM

## 2023-11-20 DIAGNOSIS — M54.2 CERVICALGIA: ICD-10-CM

## 2023-11-20 DIAGNOSIS — Z98.1 S/P CERVICAL SPINAL FUSION: ICD-10-CM

## 2023-11-20 DIAGNOSIS — G89.4 CHRONIC PAIN SYNDROME: ICD-10-CM

## 2023-11-20 RX ORDER — CYCLOBENZAPRINE HCL 5 MG
TABLET ORAL
Qty: 90 TABLET | Refills: 2 | Status: SHIPPED | OUTPATIENT
Start: 2023-11-20 | End: 2024-02-12

## 2023-11-28 RX ORDER — CELECOXIB 100 MG/1
100 CAPSULE ORAL 2 TIMES DAILY
Qty: 60 CAPSULE | Refills: 0 | Status: SHIPPED | OUTPATIENT
Start: 2023-11-28 | End: 2024-01-04

## 2023-12-04 ENCOUNTER — VIRTUAL VISIT (OUTPATIENT)
Dept: FAMILY MEDICINE | Facility: CLINIC | Age: 58
End: 2023-12-04
Payer: COMMERCIAL

## 2023-12-04 DIAGNOSIS — R41.89 PSEUDODEMENTIA: ICD-10-CM

## 2023-12-04 DIAGNOSIS — F41.8 DEPRESSION WITH ANXIETY: ICD-10-CM

## 2023-12-04 PROCEDURE — 99213 OFFICE O/P EST LOW 20 MIN: CPT | Mod: VID | Performed by: FAMILY MEDICINE

## 2023-12-04 RX ORDER — ATORVASTATIN CALCIUM 10 MG/1
10 TABLET, FILM COATED ORAL DAILY
Status: CANCELLED | OUTPATIENT
Start: 2023-12-04

## 2023-12-04 ASSESSMENT — PATIENT HEALTH QUESTIONNAIRE - PHQ9
SUM OF ALL RESPONSES TO PHQ QUESTIONS 1-9: 18
10. IF YOU CHECKED OFF ANY PROBLEMS, HOW DIFFICULT HAVE THESE PROBLEMS MADE IT FOR YOU TO DO YOUR WORK, TAKE CARE OF THINGS AT HOME, OR GET ALONG WITH OTHER PEOPLE: VERY DIFFICULT
SUM OF ALL RESPONSES TO PHQ QUESTIONS 1-9: 18

## 2023-12-04 NOTE — PROGRESS NOTES
"    Instructions Relayed to Patient by Virtual Roomer:     Patient is active on [email or web address]:   Relayed following to patient: \"It looks like you are active on [email or web address], are you able to join the visit this way? If not, do you need us to send you a link now or would you like your provider to send a link via text or email when they are ready to initiate the visit?\"    Reminded patient to ensure they were logged on to virtual visit by arrival time listed. Documented in appointment notes if patient had flexibility to initiate visit sooner than arrival time. If pediatric virtual visit, ensured pediatric patient along with parent/guardian will be present for video visit.     Patient offered the website [email or web address] and/or phone number to [employer] Help line: [phone]    Ketan is a 58 year old who is being evaluated via a billable video visit.      How would you like to obtain your AVS? Melty  If the video visit is dropped, the invitation should be resent by: Text to cell phone: [phone]  Will anyone else be joining your video visit? 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 Started on Zoloft 1 month ago. No side effects but notes no improvement.       8/14/2023     3:38 PM 9/21/2023     3:05 PM 10/4/2023     4:05 PM   ALICE-7 SCORE   Total Score 10 (moderate anxiety) 16 (severe anxiety) 16 (severe anxiety)   Total Score 10 16 16           8/14/2023     3:35 PM 9/21/2023     3:04 PM 12/4/2023     1:40 PM   PHQ   PHQ-9 Total Score 13 11 18   Q9: Thoughts of better off dead/self-harm past 2 weeks Not at all Not at all Not at all     He also fell in his pole barn a couple of weeks ago and hit his head. Unsure of LOC but continues to struggle with concentration. He has follow up with neurology in 1 month. Increase Zoloft and follow up with me in 1 month.  - sertraline (ZOLOFT) 50 MG tablet; Take 2 tablets (100 mg) by mouth daily  - PRIMARY CARE FOLLOW-UP " SCHEDULING; Future    Depression with anxiety  Chronic. Increase Zoloft and follow up in 1 month.  - sertraline (ZOLOFT) 50 MG tablet; Take 2 tablets (100 mg) by mouth daily  - PRIMARY CARE FOLLOW-UP SCHEDULING; Future    Prescription drug management         BMI:   Estimated body mass index is 29.84 kg/m  as calculated from the following:    Height as of 10/10/23: 1.829 m (6').    Weight as of 10/10/23: 99.8 kg (220 lb).       Regular exercise   Follow-up Visit   Expected date:  Jan 04, 2024 (Approximate)      Follow Up Appointment Details:     Follow-up with whom?: Me    Follow-Up for what?: Chronic Disease f/u    Chronic Disease f/u:  Depression  Anxiety       How?: Iram Grullon MD  [facility] is a 58 year old, presenting for the following health issues:  Recheck Medication  - Discontinued testosterone, insurance no longer covers where he was being seen. Testerone was helping him and would like to get back to starting those.  - Had a pretty significant fall in his pull shed, foot slipped as he was stepping over something. Banged his head, unsure of LOC, hurt his shoulder and his knee as well as neck. Neck and Shoulder are the worst things in regards to pain from the fall      12/4/2023     1:41 PM   Additional Questions   Roomed by Shon BARDALES   Accompanied by Self         12/4/2023     1:41 PM   Patient Reported Additional Medications   Patient reports taking the following new medications None       History of Present Illness       Reason for visit:  Medication Check        Depression and Anxiety Follow-Up  How are you doing with your depression since your last visit? Worsened   How are you doing with your anxiety since your last visit?  Worsened   Are you having other symptoms that might be associated with depression or anxiety? Yes:  Hard to concentrate, hard to get up and get going, overwhelmed easily  Have you had a significant life event?  OTHER: Fall    Do you have any concerns with your use of alcohol or other drugs? No    Social History     Tobacco Use    Smoking status: Never    Smokeless tobacco: Never   Vaping Use    Vaping Use: Never used   Substance Use Topics    Alcohol use: Yes     Alcohol/week: 0.0 standard drinks of alcohol     Comment: rarely    Drug use: No         8/14/2023     3:35 PM 9/21/2023     3:04 PM 12/4/2023     1:40 PM   PHQ   PHQ-9 Total Score 13 11 18   Q9: Thoughts of better off dead/self-harm past 2 weeks Not at all Not at all Not at all         8/14/2023     3:38 PM 9/21/2023     3:05 PM 10/4/2023     4:05 PM   ALICE-7 SCORE   Total Score 10 (moderate anxiety) 16 (severe anxiety) 16 (severe anxiety)   Total Score 10 16 16         12/4/2023     1:40 PM   Last PHQ-9   1.  Little interest or pleasure in doing things 3   2.  Feeling down, depressed, or hopeless 3   3.  Trouble falling or staying asleep, or sleeping too much 3   4.  Feeling tired or having little energy 3   5.  Poor appetite or overeating 1   6.  Feeling bad about yourself 1   7.  Trouble concentrating 3   8.  Moving slowly or restless 1   Q9: Thoughts of better off dead/self-harm past 2 weeks 0   PHQ-9 Total Score 18       Suicide Assessment Five-step Evaluation and Treatment (SAFE-T)      Medication Followup of Pregabalin  Taking Medication as prescribed: yes - Lowered dose during last visit  Side Effects:  Pain in the feet are worse and still having cognitive side effect   Medication Helping Symptoms:  yes  Medication Followup of Sertraline  Taking Medication as prescribed: yes  Side Effects:  For the first month or so he was nauseous for quite a bit. Seemed to have slowed down but has also been having some dizziness lately  Medication Helping Symptoms:  NO-Does not seem to be doing much      Review of Systems   CONSTITUTIONAL: NEGATIVE for fever, chills, change in weight  ENT/MOUTH: NEGATIVE for ear, mouth and throat problems  RESP: NEGATIVE for significant  cough or SOB  CV: NEGATIVE for chest pain, palpitations or peripheral edema  MUSCULOSKELETAL: POSITIVE  for back pain chronic, joint pain shoulders and knees, and neck pain  PSYCHIATRIC: POSITIVE foranhedonia, concentration difficulty, depressed mood, Hx anxiety, Hx depression, hopelessness, impaired memory, and stress financial due to delayed short term disability and NEGATIVE fordelusions, hallucinations, and thoughts of self harm  ROS otherwise negative      Objective           Vitals:  No vitals were obtained today due to virtual visit.    Physical Exam   GENERAL: Healthy, alert and no distress  EYES: Eyes grossly normal to inspection.  No discharge or erythema, or obvious scleral/conjunctival abnormalities.  RESP: No audible wheeze, cough, or visible cyanosis.  No visible retractions or increased work of breathing.    SKIN: Visible skin clear. No significant rash, abnormal pigmentation or lesions.  NEURO: Cranial nerves grossly intact.  Mentation and speech appropriate for age.  PSYCH: Mentation appears normal, affect normal/bright, judgement and insight intact, normal speech and appearance well-groomed.    Lab on 09/15/2023   Component Date Value Ref Range Status    Folic Acid 09/15/2023 6.0  4.6 - 34.8 ng/mL Final    Methylmalonic Acid 09/15/2023 0.21  0.00 - 0.40 umol/L Final    INTERPRETIVE INFORMATION:    Slight elevation 0.41-0.99 umol/L is consistent with mild vitamin B12 deficiency, renal insufficiency, or intravascular volume contraction.    Moderate elevation 1.00-9.99 umol/L is consistent with mild vitamin B12 deficiency.    Massive elevation 10.00 umol/L or greater is consistent with significant vitamin B12 deficiency or with inborn errors of metabolism.    Treponema Antibody Total 09/15/2023 Nonreactive  Nonreactive Final    TSH 09/15/2023 0.42  0.30 - 4.20 uIU/mL Final    Vitamin B1 Whole Blood Level 09/15/2023 128  70 - 180 nmol/L Final    INTERPRETIVE INFORMATION: Vitamin B1, Whole Blood    This  assay measures the concentration of thiamine   diphosphate (TDP), the primary active form of vitamin B1.   Approximately 90 percent of vitamin B1 present in whole   blood is TDP. Thiamine and thiamine monophosphate, which   comprise the remaining 10 percent, are not measured.    This test was developed and its performance characteristics   determined by [facility]. It has not been cleared or   approved by the US Food and Drug Administration. This test   was performed in a CLIA certified laboratory and is   intended for clinical purposes.  Performed By: [facility]  [address] [place] [id number]  : Fabian Castillo MD, PhD  IA Number: [id number]    Vitamin B12 09/15/2023 465  232 - 1,245 pg/mL Final               Video-Visit Details    Type of service:  Video Visit     Originating Location (pt. Location): Home    Distant Location (provider location):  On-site  Platform used for Video Visit: Merle

## 2023-12-05 DIAGNOSIS — G62.9 NEUROPATHY: ICD-10-CM

## 2023-12-05 NOTE — PROGRESS NOTES
Ketan is a 58 year old who is being evaluated via a billable video visit.      How would you like to obtain your AVS? MyChart  If the video visit is dropped, the invitation should be resent by: Text to cell phone: [phone]  Will anyone else be joining your video visit? No            Subjective   Ketan is a 58 year old, presenting for the following health issues:  Followup for neck pain, right shoulder pain worse, and right knee pain worse  Had a fall a few weeks ago and injured his right shoulder and right knee  Right shoulder improving slowly, but still painful to lift overhead  Right knee was improved over 50% from euflexxa but worse after fall  Neck pain has persisted and having more pain in neck and radiating down arms      Review of Systems   Constitutional, HEENT, cardiovascular, pulmonary, gi and gu systems are negative, except as otherwise noted.      Objective           Vitals:  No vitals were obtained today due to virtual visit.    Physical Exam   GENERAL: Healthy, alert and no distress  EYES: Eyes grossly normal to inspection.  No discharge or erythema, or obvious scleral/conjunctival abnormalities.  RESP: No audible wheeze, cough, or visible cyanosis.  No visible retractions or increased work of breathing.    SKIN: Visible skin clear. No significant rash, abnormal pigmentation or lesions.  NEURO: Cranial nerves grossly intact.  Mentation and speech appropriate for age.  PSYCH: Mentation appears normal, affect normal/bright, judgement and insight intact, normal speech and appearance well-groomed.    Assessment/Plan  59 yo male with cervical ddd, disc herniations, radicular pain, worse, and right shoulder pain due to rotator cuff injury/strain, and right knee arthritis, worse    Reviewed imaging independently of right knee: shows arthritis  And right shoulder shows AC arthritis  And cervical MRI from 2022 shows ddd, disc herniations  Discussed and ordered new cervical MRI  RX given for medrol  pack  Followup next week for possible shoulder injection and review options          Video-Visit Details    Type of service:  Video Visit     Originating Location (pt. Location): Home    Distant Location (provider location):  On-site  Platform used for Video Visit: Merle    Video start time: 840am  Video end time: 900am  Dr Moore

## 2023-12-06 ENCOUNTER — TELEPHONE (OUTPATIENT)
Dept: ORTHOPEDICS | Facility: CLINIC | Age: 58
End: 2023-12-06

## 2023-12-06 ENCOUNTER — VIRTUAL VISIT (OUTPATIENT)
Dept: ORTHOPEDICS | Facility: CLINIC | Age: 58
End: 2023-12-06
Payer: COMMERCIAL

## 2023-12-06 DIAGNOSIS — M50.30 DDD (DEGENERATIVE DISC DISEASE), CERVICAL: ICD-10-CM

## 2023-12-06 DIAGNOSIS — Z98.890 HX OF CERVICAL SPINE SURGERY: ICD-10-CM

## 2023-12-06 DIAGNOSIS — M50.20 CERVICAL DISC HERNIATION: Primary | ICD-10-CM

## 2023-12-06 PROCEDURE — 99214 OFFICE O/P EST MOD 30 MIN: CPT | Mod: VID | Performed by: PREVENTIVE MEDICINE

## 2023-12-06 RX ORDER — PREGABALIN 150 MG/1
150 CAPSULE ORAL DAILY
Qty: 60 CAPSULE | Refills: 1 | Status: SHIPPED | OUTPATIENT
Start: 2023-12-06 | End: 2024-01-30

## 2023-12-06 RX ORDER — METHYLPREDNISOLONE 4 MG
TABLET, DOSE PACK ORAL
Qty: 21 TABLET | Refills: 0 | Status: SHIPPED | OUTPATIENT
Start: 2023-12-06 | End: 2024-07-22

## 2023-12-06 NOTE — LETTER
12/6/2023         RE: Indra Mehta  [id number] [facility] [address]        Dear Colleague,    Thank you for referring your patient, Indra Mehta, to the [facility] [place]. Please see a copy of my visit note below.    Ketan is a 58 year old who is being evaluated via a billable video visit.      How would you like to obtain your AVS? MyChart  If the video visit is dropped, the invitation should be resent by: Text to cell phone: [phone]  Will anyone else be joining your video visit? No            Subjective  Ketan is a 58 year old, presenting for the following health issues:  Followup for neck pain, right shoulder pain worse, and right knee pain worse  Had a fall a few weeks ago and injured his right shoulder and right knee  Right shoulder improving slowly, but still painful to lift overhead  Right knee was improved over 50% from euflexxa but worse after fall  Neck pain has persisted and having more pain in neck and radiating down arms      Review of Systems   Constitutional, HEENT, cardiovascular, pulmonary, gi and gu systems are negative, except as otherwise noted.      Objective          Vitals:  No vitals were obtained today due to virtual visit.    Physical Exam   GENERAL: Healthy, alert and no distress  EYES: Eyes grossly normal to inspection.  No discharge or erythema, or obvious scleral/conjunctival abnormalities.  RESP: No audible wheeze, cough, or visible cyanosis.  No visible retractions or increased work of breathing.    SKIN: Visible skin clear. No significant rash, abnormal pigmentation or lesions.  NEURO: Cranial nerves grossly intact.  Mentation and speech appropriate for age.  PSYCH: Mentation appears normal, affect normal/bright, judgement and insight intact, normal speech and appearance well-groomed.    Assessment/Plan  59 yo male with cervical ddd, disc herniations, radicular pain, worse, and right shoulder pain due to rotator cuff  injury/strain, and right knee arthritis, worse    Reviewed imaging independently of right knee: shows arthritis  And right shoulder shows AC arthritis  And cervical MRI from 2022 shows ddd, disc herniations  Discussed and ordered new cervical MRI  RX given for medrol pack  Followup next week for possible shoulder injection and review options          Video-Visit Details    Type of service:  Video Visit     Originating Location (pt. Location): Home    Distant Location (provider location):  On-site  Platform used for Video Visit: [email or web address]    Video start time: 840am  Video end time: 900am  Dr Moore           Again, thank you for allowing me to participate in the care of your patient.        Sincerely,        Alvaro Moore MD

## 2023-12-06 NOTE — LETTER
12/6/2023         RE: Indra Mehta  [id number] [facility] [address]        Dear Colleague,    Thank you for referring your patient, Indra Mehta, to the [facility] [place]. Please see a copy of my visit note below.    Ketan is a 58 year old who is being evaluated via a billable video visit.      How would you like to obtain your AVS? MyChart  If the video visit is dropped, the invitation should be resent by: Text to cell phone: [phone]  Will anyone else be joining your video visit? No            Subjective  Ketan is a 58 year old, presenting for the following health issues:  Followup for neck pain, right shoulder pain worse, and right knee pain worse  Had a fall a few weeks ago and injured his right shoulder and right knee  Right shoulder improving slowly, but still painful to lift overhead  Right knee was improved over 50% from euflexxa but worse after fall  Neck pain has persisted and having more pain in neck and radiating down arms      Review of Systems   Constitutional, HEENT, cardiovascular, pulmonary, gi and gu systems are negative, except as otherwise noted.      Objective          Vitals:  No vitals were obtained today due to virtual visit.    Physical Exam   GENERAL: Healthy, alert and no distress  EYES: Eyes grossly normal to inspection.  No discharge or erythema, or obvious scleral/conjunctival abnormalities.  RESP: No audible wheeze, cough, or visible cyanosis.  No visible retractions or increased work of breathing.    SKIN: Visible skin clear. No significant rash, abnormal pigmentation or lesions.  NEURO: Cranial nerves grossly intact.  Mentation and speech appropriate for age.  PSYCH: Mentation appears normal, affect normal/bright, judgement and insight intact, normal speech and appearance well-groomed.    Assessment/Plan  57 yo male with cervical ddd, disc herniations, radicular pain, worse, and right shoulder pain due to rotator cuff  injury/strain, and right knee arthritis, worse    Reviewed imaging independently of right knee: shows arthritis  And right shoulder shows AC arthritis  And cervical MRI from 2022 shows ddd, disc herniations  Discussed and ordered new cervical MRI  RX given for medrol pack  Followup next week for possible shoulder injection and review options          Video-Visit Details    Type of service:  Video Visit     Originating Location (pt. Location): Home    Distant Location (provider location):  On-site  Platform used for Video Visit: [employer]    Video start time: 840am  Video end time: 900am  Dr Moore           Again, thank you for allowing me to participate in the care of your patient.        Sincerely,        Alvaro Moore MD

## 2023-12-12 ENCOUNTER — MYC MEDICAL ADVICE (OUTPATIENT)
Dept: FAMILY MEDICINE | Facility: CLINIC | Age: 58
End: 2023-12-12
Payer: COMMERCIAL

## 2023-12-13 ENCOUNTER — ANCILLARY PROCEDURE (OUTPATIENT)
Dept: GENERAL RADIOLOGY | Facility: CLINIC | Age: 58
End: 2023-12-13
Attending: PREVENTIVE MEDICINE
Payer: COMMERCIAL

## 2023-12-13 ENCOUNTER — OFFICE VISIT (OUTPATIENT)
Dept: ORTHOPEDICS | Facility: CLINIC | Age: 58
End: 2023-12-13
Payer: COMMERCIAL

## 2023-12-13 DIAGNOSIS — M17.0 ARTHRITIS OF BOTH KNEES: ICD-10-CM

## 2023-12-13 DIAGNOSIS — M17.0 ARTHRITIS OF BOTH KNEES: Primary | ICD-10-CM

## 2023-12-13 DIAGNOSIS — M54.40 CHRONIC MIDLINE LOW BACK PAIN WITH SCIATICA, SCIATICA LATERALITY UNSPECIFIED: ICD-10-CM

## 2023-12-13 DIAGNOSIS — G89.29 CHRONIC MIDLINE LOW BACK PAIN WITH SCIATICA, SCIATICA LATERALITY UNSPECIFIED: ICD-10-CM

## 2023-12-13 DIAGNOSIS — M51.369 DDD (DEGENERATIVE DISC DISEASE), LUMBAR: ICD-10-CM

## 2023-12-13 DIAGNOSIS — M47.816 ARTHROPATHY OF LUMBAR FACET JOINT: ICD-10-CM

## 2023-12-13 PROCEDURE — 73562 X-RAY EXAM OF KNEE 3: CPT | Mod: RT | Performed by: RADIOLOGY

## 2023-12-13 PROCEDURE — 99214 OFFICE O/P EST MOD 30 MIN: CPT | Performed by: PREVENTIVE MEDICINE

## 2023-12-13 NOTE — PROGRESS NOTES
HISTORY OF PRESENT ILLNESS  Mr. Mehta is a pleasant 58 year old year old male who presents to clinic today with the following:  What problem are you here for? Right shoulder, right knee   And neck pain that radiates down arm, not resolving  Has cervical MRI scheduled for next week  How long have you had this problem? Chronic     Have you had any recent imaging of this problem? Xrays/MRI/CT scans? Yes     Have you had treatments for this problem in the past?  -Medications? Yes   -Physical therapy? yes  -Injections? yes  -Surgery? no    How severe is this problem today? 0-10 scale? Right shoulder:3 right knee: 3    How severe has this problem been at WORST in the past? 0-10 scale? Right shoulder: 9 right knee: 7    What do you think caused this problem? Na     Does this problem or its symptoms cause difficulty for you falling asleep or staying asleep? yes    Anything else you want us to know about this problem? no          MEDICAL HISTORY  Patient Active Problem List   Diagnosis     Meniere's disease     Major depression in complete remission (H)     Chronic pain syndrome     Pain medication agreement signed     Occipital neuralgia     Benign essential hypertension     Cervical radiculopathy     Sleep apnea, unspecified type     Osteoarthritis of spine with radiculopathy, lumbar region     Impingement syndrome of left shoulder     Primary osteoarthritis of both knees     Chronic bilateral low back pain without sciatica     Cervical myelopathy (H)     Chronic, continuous use of opioids     Idiopathic progressive polyneuropathy     Lumbar radiculopathy       Current Outpatient Medications   Medication Sig Dispense Refill     Acetaminophen (TYLENOL PO) Take 1,000 mg by mouth 3 times daily       amLODIPine (NORVASC) 5 MG tablet TAKE 1 TABLET BY MOUTH EVERY DAY 90 tablet 3     atorvastatin (LIPITOR) 10 MG tablet Take 10 mg by mouth daily       celecoxib (CELEBREX) 100 MG capsule TAKE 1 CAPSULE (100 MG) BY MOUTH 2 TIMES  DAILY 60 capsule 0     cyclobenzaprine (FLEXERIL) 5 MG tablet TAKE 1-2 TABLETS BY MOUTH 3TIMES DAILY AS NEEDED 90 tablet 2     losartan-hydrochlorothiazide (HYZAAR) 50-12.5 MG tablet TAKE 2 TABLETS BY MOUTH DAILY 180 tablet 1     methylPREDNISolone (MEDROL) 4 MG tablet therapy pack Follow Package Directions 21 tablet 0     methylPREDNISolone (MEDROL) 4 MG tablet therapy pack Follow Package Directions (Patient not taking: Reported on 2023) 21 tablet 0     naloxone (NARCAN) 4 MG/0.1ML nasal spray Spray 1 spray (4 mg) into one nostril alternating nostrils once as needed for opioid reversal every 2-3 minutes until assistance arrives (Patient not taking: Reported on 2023) 0.2 mL 1     nortriptyline (PAMELOR) 50 MG capsule Take 1 capsule (50 mg) by mouth at bedtime 90 capsule 1     order for DME Equipment being ordered: TENS Pads as directed 1 Device 0     oxyCODONE (ROXICODONE) 5 MG tablet Take 1-2 tablets (5-10 mg) by mouth every 6 hours as needed for severe pain Must last 30 days 165 tablet 0     pregabalin (LYRICA) 150 MG capsule TAKE 1 CAPSULE (150 MG) BY MOUTH DAILY 60 capsule 1     sertraline (ZOLOFT) 50 MG tablet Take 2 tablets (100 mg) by mouth daily 60 tablet 11     sildenafil (VIAGRA) 100 MG tablet Take 0.5-1 tablets ( mg) by mouth daily as needed Take 30 min to 4 hours before intercourse.  Never use with nitroglycerin, terazosin or doxazosin. 6 tablet 7       Allergies   Allergen Reactions     No Known Drug Allergy        Family History   Problem Relation Age of Onset     Diabetes Mother      Cancer Mother         colon cancer -  at 52, diag at 42     Hypertension Father      Heart Disease Father          of AAA     No Known Problems Brother      No Known Problems Brother      Depression Sister      Suicide Other      Unknown/Adopted Maternal Grandmother      Unknown/Adopted Maternal Grandfather      Unknown/Adopted Paternal Grandmother      Unknown/Adopted Paternal Grandfather      No  Known Problems Daughter      No Known Problems Daughter      Social History     Socioeconomic History     Marital status: Single     Number of children: 2   Occupational History     Employer: [employer]   Tobacco Use     Smoking status: Never     Smokeless tobacco: Never   Vaping Use     Vaping Use: Never used   Substance and Sexual Activity     Alcohol use: Yes     Alcohol/week: 0.0 standard drinks of alcohol     Comment: rarely     Drug use: No     Sexual activity: Not Currently     Partners: Female   Other Topics Concern     Parent/sibling w/ CABG, MI or angioplasty before 65F 55M? No     Social Determinants of Health     Financial Resource Strain: Unknown (12/4/2023)    Financial Resource Strain      Within the past 12 months, have you or your family members you live with been unable to get utilities (heat, electricity) when it was really needed?: Patient refused   Food Insecurity: Unknown (12/4/2023)    Food Insecurity      Within the past 12 months, did you worry that your food would run out before you got money to buy more?: Patient refused      Within the past 12 months, did the food you bought just not last and you didn t have money to get more?: Patient refused   Transportation Needs: Unknown (12/4/2023)    Transportation Needs      Within the past 12 months, has lack of transportation kept you from medical appointments, getting your medicines, non-medical meetings or appointments, work, or from getting things that you need?: Patient refused   Interpersonal Safety: Low Risk  (9/21/2023)    Interpersonal Safety      Do you feel physically and emotionally safe where you currently live?: Yes      Within the past 12 months, have you been hit, slapped, kicked or otherwise physically hurt by someone?: No      Within the past 12 months, have you been humiliated or emotionally abused in other ways by your partner or ex-partner?: No   Housing Stability: Unknown (12/4/2023)    Housing Stability      Do you have  housing? : Patient refused      Are you worried about losing your housing?: Patient refused       Additional medical/Social/Surgical histories reviewed in [facility] and updated as appropriate.     REVIEW OF SYSTEMS (12/13/2023)  10 point ROS of systems including Constitutional, Eyes, Respiratory, Cardiovascular, Gastroenterology, Genitourinary, Integumentary, Musculoskeletal, Psychiatric, Allergic/Immunologic were all negative except for pertinent positives noted in my HPI.     PHYSICAL EXAM  VSS  General  - normal appearance, in no obvious distress  HEENT  - conjunctivae not injected, moist mucous membranes, normocephalic/atraumatic head, ears normal appearance, no lesions, mouth normal appearance, no scars, normal dentition and teeth present  CV  - normal peripheral perfusion  Pulm  - normal respiratory pattern, non-labored    Musculoskeletal - CERVICAL SPINE  - stance and posture: normal gait without limp, no obvious leg length discrepancy, normal heel and toe walk, normal balance, slightly forward shoulders, head balanced normally on trunk  - inspection: normal bone and joint alignment, no obvious kyphosis  - palpation: no paravertebral or bony tenderness, except at base of neck and trapezius muscles  - ROM: normal and painless flexion, extension, left and right sidebending and rotation with some discomfort  - strength: upper extremities 5/5 in all planes  - special tests:  (+) spurlings    Neuro  - biceps, triceps, supinator DTRs 2+ bilaterally, no sensory or motor deficit, grossly normal coordination, normal muscle tone  Skin  - no ecchymosis, erythema, warmth, or induration, no obvious rash  Psych  - interactive, appropriate, normal mood and affect  Right knee: has pain with flexion, patellar compression, medial joint ttp  ASSESSMENT & PLAN  59 yo male with cervical ddd, disc herniations, radicular pain, not resolved, and right knee pain due to arthritis, worse    I independently reviewed the following imaging  studies:  Right knee xray: shows arthritis  Discussed possible injection   Can followup PRN for knee injection  Reviewed cervical xrays: shows ddd  Discussed and ordered cervical MRI  Cont. Medications as written  followup after cervical MRI    Discussed lumbar pain and radicular pain, not resolved since injection a few months prior, wants to get referral to pain clinic for further evaluation and treatments  Patient has been doing home exercise physical therapy program for this problem      Appropriate PPE was utilized for prevention of spread of Covid-19.  Alvaro Moore MD, CAM

## 2023-12-13 NOTE — LETTER
12/13/2023         RE: Indra Mehta  [id number] [address] [place] [id number]        Dear Colleague,    Thank you for referring your patient, Indra Mehta, to the [facility] [place]. Please see a copy of my visit note below.    HISTORY OF PRESENT ILLNESS  Mr. Mehta is a pleasant 58 year old year old male who presents to clinic today with the following:  What problem are you here for? Right shoulder, right knee   And neck pain that radiates down arm, not resolving  Has cervical MRI scheduled for next week  How long have you had this problem? Chronic     Have you had any recent imaging of this problem? Xrays/MRI/CT scans? Yes     Have you had treatments for this problem in the past?  -Medications? Yes   -Physical therapy? yes  -Injections? yes  -Surgery? no    How severe is this problem today? 0-10 scale? Right shoulder:3 right knee: 3    How severe has this problem been at WORST in the past? 0-10 scale? Right shoulder: 9 right knee: 7    What do you think caused this problem? Na     Does this problem or its symptoms cause difficulty for you falling asleep or staying asleep? yes    Anything else you want us to know about this problem? no          MEDICAL HISTORY  Patient Active Problem List   Diagnosis     Meniere's disease     Major depression in complete remission (H)     Chronic pain syndrome     Pain medication agreement signed     Occipital neuralgia     Benign essential hypertension     Cervical radiculopathy     Sleep apnea, unspecified type     Osteoarthritis of spine with radiculopathy, lumbar region     Impingement syndrome of left shoulder     Primary osteoarthritis of both knees     Chronic bilateral low back pain without sciatica     Cervical myelopathy (H)     Chronic, continuous use of opioids     Idiopathic progressive polyneuropathy     Lumbar radiculopathy       Current Outpatient Medications   Medication Sig Dispense Refill     Acetaminophen (TYLENOL  PO) Take 1,000 mg by mouth 3 times daily       amLODIPine (NORVASC) 5 MG tablet TAKE 1 TABLET BY MOUTH EVERY DAY 90 tablet 3     atorvastatin (LIPITOR) 10 MG tablet Take 10 mg by mouth daily       celecoxib (CELEBREX) 100 MG capsule TAKE 1 CAPSULE (100 MG) BY MOUTH 2 TIMES DAILY 60 capsule 0     cyclobenzaprine (FLEXERIL) 5 MG tablet TAKE 1-2 TABLETS BY MOUTH 3TIMES DAILY AS NEEDED 90 tablet 2     losartan-hydrochlorothiazide (HYZAAR) 50-12.5 MG tablet TAKE 2 TABLETS BY MOUTH DAILY 180 tablet 1     methylPREDNISolone (MEDROL) 4 MG tablet therapy pack Follow Package Directions 21 tablet 0     methylPREDNISolone (MEDROL) 4 MG tablet therapy pack Follow Package Directions (Patient not taking: Reported on 2023) 21 tablet 0     naloxone (NARCAN) 4 MG/0.1ML nasal spray Spray 1 spray (4 mg) into one nostril alternating nostrils once as needed for opioid reversal every 2-3 minutes until assistance arrives (Patient not taking: Reported on 2023) 0.2 mL 1     nortriptyline (PAMELOR) 50 MG capsule Take 1 capsule (50 mg) by mouth at bedtime 90 capsule 1     order for DME Equipment being ordered: TENS Pads as directed 1 Device 0     oxyCODONE (ROXICODONE) 5 MG tablet Take 1-2 tablets (5-10 mg) by mouth every 6 hours as needed for severe pain Must last 30 days 165 tablet 0     pregabalin (LYRICA) 150 MG capsule TAKE 1 CAPSULE (150 MG) BY MOUTH DAILY 60 capsule 1     sertraline (ZOLOFT) 50 MG tablet Take 2 tablets (100 mg) by mouth daily 60 tablet 11     sildenafil (VIAGRA) 100 MG tablet Take 0.5-1 tablets ( mg) by mouth daily as needed Take 30 min to 4 hours before intercourse.  Never use with nitroglycerin, terazosin or doxazosin. 6 tablet 7       Allergies   Allergen Reactions     No Known Drug Allergy        Family History   Problem Relation Age of Onset     Diabetes Mother      Cancer Mother         colon cancer -  at 52, diag at 42     Hypertension Father      Heart Disease Father          of AAA      No Known Problems Brother      No Known Problems Brother      Depression Sister      Suicide Other      Unknown/Adopted Maternal Grandmother      Unknown/Adopted Maternal Grandfather      Unknown/Adopted Paternal Grandmother      Unknown/Adopted Paternal Grandfather      No Known Problems Daughter      No Known Problems Daughter      Social History     Socioeconomic History     Marital status: Single     Number of children: 2   Occupational History     Employer: [employer]   Tobacco Use     Smoking status: Never     Smokeless tobacco: Never   Vaping Use     Vaping Use: Never used   Substance and Sexual Activity     Alcohol use: Yes     Alcohol/week: 0.0 standard drinks of alcohol     Comment: rarely     Drug use: No     Sexual activity: Not Currently     Partners: Female   Other Topics Concern     Parent/sibling w/ CABG, MI or angioplasty before 65F 55M? No     Social Determinants of Health     Financial Resource Strain: Unknown (12/4/2023)    Financial Resource Strain      Within the past 12 months, have you or your family members you live with been unable to get utilities (heat, electricity) when it was really needed?: Patient refused   Food Insecurity: Unknown (12/4/2023)    Food Insecurity      Within the past 12 months, did you worry that your food would run out before you got money to buy more?: Patient refused      Within the past 12 months, did the food you bought just not last and you didn t have money to get more?: Patient refused   Transportation Needs: Unknown (12/4/2023)    Transportation Needs      Within the past 12 months, has lack of transportation kept you from medical appointments, getting your medicines, non-medical meetings or appointments, work, or from getting things that you need?: Patient refused   Interpersonal Safety: Low Risk  (9/21/2023)    Interpersonal Safety      Do you feel physically and emotionally safe where you currently live?: Yes      Within the past 12 months, have you  been hit, slapped, kicked or otherwise physically hurt by someone?: No      Within the past 12 months, have you been humiliated or emotionally abused in other ways by your partner or ex-partner?: No   Housing Stability: Unknown (12/4/2023)    Housing Stability      Do you have housing? : Patient refused      Are you worried about losing your housing?: Patient refused       Additional medical/Social/Surgical histories reviewed in [facility] and updated as appropriate.     REVIEW OF SYSTEMS (12/13/2023)  10 point ROS of systems including Constitutional, Eyes, Respiratory, Cardiovascular, Gastroenterology, Genitourinary, Integumentary, Musculoskeletal, Psychiatric, Allergic/Immunologic were all negative except for pertinent positives noted in my HPI.     PHYSICAL EXAM  VSS  General  - normal appearance, in no obvious distress  HEENT  - conjunctivae not injected, moist mucous membranes, normocephalic/atraumatic head, ears normal appearance, no lesions, mouth normal appearance, no scars, normal dentition and teeth present  CV  - normal peripheral perfusion  Pulm  - normal respiratory pattern, non-labored    Musculoskeletal - CERVICAL SPINE  - stance and posture: normal gait without limp, no obvious leg length discrepancy, normal heel and toe walk, normal balance, slightly forward shoulders, head balanced normally on trunk  - inspection: normal bone and joint alignment, no obvious kyphosis  - palpation: no paravertebral or bony tenderness, except at base of neck and trapezius muscles  - ROM: normal and painless flexion, extension, left and right sidebending and rotation with some discomfort  - strength: upper extremities 5/5 in all planes  - special tests:  (+) spurlings    Neuro  - biceps, triceps, supinator DTRs 2+ bilaterally, no sensory or motor deficit, grossly normal coordination, normal muscle tone  Skin  - no ecchymosis, erythema, warmth, or induration, no obvious rash  Psych  - interactive, appropriate, normal mood and  affect  Right knee: has pain with flexion, patellar compression, medial joint ttp  ASSESSMENT & PLAN  59 yo male with cervical ddd, disc herniations, radicular pain, not resolved, and right knee pain due to arthritis, worse    I independently reviewed the following imaging studies:  Right knee xray: shows arthritis  Discussed possible injection   Can followup PRN for knee injection  Reviewed cervical xrays: shows ddd  Discussed and ordered cervical MRI  Cont. Medications as written  followup after cervical MRI    Discussed lumbar pain and radicular pain, not resolved since injection a few months prior, wants to get referral to pain clinic for further evaluation and treatments  Patient has been doing home exercise physical therapy program for this problem      Appropriate PPE was utilized for prevention of spread of Covid-19.  Alvaro Moore MD, CAM        Again, thank you for allowing me to participate in the care of your patient.        Sincerely,        Alvaro Moore MD

## 2023-12-15 ENCOUNTER — TELEPHONE (OUTPATIENT)
Dept: PALLIATIVE MEDICINE | Facility: CLINIC | Age: 58
End: 2023-12-15
Payer: COMMERCIAL

## 2023-12-15 ENCOUNTER — TELEPHONE (OUTPATIENT)
Dept: ORTHOPEDICS | Facility: CLINIC | Age: 58
End: 2023-12-15
Payer: COMMERCIAL

## 2023-12-15 NOTE — TELEPHONE ENCOUNTER
12/15 Called and left voicemail, provided phone number [phone] to schedule pain management referral from dr. Moore with dr. Mcdaniel in [facility].     [facility]   Orthopedics, Podiatry, Sports Medicine, Ent ,Eye , Audiology, Adult Endocrine & Diabetes, Nutrition & Medication Therapy Management Specialties   [facility]          ----- Message from Alvaro Moore MD sent at 12/14/2023  3:59 PM CST -----  Regarding: the injection order is attached to pain clinic referral for procedure, please help schedule at [place] with Dr Mcdaniel  patient prefers to be scheduled with Dr Mcdaniel at [place]

## 2023-12-15 NOTE — TELEPHONE ENCOUNTER
Spoke with patient and got him scheduled for 1/19/24 @9274 with Dr. Mcdaniel for and cervical epidural steroid injection.

## 2023-12-18 ENCOUNTER — MYC REFILL (OUTPATIENT)
Dept: FAMILY MEDICINE | Facility: CLINIC | Age: 58
End: 2023-12-18
Payer: COMMERCIAL

## 2023-12-18 DIAGNOSIS — M54.2 CERVICALGIA: ICD-10-CM

## 2023-12-18 DIAGNOSIS — M54.16 LUMBAR RADICULOPATHY: ICD-10-CM

## 2023-12-18 DIAGNOSIS — M79.18 MYOFASCIAL PAIN: ICD-10-CM

## 2023-12-18 DIAGNOSIS — Z98.1 S/P CERVICAL SPINAL FUSION: ICD-10-CM

## 2023-12-18 DIAGNOSIS — F11.90 CHRONIC, CONTINUOUS USE OF OPIOIDS: ICD-10-CM

## 2023-12-18 DIAGNOSIS — G89.4 CHRONIC PAIN SYNDROME: ICD-10-CM

## 2023-12-18 RX ORDER — OXYCODONE HYDROCHLORIDE 5 MG/1
5-10 TABLET ORAL EVERY 6 HOURS PRN
Qty: 165 TABLET | Refills: 0 | Status: SHIPPED | OUTPATIENT
Start: 2023-12-22 | End: 2024-01-16

## 2023-12-19 ENCOUNTER — MYC MEDICAL ADVICE (OUTPATIENT)
Dept: FAMILY MEDICINE | Facility: CLINIC | Age: 58
End: 2023-12-19
Payer: COMMERCIAL

## 2023-12-21 ENCOUNTER — HOSPITAL ENCOUNTER (OUTPATIENT)
Dept: MRI IMAGING | Facility: CLINIC | Age: 58
Discharge: HOME | End: 2023-12-21
Attending: PREVENTIVE MEDICINE | Admitting: PREVENTIVE MEDICINE
Payer: COMMERCIAL

## 2023-12-21 DIAGNOSIS — M50.20 CERVICAL DISC HERNIATION: ICD-10-CM

## 2023-12-21 DIAGNOSIS — Z98.890 HX OF CERVICAL SPINE SURGERY: ICD-10-CM

## 2023-12-21 DIAGNOSIS — M50.30 DDD (DEGENERATIVE DISC DISEASE), CERVICAL: ICD-10-CM

## 2023-12-21 PROCEDURE — 72141 MRI NECK SPINE W/O DYE: CPT

## 2023-12-30 DIAGNOSIS — M25.511 CHRONIC RIGHT SHOULDER PAIN: ICD-10-CM

## 2023-12-30 DIAGNOSIS — G89.29 CHRONIC RIGHT SHOULDER PAIN: ICD-10-CM

## 2024-01-02 ENCOUNTER — MYC MEDICAL ADVICE (OUTPATIENT)
Dept: FAMILY MEDICINE | Facility: CLINIC | Age: 59
End: 2024-01-02
Payer: COMMERCIAL

## 2024-01-02 NOTE — TELEPHONE ENCOUNTER
Prescription refill requested for:   Celecoxib (CELEBREX) 100 MG capsule       Last Written Prescription Date:  11/28/23  Last Fill Quantity: 60,   # refills: 0  Last Office Visit: 12/13/23  Future Office visit:    Next 5 appointments (look out 90 days)      Jan 08, 2024 10:00 AM  (Arrive by 9:45 AM)  Return Visit with Alvaro Moore MD  [facility] [place] ([facility]) [address] [place] [id number] [phone]     Red 10, 2024  9:15 AM  (Arrive by 9:00 AM)  Return Visit with Edu Griggs MD  [facility] Neurology [facility] ([facility] ) [address] [place] [id number] [phone]                 Clinton Sanches, EMT

## 2024-01-04 RX ORDER — CELECOXIB 100 MG/1
100 CAPSULE ORAL 2 TIMES DAILY
Qty: 60 CAPSULE | Refills: 0 | Status: SHIPPED | OUTPATIENT
Start: 2024-01-04 | End: 2024-02-02

## 2024-01-08 ENCOUNTER — OFFICE VISIT (OUTPATIENT)
Dept: ORTHOPEDICS | Facility: CLINIC | Age: 59
End: 2024-01-08
Payer: COMMERCIAL

## 2024-01-08 DIAGNOSIS — M19.011 GLENOHUMERAL ARTHRITIS, RIGHT: ICD-10-CM

## 2024-01-08 DIAGNOSIS — G89.29 CHRONIC MIDLINE LOW BACK PAIN WITH SCIATICA, SCIATICA LATERALITY UNSPECIFIED: ICD-10-CM

## 2024-01-08 DIAGNOSIS — M51.369 DDD (DEGENERATIVE DISC DISEASE), LUMBAR: ICD-10-CM

## 2024-01-08 DIAGNOSIS — M54.40 CHRONIC MIDLINE LOW BACK PAIN WITH SCIATICA, SCIATICA LATERALITY UNSPECIFIED: ICD-10-CM

## 2024-01-08 DIAGNOSIS — M50.20 CERVICAL DISC HERNIATION: ICD-10-CM

## 2024-01-08 DIAGNOSIS — M17.11 ARTHRITIS OF RIGHT KNEE: ICD-10-CM

## 2024-01-08 DIAGNOSIS — M47.816 ARTHROPATHY OF LUMBAR FACET JOINT: Primary | ICD-10-CM

## 2024-01-08 DIAGNOSIS — Z98.890 HX OF CERVICAL SPINE SURGERY: ICD-10-CM

## 2024-01-08 DIAGNOSIS — M50.30 DDD (DEGENERATIVE DISC DISEASE), CERVICAL: ICD-10-CM

## 2024-01-08 PROCEDURE — 20610 DRAIN/INJ JOINT/BURSA W/O US: CPT | Mod: RT | Performed by: PREVENTIVE MEDICINE

## 2024-01-08 PROCEDURE — 20610 DRAIN/INJ JOINT/BURSA W/O US: CPT | Mod: XS | Performed by: PREVENTIVE MEDICINE

## 2024-01-08 PROCEDURE — 99214 OFFICE O/P EST MOD 30 MIN: CPT | Mod: 25 | Performed by: PREVENTIVE MEDICINE

## 2024-01-08 RX ADMIN — METHYLPREDNISOLONE ACETATE 40 MG: 40 INJECTION, SUSPENSION INTRA-ARTICULAR; INTRALESIONAL; INTRAMUSCULAR; SOFT TISSUE at 10:18

## 2024-01-08 RX ADMIN — TRIAMCINOLONE ACETONIDE 40 MG: 40 INJECTION, SUSPENSION INTRA-ARTICULAR; INTRAMUSCULAR at 10:18

## 2024-01-08 NOTE — PROGRESS NOTES
HISTORY OF PRESENT ILLNESS  Mr. Mehta is a pleasant 58 year old year old male who presents to clinic today with the following:  What problem are you here for? Right knee pain ,right shoulder   Not resolved  Wants to discuss injections for shoulder and knee today    How long have you had this problem? Chronic     Have you had any recent imaging of this problem? Xrays/MRI/CT scans? Yes     Have you had treatments for this problem in the past?  -Medications? yes  -Physical therapy? yes  -Injections? yes  -Surgery? no    How severe is this problem today? 0-10 scale? 7    How severe has this problem been at WORST in the past? 0-10 scale? 9    What do you think caused this problem? Na     Does this problem or its symptoms cause difficulty for you falling asleep or staying asleep? no    Anything else you want us to know about this problem? no          MEDICAL HISTORY  Patient Active Problem List   Diagnosis     Meniere's disease     Major depression in complete remission (H)     Chronic pain syndrome     Pain medication agreement signed     Occipital neuralgia     Benign essential hypertension     Cervical radiculopathy     Sleep apnea, unspecified type     Osteoarthritis of spine with radiculopathy, lumbar region     Impingement syndrome of left shoulder     Primary osteoarthritis of both knees     Chronic bilateral low back pain without sciatica     Cervical myelopathy (H)     Chronic, continuous use of opioids     Idiopathic progressive polyneuropathy     Lumbar radiculopathy       Current Outpatient Medications   Medication Sig Dispense Refill     Acetaminophen (TYLENOL PO) Take 1,000 mg by mouth 3 times daily       amLODIPine (NORVASC) 5 MG tablet TAKE 1 TABLET BY MOUTH EVERY DAY 90 tablet 3     atorvastatin (LIPITOR) 10 MG tablet Take 10 mg by mouth daily       celecoxib (CELEBREX) 100 MG capsule TAKE 1 CAPSULE (100 MG) BY MOUTH 2 TIMES DAILY 60 capsule 0     cyclobenzaprine (FLEXERIL) 5 MG tablet TAKE 1-2 TABLETS  BY MOUTH 3TIMES DAILY AS NEEDED 90 tablet 2     losartan-hydrochlorothiazide (HYZAAR) 50-12.5 MG tablet TAKE 2 TABLETS BY MOUTH DAILY 180 tablet 1     methylPREDNISolone (MEDROL) 4 MG tablet therapy pack Follow Package Directions 21 tablet 0     methylPREDNISolone (MEDROL) 4 MG tablet therapy pack Follow Package Directions (Patient not taking: Reported on 2023) 21 tablet 0     naloxone (NARCAN) 4 MG/0.1ML nasal spray Spray 1 spray (4 mg) into one nostril alternating nostrils once as needed for opioid reversal every 2-3 minutes until assistance arrives (Patient not taking: Reported on 2023) 0.2 mL 1     nortriptyline (PAMELOR) 50 MG capsule Take 1 capsule (50 mg) by mouth at bedtime 90 capsule 1     order for DME Equipment being ordered: TENS Pads as directed 1 Device 0     oxyCODONE (ROXICODONE) 5 MG tablet Take 1-2 tablets (5-10 mg) by mouth every 6 hours as needed for severe pain Must last 30 days 165 tablet 0     pregabalin (LYRICA) 150 MG capsule TAKE 1 CAPSULE (150 MG) BY MOUTH DAILY 60 capsule 1     sertraline (ZOLOFT) 50 MG tablet Take 2 tablets (100 mg) by mouth daily 60 tablet 11     sildenafil (VIAGRA) 100 MG tablet Take 0.5-1 tablets ( mg) by mouth daily as needed Take 30 min to 4 hours before intercourse.  Never use with nitroglycerin, terazosin or doxazosin. 6 tablet 7       Allergies   Allergen Reactions     No Known Drug Allergy        Family History   Problem Relation Age of Onset     Diabetes Mother      Cancer Mother         colon cancer -  at 52, diag at 42     Hypertension Father      Heart Disease Father          of AAA     No Known Problems Brother      No Known Problems Brother      Depression Sister      Suicide Other      Unknown/Adopted Maternal Grandmother      Unknown/Adopted Maternal Grandfather      Unknown/Adopted Paternal Grandmother      Unknown/Adopted Paternal Grandfather      No Known Problems Daughter      No Known Problems Daughter      Social History      Socioeconomic History     Marital status: Single     Number of children: 2   Occupational History     Employer: [employer]   Tobacco Use     Smoking status: Never     Smokeless tobacco: Never   Vaping Use     Vaping Use: Never used   Substance and Sexual Activity     Alcohol use: Yes     Alcohol/week: 0.0 standard drinks of alcohol     Comment: rarely     Drug use: No     Sexual activity: Not Currently     Partners: Female   Other Topics Concern     Parent/sibling w/ CABG, MI or angioplasty before 65F 55M? No     Social Determinants of Health     Financial Resource Strain: Unknown (12/4/2023)    Financial Resource Strain      Within the past 12 months, have you or your family members you live with been unable to get utilities (heat, electricity) when it was really needed?: Patient refused   Food Insecurity: Unknown (12/4/2023)    Food Insecurity      Within the past 12 months, did you worry that your food would run out before you got money to buy more?: Patient refused      Within the past 12 months, did the food you bought just not last and you didn t have money to get more?: Patient refused   Transportation Needs: Unknown (12/4/2023)    Transportation Needs      Within the past 12 months, has lack of transportation kept you from medical appointments, getting your medicines, non-medical meetings or appointments, work, or from getting things that you need?: Patient refused   Interpersonal Safety: Low Risk  (9/21/2023)    Interpersonal Safety      Do you feel physically and emotionally safe where you currently live?: Yes      Within the past 12 months, have you been hit, slapped, kicked or otherwise physically hurt by someone?: No      Within the past 12 months, have you been humiliated or emotionally abused in other ways by your partner or ex-partner?: No   Housing Stability: Unknown (12/4/2023)    Housing Stability      Do you have housing? : Patient refused      Are you worried about losing your housing?:  Patient refused       Additional medical/Social/Surgical histories reviewed in [facility] and updated as appropriate.     REVIEW OF SYSTEMS (1/8/2024)  10 point ROS of systems including Constitutional, Eyes, Respiratory, Cardiovascular, Gastroenterology, Genitourinary, Integumentary, Musculoskeletal, Psychiatric, Allergic/Immunologic were all negative except for pertinent positives noted in my HPI.     PHYSICAL EXAM  VSS    General  - normal appearance, in no obvious distress  HEENT  - conjunctivae not injected, moist mucous membranes, normocephalic/atraumatic head, ears normal appearance, no lesions, mouth normal appearance, no scars, normal dentition and teeth present  CV  - normal radial pulse  Pulm  - normal respiratory pattern, non-labored  Musculoskeletal - right shoulder  - inspection: normal bone and joint alignment, no obvious deformity, no scapular winging, no AC step-off  - palpation: mildly tender RC insertion, normal clavicle, non-tender AC  - ROM:  painful and limited flexion and ER at end range, limited IR  - strength: 5/5  strength, 5/5 in all shoulder planes  - special tests:  (-) Speed's  (+) Neer  (+) Hawkin's  (+) Tran's  (-) Codington's  (-) apprehension  (-) subscap lift-off  Neuro  - no sensory or motor deficit, grossly normal coordination, normal muscle tone  Skin  - no ecchymosis, erythema, warmth, or induration, no obvious rash  Psych  - interactive, appropriate, normal mood and affect  Right knee: has some pain with flexion/extension, pain with patellar compression  ASSESSMENT & PLAN  57 yo male with right knee arthritis, worse, and right shoulder rotator cuff arthropathy, and glenohumeral arthritis worse    I independently reviewed the following imaging studies:  Right knee xray: shows arthritis  Cervical MRI: shows ddd, disc herniations  Lumbar MRI; shows ddd, disc herniations  Discussed and ordered injection for lumbar and cervical  Right shoulder xray shows arthritis  After a 20 minute  discussion and examination, we decided to perform a same day injection for diagnostic and therapeutic purposes for  Right shoulder and right knee  Discussed and ordered cervical and lumbar injections for pain clinic per his request  Patient has been doing home exercise physical therapy program for this problem      Appropriate PPE was utilized for prevention of spread of Covid-19.  Alvaro Moore MD, [facility]    PROCEDURE:       right     Knee joint injection   The patient was apprised of the risks and the benefits of the procedure and consented and a written consent was signed.   The patient s  KNEE was sterilely prepped with chloraprep.   40 mg of triamcinolone suspension was drawn up into a 5 mL syringe with 4 mL of 1% lidocaine  The patient was injected into the knee with a 1.5-inch 22-gauge needle at the_superiorlateral aspect of their knee joint  There were no complications. The patient tolerated the procedure well. There was minimal bleeding.   The patient was instructed to ice the knee upon leaving clinic and refrain from overuse over the next 3 days.   The patient was instructed to go to the emergency room with any usual pain, swelling, or redness occurred in the injected area.   The patient was given a followup appointment to evaluate response to the injection to their increased range of motion and reduction of pain.  PROCEDURE: right SHOULDER subacromial bursa injection     The patient was apprised of the risks and the benefits of the procedure and consented and a written consent was signed by the patient.   The patient s shoulder was sterilely prepped with chloraprep.   40 mg of depo suspension was drawn up into a 5 mL syringe with 4 mL of 1% lidocaine   The patient was injected with a 1.5-inch 22-gauge needle at lateral gleno-humeral joint in the subacromial space  There were no complications. The patient tolerated the procedure well. There was minimal bleeding.   The patient was instructed to ice the  shoulder upon leaving clinic and refrain from overuse over the next 3 days.   The patient was instructed to go to the emergency room with any usual pain, swelling, or redness occurred in the injected area.   The patient was given a followup appointment to evaluate response to the injection to their increased range of motion and reduction of pain.    followup PRN  Dr Alvaro Moore        Large Joint Injection/Arthocentesis: R subacromial bursa    Date/Time: 2024 10:18 AM    Performed by: Alvaro Moore MD  Authorized by: Alvaro Moore MD    Indications:  Osteoarthritis and pain  Needle Size:  22 G  Guidance: landmark guided    Approach:  Anterolateral  Location:  Shoulder      Site:  R subacromial bursa  Medications:  40 mg methylPREDNISolone 40 MG/ML  Outcome:  Tolerated well, no immediate complications  Procedure discussed: discussed risks, benefits, and alternatives    Consent Given by:  Patient  Timeout: timeout called immediately prior to procedure    Prep: patient was prepped and draped in usual sterile fashion    Large Joint Injection/Arthocentesis: R knee joint    Date/Time: 2024 10:18 AM    Performed by: Alvaro Moore MD  Authorized by: Alvaro Moore MD    Indications:  Pain and osteoarthritis  Needle Size:  22 G  Guidance: landmark guided    Approach:  Superolateral  Location:  Knee      Medications:  40 mg triamcinolone 40 MG/ML  Outcome:  Tolerated well, no immediate complications  Procedure discussed: discussed risks, benefits, and alternatives    Consent Given by:  Patient  Prep: patient was prepped and draped in usual sterile fashion          [facility]   [facility]  [id number] [place] [address]  Dept: [phone]  ______________________________________________________________________________    Patient: Indra Hollowayclovis   : 1965   MRN: [id number]   2024    INVASIVE PROCEDURE SAFETY CHECKLIST    Date:  24  Procedure:right knee, right shoulder injection  Patient Name: Indra Mehta  MRN: [id number]  YOB: 1965    Action: Complete sections as appropriate. Any discrepancy results in a HARD COPY until resolved.     PRE PROCEDURE:  Patient ID verified with 2 identifiers (name and  or MRN): Yes  Procedure and site verified with patient/designee (when able): Yes  Accurate consent documentation in medical record: Yes  H&P (or appropriate assessment) documented in medical record: Yes  H&P must be up to 20 days prior to procedure and updates within 24 hours of procedure as applicable: Yes  Relevant diagnostic and radiology test results appropriately labeled and displayed as applicable: Yes  Procedure site(s) marked with provider initials: Yes    TIMEOUT:  Time-Out performed immediately prior to starting procedure, including verbal and active participation of all team members addressing the following:Yes  * Correct patient identify  * Confirmed that the correct side and site are marked  * An accurate procedure consent form  * Agreement on the procedure to be done  * Correct patient position  * Relevant images and results are properly labeled and appropriately displayed  * The need to administer antibiotics or fluids for irrigation purposes during the procedure as applicable   * Safety precautions based on patient history or medication use    DURING PROCEDURE: Verification of correct person, site, and procedures any time the responsibility for care of the patient is transferred to another member of the care team.       Prior to injection, verified patient identity using patient's name and date of birth.  Due to injection administration, patient instructed to remain in clinic for 15 minutes  afterwards, and to report any adverse reaction to me immediately.    Joint injection was performed.      Drug Amount Wasted:  None.  Vial/Syringe: Single dose vial  Expiration Date:  25,25      Clinton Sanches  EMT  January 8, 2024

## 2024-01-08 NOTE — LETTER
1/8/2024         RE: Indra Mehta  [id number] [address] [place] [id number]        Dear Colleague,    Thank you for referring your patient, Indra Mehta, to the [facility] [place]. Please see a copy of my visit note below.    HISTORY OF PRESENT ILLNESS  Mr. Mehta is a pleasant 58 year old year old male who presents to clinic today with the following:  What problem are you here for? Right knee pain ,right shoulder   Not resolved  Wants to discuss injections for shoulder and knee today    How long have you had this problem? Chronic     Have you had any recent imaging of this problem? Xrays/MRI/CT scans? Yes     Have you had treatments for this problem in the past?  -Medications? yes  -Physical therapy? yes  -Injections? yes  -Surgery? no    How severe is this problem today? 0-10 scale? 7    How severe has this problem been at WORST in the past? 0-10 scale? 9    What do you think caused this problem? Na     Does this problem or its symptoms cause difficulty for you falling asleep or staying asleep? no    Anything else you want us to know about this problem? no          MEDICAL HISTORY  Patient Active Problem List   Diagnosis     Meniere's disease     Major depression in complete remission (H)     Chronic pain syndrome     Pain medication agreement signed     Occipital neuralgia     Benign essential hypertension     Cervical radiculopathy     Sleep apnea, unspecified type     Osteoarthritis of spine with radiculopathy, lumbar region     Impingement syndrome of left shoulder     Primary osteoarthritis of both knees     Chronic bilateral low back pain without sciatica     Cervical myelopathy (H)     Chronic, continuous use of opioids     Idiopathic progressive polyneuropathy     Lumbar radiculopathy       Current Outpatient Medications   Medication Sig Dispense Refill     Acetaminophen (TYLENOL PO) Take 1,000 mg by mouth 3 times daily       amLODIPine (NORVASC) 5 MG tablet  TAKE 1 TABLET BY MOUTH EVERY DAY 90 tablet 3     atorvastatin (LIPITOR) 10 MG tablet Take 10 mg by mouth daily       celecoxib (CELEBREX) 100 MG capsule TAKE 1 CAPSULE (100 MG) BY MOUTH 2 TIMES DAILY 60 capsule 0     cyclobenzaprine (FLEXERIL) 5 MG tablet TAKE 1-2 TABLETS BY MOUTH 3TIMES DAILY AS NEEDED 90 tablet 2     losartan-hydrochlorothiazide (HYZAAR) 50-12.5 MG tablet TAKE 2 TABLETS BY MOUTH DAILY 180 tablet 1     methylPREDNISolone (MEDROL) 4 MG tablet therapy pack Follow Package Directions 21 tablet 0     methylPREDNISolone (MEDROL) 4 MG tablet therapy pack Follow Package Directions (Patient not taking: Reported on 2023) 21 tablet 0     naloxone (NARCAN) 4 MG/0.1ML nasal spray Spray 1 spray (4 mg) into one nostril alternating nostrils once as needed for opioid reversal every 2-3 minutes until assistance arrives (Patient not taking: Reported on 2023) 0.2 mL 1     nortriptyline (PAMELOR) 50 MG capsule Take 1 capsule (50 mg) by mouth at bedtime 90 capsule 1     order for DME Equipment being ordered: TENS Pads as directed 1 Device 0     oxyCODONE (ROXICODONE) 5 MG tablet Take 1-2 tablets (5-10 mg) by mouth every 6 hours as needed for severe pain Must last 30 days 165 tablet 0     pregabalin (LYRICA) 150 MG capsule TAKE 1 CAPSULE (150 MG) BY MOUTH DAILY 60 capsule 1     sertraline (ZOLOFT) 50 MG tablet Take 2 tablets (100 mg) by mouth daily 60 tablet 11     sildenafil (VIAGRA) 100 MG tablet Take 0.5-1 tablets ( mg) by mouth daily as needed Take 30 min to 4 hours before intercourse.  Never use with nitroglycerin, terazosin or doxazosin. 6 tablet 7       Allergies   Allergen Reactions     No Known Drug Allergy        Family History   Problem Relation Age of Onset     Diabetes Mother      Cancer Mother         colon cancer -  at 52, diag at 42     Hypertension Father      Heart Disease Father          of AAA     No Known Problems Brother      No Known Problems Brother      Depression Sister       Suicide Other      Unknown/Adopted Maternal Grandmother      Unknown/Adopted Maternal Grandfather      Unknown/Adopted Paternal Grandmother      Unknown/Adopted Paternal Grandfather      No Known Problems Daughter      No Known Problems Daughter      Social History     Socioeconomic History     Marital status: Single     Number of children: 2   Occupational History     Employer: [employer]   Tobacco Use     Smoking status: Never     Smokeless tobacco: Never   Vaping Use     Vaping Use: Never used   Substance and Sexual Activity     Alcohol use: Yes     Alcohol/week: 0.0 standard drinks of alcohol     Comment: rarely     Drug use: No     Sexual activity: Not Currently     Partners: Female   Other Topics Concern     Parent/sibling w/ CABG, MI or angioplasty before 65F 55M? No     Social Determinants of Health     Financial Resource Strain: Unknown (12/4/2023)    Financial Resource Strain      Within the past 12 months, have you or your family members you live with been unable to get utilities (heat, electricity) when it was really needed?: Patient refused   Food Insecurity: Unknown (12/4/2023)    Food Insecurity      Within the past 12 months, did you worry that your food would run out before you got money to buy more?: Patient refused      Within the past 12 months, did the food you bought just not last and you didn t have money to get more?: Patient refused   Transportation Needs: Unknown (12/4/2023)    Transportation Needs      Within the past 12 months, has lack of transportation kept you from medical appointments, getting your medicines, non-medical meetings or appointments, work, or from getting things that you need?: Patient refused   Interpersonal Safety: Low Risk  (9/21/2023)    Interpersonal Safety      Do you feel physically and emotionally safe where you currently live?: Yes      Within the past 12 months, have you been hit, slapped, kicked or otherwise physically hurt by someone?: No      Within  the past 12 months, have you been humiliated or emotionally abused in other ways by your partner or ex-partner?: No   Housing Stability: Unknown (12/4/2023)    Housing Stability      Do you have housing? : Patient refused      Are you worried about losing your housing?: Patient refused       Additional medical/Social/Surgical histories reviewed in [facility] and updated as appropriate.     REVIEW OF SYSTEMS (1/8/2024)  10 point ROS of systems including Constitutional, Eyes, Respiratory, Cardiovascular, Gastroenterology, Genitourinary, Integumentary, Musculoskeletal, Psychiatric, Allergic/Immunologic were all negative except for pertinent positives noted in my HPI.     PHYSICAL EXAM  VSS    General  - normal appearance, in no obvious distress  HEENT  - conjunctivae not injected, moist mucous membranes, normocephalic/atraumatic head, ears normal appearance, no lesions, mouth normal appearance, no scars, normal dentition and teeth present  CV  - normal radial pulse  Pulm  - normal respiratory pattern, non-labored  Musculoskeletal - shoulder  - inspection: normal bone and joint alignment, no obvious deformity, no scapular winging, no AC step-off  - palpation: mildly tender RC insertion, normal clavicle, non-tender AC  - ROM:  painful and limited flexion and ER at end range, limited IR  - strength: 5/5  strength, 5/5 in all shoulder planes  - special tests:  (-) Speed's  (+) Neer  (+) Hawkin's  (+) Tran's  (-) [place]'s  (-) apprehension  (-) subscap lift-off  Neuro  - no sensory or motor deficit, grossly normal coordination, normal muscle tone  Skin  - no ecchymosis, erythema, warmth, or induration, no obvious rash  Psych  - interactive, appropriate, normal mood and affect  Right knee: has some pain with flexion/extension, pain with patellar compression  ASSESSMENT & PLAN  59 yo male with right knee arthritis, worse, and left shoulder rotator cuff arthropathy, and glenohumeral arthritis worse    I independently reviewed  the following imaging studies:  Right knee xray: shows arthritis  Cervical MRI: shows ddd, disc herniations  Lumbar MRI; shows ddd, disc herniations  Discussed and ordered injection for lumbar and cervical  Right shoulder xray shows arthritis  After a 20 minute discussion and examination, we decided to perform a same day injection for diagnostic and therapeutic purposes for  Right shoulder and right knee  Discussed and ordered cervical and lumbar injections for pain clinic per his request  Patient has been doing home exercise physical therapy program for this problem      Appropriate PPE was utilized for prevention of spread of Covid-19.  Alvaro Moore MD, CAQSM    PROCEDURE:       right     Knee joint injection   The patient was apprised of the risks and the benefits of the procedure and consented and a written consent was signed.   The patient s  KNEE was sterilely prepped with chloraprep.   40 mg of triamcinolone suspension was drawn up into a 5 mL syringe with 4 mL of 1% lidocaine  The patient was injected into the knee with a 1.5-inch 22-gauge needle at the_superiorlateral aspect of their knee joint  There were no complications. The patient tolerated the procedure well. There was minimal bleeding.   The patient was instructed to ice the knee upon leaving clinic and refrain from overuse over the next 3 days.   The patient was instructed to go to the emergency room with any usual pain, swelling, or redness occurred in the injected area.   The patient was given a followup appointment to evaluate response to the injection to their increased range of motion and reduction of pain.  PROCEDURE: right SHOULDER subacromial bursa injection     The patient was apprised of the risks and the benefits of the procedure and consented and a written consent was signed by the patient.   The patient s shoulder was sterilely prepped with chloraprep.   40 mg of depo suspension was drawn up into a 5 mL syringe with 4 mL of 1%  lidocaine   The patient was injected with a 1.5-inch 22-gauge needle at lateral gleno-humeral joint in the subacromial space  There were no complications. The patient tolerated the procedure well. There was minimal bleeding.   The patient was instructed to ice the shoulder upon leaving clinic and refrain from overuse over the next 3 days.   The patient was instructed to go to the emergency room with any usual pain, swelling, or redness occurred in the injected area.   The patient was given a followup appointment to evaluate response to the injection to their increased range of motion and reduction of pain.    followup PRN  Dr Alvaro Moore        Large Joint Injection/Arthocentesis: R subacromial bursa    Date/Time: 1/8/2024 10:18 AM    Performed by: Alvaro Moore MD  Authorized by: Alvaro Moore MD    Indications:  Osteoarthritis and pain  Needle Size:  22 G  Guidance: landmark guided    Approach:  Anterolateral  Location:  Shoulder      Site:  R subacromial bursa  Medications:  40 mg methylPREDNISolone 40 MG/ML  Outcome:  Tolerated well, no immediate complications  Procedure discussed: discussed risks, benefits, and alternatives    Consent Given by:  Patient  Timeout: timeout called immediately prior to procedure    Prep: patient was prepped and draped in usual sterile fashion    Large Joint Injection/Arthocentesis: R knee joint    Date/Time: 1/8/2024 10:18 AM    Performed by: Alvaro Moore MD  Authorized by: Alvaro Moore MD    Indications:  Pain and osteoarthritis  Needle Size:  22 G  Guidance: landmark guided    Approach:  Superolateral  Location:  Knee      Medications:  40 mg triamcinolone 40 MG/ML  Outcome:  Tolerated well, no immediate complications  Procedure discussed: discussed risks, benefits, and alternatives    Consent Given by:  Patient  Prep: patient was prepped and draped in usual sterile fashion          [facility]   ORTHOPED[facility] & [facility]IC[facility]  [id number]  [address] [place] [address]  Dept: [phone]  ______________________________________________________________________________    Patient: Indra Mehta   : 1965   MRN: [id number]   2024    INVASIVE PROCEDURE SAFETY CHECKLIST    Date: 24  Procedure:right knee, right shoulder injection  Patient Name: Indra Mehta  MRN: [id number]  YOB: 1965    Action: Complete sections as appropriate. Any discrepancy results in a HARD COPY until resolved.     PRE PROCEDURE:  Patient ID verified with 2 identifiers (name and  or MRN): Yes  Procedure and site verified with patient/designee (when able): Yes  Accurate consent documentation in medical record: Yes  H&P (or appropriate assessment) documented in medical record: Yes  H&P must be up to 20 days prior to procedure and updates within 24 hours of procedure as applicable: Yes  Relevant diagnostic and radiology test results appropriately labeled and displayed as applicable: Yes  Procedure site(s) marked with provider initials: Yes    TIMEOUT:  Time-Out performed immediately prior to starting procedure, including verbal and active participation of all team members addressing the following:Yes  * Correct patient identify  * Confirmed that the correct side and site are marked  * An accurate procedure consent form  * Agreement on the procedure to be done  * Correct patient position  * Relevant images and results are properly labeled and appropriately displayed  * The need to administer antibiotics or fluids for irrigation purposes during the procedure as applicable   * Safety precautions based on patient history or medication use    DURING PROCEDURE: Verification of correct person, site, and procedures any time the responsibility for care of the patient is transferred to another member of the care team.       Prior to injection, verified patient identity using patient's name and date of birth.  Due to injection administration,  patient instructed to remain in clinic for 15 minutes  afterwards, and to report any adverse reaction to me immediately.    Joint injection was performed.      Drug Amount Wasted:  None.  Vial/Syringe: Single dose vial  Expiration Date:  8/1/25,5/1/25      DMITRIY Umaña  January 8, 2024      Again, thank you for allowing me to participate in the care of your patient.        Sincerely,        Alvaro Moore MD

## 2024-01-08 NOTE — PROGRESS NOTES
NEUROLOGY FOLLOW UP VISIT  NOTE       [facility] NEUROLOGY [place]  [address] [place] [address]  Tel: [phone]  Fax: [phone]  [email or web address]     Indra Mehta,  1965, MRN [id number]  PCP: Tha Grullon  Date: 01/10/2024      ASSESSMENT & PLAN     Visit Diagnosis  Pseudodementia  Idiopathic progressive polyneuropathy     Pseudodementia  58-year-old male with history of HTN, sleep apnea, major depression who was initially evaluated on 9/15/2023 for cognitive decline and a seizure-like activity.  He scored 28/30 on MoCA and workup for cognitive decline including MRI brain, EEG and lab work were normal.  He was informed that his cognitive issues likely stem from underlying mental health issues and he was previously on nortriptyline switching to Zoloft and the dose was increased to 100 mg daily.  He has noticed improvement in his memory.  Follow-up will be in 1 year    Thank you again for this referral, please feel free to contact me if you have any questions.    Edu Griggs MD  [facility]  (Formerly, [facility], ..)     HISTORY OF PRESENT ILLNESS     Patient is a 58-year-old male with HTN, sleep apnea, major depression, occipital neuralgia, idiopathic polyneuropathy who was initially evaluated on 9/15/2023 for neurocognitive decline and seizure-like activity.  He scored 28/30 on MoCA and workup included normal MRI brain, EEG and lab work for common causes of cognitive decline.  He was informed that his cognitive struggles likely are due to underlying mental health issues.  Previously he was on nortriptyline but I recommended switching to Zoloft he noticed some improvement and Dr. Grullon increased the dose to 100 mg daily.  He does not think there was any side effects and feels his memory is improving.    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  No etiology was found and he was treated symptomatically with Lyrica and dose was gradually escalated.  According to patient he had cervical spine surgery in 2017, 2018 and 2019 and had C5-C7 fusion.  Prior to that starting in 2012 he started having bilateral feet paresthesias that he described as pins and needle at times he would experience burning sharp shooting pain along with vasomotor changes who was initially tried on gabapentin and subsequently switched to Lyrica. He had an EMG done that showed length-dependent sensorimotor axonal polyneuropathy with superimposed chronic right L5 and left L4 radiculopathy. Subsequently autonomic testing was done that was abnormal suggesting autonomic involvement.     PROBLEM LIST   Patient Active Problem List   Diagnosis Code    Meniere's disease H81.09    Major depression in complete remission (H) F32.9    Chronic pain syndrome G89.4    Pain medication agreement signed Z02.89    Occipital neuralgia M54.81    Benign essential hypertension I10    Cervical radiculopathy M54.12    Sleep apnea, unspecified type G47.30    Osteoarthritis of spine with radiculopathy, lumbar region M47.26    Impingement syndrome of left shoulder M75.42    Primary osteoarthritis of both knees M17.0    Chronic bilateral low back pain without sciatica M54.50, G89.29    Cervical myelopathy (H) G95.9    Chronic, continuous use of opioids F11.90    Idiopathic progressive polyneuropathy G60.3    Lumbar radiculopathy M54.16         PAST MEDICAL & SURGICAL HISTORY     Past Medical History:   Patient  has a past medical history of Anxiety, Back pain, Depressive disorder, Hyperlipidemia, Hypertension, Meniere's disease, unspecified, Pain medication agreement signed (08/20/2010), Sleep apnea, unspecified type, and Status post lumbar spinal fusion (01/14/2020).    Surgical History:  He  has a past surgical history that includes  Colonoscopy w/wo Brush **Performed** (12/27/2005); pe tubes (2006); MASTOIDECTOMY,COMPLETE (09/27/2007); CREATE EARDRUM OPENING,GEN ANESTH (09/27/2007); Colonoscopy (N/A, 12/28/2016); bunionectomy rt/lt (09/05/08); Discectomy, fusion cervical anterior one level, combined (N/A, 7/5/2017); Herniorrhaphy umbilical (N/A, 8/11/2017); Inject epidural lumbar (N/A, 11/9/2017); Inject epidural lumbar (N/A, 12/28/2017); Inject epidural transforaminal (Bilateral, 8/23/2018); Inject epidural transforaminal (Bilateral, 11/8/2018); Discectomy, fusion cervical anterior one level, combined (N/A, 12/19/2018); Inject epidural transforaminal (Bilateral, 6/14/2019); Inject epidural cervical (N/A, 8/22/2019); Inject epidural transforaminal (Bilateral, 5/22/2020); Inject epidural transforaminal (Bilateral, 9/24/2020); Inject epidural lumbar (Bilateral, 5/13/2021); Inject block medial branch cervical/thoracic/lumbar (Bilateral, 8/12/2021); Inject block medial branch cervical/thoracic/lumbar (N/A, 9/9/2021); Radio Frequency Ablation Pulsed Cervical (Bilateral, 10/1/2021); Colonoscopy (N/A, 9/27/2022); Inject joint sacroiliac (Bilateral, 10/13/2022); Inject epidural cervical (Left, 3/9/2023); and Inject epidural cervical (N/A, 8/18/2023).     SOCIAL HISTORY     Reviewed, and he  reports that he has never smoked. He has never used smokeless tobacco. He reports current alcohol use. He reports that he does not use drugs.     FAMILY HISTORY     Reviewed, and family history includes Cancer in his mother; Depression in his sister; Diabetes in his mother; Heart Disease in his father; Hypertension in his father; No Known Problems in his brother, brother, daughter, and daughter; Suicide in an other family member; Unknown/Adopted in his maternal grandfather, maternal grandmother, paternal grandfather, and paternal grandmother.     ALLERGIES     No Known Allergies        REVIEW OF SYSTEMS     A 12 point review of system was performed and was negative  except as outlined in the history of present illness.     HOME MEDICATIONS     Current Outpatient Rx   Medication Sig Dispense Refill    Acetaminophen (TYLENOL PO) Take 1,000 mg by mouth 3 times daily      amLODIPine (NORVASC) 5 MG tablet TAKE 1 TABLET BY MOUTH EVERY DAY 90 tablet 3    atorvastatin (LIPITOR) 10 MG tablet Take 10 mg by mouth daily      celecoxib (CELEBREX) 100 MG capsule TAKE 1 CAPSULE (100 MG) BY MOUTH 2 TIMES DAILY 60 capsule 0    cyclobenzaprine (FLEXERIL) 5 MG tablet TAKE 1-2 TABLETS BY MOUTH 3TIMES DAILY AS NEEDED 90 tablet 2    losartan-hydrochlorothiazide (HYZAAR) 50-12.5 MG tablet TAKE 2 TABLETS BY MOUTH DAILY 180 tablet 1    methylPREDNISolone (MEDROL) 4 MG tablet therapy pack Follow Package Directions 21 tablet 0    methylPREDNISolone (MEDROL) 4 MG tablet therapy pack Follow Package Directions 21 tablet 0    naloxone (NARCAN) 4 MG/0.1ML nasal spray Spray 1 spray (4 mg) into one nostril alternating nostrils once as needed for opioid reversal every 2-3 minutes until assistance arrives 0.2 mL 1    nortriptyline (PAMELOR) 50 MG capsule Take 1 capsule (50 mg) by mouth at bedtime 90 capsule 1    order for DME Equipment being ordered: TENS Pads as directed 1 Device 0    oxyCODONE (ROXICODONE) 5 MG tablet Take 1-2 tablets (5-10 mg) by mouth every 6 hours as needed for severe pain Must last 30 days 165 tablet 0    pregabalin (LYRICA) 150 MG capsule TAKE 1 CAPSULE (150 MG) BY MOUTH DAILY 60 capsule 1    sertraline (ZOLOFT) 50 MG tablet Take 2 tablets (100 mg) by mouth daily 60 tablet 11    sildenafil (VIAGRA) 100 MG tablet Take 0.5-1 tablets ( mg) by mouth daily as needed Take 30 min to 4 hours before intercourse.  Never use with nitroglycerin, terazosin or doxazosin. 6 tablet 7         PHYSICAL EXAM     Vital signs  /75 (BP Location: Left arm, Patient Position: Sitting)   Pulse 91   Ht 1.829 m (6')   Wt 99.8 kg (220 lb)   BMI 29.84 kg/m      Weight:   220 lbs 0 oz    Patient is alert  and oriented x4 in no acute distress. Vital signs were reviewed and are documented in electronic medical record. Neck was supple, no carotid bruits, thyromegaly, JVD, or lymphadenopathy was noted.   NEUROLOGY EXAM:   Patient s speech was normal with no aphasia or dysarthria. Mentation, and affect were also normal.    Funduscopic exam was normal, with normal cup to disc ratio. Cranial nerves II -XII were intact.    Patient had normal mass, tone and motor strength was 5/5 in all extremities without pronator drift.    Sensation was intact to light touch, pinprick, and vibratory sensation.    Reflexes were 1+ symmetrical with downgoing toes.    No dysmetria noted on FNF or HKS. Romberg was negative.   Gait testing was normal. Able to walk on toes/heels. Tandem walk normal.      PERTINENT DIAGNOSTIC STUDIES     Following studies were reviewed:     MRI BRAIN 9/21/2023  1.  No acute/subacute infarcts, mass lesions, hydrocephalus or MRI evidence of intracranial hemorrhage. No abnormal enhancement.  2.  Mild age-related diffuse cerebral parenchymal volume loss. Presumed chronic hypertensive/microvascular ischemic white matter changes.     MRI CERVICAL SPINE 10/13/2022  1. Postoperative and diffuse degenerative change of the cervical spine  as detailed above.  2. No spinal canal stenosis.  3. Moderate neural foraminal stenosis on the right at C3-C4 and on the  left at C7-T1.     MRI LUMBAR SPINE 3/17/2022  1. Diffuse degenerative change of the lumbar spine as detailed above  without appreciable change from the recent comparison study.  2. No significant spinal canal or neural foraminal stenosis at any  level of the lumbar spine.     EMG 12/15/2020  This is an abnormal study.  There is electrodiagnostic evidence of a   length-dependent, sensorimotor axonal peripheral neuropathy and superimposed old or chronic right   L5 and left L4 radiculopathy without active denervation.     AUTONOMIC TESTING 12/17/2020  Abnormal study.  There is evidence of length-dependent postganglionic   sympathetic sudomotor and mild cardiovagal impairment while cardiovascular   adrenergic function is normal. These findings can be seen in autonomic   involvement in the patient's known peripheral neuropathy. Residual   medication effects (nortriptyline) cannot be excluded.     EEG 10/10/2023  Normal awake and drowsy     PERTINENT LABS  Following labs were reviewed:  No visits with results within 3 Month(s) from this visit.   Latest known visit with results is:   Lab on 09/15/2023   Component Date Value Ref Range Status    Folic Acid 09/15/2023 6.0  4.6 - 34.8 ng/mL Final    Methylmalonic Acid 09/15/2023 0.21  0.00 - 0.40 umol/L Final    Treponema Antibody Total 09/15/2023 Nonreactive  Nonreactive Final    TSH 09/15/2023 0.42  0.30 - 4.20 uIU/mL Final    Vitamin B1 Whole Blood Level 09/15/2023 128  70 - 180 nmol/L Final    Vitamin B12 09/15/2023 465  232 - 1,245 pg/mL Final         Total time spent for face to face visit, reviewing labs/imaging studies, counseling and coordination of care was: 20 Minutes spent on the date of the encounter doing chart review, review of outside records, review of test results, interpretation of tests, patient visit, and documentation     This note was dictated using voice recognition software.  Any grammatical or context distortions are unintentional and inherent to the software.    No orders of the defined types were placed in this encounter.     New Prescriptions    No medications on file     Modified Medications    No medications on file

## 2024-01-09 RX ORDER — TRIAMCINOLONE ACETONIDE 40 MG/ML
40 INJECTION, SUSPENSION INTRA-ARTICULAR; INTRAMUSCULAR
Status: SHIPPED | OUTPATIENT
Start: 2024-01-08

## 2024-01-09 RX ORDER — METHYLPREDNISOLONE ACETATE 40 MG/ML
40 INJECTION, SUSPENSION INTRA-ARTICULAR; INTRALESIONAL; INTRAMUSCULAR; SOFT TISSUE
Status: SHIPPED | OUTPATIENT
Start: 2024-01-08

## 2024-01-10 ENCOUNTER — OFFICE VISIT (OUTPATIENT)
Dept: NEUROLOGY | Facility: CLINIC | Age: 59
End: 2024-01-10
Payer: COMMERCIAL

## 2024-01-10 VITALS
DIASTOLIC BLOOD PRESSURE: 75 MMHG | HEART RATE: 91 BPM | WEIGHT: 220 LBS | HEIGHT: 72 IN | SYSTOLIC BLOOD PRESSURE: 105 MMHG | BODY MASS INDEX: 29.8 KG/M2

## 2024-01-10 DIAGNOSIS — R41.89 PSEUDODEMENTIA: Primary | ICD-10-CM

## 2024-01-10 DIAGNOSIS — G60.3 IDIOPATHIC PROGRESSIVE POLYNEUROPATHY: ICD-10-CM

## 2024-01-10 PROCEDURE — 99213 OFFICE O/P EST LOW 20 MIN: CPT | Performed by: PSYCHIATRY & NEUROLOGY

## 2024-01-10 NOTE — NURSING NOTE
Chief Complaint   Patient presents with    Pseudodementia     3 month follow up     Fiorella Stack CMA on 1/10/2024 at 9:13 AM  [facility]

## 2024-01-10 NOTE — LETTER
1/10/2024         RE: Indra Mehta  [address] [facility] [address]        Dear Colleague,    Thank you for referring your patient, Indra Mehta, to the [facility] [place]. Please see a copy of my visit note below.    NEUROLOGY FOLLOW UP VISIT  NOTE       [facility] NEUROLOGY [place]  [address] [place] [address]  Tel: [phone]  Fax: [phone]  [email or web address]     Indar Mehta,  1965, MRN [id number]  PCP: Tha Grullon  Date: 01/10/2024      ASSESSMENT & PLAN     Visit Diagnosis  Pseudodementia  Idiopathic progressive polyneuropathy     Pseudodementia  58-year-old male with history of HTN, sleep apnea, major depression who was initially evaluated on 9/15/2023 for cognitive decline and a seizure-like activity.  He scored 28/30 on MoCA and workup for cognitive decline including MRI brain, EEG and lab work were normal.  He was informed that his cognitive issues likely stem from underlying mental health issues and he was previously on nortriptyline switching to Zoloft and the dose was increased to 100 mg daily.  He has noticed improvement in his memory.  Follow-up will be in 1 year    Thank you again for this referral, please feel free to contact me if you have any questions.    Edu Griggs MD  [facility]  (Former[facility], [facility]     HISTORY OF PRESENT ILLNESS     Patient is a 58-year-old male with HTN, sleep apnea, major depression, occipital neuralgia, idiopathic polyneuropathy who was initially evaluated on 9/15/2023 for neurocognitive decline and seizure-like activity.  He scored 28/30 on MoCA and workup included normal MRI brain, EEG and lab work for common causes of cognitive decline.  He was informed that his cognitive struggles likely are due to underlying mental health issues.  Previously he was on nortriptyline but I recommended switching to Zoloft he  noticed some improvement and Dr. Grullon increased the dose to 100 mg daily.  He does not think there was any side effects and feels his memory is improving.    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  No etiology was found and he was treated symptomatically with Lyrica and dose was gradually escalated.  According to patient he had cervical spine surgery in 2017, 2018 and 2019 and had C5-C7 fusion.  Prior to that starting in 2012 he started having bilateral feet paresthesias that he described as pins and needle at times he would experience burning sharp shooting pain along with vasomotor changes who was initially tried on gabapentin and subsequently switched to Lyrica. He had an EMG done that showed length-dependent sensorimotor axonal polyneuropathy with superimposed chronic right L5 and left L4 radiculopathy. Subsequently autonomic testing was done that was abnormal suggesting autonomic involvement.     PROBLEM LIST   Patient Active Problem List   Diagnosis Code     Meniere's disease H81.09     Major depression in complete remission (H) F32.9     Chronic pain syndrome G89.4     Pain medication agreement signed Z02.89     Occipital neuralgia M54.81     Benign essential hypertension I10     Cervical radiculopathy M54.12     Sleep apnea, unspecified type G47.30     Osteoarthritis of spine with radiculopathy, lumbar region M47.26     Impingement syndrome of left shoulder M75.42     Primary osteoarthritis of both knees M17.0     Chronic bilateral low back pain without sciatica M54.50, G89.29     Cervical myelopathy (H) G95.9     Chronic, continuous use of opioids F11.90     Idiopathic progressive polyneuropathy G60.3     Lumbar radiculopathy M54.16         PAST MEDICAL & SURGICAL HISTORY     Past Medical History:   Patient  has a past medical history of Anxiety, Back pain,  Depressive disorder, Hyperlipidemia, Hypertension, Meniere's disease, unspecified, Pain medication agreement signed (08/20/2010), Sleep apnea, unspecified type, and Status post lumbar spinal fusion (01/14/2020).    Surgical History:  He  has a past surgical history that includes Colonoscopy w/wo Brush **Performed** (12/27/2005); pe tubes (2006); MASTOIDECTOMY,COMPLETE (09/27/2007); CREATE EARDRUM OPENING,GEN ANESTH (09/27/2007); Colonoscopy (N/A, 12/28/2016); bunionectomy rt/lt (09/05/08); Discectomy, fusion cervical anterior one level, combined (N/A, 7/5/2017); Herniorrhaphy umbilical (N/A, 8/11/2017); Inject epidural lumbar (N/A, 11/9/2017); Inject epidural lumbar (N/A, 12/28/2017); Inject epidural transforaminal (Bilateral, 8/23/2018); Inject epidural transforaminal (Bilateral, 11/8/2018); Discectomy, fusion cervical anterior one level, combined (N/A, 12/19/2018); Inject epidural transforaminal (Bilateral, 6/14/2019); Inject epidural cervical (N/A, 8/22/2019); Inject epidural transforaminal (Bilateral, 5/22/2020); Inject epidural transforaminal (Bilateral, 9/24/2020); Inject epidural lumbar (Bilateral, 5/13/2021); Inject block medial branch cervical/thoracic/lumbar (Bilateral, 8/12/2021); Inject block medial branch cervical/thoracic/lumbar (N/A, 9/9/2021); Radio Frequency Ablation Pulsed Cervical (Bilateral, 10/1/2021); Colonoscopy (N/A, 9/27/2022); Inject joint sacroiliac (Bilateral, 10/13/2022); Inject epidural cervical (Left, 3/9/2023); and Inject epidural cervical (N/A, 8/18/2023).     SOCIAL HISTORY     Reviewed, and he  reports that he has never smoked. He has never used smokeless tobacco. He reports current alcohol use. He reports that he does not use drugs.     FAMILY HISTORY     Reviewed, and family history includes Cancer in his mother; Depression in his sister; Diabetes in his mother; Heart Disease in his father; Hypertension in his father; No Known Problems in his brother, brother, daughter, and  daughter; Suicide in an other family member; Unknown/Adopted in his maternal grandfather, maternal grandmother, paternal grandfather, and paternal grandmother.     ALLERGIES     No Known Allergies        REVIEW OF SYSTEMS     A 12 point review of system was performed and was negative except as outlined in the history of present illness.     HOME MEDICATIONS     Current Outpatient Rx   Medication Sig Dispense Refill     Acetaminophen (TYLENOL PO) Take 1,000 mg by mouth 3 times daily       amLODIPine (NORVASC) 5 MG tablet TAKE 1 TABLET BY MOUTH EVERY DAY 90 tablet 3     atorvastatin (LIPITOR) 10 MG tablet Take 10 mg by mouth daily       celecoxib (CELEBREX) 100 MG capsule TAKE 1 CAPSULE (100 MG) BY MOUTH 2 TIMES DAILY 60 capsule 0     cyclobenzaprine (FLEXERIL) 5 MG tablet TAKE 1-2 TABLETS BY MOUTH 3TIMES DAILY AS NEEDED 90 tablet 2     losartan-hydrochlorothiazide (HYZAAR) 50-12.5 MG tablet TAKE 2 TABLETS BY MOUTH DAILY 180 tablet 1     methylPREDNISolone (MEDROL) 4 MG tablet therapy pack Follow Package Directions 21 tablet 0     methylPREDNISolone (MEDROL) 4 MG tablet therapy pack Follow Package Directions 21 tablet 0     naloxone (NARCAN) 4 MG/0.1ML nasal spray Spray 1 spray (4 mg) into one nostril alternating nostrils once as needed for opioid reversal every 2-3 minutes until assistance arrives 0.2 mL 1     nortriptyline (PAMELOR) 50 MG capsule Take 1 capsule (50 mg) by mouth at bedtime 90 capsule 1     order for DME Equipment being ordered: TENS Pads as directed 1 Device 0     oxyCODONE (ROXICODONE) 5 MG tablet Take 1-2 tablets (5-10 mg) by mouth every 6 hours as needed for severe pain Must last 30 days 165 tablet 0     pregabalin (LYRICA) 150 MG capsule TAKE 1 CAPSULE (150 MG) BY MOUTH DAILY 60 capsule 1     sertraline (ZOLOFT) 50 MG tablet Take 2 tablets (100 mg) by mouth daily 60 tablet 11     sildenafil (VIAGRA) 100 MG tablet Take 0.5-1 tablets ( mg) by mouth daily as needed Take 30 min to 4 hours  before intercourse.  Never use with nitroglycerin, terazosin or doxazosin. 6 tablet 7         PHYSICAL EXAM     Vital signs  /75 (BP Location: Left arm, Patient Position: Sitting)   Pulse 91   Ht 1.829 m (6')   Wt 99.8 kg (220 lb)   BMI 29.84 kg/m      Weight:   220 lbs 0 oz    Patient is alert and oriented x4 in no acute distress. Vital signs were reviewed and are documented in electronic medical record. Neck was supple, no carotid bruits, thyromegaly, JVD, or lymphadenopathy was noted.   NEUROLOGY EXAM:    Patient s speech was normal with no aphasia or dysarthria. Mentation, and affect were also normal.     Funduscopic exam was normal, with normal cup to disc ratio. Cranial nerves II -XII were intact.     Patient had normal mass, tone and motor strength was 5/5 in all extremities without pronator drift.     Sensation was intact to light touch, pinprick, and vibratory sensation.     Reflexes were 1+ symmetrical with downgoing toes.     No dysmetria noted on FNF or HKS. Romberg was negative.    Gait testing was normal. Able to walk on toes/heels. Tandem walk normal.      PERTINENT DIAGNOSTIC STUDIES     Following studies were reviewed:     MRI BRAIN 9/21/2023  1.  No acute/subacute infarcts, mass lesions, hydrocephalus or MRI evidence of intracranial hemorrhage. No abnormal enhancement.  2.  Mild age-related diffuse cerebral parenchymal volume loss. Presumed chronic hypertensive/microvascular ischemic white matter changes.     MRI CERVICAL SPINE 10/13/2022  1. Postoperative and diffuse degenerative change of the cervical spine  as detailed above.  2. No spinal canal stenosis.  3. Moderate neural foraminal stenosis on the right at C3-C4 and on the  left at C7-T1.     MRI LUMBAR SPINE 3/17/2022  1. Diffuse degenerative change of the lumbar spine as detailed above  without appreciable change from the recent comparison study.  2. No significant spinal canal or neural foraminal stenosis at any  level of the  lumbar spine.     EMG 12/15/2020  This is an abnormal study.  There is electrodiagnostic evidence of a   length-dependent, sensorimotor axonal peripheral neuropathy and superimposed old or chronic right   L5 and left L4 radiculopathy without active denervation.     AUTONOMIC TESTING 12/17/2020  Abnormal study. There is evidence of length-dependent postganglionic   sympathetic sudomotor and mild cardiovagal impairment while cardiovascular   adrenergic function is normal. These findings can be seen in autonomic   involvement in the patient's known peripheral neuropathy. Residual   medication effects (nortriptyline) cannot be excluded.     EEG 10/10/2023  Normal awake and drowsy     PERTINENT LABS  Following labs were reviewed:  No visits with results within 3 Month(s) from this visit.   Latest known visit with results is:   Lab on 09/15/2023   Component Date Value Ref Range Status     Folic Acid 09/15/2023 6.0  4.6 - 34.8 ng/mL Final     Methylmalonic Acid 09/15/2023 0.21  0.00 - 0.40 umol/L Final     Treponema Antibody Total 09/15/2023 Nonreactive  Nonreactive Final     TSH 09/15/2023 0.42  0.30 - 4.20 uIU/mL Final     Vitamin B1 Whole Blood Level 09/15/2023 128  70 - 180 nmol/L Final     Vitamin B12 09/15/2023 465  232 - 1,245 pg/mL Final         Total time spent for face to face visit, reviewing labs/imaging studies, counseling and coordination of care was: 20 Minutes spent on the date of the encounter doing chart review, review of outside records, review of test results, interpretation of tests, patient visit, and documentation     This note was dictated using voice recognition software.  Any grammatical or context distortions are unintentional and inherent to the software.    No orders of the defined types were placed in this encounter.     New Prescriptions    No medications on file     Modified Medications    No medications on file                 Again, thank you for allowing me to participate in the care of  your patient.        Sincerely,        Edu Griggs MD

## 2024-01-15 ENCOUNTER — OFFICE VISIT (OUTPATIENT)
Dept: FAMILY MEDICINE | Facility: CLINIC | Age: 59
End: 2024-01-15
Payer: COMMERCIAL

## 2024-01-15 ENCOUNTER — MYC MEDICAL ADVICE (OUTPATIENT)
Dept: FAMILY MEDICINE | Facility: CLINIC | Age: 59
End: 2024-01-15

## 2024-01-15 VITALS
DIASTOLIC BLOOD PRESSURE: 70 MMHG | WEIGHT: 210.2 LBS | TEMPERATURE: 97.8 F | HEART RATE: 90 BPM | SYSTOLIC BLOOD PRESSURE: 114 MMHG | OXYGEN SATURATION: 97 % | RESPIRATION RATE: 14 BRPM | BODY MASS INDEX: 28.51 KG/M2

## 2024-01-15 DIAGNOSIS — F33.1 MODERATE EPISODE OF RECURRENT MAJOR DEPRESSIVE DISORDER (H): ICD-10-CM

## 2024-01-15 DIAGNOSIS — F41.8 DEPRESSION WITH ANXIETY: ICD-10-CM

## 2024-01-15 DIAGNOSIS — M46.1 SACROILIITIS, NOT ELSEWHERE CLASSIFIED (H): ICD-10-CM

## 2024-01-15 DIAGNOSIS — Z01.818 PREOP GENERAL PHYSICAL EXAM: Primary | ICD-10-CM

## 2024-01-15 DIAGNOSIS — R41.89 PSEUDODEMENTIA: ICD-10-CM

## 2024-01-15 DIAGNOSIS — R56.9 SEIZURE-LIKE ACTIVITY (H): ICD-10-CM

## 2024-01-15 DIAGNOSIS — G95.9 CERVICAL MYELOPATHY (H): ICD-10-CM

## 2024-01-15 PROBLEM — F33.9 MAJOR DEPRESSION, RECURRENT (H): Status: ACTIVE | Noted: 2024-01-15

## 2024-01-15 PROCEDURE — 99214 OFFICE O/P EST MOD 30 MIN: CPT | Performed by: FAMILY MEDICINE

## 2024-01-15 NOTE — PATIENT INSTRUCTIONS
Preparing for Your Surgery  Getting started  A nurse will call you to review your health history and instructions. They will give you an arrival time based on your scheduled surgery time. Please be ready to share:  Your doctor's clinic name and phone number  Your medical, surgical, and anesthesia history  A list of allergies and sensitivities  A list of medicines, including herbal treatments and over-the-counter drugs  Whether the patient has a legal guardian (ask how to send us the papers in advance)  Please tell us if you're pregnant--or if there's any chance you might be pregnant. Some surgeries may injure a fetus (unborn baby), so they require a pregnancy test. Surgeries that are safe for a fetus don't always need a test, and you can choose whether to have one.   If you have a child who's having surgery, please ask for a copy of Preparing for Your Child's Surgery.    Preparing for surgery  Within 10 to 30 days of surgery: Have a pre-op exam (sometimes called an H&P, or History and Physical). This can be done at a clinic or pre-operative center.  If you're having a , you may not need this exam. Talk to your care team.  At your pre-op exam, talk to your care team about all medicines you take. If you need to stop any medicines before surgery, ask when to start taking them again.  We do this for your safety. Many medicines can make you bleed too much during surgery. Some change how well surgery (anesthesia) drugs work.  Call your insurance company to let them know you're having surgery. (If you don't have insurance, call [phone].)  Call your clinic if there's any change in your health. This includes signs of a cold or flu (sore throat, runny nose, cough, rash, fever). It also includes a scrape or scratch near the surgery site.  If you have questions on the day of surgery, call your hospital or surgery center.  Eating and drinking guidelines  For your safety: Unless your surgeon tells you otherwise,  follow the guidelines below.  Eat and drink as usual until 8 hours before you arrive for surgery. After that, no food or milk.  Drink clear liquids until 2 hours before you arrive. These are liquids you can see through, like water, Gatorade, and Propel Water. They also include plain black coffee and tea (no cream or milk), candy, and breath mints. You can spit out gum when you arrive.  If you drink alcohol: Stop drinking it the night before surgery.  If your care team tells you to take medicine on the morning of surgery, it's okay to take it with a sip of water.  Preventing infection  Shower or bathe the night before and morning of your surgery. Follow the instructions your clinic gave you. (If no instructions, use regular soap.)  Don't shave or clip hair near your surgery site. We'll remove the hair if needed.  Don't smoke or vape the morning of surgery. You may chew nicotine gum up to 2 hours before surgery. A nicotine patch is okay.  Note: Some surgeries require you to completely quit smoking and nicotine. Check with your surgeon.  Your care team will make every effort to keep you safe from infection. We will:  Clean our hands often with soap and water (or an alcohol-based hand rub).  Clean the skin at your surgery site with a special soap that kills germs.  Give you a special gown to keep you warm. (Cold raises the risk of infection.)  Wear special hair covers, masks, gowns and gloves during surgery.  Give antibiotic medicine, if prescribed. Not all surgeries need antibiotics.  What to bring on the day of surgery  Photo ID and insurance card  Copy of your health care directive, if you have one  Glasses and hearing aids (bring cases)  You can't wear contacts during surgery  Inhaler and eye drops, if you use them (tell us about these when you arrive)  CPAP machine or breathing device, if you use them  A few personal items, if spending the night  If you have . . .  A pacemaker, ICD (cardiac defibrillator) or other  implant: Bring the ID card.  An implanted stimulator: Bring the remote control.  A legal guardian: Bring a copy of the certified (court-stamped) guardianship papers.  Please remove any jewelry, including body piercings. Leave jewelry and other valuables at home.  If you're going home the day of surgery  You must have a responsible adult drive you home. They should stay with you overnight as well.  If you don't have someone to stay with you, and you aren't safe to go home alone, we may keep you overnight. Insurance often won't pay for this.  After surgery  If it's hard to control your pain or you need more pain medicine, please call your surgeon's office.  Questions?   If you have any questions for your care team, list them here: _________________________________________________________________________________________________________________________________________________________________________ ____________________________________ ____________________________________ ____________________________________  For informational purposes only. Not to replace the advice of your health care provider. Copyright   2003, 2019 [facility], [place]. All rights reserved. Clinically reviewed by Karina Haq MD. [employer] 233739 - REV 12/22.    How to Take Your Medication Before Surgery  - Take all of your medications before surgery as usual

## 2024-01-15 NOTE — PROGRESS NOTES
[address] [address]  Phone: [phone]  Fax: [phone]  Primary Provider: Gary Bello  Pre-op Performing Provider: GARY BELLO      PREOPERATIVE EVALUATION:  Today's date: 1/15/2024    Ketan is a 58 year old, presenting for the following:  Pre-Op Exam        1/15/2024     1:52 PM   Additional Questions   Roomed by [email or web address]       Surgical Information:  Surgery/Procedure: Epidural Steroid Injection Cervical   Surgery Location: [facility]   Surgeon: Violeta   Surgery Date: 1/19/2024  Time of Surgery: 4:30 pm   Where patient plans to recover: At home with family  Fax number for surgical facility: Note does not need to be faxed, will be available electronically in Epic.    Assessment & Plan     The proposed surgical procedure is considered LOW risk.      ICD-10-CM    1. Preop general physical exam  Z01.818       2. Cervical myelopathy (H)  G95.9       3. Seizure-like activity (H)  R56.9       4. Sacroiliitis, not elsewhere classified (H24)  M46.1       5. Pseudodementia  R41.89 sertraline (ZOLOFT) 50 MG tablet      6. Depression with anxiety  F41.8 sertraline (ZOLOFT) 50 MG tablet      7. Moderate episode of recurrent major depressive disorder (H)  F33.1                    Risks and Recommendations:  The patient has the following additional risks and recommendations for perioperative complications:  Social and Substance:    - Patient is taking medications for chronic pain    Antiplatelet or Anticoagulation Medication Instructions:   - Patient is on no antiplatelet or anticoagulation medications.    Additional Medication Instructions:   - ACE/ARB: HOLD on day of surgery (minimum 11 hours for general anesthesia).   - Statins: Continue taking on the day of surgery.     RECOMMENDATION:  APPROVAL GIVEN to proceed with proposed procedure, without further diagnostic evaluation.            Subjective       HPI related to upcoming procedure: Indra Mehta is  a 58 year old male who presents with chronic cervical disc disease with prior fusion for repeat cervical SRIDEVI         1/15/2024     1:52 PM   Preop Questions   1. Have you ever had a heart attack or stroke? No   2. Have you ever had surgery on your heart or blood vessels, such as a stent placement, a coronary artery bypass, or surgery on an artery in your head, neck, heart, or legs? No   3. Do you have chest pain with activity? No   4. Do you have a history of  heart failure? No   5. Do you currently have a cold, bronchitis or symptoms of other infection? No   6. Do you have a cough, shortness of breath, or wheezing? No   7. Do you or anyone in your family have previous history of blood clots? No   8. Do you or does anyone in your family have a serious bleeding problem such as prolonged bleeding following surgeries or cuts? No   9. Have you ever had problems with anemia or been told to take iron pills? No   10. Have you had any abnormal blood loss such as black, tarry or bloody stools? No   11. Have you ever had a blood transfusion? No   12. Are you willing to have a blood transfusion if it is medically needed before, during, or after your surgery? Yes   13. Have you or any of your relatives ever had problems with anesthesia? No   14. Do you have sleep apnea, excessive snoring or daytime drowsiness? No   15. Do you have any artifical heart valves or other implanted medical devices like a pacemaker, defibrillator, or continuous glucose monitor? No   16. Do you have artificial joints? No   17. Are you allergic to latex? No       Health Care Directive:  Patient does not have a Health Care Directive or Living Will: Discussed advance care planning with patient; however, patient declined at this time.    Preoperative Review of :   reviewed - controlled substances reflected in medication list.      Status of Chronic Conditions:  See problem list for active medical problems.  Problems all longstanding and stable,  except as noted/documented.  See ROS for pertinent symptoms related to these conditions.    Review of Systems  CONSTITUTIONAL: NEGATIVE for fever, chills, change in weight  ENT/MOUTH: NEGATIVE for ear, mouth and throat problems  RESP: NEGATIVE for significant cough or SOB  CV: NEGATIVE for chest pain, palpitations or peripheral edema  ROS otherwise negative    Patient Active Problem List    Diagnosis Date Noted    Idiopathic progressive polyneuropathy 09/14/2023     Priority: Medium    Lumbar radiculopathy 09/14/2023     Priority: Medium    Chronic, continuous use of opioids 06/15/2023     Priority: Medium    Cervical myelopathy (H) 06/13/2022     Priority: Medium    Chronic bilateral low back pain without sciatica 05/08/2022     Priority: Medium    Primary osteoarthritis of both knees 05/04/2022     Priority: Medium    Impingement syndrome of left shoulder 06/23/2021     Priority: Medium    Sleep apnea, unspecified type 04/06/2021     Priority: Medium    Osteoarthritis of spine with radiculopathy, lumbar region 04/06/2021     Priority: Medium    Cervical radiculopathy 10/28/2019     Priority: Medium     Formatting of this note might be different from the original.  Added automatically from request for surgery [id number]      Benign essential hypertension 06/30/2017     Priority: Medium    Occipital neuralgia 10/28/2016     Priority: Medium    Major depression in complete remission (H) 08/20/2010     Priority: Medium    Chronic pain syndrome 08/20/2010     Priority: Medium     Patient is followed by Tha Grullon MD for ongoing prescription of pain medication.  All refills should be approved by this provider only at face-to-face appointments - not by phone request.    Medication(s): Tramadol.   Maximum quantity per month: 60  Clinic visit frequency required: Q 1 months     Controlled substance agreement:  Encounter-Level CSA - 10/13/16:               Controlled Substance Agreement - Scan on 10/23/2016  5:07 PM :  CONTROLLED SUBSTANCE AGREEMENT 10/13/16 (below)            Pain Clinic evaluation in the past: Yes       Date/Location:   [facility]    DIRE Total Score(s):  No flowsheet data found.    Last [facility] website verification:  none   [email or web address]              Pain medication agreement signed 08/20/2010     Priority: Medium    Meniere's disease 03/22/2007     Priority: Medium     Problem list name updated by automated process. Provider to review        Past Medical History:   Diagnosis Date    Anxiety     Back pain     Depressive disorder     Hyperlipidemia     Hypertension     Meniere's disease, unspecified     Pain medication agreement signed 08/20/2010    Sleep apnea, unspecified type     Status post lumbar spinal fusion 01/14/2020     Past Surgical History:   Procedure Laterality Date    BUNIONECTOMY RT/LT  09/05/08    Both feet    COLONOSCOPY N/A 12/28/2016    Procedure: COMBINED COLONOSCOPY, SINGLE OR MULTIPLE BIOPSY/POLYPECTOMY BY BIOPSY;  Surgeon: Indra Jernigan MD;  Location: [facility]    COLONOSCOPY N/A 9/27/2022    Procedure: COLONOSCOPY, FLEXIBLE, WITH LESION REMOVAL USING SNARE;  Surgeon: Pablo Ferris MD;  Location[facility]    DISCECTOMY, FUSION CERVICAL ANTERIOR ONE LEVEL, COMBINED N/A 7/5/2017    Procedure: COMBINED DISCECTOMY, FUSION CERVICAL ANTERIOR ONE LEVEL;  Cervical 6-7, Anterior cervical discectomy fusion;  Surgeon: Mike Mckenna MD;  [facility]    DISCECTOMY, FUSION CERVICAL ANTERIOR ONE LEVEL, COMBINED N/A 12/19/2018    Procedure: cervical 5-6 anterior cervical discectomy fusion;  Surgeon: Mike Mckenna MD;  [facility]    HERNIORRHAPHY UMBILICAL N/A 8/11/2017    Procedure: HERNIORRHAPHY UMBILICAL;  open umbilical hernia repair;  Surgeon: Boni Story MD;  [facility]    INJECT BLOCK MEDIAL BRANCH CERVICAL/THORACIC/LUMBAR Bilateral 8/12/2021    Procedure: Left L3, Left L4, Left L5, Right L3, Right L4 and Right L5 medial branch nerve blocks  using fluoroscopic guidance and contrast control;  Surgeon: Darryn Mcdaniel MD;  [facility]    INJECT BLOCK MEDIAL BRANCH CERVICAL/THORACIC/LUMBAR N/A 9/9/2021    Procedure: Left L3, Left L4, Left L5, Right L3, Right L4 and Right L5 medial branch nerve blocks using fluoroscopic guidance and contrast control;  Surgeon: Darryn Mcdaniel MD;  [facility]    INJECT EPIDURAL CERVICAL N/A 8/22/2019    Procedure: INJECTION, SPINE, CERVICAL 6-7 EPIDURAL;  Surgeon: Darryn Mcdaniel MD;  [facility]    INJECT EPIDURAL CERVICAL Left 3/9/2023    Procedure: Cervical 7-Thoracic 1 Interlaminar epidural steroid injection using fluoroscopic guidance with contrast dye, Left;  Surgeon: Darryn Mcdaniel MD;  [facility]    INJECT EPIDURAL CERVICAL N/A 8/18/2023    Procedure: Cervical 7-Thoracic 1 Interlaminar epidural steroid injection using fluoroscopic guidance with contrast dye.;  Surgeon: Darryn Mcdaniel MD;  [facility]    INJECT EPIDURAL LUMBAR N/A 11/9/2017    Procedure: INJECT EPIDURAL LUMBAR;  lumbar 4-5 epidural steroid injection ;  Surgeon: Darryn Mcdaniel MD;  [facility]    INJECT EPIDURAL LUMBAR N/A 12/28/2017    Procedure: INJECT EPIDURAL LUMBAR;  lumbar epidural injection;  Surgeon: Darryn Mcdaniel MD;  [facility]    INJECT EPIDURAL LUMBAR Bilateral 5/13/2021    Procedure: Bilateral Lumbar 3-4 Epidural Steroid Injection;  Surgeon: Darryn Mcdaniel MD;  [facility]    INJECT EPIDURAL TRANSFORAMINAL Bilateral 8/23/2018    Procedure: INJECT EPIDURAL TRANSFORAMINAL;  transforaminal bilateral lumbar 3-4 steroid injection;  Surgeon: Darryn Mcdaniel MD;  [facility]    INJECT EPIDURAL TRANSFORAMINAL Bilateral 11/8/2018    Procedure: INJECT EPIDURAL TRANSFORAMINAL lumbar 3-4;  Surgeon: Darryn Mcdaniel MD;  [facility]    INJECT EPIDURAL TRANSFORAMINAL Bilateral 6/14/2019    Procedure: INJECTION, EPIDURAL, TRANSFORAMINAL LUMBAR 3-4 BILATERAL;  Surgeon: Darryn Mcdaniel MD;  [facility]     INJECT EPIDURAL TRANSFORAMINAL Bilateral 5/22/2020    Procedure: lumbar 3-4 bilateral transforaminal epidural steroid injection;  Surgeon: Darryn Mcdaniel MD;  [facility]    INJECT EPIDURAL TRANSFORAMINAL Bilateral 9/24/2020    Procedure: INJECTION, EPIDURAL, TRANSFORAMINAL  LUMBAR 3-4 APPROACH;  Surgeon: Darryn Mcdaniel MD;  [facility]    INJECT JOINT SACROILIAC Bilateral 10/13/2022    Procedure: Fluoroscopically-guided injection of the Bilateral sacroiliac joints with Bilateral kerrie Sacroiliac joint ligaments infiltration over the sacrum;  Surgeon: Darryn Mcdaniel MD;  [facility]    PE TUBES  2006    left ear    RADIO FREQUENCY ABLATION PULSED CERVICAL Bilateral 10/1/2021    Procedure: RADIOFREQUENCY ABLATION lumbar 3, 4, 5 bilateral;  Surgeon: Darryn Mcdaniel MD;  [facility]    ZZHC COLONOSCOPY W/WO BRUSH/WASH  12/27/2005    ZZHC CREATE EARDRUM OPENING,GEN ANESTH  09/27/2007    Pe tube, Left endolymphatic sac enhancement.    ZZHC MASTOIDECTOMY,COMPLETE  09/27/2007     Current Outpatient Medications   Medication Sig Dispense Refill    Acetaminophen (TYLENOL PO) Take 1,000 mg by mouth 3 times daily      amLODIPine (NORVASC) 5 MG tablet TAKE 1 TABLET BY MOUTH EVERY DAY 90 tablet 3    atorvastatin (LIPITOR) 10 MG tablet Take 10 mg by mouth daily      celecoxib (CELEBREX) 100 MG capsule TAKE 1 CAPSULE (100 MG) BY MOUTH 2 TIMES DAILY 60 capsule 0    cyclobenzaprine (FLEXERIL) 5 MG tablet TAKE 1-2 TABLETS BY MOUTH 3TIMES DAILY AS NEEDED 90 tablet 2    losartan-hydrochlorothiazide (HYZAAR) 50-12.5 MG tablet TAKE 2 TABLETS BY MOUTH DAILY 180 tablet 1    methylPREDNISolone (MEDROL) 4 MG tablet therapy pack Follow Package Directions 21 tablet 0    methylPREDNISolone (MEDROL) 4 MG tablet therapy pack Follow Package Directions 21 tablet 0    naloxone (NARCAN) 4 MG/0.1ML nasal spray Spray 1 spray (4 mg) into one nostril alternating nostrils once as needed for opioid reversal every 2-3 minutes until  assistance arrives 0.2 mL 1    nortriptyline (PAMELOR) 50 MG capsule Take 1 capsule (50 mg) by mouth at bedtime 90 capsule 1    order for DME Equipment being ordered: TENS Pads as directed 1 Device 0    oxyCODONE (ROXICODONE) 5 MG tablet Take 1-2 tablets (5-10 mg) by mouth every 6 hours as needed for severe pain Must last 30 days 165 tablet 0    pregabalin (LYRICA) 150 MG capsule TAKE 1 CAPSULE (150 MG) BY MOUTH DAILY 60 capsule 1    sertraline (ZOLOFT) 50 MG tablet Take 2 tablets (100 mg) by mouth daily 60 tablet 11    sildenafil (VIAGRA) 100 MG tablet Take 0.5-1 tablets ( mg) by mouth daily as needed Take 30 min to 4 hours before intercourse.  Never use with nitroglycerin, terazosin or doxazosin. 6 tablet 7       No Known Allergies     Social History     Tobacco Use    Smoking status: Never    Smokeless tobacco: Never   Substance Use Topics    Alcohol use: Yes     Alcohol/week: 0.0 standard drinks of alcohol     Comment: rarely     Family History   Problem Relation Age of Onset    Diabetes Mother     Cancer Mother         colon cancer -  at 52, diag at 42    Hypertension Father     Heart Disease Father          of AAA    No Known Problems Brother     No Known Problems Brother     Depression Sister     Suicide Other     Unknown/Adopted Maternal Grandmother     Unknown/Adopted Maternal Grandfather     Unknown/Adopted Paternal Grandmother     Unknown/Adopted Paternal Grandfather     No Known Problems Daughter     No Known Problems Daughter      History   Drug Use No         Objective     /70   Pulse 90   Temp 97.8  F (36.6  C)   Resp 14   Wt 95.3 kg (210 lb 3.2 oz)   SpO2 97%   BMI 28.51 kg/m      Physical Exam  GENERAL APPEARANCE: healthy, alert and no distress  HENT: ear canals and TM's normal and nose and mouth without ulcers or lesions  RESP: lungs clear to auscultation - no rales, rhonchi or wheezes  CV: regular rate and rhythm, normal S1 S2, no S3 or S4 and no murmur, click or rub    ABDOMEN: soft, nontender, no HSM or masses and bowel sounds normal  NEURO: Normal strength and tone, sensory exam grossly normal, mentation intact and speech normal    Recent Labs   Lab Test 08/14/23  1620 11/29/22  1033 08/11/22  1426   HGB 16.5  --  15.5     --  291   INR  --  0.97  --      --  139   POTASSIUM 3.3*  --  3.5   CR 1.01  --  1.08        Diagnostics:  No labs were ordered during this visit.   No EKG required for low risk surgery (cataract, skin procedure, breast biopsy, etc).    Revised Cardiac Risk Index (RCRI):  The patient has the following serious cardiovascular risks for perioperative complications:   - No serious cardiac risks = 0 points     RCRI Interpretation: 0 points: Class I (very low risk - 0.4% complication rate)         Signed Electronically by: Tha Grullon MD  Copy of this evaluation report is provided to requesting physician.

## 2024-01-15 NOTE — TELEPHONE ENCOUNTER
Patient called back and will come into office at 2pm for med check and pre op.  Cancelled virtual medication review.

## 2024-01-16 ENCOUNTER — MYC REFILL (OUTPATIENT)
Dept: FAMILY MEDICINE | Facility: CLINIC | Age: 59
End: 2024-01-16
Payer: COMMERCIAL

## 2024-01-16 DIAGNOSIS — Z98.1 S/P CERVICAL SPINAL FUSION: ICD-10-CM

## 2024-01-16 DIAGNOSIS — M54.16 LUMBAR RADICULOPATHY: ICD-10-CM

## 2024-01-16 DIAGNOSIS — M79.18 MYOFASCIAL PAIN: ICD-10-CM

## 2024-01-16 DIAGNOSIS — M54.2 CERVICALGIA: ICD-10-CM

## 2024-01-16 DIAGNOSIS — G89.4 CHRONIC PAIN SYNDROME: ICD-10-CM

## 2024-01-16 DIAGNOSIS — F11.90 CHRONIC, CONTINUOUS USE OF OPIOIDS: ICD-10-CM

## 2024-01-17 RX ORDER — OXYCODONE HYDROCHLORIDE 5 MG/1
5-10 TABLET ORAL EVERY 6 HOURS PRN
Qty: 165 TABLET | Refills: 0 | Status: SHIPPED | OUTPATIENT
Start: 2024-01-20 | End: 2024-02-14

## 2024-01-18 DIAGNOSIS — M54.50 CHRONIC BILATERAL LOW BACK PAIN WITHOUT SCIATICA: ICD-10-CM

## 2024-01-18 DIAGNOSIS — M54.2 CERVICALGIA: ICD-10-CM

## 2024-01-18 DIAGNOSIS — Z98.1 S/P CERVICAL SPINAL FUSION: ICD-10-CM

## 2024-01-18 DIAGNOSIS — M54.16 LUMBAR RADICULOPATHY: ICD-10-CM

## 2024-01-18 DIAGNOSIS — M79.18 MYOFASCIAL PAIN: ICD-10-CM

## 2024-01-18 DIAGNOSIS — G62.9 NEUROPATHY: ICD-10-CM

## 2024-01-18 DIAGNOSIS — G89.29 CHRONIC BILATERAL LOW BACK PAIN WITHOUT SCIATICA: ICD-10-CM

## 2024-01-18 DIAGNOSIS — G89.4 CHRONIC PAIN SYNDROME: ICD-10-CM

## 2024-01-18 NOTE — TELEPHONE ENCOUNTER
Received fax request from [facility] pharmacy requesting refill(s) for nortriptyline (PAMELOR) 50 MG capsule     Last refilled on 10/17/23    Pt last seen on 10/05/23  Next appt scheduled for 03/07/24    Will facilitate refill.

## 2024-01-19 ENCOUNTER — HOSPITAL ENCOUNTER (OUTPATIENT)
Facility: CLINIC | Age: 59
Discharge: HOME | End: 2024-01-19
Attending: ANESTHESIOLOGY | Admitting: ANESTHESIOLOGY
Payer: COMMERCIAL

## 2024-01-19 ENCOUNTER — ANESTHESIA EVENT (OUTPATIENT)
Dept: SURGERY | Facility: CLINIC | Age: 59
End: 2024-01-19
Payer: COMMERCIAL

## 2024-01-19 ENCOUNTER — ANESTHESIA (OUTPATIENT)
Dept: SURGERY | Facility: CLINIC | Age: 59
End: 2024-01-19
Payer: COMMERCIAL

## 2024-01-19 ENCOUNTER — APPOINTMENT (OUTPATIENT)
Dept: GENERAL RADIOLOGY | Facility: CLINIC | Age: 59
End: 2024-01-19
Attending: ANESTHESIOLOGY
Payer: COMMERCIAL

## 2024-01-19 VITALS
TEMPERATURE: 98.2 F | SYSTOLIC BLOOD PRESSURE: 131 MMHG | RESPIRATION RATE: 16 BRPM | OXYGEN SATURATION: 98 % | DIASTOLIC BLOOD PRESSURE: 82 MMHG | HEART RATE: 78 BPM

## 2024-01-19 DIAGNOSIS — M50.20 CERVICAL DISC HERNIATION: ICD-10-CM

## 2024-01-19 PROCEDURE — 62321 NJX INTERLAMINAR CRV/THRC: CPT | Performed by: ANESTHESIOLOGY

## 2024-01-19 PROCEDURE — 250N000011 HC RX IP 250 OP 636: Performed by: ANESTHESIOLOGY

## 2024-01-19 PROCEDURE — 999N000179 XR SURGERY CARM FLUORO LESS THAN 5 MIN W STILLS: Mod: TC

## 2024-01-19 PROCEDURE — 250N000011 HC RX IP 250 OP 636: Performed by: NURSE ANESTHETIST, CERTIFIED REGISTERED

## 2024-01-19 PROCEDURE — 250N000009 HC RX 250: Performed by: NURSE ANESTHETIST, CERTIFIED REGISTERED

## 2024-01-19 PROCEDURE — 370N000017 HC ANESTHESIA TECHNICAL FEE, PER MIN: Performed by: ANESTHESIOLOGY

## 2024-01-19 RX ORDER — PROPOFOL 10 MG/ML
INJECTION, EMULSION INTRAVENOUS PRN
Status: DISCONTINUED | OUTPATIENT
Start: 2024-01-19 | End: 2024-01-19

## 2024-01-19 RX ORDER — LIDOCAINE HYDROCHLORIDE 20 MG/ML
INJECTION, SOLUTION INFILTRATION; PERINEURAL PRN
Status: DISCONTINUED | OUTPATIENT
Start: 2024-01-19 | End: 2024-01-19

## 2024-01-19 RX ORDER — TRIAMCINOLONE ACETONIDE 40 MG/ML
INJECTION, SUSPENSION INTRA-ARTICULAR; INTRAMUSCULAR PRN
Status: DISCONTINUED | OUTPATIENT
Start: 2024-01-19 | End: 2024-01-19 | Stop reason: HOSPADM

## 2024-01-19 RX ORDER — ONDANSETRON 2 MG/ML
4 INJECTION INTRAMUSCULAR; INTRAVENOUS EVERY 30 MIN PRN
Status: CANCELLED | OUTPATIENT
Start: 2024-01-19

## 2024-01-19 RX ORDER — NORTRIPTYLINE HYDROCHLORIDE 50 MG/1
50 CAPSULE ORAL AT BEDTIME
Qty: 90 CAPSULE | Refills: 1 | Status: SHIPPED | OUTPATIENT
Start: 2024-01-19 | End: 2024-07-15

## 2024-01-19 RX ORDER — IOPAMIDOL 612 MG/ML
INJECTION, SOLUTION INTRATHECAL PRN
Status: DISCONTINUED | OUTPATIENT
Start: 2024-01-19 | End: 2024-01-19 | Stop reason: HOSPADM

## 2024-01-19 RX ORDER — ONDANSETRON 4 MG/1
4 TABLET, ORALLY DISINTEGRATING ORAL EVERY 30 MIN PRN
Status: CANCELLED | OUTPATIENT
Start: 2024-01-19

## 2024-01-19 RX ORDER — LIDOCAINE 40 MG/G
CREAM TOPICAL
Status: DISCONTINUED | OUTPATIENT
Start: 2024-01-19 | End: 2024-01-19 | Stop reason: HOSPADM

## 2024-01-19 RX ADMIN — PROPOFOL 30 MG: 10 INJECTION, EMULSION INTRAVENOUS at 15:09

## 2024-01-19 RX ADMIN — PROPOFOL 80 MG: 10 INJECTION, EMULSION INTRAVENOUS at 15:05

## 2024-01-19 RX ADMIN — LIDOCAINE HYDROCHLORIDE 25 MG: 20 INJECTION, SOLUTION INFILTRATION; PERINEURAL at 15:05

## 2024-01-19 RX ADMIN — PROPOFOL 50 MG: 10 INJECTION, EMULSION INTRAVENOUS at 15:07

## 2024-01-19 ASSESSMENT — ACTIVITIES OF DAILY LIVING (ADL): ADLS_ACUITY_SCORE: 35

## 2024-01-19 NOTE — ANESTHESIA PREPROCEDURE EVALUATION
Anesthesia Pre-Procedure Evaluation    Patient: Indra Mehta   MRN: [id number] : 1965        Procedure : Procedure(s):  Inject epidural cervical          Past Medical History:   Diagnosis Date    Anxiety     Back pain     Depressive disorder     Hyperlipidemia     Hypertension     Meniere's disease, unspecified     Pain medication agreement signed 2010    Sleep apnea, unspecified type     Status post lumbar spinal fusion 2020      Past Surgical History:   Procedure Laterality Date    BUNIONECTOMY RT/LT  08    Both feet    COLONOSCOPY N/A 2016    Procedure: COMBINED COLONOSCOPY, SINGLE OR MULTIPLE BIOPSY/POLYPECTOMY BY BIOPSY;  Surgeon: Indra Jernigan MD;  Location: [facility]    COLONOSCOPY N/A 2022    Procedure: COLONOSCOPY, FLEXIBLE, WITH LESION REMOVAL USING SNARE;  Surgeon: Pablo Ferris MD;  Location: [facility]    DISCECTOMY, FUSION CERVICAL ANTERIOR ONE LEVEL, COMBINED N/A 2017    Procedure: COMBINED DISCECTOMY, FUSION CERVICAL ANTERIOR ONE LEVEL;  Cervical 6-7, Anterior cervical discectomy fusion;  Surgeon: Mike Mckenna MD;  [facility]    DISCECTOMY, FUSION CERVICAL ANTERIOR ONE LEVEL, COMBINED N/A 2018    Procedure: cervical 5-6 anterior cervical discectomy fusion;  Surgeon: Mike Mckenna MD;  [facility]    HERNIORRHAPHY UMBILICAL N/A 2017    Procedure: HERNIORRHAPHY UMBILICAL;  open umbilical hernia repair;  Surgeon: Boni Story MD;  [facility]    INJECT BLOCK MEDIAL BRANCH CERVICAL/THORACIC/LUMBAR Bilateral 2021    Procedure: Left L3, Left L4, Left L5, Right L3, Right L4 and Right L5 medial branch nerve blocks using fluoroscopic guidance and contrast control;  Surgeon: Darryn Mcdaniel MD;  [facility]    INJECT BLOCK MEDIAL BRANCH CERVICAL/THORACIC/LUMBAR N/A 2021    Procedure: Left L3, Left L4, Left L5, Right L3, Right L4 and Right L5 medial branch nerve blocks using fluoroscopic guidance and contrast  control;  Surgeon: Darryn Mcdaniel MD;  [facility]    INJECT EPIDURAL CERVICAL N/A 8/22/2019    Procedure: INJECTION, SPINE, CERVICAL 6-7 EPIDURAL;  Surgeon: Darryn Mcdaniel MD;  [facility]    INJECT EPIDURAL CERVICAL Left 3/9/2023    Procedure: Cervical 7-Thoracic 1 Interlaminar epidural steroid injection using fluoroscopic guidance with contrast dye, Left;  Surgeon: Darryn Mcdaniel MD;  [facility]    INJECT EPIDURAL CERVICAL N/A 8/18/2023    Procedure: Cervical 7-Thoracic 1 Interlaminar epidural steroid injection using fluoroscopic guidance with contrast dye.;  Surgeon: Darryn Mcdaniel MD;  [facility]    INJECT EPIDURAL LUMBAR N/A 11/9/2017    Procedure: INJECT EPIDURAL LUMBAR;  lumbar 4-5 epidural steroid injection ;  Surgeon: Darryn Mcdaniel MD;  [facility]    INJECT EPIDURAL LUMBAR N/A 12/28/2017    Procedure: INJECT EPIDURAL LUMBAR;  lumbar epidural injection;  Surgeon: Darryn Mcdaniel MD;  [facility]    INJECT EPIDURAL LUMBAR Bilateral 5/13/2021    Procedure: Bilateral Lumbar 3-4 Epidural Steroid Injection;  Surgeon: Darryn Mcdaniel MD;  [facility]    INJECT EPIDURAL TRANSFORAMINAL Bilateral 8/23/2018    Procedure: INJECT EPIDURAL TRANSFORAMINAL;  transforaminal bilateral lumbar 3-4 steroid injection;  Surgeon: Darryn Mcdaniel MD;  [facility]    INJECT EPIDURAL TRANSFORAMINAL Bilateral 11/8/2018    Procedure: INJECT EPIDURAL TRANSFORAMINAL lumbar 3-4;  Surgeon: Darryn Mcdaniel MD;  [facility]    INJECT EPIDURAL TRANSFORAMINAL Bilateral 6/14/2019    Procedure: INJECTION, EPIDURAL, TRANSFORAMINAL LUMBAR 3-4 BILATERAL;  Surgeon: Darryn Mcdaniel MD;  [facility]    INJECT EPIDURAL TRANSFORAMINAL Bilateral 5/22/2020    Procedure: lumbar 3-4 bilateral transforaminal epidural steroid injection;  Surgeon: Darryn Mcdaniel MD;  [facility]    INJECT EPIDURAL TRANSFORAMINAL Bilateral 9/24/2020    Procedure: INJECTION, EPIDURAL, TRANSFORAMINAL  LUMBAR 3-4 APPROACH;   Surgeon: Darryn Mcdaniel MD;  [facility]    INJECT JOINT SACROILIAC Bilateral 10/13/2022    Procedure: Fluoroscopically-guided injection of the Bilateral sacroiliac joints with Bilateral kerrie Sacroiliac joint ligaments infiltration over the sacrum;  Surgeon: Darryn Mcdaniel MD;  [facility]    PE TUBES  2006    left ear    RADIO FREQUENCY ABLATION PULSED CERVICAL Bilateral 10/1/2021    Procedure: RADIOFREQUENCY ABLATION lumbar 3, 4, 5 bilateral;  Surgeon: Darryn Mcdaniel MD;  [facility]    ZZHC COLONOSCOPY W/WO BRUSH/WASH  12/27/2005    ZZHC CREATE EARDRUM OPENING,GEN ANESTH  09/27/2007    Pe tube, Left endolymphatic sac enhancement.    ZZHC MASTOIDECTOMY,COMPLETE  09/27/2007      No Known Allergies     Social History     Tobacco Use    Smoking status: Never    Smokeless tobacco: Never   Substance Use Topics    Alcohol use: Yes     Alcohol/week: 0.0 standard drinks of alcohol     Comment: rarely      Wt Readings from Last 1 Encounters:   01/15/24 95.3 kg (210 lb 3.2 oz)        Anesthesia Evaluation   Pt has had prior anesthetic. Type: General and MAC.        ROS/MED HX  ENT/Pulmonary: Comment: Bilateral occipital neuralgia    (+) sleep apnea,                                       Neurologic: Comment: Meniere's disease      Cardiovascular:     (+) Dyslipidemia hypertension- -   -  - -                                 Previous cardiac testing   Echo: Date: Results:    Stress Test:  Date: Results:    ECG Reviewed:  Date: 9/21/2020 Results:  SR  Cath:  Date: Results:      METS/Exercise Tolerance:     Hematologic:       Musculoskeletal: Comment: Cervical myelopathy  Chronic bilateral low back pain without sciatica  Chronic pain syndrome  (+)  arthritis,             GI/Hepatic:  - neg GI/hepatic ROS     Renal/Genitourinary:  - neg Renal ROS   (+)        BPH,      Endo:       Psychiatric/Substance Use:     (+) psychiatric history depression and anxiety  H/O chronic opiod use .     Infectious Disease:  - neg  "infectious disease ROS     Malignancy:  - neg malignancy ROS     Other:            Physical Exam    Airway  airway exam normal      Mallampati: II   TM distance: > 3 FB   Neck ROM: full   Mouth opening: > 3 cm    Respiratory Devices and Support         Dental  no notable dental history         Cardiovascular   cardiovascular exam normal       Rhythm and rate: regular and normal     Pulmonary   pulmonary exam normal        breath sounds clear to auscultation           OUTSIDE LABS:  CBC:   Lab Results   Component Value Date    WBC 6.1 08/14/2023    WBC 5.7 08/11/2022    HGB 16.5 08/14/2023    HGB 15.5 08/11/2022    HCT 47.2 08/14/2023    HCT 43.6 08/11/2022     08/14/2023     08/11/2022     BMP:   Lab Results   Component Value Date     08/14/2023     08/11/2022    POTASSIUM 3.3 (L) 08/14/2023    POTASSIUM 3.5 08/11/2022    CHLORIDE 100 08/14/2023    CHLORIDE 104 08/11/2022    CO2 25 08/14/2023    CO2 30 08/11/2022    BUN 14.0 08/14/2023    BUN 14 08/11/2022    CR 1.01 08/14/2023    CR 1.08 08/11/2022     (H) 08/14/2023    GLC 84 08/11/2022     COAGS:   Lab Results   Component Value Date    INR 0.97 11/29/2022     POC: No results found for: \"BGM\", \"HCG\", \"HCGS\"  HEPATIC:   Lab Results   Component Value Date    ALBUMIN 4.4 12/01/2020    PROTTOTAL 8.1 12/01/2020    ALT 89 (H) 12/01/2020    AST 25 12/01/2020    ALKPHOS 66 12/01/2020    BILITOTAL 0.7 12/01/2020     OTHER:   Lab Results   Component Value Date    A1C 5.2 12/01/2020    KRISHAN 9.5 08/14/2023    MAG 2.2 03/19/2007    TSH 0.42 09/15/2023    CRP <2.9 08/11/2022    SED 5 11/29/2022       Anesthesia Plan    ASA Status:  3    NPO Status:  NPO Appropriate    Anesthesia Type: MAC.     - Reason for MAC: straight local not clinically adequate, immobility needed   Induction: Intravenous, Propofol.   Maintenance: TIVA.        Consents    Anesthesia Plan(s) and associated risks, benefits, and realistic alternatives discussed. Questions " answered and patient/representative(s) expressed understanding.     - Discussed:     - Discussed with:  Patient      - Extended Intubation/Ventilatory Support Discussed: No.      - Patient is DNR/DNI Status: No     Use of blood products discussed: No .     Postoperative Care    Pain management: Oral pain medications.   PONV prophylaxis: Background Propofol Infusion     Comments:    Other Comments: The risks and benefits of anesthesia, and the alternatives where applicable, have been discussed with the patient, and they wish to proceed.               YUMIKO Dinh CRNA

## 2024-01-19 NOTE — ANESTHESIA POSTPROCEDURE EVALUATION
Patient: Indra Mehta    Procedure: Procedure(s):  Cervical 7 thoracic 1 epidural injection       Anesthesia Type:  MAC    Note:  Disposition: Outpatient   Postop Pain Control: Uneventful            Sign Out: Well controlled pain   PONV: No   Neuro/Psych: Uneventful            Sign Out: Acceptable/Baseline neuro status   Airway/Respiratory: Uneventful            Sign Out: Acceptable/Baseline resp. status   CV/Hemodynamics: Uneventful            Sign Out: Acceptable CV status   Other NRE: NONE   DID A NON-ROUTINE EVENT OCCUR? No    Event details/Postop Comments:  Pt was happy with anesthesia care.  No complications.  I will follow up with the pt if needed.           Last vitals:  Vitals Value Taken Time   BP     Temp     Pulse     Resp     SpO2         Electronically Signed By: YUMIKO Dinh CRNA  January 19, 2024  3:43 PM

## 2024-01-19 NOTE — ANESTHESIA CARE TRANSFER NOTE
Patient: Indra Mehta    Procedure: Procedure(s):  Cervical 7 thoracic 1 epidural injection       Diagnosis: Cervical disc herniation [M50.20]  DDD (degenerative disc disease), cervical [M50.30]  Hx of cervical spine surgery [Z98.890]  Diagnosis Additional Information: No value filed.    Anesthesia Type:   MAC     Note:    Oropharynx: oropharynx clear of all foreign objects and spontaneously breathing  Level of Consciousness: drowsy  Oxygen Supplementation: nasal cannula    Independent Airway: airway patency satisfactory and stable  Dentition: dentition unchanged  Vital Signs Stable: post-procedure vital signs reviewed and stable  Report to RN Given: handoff report given  Patient transferred to: Phase II    Handoff Report: Identifed the Patient, Identified the Reponsible Provider, Reviewed the pertinent medical history, Discussed the surgical course, Reviewed Intra-OP anesthesia mangement and issues during anesthesia, Set expectations for post-procedure period and Allowed opportunity for questions and acknowledgement of understanding      Vitals:  Vitals Value Taken Time   BP     Temp     Pulse     Resp     SpO2         Electronically Signed By: YUMIKO Dinh CRNA  January 19, 2024  3:16 PM

## 2024-01-19 NOTE — DISCHARGE INSTRUCTIONS

## 2024-01-19 NOTE — OP NOTE
CHIEF COMPLAINT: Neck pain with radicular arm pains    INDICATIONS FOR PROCEDURE:   1.This patient suffers from moderate to severe neck pain and upper extremity radicular pains.    2.This pain has persisted for more than 4 weeks and is causing significant functional disability when they are trying to perform ADL's.   3. They failed conservative care which consisted of giving this pain time to jay, spine surgery,  Previous injections which help about 50% pain improvement for 3-4 months  4. Preoperative NRS pain score was verbally reported to me today as a 7 /10.   5. The patients radicular pains correlates to their MRI which shows existing fusions down to C6-7  6.  An order was sent to me to perform the technical component of a cervical epidural steroid injection.    PROCEDURE: C7-T1 Interlaminar epidural steroid injection using fluoroscopic guidance with contrast dye.     PROCEDURE DETAILS: After written informed consent was obtained from the patient, the patient was escorted to the procedure room.  The patient was placed in the prone position.  A  time out  was conducted to verify patient identity, procedure to be performed, side, site, allergies and any special requirements.  The skin over the neck and upper back region were prepped and draped in normal sterile fashion utilizing ChloraPrep.  Fluoroscopy was used to identify the C7-T1 interspace in an AP view and the skin was anesthetized with 2 mL of 1% lidocaine with bicarbonate buffer.  A 20-gauge 3-1/2 inch Tuohy needle was advanced using the loss of resistance technique with preservative free normal saline with fluoroscopic guidance. After negative aspiration for CSF and blood, 1.5 cc of Isovue contrast dye was injected revealing the appropriate cervical epidurogram without evidence of intrathecal or intravascular spread. Following this, a 3-mL solution of 40 mg of triamcinolone with 2 cc preservative-free normal saline was slowly injected.  After injection  of the medication, as the needle tip was withdrawn, it was flushed with local anesthetic.  The patient was monitored with blood pressure and pulse oximetry machines with the assistance of an RN throughout the procedure.  The patient was alert and responsive to questions throughout the procedure.   The patient tolerated the procedure well and was observed in the post-procedural area.  The patient was dismissed without apparent complications.     BLOOD LOSS: less than 5 cc    DIAGNOSIS:  1.  Cervical radiculitis with associated neck pain    PLAN:    1. Performed a C7-T1 interlaminar epidural steroid injection without complications.   2. The patient was instructed follow-up with the referring provider and to call the [facility] if today's procedure is not helpful.    Darryn Mcdaniel MD  Diplomate of the [employer] Board of Anesthesiology, Pain Medicine

## 2024-01-24 DIAGNOSIS — I10 BENIGN ESSENTIAL HYPERTENSION: ICD-10-CM

## 2024-01-24 RX ORDER — LOSARTAN POTASSIUM AND HYDROCHLOROTHIAZIDE 12.5; 5 MG/1; MG/1
2 TABLET ORAL DAILY
Qty: 180 TABLET | Refills: 1 | Status: SHIPPED | OUTPATIENT
Start: 2024-01-24 | End: 2024-08-05

## 2024-01-29 DIAGNOSIS — G62.9 NEUROPATHY: ICD-10-CM

## 2024-01-30 RX ORDER — PREGABALIN 150 MG/1
150 CAPSULE ORAL DAILY
Qty: 60 CAPSULE | Refills: 1 | Status: SHIPPED | OUTPATIENT
Start: 2024-01-30 | End: 2024-02-16

## 2024-02-01 DIAGNOSIS — M25.511 CHRONIC RIGHT SHOULDER PAIN: ICD-10-CM

## 2024-02-01 DIAGNOSIS — G89.29 CHRONIC RIGHT SHOULDER PAIN: ICD-10-CM

## 2024-02-01 NOTE — TELEPHONE ENCOUNTER
Prescription refill requested for:   Celecoxib (CELEBREX) 100 MG capsule      Last Written Prescription Date:  1/4/24  Last Fill Quantity: 60,   # refills: 0  Last Office Visit: 1/8/24  Future Office visit:           Clinton Sanches EMT

## 2024-02-02 RX ORDER — CELECOXIB 100 MG/1
100 CAPSULE ORAL 2 TIMES DAILY
Qty: 60 CAPSULE | Refills: 0 | Status: SHIPPED | OUTPATIENT
Start: 2024-02-02 | End: 2024-03-06

## 2024-02-11 DIAGNOSIS — M54.2 CERVICALGIA: ICD-10-CM

## 2024-02-11 DIAGNOSIS — Z98.1 S/P CERVICAL SPINAL FUSION: ICD-10-CM

## 2024-02-11 DIAGNOSIS — M54.16 LUMBAR RADICULOPATHY: ICD-10-CM

## 2024-02-11 DIAGNOSIS — G62.9 NEUROPATHY: ICD-10-CM

## 2024-02-11 DIAGNOSIS — M79.18 MYOFASCIAL PAIN: ICD-10-CM

## 2024-02-11 DIAGNOSIS — G89.4 CHRONIC PAIN SYNDROME: ICD-10-CM

## 2024-02-12 RX ORDER — CYCLOBENZAPRINE HCL 5 MG
TABLET ORAL
Qty: 90 TABLET | Refills: 2 | Status: SHIPPED | OUTPATIENT
Start: 2024-02-12 | End: 2024-03-31

## 2024-02-14 ENCOUNTER — MYC REFILL (OUTPATIENT)
Dept: FAMILY MEDICINE | Facility: CLINIC | Age: 59
End: 2024-02-14
Payer: COMMERCIAL

## 2024-02-14 DIAGNOSIS — F11.90 CHRONIC, CONTINUOUS USE OF OPIOIDS: ICD-10-CM

## 2024-02-14 DIAGNOSIS — Z98.1 S/P CERVICAL SPINAL FUSION: ICD-10-CM

## 2024-02-14 DIAGNOSIS — M54.2 CERVICALGIA: ICD-10-CM

## 2024-02-14 DIAGNOSIS — G89.4 CHRONIC PAIN SYNDROME: ICD-10-CM

## 2024-02-14 DIAGNOSIS — M79.18 MYOFASCIAL PAIN: ICD-10-CM

## 2024-02-14 DIAGNOSIS — M54.16 LUMBAR RADICULOPATHY: ICD-10-CM

## 2024-02-15 ENCOUNTER — MYC MEDICAL ADVICE (OUTPATIENT)
Dept: FAMILY MEDICINE | Facility: CLINIC | Age: 59
End: 2024-02-15
Payer: COMMERCIAL

## 2024-02-15 DIAGNOSIS — G62.9 NEUROPATHY: ICD-10-CM

## 2024-02-15 RX ORDER — OXYCODONE HYDROCHLORIDE 5 MG/1
5-10 TABLET ORAL EVERY 6 HOURS PRN
Qty: 165 TABLET | Refills: 0 | Status: SHIPPED | OUTPATIENT
Start: 2024-02-19 | End: 2024-03-15

## 2024-02-16 RX ORDER — PREGABALIN 150 MG/1
150 CAPSULE ORAL 2 TIMES DAILY
Qty: 60 CAPSULE | Refills: 1 | Status: SHIPPED | OUTPATIENT
Start: 2024-02-16 | End: 2024-04-08

## 2024-03-05 DIAGNOSIS — M25.511 CHRONIC RIGHT SHOULDER PAIN: ICD-10-CM

## 2024-03-05 DIAGNOSIS — G89.29 CHRONIC RIGHT SHOULDER PAIN: ICD-10-CM

## 2024-03-06 RX ORDER — CELECOXIB 100 MG/1
100 CAPSULE ORAL 2 TIMES DAILY
Qty: 60 CAPSULE | Refills: 0 | Status: SHIPPED | OUTPATIENT
Start: 2024-03-06 | End: 2024-04-01

## 2024-03-06 ASSESSMENT — PAIN SCALES - PAIN ENJOYMENT GENERAL ACTIVITY SCALE (PEG)
INTERFERED_GENERAL_ACTIVITY: 9
INTERFERED_ENJOYMENT_LIFE: 9
AVG_PAIN_PASTWEEK: 6
PEG_TOTALSCORE: 8

## 2024-03-06 NOTE — TELEPHONE ENCOUNTER
Prescription refill requested for:   celecoxib (CELEBREX) 100 MG capsule       Last Written Prescription Date:  2/2/24  Last Fill Quantity: 60,   # refills: 0  Last Office Visit: 1/8/24  Future Office visit:           Shonda Dai LPN

## 2024-03-07 ENCOUNTER — TELEPHONE (OUTPATIENT)
Dept: ANESTHESIOLOGY | Facility: CLINIC | Age: 59
End: 2024-03-07

## 2024-03-07 ENCOUNTER — OFFICE VISIT (OUTPATIENT)
Dept: PALLIATIVE MEDICINE | Facility: CLINIC | Age: 59
End: 2024-03-07
Payer: COMMERCIAL

## 2024-03-07 VITALS
DIASTOLIC BLOOD PRESSURE: 84 MMHG | BODY MASS INDEX: 28.44 KG/M2 | SYSTOLIC BLOOD PRESSURE: 149 MMHG | HEIGHT: 72 IN | HEART RATE: 81 BPM | WEIGHT: 210 LBS

## 2024-03-07 DIAGNOSIS — M47.816 SPONDYLOSIS OF LUMBAR REGION WITHOUT MYELOPATHY OR RADICULOPATHY: Primary | ICD-10-CM

## 2024-03-07 PROCEDURE — 99214 OFFICE O/P EST MOD 30 MIN: CPT

## 2024-03-07 RX ORDER — LIDOCAINE 40 MG/G
CREAM TOPICAL
Status: CANCELLED | OUTPATIENT
Start: 2024-03-07

## 2024-03-07 NOTE — PROGRESS NOTES
"[facility]         Date of visit: 3/7/2024          Subjective:\"I have pain in my back, neck and neuropathy.  The back pain seems to be worse.\"    Chief complaint:   Chief Complaint   Patient presents with    Pain       Pain Information:  The pain score is 5-6/10    Interval history:  Indra Mehta is a 58 year old male last seen on 10/05/2024.  They are a patient of Tha Grullon and are being seen in follow up. He complains of back pain and neuropathic pain.  He has had several different pain providers.  He has received spinal injections from both Rhett Mcdaniel and Daria.  He has received RF denervation in the past for his back pain.  He has had PT in the past and the radiofrequency denervation has provided relief . He returns today for re-evaluation.  He notes that the denervation helped with his back pain, however it did not help his neuropathic pain.  He states that noting has helped the neuropathic pain.  He does not have diabetes, nor has he had chemotherapy.  He has not had lumbar spine surgery, thus the etiology of his neuropathy is not clear.    Recommendations/plan at the last visit included:  Obtain lumbar MRI  Consider spinal cord stimulation    Since his last visit, Indra Mehta reports:  The patient denies an improvement in pain.  The patient denies an improvement in function.  There have not been interventional techniques performed since the last visit.             Current Pain Treatments:    Medications:    Celocoxib   Pregabalin       2. Other therapies:    none             MEDICATIONS:  Current Outpatient Medications   Medication Sig Dispense Refill    Acetaminophen (TYLENOL PO) Take 1,000 mg by mouth 3 times daily      amLODIPine (NORVASC) 5 MG tablet TAKE 1 TABLET BY MOUTH EVERY DAY 90 tablet 3    atorvastatin (LIPITOR) 10 MG tablet Take 10 mg by mouth daily      celecoxib (CELEBREX) 100 MG capsule TAKE 1 CAPSULE (100 MG) BY MOUTH 2 TIMES DAILY 60 capsule 0    " cyclobenzaprine (FLEXERIL) 5 MG tablet TAKE 1-2 TABLETS (5-10MG) BY MOUTH 3 TIMES DAILY AS NEEDED 90 tablet 2    losartan-hydrochlorothiazide (HYZAAR) 50-12.5 MG tablet Take 2 tablets by mouth daily 180 tablet 1    methylPREDNISolone (MEDROL) 4 MG tablet therapy pack Follow Package Directions 21 tablet 0    methylPREDNISolone (MEDROL) 4 MG tablet therapy pack Follow Package Directions 21 tablet 0    naloxone (NARCAN) 4 MG/0.1ML nasal spray Spray 1 spray (4 mg) into one nostril alternating nostrils once as needed for opioid reversal every 2-3 minutes until assistance arrives 0.2 mL 1    nortriptyline (PAMELOR) 50 MG capsule Take 1 capsule (50 mg) by mouth at bedtime 90 capsule 1    order for DME Equipment being ordered: TENS Pads as directed 1 Device 0    oxyCODONE (ROXICODONE) 5 MG tablet Take 1-2 tablets (5-10 mg) by mouth every 6 hours as needed for severe pain Must last 30 days 165 tablet 0    pregabalin (LYRICA) 150 MG capsule Take 1 capsule (150 mg) by mouth 2 times daily 60 capsule 1    sertraline (ZOLOFT) 50 MG tablet Take 3 tablets (150 mg) by mouth daily 90 tablet 11    sildenafil (VIAGRA) 100 MG tablet Take 0.5-1 tablets ( mg) by mouth daily as needed Take 30 min to 4 hours before intercourse.  Never use with nitroglycerin, terazosin or doxazosin. 6 tablet 7       ALLERGIES:  No Known Allergies    SOCIAL HISTORY:  Social History     Socioeconomic History    Marital status: Single    Number of children: 2   Occupational History     Employer: [employer]   Tobacco Use    Smoking status: Never    Smokeless tobacco: Never   Vaping Use    Vaping Use: Never used   Substance and Sexual Activity    Alcohol use: Yes     Alcohol/week: 0.0 standard drinks of alcohol     Comment: rarely    Drug use: No    Sexual activity: Not Currently     Partners: Female   Other Topics Concern    Parent/sibling w/ CABG, MI or angioplasty before 65F 55M? No     Social Determinants of Health     Financial Resource Strain:  Low Risk  (1/14/2024)    Financial Resource Strain     Within the past 12 months, have you or your family members you live with been unable to get utilities (heat, electricity) when it was really needed?: No   Food Insecurity: Low Risk  (1/14/2024)    Food Insecurity     Within the past 12 months, did you worry that your food would run out before you got money to buy more?: No     Within the past 12 months, did the food you bought just not last and you didn t have money to get more?: No   Transportation Needs: Low Risk  (1/14/2024)    Transportation Needs     Within the past 12 months, has lack of transportation kept you from medical appointments, getting your medicines, non-medical meetings or appointments, work, or from getting things that you need?: No   Interpersonal Safety: Low Risk  (9/21/2023)    Interpersonal Safety     Do you feel physically and emotionally safe where you currently live?: Yes     Within the past 12 months, have you been hit, slapped, kicked or otherwise physically hurt by someone?: No     Within the past 12 months, have you been humiliated or emotionally abused in other ways by your partner or ex-partner?: No   Housing Stability: Low Risk  (1/14/2024)    Housing Stability     Do you have housing? : Yes     Are you worried about losing your housing?: No       REVIEW OF SYSTEMS:   ROS: 10 point ROS neg other than the symptoms noted above in the HPI.    MEDICAL CHANGES: any changes in medical history since they were last seen? No      Objective:    BP (!) 149/84   Pulse 81   Ht 1.829 m (6')   Wt 95.3 kg (210 lb)   BMI 28.48 kg/m      Physical Exam  Vitals and nursing note reviewed. Exam conducted with a chaperone present.   Musculoskeletal:         General: Tenderness present. No swelling, deformity or signs of injury.      Lumbar back: Spasms and tenderness present. No swelling, edema, deformity, signs of trauma, lacerations or bony tenderness. Decreased range of motion. Negative right  straight leg raise test and negative left straight leg raise test. No scoliosis.      Right lower leg: No edema.      Left lower leg: No edema.      Comments: Patient has facet loading in the lumbar region with hyperextension and lateral bending and twisting         Diagnostic Tests/Imaging/Labs:  MRI CERVICAL SPINE WITHOUT CONTRAST December 21, 2023 9:19 AM      HISTORY: Cervical DDD, disc herniations, radicular pain, worse,  history of cervical surgery. Cervical disc herniation. DDD  (degenerative disc disease), cervical. History of cervical spine  surgery.      TECHNIQUE: Multiplanar, multisequence MRI of the cervical spine  without contrast.      COMPARISON: MRI of the cervical spine dated 10/13/2022.      FINDINGS: Normal vertebral height. Straightening of the normal  cervical lordosis. Sagittal alignment otherwise normal. Again seen is  patchy edema-like marrow signal change centered about the left C3-C4  facet joint (series 4 image 12). There is a small left C3-C4 facet  joint effusion. Findings are nonspecific, but may be reactive due to  degenerative facet disease with possible active/inflammatory  component. Moderate type I degenerative endplate signal changes along  the left posterolateral aspect of the C3-C4 disc space. Bone marrow  signal otherwise appears unremarkable.     Interbody fusion changes at C5-C6 and C6-C7, as before. There is  susceptibility artifact related to bilateral posterior fusion hardware  spanning C5-C7, as before. No definite spinal cord signal abnormality.  The visualized paraspinous soft tissues appear unremarkable.     Segmental analysis:  Craniovertebral junction/C1-C2: Mild degenerative changes of the  median atlantoaxial joint. Otherwise unremarkable. No significant  change.     C2-C3: Normal disc height. Shallow symmetric disc bulge. Possible tiny  superimposed central disc protrusion. Mild to moderate left and mild  right facet arthropathy. No significant spinal canal or  neural  foraminal stenosis. Overall, no significant change.     C3-C4: Minimal disc height loss. Disc bulge eccentric to the left,  appearing increased in size from prior. Bilateral uncovertebral  spurring. Moderate to severe left facet arthropathy, progressed. Mild  right facet arthropathy, as before. Ligamentum flavum thickening. Mild  to moderate bordering on moderate central spinal canal stenosis with  mild flattening of the normal spinal cord contour, progressed compared  to the prior study. Progressed moderate to severe left greater than  right neural foraminal stenosis.     C4-C5: Minimal disc height loss. Symmetric disc bulge. Minimal  uncovertebral spurring. Mild to moderate left and moderate right facet  arthropathy. Mild spinal canal stenosis. Mild or mild to moderate  bilateral neural foraminal stenosis. Overall, no significant change.     C5-C6: Postoperative level. No spinal canal stenosis. Mild residual  right uncovertebral spurring. There is at least mild right neural  foraminal stenosis. Left neural foramen appears patent. Overall, no  significant change.     C6-C7: Postoperative level. No significant spinal canal or neural  foraminal stenosis. Overall, no significant change.     C7-T1: Minimal disc height loss. Shallow symmetric disc bulge with  mild bilateral uncovertebral spurring and mild to moderate facet  arthropathy. No spinal canal stenosis. Mild to moderate left and  minimal right neural foraminal narrowing. Overall, no significant  change.                                                                      IMPRESSION:  1. Progressed degenerative findings at C3-C4, as described. Otherwise,  no significant change in degenerative and postoperative findings of  the cervical spine, as described.  2. Patchy nonspecific edema-like marrow signal changes centered about  the left C3-C4 facet joint with a small facet joint effusion. Findings  are nonspecific, but are favored to reflect reactive  marrow signal  changes in the setting of degenerative facet disease with possible  active/inflammatory component. There are presumed Modic type I  degenerative endplate signal changes along the left posterolateral  aspect of the C3-C4 disc space.  3. Mild to moderate bordering on moderate central spinal canal  stenosis and moderate to severe left and moderate right neural  foraminal stenosis at C3-C4 appear progressed.  4. Redemonstrated C5-C6 and C6-C7 interbody and posterior fusion  changes.     RAMONA WOMACK MD     EXAM: MR LUMBAR SPINE W/O CONTRAST  10/17/2023 2:22 PM      HISTORY: lumbar ddd, disc herniations, chronic lumbar radicular pain,  worsening; Low back pain; hx Spine surgery; No r/o hardware failure;  Worsening low back pain; No known/automatically detected potential  contraindications to imaging; Lumbar radiculopathy; DDD (degenerative  disc disease), lumbar; Lumbar disc herniation with radiculopathy        COMPARISON: Lumbar spine MRI: 3/17/2022.     TECHNIQUE: Multiplanar, multisequence MR images of the lumbar spine  were obtained without intravenous contrast..     FINDINGS:     There are 5 lumbar type vertebral bodies. Vertebral body heights are  maintained. No suspicious focal marrow replacing lesions. Gentle  levocurvature of the lumbar spine. No substantial listhesis. Conus tip  terminates at L1. Normal size and signal of the cauda equina nerve  roots. Similar T2 hyperintense presumed cyst associated with the  anterior aspect of the right kidney. Prominent dorsal epidural  lipomatosis throughout portions of the lumbar spine, most pronounced  at L1-L2, L2-L3, and L3-L4.     On a level by level basis, degenerative changes are as follows:     T12-L1: Normal disc height and signal. Mild bilateral facet  hypertrophy. Slight ligamentum flavum thickening. No substantial  spinal canal or neural foraminal stenosis.     L1-L2: Similar mild disc height loss with desiccation. Mild posterior  disc bulging.  Moderate bilateral facet hypertrophy. Ligamentum flavum  thickening. In combination with epidural lipomatosis, there is similar  mild spinal canal stenosis. Similar moderate right and mild left  neural foraminal stenosis.     L2-L3: Minimal disc height loss with desiccation. Broad-based disc  bulging. Moderate bilateral facet hypertrophy. Ligamentum flavum  thickening. In combination with prominent dorsal epidural lipomatosis,  there is similar moderate spinal canal stenosis. Similar mild  bilateral neural foraminal stenosis.     L3-L4: Normal disc height and signal. Minimal broad-based disc bulging  with pronounced left foraminal component. Moderate to advanced  bilateral facet hypertrophy. Ligamentum flavum thickening. In  combination with epidural lipomatosis, there is moderate spinal canal  stenosis, similar to prior. No substantial neural foraminal stenosis.     L4-L5: Mild disc height loss with desiccation. Mild broad-based disc  bulging. Advanced bilateral facet hypertrophy. There is a large right  and moderate left facet joint effusion, increased in size from prior.  There is mild edema signal within the adjacent articular pillar at  L4-L5 and mild edema within the left L4 and L5 pedicles (series  5/images 16-18). No significant adjacent edema on the right. Mild  spinal canal stenosis in combination with epidural lipomatosis. Mild  right greater than left neural foraminal stenosis, similar to prior.      L5-S1: Normal disc height and signal. No disc herniation. Advanced  bilateral facet hypertrophy. No substantial spinal canal or neural  foraminal stenosis.                                                                      IMPRESSION:     1.  Increased size of large right and moderate left facet joint  effusions at L4-L5. There is some mild edema signal involving the  adjacent left articular pillar at L4-L5 extending into the left L4 and  L5 pedicles. While findings are most likely degenerative in  etiology,  sequelae of an infectious/inflammatory process cannot be excluded in  the proper clinical setting. No significant adjacent inflammatory  changes on the right.  2.  Otherwise, no significant interval change in multilevel lumbar  spondylosis as detailed above.  3.  Spinal canal stenosis is greatest and moderate at L2-L3 and L3-L4  secondary to degenerative changes superimposed on dorsal epidural  lipomatosis.  4.  Neural foraminal stenosis is greatest and moderate on the right at  L1-L2.     RNEÉ YATES MD        Assessment:   Indra Mehta is a 58 year old male with a past medical history significant for peripheral neuropathy who presents with complaints of low back pain and burning pain of the feet       Visit Diagnoses:  Polyneuropathy  Lumbar spondylosis without myelopathy    Plan:  Diagnosis reviewed, treatment option addressed, and risk/benefits discussed.  Self-care instructions given.  I am recommending a multidisciplinary treatment plan to help this patient better manage their pain.  This includes:  Schedule repeat lumbar radiofrequency denervation for the lumbat facet arthropathy  Consider spinal cord stimulation to treat polyneuropathy        Review of Electronic Chart: Today I have also reviewed available medical information in the patient's medical record at [facility] ([facility]) and Care Everywhere (if available), including relevant provider notes, laboratory work, and imaging.     Brian Todd IV, MD  [facility]

## 2024-03-07 NOTE — CONFIDENTIAL NOTE
Haylie, I just spoke to him and explained that Dr Todd already placed the orders and his plan was to complete the RFA here in [place]. I reassured him that it is done under moderate sedation and that he does not need an H&P.     He agreed to proceed with Dr Todd.    Lizbeth Song, RN Care Coordinator   Neurology/Neurosurgery/PM&R/ Pain Management

## 2024-03-07 NOTE — TELEPHONE ENCOUNTER
Phoned the patient to schedule procedure with dr. Todd. Patient stated that he wants to go to [place], [place] to get the injection with another provider.

## 2024-03-07 NOTE — NURSING NOTE
Indra Mehta's goals for this visit include:   Chief Complaint   Patient presents with    Pain       He requests these members of his care team be copied on today's visit information: yes    PCP: Tha Grullon    Referring Provider:  Referred Self, MD  No address on file    BP (!) 149/84   Pulse 81   Ht 1.829 m (6')   Wt 95.3 kg (210 lb)   BMI 28.48 kg/m      Do you need any medication refills at today's visit? No  JL Neal, TATIANA ([facility])

## 2024-03-08 ENCOUNTER — HOSPITAL ENCOUNTER (OUTPATIENT)
Facility: AMBULATORY SURGERY CENTER | Age: 59
End: 2024-03-08
Payer: COMMERCIAL

## 2024-03-08 ENCOUNTER — TELEPHONE (OUTPATIENT)
Dept: ANESTHESIOLOGY | Facility: CLINIC | Age: 59
End: 2024-03-08
Payer: COMMERCIAL

## 2024-03-08 PROBLEM — M47.816 SPONDYLOSIS OF LUMBAR REGION WITHOUT MYELOPATHY OR RADICULOPATHY: Status: ACTIVE | Noted: 2024-03-07

## 2024-03-08 NOTE — TELEPHONE ENCOUNTER
Patient is scheduled for surgery with Dr. Todd    Spoke with: patient    Date of Surgery: 3/14/24    Location: [facility]    Informed patient they will need an adult  yes    Additional comments: Patient is aware of date and time of the procedure.        Haylie Andrew MA on 3/8/2024 at 10:52 AM

## 2024-03-15 ENCOUNTER — MYC REFILL (OUTPATIENT)
Dept: FAMILY MEDICINE | Facility: CLINIC | Age: 59
End: 2024-03-15
Payer: COMMERCIAL

## 2024-03-15 DIAGNOSIS — F11.90 CHRONIC, CONTINUOUS USE OF OPIOIDS: ICD-10-CM

## 2024-03-15 DIAGNOSIS — G89.4 CHRONIC PAIN SYNDROME: ICD-10-CM

## 2024-03-15 DIAGNOSIS — M54.2 CERVICALGIA: ICD-10-CM

## 2024-03-15 DIAGNOSIS — M79.18 MYOFASCIAL PAIN: ICD-10-CM

## 2024-03-15 DIAGNOSIS — M54.16 LUMBAR RADICULOPATHY: ICD-10-CM

## 2024-03-15 DIAGNOSIS — Z98.1 S/P CERVICAL SPINAL FUSION: ICD-10-CM

## 2024-03-15 RX ORDER — OXYCODONE HYDROCHLORIDE 5 MG/1
5-10 TABLET ORAL EVERY 6 HOURS PRN
Qty: 165 TABLET | Refills: 0 | Status: SHIPPED | OUTPATIENT
Start: 2024-03-20 | End: 2024-04-15

## 2024-03-28 DIAGNOSIS — M25.511 CHRONIC RIGHT SHOULDER PAIN: ICD-10-CM

## 2024-03-28 DIAGNOSIS — G89.29 CHRONIC RIGHT SHOULDER PAIN: ICD-10-CM

## 2024-03-28 NOTE — TELEPHONE ENCOUNTER
Prescription refill requested for:   celecoxib (CELEBREX) 100 MG capsule       Last Written Prescription Date:  3/6/24  Last Fill Quantity: 60,   # refills: 0  Last Office Visit: 1/8/24  Future Office visit:    Next 5 appointments (look out 90 days)      Apr 18, 2024  8:30 AM  (Arrive by 8:15 AM)  Return Visit with Brian Todd MD  [facility] ([facility]) [id number] [place] [id number] [phone]                 Shonda Dai LPN

## 2024-03-31 ENCOUNTER — MYC REFILL (OUTPATIENT)
Dept: FAMILY MEDICINE | Facility: CLINIC | Age: 59
End: 2024-03-31
Payer: COMMERCIAL

## 2024-03-31 DIAGNOSIS — M54.2 CERVICALGIA: ICD-10-CM

## 2024-03-31 DIAGNOSIS — G62.9 NEUROPATHY: ICD-10-CM

## 2024-03-31 DIAGNOSIS — Z98.1 S/P CERVICAL SPINAL FUSION: ICD-10-CM

## 2024-03-31 DIAGNOSIS — G89.4 CHRONIC PAIN SYNDROME: ICD-10-CM

## 2024-03-31 DIAGNOSIS — M54.16 LUMBAR RADICULOPATHY: ICD-10-CM

## 2024-03-31 DIAGNOSIS — M79.18 MYOFASCIAL PAIN: ICD-10-CM

## 2024-04-01 RX ORDER — CYCLOBENZAPRINE HCL 5 MG
TABLET ORAL
Qty: 90 TABLET | Refills: 2 | Status: SHIPPED | OUTPATIENT
Start: 2024-04-01 | End: 2024-08-04

## 2024-04-01 RX ORDER — CELECOXIB 100 MG/1
100 CAPSULE ORAL 2 TIMES DAILY
Qty: 60 CAPSULE | Refills: 1 | Status: SHIPPED | OUTPATIENT
Start: 2024-04-01 | End: 2024-05-14

## 2024-04-08 DIAGNOSIS — G62.9 NEUROPATHY: ICD-10-CM

## 2024-04-08 RX ORDER — PREGABALIN 150 MG/1
150 CAPSULE ORAL 2 TIMES DAILY
Qty: 60 CAPSULE | Refills: 1 | Status: SHIPPED | OUTPATIENT
Start: 2024-04-08 | End: 2024-05-08

## 2024-04-18 ENCOUNTER — OFFICE VISIT (OUTPATIENT)
Dept: PALLIATIVE MEDICINE | Facility: CLINIC | Age: 59
End: 2024-04-18
Payer: COMMERCIAL

## 2024-04-18 VITALS
DIASTOLIC BLOOD PRESSURE: 79 MMHG | HEART RATE: 103 BPM | BODY MASS INDEX: 28.92 KG/M2 | SYSTOLIC BLOOD PRESSURE: 129 MMHG | WEIGHT: 213.2 LBS

## 2024-04-18 DIAGNOSIS — M47.816 SPONDYLOSIS OF LUMBAR REGION WITHOUT MYELOPATHY OR RADICULOPATHY: Primary | ICD-10-CM

## 2024-04-18 PROCEDURE — 99213 OFFICE O/P EST LOW 20 MIN: CPT

## 2024-04-18 ASSESSMENT — PAIN SCALES - GENERAL: PAINLEVEL: MODERATE PAIN (5)

## 2024-04-18 NOTE — PROGRESS NOTES
"[facility] Pain Management         Date of visit: 04/18/24            Subjective:\"I have pain in my back, neck and neuropathy.  The back pain seems to be worse.\"    Chief complaint:   Chief Complaint   Patient presents with    RECHECK       Pain Information:  The pain score is 5-6/10    Interval history:  Indra Mehta is a 58 year old male last seen on 3/7/2024.  They are a patient of Tha Grullon and are being seen in follow up. He complains of back pain and neuropathic pain.  He has had several different pain providers.  He has received spinal injections from both Rhett Mcdaniel and [facility].  He has received RF denervation in the past for his low back pain.  He has had PT in the past and the radiofrequency denervation has provided relief. We attempted a request to perform repeat RF denervation of the lumbar spine, but this was denied by his insurance carrier.  He returns today for re-evaluation.  He notes that the denervation helped with his back pain, however it did not help his neuropathic pain.  He states that noting has helped the neuropathic pain.  He does not have diabetes, nor has he had chemotherapy.  He has not had lumbar spine surgery, thus the etiology of his neuropathy is not clear.    Recommendations/plan at the last visit included:  Schedule repeat lumbar radiofrequency denervation for the lumbat facet arthropathy  Consider spinal cord stimulation to treat polyneuropathy    Since his last visit, Indra Mehta reports:  The patient denies an improvement in pain.  The patient denies an improvement in function.  There have not been interventional techniques performed since the last visit.             Current Pain Treatments:    Medications:    Celocoxib   Pregabalin             Oxycodone 5mg (5-6/d via Tha Grullon)              Zoloft              Pamelor              Flexeril     2. Other therapies:    Spinal Injections             MEDICATIONS:  Current Outpatient Medications "   Medication Sig Dispense Refill    Acetaminophen (TYLENOL PO) Take 1,000 mg by mouth 3 times daily      amLODIPine (NORVASC) 5 MG tablet TAKE 1 TABLET BY MOUTH EVERY DAY 90 tablet 3    atorvastatin (LIPITOR) 10 MG tablet Take 10 mg by mouth daily      celecoxib (CELEBREX) 100 MG capsule TAKE 1 CAPSULE BY MOUTH TWICE DAILY. 60 capsule 1    cyclobenzaprine (FLEXERIL) 5 MG tablet TAKE 1-2 TABLETS (5-10MG) BY MOUTH 3 TIMES DAILY AS NEEDED 90 tablet 2    losartan-hydrochlorothiazide (HYZAAR) 50-12.5 MG tablet Take 2 tablets by mouth daily 180 tablet 1    naloxone (NARCAN) 4 MG/0.1ML nasal spray Spray 1 spray (4 mg) into one nostril alternating nostrils once as needed for opioid reversal every 2-3 minutes until assistance arrives 0.2 mL 1    nortriptyline (PAMELOR) 50 MG capsule Take 1 capsule (50 mg) by mouth at bedtime 90 capsule 1    [START ON 4/19/2024] oxyCODONE (ROXICODONE) 5 MG tablet Take 1-2 tablets (5-10 mg) by mouth every 6 hours as needed for severe pain Must last 30 days 165 tablet 0    pregabalin (LYRICA) 150 MG capsule TAKE 1 CAPSULE (150 MG) BY MOUTH 2 TIMES DAILY 60 capsule 1    sertraline (ZOLOFT) 50 MG tablet Take 3 tablets (150 mg) by mouth daily 90 tablet 11    sildenafil (VIAGRA) 100 MG tablet Take 0.5-1 tablets ( mg) by mouth daily as needed Take 30 min to 4 hours before intercourse.  Never use with nitroglycerin, terazosin or doxazosin. 6 tablet 7    methylPREDNISolone (MEDROL) 4 MG tablet therapy pack Follow Package Directions (Patient not taking: Reported on 4/18/2024) 21 tablet 0    methylPREDNISolone (MEDROL) 4 MG tablet therapy pack Follow Package Directions (Patient not taking: Reported on 4/18/2024) 21 tablet 0    order for DME Equipment being ordered: TENS Pads as directed 1 Device 0       ALLERGIES:  No Known Allergies    SOCIAL HISTORY:  Social History     Socioeconomic History    Marital status: Single    Number of children: 2   Occupational History     Employer: [employer]    Tobacco Use    Smoking status: Never    Smokeless tobacco: Never   Vaping Use    Vaping Use: Never used   Substance and Sexual Activity    Alcohol use: Yes     Alcohol/week: 0.0 standard drinks of alcohol     Comment: rarely    Drug use: No    Sexual activity: Not Currently     Partners: Female   Other Topics Concern    Parent/sibling w/ CABG, MI or angioplasty before 65F 55M? No     Social Determinants of Health     Financial Resource Strain: Low Risk  (1/14/2024)    Financial Resource Strain     Within the past 12 months, have you or your family members you live with been unable to get utilities (heat, electricity) when it was really needed?: No   Food Insecurity: Low Risk  (1/14/2024)    Food Insecurity     Within the past 12 months, did you worry that your food would run out before you got money to buy more?: No     Within the past 12 months, did the food you bought just not last and you didn t have money to get more?: No   Transportation Needs: Low Risk  (1/14/2024)    Transportation Needs     Within the past 12 months, has lack of transportation kept you from medical appointments, getting your medicines, non-medical meetings or appointments, work, or from getting things that you need?: No   Interpersonal Safety: Low Risk  (9/21/2023)    Interpersonal Safety     Do you feel physically and emotionally safe where you currently live?: Yes     Within the past 12 months, have you been hit, slapped, kicked or otherwise physically hurt by someone?: No     Within the past 12 months, have you been humiliated or emotionally abused in other ways by your partner or ex-partner?: No   Housing Stability: Low Risk  (1/14/2024)    Housing Stability     Do you have housing? : Yes     Are you worried about losing your housing?: No       REVIEW OF SYSTEMS:   ROS: 10 point ROS neg other than the symptoms noted above in the HPI.    MEDICAL CHANGES: any changes in medical history since they were last seen? No      Objective:    BP  129/79 (BP Location: Right arm, Patient Position: Sitting, Cuff Size: Adult Regular)   Pulse 103   Wt 96.7 kg (213 lb 3.2 oz)   BMI 28.92 kg/m      Physical Exam  Vitals and nursing note reviewed.   Musculoskeletal:         General: Tenderness present. No swelling, deformity or signs of injury.      Lumbar back: Spasms and tenderness present. No swelling, edema, deformity, signs of trauma, lacerations or bony tenderness. Decreased range of motion. Negative right straight leg raise test and negative left straight leg raise test. No scoliosis.      Right lower leg: No edema.      Left lower leg: No edema.      Comments: Patient has facet loading in the lumbar region with hyperextension and lateral bending and twisting         Diagnostic Tests/Imaging/Labs:  MRI CERVICAL SPINE WITHOUT CONTRAST December 21, 2023 9:19 AM      HISTORY: Cervical DDD, disc herniations, radicular pain, worse,  history of cervical surgery. Cervical disc herniation. DDD  (degenerative disc disease), cervical. History of cervical spine  surgery.      TECHNIQUE: Multiplanar, multisequence MRI of the cervical spine  without contrast.      COMPARISON: MRI of the cervical spine dated 10/13/2022.      FINDINGS: Normal vertebral height. Straightening of the normal  cervical lordosis. Sagittal alignment otherwise normal. Again seen is  patchy edema-like marrow signal change centered about the left C3-C4  facet joint (series 4 image 12). There is a small left C3-C4 facet  joint effusion. Findings are nonspecific, but may be reactive due to  degenerative facet disease with possible active/inflammatory  component. Moderate type I degenerative endplate signal changes along  the left posterolateral aspect of the C3-C4 disc space. Bone marrow  signal otherwise appears unremarkable.     Interbody fusion changes at C5-C6 and C6-C7, as before. There is  susceptibility artifact related to bilateral posterior fusion hardware  spanning C5-C7, as before. No  definite spinal cord signal abnormality.  The visualized paraspinous soft tissues appear unremarkable.     Segmental analysis:  Craniovertebral junction/C1-C2: Mild degenerative changes of the  median atlantoaxial joint. Otherwise unremarkable. No significant  change.     C2-C3: Normal disc height. Shallow symmetric disc bulge. Possible tiny  superimposed central disc protrusion. Mild to moderate left and mild  right facet arthropathy. No significant spinal canal or neural  foraminal stenosis. Overall, no significant change.     C3-C4: Minimal disc height loss. Disc bulge eccentric to the left,  appearing increased in size from prior. Bilateral uncovertebral  spurring. Moderate to severe left facet arthropathy, progressed. Mild  right facet arthropathy, as before. Ligamentum flavum thickening. Mild  to moderate bordering on moderate central spinal canal stenosis with  mild flattening of the normal spinal cord contour, progressed compared  to the prior study. Progressed moderate to severe left greater than  right neural foraminal stenosis.     C4-C5: Minimal disc height loss. Symmetric disc bulge. Minimal  uncovertebral spurring. Mild to moderate left and moderate right facet  arthropathy. Mild spinal canal stenosis. Mild or mild to moderate  bilateral neural foraminal stenosis. Overall, no significant change.     C5-C6: Postoperative level. No spinal canal stenosis. Mild residual  right uncovertebral spurring. There is at least mild right neural  foraminal stenosis. Left neural foramen appears patent. Overall, no  significant change.     C6-C7: Postoperative level. No significant spinal canal or neural  foraminal stenosis. Overall, no significant change.     C7-T1: Minimal disc height loss. Shallow symmetric disc bulge with  mild bilateral uncovertebral spurring and mild to moderate facet  arthropathy. No spinal canal stenosis. Mild to moderate left and  minimal right neural foraminal narrowing. Overall, no  significant  change.                                                                      IMPRESSION:  1. Progressed degenerative findings at C3-C4, as described. Otherwise,  no significant change in degenerative and postoperative findings of  the cervical spine, as described.  2. Patchy nonspecific edema-like marrow signal changes centered about  the left C3-C4 facet joint with a small facet joint effusion. Findings  are nonspecific, but are favored to reflect reactive marrow signal  changes in the setting of degenerative facet disease with possible  active/inflammatory component. There are presumed Modic type I  degenerative endplate signal changes along the left posterolateral  aspect of the C3-C4 disc space.  3. Mild to moderate bordering on moderate central spinal canal  stenosis and moderate to severe left and moderate right neural  foraminal stenosis at C3-C4 appear progressed.  4. Redemonstrated C5-C6 and C6-C7 interbody and posterior fusion  changes.     RAMONA WOMACK MD     EXAM: MR LUMBAR SPINE W/O CONTRAST  10/17/2023 2:22 PM      HISTORY: lumbar ddd, disc herniations, chronic lumbar radicular pain,  worsening; Low back pain; hx Spine surgery; No r/o hardware failure;  Worsening low back pain; No known/automatically detected potential  contraindications to imaging; Lumbar radiculopathy; DDD (degenerative  disc disease), lumbar; Lumbar disc herniation with radiculopathy        COMPARISON: Lumbar spine MRI: 3/17/2022.     TECHNIQUE: Multiplanar, multisequence MR images of the lumbar spine  were obtained without intravenous contrast..     FINDINGS:     There are 5 lumbar type vertebral bodies. Vertebral body heights are  maintained. No suspicious focal marrow replacing lesions. Gentle  levocurvature of the lumbar spine. No substantial listhesis. Conus tip  terminates at L1. Normal size and signal of the cauda equina nerve  roots. Similar T2 hyperintense presumed cyst associated with the  anterior aspect of  the right kidney. Prominent dorsal epidural  lipomatosis throughout portions of the lumbar spine, most pronounced  at L1-L2, L2-L3, and L3-L4.     On a level by level basis, degenerative changes are as follows:     T12-L1: Normal disc height and signal. Mild bilateral facet  hypertrophy. Slight ligamentum flavum thickening. No substantial  spinal canal or neural foraminal stenosis.     L1-L2: Similar mild disc height loss with desiccation. Mild posterior  disc bulging. Moderate bilateral facet hypertrophy. Ligamentum flavum  thickening. In combination with epidural lipomatosis, there is similar  mild spinal canal stenosis. Similar moderate right and mild left  neural foraminal stenosis.     L2-L3: Minimal disc height loss with desiccation. Broad-based disc  bulging. Moderate bilateral facet hypertrophy. Ligamentum flavum  thickening. In combination with prominent dorsal epidural lipomatosis,  there is similar moderate spinal canal stenosis. Similar mild  bilateral neural foraminal stenosis.     L3-L4: Normal disc height and signal. Minimal broad-based disc bulging  with pronounced left foraminal component. Moderate to advanced  bilateral facet hypertrophy. Ligamentum flavum thickening. In  combination with epidural lipomatosis, there is moderate spinal canal  stenosis, similar to prior. No substantial neural foraminal stenosis.     L4-L5: Mild disc height loss with desiccation. Mild broad-based disc  bulging. Advanced bilateral facet hypertrophy. There is a large right  and moderate left facet joint effusion, increased in size from prior.  There is mild edema signal within the adjacent articular pillar at  L4-L5 and mild edema within the left L4 and L5 pedicles (series  5/images 16-18). No significant adjacent edema on the right. Mild  spinal canal stenosis in combination with epidural lipomatosis. Mild  right greater than left neural foraminal stenosis, similar to prior.      L5-S1: Normal disc height and signal.  No disc herniation. Advanced  bilateral facet hypertrophy. No substantial spinal canal or neural  foraminal stenosis.                                                                      IMPRESSION:     1.  Increased size of large right and moderate left facet joint  effusions at L4-L5. There is some mild edema signal involving the  adjacent left articular pillar at L4-L5 extending into the left L4 and  L5 pedicles. While findings are most likely degenerative in etiology,  sequelae of an infectious/inflammatory process cannot be excluded in  the proper clinical setting. No significant adjacent inflammatory  changes on the right.  2.  Otherwise, no significant interval change in multilevel lumbar  spondylosis as detailed above.  3.  Spinal canal stenosis is greatest and moderate at L2-L3 and L3-L4  secondary to degenerative changes superimposed on dorsal epidural  lipomatosis.  4.  Neural foraminal stenosis is greatest and moderate on the right at  L1-L2.     RENÉ YATES MD        Assessment:   Indra Mehta is a 58 year old male with a past medical history significant for peripheral neuropathy who presents with complaints of low back pain and burning pain of the feet       Visit Diagnoses:  Polyneuropathy  Lumbar spondylosis without myelopathy    Plan:  Diagnosis reviewed, treatment option addressed, and risk/benefits discussed.  Self-care instructions given.  I am recommending a multidisciplinary treatment plan to help this patient better manage their pain.  This includes:  Patient still has facet loading on PE.  The RF was denied by his insurance provider.  I will attempt a peer to peer.  In the meantime I will prescribe another round of PT.  Performing the RF denervation was facilitate decrease in the dose of opioids that are prescribed by the PCP and taken by the patient on a daily basis.          Review of Electronic Chart: Today I have also reviewed available medical information in the patient's  medical record at [facility] ([facility] and Care Everywhere (if available), including relevant provider notes, laboratory work, and imaging.     Brian Todd IV, MD  [facility]

## 2024-04-18 NOTE — PATIENT INSTRUCTIONS
Referrals:    Physical Therapy- we will send it to [facility]      Follow up:      Dr. Todd will continue to try to get the RFA approved.     To speak with a nurse, schedule/reschedule/cancel a clinic appointment, or request a medication refill call: [phone].    You can also reach us by MyChart: [email or web address]

## 2024-04-22 DIAGNOSIS — I10 BENIGN ESSENTIAL HYPERTENSION: ICD-10-CM

## 2024-04-22 RX ORDER — LOSARTAN POTASSIUM AND HYDROCHLOROTHIAZIDE 12.5; 5 MG/1; MG/1
2 TABLET ORAL DAILY
Qty: 180 TABLET | Refills: 1 | OUTPATIENT
Start: 2024-04-22

## 2024-04-23 ENCOUNTER — TELEPHONE (OUTPATIENT)
Dept: NEUROLOGY | Facility: CLINIC | Age: 59
End: 2024-04-23
Payer: COMMERCIAL

## 2024-04-23 NOTE — TELEPHONE ENCOUNTER
Faxed PT referral to [facility] per patient preference. Fax receipt verified via rightfax.

## 2024-04-29 ENCOUNTER — TELEPHONE (OUTPATIENT)
Dept: ANESTHESIOLOGY | Facility: CLINIC | Age: 59
End: 2024-04-29
Payer: COMMERCIAL

## 2024-04-29 DIAGNOSIS — M47.816 SPONDYLOSIS WITHOUT MYELOPATHY OR RADICULOPATHY, LUMBAR REGION: Primary | ICD-10-CM

## 2024-04-29 RX ORDER — LIDOCAINE 40 MG/G
CREAM TOPICAL
OUTPATIENT
Start: 2024-04-29

## 2024-05-01 ENCOUNTER — TELEPHONE (OUTPATIENT)
Dept: ANESTHESIOLOGY | Facility: CLINIC | Age: 59
End: 2024-05-01
Payer: COMMERCIAL

## 2024-05-01 NOTE — TELEPHONE ENCOUNTER
Second attempt: phoned the patient and left VM. Stated to call and schedule injection procedure with dr. Todd. Send [employer] message as well.

## 2024-05-02 ENCOUNTER — TELEPHONE (OUTPATIENT)
Dept: ANESTHESIOLOGY | Facility: CLINIC | Age: 59
End: 2024-05-02
Payer: COMMERCIAL

## 2024-05-02 PROBLEM — M47.816 SPONDYLOSIS WITHOUT MYELOPATHY OR RADICULOPATHY, LUMBAR REGION: Status: ACTIVE | Noted: 2024-04-29

## 2024-05-02 NOTE — TELEPHONE ENCOUNTER
Called patient to schedule procedure with Dr. Todd    Date of Procedure: 6/13/24    Arrival time given: Yes: Arrival Time 12:15pm       Procedure Location: [facility]     Verified Location with Patient:  Yes  Address provided to the patient     Pre-op H&P Required:  No: Local Anesthesia        Post-Op/Follow Up Appt:  Not Indicated in Request      Informed patient they will need a  to drive them home:  Yes    Patients : Daughter (Aidee)    Patient is aware that pre-op RN from the procedure center will call 2-3 days prior to scheduled procedure to confirm arrival time and review any instructions:  Yes       Additional Comments: N/A        Haylie Andrew MA on 5/2/2024 at 10:09 AM      P: [phone]*

## 2024-05-05 DIAGNOSIS — G89.29 CHRONIC RIGHT SHOULDER PAIN: ICD-10-CM

## 2024-05-05 DIAGNOSIS — M25.511 CHRONIC RIGHT SHOULDER PAIN: ICD-10-CM

## 2024-05-08 DIAGNOSIS — G62.9 NEUROPATHY: ICD-10-CM

## 2024-05-08 RX ORDER — PREGABALIN 150 MG/1
150 CAPSULE ORAL 2 TIMES DAILY
Qty: 60 CAPSULE | Refills: 1 | Status: SHIPPED | OUTPATIENT
Start: 2024-06-07 | End: 2024-06-06

## 2024-05-14 RX ORDER — CELECOXIB 100 MG/1
100 CAPSULE ORAL 2 TIMES DAILY
Qty: 60 CAPSULE | Refills: 1 | Status: SHIPPED | OUTPATIENT
Start: 2024-05-14 | End: 2024-07-08

## 2024-05-15 ENCOUNTER — MYC REFILL (OUTPATIENT)
Dept: FAMILY MEDICINE | Facility: CLINIC | Age: 59
End: 2024-05-15
Payer: COMMERCIAL

## 2024-05-15 DIAGNOSIS — F11.90 CHRONIC, CONTINUOUS USE OF OPIOIDS: ICD-10-CM

## 2024-05-15 DIAGNOSIS — Z98.1 S/P CERVICAL SPINAL FUSION: ICD-10-CM

## 2024-05-15 DIAGNOSIS — M79.18 MYOFASCIAL PAIN: ICD-10-CM

## 2024-05-15 DIAGNOSIS — M54.16 LUMBAR RADICULOPATHY: ICD-10-CM

## 2024-05-15 DIAGNOSIS — M54.2 CERVICALGIA: ICD-10-CM

## 2024-05-15 DIAGNOSIS — G89.4 CHRONIC PAIN SYNDROME: ICD-10-CM

## 2024-05-16 ENCOUNTER — MYC MEDICAL ADVICE (OUTPATIENT)
Dept: FAMILY MEDICINE | Facility: CLINIC | Age: 59
End: 2024-05-16
Payer: COMMERCIAL

## 2024-05-16 DIAGNOSIS — M54.16 LUMBAR RADICULOPATHY: ICD-10-CM

## 2024-05-16 DIAGNOSIS — Z98.1 S/P CERVICAL SPINAL FUSION: ICD-10-CM

## 2024-05-16 DIAGNOSIS — M79.18 MYOFASCIAL PAIN: ICD-10-CM

## 2024-05-16 DIAGNOSIS — M54.2 CERVICALGIA: ICD-10-CM

## 2024-05-16 DIAGNOSIS — F11.90 CHRONIC, CONTINUOUS USE OF OPIOIDS: ICD-10-CM

## 2024-05-16 DIAGNOSIS — G89.4 CHRONIC PAIN SYNDROME: ICD-10-CM

## 2024-05-16 RX ORDER — OXYCODONE HYDROCHLORIDE 5 MG/1
5-10 TABLET ORAL EVERY 6 HOURS PRN
Qty: 165 TABLET | Refills: 0 | Status: SHIPPED | OUTPATIENT
Start: 2024-05-19 | End: 2024-05-17

## 2024-05-17 RX ORDER — OXYCODONE HYDROCHLORIDE 5 MG/1
5-10 TABLET ORAL EVERY 6 HOURS PRN
Qty: 165 TABLET | Refills: 0 | Status: SHIPPED | OUTPATIENT
Start: 2024-05-18 | End: 2024-06-13

## 2024-05-17 NOTE — TELEPHONE ENCOUNTER
Pharmacist from [facility] calling asking if PCP would like to change start date on oxycodone. Current date is 5/19 which is Sunday and they are not open. Would you like to change the date to 5/18 which is Saturday?    Will route to PCP    Ila Saini RN on 5/17/2024 at 11:53 AM

## 2024-06-06 DIAGNOSIS — G62.9 NEUROPATHY: ICD-10-CM

## 2024-06-06 RX ORDER — PREGABALIN 150 MG/1
150 CAPSULE ORAL 2 TIMES DAILY
Qty: 60 CAPSULE | Refills: 1 | Status: SHIPPED | OUTPATIENT
Start: 2024-06-06 | End: 2024-07-06

## 2024-06-13 ENCOUNTER — TELEPHONE (OUTPATIENT)
Dept: ANESTHESIOLOGY | Facility: CLINIC | Age: 59
End: 2024-06-13

## 2024-06-13 ENCOUNTER — MYC REFILL (OUTPATIENT)
Dept: FAMILY MEDICINE | Facility: CLINIC | Age: 59
End: 2024-06-13

## 2024-06-13 DIAGNOSIS — G89.4 CHRONIC PAIN SYNDROME: ICD-10-CM

## 2024-06-13 DIAGNOSIS — Z98.1 S/P CERVICAL SPINAL FUSION: ICD-10-CM

## 2024-06-13 DIAGNOSIS — M54.16 LUMBAR RADICULOPATHY: ICD-10-CM

## 2024-06-13 DIAGNOSIS — M79.18 MYOFASCIAL PAIN: ICD-10-CM

## 2024-06-13 DIAGNOSIS — F11.90 CHRONIC, CONTINUOUS USE OF OPIOIDS: ICD-10-CM

## 2024-06-13 DIAGNOSIS — M54.2 CERVICALGIA: ICD-10-CM

## 2024-06-13 RX ORDER — OXYCODONE HYDROCHLORIDE 5 MG/1
5-10 TABLET ORAL EVERY 6 HOURS PRN
Qty: 165 TABLET | Refills: 0 | Status: SHIPPED | OUTPATIENT
Start: 2024-06-17 | End: 2024-07-12

## 2024-06-13 NOTE — TELEPHONE ENCOUNTER
Phoned the patient and left VM. Stated procedure on 6/13/24 with dr. Todd has been canceled due to dr. Todd out ill. Stated to call and reschedule the injection procedure at [phone].

## 2024-06-13 NOTE — TELEPHONE ENCOUNTER
Patient called back to reschedule injection procedure with dr. Todd. Informed the patient the next available date for dr. Todd at  is on 8/22/24 and [facility] 7/16/24. Patient decided not to have the injection procedure anymore.

## 2024-06-19 NOTE — TELEPHONE ENCOUNTER
Left Voicemail (1st Attempt) for the patient to call back and schedule the following:    Appointment type: mri  Specialty phone number: [phone]     Anny goodwin Procedure   Orthopedics, Podiatry, Sports Medicine, Ent ,Eye , Audiology, Adult Endocrine & Diabetes, Nutrition & Medication Therapy Management Specialties   [facility]      
PAST SURGICAL HISTORY:  No significant past surgical history

## 2024-07-05 DIAGNOSIS — M25.511 CHRONIC RIGHT SHOULDER PAIN: ICD-10-CM

## 2024-07-05 DIAGNOSIS — I73.01 RAYNAUD'S DISEASE WITH GANGRENE (H): ICD-10-CM

## 2024-07-05 DIAGNOSIS — G62.9 NEUROPATHY: ICD-10-CM

## 2024-07-05 DIAGNOSIS — G89.29 CHRONIC RIGHT SHOULDER PAIN: ICD-10-CM

## 2024-07-05 RX ORDER — AMLODIPINE BESYLATE 5 MG/1
TABLET ORAL
Qty: 90 TABLET | Refills: 0 | Status: SHIPPED | OUTPATIENT
Start: 2024-07-05

## 2024-07-05 NOTE — TELEPHONE ENCOUNTER
Prescription refill requested for:   celecoxib (CELEBREX) 100 MG capsule       Last Written Prescription Date:  5/14/24  Last Fill Quantity: 60,   # refills: 1  Last Office Visit: 1/8/24  Future Office visit:           Laury Mann ATC   
Home

## 2024-07-06 RX ORDER — PREGABALIN 150 MG/1
150 CAPSULE ORAL 2 TIMES DAILY
Qty: 60 CAPSULE | Refills: 1 | Status: SHIPPED | OUTPATIENT
Start: 2024-07-06 | End: 2024-09-06

## 2024-07-08 RX ORDER — CELECOXIB 100 MG/1
100 CAPSULE ORAL 2 TIMES DAILY
Qty: 60 CAPSULE | Refills: 1 | Status: SHIPPED | OUTPATIENT
Start: 2024-07-08 | End: 2024-09-11

## 2024-07-09 NOTE — PROGRESS NOTES
ENT Consultation    Indra Mehta who is a 59 year old male seen in consultation at the request of self.      History of Present Illness - Indra Mehta is a 59 year old male with long standing history of left Ménière's disease status post left endolymphatic sac decompression in the past presents because of severe tinnitus that he has had lately nonpulsatile.  It is bilateral but mostly bothers him on the left side.  Had couple of small episodes of vertigo but certainly much improved as compared to the time before surgery.  Denies any ear discharge.      BP Readings from Last 1 Encounters:   07/22/24 136/68       BP noted to be well controlled today in office.     Indra IS NOT a smoker/uses chewing tobacco.        Past Medical History -   Past Medical History:   Diagnosis Date    Anxiety     Back pain     Depressive disorder     Hyperlipidemia     Hypertension     Meniere's disease, unspecified     Pain medication agreement signed 08/20/2010    Sleep apnea, unspecified type     Status post lumbar spinal fusion 01/14/2020       Current Medications -   Current Outpatient Medications:     Acetaminophen (TYLENOL PO), Take 1,000 mg by mouth 3 times daily, Disp: , Rfl:     amLODIPine (NORVASC) 5 MG tablet, TAKE 1 TABLET BY MOUTH EVERY DAY, Disp: 90 tablet, Rfl: 0    atorvastatin (LIPITOR) 10 MG tablet, Take 10 mg by mouth daily, Disp: , Rfl:     celecoxib (CELEBREX) 100 MG capsule, TAKE 1 CAPSULE BY MOUTH TWICE DAILY., Disp: 60 capsule, Rfl: 1    cyclobenzaprine (FLEXERIL) 5 MG tablet, TAKE 1-2 TABLETS (5-10MG) BY MOUTH 3 TIMES DAILY AS NEEDED, Disp: 90 tablet, Rfl: 2    losartan-hydrochlorothiazide (HYZAAR) 50-12.5 MG tablet, Take 2 tablets by mouth daily, Disp: 180 tablet, Rfl: 1    nortriptyline (PAMELOR) 50 MG capsule, Take 1 capsule (50 mg) by mouth at bedtime, Disp: 90 capsule, Rfl: 1    order for DME, Equipment being ordered: TENS Pads as directed, Disp: 1 Device, Rfl: 0    oxyCODONE (ROXICODONE) 5 MG  tablet, Take 1-2 tablets (5-10 mg) by mouth every 6 hours as needed for severe pain Must last 30 days, Disp: 165 tablet, Rfl: 0    pregabalin (LYRICA) 150 MG capsule, TAKE 1 CAPSULE (150 MG) BY MOUTH 2 TIMES DAILY, Disp: 60 capsule, Rfl: 1    sertraline (ZOLOFT) 50 MG tablet, Take 3 tablets (150 mg) by mouth daily, Disp: 90 tablet, Rfl: 11    sildenafil (VIAGRA) 100 MG tablet, Take 0.5-1 tablets ( mg) by mouth daily as needed Take 30 min to 4 hours before intercourse.  Never use with nitroglycerin, terazosin or doxazosin., Disp: 6 tablet, Rfl: 7    naloxone (NARCAN) 4 MG/0.1ML nasal spray, Spray 1 spray (4 mg) into one nostril alternating nostrils once as needed for opioid reversal every 2-3 minutes until assistance arrives (Patient not taking: Reported on 7/22/2024), Disp: 0.2 mL, Rfl: 1    Current Facility-Administered Medications:     lidocaine (PF) (XYLOCAINE) 1 % injection 5 mL, 5 mL, , , Alvaro Mariee MD, 5 mL at 08/25/22 0842    lidocaine (PF) (XYLOCAINE) 1 % injection 5 mL, 5 mL, , , Alvaro Mariee MD, 5 mL at 08/25/22 0842    lidocaine 1 % injection 5 mL, 5 mL, , , Alvaro Mariee MD, 5 mL at 06/01/23 1353    lidocaine 1 % injection 5 mL, 5 mL, , , Alvaro Mariee MD, 5 mL at 06/01/23 1353    lidocaine 1 % injection 5 mL, 5 mL, , , Alvaro Mariee MD, 5 mL at 01/24/23 1329    lidocaine 1 % injection 5 mL, 5 mL, , , Alvaro Mariee MD, 5 mL at 01/24/23 1329    lidocaine 1 % injection 5 mL, 5 mL, , , Alvaro Mariee MD, 5 mL at 10/25/22 0912    lidocaine 1 % injection 5 mL, 5 mL, , , Alvaro Mariee MD, 5 mL at 10/25/22 0912    lidocaine 1 % injection 5 mL, 5 mL, , , Alvaro Mariee MD, 5 mL at 05/04/22 1511    lidocaine 1 % injection 5 mL, 5 mL, , , Alvaro Mariee MD, 5 mL at 05/04/22 1511    lidocaine 1 % injection 5 mL, 5 mL, , , Alvaro Mariee MD, 5 mL at 06/22/21 0902    methylPREDNISolone (DEPO-Medrol) injection 40 mg, 40 mg, ,  , Alvaro Moore MD, 40 mg at 01/08/24 1018    methylPREDNISolone (DEPO-MEDROL) injection 40 mg, 40 mg, , , Alvaro Moore MD, 40 mg at 06/07/23 0853    methylPREDNISolone (DEPO-MEDROL) injection 40 mg, 40 mg, , , Alavro Moore MD, 40 mg at 03/06/23 0917    methylPREDNISolone (DEPO-MEDROL) injection 40 mg, 40 mg, , , Alvaro Moore MD, 40 mg at 12/14/22 0907    methylPREDNISolone (DEPO-MEDROL) injection 40 mg, 40 mg, , , Alvaro Moore MD, 40 mg at 09/30/21 0816    methylPREDNISolone (DEPO-MEDROL) injection 80 mg, 80 mg, , , Alvaro Mariee MD, 80 mg at 06/01/23 1353    methylPREDNISolone (DEPO-MEDROL) injection 80 mg, 80 mg, , , Alvaro Mariee MD, 80 mg at 06/01/23 1353    methylPREDNISolone (DEPO-MEDROL) injection 80 mg, 80 mg, , , Alvaro Mariee MD, 80 mg at 01/24/23 1329    methylPREDNISolone (DEPO-MEDROL) injection 80 mg, 80 mg, , , Alvaro Mariee MD, 80 mg at 01/24/23 1329    methylPREDNISolone (DEPO-MEDROL) injection 80 mg, 80 mg, , , Alvaro Mariee MD, 80 mg at 10/25/22 0912    methylPREDNISolone (DEPO-MEDROL) injection 80 mg, 80 mg, , , Alvaro Mariee MD, 80 mg at 10/25/22 0912    methylPREDNISolone (DEPO-MEDROL) injection 80 mg, 80 mg, , , Alvaro Mariee MD, 80 mg at 08/25/22 0842    methylPREDNISolone (DEPO-MEDROL) injection 80 mg, 80 mg, , , Alvaro Mariee MD, 80 mg at 08/25/22 0842    methylPREDNISolone (DEPO-MEDROL) injection 80 mg, 80 mg, , , Alvaro Mariee MD, 80 mg at 05/04/22 1511    methylPREDNISolone (DEPO-MEDROL) injection 80 mg, 80 mg, , , Alvaro Mariee MD, 80 mg at 05/04/22 1511    sodium hyaluronate (EUFLEXXA) injection 20 mg, 20 mg, , , Alvaro Moore MD, 20 mg at 10/09/23 0808    sodium hyaluronate (EUFLEXXA) injection 20 mg, 20 mg, , , Alvaro Moore MD, 20 mg at 10/09/23 0808    sodium hyaluronate (EUFLEXXA) injection 20 mg, 20 mg, , , Alvaro Moore MD,  20 mg at 09/18/23 1008    sodium hyaluronate (EUFLEXXA) injection 20 mg, 20 mg, , , Alvaro Moore MD, 20 mg at 09/18/23 1008    sodium hyaluronate (EUFLEXXA) injection 20 mg, 20 mg, , , Alvaro Moore MD, 20 mg at 09/28/23 0804    sodium hyaluronate (EUFLEXXA) injection 20 mg, 20 mg, , , Alvaro Moore MD, 20 mg at 09/28/23 0804    triamcinolone (KENALOG-40) injection 40 mg, 40 mg, , , Alvaro Moore MD, 40 mg at 01/08/24 1018    triamcinolone (KENALOG-40) injection 40 mg, 40 mg, , , Melissa Xie MD, 40 mg at 12/09/21 0827    triamcinolone (KENALOG-40) injection 40 mg, 40 mg, , , Melissa Xie MD, 40 mg at 12/09/21 0827    triamcinolone (KENALOG-40) injection 40 mg, 40 mg, , , Melissa Xie MD, 40 mg at 09/07/21 0855    triamcinolone (KENALOG-40) injection 40 mg, 40 mg, , , Melissa Xie MD, 40 mg at 09/07/21 0855    triamcinolone (KENALOG-40) injection 40 mg, 40 mg, , , Alvaro Mariee MD, 40 mg at 06/22/21 0902    triamcinolone (KENALOG-40) injection 40 mg, 40 mg, , , Melissa Xie MD, 40 mg at 11/16/20 0848    triamcinolone (KENALOG-40) injection 40 mg, 40 mg, , , Melissa Xie MD, 40 mg at 11/16/20 0848    Allergies - No Known Allergies    Social History -   Social History     Socioeconomic History    Marital status: Single    Number of children: 2   Occupational History     Employer: [employer]   Tobacco Use    Smoking status: Never    Smokeless tobacco: Never   Vaping Use    Vaping status: Never Used   Substance and Sexual Activity    Alcohol use: Yes     Alcohol/week: 0.0 standard drinks of alcohol     Comment: rarely    Drug use: No    Sexual activity: Not Currently     Partners: Female   Other Topics Concern    Parent/sibling w/ CABG, MI or angioplasty before 65F 55M? No     Social Determinants of Health     Financial Resource Strain: Low Risk  (1/14/2024)    Financial Resource Strain      Within the past 12 months, have you or your family members you live with been unable to get utilities (heat, electricity) when it was really needed?: No   Food Insecurity: Low Risk  (1/14/2024)    Food Insecurity     Within the past 12 months, did you worry that your food would run out before you got money to buy more?: No     Within the past 12 months, did the food you bought just not last and you didn t have money to get more?: No   Transportation Needs: Low Risk  (1/14/2024)    Transportation Needs     Within the past 12 months, has lack of transportation kept you from medical appointments, getting your medicines, non-medical meetings or appointments, work, or from getting things that you need?: No   Physical Activity: Sufficiently Active (1/9/2023)    Received from [facility]    Exercise Vital Sign     Days of Exercise per Week: 4 days     Minutes of Exercise per Session: 90 min   Stress: Stress Concern Present (1/9/2023)    Received from [facility]    [employer] [place] [employer] - Occupational Stress Questionnaire     Feeling of Stress : Very much   Social Connections: Moderately Isolated (1/9/2023)    Received from [facility], [facility]    Social Connection and Isolation Panel [NHANES]     Frequency of Communication with Friends and Family: Once a week     Frequency of Social Gatherings with Friends and Family: More than three times a week     Attends Sikhism Services: More than 4 times per year     Active Member of Clubs or Organizations: No     Marital Status:    Interpersonal Safety: Low Risk  (9/21/2023)    Interpersonal Safety     Do you feel physically and emotionally safe where you currently live?: Yes     Within the past 12 months, have you been hit, slapped, kicked or otherwise physically hurt by someone?: No     Within the past 12 months, have you been humiliated or emotionally abused in other ways by your partner or ex-partner?: No   Housing  "Stability: Low Risk  (2024)    Housing Stability     Do you have housing? : Yes     Are you worried about losing your housing?: No       Family History -   Family History   Problem Relation Age of Onset    Diabetes Mother     Cancer Mother         colon cancer -  at 52, diag at 42    Hypertension Father     Heart Disease Father          of AAA    No Known Problems Brother     No Known Problems Brother     Depression Sister     Suicide Other     Unknown/Adopted Maternal Grandmother     Unknown/Adopted Maternal Grandfather     Unknown/Adopted Paternal Grandmother     Unknown/Adopted Paternal Grandfather     No Known Problems Daughter     No Known Problems Daughter        Review of Systems - As per HPI and PMHx, otherwise review of system review of the head and neck negative. Otherwise 10+ review of system is negative    Physical Exam  /68 (BP Location: Right arm, Patient Position: Sitting, Cuff Size: Adult Large)   Temp 97.6  F (36.4  C) (Temporal)   Ht 1.854 m (6' 1\")   Wt 100.3 kg (221 lb 3 oz)   BMI 29.18 kg/m    BMI: Body mass index is 29.18 kg/m .    General - The patient is well nourished and well developed, and appears to have good nutritional status.  Alert and oriented to person and place, answers questions and cooperates with examination appropriately.    SKIN - No suspicious lesions or rashes.  Respiration - No respiratory distress.  Head and Face - Normocephalic and atraumatic, with no gross asymmetry noted of the contour of the facial features.  The facial nerve is intact, with strong symmetric movements.    Voice and Breathing - The patient was breathing comfortably without the use of accessory muscles. The patients voice was clear and strong, and had appropriate pitch and quality.    Ears - Bilateral pinna and EACs with normal appearing overlying skin. Tympanic membrane intact with good mobility on pneumatic otoscopy bilaterally. Bony landmarks of the ossicular chain are normal. " The tympanic membranes are normal in appearance. No retraction, perforation, or masses.  No fluid or purulence was seen in the external canal or the middle ear.     Eyes - Extraocular movements intact.  Sclera were not icteric or injected, conjunctiva were pink and moist.    Mouth - Examination of the oral cavity showed pink, healthy oral mucosa. No lesions or ulcerations noted.  The tongue was mobile and midline, and the dentition were in good condition.      Throat - The walls of the oropharynx were smooth, pink, moist, symmetric, and had no lesions or ulcerations.  The tonsillar pillars and soft palate were symmetric.  The uvula was midline on elevation.    Neck - Normal midline excursion of the laryngotracheal complex during swallowing.  Full range of motion on passive movement.  Palpation of the occipital, submental, submandibular, internal jugular chain, and supraclavicular nodes did not demonstrate any abnormal lymph nodes or masses.  The carotid pulse was palpable bilaterally.  Palpation of the thyroid was soft and smooth, with no nodules or goiter appreciated.  The trachea was mobile and midline.    Nose - External contour is symmetric, no gross deflection or scars.  Nasal mucosa is pink and moist with no abnormal mucus.  The septum was midline and non-obstructive, turbinates of normal size and position.  No polyps, masses, or purulence noted on examination.    Neuro - Nonfocal neuro exam is normal, CN 2 through 12 intact, normal gait and muscle tone.      Performed in clinic today:  Audiologic Studies - An audiogram and tympanogram were performed today as part of the evaluation and personally reviewed. The tympanogram shows Type A curves on the right and Type A curves on the left, with normal canal volumes and middle ear pressures.  The audiogram showed mild to moderate high-frequency SNHL on the right and severe SNHL on the left.      Word recognition score 94% on the right and 26% on the left.  There is  no significant difference between patient's hearing test 6 years ago and today.  A/P - Indra Mehta is a 59 year old male with significant asymmetric sensorineural hearing loss tinnitus much worse on the left.  Patient has history of left Ménière's disease.  At this point we discussed managing tinnitus.  We discussed different treatment strategies that would include white noise in this gentleman's case perhaps using wireless ear headphones on the right to deliver white noise at higher volume.  Also discussed avoidance of caffeine, salt, dark chocolate anti-inflammatory agents such as Aleve especially.  We discussed potentially using over-the-counter supplements such as Lipoflavonoid or tinnitus formula by jasmin.  Prescription medication such as lorazepam and alprazolam were discussed.  Patient is not interested in prescription medications.  Will try over-the-counter supplements.  He will follow-up in a year with audiogram.    Rick Farias MD

## 2024-07-12 ENCOUNTER — MYC REFILL (OUTPATIENT)
Dept: FAMILY MEDICINE | Facility: CLINIC | Age: 59
End: 2024-07-12
Payer: COMMERCIAL

## 2024-07-12 DIAGNOSIS — M54.2 CERVICALGIA: ICD-10-CM

## 2024-07-12 DIAGNOSIS — G89.4 CHRONIC PAIN SYNDROME: ICD-10-CM

## 2024-07-12 DIAGNOSIS — F11.90 CHRONIC, CONTINUOUS USE OF OPIOIDS: ICD-10-CM

## 2024-07-12 DIAGNOSIS — M54.16 LUMBAR RADICULOPATHY: ICD-10-CM

## 2024-07-12 DIAGNOSIS — Z98.1 S/P CERVICAL SPINAL FUSION: ICD-10-CM

## 2024-07-12 DIAGNOSIS — M79.18 MYOFASCIAL PAIN: ICD-10-CM

## 2024-07-12 RX ORDER — OXYCODONE HYDROCHLORIDE 5 MG/1
5-10 TABLET ORAL EVERY 6 HOURS PRN
Qty: 165 TABLET | Refills: 0 | Status: SHIPPED | OUTPATIENT
Start: 2024-07-17 | End: 2024-08-12

## 2024-07-15 DIAGNOSIS — G89.4 CHRONIC PAIN SYNDROME: ICD-10-CM

## 2024-07-15 DIAGNOSIS — G62.9 NEUROPATHY: ICD-10-CM

## 2024-07-15 DIAGNOSIS — M54.16 LUMBAR RADICULOPATHY: ICD-10-CM

## 2024-07-15 DIAGNOSIS — M54.50 CHRONIC BILATERAL LOW BACK PAIN WITHOUT SCIATICA: ICD-10-CM

## 2024-07-15 DIAGNOSIS — M79.18 MYOFASCIAL PAIN: ICD-10-CM

## 2024-07-15 DIAGNOSIS — M54.2 CERVICALGIA: ICD-10-CM

## 2024-07-15 DIAGNOSIS — Z98.1 S/P CERVICAL SPINAL FUSION: ICD-10-CM

## 2024-07-15 DIAGNOSIS — G89.29 CHRONIC BILATERAL LOW BACK PAIN WITHOUT SCIATICA: ICD-10-CM

## 2024-07-15 NOTE — TELEPHONE ENCOUNTER
Pending Prescriptions:                       Disp   Refills    nortriptyline (PAMELOR) 50 MG capsule     90 cap*1            Sig: Take 1 capsule (50 mg) by mouth at bedtime        Requested Pharmacy: [facility] in [place]    Pt's last office visit: 4/18/24  Next scheduled office visit: 10/17/24      Per the RN/LPN medication refill protocol, writer is unable to refill this request.

## 2024-07-15 NOTE — TELEPHONE ENCOUNTER
Called and left message for patient to call back. We received a refill request from [facility] in [place] for nortiptyline, but patient doesn't have a follow up scheduled with Dr. Todd. Would like patient to schedule a follow up with Dr. Todd, and then he can refill it, or patient can ask primary care provider for refill

## 2024-07-16 RX ORDER — NORTRIPTYLINE HYDROCHLORIDE 50 MG/1
50 CAPSULE ORAL AT BEDTIME
Qty: 90 CAPSULE | Refills: 1 | Status: SHIPPED | OUTPATIENT
Start: 2024-07-16

## 2024-07-22 ENCOUNTER — OFFICE VISIT (OUTPATIENT)
Dept: AUDIOLOGY | Facility: CLINIC | Age: 59
End: 2024-07-22
Payer: COMMERCIAL

## 2024-07-22 ENCOUNTER — OFFICE VISIT (OUTPATIENT)
Dept: OTOLARYNGOLOGY | Facility: CLINIC | Age: 59
End: 2024-07-22
Payer: COMMERCIAL

## 2024-07-22 VITALS
BODY MASS INDEX: 29.31 KG/M2 | HEIGHT: 73 IN | DIASTOLIC BLOOD PRESSURE: 68 MMHG | TEMPERATURE: 97.6 F | WEIGHT: 221.19 LBS | SYSTOLIC BLOOD PRESSURE: 136 MMHG

## 2024-07-22 DIAGNOSIS — H90.3 SENSORINEURAL HEARING LOSS, ASYMMETRICAL: Primary | ICD-10-CM

## 2024-07-22 DIAGNOSIS — H81.02 MENIERE'S DISEASE, LEFT: ICD-10-CM

## 2024-07-22 DIAGNOSIS — H90.3 ASYMMETRICAL SENSORINEURAL HEARING LOSS: ICD-10-CM

## 2024-07-22 DIAGNOSIS — H93.13 TINNITUS, BILATERAL: Primary | ICD-10-CM

## 2024-07-22 DIAGNOSIS — H93.13 TINNITUS OF BOTH EARS: ICD-10-CM

## 2024-07-22 PROCEDURE — 99204 OFFICE O/P NEW MOD 45 MIN: CPT | Performed by: OTOLARYNGOLOGY

## 2024-07-22 PROCEDURE — 92557 COMPREHENSIVE HEARING TEST: CPT | Performed by: AUDIOLOGIST

## 2024-07-22 PROCEDURE — 92550 TYMPANOMETRY & REFLEX THRESH: CPT | Performed by: AUDIOLOGIST

## 2024-07-22 ASSESSMENT — PAIN SCALES - GENERAL: PAINLEVEL: MILD PAIN (3)

## 2024-07-22 ASSESSMENT — PATIENT HEALTH QUESTIONNAIRE - PHQ9: SUM OF ALL RESPONSES TO PHQ QUESTIONS 1-9: 0

## 2024-07-22 NOTE — LETTER
7/22/2024      Indra Mehta  [address] [address] [place] [id number]      Dear Colleague,    Thank you for referring your patient, Indra Mehta, to the [facility]. Please see a copy of my visit note below.    ENT Consultation    Indra Mehta who is a 59 year old male seen in consultation at the request of self.      History of Present Illness - Indra Mehta is a 59 year old male with long standing history of left Ménière's disease status post left endolymphatic sac decompression in the past presents because of severe tinnitus that he has had lately nonpulsatile.  It is bilateral but mostly bothers him on the left side.  Had couple of small episodes of vertigo but certainly much improved as compared to the time before surgery.  Denies any ear discharge.      BP Readings from Last 1 Encounters:   07/22/24 136/68       BP noted to be well controlled today in office.     Indra IS NOT a smoker/uses chewing tobacco.        Past Medical History -   Past Medical History:   Diagnosis Date     Anxiety      Back pain      Depressive disorder      Hyperlipidemia      Hypertension      Meniere's disease, unspecified      Pain medication agreement signed 08/20/2010     Sleep apnea, unspecified type      Status post lumbar spinal fusion 01/14/2020       Current Medications -   Current Outpatient Medications:      Acetaminophen (TYLENOL PO), Take 1,000 mg by mouth 3 times daily, Disp: , Rfl:      amLODIPine (NORVASC) 5 MG tablet, TAKE 1 TABLET BY MOUTH EVERY DAY, Disp: 90 tablet, Rfl: 0     atorvastatin (LIPITOR) 10 MG tablet, Take 10 mg by mouth daily, Disp: , Rfl:      celecoxib (CELEBREX) 100 MG capsule, TAKE 1 CAPSULE BY MOUTH TWICE DAILY., Disp: 60 capsule, Rfl: 1     cyclobenzaprine (FLEXERIL) 5 MG tablet, TAKE 1-2 TABLETS (5-10MG) BY MOUTH 3 TIMES DAILY AS NEEDED, Disp: 90 tablet, Rfl: 2     losartan-hydrochlorothiazide (HYZAAR) 50-12.5 MG tablet, Take 2 tablets by mouth daily, Disp:  180 tablet, Rfl: 1     nortriptyline (PAMELOR) 50 MG capsule, Take 1 capsule (50 mg) by mouth at bedtime, Disp: 90 capsule, Rfl: 1     order for DME, Equipment being ordered: TENS Pads as directed, Disp: 1 Device, Rfl: 0     oxyCODONE (ROXICODONE) 5 MG tablet, Take 1-2 tablets (5-10 mg) by mouth every 6 hours as needed for severe pain Must last 30 days, Disp: 165 tablet, Rfl: 0     pregabalin (LYRICA) 150 MG capsule, TAKE 1 CAPSULE (150 MG) BY MOUTH 2 TIMES DAILY, Disp: 60 capsule, Rfl: 1     sertraline (ZOLOFT) 50 MG tablet, Take 3 tablets (150 mg) by mouth daily, Disp: 90 tablet, Rfl: 11     sildenafil (VIAGRA) 100 MG tablet, Take 0.5-1 tablets ( mg) by mouth daily as needed Take 30 min to 4 hours before intercourse.  Never use with nitroglycerin, terazosin or doxazosin., Disp: 6 tablet, Rfl: 7     naloxone (NARCAN) 4 MG/0.1ML nasal spray, Spray 1 spray (4 mg) into one nostril alternating nostrils once as needed for opioid reversal every 2-3 minutes until assistance arrives (Patient not taking: Reported on 7/22/2024), Disp: 0.2 mL, Rfl: 1    Current Facility-Administered Medications:      lidocaine (PF) (XYLOCAINE) 1 % injection 5 mL, 5 mL, , , Alvaro Mariee MD, 5 mL at 08/25/22 0842     lidocaine (PF) (XYLOCAINE) 1 % injection 5 mL, 5 mL, , , Alvaro Mariee MD, 5 mL at 08/25/22 0842     lidocaine 1 % injection 5 mL, 5 mL, , , Alvaro Mariee MD, 5 mL at 06/01/23 1353     lidocaine 1 % injection 5 mL, 5 mL, , , Alvaro Mariee MD, 5 mL at 06/01/23 1353     lidocaine 1 % injection 5 mL, 5 mL, , , Alvaro Mariee MD, 5 mL at 01/24/23 1329     lidocaine 1 % injection 5 mL, 5 mL, , , Alvaro Mariee MD, 5 mL at 01/24/23 1329     lidocaine 1 % injection 5 mL, 5 mL, , , Alvaro Mariee MD, 5 mL at 10/25/22 0912     lidocaine 1 % injection 5 mL, 5 mL, , , Alvaro Mariee MD, 5 mL at 10/25/22 0912     lidocaine 1 % injection 5 mL, 5 mL, , , Alvaro Mariee MD,  5 mL at 05/04/22 1511     lidocaine 1 % injection 5 mL, 5 mL, , , Alvaro Mariee MD, 5 mL at 05/04/22 1511     lidocaine 1 % injection 5 mL, 5 mL, , , Alvaro Mariee MD, 5 mL at 06/22/21 0902     methylPREDNISolone (DEPO-Medrol) injection 40 mg, 40 mg, , , Alvaro Moore MD, 40 mg at 01/08/24 1018     methylPREDNISolone (DEPO-MEDROL) injection 40 mg, 40 mg, , , Alvaro Moore MD, 40 mg at 06/07/23 0853     methylPREDNISolone (DEPO-MEDROL) injection 40 mg, 40 mg, , , Alvaro Moore MD, 40 mg at 03/06/23 0917     methylPREDNISolone (DEPO-MEDROL) injection 40 mg, 40 mg, , , Alvaro Moore MD, 40 mg at 12/14/22 0907     methylPREDNISolone (DEPO-MEDROL) injection 40 mg, 40 mg, , , Alvaro Moore MD, 40 mg at 09/30/21 0816     methylPREDNISolone (DEPO-MEDROL) injection 80 mg, 80 mg, , , Alvaro Mariee MD, 80 mg at 06/01/23 1353     methylPREDNISolone (DEPO-MEDROL) injection 80 mg, 80 mg, , , Alvaro Mariee MD, 80 mg at 06/01/23 1353     methylPREDNISolone (DEPO-MEDROL) injection 80 mg, 80 mg, , , Alvaro Mariee MD, 80 mg at 01/24/23 1329     methylPREDNISolone (DEPO-MEDROL) injection 80 mg, 80 mg, , , Alvaro Mariee MD, 80 mg at 01/24/23 1329     methylPREDNISolone (DEPO-MEDROL) injection 80 mg, 80 mg, , , Alvaro Mariee MD, 80 mg at 10/25/22 0912     methylPREDNISolone (DEPO-MEDROL) injection 80 mg, 80 mg, , , Alvaro Mariee MD, 80 mg at 10/25/22 0912     methylPREDNISolone (DEPO-MEDROL) injection 80 mg, 80 mg, , , Alvaro Mariee MD, 80 mg at 08/25/22 0842     methylPREDNISolone (DEPO-MEDROL) injection 80 mg, 80 mg, , , Alvaro Mariee MD, 80 mg at 08/25/22 0842     methylPREDNISolone (DEPO-MEDROL) injection 80 mg, 80 mg, , , Alvaro Mariee MD, 80 mg at 05/04/22 1511     methylPREDNISolone (DEPO-MEDROL) injection 80 mg, 80 mg, , , Alvaro Mariee MD, 80 mg at 05/04/22 1511     sodium hyaluronate  (EUFLEXXA) injection 20 mg, 20 mg, , , Alvaro Moore MD, 20 mg at 10/09/23 0808     sodium hyaluronate (EUFLEXXA) injection 20 mg, 20 mg, , , Alvaro Moore MD, 20 mg at 10/09/23 0808     sodium hyaluronate (EUFLEXXA) injection 20 mg, 20 mg, , , Alvaro Moore MD, 20 mg at 09/18/23 1008     sodium hyaluronate (EUFLEXXA) injection 20 mg, 20 mg, , , Alvaro Moore MD, 20 mg at 09/18/23 1008     sodium hyaluronate (EUFLEXXA) injection 20 mg, 20 mg, , , Alvaro Moore MD, 20 mg at 09/28/23 0804     sodium hyaluronate (EUFLEXXA) injection 20 mg, 20 mg, , , Alvaro Moore MD, 20 mg at 09/28/23 0804     triamcinolone (KENALOG-40) injection 40 mg, 40 mg, , , Alvaro Moore MD, 40 mg at 01/08/24 1018     triamcinolone (KENALOG-40) injection 40 mg, 40 mg, , , Melissa Xie MD, 40 mg at 12/09/21 0827     triamcinolone (KENALOG-40) injection 40 mg, 40 mg, , , Melissa Xie MD, 40 mg at 12/09/21 0827     triamcinolone (KENALOG-40) injection 40 mg, 40 mg, , , Melissa Xie MD, 40 mg at 09/07/21 0855     triamcinolone (KENALOG-40) injection 40 mg, 40 mg, , , Melissa Xie MD, 40 mg at 09/07/21 0855     triamcinolone (KENALOG-40) injection 40 mg, 40 mg, , , Alvaro Mariee MD, 40 mg at 06/22/21 0902     triamcinolone (KENALOG-40) injection 40 mg, 40 mg, , , Melissa Xie MD, 40 mg at 11/16/20 0848     triamcinolone (KENALOG-40) injection 40 mg, 40 mg, , , Melissa Xie MD, 40 mg at 11/16/20 0848    Allergies - No Known Allergies    Social History -   Social History     Socioeconomic History     Marital status: Single     Number of children: 2   Occupational History     Employer: [employer]   Tobacco Use     Smoking status: Never     Smokeless tobacco: Never   Vaping Use     Vaping status: Never Used   Substance and Sexual Activity     Alcohol use: Yes     Alcohol/week: 0.0 standard drinks  of alcohol     Comment: rarely     Drug use: No     Sexual activity: Not Currently     Partners: Female   Other Topics Concern     Parent/sibling w/ CABG, MI or angioplasty before 65F 55M? No     Social Determinants of Health     Financial Resource Strain: Low Risk  (1/14/2024)    Financial Resource Strain      Within the past 12 months, have you or your family members you live with been unable to get utilities (heat, electricity) when it was really needed?: No   Food Insecurity: Low Risk  (1/14/2024)    Food Insecurity      Within the past 12 months, did you worry that your food would run out before you got money to buy more?: No      Within the past 12 months, did the food you bought just not last and you didn t have money to get more?: No   Transportation Needs: Low Risk  (1/14/2024)    Transportation Needs      Within the past 12 months, has lack of transportation kept you from medical appointments, getting your medicines, non-medical meetings or appointments, work, or from getting things that you need?: No   Physical Activity: Sufficiently Active (1/9/2023)    Received from [facility]    Exercise Vital Sign      Days of Exercise per Week: 4 days      Minutes of Exercise per Session: 90 min   Stress: Stress Concern Present (1/9/2023)    Received from [facility]    Citizen of [place] [employer] - Occupational Stress Questionnaire      Feeling of Stress : Very much   Social Connections: Moderately Isolated (1/9/2023)    Received from [facility], [facility]    Social Connection and Isolation Panel [NHANES]      Frequency of Communication with Friends and Family: Once a week      Frequency of Social Gatherings with Friends and Family: More than three times a week      Attends [employer] Services: More than 4 times per year      Active Member of Clubs or Organizations: No      Marital Status:    Interpersonal Safety: Low Risk  (9/21/2023)    Interpersonal Safety      Do  "you feel physically and emotionally safe where you currently live?: Yes      Within the past 12 months, have you been hit, slapped, kicked or otherwise physically hurt by someone?: No      Within the past 12 months, have you been humiliated or emotionally abused in other ways by your partner or ex-partner?: No   Housing Stability: Low Risk  (2024)    Housing Stability      Do you have housing? : Yes      Are you worried about losing your housing?: No       Family History -   Family History   Problem Relation Age of Onset     Diabetes Mother      Cancer Mother         colon cancer -  at 52, diag at 42     Hypertension Father      Heart Disease Father          of AAA     No Known Problems Brother      No Known Problems Brother      Depression Sister      Suicide Other      Unknown/Adopted Maternal Grandmother      Unknown/Adopted Maternal Grandfather      Unknown/Adopted Paternal Grandmother      Unknown/Adopted Paternal Grandfather      No Known Problems Daughter      No Known Problems Daughter        Review of Systems - As per HPI and PMHx, otherwise review of system review of the head and neck negative. Otherwise 10+ review of system is negative    Physical Exam  /68 (BP Location: Right arm, Patient Position: Sitting, Cuff Size: Adult Large)   Temp 97.6  F (36.4  C) (Temporal)   Ht 1.854 m (6' 1\")   Wt 100.3 kg (221 lb 3 oz)   BMI 29.18 kg/m    BMI: Body mass index is 29.18 kg/m .    General - The patient is well nourished and well developed, and appears to have good nutritional status.  Alert and oriented to person and place, answers questions and cooperates with examination appropriately.    SKIN - No suspicious lesions or rashes.  Respiration - No respiratory distress.  Head and Face - Normocephalic and atraumatic, with no gross asymmetry noted of the contour of the facial features.  The facial nerve is intact, with strong symmetric movements.    Voice and Breathing - The patient was " breathing comfortably without the use of accessory muscles. The patients voice was clear and strong, and had appropriate pitch and quality.    Ears - Bilateral pinna and EACs with normal appearing overlying skin. Tympanic membrane intact with good mobility on pneumatic otoscopy bilaterally. Bony landmarks of the ossicular chain are normal. The tympanic membranes are normal in appearance. No retraction, perforation, or masses.  No fluid or purulence was seen in the external canal or the middle ear.     Eyes - Extraocular movements intact.  Sclera were not icteric or injected, conjunctiva were pink and moist.    Mouth - Examination of the oral cavity showed pink, healthy oral mucosa. No lesions or ulcerations noted.  The tongue was mobile and midline, and the dentition were in good condition.      Throat - The walls of the oropharynx were smooth, pink, moist, symmetric, and had no lesions or ulcerations.  The tonsillar pillars and soft palate were symmetric.  The uvula was midline on elevation.    Neck - Normal midline excursion of the laryngotracheal complex during swallowing.  Full range of motion on passive movement.  Palpation of the occipital, submental, submandibular, internal jugular chain, and supraclavicular nodes did not demonstrate any abnormal lymph nodes or masses.  The carotid pulse was palpable bilaterally.  Palpation of the thyroid was soft and smooth, with no nodules or goiter appreciated.  The trachea was mobile and midline.    Nose - External contour is symmetric, no gross deflection or scars.  Nasal mucosa is pink and moist with no abnormal mucus.  The septum was midline and non-obstructive, turbinates of normal size and position.  No polyps, masses, or purulence noted on examination.    Neuro - Nonfocal neuro exam is normal, CN 2 through 12 intact, normal gait and muscle tone.      Performed in clinic today:  Audiologic Studies - An audiogram and tympanogram were performed today as part of the  evaluation and personally reviewed. The tympanogram shows Type A curves on the right and Type A curves on the left, with normal canal volumes and middle ear pressures.  The audiogram showed mild to moderate high-frequency SNHL on the right and severe SNHL on the left.      Word recognition score 94% on the right and 26% on the left.  There is no significant difference between patient's hearing test 6 years ago and today.  A/P - Indra Mehta is a 59 year old male with significant asymmetric sensorineural hearing loss tinnitus much worse on the left.  Patient has history of left Ménière's disease.  At this point we discussed managing tinnitus.  We discussed different treatment strategies that would include white noise in this gentleman's case perhaps using wireless ear headphones on the right to deliver white noise at higher volume.  Also discussed avoidance of caffeine, salt, dark chocolate anti-inflammatory agents such as Aleve especially.  We discussed potentially using over-the-counter supplements such as Lipoflavonoid or tinnitus formula by jasmin.  Prescription medication such as lorazepam and alprazolam were discussed.  Patient is not interested in prescription medications.  Will try over-the-counter supplements.  He will follow-up in a year with audiogram.    Rick Farias MD       Again, thank you for allowing me to participate in the care of your patient.        Sincerely,        Rick Farias MD, MD

## 2024-07-22 NOTE — PROGRESS NOTES
AUDIOLOGY REPORT     SUMMARY: Audiology visit completed. See audiogram for results.     RECOMMENDATIONS: Follow-up with ENT    Dominic Parikh Licensed Audiologist #[id number]

## 2024-08-02 DIAGNOSIS — M54.16 LUMBAR RADICULOPATHY: ICD-10-CM

## 2024-08-02 DIAGNOSIS — M79.18 MYOFASCIAL PAIN: ICD-10-CM

## 2024-08-02 DIAGNOSIS — Z98.1 S/P CERVICAL SPINAL FUSION: ICD-10-CM

## 2024-08-02 DIAGNOSIS — M54.2 CERVICALGIA: ICD-10-CM

## 2024-08-02 DIAGNOSIS — G89.4 CHRONIC PAIN SYNDROME: ICD-10-CM

## 2024-08-02 DIAGNOSIS — G62.9 NEUROPATHY: ICD-10-CM

## 2024-08-04 RX ORDER — CYCLOBENZAPRINE HCL 5 MG
TABLET ORAL
Qty: 90 TABLET | Refills: 2 | Status: SHIPPED | OUTPATIENT
Start: 2024-08-04

## 2024-08-05 DIAGNOSIS — I10 BENIGN ESSENTIAL HYPERTENSION: ICD-10-CM

## 2024-08-07 RX ORDER — LOSARTAN POTASSIUM AND HYDROCHLOROTHIAZIDE 12.5; 5 MG/1; MG/1
2 TABLET ORAL DAILY
Qty: 180 TABLET | Refills: 1 | Status: SHIPPED | OUTPATIENT
Start: 2024-08-07

## 2024-08-12 ENCOUNTER — MYC REFILL (OUTPATIENT)
Dept: FAMILY MEDICINE | Facility: CLINIC | Age: 59
End: 2024-08-12
Payer: COMMERCIAL

## 2024-08-12 DIAGNOSIS — M79.18 MYOFASCIAL PAIN: ICD-10-CM

## 2024-08-12 DIAGNOSIS — G89.4 CHRONIC PAIN SYNDROME: ICD-10-CM

## 2024-08-12 DIAGNOSIS — F11.90 CHRONIC, CONTINUOUS USE OF OPIOIDS: ICD-10-CM

## 2024-08-12 DIAGNOSIS — Z98.1 S/P CERVICAL SPINAL FUSION: ICD-10-CM

## 2024-08-12 DIAGNOSIS — M54.16 LUMBAR RADICULOPATHY: ICD-10-CM

## 2024-08-12 DIAGNOSIS — M54.2 CERVICALGIA: ICD-10-CM

## 2024-08-13 RX ORDER — OXYCODONE HYDROCHLORIDE 5 MG/1
5-10 TABLET ORAL EVERY 6 HOURS PRN
Qty: 165 TABLET | Refills: 0 | Status: SHIPPED | OUTPATIENT
Start: 2024-08-16 | End: 2024-09-09

## 2024-09-06 DIAGNOSIS — G62.9 NEUROPATHY: ICD-10-CM

## 2024-09-06 RX ORDER — PREGABALIN 150 MG/1
150 CAPSULE ORAL 2 TIMES DAILY
Qty: 60 CAPSULE | Refills: 1 | Status: SHIPPED | OUTPATIENT
Start: 2024-09-06 | End: 2024-10-07

## 2024-09-09 ENCOUNTER — MYC REFILL (OUTPATIENT)
Dept: FAMILY MEDICINE | Facility: CLINIC | Age: 59
End: 2024-09-09
Payer: COMMERCIAL

## 2024-09-09 DIAGNOSIS — F11.90 CHRONIC, CONTINUOUS USE OF OPIOIDS: ICD-10-CM

## 2024-09-09 DIAGNOSIS — Z98.1 S/P CERVICAL SPINAL FUSION: ICD-10-CM

## 2024-09-09 DIAGNOSIS — G89.4 CHRONIC PAIN SYNDROME: ICD-10-CM

## 2024-09-09 DIAGNOSIS — M54.16 LUMBAR RADICULOPATHY: ICD-10-CM

## 2024-09-09 DIAGNOSIS — M79.18 MYOFASCIAL PAIN: ICD-10-CM

## 2024-09-09 DIAGNOSIS — M54.2 CERVICALGIA: ICD-10-CM

## 2024-09-09 RX ORDER — OXYCODONE HYDROCHLORIDE 5 MG/1
5-10 TABLET ORAL EVERY 6 HOURS PRN
Qty: 165 TABLET | Refills: 0 | Status: SHIPPED | OUTPATIENT
Start: 2024-09-09

## 2024-09-10 DIAGNOSIS — G89.29 CHRONIC RIGHT SHOULDER PAIN: ICD-10-CM

## 2024-09-10 DIAGNOSIS — M25.511 CHRONIC RIGHT SHOULDER PAIN: ICD-10-CM

## 2024-09-11 RX ORDER — CELECOXIB 100 MG/1
100 CAPSULE ORAL 2 TIMES DAILY
Qty: 60 CAPSULE | Refills: 1 | Status: SHIPPED | OUTPATIENT
Start: 2024-09-11

## 2024-10-07 DIAGNOSIS — G62.9 NEUROPATHY: ICD-10-CM

## 2024-10-07 RX ORDER — PREGABALIN 150 MG/1
150 CAPSULE ORAL 2 TIMES DAILY
Qty: 60 CAPSULE | Refills: 1 | Status: SHIPPED | OUTPATIENT
Start: 2024-10-07

## 2024-10-10 ENCOUNTER — MYC REFILL (OUTPATIENT)
Dept: FAMILY MEDICINE | Facility: CLINIC | Age: 59
End: 2024-10-10
Payer: COMMERCIAL

## 2024-10-10 DIAGNOSIS — F11.90 CHRONIC, CONTINUOUS USE OF OPIOIDS: ICD-10-CM

## 2024-10-10 DIAGNOSIS — G89.4 CHRONIC PAIN SYNDROME: ICD-10-CM

## 2024-10-10 DIAGNOSIS — Z98.1 S/P CERVICAL SPINAL FUSION: ICD-10-CM

## 2024-10-10 DIAGNOSIS — M54.2 CERVICALGIA: ICD-10-CM

## 2024-10-10 DIAGNOSIS — M79.18 MYOFASCIAL PAIN: ICD-10-CM

## 2024-10-10 DIAGNOSIS — M54.16 LUMBAR RADICULOPATHY: ICD-10-CM

## 2024-10-10 RX ORDER — OXYCODONE HYDROCHLORIDE 5 MG/1
5-10 TABLET ORAL EVERY 6 HOURS PRN
Qty: 165 TABLET | Refills: 0 | Status: SHIPPED | OUTPATIENT
Start: 2024-10-10 | End: 2024-11-08

## 2024-10-17 ENCOUNTER — TELEPHONE (OUTPATIENT)
Dept: ANESTHESIOLOGY | Facility: CLINIC | Age: 59
End: 2024-10-17

## 2024-10-17 ENCOUNTER — OFFICE VISIT (OUTPATIENT)
Dept: PALLIATIVE MEDICINE | Facility: CLINIC | Age: 59
End: 2024-10-17
Payer: COMMERCIAL

## 2024-10-17 VITALS
HEART RATE: 91 BPM | HEIGHT: 73 IN | DIASTOLIC BLOOD PRESSURE: 82 MMHG | BODY MASS INDEX: 29.29 KG/M2 | SYSTOLIC BLOOD PRESSURE: 131 MMHG | WEIGHT: 221 LBS

## 2024-10-17 DIAGNOSIS — M47.816 SPONDYLOSIS OF LUMBAR REGION WITHOUT MYELOPATHY OR RADICULOPATHY: Primary | ICD-10-CM

## 2024-10-17 PROCEDURE — 99214 OFFICE O/P EST MOD 30 MIN: CPT

## 2024-10-17 NOTE — TELEPHONE ENCOUNTER
Called patient to schedule procedure with Dr. Todd    Date of Procedure: 12/5/24    Arrival time given: Yes: Arrival Time 11:30am       Procedure Location: [facility]     Verified Location with Patient:  Yes  Address provided to the patient     Pre-op H&P Required:  No: Moderate Sedation       Post-Op/Follow Up Appt:  Not Indicated in Request      Informed patient they will need a  to drive them home:  Yes    Patients : Spouse     Patient is aware that pre-op RN from the procedure center will call 2-3 days prior to scheduled procedure to confirm arrival time and review any instructions:  Yes       Additional Comments: N/A        Haylie Andrew MA on 10/17/2024 at 3:15 PM      P: [phone]*

## 2024-10-17 NOTE — PROGRESS NOTES
"[facility] Pain Management         Date of visit: 10/17/2024             Subjective:\"Things are pretty much the same.\"    Chief complaint:   Chief Complaint   Patient presents with    RECHECK     Spondylosis of lumbar region without myelopathy or radiculopathy       Pain Information:  The pain score is 5/10    Interval history:  Indra Mehta is a 58 year old male last seen on 04/18/24.  They are a patient of Tha Grullon and are being seen in follow up. He complains of back pain and neuropathic pain.  He has had several different pain providers.  He has received spinal injections from both Rhett Mcdaniel and [facility].  He has received RF denervation in the past for his low back pain.  He was scheduled to have an RF denervation here.  It was initially denied by his insurance provider, but then approved after a peer to peer.  The subsequent procedure was canceled due to illness on our side.  The procedure was thus never performed and was not rescheduled.  He has had PT in the past and the radiofrequency denervation has provided relief.   He returns today for re-evaluation.  He notes that the denervation helped with his back pain, however it did not help his neuropathic pain.  He states that noting has helped the neuropathic pain.  He does not have diabetes, nor has he had chemotherapy.  He has not had lumbar spine surgery, thus the etiology of his neuropathy is not clear.  In the interim since his last visit    Recommendations/plan at the last visit included:  \"Patient still has facet loading on PE.  The RF was denied by his insurance provider.  I will attempt a peer to peer.  In the meantime I will prescribe another round of PT.  Performing the RF denervation was facilitate decrease in the dose of opioids that are prescribed by the PCP and taken by the patient on a daily basis.\"    Since his last visit, Indra Mehta reports:  The patient denies an improvement in pain.  The patient denies an " improvement in function.  There have not been interventional techniques performed since the last visit.             Current Pain Treatments:    Medications:    Celocoxib   Pregabalin             Oxycodone 5mg (5-6/d via Ross Chambers)              Zoloft              Pamelor              Flexeril     2. Other therapies:    Spinal Injections             MEDICATIONS:  Current Outpatient Medications   Medication Sig Dispense Refill    Acetaminophen (TYLENOL PO) Take 1,000 mg by mouth 3 times daily      amLODIPine (NORVASC) 5 MG tablet TAKE 1 TABLET BY MOUTH EVERY DAY 90 tablet 0    atorvastatin (LIPITOR) 10 MG tablet Take 10 mg by mouth daily      celecoxib (CELEBREX) 100 MG capsule TAKE 1 CAPSULE BY MOUTH TWICE DAILY. 60 capsule 1    cyclobenzaprine (FLEXERIL) 5 MG tablet TAKE 1-2 TABLETS (5-10MG) BY MOUTH 3 TIMES DAILY AS NEEDED 90 tablet 2    losartan-hydrochlorothiazide (HYZAAR) 50-12.5 MG tablet Take 2 tablets by mouth daily 180 tablet 1    nortriptyline (PAMELOR) 50 MG capsule Take 1 capsule (50 mg) by mouth at bedtime 90 capsule 1    order for DME Equipment being ordered: TENS Pads as directed 1 Device 0    oxyCODONE (ROXICODONE) 5 MG tablet Take 1-2 tablets (5-10 mg) by mouth every 6 hours as needed for severe pain. Must last 30 days 165 tablet 0    pregabalin (LYRICA) 150 MG capsule TAKE 1 CAPSULE (150 MG) BY MOUTH 2 TIMES DAILY 60 capsule 1    sertraline (ZOLOFT) 50 MG tablet Take 3 tablets (150 mg) by mouth daily 90 tablet 11    sildenafil (VIAGRA) 100 MG tablet Take 0.5-1 tablets ( mg) by mouth daily as needed Take 30 min to 4 hours before intercourse.  Never use with nitroglycerin, terazosin or doxazosin. 6 tablet 7    naloxone (NARCAN) 4 MG/0.1ML nasal spray Spray 1 spray (4 mg) into one nostril alternating nostrils once as needed for opioid reversal every 2-3 minutes until assistance arrives (Patient not taking: Reported on 7/22/2024) 0.2 mL 1       ALLERGIES:  No Known Allergies    SOCIAL  HISTORY:  Social History     Socioeconomic History    Marital status: Single    Number of children: 2   Occupational History     Employer: [employer]   Tobacco Use    Smoking status: Never    Smokeless tobacco: Never   Vaping Use    Vaping Use: Never used   Substance and Sexual Activity    Alcohol use: Yes     Alcohol/week: 0.0 standard drinks of alcohol     Comment: rarely    Drug use: No    Sexual activity: Not Currently     Partners: Female   Other Topics Concern    Parent/sibling w/ CABG, MI or angioplasty before 65F 55M? No     Social Determinants of Health     Financial Resource Strain: Low Risk  (1/14/2024)    Financial Resource Strain     Within the past 12 months, have you or your family members you live with been unable to get utilities (heat, electricity) when it was really needed?: No   Food Insecurity: Low Risk  (1/14/2024)    Food Insecurity     Within the past 12 months, did you worry that your food would run out before you got money to buy more?: No     Within the past 12 months, did the food you bought just not last and you didn t have money to get more?: No   Transportation Needs: Low Risk  (1/14/2024)    Transportation Needs     Within the past 12 months, has lack of transportation kept you from medical appointments, getting your medicines, non-medical meetings or appointments, work, or from getting things that you need?: No   Interpersonal Safety: Low Risk  (9/21/2023)    Interpersonal Safety     Do you feel physically and emotionally safe where you currently live?: Yes     Within the past 12 months, have you been hit, slapped, kicked or otherwise physically hurt by someone?: No     Within the past 12 months, have you been humiliated or emotionally abused in other ways by your partner or ex-partner?: No   Housing Stability: Low Risk  (1/14/2024)    Housing Stability     Do you have housing? : Yes     Are you worried about losing your housing?: No       REVIEW OF SYSTEMS:   ROS: 10 point ROS  "neg other than the symptoms noted above in the HPI.    MEDICAL CHANGES: any changes in medical history since they were last seen? No      Objective:    /82 (BP Location: Right arm, Patient Position: Sitting, Cuff Size: Adult Regular)   Pulse 91   Ht 1.854 m (6' 1\")   Wt 100.2 kg (221 lb)   BMI 29.16 kg/m      Physical Exam  Vitals and nursing note reviewed.   Musculoskeletal:         General: Tenderness present. No swelling, deformity or signs of injury.      Lumbar back: Spasms and tenderness present. No swelling, edema, deformity, signs of trauma, lacerations or bony tenderness. Decreased range of motion. Negative right straight leg raise test and negative left straight leg raise test. No scoliosis.      Right lower leg: No edema.      Left lower leg: No edema.      Comments: Patient has facet loading in the lumbar region with hyperextension and lateral bending and twisting         Diagnostic Tests/Imaging/Labs:  None                                                                     Assessment:   Indra Mehta is a 58 year old male with a past medical history significant for peripheral neuropathy who presents with complaints of low back pain and burning pain of the feet       Visit Diagnoses:  Polyneuropathy  Lumbar spondylosis without myelopathy    Plan:  Diagnosis reviewed, treatment option addressed, and risk/benefits discussed.  Self-care instructions given.  I am recommending a multidisciplinary treatment plan to help this patient better manage their pain.  This includes:  Patient still has facet loading on PE.  The RF was denied by his insurance provider, initially  Peer to peer was approved, but we had to cancel the procedure due to illness on my part.  .  I will attempt schedule the RF denervation.   Performing the RF denervation will hopefully facilitate a decrease in the dose of opioids that are prescribed by the PCP and taken by the patient on a daily basis.          Review of Electronic " Chart: Today I have also reviewed available medical information in the patient's medical record at [facility] ([facility] and Care Everywhere (if available), including relevant provider notes, laboratory work, and imaging.     Brian Todd IV, MD  [facility]    BILLING TIME DOCUMENTATION:   The total TIME spent on this patient on the date of the encounter/appointment was 30 minutes.       TOTAL TIME includes:   Time spent preparing to see the patient (reviewing records and tests)   Time spent face to face (or over the phone) with the patient   Time spent ordering tests, medications, procedures and referrals   Time spent Referring and communicating with other healthcare professionals   Time spent documenting clinical information in Epic

## 2024-10-17 NOTE — NURSING NOTE
"Indra Mehta's goals for this visit include:   Chief Complaint   Patient presents with    RECHECK     Spondylosis of lumbar region without myelopathy or radiculopathy       He requests these members of his care team be copied on today's visit information: yes    PCP: Tha Grullon    Referring Provider:  Brian Todd MD  [address] [place] [id number]    /82 (BP Location: Right arm, Patient Position: Sitting, Cuff Size: Adult Regular)   Pulse 91   Ht 1.854 m (6' 1\")   Wt 100.2 kg (221 lb)   BMI 29.16 kg/m      Do you need any medication refills at today's visit? No  MITCHELL Neal., CMA ([facility])      "

## 2024-10-17 NOTE — PATIENT INSTRUCTIONS
Procedures:    Lumbar RFA    Call to schedule your procedure at [phone]. All procedure now require that you have a  with you when you check in for your appointment.      Your pre-procedure instructions are below, please call our clinic if you have any questions.      Follow up:      Follow up after RFA       To speak with a nurse, schedule/reschedule/cancel a clinic appointment, or request a medication refill call: [phone].    You can also reach us by [employer]: [email or web address]

## 2024-11-04 DIAGNOSIS — I10 BENIGN ESSENTIAL HYPERTENSION: ICD-10-CM

## 2024-11-04 RX ORDER — LOSARTAN POTASSIUM AND HYDROCHLOROTHIAZIDE 12.5; 5 MG/1; MG/1
2 TABLET ORAL DAILY
Qty: 180 TABLET | Refills: 1 | OUTPATIENT
Start: 2024-11-04

## 2024-11-08 ENCOUNTER — MYC REFILL (OUTPATIENT)
Dept: FAMILY MEDICINE | Facility: CLINIC | Age: 59
End: 2024-11-08
Payer: COMMERCIAL

## 2024-11-08 DIAGNOSIS — M79.18 MYOFASCIAL PAIN: ICD-10-CM

## 2024-11-08 DIAGNOSIS — F11.90 CHRONIC, CONTINUOUS USE OF OPIOIDS: ICD-10-CM

## 2024-11-08 DIAGNOSIS — G89.4 CHRONIC PAIN SYNDROME: ICD-10-CM

## 2024-11-08 DIAGNOSIS — M54.2 CERVICALGIA: ICD-10-CM

## 2024-11-08 DIAGNOSIS — M54.16 LUMBAR RADICULOPATHY: ICD-10-CM

## 2024-11-08 DIAGNOSIS — Z98.1 S/P CERVICAL SPINAL FUSION: ICD-10-CM

## 2024-11-08 RX ORDER — OXYCODONE HYDROCHLORIDE 5 MG/1
5-10 TABLET ORAL EVERY 6 HOURS PRN
Qty: 165 TABLET | Refills: 0 | Status: SHIPPED | OUTPATIENT
Start: 2024-11-08

## 2024-12-03 DIAGNOSIS — G62.9 NEUROPATHY: ICD-10-CM

## 2024-12-03 RX ORDER — PREGABALIN 150 MG/1
150 CAPSULE ORAL 2 TIMES DAILY
Qty: 60 CAPSULE | Refills: 1 | Status: SHIPPED | OUTPATIENT
Start: 2024-12-03

## 2024-12-05 ENCOUNTER — ANCILLARY PROCEDURE (OUTPATIENT)
Dept: GENERAL RADIOLOGY | Facility: CLINIC | Age: 59
End: 2024-12-05
Payer: COMMERCIAL

## 2024-12-05 ENCOUNTER — HOSPITAL ENCOUNTER (OUTPATIENT)
Facility: AMBULATORY SURGERY CENTER | Age: 59
Discharge: HOME | End: 2024-12-05
Payer: COMMERCIAL

## 2024-12-05 VITALS
DIASTOLIC BLOOD PRESSURE: 81 MMHG | TEMPERATURE: 98.2 F | BODY MASS INDEX: 29.16 KG/M2 | OXYGEN SATURATION: 98 % | RESPIRATION RATE: 20 BRPM | SYSTOLIC BLOOD PRESSURE: 126 MMHG | WEIGHT: 221 LBS

## 2024-12-05 DIAGNOSIS — M47.816 SPONDYLOSIS WITHOUT MYELOPATHY OR RADICULOPATHY, LUMBAR REGION: ICD-10-CM

## 2024-12-05 DIAGNOSIS — R52 PAIN: ICD-10-CM

## 2024-12-05 RX ORDER — LIDOCAINE 40 MG/G
CREAM TOPICAL
Status: DISPENSED | OUTPATIENT
Start: 2024-12-05

## 2024-12-05 RX ORDER — FENTANYL CITRATE 50 UG/ML
INJECTION, SOLUTION INTRAMUSCULAR; INTRAVENOUS PRN
Status: DISCONTINUED | OUTPATIENT
Start: 2024-12-05 | End: 2024-12-05 | Stop reason: HOSPADM

## 2024-12-05 RX ORDER — LIDOCAINE HYDROCHLORIDE 10 MG/ML
INJECTION, SOLUTION EPIDURAL; INFILTRATION; INTRACAUDAL; PERINEURAL PRN
Status: DISCONTINUED | OUTPATIENT
Start: 2024-12-05 | End: 2024-12-05 | Stop reason: HOSPADM

## 2024-12-05 RX ORDER — METHYLPREDNISOLONE ACETATE 40 MG/ML
INJECTION, SUSPENSION INTRA-ARTICULAR; INTRALESIONAL; INTRAMUSCULAR; SOFT TISSUE PRN
Status: DISCONTINUED | OUTPATIENT
Start: 2024-12-05 | End: 2024-12-05 | Stop reason: HOSPADM

## 2024-12-05 RX ORDER — BUPIVACAINE HYDROCHLORIDE 2.5 MG/ML
INJECTION, SOLUTION INFILTRATION; PERINEURAL PRN
Status: DISCONTINUED | OUTPATIENT
Start: 2024-12-05 | End: 2024-12-05 | Stop reason: HOSPADM

## 2024-12-05 NOTE — DISCHARGE INSTRUCTIONS
"PAIN INJECTION HOME CARE INSTRUCTIONS  Activity  -Rest today  -Do not work today  -Resume normal activity tomorrow  -DO NOT shower for 24 hours  -DO NOT remove bandaid for 24 hours    Pain  -You may experience soreness at the injection site for one or two days  -You may use an ice pack for 20 minutes every 2 hours for the first 24 hours  -You may use a heating pad after the first 24 hours  -You may use Tylenol (acetaminophen) every 4 hours or other pain medicines as directed by your physician    You may experience numbness radiating into your legs or arms (depending on the procedure location). This numbness may last several hours. Until sensation returns to normal; please use caution in walking, climbing stairs, and stepping out of your vehicle, etc.    DID YOU RECEIVE SEDATION TODAY?  Yes    Safety  Sedation medicine, if given, may remain active for many hours. It is important for the next 24 hours that you do not:  -Drive a car  -Operate machines or power tools  -Consume alcohol, including beer  -Sign any important papers or legal documents    DID YOU RECEIVE STEROIDS TODAY?  {YES / NO:501160::\"Yes\"}    Common side effects of steroids:  Not everyone will experience corticosteroid side effects. If side effects are experienced, they will gradually subside in the 7-10 day period following an injection. Most common side effects include:  -Flushed face and/or chest  -Feeling of warmth, particularly in the face but could be an overall feeling of warmth  -Increased blood sugar in diabetic patients  -Menstrual irregularities my occur. If taking hormone-based birth control an alternate method of birth control is recommended  -Sleep disturbances and/or mood swings are possible  -Leg cramps    Please contact us if you have:  -Severe pain  -Fever more than 101.5 degrees Fahrenheit  -Signs of infection at the injection site (redness, swelling, or drainage)    FOR PAIN CENTER PATIENTS:  If you have questions, please contact the " Pain Clinic at [phone] Option #1 between the hours of 7:00 am and 3:00 pm Monday through Friday. After office hours you can contact the on call provider by dialing [phone]. If you need immediate attention, we recommend that you go to a hospital emergency room or dial [phone].      FOR PM&R PATIENTS:  For patients seen by the PM and R service, please call [phone]. (Monday through Friday 8a-5p.  After business hours and weekends call [phone] and ask for the PM and R doctor on call). If you need to fax a pain diary to PM&R the fax number is [phone]. If you are unable to fax, uploading to [employer] is encouraged, then send to provider. If you have procedure scheduling questions please call [phone] Option #2.

## 2024-12-05 NOTE — OP NOTE
Posterior Primary Ramus Radiofrequency Denervation    The patient s identity, the procedure to be performed and the specific site of the procedure was verified in accordance with [facility] Protocol.   Diagnosis: Lumbar Spondylosis without Myelopathy  Levels Blocked: bilateral L3/4/ALA  Pre-Procedure Pain Score:6/10  Procedure Note:  Informed consent was obtained.  The patient was positioned comfortably in the prone position.  There was no evidence of infection at the site of needle insertion.  The patient was prepped and draped in a sterile fashion.  Skeletal landmarks were identified under fluoroscopic guidance.  At all insertion sites the skin were anesthetized with 1% lidocaine.  Standard insulated radiofrequency probe needles were inserted at the sites indicated.  Proper placement was determined both fluoroscopically and with test stimulation at each primary site with 50hz and 2hz stimulation.  No radicular stimulation was identified and no distal motor activity was noted. Radiofrequency denervation was performed at each site for 135 seconds at 90 degrees Centigrade. The patient was then observed for 30 minutes.  No complications were noted. The patient tolerated the procedure well and was released with post-procedure instructions. The patient was given discharge instructions and verbalizes understanding, including understanding of those signs and symptoms that would require emergency care.      Medications injected at each site post radio frequency denervation:  1ml from a 10ml mixture 0.25% Marcaine and with 40 mg Methylprednisolone    Intravenous line placed Yes.  Standard non-invasive monitors placed Yes.    Post-Procedure Pain Score:6/10    The patient was given discharge instructions and verbalizes understanding, including understanding of those signs and symptoms that would require emergency care.     Counseling: Greater than 50% of this patient visit was spent in counseling the  patient regarding the treatment of their pain, coordinating their overall treatment plan and assessing their progress.

## 2024-12-11 ENCOUNTER — MYC REFILL (OUTPATIENT)
Dept: FAMILY MEDICINE | Facility: CLINIC | Age: 59
End: 2024-12-11
Payer: COMMERCIAL

## 2024-12-11 DIAGNOSIS — M54.2 CERVICALGIA: ICD-10-CM

## 2024-12-11 DIAGNOSIS — Z98.1 S/P CERVICAL SPINAL FUSION: ICD-10-CM

## 2024-12-11 DIAGNOSIS — M54.16 LUMBAR RADICULOPATHY: ICD-10-CM

## 2024-12-11 DIAGNOSIS — M79.18 MYOFASCIAL PAIN: ICD-10-CM

## 2024-12-11 DIAGNOSIS — G89.4 CHRONIC PAIN SYNDROME: ICD-10-CM

## 2024-12-11 DIAGNOSIS — F11.90 CHRONIC, CONTINUOUS USE OF OPIOIDS: ICD-10-CM

## 2024-12-12 ENCOUNTER — MYC MEDICAL ADVICE (OUTPATIENT)
Dept: FAMILY MEDICINE | Facility: CLINIC | Age: 59
End: 2024-12-12
Payer: COMMERCIAL

## 2024-12-12 RX ORDER — OXYCODONE HYDROCHLORIDE 5 MG/1
5-10 TABLET ORAL EVERY 6 HOURS PRN
Qty: 165 TABLET | Refills: 0 | Status: SHIPPED | OUTPATIENT
Start: 2024-12-12

## 2025-01-05 DIAGNOSIS — G62.9 NEUROPATHY: ICD-10-CM

## 2025-01-06 RX ORDER — PREGABALIN 150 MG/1
150 CAPSULE ORAL 2 TIMES DAILY
Qty: 60 CAPSULE | Refills: 1 | Status: SHIPPED | OUTPATIENT
Start: 2025-01-06

## 2025-01-08 ENCOUNTER — MYC REFILL (OUTPATIENT)
Dept: FAMILY MEDICINE | Facility: CLINIC | Age: 60
End: 2025-01-08
Payer: COMMERCIAL

## 2025-01-08 DIAGNOSIS — R41.89 PSEUDODEMENTIA: ICD-10-CM

## 2025-01-08 DIAGNOSIS — G89.4 CHRONIC PAIN SYNDROME: ICD-10-CM

## 2025-01-08 DIAGNOSIS — F11.90 CHRONIC, CONTINUOUS USE OF OPIOIDS: ICD-10-CM

## 2025-01-08 DIAGNOSIS — Z98.1 S/P CERVICAL SPINAL FUSION: ICD-10-CM

## 2025-01-08 DIAGNOSIS — M54.2 CERVICALGIA: ICD-10-CM

## 2025-01-08 DIAGNOSIS — M79.18 MYOFASCIAL PAIN: ICD-10-CM

## 2025-01-08 DIAGNOSIS — M54.16 LUMBAR RADICULOPATHY: ICD-10-CM

## 2025-01-08 DIAGNOSIS — F41.8 DEPRESSION WITH ANXIETY: ICD-10-CM

## 2025-01-09 RX ORDER — OXYCODONE HYDROCHLORIDE 5 MG/1
5-10 TABLET ORAL EVERY 6 HOURS PRN
Qty: 60 TABLET | Refills: 0 | Status: SHIPPED | OUTPATIENT
Start: 2025-01-09

## 2025-01-14 DIAGNOSIS — I73.01 RAYNAUD'S DISEASE WITH GANGRENE (H): ICD-10-CM

## 2025-01-15 RX ORDER — AMLODIPINE BESYLATE 5 MG/1
TABLET ORAL
Qty: 90 TABLET | Refills: 0 | Status: SHIPPED | OUTPATIENT
Start: 2025-01-15

## 2025-01-20 ENCOUNTER — MYC REFILL (OUTPATIENT)
Dept: FAMILY MEDICINE | Facility: CLINIC | Age: 60
End: 2025-01-20
Payer: COMMERCIAL

## 2025-01-20 ENCOUNTER — TELEPHONE (OUTPATIENT)
Dept: FAMILY MEDICINE | Facility: CLINIC | Age: 60
End: 2025-01-20
Payer: COMMERCIAL

## 2025-01-20 DIAGNOSIS — G89.4 CHRONIC PAIN SYNDROME: ICD-10-CM

## 2025-01-20 DIAGNOSIS — M54.16 LUMBAR RADICULOPATHY: ICD-10-CM

## 2025-01-20 DIAGNOSIS — M79.18 MYOFASCIAL PAIN: ICD-10-CM

## 2025-01-20 DIAGNOSIS — F11.90 CHRONIC, CONTINUOUS USE OF OPIOIDS: ICD-10-CM

## 2025-01-20 DIAGNOSIS — M54.2 CERVICALGIA: ICD-10-CM

## 2025-01-20 DIAGNOSIS — Z98.1 S/P CERVICAL SPINAL FUSION: ICD-10-CM

## 2025-01-20 NOTE — TELEPHONE ENCOUNTER
Patient Quality Outreach    Patient is due for the following:   Chronic Opioid Use -  ALICE-7    Action(s) Taken:   Patient has upcoming appointment, these items will be addressed at that time.    Type of outreach:    Chart review performed, no outreach needed.    Questions for provider review:    None           Sandra Demarco MA

## 2025-01-21 ENCOUNTER — MYC MEDICAL ADVICE (OUTPATIENT)
Dept: FAMILY MEDICINE | Facility: CLINIC | Age: 60
End: 2025-01-21
Payer: COMMERCIAL

## 2025-01-21 RX ORDER — OXYCODONE HYDROCHLORIDE 5 MG/1
5-10 TABLET ORAL EVERY 6 HOURS PRN
Qty: 120 TABLET | Refills: 0 | Status: SHIPPED | OUTPATIENT
Start: 2025-01-21

## 2025-01-21 NOTE — TELEPHONE ENCOUNTER
S-(situation): Patient MyChart inquiring about early release for oxy prescription written 01/21/2025.     B-(background): Patient was given 165 tablet dispense 12/12 written to last 30 days. 60 tablet written on 01/09 written to last 30 days. Then 120 tablet dispense written 01/21 written to last 30 days.     A-(assessment): Pharmacy is not filling the prescription written on 01/21 because the previous prescription was written to last 30 days, we have not exhausted that time frame yet.     R-(recommendations): Please advise what quantity patient is to last for 30 days as it is not clear. Does RN have permission to give early release for the 120 tablets?     Sonny Rajput RN on 1/21/2025 at 3:11 PM

## 2025-01-22 NOTE — TELEPHONE ENCOUNTER
Called and spoke with pharmacist. Pharmacist will be able to adjust and give the patient the amount of pills to get him to what would have been his normal script of 165 pills. Pharmacist states she will contact insurance and inform them of situation. Will give patient enough medication to equal 165 pills no  of 1/11/25 and going forward patients 30 day refills will be eligible again on 2/10/25.    Will call patient to notify him that pharmacy working on refill and pharmacy will call patient later today when able to pickup.    Clemencia Reyez RN on 1/22/2025 at 11:51 AM

## 2025-01-22 NOTE — TELEPHONE ENCOUNTER
RN Triage  Patient Contact    Attempt # 1    Was call answered?  No.  Left message on voicemail with information to call me back.    Upon callback, please inform patient that pharmacy is working on refill with his insurance and pharmacy staff will call him today if able to  today.    Clemencia Reyez RN on 1/22/2025 at 11:54 AM

## 2025-01-22 NOTE — TELEPHONE ENCOUNTER
Dr. See gave refill on my behalf while I was gone, but was unwilling to refill the 165 tablets. He refilled only 60 but did not change the note to pharmacy to last 30 days. 60 tablets should last 2 weeks. I refilled an additional 120 tablets which can be dispensed on 1/22/25. Please notify pharmacy.  Tha Grullon MD

## 2025-01-22 NOTE — TELEPHONE ENCOUNTER
Called and spoke with pharmacy. Pharmacy states they will work on script and call insurance. Pharmacy states patient is still not able to  script until 1/26/25. Pharmacist states if patient is taking as prescribed script should last 15 days. Patient picked up 60 pills on 1/11/25. Will call to notify patient.    Clemencia Reyez RN on 1/22/2025 at 11:37 AM

## 2025-01-22 NOTE — TELEPHONE ENCOUNTER
Patient states he will be out of script today, he takes 5-6 pills per day. Order is written Take 1-2 tablets (5-10 mg) by mouth every 6 hours as needed for severe pain. Patient states 165 tablets is a 30 day supply for him. With the 60 tablets that were sent about 11 day supply. Last  was 1/11/25, should be able to  new script today. Will call pharmacy to see if he can  script today.    Clemencia Reyez RN on 1/22/2025 at 11:43 AM

## 2025-01-22 NOTE — TELEPHONE ENCOUNTER
Pharmacy calling to just give heads up they were able to fill script for 105 tablets today. To fulfil patients 165 tablets should have received on 1/11/25. Patient now back on track for 30-day refills. Next refill date 2/10/25.    Clemencia Reyez RN on 1/22/2025 at 2:57 PM

## 2025-01-26 DIAGNOSIS — G62.9 NEUROPATHY: ICD-10-CM

## 2025-01-26 DIAGNOSIS — M54.2 CERVICALGIA: ICD-10-CM

## 2025-01-26 DIAGNOSIS — Z98.1 S/P CERVICAL SPINAL FUSION: ICD-10-CM

## 2025-01-26 DIAGNOSIS — G89.4 CHRONIC PAIN SYNDROME: ICD-10-CM

## 2025-01-26 DIAGNOSIS — M54.16 LUMBAR RADICULOPATHY: ICD-10-CM

## 2025-01-26 DIAGNOSIS — M79.18 MYOFASCIAL PAIN: ICD-10-CM

## 2025-01-26 RX ORDER — CYCLOBENZAPRINE HCL 5 MG
TABLET ORAL
Qty: 90 TABLET | Refills: 2 | Status: SHIPPED | OUTPATIENT
Start: 2025-01-26

## 2025-02-04 ENCOUNTER — OFFICE VISIT (OUTPATIENT)
Dept: FAMILY MEDICINE | Facility: CLINIC | Age: 60
End: 2025-02-04
Payer: COMMERCIAL

## 2025-02-04 VITALS
BODY MASS INDEX: 28.67 KG/M2 | HEIGHT: 73 IN | SYSTOLIC BLOOD PRESSURE: 122 MMHG | OXYGEN SATURATION: 99 % | WEIGHT: 216.3 LBS | DIASTOLIC BLOOD PRESSURE: 80 MMHG | RESPIRATION RATE: 18 BRPM | HEART RATE: 88 BPM | TEMPERATURE: 97.7 F

## 2025-02-04 DIAGNOSIS — M54.2 CERVICALGIA: ICD-10-CM

## 2025-02-04 DIAGNOSIS — M79.18 MYOFASCIAL PAIN: ICD-10-CM

## 2025-02-04 DIAGNOSIS — Z12.5 SCREENING FOR PROSTATE CANCER: ICD-10-CM

## 2025-02-04 DIAGNOSIS — I10 BENIGN ESSENTIAL HYPERTENSION: ICD-10-CM

## 2025-02-04 DIAGNOSIS — I73.01 RAYNAUD'S DISEASE WITH GANGRENE (H): ICD-10-CM

## 2025-02-04 DIAGNOSIS — G62.9 NEUROPATHY: ICD-10-CM

## 2025-02-04 DIAGNOSIS — G89.29 CHRONIC BILATERAL LOW BACK PAIN WITHOUT SCIATICA: ICD-10-CM

## 2025-02-04 DIAGNOSIS — E78.2 MIXED HYPERLIPIDEMIA: ICD-10-CM

## 2025-02-04 DIAGNOSIS — Z98.1 S/P CERVICAL SPINAL FUSION: ICD-10-CM

## 2025-02-04 DIAGNOSIS — Z80.0 FAMILY HISTORY OF COLON CANCER: ICD-10-CM

## 2025-02-04 DIAGNOSIS — F11.90 CHRONIC, CONTINUOUS USE OF OPIOIDS: ICD-10-CM

## 2025-02-04 DIAGNOSIS — M54.16 LUMBAR RADICULOPATHY: ICD-10-CM

## 2025-02-04 DIAGNOSIS — F33.1 MODERATE EPISODE OF RECURRENT MAJOR DEPRESSIVE DISORDER (H): ICD-10-CM

## 2025-02-04 DIAGNOSIS — M54.50 CHRONIC BILATERAL LOW BACK PAIN WITHOUT SCIATICA: ICD-10-CM

## 2025-02-04 DIAGNOSIS — G89.4 CHRONIC PAIN SYNDROME: ICD-10-CM

## 2025-02-04 DIAGNOSIS — Z00.00 ROUTINE GENERAL MEDICAL EXAMINATION AT A HEALTH CARE FACILITY: Primary | ICD-10-CM

## 2025-02-04 DIAGNOSIS — Z02.89 PAIN MEDICATION AGREEMENT SIGNED: ICD-10-CM

## 2025-02-04 PROBLEM — R56.9 SEIZURE-LIKE ACTIVITY (H): Status: RESOLVED | Noted: 2024-01-15 | Resolved: 2025-02-04

## 2025-02-04 LAB
AMPHETAMINES UR QL SCN: ABNORMAL
ANION GAP SERPL CALCULATED.3IONS-SCNC: 15 MMOL/L (ref 7–15)
BARBITURATES UR QL SCN: ABNORMAL
BENZODIAZ UR QL SCN: ABNORMAL
BUN SERPL-MCNC: 16.6 MG/DL (ref 6–20)
BZE UR QL SCN: ABNORMAL
CALCIUM SERPL-MCNC: 10 MG/DL (ref 8.8–10.4)
CANNABINOIDS UR QL SCN: ABNORMAL
CHLORIDE SERPL-SCNC: 98 MMOL/L (ref 98–107)
CHOLEST SERPL-MCNC: 337 MG/DL
CREAT SERPL-MCNC: 1.13 MG/DL (ref 0.67–1.17)
EGFRCR SERPLBLD CKD-EPI 2021: 75 ML/MIN/1.73M2
FASTING STATUS PATIENT QL REPORTED: NO
FASTING STATUS PATIENT QL REPORTED: NO
FENTANYL UR QL: ABNORMAL
GLUCOSE SERPL-MCNC: 132 MG/DL (ref 70–99)
HCO3 SERPL-SCNC: 25 MMOL/L (ref 22–29)
HDLC SERPL-MCNC: 41 MG/DL
LDLC SERPL CALC-MCNC: 236 MG/DL
NONHDLC SERPL-MCNC: 296 MG/DL
OPIATES UR QL SCN: ABNORMAL
PCP QUAL URINE (ROCHE): ABNORMAL
POTASSIUM SERPL-SCNC: 3.9 MMOL/L (ref 3.4–5.3)
PSA SERPL DL<=0.01 NG/ML-MCNC: 0.33 NG/ML (ref 0–3.5)
SODIUM SERPL-SCNC: 138 MMOL/L (ref 135–145)
TRIGL SERPL-MCNC: 298 MG/DL

## 2025-02-04 PROCEDURE — 36415 COLL VENOUS BLD VENIPUNCTURE: CPT | Performed by: STUDENT IN AN ORGANIZED HEALTH CARE EDUCATION/TRAINING PROGRAM

## 2025-02-04 RX ORDER — AMLODIPINE BESYLATE 5 MG/1
5 TABLET ORAL DAILY
Qty: 90 TABLET | Refills: 3 | Status: SHIPPED | OUTPATIENT
Start: 2025-02-04

## 2025-02-04 RX ORDER — PREGABALIN 150 MG/1
150 CAPSULE ORAL 2 TIMES DAILY
Qty: 60 CAPSULE | Refills: 5 | Status: SHIPPED | OUTPATIENT
Start: 2025-02-04

## 2025-02-04 RX ORDER — OXYCODONE HYDROCHLORIDE 5 MG/1
5-10 TABLET ORAL EVERY 6 HOURS PRN
Qty: 150 TABLET | Refills: 0 | Status: SHIPPED | OUTPATIENT
Start: 2025-02-04

## 2025-02-04 RX ORDER — LOSARTAN POTASSIUM AND HYDROCHLOROTHIAZIDE 12.5; 5 MG/1; MG/1
2 TABLET ORAL DAILY
Qty: 180 TABLET | Refills: 1 | Status: SHIPPED | OUTPATIENT
Start: 2025-02-04

## 2025-02-04 SDOH — HEALTH STABILITY: PHYSICAL HEALTH: ON AVERAGE, HOW MANY DAYS PER WEEK DO YOU ENGAGE IN MODERATE TO STRENUOUS EXERCISE (LIKE A BRISK WALK)?: 3 DAYS

## 2025-02-04 ASSESSMENT — ANXIETY QUESTIONNAIRES
7. FEELING AFRAID AS IF SOMETHING AWFUL MIGHT HAPPEN: MORE THAN HALF THE DAYS
7. FEELING AFRAID AS IF SOMETHING AWFUL MIGHT HAPPEN: MORE THAN HALF THE DAYS
3. WORRYING TOO MUCH ABOUT DIFFERENT THINGS: MORE THAN HALF THE DAYS
GAD7 TOTAL SCORE: 11
GAD7 TOTAL SCORE: 11
2. NOT BEING ABLE TO STOP OR CONTROL WORRYING: MORE THAN HALF THE DAYS
5. BEING SO RESTLESS THAT IT IS HARD TO SIT STILL: NOT AT ALL
GAD7 TOTAL SCORE: 11
6. BECOMING EASILY ANNOYED OR IRRITABLE: SEVERAL DAYS
IF YOU CHECKED OFF ANY PROBLEMS ON THIS QUESTIONNAIRE, HOW DIFFICULT HAVE THESE PROBLEMS MADE IT FOR YOU TO DO YOUR WORK, TAKE CARE OF THINGS AT HOME, OR GET ALONG WITH OTHER PEOPLE: SOMEWHAT DIFFICULT
8. IF YOU CHECKED OFF ANY PROBLEMS, HOW DIFFICULT HAVE THESE MADE IT FOR YOU TO DO YOUR WORK, TAKE CARE OF THINGS AT HOME, OR GET ALONG WITH OTHER PEOPLE?: SOMEWHAT DIFFICULT
1. FEELING NERVOUS, ANXIOUS, OR ON EDGE: MORE THAN HALF THE DAYS
4. TROUBLE RELAXING: MORE THAN HALF THE DAYS

## 2025-02-04 ASSESSMENT — PATIENT HEALTH QUESTIONNAIRE - PHQ9: SUM OF ALL RESPONSES TO PHQ QUESTIONS 1-9: 10

## 2025-02-04 ASSESSMENT — PAIN SCALES - GENERAL: PAINLEVEL_OUTOF10: MODERATE PAIN (4)

## 2025-02-04 ASSESSMENT — SOCIAL DETERMINANTS OF HEALTH (SDOH): HOW OFTEN DO YOU GET TOGETHER WITH FRIENDS OR RELATIVES?: TWICE A WEEK

## 2025-02-04 NOTE — PATIENT INSTRUCTIONS
Patient Education   Preventive Care Advice   This is general advice given by our system to help you stay healthy. However, your care team may have specific advice just for you. Please talk to your care team about your preventive care needs.  Nutrition  Eat 5 or more servings of fruits and vegetables each day.  Try wheat bread, brown rice and whole grain pasta (instead of white bread, rice, and pasta).  Get enough calcium and vitamin D. Check the label on foods and aim for 100% of the RDA (recommended daily allowance).  Lifestyle  Exercise at least 150 minutes each week  (30 minutes a day, 5 days a week).  Do muscle strengthening activities 2 days a week. These help control your weight and prevent disease.  No smoking.  Wear sunscreen to prevent skin cancer.  Have a dental exam and cleaning every 6 months.  Yearly exams  See your health care team every year to talk about:  Any changes in your health.  Any medicines your care team has prescribed.  Preventive care, family planning, and ways to prevent chronic diseases.  Shots (vaccines)   HPV shots (up to age 26), if you've never had them before.  Hepatitis B shots (up to age 59), if you've never had them before.  COVID-19 shot: Get this shot when it's due.  Flu shot: Get a flu shot every year.  Tetanus shot: Get a tetanus shot every 10 years.  Pneumococcal, hepatitis A, and RSV shots: Ask your care team if you need these based on your risk.  Shingles shot (for age 50 and up)  General health tests  Diabetes screening:  Starting at age 35, Get screened for diabetes at least every 3 years.  If you are younger than age 35, ask your care team if you should be screened for diabetes.  Cholesterol test: At age 39, start having a cholesterol test every 5 years, or more often if advised.  Bone density scan (DEXA): At age 50, ask your care team if you should have this scan for osteoporosis (brittle bones).  Hepatitis C: Get tested at least once in your life.  STIs (sexually  transmitted infections)  Before age 24: Ask your care team if you should be screened for STIs.  After age 24: Get screened for STIs if you're at risk. You are at risk for STIs (including HIV) if:  You are sexually active with more than one person.  You don't use condoms every time.  You or a partner was diagnosed with a sexually transmitted infection.  If you are at risk for HIV, ask about PrEP medicine to prevent HIV.  Get tested for HIV at least once in your life, whether you are at risk for HIV or not.  Cancer screening tests  Cervical cancer screening: If you have a cervix, begin getting regular cervical cancer screening tests starting at age 21.  Breast cancer scan (mammogram): If you've ever had breasts, begin having regular mammograms starting at age 40. This is a scan to check for breast cancer.  Colon cancer screening: It is important to start screening for colon cancer at age 45.  Have a colonoscopy test every 10 years (or more often if you're at risk) Or, ask your provider about stool tests like a FIT test every year or Cologuard test every 3 years.  To learn more about your testing options, visit:   .  For help making a decision, visit:   https://bit.ly/mi13450.  Prostate cancer screening test: If you have a prostate, ask your care team if a prostate cancer screening test (PSA) at age 55 is right for you.  Lung cancer screening: If you are a current or former smoker ages 50 to 80, ask your care team if ongoing lung cancer screenings are right for you.  For informational purposes only. Not to replace the advice of your health care provider. Copyright   2023 [facility]. All rights reserved. Clinically reviewed by the [facility]. [employer] 140785 - REV 01/24.  Preventing Falls: Care Instructions  Injuries and health problems such as trouble walking or poor eyesight can increase your risk of falling. So can some medicines. But there are things you can do to help  "prevent falls. You can exercise to get stronger. You can also arrange your home to make it safer.    Talk to your doctor about the medicines you take. Ask if any of them increase the risk of falls and whether they can be changed or stopped.   Try to exercise regularly. It can help improve your strength and balance. This can help lower your risk of falling.         Practice fall safety and prevention.   Wear low-heeled shoes that fit well and give your feet good support. Talk to your doctor if you have foot problems that make this hard.  Carry a cellphone or wear a medical alert device that you can use to call for help.  Use stepladders instead of chairs to reach high objects. Don't climb if you're at risk for falls. Ask for help, if needed.  Wear the correct eyeglasses, if you need them.        Make your home safer.   Remove rugs, cords, clutter, and furniture from walkways.  Keep your house well lit. Use night-lights in hallways and bathrooms.  Install and use sturdy handrails on stairways.  Wear nonskid footwear, even inside. Don't walk barefoot or in socks without shoes.        Be safe outside.   Use handrails, curb cuts, and ramps whenever possible.  Keep your hands free by using a shoulder bag or backpack.  Try to walk in well-lit areas. Watch out for uneven ground, changes in pavement, and debris.  Be careful in the winter. Walk on the grass or gravel when sidewalks are slippery. Use de-icer on steps and walkways. Add non-slip devices to shoes.    Put grab bars and nonskid mats in your shower or tub and near the toilet. Try to use a shower chair or bath bench when bathing.   Get into a tub or shower by putting in your weaker leg first. Get out with your strong side first. Have a phone or medical alert device in the bathroom with you.   Where can you learn more?  Go to https://www.[employer].net/patiented  Enter G117 in the search box to learn more about \"Preventing Falls: Care Instructions.\"  Current as of: " July 31, 2024  Content Version: 14.3    2024 [employer].   Care instructions adapted under license by your healthcare professional. If you have questions about a medical condition or this instruction, always ask your healthcare professional. [employer] disclaims any warranty or liability for your use of this information.    Learning About Stress  What is stress?     Stress is your body's response to a hard situation. Your body can have a physical, emotional, or mental response. Stress is a fact of life for most people, and it affects everyone differently. What causes stress for you may not be stressful for someone else.  A lot of things can cause stress. You may feel stress when you go on a job interview, take a test, or run a race. This kind of short-term stress is normal and even useful. It can help you if you need to work hard or react quickly. For example, stress can help you finish an important job on time.  Long-term stress is caused by ongoing stressful situations or events. Examples of long-term stress include long-term health problems, ongoing problems at work, or conflicts in your family. Long-term stress can harm your health.  How does stress affect your health?  When you are stressed, your body responds as though you are in danger. It makes hormones that speed up your heart, make you breathe faster, and give you a burst of energy. This is called the fight-or-flight stress response. If the stress is over quickly, your body goes back to normal and no harm is done.  But if stress happens too often or lasts too long, it can have bad effects. Long-term stress can make you more likely to get sick, and it can make symptoms of some diseases worse. If you tense up when you are stressed, you may develop neck, shoulder, or low back pain. Stress is linked to high blood pressure and heart disease.  Stress also harms your emotional health. It can make you ivan, tense, or depressed. Your  relationships may suffer, and you may not do well at work or school.  What can you do to manage stress?  You can try these things to help manage stress:   Do something active. Exercise or activity can help reduce stress. Walking is a great way to get started. Even everyday activities such as housecleaning or yard work can help.  Try yoga or vivian chi. These techniques combine exercise and meditation. You may need some training at first to learn them.  Do something you enjoy. For example, listen to music or go to a movie. Practice your hobby or do volunteer work.  Meditate. This can help you relax, because you are not worrying about what happened before or what may happen in the future.  Do guided imagery. Imagine yourself in any setting that helps you feel calm. You can use online videos, books, or a teacher to guide you.  Do breathing exercises. For example:  From a standing position, bend forward from the waist with your knees slightly bent. Let your arms dangle close to the floor.  Breathe in slowly and deeply as you return to a standing position. Roll up slowly and lift your head last.  Hold your breath for just a few seconds in the standing position.  Breathe out slowly and bend forward from the waist.  Let your feelings out. Talk, laugh, cry, and express anger when you need to. Talking with supportive friends or family, a counselor, or a sharla leader about your feelings is a healthy way to relieve stress. Avoid discussing your feelings with people who make you feel worse.  Write. It may help to write about things that are bothering you. This helps you find out how much stress you feel and what is causing it. When you know this, you can find better ways to cope.  What can you do to prevent stress?  You might try some of these things to help prevent stress:  Manage your time. This helps you find time to do the things you want and need to do.  Get enough sleep. Your body recovers from the stresses of the day while  "you are sleeping.  Get support. Your family, friends, and community can make a difference in how you experience stress.  Limit your news feed. Avoid or limit time on social media or news that may make you feel stressed.  Do something active. Exercise or activity can help reduce stress. Walking is a great way to get started.  Where can you learn more?  Go to https://www.[employer].net/patiented  Enter N032 in the search box to learn more about \"Learning About Stress.\"  Current as of: October 24, 2023  Content Version: 14.3    2024 [employer].   Care instructions adapted under license by your healthcare professional. If you have questions about a medical condition or this instruction, always ask your healthcare professional. [employer] disclaims any warranty or liability for your use of this information.    Learning About Depression Screening  What is depression screening?  Depression screening is a way to see if you have depression symptoms. It may be done by a doctor or counselor. It's often part of a routine checkup. That's because your mental health is just as important as your physical health.  Depression is a mental health condition that affects how you feel, think, and act. You may:  Have less energy.  Lose interest in your daily activities.  Feel sad and grouchy for a long time.  Depression is very common. It affects people of all ages.  Many things can lead to depression. Some people become depressed after they have a stroke or find out they have a major illness like cancer or heart disease. The death of a loved one or a breakup may lead to depression. It can run in families. Most experts believe that a combination of inherited genes and stressful life events can cause it.  What happens during screening?  You may be asked to fill out a form about your depression symptoms. You and the doctor will discuss your answers. The doctor may ask you more questions to learn more about how you " "think, act, and feel.  What happens after screening?  If you have symptoms of depression, your doctor will talk to you about your options.  Doctors usually treat depression with medicines or counseling. Often, combining the two works best. Many people don't get help because they think that they'll get over the depression on their own. But people with depression may not get better unless they get treatment.  The cause of depression is not well understood. There may be many factors involved. But if you have depression, it's not your fault.  A serious symptom of depression is thinking about death or suicide. If you or someone you care about talks about this or about feeling hopeless, get help right away.  It's important to know that depression can be treated. Medicine, counseling, and self-care may help.  Where can you learn more?  Go to https://www.[employer].net/patiented  Enter T185 in the search box to learn more about \"Learning About Depression Screening.\"  Current as of: July 31, 2024  Content Version: 14.3    2024 [employer].   Care instructions adapted under license by your healthcare professional. If you have questions about a medical condition or this instruction, always ask your healthcare professional. [employer] disclaims any warranty or liability for your use of this information.       "

## 2025-02-04 NOTE — PROGRESS NOTES
Preventive Care Visit  [facility]  Michael Monroy MD, Family Medicine  Feb 4, 2025      Assessment & Plan   Problem List Items Addressed This Visit          Nervous and Auditory    Chronic pain syndrome (Chronic)    Relevant Medications    oxyCODONE (ROXICODONE) 5 MG tablet    Chronic bilateral low back pain without sciatica    Relevant Medications    oxyCODONE (ROXICODONE) 5 MG tablet    Lumbar radiculopathy    Relevant Medications    pregabalin (LYRICA) 150 MG capsule    oxyCODONE (ROXICODONE) 5 MG tablet       Circulatory    Benign essential hypertension    Relevant Medications    losartan-hydrochlorothiazide (HYZAAR) 50-12.5 MG tablet    Other Relevant Orders    BASIC METABOLIC PANEL       Behavioral    Major depression, recurrent (H)       Other    Pain medication agreement signed    Chronic, continuous use of opioids    Relevant Medications    oxyCODONE (ROXICODONE) 5 MG tablet    Other Relevant Orders    Urine Drug Screen     Other Visit Diagnoses       Routine general medical examination at a health care facility    -  Primary    Relevant Orders    Lipid panel reflex to direct LDL Non-fasting    Neuropathy        Relevant Medications    pregabalin (LYRICA) 150 MG capsule    Cervicalgia        Relevant Medications    oxyCODONE (ROXICODONE) 5 MG tablet    S/P cervical spinal fusion        Relevant Medications    oxyCODONE (ROXICODONE) 5 MG tablet    Myofascial pain        Relevant Medications    oxyCODONE (ROXICODONE) 5 MG tablet    Raynaud's disease with gangrene (H)        Relevant Medications    amLODIPine (NORVASC) 5 MG tablet    Family history of colon cancer        Screening for prostate cancer        Relevant Orders    PSA, screen           Age-appropriate screening and immunization reviewed.  Patient establishing care and history was updated.  Blood pressure stable now with current medications and we will repeat his labs today with lipids.  He has been more  "sedentary so he will focus on increasing his cardiovascular activity.  Pain has been his limiting factor he does follow with the pain clinic but she is little frustrated with and may plan to reestablish moving forward.  Does not feel like medicine is working as well as it did initially and he is interested in decreasing dose as I encouraged today.  Risk of respiratory suppression death and addiction reviewed.  Will continue Lyrica now.  Plan to limit to 5 tablets daily at 150 tablets/month and decrease to 4 tablets in the future.  Will follow-up in 3 to 6 months regarding his pain.  Encouraged home therapy and would have him consider following up with psychology.  He did see neurology previously and things stable with sertraline now.  I also wonder if Lyrica or delta 9 he was taking contributed to things.  I did encourage him to stop this now and continue with Lyrica.    The longitudinal plan of care for the diagnosis(es)/condition(s) as documented were addressed during this visit. Due to the added complexity in care, I will continue to support Ketan in the subsequent management and with ongoing continuity of care.      Patient has been advised of split billing requirements and indicates understanding: Yes       BMI  Estimated body mass index is 28.54 kg/m  as calculated from the following:    Height as of this encounter: 1.854 m (6' 1\").    Weight as of this encounter: 98.1 kg (216 lb 4.8 oz).   Weight management plan: Discussed healthy diet and exercise guidelines    Counseling  Appropriate preventive services were addressed with this patient via screening, questionnaire, or discussion as appropriate for fall prevention, nutrition, physical activity, Tobacco-use cessation, social engagement, weight loss and cognition.  Checklist reviewing preventive services available has been given to the patient.  Reviewed patient's diet, addressing concerns and/or questions.   He is at risk for lack of exercise and has been " provided with information to increase physical activity for the benefit of his well-being.   The patient was instructed to see the dentist every 6 months.   He is at risk for psychosocial distress and has been provided with information to reduce risk.   The patient's PHQ-9 score is consistent with moderate depression. He was provided with information regarding depression.         Senthil Aceves is a 59 year old, presenting for the following:  Physical and Establish Care        2/4/2025     7:31 AM   Additional Questions   Roomed by Deysi CASEY         2/4/2025     7:31 AM   Patient Reported Additional Medications   Patient reports taking the following new medications Delta 9 THC         HPI      Health Care Directive  Patient does not have a Health Care Directive: Discussed advance care planning with patient; information given to patient to review.      2/4/2025   General Health   How would you rate your overall physical health? (!) FAIR   Feel stress (tense, anxious, or unable to sleep) To some extent   (!) STRESS CONCERN      2/4/2025   Nutrition   Three or more servings of calcium each day? Yes   Diet: Regular (no restrictions)   How many servings of fruit and vegetables per day? (!) 0-1   How many sweetened beverages each day? (!) 4+         2/4/2025   Exercise   Days per week of moderate/strenous exercise 3 days         2/4/2025   Social Factors   Frequency of gathering with friends or relatives Twice a week   Worry food won't last until get money to buy more No   Food not last or not have enough money for food? No   Do you have housing? (Housing is defined as stable permanent housing and does not include staying ouside in a car, in a tent, in an abandoned building, in an overnight shelter, or couch-surfing.) Yes   Are you worried about losing your housing? No   Lack of transportation? No   Unable to get utilities (heat,electricity)? No         2/4/2025   Fall Risk   Fallen 2 or more times in the past year?  "Yes   Trouble with walking or balance? Yes           2025   Dental   Dentist two times every year? (!) NO         2025   TB Screening   Were you born outside of the [place]? No       Today's PHQ-9 Score:       2025     7:33 AM   PHQ-9 SCORE   PHQ-9 Total Score 10         2025   Substance Use   Alcohol more than 3/day or more than 7/wk No   Do you use any other substances recreationally? No     Social History     Tobacco Use    Smoking status: Never    Smokeless tobacco: Never   Vaping Use    Vaping status: Never Used   Substance Use Topics    Alcohol use: Yes     Alcohol/week: 0.0 standard drinks of alcohol     Comment: rarely    Drug use: No           2025   STI Screening   New sexual partner(s) since last STI/HIV test? No   Last PSA: No results found for: \"PSA\"  ASCVD Risk   The 10-year ASCVD risk score (Cat HUNT, et al., 2019) is: 11.2%    Values used to calculate the score:      Age: 59 years      Sex: Male      Is Non- : No      Diabetic: No      Tobacco smoker: No      Systolic Blood Pressure: 122 mmHg      Is BP treated: Yes      HDL Cholesterol: 34 mg/dL      Total Cholesterol: 214 mg/dL    Fracture Risk Assessment Tool  Link to Frax Calculator  Use the information below to complete the Frax calculator  : 1965  Sex: male  Weight (kg): 98.1 kg (actual weight)  Height (cm): 185.4 cm  Previous Fragility Fracture:  No  History of parent with fractured hip:  No  Current Smoking:  No  Patient has been on glucocorticoids for more than 3 months (5mg/day or more): No  Rheumatoid Arthritis on Problem List:  No  Secondary Osteoporosis on Problem List:  No  Consumes 3 or more units of alcohol per day: No  Femoral Neck BMD (g/cm2)           Reviewed and updated as needed this visit by Provider                    Past Medical History:   Diagnosis Date    Anxiety     Back pain     Depressive disorder     Hyperlipidemia     Hypertension     Meniere's disease, " unspecified     Pain medication agreement signed 08/20/2010    Sleep apnea, unspecified type     Status post lumbar spinal fusion 01/14/2020     Past Surgical History:   Procedure Laterality Date    ABDOMEN SURGERY  Aug 2017    BUNIONECTOMY RT/LT  09/05/2008    Both feet    COLONOSCOPY N/A 12/28/2016    Procedure: COMBINED COLONOSCOPY, SINGLE OR MULTIPLE BIOPSY/POLYPECTOMY BY BIOPSY;  Surgeon: Indra Jernigan MD;  Location: [facility]    COLONOSCOPY N/A 09/27/2022    Procedure: COLONOSCOPY, FLEXIBLE, WITH LESION REMOVAL USING SNARE;  Surgeon: Pablo Ferris MD;  Location: [facility]    DESTRUCTION OF PARAVERTEBRAL FACET LUMBAR / SACRAL SINGLE Bilateral 12/05/2024    Procedure: DESTRUCTION, NERVE, FACET JOINT, LUMBOSACRALL3/4/ALA;  Surgeon: Brian Todd MD;  Location: [facility]    DISCECTOMY, FUSION CERVICAL ANTERIOR ONE LEVEL, COMBINED N/A 07/05/2017    Procedure: COMBINED DISCECTOMY, FUSION CERVICAL ANTERIOR ONE LEVEL;  Cervical 6-7, Anterior cervical discectomy fusion;  Surgeon: Mike Mckenna MD;  [facility]    DISCECTOMY, FUSION CERVICAL ANTERIOR ONE LEVEL, COMBINED N/A 12/19/2018    Procedure: cervical 5-6 anterior cervical discectomy fusion;  Surgeon: Mike Mckenna MD;  [facility]    HEAD & NECK SURGERY  Jul 2017    Disk removal    HERNIORRHAPHY UMBILICAL N/A 08/11/2017    Procedure: HERNIORRHAPHY UMBILICAL;  open umbilical hernia repair;  Surgeon: Boni Story MD;  [facility]    INJECT BLOCK MEDIAL BRANCH CERVICAL/THORACIC/LUMBAR Bilateral 08/12/2021    Procedure: Left L3, Left L4, Left L5, Right L3, Right L4 and Right L5 medial branch nerve blocks using fluoroscopic guidance and contrast control;  Surgeon: Darryn Mcdaniel MD;  [facility]    INJECT BLOCK MEDIAL BRANCH CERVICAL/THORACIC/LUMBAR N/A 09/09/2021    Procedure: Left L3, Left L4, Left L5, Right L3, Right L4 and Right L5 medial branch nerve blocks using fluoroscopic guidance and contrast control;  Surgeon: Violeta  Darryn RODRIGEZ MD;  [facility]    INJECT EPIDURAL CERVICAL N/A 08/22/2019    Procedure: INJECTION, SPINE, CERVICAL 6-7 EPIDURAL;  Surgeon: Darryn Mcdaniel MD;  [facility]    INJECT EPIDURAL CERVICAL Left 03/09/2023    Procedure: Cervical 7-Thoracic 1 Interlaminar epidural steroid injection using fluoroscopic guidance with contrast dye, Left;  Surgeon: Darryn Mcdaniel MD;  [facility]    INJECT EPIDURAL CERVICAL N/A 08/18/2023    Procedure: Cervical 7-Thoracic 1 Interlaminar epidural steroid injection using fluoroscopic guidance with contrast dye.;  Surgeon: Darryn Mcdaniel MD;  [facility]    INJECT EPIDURAL CERVICAL N/A 01/19/2024    Procedure: Cervical 7 - Thoracic 1 Interlaminar epidural steroid injection using fluoroscopic guidance with contrast dye.;  Surgeon: Darryn Mcdaniel MD;  [facility]    INJECT EPIDURAL LUMBAR N/A 11/09/2017    Procedure: INJECT EPIDURAL LUMBAR;  lumbar 4-5 epidural steroid injection ;  Surgeon: Darryn Mcdaniel MD;  [facility]    INJECT EPIDURAL LUMBAR N/A 12/28/2017    Procedure: INJECT EPIDURAL LUMBAR;  lumbar epidural injection;  Surgeon: Darryn Mcdaniel MD;  [facility]    INJECT EPIDURAL LUMBAR Bilateral 05/13/2021    Procedure: Bilateral Lumbar 3-4 Epidural Steroid Injection;  Surgeon: Darryn Mcdaniel MD;  [facility]    INJECT EPIDURAL TRANSFORAMINAL Bilateral 08/23/2018    Procedure: INJECT EPIDURAL TRANSFORAMINAL;  transforaminal bilateral lumbar 3-4 steroid injection;  Surgeon: Darryn Mcdaniel MD;  [facility]    INJECT EPIDURAL TRANSFORAMINAL Bilateral 11/08/2018    Procedure: INJECT EPIDURAL TRANSFORAMINAL lumbar 3-4;  Surgeon: Darryn Mcdaniel MD;  [facility]    INJECT EPIDURAL TRANSFORAMINAL Bilateral 06/14/2019    Procedure: INJECTION, EPIDURAL, TRANSFORAMINAL LUMBAR 3-4 BILATERAL;  Surgeon: Darryn Mcdaniel MD;  [facility]    INJECT EPIDURAL TRANSFORAMINAL Bilateral 05/22/2020    Procedure: lumbar 3-4 bilateral transforaminal  "epidural steroid injection;  Surgeon: Darryn Mcdaniel MD;  [facility]    INJECT EPIDURAL TRANSFORAMINAL Bilateral 09/24/2020    Procedure: INJECTION, EPIDURAL, TRANSFORAMINAL  LUMBAR 3-4 APPROACH;  Surgeon: Darryn Mcdaniel MD;  [facility]    INJECT JOINT SACROILIAC Bilateral 10/13/2022    Procedure: Fluoroscopically-guided injection of the Bilateral sacroiliac joints with Bilateral kerrie Sacroiliac joint ligaments infiltration over the sacrum;  Surgeon: Darryn Mcdaniel MD;  [facility]    PE TUBES  2006    left ear    RADIO FREQUENCY ABLATION PULSED CERVICAL Bilateral 10/01/2021    Procedure: RADIOFREQUENCY ABLATION lumbar 3, 4, 5 bilateral;  Surgeon: Darryn Mcdaniel MD;  [facility]    SOFT TISSUE SURGERY  2013    Achilles (right side)    ZZHC COLONOSCOPY W/WO BRUSH/WASH  12/27/2005    ZZHC CREATE EARDRUM OPENING,GEN ANESTH  09/27/2007    Pe tube, Left endolymphatic sac enhancement.    ZZHC MASTOIDECTOMY,COMPLETE  09/27/2007         Review of Systems  Constitutional, HEENT, cardiovascular, pulmonary, GI, , musculoskeletal, neuro, skin, endocrine and psych systems are negative, except as otherwise noted.     Objective    Exam  /80   Pulse 88   Temp 97.7  F (36.5  C) (Temporal)   Resp 18   Ht 1.854 m (6' 1\")   Wt 98.1 kg (216 lb 4.8 oz)   SpO2 99%   BMI 28.54 kg/m     Estimated body mass index is 28.54 kg/m  as calculated from the following:    Height as of this encounter: 1.854 m (6' 1\").    Weight as of this encounter: 98.1 kg (216 lb 4.8 oz).    Physical Exam  GENERAL: alert and no distress  EYES: Eyes grossly normal to inspection, PERRL and conjunctivae and sclerae normal  HENT: ear canals and TM's normal, nose and mouth without ulcers or lesions  NECK: no adenopathy, no asymmetry, masses, or scars  RESP: lungs clear to auscultation - no rales, rhonchi or wheezes  CV: regular rate and rhythm, normal S1 S2, no S3 or S4, no murmur, click or rub, no peripheral edema  ABDOMEN: soft, " nontender, no hepatosplenomegaly, no masses and bowel sounds normal  MS: no gross musculoskeletal defects noted, no edema  SKIN: no suspicious lesions or rashes  NEURO: Normal strength and tone, mentation intact and speech normal  PSYCH: mentation appears normal, affect normal/bright        Signed Electronically by: Michael Monroy MD    Answers submitted by the patient for this visit:  Patient Health Questionnaire (G7) (Submitted on 2/4/2025)  ALICE 7 TOTAL SCORE: 11

## 2025-02-04 NOTE — LETTER
Opioid / Opioid Plus Controlled Substance Agreement    This is an agreement between you and your provider about the safe and appropriate use of controlled substance/opioids prescribed by your care team. Controlled substances are medicines that can cause physical and mental dependence (abuse).    There are strict laws about having and using these medicines. We here at [facility] are committing to working with you in your efforts to get better. To support you in this work, we ll help you schedule regular office appointments for medicine refills. If we must cancel or change your appointment for any reason, we ll make sure you have enough medicine to last until your next appointment.     As a Provider, I will:  Listen carefully to your concerns and treat you with respect.   Recommend a treatment plan that I believe is in your best interest. This plan may involve therapies other than opioid pain medication.   Talk with you often about the possible benefits, and the risk of harm of any medicine that we prescribe for you.   Provide a plan on how to taper (discontinue or go off) using this medicine if the decision is made to stop its use.    As a Patient, I understand that opioid(s):   Are a controlled substance prescribed by my care team to help me function or work and manage my condition(s).   Are strong medicines and can cause serious side effects such as:  Drowsiness, which can seriously affect my driving ability  A lower breathing rate, enough to cause death  Harm to my thinking ability   Depression   Abuse of and addiction to this medicine  Need to be taken exactly as prescribed. Combining opioids with certain medicines or chemicals (such as illegal drugs, sedatives, sleeping pills, and benzodiazepines) can be dangerous or even fatal. If I stop opioids suddenly, I may have severe withdrawal symptoms.  Do not work for all types of pain nor for all patients. If they re not helpful, I may be asked to stop  them.    The risks, benefits and side effects of these medicine(s) were explained to me. I agree that:  I will take part in other treatments as advised by my care team. This may be psychiatry or counseling, physical therapy, behavioral therapy, group treatment or a referral to a specialist.     I will keep all my appointments. I understand that this is part of the monitoring of opioids. My care team may require an office visit for EVERY opioid/controlled substance refill. If I miss appointments or don t follow instructions, my care team may stop my medicine.    I will take my medicines as prescribed. I will not change the dose or schedule unless my care team tells me to. There will be no refills if I run out early.     I may be asked to come to the clinic and complete a urine drug test or complete a pill count at any time. If I don t give a urine sample or participate in a pill count, the care team may stop my medicine.    I will only receive prescriptions from this clinic for chronic pain. If I am treated by another provider for acute pain issues, I will tell them that I am taking opioid pain medication for chronic pain and that I have a treatment agreement with this provider. I will inform my [facility] care team within one business day if I am given a prescription for any pain medication by another healthcare provider. My [facility] care team can contact other providers and pharmacists about my use of any medicines.    It is up to me to make sure that I don t run out of my medicines on weekends or holidays. If my care team is willing to refill my opioid prescription without a visit, I must request refills only during office hours. Refills may take up to 3 business days to process. I will use one pharmacy to fill all my opioid and other controlled substance prescriptions. I will notify the clinic about any changes to my insurance or medication availability.    I am responsible for my prescriptions.  If the medicine/prescription is lost, stolen or destroyed, it will not be replaced. I also agree not to share controlled substance medicines with anyone.    I am aware I should not use any illegal or recreational drugs. I agree not to drink alcohol unless my care team says I can.       If I enroll in the [place] [facility] program, I will tell my care team prior to my next refill.     I will tell my care team right away if I become pregnant, have a new medical problem treated outside of my regular clinic, or have a change in my medications.    I understand that this medicine can affect my thinking, judgment and reaction time. Alcohol and drugs affect the brain and body, which can affect the safety of my driving. Being under the influence of alcohol or drugs can affect my decision-making, behaviors, personal safety, and the safety of others. Driving while impaired (DWI) can occur if a person is driving, operating, or in physical control of a car, motorcycle, boat, snowmobile, ATV, motorbike, off-road vehicle, or any other motor vehicle ([place] Statute 169A.20). I understand the risk if I choose to drive or operate any vehicle or machinery.    I understand that if I do not follow any of the conditions above, my prescriptions or treatment may be stopped or changed.          Opioids  What You Need to Know    What are opioids?   Opioids are pain medicines that must be prescribed by a doctor. They are also known as narcotics.     Examples are:   morphine (MS Contin, Lucia)  oxycodone (Oxycontin)  oxycodone and acetaminophen (Percocet)  hydrocodone and acetaminophen (Vicodin, Norco)   fentanyl patch (Duragesic)   hydromorphone (Dilaudid)   methadone  codeine (Tylenol #3)     What do opioids do well?   Opioids are best for severe short-term pain such as after a surgery or injury. They may work well for cancer pain. They may help some people with long-lasting (chronic) pain.     What do opioids NOT do well?   Opioids  never get rid of pain entirely, and they don t work well for most patients with chronic pain. Opioids don t reduce swelling, one of the causes of pain.                                    Other ways to manage chronic pain and improve function include:     Treat the health problem that may be causing pain  Anti-inflammation medicines, which reduce swelling and tenderness, such as ibuprofen (Advil, Motrin) or naproxen (Aleve)  Acetaminophen (Tylenol)  Antidepressants and anti-seizure medicines, especially for nerve pain  Topical treatments such as patches or creams  Injections or nerve blocks  Chiropractic or osteopathic treatment  Acupuncture, massage, deep breathing, meditation, visual imagery, aromatherapy  Use heat or ice at the pain site  Physical therapy   Exercise  Stop smoking  Take part in therapy       Risks and side effects     Talk to your doctor before you start or decide to keep taking opioids. Possible side effects include:    Lowering your breathing rate enough to cause death  Overdose, including death, especially if taking higher than prescribed doses  Worse depression symptoms; less pleasure in things you usually enjoy  Feeling tired or sluggish  Slower thoughts or cloudy thinking  Being more sensitive to pain over time; pain is harder to control  Trouble sleeping or restless sleep  Changes in hormone levels (for example, less testosterone)  Changes in sex drive or ability to have sex  Constipation  Unsafe driving  Itching and sweating  Dizziness  Nausea, throwing up and dry mouth    What else should I know about opioids?    Opioids may lead to dependence, tolerance, or addiction.    Dependence means that if you stop or reduce the medicine too quickly, you will have withdrawal symptoms. These include loose poop (diarrhea), jitters, flu-like symptoms, nervousness and tremors. Dependence is not the same as addiction.                     Tolerance means needing higher doses over time to get the same  effect. This may increase the chance of serious side effects.    Addiction is when people improperly use a substance that harms their body, their mind or their relations with others. Use of opiates can cause a relapse of addiction if you have a history of drug or alcohol abuse.    People who have used opioids for a long time may have a lower quality of life, worse depression, higher levels of pain and more visits to doctors.    You can overdose on opioids. Take these steps to lower your risk of overdose:    Recognize the signs:  Signs of overdose include decrease or loss of consciousness (blackout), slowed breathing, trouble waking up and blue lips. If someone is worried about overdose, they should call 911.    Talk to your doctor about Narcan (naloxone).   If you are at risk for overdose, you may be given a prescription for Narcan. This medicine very quickly reverses the effects of opioids.   If you overdose, a friend or family member can give you Narcan while waiting for the ambulance. They need to know the signs of overdose and how to give Narcan.     Don't use alcohol or street drugs.   Taking them with opioids can cause death.    Do not take any of these medicines unless your doctor says it s OK. Taking these with opioids can cause death:  Benzodiazepines, such as lorazepam (Ativan), alprazolam (Xanax) or diazepam (Valium)  Muscle relaxers, such as cyclobenzaprine (Flexeril)  Sleeping pills like zolpidem (Ambien)   Other opioids      How to keep you and other people safe while taking opioids:    Never share your opioids with others.  Opioid medicines are regulated by the Drug Enforcement Agency (ALCIDES). Selling or sharing medications is a criminal act.    2. Be sure to store opioids in a secure place, locked up if possible. Young children can easily swallow them and overdose.    3. When you are traveling with your medicines, keep them in the original bottles. If you use a pill box, be sure you also carry a copy  of your medicine list from your clinic or pharmacy.    4. Safe disposal of opioids    Most pharmacies have places to get rid of medicine, called disposal kiosks. Medicine disposal options are also available in every [facility]. Search your county and  medication disposal  to find more options. You can find more details at:  https://www.pca.[facility].[email or web address]./living-green/managing-unwanted-medications     I agree that my provider, clinic care team, and pharmacy may work with any city, state or federal law enforcement agency that investigates the misuse, sale, or other diversion of my controlled medicine. I will allow my provider to discuss my care with, or share a copy of, this agreement with any other treating provider, pharmacy or emergency room where I receive care.    I have read this agreement and have asked questions about anything I did not understand.    _______________________________________________________  Patient Signature - Indra Mehta _____________________                   Date     _______________________________________________________  Provider Signature - Michael Monroy MD   _____________________                   Date     _______________________________________________________  Witness Signature (required if provider not present while patient signing)   _____________________                   Date

## 2025-02-04 NOTE — NURSING NOTE
Prior to immunization administration, verified patients identity using patient s name and date of birth. Please see Immunization Activity for additional information.     Screening Questionnaire for Adult Immunization    Are you sick today?   No   Do you have allergies to medications, food, a vaccine component or latex?   No   Have you ever had a serious reaction after receiving a vaccination?   No   Do you have a long-term health problem with heart, lung, kidney, or metabolic disease (e.g., diabetes), asthma, a blood disorder, no spleen, complement component deficiency, a cochlear implant, or a spinal fluid leak?  Are you on long-term aspirin therapy?   No   Do you have cancer, leukemia, HIV/AIDS, or any other immune system problem?   No   Do you have a parent, brother, or sister with an immune system problem?   No   In the past 3 months, have you taken medications that affect  your immune system, such as prednisone, other steroids, or anticancer drugs; drugs for the treatment of rheumatoid arthritis, Crohn s disease, or psoriasis; or have you had radiation treatments?   No   Have you had a seizure, or a brain or other nervous system problem?   No   During the past year, have you received a transfusion of blood or blood    products, or been given immune (gamma) globulin or antiviral drug?   No   For women: Are you pregnant or is there a chance you could become       pregnant during the next month?   No   Have you received any vaccinations in the past 4 weeks?   No     Immunization questionnaire answers were all negative.      Patient instructed to remain in clinic for 15 minutes afterwards, and to report any adverse reactions.     Screening performed by Deysi Anderson LPN on 2/4/2025 at 7:38 AM.

## 2025-02-06 RX ORDER — ATORVASTATIN CALCIUM 20 MG/1
20 TABLET, FILM COATED ORAL DAILY
Qty: 90 TABLET | Refills: 3 | Status: SHIPPED | OUTPATIENT
Start: 2025-02-06

## 2025-04-08 ENCOUNTER — MYC REFILL (OUTPATIENT)
Dept: FAMILY MEDICINE | Facility: CLINIC | Age: 60
End: 2025-04-08
Payer: COMMERCIAL

## 2025-04-08 DIAGNOSIS — M79.18 MYOFASCIAL PAIN: ICD-10-CM

## 2025-04-08 DIAGNOSIS — M54.16 LUMBAR RADICULOPATHY: ICD-10-CM

## 2025-04-08 DIAGNOSIS — G89.4 CHRONIC PAIN SYNDROME: ICD-10-CM

## 2025-04-08 DIAGNOSIS — M54.2 CERVICALGIA: ICD-10-CM

## 2025-04-08 DIAGNOSIS — Z98.1 S/P CERVICAL SPINAL FUSION: ICD-10-CM

## 2025-04-08 DIAGNOSIS — F11.90 CHRONIC, CONTINUOUS USE OF OPIOIDS: ICD-10-CM

## 2025-04-09 RX ORDER — OXYCODONE HYDROCHLORIDE 5 MG/1
5-10 TABLET ORAL EVERY 6 HOURS PRN
Qty: 150 TABLET | Refills: 0 | Status: SHIPPED | OUTPATIENT
Start: 2025-04-09

## 2025-04-16 DIAGNOSIS — M54.16 LUMBAR RADICULOPATHY: ICD-10-CM

## 2025-04-16 DIAGNOSIS — G89.4 CHRONIC PAIN SYNDROME: ICD-10-CM

## 2025-04-16 DIAGNOSIS — M54.50 CHRONIC BILATERAL LOW BACK PAIN WITHOUT SCIATICA: ICD-10-CM

## 2025-04-16 DIAGNOSIS — M54.2 CERVICALGIA: ICD-10-CM

## 2025-04-16 DIAGNOSIS — G62.9 NEUROPATHY: ICD-10-CM

## 2025-04-16 DIAGNOSIS — G89.29 CHRONIC BILATERAL LOW BACK PAIN WITHOUT SCIATICA: ICD-10-CM

## 2025-04-16 DIAGNOSIS — Z98.1 S/P CERVICAL SPINAL FUSION: ICD-10-CM

## 2025-04-16 DIAGNOSIS — M79.18 MYOFASCIAL PAIN: ICD-10-CM

## 2025-04-16 RX ORDER — NORTRIPTYLINE HYDROCHLORIDE 50 MG/1
50 CAPSULE ORAL AT BEDTIME
Qty: 90 CAPSULE | Refills: 1 | Status: SHIPPED | OUTPATIENT
Start: 2025-04-16

## 2025-04-28 NOTE — TELEPHONE ENCOUNTER
[facility]  [address] [facility] [address]  Dept: [phone]      Physical Therapy Daily Note     Referring MD: Tyrone Yanez PA  Diagnosis:     ICD-10-CM    1. Shoulder stiffness, right  M25.611       2. Right shoulder pain, unspecified chronicity  M25.511       3. Impaired mobility and ADLs  Z74.09     Z78.9       4. Dorsalgia  M54.9         Surgery: no    Therapy precautions:None  Co-morbidities affecting plan of care: low back pain    Payor: Payor: [employer] [place] MEDICAID /  /  /  Billing pattern: Government- total time   Total Timed Procedure Codes: 42 min, Total Time: 57 min Modifier needed: No  Episode visit count:  7     PERTINENT MEDICAL HISTORY     Chief complaints/history of injury:     Date symptoms began: 8-9 years  Nature of condition: Chronic (continuous duration > 3 months)  Primary cause of current episode: Unspecified  How did symptoms start: Mr. Rooney has been experiencing right shoulder pain and catching for 8-9 years. He can feel it with shoulder abduction, cleaning, and weight bearing through the right shoulder. He thinks sleeping on his arm contributes to his symptoms. He can't do yardwork, and it affects him at work when reaching for things.  Describe current symptoms: catching in right shoulder that affects ability to perform ADLs    Received previous therapy? No    Pain Assessment:  Pain location: R shoulder    Average Pain/symptom intensity (0-10 scale)  Last 24 hours: 1/10  Last week (1-7 days): 1/10  How often do you feel symptoms? Daily when provoked  Description: catching  Aggravating factors: shoulder abduction, laying on R shoulder, yardwork, weight bearing through R UE  Alleviating factors: avoiding provoking activities    Neuro screen: numbness in R arm when laying on his side or leaning on his shoulder    Social/Functional Hx:  How would you rate your overall health? good  Pt lives alone in a(n) 1 story house.  Current DME:  Patient called at 11:21. He would like a refill of his Tramadol. It can be sent to the [facility] in [place]. We can call him back at [phone]    Marielena MORATAYA (R)

## 2025-05-07 ENCOUNTER — MYC REFILL (OUTPATIENT)
Dept: FAMILY MEDICINE | Facility: CLINIC | Age: 60
End: 2025-05-07
Payer: COMMERCIAL

## 2025-05-07 DIAGNOSIS — M54.2 CERVICALGIA: ICD-10-CM

## 2025-05-07 DIAGNOSIS — I10 BENIGN ESSENTIAL HYPERTENSION: ICD-10-CM

## 2025-05-07 DIAGNOSIS — G89.4 CHRONIC PAIN SYNDROME: ICD-10-CM

## 2025-05-07 DIAGNOSIS — Z98.1 S/P CERVICAL SPINAL FUSION: ICD-10-CM

## 2025-05-07 DIAGNOSIS — F11.90 CHRONIC, CONTINUOUS USE OF OPIOIDS: ICD-10-CM

## 2025-05-07 DIAGNOSIS — M54.16 LUMBAR RADICULOPATHY: ICD-10-CM

## 2025-05-07 DIAGNOSIS — M79.18 MYOFASCIAL PAIN: ICD-10-CM

## 2025-05-07 RX ORDER — LOSARTAN POTASSIUM AND HYDROCHLOROTHIAZIDE 12.5; 5 MG/1; MG/1
2 TABLET ORAL DAILY
Qty: 180 TABLET | Refills: 1 | OUTPATIENT
Start: 2025-05-07

## 2025-05-07 NOTE — TELEPHONE ENCOUNTER
Patient asking for early fill on 5/10/25 as pharmacy is closed on Sunday 5/11/25.    Last script sent:   Disp Refills Start End MARIA EUGENIA   oxyCODONE (ROXICODONE) 5 MG tablet 150 tablet 0 4/9/2025 -- No   Sig - Route: Take 1-2 tablets (5-10 mg) by mouth every 6 hours as needed for severe pain. Must last 30 days - Oral       Clemencia Reyez RN on 5/7/2025 at 9:00 AM

## 2025-05-08 RX ORDER — OXYCODONE HYDROCHLORIDE 5 MG/1
5-10 TABLET ORAL EVERY 6 HOURS PRN
Qty: 150 TABLET | Refills: 0 | Status: SHIPPED | OUTPATIENT
Start: 2025-05-08

## 2025-07-04 DIAGNOSIS — G62.9 NEUROPATHY: ICD-10-CM

## 2025-07-05 ENCOUNTER — MYC REFILL (OUTPATIENT)
Dept: FAMILY MEDICINE | Facility: CLINIC | Age: 60
End: 2025-07-05
Payer: COMMERCIAL

## 2025-07-05 DIAGNOSIS — G89.4 CHRONIC PAIN SYNDROME: ICD-10-CM

## 2025-07-05 DIAGNOSIS — Z98.1 S/P CERVICAL SPINAL FUSION: ICD-10-CM

## 2025-07-05 DIAGNOSIS — M79.18 MYOFASCIAL PAIN: ICD-10-CM

## 2025-07-05 DIAGNOSIS — F11.90 CHRONIC, CONTINUOUS USE OF OPIOIDS: ICD-10-CM

## 2025-07-05 DIAGNOSIS — M54.2 CERVICALGIA: ICD-10-CM

## 2025-07-05 DIAGNOSIS — M54.16 LUMBAR RADICULOPATHY: ICD-10-CM

## 2025-07-08 ENCOUNTER — MYC MEDICAL ADVICE (OUTPATIENT)
Dept: FAMILY MEDICINE | Facility: CLINIC | Age: 60
End: 2025-07-08
Payer: COMMERCIAL

## 2025-07-08 RX ORDER — OXYCODONE HYDROCHLORIDE 5 MG/1
5-10 TABLET ORAL EVERY 6 HOURS PRN
Qty: 150 TABLET | Refills: 0 | Status: SHIPPED | OUTPATIENT
Start: 2025-07-08

## 2025-07-08 RX ORDER — PREGABALIN 150 MG/1
150 CAPSULE ORAL 2 TIMES DAILY
Qty: 60 CAPSULE | Refills: 5 | Status: SHIPPED | OUTPATIENT
Start: 2025-07-08

## 2025-07-08 NOTE — TELEPHONE ENCOUNTER
Included patient message in [employer] refill dated 07/05/25.     Sonny Rajput RN on 7/8/2025 at 12:16 PM

## 2025-07-08 NOTE — TELEPHONE ENCOUNTER
Patient sent in another message asking PCP to review. He will run out tomorrow.     Sonny Rajput RN on 7/8/2025 at 12:15 PM

## 2025-07-17 NOTE — ANESTHESIA CARE TRANSFER NOTE
Patient: Indra Mehta    Procedure(s):  lumbar 3-4 bilateral transforaminal epidural steroid injection    Diagnosis: Lumbar radiculopathy [M54.16]  Diagnosis Additional Information: No value filed.    Anesthesia Type:   MAC     Note:  Airway :Room Air  Patient transferred to:Phase II  Handoff Report: Identifed the Patient, Identified the Reponsible Provider, Reviewed the pertinent medical history, Discussed the surgical course, Reviewed Intra-OP anesthesia mangement and issues during anesthesia, Set expectations for post-procedure period and Allowed opportunity for questions and acknowledgement of understanding      Vitals: (Last set prior to Anesthesia Care Transfer)    CRNA VITALS  5/22/2020 0820 - 5/22/2020 0856      5/22/2020             SpO2:  97 %    Resp Rate (observed):  16                Electronically Signed By: YUMIOK Allison CRNA  May 22, 2020  8:56 AM  
Detail Level: Zone
Detail Level: Generalized

## 2025-07-23 DIAGNOSIS — M54.2 CERVICALGIA: ICD-10-CM

## 2025-07-23 DIAGNOSIS — M79.18 MYOFASCIAL PAIN: ICD-10-CM

## 2025-07-23 DIAGNOSIS — G62.9 NEUROPATHY: ICD-10-CM

## 2025-07-23 DIAGNOSIS — M54.50 CHRONIC BILATERAL LOW BACK PAIN WITHOUT SCIATICA: ICD-10-CM

## 2025-07-23 DIAGNOSIS — M54.16 LUMBAR RADICULOPATHY: ICD-10-CM

## 2025-07-23 DIAGNOSIS — G89.29 CHRONIC BILATERAL LOW BACK PAIN WITHOUT SCIATICA: ICD-10-CM

## 2025-07-23 DIAGNOSIS — G89.4 CHRONIC PAIN SYNDROME: ICD-10-CM

## 2025-07-23 DIAGNOSIS — Z98.1 S/P CERVICAL SPINAL FUSION: ICD-10-CM

## 2025-07-23 RX ORDER — NORTRIPTYLINE HYDROCHLORIDE 50 MG/1
50 CAPSULE ORAL AT BEDTIME
Qty: 90 CAPSULE | Refills: 1 | OUTPATIENT
Start: 2025-07-23

## 2025-08-05 ENCOUNTER — MYC REFILL (OUTPATIENT)
Dept: FAMILY MEDICINE | Facility: CLINIC | Age: 60
End: 2025-08-05
Payer: COMMERCIAL

## 2025-08-05 DIAGNOSIS — M54.2 CERVICALGIA: ICD-10-CM

## 2025-08-05 DIAGNOSIS — G89.4 CHRONIC PAIN SYNDROME: ICD-10-CM

## 2025-08-05 DIAGNOSIS — Z98.1 S/P CERVICAL SPINAL FUSION: ICD-10-CM

## 2025-08-05 DIAGNOSIS — M79.18 MYOFASCIAL PAIN: ICD-10-CM

## 2025-08-05 DIAGNOSIS — F11.90 CHRONIC, CONTINUOUS USE OF OPIOIDS: ICD-10-CM

## 2025-08-05 DIAGNOSIS — M54.16 LUMBAR RADICULOPATHY: ICD-10-CM

## 2025-08-07 RX ORDER — OXYCODONE HYDROCHLORIDE 5 MG/1
5-10 TABLET ORAL EVERY 6 HOURS PRN
Qty: 150 TABLET | Refills: 0 | Status: SHIPPED | OUTPATIENT
Start: 2025-08-07

## 2025-08-11 ENCOUNTER — MYC MEDICAL ADVICE (OUTPATIENT)
Dept: FAMILY MEDICINE | Facility: CLINIC | Age: 60
End: 2025-08-11
Payer: COMMERCIAL

## 2025-08-11 DIAGNOSIS — I10 BENIGN ESSENTIAL HYPERTENSION: ICD-10-CM

## 2025-08-11 RX ORDER — LOSARTAN POTASSIUM AND HYDROCHLOROTHIAZIDE 12.5; 5 MG/1; MG/1
2 TABLET ORAL DAILY
Qty: 180 TABLET | Refills: 1 | Status: SHIPPED | OUTPATIENT
Start: 2025-08-11

## (undated) DEVICE — GLOVE PROTEXIS W/NEU-THERA 7.5  2D73TE75

## (undated) DEVICE — SYR 05ML LL W/O NDL

## (undated) DEVICE — SYR 10ML LL W/O NDL

## (undated) DEVICE — NDL ECLIPSE 18GA 1.5"

## (undated) DEVICE — SOL ADH LIQUID BENZOIN SWAB 0.6ML C1544

## (undated) DEVICE — BONE WAX 2.5GM W31G

## (undated) DEVICE — ADH FLOSEAL W/HUMAN THROMBIN 5ML 1501825

## (undated) DEVICE — PREP CHLORAPREP 26ML TINTED ORANGE  260815

## (undated) DEVICE — GLOVE PROTEXIS W/NEU-THERA 8.5  2D73TE85

## (undated) DEVICE — NDL SPINAL 22GA 5" QUINCKE 405148

## (undated) DEVICE — PACK SET-UP STD 9102

## (undated) DEVICE — TRAY PROCEDURE SUPPORT PAIN MANAGEMENT 332114

## (undated) DEVICE — DRSG PRIMAPORE 03 1/8X6" 66000318

## (undated) DEVICE — GLOVE BIOGEL PI ULTRATOUCH G SZ 7.5 42175

## (undated) DEVICE — SYR 10ML PERFIX LL 332152

## (undated) DEVICE — SYR 10ML FINGER CONTROL W/O NDL 309695

## (undated) DEVICE — BLADE CLIPPER 4406

## (undated) DEVICE — SOL ISOPROPYL ALCOHOL USP 70% 16OZ  NDC10565-002-16 D0022

## (undated) DEVICE — GLOVE PROTEXIS BLUE W/NEU-THERA 8.5  2D73EB85

## (undated) DEVICE — GLOVE PROTEXIS BLUE W/NEU-THERA 8.0  2D73EB80

## (undated) DEVICE — KIT ENDO TURNOVER/PROCEDURE CARRY-ON 101822

## (undated) DEVICE — ESU CANNULA VENOM RF 18GA 10X150MM LF 0406-860-225

## (undated) DEVICE — NDL COUNTER 20CT 31142493

## (undated) DEVICE — SPONGE TONSIL W/STRING MED

## (undated) DEVICE — TUBING IV EXTENSION SET 34"

## (undated) DEVICE — TRAY EPDRL 3.5IN 17GA 19GA PRFX BPA DEHP 332079

## (undated) DEVICE — SYR 03ML LL W/O NDL

## (undated) DEVICE — DRAPE MAYO STAND 23X54 8337

## (undated) DEVICE — Device

## (undated) DEVICE — DRAPE C-ARM

## (undated) DEVICE — TUBING SUCTION 12"X1/4" N612

## (undated) DEVICE — DRSG GAUZE 4X4" TRAY

## (undated) DEVICE — ADH LIQUID MASTISOL TOPICAL VIAL 2-3ML 0523-48

## (undated) DEVICE — SU MONOCRYL 4-0 PS-2 18" UND Y496G

## (undated) DEVICE — BLADE KNIFE SURG 10 371110

## (undated) DEVICE — DRSG PRIMAPORE 02X3" 7133

## (undated) DEVICE — DRAPE LAP W/ARMBOARD 29410

## (undated) DEVICE — NDL SPINAL 22GA 3.5" QUINCKE 405181

## (undated) DEVICE — SOL WATER IRRIG 1000ML BOTTLE 07139-09

## (undated) DEVICE — PACK MINOR PROCEDURE CUSTOM

## (undated) DEVICE — TUBING EXTENSION SET MICROBORE 21CM LL 6N8374

## (undated) DEVICE — LUBRICATING JELLY 4.25OZ

## (undated) DEVICE — GLOVE EXAM NITRILE LG

## (undated) DEVICE — NDL SPINAL 25GA 4.69" QUINCKE 405234

## (undated) DEVICE — DRAPE MICRO ZEISS OPMI 137X381CM 306070-0000-000

## (undated) DEVICE — BLADE KNIFE SURG 11 371111

## (undated) DEVICE — ESU GROUND PAD UNIVERSAL W/O CORD

## (undated) DEVICE — SYR BULB IRRIG DOVER 60 ML LATEX FREE 67000

## (undated) DEVICE — STOCKING SLEEVE COMPRESSION CALF MED

## (undated) DEVICE — SPONGE KITTNER 31001010

## (undated) DEVICE — NDL SPINAL 18GA 3.5" 405184

## (undated) DEVICE — DRAPE POUCH INSTRUMENT 1018

## (undated) DEVICE — BASIN SET MINOR DISP

## (undated) DEVICE — DRAPE SHEET REV FOLD 3/4 9349

## (undated) DEVICE — SU VICRYL 3-0 SH 27" UND J416H

## (undated) DEVICE — TRAY SINGLE DOSE EPIDURAL ANESTHESIA

## (undated) DEVICE — DRSG PRIMAPORE 04X8"

## (undated) DEVICE — SU PDS II 0 CT-1 36" Z346H

## (undated) DEVICE — SPONGE LAP 18X18" 1515

## (undated) DEVICE — BUR MATCHSTICK 3MM ANSPACH L-8NS-G1

## (undated) DEVICE — NDL EPIDURAL TUOHY 20GA 3.5" LF DISP 183A12

## (undated) DEVICE — DRSG STERI STRIP 1/2X4" R1547

## (undated) DEVICE — DRAPE STERI TOWEL SM 1000

## (undated) DEVICE — SPINOCAN SPINAL NEEDLE

## (undated) DEVICE — ESU ELEC BLADE 2.75" COATED/INSULATED E1455

## (undated) DEVICE — GELFOAM 7X12MM

## (undated) DEVICE — GOWN XXL 9575

## (undated) DEVICE — NDL ECLIPSE 22GA 1.5"

## (undated) DEVICE — PEN MARKING SKIN

## (undated) RX ORDER — LIDOCAINE HYDROCHLORIDE 20 MG/ML
INJECTION, SOLUTION EPIDURAL; INFILTRATION; INTRACAUDAL; PERINEURAL
Status: DISPENSED
Start: 2022-08-18

## (undated) RX ORDER — LIDOCAINE HYDROCHLORIDE 10 MG/ML
INJECTION, SOLUTION EPIDURAL; INFILTRATION; INTRACAUDAL; PERINEURAL
Status: DISPENSED
Start: 2023-03-09

## (undated) RX ORDER — LIDOCAINE HYDROCHLORIDE 20 MG/ML
INJECTION, SOLUTION EPIDURAL; INFILTRATION; INTRACAUDAL; PERINEURAL
Status: DISPENSED
Start: 2021-10-01

## (undated) RX ORDER — LIDOCAINE HYDROCHLORIDE 20 MG/ML
INJECTION, SOLUTION EPIDURAL; INFILTRATION; INTRACAUDAL; PERINEURAL
Status: DISPENSED
Start: 2017-08-11

## (undated) RX ORDER — DEXAMETHASONE SODIUM PHOSPHATE 10 MG/ML
INJECTION INTRAMUSCULAR; INTRAVENOUS
Status: DISPENSED
Start: 2018-12-19

## (undated) RX ORDER — BUPIVACAINE HYDROCHLORIDE 2.5 MG/ML
INJECTION, SOLUTION EPIDURAL; INFILTRATION; INTRACAUDAL
Status: DISPENSED
Start: 2024-12-05

## (undated) RX ORDER — ONDANSETRON 2 MG/ML
INJECTION INTRAMUSCULAR; INTRAVENOUS
Status: DISPENSED
Start: 2017-08-11

## (undated) RX ORDER — LIDOCAINE HYDROCHLORIDE 10 MG/ML
INJECTION, SOLUTION EPIDURAL; INFILTRATION; INTRACAUDAL; PERINEURAL
Status: DISPENSED
Start: 2020-09-24

## (undated) RX ORDER — METHYLPREDNISOLONE ACETATE 40 MG/ML
INJECTION, SUSPENSION INTRA-ARTICULAR; INTRALESIONAL; INTRAMUSCULAR; SOFT TISSUE
Status: DISPENSED
Start: 2024-12-05

## (undated) RX ORDER — LIDOCAINE HYDROCHLORIDE 10 MG/ML
INJECTION, SOLUTION EPIDURAL; INFILTRATION; INTRACAUDAL; PERINEURAL
Status: DISPENSED
Start: 2024-12-05

## (undated) RX ORDER — FLUMAZENIL 0.1 MG/ML
INJECTION, SOLUTION INTRAVENOUS
Status: DISPENSED
Start: 2022-08-18

## (undated) RX ORDER — KETOROLAC TROMETHAMINE 30 MG/ML
INJECTION, SOLUTION INTRAMUSCULAR; INTRAVENOUS
Status: DISPENSED
Start: 2017-08-11

## (undated) RX ORDER — PROPOFOL 10 MG/ML
INJECTION, EMULSION INTRAVENOUS
Status: DISPENSED
Start: 2018-12-19

## (undated) RX ORDER — LIDOCAINE HYDROCHLORIDE 20 MG/ML
INJECTION, SOLUTION EPIDURAL; INFILTRATION; INTRACAUDAL; PERINEURAL
Status: DISPENSED
Start: 2017-11-09

## (undated) RX ORDER — PROPOFOL 10 MG/ML
INJECTION, EMULSION INTRAVENOUS
Status: DISPENSED
Start: 2017-07-05

## (undated) RX ORDER — CEFAZOLIN SODIUM 1 G/3ML
INJECTION, POWDER, FOR SOLUTION INTRAMUSCULAR; INTRAVENOUS
Status: DISPENSED
Start: 2018-12-19

## (undated) RX ORDER — NEOSTIGMINE METHYLSULFATE 5 MG/5 ML
SYRINGE (ML) INTRAVENOUS
Status: DISPENSED
Start: 2017-07-05

## (undated) RX ORDER — LIDOCAINE HYDROCHLORIDE 20 MG/ML
INJECTION, SOLUTION EPIDURAL; INFILTRATION; INTRACAUDAL; PERINEURAL
Status: DISPENSED
Start: 2018-12-19

## (undated) RX ORDER — FENTANYL CITRATE 50 UG/ML
INJECTION, SOLUTION INTRAMUSCULAR; INTRAVENOUS
Status: DISPENSED
Start: 2022-08-18

## (undated) RX ORDER — LIDOCAINE HYDROCHLORIDE 20 MG/ML
INJECTION, SOLUTION EPIDURAL; INFILTRATION; INTRACAUDAL; PERINEURAL
Status: DISPENSED
Start: 2017-12-28

## (undated) RX ORDER — PROPOFOL 10 MG/ML
INJECTION, EMULSION INTRAVENOUS
Status: DISPENSED
Start: 2023-03-09

## (undated) RX ORDER — FENTANYL CITRATE 50 UG/ML
INJECTION, SOLUTION INTRAMUSCULAR; INTRAVENOUS
Status: DISPENSED
Start: 2024-12-05

## (undated) RX ORDER — LIDOCAINE HYDROCHLORIDE 20 MG/ML
INJECTION, SOLUTION EPIDURAL; INFILTRATION; INTRACAUDAL; PERINEURAL
Status: DISPENSED
Start: 2017-07-05

## (undated) RX ORDER — PHENYLEPHRINE HCL IN 0.9% NACL 1 MG/10 ML
SYRINGE (ML) INTRAVENOUS
Status: DISPENSED
Start: 2018-12-19

## (undated) RX ORDER — PROPOFOL 10 MG/ML
INJECTION, EMULSION INTRAVENOUS
Status: DISPENSED
Start: 2017-12-28

## (undated) RX ORDER — FENTANYL CITRATE 50 UG/ML
INJECTION, SOLUTION INTRAMUSCULAR; INTRAVENOUS
Status: DISPENSED
Start: 2023-08-18

## (undated) RX ORDER — TRIAMCINOLONE ACETONIDE 40 MG/ML
INJECTION, SUSPENSION INTRA-ARTICULAR; INTRAMUSCULAR
Status: DISPENSED
Start: 2024-12-05

## (undated) RX ORDER — FENTANYL CITRATE 50 UG/ML
INJECTION, SOLUTION INTRAMUSCULAR; INTRAVENOUS
Status: DISPENSED
Start: 2017-08-11

## (undated) RX ORDER — FENTANYL CITRATE 50 UG/ML
INJECTION, SOLUTION INTRAMUSCULAR; INTRAVENOUS
Status: DISPENSED
Start: 2017-07-05

## (undated) RX ORDER — LIDOCAINE HYDROCHLORIDE 10 MG/ML
INJECTION, SOLUTION EPIDURAL; INFILTRATION; INTRACAUDAL; PERINEURAL
Status: DISPENSED
Start: 2023-08-18

## (undated) RX ORDER — PROPOFOL 10 MG/ML
INJECTION, EMULSION INTRAVENOUS
Status: DISPENSED
Start: 2023-08-18

## (undated) RX ORDER — IOPAMIDOL 408 MG/ML
INJECTION, SOLUTION INTRATHECAL
Status: DISPENSED
Start: 2024-12-05

## (undated) RX ORDER — DEXAMETHASONE SODIUM PHOSPHATE 10 MG/ML
INJECTION INTRAMUSCULAR; INTRAVENOUS
Status: DISPENSED
Start: 2017-08-11

## (undated) RX ORDER — FENTANYL CITRATE 50 UG/ML
INJECTION, SOLUTION INTRAMUSCULAR; INTRAVENOUS
Status: DISPENSED
Start: 2018-12-19

## (undated) RX ORDER — DEXAMETHASONE SODIUM PHOSPHATE 10 MG/ML
INJECTION, SOLUTION INTRAMUSCULAR; INTRAVENOUS
Status: DISPENSED
Start: 2024-12-05

## (undated) RX ORDER — PROPOFOL 10 MG/ML
INJECTION, EMULSION INTRAVENOUS
Status: DISPENSED
Start: 2017-08-11

## (undated) RX ORDER — LIDOCAINE HYDROCHLORIDE 10 MG/ML
INJECTION, SOLUTION EPIDURAL; INFILTRATION; INTRACAUDAL; PERINEURAL
Status: DISPENSED
Start: 2022-08-18

## (undated) RX ORDER — GLYCOPYRROLATE 0.2 MG/ML
INJECTION INTRAMUSCULAR; INTRAVENOUS
Status: DISPENSED
Start: 2017-07-05

## (undated) RX ORDER — DEXAMETHASONE SODIUM PHOSPHATE 10 MG/ML
INJECTION INTRAMUSCULAR; INTRAVENOUS
Status: DISPENSED
Start: 2017-07-05

## (undated) RX ORDER — PROPOFOL 10 MG/ML
INJECTION, EMULSION INTRAVENOUS
Status: DISPENSED
Start: 2021-10-01

## (undated) RX ORDER — HYDROMORPHONE HYDROCHLORIDE 1 MG/ML
INJECTION, SOLUTION INTRAMUSCULAR; INTRAVENOUS; SUBCUTANEOUS
Status: DISPENSED
Start: 2018-12-19

## (undated) RX ORDER — METHYLPREDNISOLONE ACETATE 40 MG/ML
INJECTION, SUSPENSION INTRA-ARTICULAR; INTRALESIONAL; INTRAMUSCULAR; SOFT TISSUE
Status: DISPENSED
Start: 2018-12-19

## (undated) RX ORDER — ONDANSETRON 2 MG/ML
INJECTION INTRAMUSCULAR; INTRAVENOUS
Status: DISPENSED
Start: 2017-07-05

## (undated) RX ORDER — CEFAZOLIN SODIUM 1 G/3ML
INJECTION, POWDER, FOR SOLUTION INTRAMUSCULAR; INTRAVENOUS
Status: DISPENSED
Start: 2017-07-05

## (undated) RX ORDER — NALOXONE HYDROCHLORIDE 0.4 MG/ML
INJECTION, SOLUTION INTRAMUSCULAR; INTRAVENOUS; SUBCUTANEOUS
Status: DISPENSED
Start: 2022-08-18

## (undated) RX ORDER — LIDOCAINE HYDROCHLORIDE AND EPINEPHRINE 10; 10 MG/ML; UG/ML
INJECTION, SOLUTION INFILTRATION; PERINEURAL
Status: DISPENSED
Start: 2018-12-19